# Patient Record
Sex: MALE | Race: BLACK OR AFRICAN AMERICAN | NOT HISPANIC OR LATINO | Employment: OTHER | URBAN - METROPOLITAN AREA
[De-identification: names, ages, dates, MRNs, and addresses within clinical notes are randomized per-mention and may not be internally consistent; named-entity substitution may affect disease eponyms.]

---

## 2017-01-11 ENCOUNTER — OFFICE VISIT (OUTPATIENT)
Dept: INTERNAL MEDICINE | Facility: CLINIC | Age: 67
End: 2017-01-11
Payer: MEDICARE

## 2017-01-11 VITALS
OXYGEN SATURATION: 98 % | RESPIRATION RATE: 20 BRPM | BODY MASS INDEX: 40.49 KG/M2 | HEIGHT: 67 IN | DIASTOLIC BLOOD PRESSURE: 76 MMHG | HEART RATE: 58 BPM | WEIGHT: 258 LBS | SYSTOLIC BLOOD PRESSURE: 126 MMHG

## 2017-01-11 DIAGNOSIS — E11.42 DIABETIC POLYNEUROPATHY ASSOCIATED WITH TYPE 2 DIABETES MELLITUS: ICD-10-CM

## 2017-01-11 DIAGNOSIS — I25.118 CORONARY ARTERY DISEASE OF NATIVE ARTERY OF NATIVE HEART WITH STABLE ANGINA PECTORIS: Primary | ICD-10-CM

## 2017-01-11 DIAGNOSIS — K21.9 GASTROESOPHAGEAL REFLUX DISEASE, ESOPHAGITIS PRESENCE NOT SPECIFIED: ICD-10-CM

## 2017-01-11 DIAGNOSIS — E11.69 DIABETES MELLITUS TYPE 2 IN OBESE: ICD-10-CM

## 2017-01-11 DIAGNOSIS — E78.5 HYPERLIPIDEMIA, UNSPECIFIED HYPERLIPIDEMIA TYPE: ICD-10-CM

## 2017-01-11 DIAGNOSIS — E11.9 ENCOUNTER FOR COMPREHENSIVE DIABETIC FOOT EXAMINATION, TYPE 2 DIABETES MELLITUS: ICD-10-CM

## 2017-01-11 DIAGNOSIS — E66.9 DIABETES MELLITUS TYPE 2 IN OBESE: ICD-10-CM

## 2017-01-11 DIAGNOSIS — Z12.11 SCREEN FOR COLON CANCER: ICD-10-CM

## 2017-01-11 DIAGNOSIS — Z95.1 S/P CABG X 5: ICD-10-CM

## 2017-01-11 DIAGNOSIS — Z87.891 HISTORY OF TOBACCO USE: ICD-10-CM

## 2017-01-11 PROCEDURE — 99203 OFFICE O/P NEW LOW 30 MIN: CPT | Mod: S$GLB,,, | Performed by: INTERNAL MEDICINE

## 2017-01-11 PROCEDURE — 3046F HEMOGLOBIN A1C LEVEL >9.0%: CPT | Mod: S$GLB,,, | Performed by: INTERNAL MEDICINE

## 2017-01-11 PROCEDURE — 2022F DILAT RTA XM EVC RTNOPTHY: CPT | Mod: S$GLB,,, | Performed by: INTERNAL MEDICINE

## 2017-01-11 PROCEDURE — 1160F RVW MEDS BY RX/DR IN RCRD: CPT | Mod: S$GLB,,, | Performed by: INTERNAL MEDICINE

## 2017-01-11 PROCEDURE — 3060F POS MICROALBUMINURIA REV: CPT | Mod: S$GLB,,, | Performed by: INTERNAL MEDICINE

## 2017-01-11 PROCEDURE — 99999 PR PBB SHADOW E&M-NEW PATIENT-LVL III: CPT | Mod: PBBFAC,,, | Performed by: INTERNAL MEDICINE

## 2017-01-11 PROCEDURE — 1126F AMNT PAIN NOTED NONE PRSNT: CPT | Mod: S$GLB,,, | Performed by: INTERNAL MEDICINE

## 2017-01-11 PROCEDURE — 1159F MED LIST DOCD IN RCRD: CPT | Mod: S$GLB,,, | Performed by: INTERNAL MEDICINE

## 2017-01-11 PROCEDURE — 99499 UNLISTED E&M SERVICE: CPT | Mod: S$PBB,,, | Performed by: INTERNAL MEDICINE

## 2017-01-11 PROCEDURE — 1157F ADVNC CARE PLAN IN RCRD: CPT | Mod: S$GLB,,, | Performed by: INTERNAL MEDICINE

## 2017-01-11 RX ORDER — ASPIRIN 81 MG/1
81 TABLET ORAL DAILY
COMMUNITY
End: 2018-09-26

## 2017-01-11 RX ORDER — ISOSORBIDE MONONITRATE 30 MG/1
30 TABLET, EXTENDED RELEASE ORAL DAILY
COMMUNITY
End: 2017-01-18 | Stop reason: DRUGHIGH

## 2017-01-11 RX ORDER — IBUPROFEN 200 MG
200-400 TABLET ORAL EVERY 6 HOURS PRN
COMMUNITY
End: 2017-01-18

## 2017-01-11 RX ORDER — METOPROLOL TARTRATE 50 MG/1
50 TABLET ORAL 2 TIMES DAILY
COMMUNITY
Start: 2017-01-10 | End: 2017-04-10 | Stop reason: SDUPTHER

## 2017-01-11 RX ORDER — ROSUVASTATIN CALCIUM 20 MG/1
20 TABLET, COATED ORAL DAILY
COMMUNITY
Start: 2017-01-10 | End: 2017-06-26 | Stop reason: SDUPTHER

## 2017-01-11 RX ORDER — FUROSEMIDE 40 MG/1
40 TABLET ORAL DAILY
COMMUNITY
Start: 2017-01-10 | End: 2017-06-26 | Stop reason: SDUPTHER

## 2017-01-11 RX ORDER — LANOLIN ALCOHOL/MO/W.PET/CERES
1000 CREAM (GRAM) TOPICAL DAILY
COMMUNITY
End: 2018-10-15

## 2017-01-11 RX ORDER — NITROGLYCERIN 0.4 MG/1
0.4 TABLET SUBLINGUAL EVERY 5 MIN PRN
COMMUNITY
End: 2017-07-19 | Stop reason: SDUPTHER

## 2017-01-11 RX ORDER — DIPHENHYDRAMINE HCL 25 MG
25 CAPSULE ORAL EVERY 6 HOURS PRN
COMMUNITY
End: 2017-07-12

## 2017-01-11 RX ORDER — SITAGLIPTIN AND METFORMIN HYDROCHLORIDE 1000; 50 MG/1; MG/1
TABLET, FILM COATED ORAL 2 TIMES DAILY
COMMUNITY
Start: 2017-01-04 | End: 2017-04-10 | Stop reason: SDUPTHER

## 2017-01-11 NOTE — PROGRESS NOTES
Subjective:       Patient ID: Walter Bull is a 66 y.o. male.    Chief Complaint: Establish Care      HPI:  Patient is new to clinic and presents to establish care. Patient previously followed by Dr. Polk in [place]. Moves to the area and need to establish.     He has CAD, DM type 2, HLD.    CAD s/p 5v CABG: he is going to establish with Dr. Vitale as well. Does still have occasional chest pains which is relieved by SL NTG---takes a NTG up to twice a day. Last stress test 1 year ago which was abnormal per patient but I don't have records for me to review.  Also reports h/o CHF, on lasix 40mg once a day. Denies SOB, [place] and orthopnea.   He takes ASA, BB. On Crestor. Unsure why not on ACEi/ARB as it sounds like he has systolic heart failure.    Dm type 2: NPH 80 units AM and 30 units bedtime and Janumet. He does not check blood sugars at home. Need A1C. He does report numbness and tingling of left foot > right foot and b/l fingers; sx have been present for several years. Getting worse. Not on medicine for neuropathy. Denies dizziness, syncope. Denies polyuria, polydipsia  Foot exam: due  Eye exam: last exam 3 months ago, needs to establish here  Microalb: ordered    HLD: on crestor, has been taking for several years. Denies side effects    GERD: Uses Zantac PRN. Works well, denies abd pain, n/v/d/c.    Tobacco use: quit 1981; previously smoked 3 PPD for 20 years  EtOH: stopped drink 1982; was a daily drink 2 fifth liquor daily  Illicit drug: denies    FH  Mother with DMa nd CAD  No FH of cancers  Past Medical History   Diagnosis Date    Diabetes mellitus type I     Disorder of kidney and ureter     Heart disease        Family History   Problem Relation Age of Onset    Diabetes Mother     Heart disease Mother        Social History     Social History    Marital status:      Spouse name: N/A    Number of children: N/A    Years of education: N/A     Occupational History    Not on file.     Social  History Main Topics    Smoking status: Former Smoker     Quit date: 1/1/1981    Smokeless tobacco: Never Used    Alcohol use No    Drug use: No    Sexual activity: Not on file     Other Topics Concern    Not on file     Social History Narrative    No narrative on file       Review of Systems   Constitutional: Negative for activity change, fatigue, fever and unexpected weight change.   HENT: Negative for congestion, ear pain, hearing loss, rhinorrhea and sore throat.    Eyes: Negative for pain, redness and visual disturbance.   Respiratory: Negative for cough, shortness of breath and wheezing.    Cardiovascular: Negative for chest pain, palpitations and leg swelling.   Gastrointestinal: Negative for abdominal pain, blood in stool, constipation, diarrhea, nausea and vomiting.   Genitourinary: Negative for decreased urine volume, dysuria, frequency and urgency.   Musculoskeletal: Negative for back pain, joint swelling and neck pain.   Skin: Negative for color change, rash and wound.   Neurological: Positive for numbness. Negative for dizziness, tremors, weakness, light-headedness and headaches.         Objective:      Physical Exam   Constitutional: He is oriented to person, place, and time. He appears well-developed and well-nourished. No distress.   HENT:   Head: Normocephalic and atraumatic.   Right Ear: External ear normal.   Left Ear: External ear normal.   Eyes: Conjunctivae and EOM are normal. Pupils are equal, round, and reactive to light. Right eye exhibits no discharge. Left eye exhibits no discharge.   Neck: Neck supple. No tracheal deviation present.   Cardiovascular: Normal rate and regular rhythm.  Exam reveals no gallop and no friction rub.    No murmur heard.  Pulses:       Dorsalis pedis pulses are 2+ on the right side, and 2+ on the left side.        Posterior tibial pulses are 2+ on the right side, and 2+ on the left side.   Pulmonary/Chest: Effort normal and breath sounds normal. No  respiratory distress. He has no wheezes. He has no rales.   Abdominal: Soft. Bowel sounds are normal. He exhibits no distension. There is no tenderness. There is no rebound and no guarding.   Musculoskeletal: He exhibits no edema or deformity.   Feet:   Right Foot:   Skin Integrity: Positive for dry skin. Negative for ulcer or blister.   Left Foot:   Skin Integrity: Positive for dry skin. Negative for ulcer or blister.   Lymphadenopathy:     He has no cervical adenopathy.   Neurological: He is alert and oriented to person, place, and time. No cranial nerve deficit.   senstaion intact at all points on b/l feet with monofilament exam     Skin: Skin is warm and dry.   Psychiatric: He has a normal mood and affect. His behavior is normal.   Vitals reviewed.      Assessment:       1. Coronary artery disease of native artery of native heart with stable angina pectoris    2. Diabetes mellitus type 2 in obese    3. Diabetic polyneuropathy associated with type 2 diabetes mellitus    4. Hyperlipidemia, unspecified hyperlipidemia type    5. S/P CABG x 5    6. History of tobacco use    7. Gastroesophageal reflux disease, esophagitis presence not specified    8. Encounter for comprehensive diabetic foot examination, type 2 diabetes mellitus    9. Screen for colon cancer        Plan:       Walter was seen today for establish care.    Diagnoses and all orders for this visit:    Coronary artery disease of native artery of native heart with stable angina pectoris  -     CBC auto differential; Future  -     Comprehensive metabolic panel; Future  -     TSH; Future  -     Lipid panel; Future  -     T4, free; Future  -     CBC auto differential; Future  -     Comprehensive metabolic panel; Future  -     TSH; Future  -     Lipid panel; Future  Chronic  Still with angina relieved with SL NTG  Continue imdur, BB, ASA, statin at same dose  Unsure why not on ACEi/ARB, will need to get records  Establishing with Dr. Vitale  ??? Systolic CHF, need  TTE records to confirm    Diabetes mellitus type 2 in obese  -     CBC auto differential; Future  -     Comprehensive metabolic panel; Future  -     Lipid panel; Future  -     Hemoglobin A1c; Future  -     Microalbumin/creatinine urine ratio; Future  -     CBC auto differential; Future  -     Comprehensive metabolic panel; Future  -     TSH; Future  -     Lipid panel; Future  -     Hemoglobin A1c; Future  Chronic  Check labs to assist with how controlled he is  Continue NPH and janumet at same dose  Foot exam: 1/2017  Eye exam: last exam 3 months ago, needs to establish here  Microalb: ordered  On statin  Unsure why not on ACEi/ARB, will need to get records    Diabetic polyneuropathy associated with type 2 diabetes mellitus  -     CBC auto differential; Future  -     Comprehensive metabolic panel; Future  -     Lipid panel; Future  -     Hemoglobin A1c; Future  -     Microalbumin/creatinine urine ratio; Future  -     CBC auto differential; Future  -     Comprehensive metabolic panel; Future  -     TSH; Future  -     Lipid panel; Future  -     Hemoglobin A1c; Future  Normal foot exam  Declines medical intervention for numbness at this time    Hyperlipidemia, unspecified hyperlipidemia type  -     CBC auto differential; Future  -     Comprehensive metabolic panel; Future  -     Lipid panel; Future  -     CBC auto differential; Future  -     Comprehensive metabolic panel; Future  -     Lipid panel; Future  Check labs  Continue crestor at same dose  Chronic    S/P CABG x 5  -     CBC auto differential; Future  -     Comprehensive metabolic panel; Future  -     TSH; Future  -     Lipid panel; Future  -     T4, free; Future  -     CBC auto differential; Future  -     Comprehensive metabolic panel; Future  -     Lipid panel; Future  Establishing with cardiology  Continue ASA, BB, statin    History of tobacco use  -     US Abdominal Aorta; Future  Now quit  Needs AAA screening    Gastroesophageal reflux disease, esophagitis  presence not specified  Well controlled wit Zantac PRN    Encounter for comprehensive diabetic foot examination, type 2 diabetes mellitus  Normal foot exam today  + dry skin otherwise normal sensation and pulse b/l    Screen for colon cancer  -     Case request GI: COLONOSCOPY      Health Maintenance:  -CRC: due for repeat, ordered today  -PSA: not screening  -AAA ordered today  -tobacco: former smoker  -Vaccines  Flu: did 2016  Pneumonia: received both prevnar and pneumovx  Zoster: needs to get    RTC 6 months with labs and PRN. Will call with labs from this week

## 2017-01-11 NOTE — MR AVS SNAPSHOT
[facility]  [address] [place] [address]  Phone: [phone]  Fax: [phone]                  Walter Bull   2017 9:00 AM   Office Visit    Description:  Male : 1950   Provider:  Belkys Kruger MD   Department:  [facility]           Reason for Visit     Establish Care           Diagnoses this Visit        Comments    Coronary artery disease of native artery of native heart with stable angina pectoris    -  Primary     Diabetes mellitus type 2 in obese         Diabetic polyneuropathy associated with type 2 diabetes mellitus         Hyperlipidemia, unspecified hyperlipidemia type         S/P CABG x 5         History of tobacco use         Gastroesophageal reflux disease, esophagitis presence not specified         Encounter for comprehensive diabetic foot examination, type 2 diabetes mellitus         Screen for colon cancer                To Do List           Goals (5 Years of Data)     None      Follow-Up and Disposition     Return in about 6 months (around 2017).      [facility]     [facility] -  Assistance  Registered nurses in the [facility] provide clinical advisement, health education, appointment booking, and other advisory services.  Call for this free service at [phone].             Medications           Message regarding Medications     Verify the changes and/or additions to your medication regime listed below are the same as discussed with your clinician today.  If any of these changes or additions are incorrect, please notify your healthcare provider.             Verify that the below list of medications is an accurate representation of the medications you are currently taking.  If none reported, the list may be blank. If incorrect, please contact your healthcare provider. Carry this list with you in case of emergency.           Current Medications     aspirin (ECOTRIN) 81 MG EC  "tablet Take 81 mg by mouth once daily.    cyanocobalamin (VITAMIN B-12) 1000 MCG tablet Take 3,000 mcg by mouth once daily.    diphenhydrAMINE (BENADRYL) 25 mg capsule Take 25 mg by mouth every 6 (six) hours as needed for Itching.    FOLIC ACID/MULTIVIT-MIN/LUTEIN (CENTRUM SILVER ORAL) Take by mouth.    furosemide (LASIX) 40 MG tablet 40 mg once daily.     ibuprofen (ADVIL,MOTRIN) 200 MG tablet Take 200 mg by mouth every 6 (six) hours as needed for Pain.    insulin NPH (NOVOLIN N) 100 unit/mL injection Inject 80 Units into the skin 2 (two) times daily before meals. 80 units in the morning and 30 units at bedtime    isosorbide mononitrate (IMDUR) 30 MG 24 hr tablet Take 30 mg by mouth once daily.    JANUMET 50-1,000 mg per tablet 2 (two) times daily.     metoprolol tartrate (LOPRESSOR) 50 MG tablet 50 mg 2 (two) times daily.     nitroGLYCERIN (NITROSTAT) 0.4 MG SL tablet Place 0.4 mg under the tongue every 5 (five) minutes as needed for Chest pain.    ranitidine (ZANTAC) 150 MG capsule Take 150 mg by mouth as needed for Heartburn.    rosuvastatin (CRESTOR) 20 MG tablet 20 mg once daily.            Clinical Reference Information           Vital Signs - Last Recorded  Most recent update: 1/11/2017  9:15 AM by Ailyn Hope MA    BP Pulse Resp Ht Wt SpO2    126/76 (!) 58 20 5' 7" (1.702 m) 117 kg (258 lb) 98%    BMI                40.41 kg/m2          Blood Pressure          Most Recent Value    BP  126/76      Allergies as of 1/11/2017     No Known Allergies      Immunizations Administered on Date of Encounter - 1/11/2017     None      Orders Placed During Today's Visit      Normal Orders This Visit    Case request GI: COLONOSCOPY     Future Labs/Procedures Expected by Expires    CBC auto differential  1/11/2017 (Approximate) 2/10/2017    Comprehensive metabolic panel  1/11/2017 (Approximate) 2/10/2017    Hemoglobin A1c  1/11/2017 3/12/2018    Lipid panel  1/11/2017 (Approximate) 2/10/2017    " Microalbumin/creatinine urine ratio  1/11/2017 (Approximate) 2/10/2017    T4, free  1/11/2017 (Approximate) 2/10/2017    TSH  1/11/2017 (Approximate) 2/10/2017    US Abdominal Aorta  1/11/2017 1/11/2018    CBC auto differential  7/10/2017 (Approximate) 1/11/2018    Comprehensive metabolic panel  7/10/2017 (Approximate) 1/11/2018    Hemoglobin A1c  7/10/2017 (Approximate) 1/11/2018    Lipid panel  7/10/2017 (Approximate) 1/11/2018    TSH  7/10/2017 (Approximate) 1/11/2018      Instructions      Using a Blood Sugar Log  You have diabetes. This means your body has trouble regulating a sugar called glucose. To help manage your diabetes, youll need to check your blood sugar level as directed by your healthcare provider. Keeping a log of your blood sugar levels will help you track your blood sugar readings. Its a simple and easy way to see how well you are controlling your diabetes.    Checking your blood sugar level  You can check your blood sugar level with a blood glucose meter. Youll first prick the side of your finger with a tiny lancet to draw a tiny drop of blood onto the test strip. Some glucose meters let you use another place on your body to test. But these other places should not be used in some cases as they may be inaccurate. Follow the instructions for your glucose meter. And talk with your healthcare provider before doing the test on other places.  The strip goes into the meter first, then a drop of blood is placed on the tip of the strip. The meter then shows a reading that tells you the level of your blood sugar. Your readings should be in your target range as often as possible. This means not too high or too low. Staying in this range helps lower your risk for complications. Your healthcare provider will help you figure out the target range that is best for you.  Tracking your readings  Every time you check your blood sugar, use your log to keep track of your readings. Your meter will also probably  have a memory feature that your healthcare provider can check at your next visit. You may be advised by your healthcare provider to check your blood sugar in the morning, at bedtime, and before and after meals. Be sure to write down all of your numbers. Also use your log to record things that might have affected your blood sugar. Some examples include being sick, certain medicines, being physically active, feeling stressed, or skipping meals.   Lessons learned from your readings  Tracking your blood sugar readings helps you see patterns. These patterns tell you how your actions affect your blood sugar. For instance, you may have higher numbers after eating certain foods or lower numbers after exercise. They just help you understand how to stay in your target range more often, so that your diabetes remains in good control.  Sharing your log with your healthcare team  Bring your blood sugar log and glucose meter with you to all of your healthcare appointments. This can help your healthcare team make changes to your treatment plan, if needed. This may involve making changes in what you eat, what medicines you take, or how much you exercise.  To learn more  The resources below can help you learn more:  · American Diabetes Association 672-353-0396 www.diabetes.org  · [employer] 836-298-7282 www.lighthouse.org  · National Eye [place] 862-065-4237 www.nei.nih.gov  · [facility] 968-690-1336 www.hormone.org  © 5570-1699 [employer]. [address] [place] [address], [place] [address]. All rights reserved. This information is not intended as a substitute for professional medical care. Always follow your healthcare professional's instructions.

## 2017-01-11 NOTE — PATIENT INSTRUCTIONS
Using a Blood Sugar Log  You have diabetes. This means your body has trouble regulating a sugar called glucose. To help manage your diabetes, youll need to check your blood sugar level as directed by your healthcare provider. Keeping a log of your blood sugar levels will help you track your blood sugar readings. Its a simple and easy way to see how well you are controlling your diabetes.    Checking your blood sugar level  You can check your blood sugar level with a blood glucose meter. Youll first prick the side of your finger with a tiny lancet to draw a tiny drop of blood onto the test strip. Some glucose meters let you use another place on your body to test. But these other places should not be used in some cases as they may be inaccurate. Follow the instructions for your glucose meter. And talk with your healthcare provider before doing the test on other places.  The strip goes into the meter first, then a drop of blood is placed on the tip of the strip. The meter then shows a reading that tells you the level of your blood sugar. Your readings should be in your target range as often as possible. This means not too high or too low. Staying in this range helps lower your risk for complications. Your healthcare provider will help you figure out the target range that is best for you.  Tracking your readings  Every time you check your blood sugar, use your log to keep track of your readings. Your meter will also probably have a memory feature that your healthcare provider can check at your next visit. You may be advised by your healthcare provider to check your blood sugar in the morning, at bedtime, and before and after meals. Be sure to write down all of your numbers. Also use your log to record things that might have affected your blood sugar. Some examples include being sick, certain medicines, being physically active, feeling stressed, or skipping meals.   Lessons learned from your readings  Tracking your  blood sugar readings helps you see patterns. These patterns tell you how your actions affect your blood sugar. For instance, you may have higher numbers after eating certain foods or lower numbers after exercise. They just help you understand how to stay in your target range more often, so that your diabetes remains in good control.  Sharing your log with your healthcare team  Bring your blood sugar log and glucose meter with you to all of your healthcare appointments. This can help your healthcare team make changes to your treatment plan, if needed. This may involve making changes in what you eat, what medicines you take, or how much you exercise.  To learn more  The resources below can help you learn more:  · American Diabetes Association 593-796-2408 www.diabetes.org  · [employer] 357-613-3568 www.lighthouse.org  · National [employer] [place] 517-520-0227 www.nei.nih.gov  · [facility] [facility] 768-276-7095 www.hormone.org  © 0638-6561 [employer]. [address] [place] [address], [place] [address]. All rights reserved. This information is not intended as a substitute for professional medical care. Always follow your healthcare professional's instructions.

## 2017-01-18 ENCOUNTER — OFFICE VISIT (OUTPATIENT)
Dept: NEPHROLOGY | Facility: CLINIC | Age: 67
End: 2017-01-18
Payer: MEDICARE

## 2017-01-18 ENCOUNTER — HOSPITAL ENCOUNTER (OUTPATIENT)
Dept: RADIOLOGY | Facility: HOSPITAL | Age: 67
Discharge: HOME | End: 2017-01-18
Attending: INTERNAL MEDICINE
Payer: MEDICARE

## 2017-01-18 ENCOUNTER — ANESTHESIA EVENT (OUTPATIENT)
Dept: ENDOSCOPY | Facility: HOSPITAL | Age: 67
End: 2017-01-18
Payer: MEDICARE

## 2017-01-18 ENCOUNTER — HOSPITAL ENCOUNTER (OUTPATIENT)
Dept: PREADMISSION TESTING | Facility: HOSPITAL | Age: 67
Discharge: HOME | End: 2017-01-18
Attending: INTERNAL MEDICINE
Payer: MEDICARE

## 2017-01-18 VITALS
DIASTOLIC BLOOD PRESSURE: 68 MMHG | WEIGHT: 264.56 LBS | BODY MASS INDEX: 41.52 KG/M2 | HEART RATE: 64 BPM | HEIGHT: 67 IN | SYSTOLIC BLOOD PRESSURE: 118 MMHG | RESPIRATION RATE: 16 BRPM

## 2017-01-18 VITALS — BODY MASS INDEX: 40.97 KG/M2 | HEIGHT: 67 IN | WEIGHT: 261 LBS

## 2017-01-18 DIAGNOSIS — K21.9 GASTROESOPHAGEAL REFLUX DISEASE, ESOPHAGITIS PRESENCE NOT SPECIFIED: ICD-10-CM

## 2017-01-18 DIAGNOSIS — Z95.1 S/P CABG X 5: Primary | ICD-10-CM

## 2017-01-18 DIAGNOSIS — E66.9 DIABETES MELLITUS TYPE 2 IN OBESE: ICD-10-CM

## 2017-01-18 DIAGNOSIS — E11.69 DIABETES MELLITUS TYPE 2 IN OBESE: ICD-10-CM

## 2017-01-18 DIAGNOSIS — E78.5 HYPERLIPIDEMIA, UNSPECIFIED HYPERLIPIDEMIA TYPE: ICD-10-CM

## 2017-01-18 DIAGNOSIS — Z87.891 HISTORY OF TOBACCO USE: ICD-10-CM

## 2017-01-18 PROCEDURE — 1157F ADVNC CARE PLAN IN RCRD: CPT | Mod: S$GLB,,, | Performed by: INTERNAL MEDICINE

## 2017-01-18 PROCEDURE — 3046F HEMOGLOBIN A1C LEVEL >9.0%: CPT | Mod: S$GLB,,, | Performed by: INTERNAL MEDICINE

## 2017-01-18 PROCEDURE — 99999 PR PBB SHADOW E&M-EST. PATIENT-LVL IV: CPT | Mod: PBBFAC,,, | Performed by: INTERNAL MEDICINE

## 2017-01-18 PROCEDURE — 99499 UNLISTED E&M SERVICE: CPT | Mod: S$PBB,,, | Performed by: INTERNAL MEDICINE

## 2017-01-18 PROCEDURE — 1160F RVW MEDS BY RX/DR IN RCRD: CPT | Mod: S$GLB,,, | Performed by: INTERNAL MEDICINE

## 2017-01-18 PROCEDURE — 1159F MED LIST DOCD IN RCRD: CPT | Mod: S$GLB,,, | Performed by: INTERNAL MEDICINE

## 2017-01-18 PROCEDURE — 99203 OFFICE O/P NEW LOW 30 MIN: CPT | Mod: S$GLB,,, | Performed by: INTERNAL MEDICINE

## 2017-01-18 PROCEDURE — 76775 US EXAM ABDO BACK WALL LIM: CPT | Mod: 26,,, | Performed by: RADIOLOGY

## 2017-01-18 PROCEDURE — 3066F NEPHROPATHY DOC TX: CPT | Mod: S$GLB,,, | Performed by: INTERNAL MEDICINE

## 2017-01-18 PROCEDURE — 1126F AMNT PAIN NOTED NONE PRSNT: CPT | Mod: S$GLB,,, | Performed by: INTERNAL MEDICINE

## 2017-01-18 PROCEDURE — 76775 US EXAM ABDO BACK WALL LIM: CPT | Mod: TC

## 2017-01-18 PROCEDURE — 2022F DILAT RTA XM EVC RTNOPTHY: CPT | Mod: S$GLB,,, | Performed by: INTERNAL MEDICINE

## 2017-01-18 RX ORDER — CLOPIDOGREL BISULFATE 75 MG/1
75 TABLET ORAL DAILY
Status: ON HOLD | COMMUNITY
End: 2018-11-20 | Stop reason: HOSPADM

## 2017-01-18 RX ORDER — ISOSORBIDE MONONITRATE 60 MG/1
60 TABLET, EXTENDED RELEASE ORAL DAILY
Status: ON HOLD | COMMUNITY
End: 2018-11-20 | Stop reason: SDUPTHER

## 2017-01-18 RX ORDER — AMLODIPINE BESYLATE 5 MG/1
5 TABLET ORAL DAILY
COMMUNITY
End: 2018-10-15

## 2017-01-18 NOTE — PROGRESS NOTES
Subjective:       Patient ID: Walter Bull is a 66 y.o. Black or  male who presents for new evaluation of renal fucntion  HPI  67 yo AAAM with IDDM type 2, CAD, CABGx5 vessel complicated by HARRIS not requiring dialysis, past tobacco abuse,  morbid obesity, HPL, HTN, with serum crt 1.2  U microalbumin crt ratio of 2.9. No h/o continuous  NSAIDs use though occassional, BPH or LUTS, UTIs, flank pain, fever sob, peripheral edema.Recently meds adjusted by cardiology for frequent cp requiring use of sl NTG  He is to have doubutamine stress test next week.  He does not appear to have been onRAAs inhibiton in past.    Daughter DM age 30 s/p kidney transplant    Review of Systems   Constitutional: Negative for activity change, appetite change, chills, diaphoresis, fatigue, fever and unexpected weight change.   HENT: Negative for congestion, ear discharge, ear pain, facial swelling, hearing loss, nosebleeds, sinus pressure, sore throat and trouble swallowing.    Eyes: Negative for photophobia, pain, discharge, redness, itching and visual disturbance.   Respiratory: Negative for apnea, cough, chest tightness, shortness of breath and wheezing.    Cardiovascular: Negative for chest pain, palpitations and leg swelling.   Gastrointestinal: Negative for abdominal distention, abdominal pain, constipation, diarrhea and vomiting.   Endocrine: Negative for cold intolerance, heat intolerance, polydipsia and polyuria.   Genitourinary: Negative for decreased urine volume, difficulty urinating, dysuria, flank pain, frequency, hematuria, scrotal swelling, testicular pain and urgency.   Musculoskeletal: Negative for arthralgias, back pain, gait problem, joint swelling, myalgias, neck pain and neck stiffness.   Skin: Negative for color change, pallor, rash and wound.   Allergic/Immunologic: Negative for environmental allergies and food allergies.   Neurological: Negative for dizziness, tremors, seizures, syncope, facial  "asymmetry, speech difficulty, weakness, light-headedness, numbness and headaches.   Hematological: Negative for adenopathy. Does not bruise/bleed easily.   Psychiatric/Behavioral: Negative for agitation, behavioral problems, dysphoric mood and sleep disturbance. The patient is not nervous/anxious.        Objective:     Blood pressure 118/68, pulse 64, resp. rate 16, height 5' 7" (1.702 m), weight 120 kg (264 lb 8.8 oz).    Physical Exam   Constitutional: He is oriented to person, place, and time. He appears well-developed and well-nourished. No distress.   HENT:   Head: Normocephalic and atraumatic.   Mouth/Throat: Oropharynx is clear and moist. No oropharyngeal exudate.   Eyes: Conjunctivae and EOM are normal. Pupils are equal, round, and reactive to light. Right eye exhibits no discharge. Left eye exhibits no discharge. No scleral icterus.   Neck: Normal range of motion. Neck supple. No JVD present. No tracheal deviation present. No thyromegaly present.   Cardiovascular: Normal rate, regular rhythm and normal heart sounds.  Exam reveals no gallop and no friction rub.    No murmur heard.  Pulmonary/Chest: No stridor. No respiratory distress. He has no wheezes. He has no rales. He exhibits no tenderness.   Abdominal: Soft. Bowel sounds are normal. He exhibits no distension and no mass. There is no tenderness. There is no rebound and no guarding. No hernia.   Musculoskeletal: Normal range of motion. He exhibits no edema or tenderness.   Lymphadenopathy:     He has no cervical adenopathy.   Neurological: He is alert and oriented to person, place, and time. No cranial nerve deficit. He exhibits normal muscle tone. Coordination normal.   Skin: Skin is warm and dry. No rash noted. He is not diaphoretic. No erythema. No pallor.   Psychiatric: He has a normal mood and affect. His behavior is normal. Judgment and thought content normal.   Nursing note and vitals reviewed.      Assessment:       1. S/P CABG x 5    2. History " of tobacco use    3. Hyperlipidemia, unspecified hyperlipidemia type    4. Gastroesophageal reflux disease, esophagitis presence not specified    5. Diabetes mellitus type 2 in obese        Plan:       1. CKD:  Stage 2 with serum crt 1.2 and estimated gfr >60 cc in the setting of longstanding DM, CAD, and past HARRIS. 67 yo AAAM with IDDM type 2, CAD, CABGx5 vessel complicated by HARRIS, past tobacco abuse,  morbid obesity, HPL, HTN, with serum crt 1.2  U microalbumin crt ratio of 2.9. No h/o continuous  NSAIDs use though occassional, BPH or LUTS, UTIs, flank pain, fever sob, peripheral edema.Recently meds adjusted by cardiology for frequent cp requiring use of sl NTG  He is to have doubutamine [id number] test next week.  He does not appear to have been onRAAs inhibiton in past.  Will order labs and serologies, check renal US  And await stress test and then would consider RAAS for BP control and control of microalbuminuria rather than amlodipine.     Lab Results   Component Value Date    CREATININE 1.2 01/18/2017     Protein Creatinine Ratios: check    No results found for: UTPCR    2. Acid-Base: stable follow serially  Lab Results   Component Value Date     01/18/2017    K 4.6 01/18/2017    CO2 29 01/18/2017         3. Renal osteodystrophy: last PTH order and vit d  Lab Results   Component Value Date    CALCIUM 8.8 01/18/2017       4. Anemia:   Lab Results   Component Value Date    HGB 12.3 (L) 01/18/2017        5. HTN:       Medication: no change      Monitor BP as directed in instructions    6. DM:  Metformin max dose 1000mg daily with gfr <45, d/c for gfr < 30 ml/min               Avoid long acting sulfonylurea     7. Weight:  . he states that his daily weights are stable     Wt Readings from Last 3 Encounters:   01/18/17 118.4 kg (261 lb)   01/11/17 117 kg (258 lb)       8. Lipid management:   Lab Results   Component Value Date    LDLCALC 83.2 01/18/2017       9. Diet:  Education/referral:       Sodium: 2  gm    10. ESRD planing: not at this time       Education:       Access:    11. Please avoid or minimize all NSAIDS (ibuprofen, motrin, aleve, indocin, naprosyn) to minimize the risk to your kidneys.      12. Follow up in 4 months labs prior

## 2017-01-18 NOTE — DISCHARGE INSTRUCTIONS
Colonoscopy Outpatient procedure instructions    Prep Review  Nothing to eat or drink after midnight unless your doctor tells you differently.    Bring your medication in the original containers.   Take medications as instructed by your doctor.    Wear something comfortable that is easy for you to take off and put on.   Do not wear any makeup, jewelry, or body piercings. Leave valuables at home or let your family member keep them for you. Do not bring them to the Surgery area.     Date/Day of Procedure: 1-    Arrival time: Someone will call you the day before the procedure to give you an arrival time.   If you are told to report to the hospital before 7:00 am you will report to the Emergency Room.  If you are told to report to the hospital at 7:00 a.m. or later you will report to Patient Registration  It is not necessary to report earlier than the time you are told.   Ignore any automated/computer generated calls telling to what time to report to the hospital.   Plan to be at the hospital for about 4 hours, however, it could be longer.       Diabetics  If you are diabetic do not take your diabetes medication the morning of the procedure unless otherwise instructed by your doctor.  It is important to monitor your blood sugar levels the day you are doing your prep to make sure your blood sugar levels are maintained.

## 2017-01-18 NOTE — IP AVS SNAPSHOT
[place] [id number] [address]  Phone: [phone]           I have received a copy of my After Visit Summary and discharge instructions from [facility].    INSTRUCTIONS RECEIVED AND UNDERSTOOD BY:                     Patient/Patient Representative: ________________________________________________________________     Date/Time: ________________________________________________________________                     Instructions Given By: ________________________________________________________________     Date/Time: ________________________________________________________________

## 2017-01-18 NOTE — MR AVS SNAPSHOT
[facility]  [address] [facility] [address]  Phone: [phone]                  Walter Bull   2017 1:00 PM   Office Visit    Description:  Male : 1950   Provider:  Claudine Burroughs MD   Department:  [facility]           Reason for Visit     Establish Care           Diagnoses this Visit        Comments    S/P CABG x 5    -  Primary     History of tobacco use         Hyperlipidemia, unspecified hyperlipidemia type         Gastroesophageal reflux disease, esophagitis presence not specified         Diabetes mellitus type 2 in obese                To Do List           Future Appointments        Provider Department Dept Phone    2017 8:00 AM [place], LAB [facility] [phone]    2017 9:30 AM Belkys Kruger MD [facility] [phone]      Your Future Surgeries/Procedures     2017   Surgery with Ronnie Conway MD   [facility] ([facility])    [address] [place] [address] [phone]              Goals (5 Years of Data)     None      Follow-Up and Disposition     Return in about 4 months (around 2017).      [facility]     [facility] Nurse Care Line -  Assistance  Registered nurses in the [facility] provide clinical advisement, health education, appointment booking, and other advisory services.  Call for this free service at [phone].             Medications           Message regarding Medications     Verify the changes and/or additions to your medication regime listed below are the same as discussed with your clinician today.  If any of these changes or additions are incorrect, please notify your healthcare provider.        STOP taking these medications     ibuprofen (ADVIL,MOTRIN) 200 MG tablet Take 200-400 mg by mouth every 6 (six) hours as needed for Pain.            Verify that the below list of medications is  "an accurate representation of the medications you are currently taking.  If none reported, the list may be blank. If incorrect, please contact your healthcare provider. Carry this list with you in case of emergency.           Current Medications     amlodipine (NORVASC) 5 MG tablet Take 5 mg by mouth once daily.    aspirin (ECOTRIN) 81 MG EC tablet Take 81 mg by mouth once daily.    clopidogrel (PLAVIX) 75 mg tablet Take 75 mg by mouth once daily.    cyanocobalamin (VITAMIN B-12) 1000 MCG tablet Take 1,000 mcg by mouth once daily.     diphenhydrAMINE (BENADRYL) 25 mg capsule Take 25 mg by mouth every 6 (six) hours as needed for Itching.    FOLIC ACID/MULTIVIT-MIN/LUTEIN (CENTRUM SILVER ORAL) Take by mouth.    furosemide (LASIX) 40 MG tablet 40 mg once daily.     insulin NPH (NOVOLIN N) 100 unit/mL injection Inject into the skin. 80 units in the morning and 30 units at bedtime     isosorbide mononitrate (IMDUR) 60 MG 24 hr tablet Take 60 mg by mouth once daily.    JANUMET 50-1,000 mg per tablet 2 (two) times daily.     metoprolol tartrate (LOPRESSOR) 50 MG tablet 50 mg 2 (two) times daily.     nitroGLYCERIN (NITROSTAT) 0.4 MG SL tablet Place 0.4 mg under the tongue every 5 (five) minutes as needed for Chest pain.    ranitidine (ZANTAC) 150 MG capsule Take 150 mg by mouth as needed for Heartburn.    rosuvastatin (CRESTOR) 20 MG tablet 20 mg once daily.            Clinical Reference Information           Vital Signs - Last Recorded  Most recent update: 1/18/2017  1:30 PM by Elisha Tejada MA    BP Pulse Resp Ht Wt BMI    118/68 (BP Location: Left arm, Patient Position: Sitting, BP Method: Manual) 64 16 5' 7" (1.702 m) 120 kg (264 lb 8.8 oz) 41.43 kg/m2      Blood Pressure          Most Recent Value    BP  118/68      Allergies as of 1/18/2017     No Known Allergies      Immunizations Administered on Date of Encounter - 1/18/2017     None      Instructions      1. Labs: prior to next visit and renal US    2.  " Medications:no change      5. BP:  Take BP and pulse  twice daily for one week, record              Bring results  to next visit.              Goal :   <140/90    6. Diet:         Sodium: < 2000 milligrams daily including all food and drink      (one teaspoon of table salt has 2300 milligrams of sodium)          7. Please avoid or minimize all NSAIDS (ibuprofen, motrin, aleve, indocin, naprosyn) to minimize the risk to your kidneys    8. Return to clinic: 4 months

## 2017-01-18 NOTE — ANESTHESIA PREPROCEDURE EVALUATION
01/18/2017  Walter Bull is a 66 y.o., male.    [facility] Anesthesia Evaluation    I have reviewed the Patient Summary Reports.    I have reviewed the Nursing Notes.   I have reviewed the Medications.     Review of Systems  Anesthesia Hx:  No problems with previous Anesthesia    Social:  Non-Smoker, No Alcohol Use    Cardiovascular:   Hypertension, well controlled CAD asymptomatic  Angina, at rest CHF    Renal/:   Chronic Renal Disease    Hepatic/GI:   GERD, well controlled    Endocrine:   Diabetes, well controlled, using insulin        Physical Exam  General:  Well nourished, Obesity    Airway/Jaw/Neck:  Airway Findings: Mouth Opening: Normal Tongue: Normal  General Airway Assessment: Adult  Mallampati: I  TM Distance: Normal, at least 6 cm  Jaw/Neck Findings:  Neck ROM: Normal ROM      Dental:  Dental Findings: Edentulous             Anesthesia Plan  Type of Anesthesia, risks & benefits discussed:  Anesthesia Type:  general  Patient's Preference:   Intra-op Monitoring Plan:   Intra-op Monitoring Plan Comments:   Post Op Pain Control Plan:   Post Op Pain Control Plan Comments:   Induction:   IV  Beta Blocker:  Patient is on a Beta-Blocker and has received one dose within the past 24 hours (No further documentation required).       Informed Consent: Patient understands risks and agrees with Anesthesia plan.  Questions answered. Anesthesia consent signed with patient.  ASA Score: 3     Day of Surgery Review of History & Physical: I have interviewed and examined the patient. I have reviewed the patient's H&P dated: 2/2/2017. There are no significant changes.  H&P update referred to the surgeon.         Ready For Surgery From Anesthesia Perspective.

## 2017-01-18 NOTE — PATIENT INSTRUCTIONS
1. Labs: prior to next visit and renal US    2.  Medications:no change      5. BP:  Take BP and pulse  twice daily for one week, record              Bring results  to next visit.              Goal :   <140/90    6. Diet:         Sodium: < 2000 milligrams daily including all food and drink      (one teaspoon of table salt has 2300 milligrams of sodium)          7. Please avoid or minimize all NSAIDS (ibuprofen, motrin, aleve, indocin, naprosyn) to minimize the risk to your kidneys    8. Return to clinic: 4 months

## 2017-01-18 NOTE — IP AVS SNAPSHOT
[address] [address]  Phone: [phone]           Patient Discharge Instructions     Our goal is to set you up for success. This packet includes information on your condition, medications, and your home care. It will help you to care for yourself so you don't get sicker and need to go back to the hospital.     Please ask your nurse if you have any questions.        There are many details to remember when preparing to leave the hospital. Here is what you will need to do:    1. Take your medicine. If you are prescribed medications, review your Medication List in the following pages. You may have new medications to  at the pharmacy and others that you'll need to stop taking. Review the instructions for how and when to take your medications. Talk with your doctor or nurses if you are unsure of what to do.     2. Go to your follow-up appointments. Specific follow-up information is listed in the following pages. Your may be contacted by a transition nurse or clinical provider about future appointments. Be sure we have all of the phone numbers to reach you, if needed. Please contact your provider's office if you are unable to make an appointment.     3. Watch for warning signs. Your doctor or nurse will give you detailed warning signs to watch for and when to call for assistance. These instructions may also include educational information about your condition. If you experience any of warning signs to your health, call your doctor.               [facility]  Unless otherwise directed by your provider, please contact [facility], our nurse care line that is available for 24/7 assistance.     [phone] (toll-free)    Registered nurses in the [facility] provide clinical advisement, health education, appointment booking, and other advisory services.                    ** Verify the list of medication(s) below is accurate and up to date. Carry  this with you in case of emergency. If your medications have changed, please notify your healthcare provider.             Medication List      TAKE these medications        Additional Info                      amlodipine 5 MG tablet   Commonly known as:  NORVASC   Refills:  0   Dose:  5 mg    Instructions:  Take 5 mg by mouth once daily.     Begin Date    AM    Noon    PM    Bedtime       aspirin 81 MG EC tablet   Commonly known as:  ECOTRIN   Refills:  0   Dose:  81 mg    Instructions:  Take 81 mg by mouth once daily.     Begin Date    AM    Noon    PM    Bedtime       CENTRUM SILVER ORAL   Refills:  0    Instructions:  Take by mouth.     Begin Date    AM    Noon    PM    Bedtime       clopidogrel 75 mg tablet   Commonly known as:  PLAVIX   Refills:  0   Dose:  75 mg    Instructions:  Take 75 mg by mouth once daily.     Begin Date    AM    Noon    PM    Bedtime       diphenhydrAMINE 25 mg capsule   Commonly known as:  BENADRYL   Refills:  0   Dose:  25 mg    Instructions:  Take 25 mg by mouth every 6 (six) hours as needed for Itching.     Begin Date    AM    Noon    PM    Bedtime       furosemide 40 MG tablet   Commonly known as:  LASIX   Refills:  0   Dose:  40 mg    Instructions:  40 mg once daily.     Begin Date    AM    Noon    PM    Bedtime       ibuprofen 200 MG tablet   Commonly known as:  ADVIL,MOTRIN   Refills:  0   Dose:  200-400 mg    Instructions:  Take 200-400 mg by mouth every 6 (six) hours as needed for Pain.     Begin Date    AM    Noon    PM    Bedtime       insulin  unit/mL injection   Commonly known as:  NovoLIN N   Refills:  0    Instructions:  Inject into the skin. 80 units in the morning and 30 units at bedtime     Begin Date    AM    Noon    PM    Bedtime       * isosorbide mononitrate 30 MG 24 hr tablet   Commonly known as:  IMDUR   Refills:  0   Dose:  30 mg    Instructions:  Take 30 mg by mouth once daily.     Begin Date    AM    Noon    PM    Bedtime       * isosorbide mononitrate  60 MG 24 hr tablet   Commonly known as:  IMDUR   Refills:  0   Dose:  60 mg    Instructions:  Take 60 mg by mouth once daily.     Begin Date    AM    Noon    PM    Bedtime       JANUMET 50-1,000 mg per tablet   Refills:  0   Generic drug:  SITagliptan-metformin    Instructions:  2 (two) times daily.     Begin Date    AM    Noon    PM    Bedtime       metoprolol tartrate 50 MG tablet   Commonly known as:  LOPRESSOR   Refills:  0   Dose:  50 mg    Instructions:  50 mg 2 (two) times daily.     Begin Date    AM    Noon    PM    Bedtime       nitroGLYCERIN 0.4 MG SL tablet   Commonly known as:  NITROSTAT   Refills:  0   Dose:  0.4 mg    Instructions:  Place 0.4 mg under the tongue every 5 (five) minutes as needed for Chest pain.     Begin Date    AM    Noon    PM    Bedtime       ranitidine 150 MG capsule   Commonly known as:  ZANTAC   Refills:  0   Dose:  150 mg    Instructions:  Take 150 mg by mouth as needed for Heartburn.     Begin Date    AM    Noon    PM    Bedtime       rosuvastatin 20 MG tablet   Commonly known as:  CRESTOR   Refills:  0   Dose:  20 mg    Instructions:  20 mg once daily.     Begin Date    AM    Noon    PM    Bedtime       VITAMIN B-12 1000 MCG tablet   Refills:  0   Dose:  1000 mcg   Generic drug:  cyanocobalamin    Instructions:  Take 1,000 mcg by mouth once daily.     Begin Date    AM    Noon    PM    Bedtime       * Notice:  This list has 2 medication(s) that are the same as other medications prescribed for you. Read the directions carefully, and ask your doctor or other care provider to review them with you.               Please bring to all follow up appointments:    1. A copy of your discharge instructions.  2. All medicines you are currently taking in their original bottles.  3. Identification and insurance card.    Please arrive 15 minutes ahead of scheduled appointment time.    Please call 24 hours in advance if you must reschedule your appointment and/or time.        Your Scheduled  Appointments     Jan 18, 2017  1:00 PM CST   Consult with Claudine Burroughs MD   [facility] ([facility])    [address] [facility] [id number] [phone]            Jul 12, 2017  8:00 AM CDT   Nurse Visit with [place], [facility]   [facility]    [address] [facility] [address]   [phone]            Jul 19, 2017  9:30 AM CDT   Established Patient Visit with Belkys Kruger MD   [facility]    [address] [facility] [address]   [phone]              Your Future Surgeries/Procedures     Jan 26, 2017   Surgery with Ronnie Conway MD   [facility] ([facility])    [address] [place] [address]   [phone]                  Discharge Instructions       Colonoscopy Outpatient procedure instructions    Prep Review  Nothing to eat or drink after midnight unless your doctor tells you differently.    Bring your medication in the original containers.   Take medications as instructed by your doctor.    Wear something comfortable that is easy for you to take off and put on.   Do not wear any makeup, jewelry, or body piercings. Leave valuables at home or let your family member keep them for you. Do not bring them to the Surgery area.     Date/Day of Procedure: 1-    Arrival time: Someone will call you the day before the procedure to give you an arrival time.   If you are told to report to the hospital before 7:00 am you will report to the Emergency Room.  If you are told to report to the hospital at 7:00 a.m. or later you will report to Patient Registration  It is not necessary to report earlier than the time you are told.   Ignore any automated/computer generated calls telling to what time to report to the hospital.   Plan to be at the hospital for about 4 hours, however, it could be longer.       Diabetics  If you are diabetic do not take your diabetes medication  "the morning of the procedure unless otherwise instructed by your doctor.  It is important to monitor your blood sugar levels the day you are doing your prep to make sure your blood sugar levels are maintained.       Admission Information     Date & Time Provider Department CSN    1/18/2017 11:00 AM Belkys Kruger MD [facility] [id number]      Care Providers     Provider Role Specialty Primary office phone    Belkys Kruger MD Attending Provider Internal Medicine [phone]      Your Vitals Were     Height Weight BMI          5' 7" (1.702 m) 118.4 kg (261 lb) 40.88 kg/m2        Recent Lab Values     No lab values to display.      Allergies as of 1/18/2017     No Known Allergies      Advance Directives     An advance directive is a document which, in the event you are no longer able to make decisions for yourself, tells your healthcare team what kind of treatment you do or do not want to receive, or who you would like to make those decisions for you.  If you do not currently have an advance directive, [facility] encourages you to create one.  For more information call:  [phone]), [phone] ([phone]),  or log on to [email or web address].        Smoking Cessation     If you would like to quit smoking:   You may be eligible for free services if you are a [place] resident and started smoking cigarettes before September 1, 1988.  Call the Smoking Cessation [employer] ([facility]) toll free at (669) 074-9769 or (525) 562-2202.   Call 1-800-QUIT-NOW if you do not meet the above criteria.            Language Assistance Services     ATTENTION: Language assistance services are available, free of charge. Please call [phone].      ATENCIÓN: Si habla español, tiene a moore disposición servicios gratuitos de asistencia lingüística. Llame al [phone].     CHÚ Ý: N?u b?n nói Ti?ng Vi?t, có các d?ch v? h? tr? ngôn ng? mi?n phí dành cho b?n. G?i s? [phone].      "   Diabetes Discharge Instructions                                    [facility] complies with applicable Federal civil rights laws and does not discriminate on the basis of race, color, national origin, age, disability, or sex.

## 2017-02-01 ENCOUNTER — TELEPHONE (OUTPATIENT)
Dept: INTERNAL MEDICINE | Facility: CLINIC | Age: 67
End: 2017-02-01

## 2017-02-01 NOTE — TELEPHONE ENCOUNTER
Spoke with pt. Notified  was out for the afternoon and tomorrow stating he was feeling a little but better but will keep an eye on it and call back if needs to be seen tomorrow or Friday when  is back in

## 2017-02-01 NOTE — TELEPHONE ENCOUNTER
----- Message from Lucero Coombs sent at 2017 10:14 AM CST -----  Contact: Eve / girlfriend  Walter Bull  MRN: [id number]  : 1950  PCP: Belkys Kruger  Home Phone      [phone]  Work Phone      Not on file.  Mobile          [phone]      MESSAGE:   Pt is having acid reflux. The top part of his stomach is hurting really bad. It started a couple days ago and nothing is helping for it. Wanting to see if he can get something called in or does he need to make an appt.    Phone: [phone]    Pharmacy: [facility] / [place]

## 2017-02-02 ENCOUNTER — HOSPITAL ENCOUNTER (OUTPATIENT)
Facility: HOSPITAL | Age: 67
Discharge: HOME | End: 2017-02-02
Attending: COLON & RECTAL SURGERY | Admitting: COLON & RECTAL SURGERY
Payer: MEDICARE

## 2017-02-02 ENCOUNTER — ANESTHESIA (OUTPATIENT)
Dept: ENDOSCOPY | Facility: HOSPITAL | Age: 67
End: 2017-02-02
Payer: MEDICARE

## 2017-02-02 ENCOUNTER — SURGERY (OUTPATIENT)
Age: 67
End: 2017-02-02

## 2017-02-02 VITALS
OXYGEN SATURATION: 94 % | DIASTOLIC BLOOD PRESSURE: 63 MMHG | HEART RATE: 50 BPM | TEMPERATURE: 97 F | SYSTOLIC BLOOD PRESSURE: 137 MMHG | RESPIRATION RATE: 18 BRPM

## 2017-02-02 VITALS — RESPIRATION RATE: 30 BRPM

## 2017-02-02 DIAGNOSIS — Z12.11 COLON CANCER SCREENING: ICD-10-CM

## 2017-02-02 PROCEDURE — 63600175 PHARM REV CODE 636 W HCPCS

## 2017-02-02 PROCEDURE — G0121 COLON CA SCRN NOT HI RSK IND: HCPCS | Performed by: COLON & RECTAL SURGERY

## 2017-02-02 PROCEDURE — 37000008 HC ANESTHESIA 1ST 15 MINUTES: Performed by: COLON & RECTAL SURGERY

## 2017-02-02 PROCEDURE — 25000003 PHARM REV CODE 250: Performed by: COLON & RECTAL SURGERY

## 2017-02-02 PROCEDURE — 27000495 *HC ANESTHESIA START UP SUPPLY KIT (STAH ONLY): Performed by: NURSE ANESTHETIST, CERTIFIED REGISTERED

## 2017-02-02 PROCEDURE — G0121 COLON CA SCRN NOT HI RSK IND: HCPCS | Mod: ,,, | Performed by: COLON & RECTAL SURGERY

## 2017-02-02 PROCEDURE — 27200120 HC KIT IV START (RUSH ONLY): Performed by: NURSE ANESTHETIST, CERTIFIED REGISTERED

## 2017-02-02 PROCEDURE — 37000009 HC ANESTHESIA EA ADD 15 MINS: Performed by: COLON & RECTAL SURGERY

## 2017-02-02 PROCEDURE — 00810 *PRA ANESTH,INTESTINE,SCOPE,LOW: CPT | Mod: P3,,QZ | Performed by: NURSE ANESTHETIST, CERTIFIED REGISTERED

## 2017-02-02 PROCEDURE — 27000494 *HC OXYGEN SET UP (STAH ONLY): Performed by: NURSE ANESTHETIST, CERTIFIED REGISTERED

## 2017-02-02 PROCEDURE — 25000003 PHARM REV CODE 250

## 2017-02-02 RX ORDER — LIDOCAINE HCL/PF 100 MG/5ML
SYRINGE (ML) INTRAVENOUS
Status: DISCONTINUED | OUTPATIENT
Start: 2017-02-02 | End: 2017-02-02

## 2017-02-02 RX ORDER — PROPOFOL 10 MG/ML
INJECTION, EMULSION INTRAVENOUS CONTINUOUS PRN
Status: DISCONTINUED | OUTPATIENT
Start: 2017-02-02 | End: 2017-02-02

## 2017-02-02 RX ORDER — SODIUM CHLORIDE, SODIUM LACTATE, POTASSIUM CHLORIDE, CALCIUM CHLORIDE 600; 310; 30; 20 MG/100ML; MG/100ML; MG/100ML; MG/100ML
INJECTION, SOLUTION INTRAVENOUS CONTINUOUS
Status: DISCONTINUED | OUTPATIENT
Start: 2017-02-02 | End: 2017-02-02 | Stop reason: HOSPADM

## 2017-02-02 RX ORDER — PROPOFOL 10 MG/ML
INJECTION, EMULSION INTRAVENOUS
Status: DISCONTINUED | OUTPATIENT
Start: 2017-02-02 | End: 2017-02-02

## 2017-02-02 RX ADMIN — PROPOFOL 130 MG: 10 INJECTION, EMULSION INTRAVENOUS at 07:02

## 2017-02-02 RX ADMIN — SODIUM CHLORIDE, SODIUM LACTATE, POTASSIUM CHLORIDE, AND CALCIUM CHLORIDE: .6; .31; .03; .02 INJECTION, SOLUTION INTRAVENOUS at 07:02

## 2017-02-02 RX ADMIN — LIDOCAINE HYDROCHLORIDE 100 MG: 20 INJECTION, SOLUTION INTRAVENOUS at 07:02

## 2017-02-02 RX ADMIN — PROPOFOL 200 MCG/KG/MIN: 10 INJECTION, EMULSION INTRAVENOUS at 07:02

## 2017-02-02 NOTE — DISCHARGE INSTRUCTIONS
If sedated do not drive, smoke or drink alcohol for 10 hours  Avoid heavy lifting,straining or running for 3 days  You may not have a bowel movement for 1-3 days after procedure  You may return to normal activities the day after  You may experience some bloating and excessive gas.  This can be relieved by walking, eating lightly,or a heating pad can be applied to the abdomen.   A small amount of blood from the return is not serious, especially if hemorrhoids are present,  Go to ER if you notice any;   Chills and/or fever over 101   Persistent vomiting or vomiting blood   Severe abdominal pain, other gas cramp   Severe chest pain   Black tarry stools    Bright red bleeding from your rectum (greater than 1 tablespoon    Call your doctor for any unusual pain,bleeding or fever.    Resume medications today except aspirin and plavix which can be resumed tomorrow

## 2017-02-02 NOTE — H&P
Procedure : Colonoscopy    Indication(s):  asymptomatic screening exam    Review of patient's allergies indicates:  No Known Allergies    Past Medical History   Diagnosis Date    Anticoagulant long-term use     CHF (congestive heart failure)     Colon cancer screening 2/2/2017    Coronary artery disease     Diabetes mellitus type I     Disorder of kidney and ureter     Encounter for blood transfusion     Glaucoma     Heart disease     Hypertension     Kidney failure      post CABG       Prior to Admission medications    Medication Sig Start Date End Date Taking? Authorizing Provider   amlodipine (NORVASC) 5 MG tablet Take 5 mg by mouth once daily.    Historical Provider, MD   aspirin (ECOTRIN) 81 MG EC tablet Take 81 mg by mouth once daily.    Historical Provider, MD   clopidogrel (PLAVIX) 75 mg tablet Take 75 mg by mouth once daily.    Historical Provider, MD   cyanocobalamin (VITAMIN B-12) 1000 MCG tablet Take 1,000 mcg by mouth once daily.     Historical Provider, MD   diphenhydrAMINE (BENADRYL) 25 mg capsule Take 25 mg by mouth every 6 (six) hours as needed for Itching.    Historical Provider, MD   FOLIC ACID/MULTIVIT-MIN/LUTEIN (CENTRUM SILVER ORAL) Take by mouth.    Historical Provider, MD   furosemide (LASIX) 40 MG tablet 40 mg once daily.  1/10/17   Historical Provider, MD   insulin NPH (NOVOLIN N) 100 unit/mL injection Inject into the skin. 90 units in the morning and 40 units at bedtime     Historical Provider, MD   isosorbide mononitrate (IMDUR) 60 MG 24 hr tablet Take 60 mg by mouth once daily.    Historical Provider, MD   JANUMET 50-1,000 mg per tablet 2 (two) times daily.  1/4/17   Historical Provider, MD   metoprolol tartrate (LOPRESSOR) 50 MG tablet 50 mg 2 (two) times daily.  1/10/17   Historical Provider, MD   nitroGLYCERIN (NITROSTAT) 0.4 MG SL tablet Place 0.4 mg under the tongue every 5 (five) minutes as needed for Chest pain.    Historical Provider, MD   ranitidine (ZANTAC) 150 MG  capsule Take 150 mg by mouth as needed for Heartburn.    Historical Provider, MD   rosuvastatin (CRESTOR) 20 MG tablet 20 mg once daily.  1/10/17   Historical Provider, MD       Sedation Problems: NO    Family History   Problem Relation Age of Onset    Diabetes Mother     Heart disease Mother        Fam Hx of Sedation Problems: NO    Social History     Social History    Marital status:      Spouse name: N/A    Number of children: N/A    Years of education: N/A     Occupational History    Not on file.     Social History Main Topics    Smoking status: Former Smoker     Packs/day: 3.00     Types: Cigarettes     Start date: 1/18/1963     Quit date: 1/1/1981    Smokeless tobacco: Former User     Types: Snuff, Chew     Quit date: 1984    Alcohol use No    Drug use: No    Sexual activity: Not on file      Comment: -with a significant other     Other Topics Concern    Not on file     Social History Narrative       Review of Systems -     Respiratory ROS: no cough, shortness of breath, or wheezing  Cardiovascular ROS: no chest pain or dyspnea on exertion  Gastrointestinal ROS: no abdominal pain, change in bowel habits, or black or bloody stools  Musculoskeletal ROS: negative  Neurological ROS: no TIA or stroke symptoms        Physical Exam:  General: no distress  Head: normocephalic  Airway:  normal oropharynx, airway normal  Neck: supple, symmetrical, trachea midline  Lungs:  normal respiratory effort  Heart: regular rate and rhythm  Abdomen: soft, non-tender non-distented; bowel sounds normal; no masses,  no organomegaly  Extremities: no cyanosis or edema, or clubbing       Deep Sedation: Mallampati Score per anesthesia     SedationPlan :Moderate     ASA : II    Patient is medically cleared for anesthesia.    Anesthesia/Surgery risks, benefits and alternative options discussed and understood by patient/family.

## 2017-02-02 NOTE — ANESTHESIA POSTPROCEDURE EVALUATION
Anesthesia Post Evaluation    Patient: Walter Bull    Procedure(s) Performed: Procedure(s) (LRB):  COLONOSCOPY (N/A)    Final Anesthesia Type: general  Patient location during evaluation: PACU  Patient participation: Yes- Able to Participate  Level of consciousness: awake and alert and oriented  Post-procedure vital signs: reviewed and stable  Pain management: adequate  Airway patency: patent  PONV status at discharge: No PONV  Anesthetic complications: no      Cardiovascular status: blood pressure returned to baseline, hemodynamically stable and stable  Respiratory status: unassisted, spontaneous ventilation and room air  Hydration status: euvolemic  Follow-up not needed.        Visit Vitals    /63    Pulse (!) 50    Temp 36.2 °C (97.1 °F) (Tympanic)    Resp 18    SpO2 (!) 94%       Pain/Madhav Score: Pain Assessment Performed: Yes (2/2/2017  7:30 AM)  Presence of Pain: denies (2/2/2017  8:22 AM)  Madhav Score: 10 (2/2/2017  8:12 AM)

## 2017-02-02 NOTE — OP NOTE
Patient Name: Walter Bull   Procedure Date: 2017  MRN: [id number]  : 1950  Age: 66 y.o.  Gender: male   Referring Physician :  Belkys Kruger MD  Plan for Procedure: Monitored anaesthesia care  Indication: asymptomatic screening exam  Procedure:   Colonoscopy    Surgeon(s) and Role:     * Ronnie Conway MD - Primary    Complications: None     Medicines: monitored anesthesia care    Procedure:  Prior to the procedure, a History and Physical was performed, and patient medications, allergies and sensitivities were reviewed.  The patient's tolerance of previous anesthesia was reviewed. The risks and benefits of the procedure and the sedation options and risks were discussed with the patient.  All questions were answered and informed consent was obtained.    After I obtained informed consent, the scope was passed under direct vision.  Throughout the procedure, the patient's blood pressure, pulse, and oxygen saturations were monitored continuously.  The Olympus scope CF - DF840U was introduced through the anus and advanced to the cecum, identified by appendiceal orifice and ileocecal valve.  The colonoscopy was performed without difficulty.  The patient tolerated the procedure well.  The quality of the bowel preparation was good     Findings:  normal colonic mucosa throughout    EBL: none  Impression:  Normal colonoscopy to the cecum with no evidence of neoplasia, diverticular disease or mucosal abnormality.    Recommendation:  Repeat exam: 10 years  Return to my office prn

## 2017-02-02 NOTE — DISCHARGE SUMMARY
Discharge Note  Short Stay      SUMMARY     Admit Date: 2/2/2017    Attending Physician: Ronnie Conway MD     Discharge Physician: Ronnie Conway MD    Discharge Date: 2/2/2017 7:51 AM    Admission Diagnosis: asymptomatic screening exam    Final Diagnosis:  Normal screening colonoscopy    Procedures Performed:    Colonoscopy    Disposition: Home or Self Care    Condition at Discharge:  Stable    Patient Instructions:   Current Discharge Medication List      CONTINUE these medications which have NOT CHANGED    Details   amlodipine (NORVASC) 5 MG tablet Take 5 mg by mouth once daily.      aspirin (ECOTRIN) 81 MG EC tablet Take 81 mg by mouth once daily.      clopidogrel (PLAVIX) 75 mg tablet Take 75 mg by mouth once daily.      cyanocobalamin (VITAMIN B-12) 1000 MCG tablet Take 1,000 mcg by mouth once daily.       diphenhydrAMINE (BENADRYL) 25 mg capsule Take 25 mg by mouth every 6 (six) hours as needed for Itching.      FOLIC ACID/MULTIVIT-MIN/LUTEIN (CENTRUM SILVER ORAL) Take by mouth.      furosemide (LASIX) 40 MG tablet 40 mg once daily.       insulin NPH (NOVOLIN N) 100 unit/mL injection Inject into the skin. 90 units in the morning and 40 units at bedtime       isosorbide mononitrate (IMDUR) 60 MG 24 hr tablet Take 60 mg by mouth once daily.      JANUMET 50-1,000 mg per tablet 2 (two) times daily.       metoprolol tartrate (LOPRESSOR) 50 MG tablet 50 mg 2 (two) times daily.       nitroGLYCERIN (NITROSTAT) 0.4 MG SL tablet Place 0.4 mg under the tongue every 5 (five) minutes as needed for Chest pain.      ranitidine (ZANTAC) 150 MG capsule Take 150 mg by mouth as needed for Heartburn.      rosuvastatin (CRESTOR) 20 MG tablet 20 mg once daily.              Discharge Procedure Orders (must include Diet, Follow-up, Activity)    Discharge Procedure Orders (must include Diet, Follow-up, Activity)  Diet general     Activity as tolerated          Repeat colonoscopy 10 years.

## 2017-02-02 NOTE — IP AVS SNAPSHOT
[address] [address]  Phone: [phone]           Patient Discharge Instructions     Our goal is to set you up for success. This packet includes information on your condition, medications, and your home care. It will help you to care for yourself so you don't get sicker and need to go back to the hospital.     Please ask your nurse if you have any questions.        There are many details to remember when preparing to leave the hospital. Here is what you will need to do:    1. Take your medicine. If you are prescribed medications, review your Medication List in the following pages. You may have new medications to  at the pharmacy and others that you'll need to stop taking. Review the instructions for how and when to take your medications. Talk with your doctor or nurses if you are unsure of what to do.     2. Go to your follow-up appointments. Specific follow-up information is listed in the following pages. Your may be contacted by a transition nurse or clinical provider about future appointments. Be sure we have all of the phone numbers to reach you, if needed. Please contact your provider's office if you are unable to make an appointment.     3. Watch for warning signs. Your doctor or nurse will give you detailed warning signs to watch for and when to call for assistance. These instructions may also include educational information about your condition. If you experience any of warning signs to your health, call your doctor.               [facility]  Unless otherwise directed by your provider, please contact [facility], our nurse care line that is available for 24/7 assistance.     [phone] (toll-free)    Registered nurses in the [facility] provide clinical advisement, health education, appointment booking, and other advisory services.                    ** Verify the list of medication(s) below is accurate and up to date. Carry  this with you in case of emergency. If your medications have changed, please notify your healthcare provider.             Medication List      CONTINUE taking these medications        Additional Info                      amlodipine 5 MG tablet   Commonly known as:  NORVASC   Refills:  0   Dose:  5 mg    Instructions:  Take 5 mg by mouth once daily.     Begin Date    AM    Noon    PM    Bedtime       aspirin 81 MG EC tablet   Commonly known as:  ECOTRIN   Refills:  0   Dose:  81 mg    Instructions:  Take 81 mg by mouth once daily.     Begin Date    AM    Noon    PM    Bedtime       CENTRUM SILVER ORAL   Refills:  0    Instructions:  Take by mouth.     Begin Date    AM    Noon    PM    Bedtime       clopidogrel 75 mg tablet   Commonly known as:  PLAVIX   Refills:  0   Dose:  75 mg    Instructions:  Take 75 mg by mouth once daily.     Begin Date    AM    Noon    PM    Bedtime       diphenhydrAMINE 25 mg capsule   Commonly known as:  BENADRYL   Refills:  0   Dose:  25 mg    Instructions:  Take 25 mg by mouth every 6 (six) hours as needed for Itching.     Begin Date    AM    Noon    PM    Bedtime       furosemide 40 MG tablet   Commonly known as:  LASIX   Refills:  0   Dose:  40 mg    Instructions:  40 mg once daily.     Begin Date    AM    Noon    PM    Bedtime       insulin  unit/mL injection   Commonly known as:  NovoLIN N   Refills:  0    Instructions:  Inject into the skin. 90 units in the morning and 40 units at bedtime     Begin Date    AM    Noon    PM    Bedtime       isosorbide mononitrate 60 MG 24 hr tablet   Commonly known as:  IMDUR   Refills:  0   Dose:  60 mg    Instructions:  Take 60 mg by mouth once daily.     Begin Date    AM    Noon    PM    Bedtime       JANUMET 50-1,000 mg per tablet   Refills:  0   Generic drug:  SITagliptan-metformin    Instructions:  2 (two) times daily.     Begin Date    AM    Noon    PM    Bedtime       metoprolol tartrate 50 MG tablet   Commonly known as:  LOPRESSOR    Refills:  0   Dose:  50 mg    Instructions:  50 mg 2 (two) times daily.     Begin Date    AM    Noon    PM    Bedtime       nitroGLYCERIN 0.4 MG SL tablet   Commonly known as:  NITROSTAT   Refills:  0   Dose:  0.4 mg    Instructions:  Place 0.4 mg under the tongue every 5 (five) minutes as needed for Chest pain.     Begin Date    AM    Noon    PM    Bedtime       ranitidine 150 MG capsule   Commonly known as:  ZANTAC   Refills:  0   Dose:  150 mg    Instructions:  Take 150 mg by mouth as needed for Heartburn.     Begin Date    AM    Noon    PM    Bedtime       rosuvastatin 20 MG tablet   Commonly known as:  CRESTOR   Refills:  0   Dose:  20 mg    Instructions:  20 mg once daily.     Begin Date    AM    Noon    PM    Bedtime       VITAMIN B-12 1000 MCG tablet   Refills:  0   Dose:  1000 mcg   Generic drug:  cyanocobalamin    Instructions:  Take 1,000 mcg by mouth once daily.     Begin Date    AM    Noon    PM    Bedtime                  Please bring to all follow up appointments:    1. A copy of your discharge instructions.  2. All medicines you are currently taking in their original bottles.  3. Identification and insurance card.    Please arrive 15 minutes ahead of scheduled appointment time.    Please call 24 hours in advance if you must reschedule your appointment and/or time.        Your Scheduled Appointments     Jul 12, 2017  8:00 AM CDT   Nurse Visit with [place], LAB   [place]  Internal [facility] ([place])    [address]  [place] [address]   [phone]            Jul 19, 2017  9:30 AM CDT   Established Patient Visit with MD Fabienne [place] - Internal Medicine [facility]    [address]  [place] [address]   [phone]                Discharge Instructions     Future Orders    Activity as tolerated     Diet general     Questions:    Total calories:      Fat restriction, if any:      Protein restriction, if any:      Na restriction, if any:      Fluid restriction:       Additional restrictions:          Discharge Instructions         If sedated do not drive, smoke or drink alcohol for 10 hours  Avoid heavy lifting,straining or running for 3 days  You may not have a bowel movement for 1-3 days after procedure  You may return to normal activities the day after  You may experience some bloating and excessive gas.  This can be relieved by walking, eating lightly,or a heating pad can be applied to the abdomen.   A small amount of blood from the return is not serious, especially if hemorrhoids are present,  Go to ER if you notice any;   Chills and/or fever over 101   Persistent vomiting or vomiting blood   Severe abdominal pain, other gas cramp   Severe chest pain   Black tarry stools    Bright red bleeding from your rectum (greater than 1 tablespoon    Call your doctor for any unusual pain,bleeding or fever.    Resume medications today except aspirin and plavix which can be resumed tomorrow      Admission Information     Date & Time Provider Department CSN    2/2/2017  6:16 AM Ronnie Conway MD [facility] [id number]      Care Providers     Provider Role Specialty Primary office phone    Ronnie Conway MD Attending Provider Colon and Rectal Surgery [phone]    Ronnie Conway MD Surgeon  Colon and Rectal Surgery [phone]      Your Vitals Were     BP Pulse Temp Resp SpO2       135/62 52 96.6 °F (35.9 °C) (Tympanic) 18 95%       Recent Lab Values        1/18/2017                           8:40 AM           A1C 8.1 (H)           Comment for A1C at  8:40 AM on 1/18/2017:  According to ADA guidelines, hemoglobin A1C <7.0% represents  optimal control in non-pregnant diabetic patients.  Different  metrics may apply to specific populations.   Standards of Medical Care in Diabetes - 2016.  For the purpose of screening for the presence of diabetes:  <5.7%     Consistent with the absence of diabetes  5.7-6.4%  Consistent with increasing risk for diabetes    (prediabetes)  >or=6.5%  Consistent with diabetes  Currently no consensus exists for use of hemoglobin A1C  for diagnosis of diabetes for children.        Allergies as of 2/2/2017     No Known Allergies      Advance Directives     An advance directive is a document which, in the event you are no longer able to make decisions for yourself, tells your healthcare team what kind of treatment you do or do not want to receive, or who you would like to make those decisions for you.  If you do not currently have an advance directive, [facility] encourages you to create one.  For more information call:  [phone]), [phone] ([phone]),  or log on to [email or web address].        Smoking Cessation     If you would like to quit smoking:   You may be eligible for free services if you are a [place] resident and started smoking cigarettes before September 1, 1988.  Call the Smoking Cessation [employer] (SCT) toll free at (306) 917-5154 or (833) 291-6929.   Call 5-576-QUIT-NOW if you do not meet the above criteria.            Language Assistance Services     ATTENTION: Language assistance services are available, free of charge. Please call [phone].      ATENCIÓN: Si habla español, tiene a moore disposición servicios gratuitos de asistencia lingüística. Llame al [phone].     CHÚ Ý: N?u b?n nói Ti?ng Vi?t, có các d?ch v? h? tr? ngôn ng? mi?n phí dành cho b?n. G?i s? [phone].        Diabetes Discharge Instructions                                    [facility] complies with applicable Federal civil rights laws and does not discriminate on the basis of race, color, national origin, age, disability, or sex.

## 2017-02-03 ENCOUNTER — TELEPHONE (OUTPATIENT)
Dept: INTERNAL MEDICINE | Facility: CLINIC | Age: 67
End: 2017-02-03

## 2017-02-03 NOTE — TELEPHONE ENCOUNTER
----- Message from Erin Giles sent at 2/3/2017 11:27 AM CST -----  Contact: JO Bull  MRN: [id number]  : 1950  PCP: Belkys Kruger  Home Phone      [phone]  Work Phone      Not on file.  Mobile          [phone]      MESSAGE: QUESTION ABOUT ULCER IN STOMACH------[phone]

## 2017-02-22 ENCOUNTER — OFFICE VISIT (OUTPATIENT)
Dept: PODIATRY | Facility: CLINIC | Age: 67
End: 2017-02-22
Payer: MEDICARE

## 2017-02-22 VITALS
BODY MASS INDEX: 41.44 KG/M2 | HEART RATE: 56 BPM | WEIGHT: 264 LBS | DIASTOLIC BLOOD PRESSURE: 67 MMHG | HEIGHT: 67 IN | SYSTOLIC BLOOD PRESSURE: 128 MMHG

## 2017-02-22 DIAGNOSIS — B35.3 TINEA PEDIS OF BOTH FEET: Primary | ICD-10-CM

## 2017-02-22 DIAGNOSIS — E11.42 DIABETIC POLYNEUROPATHY ASSOCIATED WITH TYPE 2 DIABETES MELLITUS: ICD-10-CM

## 2017-02-22 PROCEDURE — 3066F NEPHROPATHY DOC TX: CPT | Mod: S$GLB,,, | Performed by: PODIATRIST

## 2017-02-22 PROCEDURE — 1160F RVW MEDS BY RX/DR IN RCRD: CPT | Mod: S$GLB,,, | Performed by: PODIATRIST

## 2017-02-22 PROCEDURE — 2022F DILAT RTA XM EVC RTNOPTHY: CPT | Mod: S$GLB,,, | Performed by: PODIATRIST

## 2017-02-22 PROCEDURE — 99204 OFFICE O/P NEW MOD 45 MIN: CPT | Mod: S$GLB,,, | Performed by: PODIATRIST

## 2017-02-22 PROCEDURE — 1159F MED LIST DOCD IN RCRD: CPT | Mod: S$GLB,,, | Performed by: PODIATRIST

## 2017-02-22 PROCEDURE — 3045F PR MOST RECENT HEMOGLOBIN A1C LEVEL 7.0-9.0%: CPT | Mod: S$GLB,,, | Performed by: PODIATRIST

## 2017-02-22 PROCEDURE — 1126F AMNT PAIN NOTED NONE PRSNT: CPT | Mod: S$GLB,,, | Performed by: PODIATRIST

## 2017-02-22 PROCEDURE — 99999 PR PBB SHADOW E&M-EST. PATIENT-LVL III: CPT | Mod: PBBFAC,,, | Performed by: PODIATRIST

## 2017-02-22 PROCEDURE — 1157F ADVNC CARE PLAN IN RCRD: CPT | Mod: S$GLB,,, | Performed by: PODIATRIST

## 2017-02-22 PROCEDURE — 99499 UNLISTED E&M SERVICE: CPT | Mod: S$PBB,,, | Performed by: PODIATRIST

## 2017-02-22 RX ORDER — ECONAZOLE NITRATE 10 MG/G
CREAM TOPICAL DAILY
Qty: 30 G | Refills: 0 | Status: SHIPPED | OUTPATIENT
Start: 2017-02-22 | End: 2017-07-12

## 2017-02-22 NOTE — PROGRESS NOTES
CC:     Foot exam       HPI:   The patient is a 66 y.o.  male  who presents for a diabetic foot exam.     Patient reports  presence of abnormal sensation to the feet .    Feet were hurting a few days ago due to being on it a lot while working but better when he was able to be off to rest.   History of diabetic foot ulcers: none   History of foot surgery: none.     Shoes worn today:  [place]  He is concerned about the fungal right great toenail.  Had oral treatment over 10 years ago but toenail fungus recurred.         Primary care doctor is: Belkys Kruger MD  Patient last saw primary care doctor on:   1/11/2017        Past Medical History   Diagnosis Date    Anticoagulant long-term use     CHF (congestive heart failure)     Colon cancer screening 2/2/2017    Coronary artery disease     Diabetes mellitus type I     Disorder of kidney and ureter     Encounter for blood transfusion     Glaucoma     Heart disease     Hypertension     Kidney failure      post CABG         Current Outpatient Prescriptions on File Prior to Visit   Medication Sig Dispense Refill    amlodipine (NORVASC) 5 MG tablet Take 5 mg by mouth once daily.      aspirin (ECOTRIN) 81 MG EC tablet Take 81 mg by mouth once daily.      clopidogrel (PLAVIX) 75 mg tablet Take 75 mg by mouth once daily.      cyanocobalamin (VITAMIN B-12) 1000 MCG tablet Take 1,000 mcg by mouth once daily.       diphenhydrAMINE (BENADRYL) 25 mg capsule Take 25 mg by mouth every 6 (six) hours as needed for Itching.      FOLIC ACID/MULTIVIT-MIN/LUTEIN (CENTRUM SILVER ORAL) Take by mouth.      furosemide (LASIX) 40 MG tablet 40 mg once daily.       insulin NPH (NOVOLIN N) 100 unit/mL injection Inject into the skin. 90 units in the morning and 40 units at bedtime       isosorbide mononitrate (IMDUR) 60 MG 24 hr tablet Take 60 mg by mouth once daily.      JANUMET 50-1,000 mg per tablet 2 (two) times daily.       metoprolol tartrate (LOPRESSOR) 50 MG  "tablet 50 mg 2 (two) times daily.       nitroGLYCERIN (NITROSTAT) 0.4 MG SL tablet Place 0.4 mg under the tongue every 5 (five) minutes as needed for Chest pain.      ranitidine (ZANTAC) 150 MG capsule Take 150 mg by mouth as needed for Heartburn.      rosuvastatin (CRESTOR) 20 MG tablet 20 mg once daily.        No current facility-administered medications on file prior to visit.          Review of patient's allergies indicates:  No Known Allergies          ROS:  General ROS: negative  Respiratory ROS: no cough, shortness of breath, or wheezing  Cardiovascular ROS: no chest pain or dyspnea on exertion  Musculoskeletal ROS: negative  Neurological ROS:   positive for - numbness/tingling  Dermatological ROS: negative      LAST HbA1c:   Hemoglobin A1C   Date Value Ref Range Status   01/18/2017 8.1 (H) 4.5 - 6.2 % Final     Comment:     According to ADA guidelines, hemoglobin A1C <7.0% represents  optimal control in non-pregnant diabetic patients.  Different  metrics may apply to specific populations.   Standards of Medical Care in Diabetes - 2016.  For the purpose of screening for the presence of diabetes:  <5.7%     Consistent with the absence of diabetes  5.7-6.4%  Consistent with increasing risk for diabetes   (prediabetes)  >or=6.5%  Consistent with diabetes  Currently no consensus exists for use of hemoglobin A1C  for diagnosis of diabetes for children.             EXAM:   Vitals:    02/22/17 1054   BP: 128/67   Pulse: (!) 56   Weight: 119.7 kg (264 lb)   Height: 5' 7" (1.702 m)       General: alert, cooperative    Vascular:   Dorsalis pedis:   2+ bilateral.   Posterior Tibial:   1+ bilateral.   3 seconds capillary refill time   Temperature of toes are warm to touch.   reduced hair growth on the feet.    Edema on feet:   Trace and non-pitting   Varicosities:  none    Dermatological:    Skin: thin,  warm and dry; hyperkeratotic bilateral soles  Nails: toenails 1-5 L and 1-5 R  are dystrophic nails, right hallux " toenail darkly discolored  Callus:  Mild - diffusely on the soles  Open Wounds: None  Ecchymoses is not observed.      Erythema:  none .     Interdigital spaces: clean, dry and without evidence of break in skin integrity      Neurological:    normal light touch sensation and normal position sensation  [place] diminished      Musculoskeletal:     Muscle strength: 5/5, adequate ROM, adequate strength     Right foot:  hammertoe   Left foot: hammertoe             ASSESSMENT/PLAN:      I counseled the patient on his conditions, their implications and medical management.       Tinea pedis of both feet  -     econazole nitrate 1 % cream; Apply topically once daily. To the bottom of the feet.  Dispense: 30 g; Refill: 0    Diabetic polyneuropathy associated with type 2 diabetes mellitus  -     DIABETIC SHOES FOR HOME USE      Shoe inspection. Diabetic Foot Education. Patient reminded of the importance of good nutrition and blood sugar control to help prevent podiatric complications of diabetes. Patient instructed on proper foot hygiene. We discussed wearing proper shoe gear, daily foot inspections, never walking without protective shoe gear, never putting sharp instruments to feet.    Return in about 6 months (around 8/22/2017) for diabetic foot exam, or sooner if concerned.

## 2017-02-22 NOTE — PATIENT INSTRUCTIONS
Your A1c:    Hemoglobin A1C   Date Value Ref Range Status   01/18/2017 8.1 (H) 4.5 - 6.2 % Final     Comment:     According to ADA guidelines, hemoglobin A1C <7.0% represents  optimal control in non-pregnant diabetic patients.  Different  metrics may apply to specific populations.   Standards of Medical Care in Diabetes - 2016.  For the purpose of screening for the presence of diabetes:  <5.7%     Consistent with the absence of diabetes  5.7-6.4%  Consistent with increasing risk for diabetes   (prediabetes)  >or=6.5%  Consistent with diabetes  Currently no consensus exists for use of hemoglobin A1C  for diagnosis of diabetes for children.         How to Check Your Feet    Below are tips to help you look for foot problems. Try to check your feet at the same time each day, such as when you get out of bed in the morning.    · Check the top of each foot. The tops of toes, back of the heel, and outer edge of the foot can get a lot of rubbing from poor-fitting shoes.    · Check the bottom of each foot. Daily wear and tear often leads to problems at pressure spots.    · Check the toes and nails. Fungal infections often occur between toes. Toenail problems can also be a sign of fungal infections or lead to breaks in the skin.    · Check your shoes, too. Loose objects inside a shoe can injure the foot. Use your hand to feel inside your shoes for things like robbie, loose stitching, or rough areas that could irritate your skin.        Diabetic Foot Care    Diabetes can lead to a number of different foot complications. Fortunately, most of these complications can be prevented with a little extra foot care. If diabetes is not well controlled, the high blood sugar can cause damage to blood vessels and result in poor circulation to the foot. When the skin does not get enough blood flow, it becomes prone to pressure sores and ulcers, which heal slowly.  High blood sugar can also damage nerves, interfering with the ability to feel  pain and pressure. When you cant feel your foot normally, it is easy to injure your skin, bones and joints without knowing it. For these reasons diabetes increases the risk of fungal infections, bunions and ulcers. Deep ulcers can lead to bone infection. Gangrene is the most serious foot complication of diabetes. It usually occurs on the tips of the toes as blacked areas of skin. The black area is dead tissue. In severe cases, gangrene spreads to involve the entire toe, other toes and the entire foot. Foot or toe amputation may be required. Good foot care and blood sugar control can prevent this.    Home Care  1. Wear comfortable, proper fitting shoes.  2. Wash your feet daily with warm water and mild soap.  3. After drying, apply a moisturizing cream or lotion.  4. Check your feet daily for skin breaks, blisters, swelling, or redness. Look between your toes also.  5. Wear cotton socks and change them every day.  6. Trim toe nails carefully and do not cut your cuticles.  7. Strive to keep your blood sugar under control with a combination of medicines, diet and activity.  8. If you smoke and have diabetes, it is very important that you stop. Smoking reduces blood flow to your foot.  9. Avoid activities that increase your risk of foot injury:  · Do not walk barefoot.  · Do not use heating pads or hot water bottles on your feet.  · Do not put your foot in a hot tub without first checking the temperature with your hand.  10) Schedule yearly foot exams.    Follow Up  with your doctor or as advised by our staff. Report any cut, puncture, scrape, other injury, blister, ingrown toenail or ulcer on your foot.    Get Prompt Medical Attention  if any of the following occur:  -- Open ulcer with pus draining from the wound  -- Increasing foot or leg pain  -- New areas of redness or swelling or tender areas of the foot    © 1339-1210 [employer]. [address] [place] [address], [place] [address]. All rights reserved. This  information is not intended as a substitute for professional medical care. Always follow your healthcare professional's instructions.

## 2017-04-10 RX ORDER — SITAGLIPTIN AND METFORMIN HYDROCHLORIDE 1000; 50 MG/1; MG/1
1 TABLET, FILM COATED ORAL 2 TIMES DAILY WITH MEALS
Qty: 180 TABLET | Refills: 1 | Status: SHIPPED | OUTPATIENT
Start: 2017-04-10 | End: 2017-10-19 | Stop reason: SDUPTHER

## 2017-04-10 RX ORDER — METOPROLOL TARTRATE 50 MG/1
50 TABLET ORAL 2 TIMES DAILY
Qty: 180 TABLET | Refills: 1 | Status: SHIPPED | OUTPATIENT
Start: 2017-04-10 | End: 2017-10-19 | Stop reason: SDUPTHER

## 2017-04-10 NOTE — TELEPHONE ENCOUNTER
----- Message from Lucero Coombs sent at 4/10/2017  8:26 AM CDT -----  Contact: Eve / girlfriend  Walter Bull  MRN: [id number]  : 1950  PCP: Belkys Kruger  Home Phone      [phone]  Work Phone      Not on file.  Mobile          [phone]      MESSAGE:   Pt needs to get a refill on his janumet and his metoprolol.    Pharmacy: [facility]    Phone: [phone]

## 2017-06-26 RX ORDER — ROSUVASTATIN CALCIUM 20 MG/1
20 TABLET, COATED ORAL DAILY
Qty: 30 TABLET | Refills: 5 | Status: SHIPPED | OUTPATIENT
Start: 2017-06-26 | End: 2017-10-24 | Stop reason: SDUPTHER

## 2017-06-26 RX ORDER — FUROSEMIDE 40 MG/1
40 TABLET ORAL DAILY
Qty: 30 TABLET | Refills: 5 | Status: SHIPPED | OUTPATIENT
Start: 2017-06-26 | End: 2018-05-02 | Stop reason: DRUGHIGH

## 2017-06-26 NOTE — TELEPHONE ENCOUNTER
----- Message from Erin Giles sent at 2017  9:21 AM CDT -----  Contact: anival  girlfriensuki Bull  MRN: [id number]  : 1950  PCP: Belkys Kruger  Home Phone      [phone]  Work Phone      Not on file.  [employer]          [phone]      MESSAGE: [facility]-----[place]---yanelissomide----[phone]

## 2017-07-12 ENCOUNTER — CLINICAL SUPPORT (OUTPATIENT)
Dept: INTERNAL MEDICINE | Facility: CLINIC | Age: 67
End: 2017-07-12
Payer: MEDICARE

## 2017-07-12 ENCOUNTER — OFFICE VISIT (OUTPATIENT)
Dept: INTERNAL MEDICINE | Facility: CLINIC | Age: 67
End: 2017-07-12
Payer: MEDICARE

## 2017-07-12 VITALS
RESPIRATION RATE: 18 BRPM | DIASTOLIC BLOOD PRESSURE: 60 MMHG | WEIGHT: 263.88 LBS | HEART RATE: 62 BPM | SYSTOLIC BLOOD PRESSURE: 122 MMHG | HEIGHT: 67 IN | BODY MASS INDEX: 41.42 KG/M2

## 2017-07-12 DIAGNOSIS — E11.69 DIABETES MELLITUS TYPE 2 IN OBESE: ICD-10-CM

## 2017-07-12 DIAGNOSIS — R63.0 POOR APPETITE: Primary | ICD-10-CM

## 2017-07-12 DIAGNOSIS — E66.9 DIABETES MELLITUS TYPE 2 IN OBESE: ICD-10-CM

## 2017-07-12 DIAGNOSIS — E78.5 HYPERLIPIDEMIA, UNSPECIFIED HYPERLIPIDEMIA TYPE: ICD-10-CM

## 2017-07-12 DIAGNOSIS — N40.1 BENIGN PROSTATIC HYPERPLASIA WITH NOCTURIA: ICD-10-CM

## 2017-07-12 DIAGNOSIS — I25.118 CORONARY ARTERY DISEASE OF NATIVE ARTERY OF NATIVE HEART WITH STABLE ANGINA PECTORIS: ICD-10-CM

## 2017-07-12 DIAGNOSIS — R35.1 BENIGN PROSTATIC HYPERPLASIA WITH NOCTURIA: ICD-10-CM

## 2017-07-12 DIAGNOSIS — Z95.1 S/P CABG X 5: ICD-10-CM

## 2017-07-12 DIAGNOSIS — E11.42 DIABETIC POLYNEUROPATHY ASSOCIATED WITH TYPE 2 DIABETES MELLITUS: ICD-10-CM

## 2017-07-12 LAB
ALBUMIN SERPL BCP-MCNC: 3.5 G/DL
ALP SERPL-CCNC: 59 U/L
ALT SERPL W/O P-5'-P-CCNC: 21 U/L
ANION GAP SERPL CALC-SCNC: 8 MMOL/L
AST SERPL-CCNC: 28 U/L
BASOPHILS # BLD AUTO: 0.04 K/UL
BASOPHILS NFR BLD: 0.5 %
BILIRUB SERPL-MCNC: 0.4 MG/DL
BUN SERPL-MCNC: 12 MG/DL
CALCIUM SERPL-MCNC: 9 MG/DL
CHLORIDE SERPL-SCNC: 105 MMOL/L
CHOLEST/HDLC SERPL: 3.3 {RATIO}
CO2 SERPL-SCNC: 26 MMOL/L
CREAT SERPL-MCNC: 1.1 MG/DL
DIFFERENTIAL METHOD: ABNORMAL
EOSINOPHIL # BLD AUTO: 0.2 K/UL
EOSINOPHIL NFR BLD: 2.8 %
ERYTHROCYTE [DISTWIDTH] IN BLOOD BY AUTOMATED COUNT: 15.4 %
EST. GFR  (AFRICAN AMERICAN): >60 ML/MIN/1.73 M^2
EST. GFR  (NON AFRICAN AMERICAN): >60 ML/MIN/1.73 M^2
GLUCOSE SERPL-MCNC: 61 MG/DL
HCT VFR BLD AUTO: 38.2 %
HDL/CHOLESTEROL RATIO: 30.4 %
HDLC SERPL-MCNC: 125 MG/DL
HDLC SERPL-MCNC: 38 MG/DL
HGB BLD-MCNC: 12.2 G/DL
LDLC SERPL CALC-MCNC: 72.8 MG/DL
LYMPHOCYTES # BLD AUTO: 2.9 K/UL
LYMPHOCYTES NFR BLD: 36.5 %
MCH RBC QN AUTO: 27.2 PG
MCHC RBC AUTO-ENTMCNC: 31.9 %
MCV RBC AUTO: 85 FL
MONOCYTES # BLD AUTO: 0.8 K/UL
MONOCYTES NFR BLD: 9.7 %
NEUTROPHILS # BLD AUTO: 4 K/UL
NEUTROPHILS NFR BLD: 50.4 %
NONHDLC SERPL-MCNC: 87 MG/DL
PLATELET # BLD AUTO: 255 K/UL
PMV BLD AUTO: 10.2 FL
POTASSIUM SERPL-SCNC: 4.1 MMOL/L
PROT SERPL-MCNC: 7.1 G/DL
RBC # BLD AUTO: 4.48 M/UL
SODIUM SERPL-SCNC: 139 MMOL/L
TRIGL SERPL-MCNC: 71 MG/DL
TSH SERPL DL<=0.005 MIU/L-ACNC: 1.7 UIU/ML
WBC # BLD AUTO: 7.94 K/UL

## 2017-07-12 PROCEDURE — 36415 COLL VENOUS BLD VENIPUNCTURE: CPT | Mod: S$GLB,,, | Performed by: INTERNAL MEDICINE

## 2017-07-12 PROCEDURE — 99999 PR PBB SHADOW E&M-EST. PATIENT-LVL III: CPT | Mod: PBBFAC,,, | Performed by: INTERNAL MEDICINE

## 2017-07-12 PROCEDURE — 1159F MED LIST DOCD IN RCRD: CPT | Mod: S$GLB,,, | Performed by: INTERNAL MEDICINE

## 2017-07-12 PROCEDURE — 1126F AMNT PAIN NOTED NONE PRSNT: CPT | Mod: S$GLB,,, | Performed by: INTERNAL MEDICINE

## 2017-07-12 PROCEDURE — 99214 OFFICE O/P EST MOD 30 MIN: CPT | Mod: S$GLB,,, | Performed by: INTERNAL MEDICINE

## 2017-07-12 RX ORDER — EZETIMIBE 10 MG/1
10 TABLET ORAL DAILY
COMMUNITY
Start: 2017-06-15 | End: 2019-03-14 | Stop reason: SDUPTHER

## 2017-07-12 RX ORDER — TAMSULOSIN HYDROCHLORIDE 0.4 MG/1
0.4 CAPSULE ORAL DAILY
Qty: 30 CAPSULE | Refills: 5 | Status: SHIPPED | OUTPATIENT
Start: 2017-07-12 | End: 2018-01-28 | Stop reason: SDUPTHER

## 2017-07-12 RX ORDER — RANOLAZINE 500 MG/1
500 TABLET, FILM COATED, EXTENDED RELEASE ORAL 2 TIMES DAILY
COMMUNITY
Start: 2017-07-10 | End: 2018-09-25

## 2017-07-12 NOTE — PROGRESS NOTES
Subjective:       Patient ID: Walter Bull is a 66 y.o. male.    Chief Complaint: Follow-up (No appetite for the last two weeks)      HPI:  Patient is known to me and presents with poor appetite. Started 2 weeks ago. For last few days he has noticed an improvement but only eating 1-2 small meals a day. No abd pain, No n/v. He reports small amount of watery stools but that is resolved. Denies constipation. Denies eating raw/undercooked foods. No fevers. Weight is stable, actually gained weight since last visit 6 months ago.     He also reports difficulty urinating at night. Sx started a few weeks ago. Dark colored urine reports. No dysuria. + nocturia, urinated 3-4x a night. No fevers.   Past Medical History:   Diagnosis Date    Anticoagulant long-term use     CHF (congestive heart failure)     Colon cancer screening 2/2/2017    Coronary artery disease     Diabetes mellitus type I     Disorder of kidney and ureter     Encounter for blood transfusion     Glaucoma     Heart disease     Hypertension     Kidney failure     post CABG       Family History   Problem Relation Age of Onset    Diabetes Mother     Heart disease Mother        Social History     Social History    Marital status:      Spouse name: N/A    Number of children: N/A    Years of education: N/A     Occupational History    Not on file.     Social History Main Topics    Smoking status: Former Smoker     Packs/day: 3.00     Types: Cigarettes     Start date: 1/18/1963     Quit date: 1/1/1981    Smokeless tobacco: Former User     Types: Snuff, Chew     Quit date: 1984    Alcohol use No    Drug use: No    Sexual activity: Not on file      Comment: -with a significant other     Other Topics Concern    Not on file     Social History Narrative    No narrative on file       Review of Systems   Constitutional: Negative for activity change, fatigue, fever and unexpected weight change.   HENT: Negative for congestion, ear  pain, hearing loss, rhinorrhea and sore throat.    Eyes: Negative for pain, redness and visual disturbance.   Respiratory: Negative for cough, shortness of breath and wheezing.    Cardiovascular: Negative for chest pain, palpitations and leg swelling.   Gastrointestinal: Negative for abdominal pain, constipation, diarrhea, nausea and vomiting.   Genitourinary: Positive for difficulty urinating. Negative for decreased urine volume, dysuria, frequency and urgency.        Nocturia     Musculoskeletal: Negative for back pain, joint swelling and neck pain.   Skin: Negative for color change, rash and wound.   Neurological: Negative for dizziness, tremors, weakness, light-headedness and headaches.         Objective:      Physical Exam   Constitutional: He is oriented to person, place, and time. He appears well-developed and well-nourished. No distress.   HENT:   Head: Normocephalic and atraumatic.   Right Ear: External ear normal.   Left Ear: External ear normal.   Eyes: Conjunctivae and EOM are normal. Pupils are equal, round, and reactive to light.   Neck: Neck supple. No tracheal deviation present.   Cardiovascular: Normal rate and regular rhythm.    No murmur heard.  Pulmonary/Chest: Effort normal and breath sounds normal. No respiratory distress. He has no wheezes. He has no rales.   Abdominal: Soft. Bowel sounds are normal. He exhibits no distension. There is no tenderness.   Neurological: He is alert and oriented to person, place, and time. No cranial nerve deficit.   Skin: Skin is warm and dry.   Psychiatric: He has a normal mood and affect. His behavior is normal.   Vitals reviewed.      Assessment:       1. Poor appetite    2. Benign prostatic hyperplasia with nocturia        Plan:       Walter was seen today for follow-up.    Diagnoses and all orders for this visit:    Poor appetite  - etiology unclear to me  - could have been viral with the diarrhea reported and sounds like it is resolving on it's own at this  point  - weight is stable  - no other concerning sx reported  - will monitor and check in with him next week for his 6 months follow u pappt    Benign prostatic hyperplasia with nocturia  -     tamsulosin (FLOMAX) 0.4 mg Cp24; Take 1 capsule (0.4 mg total) by mouth once daily.  -     PSA, Screening; Future  New problem  Check PSA  Start flomax  Follow up next week for sx improvement    RTC as scheduled with labs and PRN

## 2017-07-13 LAB
ESTIMATED AVG GLUCOSE: 148 MG/DL
HBA1C MFR BLD HPLC: 6.8 %

## 2017-07-19 ENCOUNTER — OFFICE VISIT (OUTPATIENT)
Dept: INTERNAL MEDICINE | Facility: CLINIC | Age: 67
End: 2017-07-19
Payer: MEDICARE

## 2017-07-19 VITALS
HEIGHT: 67 IN | WEIGHT: 265.63 LBS | DIASTOLIC BLOOD PRESSURE: 62 MMHG | SYSTOLIC BLOOD PRESSURE: 132 MMHG | OXYGEN SATURATION: 94 % | RESPIRATION RATE: 18 BRPM | BODY MASS INDEX: 41.69 KG/M2 | HEART RATE: 58 BPM

## 2017-07-19 DIAGNOSIS — I25.118 CORONARY ARTERY DISEASE OF NATIVE ARTERY OF NATIVE HEART WITH STABLE ANGINA PECTORIS: Primary | ICD-10-CM

## 2017-07-19 DIAGNOSIS — K21.9 GASTROESOPHAGEAL REFLUX DISEASE, ESOPHAGITIS PRESENCE NOT SPECIFIED: ICD-10-CM

## 2017-07-19 DIAGNOSIS — E11.42 DIABETIC POLYNEUROPATHY ASSOCIATED WITH TYPE 2 DIABETES MELLITUS: ICD-10-CM

## 2017-07-19 DIAGNOSIS — I10 BENIGN ESSENTIAL HTN: ICD-10-CM

## 2017-07-19 DIAGNOSIS — E66.9 DIABETES MELLITUS TYPE 2 IN OBESE: ICD-10-CM

## 2017-07-19 DIAGNOSIS — E78.5 HYPERLIPIDEMIA, UNSPECIFIED HYPERLIPIDEMIA TYPE: ICD-10-CM

## 2017-07-19 DIAGNOSIS — E11.69 DIABETES MELLITUS TYPE 2 IN OBESE: ICD-10-CM

## 2017-07-19 DIAGNOSIS — N40.0 BENIGN PROSTATIC HYPERPLASIA WITHOUT LOWER URINARY TRACT SYMPTOMS: ICD-10-CM

## 2017-07-19 DIAGNOSIS — Z95.1 S/P CABG X 5: ICD-10-CM

## 2017-07-19 PROBLEM — Z12.11 COLON CANCER SCREENING: Status: RESOLVED | Noted: 2017-02-02 | Resolved: 2017-07-19

## 2017-07-19 PROCEDURE — 3066F NEPHROPATHY DOC TX: CPT | Mod: S$GLB,,, | Performed by: INTERNAL MEDICINE

## 2017-07-19 PROCEDURE — 99214 OFFICE O/P EST MOD 30 MIN: CPT | Mod: S$GLB,,, | Performed by: INTERNAL MEDICINE

## 2017-07-19 PROCEDURE — 99999 PR PBB SHADOW E&M-EST. PATIENT-LVL III: CPT | Mod: PBBFAC,,, | Performed by: INTERNAL MEDICINE

## 2017-07-19 PROCEDURE — 99499 UNLISTED E&M SERVICE: CPT | Mod: S$PBB,,, | Performed by: INTERNAL MEDICINE

## 2017-07-19 PROCEDURE — 1159F MED LIST DOCD IN RCRD: CPT | Mod: S$GLB,,, | Performed by: INTERNAL MEDICINE

## 2017-07-19 PROCEDURE — 3044F HG A1C LEVEL LT 7.0%: CPT | Mod: S$GLB,,, | Performed by: INTERNAL MEDICINE

## 2017-07-19 RX ORDER — NITROGLYCERIN 0.4 MG/1
0.4 TABLET SUBLINGUAL EVERY 5 MIN PRN
Qty: 90 TABLET | Refills: 0 | Status: SHIPPED | OUTPATIENT
Start: 2017-07-19 | End: 2018-05-22 | Stop reason: SDUPTHER

## 2017-07-19 NOTE — PROGRESS NOTES
Subjective:       Patient ID: Walter Bull is a 66 y.o. male.    Chief Complaint: Follow-up      HPI:  Patient is known to me and presents for follow up CAD, DM type 2, HLD. Labs from 7./12/17 personally reviewed and discussed with the patient today.     H/H remains stable at 12/38; MCV normal at 85    CAD s/p 5v CABG: following with Dr. Vitale as well. Now on Imdur and Ranexa and chest pains are resolved. Not using NTG as much  Also reports h/o CHF, on lasix 40mg once a day. Denies SOB, [place] and orthopnea.   He takes ASA, BB. On Crestor. Unsure why not on ACEi/ARB as it sounds like he has systolic heart failure, will still need to get records.     Dm type 2: NPH 90 units AM and 40 units bedtime and Janumet. He does not check blood sugars at home. A1C 6.8% from 8.1% prior . He does report numbness and tingling of left foot > right foot and b/l fingers; sx have been present for several years. Not on medicine for neuropathy. Denies dizziness, syncope. Denies polyuria, polydipsia  Foot exam: 1/2017  Eye exam: last exam 3 months ago, needs to establish here  Microalb: 1/2017     HLD: on crestor, has been taking for several years. Denies side effects. LDL 72     HTN: on amlodipine. Unsure why not on ACEi/ARB, will need to get prior records to determine why. If no reason I would like to start low dose due to DM.     GERD: Uses Zantac PRN. Works well, denies abd pain, n/v/d/c.     BPH: start flomax at last visit due to poor stream and nocturia. He has already noticed an improvement in sx. No medication side effects    Tobacco use: quit 1981; previously smoked 3 PPD for 20 years  EtOH: stopped drink 1982; was a daily drink 2 fifth liquor daily  Illicit drug: denies  Past Medical History:   Diagnosis Date    Anticoagulant long-term use     CHF (congestive heart failure)     Colon cancer screening 2/2/2017    Coronary artery disease     Diabetes mellitus type I     Disorder of kidney and ureter     Encounter for blood  transfusion     Glaucoma     Heart disease     Hypertension     Kidney failure     post CABG       Family History   Problem Relation Age of Onset    Diabetes Mother     Heart disease Mother        Social History     Social History    Marital status:      Spouse name: N/A    Number of children: N/A    Years of education: N/A     Occupational History    Not on file.     Social History Main Topics    Smoking status: Former Smoker     Packs/day: 3.00     Types: Cigarettes     Start date: 1/18/1963     Quit date: 1/1/1981    Smokeless tobacco: Former User     Types: Snuff, Chew     Quit date: 1984    Alcohol use No    Drug use: No    Sexual activity: Not on file      Comment: -with a significant other     Other Topics Concern    Not on file     Social History Narrative    No narrative on file       Review of Systems   Constitutional: Negative for activity change, fatigue, fever and unexpected weight change.   HENT: Negative for congestion, ear pain, hearing loss, rhinorrhea, sore throat and tinnitus.    Eyes: Negative for pain, redness and visual disturbance.   Respiratory: Negative for cough, shortness of breath and wheezing.    Cardiovascular: Negative for chest pain, palpitations and leg swelling.   Gastrointestinal: Negative for abdominal pain, blood in stool, constipation, diarrhea, nausea and vomiting.   Genitourinary: Negative for decreased urine volume, dysuria, frequency and urgency.   Musculoskeletal: Negative for back pain, joint swelling and neck pain.   Skin: Negative for color change, rash and wound.   Neurological: Negative for dizziness, tremors, weakness, light-headedness and headaches.         Objective:      Physical Exam   Constitutional: He is oriented to person, place, and time. He appears well-developed and well-nourished. No distress.   HENT:   Head: Normocephalic and atraumatic.   Right Ear: External ear normal.   Left Ear: External ear normal.   Eyes:  Conjunctivae and EOM are normal. Pupils are equal, round, and reactive to light. Right eye exhibits no discharge. Left eye exhibits no discharge.   Neck: Neck supple. No tracheal deviation present.   Cardiovascular: Normal rate and regular rhythm.  Exam reveals no gallop and no friction rub.    No murmur heard.  Pulmonary/Chest: Effort normal and breath sounds normal. No respiratory distress. He has no wheezes. He has no rales.   Abdominal: Soft. Bowel sounds are normal. He exhibits no distension. There is no tenderness. There is no rebound and no guarding.   Musculoskeletal: He exhibits no edema.   Neurological: He is alert and oriented to person, place, and time. No cranial nerve deficit.   Skin: Skin is warm and dry.   Psychiatric: He has a normal mood and affect. His behavior is normal.   Vitals reviewed.      Assessment:       1. Coronary artery disease of native artery of native heart with stable angina pectoris    2. S/P CABG x 5    3. Diabetes mellitus type 2 in obese    4. Diabetic polyneuropathy associated with type 2 diabetes mellitus    5. Hyperlipidemia, unspecified hyperlipidemia type    6. Gastroesophageal reflux disease, esophagitis presence not specified    7. Benign essential HTN    8. Benign prostatic hyperplasia without lower urinary tract symptoms        Plan:       Walter was seen today for follow-up.    Diagnoses and all orders for this visit:    Coronary artery disease of native artery of native heart with stable angina pectoris  -     nitroGLYCERIN (NITROSTAT) 0.4 MG SL tablet; Place 1 tablet (0.4 mg total) under the tongue every 5 (five) minutes as needed for Chest pain.  -     CBC auto differential; Future  -     Comprehensive metabolic panel; Future  -     TSH; Future  -     Lipid panel; Future  Chronic, stable  No more angina on imdur and ranex. Has PRN NTG  Following with Dr. Vitale  Cont BB, ASA, plavix and statin  LDL at goal  DM controlled    S/P CABG x 5  -     nitroGLYCERIN  (NITROSTAT) 0.4 MG SL tablet; Place 1 tablet (0.4 mg total) under the tongue every 5 (five) minutes as needed for Chest pain.  -     CBC auto differential; Future  -     Comprehensive metabolic panel; Future  -     Lipid panel; Future  -     Hemoglobin A1c; Future  Chronic, stable  No more angina on imdur and ranex. Has PRN NTG  Following with Dr. Vitale  Cont BB, ASA, plavix and statin    Diabetes mellitus type 2 in obese  -     CBC auto differential; Future  -     Comprehensive metabolic panel; Future  -     TSH; Future  -     Lipid panel; Future  -     Hemoglobin A1c; Future  -     Microalbumin/creatinine urine ratio; Future  A1C great control!  Cont insulin and janumet at same doses  Check sugars and keep log  1800 cookie ADA diet  Exercise weight loss disucssed  Foot exam: 1/2017  Eye exam: last exam 3 months ago, needs to establish here  Microalb: 1/2017  On statin  Unclear why not on ACEi/ARB prior; would like to start low dose. Trying to get prior records as he is unsure if he was ever on these medications    Diabetic polyneuropathy associated with type 2 diabetes mellitus  -     CBC auto differential; Future  -     Comprehensive metabolic panel; Future  -     TSH; Future  -     Lipid panel; Future  -     Hemoglobin A1c; Future  -     Microalbumin/creatinine urine ratio; Future  Sugars much better controlled  Cont meds at same dose  No meds for neuropathy right now. Will monitor and start gabapentin if worsening sx    Hyperlipidemia, unspecified hyperlipidemia type  -     CBC auto differential; Future  -     Comprehensive metabolic panel; Future  -     Lipid panel; Future  Chronic, controlled  Cont statin at same dose    Gastroesophageal reflux disease, esophagitis presence not specified  Chronic, controlled  Cont PRN zantac    Benign essential HTN  -     CBC auto differential; Future  -     Comprehensive metabolic panel; Future  -     Lipid panel; Future  Chronic controlled  Cont meds at same dose  Low Na  diet  Check BP and keep log  Diet and exercise and weight loss disucssed    Benign prostatic hyperplasia without lower urinary tract symptoms  New problem  Start flomax  Sx now resolved  Cont meds at same dose  PSA ordered    Health Maintenance:  -CRC: due for repeat, ordered today  -PSA: ordered  -AAA 1/2017 negative  -tobacco: former smoker  -Vaccines  Flu: did 2016  Pneumonia: received both prevnar and pneumovx  Zoster: needs to get      RTC 6 months with labs and PRN

## 2017-10-20 RX ORDER — SITAGLIPTIN AND METFORMIN HYDROCHLORIDE 1000; 50 MG/1; MG/1
TABLET, FILM COATED ORAL
Qty: 180 TABLET | Refills: 1 | Status: SHIPPED | OUTPATIENT
Start: 2017-10-20 | End: 2018-05-16 | Stop reason: SDUPTHER

## 2017-10-20 RX ORDER — METOPROLOL TARTRATE 50 MG/1
TABLET ORAL
Qty: 180 TABLET | Refills: 1 | Status: SHIPPED | OUTPATIENT
Start: 2017-10-20 | End: 2018-05-14 | Stop reason: SDUPTHER

## 2017-10-24 RX ORDER — ROSUVASTATIN CALCIUM 20 MG/1
20 TABLET, COATED ORAL DAILY
Qty: 30 TABLET | Refills: 5 | Status: SHIPPED | OUTPATIENT
Start: 2017-10-24 | End: 2018-07-11 | Stop reason: SDUPTHER

## 2017-11-14 ENCOUNTER — HOSPITAL ENCOUNTER (EMERGENCY)
Facility: HOSPITAL | Age: 67
Discharge: HOME | End: 2017-11-14
Attending: FAMILY MEDICINE
Payer: MEDICARE

## 2017-11-14 VITALS
DIASTOLIC BLOOD PRESSURE: 61 MMHG | BODY MASS INDEX: 40.88 KG/M2 | WEIGHT: 261 LBS | SYSTOLIC BLOOD PRESSURE: 138 MMHG | TEMPERATURE: 101 F | RESPIRATION RATE: 20 BRPM | HEART RATE: 67 BPM

## 2017-11-14 DIAGNOSIS — J40 BRONCHITIS: Primary | ICD-10-CM

## 2017-11-14 LAB
FLUAV AG SPEC QL IA: NEGATIVE
FLUBV AG SPEC QL IA: NEGATIVE
SPECIMEN SOURCE: NORMAL

## 2017-11-14 PROCEDURE — 63600175 PHARM REV CODE 636 W HCPCS: Performed by: FAMILY MEDICINE

## 2017-11-14 PROCEDURE — 99283 EMERGENCY DEPT VISIT LOW MDM: CPT | Mod: 25

## 2017-11-14 PROCEDURE — 25000003 PHARM REV CODE 250: Performed by: FAMILY MEDICINE

## 2017-11-14 PROCEDURE — 96372 THER/PROPH/DIAG INJ SC/IM: CPT

## 2017-11-14 PROCEDURE — 87400 INFLUENZA A/B EACH AG IA: CPT | Mod: 59

## 2017-11-14 RX ORDER — DOXYCYCLINE 100 MG/1
100 CAPSULE ORAL 2 TIMES DAILY
Qty: 20 CAPSULE | Refills: 0 | Status: SHIPPED | OUTPATIENT
Start: 2017-11-14 | End: 2017-11-24

## 2017-11-14 RX ORDER — KETOROLAC TROMETHAMINE 10 MG/1
10 TABLET, FILM COATED ORAL
Status: COMPLETED | OUTPATIENT
Start: 2017-11-14 | End: 2017-11-14

## 2017-11-14 RX ORDER — DOXYCYCLINE HYCLATE 100 MG
100 TABLET ORAL
Status: COMPLETED | OUTPATIENT
Start: 2017-11-14 | End: 2017-11-14

## 2017-11-14 RX ORDER — METHYLPREDNISOLONE SOD SUCC 125 MG
125 VIAL (EA) INJECTION
Status: COMPLETED | OUTPATIENT
Start: 2017-11-14 | End: 2017-11-14

## 2017-11-14 RX ORDER — ACETAMINOPHEN 500 MG
1000 TABLET ORAL
Status: COMPLETED | OUTPATIENT
Start: 2017-11-14 | End: 2017-11-14

## 2017-11-14 RX ADMIN — KETOROLAC TROMETHAMINE 10 MG: 10 TABLET, FILM COATED ORAL at 03:11

## 2017-11-14 RX ADMIN — ACETAMINOPHEN 1000 MG: 500 TABLET ORAL at 03:11

## 2017-11-14 RX ADMIN — METHYLPREDNISOLONE SODIUM SUCCINATE 125 MG: 125 INJECTION, POWDER, FOR SOLUTION INTRAMUSCULAR; INTRAVENOUS at 03:11

## 2017-11-14 RX ADMIN — DOXYCYCLINE HYCLATE 100 MG: 100 TABLET, COATED ORAL at 03:11

## 2017-11-14 NOTE — ED PROVIDER NOTES
Encounter Date: 11/14/2017       History     Chief Complaint   Patient presents with    Fever     The history is provided by the patient. No  was used.   URI   The primary symptoms include fever (Subjective), fatigue, sore throat, cough (Dry) and myalgias. Primary symptoms do not include headaches, ear pain, swollen glands, wheezing, abdominal pain, nausea, vomiting, arthralgias or rash. The current episode started two days ago. This is a new problem. The problem has been gradually worsening. The fever began several days ago. The fever has been resolved since its onset. The fever has been present for 1 to 2 days. The temperature was taken by no thermometer. The maximum temperature recorded prior to his arrival was unknown.   The fatigue began 2 days ago.   The sore throat began 2 days ago. The sore throat has been unchanged since its onset. The sore throat is not accompanied by trouble swallowing, drooling, hoarse voice or stridor. The sore throat pain is at a severity of 1/10.   The cough began 2 days ago. The cough is non-productive. The sputum is clear.   Myalgias began 2 days ago. The myalgias have been unchanged since their onset. The myalgias are generalized. The myalgias are not associated with weakness, tenderness or swelling. The myalgia pain is at a severity of 2/10.   Symptoms associated with the illness include rhinorrhea. The illness is not associated with chills, plugged ear sensation, facial pain, sinus pressure or congestion. The following treatments were addressed: Acetaminophen was not tried. A decongestant was ineffective. Aspirin was not tried. NSAIDs were not tried. Risk factors for severe complications from URI include being elderly.     Patient had onset 2 days ago of subjective fever cough and rhinorrhea.  He also had diffuse body aches.  No nausea or vomiting.  Mild sore throat.  Has some chest discomfort with coughing.  No shortness of breath.  Medication includes  DayQuil NyQuil and Coricidin.  He quit smoking approximately 35 years ago.  No diarrhea.    Review of patient's allergies indicates:  No Known Allergies  Past Medical History:   Diagnosis Date    Anticoagulant long-term use     CHF (congestive heart failure)     Colon cancer screening 2/2/2017    Coronary artery disease     Diabetes mellitus type I     Disorder of kidney and ureter     Encounter for blood transfusion     Glaucoma     Heart disease     Hypertension     Kidney failure     post CABG     Past Surgical History:   Procedure Laterality Date    COLON SURGERY  2006    COLONOSCOPY N/A 2/2/2017    Procedure: COLONOSCOPY;  Surgeon: Ronnie Conway MD;  Location[facility];  Service: Endoscopy;  Laterality: N/A;    CORONARY ARTERY BYPASS GRAFT  2005    5 arteries    EYE SURGERY Bilateral 2016    Laser surgery for glaucoma     Family History   Problem Relation Age of Onset    Diabetes Mother     Heart disease Mother      Social History   Substance Use Topics    Smoking status: Former Smoker     Packs/day: 3.00     Types: Cigarettes     Start date: 1/18/1963     Quit date: 1/1/1981    Smokeless tobacco: Former User     Types: Snuff, Chew     Quit date: 1984    Alcohol use No     Review of Systems   Constitutional: Positive for fatigue and fever (Subjective). Negative for chills.   HENT: Positive for rhinorrhea and sore throat. Negative for congestion, drooling, ear pain, hoarse voice, sinus pressure and trouble swallowing.    Respiratory: Positive for cough (Dry). Negative for wheezing and stridor.    Gastrointestinal: Negative for abdominal pain, nausea and vomiting.   Musculoskeletal: Positive for myalgias. Negative for arthralgias.   Skin: Negative for rash.   Neurological: Negative for weakness and headaches.       Physical Exam     Initial Vitals [11/14/17 0217]   BP Pulse Resp Temp SpO2   (!) 158/61 68 (!) 22 98.8 °F (37.1 °C) --      MAP       93.33         Physical Exam    Nursing note  and vitals reviewed.  Constitutional: He appears well-developed and well-nourished.   Obese elderly male in no acute distress.   HENT:   Head: Normocephalic.   Right Ear: External ear normal.   Left Ear: External ear normal.   Nose: Nose normal.   Mouth/Throat: Oropharynx is clear and moist.   Eyes: Conjunctivae and EOM are normal. Pupils are equal, round, and reactive to light.   Neck: Normal range of motion. Neck supple.   Cardiovascular: Normal rate, regular rhythm and normal heart sounds.   Pulmonary/Chest: Breath sounds normal. No respiratory distress. He has no wheezes. He has no rhonchi. He has no rales.   Abdominal: Soft. Bowel sounds are normal. He exhibits no distension. There is no tenderness.   Musculoskeletal: Normal range of motion.   Neurological: He is alert and oriented to person, place, and time.   Skin: Skin is warm and dry.   Psychiatric: He has a normal mood and affect. Thought content normal.         ED Course   Procedures  Labs Reviewed   INFLUENZA A AND B ANTIGEN                               ED Course      Clinical Impression:   The encounter diagnosis was Bronchitis.                           Rodríguez Frazier MD  11/14/17 [id number]

## 2017-11-20 ENCOUNTER — OFFICE VISIT (OUTPATIENT)
Dept: INTERNAL MEDICINE | Facility: CLINIC | Age: 67
End: 2017-11-20
Payer: MEDICARE

## 2017-11-20 ENCOUNTER — HOSPITAL ENCOUNTER (OUTPATIENT)
Dept: RADIOLOGY | Facility: HOSPITAL | Age: 67
Discharge: HOME | End: 2017-11-20
Attending: INTERNAL MEDICINE
Payer: MEDICARE

## 2017-11-20 ENCOUNTER — TELEPHONE (OUTPATIENT)
Dept: INTERNAL MEDICINE | Facility: CLINIC | Age: 67
End: 2017-11-20

## 2017-11-20 VITALS
RESPIRATION RATE: 14 BRPM | SYSTOLIC BLOOD PRESSURE: 110 MMHG | HEIGHT: 67 IN | BODY MASS INDEX: 40.17 KG/M2 | DIASTOLIC BLOOD PRESSURE: 58 MMHG | WEIGHT: 255.94 LBS | HEART RATE: 61 BPM | OXYGEN SATURATION: 97 %

## 2017-11-20 DIAGNOSIS — J40 BRONCHITIS: Primary | ICD-10-CM

## 2017-11-20 DIAGNOSIS — J40 BRONCHITIS: ICD-10-CM

## 2017-11-20 PROCEDURE — 71020 XR CHEST PA AND LATERAL: CPT | Mod: 26,,, | Performed by: RADIOLOGY

## 2017-11-20 PROCEDURE — 71020 XR CHEST PA AND LATERAL: CPT | Mod: TC

## 2017-11-20 PROCEDURE — 99214 OFFICE O/P EST MOD 30 MIN: CPT | Mod: S$GLB,,, | Performed by: INTERNAL MEDICINE

## 2017-11-20 PROCEDURE — 99999 PR PBB SHADOW E&M-EST. PATIENT-LVL III: CPT | Mod: PBBFAC,,, | Performed by: INTERNAL MEDICINE

## 2017-11-20 PROCEDURE — 99499 UNLISTED E&M SERVICE: CPT | Mod: S$PBB,,, | Performed by: INTERNAL MEDICINE

## 2017-11-20 RX ORDER — ALBUTEROL SULFATE 90 UG/1
2 AEROSOL, METERED RESPIRATORY (INHALATION) EVERY 6 HOURS PRN
Qty: 1 INHALER | Refills: 5 | Status: ON HOLD | OUTPATIENT
Start: 2017-11-20 | End: 2018-11-20 | Stop reason: SDUPTHER

## 2017-11-20 RX ORDER — METHYLPREDNISOLONE 4 MG/1
TABLET ORAL
Qty: 1 PACKAGE | Refills: 0 | Status: SHIPPED | OUTPATIENT
Start: 2017-11-20 | End: 2018-01-24

## 2017-11-20 NOTE — PROGRESS NOTES
Subjective:       Patient ID: Walter Bull is a 67 y.o. male.    Chief Complaint: Cough and Wheezing      HPI:  Patient is known to me and presents for ER follow for bronchitis. He was given IM steroids and started on doxycycline. He is still on doxy. Using Vicks vapor rub which is helping. However he still feels SOB. He was flu negative on 11/14/17. Subjective fevers. + wheezing. Cough is sometimes productive or sputum.     Past Medical History:   Diagnosis Date    Anticoagulant long-term use     CHF (congestive heart failure)     Colon cancer screening 2/2/2017    Coronary artery disease     Diabetes mellitus type I     Disorder of kidney and ureter     Encounter for blood transfusion     Glaucoma     Heart disease     Hypertension     Kidney failure     post CABG       Family History   Problem Relation Age of Onset    Diabetes Mother     Heart disease Mother        Social History     Social History    Marital status:      Spouse name: N/A    Number of children: N/A    Years of education: N/A     Occupational History    Not on file.     Social History Main Topics    Smoking status: Former Smoker     Packs/day: 3.00     Types: Cigarettes     Start date: 1/18/1963     Quit date: 1/1/1981    Smokeless tobacco: Former User     Types: Snuff, Chew     Quit date: 1984    Alcohol use No    Drug use: No    Sexual activity: Not on file      Comment: -with a significant other     Other Topics Concern    Not on file     Social History Narrative    No narrative on file       Review of Systems   Constitutional: Negative for activity change, fatigue, fever and unexpected weight change.   HENT: Negative for congestion, ear pain, hearing loss, rhinorrhea and sore throat.    Eyes: Negative for pain, redness and visual disturbance.   Respiratory: Positive for cough, shortness of breath and wheezing.    Cardiovascular: Negative for chest pain, palpitations and leg swelling.    Gastrointestinal: Negative for abdominal pain, constipation, diarrhea, nausea and vomiting.   Genitourinary: Negative for decreased urine volume, dysuria, frequency and urgency.   Musculoskeletal: Negative for back pain, joint swelling and neck pain.   Skin: Negative for color change, rash and wound.   Neurological: Negative for dizziness, light-headedness and headaches.         Objective:      Physical Exam   Constitutional: He is oriented to person, place, and time. He appears well-developed and well-nourished. No distress.   HENT:   Head: Normocephalic and atraumatic.   Right Ear: Tympanic membrane and external ear normal.   Left Ear: Tympanic membrane and external ear normal.   Mouth/Throat: Posterior oropharyngeal erythema present. No oropharyngeal exudate or posterior oropharyngeal edema.   Eyes: Conjunctivae and EOM are normal. Pupils are equal, round, and reactive to light. Right eye exhibits no discharge. Left eye exhibits no discharge.   Neck: Neck supple. No tracheal deviation present.   Cardiovascular: Normal rate and regular rhythm.  Exam reveals no gallop and no friction rub.    No murmur heard.  Pulmonary/Chest: Effort normal. No respiratory distress. He has wheezes.   rhonchit left lung throughout   Abdominal: Soft. Bowel sounds are normal. He exhibits no distension. There is no tenderness.   Neurological: He is alert and oriented to person, place, and time. No cranial nerve deficit.   Skin: Skin is warm and dry.   Psychiatric: He has a normal mood and affect. His behavior is normal.   Vitals reviewed.      Assessment:       1. Bronchitis        Plan:       Walter was seen today for cough and wheezing.    Diagnoses and all orders for this visit:    Bronchitis  -     X-Ray Chest PA And Lateral; Future  -     albuterol 90 mcg/actuation inhaler; Inhale 2 puffs into the lungs every 6 (six) hours as needed for Wheezing.  -     methylPREDNISolone (MEDROL DOSEPACK) 4 mg tablet; use as directed    not  improving  cont doxy for now. If CXR shows consolidation may switch to levaquin  Start steroids, WATCH SUGARS and call if elevated  Albuterol PRN for wheezing

## 2017-11-20 NOTE — TELEPHONE ENCOUNTER
----- Message from Rebeca Palacios MA sent at 2017 10:25 AM CST -----  Contact: Eve / wife  Walter Bull  MRN: [id number]  : 1950  PCP: Belkys Kruger  Home Phone      [phone]  Work Phone      Not on file.  Mobile          [phone]      MESSAGE:   Would like to be seen today for bronchitis.    Phone:  [phone]

## 2017-11-20 NOTE — PROGRESS NOTES
Patient, Walter Bull (MRN #[id number]), presented with a recorded BMI of 40.09 kg/m^2 consistent with the definition of morbid obesity (ICD-10 E66.01). The patient's morbid obesity was monitored, evaluated, addressed and/or treated. This addendum to the medical record is made on 11/20/2017.

## 2018-01-15 ENCOUNTER — LAB VISIT (OUTPATIENT)
Dept: LAB | Facility: HOSPITAL | Age: 68
End: 2018-01-15
Attending: INTERNAL MEDICINE
Payer: MEDICARE

## 2018-01-15 DIAGNOSIS — E78.5 HYPERLIPIDEMIA, UNSPECIFIED HYPERLIPIDEMIA TYPE: ICD-10-CM

## 2018-01-15 DIAGNOSIS — E11.42 DIABETIC POLYNEUROPATHY ASSOCIATED WITH TYPE 2 DIABETES MELLITUS: ICD-10-CM

## 2018-01-15 DIAGNOSIS — K21.9 GASTROESOPHAGEAL REFLUX DISEASE, ESOPHAGITIS PRESENCE NOT SPECIFIED: ICD-10-CM

## 2018-01-15 DIAGNOSIS — I25.118 CORONARY ARTERY DISEASE OF NATIVE ARTERY OF NATIVE HEART WITH STABLE ANGINA PECTORIS: ICD-10-CM

## 2018-01-15 DIAGNOSIS — Z95.1 S/P CABG X 5: ICD-10-CM

## 2018-01-15 DIAGNOSIS — Z87.891 HISTORY OF TOBACCO USE: ICD-10-CM

## 2018-01-15 DIAGNOSIS — E66.9 DIABETES MELLITUS TYPE 2 IN OBESE: ICD-10-CM

## 2018-01-15 DIAGNOSIS — N40.0 BENIGN PROSTATIC HYPERPLASIA WITHOUT LOWER URINARY TRACT SYMPTOMS: ICD-10-CM

## 2018-01-15 DIAGNOSIS — E11.69 DIABETES MELLITUS TYPE 2 IN OBESE: ICD-10-CM

## 2018-01-15 DIAGNOSIS — I10 BENIGN ESSENTIAL HTN: ICD-10-CM

## 2018-01-15 LAB
25(OH)D3+25(OH)D2 SERPL-MCNC: 26 NG/ML
ALBUMIN SERPL BCP-MCNC: 3.2 G/DL
ALP SERPL-CCNC: 67 U/L
ALT SERPL W/O P-5'-P-CCNC: 23 U/L
ANION GAP SERPL CALC-SCNC: 8 MMOL/L
AST SERPL-CCNC: 21 U/L
BASOPHILS # BLD AUTO: 0.03 K/UL
BASOPHILS NFR BLD: 0.4 %
BILIRUB SERPL-MCNC: 0.4 MG/DL
BILIRUB UR QL STRIP: NEGATIVE
BUN SERPL-MCNC: 10 MG/DL
CALCIUM SERPL-MCNC: 9 MG/DL
CHLORIDE SERPL-SCNC: 108 MMOL/L
CHOLEST SERPL-MCNC: 119 MG/DL
CHOLEST/HDLC SERPL: 3.1 {RATIO}
CLARITY UR: CLEAR
CO2 SERPL-SCNC: 25 MMOL/L
COLOR UR: YELLOW
COMPLEXED PSA SERPL-MCNC: 1.7 NG/ML
CREAT SERPL-MCNC: 1 MG/DL
CREAT UR-MCNC: 240.7 MG/DL
CREAT UR-MCNC: 240.7 MG/DL
DIFFERENTIAL METHOD: ABNORMAL
EOSINOPHIL # BLD AUTO: 0.3 K/UL
EOSINOPHIL NFR BLD: 4.1 %
ERYTHROCYTE [DISTWIDTH] IN BLOOD BY AUTOMATED COUNT: 15.4 %
EST. GFR  (AFRICAN AMERICAN): >60 ML/MIN/1.73 M^2
EST. GFR  (NON AFRICAN AMERICAN): >60 ML/MIN/1.73 M^2
ESTIMATED AVG GLUCOSE: 174 MG/DL
GLUCOSE SERPL-MCNC: 107 MG/DL
GLUCOSE UR QL STRIP: NEGATIVE
HBA1C MFR BLD HPLC: 7.7 %
HCT VFR BLD AUTO: 37.8 %
HDLC SERPL-MCNC: 38 MG/DL
HDLC SERPL: 31.9 %
HGB BLD-MCNC: 12 G/DL
HGB UR QL STRIP: NEGATIVE
KETONES UR QL STRIP: ABNORMAL
LDLC SERPL CALC-MCNC: 69.8 MG/DL
LEUKOCYTE ESTERASE UR QL STRIP: NEGATIVE
LYMPHOCYTES # BLD AUTO: 2.5 K/UL
LYMPHOCYTES NFR BLD: 35 %
MAGNESIUM SERPL-MCNC: 1.6 MG/DL
MCH RBC QN AUTO: 27.6 PG
MCHC RBC AUTO-ENTMCNC: 31.7 G/DL
MCV RBC AUTO: 87 FL
MICROALBUMIN UR DL<=1MG/L-MCNC: 10 UG/ML
MICROALBUMIN/CREATININE RATIO: 4.2 UG/MG
MONOCYTES # BLD AUTO: 0.6 K/UL
MONOCYTES NFR BLD: 8.7 %
NEUTROPHILS # BLD AUTO: 3.6 K/UL
NEUTROPHILS NFR BLD: 51.8 %
NITRITE UR QL STRIP: NEGATIVE
NONHDLC SERPL-MCNC: 81 MG/DL
PH UR STRIP: 6 [PH] (ref 5–8)
PLATELET # BLD AUTO: 200 K/UL
PMV BLD AUTO: 10.7 FL
POTASSIUM SERPL-SCNC: 4.5 MMOL/L
PROT SERPL-MCNC: 6.4 G/DL
PROT UR QL STRIP: NEGATIVE
PROT UR-MCNC: 20 MG/DL
PROT/CREAT RATIO, UR: 0.08
PTH-INTACT SERPL-MCNC: 54 PG/ML
RBC # BLD AUTO: 4.34 M/UL
SODIUM SERPL-SCNC: 141 MMOL/L
SP GR UR STRIP: >=1.03 (ref 1–1.03)
TRIGL SERPL-MCNC: 56 MG/DL
TSH SERPL DL<=0.005 MIU/L-ACNC: 1.64 UIU/ML
URN SPEC COLLECT METH UR: ABNORMAL
UROBILINOGEN UR STRIP-ACNC: NEGATIVE EU/DL
WBC # BLD AUTO: 7.01 K/UL

## 2018-01-15 PROCEDURE — 84153 ASSAY OF PSA TOTAL: CPT

## 2018-01-15 PROCEDURE — 81003 URINALYSIS AUTO W/O SCOPE: CPT

## 2018-01-15 PROCEDURE — 82043 UR ALBUMIN QUANTITATIVE: CPT

## 2018-01-15 PROCEDURE — 83735 ASSAY OF MAGNESIUM: CPT

## 2018-01-15 PROCEDURE — 87340 HEPATITIS B SURFACE AG IA: CPT

## 2018-01-15 PROCEDURE — 80061 LIPID PANEL: CPT

## 2018-01-15 PROCEDURE — 84165 PROTEIN E-PHORESIS SERUM: CPT

## 2018-01-15 PROCEDURE — 86803 HEPATITIS C AB TEST: CPT

## 2018-01-15 PROCEDURE — 86334 IMMUNOFIX E-PHORESIS SERUM: CPT | Mod: 26,,, | Performed by: PATHOLOGY

## 2018-01-15 PROCEDURE — 82570 ASSAY OF URINE CREATININE: CPT

## 2018-01-15 PROCEDURE — 83970 ASSAY OF PARATHORMONE: CPT

## 2018-01-15 PROCEDURE — 86334 IMMUNOFIX E-PHORESIS SERUM: CPT

## 2018-01-15 PROCEDURE — 86706 HEP B SURFACE ANTIBODY: CPT

## 2018-01-15 PROCEDURE — 36415 COLL VENOUS BLD VENIPUNCTURE: CPT

## 2018-01-15 PROCEDURE — 86038 ANTINUCLEAR ANTIBODIES: CPT

## 2018-01-15 PROCEDURE — 84443 ASSAY THYROID STIM HORMONE: CPT

## 2018-01-15 PROCEDURE — 86255 FLUORESCENT ANTIBODY SCREEN: CPT

## 2018-01-15 PROCEDURE — 80053 COMPREHEN METABOLIC PANEL: CPT

## 2018-01-15 PROCEDURE — 82306 VITAMIN D 25 HYDROXY: CPT

## 2018-01-15 PROCEDURE — 85025 COMPLETE CBC W/AUTO DIFF WBC: CPT

## 2018-01-15 PROCEDURE — 84165 PROTEIN E-PHORESIS SERUM: CPT | Mod: 26,,, | Performed by: PATHOLOGY

## 2018-01-15 PROCEDURE — 83036 HEMOGLOBIN GLYCOSYLATED A1C: CPT

## 2018-01-16 LAB
ALBUMIN SERPL ELPH-MCNC: 3.39 G/DL
ALPHA1 GLOB SERPL ELPH-MCNC: 0.34 G/DL
ALPHA2 GLOB SERPL ELPH-MCNC: 0.74 G/DL
ANA SER QL IF: NORMAL
B-GLOBULIN SERPL ELPH-MCNC: 0.79 G/DL
GAMMA GLOB SERPL ELPH-MCNC: 0.94 G/DL
HBV SURFACE AB SER-ACNC: NEGATIVE M[IU]/ML
HBV SURFACE AG SERPL QL IA: NEGATIVE
HCV AB SERPL QL IA: NEGATIVE
INTERPRETATION SERPL IFE-IMP: NORMAL
PATHOLOGIST INTERPRETATION IFE: NORMAL
PATHOLOGIST INTERPRETATION SPE: NORMAL
PROT SERPL-MCNC: 6.2 G/DL

## 2018-01-17 ENCOUNTER — OFFICE VISIT (OUTPATIENT)
Dept: PODIATRY | Facility: CLINIC | Age: 68
End: 2018-01-17
Payer: MEDICARE

## 2018-01-17 ENCOUNTER — DOCUMENTATION ONLY (OUTPATIENT)
Dept: NEPHROLOGY | Facility: CLINIC | Age: 68
End: 2018-01-17

## 2018-01-17 VITALS
DIASTOLIC BLOOD PRESSURE: 59 MMHG | BODY MASS INDEX: 42.22 KG/M2 | SYSTOLIC BLOOD PRESSURE: 131 MMHG | HEIGHT: 67 IN | WEIGHT: 269 LBS | HEART RATE: 50 BPM

## 2018-01-17 DIAGNOSIS — E11.22 CKD STAGE 2 DUE TO TYPE 2 DIABETES MELLITUS: ICD-10-CM

## 2018-01-17 DIAGNOSIS — D47.2 IGG LAMBDA MONOCLONAL GAMMOPATHY: ICD-10-CM

## 2018-01-17 DIAGNOSIS — E11.9 ENCOUNTER FOR DIABETIC FOOT EXAM: ICD-10-CM

## 2018-01-17 DIAGNOSIS — D89.2 PARAPROTEINEMIA: Primary | ICD-10-CM

## 2018-01-17 DIAGNOSIS — N18.2 CKD STAGE 2 DUE TO TYPE 2 DIABETES MELLITUS: ICD-10-CM

## 2018-01-17 DIAGNOSIS — E11.42 DIABETIC POLYNEUROPATHY ASSOCIATED WITH TYPE 2 DIABETES MELLITUS: Primary | ICD-10-CM

## 2018-01-17 LAB
ANCA AB TITR SER IF: NORMAL TITER
P-ANCA TITR SER IF: NORMAL TITER

## 2018-01-17 PROCEDURE — 99999 PR PBB SHADOW E&M-EST. PATIENT-LVL III: CPT | Mod: PBBFAC,,, | Performed by: PODIATRIST

## 2018-01-17 PROCEDURE — 99499 UNLISTED E&M SERVICE: CPT | Mod: S$GLB,,, | Performed by: PODIATRIST

## 2018-01-17 PROCEDURE — 99213 OFFICE O/P EST LOW 20 MIN: CPT | Mod: S$GLB,,, | Performed by: PODIATRIST

## 2018-01-17 NOTE — PROGRESS NOTES
Patient, Walter Bull (MRN #[id number]), presented with a recorded BMI of 42.13 kg/m^2 consistent with the definition of morbid obesity (ICD-10 E66.01). The patient's morbid obesity was monitored, evaluated, addressed and/or treated. This addendum to the medical record is made on 01/17/2018.

## 2018-01-17 NOTE — PATIENT INSTRUCTIONS
How to Check Your Feet    Below are tips to help you look for foot problems. Try to check your feet at the same time each day, such as when you get out of bed in the morning.    · Check the top of each foot. The tops of toes, back of the heel, and outer edge of the foot can get a lot of rubbing from poor-fitting shoes.    · Check the bottom of each foot. Daily wear and tear often leads to problems at pressure spots.    · Check the toes and nails. Fungal infections often occur between toes. Toenail problems can also be a sign of fungal infections or lead to breaks in the skin.    · Check your shoes, too. Loose objects inside a shoe can injure the foot. Use your hand to feel inside your shoes for things like robbie, loose stitching, or rough areas that could irritate your skin.        Diabetic Foot Care    Diabetes can lead to a number of different foot complications. Fortunately, most of these complications can be prevented with a little extra foot care. If diabetes is not well controlled, the high blood sugar can cause damage to blood vessels and result in poor circulation to the foot. When the skin does not get enough blood flow, it becomes prone to pressure sores and ulcers, which heal slowly.  High blood sugar can also damage nerves, interfering with the ability to feel pain and pressure. When you cant feel your foot normally, it is easy to injure your skin, bones and joints without knowing it. For these reasons diabetes increases the risk of fungal infections, bunions and ulcers. Deep ulcers can lead to bone infection. Gangrene is the most serious foot complication of diabetes. It usually occurs on the tips of the toes as blacked areas of skin. The black area is dead tissue. In severe cases, gangrene spreads to involve the entire toe, other toes and the entire foot. Foot or toe amputation may be required. Good foot care and blood sugar control can prevent this.    Home Care  1. Wear comfortable, proper fitting  shoes.  2. Wash your feet daily with warm water and mild soap.  3. After drying, apply a moisturizing cream or lotion.  4. Check your feet daily for skin breaks, blisters, swelling, or redness. Look between your toes also.  5. Wear cotton socks and change them every day.  6. Trim toe nails carefully and do not cut your cuticles.  7. Strive to keep your blood sugar under control with a combination of medicines, diet and activity.  8. If you smoke and have diabetes, it is very important that you stop. Smoking reduces blood flow to your foot.  9. Avoid activities that increase your risk of foot injury:  · Do not walk barefoot.  · Do not use heating pads or hot water bottles on your feet.  · Do not put your foot in a hot tub without first checking the temperature with your hand.  10) Schedule yearly foot exams.    Follow Up  with your doctor or as advised by our staff. Report any cut, puncture, scrape, other injury, blister, ingrown toenail or ulcer on your foot.    Get Prompt Medical Attention  if any of the following occur:  -- Open ulcer with pus draining from the wound  -- Increasing foot or leg pain  -- New areas of redness or swelling or tender areas of the foot    © 3628-6399 [employer]. [address] [place] [address], [place] [address]. All rights reserved. This information is not intended as a substitute for professional medical care. Always follow your healthcare professional's instructions.

## 2018-01-17 NOTE — PROGRESS NOTES
Chief Complaint   Patient presents with    Diabetes Mellitus     PCP Dr. Kruger 11/20/17    Diabetic Foot Exam    Routine Foot Care        HPI:   The patient is a 67 y.o.  male  who presents for a diabetic foot exam.     Patient reports no presence of persistent tingling/numb sensation to the feet .    History of diabetic foot ulcers: none   History of foot surgery: none.     Shoes worn today:  diabetes shoes.   Needs a prescription for new diabetes shoes.        Primary care doctor is: Belkys Kruger MD  Patient last saw primary care doctor on:   11/20/2017        Past Medical History:   Diagnosis Date    Anticoagulant long-term use     CHF (congestive heart failure)     Colon cancer screening 2/2/2017    Coronary artery disease     Diabetes mellitus type I     Disorder of kidney and ureter     Encounter for blood transfusion     Glaucoma     Heart disease     Hypertension     Kidney failure     post CABG         Current Outpatient Prescriptions on File Prior to Visit   Medication Sig Dispense Refill    albuterol 90 mcg/actuation inhaler Inhale 2 puffs into the lungs every 6 (six) hours as needed for Wheezing. 1 Inhaler 5    amlodipine (NORVASC) 5 MG tablet Take 5 mg by mouth once daily.      aspirin (ECOTRIN) 81 MG EC tablet Take 81 mg by mouth once daily.      clopidogrel (PLAVIX) 75 mg tablet Take 75 mg by mouth once daily.      cyanocobalamin (VITAMIN B-12) 1000 MCG tablet Take 1,000 mcg by mouth once daily.       ezetimibe (ZETIA) 10 mg tablet Take 10 mg by mouth once daily.       FOLIC ACID/MULTIVIT-MIN/LUTEIN (CENTRUM SILVER ORAL) Take by mouth.      furosemide (LASIX) 40 MG tablet Take 1 tablet (40 mg total) by mouth once daily. 30 tablet 5    insulin NPH (NOVOLIN N) 100 unit/mL injection Inject into the skin. 90 units in the morning and 40 units at bedtime       isosorbide mononitrate (IMDUR) 60 MG 24 hr tablet Take 60 mg by mouth once daily.      JANUMET 50-1,000 mg per  tablet TAKE ONE TABLET BY MOUTH TWICE DAILY WITH MEALS 180 tablet 1    methylPREDNISolone (MEDROL DOSEPACK) 4 mg tablet use as directed 1 Package 0    metoprolol tartrate (LOPRESSOR) 50 MG tablet TAKE ONE TABLET BY MOUTH TWICE DAILY 180 tablet 1    nitroGLYCERIN (NITROSTAT) 0.4 MG SL tablet Place 1 tablet (0.4 mg total) under the tongue every 5 (five) minutes as needed for Chest pain. 90 tablet 0    RANEXA 500 mg Tb12 Take 500 mg by mouth 2 (two) times daily.       ranitidine (ZANTAC) 150 MG capsule Take 150 mg by mouth as needed for Heartburn.      rosuvastatin (CRESTOR) 20 MG tablet Take 1 tablet (20 mg total) by mouth once daily. 30 tablet 5    tamsulosin (FLOMAX) 0.4 mg Cp24 Take 1 capsule (0.4 mg total) by mouth once daily. 30 capsule 5     No current facility-administered medications on file prior to visit.          Review of patient's allergies indicates:  No Known Allergies      Social History     Social History    Marital status:      Spouse name: N/A    Number of children: N/A    Years of education: N/A     Occupational History    Not on file.     Social History Main Topics    Smoking status: Former Smoker     Packs/day: 3.00     Types: Cigarettes     Start date: 1/18/1963     Quit date: 1/1/1981    Smokeless tobacco: Former User     Types: Snuff, Chew     Quit date: 1984    Alcohol use No    Drug use: No    Sexual activity: Not on file      Comment: -with a significant other     Other Topics Concern    Not on file     Social History Narrative    No narrative on file           ROS:  General ROS: negative  Respiratory ROS: no cough, shortness of breath, or wheezing  Cardiovascular ROS: no chest pain or dyspnea on exertion  Musculoskeletal ROS: negative  Neurological ROS:   positive for - numbness/tingling  Dermatological ROS: negative      LAST HbA1c:   Hemoglobin A1C   Date Value Ref Range Status   01/15/2018 7.7 (H) 4.0 - 5.6 % Final     Comment:     According to ADA  guidelines, hemoglobin A1c <7.0% represents  optimal control in non-pregnant diabetic patients. Different  metrics may apply to specific patient populations.   Standards of Medical Care in Diabetes-2016.  For the purpose of screening for the presence of diabetes:  <5.7%     Consistent with the absence of diabetes  5.7-6.4%  Consistent with increasing risk for diabetes   (prediabetes)  >or=6.5%  Consistent with diabetes  Currently, no consensus exists for use of hemoglobin A1c  for diagnosis of diabetes for children.  This Hemoglobin A1c assay has significant interference with fetal   hemoglobin   (HbF). The results are invalid for patients with abnormal amounts of   HbF,   including those with known Hereditary Persistence   of Fetal Hemoglobin. Heterozygous hemoglobin variants (HbAS, HbAC,   HbAD, HbAE, HbA2) do not significantly interfere with this assay;   however, presence of multiple variants in a sample may impact the %   interference.     07/12/2017 6.8 (H) 4.0 - 5.6 % Final     Comment:     According to ADA guidelines, hemoglobin A1c <7.0% represents  optimal control in non-pregnant diabetic patients. Different  metrics may apply to specific patient populations.   Standards of Medical Care in Diabetes-2016.  For the purpose of screening for the presence of diabetes:  <5.7%     Consistent with the absence of diabetes  5.7-6.4%  Consistent with increasing risk for diabetes   (prediabetes)  >or=6.5%  Consistent with diabetes  Currently, no consensus exists for use of hemoglobin A1c  for diagnosis of diabetes for children.  This Hemoglobin A1c assay has significant interference with fetal   hemoglobin   (HbF). The results are invalid for patients with abnormal amounts of   HbF,   including those with known Hereditary Persistence   of Fetal Hemoglobin. Heterozygous hemoglobin variants (HbAS, HbAC,   HbAD, HbAE, HbA2) do not significantly interfere with this assay;   however, presence of multiple variants in a  "sample may impact the %   interference.     01/18/2017 8.1 (H) 4.5 - 6.2 % Final     Comment:     According to ADA guidelines, hemoglobin A1C <7.0% represents  optimal control in non-pregnant diabetic patients.  Different  metrics may apply to specific populations.   Standards of Medical Care in Diabetes - 2016.  For the purpose of screening for the presence of diabetes:  <5.7%     Consistent with the absence of diabetes  5.7-6.4%  Consistent with increasing risk for diabetes   (prediabetes)  >or=6.5%  Consistent with diabetes  Currently no consensus exists for use of hemoglobin A1C  for diagnosis of diabetes for children.             EXAM:   Vitals:    01/17/18 1256   BP: (!) 131/59   Pulse: (!) 50   Weight: 122 kg (269 lb)   Height: 5' 7" (1.702 m)       General: alert, cooperative    Vascular:   Dorsalis pedis:   2+ bilateral.   Posterior Tibial:   1+ bilateral.   3 seconds capillary refill time   Temperature of toes are warm to touch.   reduced hair growth on the feet.    Edema on feet:   Trace and non-pitting   Varicosities:  none      Dermatological:    Skin: thin,  warm and dry; hyperkeratotic bilateral soles  Nails: toenails 1-5 L and 1-5 R  are dystrophic nails, right hallux toenail darkly discolored; no paronychia  Callus:  Mild - diffusely on the soles  Open Wounds: None  Ecchymoses is not observed.      Erythema:  none .     Interdigital spaces: clean, dry and without evidence of break in skin integrity      Neurological:    normal light touch sensation and normal position sensation  [place] diminished      Musculoskeletal:     Muscle strength: 5/5, adequate ROM, adequate strength     Right foot:  hammertoe   Left foot: hammertoe             ASSESSMENT/PLAN:      I counseled the patient on his conditions, their implications and medical management.       Diabetic polyneuropathy associated with type 2 diabetes mellitus  -     DIABETIC SHOES FOR HOME USE    Encounter for diabetic foot exam      Shoe " inspection. Diabetic Foot Education. Patient reminded of the importance of good nutrition and blood sugar control to help prevent podiatric complications of diabetes. Patient instructed on proper foot hygiene. We discussed wearing proper shoe gear, daily foot inspections, never walking without protective shoe gear, never putting sharp instruments to feet.  Nails trimmed as a courtesy.     Follow up annually for foot exam or sooner if concerned.

## 2018-01-17 NOTE — PROGRESS NOTES
IgG lambda protein on SPEP and SHLOMO 0.24 g/dl   Mild anemia  Likely MGUS.  UPRCT 0.08  Umicroalbumin/crt  4.2  Will send for Heme eval and obtain free light chains.  Unlikely  renal invovlevment given minimal prtoeinuria  Rfp, cbc prior to next visit 4 mo

## 2018-01-19 DIAGNOSIS — Z13.5 DIABETIC RETINOPATHY SCREENING: ICD-10-CM

## 2018-01-23 ENCOUNTER — TELEPHONE (OUTPATIENT)
Dept: NEPHROLOGY | Facility: CLINIC | Age: 68
End: 2018-01-23

## 2018-01-24 ENCOUNTER — OFFICE VISIT (OUTPATIENT)
Dept: INTERNAL MEDICINE | Facility: CLINIC | Age: 68
End: 2018-01-24
Payer: MEDICARE

## 2018-01-24 VITALS
SYSTOLIC BLOOD PRESSURE: 124 MMHG | HEART RATE: 80 BPM | OXYGEN SATURATION: 97 % | RESPIRATION RATE: 18 BRPM | DIASTOLIC BLOOD PRESSURE: 70 MMHG | HEIGHT: 67 IN | WEIGHT: 264.56 LBS | BODY MASS INDEX: 41.52 KG/M2

## 2018-01-24 DIAGNOSIS — E11.42 DIABETIC POLYNEUROPATHY ASSOCIATED WITH TYPE 2 DIABETES MELLITUS: ICD-10-CM

## 2018-01-24 DIAGNOSIS — H91.93 BILATERAL HEARING LOSS, UNSPECIFIED HEARING LOSS TYPE: ICD-10-CM

## 2018-01-24 DIAGNOSIS — E78.5 HYPERLIPIDEMIA, UNSPECIFIED HYPERLIPIDEMIA TYPE: ICD-10-CM

## 2018-01-24 DIAGNOSIS — E11.69 DIABETES MELLITUS TYPE 2 IN OBESE: ICD-10-CM

## 2018-01-24 DIAGNOSIS — E66.9 DIABETES MELLITUS TYPE 2 IN OBESE: ICD-10-CM

## 2018-01-24 DIAGNOSIS — I25.118 CORONARY ARTERY DISEASE OF NATIVE ARTERY OF NATIVE HEART WITH STABLE ANGINA PECTORIS: Primary | ICD-10-CM

## 2018-01-24 DIAGNOSIS — I10 BENIGN ESSENTIAL HTN: ICD-10-CM

## 2018-01-24 PROCEDURE — 99214 OFFICE O/P EST MOD 30 MIN: CPT | Mod: S$GLB,,, | Performed by: INTERNAL MEDICINE

## 2018-01-24 PROCEDURE — 99999 PR PBB SHADOW E&M-EST. PATIENT-LVL III: CPT | Mod: PBBFAC,,, | Performed by: INTERNAL MEDICINE

## 2018-01-24 PROCEDURE — 99499 UNLISTED E&M SERVICE: CPT | Mod: S$GLB,,, | Performed by: INTERNAL MEDICINE

## 2018-01-24 RX ORDER — TRAMADOL HYDROCHLORIDE 50 MG/1
TABLET ORAL
COMMUNITY
Start: 2017-12-26 | End: 2018-08-28

## 2018-01-24 RX ORDER — FAMOTIDINE 20 MG/1
TABLET, FILM COATED ORAL
COMMUNITY
Start: 2017-12-26 | End: 2018-03-01 | Stop reason: DRUGHIGH

## 2018-01-24 RX ORDER — AMLODIPINE BESYLATE 2.5 MG/1
TABLET ORAL
COMMUNITY
Start: 2017-12-22 | End: 2018-01-24 | Stop reason: DRUGHIGH

## 2018-01-24 RX ORDER — MELOXICAM 15 MG/1
TABLET ORAL
Status: ON HOLD | COMMUNITY
Start: 2017-12-26 | End: 2018-09-13

## 2018-01-24 NOTE — PROGRESS NOTES
Subjective:       Patient ID: Walter Bull is a 67 y.o. male.    Chief Complaint: Follow-up (x 6 months- results)      HPI:  Patient is known to me and presents for follow up CAD, DM type 2, HLD. Labs from 1/15/18 personally reviewed and discussed with the patient today.     Today wife reports hearing loss. TV is very loud. Does not respond when she calls for him. He agrees his hearing is worse. B/L ears affected. No otalgia or tinnitus. Would like to be evaluated for hearing aids.     H/H remains stable at 12/38; MCV normal at 87. SPEP was abnormal (done by nephrology) and referred to hematology.     CAD s/p 5v CABG: following with Dr. Vitale as well. Now on Imdur and Ranexa and chest pains are resolved. Not using NTG as much  Also reports h/o CHF, on lasix 40mg once a day. Denies SOB, [place] and orthopnea.   He takes ASA, BB. On Crestor. Unsure why not on ACEi/ARB as it sounds like he has systolic heart failure, will still need to get records.     Dm type 2: NPH 80 units AM and 40 units bedtime and Janumet. He does not check blood sugars at home. A1C 7.7% from 6.8%. He admits he is cheating on his diet, potato chips every night.   He does report numbness and tingling of left foot > right foot and b/l fingers; sx have been present for several years. Not on medicine for neuropathy. Denies dizziness, syncope. Denies polyuria, polydipsia  Foot exam: 1/2018  Eye exam: 6/2017  Microalb: 1/2017, ordered     HLD: on crestor and zetia, has been taking for several years. Denies side effects. LDL 78     HTN: on amlodipine. Unsure why not on ACEi/ARB, will need to get prior records to determine why. If no reason I would like to start low dose due to DM. He denies allergies.     GERD: Uses Zantac PRN. Works well, denies abd pain, n/v/d/c.     BPH: on flomax due to poor stream and nocturia. He has already noticed an improvement in sx. No medication side effects     Tobacco use: quit 1981; previously smoked 3 PPD for 20  years  EtOH: stopped drink 1982; was a daily drink 2 fifth liquor daily  Illicit drug: denies  Past Medical History:   Diagnosis Date    Anticoagulant long-term use     CHF (congestive heart failure)     Colon cancer screening 2/2/2017    Coronary artery disease     Diabetes mellitus type I     Disorder of kidney and ureter     Encounter for blood transfusion     Glaucoma     Heart disease     Hypertension     Kidney failure     post CABG       Family History   Problem Relation Age of Onset    Diabetes Mother     Heart disease Mother        Social History     Social History    Marital status:      Spouse name: N/A    Number of children: N/A    Years of education: N/A     Occupational History    Not on file.     Social History Main Topics    Smoking status: Former Smoker     Packs/day: 3.00     Types: Cigarettes     Start date: 1/18/1963     Quit date: 1/1/1981    Smokeless tobacco: Former User     Types: Snuff, Chew     Quit date: 1984    Alcohol use No    Drug use: No    Sexual activity: Not on file      Comment: -with a significant other     Other Topics Concern    Not on file     Social History Narrative    No narrative on file       Review of Systems   Constitutional: Negative for activity change, fatigue, fever and unexpected weight change.   HENT: Positive for hearing loss. Negative for congestion, ear pain, rhinorrhea and sore throat.    Eyes: Negative for redness and visual disturbance.   Respiratory: Negative for cough, shortness of breath and wheezing.    Cardiovascular: Negative for chest pain, palpitations and leg swelling.   Gastrointestinal: Negative for abdominal pain, blood in stool, constipation, diarrhea, nausea and vomiting.   Genitourinary: Negative for decreased urine volume, dysuria, frequency and urgency.   Musculoskeletal: Negative for back pain, joint swelling and neck pain.   Skin: Negative for color change, rash and wound.   Neurological: Negative  for dizziness, tremors, weakness, light-headedness and headaches.         Objective:      Physical Exam   Constitutional: He is oriented to person, place, and time. He appears well-developed and well-nourished. No distress.   HENT:   Head: Normocephalic and atraumatic.   Right Ear: External ear normal.   Left Ear: External ear normal.   Eyes: Conjunctivae and EOM are normal. Pupils are equal, round, and reactive to light. Right eye exhibits no discharge. Left eye exhibits no discharge.   Neck: Neck supple. No tracheal deviation present.   Cardiovascular: Normal rate and regular rhythm.    No murmur heard.  Pulmonary/Chest: Effort normal and breath sounds normal. No respiratory distress. He has no wheezes. He has no rales.   Abdominal: Soft. Bowel sounds are normal. He exhibits no distension. There is no tenderness.   Musculoskeletal: He exhibits no edema.   Neurological: He is alert and oriented to person, place, and time. No cranial nerve deficit.   Skin: Skin is warm and dry.   Psychiatric: He has a normal mood and affect. His behavior is normal.   Vitals reviewed.      Assessment:       1. Coronary artery disease of native artery of native heart with stable angina pectoris    2. Benign essential HTN    3. Diabetes mellitus type 2 in obese    4. Diabetic polyneuropathy associated with type 2 diabetes mellitus    5. Hyperlipidemia, unspecified hyperlipidemia type    6. Bilateral hearing loss, unspecified hearing loss type        Plan:       Walter was seen today for follow-up.    Diagnoses and all orders for this visit:    Coronary artery disease of native artery of native heart with stable angina pectoris  -     CBC auto differential; Future  -     Comprehensive metabolic panel; Future  -     Hemoglobin A1c; Future  -     Lipid panel; Future  -     Microalbumin/creatinine urine ratio; Future  -     TSH; Future  -     T4, free; Future  Chronic controlled  Cont meds at same dose  Low Na/cardiac diet  Sees.   Iam  Denies angina    Benign essential HTN  -     CBC auto differential; Future  -     Comprehensive metabolic panel; Future  -     Hemoglobin A1c; Future  -     Lipid panel; Future  -     Microalbumin/creatinine urine ratio; Future  -     TSH; Future  -     T4, free; Future  Chronic controlled  Cont meds at same dose  Low na diet  Exercise, weight loss  Check BP and keep log    Diabetes mellitus type 2 in obese  -     CBC auto differential; Future  -     Comprehensive metabolic panel; Future  -     Hemoglobin A1c; Future  -     Lipid panel; Future  -     Microalbumin/creatinine urine ratio; Future  -     TSH; Future  -     T4, free; Future  Chronic uncontrolled  1800 cookie ADA diet stressed---I think we can control this again with just diet modification so no meds changed today   Check sugars and keep log  Foot exam: 1/2018  Eye exam: 6/2017  Microalb: 1/2017, ordered  On statin  Start ACEi next visit    Diabetic polyneuropathy associated with type 2 diabetes mellitus  -     CBC auto differential; Future  -     Comprehensive metabolic panel; Future  -     Hemoglobin A1c; Future  -     Lipid panel; Future  -     Microalbumin/creatinine urine ratio; Future  -     TSH; Future  -     T4, free; Future  Having sx but not painful enough he feels he needs medications  Keep sugars under good control    Hyperlipidemia, unspecified hyperlipidemia type  -     CBC auto differential; Future  -     Comprehensive metabolic panel; Future  -     Hemoglobin A1c; Future  -     Lipid panel; Future  -     Microalbumin/creatinine urine ratio; Future  -     TSH; Future  -     T4, free; Future  Chronic controlled  Cont statin at same dose    Bilateral hearing loss, unspecified hearing loss type  -     Ambulatory Referral to Audiology  New problem  Referred for hearing aids eval       Health Maintenance:  -CRC: due for repeat, ordered today  -PSA: ordered  -AAA 1/2017 negative  -tobacco: former smoker  -Vaccines  Flu: did 2016  Pneumonia:  received both prevnar and pneumovx  Zoster: needs to get    RTC 3 months with labs and PRN

## 2018-01-28 DIAGNOSIS — N40.1 BENIGN PROSTATIC HYPERPLASIA WITH NOCTURIA: ICD-10-CM

## 2018-01-28 DIAGNOSIS — R35.1 BENIGN PROSTATIC HYPERPLASIA WITH NOCTURIA: ICD-10-CM

## 2018-01-29 RX ORDER — TAMSULOSIN HYDROCHLORIDE 0.4 MG/1
CAPSULE ORAL
Qty: 30 CAPSULE | Refills: 5 | Status: SHIPPED | OUTPATIENT
Start: 2018-01-29 | End: 2018-07-27 | Stop reason: SDUPTHER

## 2018-01-30 ENCOUNTER — TELEPHONE (OUTPATIENT)
Dept: HEMATOLOGY/ONCOLOGY | Facility: CLINIC | Age: 68
End: 2018-01-30

## 2018-01-31 ENCOUNTER — TELEPHONE (OUTPATIENT)
Dept: HEMATOLOGY/ONCOLOGY | Facility: CLINIC | Age: 68
End: 2018-01-31

## 2018-01-31 NOTE — PROGRESS NOTES
HEMATOLOGY/ONCOLOGY CONSULT    Requesting physician: Dr. Hood    Reason for consult: Paraproteinemia    HPI:   Mr. Bull is a 68 y/o PMHx of CHF, CAD s/p CABG in 2005 complicated by HARRIS, HTN, DM II, CKD II, who is referred by Nephrology for recently found abnormal paraproteinemia. Patient was noted to have a IgG Lambda specific monoclonal band of 0.24 g/dL in gamma on 1/15/18. His renal function remains relatively stable with Cr of 1.0 mg/dL and calcium relatively unremarkable. He does have history of mild normocytic anemia. No significant proteinuria. Urine microalbumin/crt ratio of 4.2. No reported bone pain. No weight loss or generalized weakness. Denies any fever/chills, night sweats. Denies any melena or hematochezia. Had a colonoscopy done in Feb, 2017 which was unremarkable.        Past Medical History:   Diagnosis Date    Anticoagulant long-term use     CHF (congestive heart failure)     Colon cancer screening 2/2/2017    Coronary artery disease     Diabetes mellitus type I     Disorder of kidney and ureter     Encounter for blood transfusion     Glaucoma     Heart disease     Hypertension     Kidney failure     post CABG     Past Surgical History:   Procedure Laterality Date    COLON SURGERY  2006    COLONOSCOPY N/A 2/2/2017    Procedure: COLONOSCOPY;  Surgeon: Ronnie Conway MD;  Location: [facility];  Service: Endoscopy;  Laterality: N/A;    CORONARY ARTERY BYPASS GRAFT  2005    5 arteries    EYE SURGERY Bilateral 2016    Laser surgery for glaucoma       Allergies:   Patient has no known allergies.      Medications:     Current Outpatient Prescriptions   Medication Sig Dispense Refill    albuterol 90 mcg/actuation inhaler Inhale 2 puffs into the lungs every 6 (six) hours as needed for Wheezing. 1 Inhaler 5    amlodipine (NORVASC) 5 MG tablet Take 5 mg by mouth once daily.      aspirin (ECOTRIN) 81 MG EC tablet Take 81 mg by mouth once daily.      clopidogrel (PLAVIX) 75 mg tablet  Take 75 mg by mouth once daily.      cyanocobalamin (VITAMIN B-12) 1000 MCG tablet Take 1,000 mcg by mouth once daily.       ezetimibe (ZETIA) 10 mg tablet Take 10 mg by mouth once daily.       famotidine (PEPCID) 20 MG tablet       FOLIC ACID/MULTIVIT-MIN/LUTEIN (CENTRUM SILVER ORAL) Take by mouth.      furosemide (LASIX) 40 MG tablet Take 1 tablet (40 mg total) by mouth once daily. 30 tablet 5    insulin NPH (NOVOLIN N) 100 unit/mL injection Inject into the skin. 90 units in the morning and 40 units at bedtime       isosorbide mononitrate (IMDUR) 60 MG 24 hr tablet Take 60 mg by mouth once daily.      JANUMET 50-1,000 mg per tablet TAKE ONE TABLET BY MOUTH TWICE DAILY WITH MEALS 180 tablet 1    meloxicam (MOBIC) 15 MG tablet       metoprolol tartrate (LOPRESSOR) 50 MG tablet TAKE ONE TABLET BY MOUTH TWICE DAILY 180 tablet 1    nitroGLYCERIN (NITROSTAT) 0.4 MG SL tablet Place 1 tablet (0.4 mg total) under the tongue every 5 (five) minutes as needed for Chest pain. 90 tablet 0    RANEXA 500 mg Tb12 Take 500 mg by mouth 2 (two) times daily.       ranitidine (ZANTAC) 150 MG capsule Take 150 mg by mouth as needed for Heartburn.      rosuvastatin (CRESTOR) 20 MG tablet Take 1 tablet (20 mg total) by mouth once daily. 30 tablet 5    tamsulosin (FLOMAX) 0.4 mg Cp24 TAKE ONE CAPSULE BY MOUTH ONCE DAILY 30 capsule 5    traMADol (ULTRAM) 50 mg tablet        No current facility-administered medications for this visit.          Review Of Systems:   Constitutional: Negative for activity change, fatigue, fever and unexpected weight change.   HENT: Positive for hearing loss. Negative for congestion, ear pain, rhinorrhea and sore throat.    Eyes: Negative for redness and visual disturbance.   Respiratory: Negative for cough, shortness of breath and wheezing.    Cardiovascular: Negative for chest pain, palpitations and leg swelling.   Gastrointestinal: Negative for abdominal pain, blood in stool, constipation,  diarrhea, nausea and vomiting.   Genitourinary: Negative for decreased urine volume, dysuria, frequency and urgency.   Musculoskeletal: Negative for back pain, joint swelling and neck pain.   Skin: Negative for color change, rash and wound.   Neurological: Negative for dizziness, tremors, weakness, light-headedness and headaches.     Physical Exam:   Performance Status: 1  Constitutional: He is oriented to person, place, and time. He appears well-developed and well-nourished. No distress.   HENT:   Head: Normocephalic and atraumatic.   Right Ear: External ear normal.   Left Ear: External ear normal.   Eyes: Conjunctivae and EOM are normal. Pupils are equal, round, and reactive to light. Right eye exhibits no discharge. Left eye exhibits no discharge.   Neck: Neck supple. No tracheal deviation present.   Cardiovascular: Normal rate and regular rhythm.    No murmur heard.  Pulmonary/Chest: Effort normal and breath sounds normal. No respiratory distress. He has no wheezes. He has no rales.   Abdominal: Soft. Bowel sounds are normal. He exhibits no distension. There is no tenderness.   Musculoskeletal: He exhibits no edema.   Neurological: He is alert and oriented to person, place, and time. No cranial nerve deficit.   Skin: Skin is warm and dry.   Psychiatric: He has a normal mood and affect. His behavior is normal.     Diagnostic Tests:   Labs: Reviewed    Recent Results (from the past 24 hour(s))   CBC auto differential    Collection Time: 02/01/18 10:14 AM   Result Value Ref Range    WBC 9.11 3.90 - 12.70 K/uL    RBC 4.21 (L) 4.60 - 6.20 M/uL    Hemoglobin 11.4 (L) 14.0 - 18.0 g/dL    Hematocrit 36.5 (L) 40.0 - 54.0 %    MCV 87 82 - 98 fL    MCH 27.1 27.0 - 31.0 pg    MCHC 31.2 (L) 32.0 - 36.0 g/dL    RDW 15.0 (H) 11.5 - 14.5 %    Platelets 242 150 - 350 K/uL    MPV 10.0 9.2 - 12.9 fL    Immature Granulocytes 0.1 0.0 - 0.5 %    Gran # (ANC) 4.7 1.8 - 7.7 K/uL    Immature Grans (Abs) 0.01 0.00 - 0.04 K/uL    Lymph #  3.2 1.0 - 4.8 K/uL    Mono # 0.9 0.3 - 1.0 K/uL    Eos # 0.3 0.0 - 0.5 K/uL    Baso # 0.04 0.00 - 0.20 K/uL    nRBC 0 0 /100 WBC    Gran% 52.0 38.0 - 73.0 %    Lymph% 34.8 18.0 - 48.0 %    Mono% 9.8 4.0 - 15.0 %    Eosinophil% 2.9 0.0 - 8.0 %    Basophil% 0.4 0.0 - 1.9 %    Differential Method Automated    Pathologist Interpretation Differential    Collection Time: 02/01/18 10:14 AM   Result Value Ref Range    Pathologist Review Review required    Reticulocytes    Collection Time: 02/01/18 10:14 AM   Result Value Ref Range    Retic 1.7 0.4 - 2.0 %   Lactate dehydrogenase    Collection Time: 02/01/18 10:14 AM   Result Value Ref Range     110 - 260 U/L   Haptoglobin    Collection Time: 02/01/18 10:14 AM   Result Value Ref Range    Haptoglobin 133 30 - 250 mg/dL   Direct antiglobulin test    Collection Time: 02/01/18 10:14 AM   Result Value Ref Range    Direct Tracy (RAFAELA) NEG    TSH    Collection Time: 02/01/18 10:14 AM   Result Value Ref Range    TSH 1.013 0.400 - 4.000 uIU/mL   T4, free    Collection Time: 02/01/18 10:14 AM   Result Value Ref Range    Free T4 0.82 0.71 - 1.51 ng/dL   Iron and TIBC    Collection Time: 02/01/18 10:14 AM   Result Value Ref Range    Iron 36 (L) 45 - 160 ug/dL    Transferrin 263 200 - 375 mg/dL    TIBC 389 250 - 450 ug/dL    Saturated Iron 9 (L) 20 - 50 %   FERRITIN    Collection Time: 02/01/18 10:14 AM   Result Value Ref Range    Ferritin 20 20.0 - 300.0 ng/mL   Folate    Collection Time: 02/01/18 10:14 AM   Result Value Ref Range    Folate 15.5 4.0 - 24.0 ng/mL   Vitamin B12    Collection Time: 02/01/18 10:14 AM   Result Value Ref Range    Vitamin B-12 347 210 - 950 pg/mL   IMMUNOGLOBULINS (IGG, IGA, IGM) QUANTITATIVE    Collection Time: 02/01/18 10:14 AM   Result Value Ref Range    IgG - Serum 1048 650 - 1600 mg/dL    IgA 191 40 - 350 mg/dL    IgM 28 (L) 50 - 300 mg/dL   Protein, Quantitative, Urine Random    Collection Time: 02/01/18 10:14 AM   Result Value Ref Range     Protein, Urine Random <7 0 - 15 mg/dL       Assessment:     1. Paraproteinemia     2. Normocytic anemia       Recommendations:     1. IgG Lambda Paraproteinemia  - paraproteinemia noted on work-up for CKD.   - M-protein of 0.24 g/dL and also fits one of the CRAB criteria with anemia.   - therefore, will obtain SFLC, Immunoglobulins, 24 hr Urine Protein, UPEP/UIEP, and Skeletal Survey.   - will consider bone marrow evaluation if above studies are concerning.   - return to clinic in 1 month to review studies and discuss further management.   - if above studies are unremarkable then very likely he may have a very low risk type of MGUS and have a 5% absolute risk of disease progression over 20 years. This can be monitored with surveillance lab work on a yearly basis.       2. Normocytic Anemia  - repeat CBC today and review peripheral smear.   - check hemolysis lab work, TSH, and nutritional studies.   - check Hb Electrophoresis and Alpha Globin Gene Chain Analysis.  - will consider bone marrow biopsy if deemed necessary.   - if above studies are unremarkable then very likely his anemia possibly could to be related to chronic kidney disease.       Will call patients with results.  RTC in 1 month to review studies and discuss further management.       Case discussed with Dr. Asmita Rodriguez MD  Hematology/Oncology Fellow        Distress Screening Results: Psychosocial Distress screening score of Distress Score: 0 noted and reviewed. No intervention indicated.     Agree with Dr. Rodriguez's history, physical, assessment, and plan with the following addendums.    Low risk igg k paraprotein noted on cade boss  Mild anemia  - basic anemia eval includign hgep, agga   24 hr urine protein  SS/LBS  Quant ig, SFLC  rtc 4 weeks  May need bone marrow biopsy if anemia etiology unclear  Gasper Tian MD  Hematology & Stem Cell Transplant

## 2018-02-01 ENCOUNTER — INITIAL CONSULT (OUTPATIENT)
Dept: HEMATOLOGY/ONCOLOGY | Facility: CLINIC | Age: 68
End: 2018-02-01
Payer: MEDICARE

## 2018-02-01 ENCOUNTER — HOSPITAL ENCOUNTER (OUTPATIENT)
Dept: RADIOLOGY | Facility: HOSPITAL | Age: 68
Discharge: HOME | End: 2018-02-01
Attending: INTERNAL MEDICINE
Payer: MEDICARE

## 2018-02-01 VITALS
HEART RATE: 53 BPM | DIASTOLIC BLOOD PRESSURE: 67 MMHG | RESPIRATION RATE: 16 BRPM | WEIGHT: 270.06 LBS | HEIGHT: 67 IN | OXYGEN SATURATION: 95 % | SYSTOLIC BLOOD PRESSURE: 142 MMHG | BODY MASS INDEX: 42.39 KG/M2 | TEMPERATURE: 99 F

## 2018-02-01 DIAGNOSIS — D50.9 IRON DEFICIENCY ANEMIA, UNSPECIFIED IRON DEFICIENCY ANEMIA TYPE: Primary | ICD-10-CM

## 2018-02-01 DIAGNOSIS — D64.9 NORMOCYTIC ANEMIA: ICD-10-CM

## 2018-02-01 DIAGNOSIS — D89.2 PARAPROTEINEMIA: Primary | ICD-10-CM

## 2018-02-01 DIAGNOSIS — D89.2 PARAPROTEINEMIA: ICD-10-CM

## 2018-02-01 PROCEDURE — 99204 OFFICE O/P NEW MOD 45 MIN: CPT | Mod: GC,S$GLB,, | Performed by: INTERNAL MEDICINE

## 2018-02-01 PROCEDURE — 3008F BODY MASS INDEX DOCD: CPT | Mod: GC,S$GLB,, | Performed by: INTERNAL MEDICINE

## 2018-02-01 PROCEDURE — 99999 PR PBB SHADOW E&M-EST. PATIENT-LVL V: CPT | Mod: PBBFAC,GC,, | Performed by: INTERNAL MEDICINE

## 2018-02-01 PROCEDURE — 1126F AMNT PAIN NOTED NONE PRSNT: CPT | Mod: GC,S$GLB,, | Performed by: INTERNAL MEDICINE

## 2018-02-01 PROCEDURE — 77075 RADEX OSSEOUS SURVEY COMPL: CPT | Mod: TC

## 2018-02-01 PROCEDURE — 1159F MED LIST DOCD IN RCRD: CPT | Mod: GC,S$GLB,, | Performed by: INTERNAL MEDICINE

## 2018-02-01 PROCEDURE — 77075 RADEX OSSEOUS SURVEY COMPL: CPT | Mod: 26,,, | Performed by: RADIOLOGY

## 2018-02-01 RX ORDER — FERROUS SULFATE 325(65) MG
325 TABLET ORAL 3 TIMES DAILY
Qty: 90 TABLET | Refills: 3 | Status: SHIPPED | OUTPATIENT
Start: 2018-02-01 | End: 2018-09-06 | Stop reason: SDUPTHER

## 2018-02-01 NOTE — PROGRESS NOTES
Low risk igg k paraprotein noted on cade boss  Mild anemia  - basic anemia eval includign hgep, agga   24 hr urine protein  SS/LBS  Quant ig, SFLC  rtc 4 weeks  May need bone marrow biopsy if anemia etiology unclear

## 2018-02-07 PROCEDURE — 84166 PROTEIN E-PHORESIS/URINE/CSF: CPT | Mod: 26,,, | Performed by: PATHOLOGY

## 2018-02-07 PROCEDURE — 86335 IMMUNFIX E-PHORSIS/URINE/CSF: CPT | Mod: 26,,, | Performed by: PATHOLOGY

## 2018-02-09 ENCOUNTER — LAB VISIT (OUTPATIENT)
Dept: LAB | Facility: HOSPITAL | Age: 68
End: 2018-02-09
Attending: STUDENT IN AN ORGANIZED HEALTH CARE EDUCATION/TRAINING PROGRAM
Payer: MEDICARE

## 2018-02-09 DIAGNOSIS — D89.2 PARAPROTEINEMIA: ICD-10-CM

## 2018-02-09 DIAGNOSIS — D64.9 NORMOCYTIC ANEMIA: ICD-10-CM

## 2018-02-09 LAB
PROT 24H UR-MRATE: 84 MG/SPEC
PROT UR-MCNC: 7 MG/DL
URINE COLLECTION DURATION: 24 HR
URINE VOLUME: 1200 ML

## 2018-02-09 PROCEDURE — 86335 IMMUNFIX E-PHORSIS/URINE/CSF: CPT

## 2018-02-09 PROCEDURE — 84166 PROTEIN E-PHORESIS/URINE/CSF: CPT

## 2018-02-09 PROCEDURE — 84156 ASSAY OF PROTEIN URINE: CPT

## 2018-02-12 LAB
INTERPRETATION UR IFE-IMP: NORMAL
PATHOLOGIST INTERPRETATION UIFE: NORMAL
PATHOLOGIST INTERPRETATION UPE: NORMAL
PROT PATTERN UR ELPH-IMP: NORMAL

## 2018-02-20 DIAGNOSIS — D50.9 IRON DEFICIENCY ANEMIA, UNSPECIFIED IRON DEFICIENCY ANEMIA TYPE: Primary | ICD-10-CM

## 2018-02-20 NOTE — PROGRESS NOTES
Hematology Note  - case discussed with Dr. Tian  - given low ferritin, refer to GI for Colonoscopy/EGD.   - on oral iron supplements since 2/2/18.  - RTC in March for follow up regarding ALEKSANDR and Paraproteinemia.     Jose Francisco Rodriguez MD  Hematology/Oncology Fellow

## 2018-02-21 ENCOUNTER — OFFICE VISIT (OUTPATIENT)
Dept: GASTROENTEROLOGY | Facility: CLINIC | Age: 68
End: 2018-02-21
Payer: MEDICARE

## 2018-02-21 VITALS
HEART RATE: 56 BPM | BODY MASS INDEX: 41.66 KG/M2 | WEIGHT: 265.44 LBS | DIASTOLIC BLOOD PRESSURE: 64 MMHG | HEIGHT: 67 IN | SYSTOLIC BLOOD PRESSURE: 117 MMHG

## 2018-02-21 DIAGNOSIS — I10 BENIGN ESSENTIAL HTN: ICD-10-CM

## 2018-02-21 DIAGNOSIS — Z79.01 ENCOUNTER FOR CURRENT LONG-TERM USE OF ANTICOAGULANTS: ICD-10-CM

## 2018-02-21 DIAGNOSIS — D50.9 IRON DEFICIENCY ANEMIA, UNSPECIFIED IRON DEFICIENCY ANEMIA TYPE: Primary | ICD-10-CM

## 2018-02-21 DIAGNOSIS — Z95.1 S/P CABG X 5: ICD-10-CM

## 2018-02-21 DIAGNOSIS — Z79.1 ENCOUNTER FOR LONG-TERM (CURRENT) USE OF NSAIDS: ICD-10-CM

## 2018-02-21 DIAGNOSIS — I25.118 CORONARY ARTERY DISEASE OF NATIVE ARTERY OF NATIVE HEART WITH STABLE ANGINA PECTORIS: ICD-10-CM

## 2018-02-21 DIAGNOSIS — I50.9 CONGESTIVE HEART FAILURE, UNSPECIFIED CONGESTIVE HEART FAILURE CHRONICITY, UNSPECIFIED CONGESTIVE HEART FAILURE TYPE: ICD-10-CM

## 2018-02-21 PROCEDURE — 3008F BODY MASS INDEX DOCD: CPT | Mod: S$GLB,,, | Performed by: PHYSICIAN ASSISTANT

## 2018-02-21 PROCEDURE — 99999 PR PBB SHADOW E&M-EST. PATIENT-LVL V: CPT | Mod: PBBFAC,,, | Performed by: PHYSICIAN ASSISTANT

## 2018-02-21 PROCEDURE — 1126F AMNT PAIN NOTED NONE PRSNT: CPT | Mod: S$GLB,,, | Performed by: PHYSICIAN ASSISTANT

## 2018-02-21 PROCEDURE — 99205 OFFICE O/P NEW HI 60 MIN: CPT | Mod: S$GLB,,, | Performed by: PHYSICIAN ASSISTANT

## 2018-02-21 PROCEDURE — 99499 UNLISTED E&M SERVICE: CPT | Mod: S$GLB,,, | Performed by: PHYSICIAN ASSISTANT

## 2018-02-21 PROCEDURE — 1159F MED LIST DOCD IN RCRD: CPT | Mod: S$GLB,,, | Performed by: PHYSICIAN ASSISTANT

## 2018-02-21 NOTE — LETTER
February 21, 2018      Jose Francisco Rodriguez MD  [address]  [facility] [id number]           [facility]  [address]  [facility] [id number]  Phone: [phone]  Fax: [phone]          Patient: Walter Bull   MR Number: [id number]   YOB: 1950   Date of Visit: 2/21/2018       Dear Dr. Jose Francisco Rodriguez:    Thank you for referring Walter Bull to me for evaluation. Attached you will find relevant portions of my assessment and plan of care.    If you have questions, please do not hesitate to call me. I look forward to following Walter Bull along with you.    Sincerely,    Jasbir Styles PA-C    Enclosure  CC:  No Recipients    If you would like to receive this communication electronically, please contact [email or web address] or [phone] to request more information on [employer] Link access.    For providers and/or their staff who would like to refer a patient to [facility], please contact us through our one-stop-shop provider referral line, [facility], at [phone].    If you feel you have received this communication in error or would no longer like to receive these types of communications, please e-mail [email or web address]

## 2018-02-21 NOTE — PROGRESS NOTES
[facility] Consultation Note    Reason for Consult:  The primary encounter diagnosis was Iron deficiency anemia, unspecified iron deficiency anemia type. Diagnoses of Encounter for current long-term use of anticoagulants, Coronary artery disease of native artery of native heart with stable angina pectoris, S/P CABG x 5, Benign essential HTN, and Congestive heart failure, unspecified congestive heart failure chronicity, unspecified congestive heart failure type were also pertinent to this visit.    PCP:   Belkys Kruger   [address] [facility] / [place] [address]    Referring MD:  Jose Francisco Rodriguez Md  [address] [place] [address]    HPI:  This is a 67 y.o. male referred by Dr. Rodriguez in hematology for evaluation of Iron Deficiency Anemia    2/2018 Pertinent labs:   Hgb 11.4 (low)  MCV 87 (wnl)  Iron- 36 (low)  Iron Sat- 9 (low)  TIBC- 389 (wnl)  Ferritin- 20    Denies blood in the stool  No abdominal pain  Some nausea with taking meds on an empty stomach  No dysphagia  Currently taking iron 325mg twice daily    NSAIDs/ ASA - [id number], mobic  Anticoagulants - plavix only about 1 yr  Antacids - Pepcid 20 intermittently as needed    EGD - no prior  Colonoscopy- normal per pt- need report    PMH: CAD with stent placement sp CABG. SOB with walking  Saw cardiology - echocardiogram few months     García screening- Family Hx of:  Colon cancer- no    ROS:  Constitutional: No fevers, chills, No weight loss  ENT: No allergies  CV: No chest pain  Pulm: No cough, No shortness of breath  Ophtho: No vision changes  GI: see HPI  Derm: No rash  Heme: No lymphadenopathy, No bruising  MSK: No arthritis  : No dysuria, No hematuria  Endo: No hot or cold intolerance  Neuro: No syncope, No seizure  Psych: No anxiety, No depression    Medical History:  has a past medical history of Anemia; Anticoagulant long-term use; CHF (congestive heart failure); Colon cancer screening (2/2/2017); Coronary artery  disease; Diabetes mellitus type I; Disorder of kidney and ureter; Encounter for blood transfusion; Glaucoma; Heart disease; Hypertension; and Kidney failure.    Surgical History:  has a past surgical history that includes Coronary artery bypass graft (2005); Eye surgery (Bilateral, 2016); Colon surgery (2006); and Colonoscopy (N/A, 2/2/2017).    Family History: family history includes Diabetes in his mother; Heart disease in his mother..     Social History:  reports that he quit smoking about 37 years ago. His smoking use included Cigarettes. He started smoking about 55 years ago. He smoked 3.00 packs per day. He quit smokeless tobacco use about 34 years ago. His smokeless tobacco use included Snuff and Chew. He reports that he does not drink alcohol or use drugs.    Review of patient's allergies indicates:  No Known Allergies    Current Outpatient Prescriptions on File Prior to Visit   Medication Sig Dispense Refill    albuterol 90 mcg/actuation inhaler Inhale 2 puffs into the lungs every 6 (six) hours as needed for Wheezing. 1 Inhaler 5    amlodipine (NORVASC) 5 MG tablet Take 5 mg by mouth once daily.      aspirin (ECOTRIN) 81 MG EC tablet Take 81 mg by mouth once daily.      clopidogrel (PLAVIX) 75 mg tablet Take 75 mg by mouth once daily.      cyanocobalamin (VITAMIN B-12) 1000 MCG tablet Take 1,000 mcg by mouth once daily.       ezetimibe (ZETIA) 10 mg tablet Take 10 mg by mouth once daily.       famotidine (PEPCID) 20 MG tablet       ferrous sulfate 325 mg (65 mg iron) Tab tablet Take 1 tablet (325 mg total) by mouth 3 (three) times daily. 90 tablet 3    FOLIC ACID/MULTIVIT-MIN/LUTEIN (CENTRUM SILVER ORAL) Take by mouth.      furosemide (LASIX) 40 MG tablet Take 1 tablet (40 mg total) by mouth once daily. 30 tablet 5    insulin NPH (NOVOLIN N) 100 unit/mL injection Inject into the skin. 90 units in the morning and 40 units at bedtime       isosorbide mononitrate (IMDUR) 60 MG 24 hr tablet Take 60  "mg by mouth once daily.      JANUMET 50-1,000 mg per tablet TAKE ONE TABLET BY MOUTH TWICE DAILY WITH MEALS 180 tablet 1    meloxicam (MOBIC) 15 MG tablet       metoprolol tartrate (LOPRESSOR) 50 MG tablet TAKE ONE TABLET BY MOUTH TWICE DAILY 180 tablet 1    nitroGLYCERIN (NITROSTAT) 0.4 MG SL tablet Place 1 tablet (0.4 mg total) under the tongue every 5 (five) minutes as needed for Chest pain. 90 tablet 0    RANEXA 500 mg Tb12 Take 500 mg by mouth 2 (two) times daily.       rosuvastatin (CRESTOR) 20 MG tablet Take 1 tablet (20 mg total) by mouth once daily. 30 tablet 5    tamsulosin (FLOMAX) 0.4 mg Cp24 TAKE ONE CAPSULE BY MOUTH ONCE DAILY 30 capsule 5    traMADol (ULTRAM) 50 mg tablet       [DISCONTINUED] ranitidine (ZANTAC) 150 MG capsule Take 150 mg by mouth as needed for Heartburn.       No current facility-administered medications on file prior to visit.          Objective Findings:    Vital Signs:  /64   Pulse (!) 56   Ht 5' 7" (1.702 m)   Wt 120.4 kg (265 lb 6.9 oz)   BMI 41.57 kg/m²   Body mass index is 41.57 kg/m².    Physical Exam:  General Appearance: Well appearing in no acute distress  Head:   Normocephalic, without obvious abnormality  Eyes:    No scleral icterus  ENT: Neck supple, Lips, mucosa, and tongue normal  Lungs: CTA bilaterally in anterior and posterior fields, no wheezes, no crackles.  Heart:  Regular rhythm, bradycardia, S1, S2 normal, no murmurs heard  Abdomen: Soft, mild superficial LLQ tenderness over injection site, non distended with positive bowel sounds in all four quadrants.   Extremities: no edema  Skin: No rash  Neurologic: AAO x 3      Labs:  Lab Results   Component Value Date    WBC 9.11 02/01/2018    HGB 11.4 (L) 02/01/2018    HCT 36.5 (L) 02/01/2018     02/01/2018    CHOL 119 (L) 01/15/2018    TRIG 56 01/15/2018    HDL 38 (L) 01/15/2018    ALT 23 01/15/2018    AST 21 01/15/2018     01/15/2018    K 4.5 01/15/2018     01/15/2018    " CREATININE 1.0 01/15/2018    BUN 10 01/15/2018    CO2 25 01/15/2018    TSH 1.013 02/01/2018    PSA 1.7 01/15/2018    HGBA1C 7.7 (H) 01/15/2018       Imaging:    Endoscopy:    EGD - no prior  2/2017 Colonoscopy- normal, f/u in 10yrs  (Scanned to media)    Assessment:  1. Iron deficiency anemia, unspecified iron deficiency anemia type    2. Encounter for current long-term use of anticoagulants    3. Coronary artery disease of native artery of native heart with stable angina pectoris    4. S/P CABG x 5    5. Benign essential HTN    6. Congestive heart failure, unspecified congestive heart failure chronicity, unspecified congestive heart failure type       68yo M with chronic anemia, recently diagnosed with iron deficiency. ASA and Mobic use increase his risk for erosions / ulcers.  Need to rule out GI bleeding.    Recommendations:  1. Schedule EGD to rule out sources of bleeding. He is high risk for endoscopies given his CAD on plavix.    2. Will contact pt's cardiologist Dr. Vitale of [facility] [place] for recent echocardiogram, EKG, stress test, and clearance to hold his plavix for EGD.      3. Request recent colonoscopy report from Dr. Kruger in [place]. Will consider repeating.    No Follow-up on file.      Order summary:  Orders Placed This Encounter    Case request GI: ESOPHAGOGASTRODUODENOSCOPY (EGD)       Thank you so much for allowing me to participate in the care of Walter Styles PA-C

## 2018-03-01 ENCOUNTER — LAB VISIT (OUTPATIENT)
Dept: LAB | Facility: HOSPITAL | Age: 68
End: 2018-03-01
Payer: MEDICARE

## 2018-03-01 ENCOUNTER — OFFICE VISIT (OUTPATIENT)
Dept: HEMATOLOGY/ONCOLOGY | Facility: CLINIC | Age: 68
End: 2018-03-01
Payer: MEDICARE

## 2018-03-01 VITALS
RESPIRATION RATE: 18 BRPM | DIASTOLIC BLOOD PRESSURE: 72 MMHG | BODY MASS INDEX: 41.84 KG/M2 | TEMPERATURE: 98 F | OXYGEN SATURATION: 95 % | WEIGHT: 266.56 LBS | HEIGHT: 67 IN | HEART RATE: 52 BPM | SYSTOLIC BLOOD PRESSURE: 158 MMHG

## 2018-03-01 DIAGNOSIS — D50.9 IRON DEFICIENCY ANEMIA, UNSPECIFIED IRON DEFICIENCY ANEMIA TYPE: ICD-10-CM

## 2018-03-01 DIAGNOSIS — D89.2 PARAPROTEINEMIA: Primary | ICD-10-CM

## 2018-03-01 DIAGNOSIS — D89.2 PARAPROTEINEMIA: ICD-10-CM

## 2018-03-01 DIAGNOSIS — D64.9 NORMOCYTIC ANEMIA: ICD-10-CM

## 2018-03-01 LAB
BASOPHILS # BLD AUTO: 0.04 K/UL
BASOPHILS NFR BLD: 0.4 %
DIFFERENTIAL METHOD: ABNORMAL
EOSINOPHIL # BLD AUTO: 0.2 K/UL
EOSINOPHIL NFR BLD: 2.6 %
ERYTHROCYTE [DISTWIDTH] IN BLOOD BY AUTOMATED COUNT: 14.6 %
FERRITIN SERPL-MCNC: 33 NG/ML
HCT VFR BLD AUTO: 38.9 %
HGB BLD-MCNC: 12.2 G/DL
IMM GRANULOCYTES # BLD AUTO: 0.02 K/UL
IMM GRANULOCYTES NFR BLD AUTO: 0.2 %
LYMPHOCYTES # BLD AUTO: 3.3 K/UL
LYMPHOCYTES NFR BLD: 37.1 %
MCH RBC QN AUTO: 27.6 PG
MCHC RBC AUTO-ENTMCNC: 31.4 G/DL
MCV RBC AUTO: 88 FL
MONOCYTES # BLD AUTO: 0.9 K/UL
MONOCYTES NFR BLD: 9.6 %
NEUTROPHILS # BLD AUTO: 4.5 K/UL
NEUTROPHILS NFR BLD: 50.1 %
NRBC BLD-RTO: 0 /100 WBC
PLATELET # BLD AUTO: 240 K/UL
PMV BLD AUTO: 10.3 FL
RBC # BLD AUTO: 4.42 M/UL
WBC # BLD AUTO: 8.9 K/UL

## 2018-03-01 PROCEDURE — 81269 HBA1/HBA2 GENE DUP/DEL VRNTS: CPT

## 2018-03-01 PROCEDURE — 36415 COLL VENOUS BLD VENIPUNCTURE: CPT

## 2018-03-01 PROCEDURE — 85025 COMPLETE CBC W/AUTO DIFF WBC: CPT

## 2018-03-01 PROCEDURE — 99999 PR PBB SHADOW E&M-EST. PATIENT-LVL V: CPT | Mod: PBBFAC,GC,, | Performed by: INTERNAL MEDICINE

## 2018-03-01 PROCEDURE — 99214 OFFICE O/P EST MOD 30 MIN: CPT | Mod: GC,S$GLB,, | Performed by: INTERNAL MEDICINE

## 2018-03-01 PROCEDURE — 82728 ASSAY OF FERRITIN: CPT

## 2018-03-01 PROCEDURE — 3077F SYST BP >= 140 MM HG: CPT | Mod: GC,S$GLB,, | Performed by: INTERNAL MEDICINE

## 2018-03-01 PROCEDURE — 3078F DIAST BP <80 MM HG: CPT | Mod: GC,S$GLB,, | Performed by: INTERNAL MEDICINE

## 2018-03-01 RX ORDER — FAMOTIDINE 40 MG/1
TABLET, FILM COATED ORAL
Status: ON HOLD | COMMUNITY
Start: 2018-02-02 | End: 2018-03-21 | Stop reason: HOSPADM

## 2018-03-01 NOTE — Clinical Note
Follow up on 9/3 with Dr. Tian with lab work (CBC, CMP, Ferritin, Serum Protein Electrophoresis, Immunofixation, Light Chains, and Immunoglobulins). Thank you!

## 2018-03-01 NOTE — PROGRESS NOTES
HEMATOLOGY/ONCOLOGY Progress Note    Interval Hx:  Mr. Bull is a 68 y/o PMHx of CHF, CAD s/p CABG in 2005 complicated by HARRIS, HTN, DM II, CKD II, who was initially referred for evaluation of IgG Lambda Paraproteinemia here for follow up. Since last visit he was also noted to have Iron Deficiency Anemia and was referred to GI for endoscopy. On oral iron twice a day. Otherwise, doing well overall.     Hematology Hx:  Mr. Bull is a 68 y/o PMHx of CHF, CAD s/p CABG in 2005 complicated by HARRIS, HTN, DM II, CKD II, who is referred by Nephrology for recently found abnormal paraproteinemia. Patient was noted to have a IgG Lambda specific monoclonal band of 0.24 g/dL in gamma on 1/15/18. His renal function remains relatively stable with Cr of 1.0 mg/dL and calcium relatively unremarkable. He does have history of mild normocytic anemia. No significant proteinuria. Urine microalbumin/crt ratio of 4.2. No reported bone pain. No weight loss or generalized weakness. Denies any fever/chills, night sweats. Denies any melena or hematochezia. Had a colonoscopy done in Feb, 2017 which was unremarkable.   - diagnosed with Iron Deficiency Anemia 2/1/18  (feritin 20 ng/mL) - on oral supplements   - seen by GI - Dr. Cunningham - plan for EGD upon cardiac clearance.   - IgG Lambda Paraproteinemia - initial work-up unremarkable, currently under surveillance.     Allergies:   Patient has no known allergies.      Medications:     Current Outpatient Prescriptions   Medication Sig Dispense Refill    acetaminophen (TYLENOL) 100 mg/mL suspension Take by mouth every 4 (four) hours as needed for Temperature greater than.      albuterol 90 mcg/actuation inhaler Inhale 2 puffs into the lungs every 6 (six) hours as needed for Wheezing. 1 Inhaler 5    amlodipine (NORVASC) 5 MG tablet Take 5 mg by mouth once daily.      aspirin (ECOTRIN) 81 MG EC tablet Take 81 mg by mouth once daily.      clopidogrel (PLAVIX) 75 mg tablet Take 75 mg by mouth once  daily.      cyanocobalamin (VITAMIN B-12) 1000 MCG tablet Take 1,000 mcg by mouth once daily.       ezetimibe (ZETIA) 10 mg tablet Take 10 mg by mouth once daily.       famotidine (PEPCID) 40 MG tablet       ferrous sulfate 325 mg (65 mg iron) Tab tablet Take 1 tablet (325 mg total) by mouth 3 (three) times daily. 90 tablet 3    FOLIC ACID/MULTIVIT-MIN/LUTEIN (CENTRUM SILVER ORAL) Take by mouth.      furosemide (LASIX) 40 MG tablet Take 1 tablet (40 mg total) by mouth once daily. 30 tablet 5    insulin NPH (NOVOLIN N) 100 unit/mL injection Inject into the skin. 90 units in the morning and 40 units at bedtime       isosorbide mononitrate (IMDUR) 60 MG 24 hr tablet Take 60 mg by mouth once daily.      JANUMET 50-1,000 mg per tablet TAKE ONE TABLET BY MOUTH TWICE DAILY WITH MEALS 180 tablet 1    meloxicam (MOBIC) 15 MG tablet       metoprolol tartrate (LOPRESSOR) 50 MG tablet TAKE ONE TABLET BY MOUTH TWICE DAILY 180 tablet 1    nitroGLYCERIN (NITROSTAT) 0.4 MG SL tablet Place 1 tablet (0.4 mg total) under the tongue every 5 (five) minutes as needed for Chest pain. 90 tablet 0    RANEXA 500 mg Tb12 Take 500 mg by mouth 2 (two) times daily.       rosuvastatin (CRESTOR) 20 MG tablet Take 1 tablet (20 mg total) by mouth once daily. 30 tablet 5    tamsulosin (FLOMAX) 0.4 mg Cp24 TAKE ONE CAPSULE BY MOUTH ONCE DAILY 30 capsule 5    traMADol (ULTRAM) 50 mg tablet        No current facility-administered medications for this visit.          Review Of Systems:   Constitutional: Negative for activity change, fatigue, fever and unexpected weight change.   HENT: Positive for hearing loss. Negative for congestion, ear pain, rhinorrhea and sore throat.    Eyes: Negative for redness and visual disturbance.   Respiratory: Negative for cough, shortness of breath and wheezing.    Cardiovascular: Negative for chest pain, palpitations and leg swelling.   Gastrointestinal: Negative for abdominal pain, blood in stool,  constipation, diarrhea, nausea and vomiting.   Genitourinary: Negative for decreased urine volume, dysuria, frequency and urgency.   Musculoskeletal: Negative for back pain, joint swelling and neck pain.   Skin: Negative for color change, rash and wound.   Neurological: Negative for dizziness, tremors, weakness, light-headedness and headaches.     Physical Exam:   Performance Status: 1  Constitutional: He is oriented to person, place, and time. He appears well-developed and well-nourished. No distress.   HENT:   Head: Normocephalic and atraumatic.   Right Ear: External ear normal.   Left Ear: External ear normal.   Eyes: Conjunctivae and EOM are normal. Pupils are equal, round, and reactive to light. Right eye exhibits no discharge. Left eye exhibits no discharge.   Neck: Neck supple. No tracheal deviation present.   Cardiovascular: Normal rate and regular rhythm.    No murmur heard.  Pulmonary/Chest: Effort normal and breath sounds normal. No respiratory distress. He has no wheezes. He has no rales.   Abdominal: Soft. Bowel sounds are normal. He exhibits no distension. There is no tenderness.   Musculoskeletal: He exhibits no edema.   Neurological: He is alert and oriented to person, place, and time. No cranial nerve deficit.   Skin: Skin is warm and dry.   Psychiatric: He has a normal mood and affect. His behavior is normal.     Diagnostic Tests:   Labs: Reviewed    Recent Results (from the past 24 hour(s))   CBC auto differential    Collection Time: 03/01/18  7:05 AM   Result Value Ref Range    WBC 8.90 3.90 - 12.70 K/uL    RBC 4.42 (L) 4.60 - 6.20 M/uL    Hemoglobin 12.2 (L) 14.0 - 18.0 g/dL    Hematocrit 38.9 (L) 40.0 - 54.0 %    MCV 88 82 - 98 fL    MCH 27.6 27.0 - 31.0 pg    MCHC 31.4 (L) 32.0 - 36.0 g/dL    RDW 14.6 (H) 11.5 - 14.5 %    Platelets 240 150 - 350 K/uL    MPV 10.3 9.2 - 12.9 fL    Immature Granulocytes 0.2 0.0 - 0.5 %    Gran # (ANC) 4.5 1.8 - 7.7 K/uL    Immature Grans (Abs) 0.02 0.00 - 0.04  K/uL    Lymph # 3.3 1.0 - 4.8 K/uL    Mono # 0.9 0.3 - 1.0 K/uL    Eos # 0.2 0.0 - 0.5 K/uL    Baso # 0.04 0.00 - 0.20 K/uL    nRBC 0 0 /100 WBC    Gran% 50.1 38.0 - 73.0 %    Lymph% 37.1 18.0 - 48.0 %    Mono% 9.6 4.0 - 15.0 %    Eosinophil% 2.6 0.0 - 8.0 %    Basophil% 0.4 0.0 - 1.9 %    Differential Method Automated    FERRITIN    Collection Time: 03/01/18  7:05 AM   Result Value Ref Range    Ferritin 33 20.0 - 300.0 ng/mL       Assessment:     1. Paraproteinemia     2. Iron deficiency anemia, unspecified iron deficiency anemia type       Recommendations:     1. IgG Lambda MGUS  - paraproteinemia noted on work-up for CKD.   - M-protein of 0.24 g/dL.   - K/L: 1.83, 24 hr Urine Protein unremarkable, and no discrete monoclonal peaks identified in urine  - Skeletal Survey did not show any obvious lesions.   - has a low risk type of MGUS and will repeat lab work in 6 months.  - if unremarkable, then he can be monitored with surveillance lab work on a yearly basis.       2. Iron Deficiency Anemia/Normocytic Anemia   - ferritin on 2/1/18 was 20 ng/mL, on oral iron supplements. Today increased to 33 ng/mL.    - seen by GI - plan for EGD upon cardiac clearance. Had a Colonoscopy about 1 year ago.  - Hb Electrophoresis unremarkable and Alpha Globin Gene Chain Analysis in process.   - monitor Ferritin on follow up visit.       RTC in 6 months with lab work.     Case discussed with Dr. Asmita Rodriguez MD  Hematology/Oncology Fellow        Distress Screening Results: Psychosocial Distress screening score of Distress Score: 0 noted and reviewed. No intervention indicated.     Agree with Dr. Rodriguez's history, physical, assessment, and plan with the following addendums.  Low risk IgG MGUS. mild Renal dysfunction anemia due to other etiologies.  Fu in 6 months with cbc, cmp, serum free light chains, quantitative immunoglobulins, serum electropheresis, serum immunofixation prior to appt.  Gasper Tian MD  Hematology &  Stem Cell Transplant

## 2018-03-02 DIAGNOSIS — D50.9 IRON DEFICIENCY ANEMIA, UNSPECIFIED IRON DEFICIENCY ANEMIA TYPE: Primary | ICD-10-CM

## 2018-03-02 RX ORDER — POLYETHYLENE GLYCOL 3350, SODIUM SULFATE, SODIUM CHLORIDE, POTASSIUM CHLORIDE, SODIUM ASCORBATE, AND ASCORBIC ACID 7.5-2.691G
2000 KIT ORAL ONCE
Qty: 1 BOTTLE | Refills: 0 | Status: SHIPPED | OUTPATIENT
Start: 2018-03-02 | End: 2018-03-02

## 2018-03-08 LAB
ALPHA GLOBIN RELEASED BY: NORMAL
ALPHA-GLOBIN SPECIMEN: NORMAL
GENETICIST REVIEW: NORMAL
HBA1 GENE MUT ANL BLD/T: NEGATIVE
HBA1 GENE MUT ANL BLD/T: NORMAL
REF LAB TEST METHOD: NORMAL
SERVICE CMNT-IMP: NORMAL
SPECIMEN SOURCE: NORMAL

## 2018-03-21 ENCOUNTER — SURGERY (OUTPATIENT)
Age: 68
End: 2018-03-21

## 2018-03-21 ENCOUNTER — HOSPITAL ENCOUNTER (OUTPATIENT)
Facility: HOSPITAL | Age: 68
Discharge: HOME | End: 2018-03-21
Attending: INTERNAL MEDICINE | Admitting: INTERNAL MEDICINE
Payer: MEDICARE

## 2018-03-21 ENCOUNTER — ANESTHESIA (OUTPATIENT)
Dept: ENDOSCOPY | Facility: HOSPITAL | Age: 68
End: 2018-03-21
Payer: MEDICARE

## 2018-03-21 ENCOUNTER — ANESTHESIA EVENT (OUTPATIENT)
Dept: ENDOSCOPY | Facility: HOSPITAL | Age: 68
End: 2018-03-21
Payer: MEDICARE

## 2018-03-21 VITALS
HEIGHT: 67 IN | TEMPERATURE: 98 F | OXYGEN SATURATION: 96 % | DIASTOLIC BLOOD PRESSURE: 78 MMHG | BODY MASS INDEX: 41.12 KG/M2 | HEART RATE: 54 BPM | SYSTOLIC BLOOD PRESSURE: 138 MMHG | RESPIRATION RATE: 20 BRPM | WEIGHT: 262 LBS

## 2018-03-21 DIAGNOSIS — K21.9 GASTROESOPHAGEAL REFLUX DISEASE, ESOPHAGITIS PRESENCE NOT SPECIFIED: Primary | ICD-10-CM

## 2018-03-21 DIAGNOSIS — D50.9 IRON DEFICIENCY ANEMIA, UNSPECIFIED IRON DEFICIENCY ANEMIA TYPE: ICD-10-CM

## 2018-03-21 DIAGNOSIS — K21.9 GERD (GASTROESOPHAGEAL REFLUX DISEASE): ICD-10-CM

## 2018-03-21 LAB
GLUCOSE SERPL-MCNC: 36 MG/DL (ref 70–110)
POCT GLUCOSE: 157 MG/DL (ref 70–110)

## 2018-03-21 PROCEDURE — D9220A PRA ANESTHESIA: Mod: ANES,,, | Performed by: ANESTHESIOLOGY

## 2018-03-21 PROCEDURE — 88305 TISSUE EXAM BY PATHOLOGIST: CPT | Mod: 26,,, | Performed by: PATHOLOGY

## 2018-03-21 PROCEDURE — 43239 EGD BIOPSY SINGLE/MULTIPLE: CPT | Mod: ,,, | Performed by: INTERNAL MEDICINE

## 2018-03-21 PROCEDURE — 63600175 PHARM REV CODE 636 W HCPCS: Performed by: NURSE ANESTHETIST, CERTIFIED REGISTERED

## 2018-03-21 PROCEDURE — 37000008 HC ANESTHESIA 1ST 15 MINUTES: Performed by: INTERNAL MEDICINE

## 2018-03-21 PROCEDURE — 88305 TISSUE EXAM BY PATHOLOGIST: CPT | Performed by: PATHOLOGY

## 2018-03-21 PROCEDURE — D9220A PRA ANESTHESIA: Mod: CRNA,,, | Performed by: NURSE ANESTHETIST, CERTIFIED REGISTERED

## 2018-03-21 PROCEDURE — 43239 EGD BIOPSY SINGLE/MULTIPLE: CPT | Performed by: INTERNAL MEDICINE

## 2018-03-21 PROCEDURE — S5010 5% DEXTROSE AND 0.45% SALINE: HCPCS | Performed by: ANESTHESIOLOGY

## 2018-03-21 PROCEDURE — 25000003 PHARM REV CODE 250: Performed by: INTERNAL MEDICINE

## 2018-03-21 PROCEDURE — 88342 IMHCHEM/IMCYTCHM 1ST ANTB: CPT | Mod: 26,,, | Performed by: PATHOLOGY

## 2018-03-21 PROCEDURE — 25000003 PHARM REV CODE 250: Performed by: NURSE ANESTHETIST, CERTIFIED REGISTERED

## 2018-03-21 PROCEDURE — 27201012 HC FORCEPS, HOT/COLD, DISP: Performed by: INTERNAL MEDICINE

## 2018-03-21 PROCEDURE — 25000003 PHARM REV CODE 250: Performed by: ANESTHESIOLOGY

## 2018-03-21 PROCEDURE — 82962 GLUCOSE BLOOD TEST: CPT | Performed by: INTERNAL MEDICINE

## 2018-03-21 PROCEDURE — 37000009 HC ANESTHESIA EA ADD 15 MINS: Performed by: INTERNAL MEDICINE

## 2018-03-21 RX ORDER — DEXTROSE MONOHYDRATE AND SODIUM CHLORIDE 5; .45 G/100ML; G/100ML
INJECTION, SOLUTION INTRAVENOUS CONTINUOUS
Status: DISCONTINUED | OUTPATIENT
Start: 2018-03-21 | End: 2018-03-21 | Stop reason: HOSPADM

## 2018-03-21 RX ORDER — PROPOFOL 10 MG/ML
VIAL (ML) INTRAVENOUS
Status: DISCONTINUED | OUTPATIENT
Start: 2018-03-21 | End: 2018-03-21

## 2018-03-21 RX ORDER — DEXTROSE 50 % IN WATER (D50W) INTRAVENOUS SYRINGE
25
Status: DISCONTINUED | OUTPATIENT
Start: 2018-03-21 | End: 2018-03-21 | Stop reason: HOSPADM

## 2018-03-21 RX ORDER — SODIUM CHLORIDE 9 MG/ML
INJECTION, SOLUTION INTRAVENOUS CONTINUOUS
Status: DISCONTINUED | OUTPATIENT
Start: 2018-03-21 | End: 2018-03-21 | Stop reason: HOSPADM

## 2018-03-21 RX ORDER — GLYCOPYRROLATE 0.2 MG/ML
INJECTION INTRAMUSCULAR; INTRAVENOUS
Status: DISCONTINUED | OUTPATIENT
Start: 2018-03-21 | End: 2018-03-21

## 2018-03-21 RX ORDER — LIDOCAINE HCL/PF 100 MG/5ML
SYRINGE (ML) INTRAVENOUS
Status: DISCONTINUED | OUTPATIENT
Start: 2018-03-21 | End: 2018-03-21

## 2018-03-21 RX ORDER — OMEPRAZOLE 40 MG/1
40 CAPSULE, DELAYED RELEASE ORAL EVERY MORNING
Qty: 30 CAPSULE | Refills: 11 | Status: SHIPPED | OUTPATIENT
Start: 2018-03-21 | End: 2018-10-22 | Stop reason: ALTCHOICE

## 2018-03-21 RX ADMIN — DEXTROSE MONOHYDRATE 25 G: 500 INJECTION PARENTERAL at 01:03

## 2018-03-21 RX ADMIN — PROPOFOL 100 MG: 10 INJECTION, EMULSION INTRAVENOUS at 01:03

## 2018-03-21 RX ADMIN — SODIUM CHLORIDE: 9 INJECTION, SOLUTION INTRAVENOUS at 01:03

## 2018-03-21 RX ADMIN — GLYCOPYRROLATE 0.2 MG: 0.2 INJECTION, SOLUTION INTRAMUSCULAR; INTRAVENOUS at 01:03

## 2018-03-21 RX ADMIN — PROPOFOL 50 MG: 10 INJECTION, EMULSION INTRAVENOUS at 01:03

## 2018-03-21 RX ADMIN — LIDOCAINE HYDROCHLORIDE 150 MG: 20 INJECTION, SOLUTION INTRAVENOUS at 01:03

## 2018-03-21 RX ADMIN — DEXTROSE AND SODIUM CHLORIDE: 5; .45 INJECTION, SOLUTION INTRAVENOUS at 01:03

## 2018-03-21 NOTE — PROGRESS NOTES
Blood glucose 151,pt instructed to start plavix on 3/23  And Dr Cunningham at bedside instructed pt to use new med phoned into pharmacy,  Pt verbalized understanding

## 2018-03-21 NOTE — ANESTHESIA POSTPROCEDURE EVALUATION
"Anesthesia Post Evaluation    Patient: Walter Bull    Procedure(s) Performed: Procedure(s) (LRB):  ESOPHAGOGASTRODUODENOSCOPY (EGD) (N/A)    Final Anesthesia Type: general  Patient location during evaluation: PACU  Patient participation: Yes- Able to Participate  Level of consciousness: awake and alert and oriented  Post-procedure vital signs: reviewed and stable  Pain management: adequate  Airway patency: patent  PONV status at discharge: No PONV  Anesthetic complications: no      Cardiovascular status: stable  Respiratory status: unassisted, spontaneous ventilation and room air  Hydration status: euvolemic  Follow-up not needed.        Visit Vitals  /75 (BP Location: Left arm, Patient Position: Lying)   Pulse (!) 55   Temp 36.7 °C (98.1 °F) (Temporal)   Resp 20   Ht 5' 7" (1.702 m)   Wt 118.8 kg (262 lb)   SpO2 96%   BMI 41.04 kg/m²       Pain/Madhav Score: Pain Assessment Performed: Yes (3/21/2018  2:11 PM)  Presence of Pain: denies (3/21/2018  1:30 PM)  Pain Rating Prior to Med Admin: 0 (3/21/2018  2:04 PM)  Madhav Score: 10 (3/21/2018  2:11 PM)      "

## 2018-03-21 NOTE — TRANSFER OF CARE
"Anesthesia Transfer of Care Note    Patient: Walter Bull    Procedure(s) Performed: Procedure(s) (LRB):  ESOPHAGOGASTRODUODENOSCOPY (EGD) (N/A)    Patient location: PACU    Anesthesia Type: general    Transport from OR: Transported from OR on room air with adequate spontaneous ventilation    Post pain: adequate analgesia    Post assessment: no apparent anesthetic complications and tolerated procedure well    Post vital signs: stable    Level of consciousness: sedated    Nausea/Vomiting: no nausea/vomiting    Complications: none    Transfer of care protocol was followed      Last vitals:   Visit Vitals  BP (!) 146/66 (BP Location: Left arm, Patient Position: Lying)   Pulse (!) 57   Temp 37 °C (98.6 °F) (Temporal)   Resp 18   Ht 5' 7" (1.702 m)   Wt 118.8 kg (262 lb)   SpO2 100%   BMI 41.04 kg/m²     "

## 2018-03-21 NOTE — ANESTHESIA PREPROCEDURE EVALUATION
03/21/2018  Walter Bull is a 67 y.o., male.    Evaluation of bleeding  without emesis    Pre-operative evaluation for Procedure(s) (LRB):  ESOPHAGOGASTRODUODENOSCOPY (EGD) (N/A)    Review of patient's allergies indicates:  No Known Allergies    No current facility-administered medications on file prior to encounter.      Current Outpatient Prescriptions on File Prior to Encounter   Medication Sig Dispense Refill    acetaminophen (TYLENOL) 100 mg/mL suspension Take by mouth every 4 (four) hours as needed for Temperature greater than.      albuterol 90 mcg/actuation inhaler Inhale 2 puffs into the lungs every 6 (six) hours as needed for Wheezing. 1 Inhaler 5    amlodipine (NORVASC) 5 MG tablet Take 5 mg by mouth once daily.      aspirin (ECOTRIN) 81 MG EC tablet Take 81 mg by mouth once daily.      clopidogrel (PLAVIX) 75 mg tablet Take 75 mg by mouth once daily.      cyanocobalamin (VITAMIN B-12) 1000 MCG tablet Take 1,000 mcg by mouth once daily.       ezetimibe (ZETIA) 10 mg tablet Take 10 mg by mouth once daily.       ferrous sulfate 325 mg (65 mg iron) Tab tablet Take 1 tablet (325 mg total) by mouth 3 (three) times daily. 90 tablet 3    FOLIC ACID/MULTIVIT-MIN/LUTEIN (CENTRUM SILVER ORAL) Take by mouth.      furosemide (LASIX) 40 MG tablet Take 1 tablet (40 mg total) by mouth once daily. 30 tablet 5    insulin NPH (NOVOLIN N) 100 unit/mL injection Inject into the skin. 90 units in the morning and 40 units at bedtime       isosorbide mononitrate (IMDUR) 60 MG 24 hr tablet Take 60 mg by mouth once daily.      JANUMET 50-1,000 mg per tablet TAKE ONE TABLET BY MOUTH TWICE DAILY WITH MEALS 180 tablet 1    meloxicam (MOBIC) 15 MG tablet       metoprolol tartrate (LOPRESSOR) 50 MG tablet TAKE ONE TABLET BY MOUTH TWICE DAILY 180 tablet 1    nitroGLYCERIN (NITROSTAT) 0.4 MG SL tablet Place 1  tablet (0.4 mg total) under the tongue every 5 (five) minutes as needed for Chest pain. 90 tablet 0    RANEXA 500 mg Tb12 Take 500 mg by mouth 2 (two) times daily.       rosuvastatin (CRESTOR) 20 MG tablet Take 1 tablet (20 mg total) by mouth once daily. 30 tablet 5    tamsulosin (FLOMAX) 0.4 mg Cp24 TAKE ONE CAPSULE BY MOUTH ONCE DAILY 30 capsule 5    traMADol (ULTRAM) 50 mg tablet          Patient Active Problem List   Diagnosis    Coronary artery disease of native artery of native heart with stable angina pectoris    Diabetes mellitus type 2 in obese    Diabetic polyneuropathy associated with type 2 diabetes mellitus    HLD (hyperlipidemia)    S/P CABG x 5    History of tobacco use    GERD (gastroesophageal reflux disease)    Benign essential HTN    Benign prostatic hyperplasia without lower urinary tract symptoms    Iron deficiency anemia       Past Surgical History:   Procedure Laterality Date    COLON SURGERY  2006    COLONOSCOPY N/A 2/2/2017    Procedure: COLONOSCOPY;  Surgeon: Ronnie Conway MD;  Location: [facility];  Service: Endoscopy;  Laterality: N/A;    CORONARY ARTERY BYPASS GRAFT  2005    5 arteries    EYE SURGERY Bilateral 2016    Laser surgery for glaucoma           No results for input(s): HCT in the last 72 hours.  No results for input(s): PLT in the last 72 hours.  No results for input(s): K in the last 72 hours.  No results for input(s): CREATININE in the last 72 hours.  No results for input(s): GLU in the last 72 hours.  No results for input(s): PT in the last 72 hours.                    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  Denies Asthma.  Denies Shortness of breath. Last bronchitis several months ago   Hepatic/GI:   Denies Liver Disease.    Neurological:   Denies TIA. Denies CVA. Denies Seizures.    Endocrine:   Diabetes, type 2, using insulin        Physical Exam  General:  Well nourished, Obesity    Airway/Jaw/Neck:  Airway Findings: Mouth Opening: Normal Tongue: Normal  General Airway Assessment: Adult, Average  Mallampati: III  TM Distance: Normal, at least 6 cm  Jaw/Neck Findings:  Neck ROM: Normal ROM  No teeth        Heart/Vascular:  Heart Findings: Rate: Normal  Rhythm: Regular Rhythm        Mental Status:  Mental Status Findings:  Cooperative, Alert and Oriented         Anesthesia Plan  Type of Anesthesia, risks & benefits discussed:  Anesthesia Type:  general  Patient's Preference:   Intra-op Monitoring Plan:   Intra-op Monitoring Plan Comments:   Post Op Pain Control Plan:   Post Op Pain Control Plan Comments: As per surgeon's plan  Induction:   IV  Beta Blocker:  Patient is on a Beta-Blocker and has received one dose within the past 24 hours (No further documentation required).       Informed Consent: Patient understands risks and agrees with Anesthesia plan.  Questions answered. Anesthesia consent signed with patient.  ASA Score: 2     Day of Surgery Review of History & Physical:    H&P update referred to the surgeon.         Ready For Surgery From Anesthesia Perspective.

## 2018-03-21 NOTE — H&P (VIEW-ONLY)
[facility] Consultation Note    Reason for Consult:  The primary encounter diagnosis was Iron deficiency anemia, unspecified iron deficiency anemia type. Diagnoses of Encounter for current long-term use of anticoagulants, Coronary artery disease of native artery of native heart with stable angina pectoris, S/P CABG x 5, Benign essential HTN, and Congestive heart failure, unspecified congestive heart failure chronicity, unspecified congestive heart failure type were also pertinent to this visit.    PCP:   Belkys Kruger   [address] / [place] [address]    Referring MD:  Jose Francisco Rodriguez Md  [address] [place] [address]    HPI:  This is a 67 y.o. male referred by Dr. Rodriguez in hematology for evaluation of Iron Deficiency Anemia    2/2018 Pertinent labs:   Hgb 11.4 (low)  MCV 87 (wnl)  Iron- 36 (low)  Iron Sat- 9 (low)  TIBC- 389 (wnl)  Ferritin- 20    Denies blood in the stool  No abdominal pain  Some nausea with taking meds on an empty stomach  No dysphagia  Currently taking iron 325mg twice daily    NSAIDs/ ASA - [id number], mobic  Anticoagulants - plavix only about 1 yr  Antacids - Pepcid 20 intermittently as needed    EGD - no prior  Colonoscopy- normal per pt- need report    PMH: CAD with stent placement sp CABG. SOB with walking  Saw cardiology - echocardiogram few months     García screening- Family Hx of:  Colon cancer- no    ROS:  Constitutional: No fevers, chills, No weight loss  ENT: No allergies  CV: No chest pain  Pulm: No cough, No shortness of breath  Ophtho: No vision changes  GI: see HPI  Derm: No rash  Heme: No lymphadenopathy, No bruising  MSK: No arthritis  : No dysuria, No hematuria  Endo: No hot or cold intolerance  Neuro: No syncope, No seizure  Psych: No anxiety, No depression    Medical History:  has a past medical history of Anemia; Anticoagulant long-term use; CHF (congestive heart failure); Colon cancer screening (2/2/2017); Coronary artery  disease; Diabetes mellitus type I; Disorder of kidney and ureter; Encounter for blood transfusion; Glaucoma; Heart disease; Hypertension; and Kidney failure.    Surgical History:  has a past surgical history that includes Coronary artery bypass graft (2005); Eye surgery (Bilateral, 2016); Colon surgery (2006); and Colonoscopy (N/A, 2/2/2017).    Family History: family history includes Diabetes in his mother; Heart disease in his mother..     Social History:  reports that he quit smoking about 37 years ago. His smoking use included Cigarettes. He started smoking about 55 years ago. He smoked 3.00 packs per day. He quit smokeless tobacco use about 34 years ago. His smokeless tobacco use included Snuff and Chew. He reports that he does not drink alcohol or use drugs.    Review of patient's allergies indicates:  No Known Allergies    Current Outpatient Prescriptions on File Prior to Visit   Medication Sig Dispense Refill    albuterol 90 mcg/actuation inhaler Inhale 2 puffs into the lungs every 6 (six) hours as needed for Wheezing. 1 Inhaler 5    amlodipine (NORVASC) 5 MG tablet Take 5 mg by mouth once daily.      aspirin (ECOTRIN) 81 MG EC tablet Take 81 mg by mouth once daily.      clopidogrel (PLAVIX) 75 mg tablet Take 75 mg by mouth once daily.      cyanocobalamin (VITAMIN B-12) 1000 MCG tablet Take 1,000 mcg by mouth once daily.       ezetimibe (ZETIA) 10 mg tablet Take 10 mg by mouth once daily.       famotidine (PEPCID) 20 MG tablet       ferrous sulfate 325 mg (65 mg iron) Tab tablet Take 1 tablet (325 mg total) by mouth 3 (three) times daily. 90 tablet 3    FOLIC ACID/MULTIVIT-MIN/LUTEIN (CENTRUM SILVER ORAL) Take by mouth.      furosemide (LASIX) 40 MG tablet Take 1 tablet (40 mg total) by mouth once daily. 30 tablet 5    insulin NPH (NOVOLIN N) 100 unit/mL injection Inject into the skin. 90 units in the morning and 40 units at bedtime       isosorbide mononitrate (IMDUR) 60 MG 24 hr tablet Take 60  "mg by mouth once daily.      JANUMET 50-1,000 mg per tablet TAKE ONE TABLET BY MOUTH TWICE DAILY WITH MEALS 180 tablet 1    meloxicam (MOBIC) 15 MG tablet       metoprolol tartrate (LOPRESSOR) 50 MG tablet TAKE ONE TABLET BY MOUTH TWICE DAILY 180 tablet 1    nitroGLYCERIN (NITROSTAT) 0.4 MG SL tablet Place 1 tablet (0.4 mg total) under the tongue every 5 (five) minutes as needed for Chest pain. 90 tablet 0    RANEXA 500 mg Tb12 Take 500 mg by mouth 2 (two) times daily.       rosuvastatin (CRESTOR) 20 MG tablet Take 1 tablet (20 mg total) by mouth once daily. 30 tablet 5    tamsulosin (FLOMAX) 0.4 mg Cp24 TAKE ONE CAPSULE BY MOUTH ONCE DAILY 30 capsule 5    traMADol (ULTRAM) 50 mg tablet       [DISCONTINUED] ranitidine (ZANTAC) 150 MG capsule Take 150 mg by mouth as needed for Heartburn.       No current facility-administered medications on file prior to visit.          Objective Findings:    Vital Signs:  /64   Pulse (!) 56   Ht 5' 7" (1.702 m)   Wt 120.4 kg (265 lb 6.9 oz)   BMI 41.57 kg/m²   Body mass index is 41.57 kg/m².    Physical Exam:  General Appearance: Well appearing in no acute distress  Head:   Normocephalic, without obvious abnormality  Eyes:    No scleral icterus  ENT: Neck supple, Lips, mucosa, and tongue normal  Lungs: CTA bilaterally in anterior and posterior fields, no wheezes, no crackles.  Heart:  Regular rhythm, bradycardia, S1, S2 normal, no murmurs heard  Abdomen: Soft, mild superficial LLQ tenderness over injection site, non distended with positive bowel sounds in all four quadrants.   Extremities: no edema  Skin: No rash  Neurologic: AAO x 3      Labs:  Lab Results   Component Value Date    WBC 9.11 02/01/2018    HGB 11.4 (L) 02/01/2018    HCT 36.5 (L) 02/01/2018     02/01/2018    CHOL 119 (L) 01/15/2018    TRIG 56 01/15/2018    HDL 38 (L) 01/15/2018    ALT 23 01/15/2018    AST 21 01/15/2018     01/15/2018    K 4.5 01/15/2018     01/15/2018    " CREATININE 1.0 01/15/2018    BUN 10 01/15/2018    CO2 25 01/15/2018    TSH 1.013 02/01/2018    PSA 1.7 01/15/2018    HGBA1C 7.7 (H) 01/15/2018       Imaging:    Endoscopy:    EGD - no prior  Colonoscopy- normal per pt- need report    Assessment:  1. Iron deficiency anemia, unspecified iron deficiency anemia type    2. Encounter for current long-term use of anticoagulants    3. Coronary artery disease of native artery of native heart with stable angina pectoris    4. S/P CABG x 5    5. Benign essential HTN    6. Congestive heart failure, unspecified congestive heart failure chronicity, unspecified congestive heart failure type       68yo M with chronic anemia, recently diagnosed with iron deficiency. ASA and Mobic use increase his risk for erosions / ulcers.  Need to rule out GI bleeding.    Recommendations:  1. Schedule EGD to rule out sources of bleeding. He is high risk for endoscopies given his CAD on plavix.    2. Will contact pt's cardiologist Dr. Vitale of [facility] for recent echocardiogram, EKG, stress test, and clearance to hold his plavix for EGD.      3. Request recent colonoscopy report from Dr. Kruger in [place]. Will consider repeating.    No Follow-up on file.      Order summary:  Orders Placed This Encounter    Case request GI: ESOPHAGOGASTRODUODENOSCOPY (EGD)         Thank you so much for allowing me to participate in the care of Walter Oc Styles PA-C

## 2018-03-21 NOTE — DISCHARGE INSTRUCTIONS

## 2018-03-21 NOTE — PROVATION PATIENT INSTRUCTIONS
Discharge Summary/Instructions after an Endoscopic Procedure  Patient Name: Walter Bull  Patient MRN: [id number]  Patient YOB: 1950  Wednesday, March 21, 2018  Fawad Cunningham MD  RESTRICTIONS:  During your procedure today, you received medications for sedation.  These   medications may affect your judgment, balance and coordination.  Therefore,   for 24 hours, you have the following restrictions:   - DO NOT drive a car, operate machinery, make legal/financial decisions,   sign important papers or drink alcohol.    ACTIVITY:  The following day: return to full activity including work, except no heavy   lifting, straining or running for 3 days if polyps were removed.  DIET:  Eat and drink normally unless instructed otherwise.     TREATMENT FOR COMMON SIDE EFFECTS:  - Mild abdominal pain, nausea, belching, bloating or excessive gas:  rest,   eat lightly and use a heating pad.  - Sore Throat: treat with throat lozenges and/or gargle with warm salt   water.  - Because air was used during the procedure, expelling large amounts of air   from your rectum or belching is normal.  - If a bowel prep was taken, you may not have a bowel movement for 1-3 days.    This is normal.  SYMPTOMS TO WATCH FOR AND REPORT TO YOUR PHYSICIAN:  1. Abdominal pain or bloating, other than gas cramps.  2. Chest pain.  3. Back pain.  4. Signs of infection such as: chills or fever occurring within 24 hours   after the procedure.  5. Rectal bleeding, which would show as bright red, maroon, or black stools.   (A tablespoon of blood from the rectum is not serious, especially if   hemorrhoids are present.)  6. Vomiting.  7. Weakness or dizziness.  GO DIRECTLY TO THE NEAREST EMERGENCY ROOM IF YOU HAVE ANY OF THE FOLLOWING:      Difficulty breathing              Chills and/or fever over 101 F   Persistent vomiting and/or vomiting blood   Severe abdominal pain   Severe chest pain   Black, tarry stools   Bleeding- more than one tablespoon   Any  other symptom or condition that you feel may need urgent attention  Your doctor recommends these additional instructions:  If any biopsies were taken, your doctors clinic will contact you in 1 to 2   weeks with any results.  You have a contact number available for emergencies.  The signs and symptoms   of potential delayed complications were discussed with you.  You may return   to normal activities tomorrow.  Written discharge instructions were   provided to you.   You are being discharged to home.   Resume palvis in 2 days.  We are waiting for your pathology results.   A proton pump inhibitor medication will be prescribed to be taken by mouth   once a day indefinitely.   The findings and recommendations were discussed with your designated   responsible adult.  For questions, problems or results please call your physician - Fawad Cunningham MD at Work:  [phone].  [facility], EMERGENCY ROOM PHONE NUMBER: [phone]  IF A COMPLICATION OR EMERGENCY SITUATION ARISES AND YOU ARE UNABLE TO REACH   YOUR PHYSICIAN - GO DIRECTLY TO THE EMERGENCY ROOM.  Fawad Cunningham MD  3/21/2018 1:59:19 PM  This report has been verified and signed electronically.

## 2018-03-22 LAB — POCT GLUCOSE: 151 MG/DL (ref 70–110)

## 2018-03-26 ENCOUNTER — TELEPHONE (OUTPATIENT)
Dept: GASTROENTEROLOGY | Facility: CLINIC | Age: 68
End: 2018-03-26

## 2018-03-28 ENCOUNTER — TELEPHONE (OUTPATIENT)
Dept: ENDOSCOPY | Facility: HOSPITAL | Age: 68
End: 2018-03-28

## 2018-03-30 ENCOUNTER — TELEPHONE (OUTPATIENT)
Dept: GASTROENTEROLOGY | Facility: CLINIC | Age: 68
End: 2018-03-30

## 2018-03-30 DIAGNOSIS — A04.8 H. PYLORI INFECTION: Primary | ICD-10-CM

## 2018-03-30 RX ORDER — AMOXICILLIN 500 MG/1
1000 TABLET, FILM COATED ORAL EVERY 12 HOURS
Qty: 56 TABLET | Refills: 0 | Status: SHIPPED | OUTPATIENT
Start: 2018-03-30 | End: 2018-04-13

## 2018-03-30 RX ORDER — CLARITHROMYCIN 500 MG/1
500 TABLET, FILM COATED ORAL EVERY 12 HOURS
Qty: 28 TABLET | Refills: 0 | Status: SHIPPED | OUTPATIENT
Start: 2018-03-30 | End: 2018-04-13

## 2018-04-02 ENCOUNTER — TELEPHONE (OUTPATIENT)
Dept: GASTROENTEROLOGY | Facility: CLINIC | Age: 68
End: 2018-04-02

## 2018-04-04 ENCOUNTER — TELEPHONE (OUTPATIENT)
Dept: GASTROENTEROLOGY | Facility: CLINIC | Age: 68
End: 2018-04-04

## 2018-04-04 NOTE — TELEPHONE ENCOUNTER
----- Message from Dea Hendrickson sent at 4/3/2018 12:56 PM CDT -----  Contact: anival shell - [phone]  Ray - meds are making him nauseated and lightheaded - please call anival shell - [phone]

## 2018-04-04 NOTE — TELEPHONE ENCOUNTER
----- Message from Fawad Cunningham MD sent at 4/4/2018 11:00 AM CDT -----  Tell him to try to reduce to taking one and finishing it out if he can  ----- Message -----  From: Noreen Ching MA  Sent: 4/4/2018  10:49 AM  To: Fawad Cunningham MD    Spoke with  and he told me that the amoxicillin(2) that he is taking every 12 hours and the  Clarithromycin(1) that he is taking every 12 hours are making him lightheaded and nauseated. He is asking should he stop taking the pills or reduce taking one? Please advise

## 2018-04-25 ENCOUNTER — CLINICAL SUPPORT (OUTPATIENT)
Dept: INTERNAL MEDICINE | Facility: CLINIC | Age: 68
End: 2018-04-25
Payer: MEDICARE

## 2018-04-25 DIAGNOSIS — E66.9 DIABETES MELLITUS TYPE 2 IN OBESE: ICD-10-CM

## 2018-04-25 DIAGNOSIS — I10 BENIGN ESSENTIAL HTN: ICD-10-CM

## 2018-04-25 DIAGNOSIS — I25.118 CORONARY ARTERY DISEASE OF NATIVE ARTERY OF NATIVE HEART WITH STABLE ANGINA PECTORIS: ICD-10-CM

## 2018-04-25 DIAGNOSIS — E11.42 DIABETIC POLYNEUROPATHY ASSOCIATED WITH TYPE 2 DIABETES MELLITUS: ICD-10-CM

## 2018-04-25 DIAGNOSIS — E11.69 DIABETES MELLITUS TYPE 2 IN OBESE: ICD-10-CM

## 2018-04-25 DIAGNOSIS — E78.5 HYPERLIPIDEMIA, UNSPECIFIED HYPERLIPIDEMIA TYPE: ICD-10-CM

## 2018-04-25 LAB
ALBUMIN SERPL BCP-MCNC: 3.7 G/DL
ALP SERPL-CCNC: 65 U/L
ALT SERPL W/O P-5'-P-CCNC: 19 U/L
ANION GAP SERPL CALC-SCNC: 5 MMOL/L
AST SERPL-CCNC: 23 U/L
BASOPHILS # BLD AUTO: 0.04 K/UL
BASOPHILS NFR BLD: 0.5 %
BILIRUB SERPL-MCNC: 0.4 MG/DL
BUN SERPL-MCNC: 14 MG/DL
CALCIUM SERPL-MCNC: 8.7 MG/DL
CHLORIDE SERPL-SCNC: 102 MMOL/L
CHOLEST SERPL-MCNC: 128 MG/DL
CHOLEST/HDLC SERPL: 3.4 {RATIO}
CO2 SERPL-SCNC: 29 MMOL/L
CREAT SERPL-MCNC: 1.2 MG/DL
CREAT UR-MCNC: 103 MG/DL
DIFFERENTIAL METHOD: ABNORMAL
EOSINOPHIL # BLD AUTO: 1.1 K/UL
EOSINOPHIL NFR BLD: 12.9 %
ERYTHROCYTE [DISTWIDTH] IN BLOOD BY AUTOMATED COUNT: 15 %
EST. GFR  (AFRICAN AMERICAN): >60 ML/MIN/1.73 M^2
EST. GFR  (NON AFRICAN AMERICAN): >60 ML/MIN/1.73 M^2
ESTIMATED AVG GLUCOSE: 146 MG/DL
GLUCOSE SERPL-MCNC: 241 MG/DL
HBA1C MFR BLD HPLC: 6.7 %
HCT VFR BLD AUTO: 41.5 %
HDLC SERPL-MCNC: 38 MG/DL
HDLC SERPL: 29.7 %
HGB BLD-MCNC: 12.7 G/DL
IMM GRANULOCYTES # BLD AUTO: 0.03 K/UL
IMM GRANULOCYTES NFR BLD AUTO: 0.4 %
LDLC SERPL CALC-MCNC: 75.8 MG/DL
LYMPHOCYTES # BLD AUTO: 2.5 K/UL
LYMPHOCYTES NFR BLD: 30.1 %
MCH RBC QN AUTO: 27.5 PG
MCHC RBC AUTO-ENTMCNC: 30.6 G/DL
MCV RBC AUTO: 90 FL
MICROALBUMIN UR DL<=1MG/L-MCNC: 7 UG/ML
MICROALBUMIN/CREATININE RATIO: 6.8 UG/MG
MONOCYTES # BLD AUTO: 0.8 K/UL
MONOCYTES NFR BLD: 9.7 %
NEUTROPHILS # BLD AUTO: 3.8 K/UL
NEUTROPHILS NFR BLD: 46.4 %
NONHDLC SERPL-MCNC: 90 MG/DL
NRBC BLD-RTO: 0 /100 WBC
PLATELET # BLD AUTO: 205 K/UL
PMV BLD AUTO: 11.1 FL
POTASSIUM SERPL-SCNC: 4.3 MMOL/L
PROT SERPL-MCNC: 7 G/DL
RBC # BLD AUTO: 4.61 M/UL
SODIUM SERPL-SCNC: 136 MMOL/L
T4 FREE SERPL-MCNC: 1.06 NG/DL
TRIGL SERPL-MCNC: 71 MG/DL
TSH SERPL DL<=0.005 MIU/L-ACNC: 1.55 UIU/ML
WBC # BLD AUTO: 8.24 K/UL

## 2018-04-25 PROCEDURE — 84443 ASSAY THYROID STIM HORMONE: CPT

## 2018-04-25 PROCEDURE — 82043 UR ALBUMIN QUANTITATIVE: CPT

## 2018-04-25 PROCEDURE — 80061 LIPID PANEL: CPT

## 2018-04-25 PROCEDURE — 99999 PR PBB SHADOW E&M-EST. PATIENT-LVL I: CPT | Mod: PBBFAC,,,

## 2018-04-25 PROCEDURE — 80053 COMPREHEN METABOLIC PANEL: CPT

## 2018-04-25 PROCEDURE — 84439 ASSAY OF FREE THYROXINE: CPT

## 2018-04-25 PROCEDURE — 85025 COMPLETE CBC W/AUTO DIFF WBC: CPT

## 2018-04-25 PROCEDURE — 83036 HEMOGLOBIN GLYCOSYLATED A1C: CPT

## 2018-05-02 ENCOUNTER — OFFICE VISIT (OUTPATIENT)
Dept: INTERNAL MEDICINE | Facility: CLINIC | Age: 68
End: 2018-05-02
Payer: MEDICARE

## 2018-05-02 VITALS
BODY MASS INDEX: 41.18 KG/M2 | HEIGHT: 67 IN | SYSTOLIC BLOOD PRESSURE: 116 MMHG | DIASTOLIC BLOOD PRESSURE: 66 MMHG | RESPIRATION RATE: 18 BRPM | WEIGHT: 262.38 LBS | OXYGEN SATURATION: 95 % | HEART RATE: 60 BPM

## 2018-05-02 DIAGNOSIS — E66.9 DIABETES MELLITUS TYPE 2 IN OBESE: ICD-10-CM

## 2018-05-02 DIAGNOSIS — I10 BENIGN ESSENTIAL HTN: ICD-10-CM

## 2018-05-02 DIAGNOSIS — N40.0 BENIGN PROSTATIC HYPERPLASIA WITHOUT LOWER URINARY TRACT SYMPTOMS: ICD-10-CM

## 2018-05-02 DIAGNOSIS — Z12.5 PROSTATE CANCER SCREENING: ICD-10-CM

## 2018-05-02 DIAGNOSIS — E11.42 DIABETIC POLYNEUROPATHY ASSOCIATED WITH TYPE 2 DIABETES MELLITUS: ICD-10-CM

## 2018-05-02 DIAGNOSIS — E78.5 HYPERLIPIDEMIA, UNSPECIFIED HYPERLIPIDEMIA TYPE: ICD-10-CM

## 2018-05-02 DIAGNOSIS — E11.69 DIABETES MELLITUS TYPE 2 IN OBESE: ICD-10-CM

## 2018-05-02 DIAGNOSIS — I25.118 CORONARY ARTERY DISEASE OF NATIVE ARTERY OF NATIVE HEART WITH STABLE ANGINA PECTORIS: Primary | ICD-10-CM

## 2018-05-02 DIAGNOSIS — H91.93 BILATERAL HEARING LOSS, UNSPECIFIED HEARING LOSS TYPE: ICD-10-CM

## 2018-05-02 PROCEDURE — 99215 OFFICE O/P EST HI 40 MIN: CPT | Mod: S$GLB,,, | Performed by: INTERNAL MEDICINE

## 2018-05-02 PROCEDURE — 3044F HG A1C LEVEL LT 7.0%: CPT | Mod: CPTII,S$GLB,, | Performed by: INTERNAL MEDICINE

## 2018-05-02 PROCEDURE — 99999 PR PBB SHADOW E&M-EST. PATIENT-LVL III: CPT | Mod: PBBFAC,,, | Performed by: INTERNAL MEDICINE

## 2018-05-02 PROCEDURE — 3074F SYST BP LT 130 MM HG: CPT | Mod: CPTII,S$GLB,, | Performed by: INTERNAL MEDICINE

## 2018-05-02 PROCEDURE — 3078F DIAST BP <80 MM HG: CPT | Mod: CPTII,S$GLB,, | Performed by: INTERNAL MEDICINE

## 2018-05-02 PROCEDURE — 99499 UNLISTED E&M SERVICE: CPT | Mod: S$GLB,,, | Performed by: INTERNAL MEDICINE

## 2018-05-02 RX ORDER — FUROSEMIDE 20 MG/1
TABLET ORAL
COMMUNITY
Start: 2018-04-11 | End: 2018-08-28 | Stop reason: DRUGHIGH

## 2018-05-02 NOTE — PROGRESS NOTES
Subjective:       Patient ID: Walter Bull is a 67 y.o. male.    Chief Complaint: Follow-up (x 3 months- results)      HPI:  Patient is known to me and presents for follow up CAD, DM type 2, HLD. Labs from 4/25/18 personally reviewed and discussed with the patient today.     Today wife reports hearing loss. TV is very loud. Does not respond when she calls for him. He agrees his hearing is worse. B/L ears affected. No otalgia or tinnitus. Would like to be evaluated for hearing aids.      CAD s/p 5v CABG: following with Dr. Vitale as well. Now on Imdur and Ranexa and chest pains are resolved. Still using NTG4-5x a week. Sees Dr. Vitale again soon.  Also reports h/o CHF, on lasix 40mg once a day. Denies SOB, [place] and orthopnea.   He takes ASA, BB. On Crestor. Unsure why not on ACEi/ARB as it sounds like he has systolic heart failure, will still need to get records.     Dm type 2: NPH 80 units AM and 40 units bedtime and Janumet. Reports having hypoglycemia in the AM, sometimes waking him up at night. As low as 35--eats and sugars improve. A1C 6.7%.    He does report numbness and tingling of left foot > right foot and b/l fingers; sx have been present for several years. Not on medicine for neuropathy. Denies dizziness, syncope. Denies polyuria, polydipsia  Foot exam: 1/2018  Eye exam: 6/2017  Microalb: 4/2018     HLD: on crestor and zetia, has been taking for several years. Denies side effects. LDL at goal     HTN: on amlodipine. Unsure why not on ACEi/ARB, will need to get prior records to determine why.     GERD: Uses Zantac PRN. Works well, denies abd pain, n/v/d/c.     BPH: on flomax due to poor stream and nocturia. He has already noticed an improvement in sx. No medication side effects     Tobacco use: quit 1981; previously smoked 3 PPD for 20 years  EtOH: stopped drink 1982; was a daily drink 2 fifth liquor daily  Illicit drug: denies  Past Medical History:   Diagnosis Date    Anemia     Anticoagulant long-term  use     CHF (congestive heart failure)     Colon cancer screening 2/2/2017    Coronary artery disease     Diabetes mellitus type I     Disorder of kidney and ureter     Encounter for blood transfusion     Glaucoma     Heart disease     Hypertension     Kidney failure     post CABG       Family History   Problem Relation Age of Onset    Diabetes Mother     Heart disease Mother     Colon cancer Neg Hx     Esophageal cancer Neg Hx     Stomach cancer Neg Hx        Social History     Social History    Marital status:      Spouse name: N/A    Number of children: N/A    Years of education: N/A     Occupational History    Not on file.     Social History Main Topics    Smoking status: Former Smoker     Packs/day: 3.00     Types: Cigarettes     Start date: 1/18/1963     Quit date: 1/1/1981    Smokeless tobacco: Former User     Types: Snuff, Chew     Quit date: 1984    Alcohol use No    Drug use: No    Sexual activity: Not on file      Comment: -with a significant other     Other Topics Concern    Not on file     Social History Narrative    No narrative on file       Review of Systems   Constitutional: Negative for activity change, fever and unexpected weight change.   HENT: Negative for congestion, ear pain, hearing loss, rhinorrhea and sore throat.    Eyes: Negative for pain, redness and visual disturbance.   Respiratory: Negative for cough, shortness of breath and wheezing.    Cardiovascular: Positive for chest pain (chronic, sees Dr. Vitale). Negative for palpitations and leg swelling.   Gastrointestinal: Negative for abdominal pain, constipation, diarrhea, nausea and vomiting.   Genitourinary: Negative for decreased urine volume, dysuria, frequency and urgency.   Musculoskeletal: Negative for back pain, joint swelling and neck pain.   Skin: Negative for color change, rash and wound.   Neurological: Negative for dizziness, tremors, weakness, light-headedness and headaches.          Objective:      Physical Exam   Constitutional: He is oriented to person, place, and time. He appears well-developed and well-nourished. No distress.   HENT:   Head: Normocephalic and atraumatic.   Right Ear: External ear normal.   Left Ear: External ear normal.   Eyes: Conjunctivae and EOM are normal. Pupils are equal, round, and reactive to light. Right eye exhibits no discharge. Left eye exhibits no discharge.   Neck: Neck supple. No tracheal deviation present.   Cardiovascular: Normal rate and regular rhythm.  Exam reveals no gallop and no friction rub.    No murmur heard.  Pulmonary/Chest: Effort normal and breath sounds normal. No respiratory distress. He has no wheezes. He has no rales.   Abdominal: Soft. Bowel sounds are normal. He exhibits no distension. There is no tenderness. There is no rebound and no guarding.   Musculoskeletal: He exhibits no edema.   Neurological: He is alert and oriented to person, place, and time. No cranial nerve deficit.   Skin: Skin is warm and dry.   Psychiatric: He has a normal mood and affect. His behavior is normal.   Vitals reviewed.      Assessment:       1. Coronary artery disease of native artery of native heart with stable angina pectoris    2. Benign essential HTN    3. Hyperlipidemia, unspecified hyperlipidemia type    4. Benign prostatic hyperplasia without lower urinary tract symptoms    5. Diabetes mellitus type 2 in obese    6. Diabetic polyneuropathy associated with type 2 diabetes mellitus    7. Prostate cancer screening    8. Bilateral hearing loss, unspecified hearing loss type        Plan:       Walter was seen today for follow-up.    Diagnoses and all orders for this visit:    Coronary artery disease of native artery of native heart with stable angina pectoris  -     CBC auto differential; Future  -     Comprehensive metabolic panel; Future  -     TSH; Future  -     Lipid panel; Future  Chronic controlled  Cont ASA, Plavix, BB, statin  Sees Dr. Vitale, does  still get intermittent chest pain sx. Cont imdur and Ranexa  Diet and exercise, weight loss    Benign essential HTN  -     CBC auto differential; Future  -     Comprehensive metabolic panel; Future  -     TSH; Future  -     Lipid panel; Future  Chronic controlled  Continue medications at same dose  Low Na diet  Exercise, weight loss  Check BP and keep log for next visit    Hyperlipidemia, unspecified hyperlipidemia type  -     CBC auto differential; Future  -     Comprehensive metabolic panel; Future  -     TSH; Future  -     Lipid panel; Future  Chronic controlled  Cont meds at same dose  Diet and exercise discussed    Benign prostatic hyperplasia without lower urinary tract symptoms  -     CBC auto differential; Future  -     Comprehensive metabolic panel; Future  -     TSH; Future  -     Lipid panel; Future  Chronic controlled  Asymptomatic  Cont meds at same dose    Diabetes mellitus type 2 in obese  -     CBC auto differential; Future  -     Comprehensive metabolic panel; Future  -     TSH; Future  -     Lipid panel; Future  -     Hemoglobin A1c; Future  Chronic controlled  Having HYPOGLYCEMIA: REDUCED NPH FROM 40 UNITS TO 30 UNITS qhs, CONTINUE 80 UNITS QAM. IF MORNING OR OVERNIGHT SUGARS REMAIN LOW CALL ME IN 1 WEEK. HYPOGLYCEMIA PRECAUTIONS DISCUSSED  May need evening snack as well  Otherwise, well controlled based on a1C. Cont Janumet as well  1800 cookie ADA diet  Exercise, weight loss  Check sugars and keep log  Foot exam: 1/2018  Eye exam: 6/2017  Microalb: 4/2018    Diabetic polyneuropathy associated with type 2 diabetes mellitus  Declines medication, sx controlled  Cont good glucose control    Prostate cancer screening  -     PSA, Screening; Future    Bilateral hearing loss, unspecified hearing loss type  -     Ambulatory Referral to Audiology  New problem  Never got hearing checked, referred today       Health Maintenance:  -CRC: 2017  -PSA: ordered  -AAA 1/2017 negative  -tobacco: former  smoker  -Vaccines  Flu: did 2016  Pneumonia: received both prevnar and pneumovx  Zoster: needs to get    RTC 6 months with labs and PRN

## 2018-05-04 ENCOUNTER — TELEPHONE (OUTPATIENT)
Dept: INTERNAL MEDICINE | Facility: CLINIC | Age: 68
End: 2018-05-04

## 2018-05-04 NOTE — TELEPHONE ENCOUNTER
----- Message from Zoe Rene sent at 2018  9:28 AM CDT -----  Contact: Eve/significant other  Walter Bull  MRN: [id number]  : 1950  PCP: Belkys Kruger  Home Phone      [phone]  Work Phone      Not on file.  Mobile          [phone]      MESSAGE: Eve states Dr. Antoine does not accept the patient's [facility] insurance for Audiology. Eve is inquiring where the patient will need to be referred Audiology.  Phone:[phone]

## 2018-05-14 RX ORDER — SITAGLIPTIN AND METFORMIN HYDROCHLORIDE 1000; 50 MG/1; MG/1
TABLET, FILM COATED ORAL
Qty: 180 TABLET | Refills: 1 | Status: CANCELLED | OUTPATIENT
Start: 2018-05-14

## 2018-05-16 ENCOUNTER — TELEPHONE (OUTPATIENT)
Dept: INTERNAL MEDICINE | Facility: CLINIC | Age: 68
End: 2018-05-16

## 2018-05-16 RX ORDER — SITAGLIPTIN AND METFORMIN HYDROCHLORIDE 1000; 50 MG/1; MG/1
1 TABLET, FILM COATED ORAL 2 TIMES DAILY WITH MEALS
Qty: 180 TABLET | Refills: 1 | Status: SHIPPED | OUTPATIENT
Start: 2018-05-16 | End: 2018-11-27 | Stop reason: SDUPTHER

## 2018-05-16 NOTE — TELEPHONE ENCOUNTER
----- Message from Finesse Lazaro sent at 2018 10:46 AM CDT -----  Contact: JO / YAMILE Cobbjamshid Bull  MRN: [id number]  : 1950  PCP: Belkys Kruger  Home Phone      [phone]  Work Phone      Not on file.  [employer]          [phone]      MESSAGE: NEEDS REFILL ON sitagliptin phos/metformin HCl 50-1,000  AND metoprolol tartrate 50 MG. [facility] SENT REQUEST EARLIER IN THE WEEK AND IT HASN'T BEEN ADDRESSED YET. PT IS OUT OF MEDICATIONS.     PHONE: [phone]    CINDY PARDO

## 2018-05-18 RX ORDER — METOPROLOL TARTRATE 50 MG/1
TABLET ORAL
Qty: 180 TABLET | Refills: 1 | Status: SHIPPED | OUTPATIENT
Start: 2018-05-18 | End: 2018-09-26 | Stop reason: DRUGHIGH

## 2018-05-22 DIAGNOSIS — Z95.1 S/P CABG X 5: ICD-10-CM

## 2018-05-22 DIAGNOSIS — I25.118 CORONARY ARTERY DISEASE OF NATIVE ARTERY OF NATIVE HEART WITH STABLE ANGINA PECTORIS: ICD-10-CM

## 2018-06-12 ENCOUNTER — OFFICE VISIT (OUTPATIENT)
Dept: FAMILY MEDICINE | Facility: CLINIC | Age: 68
End: 2018-06-12
Payer: MEDICARE

## 2018-06-12 VITALS
HEIGHT: 67 IN | DIASTOLIC BLOOD PRESSURE: 74 MMHG | WEIGHT: 264.63 LBS | BODY MASS INDEX: 41.53 KG/M2 | HEART RATE: 60 BPM | SYSTOLIC BLOOD PRESSURE: 128 MMHG | RESPIRATION RATE: 20 BRPM

## 2018-06-12 DIAGNOSIS — Z80.42 FAMILY HISTORY OF PROSTATE CANCER: ICD-10-CM

## 2018-06-12 DIAGNOSIS — E11.69 DIABETES MELLITUS TYPE 2 IN OBESE: ICD-10-CM

## 2018-06-12 DIAGNOSIS — I10 BENIGN ESSENTIAL HTN: ICD-10-CM

## 2018-06-12 DIAGNOSIS — N40.0 BENIGN PROSTATIC HYPERPLASIA WITHOUT LOWER URINARY TRACT SYMPTOMS: Primary | ICD-10-CM

## 2018-06-12 DIAGNOSIS — E66.9 DIABETES MELLITUS TYPE 2 IN OBESE: ICD-10-CM

## 2018-06-12 PROCEDURE — 99499 UNLISTED E&M SERVICE: CPT | Mod: S$GLB,,, | Performed by: FAMILY MEDICINE

## 2018-06-12 PROCEDURE — 3078F DIAST BP <80 MM HG: CPT | Mod: CPTII,S$GLB,, | Performed by: FAMILY MEDICINE

## 2018-06-12 PROCEDURE — 3074F SYST BP LT 130 MM HG: CPT | Mod: CPTII,S$GLB,, | Performed by: FAMILY MEDICINE

## 2018-06-12 PROCEDURE — 99999 PR PBB SHADOW E&M-EST. PATIENT-LVL III: CPT | Mod: PBBFAC,,, | Performed by: FAMILY MEDICINE

## 2018-06-12 PROCEDURE — 99214 OFFICE O/P EST MOD 30 MIN: CPT | Mod: S$GLB,,, | Performed by: FAMILY MEDICINE

## 2018-06-12 PROCEDURE — 3044F HG A1C LEVEL LT 7.0%: CPT | Mod: CPTII,S$GLB,, | Performed by: FAMILY MEDICINE

## 2018-06-12 RX ORDER — FINASTERIDE 5 MG/1
5 TABLET, FILM COATED ORAL DAILY
Qty: 30 TABLET | Refills: 11 | Status: SHIPPED | OUTPATIENT
Start: 2018-06-12 | End: 2019-02-27 | Stop reason: SDUPTHER

## 2018-06-12 NOTE — PROGRESS NOTES
Subjective:       Patient ID: Walter Bull is a 67 y.o. male.    Chief Complaint: Annual Exam (prostate)    Patient is here to get his prostate evaluated.  He is concerned because his brother now has prostate cancer and is receiving treatment for it.  In January his PSA was 1.7.  He does have some BPH symptoms.  Flomax is helping with this.  He sees his primary care provider for diabetes, hypertension, dyslipidemia.      Review of Systems   Constitutional: Negative for activity change, chills, fatigue, fever and unexpected weight change.   HENT: Negative for sore throat and trouble swallowing.    Respiratory: Negative for cough, chest tightness and shortness of breath.    Cardiovascular: Negative for chest pain and leg swelling.   Gastrointestinal: Negative for abdominal pain.   Endocrine: Negative for cold intolerance and heat intolerance.   Genitourinary: Negative for difficulty urinating.   Musculoskeletal: Negative for back pain and joint swelling.   Skin: Negative for rash.   Neurological: Negative for numbness.   Hematological: Negative for adenopathy.   Psychiatric/Behavioral: Negative for decreased concentration.       Objective:      Vitals:    06/12/18 0817   BP: 128/74   Pulse: 60   Resp: 20     Physical Exam   Constitutional: He is oriented to person, place, and time. He appears well-developed and well-nourished.   HENT:   Head: Normocephalic and atraumatic.   Right Ear: Tympanic membrane, external ear and ear canal normal.   Left Ear: Tympanic membrane, external ear and ear canal normal.   Nose: Nose normal.   Mouth/Throat: Oropharynx is clear and moist.   Eyes: Conjunctivae and EOM are normal. Pupils are equal, round, and reactive to light.   Neck: Normal range of motion. Neck supple. No tracheal deviation present. No thyromegaly present.   Cardiovascular: Normal rate, regular rhythm, normal heart sounds and intact distal pulses.  Exam reveals no gallop and no friction rub.    No murmur  heard.  Pulmonary/Chest: Effort normal and breath sounds normal.   Abdominal: Soft. Bowel sounds are normal. He exhibits no distension and no mass. There is no tenderness. There is no rebound and no guarding. Hernia confirmed negative in the right inguinal area and confirmed negative in the left inguinal area.   Genitourinary: Rectum normal and prostate normal.   Genitourinary Comments: Prostate is smooth, enlarged, approximately 35 g in size   Musculoskeletal: Normal range of motion. He exhibits tenderness. He exhibits no edema.   Neurological: He is alert and oriented to person, place, and time. He has normal reflexes.   Skin: Skin is warm and dry.   Psychiatric: He has a normal mood and affect. His behavior is normal. Judgment and thought content normal.         Lab Results   Component Value Date    PSA 1.7 01/15/2018     Lab Results   Component Value Date    HGBA1C 6.7 (H) 04/25/2018         Assessment:       1. Benign prostatic hyperplasia without lower urinary tract symptoms    2. Benign essential HTN    3. Diabetes mellitus type 2 in obese        Plan:   Walter was seen today for annual exam.    Diagnoses and all orders for this visit:    Benign prostatic hyperplasia without lower urinary tract symptoms  Continue Flomax  Add Proscar 5 mg daily  Repeat PSA in 6 months    Benign essential HTN  Continue Lasix 20 mg daily  Continue amlodipine 5 mg daily  Continue metoprolol 50 mg daily  Keep appointment with Dr. Kruger  Keep appointment with cardiology    Diabetes mellitus type 2 in obese  Continue current insulin dose  Continue Janumet  Keep appointment with Dr. Kruger

## 2018-06-20 RX ORDER — NITROGLYCERIN 0.4 MG/1
TABLET SUBLINGUAL
Qty: 100 TABLET | Refills: 0 | Status: ON HOLD | OUTPATIENT
Start: 2018-06-20 | End: 2018-11-20 | Stop reason: SDUPTHER

## 2018-07-11 RX ORDER — ROSUVASTATIN CALCIUM 20 MG/1
TABLET, COATED ORAL
Qty: 60 TABLET | Refills: 2 | Status: SHIPPED | OUTPATIENT
Start: 2018-07-11 | End: 2018-09-26

## 2018-07-27 DIAGNOSIS — N40.1 BENIGN PROSTATIC HYPERPLASIA WITH NOCTURIA: ICD-10-CM

## 2018-07-27 DIAGNOSIS — R35.1 BENIGN PROSTATIC HYPERPLASIA WITH NOCTURIA: ICD-10-CM

## 2018-07-28 RX ORDER — TAMSULOSIN HYDROCHLORIDE 0.4 MG/1
CAPSULE ORAL
Qty: 30 CAPSULE | Refills: 5 | Status: SHIPPED | OUTPATIENT
Start: 2018-07-28 | End: 2019-02-27 | Stop reason: SDUPTHER

## 2018-08-28 ENCOUNTER — OFFICE VISIT (OUTPATIENT)
Dept: HEMATOLOGY/ONCOLOGY | Facility: CLINIC | Age: 68
End: 2018-08-28
Payer: MEDICARE

## 2018-08-28 ENCOUNTER — LAB VISIT (OUTPATIENT)
Dept: LAB | Facility: HOSPITAL | Age: 68
End: 2018-08-28
Payer: MEDICARE

## 2018-08-28 VITALS
DIASTOLIC BLOOD PRESSURE: 67 MMHG | SYSTOLIC BLOOD PRESSURE: 145 MMHG | HEIGHT: 67 IN | OXYGEN SATURATION: 95 % | RESPIRATION RATE: 17 BRPM | TEMPERATURE: 99 F | HEART RATE: 50 BPM | BODY MASS INDEX: 41.66 KG/M2 | WEIGHT: 265.44 LBS

## 2018-08-28 DIAGNOSIS — D64.9 NORMOCYTIC ANEMIA: ICD-10-CM

## 2018-08-28 DIAGNOSIS — D47.2 MONOCLONAL GAMMOPATHY OF UNDETERMINED SIGNIFICANCE: ICD-10-CM

## 2018-08-28 DIAGNOSIS — D89.2 PARAPROTEINEMIA: ICD-10-CM

## 2018-08-28 DIAGNOSIS — A04.8 H. PYLORI INFECTION: Primary | ICD-10-CM

## 2018-08-28 DIAGNOSIS — D50.9 IRON DEFICIENCY ANEMIA, UNSPECIFIED IRON DEFICIENCY ANEMIA TYPE: ICD-10-CM

## 2018-08-28 LAB
ALBUMIN SERPL BCP-MCNC: 3.5 G/DL
ALP SERPL-CCNC: 65 U/L
ALT SERPL W/O P-5'-P-CCNC: 28 U/L
ANION GAP SERPL CALC-SCNC: 10 MMOL/L
AST SERPL-CCNC: 23 U/L
BASOPHILS # BLD AUTO: 0.05 K/UL
BASOPHILS NFR BLD: 0.6 %
BILIRUB SERPL-MCNC: 0.4 MG/DL
BUN SERPL-MCNC: 14 MG/DL
CALCIUM SERPL-MCNC: 9.1 MG/DL
CHLORIDE SERPL-SCNC: 104 MMOL/L
CO2 SERPL-SCNC: 24 MMOL/L
CREAT SERPL-MCNC: 1.1 MG/DL
DIFFERENTIAL METHOD: ABNORMAL
EOSINOPHIL # BLD AUTO: 0.2 K/UL
EOSINOPHIL NFR BLD: 3.1 %
ERYTHROCYTE [DISTWIDTH] IN BLOOD BY AUTOMATED COUNT: 13.9 %
EST. GFR  (AFRICAN AMERICAN): >60 ML/MIN/1.73 M^2
EST. GFR  (NON AFRICAN AMERICAN): >60 ML/MIN/1.73 M^2
FERRITIN SERPL-MCNC: 58 NG/ML
GLUCOSE SERPL-MCNC: 152 MG/DL
HCT VFR BLD AUTO: 41.1 %
HGB BLD-MCNC: 13.2 G/DL
IGA SERPL-MCNC: 207 MG/DL
IGG SERPL-MCNC: 1116 MG/DL
IGM SERPL-MCNC: 37 MG/DL
IMM GRANULOCYTES # BLD AUTO: 0.01 K/UL
IMM GRANULOCYTES NFR BLD AUTO: 0.1 %
LYMPHOCYTES # BLD AUTO: 2.7 K/UL
LYMPHOCYTES NFR BLD: 35.1 %
MCH RBC QN AUTO: 28.6 PG
MCHC RBC AUTO-ENTMCNC: 32.1 G/DL
MCV RBC AUTO: 89 FL
MONOCYTES # BLD AUTO: 0.7 K/UL
MONOCYTES NFR BLD: 8.5 %
NEUTROPHILS # BLD AUTO: 4.1 K/UL
NEUTROPHILS NFR BLD: 52.6 %
NRBC BLD-RTO: 0 /100 WBC
PLATELET # BLD AUTO: 220 K/UL
PMV BLD AUTO: 10.5 FL
POTASSIUM SERPL-SCNC: 4.5 MMOL/L
PROT SERPL-MCNC: 6.9 G/DL
RBC # BLD AUTO: 4.61 M/UL
SODIUM SERPL-SCNC: 138 MMOL/L
WBC # BLD AUTO: 7.78 K/UL

## 2018-08-28 PROCEDURE — 85025 COMPLETE CBC W/AUTO DIFF WBC: CPT

## 2018-08-28 PROCEDURE — 3078F DIAST BP <80 MM HG: CPT | Mod: CPTII,S$GLB,, | Performed by: INTERNAL MEDICINE

## 2018-08-28 PROCEDURE — 86334 IMMUNOFIX E-PHORESIS SERUM: CPT | Mod: 26,,, | Performed by: PATHOLOGY

## 2018-08-28 PROCEDURE — 82784 ASSAY IGA/IGD/IGG/IGM EACH: CPT | Mod: 59

## 2018-08-28 PROCEDURE — 99999 PR PBB SHADOW E&M-EST. PATIENT-LVL IV: CPT | Mod: PBBFAC,,, | Performed by: INTERNAL MEDICINE

## 2018-08-28 PROCEDURE — 84165 PROTEIN E-PHORESIS SERUM: CPT

## 2018-08-28 PROCEDURE — 84165 PROTEIN E-PHORESIS SERUM: CPT | Mod: 26,,, | Performed by: PATHOLOGY

## 2018-08-28 PROCEDURE — 99215 OFFICE O/P EST HI 40 MIN: CPT | Mod: S$GLB,,, | Performed by: INTERNAL MEDICINE

## 2018-08-28 PROCEDURE — 80053 COMPREHEN METABOLIC PANEL: CPT

## 2018-08-28 PROCEDURE — 36415 COLL VENOUS BLD VENIPUNCTURE: CPT

## 2018-08-28 PROCEDURE — 86334 IMMUNOFIX E-PHORESIS SERUM: CPT

## 2018-08-28 PROCEDURE — 82728 ASSAY OF FERRITIN: CPT

## 2018-08-28 PROCEDURE — 3077F SYST BP >= 140 MM HG: CPT | Mod: CPTII,S$GLB,, | Performed by: INTERNAL MEDICINE

## 2018-08-28 PROCEDURE — 83520 IMMUNOASSAY QUANT NOS NONAB: CPT | Mod: 59

## 2018-08-28 RX ORDER — FUROSEMIDE 40 MG/1
TABLET ORAL
COMMUNITY
Start: 2018-06-15 | End: 2018-10-15 | Stop reason: DRUGHIGH

## 2018-08-28 NOTE — Clinical Note
- cbc, ferritin in 6 months lab only at [facility]-cbc, cmp, serum free light chains, quantitative immunoglobulins, serum electropheresis, serum immunofixation , ferritin and MD appt in 1 year

## 2018-08-28 NOTE — PROGRESS NOTES
HEMATOLOGY/ONCOLOGY Progress Note    Interval Hx:  Mr. Bull is a 68 y/o PMHx of CHF, CAD s/p CABG in 2005 complicated by HARRIS, HTN, DM II, CKD II, who was initially referred for evaluation of IgG Lambda Paraproteinemia here for follow up. Since last visit he was also noted to have Iron Deficiency Anemia and was referred to GI for endoscopy. On oral iron twice a day. Otherwise, doing well overall.     Hematology Hx:  Mr. Bull is a 68 y/o PMHx of CHF, CAD s/p CABG in 2005 complicated by HARRIS, HTN, DM II, CKD II, who is referred by Nephrology for recently found abnormal paraproteinemia. Patient was noted to have a IgG Lambda specific monoclonal band of 0.24 g/dL in gamma on 1/15/18. His renal function remains relatively stable with Cr of 1.0 mg/dL and calcium relatively unremarkable. He does have history of mild normocytic anemia. No significant proteinuria. Urine microalbumin/crt ratio of 4.2. No reported bone pain. No weight loss or generalized weakness. Denies any fever/chills, night sweats. Denies any melena or hematochezia. Had a colonoscopy done in Feb, 2017 which was unremarkable.   - diagnosed with Iron Deficiency Anemia 2/1/18  (feritin 20 ng/mL) - on oral supplements   - seen by GI - Dr. Cunningham - plan for EGD upon cardiac clearance.   - IgG Lambda Paraproteinemia - initial work-up unremarkable, currently under surveillance  -notes dyspepsia returning; resolved previously with h pylori treatment       Allergies:   Patient has no known allergies.      Medications:     Current Outpatient Medications   Medication Sig Dispense Refill    acetaminophen (TYLENOL) 100 mg/mL suspension Take by mouth every 4 (four) hours as needed for Temperature greater than.      albuterol 90 mcg/actuation inhaler Inhale 2 puffs into the lungs every 6 (six) hours as needed for Wheezing. 1 Inhaler 5    amlodipine (NORVASC) 5 MG tablet Take 5 mg by mouth once daily.      aspirin (ECOTRIN) 81 MG EC tablet Take 81 mg by mouth once  daily.      clopidogrel (PLAVIX) 75 mg tablet Take 75 mg by mouth once daily.      cyanocobalamin (VITAMIN B-12) 1000 MCG tablet Take 1,000 mcg by mouth once daily.       ezetimibe (ZETIA) 10 mg tablet Take 10 mg by mouth once daily.       finasteride (PROSCAR) 5 mg tablet Take 1 tablet (5 mg total) by mouth once daily. 30 tablet 11    FOLIC ACID/MULTIVIT-MIN/LUTEIN (CENTRUM SILVER ORAL) Take by mouth.      furosemide (LASIX) 40 MG tablet       insulin NPH (NOVOLIN N) 100 unit/mL injection Inject into the skin. 90 units in the morning and 40 units at bedtime       isosorbide mononitrate (IMDUR) 60 MG 24 hr tablet Take 60 mg by mouth once daily.      JANUMET 50-1,000 mg per tablet Take 1 tablet by mouth 2 (two) times daily with meals. 180 tablet 1    meloxicam (MOBIC) 15 MG tablet       metoprolol tartrate (LOPRESSOR) 50 MG tablet TAKE ONE TABLET BY MOUTH TWICE DAILY 180 tablet 1    nitroGLYCERIN (NITROSTAT) 0.4 MG SL tablet DISSOLVE ONE TABLET UNDER THE TONGUE EVERY 5 MINUTES AS NEEDED FOR CHEST PAIN. 100 tablet 0    omeprazole (PRILOSEC) 40 MG capsule Take 1 capsule (40 mg total) by mouth every morning. 30 capsule 11    RANEXA 500 mg Tb12 Take 500 mg by mouth 2 (two) times daily.       rosuvastatin (CRESTOR) 20 MG tablet TAKE ONE TABLET BY MOUTH ONCE DAILY 60 tablet 2    tamsulosin (FLOMAX) 0.4 mg Cap TAKE ONE CAPSULE BY MOUTH ONCE DAILY 30 capsule 5    ferrous sulfate 325 mg (65 mg iron) Tab tablet Take 1 tablet (325 mg total) by mouth 3 (three) times daily. 90 tablet 3     No current facility-administered medications for this visit.          Review Of Systems:   Constitutional: Negative for activity change, fatigue, fever and unexpected weight change.   HENT: Positive for hearing loss. Negative for congestion, ear pain, rhinorrhea and sore throat.    Eyes: Negative for redness and visual disturbance.   Respiratory: Negative for cough, shortness of breath and wheezing.    Cardiovascular: Negative  for chest pain, palpitations and leg swelling.   Gastrointestinal: Negative for abdominal pain, blood in stool, constipation, diarrhea, nausea and vomiting.   Genitourinary: Negative for decreased urine volume, dysuria, frequency and urgency.   Musculoskeletal: Negative for back pain, joint swelling and neck pain.   Skin: Negative for color change, rash and wound.   Neurological: Negative for dizziness, tremors, weakness, light-headedness and headaches.     Physical Exam:   Performance Status: 1  Constitutional: He is oriented to person, place, and time. He appears well-developed and well-nourished. No distress.   HENT:   Head: Normocephalic and atraumatic.   Right Ear: External ear normal.   Left Ear: External ear normal.   Eyes: Conjunctivae and EOM are normal. Pupils are equal, round, and reactive to light. Right eye exhibits no discharge. Left eye exhibits no discharge.   Neck: Neck supple. No tracheal deviation present.   Cardiovascular: Normal rate and regular rhythm.    No murmur heard.  Pulmonary/Chest: Effort normal and breath sounds normal. No respiratory distress. He has no wheezes. He has no rales.   Abdominal: Soft. Bowel sounds are normal. He exhibits no distension. There is no tenderness.   Musculoskeletal: He exhibits no edema.   Neurological: He is alert and oriented to person, place, and time. No cranial nerve deficit.   Skin: Skin is warm and dry.   Psychiatric: He has a normal mood and affect. His behavior is normal.     Diagnostic Tests:   Labs: Reviewed    No results found for this or any previous visit (from the past 24 hour(s)).    Assessment:     1. H. pylori infection  H. pylori antigen, stool   2. Iron deficiency anemia, unspecified iron deficiency anemia type     3. Monoclonal gammopathy of undetermined significance       Recommendations:     1. IgG Lambda MGUS   -no overt CRAB  Today   -todays biochemical studies pending     2. Iron Deficiency Anemia/Normocytic Anemia   -due to potential  GI blood loss and or iron malaborption from h pylori ; follow with GI  -  contniue oral po supplementation, fes04 BID; hgb and ferritin uptrending        3. Dyspepsia/early satiety  -states symptomsr resolved with h pylori therapy now returned  -repeat stool ag to confirm clearance; if negative discuss re  referral to GI    FU:- cbc, ferritin in 6 months lab only at [facility] lab  -cbc, cmp, serum free light chains, quantitative immunoglobulins, serum electropheresis, serum immunofixation , ferritin and MD appt in 1 year        Distress Screening Results: Psychosocial Distress screening score of Distress Score: 0 noted and reviewed. No intervention indicated.

## 2018-08-28 NOTE — Clinical Note
-cbc, ferritin lab only at [facility] in 6 months-ferritin, cbc, cmp, serum free light chains, quantitative immunoglobulins, serum electropheresis, serum immunofixation and MD appt in 1 year

## 2018-08-28 NOTE — PROGRESS NOTES
MGUS NEp fu 1 year  ALEKSANDR ? h pylror; send ag stool to make sure cleared  Iron , cbc 6 moths at [place] [facility]   [facility] labs md appt 1 year

## 2018-08-29 ENCOUNTER — LAB VISIT (OUTPATIENT)
Dept: LAB | Facility: HOSPITAL | Age: 68
End: 2018-08-29
Attending: INTERNAL MEDICINE
Payer: MEDICARE

## 2018-08-29 DIAGNOSIS — A04.8 H. PYLORI INFECTION: ICD-10-CM

## 2018-08-29 DIAGNOSIS — D50.9 IRON DEFICIENCY ANEMIA, UNSPECIFIED IRON DEFICIENCY ANEMIA TYPE: Primary | ICD-10-CM

## 2018-08-29 LAB
ALBUMIN SERPL ELPH-MCNC: 3.54 G/DL
ALPHA1 GLOB SERPL ELPH-MCNC: 0.36 G/DL
ALPHA2 GLOB SERPL ELPH-MCNC: 0.79 G/DL
B-GLOBULIN SERPL ELPH-MCNC: 0.77 G/DL
GAMMA GLOB SERPL ELPH-MCNC: 1.03 G/DL
INTERPRETATION SERPL IFE-IMP: NORMAL
KAPPA LC SER QL IA: 2.06 MG/DL
KAPPA LC/LAMBDA SER IA: 1.5
LAMBDA LC SER QL IA: 1.37 MG/DL
PROT SERPL-MCNC: 6.5 G/DL

## 2018-08-29 PROCEDURE — 87338 HPYLORI STOOL AG IA: CPT

## 2018-08-30 LAB
PATHOLOGIST INTERPRETATION IFE: NORMAL
PATHOLOGIST INTERPRETATION SPE: NORMAL

## 2018-09-02 LAB — H PYLORI AG STL QL: NOT DETECTED

## 2018-09-06 ENCOUNTER — TELEPHONE (OUTPATIENT)
Dept: HEMATOLOGY/ONCOLOGY | Facility: CLINIC | Age: 68
End: 2018-09-06

## 2018-09-06 DIAGNOSIS — D50.9 IRON DEFICIENCY ANEMIA, UNSPECIFIED IRON DEFICIENCY ANEMIA TYPE: ICD-10-CM

## 2018-09-06 RX ORDER — FERROUS SULFATE 325(65) MG
325 TABLET ORAL 3 TIMES DAILY
Qty: 90 TABLET | Refills: 3 | Status: SHIPPED | OUTPATIENT
Start: 2018-09-06 | End: 2019-03-27 | Stop reason: SDUPTHER

## 2018-09-12 ENCOUNTER — HOSPITAL ENCOUNTER (OUTPATIENT)
Facility: HOSPITAL | Age: 68
Discharge: OTHER INSTITUTION | End: 2018-09-13
Attending: FAMILY MEDICINE | Admitting: FAMILY MEDICINE
Payer: MEDICARE

## 2018-09-12 DIAGNOSIS — R07.9 CHEST PAIN: ICD-10-CM

## 2018-09-12 DIAGNOSIS — I21.4 NSTEMI (NON-ST ELEVATED MYOCARDIAL INFARCTION): Primary | ICD-10-CM

## 2018-09-12 LAB
ALBUMIN SERPL BCP-MCNC: 3.9 G/DL
ALP SERPL-CCNC: 74 U/L
ALT SERPL W/O P-5'-P-CCNC: 58 U/L
ANION GAP SERPL CALC-SCNC: 13 MMOL/L
AST SERPL-CCNC: 54 U/L
BASOPHILS # BLD AUTO: 0.03 K/UL
BASOPHILS NFR BLD: 0.3 %
BILIRUB SERPL-MCNC: 0.4 MG/DL
BNP SERPL-MCNC: 91 PG/ML
BUN SERPL-MCNC: 12 MG/DL
CALCIUM SERPL-MCNC: 9.4 MG/DL
CHLORIDE SERPL-SCNC: 102 MMOL/L
CO2 SERPL-SCNC: 24 MMOL/L
CREAT SERPL-MCNC: 1.5 MG/DL
DIFFERENTIAL METHOD: ABNORMAL
EOSINOPHIL # BLD AUTO: 0.2 K/UL
EOSINOPHIL NFR BLD: 1.5 %
ERYTHROCYTE [DISTWIDTH] IN BLOOD BY AUTOMATED COUNT: 14.5 %
EST. GFR  (AFRICAN AMERICAN): 54 ML/MIN/1.73 M^2
EST. GFR  (NON AFRICAN AMERICAN): 47 ML/MIN/1.73 M^2
GLUCOSE SERPL-MCNC: 297 MG/DL
HCT VFR BLD AUTO: 45.1 %
HGB BLD-MCNC: 14.3 G/DL
LYMPHOCYTES # BLD AUTO: 3.9 K/UL
LYMPHOCYTES NFR BLD: 37.6 %
MCH RBC QN AUTO: 28 PG
MCHC RBC AUTO-ENTMCNC: 31.7 G/DL
MCV RBC AUTO: 88 FL
MONOCYTES # BLD AUTO: 1.1 K/UL
MONOCYTES NFR BLD: 10.5 %
NEUTROPHILS # BLD AUTO: 5.2 K/UL
NEUTROPHILS NFR BLD: 50.1 %
PLATELET # BLD AUTO: 256 K/UL
PMV BLD AUTO: 10.7 FL
POTASSIUM SERPL-SCNC: 4.7 MMOL/L
PROT SERPL-MCNC: 8.1 G/DL
RBC # BLD AUTO: 5.1 M/UL
SODIUM SERPL-SCNC: 139 MMOL/L
TROPONIN I SERPL DL<=0.01 NG/ML-MCNC: 0.02 NG/ML
WBC # BLD AUTO: 10.34 K/UL

## 2018-09-12 PROCEDURE — 82553 CREATINE MB FRACTION: CPT

## 2018-09-12 PROCEDURE — 82550 ASSAY OF CK (CPK): CPT

## 2018-09-12 PROCEDURE — 80053 COMPREHEN METABOLIC PANEL: CPT

## 2018-09-12 PROCEDURE — 99285 EMERGENCY DEPT VISIT HI MDM: CPT

## 2018-09-12 PROCEDURE — 36415 COLL VENOUS BLD VENIPUNCTURE: CPT

## 2018-09-12 PROCEDURE — 83880 ASSAY OF NATRIURETIC PEPTIDE: CPT

## 2018-09-12 PROCEDURE — 85025 COMPLETE CBC W/AUTO DIFF WBC: CPT

## 2018-09-12 PROCEDURE — 93010 ELECTROCARDIOGRAM REPORT: CPT | Mod: ,,, | Performed by: INTERNAL MEDICINE

## 2018-09-12 PROCEDURE — 84484 ASSAY OF TROPONIN QUANT: CPT | Mod: 91

## 2018-09-12 PROCEDURE — 93005 ELECTROCARDIOGRAM TRACING: CPT

## 2018-09-12 RX ORDER — ASPIRIN 325 MG
325 TABLET ORAL
Status: DISCONTINUED | OUTPATIENT
Start: 2018-09-12 | End: 2018-09-12

## 2018-09-13 VITALS
OXYGEN SATURATION: 98 % | BODY MASS INDEX: 39.44 KG/M2 | SYSTOLIC BLOOD PRESSURE: 131 MMHG | WEIGHT: 251.31 LBS | DIASTOLIC BLOOD PRESSURE: 62 MMHG | RESPIRATION RATE: 18 BRPM | HEIGHT: 67 IN | HEART RATE: 51 BPM | TEMPERATURE: 97 F

## 2018-09-13 PROBLEM — I21.4 NSTEMI (NON-ST ELEVATED MYOCARDIAL INFARCTION): Status: ACTIVE | Noted: 2018-09-13

## 2018-09-13 PROBLEM — R07.9 CHEST PAIN: Status: ACTIVE | Noted: 2018-09-13

## 2018-09-13 LAB
APTT BLDCRRT: 27.2 SEC
APTT BLDCRRT: 75.4 SEC
APTT BLDCRRT: 77.9 SEC
CK MB SERPL-MCNC: 3.6 NG/ML
CK MB SERPL-RTO: 2.7 %
CK SERPL-CCNC: 133 U/L
CK SERPL-CCNC: 133 U/L
INR PPP: 1.1
POCT GLUCOSE: 107 MG/DL (ref 70–110)
POCT GLUCOSE: 52 MG/DL (ref 70–110)
POCT GLUCOSE: 90 MG/DL (ref 70–110)
PROTHROMBIN TIME: 11.6 SEC
TROPONIN I SERPL DL<=0.01 NG/ML-MCNC: 0.26 NG/ML
TROPONIN I SERPL DL<=0.01 NG/ML-MCNC: 0.78 NG/ML
TROPONIN I SERPL DL<=0.01 NG/ML-MCNC: 0.81 NG/ML

## 2018-09-13 PROCEDURE — 63600175 PHARM REV CODE 636 W HCPCS: Performed by: INTERNAL MEDICINE

## 2018-09-13 PROCEDURE — 85610 PROTHROMBIN TIME: CPT

## 2018-09-13 PROCEDURE — 84484 ASSAY OF TROPONIN QUANT: CPT

## 2018-09-13 PROCEDURE — 36415 COLL VENOUS BLD VENIPUNCTURE: CPT

## 2018-09-13 PROCEDURE — G0378 HOSPITAL OBSERVATION PER HR: HCPCS

## 2018-09-13 PROCEDURE — 63600175 PHARM REV CODE 636 W HCPCS: Performed by: NURSE PRACTITIONER

## 2018-09-13 PROCEDURE — 85730 THROMBOPLASTIN TIME PARTIAL: CPT

## 2018-09-13 PROCEDURE — 99234 HOSP IP/OBS SM DT SF/LOW 45: CPT | Mod: ,,, | Performed by: FAMILY MEDICINE

## 2018-09-13 PROCEDURE — 96365 THER/PROPH/DIAG IV INF INIT: CPT

## 2018-09-13 PROCEDURE — 93005 ELECTROCARDIOGRAM TRACING: CPT

## 2018-09-13 PROCEDURE — 94760 N-INVAS EAR/PLS OXIMETRY 1: CPT

## 2018-09-13 PROCEDURE — 25000003 PHARM REV CODE 250: Performed by: INTERNAL MEDICINE

## 2018-09-13 PROCEDURE — 25000003 PHARM REV CODE 250: Performed by: NURSE PRACTITIONER

## 2018-09-13 PROCEDURE — 84484 ASSAY OF TROPONIN QUANT: CPT | Mod: 91

## 2018-09-13 PROCEDURE — 96366 THER/PROPH/DIAG IV INF ADDON: CPT

## 2018-09-13 PROCEDURE — 85730 THROMBOPLASTIN TIME PARTIAL: CPT | Mod: 91

## 2018-09-13 RX ORDER — FUROSEMIDE 40 MG/1
40 TABLET ORAL DAILY
Status: DISCONTINUED | OUTPATIENT
Start: 2018-09-13 | End: 2018-09-13

## 2018-09-13 RX ORDER — HEPARIN SODIUM,PORCINE/D5W 25000/250
12 INTRAVENOUS SOLUTION INTRAVENOUS CONTINUOUS
Status: DISCONTINUED | OUTPATIENT
Start: 2018-09-13 | End: 2018-09-13 | Stop reason: HOSPADM

## 2018-09-13 RX ORDER — HEPARIN SODIUM 10000 [USP'U]/100ML
5000 INJECTION, SOLUTION INTRAVENOUS
Status: DISCONTINUED | OUTPATIENT
Start: 2018-09-13 | End: 2018-09-13

## 2018-09-13 RX ORDER — ISOSORBIDE MONONITRATE 60 MG/1
60 TABLET, EXTENDED RELEASE ORAL DAILY
Status: DISCONTINUED | OUTPATIENT
Start: 2018-09-13 | End: 2018-09-13 | Stop reason: HOSPADM

## 2018-09-13 RX ORDER — CLOPIDOGREL BISULFATE 75 MG/1
75 TABLET ORAL DAILY
Status: DISCONTINUED | OUTPATIENT
Start: 2018-09-13 | End: 2018-09-13 | Stop reason: HOSPADM

## 2018-09-13 RX ORDER — NITROGLYCERIN 0.4 MG/1
0.4 TABLET SUBLINGUAL EVERY 5 MIN PRN
Status: DISCONTINUED | OUTPATIENT
Start: 2018-09-13 | End: 2018-09-13 | Stop reason: HOSPADM

## 2018-09-13 RX ORDER — TAMSULOSIN HYDROCHLORIDE 0.4 MG/1
1 CAPSULE ORAL DAILY
Status: DISCONTINUED | OUTPATIENT
Start: 2018-09-13 | End: 2018-09-13 | Stop reason: HOSPADM

## 2018-09-13 RX ORDER — ASPIRIN 325 MG
325 TABLET ORAL
Status: ACTIVE | OUTPATIENT
Start: 2018-09-13 | End: 2018-09-13

## 2018-09-13 RX ORDER — PANTOPRAZOLE SODIUM 40 MG/1
40 TABLET, DELAYED RELEASE ORAL DAILY
Status: DISCONTINUED | OUTPATIENT
Start: 2018-09-13 | End: 2018-09-13 | Stop reason: HOSPADM

## 2018-09-13 RX ORDER — ROSUVASTATIN CALCIUM 10 MG/1
20 TABLET, COATED ORAL DAILY
Status: DISCONTINUED | OUTPATIENT
Start: 2018-09-13 | End: 2018-09-13 | Stop reason: HOSPADM

## 2018-09-13 RX ORDER — PREDNISONE 20 MG/1
40 TABLET ORAL ONCE
Status: COMPLETED | OUTPATIENT
Start: 2018-09-13 | End: 2018-09-13

## 2018-09-13 RX ORDER — METOPROLOL TARTRATE 50 MG/1
50 TABLET ORAL 2 TIMES DAILY
Status: DISCONTINUED | OUTPATIENT
Start: 2018-09-13 | End: 2018-09-13 | Stop reason: HOSPADM

## 2018-09-13 RX ORDER — AMLODIPINE BESYLATE 5 MG/1
5 TABLET ORAL DAILY
Status: DISCONTINUED | OUTPATIENT
Start: 2018-09-13 | End: 2018-09-13 | Stop reason: HOSPADM

## 2018-09-13 RX ORDER — SODIUM CHLORIDE 9 MG/ML
INJECTION, SOLUTION INTRAVENOUS CONTINUOUS
Status: DISCONTINUED | OUTPATIENT
Start: 2018-09-13 | End: 2018-09-13 | Stop reason: HOSPADM

## 2018-09-13 RX ORDER — HEPARIN SODIUM 10000 [USP'U]/100ML
1000 INJECTION, SOLUTION INTRAVENOUS
Status: DISCONTINUED | OUTPATIENT
Start: 2018-09-13 | End: 2018-09-13

## 2018-09-13 RX ADMIN — NITROGLYCERIN 1 INCH: 20 OINTMENT TOPICAL at 01:09

## 2018-09-13 RX ADMIN — CLOPIDOGREL BISULFATE 75 MG: 75 TABLET ORAL at 08:09

## 2018-09-13 RX ADMIN — NITROGLYCERIN 1 INCH: 20 OINTMENT TOPICAL at 11:09

## 2018-09-13 RX ADMIN — TAMSULOSIN HYDROCHLORIDE 0.4 MG: 0.4 CAPSULE ORAL at 10:09

## 2018-09-13 RX ADMIN — SODIUM CHLORIDE: 0.9 INJECTION, SOLUTION INTRAVENOUS at 08:09

## 2018-09-13 RX ADMIN — HEPARIN SODIUM 12 UNITS/KG/HR: 10000 INJECTION, SOLUTION INTRAVENOUS at 04:09

## 2018-09-13 RX ADMIN — ISOSORBIDE MONONITRATE 60 MG: 60 TABLET, EXTENDED RELEASE ORAL at 08:09

## 2018-09-13 RX ADMIN — ROSUVASTATIN CALCIUM 20 MG: 10 TABLET, FILM COATED ORAL at 10:09

## 2018-09-13 RX ADMIN — PANTOPRAZOLE SODIUM 40 MG: 40 TABLET, DELAYED RELEASE ORAL at 08:09

## 2018-09-13 RX ADMIN — AMLODIPINE BESYLATE 5 MG: 5 TABLET ORAL at 08:09

## 2018-09-13 RX ADMIN — METOPROLOL TARTRATE 50 MG: 50 TABLET ORAL at 08:09

## 2018-09-13 RX ADMIN — PREDNISONE 40 MG: 20 TABLET ORAL at 10:09

## 2018-09-13 NOTE — ED NOTES
Care of patient assumed. Patient resting w significant other at bedside. Cardiac monitoring continued. Updated on plan of care/they voiced understanding. Call bell in reach. Bed locked and in lowest position, side rail up X1. Patient in no acute distress. Awaiting additional orders/results. Will continue to monitor.

## 2018-09-13 NOTE — PLAN OF CARE
Called [facility] for authorization for transfer to [facility]. Authorization number [id number] given to Smita Adame at [facility] for inpatient stay. Authorization number [id number] obtained from [facility] for ambulance transportation with LILIAM Robertson RN and patient updated.

## 2018-09-13 NOTE — H&P
[facility] Medicine  History & Physical    Patient Name: Walter Bull  MRN: [id number]  Admission Date: 9/12/2018  Attending Physician: Andrea Roque MD   Primary Care Provider: Belkys Kruger MD         Patient information was obtained from patient and ER records.     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  He  reports + chest pain with exertion over the past week. Pt endorses chest pressure while cutting grass yesterday;  + chest pressure not relieved with 2 NTG and asa so came to ER. Family notes ^BS 300s of late, but low this am  TNI trending up ; 0.021>0.260>0.810   Discussed with Dr. Vitale this am; recommends transfer for [facility]  BUN 12/ creat 1.5. + Hx of CHF, no available echo in chart. Will start gentle IV hydration in preparation  for procedure contrast.   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 On oral iron twice a day. .            Past Medical History:   Diagnosis Date    Anemia     Anticoagulant long-term use     CHF (congestive heart failure)     Colon cancer screening 2/2/2017    Coronary artery disease     Diabetes mellitus type I     Disorder of kidney and ureter     Encounter for blood transfusion     Glaucoma     Heart disease     Hypertension     Kidney failure     post CABG       Past Surgical History:   Procedure Laterality Date    COLON SURGERY  2006     COLONOSCOPY N/A 2/2/2017    Procedure: COLONOSCOPY;  Surgeon: Ronnie Conway MD;  [facility];  Service: Endoscopy;  Laterality: N/A;    COLONOSCOPY N/A 2/2/2017    [facility]    CORONARY ARTERY BYPASS GRAFT  2005    5 arteries    ESOPHAGOGASTRODUODENOSCOPY (EGD) N/A 3/21/2018    Performed by Fawad Cunningham MD at [facility] (4TH F[facility])    EYE SURGERY Bilateral 2016    Laser surgery for glaucoma       Review of patient's allergies indicates:  No Known Allergies    No current facility-administered medications on file prior to encounter.      Current Outpatient Medications on File Prior to Encounter   Medication Sig    acetaminophen (TYLENOL) 100 mg/mL suspension Take by mouth every 4 (four) hours as needed for Temperature greater than.    albuterol 90 mcg/actuation inhaler Inhale 2 puffs into the lungs every 6 (six) hours as needed for Wheezing.    amlodipine (NORVASC) 5 MG tablet Take 5 mg by mouth once daily.    aspirin (ECOTRIN) 81 MG EC tablet Take 81 mg by mouth once daily.    clopidogrel (PLAVIX) 75 mg tablet Take 75 mg by mouth once daily.    ezetimibe (ZETIA) 10 mg tablet Take 10 mg by mouth once daily.     ferrous sulfate 325 mg (65 mg iron) Tab tablet Take 1 tablet (325 mg total) by mouth 3 (three) times daily.    finasteride (PROSCAR) 5 mg tablet Take 1 tablet (5 mg total) by mouth once daily.    FOLIC ACID/MULTIVIT-MIN/LUTEIN (CENTRUM SILVER ORAL) Take by mouth.    furosemide (LASIX) 40 MG tablet     insulin NPH (NOVOLIN N) 100 unit/mL injection Inject into the skin. 90 units in the morning and 40 units at bedtime     isosorbide mononitrate (IMDUR) 60 MG 24 hr tablet Take 60 mg by mouth once daily.    JANUMET 50-1,000 mg per tablet Take 1 tablet by mouth 2 (two) times daily with meals.    meloxicam (MOBIC) 15 MG tablet     metoprolol tartrate (LOPRESSOR) 50 MG tablet TAKE ONE TABLET BY MOUTH TWICE DAILY    nitroGLYCERIN (NITROSTAT) 0.4 MG SL tablet  DISSOLVE ONE TABLET UNDER THE TONGUE EVERY 5 MINUTES AS NEEDED FOR CHEST PAIN.    omeprazole (PRILOSEC) 40 MG capsule Take 1 capsule (40 mg total) by mouth every morning.    RANEXA 500 mg Tb12 Take 500 mg by mouth 2 (two) times daily.     rosuvastatin (CRESTOR) 20 MG tablet TAKE ONE TABLET BY MOUTH ONCE DAILY    tamsulosin (FLOMAX) 0.4 mg Cap TAKE ONE CAPSULE BY MOUTH ONCE DAILY    cyanocobalamin (VITAMIN B-12) 1000 MCG tablet Take 1,000 mcg by mouth once daily.      Family History     Problem Relation (Age of Onset)    Diabetes Mother    Heart disease Mother        Tobacco Use    Smoking status: Former Smoker     Packs/day: 3.00     Types: Cigarettes     Start date: 1963     Last attempt to quit: 1981     Years since quittin.7    Smokeless tobacco: Former User     Types: Snuff, Chew     Quit date:    Substance and Sexual Activity    Alcohol use: No    Drug use: No    Sexual activity: Yes     Comment: -with a significant other     Review of Systems   Constitutional: Negative.  Negative for fatigue and fever.   HENT: Negative for congestion, ear pain, sinus pressure, sneezing and sore throat.    Eyes: Negative.    Respiratory: Negative for cough, chest tightness, shortness of breath and wheezing.    Cardiovascular: Positive for chest pain.   Gastrointestinal: Negative for abdominal pain, blood in stool, constipation, diarrhea, nausea and vomiting.   Genitourinary: Negative for difficulty urinating, dysuria and flank pain.   Musculoskeletal: Negative.  Negative for joint swelling.   Skin: Negative.    Neurological: Negative for dizziness, weakness and headaches.   Hematological: Negative.    Psychiatric/Behavioral: Negative.  Negative for behavioral problems and confusion.     Objective:     Vital Signs (Most Recent):  Temp: 96.1 °F (35.6 °C) (18)  Pulse: 63 (18)  Resp: 18 (18)  BP: (!) 113/58 (18)  SpO2: 96 % (18) Vital Signs  (24h Range):  Temp:  [96.1 °F (35.6 °C)-97.5 °F (36.4 °C)] 96.1 °F (35.6 °C)  Pulse:  [] 63  Resp:  [11-24] 18  SpO2:  [94 %-99 %] 96 %  BP: (109-140)/(44-89) 113/58     Weight: 114 kg (251 lb 5.2 oz)  Body mass index is 39.36 kg/m².    Physical Exam   Constitutional: He is oriented to person, place, and time. He appears well-developed.   Morbidly obese   HENT:   Head: Normocephalic and atraumatic.   Nose: Nose normal.   Eyes: EOM are normal. Pupils are equal, round, and reactive to light.   Neck: Normal range of motion. Neck supple.   Cardiovascular: Normal rate and regular rhythm.   No murmur heard.  Pulmonary/Chest: Effort normal and breath sounds normal.   Abdominal: Soft. Bowel sounds are normal.   Musculoskeletal: Normal range of motion. He exhibits no edema.   Neurological: He is alert and oriented to person, place, and time.   Skin: Skin is warm and dry. Capillary refill takes less than 2 seconds.   Psychiatric: He has a normal mood and affect. His behavior is normal. Judgment and thought content normal.   Vitals reviewed.        CRANIAL NERVES     CN III, IV, VI   Pupils are equal, round, and reactive to light.  Extraocular motions are normal.        CBC:   Recent Labs   Lab  09/12/18 2103   WBC  10.34   HGB  14.3   HCT  45.1   PLT  256     CMP:   Recent Labs   Lab  09/12/18 2103   NA  139   K  4.7   CL  102   CO2  24   GLU  297*   BUN  12   CREATININE  1.5*   CALCIUM  9.4   PROT  8.1   ALBUMIN  3.9   BILITOT  0.4   ALKPHOS  74   AST  54*   ALT  58*   ANIONGAP  13   EGFRNONAA  47*     Cardiac Markers:   Recent Labs   Lab  09/12/18   2103   BNP  91     Coagulation:   Recent Labs   Lab  09/13/18   0136  09/13/18   0601   INR   --   1.1   APTT  27.2   --      Lipid Panel: No results for input(s): CHOL, HDL, LDLCALC, TRIG, CHOLHDL in the last 48 hours.  Magnesium: No results for input(s): MG in the last 48 hours.  Troponin:   Recent Labs   Lab  09/12/18   2103  09/12/18   2351  09/13/18   0601    TROPONINI  0.021  0.260*  0.810*     TSH:   Recent Labs   Lab  04/25/18   0957   TSH  1.546       Significant Imaging: I have reviewed all pertinent imaging results/findings within the past 24 hours.   Normal sinus rhythm  Possible Inferior infarct ,age undetermined  T wave abnormality, consider lateral ischemia  Abnormal ECG  When compared with ECG of 12-SEP-2018 20:42,  Sinus rhythm has replaced Junctional rhythm  Vent. rate has decreased BY 62 BPM  Inverted T waves have replaced nonspecific T wave abnormality in Lateral leads        Assessment/Plan:     * NSTEMI (non-ST elevated myocardial infarction)    Planning for transfer for [facility]  Continue IV heparin; starting hydration, hold lasix this am  Continue ASA, plavix, statin, BB, heparin, cont to trend TN  O2, Morphine PRN (no longer having chest pain)          Chest pain    IV heparin  ASA, BB, statin  O2, Imdur/NTG  CXR this morning          Benign prostatic hyperplasia without lower urinary tract symptoms      Resume flomax        Benign essential HTN    Continue amlodipine, Isosorbide, metoprolol,  Hold lasix today        S/P CABG x 5    Continue ASA, Plavix, BB, statin  Planning for C          HLD (hyperlipidemia)    Continue statin- crestor,           Diabetes mellitus type 2 in obese      Monitor glucose; sliding scale   Hold Janumet           VTE Risk Mitigation (From admission, onward)        Ordered     heparin 25,000 units in dextrose 5% 250 mL (100 units/mL) infusion LOW INTENSITY nomogram - [facility]  Continuous      09/13/18 0357             Pearl Hurley MD  Department of Hospital Medicine   [facility]

## 2018-09-13 NOTE — ED PROVIDER NOTES
Encounter Date: 9/12/2018       History     Chief Complaint   Patient presents with    Chest Pain     pressure x 1 hour     67-year-old male presents to the emergency department complaining of chest discomfort since approximately 3:00 p.m. today.  He denies nausea /vomiting/fever /chills.  He has a history of CABG several years ago.      The history is provided by the patient. No  was used.   Chest Pain   The current episode started several hours ago. Chest pain occurs constantly. The chest pain is improving. At its most intense, the chest pain is at 5/10. The chest pain is currently at 3/10. The quality of the pain is described as squeezing. The pain does not radiate. Pertinent negatives for primary symptoms include no fever, no syncope, no shortness of breath, no wheezing, no palpitations and no abdominal pain.     Review of patient's allergies indicates:  No Known Allergies  Past Medical History:   Diagnosis Date    Anemia     Anticoagulant long-term use     CHF (congestive heart failure)     Colon cancer screening 2/2/2017    Coronary artery disease     Diabetes mellitus type I     Disorder of kidney and ureter     Encounter for blood transfusion     Glaucoma     Heart disease     Hypertension     Kidney failure     post CABG     Past Surgical History:   Procedure Laterality Date    COLON SURGERY  2006    COLONOSCOPY N/A 2/2/2017    Procedure: COLONOSCOPY;  Surgeon: Ronnie Conway MD;  Location: [facility];  Service: Endoscopy;  Laterality: N/A;    COLONOSCOPY N/A 2/2/2017    Performed by [facility] at [facility]    CORONARY ARTERY BYPASS GRAFT  2005    5 arteries    ESOPHAGOGASTRODUODENOSCOPY (EGD) N/A 3/21/2018    Performed by Fawad Cunningham [facility] at [facility] (4TH FLR)    EYE SURGERY Bilateral 2016    Laser surgery for glaucoma     Family History   Problem Relation Age of Onset    Diabetes Mother     Heart disease Mother     Colon cancer Neg Hx     Esophageal cancer  Neg Hx     Stomach cancer Neg Hx      Social History     Tobacco Use    Smoking status: Former Smoker     Packs/day: 3.00     Types: Cigarettes     Start date: 1963     Last attempt to quit: 1981     Years since quittin.7    Smokeless tobacco: Former User     Types: Snuff, Chew     Quit date:    Substance Use Topics    Alcohol use: No    Drug use: No     Review of Systems   Constitutional: Negative for fever.   Respiratory: Negative for shortness of breath and wheezing.    Cardiovascular: Positive for chest pain. Negative for palpitations, leg swelling and syncope.   Gastrointestinal: Negative for abdominal pain.   All other systems reviewed and are negative.      Physical Exam     Initial Vitals [18]   BP Pulse Resp Temp SpO2   (!) 140/89 (!) 121 (!) 24 97.5 °F (36.4 °C) 95 %      MAP       --         Physical Exam    Nursing note and vitals reviewed.  Constitutional: He appears well-developed and well-nourished. No distress.   HENT:   Head: Normocephalic and atraumatic.   Right Ear: External ear normal.   Left Ear: External ear normal.   Eyes: EOM are normal.   Neck: Normal range of motion. Neck supple.   Cardiovascular: Normal rate, regular rhythm and normal heart sounds.   Pulmonary/Chest: Breath sounds normal. No respiratory distress.   Abdominal: Soft. Bowel sounds are normal.   Musculoskeletal: Normal range of motion.   Neurological: He is alert. He has normal strength.   Skin: Skin is warm and dry.   Psychiatric: He has a normal mood and affect. Thought content normal.         ED Course   Procedures  Labs Reviewed   CBC W/ AUTO DIFFERENTIAL - Abnormal; Notable for the following components:       Result Value    MCHC 31.7 (*)     Mono # 1.1 (*)     All other components within normal limits   COMPREHENSIVE METABOLIC PANEL - Abnormal; Notable for the following components:    Glucose 297 (*)     Creatinine 1.5 (*)     AST 54 (*)     ALT 58 (*)     eGFR if  54 (*)      eGFR if non  47 (*)     All other components within normal limits   TROPONIN I - Abnormal; Notable for the following components:    Troponin I 0.260 (*)     All other components within normal limits   TROPONIN I   B-TYPE NATRIURETIC PEPTIDE          Imaging Results    None          Medical Decision Making:   Initial Assessment:   67-year-old male presents to the emergency department complaining of chest discomfort since approximately 3:00 p.m. today.  He denies nausea /vomiting/fever /chills.  He has a history of CABG several years ago.    Clinical Tests:   Lab Tests: Ordered and Reviewed  Radiological Study: Ordered and Reviewed  ED Management:  Initial troponin was normal. Second troponin was 0.26.  Spoke with Dr. Robertson from cardiology service who recommends admission to hospitalist for serial enzymes and possible cardiac catheterization within the next 24 hrs if enzymes continued to increase.  Spoke with Dr. Roque  at 12:40 a.m. who accepts admission to telemetry for further evaluation and treatment.                      Clinical Impression:   The encounter diagnosis was Chest pain.      Disposition:   Disposition: Admitted  Condition: Stable                        Jordan Cavanaugh MD  09/13/18 0059

## 2018-09-13 NOTE — NURSING
PTT 77.9 drawn at 12pm. Heparin was adjusted at 10am for PTT of 75.4. PTT needs to be drawn every 6 hours, 4 pm would be next draw. Dr. Hurley contacted to verify whether or not any adjustment needs to be made at this time. No adjustments needed, will wait for 4pm draw. Still awaiting bed assignment from [facility].

## 2018-09-13 NOTE — ED NOTES
Significant other at bedside.  Bed in low and locked position.  2 siderails up.  Call bell in reach.  Voiced understanding of use.  In NAD.  Even and unlabored breathing.  Awaiting provider evaluation.  Resting comfortably.  Patient has no complaints at this time.    Patient placed on continuous cardiac monitoring, continuous pulse oximetry, and automatic NIBP.     Will continue to monitor.

## 2018-09-13 NOTE — NURSING
Call to Dr. Hurley @ 07:27 notifying of Patient troponin 0.810, Glu 52 mg/dl, and order for aPTT to be drawn at 10 am. Nitro cream 1inch applied to left chest wall at 7am (previous patch applied after 1am). Repeat BS fingerstick verbally confirmed to be re-checked by am Nurse. Patient verbally expressed no c/o pain. Observed Patient asymptomatic.

## 2018-09-13 NOTE — PROGRESS NOTES
I spoke with Mr. Bull about his home meds, current meds, what they are for, how to take and potential side effects. We discussed his anticoagulation therapy and fall precautions.  He only questioned why he received prednisone this morning. His nurse said it was a pre-cath order from the nurse practitioner at [facility].     Nohelia Jimenes, SarahD

## 2018-09-13 NOTE — NURSING
Received bed assignment for transfer. Pt going to Step down unit Rm. 201. Report called to ANCA Miranda at [facility].

## 2018-09-13 NOTE — NURSING
Received Patient from ANCA Kelley (ER depatment) alert and oriented x2. Nitro patch in place. No c/o pain. IV clean, dry, and flushed.

## 2018-09-13 NOTE — CONSULTS
[facility]  Cardiology  Consult Note    Patient Name: Walter Bull  MRN: [id number]  Admission Date: 9/12/2018  Hospital Length of Stay: 0 days  Code Status: No Order   Consulting Provider: Douglas Rene NP  Primary Care Physician: Belkys Kruger MD  Principal Problem:NSTEMI (non-ST elevated myocardial infarction)      Inpatient consult to [facility]  Consult performed by: Parth Vitale MD  Consult ordered by: Pearl Hurley MD        Subjective:     Chief Complaint:  CP  HPI: 66 y/o male with PMHx of CAD CABG x 5 2005, DM, dye allergy, HHD, dislipidemia, presents to the ED yesterday with c/o chest pain starting a few hours prior to arrival. Pts chest pain was relieved with nitro. EKG reveals no acute ischemia, however, Dalton drawn and +.     Past Medical History:   Diagnosis Date    Anemia     Anticoagulant long-term use     CHF (congestive heart failure)     Colon cancer screening 2/2/2017    Coronary artery disease     Diabetes mellitus type I     Disorder of kidney and ureter     Encounter for blood transfusion     Glaucoma     Heart disease     Hypertension     Kidney failure     post CABG       Past Surgical History:   Procedure Laterality Date    COLON SURGERY  2006    COLONOSCOPY N/A 2/2/2017    Procedure: COLONOSCOPY;  Surgeon: Ronnie Conway MD;  Location: [facility];  Service: Endoscopy;  Laterality: N/A;    COLONOSCOPY N/A 2/2/2017    Performed by Ronnie [facility] at [facility]    CORONARY ARTERY BYPASS GRAFT  2005    5 arteries    ESOPHAGOGASTRODUODENOSCOPY (EGD) N/A 3/21/2018    [facility]    EYE SURGERY Bilateral 2016    Laser surgery for glaucoma       Review of patient's allergies indicates:  No Known Allergies    No current facility-administered medications on file prior to encounter.      Current Outpatient Medications on File Prior to Encounter   Medication Sig    acetaminophen (TYLENOL) 100 mg/mL  suspension Take by mouth every 4 (four) hours as needed for Temperature greater than.    albuterol 90 mcg/actuation inhaler Inhale 2 puffs into the lungs every 6 (six) hours as needed for Wheezing.    amlodipine (NORVASC) 5 MG tablet Take 5 mg by mouth once daily.    aspirin (ECOTRIN) 81 MG EC tablet Take 81 mg by mouth once daily.    clopidogrel (PLAVIX) 75 mg tablet Take 75 mg by mouth once daily.    ezetimibe (ZETIA) 10 mg tablet Take 10 mg by mouth once daily.     ferrous sulfate 325 mg (65 mg iron) Tab tablet Take 1 tablet (325 mg total) by mouth 3 (three) times daily.    finasteride (PROSCAR) 5 mg tablet Take 1 tablet (5 mg total) by mouth once daily.    FOLIC ACID/MULTIVIT-MIN/LUTEIN (CENTRUM SILVER ORAL) Take by mouth.    furosemide (LASIX) 40 MG tablet     insulin NPH (NOVOLIN N) 100 unit/mL injection Inject into the skin. 90 units in the morning and 40 units at bedtime     isosorbide mononitrate (IMDUR) 60 MG 24 hr tablet Take 60 mg by mouth once daily.    JANUMET 50-1,000 mg per tablet Take 1 tablet by mouth 2 (two) times daily with meals.    meloxicam (MOBIC) 15 MG tablet     metoprolol tartrate (LOPRESSOR) 50 MG tablet TAKE ONE TABLET BY MOUTH TWICE DAILY    nitroGLYCERIN (NITROSTAT) 0.4 MG SL tablet DISSOLVE ONE TABLET UNDER THE TONGUE EVERY 5 MINUTES AS NEEDED FOR CHEST PAIN.    omeprazole (PRILOSEC) 40 MG capsule Take 1 capsule (40 mg total) by mouth every morning.    RANEXA 500 mg Tb12 Take 500 mg by mouth 2 (two) times daily.     rosuvastatin (CRESTOR) 20 MG tablet TAKE ONE TABLET BY MOUTH ONCE DAILY    tamsulosin (FLOMAX) 0.4 mg Cap TAKE ONE CAPSULE BY MOUTH ONCE DAILY    cyanocobalamin (VITAMIN B-12) 1000 MCG tablet Take 1,000 mcg by mouth once daily.      Family History     Problem Relation (Age of Onset)    Diabetes Mother    Heart disease Mother        Tobacco Use    Smoking status: Former Smoker     Packs/day: 3.00     Types: Cigarettes     Start date: 1/18/1963     Last  attempt to quit: 1981     Years since quittin.7    Smokeless tobacco: Former User     Types: Snuff, Chew     Quit date:    Substance and Sexual Activity    Alcohol use: No    Drug use: No    Sexual activity: Yes     Comment: -with a significant other     ROS     Constitutional : Negative  EENT : Negative  CV: CP  Respiratory : Negative  Gastrointestinal: Negative   Genitourinary: Negative  Musculoskeletal: Negative  Skin : Negative  Neurological : AAO x 3     Objective:     Vital Signs (Most Recent):  Temp: 96.1 °F (35.6 °C) (18)  Pulse: 63 (18)  Resp: 18 (18)  BP: (!) 113/58 (18)  SpO2: 96 % (18) Vital Signs (24h Range):  Temp:  [96.1 °F (35.6 °C)-97.5 °F (36.4 °C)] 96.1 °F (35.6 °C)  Pulse:  [] 63  Resp:  [11-24] 18  SpO2:  [94 %-99 %] 96 %  BP: (109-140)/(44-89) 113/58     Weight: 114 kg (251 lb 5.2 oz)  Body mass index is 39.36 kg/m².    SpO2: 96 %  O2 Device (Oxygen Therapy): room air      Intake/Output Summary (Last 24 hours) at 2018 0855  Last data filed at 2018 0648  Gross per 24 hour   Intake --   Output 275 ml   Net -275 ml       Lines/Drains/Airways     Peripheral Intravenous Line                 Peripheral IV - Single Lumen 18 Right Wrist less than 1 day                Physical Exam     General appearance: alert, appears stated age and cooperative  Head: Normocephalic, without obvious abnormality, atraumatic  Eyes: conjunctivae/corneas clear. PERRL  Neck: no carotid bruit, no JVD and supple, symmetrical, trachea midline  Lungs: clear to auscultation bilaterally, normal respiratory effort  Chest Wall: no tenderness  Heart: regular rate and rhythm, S1, S2 normal, 1+ SE murmur, click, rub or gallop  Abdomen: soft, non-tender; bowel sounds normal; no masses,  no organomegaly  Extremities: Extremities normal, atraumatic, no cyanosis, clubbing, or edema  Pulses: Dorsalis Pedis R: 2+ (normal)/L: 2+  (normal)  Skin: Skin color, texture, turgor normal. No rashes or lesions  Neurologic: Normal mood and affect  Alert and oriented X 3    Significant Labs:   BMP:   Recent Labs   Lab  09/12/18   2103   GLU  297*   NA  139   K  4.7   CL  102   CO2  24   BUN  12   CREATININE  1.5*   CALCIUM  9.4   , CMP   Recent Labs   Lab  09/12/18   2103   NA  139   K  4.7   CL  102   CO2  24   GLU  297*   BUN  12   CREATININE  1.5*   CALCIUM  9.4   PROT  8.1   ALBUMIN  3.9   BILITOT  0.4   ALKPHOS  74   AST  54*   ALT  58*   ANIONGAP  13   ESTGFRAFRICA  54*   EGFRNONAA  47*   , CBC   Recent Labs   Lab  09/12/18 2103   WBC  10.34   HGB  14.3   HCT  45.1   PLT  256    and Troponin   Recent Labs   Lab  09/12/18   2103  09/12/18   2351  09/13/18   0601   TROPONINI  0.021  0.260*  0.810*         Assessment and Plan:     Active Diagnoses:    Diagnosis Date Noted POA    PRINCIPAL PROBLEM:  NSTEMI (non-ST elevated myocardial infarction) [I21.4] 09/13/2018 Yes    Chest pain [R07.9] 09/13/2018 Yes    Benign essential HTN [I10] 07/19/2017 Yes    Benign prostatic hyperplasia without lower urinary tract symptoms [N40.0] 07/19/2017 Yes    HLD (hyperlipidemia) [E78.5] 01/11/2017 Yes    S/P CABG x 5 [Z95.1] 01/11/2017 Not Applicable    Diabetes mellitus type 2 in obese [E11.69, E66.9] 01/11/2017 Yes      Problems Resolved During this Admission:       VTE Risk Mitigation (From admission, onward)        Ordered     heparin 25,000 units in dextrose 5% 250 mL (100 units/mL) infusion LOW INTENSITY nomogram - [facility]  Continuous      09/13/18 0357        Current Facility-Administered Medications   Medication    0.9%  NaCl infusion    amLODIPine tablet 5 mg    aspirin tablet 325 mg    clopidogrel tablet 75 mg    furosemide tablet 40 mg    heparin 25,000 units in dextrose 5% 250 mL (100 units/mL) infusion LOW INTENSITY nomogram - [facility]    isosorbide mononitrate 24 hr tablet 60 mg    metoprolol tartrate (LOPRESSOR) tablet 50 mg    nitroGLYCERIN  2% TD oint ointment 1 inch    nitroGLYCERIN SL tablet 0.4 mg    pantoprazole EC tablet 40 mg     PET 12/17:  Fixed inferior, small reversible inferior lateral    BERTIN 1/18:  EF 55%    DX:  NSTEMI with crescendo exertional angina in the past week  CAD/CABG x 5 2005 with abnormal stress PET 12/17  Dyslipidemia  HTN  DM      Douglas Rene, NP for Dr Vitale  Cardiology   [facility]    PLAN:heparin  Transfer for [facility]  Premedicate for dye allergy  Hydrate for mild CKD    I attest that I have personally seen and examined this patient. I have reviewed and discussed the management in detail as outlined above.

## 2018-09-13 NOTE — HPI
9/13/18   68 YO AAM with PMHX of CA p CABG x 5 in 2005 complicated by HARRIS, CKDII,  HTN, Hyperlipidemia, Type II DM, Diabetic polyneuropathy associated with type 2 diabetes mellitus, BPH  and iron deficiency anemia presented to ER yesterday PM with c/of chest discomfort since approximately 3:00 p.m.  He reports + chest pain with exertion over the past week. Pt endorses chest pressure while cutting grass yesterday;  + chest pressure not relieved with 2 NTG and asa so came to ER. Family notes ^BS 300s of late, but low this am  TNI trending up ; 0.021>0.260>0.810   Discussed with Dr. Vitale this am; recommends transfer for [facility]  BUN 12/ creat 1.5. + Hx of CHF, no available echo in chart. Will start gentle IV hydration in preparation  for procedure contrast.   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 On oral iron twice a day. .

## 2018-09-13 NOTE — HOSPITAL COURSE
9/13   BS 58 this am; given Sprite. Feeling better at present.   Denies any chest pain.   IV fluids started- will hold lasix this am.   Pending transfer to [facility]

## 2018-09-13 NOTE — PLAN OF CARE
09/13/18 1313   Discharge Assessment   Assessment Type Discharge Planning Assessment   Confirmed/corrected address and phone number on facesheet? Yes   Assessment information obtained from? Patient;Caregiver;Medical Record   Expected Length of Stay (days) 2   Communicated expected length of stay with patient/caregiver yes   Prior to hospitilization cognitive status: Alert/Oriented   Prior to hospitalization functional status: Independent   Current cognitive status: Alert/Oriented   Current Functional Status: Independent   Lives With significant other   Able to Return to Prior Arrangements yes   Is patient able to care for self after discharge? Yes   Who are your caregiver(s) and their phone number(s)? Eve Ortega [facility] - [phone]   Patient's perception of discharge disposition acute care hospital   Readmission Within The Last 30 Days no previous admission in last 30 days   Patient currently being followed by outpatient case management? No   Patient currently receives any other outside agency services? No   Equipment Currently Used at Home glucometer   Do you have any problems affording any of your prescribed medications? No   Is the patient taking medications as prescribed? yes   Does the patient have transportation home? Yes   Transportation Available family or friend will provide   Does the patient receive services at the [facility]? No   Discharge Plan A Home   Discharge Plan B Home   Patient/Family In Agreement With Plan yes       Pt is a 67 year old male admitted for NSTEMI.  Pt lives with his SO and she provides all care to the patient.  She is in the room and answered all questions.  No Social Work needs noted at this time. Will continue to follow and offer support as needed.    Parth Rene LMSW

## 2018-09-13 NOTE — PLAN OF CARE
09/13/18 1321   AGUIRRE Message   Medicare Outpatient and Observation Notification regarding financial responsibility Given to patient/caregiver;Explained to patient/caregiver;Signed/date by patient/caregiver   Date AGUIRRE was signed 09/13/18   Time AGUIRRE was signed 1328

## 2018-09-13 NOTE — SUBJECTIVE & OBJECTIVE
Past Medical History:   Diagnosis Date    Anemia     Anticoagulant long-term use     CHF (congestive heart failure)     Colon cancer screening 2/2/2017    Coronary artery disease     Diabetes mellitus type I     Disorder of kidney and ureter     Encounter for blood transfusion     Glaucoma     Heart disease     Hypertension     Kidney failure     post CABG       Past Surgical History:   Procedure Laterality Date    COLON SURGERY  2006    COLONOSCOPY N/A 2/2/2017    Procedure: COLONOSCOPY;  Surgeon: Ronnie Conway MD;  Location: [facility];  Service: Endoscopy;  Laterality: N/A;    COLONOSCOPY N/A 2/2/2017    Performed by Ronnie Conway MD at [facility]    CORONARY ARTERY BYPASS GRAFT  2005    5 arteries    ESOPHAGOGASTRODUODENOSCOPY (EGD) N/A 3/21/2018    Performed by Fawad Cunningham [facility] at [facility] (4[facility] F[facility])    EYE SURGERY Bilateral 2016    Laser surgery for glaucoma       Review of patient's allergies indicates:  No Known Allergies    No current facility-administered medications on file prior to encounter.      Current Outpatient Medications on File Prior to Encounter   Medication Sig    acetaminophen (TYLENOL) 100 mg/mL suspension Take by mouth every 4 (four) hours as needed for Temperature greater than.    albuterol 90 mcg/actuation inhaler Inhale 2 puffs into the lungs every 6 (six) hours as needed for Wheezing.    amlodipine (NORVASC) 5 MG tablet Take 5 mg by mouth once daily.    aspirin (ECOTRIN) 81 MG EC tablet Take 81 mg by mouth once daily.    clopidogrel (PLAVIX) 75 mg tablet Take 75 mg by mouth once daily.    ezetimibe (ZETIA) 10 mg tablet Take 10 mg by mouth once daily.     ferrous sulfate 325 mg (65 mg iron) Tab tablet Take 1 tablet (325 mg total) by mouth 3 (three) times daily.    finasteride (PROSCAR) 5 mg tablet Take 1 tablet (5 mg total) by mouth once daily.    FOLIC ACID/MULTIVIT-MIN/LUTEIN (CENTRUM SILVER ORAL) Take by mouth.    furosemide (LASIX) 40 MG tablet     insulin  NPH (NOVOLIN N) 100 unit/mL injection Inject into the skin. 90 units in the morning and 40 units at bedtime     isosorbide mononitrate (IMDUR) 60 MG 24 hr tablet Take 60 mg by mouth once daily.    JANUMET 50-1,000 mg per tablet Take 1 tablet by mouth 2 (two) times daily with meals.    meloxicam (MOBIC) 15 MG tablet     metoprolol tartrate (LOPRESSOR) 50 MG tablet TAKE ONE TABLET BY MOUTH TWICE DAILY    nitroGLYCERIN (NITROSTAT) 0.4 MG SL tablet DISSOLVE ONE TABLET UNDER THE TONGUE EVERY 5 MINUTES AS NEEDED FOR CHEST PAIN.    omeprazole (PRILOSEC) 40 MG capsule Take 1 capsule (40 mg total) by mouth every morning.    RANEXA 500 mg Tb12 Take 500 mg by mouth 2 (two) times daily.     rosuvastatin (CRESTOR) 20 MG tablet TAKE ONE TABLET BY MOUTH ONCE DAILY    tamsulosin (FLOMAX) 0.4 mg Cap TAKE ONE CAPSULE BY MOUTH ONCE DAILY    cyanocobalamin (VITAMIN B-12) 1000 MCG tablet Take 1,000 mcg by mouth once daily.      Family History     Problem Relation (Age of Onset)    Diabetes Mother    Heart disease Mother        Tobacco Use    Smoking status: Former Smoker     Packs/day: 3.00     Types: Cigarettes     Start date: 1963     Last attempt to quit: 1981     Years since quittin.7    Smokeless tobacco: Former User     Types: Snuff, Chew     Quit date:    Substance and Sexual Activity    Alcohol use: No    Drug use: No    Sexual activity: Yes     Comment: -with a significant other     Review of Systems   Constitutional: Negative.  Negative for fatigue and fever.   HENT: Negative for congestion, ear pain, sinus pressure, sneezing and sore throat.    Eyes: Negative.    Respiratory: Negative for cough, chest tightness, shortness of breath and wheezing.    Cardiovascular: Positive for chest pain.   Gastrointestinal: Negative for abdominal pain, blood in stool, constipation, diarrhea, nausea and vomiting.   Genitourinary: Negative for difficulty urinating, dysuria and flank pain.    Musculoskeletal: Negative.  Negative for joint swelling.   Skin: Negative.    Neurological: Negative for dizziness, weakness and headaches.   Hematological: Negative.    Psychiatric/Behavioral: Negative.  Negative for behavioral problems and confusion.     Objective:     Vital Signs (Most Recent):  Temp: 96.1 °F (35.6 °C) (09/13/18 0728)  Pulse: 63 (09/13/18 0728)  Resp: 18 (09/13/18 0728)  BP: (!) 113/58 (09/13/18 0728)  SpO2: 96 % (09/13/18 0728) Vital Signs (24h Range):  Temp:  [96.1 °F (35.6 °C)-97.5 °F (36.4 °C)] 96.1 °F (35.6 °C)  Pulse:  [] 63  Resp:  [11-24] 18  SpO2:  [94 %-99 %] 96 %  BP: (109-140)/(44-89) 113/58     Weight: 114 kg (251 lb 5.2 oz)  Body mass index is 39.36 kg/m².    Physical Exam   Constitutional: He is oriented to person, place, and time. He appears well-developed.   Morbidly obese   HENT:   Head: Normocephalic and atraumatic.   Nose: Nose normal.   Eyes: EOM are normal. Pupils are equal, round, and reactive to light.   Neck: Normal range of motion. Neck supple.   Cardiovascular: Normal rate and regular rhythm.   No murmur heard.  Pulmonary/Chest: Effort normal and breath sounds normal.   Abdominal: Soft. Bowel sounds are normal.   Musculoskeletal: Normal range of motion. He exhibits no edema.   Neurological: He is alert and oriented to person, place, and time.   Skin: Skin is warm and dry. Capillary refill takes less than 2 seconds.   Psychiatric: He has a normal mood and affect. His behavior is normal. Judgment and thought content normal.   Vitals reviewed.        CRANIAL NERVES     CN III, IV, VI   Pupils are equal, round, and reactive to light.  Extraocular motions are normal.        CBC:   Recent Labs   Lab  09/12/18 2103   WBC  10.34   HGB  14.3   HCT  45.1   PLT  256     CMP:   Recent Labs   Lab  09/12/18 2103   NA  139   K  4.7   CL  102   CO2  24   GLU  297*   BUN  12   CREATININE  1.5*   CALCIUM  9.4   PROT  8.1   ALBUMIN  3.9   BILITOT  0.4   ALKPHOS  74   AST  54*    ALT  58*   ANIONGAP  13   EGFRNONAA  47*     Cardiac Markers:   Recent Labs   Lab  09/12/18   2103   BNP  91     Coagulation:   Recent Labs   Lab  09/13/18   0136  09/13/18   0601   INR   --   1.1   APTT  27.2   --      Lipid Panel: No results for input(s): CHOL, HDL, LDLCALC, TRIG, CHOLHDL in the last 48 hours.  Magnesium: No results for input(s): MG in the last 48 hours.  Troponin:   Recent Labs   Lab  09/12/18   2103  09/12/18   2351  09/13/18   0601   TROPONINI  0.021  0.260*  0.810*     TSH:   Recent Labs   Lab  04/25/18   0957   TSH  1.546       Significant Imaging: I have reviewed all pertinent imaging results/findings within the past 24 hours.   Normal sinus rhythm  Possible Inferior infarct ,age undetermined  T wave abnormality, consider lateral ischemia  Abnormal ECG  When compared with ECG of 12-SEP-2018 20:42,  Sinus rhythm has replaced Junctional rhythm  Vent. rate has decreased BY 62 BPM  Inverted T waves have replaced nonspecific T wave abnormality in Lateral leads

## 2018-09-13 NOTE — ED TRIAGE NOTES
67/b/m presents to ED c/o midsternal chest pressure x 1 hour.  States took 325 mg ASA and 2 SL NTG just pta.  Patient also states + [place].  States has had cabg 12 years ago and is followed by Dr. Vitale with [facility].    ECG done.

## 2018-09-13 NOTE — PLAN OF CARE
Problem: Patient Care Overview  Goal: Plan of Care Review  Outcome: Ongoing (interventions implemented as appropriate)  Patient aware of plan of care. VS stable. Afebrile. Patient asymptomatic, no c/o chest pain/SOB. Heparin infusion and IV fluids continued. 1 inch nitropaste on left side chest wall. Transferred to [facility] step down unit Rm. 201 via stretcher per [facility]. Free from falls/injuries. No questions or concerns at this time. Agrees with plan of care.

## 2018-09-13 NOTE — ED NOTES
Patient transported to room 306 via stretcher in stable condition, respirations even and unlabored, skin warm and dry, cardiac monitoring maintained, NAD.

## 2018-09-14 NOTE — PLAN OF CARE
09/14/18 0856   Final Note   Assessment Type Final Discharge Note   Discharge Disposition ANOTHER INST  (Transfered to a higher level of care.)   What phone number can be called within the next 1-3 days to see how you are doing after discharge? [phone]   Hospital Follow Up  Appt(s) scheduled? Yes   Discharge plans and expectations educations in teach back method with documentation complete? Yes   Right Care Referral Info   Post Acute Recommendation No Care

## 2018-09-24 DIAGNOSIS — I25.10 CORONARY ARTERY DISEASE, ANGINA PRESENCE UNSPECIFIED, UNSPECIFIED VESSEL OR LESION TYPE, UNSPECIFIED WHETHER NATIVE OR TRANSPLANTED HEART: Primary | ICD-10-CM

## 2018-09-25 ENCOUNTER — OFFICE VISIT (OUTPATIENT)
Dept: INTERNAL MEDICINE | Facility: CLINIC | Age: 68
End: 2018-09-25
Payer: MEDICARE

## 2018-09-25 VITALS
HEIGHT: 67 IN | RESPIRATION RATE: 18 BRPM | WEIGHT: 248.69 LBS | SYSTOLIC BLOOD PRESSURE: 118 MMHG | BODY MASS INDEX: 39.03 KG/M2 | DIASTOLIC BLOOD PRESSURE: 50 MMHG | HEART RATE: 56 BPM

## 2018-09-25 DIAGNOSIS — E11.42 DIABETIC POLYNEUROPATHY ASSOCIATED WITH TYPE 2 DIABETES MELLITUS: ICD-10-CM

## 2018-09-25 DIAGNOSIS — Z87.891 HISTORY OF TOBACCO USE: ICD-10-CM

## 2018-09-25 DIAGNOSIS — E78.5 HYPERLIPIDEMIA, UNSPECIFIED HYPERLIPIDEMIA TYPE: ICD-10-CM

## 2018-09-25 DIAGNOSIS — I21.4 NSTEMI (NON-ST ELEVATED MYOCARDIAL INFARCTION): ICD-10-CM

## 2018-09-25 DIAGNOSIS — Z23 NEED FOR VACCINATION: ICD-10-CM

## 2018-09-25 DIAGNOSIS — E66.9 DIABETES MELLITUS TYPE 2 IN OBESE: ICD-10-CM

## 2018-09-25 DIAGNOSIS — N40.0 BENIGN PROSTATIC HYPERPLASIA WITHOUT LOWER URINARY TRACT SYMPTOMS: ICD-10-CM

## 2018-09-25 DIAGNOSIS — I10 BENIGN ESSENTIAL HTN: Primary | ICD-10-CM

## 2018-09-25 DIAGNOSIS — E11.69 DIABETES MELLITUS TYPE 2 IN OBESE: ICD-10-CM

## 2018-09-25 DIAGNOSIS — E66.01 SEVERE OBESITY (BMI 35.0-39.9) WITH COMORBIDITY: ICD-10-CM

## 2018-09-25 DIAGNOSIS — I25.118 CORONARY ARTERY DISEASE OF NATIVE ARTERY OF NATIVE HEART WITH STABLE ANGINA PECTORIS: ICD-10-CM

## 2018-09-25 DIAGNOSIS — Z95.1 S/P CABG X 5: ICD-10-CM

## 2018-09-25 PROBLEM — R07.9 CHEST PAIN: Status: RESOLVED | Noted: 2018-09-13 | Resolved: 2018-09-25

## 2018-09-25 PROCEDURE — 99499 UNLISTED E&M SERVICE: CPT | Mod: S$GLB,,, | Performed by: INTERNAL MEDICINE

## 2018-09-25 PROCEDURE — 3078F DIAST BP <80 MM HG: CPT | Mod: CPTII,,, | Performed by: INTERNAL MEDICINE

## 2018-09-25 PROCEDURE — G0009 ADMIN PNEUMOCOCCAL VACCINE: HCPCS | Mod: PBBFAC

## 2018-09-25 PROCEDURE — 1101F PT FALLS ASSESS-DOCD LE1/YR: CPT | Mod: CPTII,,, | Performed by: INTERNAL MEDICINE

## 2018-09-25 PROCEDURE — 99999 PR PBB SHADOW E&M-EST. PATIENT-LVL III: CPT | Mod: PBBFAC,,, | Performed by: INTERNAL MEDICINE

## 2018-09-25 PROCEDURE — 3074F SYST BP LT 130 MM HG: CPT | Mod: CPTII,,, | Performed by: INTERNAL MEDICINE

## 2018-09-25 PROCEDURE — 99215 OFFICE O/P EST HI 40 MIN: CPT | Mod: S$PBB,,, | Performed by: INTERNAL MEDICINE

## 2018-09-25 PROCEDURE — 99213 OFFICE O/P EST LOW 20 MIN: CPT | Mod: PBBFAC,25 | Performed by: INTERNAL MEDICINE

## 2018-09-25 PROCEDURE — 90662 IIV NO PRSV INCREASED AG IM: CPT | Mod: PBBFAC

## 2018-09-25 PROCEDURE — 3044F HG A1C LEVEL LT 7.0%: CPT | Mod: CPTII,,, | Performed by: INTERNAL MEDICINE

## 2018-09-25 RX ORDER — SPIRONOLACTONE 25 MG/1
25 TABLET ORAL DAILY
COMMUNITY
Start: 2018-09-14 | End: 2019-01-25 | Stop reason: SDUPTHER

## 2018-09-25 RX ORDER — RANOLAZINE 1000 MG/1
TABLET, FILM COATED, EXTENDED RELEASE ORAL
Status: ON HOLD | COMMUNITY
Start: 2018-09-20 | End: 2018-11-20 | Stop reason: SDUPTHER

## 2018-09-25 NOTE — PROGRESS NOTES
Subjective:       Patient ID: Walter Bull is a 67 y.o. male.    Chief Complaint: Follow-up      HPI:  Patient is known to me and presents for follow up CAD, DM type 2, HLD. Labs not done prior to today's visit.     Patient was admitted for 9/12/18 and diagnosed with NSTEMI and transferred for LHC/PCI. He reports no new stents were placed---he is doing medical management. He will be seeing cardiology at [facility] tomorrow for another opinion. He was started on aldactone since his hospitalization--I have no records to know if his EF is now lower than prior (was 55% previously).      CAD s/p 5v CABG: following with Dr. Vitale as well. Now on Imdur and Ranexa. On Plavix, ASA, Crestor and BB. Also reports h/o CHF (last known EF 55%), on lasix 40mg once a day. Denies SOB, [place] and orthopnea.      Dm type 2: NPH 70 units AM and 30 units bedtime and Janumet. blood sugars are ranging from 60s-200s. He is not taking insulin regularly--states when his sugars are low he doesn't take any insulin. Last A1C 6.7% but due for repeat labs.    He does report numbness and tingling of left foot > right foot and b/l fingers; sx have been present for several years. Not on medicine for neuropathy as he declines treatment. Denies dizziness, syncope. Denies polyuria, polydipsia  Foot exam: 1/2018  Eye exam: 6/2017  Microalb: 4/2018     HLD: on crestor and zetia, has been taking for several years. Denies side effects. Last LDL at goal, due for repeat.     HTN: on amlodipine, aldactone. BP is well controlled. Denies headaches, vision changes. If EF is low may need ACEi/ARB, will need most recent TTE.     GERD: Uses Zantac PRN. Works well, denies abd pain, n/v/d/c.     BPH: on flomax and working well.  No medication side effects     Tobacco use: quit 1981; previously smoked 3 PPD for 20 years  EtOH: stopped drink 1982; was a daily drink 2 fifth liquor daily  Illicit drug: denies    Past Medical History:   Diagnosis Date    Anemia      Anticoagulant long-term use     CHF (congestive heart failure)     Colon cancer screening 2017    Coronary artery disease     Diabetes mellitus type I     Disorder of kidney and ureter     Encounter for blood transfusion     Glaucoma     Heart disease     Hypertension     Kidney failure     post CABG       Family History   Problem Relation Age of Onset    Diabetes Mother     Heart disease Mother     Colon cancer Neg Hx     Esophageal cancer Neg Hx     Stomach cancer Neg Hx        Social History     Socioeconomic History    Marital status:      Spouse name: Not on file    Number of children: Not on file    Years of education: Not on file    Highest education level: Not on file   Social Needs    Financial resource strain: Not on file    Food insecurity - worry: Not on file    Food insecurity - inability: Not on file    Transportation needs - medical: Not on file    Transportation needs - non-medical: Not on file   Occupational History    Not on file   Tobacco Use    Smoking status: Former Smoker     Packs/day: 3.00     Types: Cigarettes     Start date: 1963     Last attempt to quit: 1981     Years since quittin.7    Smokeless tobacco: Former User     Types: Snuff, Chew     Quit date:    Substance and Sexual Activity    Alcohol use: No    Drug use: No    Sexual activity: Yes     Comment: -with a significant other   Other Topics Concern    Not on file   Social History Narrative    Not on file       Review of Systems   Constitutional: Negative for activity change, fatigue, fever and unexpected weight change.   HENT: Negative for congestion, ear pain, hearing loss, rhinorrhea, sore throat and tinnitus.    Eyes: Negative for pain, redness and visual disturbance.   Respiratory: Negative for cough, shortness of breath and wheezing.    Cardiovascular: Negative for chest pain, palpitations and leg swelling.   Gastrointestinal: Negative for abdominal pain,  blood in stool, constipation, diarrhea, nausea and vomiting.   Genitourinary: Negative for decreased urine volume, dysuria, frequency and urgency.   Musculoskeletal: Negative for back pain, joint swelling and neck pain.   Skin: Negative for color change, rash and wound.   Neurological: Negative for dizziness, tremors, weakness, light-headedness and headaches.         Objective:      Physical Exam   Constitutional: He is oriented to person, place, and time. He appears well-developed and well-nourished. No distress.   HENT:   Head: Normocephalic and atraumatic.   Right Ear: External ear normal.   Left Ear: External ear normal.   Eyes: Conjunctivae and EOM are normal. Pupils are equal, round, and reactive to light. Right eye exhibits no discharge. Left eye exhibits no discharge.   Neck: Neck supple. No tracheal deviation present.   Cardiovascular: Normal rate and regular rhythm. Exam reveals no gallop and no friction rub.   No murmur heard.  Pulmonary/Chest: Effort normal and breath sounds normal. No respiratory distress. He has no wheezes. He has no rales.   Abdominal: Soft. Bowel sounds are normal. He exhibits no distension. There is no tenderness. There is no rebound and no guarding.   Musculoskeletal: He exhibits no edema.   Neurological: He is alert and oriented to person, place, and time. No cranial nerve deficit.   Skin: Skin is warm and dry.   Psychiatric: He has a normal mood and affect. His behavior is normal.   Vitals reviewed.      Assessment:       1. Benign essential HTN    2. Coronary artery disease of native artery of native heart with stable angina pectoris    3. S/P CABG x 5    4. NSTEMI (non-ST elevated myocardial infarction)    5. Hyperlipidemia, unspecified hyperlipidemia type    6. History of tobacco use    7. Diabetes mellitus type 2 in obese    8. Diabetic polyneuropathy associated with type 2 diabetes mellitus    9. Benign prostatic hyperplasia without lower urinary tract symptoms    10. Severe  "obesity (BMI 35.0-39.9) with comorbidity    11. Need for vaccination        Plan:       Walter was seen today for follow-up.    Diagnoses and all orders for this visit:    Benign essential HTN  Chronic controlled  Cont amlodipine and newly added aldactone at same dose  Low Na diet  Exercise, weight loss  Check BP and keep log for next visit    Coronary artery disease of native artery of native heart with stable angina pectoris  S/P CABG x 5  NSTEMI (non-ST elevated myocardial infarction)  Had recent NSTEMI and treated at [facility], no records to review today  Ranexa dose increased to 1000mg   Cont Imdur  Cont Plavix, ASA, statin and BB  Will see cardiology at [facility] tomorrow for a second opinion. States he was told no further intervention is possible  He was started on aldactone---I am unsure why. Possibly EF is now lower than his last (which was 55%) so will need records    Hyperlipidemia, unspecified hyperlipidemia type  Chronic controlled pending labs  Cont crestor and zetia pending labs    History of tobacco use  Noted  No longer smoking    Diabetes mellitus type 2 in obese  Diabetic polyneuropathy associated with type 2 diabetes mellitus  Chronic  I suspect uncontrolled as home BP swining from 60s-200s  He is not taking insulin regularly, does not taking if BS is low  Discussed his insulin is "moderate" acting insulin. And if severly low he can hold but he is holding for sugars in low 100s. Discussed he can and should take his insulin as long as BS > 70 and he will be eating a meal. Voiced understanding. Hopefully this will prevent the swings we are seeing  Cont janumet at same dose  1800 cookie ADA diet  Exercise,w eigh tloss  Check sugars and keep log  Foot exam: 1/2018  Eye exam: 6/2017  Microalb: 4/2018  Not on ACEi, consider starting but BP low/normal today  Cont statin  Declines need for medication for neuropathy sx  Will do fasting labs tomorrow AM and then I'll decide on need to adjust meds based " on labs    Benign prostatic hyperplasia without lower urinary tract symptoms  Chronic controlled  Cont flomax same dose    Severe obesity (BMI 35.0-39.9) with comorbidity  Diet and exercise discussed    Need for vaccination  -     Pneumococcal Conjugate Vaccine (13 Valent) (IM)  -     Influenza - High Dose (65+) (PF) (IM)           Health Maintenance:  -CRC: 2017  -PSA: ordered  -AAA 1/2017 negative  -tobacco: former smoker  -Vaccines  Flu: did 2018 completed  Pneumonia: prevnar 13 9/2018  Zoster: needs to get    RTC 3 months pending fasting labs tomorrow. If all labs good and no med adjustments will do 6 months. Will order next set of labs after I see fasting tomorrow

## 2018-09-26 ENCOUNTER — TELEPHONE (OUTPATIENT)
Dept: CARDIOLOGY | Facility: CLINIC | Age: 68
End: 2018-09-26

## 2018-09-26 ENCOUNTER — OFFICE VISIT (OUTPATIENT)
Dept: CARDIOLOGY | Facility: CLINIC | Age: 68
End: 2018-09-26
Payer: MEDICARE

## 2018-09-26 ENCOUNTER — LAB VISIT (OUTPATIENT)
Dept: LAB | Facility: HOSPITAL | Age: 68
End: 2018-09-26
Attending: INTERNAL MEDICINE
Payer: MEDICARE

## 2018-09-26 ENCOUNTER — HOSPITAL ENCOUNTER (OUTPATIENT)
Dept: CARDIOLOGY | Facility: CLINIC | Age: 68
Discharge: HOME | End: 2018-09-26
Payer: MEDICARE

## 2018-09-26 VITALS
HEIGHT: 67 IN | DIASTOLIC BLOOD PRESSURE: 67 MMHG | HEART RATE: 50 BPM | BODY MASS INDEX: 39.03 KG/M2 | SYSTOLIC BLOOD PRESSURE: 133 MMHG | WEIGHT: 248.69 LBS

## 2018-09-26 DIAGNOSIS — E66.9 DIABETES MELLITUS TYPE 2 IN OBESE: ICD-10-CM

## 2018-09-26 DIAGNOSIS — E11.69 DIABETES MELLITUS TYPE 2 IN OBESE: ICD-10-CM

## 2018-09-26 DIAGNOSIS — Z95.1 S/P CABG X 5: ICD-10-CM

## 2018-09-26 DIAGNOSIS — I10 BENIGN ESSENTIAL HTN: ICD-10-CM

## 2018-09-26 DIAGNOSIS — E78.5 HYPERLIPIDEMIA, UNSPECIFIED HYPERLIPIDEMIA TYPE: ICD-10-CM

## 2018-09-26 DIAGNOSIS — I25.118 CORONARY ARTERY DISEASE OF NATIVE ARTERY OF NATIVE HEART WITH STABLE ANGINA PECTORIS: Primary | ICD-10-CM

## 2018-09-26 DIAGNOSIS — I25.10 CORONARY ARTERY DISEASE, ANGINA PRESENCE UNSPECIFIED, UNSPECIFIED VESSEL OR LESION TYPE, UNSPECIFIED WHETHER NATIVE OR TRANSPLANTED HEART: ICD-10-CM

## 2018-09-26 DIAGNOSIS — I25.5 ISCHEMIC CARDIOMYOPATHY: ICD-10-CM

## 2018-09-26 DIAGNOSIS — I25.118 CORONARY ARTERY DISEASE OF NATIVE ARTERY OF NATIVE HEART WITH STABLE ANGINA PECTORIS: ICD-10-CM

## 2018-09-26 DIAGNOSIS — E66.01 SEVERE OBESITY (BMI 35.0-39.9) WITH COMORBIDITY: ICD-10-CM

## 2018-09-26 DIAGNOSIS — I21.4 NSTEMI (NON-ST ELEVATED MYOCARDIAL INFARCTION): ICD-10-CM

## 2018-09-26 DIAGNOSIS — Z12.5 PROSTATE CANCER SCREENING: ICD-10-CM

## 2018-09-26 DIAGNOSIS — N40.0 BENIGN PROSTATIC HYPERPLASIA WITHOUT LOWER URINARY TRACT SYMPTOMS: ICD-10-CM

## 2018-09-26 LAB
ALBUMIN SERPL BCP-MCNC: 3.9 G/DL
ALP SERPL-CCNC: 57 U/L
ALT SERPL W/O P-5'-P-CCNC: 33 U/L
ANION GAP SERPL CALC-SCNC: 11 MMOL/L
AST SERPL-CCNC: 24 U/L
BASOPHILS # BLD AUTO: 0.04 K/UL
BASOPHILS NFR BLD: 0.4 %
BILIRUB SERPL-MCNC: 0.4 MG/DL
BUN SERPL-MCNC: 19 MG/DL
CALCIUM SERPL-MCNC: 9.9 MG/DL
CHLORIDE SERPL-SCNC: 100 MMOL/L
CHOLEST SERPL-MCNC: 124 MG/DL
CHOLEST/HDLC SERPL: 3.9 {RATIO}
CO2 SERPL-SCNC: 29 MMOL/L
COMPLEXED PSA SERPL-MCNC: 1.6 NG/ML
CREAT SERPL-MCNC: 1.4 MG/DL
DIFFERENTIAL METHOD: NORMAL
EOSINOPHIL # BLD AUTO: 0.3 K/UL
EOSINOPHIL NFR BLD: 3.2 %
ERYTHROCYTE [DISTWIDTH] IN BLOOD BY AUTOMATED COUNT: 14.2 %
EST. GFR  (AFRICAN AMERICAN): 59 ML/MIN/1.73 M^2
EST. GFR  (NON AFRICAN AMERICAN): 51 ML/MIN/1.73 M^2
ESTIMATED AVG GLUCOSE: 151 MG/DL
GLUCOSE SERPL-MCNC: 66 MG/DL
HBA1C MFR BLD HPLC: 6.9 %
HCT VFR BLD AUTO: 42.9 %
HDLC SERPL-MCNC: 32 MG/DL
HDLC SERPL: 25.8 %
HGB BLD-MCNC: 14 G/DL
LDLC SERPL CALC-MCNC: 74.8 MG/DL
LYMPHOCYTES # BLD AUTO: 3.2 K/UL
LYMPHOCYTES NFR BLD: 33.2 %
MCH RBC QN AUTO: 28.4 PG
MCHC RBC AUTO-ENTMCNC: 32.6 G/DL
MCV RBC AUTO: 87 FL
MONOCYTES # BLD AUTO: 0.9 K/UL
MONOCYTES NFR BLD: 9.8 %
NEUTROPHILS # BLD AUTO: 5.1 K/UL
NEUTROPHILS NFR BLD: 53.4 %
NONHDLC SERPL-MCNC: 92 MG/DL
PLATELET # BLD AUTO: 255 K/UL
PMV BLD AUTO: 10.4 FL
POTASSIUM SERPL-SCNC: 4.7 MMOL/L
PROT SERPL-MCNC: 7.5 G/DL
RBC # BLD AUTO: 4.93 M/UL
SODIUM SERPL-SCNC: 140 MMOL/L
TRIGL SERPL-MCNC: 86 MG/DL
TSH SERPL DL<=0.005 MIU/L-ACNC: 1.41 UIU/ML
WBC # BLD AUTO: 9.49 K/UL

## 2018-09-26 PROCEDURE — 3075F SYST BP GE 130 - 139MM HG: CPT | Mod: CPTII,,, | Performed by: INTERNAL MEDICINE

## 2018-09-26 PROCEDURE — 84443 ASSAY THYROID STIM HORMONE: CPT

## 2018-09-26 PROCEDURE — 85025 COMPLETE CBC W/AUTO DIFF WBC: CPT

## 2018-09-26 PROCEDURE — 99999 PR PBB SHADOW E&M-EST. PATIENT-LVL III: CPT | Mod: PBBFAC,,, | Performed by: INTERNAL MEDICINE

## 2018-09-26 PROCEDURE — 80061 LIPID PANEL: CPT

## 2018-09-26 PROCEDURE — 80053 COMPREHEN METABOLIC PANEL: CPT

## 2018-09-26 PROCEDURE — 84153 ASSAY OF PSA TOTAL: CPT

## 2018-09-26 PROCEDURE — 36415 COLL VENOUS BLD VENIPUNCTURE: CPT

## 2018-09-26 PROCEDURE — 1101F PT FALLS ASSESS-DOCD LE1/YR: CPT | Mod: CPTII,,, | Performed by: INTERNAL MEDICINE

## 2018-09-26 PROCEDURE — 3044F HG A1C LEVEL LT 7.0%: CPT | Mod: CPTII,,, | Performed by: INTERNAL MEDICINE

## 2018-09-26 PROCEDURE — 93005 ELECTROCARDIOGRAM TRACING: CPT | Mod: PBBFAC | Performed by: INTERNAL MEDICINE

## 2018-09-26 PROCEDURE — 99204 OFFICE O/P NEW MOD 45 MIN: CPT | Mod: S$PBB,,, | Performed by: INTERNAL MEDICINE

## 2018-09-26 PROCEDURE — 99213 OFFICE O/P EST LOW 20 MIN: CPT | Mod: PBBFAC,25 | Performed by: INTERNAL MEDICINE

## 2018-09-26 PROCEDURE — 93010 ELECTROCARDIOGRAM REPORT: CPT | Mod: S$PBB,,, | Performed by: INTERNAL MEDICINE

## 2018-09-26 PROCEDURE — 83036 HEMOGLOBIN GLYCOSYLATED A1C: CPT

## 2018-09-26 PROCEDURE — 3078F DIAST BP <80 MM HG: CPT | Mod: CPTII,,, | Performed by: INTERNAL MEDICINE

## 2018-09-26 RX ORDER — ROSUVASTATIN CALCIUM 40 MG/1
40 TABLET, COATED ORAL DAILY
Qty: 90 TABLET | Refills: 3 | Status: ON HOLD | OUTPATIENT
Start: 2018-09-26 | End: 2018-11-20 | Stop reason: SDUPTHER

## 2018-09-26 RX ORDER — LOSARTAN POTASSIUM 25 MG/1
25 TABLET ORAL DAILY
Qty: 39 TABLET | Refills: 11 | Status: ON HOLD | OUTPATIENT
Start: 2018-09-26 | End: 2018-11-20 | Stop reason: HOSPADM

## 2018-09-26 RX ORDER — ASPIRIN 325 MG
325 TABLET ORAL DAILY
Status: ON HOLD | COMMUNITY
End: 2018-11-16

## 2018-09-26 RX ORDER — METOPROLOL SUCCINATE 100 MG/1
100 TABLET, EXTENDED RELEASE ORAL DAILY
Qty: 30 TABLET | Refills: 11 | Status: SHIPPED | OUTPATIENT
Start: 2018-09-26 | End: 2018-10-15 | Stop reason: DRUGHIGH

## 2018-09-26 NOTE — PROGRESS NOTES
Subjective:   Patient ID:  Walter Bull is a 67 y.o. male who presents for post hospital follow up  S/P CABG x 5 (Hospital d/c 9/15/18) and NSTEMI (non-ST elevated myocardial infarction)      HPI:   Walter Bull presents for follow up of a recent hospitalization for chest pain where his troponin was mildly elevated but trended upward. Walter Bull has known coronary artery disease having had CABG in 2005. Had coronary angiography at [facility][1] with  of the RCA and LCX with patent LIMA to LAD and SVG to Diagonal. SVG to OM occluded. Medical management recommended. Echo EF 35% with inferolateral hypokinesis. Since discharge, the patient had one episode of mild chest discomfort at rest but was able to mow his yard with little difficulty having to rest. Walter Bull has hypertension with adequate control. Walter Bull has dyslipidemia not at goal last check. Walter Bull denies  shortness of breath, palpitations, presyncope , or syncope. ..  Review of Systems   Constitution: Negative for weakness, malaise/fatigue, weight gain and weight loss.   Eyes: Negative for blurred vision.   Cardiovascular: Positive for chest pain. Negative for claudication, cyanosis, dyspnea on exertion, irregular heartbeat, leg swelling, near-syncope, orthopnea, palpitations, paroxysmal nocturnal dyspnea and syncope.   Respiratory: Negative for cough, shortness of breath and wheezing.    Musculoskeletal: Negative for falls and myalgias.   Gastrointestinal: Negative for abdominal pain, heartburn, nausea and vomiting.   Genitourinary: Negative for nocturia.   Neurological: Positive for numbness and paresthesias. Negative for brief paralysis, dizziness, focal weakness and headaches.   Psychiatric/Behavioral: Negative for altered mental status.       Current Outpatient Medications   Medication Sig    acetaminophen (TYLENOL) 100 mg/mL suspension Take by mouth every 4 (four) hours as needed for Temperature greater  than.    albuterol 90 mcg/actuation inhaler Inhale 2 puffs into the lungs every 6 (six) hours as needed for Wheezing.    amlodipine (NORVASC) 5 MG tablet Take 5 mg by mouth once daily.    aspirin (ECOTRIN) 81 MG EC tablet Take 81 mg by mouth once daily.    clopidogrel (PLAVIX) 75 mg tablet Take 75 mg by mouth once daily.    cyanocobalamin (VITAMIN B-12) 1000 MCG tablet Take 1,000 mcg by mouth once daily.     ezetimibe (ZETIA) 10 mg tablet Take 10 mg by mouth once daily.     ferrous sulfate 325 mg (65 mg iron) Tab tablet Take 1 tablet (325 mg total) by mouth 3 (three) times daily.    finasteride (PROSCAR) 5 mg tablet Take 1 tablet (5 mg total) by mouth once daily.    FOLIC ACID/MULTIVIT-MIN/LUTEIN (CENTRUM SILVER ORAL) Take by mouth.    furosemide (LASIX) 40 MG tablet     insulin NPH (NOVOLIN N) 100 unit/mL injection Inject into the skin. 70 units in the morning and 40 units at bedtime    isosorbide mononitrate (IMDUR) 60 MG 24 hr tablet Take 60 mg by mouth once daily.    JANUMET 50-1,000 mg per tablet Take 1 tablet by mouth 2 (two) times daily with meals.    nitroGLYCERIN (NITROSTAT) 0.4 MG SL tablet DISSOLVE ONE TABLET UNDER THE TONGUE EVERY 5 MINUTES AS NEEDED FOR CHEST PAIN.    omeprazole (PRILOSEC) 40 MG capsule Take 1 capsule (40 mg total) by mouth every morning.    RANEXA 1,000 mg Tb12     ranitidine (ZANTAC) 150 MG tablet     rosuvastatin (CRESTOR) 40 MG Tab Take 1 tablet (40 mg total) by mouth once daily.    spironolactone (ALDACTONE) 25 MG tablet     tamsulosin (FLOMAX) 0.4 mg Cap TAKE ONE CAPSULE BY MOUTH ONCE DAILY    losartan (COZAAR) 25 MG tablet Take 1 tablet (25 mg total) by mouth once daily.    metoprolol succinate (TOPROL-XL) 100 MG 24 hr tablet Take 1 tablet (100 mg total) by mouth once daily.     No current facility-administered medications for this visit.      Objective:   Physical Exam   Constitutional: He is oriented to person, place, and time. He appears well-developed.  "No distress.   /67 (BP Location: Left arm, Patient Position: Sitting, BP Method: Large (Automatic))   Pulse (!) 50   Ht 5' 7" (1.702 m)   Wt 112.8 kg (248 lb 10.9 oz)   BMI 38.95 kg/m²    HENT:   Head: Normocephalic.   Right Ear: External ear normal.   Left Ear: External ear normal.   Eyes: EOM are normal. Pupils are equal, round, and reactive to light. No scleral icterus.   Neck: Neck supple. No JVD present. No thyromegaly present.   Cardiovascular: Normal rate, regular rhythm, normal heart sounds and intact distal pulses. PMI is not displaced. Exam reveals no gallop and no friction rub.   No murmur heard.  Pulmonary/Chest: Effort normal and breath sounds normal. No respiratory distress. He has no wheezes. He has no rales.   Median sternotomy scar.   Abdominal: Soft. He exhibits no distension. There is no hepatosplenomegaly. There is no tenderness.   Musculoskeletal: He exhibits no edema or tenderness.   Gait normal   Neurological: He is alert and oriented to person, place, and time.   Skin: Skin is warm and dry. No rash noted.   Psychiatric: He has a normal mood and affect. His behavior is normal.       Lab Results   Component Value Date     09/26/2018    K 4.7 09/26/2018     09/26/2018    CO2 29 09/26/2018    BUN 19 09/26/2018    CREATININE 1.4 09/26/2018    GLU 66 (L) 09/26/2018    HGBA1C 6.9 (H) 09/26/2018    MG 1.6 01/15/2018    AST 24 09/26/2018    ALT 33 09/26/2018    ALBUMIN 3.9 09/26/2018    PROT 7.5 09/26/2018    BILITOT 0.4 09/26/2018    WBC 9.49 09/26/2018    HGB 14.0 09/26/2018    HCT 42.9 09/26/2018    MCV 87 09/26/2018     09/26/2018    TSH 1.409 09/26/2018    CHOL 124 09/26/2018    HDL 32 (L) 09/26/2018    LDLCALC 74.8 09/26/2018    TRIG 86 09/26/2018       Assessment:     1. Coronary artery disease of native artery of native heart with stable angina pectoris    2. NSTEMI (non-ST elevated myocardial infarction)    3. S/P CABG x 5    4. Ischemic cardiomyopathy : EF 35% "   5. Benign essential HTN: Adequate control    6. Hyperlipidemia, unspecified hyperlipidemia type : Not at goal   7. Severe obesity (BMI 35.0-39.9) with comorbidity    8. Diabetes mellitus type 2 in obese        Plan:     Walter was seen today for s/p cabg x 5 and nstemi (non-st elevated myocardial infarction).    Diagnoses and all orders for this visit:    Coronary artery disease of native artery of native heart with stable angina pectoris    NSTEMI (non-ST elevated myocardial infarction)  -     metoprolol succinate (TOPROL-XL) 100 MG 24 hr tablet; Take 1 tablet (100 mg total) by mouth once daily.  Suggest exploring cardiac rehab at [facility]  S/P CABG x 5    Ischemic cardiomyopathy     losartan (COZAAR) 25 MG tablet; Take 1 tablet (25 mg total) by mouth once daily.  -     metoprolol succinate (TOPROL-XL) 100 MG 24 hr tablet; Take 1 tablet (100 mg total) by mouth once daily in place of BID metoprolol tartrate  Benign essential HTN  -     losartan (COZAAR) 25 MG tablet; Take 1 tablet (25 mg total) by mouth once daily.  -     Basic metabolic panel; Future; Expected date: 10/10/2018    Hyperlipidemia, unspecified hyperlipidemia type  -     Lipid panel; Future; Expected date: 11/25/2018  -     rosuvastatin (CRESTOR) 40 MG Tab; Take 1 tablet (40 mg total) by mouth once daily ( an increase ).    Severe obesity (BMI 35.0-39.9) with comorbidity  Mediterranean Diet recommended with carbohydrate restriction  Graded exercise for 30 minutes a day at least 5 days a week suggested.  Diabetes mellitus type 2 in obese

## 2018-09-26 NOTE — PATIENT INSTRUCTIONS
Increase Rosuvastatin to 40 mg daily ( 2 of your current tablets until gone then the new 40 mg tablet daily)  Change Metoprolol to the once a day metoprolol Succinate 100 mg  Add Losartan 25 mg daily  Mediterranean Diet recommended with carbohydrate restriction  Graded exercise for 30 minutes a day at least 5 days a week suggested.  Contact cardiac rehab program at [facility]

## 2018-09-27 ENCOUNTER — TELEPHONE (OUTPATIENT)
Dept: INTERNAL MEDICINE | Facility: CLINIC | Age: 68
End: 2018-09-27

## 2018-09-27 NOTE — TELEPHONE ENCOUNTER
----- Message from Finesse Lazaro sent at 2018  8:10 AM CDT -----  Contact: JO / KELLEN Renae Jorgito  MRN: [id number]  : 1950  PCP: Belkys Kruger  Home Phone      [phone]  Work Phone      Not on file.  [employer]          [phone]      MESSAGE: RETURNING CALL ABOUT LAB RESULTS     PHONE: [phone]

## 2018-10-01 ENCOUNTER — TELEPHONE (OUTPATIENT)
Dept: INTERNAL MEDICINE | Facility: CLINIC | Age: 68
End: 2018-10-01

## 2018-10-01 ENCOUNTER — TELEPHONE (OUTPATIENT)
Dept: CARDIOLOGY | Facility: CLINIC | Age: 68
End: 2018-10-01

## 2018-10-01 NOTE — TELEPHONE ENCOUNTER
----- Message from Evette Murdock sent at 10/1/2018  3:08 PM CDT -----  Contact: Pt called   Pt would like to know if a referral was sent to Cardiac Rehab . Please call pt @ [phone]. Thank you.

## 2018-10-05 ENCOUNTER — TELEPHONE (OUTPATIENT)
Dept: INTERNAL MEDICINE | Facility: CLINIC | Age: 68
End: 2018-10-05

## 2018-10-05 NOTE — TELEPHONE ENCOUNTER
Stop bedtime insulin and reduce AM to 40 units. I still want him to take morning insulin as long as he is eating. Also can stop the amlodipine and keep monitoring his sugars and blood pressure. If his sugar or BP is every low and stays low go to ER, especially over the weekend. If sugars > 120 fasting with this change let me know.

## 2018-10-05 NOTE — TELEPHONE ENCOUNTER
----- Message from Robyn Kaplan sent at 10/5/2018  1:20 PM CDT -----  Contact: Eve/Girlfriend  Walter Bull  MRN: [id number]  : 1950  PCP: Belkys Kruger  Home Phone      [phone]  Work Phone      Not on file.  [employer]          [phone]    MESSAGE:   Would like to speak to nurse about his glucose readings and blood pressure.  She spoke to nurse earlier in the week and was told to call back on Friday if they did not get any better.  Things have not improved.  Please call to advise.    Phone: [phone]

## 2018-10-05 NOTE — TELEPHONE ENCOUNTER
pts blood sugar dropping to 30s in middle of night and only coming up to 60s after eating. Also, BP is running 80s/50s. Please advise.

## 2018-10-12 ENCOUNTER — HOSPITAL ENCOUNTER (EMERGENCY)
Facility: HOSPITAL | Age: 68
Discharge: HOME | End: 2018-10-12
Attending: EMERGENCY MEDICINE
Payer: MEDICARE

## 2018-10-12 ENCOUNTER — OFFICE VISIT (OUTPATIENT)
Dept: URGENT CARE | Facility: CLINIC | Age: 68
End: 2018-10-12
Payer: MEDICARE

## 2018-10-12 VITALS
WEIGHT: 248 LBS | SYSTOLIC BLOOD PRESSURE: 124 MMHG | HEIGHT: 67 IN | HEART RATE: 59 BPM | RESPIRATION RATE: 18 BRPM | DIASTOLIC BLOOD PRESSURE: 59 MMHG | OXYGEN SATURATION: 96 % | TEMPERATURE: 98 F | BODY MASS INDEX: 38.92 KG/M2

## 2018-10-12 VITALS
DIASTOLIC BLOOD PRESSURE: 64 MMHG | SYSTOLIC BLOOD PRESSURE: 109 MMHG | BODY MASS INDEX: 38.92 KG/M2 | WEIGHT: 248 LBS | OXYGEN SATURATION: 96 % | TEMPERATURE: 97 F | HEART RATE: 54 BPM | HEIGHT: 67 IN | RESPIRATION RATE: 18 BRPM

## 2018-10-12 DIAGNOSIS — R07.9 CHEST PAIN: ICD-10-CM

## 2018-10-12 DIAGNOSIS — R42 LIGHTHEADEDNESS: Primary | ICD-10-CM

## 2018-10-12 DIAGNOSIS — R42 DIZZINESS: Primary | ICD-10-CM

## 2018-10-12 LAB
ALBUMIN SERPL BCP-MCNC: 3.9 G/DL
ALP SERPL-CCNC: 58 U/L
ALT SERPL W/O P-5'-P-CCNC: 37 U/L
ANION GAP SERPL CALC-SCNC: 9 MMOL/L
AST SERPL-CCNC: 27 U/L
BASOPHILS # BLD AUTO: 0.04 K/UL
BASOPHILS NFR BLD: 0.5 %
BILIRUB SERPL-MCNC: 0.5 MG/DL
BNP SERPL-MCNC: 82 PG/ML
BUN SERPL-MCNC: 25 MG/DL
CALCIUM SERPL-MCNC: 9.7 MG/DL
CHLORIDE SERPL-SCNC: 103 MMOL/L
CO2 SERPL-SCNC: 25 MMOL/L
CREAT SERPL-MCNC: 1.4 MG/DL
DIFFERENTIAL METHOD: ABNORMAL
EOSINOPHIL # BLD AUTO: 0.2 K/UL
EOSINOPHIL NFR BLD: 2.3 %
ERYTHROCYTE [DISTWIDTH] IN BLOOD BY AUTOMATED COUNT: 13.7 %
EST. GFR  (AFRICAN AMERICAN): 58.8 ML/MIN/1.73 M^2
EST. GFR  (NON AFRICAN AMERICAN): 50.9 ML/MIN/1.73 M^2
GLUCOSE SERPL-MCNC: 109 MG/DL
HCT VFR BLD AUTO: 40.9 %
HGB BLD-MCNC: 13.1 G/DL
IMM GRANULOCYTES # BLD AUTO: 0.02 K/UL
IMM GRANULOCYTES NFR BLD AUTO: 0.2 %
LYMPHOCYTES # BLD AUTO: 3.6 K/UL
LYMPHOCYTES NFR BLD: 40.8 %
MCH RBC QN AUTO: 28.5 PG
MCHC RBC AUTO-ENTMCNC: 32 G/DL
MCV RBC AUTO: 89 FL
MONOCYTES # BLD AUTO: 0.9 K/UL
MONOCYTES NFR BLD: 9.8 %
NEUTROPHILS # BLD AUTO: 4.1 K/UL
NEUTROPHILS NFR BLD: 46.4 %
NRBC BLD-RTO: 0 /100 WBC
PLATELET # BLD AUTO: 190 K/UL
PMV BLD AUTO: 11.1 FL
POTASSIUM SERPL-SCNC: 4.8 MMOL/L
PROT SERPL-MCNC: 7.1 G/DL
RBC # BLD AUTO: 4.59 M/UL
SODIUM SERPL-SCNC: 137 MMOL/L
TROPONIN I SERPL DL<=0.01 NG/ML-MCNC: 0.01 NG/ML
WBC # BLD AUTO: 8.84 K/UL

## 2018-10-12 PROCEDURE — 83880 ASSAY OF NATRIURETIC PEPTIDE: CPT

## 2018-10-12 PROCEDURE — 93010 ELECTROCARDIOGRAM REPORT: CPT | Mod: ,,, | Performed by: INTERNAL MEDICINE

## 2018-10-12 PROCEDURE — 99285 EMERGENCY DEPT VISIT HI MDM: CPT | Mod: 25

## 2018-10-12 PROCEDURE — 99284 EMERGENCY DEPT VISIT MOD MDM: CPT | Mod: ,,, | Performed by: EMERGENCY MEDICINE

## 2018-10-12 PROCEDURE — 80053 COMPREHEN METABOLIC PANEL: CPT

## 2018-10-12 PROCEDURE — 99499 UNLISTED E&M SERVICE: CPT | Mod: S$GLB,,, | Performed by: NURSE PRACTITIONER

## 2018-10-12 PROCEDURE — 85025 COMPLETE CBC W/AUTO DIFF WBC: CPT

## 2018-10-12 PROCEDURE — 84484 ASSAY OF TROPONIN QUANT: CPT

## 2018-10-12 NOTE — ED PROVIDER NOTES
"SCRIBE #1 NOTE: I, Annamarie Shaji, am scribing for, and in the presence of,  Dr. Markham. I have scribed the entire note.        CC: Dizziness (when going from sitting to standing on and off,had medicine changed, sugar was down, now regulated, ) and Chest Pain (had chest pain walking from parking loc, hx cabg and has more blockages)      HPI: Walter Bull is a 67 y.o. year old male who presents to the ED complaining of chest pain and dizziness x 2 weeks. He has multiple comorbidities including status post CABG x 5 in 2005, NSTEMI September 2018, DM, CHF, and HTN. Patient reports intermittent chest pain x 2 weeks, with 3 episodes within the last week. He states his first episode was a few nights ago while at rest, which went away on its own. The second episode was last night while at rest, which was relived with Nitro and the 3rd episode was today while walking from the garage to the ED. He denies diaphoresis, nausea, or vomiting with each episode. He denies any chest pain at this time. He notes this chest pain is not similar to the pain associated with his NSTEMI a month ago. He describes the pain as 3/10 in severity and states, "it's not as sharp, but it's more heavier." he usually takes nitro for it, pain lasts less than 5 minutes and resolves either w/ nitro or just resting He had a follow up of a hospitalization for chest pain at the cardiology clinic 2 weeks ago, at which Dr. Capone increased furosemide to 40 mg in the morning, metoprolol was increased to 100 mg, losartan 25 mg daily was added and his insulin was decreased. Patient attributes his symptoms to the change in medication. He states for the past week he decided to only take 20 mg of furosemide instead of the medication instructions given by Dr. Capone. Patient also reports he has been having multiple episodes of dizziness upon standing, which is relieved when sitting down. He notes today he ate breakfast before going to work; however, he states, "I felt " "nauseas and like I was going to faint," which lasted 3 minutes. He notes he tried to avoid sitting down or standing up when this sensation onset, so he continued to stand. When he returned home later, he ate again and feeling was relieved. He additionally reports 2 episodes of vomiting yesterday, and nausea for the last 3 days, at which his BGL had been at baseline at that time. He notes normal PO intake. He denies fever, SOB, weight gain, or any urinary symptoms           Past Medical History:   Diagnosis Date    Anemia     Anticoagulant long-term use     CHF (congestive heart failure)     Colon cancer screening 2/2/2017    Coronary artery disease     Diabetes mellitus type I     Disorder of kidney and ureter     Encounter for blood transfusion     Glaucoma     Heart attack     09/2018    Heart disease     Hypertension     Iron deficiency     Kidney failure     post CABG     Past Surgical History:   Procedure Laterality Date    COLON SURGERY  2006    COLONOSCOPY N/A 2/2/2017    Procedure: COLONOSCOPY;  Surgeon: Ronnie Conway MD;  Location: [facility];  Service: Endoscopy;  Laterality: N/A;    COLONOSCOPY N/A 2/2/2017    Performed by Ronnie Conway MD at [facility]    CORONARY ARTERY BYPASS GRAFT  2005    5 arteries    ESOPHAGOGASTRODUODENOSCOPY (EGD) N/A 3/21/2018    Performed by Fawad Cunningham MD at [facility] ([facility])    EYE SURGERY Bilateral 2016    Laser surgery for glaucoma     Family History   Problem Relation Age of Onset    Diabetes Mother     Heart disease Mother     Hypertension Sister     Diabetes Sister     Heart disease Sister     Heart attack Brother     Colon cancer Neg Hx     Esophageal cancer Neg Hx     Stomach cancer Neg Hx      No current facility-administered medications on file prior to encounter.      Current Outpatient Medications on File Prior to Encounter   Medication Sig Dispense Refill    acetaminophen (TYLENOL) 100 mg/mL suspension Take by mouth every 4 (four) " hours as needed for Temperature greater than.      albuterol 90 mcg/actuation inhaler Inhale 2 puffs into the lungs every 6 (six) hours as needed for Wheezing. 1 Inhaler 5    amlodipine (NORVASC) 5 MG tablet Take 5 mg by mouth once daily.      aspirin 325 MG tablet Take 325 mg by mouth once daily.      clopidogrel (PLAVIX) 75 mg tablet Take 75 mg by mouth once daily.      cyanocobalamin (VITAMIN B-12) 1000 MCG tablet Take 1,000 mcg by mouth once daily.       ezetimibe (ZETIA) 10 mg tablet Take 10 mg by mouth once daily.       ferrous sulfate 325 mg (65 mg iron) Tab tablet Take 1 tablet (325 mg total) by mouth 3 (three) times daily. 90 tablet 3    finasteride (PROSCAR) 5 mg tablet Take 1 tablet (5 mg total) by mouth once daily. 30 tablet 11    FOLIC ACID/MULTIVIT-MIN/LUTEIN (CENTRUM SILVER ORAL) Take by mouth.      furosemide (LASIX) 40 MG tablet       insulin NPH (NOVOLIN N) 100 unit/mL injection Inject into the skin. 70 units in the morning and 40 units at bedtime      isosorbide mononitrate (IMDUR) 60 MG 24 hr tablet Take 60 mg by mouth once daily.      JANUMET 50-1,000 mg per tablet Take 1 tablet by mouth 2 (two) times daily with meals. 180 tablet 1    losartan (COZAAR) 25 MG tablet Take 1 tablet (25 mg total) by mouth once daily. 39 tablet 11    metoprolol succinate (TOPROL-XL) 100 MG 24 hr tablet Take 1 tablet (100 mg total) by mouth once daily. 30 tablet 11    nitroGLYCERIN (NITROSTAT) 0.4 MG SL tablet DISSOLVE ONE TABLET UNDER THE TONGUE EVERY 5 MINUTES AS NEEDED FOR CHEST PAIN. 100 tablet 0    omeprazole (PRILOSEC) 40 MG capsule Take 1 capsule (40 mg total) by mouth every morning. 30 capsule 11    RANEXA 1,000 mg Tb12       ranitidine (ZANTAC) 150 MG tablet       rosuvastatin (CRESTOR) 40 MG Tab Take 1 tablet (40 mg total) by mouth once daily. 90 tablet 3    spironolactone (ALDACTONE) 25 MG tablet       tamsulosin (FLOMAX) 0.4 mg Cap TAKE ONE CAPSULE BY MOUTH ONCE DAILY 30 capsule 5      Patient has no known allergies.  Social History     Socioeconomic History    Marital status:      Spouse name: None    Number of children: None    Years of education: None    Highest education level: None   Social Needs    Financial resource strain: None    Food insecurity - worry: None    Food insecurity - inability: None    Transportation needs - medical: None    Transportation needs - non-medical: None   Occupational History    None   Tobacco Use    Smoking status: Former Smoker     Packs/day: 3.00     Types: Cigarettes     Start date: 1963     Last attempt to quit: 1981     Years since quittin.8    Smokeless tobacco: Former User     Types: Snuff, Chew     Quit date:    Substance and Sexual Activity    Alcohol use: No    Drug use: No    Sexual activity: Yes     Comment: -with a significant other   Other Topics Concern    None   Social History Narrative    None       ROS:     Constitutional : neg  HEENT neg  Resp neg  Cardiac  Pos chest pain   GI Pos nausea and vomiting    neg  Neuro pos lightheadedness  Heme/Immune: neg  Endo neg  Skin neg    PHYSICAL EXAM:  Vitals:    10/12/18 1946   BP: (!) 124/59   Pulse: (!) 59   Resp:    Temp:          PHYSICAL EXAM:   general: comfortable, in no acute distress  VS: triage VS reviewed  HEENT: NC/AT, PERRL, EOMI  Neck: trachea midline  CV: RRR, no m/r/g, no LE pitting edema  Resp: CTAB  ABD:  ND, + normal BS, NT  Renal: No CVAT  Neuro: AAO x 3, 5/5 muscle strength in upper and lower extremities, sensation grossly intact, face symmetric, speech normal  Ext: no deformity, +2 symmetrical peripheral pulses          DATA & INTERVENTIONS:    LABS reviewed:  Labs Reviewed   COMPREHENSIVE METABOLIC PANEL - Abnormal; Notable for the following components:       Result Value    BUN, Bld 25 (*)     eGFR if  58.8 (*)     eGFR if non  50.9 (*)     All other components within normal limits   CBC W/ AUTO  DIFFERENTIAL - Abnormal; Notable for the following components:    RBC 4.59 (*)     Hemoglobin 13.1 (*)     All other components within normal limits   TROPONIN I   B-TYPE NATRIURETIC PEPTIDE   URINALYSIS, REFLEX TO URINE CULTURE       RADIOLOGY reviewed:  Imaging Results          X-Ray Chest PA And Lateral (Final result)  Result time 10/12/18 19:49:38    Final result by Benny Steele MD (10/12/18 19:49:38)                 Narrative:    EXAMINATION:  XR CHEST PA AND LATERAL    CLINICAL HISTORY:  Dizziness and giddiness    TECHNIQUE:  PA and lateral views of the chest were performed.    COMPARISON:  Chest radiograph 9/13/2018    FINDINGS:  There are postsurgical changes related to median sternotomy and prior CABG.  There is hazy opacity at the lung bases likely related to prior scarring.  The lungs are otherwise clear.  Cardiac silhouette is enlarged.  Osseous and soft tissue structures demonstrate no acute abnormality.    Conclusion:    No acute abnormality.      Electronically signed by: Benny Steele MD  Date:    10/12/2018  Time:    19:49                              MEDICATIONS/FLUIDS:  Medications - No data to display      MDM:   67 y.o. male presents with lightheadedness with standing and exertional chest pain. Patient has history of DM, CABG, recent NSTEMI and multiple of his medications were changed including furosemide changed to 40 mg in the morning from 20 mg, metoprolol increased to 10 mg daily, insulin has been decreased. He related the onset of his symptoms with the change in dosage of furosemide.   The chest pain is short lasted, usually exertional, results either with rest or nitro . He reports this is not similar chest pain associated with the NSTEMI, likely angina. Will check EKG, troponin, CXR, basic labs, and orthostatic vitals. Physical exam negative for signs of heart failure. Vitals normal in the room. Patient looks well and comfortable. Most likely his symptoms are in the picture of  recent medication changes.   CMP with creatinine of 1.4, baseline of 1.1 earlier this year. LFT's normal. CBC with hemoglobin of 13, troponin 0.007. BNP 82, not suggestive of heart failure. CXR and orthostatic vitals pending.EKG w/ TWI V5-V6 and q wave in the inf leads seen on prior EKGs      cxr no acute  patietn w/ SBP dropped from 145 to 124, no lightheadedness associated w/ the drop in BP    Discussed w/ the patient to slowly stand up, drink a glass of water when he feels lightheaded, f/u w/ his PCP and cardiologist for management of his medications most likley orthostasis secondary to recent medication dose modification)No findings of heart failure, patient decreased his lasix to 20 mg for the last couple of days  Chest pain: likely angina. Discussed w/ the patient signs and sym[ptoms that would require return to the ED      IMPRESSION:  1.) Lightheadedness  2.) Stable angina    Dispo: Discharge    Critical Care Time: N/A      Tegan Markham MD  10/12/18 2059

## 2018-10-12 NOTE — ED NOTES
"Pt complains of shortness of breath with exertion and chest pain with exertion. Pt states had recent MI on September 13th/ patient states "I have 5 blockages and there is nothing they can do". Pt states SOB and chest pain has been x 2 weeks, getting progressively worse. Pt in no distress at this time, denies chest pain and shortness of breath at this time. Pt also complains of dizziness upon standing and states often awakens from sleep at night with sensation room is spinning.  "

## 2018-10-12 NOTE — PROGRESS NOTES
"Subjective:       Patient ID: Walter Bull is a 67 y.o. male.    Vitals:  height is 5' 7" (1.702 m) and weight is 112.5 kg (248 lb). His oral temperature is 97.2 °F (36.2 °C). His blood pressure is 109/64 and his pulse is 54 (abnormal). His respiration is 18 and oxygen saturation is 96%.     Chief Complaint: Dizziness    68 y/o male, with multiple co-morbidities, S/P CABG x 5 in 2005, NSTEMI (non-ST elevated myocardial infarction) 2 weeks ago, presents with complaints of multiple episodes of "feeling dizzy and weak."   Had follow up 9/29/18 with Dr. Capone. At which time Rosuvastatin was increased, Metoprolol was changed to once daily and Losartan 25 mg daily was added to medications. Patient states symptoms have progressively worsened since medication change.        Dizziness:   Chronicity:  New  Onset: 2 weeks ago.  Frequency:  Every few hours  Pain Scale:  0/10  Severity:  Moderate  Duration:  1 minute  Initial Spell Date and Length:  3 minutes  Frequency of Spells:  Daily  Duration of Spells:  3 minutes   Associated symptoms: vomiting, weakness, visual disturbances, light-headedness, syncope, slurred speech, extremity numbness and chest pain (last night).no hearing loss, no ear pain, no fever, no headaches, no tinnitus, no nausea, no palpitations, no panic and no facial weakness.  Exacerbated by: Switching from sitting to standing.  Treatments tried: Pepto Bismol.  Improvements on treatment:  No relief   PMH includes: cardiac surgery (13 years ago.).no strokes, no head trauma, no balance testing, no ear trauma, no ear surgery, no head trauma, no ear tubes, no MRI head and no CT head.    Review of Systems   Constitution: Positive for weakness. Negative for chills and fever.   HENT: Negative for ear pain, hearing loss, sore throat and tinnitus.    Eyes: Negative for blurred vision.   Cardiovascular: Positive for chest pain (last night) and syncope. Negative for palpitations.   Respiratory: Negative for shortness " of breath.    Skin: Negative for rash.   Musculoskeletal: Negative for back pain and joint pain.   Gastrointestinal: Positive for vomiting. Negative for abdominal pain, diarrhea and nausea.   Neurological: Positive for dizziness and light-headedness. Negative for headaches.   Psychiatric/Behavioral: The patient is not nervous/anxious.        Objective:      Physical Exam   Constitutional: He is oriented to person, place, and time.   HENT:   Head: Normocephalic and atraumatic.   Nose: Nose normal.   Eyes: EOM are normal. Pupils are equal, round, and reactive to light.   Neck: Normal range of motion. Neck supple.   Cardiovascular: Normal pulses. Bradycardia present.   Pulmonary/Chest: Effort normal and breath sounds normal.   Abdominal: Soft. Bowel sounds are normal. He exhibits no distension.   Musculoskeletal: Normal range of motion.   Neurological: He is alert and oriented to person, place, and time. No cranial nerve deficit or sensory deficit. Coordination normal.   Skin: Skin is warm and dry. Capillary refill takes less than 2 seconds.   Psychiatric: He has a normal mood and affect. His behavior is normal. Judgment and thought content normal.       Assessment:       1. Dizziness        Plan:         Dizziness  -     Refer to Emergency Dept.

## 2018-10-15 ENCOUNTER — OFFICE VISIT (OUTPATIENT)
Dept: INTERNAL MEDICINE | Facility: CLINIC | Age: 68
End: 2018-10-15
Payer: MEDICARE

## 2018-10-15 VITALS
HEIGHT: 67 IN | HEART RATE: 67 BPM | BODY MASS INDEX: 39.38 KG/M2 | OXYGEN SATURATION: 97 % | WEIGHT: 250.88 LBS | SYSTOLIC BLOOD PRESSURE: 126 MMHG | RESPIRATION RATE: 20 BRPM | DIASTOLIC BLOOD PRESSURE: 70 MMHG

## 2018-10-15 DIAGNOSIS — E11.69 DIABETES MELLITUS TYPE 2 IN OBESE: ICD-10-CM

## 2018-10-15 DIAGNOSIS — I10 BENIGN ESSENTIAL HTN: Primary | ICD-10-CM

## 2018-10-15 DIAGNOSIS — E66.9 DIABETES MELLITUS TYPE 2 IN OBESE: ICD-10-CM

## 2018-10-15 PROCEDURE — 1101F PT FALLS ASSESS-DOCD LE1/YR: CPT | Mod: CPTII,S$PBB,, | Performed by: INTERNAL MEDICINE

## 2018-10-15 PROCEDURE — 3074F SYST BP LT 130 MM HG: CPT | Mod: CPTII,S$PBB,, | Performed by: INTERNAL MEDICINE

## 2018-10-15 PROCEDURE — 3044F HG A1C LEVEL LT 7.0%: CPT | Mod: CPTII,S$PBB,, | Performed by: INTERNAL MEDICINE

## 2018-10-15 PROCEDURE — 99214 OFFICE O/P EST MOD 30 MIN: CPT | Mod: S$PBB,,, | Performed by: INTERNAL MEDICINE

## 2018-10-15 PROCEDURE — 99999 PR PBB SHADOW E&M-EST. PATIENT-LVL III: CPT | Mod: PBBFAC,,, | Performed by: INTERNAL MEDICINE

## 2018-10-15 PROCEDURE — 3078F DIAST BP <80 MM HG: CPT | Mod: CPTII,S$PBB,, | Performed by: INTERNAL MEDICINE

## 2018-10-15 PROCEDURE — 99213 OFFICE O/P EST LOW 20 MIN: CPT | Mod: PBBFAC | Performed by: INTERNAL MEDICINE

## 2018-10-15 RX ORDER — FUROSEMIDE 20 MG/1
20 TABLET ORAL DAILY
Qty: 30 TABLET | Refills: 11 | Status: ON HOLD | OUTPATIENT
Start: 2018-10-15 | End: 2018-11-20 | Stop reason: SDUPTHER

## 2018-10-15 RX ORDER — METOPROLOL TARTRATE 25 MG/1
25 TABLET, FILM COATED ORAL 2 TIMES DAILY
Qty: 60 TABLET | Refills: 11 | Status: SHIPPED | OUTPATIENT
Start: 2018-10-15 | End: 2018-10-22 | Stop reason: ALTCHOICE

## 2018-10-15 NOTE — PROGRESS NOTES
Subjective:       Patient ID: Walter Bull is a 67 y.o. male.    Chief Complaint: Dizziness (s/s x 2 weeks); Nausea; and discuss medications      HPI:  Patient is known to me and presents for diabetes follow up. At last visit NPH 70 units AM and 30 units bedtime and Janumet. blood sugars are ranging from 60s-200s. He is not taking insulin regularly--states when his sugars are low he doesn't take any insulin so I had him resume insulin at prescribe dose. This resulted in consistent hypoglycemia, mostly in the AM as low as 30s and was symptomatic but was also having daytime episodes. I decreased his AM insulin to 40 units and held him bedtime insulin. We also stopped the amlodipine as his blood pressure was running low and he was feeling dizzy. Since then he went to ER for dizziness and was found to have orthostasis but BP meds were changed. Dr. Vitale changed him to lasix 40mg--but he is only taking 20mg daily. He was also increased from metoprolol 50mg BID to 100mg Qday. Sx started after this change.     Past Medical History:   Diagnosis Date    Anemia     Anticoagulant long-term use     CHF (congestive heart failure)     Colon cancer screening 2/2/2017    Coronary artery disease     Diabetes mellitus type I     Disorder of kidney and ureter     Encounter for blood transfusion     Glaucoma     Heart attack     09/2018    Heart disease     Hypertension     Iron deficiency     Kidney failure     post CABG       Family History   Problem Relation Age of Onset    Diabetes Mother     Heart disease Mother     Hypertension Sister     Diabetes Sister     Heart disease Sister     Heart attack Brother     Colon cancer Neg Hx     Esophageal cancer Neg Hx     Stomach cancer Neg Hx        Social History     Socioeconomic History    Marital status:      Spouse name: Not on file    Number of children: Not on file    Years of education: Not on file    Highest education level: Not on file   Social  Needs    Financial resource strain: Not on file    Food insecurity - worry: Not on file    Food insecurity - inability: Not on file    Transportation needs - medical: Not on file    Transportation needs - non-medical: Not on file   Occupational History    Not on file   Tobacco Use    Smoking status: Former Smoker     Packs/day: 3.00     Types: Cigarettes     Start date: 1963     Last attempt to quit: 1981     Years since quittin.8    Smokeless tobacco: Former User     Types: Snuff, Chew     Quit date:    Substance and Sexual Activity    Alcohol use: No    Drug use: No    Sexual activity: Yes     Comment: -with a significant other   Other Topics Concern    Not on file   Social History Narrative    Not on file       Review of Systems   Constitutional: Positive for fatigue. Negative for activity change, fever and unexpected weight change.   HENT: Negative for congestion, ear pain, hearing loss, rhinorrhea, sore throat and tinnitus.    Eyes: Negative for pain, redness and visual disturbance.   Respiratory: Negative for cough, shortness of breath and wheezing.    Cardiovascular: Negative for chest pain, palpitations and leg swelling.   Gastrointestinal: Negative for abdominal pain, blood in stool, constipation, diarrhea, nausea and vomiting.   Genitourinary: Negative for decreased urine volume, dysuria, frequency and urgency.   Musculoskeletal: Negative for back pain, joint swelling and neck pain.   Skin: Negative for color change, rash and wound.   Neurological: Positive for dizziness. Negative for tremors, weakness, light-headedness and headaches.         Objective:      Physical Exam   Constitutional: He is oriented to person, place, and time. He appears well-developed and well-nourished. No distress.   HENT:   Head: Normocephalic and atraumatic.   Right Ear: External ear normal.   Left Ear: External ear normal.   Eyes: Conjunctivae and EOM are normal. Pupils are equal, round,  and reactive to light.   Neck: Neck supple. No tracheal deviation present.   Cardiovascular: Normal rate and regular rhythm.   No murmur heard.  Pulmonary/Chest: Effort normal and breath sounds normal. No respiratory distress. He has no wheezes. He has no rales.   Abdominal: Soft. Bowel sounds are normal. He exhibits no distension. There is no tenderness. There is no rebound and no guarding.   Musculoskeletal: He exhibits no edema.   Neurological: He is alert and oriented to person, place, and time. No cranial nerve deficit.   Skin: Skin is warm and dry.   Psychiatric: He has a normal mood and affect. His behavior is normal.   Vitals reviewed.      Assessment:       1. Benign essential HTN    2. Diabetes mellitus type 2 in obese        Plan:       Walter was seen today for dizziness, nausea and discuss medications.    Diagnoses and all orders for this visit:    Benign essential HTN  -     furosemide (LASIX) 20 MG tablet; Take 1 tablet (20 mg total) by mouth once daily.  -     metoprolol tartrate (LOPRESSOR) 25 MG tablet; Take 1 tablet (25 mg total) by mouth 2 (two) times daily.  Having orthostatic hypotension  Cont lasix 20mg instead of 40mg  Reduce metoprolol to 25mg BID from 100mg Qday  Check BP and keep log for next visit  Cont other meds at same dose    Diabetes mellitus type 2 in obese  Cont insulin 40mg QAM And Janumet at same dose  Blood sugars averageing 80-120s fasting  No more hypoglycemia    RTC 2 weeks and PRN

## 2018-10-18 ENCOUNTER — TELEPHONE (OUTPATIENT)
Dept: INTERNAL MEDICINE | Facility: CLINIC | Age: 68
End: 2018-10-18

## 2018-10-18 NOTE — TELEPHONE ENCOUNTER
Attempted to contact patient, but no answer. LMOM requesting that patient return call.Patient already has an appt with Dr. Kruger on 10/22/18.

## 2018-10-18 NOTE — TELEPHONE ENCOUNTER
----- Message from Finesse Lazaro sent at 10/18/2018 12:39 PM CDT -----  Contact: JO FOREMAN   Walter Bull  MRN: [id number]  : 1950  PCP: Belkys Kruger  Home Phone      [phone]  Work Phone      Not on file.  Mobile          [phone]      MESSAGE: SAID THAT BP MEDICATION HAS BEEN CHANGED AND HE'S STILL LIGHT HEADED AND VOMITING. CAN IT BE SOMETHING ELSE? PLEASE CALL     PHONE: [phone]

## 2018-10-19 NOTE — TELEPHONE ENCOUNTER
Spoke with pts wife. Pt is still having dizziness and N/V. He is also having taste changes. Foods he has always loved taste bad to him now. BP is running great. Instructed her that we only had 1 provider in today and she would likely instruct to go to ER. They will wait to see you on Monday. I instructed that if things got worse over the weekend that they should go straight to ER.

## 2018-10-22 ENCOUNTER — OFFICE VISIT (OUTPATIENT)
Dept: INTERNAL MEDICINE | Facility: CLINIC | Age: 68
End: 2018-10-22
Payer: MEDICARE

## 2018-10-22 VITALS
HEART RATE: 57 BPM | DIASTOLIC BLOOD PRESSURE: 56 MMHG | WEIGHT: 250.88 LBS | BODY MASS INDEX: 39.38 KG/M2 | RESPIRATION RATE: 18 BRPM | HEIGHT: 67 IN | SYSTOLIC BLOOD PRESSURE: 116 MMHG

## 2018-10-22 DIAGNOSIS — I10 BENIGN ESSENTIAL HTN: Primary | ICD-10-CM

## 2018-10-22 DIAGNOSIS — K21.9 GASTROESOPHAGEAL REFLUX DISEASE, ESOPHAGITIS PRESENCE NOT SPECIFIED: ICD-10-CM

## 2018-10-22 DIAGNOSIS — R11.2 NAUSEA AND VOMITING, INTRACTABILITY OF VOMITING NOT SPECIFIED, UNSPECIFIED VOMITING TYPE: ICD-10-CM

## 2018-10-22 PROCEDURE — 99214 OFFICE O/P EST MOD 30 MIN: CPT | Mod: S$PBB,,, | Performed by: INTERNAL MEDICINE

## 2018-10-22 PROCEDURE — 99213 OFFICE O/P EST LOW 20 MIN: CPT | Mod: PBBFAC | Performed by: INTERNAL MEDICINE

## 2018-10-22 PROCEDURE — 3078F DIAST BP <80 MM HG: CPT | Mod: CPTII,,, | Performed by: INTERNAL MEDICINE

## 2018-10-22 PROCEDURE — 3074F SYST BP LT 130 MM HG: CPT | Mod: CPTII,,, | Performed by: INTERNAL MEDICINE

## 2018-10-22 PROCEDURE — 1101F PT FALLS ASSESS-DOCD LE1/YR: CPT | Mod: CPTII,,, | Performed by: INTERNAL MEDICINE

## 2018-10-22 PROCEDURE — 99999 PR PBB SHADOW E&M-EST. PATIENT-LVL III: CPT | Mod: PBBFAC,,, | Performed by: INTERNAL MEDICINE

## 2018-10-22 RX ORDER — CARVEDILOL 6.25 MG/1
6.25 TABLET ORAL 2 TIMES DAILY WITH MEALS
Qty: 60 TABLET | Refills: 11 | Status: ON HOLD | OUTPATIENT
Start: 2018-10-22 | End: 2018-11-20 | Stop reason: SDUPTHER

## 2018-10-22 RX ORDER — ESOMEPRAZOLE MAGNESIUM 40 MG/1
40 CAPSULE, DELAYED RELEASE ORAL
Qty: 30 CAPSULE | Refills: 11 | Status: SHIPPED | OUTPATIENT
Start: 2018-10-22 | End: 2019-01-02 | Stop reason: SDUPTHER

## 2018-10-22 NOTE — PROGRESS NOTES
Subjective:       Patient ID: Walter Bull is a 67 y.o. male.    Chief Complaint: Follow-up      HPI:  Patient is known to me and presents for follow up HTN, dizziness. At last visit we reduced lasix to 20mg daily (which is what he was taking anyway) and metoprolol to 25mg BID from 100mg Qday. He reports his dizzy spells are less frequent and less severe but still occurring. Mostly with position changes. His BP is still on the low side and HR in the 50s today. At home HR runs 40-50s.    He is still having nausea and vomiting as well. He doesn't really find this is associated with his dizzy spells. Occurs mostly with eating. Not every day and not every meals. No particular foods make his sx worse. He is on Prilosec but it is not helpful.         Past Medical History:   Diagnosis Date    Anemia     Anticoagulant long-term use     CHF (congestive heart failure)     Colon cancer screening 2/2/2017    Coronary artery disease     Diabetes mellitus type I     Disorder of kidney and ureter     Encounter for blood transfusion     Glaucoma     Heart attack     09/2018    Heart disease     Hypertension     Iron deficiency     Kidney failure     post CABG       Family History   Problem Relation Age of Onset    Diabetes Mother     Heart disease Mother     Hypertension Sister     Diabetes Sister     Heart disease Sister     Heart attack Brother     Colon cancer Neg Hx     Esophageal cancer Neg Hx     Stomach cancer Neg Hx        Social History     Socioeconomic History    Marital status:      Spouse name: Not on file    Number of children: Not on file    Years of education: Not on file    Highest education level: Not on file   Social Needs    Financial resource strain: Not on file    Food insecurity - worry: Not on file    Food insecurity - inability: Not on file    Transportation needs - medical: Not on file    Transportation needs - non-medical: Not on file   Occupational History     Not on file   Tobacco Use    Smoking status: Former Smoker     Packs/day: 3.00     Types: Cigarettes     Start date: 1963     Last attempt to quit: 1981     Years since quittin.8    Smokeless tobacco: Former User     Types: Snuff, Chew     Quit date:    Substance and Sexual Activity    Alcohol use: No    Drug use: No    Sexual activity: Yes     Comment: -with a significant other   Other Topics Concern    Not on file   Social History Narrative    Not on file       Review of Systems   Constitutional: Negative for activity change, fatigue, fever and unexpected weight change.   HENT: Negative for congestion, ear pain, hearing loss, rhinorrhea and sore throat.    Eyes: Negative for pain, redness and visual disturbance.   Respiratory: Negative for cough, shortness of breath and wheezing.    Cardiovascular: Negative for chest pain, palpitations and leg swelling.   Gastrointestinal: Positive for nausea and vomiting. Negative for abdominal pain, blood in stool, constipation and diarrhea.   Genitourinary: Negative for decreased urine volume, dysuria, frequency and urgency.   Musculoskeletal: Negative for back pain, joint swelling and neck pain.   Skin: Negative for color change, rash and wound.   Neurological: Positive for dizziness. Negative for tremors, weakness, light-headedness and headaches.         Objective:      Physical Exam   Constitutional: He is oriented to person, place, and time. He appears well-developed and well-nourished. No distress.   HENT:   Head: Normocephalic and atraumatic.   Right Ear: External ear normal.   Left Ear: External ear normal.   Eyes: Conjunctivae and EOM are normal. Pupils are equal, round, and reactive to light.   Neck: Neck supple. No tracheal deviation present.   Cardiovascular: Normal rate and regular rhythm.   No murmur heard.  Pulmonary/Chest: Effort normal and breath sounds normal. No respiratory distress. He has no wheezes. He has no rales.    Abdominal: Soft. Bowel sounds are normal. He exhibits no distension. There is no tenderness. There is no rebound and no guarding.   Musculoskeletal: He exhibits no edema.   Neurological: He is alert and oriented to person, place, and time. No cranial nerve deficit.   Skin: Skin is warm and dry.   Psychiatric: He has a normal mood and affect. His behavior is normal.   Vitals reviewed.      Assessment:       1. Benign essential HTN    2. Nausea and vomiting, intractability of vomiting not specified, unspecified vomiting type    3. Gastroesophageal reflux disease, esophagitis presence not specified        Plan:       Walter was seen today for follow-up.    Diagnoses and all orders for this visit:    Benign essential HTN  -     carvedilol (COREG) 6.25 MG tablet; Take 1 tablet (6.25 mg total) by mouth 2 (two) times daily with meals.  Stop metoprolol  Start low dose Coreg  See if less dizziness and bradycardia with this medication  Needs BB due to CHF and CAD diagnosis but may not be able to tolerate  Cont other meds at same dose for now  Check BP and HR and keep log    Nausea and vomiting, intractability of vomiting not specified, unspecified vomiting type  -     esomeprazole (NEXIUM) 40 MG capsule; Take 1 capsule (40 mg total) by mouth before breakfast.    Gastroesophageal reflux disease, esophagitis presence not specified  -     esomeprazole (NEXIUM) 40 MG capsule; Take 1 capsule (40 mg total) by mouth before breakfast.    GERD Sx uncontrolled  Stop prilosec  Start nexium  If still uncontrolled consider gastroparesis and NM scan for nausea and vomiting with eating  abd exam is benign today. No abd pain. No other GI sx    RTC 1 week BP/HR check and PRN

## 2018-10-30 ENCOUNTER — OFFICE VISIT (OUTPATIENT)
Dept: INTERNAL MEDICINE | Facility: CLINIC | Age: 68
End: 2018-10-30
Payer: MEDICARE

## 2018-10-30 VITALS
WEIGHT: 244.25 LBS | HEIGHT: 67 IN | DIASTOLIC BLOOD PRESSURE: 58 MMHG | HEART RATE: 64 BPM | RESPIRATION RATE: 20 BRPM | SYSTOLIC BLOOD PRESSURE: 102 MMHG | BODY MASS INDEX: 38.34 KG/M2

## 2018-10-30 DIAGNOSIS — R42 LIGHTHEADEDNESS: ICD-10-CM

## 2018-10-30 DIAGNOSIS — R55 PRE-SYNCOPE: Primary | ICD-10-CM

## 2018-10-30 DIAGNOSIS — R00.1 BRADYCARDIA: ICD-10-CM

## 2018-10-30 PROCEDURE — 99999 PR PBB SHADOW E&M-EST. PATIENT-LVL III: CPT | Mod: PBBFAC,,, | Performed by: INTERNAL MEDICINE

## 2018-10-30 PROCEDURE — 3078F DIAST BP <80 MM HG: CPT | Mod: CPTII,,, | Performed by: INTERNAL MEDICINE

## 2018-10-30 PROCEDURE — 1101F PT FALLS ASSESS-DOCD LE1/YR: CPT | Mod: CPTII,,, | Performed by: INTERNAL MEDICINE

## 2018-10-30 PROCEDURE — 99213 OFFICE O/P EST LOW 20 MIN: CPT | Mod: S$PBB,,, | Performed by: INTERNAL MEDICINE

## 2018-10-30 PROCEDURE — 99213 OFFICE O/P EST LOW 20 MIN: CPT | Mod: PBBFAC | Performed by: INTERNAL MEDICINE

## 2018-10-30 PROCEDURE — 3074F SYST BP LT 130 MM HG: CPT | Mod: CPTII,,, | Performed by: INTERNAL MEDICINE

## 2018-10-30 NOTE — PROGRESS NOTES
Subjective:       Patient ID: Walter Bull is a 67 y.o. male.    Chief Complaint: Follow-up (1 week) and Dizziness      HPI:  Patient is known to me and presents for follow up HTN, bradycardia and dizziness. He is still having episodes of vomiting and dizziness. Dizziness still occurring with position changes. Described more as lightheaded. Seeing spots in his vision when he stands. Lasts a few seconds and then resolvs. Had 1 episode of vomiting 5 days ago. HR runs 50-70 with the change to Coreg last visit. -150. He reports his sx are better with changing from metoprolol to Coreg but had a bad day again today.     Past Medical History:   Diagnosis Date    Anemia     Anticoagulant long-term use     CHF (congestive heart failure)     Colon cancer screening 2/2/2017    Coronary artery disease     Diabetes mellitus type I     Disorder of kidney and ureter     Encounter for blood transfusion     Glaucoma     Heart attack     09/2018    Heart disease     Hypertension     Iron deficiency     Kidney failure     post CABG       Family History   Problem Relation Age of Onset    Diabetes Mother     Heart disease Mother     Hypertension Sister     Diabetes Sister     Heart disease Sister     Heart attack Brother     Colon cancer Neg Hx     Esophageal cancer Neg Hx     Stomach cancer Neg Hx        Social History     Socioeconomic History    Marital status:      Spouse name: Not on file    Number of children: Not on file    Years of education: Not on file    Highest education level: Not on file   Social Needs    Financial resource strain: Not on file    Food insecurity - worry: Not on file    Food insecurity - inability: Not on file    Transportation needs - medical: Not on file    Transportation needs - non-medical: Not on file   Occupational History    Not on file   Tobacco Use    Smoking status: Former Smoker     Packs/day: 3.00     Types: Cigarettes     Start date:  1963     Last attempt to quit: 1981     Years since quittin.8    Smokeless tobacco: Former User     Types: Snuff, Chew     Quit date:    Substance and Sexual Activity    Alcohol use: No    Drug use: No    Sexual activity: Yes     Comment: -with a significant other   Other Topics Concern    Not on file   Social History Narrative    Not on file       Review of Systems   Constitutional: Negative for activity change, fatigue, fever and unexpected weight change.   HENT: Negative for congestion, ear pain, hearing loss, rhinorrhea and sore throat.    Eyes: Negative for pain, redness and visual disturbance.   Respiratory: Negative for cough, shortness of breath and wheezing.    Cardiovascular: Negative for chest pain, palpitations and leg swelling.   Gastrointestinal: Positive for nausea. Negative for abdominal pain, blood in stool, constipation, diarrhea and vomiting.   Genitourinary: Negative for decreased urine volume, dysuria, frequency and urgency.   Musculoskeletal: Negative for back pain, joint swelling and neck pain.   Skin: Negative for color change, rash and wound.   Neurological: Positive for dizziness and light-headedness. Negative for tremors, weakness and headaches.         Objective:      Physical Exam   Constitutional: He is oriented to person, place, and time. He appears well-developed and well-nourished. No distress.   HENT:   Head: Normocephalic and atraumatic.   Right Ear: External ear normal.   Left Ear: External ear normal.   Eyes: Conjunctivae and EOM are normal. Pupils are equal, round, and reactive to light.   Neck: Neck supple. No tracheal deviation present.   Cardiovascular: Normal rate and regular rhythm. Exam reveals no gallop.   No murmur heard.  Pulmonary/Chest: Effort normal and breath sounds normal. No respiratory distress. He has no wheezes. He has no rales.   Abdominal: Soft. Bowel sounds are normal. He exhibits no distension. There is no tenderness.    Neurological: He is alert and oriented to person, place, and time. No cranial nerve deficit.   Skin: Skin is warm and dry.   Psychiatric: He has a normal mood and affect. His behavior is normal.   Vitals reviewed.      Assessment:       1. Pre-syncope    2. Bradycardia    3. Lightheadedness        Plan:       Walter was seen today for follow-up and dizziness.    Diagnoses and all orders for this visit:    Pre-syncope    Bradycardia    Lightheadedness      I think he does have some orthostasis. Likely mediated by his multiple medications  Sx are better with switching from metoprolol to Coreg but still present  HR 60s   to 102 from sitting to standing today in clinic. Doesn't truly meet criteria for orthostatic hypotension but did at his last ER visit before I changed his medications.   I wonder if he would benefit from long term heart monitor to further eval his bradycardia. He certainly  Need BB due to CAD but I questions if he is having episodes of worse bradycardia, ? Pauses. Or he could be having intermittent tachycardia: ? A-fib, NSVT, etc.  He has appt with Dr. Vitale tomorrow. Asked him to discuss this.   At this time, I don't think changing his medications further is wisdom given his cardiac history.

## 2018-11-07 ENCOUNTER — OFFICE VISIT (OUTPATIENT)
Dept: INTERNAL MEDICINE | Facility: CLINIC | Age: 68
End: 2018-11-07
Payer: MEDICARE

## 2018-11-07 VITALS
RESPIRATION RATE: 18 BRPM | WEIGHT: 235.88 LBS | OXYGEN SATURATION: 96 % | HEIGHT: 67 IN | HEART RATE: 68 BPM | DIASTOLIC BLOOD PRESSURE: 78 MMHG | BODY MASS INDEX: 37.02 KG/M2 | SYSTOLIC BLOOD PRESSURE: 122 MMHG

## 2018-11-07 DIAGNOSIS — I25.118 CORONARY ARTERY DISEASE OF NATIVE ARTERY OF NATIVE HEART WITH STABLE ANGINA PECTORIS: ICD-10-CM

## 2018-11-07 DIAGNOSIS — Z12.5 PROSTATE CANCER SCREENING: ICD-10-CM

## 2018-11-07 DIAGNOSIS — I10 BENIGN ESSENTIAL HTN: ICD-10-CM

## 2018-11-07 DIAGNOSIS — E11.69 DIABETES MELLITUS TYPE 2 IN OBESE: Primary | ICD-10-CM

## 2018-11-07 DIAGNOSIS — E66.01 SEVERE OBESITY (BMI 35.0-39.9) WITH COMORBIDITY: ICD-10-CM

## 2018-11-07 DIAGNOSIS — E66.9 DIABETES MELLITUS TYPE 2 IN OBESE: Primary | ICD-10-CM

## 2018-11-07 PROCEDURE — 1101F PT FALLS ASSESS-DOCD LE1/YR: CPT | Mod: CPTII,S$GLB,, | Performed by: INTERNAL MEDICINE

## 2018-11-07 PROCEDURE — 3074F SYST BP LT 130 MM HG: CPT | Mod: CPTII,S$GLB,, | Performed by: INTERNAL MEDICINE

## 2018-11-07 PROCEDURE — 99999 PR PBB SHADOW E&M-EST. PATIENT-LVL III: CPT | Mod: PBBFAC,,, | Performed by: INTERNAL MEDICINE

## 2018-11-07 PROCEDURE — 3044F HG A1C LEVEL LT 7.0%: CPT | Mod: CPTII,S$GLB,, | Performed by: INTERNAL MEDICINE

## 2018-11-07 PROCEDURE — 99214 OFFICE O/P EST MOD 30 MIN: CPT | Mod: 25,S$GLB,, | Performed by: INTERNAL MEDICINE

## 2018-11-07 PROCEDURE — 3078F DIAST BP <80 MM HG: CPT | Mod: CPTII,S$GLB,, | Performed by: INTERNAL MEDICINE

## 2018-11-07 NOTE — PROGRESS NOTES
Subjective:       Patient ID: Walter Bull is a 67 y.o. male.    Chief Complaint: Follow-up (6 month check up)      HPI:    Patient is known to me and presents for follow up CAD, DM type 2, HLD. Labs not done prior to today's visit.     CAD s/p 5v CABG and NSTEMI 9/12/18: following with Dr. Vitale as well. Now on Imdur and Ranexa. On Plavix, ASA, Crestor and BB. Also reports h/o CHF (last known EF 55%), on lasix 40mg once a day. Denies SOB, [place] and orthopnea.       Dm type 2: was having episodes of hypoglycemia so NPH reduced to 40 units QAM and stopped bedtime insulin and remains on Janumet. Blood sugars are stable, no more hypoglcyemia. A1C 6.9%  He does report numbness and tingling of left foot > right foot and b/l fingers; sx have been present for several years. Not on medicine for neuropathy as he declines treatment. Denies dizziness, syncope. Denies polyuria, polydipsia  Foot exam: 1/2018  Eye exam: 6/2017  Microalb: 4/2018     HLD: on crestor and zetia, has been taking for several years. Denies side effects. Last LDL at goal.     HTN: on coreg, amlodipine, aldactone. BP is well controlled. Denies headaches, vision changes. If EF is low may need ACEi/ARB, will need most recent TTE.     Patient was having issues with dizziness and orthostasis. We switched metoprolol to Coreg. Noted he was having more bradycardia with the metoprolol. He is feeling better. Did 24 hour holter with Dr. Vitale, awaiting results.     GERD: Uses Zantac PRN. Works well, denies abd pain, n/v/d/c.     BPH: on flomax and finasteride and working well.  No medication side effects     Tobacco use: quit 1981; previously smoked 3 PPD for 20 years  EtOH: stopped drink 1982; was a daily drink 2 fifth liquor daily  Illicit drug: denies    Past Medical History:   Diagnosis Date    Anemia     Anticoagulant long-term use     CHF (congestive heart failure)     Colon cancer screening 2/2/2017    Coronary artery disease     Diabetes mellitus type  I     Disorder of kidney and ureter     Encounter for blood transfusion     Glaucoma     Heart attack     2018    Heart disease     Hypertension     Iron deficiency     Kidney failure     post CABG       Family History   Problem Relation Age of Onset    Diabetes Mother     Heart disease Mother     Hypertension Sister     Diabetes Sister     Heart disease Sister     Heart attack Brother     Colon cancer Neg Hx     Esophageal cancer Neg Hx     Stomach cancer Neg Hx        Social History     Socioeconomic History    Marital status:      Spouse name: Not on file    Number of children: Not on file    Years of education: Not on file    Highest education level: Not on file   Social Needs    Financial resource strain: Not on file    Food insecurity - worry: Not on file    Food insecurity - inability: Not on file    Transportation needs - medical: Not on file    Transportation needs - non-medical: Not on file   Occupational History    Not on file   Tobacco Use    Smoking status: Former Smoker     Packs/day: 3.00     Types: Cigarettes     Start date: 1963     Last attempt to quit: 1981     Years since quittin.8    Smokeless tobacco: Former User     Types: Snuff, Chew     Quit date:    Substance and Sexual Activity    Alcohol use: No    Drug use: No    Sexual activity: Yes     Comment: -with a significant other   Other Topics Concern    Not on file   Social History Narrative    Not on file       Review of Systems   Constitutional: Negative for activity change, fatigue, fever and unexpected weight change.   HENT: Negative for congestion, ear pain, hearing loss, rhinorrhea, sore throat and tinnitus.    Eyes: Negative for pain, redness and visual disturbance.   Respiratory: Negative for cough, shortness of breath and wheezing.    Cardiovascular: Negative for chest pain, palpitations and leg swelling.   Gastrointestinal: Negative for abdominal pain, blood in  stool, constipation, diarrhea, nausea and vomiting.   Genitourinary: Negative for decreased urine volume, dysuria, frequency and urgency.   Musculoskeletal: Negative for back pain, joint swelling and neck pain.   Skin: Negative for color change, rash and wound.   Neurological: Negative for dizziness, tremors, weakness, light-headedness and headaches.         Objective:      Physical Exam   Constitutional: He is oriented to person, place, and time. He appears well-developed and well-nourished. No distress.   HENT:   Head: Normocephalic and atraumatic.   Right Ear: External ear normal.   Left Ear: External ear normal.   Eyes: Conjunctivae and EOM are normal. Pupils are equal, round, and reactive to light.   Neck: Neck supple. No tracheal deviation present.   Cardiovascular: Normal rate and regular rhythm.   No murmur heard.  Pulmonary/Chest: Effort normal and breath sounds normal. No respiratory distress. He has no wheezes. He has no rales.   Abdominal: Soft. Bowel sounds are normal. He exhibits no distension. There is no tenderness.   Neurological: He is alert and oriented to person, place, and time. No cranial nerve deficit.   Skin: Skin is warm and dry.   Psychiatric: He has a normal mood and affect. His behavior is normal.   Vitals reviewed.      Assessment:       1. Diabetes mellitus type 2 in obese    2. Severe obesity (BMI 35.0-39.9) with comorbidity    3. Coronary artery disease of native artery of native heart with stable angina pectoris    4. Benign essential HTN    5. Prostate cancer screening        Plan:       Walter was seen today for follow-up.    Diagnoses and all orders for this visit:    Diabetes mellitus type 2 in obese  -     CBC auto differential; Future  -     Comprehensive metabolic panel; Future  -     TSH; Future  -     Lipid panel; Future  -     Microalbumin/creatinine urine ratio; Future  -     Hemoglobin A1c; Future  Chronic controlled  Cont meds at same dose  1800 cookie ADA diet  Check sugars  and keep log  Foot exam: 1/2018  Eye exam: 6/2017  Microalb: 4/2018    Severe obesity (BMI 35.0-39.9) with comorbidity  -     CBC auto differential; Future  -     Comprehensive metabolic panel; Future  -     TSH; Future  -     Lipid panel; Future  -     Microalbumin/creatinine urine ratio; Future  Chronic, controlled  Has lost a significant amount of weight over last few weeks! Working out and changed his diet!  Doing great. Keep up the good work.     Coronary artery disease of native artery of native heart with stable angina pectoris  -     CBC auto differential; Future  -     Comprehensive metabolic panel; Future  -     TSH; Future  -     Lipid panel; Future  -     Microalbumin/creatinine urine ratio; Future  Chronic  Denies angina  Cont meds at same dose  Follows with Dr. Vitale    Benign essential HTN  -     CBC auto differential; Future  -     Comprehensive metabolic panel; Future  -     TSH; Future  -     Lipid panel; Future  -     Microalbumin/creatinine urine ratio; Future  Chronic controlled  Cont meds at same dose    Prostate cancer screening  -     PSA, Screening; Future      RTC 6 months with albs and PRN

## 2018-11-16 ENCOUNTER — HOSPITAL ENCOUNTER (INPATIENT)
Facility: HOSPITAL | Age: 68
LOS: 4 days | Discharge: HOME | DRG: 225 | End: 2018-11-20
Attending: EMERGENCY MEDICINE | Admitting: EMERGENCY MEDICINE
Payer: MEDICARE

## 2018-11-16 DIAGNOSIS — R00.0 WIDE-COMPLEX TACHYCARDIA: Primary | ICD-10-CM

## 2018-11-16 DIAGNOSIS — I47.20 VT (VENTRICULAR TACHYCARDIA): ICD-10-CM

## 2018-11-16 DIAGNOSIS — Z95.1 S/P CABG X 5: ICD-10-CM

## 2018-11-16 DIAGNOSIS — I10 BENIGN ESSENTIAL HTN: ICD-10-CM

## 2018-11-16 DIAGNOSIS — I21.4 NSTEMI (NON-ST ELEVATION MYOCARDIAL INFARCTION): ICD-10-CM

## 2018-11-16 DIAGNOSIS — R00.0 TACHYCARDIA: ICD-10-CM

## 2018-11-16 DIAGNOSIS — I21.4 NSTEMI (NON-ST ELEVATED MYOCARDIAL INFARCTION): ICD-10-CM

## 2018-11-16 DIAGNOSIS — I21.4 NON-ST ELEVATION MYOCARDIAL INFARCTION (NSTEMI): ICD-10-CM

## 2018-11-16 DIAGNOSIS — I50.9 HEART FAILURE: ICD-10-CM

## 2018-11-16 DIAGNOSIS — I47.29 VENTRICULAR TACHYCARDIA (PAROXYSMAL): ICD-10-CM

## 2018-11-16 DIAGNOSIS — I25.118 CORONARY ARTERY DISEASE OF NATIVE ARTERY OF NATIVE HEART WITH STABLE ANGINA PECTORIS: ICD-10-CM

## 2018-11-16 DIAGNOSIS — I49.9 ARRHYTHMIA: ICD-10-CM

## 2018-11-16 DIAGNOSIS — E66.9 DIABETES MELLITUS TYPE 2 IN OBESE: ICD-10-CM

## 2018-11-16 DIAGNOSIS — R07.9 CHEST PAIN: ICD-10-CM

## 2018-11-16 DIAGNOSIS — E11.69 DIABETES MELLITUS TYPE 2 IN OBESE: ICD-10-CM

## 2018-11-16 DIAGNOSIS — E78.5 HYPERLIPIDEMIA, UNSPECIFIED HYPERLIPIDEMIA TYPE: ICD-10-CM

## 2018-11-16 DIAGNOSIS — I25.5 ISCHEMIC CARDIOMYOPATHY: ICD-10-CM

## 2018-11-16 DIAGNOSIS — J40 BRONCHITIS: ICD-10-CM

## 2018-11-16 DIAGNOSIS — R00.1 BRADYCARDIA: ICD-10-CM

## 2018-11-16 LAB
ABO + RH BLD: NORMAL
ALBUMIN SERPL BCP-MCNC: 3.7 G/DL
ALP SERPL-CCNC: 58 U/L
ALT SERPL W/O P-5'-P-CCNC: 45 U/L
AMPHET+METHAMPHET UR QL: NEGATIVE
ANION GAP SERPL CALC-SCNC: 4 MMOL/L
ANION GAP SERPL CALC-SCNC: 9 MMOL/L
APTT BLDCRRT: 27.5 SEC
AST SERPL-CCNC: 33 U/L
BARBITURATES UR QL SCN>200 NG/ML: NEGATIVE
BASOPHILS # BLD AUTO: 0.07 K/UL
BASOPHILS NFR BLD: 0.7 %
BENZODIAZ UR QL SCN>200 NG/ML: NORMAL
BILIRUB SERPL-MCNC: 0.4 MG/DL
BILIRUB UR QL STRIP: NEGATIVE
BLD GP AB SCN CELLS X3 SERPL QL: NORMAL
BLOOD GROUP ANTIBODIES SERPL: NORMAL
BNP SERPL-MCNC: 101 PG/ML
BUN SERPL-MCNC: 13 MG/DL
BUN SERPL-MCNC: 18 MG/DL
BZE UR QL SCN: NEGATIVE
CALCIUM SERPL-MCNC: 7 MG/DL
CALCIUM SERPL-MCNC: 9.9 MG/DL
CANNABINOIDS UR QL SCN: NEGATIVE
CHLORIDE SERPL-SCNC: 108 MMOL/L
CHLORIDE SERPL-SCNC: 99 MMOL/L
CHOLEST SERPL-MCNC: 84 MG/DL
CHOLEST/HDLC SERPL: 4 {RATIO}
CLARITY UR REFRACT.AUTO: CLEAR
CO2 SERPL-SCNC: 22 MMOL/L
CO2 SERPL-SCNC: 25 MMOL/L
COLOR UR AUTO: YELLOW
CREAT SERPL-MCNC: 0.8 MG/DL
CREAT SERPL-MCNC: 1.3 MG/DL
CREAT UR-MCNC: 96 MG/DL
DIFFERENTIAL METHOD: ABNORMAL
EOSINOPHIL # BLD AUTO: 0.5 K/UL
EOSINOPHIL NFR BLD: 5 %
ERYTHROCYTE [DISTWIDTH] IN BLOOD BY AUTOMATED COUNT: 14.2 %
EST. GFR  (AFRICAN AMERICAN): >60 ML/MIN/1.73 M^2
EST. GFR  (AFRICAN AMERICAN): >60 ML/MIN/1.73 M^2
EST. GFR  (NON AFRICAN AMERICAN): 55.3 ML/MIN/1.73 M^2
EST. GFR  (NON AFRICAN AMERICAN): >60 ML/MIN/1.73 M^2
ESTIMATED AVG GLUCOSE: 151 MG/DL
ESTIMATED AVG GLUCOSE: NORMAL MG/DL
ETHANOL UR-MCNC: <10 MG/DL
GLUCOSE SERPL-MCNC: 223 MG/DL
GLUCOSE SERPL-MCNC: 242 MG/DL
GLUCOSE UR QL STRIP: ABNORMAL
HBA1C MFR BLD HPLC: 6.9 %
HBA1C MFR BLD HPLC: NORMAL %
HCT VFR BLD AUTO: 41.4 %
HDLC SERPL-MCNC: 21 MG/DL
HDLC SERPL: 25 %
HGB BLD-MCNC: 13.1 G/DL
HGB UR QL STRIP: NEGATIVE
IMM GRANULOCYTES # BLD AUTO: 0.02 K/UL
IMM GRANULOCYTES NFR BLD AUTO: 0.2 %
INR PPP: 1
KETONES UR QL STRIP: ABNORMAL
LDLC SERPL CALC-MCNC: 51.2 MG/DL
LEUKOCYTE ESTERASE UR QL STRIP: NEGATIVE
LYMPHOCYTES # BLD AUTO: 3 K/UL
LYMPHOCYTES NFR BLD: 29 %
MAGNESIUM SERPL-MCNC: 1.1 MG/DL
MAGNESIUM SERPL-MCNC: 2.2 MG/DL
MCH RBC QN AUTO: 27.9 PG
MCHC RBC AUTO-ENTMCNC: 31.6 G/DL
MCV RBC AUTO: 88 FL
METHADONE UR QL SCN>300 NG/ML: NEGATIVE
MICROSCOPIC COMMENT: NORMAL
MONOCYTES # BLD AUTO: 1.1 K/UL
MONOCYTES NFR BLD: 10.8 %
NEUTROPHILS # BLD AUTO: 5.6 K/UL
NEUTROPHILS NFR BLD: 54.3 %
NITRITE UR QL STRIP: NEGATIVE
NONHDLC SERPL-MCNC: 63 MG/DL
NRBC BLD-RTO: 0 /100 WBC
OHS CV CATH LVEDP PRE: 18
OPIATES UR QL SCN: NORMAL
PCP UR QL SCN>25 NG/ML: NEGATIVE
PH UR STRIP: 6 [PH] (ref 5–8)
PHOSPHATE SERPL-MCNC: 1.7 MG/DL
PLATELET # BLD AUTO: 224 K/UL
PMV BLD AUTO: 10.6 FL
POCT GLUCOSE: 198 MG/DL (ref 70–110)
POCT GLUCOSE: 226 MG/DL (ref 70–110)
POTASSIUM SERPL-SCNC: 3.8 MMOL/L
POTASSIUM SERPL-SCNC: 5 MMOL/L
PROT SERPL-MCNC: 7.5 G/DL
PROT UR QL STRIP: NEGATIVE
PROTHROMBIN TIME: 10.6 SEC
RBC # BLD AUTO: 4.69 M/UL
RBC #/AREA URNS AUTO: 1 /HPF (ref 0–4)
SODIUM SERPL-SCNC: 133 MMOL/L
SODIUM SERPL-SCNC: 134 MMOL/L
SP GR UR STRIP: >=1.03 (ref 1–1.03)
SQUAMOUS #/AREA URNS AUTO: 0 /HPF
TOXICOLOGY INFORMATION: NORMAL
TRIGL SERPL-MCNC: 59 MG/DL
TROPONIN I SERPL DL<=0.01 NG/ML-MCNC: 0.01 NG/ML
TROPONIN I SERPL DL<=0.01 NG/ML-MCNC: 0.03 NG/ML
URN SPEC COLLECT METH UR: ABNORMAL
WBC # BLD AUTO: 10.26 K/UL
WBC #/AREA URNS AUTO: 1 /HPF (ref 0–5)

## 2018-11-16 PROCEDURE — 80307 DRUG TEST PRSMV CHEM ANLYZR: CPT

## 2018-11-16 PROCEDURE — 81001 URINALYSIS AUTO W/SCOPE: CPT

## 2018-11-16 PROCEDURE — 85025 COMPLETE CBC W/AUTO DIFF WBC: CPT

## 2018-11-16 PROCEDURE — 84484 ASSAY OF TROPONIN QUANT: CPT | Mod: 91

## 2018-11-16 PROCEDURE — 80061 LIPID PANEL: CPT

## 2018-11-16 PROCEDURE — 85730 THROMBOPLASTIN TIME PARTIAL: CPT

## 2018-11-16 PROCEDURE — 93005 ELECTROCARDIOGRAM TRACING: CPT

## 2018-11-16 PROCEDURE — 99291 CRITICAL CARE FIRST HOUR: CPT | Mod: 25

## 2018-11-16 PROCEDURE — 63600175 PHARM REV CODE 636 W HCPCS: Performed by: INTERNAL MEDICINE

## 2018-11-16 PROCEDURE — 93010 ELECTROCARDIOGRAM REPORT: CPT | Mod: ,,, | Performed by: INTERNAL MEDICINE

## 2018-11-16 PROCEDURE — 83880 ASSAY OF NATRIURETIC PEPTIDE: CPT

## 2018-11-16 PROCEDURE — 63600175 PHARM REV CODE 636 W HCPCS: Performed by: STUDENT IN AN ORGANIZED HEALTH CARE EDUCATION/TRAINING PROGRAM

## 2018-11-16 PROCEDURE — 80048 BASIC METABOLIC PNL TOTAL CA: CPT

## 2018-11-16 PROCEDURE — 20000000 HC ICU ROOM

## 2018-11-16 PROCEDURE — 80053 COMPREHEN METABOLIC PANEL: CPT

## 2018-11-16 PROCEDURE — 25000003 PHARM REV CODE 250: Performed by: STUDENT IN AN ORGANIZED HEALTH CARE EDUCATION/TRAINING PROGRAM

## 2018-11-16 PROCEDURE — 25000003 PHARM REV CODE 250: Performed by: INTERNAL MEDICINE

## 2018-11-16 PROCEDURE — C1769 GUIDE WIRE: HCPCS | Performed by: INTERNAL MEDICINE

## 2018-11-16 PROCEDURE — 96375 TX/PRO/DX INJ NEW DRUG ADDON: CPT

## 2018-11-16 PROCEDURE — 86850 RBC ANTIBODY SCREEN: CPT

## 2018-11-16 PROCEDURE — 84484 ASSAY OF TROPONIN QUANT: CPT

## 2018-11-16 PROCEDURE — 86905 BLOOD TYPING RBC ANTIGENS: CPT

## 2018-11-16 PROCEDURE — 93010 ELECTROCARDIOGRAM REPORT: CPT | Mod: 76,,, | Performed by: INTERNAL MEDICINE

## 2018-11-16 PROCEDURE — 99152 MOD SED SAME PHYS/QHP 5/>YRS: CPT | Performed by: INTERNAL MEDICINE

## 2018-11-16 PROCEDURE — 25000003 PHARM REV CODE 250

## 2018-11-16 PROCEDURE — 85610 PROTHROMBIN TIME: CPT

## 2018-11-16 PROCEDURE — B2131ZZ FLUOROSCOPY OF MULTIPLE CORONARY ARTERY BYPASS GRAFTS USING LOW OSMOLAR CONTRAST: ICD-10-PCS | Performed by: INTERNAL MEDICINE

## 2018-11-16 PROCEDURE — 86870 RBC ANTIBODY IDENTIFICATION: CPT

## 2018-11-16 PROCEDURE — 96374 THER/PROPH/DIAG INJ IV PUSH: CPT

## 2018-11-16 PROCEDURE — 93459 L HRT ART/GRFT ANGIO: CPT | Mod: 26,,, | Performed by: INTERNAL MEDICINE

## 2018-11-16 PROCEDURE — 83036 HEMOGLOBIN GLYCOSYLATED A1C: CPT

## 2018-11-16 PROCEDURE — 99291 CRITICAL CARE FIRST HOUR: CPT | Mod: ,,,

## 2018-11-16 PROCEDURE — 99223 1ST HOSP IP/OBS HIGH 75: CPT | Mod: ,,, | Performed by: INTERNAL MEDICINE

## 2018-11-16 PROCEDURE — 84100 ASSAY OF PHOSPHORUS: CPT

## 2018-11-16 PROCEDURE — C1894 INTRO/SHEATH, NON-LASER: HCPCS | Performed by: INTERNAL MEDICINE

## 2018-11-16 PROCEDURE — B2181ZZ FLUOROSCOPY OF LEFT INTERNAL MAMMARY BYPASS GRAFT USING LOW OSMOLAR CONTRAST: ICD-10-PCS | Performed by: INTERNAL MEDICINE

## 2018-11-16 PROCEDURE — 83735 ASSAY OF MAGNESIUM: CPT | Mod: 91

## 2018-11-16 PROCEDURE — 63600175 PHARM REV CODE 636 W HCPCS

## 2018-11-16 PROCEDURE — S5571 INSULIN DISPOS PEN 3 ML: HCPCS | Performed by: INTERNAL MEDICINE

## 2018-11-16 PROCEDURE — 93459 L HRT ART/GRFT ANGIO: CPT | Performed by: INTERNAL MEDICINE

## 2018-11-16 PROCEDURE — 27201423 OPTIME MED/SURG SUP & DEVICES STERILE SUPPLY: Performed by: INTERNAL MEDICINE

## 2018-11-16 PROCEDURE — 94761 N-INVAS EAR/PLS OXIMETRY MLT: CPT

## 2018-11-16 PROCEDURE — 99152 MOD SED SAME PHYS/QHP 5/>YRS: CPT | Mod: ,,, | Performed by: INTERNAL MEDICINE

## 2018-11-16 PROCEDURE — 99233 SBSQ HOSP IP/OBS HIGH 50: CPT | Mod: GC,,, | Performed by: INTERNAL MEDICINE

## 2018-11-16 PROCEDURE — B2111ZZ FLUOROSCOPY OF MULTIPLE CORONARY ARTERIES USING LOW OSMOLAR CONTRAST: ICD-10-PCS | Performed by: INTERNAL MEDICINE

## 2018-11-16 PROCEDURE — B2151ZZ FLUOROSCOPY OF LEFT HEART USING LOW OSMOLAR CONTRAST: ICD-10-PCS | Performed by: INTERNAL MEDICINE

## 2018-11-16 RX ORDER — NITROGLYCERIN 20 MG/100ML
5 INJECTION INTRAVENOUS CONTINUOUS
Status: DISCONTINUED | OUTPATIENT
Start: 2018-11-16 | End: 2018-11-16

## 2018-11-16 RX ORDER — ASPIRIN 325 MG
325 TABLET ORAL
Status: COMPLETED | OUTPATIENT
Start: 2018-11-16 | End: 2018-11-16

## 2018-11-16 RX ORDER — SODIUM CHLORIDE 9 MG/ML
3 INJECTION, SOLUTION INTRAVENOUS CONTINUOUS
Status: CANCELLED | OUTPATIENT
Start: 2018-11-16 | End: 2018-11-16

## 2018-11-16 RX ORDER — GLUCAGON 1 MG
1 KIT INJECTION
Status: DISCONTINUED | OUTPATIENT
Start: 2018-11-16 | End: 2018-11-18

## 2018-11-16 RX ORDER — MAGNESIUM SULFATE HEPTAHYDRATE 40 MG/ML
2 INJECTION, SOLUTION INTRAVENOUS ONCE
Status: COMPLETED | OUTPATIENT
Start: 2018-11-16 | End: 2018-11-16

## 2018-11-16 RX ORDER — PANTOPRAZOLE SODIUM 40 MG/1
40 TABLET, DELAYED RELEASE ORAL DAILY
Status: DISCONTINUED | OUTPATIENT
Start: 2018-11-16 | End: 2018-11-20 | Stop reason: HOSPADM

## 2018-11-16 RX ORDER — SODIUM,POTASSIUM PHOSPHATES 280-250MG
1 POWDER IN PACKET (EA) ORAL ONCE
Status: COMPLETED | OUTPATIENT
Start: 2018-11-16 | End: 2018-11-16

## 2018-11-16 RX ORDER — MIDAZOLAM HYDROCHLORIDE 1 MG/ML
INJECTION, SOLUTION INTRAMUSCULAR; INTRAVENOUS
Status: DISCONTINUED | OUTPATIENT
Start: 2018-11-16 | End: 2018-11-16 | Stop reason: HOSPADM

## 2018-11-16 RX ORDER — INSULIN ASPART 100 [IU]/ML
0-5 INJECTION, SOLUTION INTRAVENOUS; SUBCUTANEOUS
Status: DISCONTINUED | OUTPATIENT
Start: 2018-11-16 | End: 2018-11-18

## 2018-11-16 RX ORDER — LIDOCAINE HYDROCHLORIDE 20 MG/ML
INJECTION, SOLUTION EPIDURAL; INFILTRATION; INTRACAUDAL; PERINEURAL
Status: DISCONTINUED | OUTPATIENT
Start: 2018-11-16 | End: 2018-11-16 | Stop reason: HOSPADM

## 2018-11-16 RX ORDER — ACETAMINOPHEN 325 MG/1
650 TABLET ORAL EVERY 4 HOURS PRN
Status: DISCONTINUED | OUTPATIENT
Start: 2018-11-16 | End: 2018-11-20 | Stop reason: HOSPADM

## 2018-11-16 RX ORDER — IBUPROFEN 200 MG
24 TABLET ORAL
Status: DISCONTINUED | OUTPATIENT
Start: 2018-11-16 | End: 2018-11-18

## 2018-11-16 RX ORDER — FENTANYL CITRATE 50 UG/ML
INJECTION, SOLUTION INTRAMUSCULAR; INTRAVENOUS
Status: DISCONTINUED | OUTPATIENT
Start: 2018-11-16 | End: 2018-11-16 | Stop reason: HOSPADM

## 2018-11-16 RX ORDER — ISOSORBIDE MONONITRATE 60 MG/1
60 TABLET, EXTENDED RELEASE ORAL DAILY
Status: DISCONTINUED | OUTPATIENT
Start: 2018-11-16 | End: 2018-11-20 | Stop reason: HOSPADM

## 2018-11-16 RX ORDER — FINASTERIDE 5 MG/1
5 TABLET, FILM COATED ORAL DAILY
Status: DISCONTINUED | OUTPATIENT
Start: 2018-11-16 | End: 2018-11-20 | Stop reason: HOSPADM

## 2018-11-16 RX ORDER — HEPARIN SODIUM,PORCINE/D5W 25000/250
12 INTRAVENOUS SOLUTION INTRAVENOUS CONTINUOUS
Status: DISCONTINUED | OUTPATIENT
Start: 2018-11-16 | End: 2018-11-16

## 2018-11-16 RX ORDER — LOSARTAN POTASSIUM 25 MG/1
25 TABLET ORAL DAILY
Status: DISCONTINUED | OUTPATIENT
Start: 2018-11-16 | End: 2018-11-17

## 2018-11-16 RX ORDER — NITROGLYCERIN 0.4 MG/1
0.4 TABLET SUBLINGUAL
Status: DISCONTINUED | OUTPATIENT
Start: 2018-11-16 | End: 2018-11-16

## 2018-11-16 RX ORDER — IPRATROPIUM BROMIDE AND ALBUTEROL SULFATE 2.5; .5 MG/3ML; MG/3ML
3 SOLUTION RESPIRATORY (INHALATION) EVERY 6 HOURS PRN
Status: DISCONTINUED | OUTPATIENT
Start: 2018-11-16 | End: 2018-11-20 | Stop reason: HOSPADM

## 2018-11-16 RX ORDER — NITROGLYCERIN 0.4 MG/1
0.4 TABLET SUBLINGUAL EVERY 5 MIN PRN
Status: DISCONTINUED | OUTPATIENT
Start: 2018-11-16 | End: 2018-11-16

## 2018-11-16 RX ORDER — SODIUM CHLORIDE 0.9 % (FLUSH) 0.9 %
5 SYRINGE (ML) INJECTION
Status: DISCONTINUED | OUTPATIENT
Start: 2018-11-16 | End: 2018-11-20 | Stop reason: HOSPADM

## 2018-11-16 RX ORDER — SODIUM CHLORIDE 0.9 % (FLUSH) 0.9 %
3 SYRINGE (ML) INJECTION
Status: DISCONTINUED | OUTPATIENT
Start: 2018-11-16 | End: 2018-11-16

## 2018-11-16 RX ORDER — DIPHENHYDRAMINE HCL 50 MG
50 CAPSULE ORAL ONCE
Status: COMPLETED | OUTPATIENT
Start: 2018-11-16 | End: 2018-11-16

## 2018-11-16 RX ORDER — CARVEDILOL 6.25 MG/1
6.25 TABLET ORAL 2 TIMES DAILY WITH MEALS
Status: DISCONTINUED | OUTPATIENT
Start: 2018-11-16 | End: 2018-11-20 | Stop reason: HOSPADM

## 2018-11-16 RX ORDER — MORPHINE SULFATE 4 MG/ML
4 INJECTION, SOLUTION INTRAMUSCULAR; INTRAVENOUS
Status: COMPLETED | OUTPATIENT
Start: 2018-11-16 | End: 2018-11-16

## 2018-11-16 RX ORDER — ONDANSETRON 2 MG/ML
4 INJECTION INTRAMUSCULAR; INTRAVENOUS EVERY 6 HOURS PRN
Status: DISCONTINUED | OUTPATIENT
Start: 2018-11-16 | End: 2018-11-20 | Stop reason: HOSPADM

## 2018-11-16 RX ORDER — IBUPROFEN 200 MG
16 TABLET ORAL
Status: DISCONTINUED | OUTPATIENT
Start: 2018-11-16 | End: 2018-11-18

## 2018-11-16 RX ORDER — NAPROXEN SODIUM 220 MG/1
81 TABLET, FILM COATED ORAL DAILY
Status: DISCONTINUED | OUTPATIENT
Start: 2018-11-17 | End: 2018-11-20 | Stop reason: HOSPADM

## 2018-11-16 RX ORDER — NITROGLYCERIN 0.4 MG/1
0.4 TABLET SUBLINGUAL EVERY 5 MIN PRN
Status: DISCONTINUED | OUTPATIENT
Start: 2018-11-16 | End: 2018-11-20 | Stop reason: HOSPADM

## 2018-11-16 RX ORDER — POTASSIUM CHLORIDE 20 MEQ/1
20 TABLET, EXTENDED RELEASE ORAL ONCE
Status: COMPLETED | OUTPATIENT
Start: 2018-11-16 | End: 2018-11-16

## 2018-11-16 RX ORDER — SPIRONOLACTONE 25 MG/1
25 TABLET ORAL DAILY
Status: DISCONTINUED | OUTPATIENT
Start: 2018-11-17 | End: 2018-11-20 | Stop reason: HOSPADM

## 2018-11-16 RX ORDER — ROSUVASTATIN CALCIUM 40 MG/1
40 TABLET, COATED ORAL DAILY
Status: DISCONTINUED | OUTPATIENT
Start: 2018-11-16 | End: 2018-11-20 | Stop reason: HOSPADM

## 2018-11-16 RX ADMIN — INSULIN DETEMIR 15 UNITS: 100 INJECTION, SOLUTION SUBCUTANEOUS at 09:11

## 2018-11-16 RX ADMIN — TICAGRELOR 180 MG: 90 TABLET ORAL at 11:11

## 2018-11-16 RX ADMIN — ISOSORBIDE MONONITRATE 60 MG: 60 TABLET, EXTENDED RELEASE ORAL at 01:11

## 2018-11-16 RX ADMIN — PANTOPRAZOLE SODIUM 40 MG: 40 TABLET, DELAYED RELEASE ORAL at 01:11

## 2018-11-16 RX ADMIN — ASPIRIN 325 MG ORAL TABLET 325 MG: 325 PILL ORAL at 10:11

## 2018-11-16 RX ADMIN — MORPHINE SULFATE 4 MG: 4 INJECTION, SOLUTION INTRAMUSCULAR; INTRAVENOUS at 10:11

## 2018-11-16 RX ADMIN — TICAGRELOR 90 MG: 90 TABLET ORAL at 11:11

## 2018-11-16 RX ADMIN — AMIODARONE HYDROCHLORIDE 0.5 MG/MIN: 1.8 INJECTION, SOLUTION INTRAVENOUS at 09:11

## 2018-11-16 RX ADMIN — POTASSIUM CHLORIDE 20 MEQ: 1500 TABLET, EXTENDED RELEASE ORAL at 06:11

## 2018-11-16 RX ADMIN — ROSUVASTATIN CALCIUM 40 MG: 40 TABLET, FILM COATED ORAL at 01:11

## 2018-11-16 RX ADMIN — FINASTERIDE 5 MG: 5 TABLET, FILM COATED ORAL at 02:11

## 2018-11-16 RX ADMIN — LOSARTAN POTASSIUM 25 MG: 25 TABLET, FILM COATED ORAL at 02:11

## 2018-11-16 RX ADMIN — POTASSIUM & SODIUM PHOSPHATES POWDER PACK 280-160-250 MG 1 PACKET: 280-160-250 PACK at 06:11

## 2018-11-16 RX ADMIN — HEPARIN SODIUM AND DEXTROSE 12 UNITS/KG/HR: 10000; 5 INJECTION INTRAVENOUS at 11:11

## 2018-11-16 RX ADMIN — SODIUM CHLORIDE 3 ML/KG/HR: 0.9 INJECTION, SOLUTION INTRAVENOUS at 12:11

## 2018-11-16 RX ADMIN — AMIODARONE HYDROCHLORIDE 1 MG/MIN: 1.8 INJECTION, SOLUTION INTRAVENOUS at 01:11

## 2018-11-16 RX ADMIN — DIPHENHYDRAMINE HYDROCHLORIDE 50 MG: 50 CAPSULE ORAL at 01:11

## 2018-11-16 RX ADMIN — NITROGLYCERIN 5 MCG/MIN: 20 INJECTION INTRAVENOUS at 11:11

## 2018-11-16 RX ADMIN — MAGNESIUM SULFATE IN WATER 2 G: 40 INJECTION, SOLUTION INTRAVENOUS at 06:11

## 2018-11-16 NOTE — SUBJECTIVE & OBJECTIVE
Past Medical History:   Diagnosis Date    Anemia     Anticoagulant long-term use     CHF (congestive heart failure)     Colon cancer screening 2/2/2017    Coronary artery disease     Diabetes mellitus type I     Disorder of kidney and ureter     Encounter for blood transfusion     Glaucoma     Heart attack     09/2018    Heart disease     Hypertension     Iron deficiency     Kidney failure     post CABG       Past Surgical History:   Procedure Laterality Date    COLON SURGERY  2006    COLONOSCOPY N/A 2/2/2017    Procedure: COLONOSCOPY;  Surgeon: Ronnie Conway MD;  Location: [facility];  Service: Endoscopy;  Laterality: N/A;    COLONOSCOPY N/A 2/2/2017    Performed by Ronnie Conway MD at [facility]    CORONARY ARTERY BYPASS GRAFT  2005    5 arteries    ESOPHAGOGASTRODUODENOSCOPY (EGD) N/A 3/21/2018    Performed by Fawad Cunningham MD at [facility] (4TH F[facility])    EYE SURGERY Bilateral 2016    Laser surgery for glaucoma       Review of patient's allergies indicates:  No Known Allergies    No current facility-administered medications on file prior to encounter.      Current Outpatient Medications on File Prior to Encounter   Medication Sig    acetaminophen (TYLENOL) 100 mg/mL suspension Take by mouth every 4 (four) hours as needed for Temperature greater than.    albuterol 90 mcg/actuation inhaler Inhale 2 puffs into the lungs every 6 (six) hours as needed for Wheezing.    carvedilol (COREG) 6.25 MG tablet Take 1 tablet (6.25 mg total) by mouth 2 (two) times daily with meals.    clopidogrel (PLAVIX) 75 mg tablet Take 75 mg by mouth once daily.    esomeprazole (NEXIUM) 40 MG capsule Take 1 capsule (40 mg total) by mouth before breakfast.    ezetimibe (ZETIA) 10 mg tablet Take 10 mg by mouth once daily.     ferrous sulfate 325 mg (65 mg iron) Tab tablet Take 1 tablet (325 mg total) by mouth 3 (three) times daily.    finasteride (PROSCAR) 5 mg tablet Take 1 tablet (5 mg total) by mouth once daily.     FOLIC ACID/MULTIVIT-MIN/LUTEIN (CENTRUM SILVER ORAL) Take by mouth.    furosemide (LASIX) 20 MG tablet Take 1 tablet (20 mg total) by mouth once daily. (Patient taking differently: Take 20 mg by mouth once daily. Pt takes 20 mg every other day , 40 mg on the other days in between)    insulin NPH (NOVOLIN N) 100 unit/mL injection Inject into the skin. 70 units in the morning and 40 units at bedtime    isosorbide mononitrate (IMDUR) 60 MG 24 hr tablet Take 60 mg by mouth once daily.    JANUMET 50-1,000 mg per tablet Take 1 tablet by mouth 2 (two) times daily with meals.    losartan (COZAAR) 25 MG tablet Take 1 tablet (25 mg total) by mouth once daily.    nitroGLYCERIN (NITROSTAT) 0.4 MG SL tablet DISSOLVE ONE TABLET UNDER THE TONGUE EVERY 5 MINUTES AS NEEDED FOR CHEST PAIN.    RANEXA 1,000 mg Tb12     ranitidine (ZANTAC) 150 MG tablet     rosuvastatin (CRESTOR) 40 MG Tab Take 1 tablet (40 mg total) by mouth once daily.    spironolactone (ALDACTONE) 25 MG tablet     tamsulosin (FLOMAX) 0.4 mg Cap TAKE ONE CAPSULE BY MOUTH ONCE DAILY    [DISCONTINUED] aspirin 325 MG tablet Take 325 mg by mouth once daily.     Family History     Problem Relation (Age of Onset)    Diabetes Mother, Sister    Heart attack Brother    Heart disease Mother, Sister    Hypertension Sister        Tobacco Use    Smoking status: Former Smoker     Packs/day: 3.00     Types: Cigarettes     Start date: 1963     Last attempt to quit: 1981     Years since quittin.8    Smokeless tobacco: Former User     Types: Snuff, Chew     Quit date:    Substance and Sexual Activity    Alcohol use: No    Drug use: No    Sexual activity: Yes     Comment: -with a significant other     Review of Systems   Constitution: Negative.   HENT: Negative.    Eyes: Negative.    Cardiovascular: Positive for chest pain, irregular heartbeat and palpitations.   Respiratory: Positive for shortness of breath.    Endocrine: Negative.     Hematologic/Lymphatic: Negative.    Skin: Negative.    Musculoskeletal: Negative.    Gastrointestinal: Positive for nausea.   Genitourinary: Negative.    Neurological: Negative.    Psychiatric/Behavioral: Negative.      Objective:     Vital Signs (Most Recent):  Temp: 97.5 °F (36.4 °C) (11/16/18 1245)  Pulse: 69 (11/16/18 1300)  Resp: 14 (11/16/18 1300)  BP: 126/61 (11/16/18 1300)  SpO2: 95 % (11/16/18 1300) Vital Signs (24h Range):  Temp:  [97.3 °F (36.3 °C)-97.5 °F (36.4 °C)] 97.5 °F (36.4 °C)  Pulse:  [] 69  Resp:  [14-24] 14  SpO2:  [94 %-99 %] 95 %  BP: ()/(50-98) 126/61     Weight: 108.6 kg (239 lb 6.7 oz)  Body mass index is 37.5 kg/m².    SpO2: 95 %  O2 Device (Oxygen Therapy): (P) room air      Intake/Output Summary (Last 24 hours) at 11/16/2018 1342  Last data filed at 11/16/2018 1300  Gross per 24 hour   Intake 115.39 ml   Output --   Net 115.39 ml       Lines/Drains/Airways     Peripheral Intravenous Line                 Peripheral IV - Single Lumen 11/16/18 1032 Right Antecubital less than 1 day         Peripheral IV - Single Lumen 11/16/18 1107 Right Hand less than 1 day                Physical Exam   Constitutional: He is oriented to person, place, and time. He appears well-developed and well-nourished. No distress.   HENT:   Head: Normocephalic and atraumatic.   Neck: No JVD present.   Cardiovascular: Normal rate, regular rhythm, normal heart sounds and intact distal pulses. Exam reveals no gallop and no friction rub.   No murmur heard.  2+ pulses in bilateral DP and radial arteries   Pulmonary/Chest: Effort normal and breath sounds normal. No respiratory distress. He has no wheezes. He has no rales.   Abdominal: Soft. Bowel sounds are normal. He exhibits no distension. There is no tenderness.   Musculoskeletal: He exhibits no edema.   Neurological: He is alert and oriented to person, place, and time.   Skin: He is not diaphoretic.       Significant Labs:     Recent Results (from the  past 24 hour(s))   CBC auto differential    Collection Time: 11/16/18 10:33 AM   Result Value Ref Range    WBC 10.26 3.90 - 12.70 K/uL    RBC 4.69 4.60 - 6.20 M/uL    Hemoglobin 13.1 (L) 14.0 - 18.0 g/dL    Hematocrit 41.4 40.0 - 54.0 %    MCV 88 82 - 98 fL    MCH 27.9 27.0 - 31.0 pg    MCHC 31.6 (L) 32.0 - 36.0 g/dL    RDW 14.2 11.5 - 14.5 %    Platelets 224 150 - 350 K/uL    MPV 10.6 9.2 - 12.9 fL    Immature Granulocytes 0.2 0.0 - 0.5 %    Gran # (ANC) 5.6 1.8 - 7.7 K/uL    Immature Grans (Abs) 0.02 0.00 - 0.04 K/uL    Lymph # 3.0 1.0 - 4.8 K/uL    Mono # 1.1 (H) 0.3 - 1.0 K/uL    Eos # 0.5 0.0 - 0.5 K/uL    Baso # 0.07 0.00 - 0.20 K/uL    nRBC 0 0 /100 WBC    Gran% 54.3 38.0 - 73.0 %    Lymph% 29.0 18.0 - 48.0 %    Mono% 10.8 4.0 - 15.0 %    Eosinophil% 5.0 0.0 - 8.0 %    Basophil% 0.7 0.0 - 1.9 %    Differential Method Automated    Comprehensive metabolic panel    Collection Time: 11/16/18 10:33 AM   Result Value Ref Range    Sodium 133 (L) 136 - 145 mmol/L    Potassium 5.0 3.5 - 5.1 mmol/L    Chloride 99 95 - 110 mmol/L    CO2 25 23 - 29 mmol/L    Glucose 242 (H) 70 - 110 mg/dL    BUN, Bld 18 8 - 23 mg/dL    Creatinine 1.3 0.5 - 1.4 mg/dL    Calcium 9.9 8.7 - 10.5 mg/dL    Total Protein 7.5 6.0 - 8.4 g/dL    Albumin 3.7 3.5 - 5.2 g/dL    Total Bilirubin 0.4 0.1 - 1.0 mg/dL    Alkaline Phosphatase 58 55 - 135 U/L    AST 33 10 - 40 U/L    ALT 45 (H) 10 - 44 U/L    Anion Gap 9 8 - 16 mmol/L    eGFR if African American >60.0 >60 mL/min/1.73 m^2    eGFR if non  55.3 (A) >60 mL/min/1.73 m^2   Troponin I #1    Collection Time: 11/16/18 10:33 AM   Result Value Ref Range    Troponin I 0.007 0.000 - 0.026 ng/mL   B-Type natriuretic peptide (BNP)    Collection Time: 11/16/18 10:33 AM   Result Value Ref Range     (H) 0 - 99 pg/mL   Protime-INR    Collection Time: 11/16/18 10:33 AM   Result Value Ref Range    Prothrombin Time 10.6 9.0 - 12.5 sec    INR 1.0 0.8 - 1.2   APTT    Collection Time:  11/16/18 10:33 AM   Result Value Ref Range    aPTT 27.5 21.0 - 32.0 sec   Type & Screen    Collection Time: 11/16/18 11:10 AM   Result Value Ref Range    Group & Rh A POS     Indirect Tracy POS    Antibody identification    Collection Time: 11/16/18 11:10 AM   Result Value Ref Range    Antibody Identification POS    Troponin I #2    Collection Time: 11/16/18 12:35 PM   Result Value Ref Range    Troponin I 0.025 0.000 - 0.026 ng/mL         Significant Imaging:     Imaging Results          X-Ray Chest AP Portable (Final result)  Result time 11/16/18 11:01:14    Final result by Jesus Hobosn Jr., MD (11/16/18 11:01:14)                 Impression:      There are some patchy increased parenchymal markings particularly at the right base.  Correlation with clinical findings suggested.      Electronically signed by: Jesus Hobson MD  Date:    11/16/2018  Time:    11:01             Narrative:    EXAMINATION:  XR CHEST AP PORTABLE    CLINICAL HISTORY:  Chest Pain;    TECHNIQUE:  Single frontal view of the chest was performed.    COMPARISON:  October 12, 2018.    FINDINGS:  Postop changes noted.  Heart size is similar.  Mild increase in the pulmonary vascular markings.  No confluent consolidation though there are some patchy increased markings at the right base.

## 2018-11-16 NOTE — ASSESSMENT & PLAN NOTE
LBBB pattern terminated with administration of lidocaine, thought to be slow VT, which may be due to the patient's cardiomyopathy  EF reportedly 35% and has no ICD, will obtain 2D echo and EP consultation for further recs for antiarrhythmic therapy and/or device placement  He had an NSTEMI on 9/13/18 and is still within the 40 day window of not being a candidate for defibrillator therapy  Continue IV amiodarone for now  Cardiac monitoring

## 2018-11-16 NOTE — H&P
[facility]  Cardiology  History and Physical     Patient Name: Walter Bull  MRN: [id number]  Admission Date: 11/16/2018  Code Status: Full Code   Attending Provider: Erika Varela MD   Primary Care Physician: Belkys Kruger MD  Principal Problem:Ventricular tachycardia    Patient information was obtained from patient and ER records.     Subjective:     Chief Complaint:  Ventricular tachycardia    HPI:  69 y/o man with prior 5v CABG (2005) (LIMA to LAD, SVG to Diag), known  to RCA and LCx and recent admission to outside hospital with NSTEMI, CKD II, HTN, HLD, DM presented with acute onset of chest pain and new onset LBBB. Had LHC at OSH and has been managed medically. Has LVEF 35%, no ICD. Was about to perform lawn work when he started having acute onset chest pain, similar in nature to his prior MI. Also noted palpitations which he had not experienced before, has never experienced syncope. 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 He denies orthopnea, PND, peripheral edema or [place]. There is no family history of sudden cardiac death.    EMS arrived and found to have wide complex tachycardia. Cardiology called for assistance. He continued to have 9-10/10 chest pain like an elephant sitting on his chest. Received NTG x 2 and ASA 325mg, was taken to the cath lab and no new occlusions were identified. The patient received lidocaine for a slow MMVT which restored NSR with a narrow complex. He is admitted to [facility] for further evaluation on an amiodarone infusion.        Past Medical History:   Diagnosis Date    Anemia     Anticoagulant long-term use     CHF (congestive heart failure)     Colon cancer screening 2/2/2017    Coronary artery disease     Diabetes mellitus type I     Disorder of kidney and ureter     Encounter for blood transfusion     Glaucoma     Heart attack     09/2018    Heart disease      Hypertension     Iron deficiency     Kidney failure     post CABG       Past Surgical History:   Procedure Laterality Date    COLON SURGERY  2006    COLONOSCOPY N/A 2/2/2017    Procedure: COLONOSCOPY;  Surgeon: Ronnie Conway MD;  Location: [facility];  Service: Endoscopy;  Laterality: N/A;    COLONOSCOPY N/A 2/2/2017    Performed by Ronnie Conway MD at [facility]    CORONARY ARTERY BYPASS GRAFT  2005    5 arteries    ESOPHAGOGASTRODUODENOSCOPY (EGD) N/A 3/21/2018    Performed by Fawad Cunningham MD at [facility] (4TH F[facility])    EYE SURGERY Bilateral 2016    Laser surgery for glaucoma       Review of patient's allergies indicates:  No Known Allergies    No current facility-administered medications on file prior to encounter.      Current Outpatient Medications on File Prior to Encounter   Medication Sig    acetaminophen (TYLENOL) 100 mg/mL suspension Take by mouth every 4 (four) hours as needed for Temperature greater than.    albuterol 90 mcg/actuation inhaler Inhale 2 puffs into the lungs every 6 (six) hours as needed for Wheezing.    carvedilol (COREG) 6.25 MG tablet Take 1 tablet (6.25 mg total) by mouth 2 (two) times daily with meals.    clopidogrel (PLAVIX) 75 mg tablet Take 75 mg by mouth once daily.    esomeprazole (NEXIUM) 40 MG capsule Take 1 capsule (40 mg total) by mouth before breakfast.    ezetimibe (ZETIA) 10 mg tablet Take 10 mg by mouth once daily.     ferrous sulfate 325 mg (65 mg iron) Tab tablet Take 1 tablet (325 mg total) by mouth 3 (three) times daily.    finasteride (PROSCAR) 5 mg tablet Take 1 tablet (5 mg total) by mouth once daily.    FOLIC ACID/MULTIVIT-MIN/LUTEIN (CENTRUM SILVER ORAL) Take by mouth.    furosemide (LASIX) 20 MG tablet Take 1 tablet (20 mg total) by mouth once daily. (Patient taking differently: Take 20 mg by mouth once daily. Pt takes 20 mg every other day , 40 mg on the other days in between)    insulin NPH (NOVOLIN N) 100 unit/mL injection Inject into the skin.  70 units in the morning and 40 units at bedtime    isosorbide mononitrate (IMDUR) 60 MG 24 hr tablet Take 60 mg by mouth once daily.    JANUMET 50-1,000 mg per tablet Take 1 tablet by mouth 2 (two) times daily with meals.    losartan (COZAAR) 25 MG tablet Take 1 tablet (25 mg total) by mouth once daily.    nitroGLYCERIN (NITROSTAT) 0.4 MG SL tablet DISSOLVE ONE TABLET UNDER THE TONGUE EVERY 5 MINUTES AS NEEDED FOR CHEST PAIN.    RANEXA 1,000 mg Tb12     ranitidine (ZANTAC) 150 MG tablet     rosuvastatin (CRESTOR) 40 MG Tab Take 1 tablet (40 mg total) by mouth once daily.    spironolactone (ALDACTONE) 25 MG tablet     tamsulosin (FLOMAX) 0.4 mg Cap TAKE ONE CAPSULE BY MOUTH ONCE DAILY    [DISCONTINUED] aspirin 325 MG tablet Take 325 mg by mouth once daily.     Family History     Problem Relation (Age of Onset)    Diabetes Mother, Sister    Heart attack Brother    Heart disease Mother, Sister    Hypertension Sister        Tobacco Use    Smoking status: Former Smoker     Packs/day: 3.00     Types: Cigarettes     Start date: 1963     Last attempt to quit: 1981     Years since quittin.8    Smokeless tobacco: Former User     Types: Snuff, Chew     Quit date:    Substance and Sexual Activity    Alcohol use: No    Drug use: No    Sexual activity: Yes     Comment: -with a significant other     Review of Systems   Constitution: Negative.   HENT: Negative.    Eyes: Negative.    Cardiovascular: Positive for chest pain, irregular heartbeat and palpitations.   Respiratory: Positive for shortness of breath.    Endocrine: Negative.    Hematologic/Lymphatic: Negative.    Skin: Negative.    Musculoskeletal: Negative.    Gastrointestinal: Positive for nausea.   Genitourinary: Negative.    Neurological: Negative.    Psychiatric/Behavioral: Negative.      Objective:     Vital Signs (Most Recent):  Temp: 97.5 °F (36.4 °C) (18 1245)  Pulse: 69 (18 1300)  Resp: 14 (18 1300)  BP:  126/61 (11/16/18 1300)  SpO2: 95 % (11/16/18 1300) Vital Signs (24h Range):  Temp:  [97.3 °F (36.3 °C)-97.5 °F (36.4 °C)] 97.5 °F (36.4 °C)  Pulse:  [] 69  Resp:  [14-24] 14  SpO2:  [94 %-99 %] 95 %  BP: ()/(50-98) 126/61     Weight: 108.6 kg (239 lb 6.7 oz)  Body mass index is 37.5 kg/m².    SpO2: 95 %  O2 Device (Oxygen Therapy): (P) room air      Intake/Output Summary (Last 24 hours) at 11/16/2018 1342  Last data filed at 11/16/2018 1300  Gross per 24 hour   Intake 115.39 ml   Output --   Net 115.39 ml       Lines/Drains/Airways     Peripheral Intravenous Line                 Peripheral IV - Single Lumen 11/16/18 1032 Right Antecubital less than 1 day         Peripheral IV - Single Lumen 11/16/18 1107 Right Hand less than 1 day                Physical Exam   Constitutional: He is oriented to person, place, and time. He appears well-developed and well-nourished. No distress.   HENT:   Head: Normocephalic and atraumatic.   Neck: No JVD present.   Cardiovascular: Normal rate, regular rhythm, normal heart sounds and intact distal pulses. Exam reveals no gallop and no friction rub.   No murmur heard.  2+ pulses in bilateral DP and radial arteries   Pulmonary/Chest: Effort normal and breath sounds normal. No respiratory distress. He has no wheezes. He has no rales.   Abdominal: Soft. Bowel sounds are normal. He exhibits no distension. There is no tenderness.   Musculoskeletal: He exhibits no edema.   Neurological: He is alert and oriented to person, place, and time.   Skin: He is not diaphoretic.       Significant Labs:     Recent Results (from the past 24 hour(s))   CBC auto differential    Collection Time: 11/16/18 10:33 AM   Result Value Ref Range    WBC 10.26 3.90 - 12.70 K/uL    RBC 4.69 4.60 - 6.20 M/uL    Hemoglobin 13.1 (L) 14.0 - 18.0 g/dL    Hematocrit 41.4 40.0 - 54.0 %    MCV 88 82 - 98 fL    MCH 27.9 27.0 - 31.0 pg    MCHC 31.6 (L) 32.0 - 36.0 g/dL    RDW 14.2 11.5 - 14.5 %    Platelets 224 150  - 350 K/uL    MPV 10.6 9.2 - 12.9 fL    Immature Granulocytes 0.2 0.0 - 0.5 %    Gran # (ANC) 5.6 1.8 - 7.7 K/uL    Immature Grans (Abs) 0.02 0.00 - 0.04 K/uL    Lymph # 3.0 1.0 - 4.8 K/uL    Mono # 1.1 (H) 0.3 - 1.0 K/uL    Eos # 0.5 0.0 - 0.5 K/uL    Baso # 0.07 0.00 - 0.20 K/uL    nRBC 0 0 /100 WBC    Gran% 54.3 38.0 - 73.0 %    Lymph% 29.0 18.0 - 48.0 %    Mono% 10.8 4.0 - 15.0 %    Eosinophil% 5.0 0.0 - 8.0 %    Basophil% 0.7 0.0 - 1.9 %    Differential Method Automated    Comprehensive metabolic panel    Collection Time: 11/16/18 10:33 AM   Result Value Ref Range    Sodium 133 (L) 136 - 145 mmol/L    Potassium 5.0 3.5 - 5.1 mmol/L    Chloride 99 95 - 110 mmol/L    CO2 25 23 - 29 mmol/L    Glucose 242 (H) 70 - 110 mg/dL    BUN, Bld 18 8 - 23 mg/dL    Creatinine 1.3 0.5 - 1.4 mg/dL    Calcium 9.9 8.7 - 10.5 mg/dL    Total Protein 7.5 6.0 - 8.4 g/dL    Albumin 3.7 3.5 - 5.2 g/dL    Total Bilirubin 0.4 0.1 - 1.0 mg/dL    Alkaline Phosphatase 58 55 - 135 U/L    AST 33 10 - 40 U/L    ALT 45 (H) 10 - 44 U/L    Anion Gap 9 8 - 16 mmol/L    eGFR if African American >60.0 >60 mL/min/1.73 m^2    eGFR if non  55.3 (A) >60 mL/min/1.73 m^2   Troponin I #1    Collection Time: 11/16/18 10:33 AM   Result Value Ref Range    Troponin I 0.007 0.000 - 0.026 ng/mL   B-Type natriuretic peptide (BNP)    Collection Time: 11/16/18 10:33 AM   Result Value Ref Range     (H) 0 - 99 pg/mL   Protime-INR    Collection Time: 11/16/18 10:33 AM   Result Value Ref Range    Prothrombin Time 10.6 9.0 - 12.5 sec    INR 1.0 0.8 - 1.2   APTT    Collection Time: 11/16/18 10:33 AM   Result Value Ref Range    aPTT 27.5 21.0 - 32.0 sec   Type & Screen    Collection Time: 11/16/18 11:10 AM   Result Value Ref Range    Group & Rh A POS     Indirect Tracy POS    Antibody identification    Collection Time: 11/16/18 11:10 AM   Result Value Ref Range    Antibody Identification POS    Troponin I #2    Collection Time: 11/16/18 12:35 PM    Result Value Ref Range    Troponin I 0.025 0.000 - 0.026 ng/mL         Significant Imaging:     Imaging Results          X-Ray Chest AP Portable (Final result)  Result time 11/16/18 11:01:14    Final result by Jesus Hobson Jr., MD (11/16/18 11:01:14)                 Impression:      There are some patchy increased parenchymal markings particularly at the right base.  Correlation with clinical findings suggested.      Electronically signed by: Jesus Hobson MD  Date:    11/16/2018  Time:    11:01             Narrative:    EXAMINATION:  XR CHEST AP PORTABLE    CLINICAL HISTORY:  Chest Pain;    TECHNIQUE:  Single frontal view of the chest was performed.    COMPARISON:  October 12, 2018.    FINDINGS:  Postop changes noted.  Heart size is similar.  Mild increase in the pulmonary vascular markings.  No confluent consolidation though there are some patchy increased markings at the right base.                                  Assessment and Plan:     * Ventricular tachycardia    LBBB pattern terminated with administration of lidocaine, thought to be slow VT, which may be due to the patient's cardiomyopathy  EF reportedly 35% and has no ICD, will obtain 2D echo and EP consultation for further recs for antiarrhythmic therapy and/or device placement  He had an NSTEMI on 9/13/18 and is still within the 40 day window of not being a candidate for defibrillator therapy  Continue IV amiodarone for now  Cardiac monitoring  MG > 2, K > 4     Ischemic cardiomyopathy    EF reportedly 35% from an outside facility, will check 2D echo here  Continue medical therapy for ICM including BB (carvedilol), ARB (losartan), diuretics and aldactone, isosorbide  Currently without evidence of volume overload or decompensation  Medical therapy for CAD as mentioned     Benign essential HTN    Medical therapy per ICM and CAD management, will add bidil if able     S/P CABG x 5    As per CAD     HLD (hyperlipidemia)    Statin      Diabetes mellitus  type 2 in obese    Detemir 15 QHS with sliding scale  Target -180     Coronary artery disease of native artery of native heart with stable angina pectoris    Taken to cath lab due to concern for MI with new LBBB pattern on EKG, no new occlusions found and no intervention performed, troponin undetectable  Found to have slow VT (see below)  Continue treatment of CAD, including DAPT, high intensity statin, beta blocker (coreg), ARB  Troponin undetectable, EKG without acute ischemic changes (nonspecific ST changes were present prior)         VTE Risk Mitigation (From admission, onward)        Ordered     IP VTE HIGH RISK PATIENT  Once      11/16/18 1234     Reason for No Pharmacological VTE Prophylaxis  Once      11/16/18 1234          Benny Rooney MD  Cardiology   [facility]

## 2018-11-16 NOTE — CONSULTS
[facility]  Cardiac Electrophysiology  Consult Note    Admission Date: 11/16/2018  Code Status: Full Code   Attending Provider: Erika Varela MD  Consulting Provider: Kamaljit Roy MD  Principal Problem:Ventricular tachycardia    Inpatient consult to Electrophysiology  Consult performed by: Kamaljit Roy MD  Consult ordered by: Benny Rooney MD        Subjective:     Chief Complaint:  Chest tightness     HPI:   Reason for Consult: Wide-complex tachycardia    HPI:  67 y/o man with prior 5v CABG (2005) (LIMA to LAD, SVG to Diag), known  to RCA and LCx and recent admission to outside hospital with NSTEMI (treated medically), ICM 35% (Sept 2018), CKD II, HTN, HLD, DM presented with acute onset of chest pain, nausea, lightheadedness and dyspnea.     He arrived to the ED earlier today 30 min after having intense chest tightness (similar to previous NSTEMI) that started suddenly when he was doing physical activity and did not improve with NTG x2. Concomitantly, complains of dyspnea, nausea, and lightheadedness that worsened in the ED with nitro drip. EKGs were obtained and showed R axis, LBBB-morphology tachycardia (with suspicion for lead misplacement) and . His WCT resolved in the ED.     He received LHC that showed no relevant change in previously known coronary disease. During the LVgram, he developed sustained VT of different morphology to presentation, that resolved with lidocaine drip. He has been on sinus rhythm w HR 60-70s since arrival to CCU.    Also, complains of episodes of palpitations that have woken him up several nights.    Previous EKG from admission for NSTEMI showed sinus tachycardia w HR on 120s and 1st degree AV block and narrow QRS.    Denies personal history of electrophysiologic abnormalities, syncope, or family history of sudden cardiac death.      Past Medical History:   Diagnosis Date    Anemia     Anticoagulant long-term  use     CHF (congestive heart failure)     Colon cancer screening 2/2/2017    Coronary artery disease     Diabetes mellitus type I     Disorder of kidney and ureter     Encounter for blood transfusion     Glaucoma     Heart attack     09/2018    Heart disease     Hypertension     Iron deficiency     Kidney failure     post CABG       Past Surgical History:   Procedure Laterality Date    COLON SURGERY  2006    COLONOSCOPY N/A 2/2/2017    Procedure: COLONOSCOPY;  Surgeon: Ronnie Conway MD;  Location: [facility];  Service: Endoscopy;  Laterality: N/A;    COLONOSCOPY N/A 2/2/2017    Performed by Ronnie Conway MD at [facility]    CORONARY ARTERY BYPASS GRAFT  2005    5 arteries    ESOPHAGOGASTRODUODENOSCOPY (EGD) N/A 3/21/2018    Performed by Fawad Cunningham MD at [facility] (4TH FLR)    EYE SURGERY Bilateral 2016    Laser surgery for glaucoma       Review of patient's allergies indicates:  No Known Allergies    No current facility-administered medications on file prior to encounter.      Current Outpatient Medications on File Prior to Encounter   Medication Sig    acetaminophen (TYLENOL) 100 mg/mL suspension Take by mouth every 4 (four) hours as needed for Temperature greater than.    albuterol 90 mcg/actuation inhaler Inhale 2 puffs into the lungs every 6 (six) hours as needed for Wheezing.    carvedilol (COREG) 6.25 MG tablet Take 1 tablet (6.25 mg total) by mouth 2 (two) times daily with meals.    clopidogrel (PLAVIX) 75 mg tablet Take 75 mg by mouth once daily.    esomeprazole (NEXIUM) 40 MG capsule Take 1 capsule (40 mg total) by mouth before breakfast.    ezetimibe (ZETIA) 10 mg tablet Take 10 mg by mouth once daily.     ferrous sulfate 325 mg (65 mg iron) Tab tablet Take 1 tablet (325 mg total) by mouth 3 (three) times daily.    finasteride (PROSCAR) 5 mg tablet Take 1 tablet (5 mg total) by mouth once daily.    FOLIC ACID/MULTIVIT-MIN/LUTEIN (CENTRUM SILVER ORAL) Take by mouth.     furosemide (LASIX) 20 MG tablet Take 1 tablet (20 mg total) by mouth once daily. (Patient taking differently: Take 20 mg by mouth once daily. Pt takes 20 mg every other day , 40 mg on the other days in between)    insulin NPH (NOVOLIN N) 100 unit/mL injection Inject into the skin. 70 units in the morning and 40 units at bedtime    isosorbide mononitrate (IMDUR) 60 MG 24 hr tablet Take 60 mg by mouth once daily.    JANUMET 50-1,000 mg per tablet Take 1 tablet by mouth 2 (two) times daily with meals.    losartan (COZAAR) 25 MG tablet Take 1 tablet (25 mg total) by mouth once daily.    nitroGLYCERIN (NITROSTAT) 0.4 MG SL tablet DISSOLVE ONE TABLET UNDER THE TONGUE EVERY 5 MINUTES AS NEEDED FOR CHEST PAIN.    RANEXA 1,000 mg Tb12     ranitidine (ZANTAC) 150 MG tablet     rosuvastatin (CRESTOR) 40 MG Tab Take 1 tablet (40 mg total) by mouth once daily.    spironolactone (ALDACTONE) 25 MG tablet     tamsulosin (FLOMAX) 0.4 mg Cap TAKE ONE CAPSULE BY MOUTH ONCE DAILY    [DISCONTINUED] aspirin 325 MG tablet Take 325 mg by mouth once daily.     Family History     Problem Relation (Age of Onset)    Diabetes Mother, Sister    Heart attack Brother    Heart disease Mother, Sister    Hypertension Sister        Tobacco Use    Smoking status: Former Smoker     Packs/day: 3.00     Types: Cigarettes     Start date: 1963     Last attempt to quit: 1981     Years since quittin.8    Smokeless tobacco: Former User     Types: Snuff, Chew     Quit date:    Substance and Sexual Activity    Alcohol use: No    Drug use: No    Sexual activity: Yes     Comment: -with a significant other     Review of Systems   Constitution: Negative for chills and decreased appetite.   HENT: Negative for congestion, ear discharge and ear pain.    Eyes: Negative for blurred vision and discharge.   Cardiovascular: Positive for chest pain. Negative for claudication, cyanosis and dyspnea on exertion.   Respiratory: Positive  for shortness of breath. Negative for cough and hemoptysis.    Endocrine: Negative for cold intolerance and heat intolerance.   Skin: Negative for color change and nail changes.   Musculoskeletal: Negative for arthritis and back pain.   Gastrointestinal: Positive for nausea and vomiting. Negative for bloating and abdominal pain.   Genitourinary: Negative for bladder incontinence and decreased libido.   Neurological: Positive for light-headedness. Negative for aphonia and brief paralysis.     Objective:     Vital Signs (Most Recent):  Temp: 97.8 °F (36.6 °C) (11/16/18 1500)  Pulse: (!) 52 (11/16/18 1500)  Resp: 13 (11/16/18 1500)  BP: (!) 131/90 (11/16/18 1500)  SpO2: 100 % (11/16/18 1500) Vital Signs (24h Range):  Temp:  [97.3 °F (36.3 °C)-97.8 °F (36.6 °C)] 97.8 °F (36.6 °C)  Pulse:  [] 52  Resp:  [13-24] 13  SpO2:  [94 %-100 %] 100 %  BP: ()/(50-98) 131/90       Weight: 108.6 kg (239 lb 6.7 oz)  Body mass index is 37.5 kg/m².    SpO2: 100 %  O2 Device (Oxygen Therapy): room air    Physical Exam   Constitutional: He is oriented to person, place, and time. He appears well-developed and well-nourished.   HENT:   Head: Normocephalic and atraumatic.   Nose: Nose normal.   Mouth/Throat: No oropharyngeal exudate.   Eyes: Right eye exhibits no discharge. Left eye exhibits no discharge. No scleral icterus.   Neck: Normal range of motion. Neck supple. No JVD present.   Cardiovascular: Normal rate, regular rhythm, S1 normal and S2 normal. Exam reveals no gallop, no S3, no S4, no distant heart sounds, no friction rub, no midsystolic click and no opening snap.   No murmur heard.  Pulses:       Radial pulses are 2+ on the right side, and 2+ on the left side.        Femoral pulses are 2+ on the right side, and 2+ on the left side.  Pulmonary/Chest: Effort normal and breath sounds normal. No respiratory distress. He has no wheezes. He has no rales. He exhibits no tenderness.   Abdominal: Soft. Bowel sounds are normal.  He exhibits no distension. There is no tenderness. There is no rebound.   Musculoskeletal: Normal range of motion. He exhibits no edema, tenderness or deformity.   Lymphadenopathy:     He has no cervical adenopathy.   Neurological: He is alert and oriented to person, place, and time. No cranial nerve deficit.   Skin: Skin is warm and dry.   Psychiatric: He has a normal mood and affect. His behavior is normal.       Significant Labs:   BMP:   Recent Labs   Lab 11/16/18  1033   *   *   K 5.0   CL 99   CO2 25   BUN 18   CREATININE 1.3   CALCIUM 9.9   , CMP:   Recent Labs   Lab 11/16/18  1033   *   K 5.0   CL 99   CO2 25   *   BUN 18   CREATININE 1.3   CALCIUM 9.9   PROT 7.5   ALBUMIN 3.7   BILITOT 0.4   ALKPHOS 58   AST 33   ALT 45*   ANIONGAP 9   ESTGFRAFRICA >60.0   EGFRNONAA 55.3*    and CBC:   Recent Labs   Lab 11/16/18  1033   WBC 10.26   HGB 13.1*   HCT 41.4          Significant Imaging: Telemetry and EKGs as above.              Assessment and Plan:     Wide-complex tachycardia    69 y/o man with prior 5v CABG (2005) (LIMA to LAD, SVG to Diag), known  to RCA and LCx and recent admission to outside hospital with NSTEMI (treated medically), ICM 35% (Sept 2018), CKD II, HTN, HLD, DM presented with WCT (LBBB morphology and R axis). LHC showed unchanged anatomy from previous LHC but he had easily induced sustained VT during LVgram.    Recommendations:  -EP study w potential ICD implantation on Monady  -NPO after midnight on Monday  -DC amiodarone drip on Sunday AM    Case discussed w Dr Glasgow           Thank you for your consult. I will follow-up with patient. Please contact us if you have any additional questions.    Kamaljit Roy MD  Cardiac Electrophysiology  [facility]

## 2018-11-16 NOTE — SUBJECTIVE & OBJECTIVE
Past Medical History:   Diagnosis Date    Anemia     Anticoagulant long-term use     CHF (congestive heart failure)     Colon cancer screening 2/2/2017    Coronary artery disease     Diabetes mellitus type I     Disorder of kidney and ureter     Encounter for blood transfusion     Glaucoma     Heart attack     09/2018    Heart disease     Hypertension     Iron deficiency     Kidney failure     post CABG       Past Surgical History:   Procedure Laterality Date    COLON SURGERY  2006    COLONOSCOPY N/A 2/2/2017    Procedure: COLONOSCOPY;  Surgeon: Ronnie Conway MD;  Location: [facility];  Service: Endoscopy;  Laterality: N/A;    COLONOSCOPY N/A 2/2/2017    Performed by Ronnie Conway MD at [facility]    CORONARY ARTERY BYPASS GRAFT  2005    5 arteries    ESOPHAGOGASTRODUODENOSCOPY (EGD) N/A 3/21/2018    Performed by Fawad Cunningham MD at [facility] (4TH FLR)    EYE SURGERY Bilateral 2016    Laser surgery for glaucoma       Review of patient's allergies indicates:  No Known Allergies    No current facility-administered medications on file prior to encounter.      Current Outpatient Medications on File Prior to Encounter   Medication Sig    acetaminophen (TYLENOL) 100 mg/mL suspension Take by mouth every 4 (four) hours as needed for Temperature greater than.    albuterol 90 mcg/actuation inhaler Inhale 2 puffs into the lungs every 6 (six) hours as needed for Wheezing.    aspirin 325 MG tablet Take 325 mg by mouth once daily.    carvedilol (COREG) 6.25 MG tablet Take 1 tablet (6.25 mg total) by mouth 2 (two) times daily with meals.    clopidogrel (PLAVIX) 75 mg tablet Take 75 mg by mouth once daily.    esomeprazole (NEXIUM) 40 MG capsule Take 1 capsule (40 mg total) by mouth before breakfast.    ezetimibe (ZETIA) 10 mg tablet Take 10 mg by mouth once daily.     ferrous sulfate 325 mg (65 mg iron) Tab tablet Take 1 tablet (325 mg total) by mouth 3 (three) times daily.    finasteride (PROSCAR) 5 mg  tablet Take 1 tablet (5 mg total) by mouth once daily.    FOLIC ACID/MULTIVIT-MIN/LUTEIN (CENTRUM SILVER ORAL) Take by mouth.    furosemide (LASIX) 20 MG tablet Take 1 tablet (20 mg total) by mouth once daily. (Patient taking differently: Take 20 mg by mouth once daily. Pt takes 20 mg every other day , 40 mg on the other days in between)    insulin NPH (NOVOLIN N) 100 unit/mL injection Inject into the skin. 70 units in the morning and 40 units at bedtime    isosorbide mononitrate (IMDUR) 60 MG 24 hr tablet Take 60 mg by mouth once daily.    JANUMET 50-1,000 mg per tablet Take 1 tablet by mouth 2 (two) times daily with meals.    losartan (COZAAR) 25 MG tablet Take 1 tablet (25 mg total) by mouth once daily.    nitroGLYCERIN (NITROSTAT) 0.4 MG SL tablet DISSOLVE ONE TABLET UNDER THE TONGUE EVERY 5 MINUTES AS NEEDED FOR CHEST PAIN.    RANEXA 1,000 mg Tb12     ranitidine (ZANTAC) 150 MG tablet     rosuvastatin (CRESTOR) 40 MG Tab Take 1 tablet (40 mg total) by mouth once daily.    spironolactone (ALDACTONE) 25 MG tablet     tamsulosin (FLOMAX) 0.4 mg Cap TAKE ONE CAPSULE BY MOUTH ONCE DAILY     Family History     Problem Relation (Age of Onset)    Diabetes Mother, Sister    Heart attack Brother    Heart disease Mother, Sister    Hypertension Sister        Tobacco Use    Smoking status: Former Smoker     Packs/day: 3.00     Types: Cigarettes     Start date: 1963     Last attempt to quit: 1981     Years since quittin.8    Smokeless tobacco: Former User     Types: Snuff, Chew     Quit date:    Substance and Sexual Activity    Alcohol use: No    Drug use: No    Sexual activity: Yes     Comment: -with a significant other     Review of Systems   Constitution: Negative for chills and fever.   Cardiovascular: Positive for chest pain. Negative for dyspnea on exertion, near-syncope and orthopnea.   Respiratory: Negative for cough and shortness of breath.    Gastrointestinal: Negative for  abdominal pain, constipation, diarrhea, melena and nausea.   Genitourinary: Negative for dysuria and hematuria.     Objective:     Vital Signs (Most Recent):  Temp: 97.3 °F (36.3 °C) (11/16/18 1017)  Pulse: (!) 130 (11/16/18 1047)  Resp: (!) 24 (11/16/18 1033)  BP: 137/72 (11/16/18 1047)  SpO2: 98 % (11/16/18 1055) Vital Signs (24h Range):  Temp:  [97.3 °F (36.3 °C)] 97.3 °F (36.3 °C)  Pulse:  [130-139] 130  Resp:  [20-24] 24  SpO2:  [97 %-98 %] 98 %  BP: (118-168)/(64-98) 137/72     Weight: 106.6 kg (235 lb)  Body mass index is 36.81 kg/m².    SpO2: 98 %  O2 Device (Oxygen Therapy): room air    No intake or output data in the 24 hours ending 11/16/18 1113    Lines/Drains/Airways     Peripheral Intravenous Line                 Peripheral IV - Single Lumen 11/16/18 1032 Right Antecubital less than 1 day         Peripheral IV - Single Lumen 11/16/18 1107 Right Hand less than 1 day                Physical Exam  Gen: no acute distress, alert & oriented x 3  Neck: no JVD, no carotid bruits  CV: tachycardic, regular, normal S1/2, no murmurs, rubs, gallops  Resp: clear to auscultation bilaterally, normal effort  GI: soft, nontender nondistended, normal bowel sounds  Ext: warm well perfused, no edema, no clubbing or cyanosis    Significant Labs:   All pertinent lab results from the last 24 hours have been reviewed. and   Recent Lab Results       11/16/18  1033        Immature Granulocytes 0.2     Immature Grans (Abs) 0.02  Comment:  Mild elevation in immature granulocytes is non specific and   can be seen in a variety of conditions including stress response,   acute inflammation, trauma and pregnancy. Correlation with other   laboratory and clinical findings is essential.       Baso # 0.07     Basophil% 0.7     Differential Method Automated     Eos # 0.5     Eosinophil% 5.0     Gran # (ANC) 5.6     Gran% 54.3     Hematocrit 41.4     Hemoglobin 13.1     Lymph # 3.0     Lymph% 29.0     MCH 27.9     MCHC 31.6     MCV 88      Mono # 1.1     Mono% 10.8     MPV 10.6     nRBC 0     Platelets 224     RBC 4.69     RDW 14.2     WBC 10.26           Significant Imaging:   EKG: LBBB, tachycardic   Reported OSH Echo: LVEF 35%

## 2018-11-16 NOTE — ED TRIAGE NOTES
Walter Bull, a 68 y.o. male presents to the ED w/ complaint of midsternal chest pain that radiates to abdomen.  Pt states that he is also SOB.  Pt was cranking his lawnmower with his R arm this morning and started having chest pains.  Pt had 5 bypass years ago and had a mild heart attack in September. Pt had an episode of nausea and lightheadedness.     Triage note:  Chief Complaint   Patient presents with    Chest Pain     onset approx x30 minutes ago, reports hx of MI     Review of patient's allergies indicates:  No Known Allergies  Past Medical History:   Diagnosis Date    Anemia     Anticoagulant long-term use     CHF (congestive heart failure)     Colon cancer screening 2/2/2017    Coronary artery disease     Diabetes mellitus type I     Disorder of kidney and ureter     Encounter for blood transfusion     Glaucoma     Heart attack     09/2018    Heart disease     Hypertension     Iron deficiency     Kidney failure     post CABG     LOC: Patient name and date of birth verified. The patient is awake, alert and aware of environment with an appropriate affect, the patient is oriented x 3 and speaking appropriately.   APPEARANCE: Patient resting comfortably, patient is clean and well groomed, patient's clothing is properly fastened.  RESPIRATORY: Respirations are spontaneous, patient has a normal effort and rate, no accessory muscle use noted. SOB reported at this time.   CARDIAC: Patient has a normal rate and rhythm, no periphreal edema noted, capillary refill < 3 seconds. Constant Midsteranl CP reported at this time.   ABDOMEN: Soft and non tender to palpation, no distention noted. Bowel sounds present in all four quadrants. Pain from chest radiates to abdomen.

## 2018-11-16 NOTE — BRIEF OP NOTE
"Post Cath Note  Referring Physician: No att. providers found  Procedure: Left heart cath (Left), Angiogram, Aortic Arch, Coronary, Bypass graft study       Access: Right CFA    LVEDP 14  See full report for further details    Intervention:    of the RCA,  LCx, occluded SVG to OM  Patent LIMA to LAD and SVG to D1  Closure device: Manual pressure    Post Cath Exam:   BP (!) 94/54   Pulse (!) 145   Temp 97.3 °F (36.3 °C) (Oral)   Resp (!) 24   Ht 5' 7" (1.702 m)   Wt 106.6 kg (235 lb)   SpO2 95%   BMI 36.81 kg/m²       Recommendations:   - Routine post-cath care  - IVF at 3cc/kg x 4 hours   -Recommend EP consult for SVT presentation and had inducible sustained VT when pigtail catheter was inserted into the LV, VT terminated with lidocaine bolus (patient never lost conciousness).   -medical management for CAD, he has  that is known and the LIMA to LAD and SVG to D1 grafts are patent.  -Bp <130/80  -HR <70  -Consider beta blockers and long acting nitrates   -Transfer to CCU     Pineda Head DO  Cardiology Fellow         "

## 2018-11-16 NOTE — Clinical Note
110 ml injected throughout the case. 90 mL total wasted during the case. 200 mL total used in the case.

## 2018-11-16 NOTE — SUBJECTIVE & OBJECTIVE
Past Medical History:   Diagnosis Date    Anemia     Anticoagulant long-term use     CHF (congestive heart failure)     Colon cancer screening 2/2/2017    Coronary artery disease     Diabetes mellitus type I     Disorder of kidney and ureter     Encounter for blood transfusion     Glaucoma     Heart attack     09/2018    Heart disease     Hypertension     Iron deficiency     Kidney failure     post CABG       Past Surgical History:   Procedure Laterality Date    COLON SURGERY  2006    COLONOSCOPY N/A 2/2/2017    Procedure: COLONOSCOPY;  Surgeon: Ronnie Conway MD;  Location: [facility];  Service: Endoscopy;  Laterality: N/A;    COLONOSCOPY N/A 2/2/2017    Performed by Ronnie Conway MD at [facility]    CORONARY ARTERY BYPASS GRAFT  2005    5 arteries    ESOPHAGOGASTRODUODENOSCOPY (EGD) N/A 3/21/2018    Performed by Fawad Cunningham MD at [facility] (4[facility] F[facility])    EYE SURGERY Bilateral 2016    Laser surgery for glaucoma       Review of patient's allergies indicates:  No Known Allergies    No current facility-administered medications on file prior to encounter.      Current Outpatient Medications on File Prior to Encounter   Medication Sig    acetaminophen (TYLENOL) 100 mg/mL suspension Take by mouth every 4 (four) hours as needed for Temperature greater than.    albuterol 90 mcg/actuation inhaler Inhale 2 puffs into the lungs every 6 (six) hours as needed for Wheezing.    carvedilol (COREG) 6.25 MG tablet Take 1 tablet (6.25 mg total) by mouth 2 (two) times daily with meals.    clopidogrel (PLAVIX) 75 mg tablet Take 75 mg by mouth once daily.    esomeprazole (NEXIUM) 40 MG capsule Take 1 capsule (40 mg total) by mouth before breakfast.    ezetimibe (ZETIA) 10 mg tablet Take 10 mg by mouth once daily.     ferrous sulfate 325 mg (65 mg iron) Tab tablet Take 1 tablet (325 mg total) by mouth 3 (three) times daily.    finasteride (PROSCAR) 5 mg tablet Take 1 tablet (5 mg total) by mouth once daily.     FOLIC ACID/MULTIVIT-MIN/LUTEIN (CENTRUM SILVER ORAL) Take by mouth.    furosemide (LASIX) 20 MG tablet Take 1 tablet (20 mg total) by mouth once daily. (Patient taking differently: Take 20 mg by mouth once daily. Pt takes 20 mg every other day , 40 mg on the other days in between)    insulin NPH (NOVOLIN N) 100 unit/mL injection Inject into the skin. 70 units in the morning and 40 units at bedtime    isosorbide mononitrate (IMDUR) 60 MG 24 hr tablet Take 60 mg by mouth once daily.    JANUMET 50-1,000 mg per tablet Take 1 tablet by mouth 2 (two) times daily with meals.    losartan (COZAAR) 25 MG tablet Take 1 tablet (25 mg total) by mouth once daily.    nitroGLYCERIN (NITROSTAT) 0.4 MG SL tablet DISSOLVE ONE TABLET UNDER THE TONGUE EVERY 5 MINUTES AS NEEDED FOR CHEST PAIN.    RANEXA 1,000 mg Tb12     ranitidine (ZANTAC) 150 MG tablet     rosuvastatin (CRESTOR) 40 MG Tab Take 1 tablet (40 mg total) by mouth once daily.    spironolactone (ALDACTONE) 25 MG tablet     tamsulosin (FLOMAX) 0.4 mg Cap TAKE ONE CAPSULE BY MOUTH ONCE DAILY    [DISCONTINUED] aspirin 325 MG tablet Take 325 mg by mouth once daily.     Family History     Problem Relation (Age of Onset)    Diabetes Mother, Sister    Heart attack Brother    Heart disease Mother, Sister    Hypertension Sister        Tobacco Use    Smoking status: Former Smoker     Packs/day: 3.00     Types: Cigarettes     Start date: 1963     Last attempt to quit: 1981     Years since quittin.8    Smokeless tobacco: Former User     Types: Snuff, Chew     Quit date:    Substance and Sexual Activity    Alcohol use: No    Drug use: No    Sexual activity: Yes     Comment: -with a significant other     Review of Systems   Constitution: Negative for chills and decreased appetite.   HENT: Negative for congestion, ear discharge and ear pain.    Eyes: Negative for blurred vision and discharge.   Cardiovascular: Positive for chest pain. Negative for  claudication, cyanosis and dyspnea on exertion.   Respiratory: Positive for shortness of breath. Negative for cough and hemoptysis.    Endocrine: Negative for cold intolerance and heat intolerance.   Skin: Negative for color change and nail changes.   Musculoskeletal: Negative for arthritis and back pain.   Gastrointestinal: Positive for nausea and vomiting. Negative for bloating and abdominal pain.   Genitourinary: Negative for bladder incontinence and decreased libido.   Neurological: Positive for light-headedness. Negative for aphonia and brief paralysis.     Objective:     Vital Signs (Most Recent):  Temp: 97.8 °F (36.6 °C) (11/16/18 1500)  Pulse: (!) 52 (11/16/18 1500)  Resp: 13 (11/16/18 1500)  BP: (!) 131/90 (11/16/18 1500)  SpO2: 100 % (11/16/18 1500) Vital Signs (24h Range):  Temp:  [97.3 °F (36.3 °C)-97.8 °F (36.6 °C)] 97.8 °F (36.6 °C)  Pulse:  [] 52  Resp:  [13-24] 13  SpO2:  [94 %-100 %] 100 %  BP: ()/(50-98) 131/90       Weight: 108.6 kg (239 lb 6.7 oz)  Body mass index is 37.5 kg/m².    SpO2: 100 %  O2 Device (Oxygen Therapy): room air    Physical Exam   Constitutional: He is oriented to person, place, and time. He appears well-developed and well-nourished.   HENT:   Head: Normocephalic and atraumatic.   Nose: Nose normal.   Mouth/Throat: No oropharyngeal exudate.   Eyes: Right eye exhibits no discharge. Left eye exhibits no discharge. No scleral icterus.   Neck: Normal range of motion. Neck supple. No JVD present.   Cardiovascular: Normal rate, regular rhythm, S1 normal and S2 normal. Exam reveals no gallop, no S3, no S4, no distant heart sounds, no friction rub, no midsystolic click and no opening snap.   No murmur heard.  Pulses:       Radial pulses are 2+ on the right side, and 2+ on the left side.        Femoral pulses are 2+ on the right side, and 2+ on the left side.  Pulmonary/Chest: Effort normal and breath sounds normal. No respiratory distress. He has no wheezes. He has no  rales. He exhibits no tenderness.   Abdominal: Soft. Bowel sounds are normal. He exhibits no distension. There is no tenderness. There is no rebound.   Musculoskeletal: Normal range of motion. He exhibits no edema, tenderness or deformity.   Lymphadenopathy:     He has no cervical adenopathy.   Neurological: He is alert and oriented to person, place, and time. No cranial nerve deficit.   Skin: Skin is warm and dry.   Psychiatric: He has a normal mood and affect. His behavior is normal.       Significant Labs:   BMP:   Recent Labs   Lab 11/16/18  1033   *   *   K 5.0   CL 99   CO2 25   BUN 18   CREATININE 1.3   CALCIUM 9.9   , CMP:   Recent Labs   Lab 11/16/18  1033   *   K 5.0   CL 99   CO2 25   *   BUN 18   CREATININE 1.3   CALCIUM 9.9   PROT 7.5   ALBUMIN 3.7   BILITOT 0.4   ALKPHOS 58   AST 33   ALT 45*   ANIONGAP 9   ESTGFRAFRICA >60.0   EGFRNONAA 55.3*    and CBC:   Recent Labs   Lab 11/16/18  1033   WBC 10.26   HGB 13.1*   HCT 41.4          Significant Imaging: Telemetry and EKGs as above.

## 2018-11-16 NOTE — ASSESSMENT & PLAN NOTE
Taken to cath lab due to concern for MI with new LBBB pattern on EKG, no new occlusions found and no intervention performed, troponin undetectable  Found to have slow VT (see below)  Continue treatment of CAD, including DAPT, high intensity statin, beta blocker (coreg), ARB  Troponin undetectable, EKG without acute ischemic changes (nonspecific ST changes were present prior)

## 2018-11-16 NOTE — ASSESSMENT & PLAN NOTE
68 year old man with CAD s/p CABG and recent NSTEMI, ICM with LVEF 35%, DM who presents this AM with ischemic chest pain 30 minutes PTA with new onset LBBB. He continues to have 9/10 chest pain, is hemodynamically stable and breathing on room air. ALEM 6, Jaci 157.     --ASA 325mg and ticagrelor 180mg  --nitro gtt  --cath lab this AM  --heparin gtt  --will need echo with CFD  --admit to ICU

## 2018-11-16 NOTE — CONSULTS
Food & Nutrition  Education    Diet Education: Cardiac (NSTEMI pathway)   Time Spent: 10 minutes   Learners: Patient & friends at bedside    Nutrition Education provided with handouts. All questions and concerns answered. Dietitian's contact information provided.   Comments: Friends at bedside report patient follows a Mediterranean diet at home.      Follow-Up: 11/23    Please re-consult as needed.    Thanks!

## 2018-11-16 NOTE — ASSESSMENT & PLAN NOTE
69 y/o man with prior 5v CABG (2005) (LIMA to LAD, SVG to Diag), known  to RCA and LCx and recent admission to outside hospital with NSTEMI (treated medically), ICM 35% (Sept 2018), CKD II, HTN, HLD, DM presented with WCT (LBBB morphology and R axis). LHC showed unchanged anatomy from previous LHC but he had easily induced sustained VT during LVgram.    Recommendations:  -EP study w potential ICD implantation on Monady  -NPO after midnight on Monday  -DC amiodarone drip on Sunday AM    Case discussed w Dr Glasgow

## 2018-11-16 NOTE — ASSESSMENT & PLAN NOTE
EF reportedly 35% from an outside facility, will check 2D echo here  Continue medical therapy for ICM including BB (carvedilol), ARB (losartan), diuretics and aldactone, isosorbide  Currently without evidence of volume overload or decompensation  Medical therapy for CAD as mentioned

## 2018-11-16 NOTE — Clinical Note
Catheter is inserted into the ostium left main artery. The vessel(s) were injected and visualized in multiple views.

## 2018-11-16 NOTE — PROGRESS NOTES
Pt arrived to [facility] 6034 from cath lab. NSR 75, BP s131/61, reporting 3/10 chest pain. Heparin and Nitro gtts are off- called CARDS to confirm they are to remain off at this time. Site check is C/D/I no hematoma or pain. Labs sent off. TM

## 2018-11-16 NOTE — CONSULTS
[facility]  Cardiology  Consult Note    Patient Name: Walter Bull  MRN: [id number]  Admission Date: 11/16/2018  Hospital Length of Stay: 0 days  Code Status: Full Code   Attending Provider: Debbi Bhagat MD   Consulting Provider: Altaf Menendez MD  Primary Care Physician: Belkys Kruger MD  Principal Problem:<principal problem not specified>    Patient information was obtained from patient.     Inpatient consult to Cardiology  Consult performed by: Altaf Menendez MD  Consult ordered by: Annamarie Pham PA-C        Subjective:     Chief Complaint:  Chest pain     HPI:   69 y/o man with prior CABG (LIMA to LAD, SVG to Diag), known  to RCA and LCx and recent admission to outside hospital with NSTEMI who presents with acute onset of chest pain and new onset LBBB. Had LHC at OSH and has been managed medically. Has LVEF 35%, no ICD. Was about to lawn work this AM when he started having acute onset chest pain, worse than his prior episodes but similar in nature. EMS arrived and found to have wide complex tachycardia. Cardiology called for assistance. He continues to have 9-10/10 chest pain like an elephant sitting on his chest. Received NTG x 2 and ASA 325mg. Started on nitro gtt. DUring my visit, BP dipped into 80s and he became less responsive, nausea and vomited clear phlegm, lightheaded and dizzy. QRS complex narrowed, HR up to 160.     Past Medical History:   Diagnosis Date    Anemia     Anticoagulant long-term use     CHF (congestive heart failure)     Colon cancer screening 2/2/2017    Coronary artery disease     Diabetes mellitus type I     Disorder of kidney and ureter     Encounter for blood transfusion     Glaucoma     Heart attack     09/2018    Heart disease     Hypertension     Iron deficiency     Kidney failure     post CABG       Past Surgical History:   Procedure Laterality Date    COLON SURGERY  2006    COLONOSCOPY N/A 2/2/2017     Procedure: COLONOSCOPY;  Surgeon: Ronnie Conway MD;  Location: [facility];  Service: Endoscopy;  Laterality: N/A;    COLONOSCOPY N/A 2/2/2017    Performed by Ronnie Conway MD at [facility]    CORONARY ARTERY BYPASS GRAFT  2005    5 arteries    ESOPHAGOGASTRODUODENOSCOPY (EGD) N/A 3/21/2018    Performed by Fawad Cunningham MD at [facility] (4TH FLR)    EYE SURGERY Bilateral 2016    Laser surgery for glaucoma       Review of patient's allergies indicates:  No Known Allergies    No current facility-administered medications on file prior to encounter.      Current Outpatient Medications on File Prior to Encounter   Medication Sig    acetaminophen (TYLENOL) 100 mg/mL suspension Take by mouth every 4 (four) hours as needed for Temperature greater than.    albuterol 90 mcg/actuation inhaler Inhale 2 puffs into the lungs every 6 (six) hours as needed for Wheezing.    aspirin 325 MG tablet Take 325 mg by mouth once daily.    carvedilol (COREG) 6.25 MG tablet Take 1 tablet (6.25 mg total) by mouth 2 (two) times daily with meals.    clopidogrel (PLAVIX) 75 mg tablet Take 75 mg by mouth once daily.    esomeprazole (NEXIUM) 40 MG capsule Take 1 capsule (40 mg total) by mouth before breakfast.    ezetimibe (ZETIA) 10 mg tablet Take 10 mg by mouth once daily.     ferrous sulfate 325 mg (65 mg iron) Tab tablet Take 1 tablet (325 mg total) by mouth 3 (three) times daily.    finasteride (PROSCAR) 5 mg tablet Take 1 tablet (5 mg total) by mouth once daily.    FOLIC ACID/MULTIVIT-MIN/LUTEIN (CENTRUM SILVER ORAL) Take by mouth.    furosemide (LASIX) 20 MG tablet Take 1 tablet (20 mg total) by mouth once daily. (Patient taking differently: Take 20 mg by mouth once daily. Pt takes 20 mg every other day , 40 mg on the other days in between)    insulin NPH (NOVOLIN N) 100 unit/mL injection Inject into the skin. 70 units in the morning and 40 units at bedtime    isosorbide mononitrate (IMDUR) 60 MG 24 hr tablet Take 60 mg by mouth  once daily.    JANUMET 50-1,000 mg per tablet Take 1 tablet by mouth 2 (two) times daily with meals.    losartan (COZAAR) 25 MG tablet Take 1 tablet (25 mg total) by mouth once daily.    nitroGLYCERIN (NITROSTAT) 0.4 MG SL tablet DISSOLVE ONE TABLET UNDER THE TONGUE EVERY 5 MINUTES AS NEEDED FOR CHEST PAIN.    RANEXA 1,000 mg Tb12     ranitidine (ZANTAC) 150 MG tablet     rosuvastatin (CRESTOR) 40 MG Tab Take 1 tablet (40 mg total) by mouth once daily.    spironolactone (ALDACTONE) 25 MG tablet     tamsulosin (FLOMAX) 0.4 mg Cap TAKE ONE CAPSULE BY MOUTH ONCE DAILY     Family History     Problem Relation (Age of Onset)    Diabetes Mother, Sister    Heart attack Brother    Heart disease Mother, Sister    Hypertension Sister        Tobacco Use    Smoking status: Former Smoker     Packs/day: 3.00     Types: Cigarettes     Start date: 1963     Last attempt to quit: 1981     Years since quittin.8    Smokeless tobacco: Former User     Types: Snuff, Chew     Quit date:    Substance and Sexual Activity    Alcohol use: No    Drug use: No    Sexual activity: Yes     Comment: -with a significant other     Review of Systems   Constitution: Negative for chills and fever.   Cardiovascular: Positive for chest pain. Negative for dyspnea on exertion, near-syncope and orthopnea.   Respiratory: Negative for cough and shortness of breath.    Gastrointestinal: Negative for abdominal pain, constipation, diarrhea, melena and nausea.   Genitourinary: Negative for dysuria and hematuria.     Objective:     Vital Signs (Most Recent):  Temp: 97.3 °F (36.3 °C) (18 1017)  Pulse: (!) 130 (18 1047)  Resp: (!) 24 (18 1033)  BP: 137/72 (18 1047)  SpO2: 98 % (18 1055) Vital Signs (24h Range):  Temp:  [97.3 °F (36.3 °C)] 97.3 °F (36.3 °C)  Pulse:  [130-139] 130  Resp:  [20-24] 24  SpO2:  [97 %-98 %] 98 %  BP: (118-168)/(64-98) 137/72     Weight: 106.6 kg (235 lb)  Body mass index is  36.81 kg/m².    SpO2: 98 %  O2 Device (Oxygen Therapy): room air    No intake or output data in the 24 hours ending 11/16/18 1113    Lines/Drains/Airways     Peripheral Intravenous Line                 Peripheral IV - Single Lumen 11/16/18 1032 Right Antecubital less than 1 day         Peripheral IV - Single Lumen 11/16/18 1107 Right Hand less than 1 day                Physical Exam  Gen: no acute distress, alert & oriented x 3  Neck: no JVD, no carotid bruits  CV: tachycardic, regular, normal S1/2, no murmurs, rubs, gallops  Resp: clear to auscultation bilaterally, normal effort  GI: soft, nontender nondistended, normal bowel sounds  Ext: warm well perfused, no edema, no clubbing or cyanosis    Significant Labs:   All pertinent lab results from the last 24 hours have been reviewed. and   Recent Lab Results       11/16/18  1033        Immature Granulocytes 0.2     Immature Grans (Abs) 0.02  Comment:  Mild elevation in immature granulocytes is non specific and   can be seen in a variety of conditions including stress response,   acute inflammation, trauma and pregnancy. Correlation with other   laboratory and clinical findings is essential.       Baso # 0.07     Basophil% 0.7     Differential Method Automated     Eos # 0.5     Eosinophil% 5.0     Gran # (ANC) 5.6     Gran% 54.3     Hematocrit 41.4     Hemoglobin 13.1     Lymph # 3.0     Lymph% 29.0     MCH 27.9     MCHC 31.6     MCV 88     Mono # 1.1     Mono% 10.8     MPV 10.6     nRBC 0     Platelets 224     RBC 4.69     RDW 14.2     WBC 10.26         CMP  Sodium   Date Value Ref Range Status   11/16/2018 133 (L) 136 - 145 mmol/L Final     Potassium   Date Value Ref Range Status   11/16/2018 5.0 3.5 - 5.1 mmol/L Final     Chloride   Date Value Ref Range Status   11/16/2018 99 95 - 110 mmol/L Final     CO2   Date Value Ref Range Status   11/16/2018 25 23 - 29 mmol/L Final     Glucose   Date Value Ref Range Status   11/16/2018 242 (H) 70 - 110 mg/dL Final     BUN,  Bld   Date Value Ref Range Status   11/16/2018 18 8 - 23 mg/dL Final     Creatinine   Date Value Ref Range Status   11/16/2018 1.3 0.5 - 1.4 mg/dL Final     Calcium   Date Value Ref Range Status   11/16/2018 9.9 8.7 - 10.5 mg/dL Final     Total Protein   Date Value Ref Range Status   11/16/2018 7.5 6.0 - 8.4 g/dL Final     Albumin   Date Value Ref Range Status   11/16/2018 3.7 3.5 - 5.2 g/dL Final     Total Bilirubin   Date Value Ref Range Status   11/16/2018 0.4 0.1 - 1.0 mg/dL Final     Comment:     For infants and newborns, interpretation of results should be based  on gestational age, weight and in agreement with clinical  observations.  Premature Infant recommended reference ranges:  Up to 24 hours.............<8.0 mg/dL  Up to 48 hours............<12.0 mg/dL  3-5 days..................<15.0 mg/dL  6-29 days.................<15.0 mg/dL       Alkaline Phosphatase   Date Value Ref Range Status   11/16/2018 58 55 - 135 U/L Final     AST   Date Value Ref Range Status   11/16/2018 33 10 - 40 U/L Final     ALT   Date Value Ref Range Status   11/16/2018 45 (H) 10 - 44 U/L Final     Anion Gap   Date Value Ref Range Status   11/16/2018 9 8 - 16 mmol/L Final     eGFR if    Date Value Ref Range Status   11/16/2018 >60.0 >60 mL/min/1.73 m^2 Final     eGFR if non    Date Value Ref Range Status   11/16/2018 55.3 (A) >60 mL/min/1.73 m^2 Final     Comment:     Calculation used to obtain the estimated glomerular filtration  rate (eGFR) is the CKD-EPI equation.        Troponin 0.007    Significant Imaging:   EKG: LBBB, tachycardic   Reported OSH Echo: LVEF 35%    Assessment and Plan:     NSTEMI (non-ST elevated myocardial infarction)    68 year old man with CAD s/p CABG and recent NSTEMI, ICM with LVEF 35%, DM who presents this AM with ischemic chest pain 30 minutes PTA with new onset LBBB. He continues to have 9/10 chest pain, is hemodynamically stable and breathing on room air. ALEM 6, Jaci  157.     --ASA 325mg and ticagrelor 180mg  --nitro gtt  --cath lab this AM  --heparin gtt  --will need echo with CFD  --admit to ICU         VTE Risk Mitigation (From admission, onward)        Ordered     heparin 25,000 units in dextrose 5% (100 units/ml) IV bolus from bag INITIAL BOLUS (max bolus 4000 units)  Once      11/16/18 1105     heparin 25,000 units in dextrose 5% 250 mL (100 units/mL) infusion LOW INTENSITY nomogram - [facility]  Continuous      11/16/18 1105     heparin 25,000 units in dextrose 5% (100 units/ml) IV bolus from bag - ADDITIONAL PRN BOLUS - 60 units/kg (max bolus 4000 units)  As needed (PRN)      11/16/18 1105     heparin 25,000 units in dextrose 5% (100 units/ml) IV bolus from bag - ADDITIONAL PRN BOLUS - 30 units/kg (max bolus 4000 units)  As needed (PRN)      11/16/18 1105     IP VTE HIGH RISK PATIENT  Once      11/16/18 1105          Thank you for your consult. Will admit patient to CCU, cath lab    Altaf Menendez MD  Cardiology   [facility]

## 2018-11-16 NOTE — Clinical Note
Generator Pocket made at the left  upper chest  with blunt dissection, electrocautery and sharp dissection.

## 2018-11-16 NOTE — HPI
67 y/o man with prior 5v CABG (2005) (LIMA to LAD, SVG to Diag), known  to RCA and LCx and recent admission to outside hospital with NSTEMI, CKD II, HTN, HLD, DM presented with acute onset of chest pain and new onset LBBB. Had LHC at OSH and has been managed medically. Has LVEF 35%, no ICD. Was about to perform lawn work when he started having acute onset chest pain, similar in nature to his prior MI. Also noted palpitations which he had not experienced before, has never experienced syncope. 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 He denies orthopnea, PND, peripheral edema or [place]. There is no family history of sudden cardiac death.    EMS arrived and found to have wide complex tachycardia. Cardiology called for assistance. He continued to have 9-10/10 chest pain like an elephant sitting on his chest. Received NTG x 2 and ASA 325mg, was taken to the cath lab and no new occlusions were identified. The patient received lidocaine for a slow MMVT which restored NSR with a narrow complex. He is admitted to [facility]ICU for further evaluation on an amiodarone infusion.

## 2018-11-16 NOTE — ED PROVIDER NOTES
Encounter Date: 11/16/2018    SCRIBE #1 NOTE: I, Vivian Cuadra, am scribing for, and in the presence of,  Dr. Bhagat. I have scribed the following portions of the note - the EKG reading and the APC attestation.       History     Chief Complaint   Patient presents with    Chest Pain     onset approx x30 minutes ago, reports hx of MI     68-year-old male with medical history of  diabetes mellitus type 1, CAD, CHF, hypertension, on long-term anticoagulation and past h/o CABGs presents to the ED with chest pain. Patient states pain began in mid sternal chest 30 min prior to arrival while cranking lawnmower.  Pain described as sharp and radiating to left side.  He denies headache, shortness of breath, abdominal pain, vomiting, vision changes, diaphoresis. Patient took 2 nitro with no relief pta.          Review of patient's allergies indicates:  No Known Allergies  Past Medical History:   Diagnosis Date    Anemia     Anticoagulant long-term use     CHF (congestive heart failure)     Colon cancer screening 2/2/2017    Coronary artery disease     Diabetes mellitus type I     Disorder of kidney and ureter     Encounter for blood transfusion     Glaucoma     Heart attack     09/2018    Heart disease     Hypertension     Iron deficiency     Kidney failure     post CABG     Past Surgical History:   Procedure Laterality Date    COLON SURGERY  2006    COLONOSCOPY N/A 2/2/2017    Procedure: COLONOSCOPY;  Surgeon: Ronnie Conway MD;  Location: [facility];  Service: Endoscopy;  Laterality: N/A;    COLONOSCOPY N/A 2/2/2017    Performed by Ronnie Conway MD at [facility]    CORONARY ARTERY BYPASS GRAFT  2005    5 arteries    ESOPHAGOGASTRODUODENOSCOPY (EGD) N/A 3/21/2018    Performed by Fawad Cunningham MD at [facility] ([facility])    EYE SURGERY Bilateral 2016    Laser surgery for glaucoma     Family History   Problem Relation Age of Onset    Diabetes Mother     Heart disease Mother     Hypertension Sister      Diabetes Sister     Heart disease Sister     Heart attack Brother     Colon cancer Neg Hx     Esophageal cancer Neg Hx     Stomach cancer Neg Hx      Social History     Tobacco Use    Smoking status: Former Smoker     Packs/day: 3.00     Types: Cigarettes     Start date: 1963     Last attempt to quit: 1981     Years since quittin.8    Smokeless tobacco: Former User     Types: Snuff, Chew     Quit date:    Substance Use Topics    Alcohol use: No    Drug use: No     Review of Systems   Constitutional: Positive for fatigue. Negative for chills, diaphoresis and fever.   HENT: Negative for congestion.    Eyes: Negative for visual disturbance.   Respiratory: Positive for shortness of breath. Negative for cough.    Cardiovascular: Positive for chest pain and palpitations. Negative for leg swelling.   Gastrointestinal: Negative for abdominal pain, nausea and vomiting.   Genitourinary: Negative for dysuria and hematuria.   Musculoskeletal: Negative for back pain.   Skin: Negative for rash.   Neurological: Negative for syncope, weakness, light-headedness and headaches.   Psychiatric/Behavioral: The patient is not nervous/anxious.        Physical Exam     Initial Vitals [18 1017]   BP Pulse Resp Temp SpO2   118/64 (!) 139 20 97.3 °F (36.3 °C) 98 %      MAP       --         Physical Exam    Vitals reviewed.  Constitutional: He appears well-developed and well-nourished.   HENT:   Head: Normocephalic and atraumatic.   Mouth/Throat: No oropharyngeal exudate.   Eyes: EOM are normal. Pupils are equal, round, and reactive to light.   Neck: Normal range of motion. Neck supple.   Cardiovascular: Intact distal pulses. Tachycardia present.    Pulmonary/Chest: Breath sounds normal. He has no wheezes. He has no rhonchi. He has no rales.   Abdominal: Soft. Bowel sounds are normal. He exhibits no distension. There is no tenderness. There is no rebound and no guarding.   Musculoskeletal: He exhibits no edema.    Neurological: He is alert and oriented to person, place, and time. He has normal strength. No cranial nerve deficit or sensory deficit.   Skin: Skin is warm. Capillary refill takes less than 2 seconds. No rash noted.   Psychiatric: He has a normal mood and affect.         ED Course   Critical Care  Date/Time: 11/16/2018 11:23 AM  Performed by: Annamarie Pham PA-C  Authorized by: Debbi Bhagat MD   Direct patient critical care time: 15 minutes  Additional history critical care time: 5 minutes  Ordering / reviewing critical care time: 5 minutes  Documentation critical care time: 5 minutes  Consulting other physicians critical care time: 10 minutes  Total critical care time (exclusive of procedural time) : 40 minutes  Critical care time was exclusive of separately billable procedures and treating other patients.  Critical care was necessary to treat or prevent imminent or life-threatening deterioration of the following conditions: wide complex tachycardia.  Critical care was time spent personally by me on the following activities: blood draw for specimens, development of treatment plan with patient or surrogate, discussions with consultants, evaluation of patient's response to treatment, examination of patient, ordering and review of laboratory studies, ordering and review of radiographic studies, pulse oximetry, re-evaluation of patient's condition and review of old charts.  Subsequent provider of critical care: I assumed direction of critical care for this patient from another provider of my specialty.        Labs Reviewed   CBC W/ AUTO DIFFERENTIAL - Abnormal; Notable for the following components:       Result Value    Hemoglobin 13.1 (*)     MCHC 31.6 (*)     Mono # 1.1 (*)     All other components within normal limits   COMPREHENSIVE METABOLIC PANEL - Abnormal; Notable for the following components:    Sodium 133 (*)     Glucose 242 (*)     ALT 45 (*)     eGFR if non  55.3 (*)     All  other components within normal limits   TROPONIN I   PROTIME-INR   APTT     EKG Readings: (Independently Interpreted)   Heart Rate: 130.   Wide complex tachycardia       Imaging Results          X-Ray Chest AP Portable (Final result)  Result time 11/16/18 11:01:14    Final result by Jesus Hobson Jr., MD (11/16/18 11:01:14)                 Impression:      There are some patchy increased parenchymal markings particularly at the right base.  Correlation with clinical findings suggested.      Electronically signed by: Jesus Hobson MD  Date:    11/16/2018  Time:    11:01             Narrative:    EXAMINATION:  XR CHEST AP PORTABLE    CLINICAL HISTORY:  Chest Pain;    TECHNIQUE:  Single frontal view of the chest was performed.    COMPARISON:  October 12, 2018.    FINDINGS:  Postop changes noted.  Heart size is similar.  Mild increase in the pulmonary vascular markings.  No confluent consolidation though there are some patchy increased markings at the right base.                                 Medical Decision Making:   History:   Old Medical Records: I decided to obtain old medical records.  Old Records Summarized: records from clinic visits.  Initial Assessment:   68-year-old male with medical history of but diabetes mellitus type 1, CAD, CHF, hypertension, on long-term anticoagulation and multiple CABGs presents to the ED with chest pain. Pain began 30 min pta while exerting self. Patient tachycardic and NAD.  Differential Diagnosis:   DDX includes but is not limited to arrhythmia, ACS, musculoskeletal, pneumonia, CHF. Considered but do nto suspect dissection or AAA; denies tearing chest pain, pulses in UE and LE symmetric, no pulsatile mass on exam.  Independently Interpreted Test(s):   I have ordered and independently interpreted X-rays - see summary below.  I have ordered and independently interpreted EKG Reading(s) - see summary below  Clinical Tests:   Lab Tests: Ordered and Reviewed  Radiological Study:  Ordered and Reviewed  Medical Tests: Ordered and Reviewed  ED Management:  Will get cardiac labs, CXR, EKG and place on cardiac telemetry. Ordered IV morphine 4mg for pain.    EKG shows wide complex tachycardia at rate of 130 bpm. Will consult cardiology    Troponin 0.007. Cr 1.3. CXR shows increased markings in right base. Cardiology taking patient to cath lab for angiogram.     I have discussed the treatment and management of this patient with my supervisory physician, and we agree on the plan of care.               Scribe Attestation:   Scribe #1: I performed the above scribed service and the documentation accurately describes the services I performed. I attest to the accuracy of the note.    Attending Attestation:     Physician Attestation Statement for NP/PA:   I have conducted a face to face encounter with this patient in addition to the NP/PA, due to Medical Complexity    Other NP/PA Attestation Additions:    History of Present Illness: 68 y.o. Male patient with PMHx of remote 5 vessel CABG who experienced MI 3 months ago, pt reports no further cardiology intervention permitable,  presents today with rapid heart rate while pulling a , and endorses chest pain.    Physical Exam: NAD, A&Ox3, SBP 160s,  with wide complex tachycardia apparent on monitor, no MRG, no CW TTP, CTAB, abdomen is soft, non tender, non distended, no lower extremity edema.      Medical Decision Making: EKG shows wide complex tachycardia with rate of 130. This is dissimilar from prior EKG, 10/12/18, with sinus bradycardia, and is dissimilar from EKG of 9/12/18 at time of MI ventrical rate of 125.   Cardiology fellow consulted.     Pt taken to cath lab emergently.          Attending ED Notes:   11:49 AM  Cardiology fellow Gemma called to the bedside and evaluated the patient, he was given ticagrelor 180 and placed on 25,000 untis heparin drip in dextrose. Patient then vomited and became bradycardic down to the 60's with wide  complex morphology then improved to 100. Patient was then taken promptly to cath lab by cardiology.              Clinical Impression:   The primary encounter diagnosis was Wide-complex tachycardia. Diagnoses of Chest pain, Non-ST elevation myocardial infarction (NSTEMI), NSTEMI (non-ST elevation myocardial infarction), NSTEMI (non-ST elevated myocardial infarction), Tachycardia, and Heart failure were also pertinent to this visit.      Disposition:   Disposition: Admitted  Condition: Serious                        Annamarie Pham PA-C  11/16/18 [id number]       Debbi Bhagat MD  11/25/18 [id number]

## 2018-11-16 NOTE — HPI
Reason for Consult: Wide-complex tachycardia    HPI:  67 y/o man with prior 5v CABG (2005) (LIMA to LAD, SVG to Diag), known  to RCA and LCx and recent admission to outside hospital with NSTEMI (treated medically), ICM 35% (Sept 2018), CKD II, HTN, HLD, DM presented with acute onset of chest pain, nausea, lightheadedness and dyspnea.     He arrived to the ED earlier today 30 min after having intense chest tightness (similar to previous NSTEMI) that started suddenly when he was doing physical activity and did not improve with NTG x2. Concomitantly, complains of dyspnea, nausea, and lightheadedness that worsened in the ED with nitro drip. EKGs were obtained and showed R axis, LBBB-morphology tachycardia (with suspicion for lead misplacement) and . His WCT resolved in the ED.     He received LHC that showed no relevant change in previously known coronary disease. During the LVgram, he developed sustained VT of different morphology to presentation, that resolved with lidocaine drip. He has been on sinus rhythm w HR 60-70s since arrival to CCU.    Also, complains of episodes of palpitations that have woken him up several nights.    Previous EKG from admission for NSTEMI showed sinus tachycardia w HR on 120s and 1st degree AV block.    Denies personal history of electrophysiologic abnormalities, syncope, or family history of sudden cardiac death.

## 2018-11-17 PROBLEM — R00.1 BRADYCARDIA: Status: ACTIVE | Noted: 2018-11-17

## 2018-11-17 LAB
ALBUMIN SERPL BCP-MCNC: 3 G/DL
ALP SERPL-CCNC: 49 U/L
ALT SERPL W/O P-5'-P-CCNC: 36 U/L
ANION GAP SERPL CALC-SCNC: 8 MMOL/L
ASCENDING AORTA: 3.67 CM
AST SERPL-CCNC: 40 U/L
AV MEAN GRADIENT: 4.34 MMHG
AV PEAK GRADIENT: 7.18 MMHG
AV VALVE AREA: 2 CM2
BASOPHILS # BLD AUTO: 0.03 K/UL
BASOPHILS NFR BLD: 0.4 %
BILIRUB SERPL-MCNC: 0.3 MG/DL
BSA FOR ECHO PROCEDURE: 2.26 M2
BUN SERPL-MCNC: 11 MG/DL
CALCIUM SERPL-MCNC: 8.7 MG/DL
CHLORIDE SERPL-SCNC: 104 MMOL/L
CO2 SERPL-SCNC: 22 MMOL/L
CREAT SERPL-MCNC: 1 MG/DL
CV ECHO LV RWT: 0.22 CM
DIFFERENTIAL METHOD: ABNORMAL
DOP CALC AO PEAK VEL: 1.34 M/S
DOP CALC AO VTI: 31.24 CM
DOP CALC LVOT AREA: 3.36 CM2
DOP CALC LVOT DIAMETER: 2.07 CM
DOP CALC LVOT STROKE VOLUME: 62.46 CM3
DOP CALCLVOT PEAK VEL VTI: 18.57 CM
E WAVE DECELERATION TIME: 366.68 MSEC
E/A RATIO: 0.65
E/E' RATIO: 7.25
ECHO LV POSTERIOR WALL: 0.57 CM (ref 0.6–1.1)
EOSINOPHIL # BLD AUTO: 0.4 K/UL
EOSINOPHIL NFR BLD: 4.8 %
ERYTHROCYTE [DISTWIDTH] IN BLOOD BY AUTOMATED COUNT: 14.3 %
EST. GFR  (AFRICAN AMERICAN): >60 ML/MIN/1.73 M^2
EST. GFR  (NON AFRICAN AMERICAN): >60 ML/MIN/1.73 M^2
FRACTIONAL SHORTENING: 23 % (ref 28–44)
GLUCOSE SERPL-MCNC: 263 MG/DL
HCT VFR BLD AUTO: 36.6 %
HGB BLD-MCNC: 11.4 G/DL
IMM GRANULOCYTES # BLD AUTO: 0.02 K/UL
IMM GRANULOCYTES NFR BLD AUTO: 0.2 %
INTERVENTRICULAR SEPTUM: 0.81 CM (ref 0.6–1.1)
IVRT: 0.13 MSEC
LA MAJOR: 5.42 CM
LA MINOR: 5.41 CM
LA WIDTH: 4.03 CM
LEFT ATRIUM SIZE: 4.19 CM
LEFT ATRIUM VOLUME INDEX: 34.4 ML/M2
LEFT ATRIUM VOLUME: 77.72 CM3
LEFT INTERNAL DIMENSION IN SYSTOLE: 3.94 CM (ref 2.1–4)
LEFT VENTRICLE DIASTOLIC VOLUME INDEX: 55.86 ML/M2
LEFT VENTRICLE DIASTOLIC VOLUME: 126.24 ML
LEFT VENTRICLE MASS INDEX: 52.3 G/M2
LEFT VENTRICLE SYSTOLIC VOLUME INDEX: 29.8 ML/M2
LEFT VENTRICLE SYSTOLIC VOLUME: 67.41 ML
LEFT VENTRICULAR INTERNAL DIMENSION IN DIASTOLE: 5.14 CM (ref 3.5–6)
LEFT VENTRICULAR MASS: 118.22 G
LV LATERAL E/E' RATIO: 5.8
LV SEPTAL E/E' RATIO: 9.67
LYMPHOCYTES # BLD AUTO: 2.4 K/UL
LYMPHOCYTES NFR BLD: 28 %
MAGNESIUM SERPL-MCNC: 2.2 MG/DL
MAGNESIUM SERPL-MCNC: 2.2 MG/DL
MCH RBC QN AUTO: 27.5 PG
MCHC RBC AUTO-ENTMCNC: 31.1 G/DL
MCV RBC AUTO: 88 FL
MONOCYTES # BLD AUTO: 1.1 K/UL
MONOCYTES NFR BLD: 13.3 %
MV PEAK A VEL: 0.89 M/S
MV PEAK E VEL: 0.58 M/S
NEUTROPHILS # BLD AUTO: 4.5 K/UL
NEUTROPHILS NFR BLD: 53.3 %
NRBC BLD-RTO: 0 /100 WBC
PHOSPHATE SERPL-MCNC: 2.6 MG/DL
PHOSPHATE SERPL-MCNC: 2.6 MG/DL
PLATELET # BLD AUTO: 201 K/UL
PMV BLD AUTO: 10.7 FL
POCT GLUCOSE: 207 MG/DL (ref 70–110)
POCT GLUCOSE: 243 MG/DL (ref 70–110)
POCT GLUCOSE: 273 MG/DL (ref 70–110)
POCT GLUCOSE: 278 MG/DL (ref 70–110)
POCT GLUCOSE: 369 MG/DL (ref 70–110)
POTASSIUM SERPL-SCNC: 4.7 MMOL/L
PROT SERPL-MCNC: 6.5 G/DL
PULM VEIN S/D RATIO: 1.31
PV PEAK D VEL: 0.29 M/S
PV PEAK S VEL: 0.38 M/S
RA MAJOR: 4.72 CM
RA PRESSURE: 3 MMHG
RA WIDTH: 3.76 CM
RBC # BLD AUTO: 4.15 M/UL
RIGHT VENTRICULAR END-DIASTOLIC DIMENSION: 3.06 CM
RV TISSUE DOPPLER FREE WALL SYSTOLIC VELOCITY 1 (APICAL 4 CHAMBER VIEW): 6.58 M/S
SINUS: 3.41 CM
SODIUM SERPL-SCNC: 134 MMOL/L
STJ: 2.69 CM
TDI LATERAL: 0.1
TDI SEPTAL: 0.06
TDI: 0.08
TRICUSPID ANNULAR PLANE SYSTOLIC EXCURSION: 1.63 CM
WBC # BLD AUTO: 8.51 K/UL

## 2018-11-17 PROCEDURE — 99233 SBSQ HOSP IP/OBS HIGH 50: CPT | Mod: GC,,, | Performed by: INTERNAL MEDICINE

## 2018-11-17 PROCEDURE — 25000003 PHARM REV CODE 250: Performed by: STUDENT IN AN ORGANIZED HEALTH CARE EDUCATION/TRAINING PROGRAM

## 2018-11-17 PROCEDURE — 84100 ASSAY OF PHOSPHORUS: CPT

## 2018-11-17 PROCEDURE — 25000003 PHARM REV CODE 250: Performed by: INTERNAL MEDICINE

## 2018-11-17 PROCEDURE — 63600175 PHARM REV CODE 636 W HCPCS: Performed by: INTERNAL MEDICINE

## 2018-11-17 PROCEDURE — 83735 ASSAY OF MAGNESIUM: CPT

## 2018-11-17 PROCEDURE — 20000000 HC ICU ROOM

## 2018-11-17 PROCEDURE — 85025 COMPLETE CBC W/AUTO DIFF WBC: CPT

## 2018-11-17 PROCEDURE — 80053 COMPREHEN METABOLIC PANEL: CPT

## 2018-11-17 RX ORDER — LOSARTAN POTASSIUM 50 MG/1
50 TABLET ORAL DAILY
Status: DISCONTINUED | OUTPATIENT
Start: 2018-11-18 | End: 2018-11-20 | Stop reason: HOSPADM

## 2018-11-17 RX ORDER — SODIUM,POTASSIUM PHOSPHATES 280-250MG
1 POWDER IN PACKET (EA) ORAL ONCE
Status: COMPLETED | OUTPATIENT
Start: 2018-11-17 | End: 2018-11-17

## 2018-11-17 RX ADMIN — TICAGRELOR 90 MG: 90 TABLET ORAL at 08:11

## 2018-11-17 RX ADMIN — INSULIN ASPART 3 UNITS: 100 INJECTION, SOLUTION INTRAVENOUS; SUBCUTANEOUS at 12:11

## 2018-11-17 RX ADMIN — AMIODARONE HYDROCHLORIDE 0.5 MG/MIN: 1.8 INJECTION, SOLUTION INTRAVENOUS at 06:11

## 2018-11-17 RX ADMIN — ASPIRIN 81 MG CHEWABLE TABLET 81 MG: 81 TABLET CHEWABLE at 08:11

## 2018-11-17 RX ADMIN — ISOSORBIDE MONONITRATE 60 MG: 60 TABLET, EXTENDED RELEASE ORAL at 08:11

## 2018-11-17 RX ADMIN — PANTOPRAZOLE SODIUM 40 MG: 40 TABLET, DELAYED RELEASE ORAL at 08:11

## 2018-11-17 RX ADMIN — LOSARTAN POTASSIUM 25 MG: 25 TABLET, FILM COATED ORAL at 08:11

## 2018-11-17 RX ADMIN — ROSUVASTATIN CALCIUM 40 MG: 40 TABLET, FILM COATED ORAL at 08:11

## 2018-11-17 RX ADMIN — FINASTERIDE 5 MG: 5 TABLET, FILM COATED ORAL at 08:11

## 2018-11-17 RX ADMIN — SPIRONOLACTONE 25 MG: 25 TABLET ORAL at 08:11

## 2018-11-17 RX ADMIN — AMIODARONE HYDROCHLORIDE 0.5 MG/MIN: 1.8 INJECTION, SOLUTION INTRAVENOUS at 04:11

## 2018-11-17 RX ADMIN — POTASSIUM & SODIUM PHOSPHATES POWDER PACK 280-160-250 MG 1 PACKET: 280-160-250 PACK at 08:11

## 2018-11-17 RX ADMIN — INSULIN ASPART 2 UNITS: 100 INJECTION, SOLUTION INTRAVENOUS; SUBCUTANEOUS at 08:11

## 2018-11-17 RX ADMIN — INSULIN ASPART 5 UNITS: 100 INJECTION, SOLUTION INTRAVENOUS; SUBCUTANEOUS at 04:11

## 2018-11-17 NOTE — NURSING
Reported to Cards  On call ; HR in the upper 40's on amiodarone infusion; drip decreased to 16.7 mL /hr at 1900; EKG ordered ; cards to come see pt . Prasanth MARCH.    2330- Called cards ; per Dr. Saldivar, HR in the 40's is ok with normal BP and pt is asymptomatic; hold coreg in the a.m. And continue amiodarone drip as ordered. Prasanth MARCH.

## 2018-11-17 NOTE — ASSESSMENT & PLAN NOTE
LBBB pattern terminated with administration of lidocaine, thought to be slow VT, which may be due to the patient's cardiomyopathy  EF reportedly 35% and has no ICD, pending 2D echo;  EP consulted for further recs for antiarrhythmic therapy and/or device placement;   continue Amiodarone GTT until Sun AM. Invasive EPS +/- dual chamber ICD on Monday.    Cardiac monitoring

## 2018-11-17 NOTE — PROGRESS NOTES
Reviewed patient's telemetry today which showed sinus rhythm with HR 45-80s. No events.    Discussed w Dr Glasgow

## 2018-11-17 NOTE — PLAN OF CARE
Problem: Patient Care Overview  Goal: Plan of Care Review  Outcome: Ongoing (interventions implemented as appropriate)  Neuro- AAOx4, 3mm NICK, afebrile, follows commands   Resp- RA  CV- Since arrival from cath lab- pt has been in Sinus Clarence to NSR (52-80s) BP stable. Beta blocker held for bradycardia.   GI/- Voids per urinal. Urinalysis sent off. No BM. Cardiac Diet tolerated well.   Skin/Musk- Right groin site C/D/I with tegaderm and gauze. See documentation for frequent site and neuro checks per order. Pt no longer on bedrest orders. Protective HAPI bundle in place.  Gtts- Nitro and Hep off since arrival from cath lab. Amio started at 1mg/min- dose ordered to change at 1930.     POC: Electrolytes replaced. Pt was seen by EP. Plan is for EP study on Monday and possible AICD placement. Consents obtained. Care explained to Walter Bull and family. THA

## 2018-11-17 NOTE — ASSESSMENT & PLAN NOTE
EF reportedly 35% from an outside facility, will check 2D echo here  Continue medical therapy for ICM including ARB (losartan), and aldactone, isosorbide  Hold BB due to kala for now  Currently without evidence of volume overload or decompensation  Medical therapy for CAD as mentioned

## 2018-11-17 NOTE — ASSESSMENT & PLAN NOTE
EF reportedly 35% and has no ICD, pending 2D echo;  EP consulted for further recs for antiarrhythmic therapy and/or device placement;   continue Amiodarone GTT until Sun AM. Invasive EPS +/- dual chamber ICD on Monday.    Cardiac monitoring

## 2018-11-17 NOTE — PLAN OF CARE
Problem: Patient Care Overview  Goal: Plan of Care Review  Outcome: Ongoing (interventions implemented as appropriate)  Amiodarone infusion reduced to 16.7 ml/hr at 1900; HR in the 40's Sinus Kala , BP's 130s-150's, no ectopy noted, no edema, pt is asymptomatic; ok with Cards as pt is asymptomatic; coreg a.m. Dose 11/17 to be held for kala; pt is A x O x 4, MAEW, RA sats > 95 %, lungs clear, tolerating diet , voids per urinal UOP 1200 for shift; repeat MG 2.2 early in the shift; instructed on POC and progressing toward goals; continue to monitor. NPO after midnight Drew night for possible PPM Monday. Prasanth MARCH.

## 2018-11-17 NOTE — PROGRESS NOTES
[facility]  Cardiology  Progress Note    Patient Name: Walter Bull  MRN: [id number]  Admission Date: 11/16/2018  Hospital Length of Stay: 1 days  Code Status: Full Code   Attending Physician: Erika Varela MD   Primary Care Physician: Belkys Kruger MD  Expected Discharge Date:   Principal Problem:Ventricular tachycardia    Subjective:     Hospital Course:   11/16: Cath lab-- that is known and the LIMA to LAD and SVG to D1 grafts are patent; EP consult for SVT presentation and had inducible sustained VT when pigtail catheter was inserted into the LV, VT terminated with lidocaine bolus (patient never lost conciousness). Started on amio gtt. DC heparin gtt and nitro gtt.  11/17: continue amio drip, BB held due to kala          Interval History:   Taken to the cath lab, coronary anatomy remains stable; no interventions applied. Off heparin drip and nitro drip, chest pain resolved, VT during the procedure, started on amio gtt  While in the morning, pt reports no more chest discomfort or SOB  Bradycardia, (not received BB), but HDS, and pt denies any lightheadedness;    ROS  Objective:     Vital Signs (Most Recent):  Temp: 97.8 °F (36.6 °C) (11/17/18 0700)  Pulse: (!) 50 (11/17/18 1200)  Resp: 17 (11/17/18 1200)  BP: (!) 146/68 (11/17/18 1200)  SpO2: 99 % (11/17/18 1200) Vital Signs (24h Range):  Temp:  [97.7 °F (36.5 °C)-97.8 °F (36.6 °C)] 97.8 °F (36.6 °C)  Pulse:  [46-91] 50  Resp:  [9-21] 17  SpO2:  [97 %-100 %] 99 %  BP: (118-174)/(58-90) 146/68     Weight: 108.6 kg (239 lb 6.7 oz)  Body mass index is 37.5 kg/m².     SpO2: 99 %  O2 Device (Oxygen Therapy): room air      Intake/Output Summary (Last 24 hours) at 11/17/2018 1358  Last data filed at 11/17/2018 1200  Gross per 24 hour   Intake 1727.89 ml   Output 3025 ml   Net -1297.11 ml       Lines/Drains/Airways     Peripheral Intravenous Line                 Peripheral IV - Single Lumen 11/16/18 1032 Right Antecubital 1 day          Peripheral IV - Single Lumen 11/16/18 1107 Right Hand 1 day                Physical Exam   Constitutional: He is oriented to person, place, and time. He appears well-developed and well-nourished.   HENT:   Head: Normocephalic and atraumatic.   Nose: Nose normal.   Mouth/Throat: No oropharyngeal exudate.   Eyes: Right eye exhibits no discharge. Left eye exhibits no discharge. No scleral icterus.   Neck: Normal range of motion. Neck supple. No JVD present.   Cardiovascular: Normal rate, regular rhythm, S1 normal and S2 normal. Exam reveals no gallop, no S3, no S4, no distant heart sounds, no friction rub, no midsystolic click and no opening snap.   No murmur heard.  Pulses:       Radial pulses are 2+ on the right side, and 2+ on the left side.        Femoral pulses are 2+ on the right side, and 2+ on the left side.  Pulmonary/Chest: Effort normal and breath sounds normal. No respiratory distress. He has no wheezes. He has no rales. He exhibits no tenderness.   Abdominal: Soft. Bowel sounds are normal. He exhibits no distension. There is no tenderness. There is no rebound.   Musculoskeletal: Normal range of motion. He exhibits no edema, tenderness or deformity.   R groin site c/d/i, no hematoma felt;   Lymphadenopathy:     He has no cervical adenopathy.   Neurological: He is alert and oriented to person, place, and time. No cranial nerve deficit.   Skin: Skin is warm and dry.   Psychiatric: He has a normal mood and affect. His behavior is normal.           Assessment and Plan:         * Ventricular tachycardia    LBBB pattern terminated with administration of lidocaine, thought to be slow VT, which may be due to the patient's cardiomyopathy  EF reportedly 35% and has no ICD, pending 2D echo;  EP consulted for further recs for antiarrhythmic therapy and/or device placement;   continue Amiodarone GTT until Sun AM. Invasive EPS +/- dual chamber ICD on Monday.    Cardiac monitoring       Bradycardia    HR about 40-50s  during sleep and awake, but HDS, no symptoms  Hold BB for now     Wide-complex tachycardia    EF reportedly 35% and has no ICD, pending 2D echo;  EP consulted for further recs for antiarrhythmic therapy and/or device placement;   continue Amiodarone GTT until Sun AM. Invasive EPS +/- dual chamber ICD on Monday.    Cardiac monitoring       Ischemic cardiomyopathy    EF reportedly 35% from an outside facility, will check 2D echo here  Continue medical therapy for ICM including ARB (losartan), and aldactone, isosorbide  Hold BB due to kala for now  Currently without evidence of volume overload or decompensation  Medical therapy for CAD as mentioned     NSTEMI (non-ST elevated myocardial infarction)    68 year old man with CAD s/p CABG and recent NSTEMI, ICM with LVEF 35%, DM who presents this AM with ischemic chest pain 30 minutes PTA with new onset LBBB. He continues to have 9/10 chest pain, is hemodynamically stable and breathing on room air. ALEM 6, Jaci 157.     --ASA 325mg and ticagrelor 180mg loaded, taken to the cath lab-- that is known and the LIMA to LAD and SVG to D1 grafts are patent; EP consult for SVT presentation and had inducible sustained VT when pigtail catheter was inserted into the LV, VT terminated with lidocaine bolus (patient never lost conciousness).   --nitro gtt and heparin drip discontinued  --will need echo with CFD  -- Continue ASA 81mg and brilinta 90mg BID now  -- Continue Amio drip   Benign essential HTN    Medical therapy per ICM and CAD management,     S/P CABG x 5    As per CAD     HLD (hyperlipidemia)    Statin      Diabetes mellitus type 2 in obese    Detemir 15 QHS with sliding scale  Target -180     Coronary artery disease of native artery of native heart with stable angina pectoris    Taken to cath lab due to concern for MI with new LBBB pattern on EKG, no new occlusions found and no intervention performed, troponin undetectable  Found to have slow VT (see  below)  Continue treatment of CAD, including DAPT, high intensity statin, ARB dose increased due to HTN;  Hold BB now due to bradycardia w HR 40-50s             VTE Risk Mitigation (From admission, onward)        Ordered     IP VTE HIGH RISK PATIENT  Once      11/16/18 1234     Reason for No Pharmacological VTE Prophylaxis  Once      11/16/18 1234          Olman Leo MD  Cardiology  [facility]

## 2018-11-17 NOTE — ASSESSMENT & PLAN NOTE
Taken to cath lab due to concern for MI with new LBBB pattern on EKG, no new occlusions found and no intervention performed, troponin undetectable  Found to have slow VT (see below)  Continue treatment of CAD, including DAPT, high intensity statin, ARB dose increased due to HTN;  Hold BB now due to bradycardia w HR 40-50s

## 2018-11-17 NOTE — SUBJECTIVE & OBJECTIVE
Interval History:   Taken to the cath lab, coronary anatomy remains stable; no interventions applied. Off heparin drip and nitro drip, chest pain resolved, VT during the procedure, started on amio gtt  While in the morning, pt reports no more chest discomfort or SOB  Bradycardia, (not received BB), but HDS, and pt denies any lightheadedness;    ROS  Objective:     Vital Signs (Most Recent):  Temp: 97.8 °F (36.6 °C) (11/17/18 0700)  Pulse: (!) 50 (11/17/18 1200)  Resp: 17 (11/17/18 1200)  BP: (!) 146/68 (11/17/18 1200)  SpO2: 99 % (11/17/18 1200) Vital Signs (24h Range):  Temp:  [97.7 °F (36.5 °C)-97.8 °F (36.6 °C)] 97.8 °F (36.6 °C)  Pulse:  [46-91] 50  Resp:  [9-21] 17  SpO2:  [97 %-100 %] 99 %  BP: (118-174)/(58-90) 146/68     Weight: 108.6 kg (239 lb 6.7 oz)  Body mass index is 37.5 kg/m².     SpO2: 99 %  O2 Device (Oxygen Therapy): room air      Intake/Output Summary (Last 24 hours) at 11/17/2018 1358  Last data filed at 11/17/2018 1200  Gross per 24 hour   Intake 1727.89 ml   Output 3025 ml   Net -1297.11 ml       Lines/Drains/Airways     Peripheral Intravenous Line                 Peripheral IV - Single Lumen 11/16/18 1032 Right Antecubital 1 day         Peripheral IV - Single Lumen 11/16/18 1107 Right Hand 1 day                Physical Exam   Constitutional: He is oriented to person, place, and time. He appears well-developed and well-nourished.   HENT:   Head: Normocephalic and atraumatic.   Nose: Nose normal.   Mouth/Throat: No oropharyngeal exudate.   Eyes: Right eye exhibits no discharge. Left eye exhibits no discharge. No scleral icterus.   Neck: Normal range of motion. Neck supple. No JVD present.   Cardiovascular: Normal rate, regular rhythm, S1 normal and S2 normal. Exam reveals no gallop, no S3, no S4, no distant heart sounds, no friction rub, no midsystolic click and no opening snap.   No murmur heard.  Pulses:       Radial pulses are 2+ on the right side, and 2+ on the left side.        Femoral  pulses are 2+ on the right side, and 2+ on the left side.  Pulmonary/Chest: Effort normal and breath sounds normal. No respiratory distress. He has no wheezes. He has no rales. He exhibits no tenderness.   Abdominal: Soft. Bowel sounds are normal. He exhibits no distension. There is no tenderness. There is no rebound.   Musculoskeletal: Normal range of motion. He exhibits no edema, tenderness or deformity.   R groin site c/d/i, no hematoma felt;   Lymphadenopathy:     He has no cervical adenopathy.   Neurological: He is alert and oriented to person, place, and time. No cranial nerve deficit.   Skin: Skin is warm and dry.   Psychiatric: He has a normal mood and affect. His behavior is normal.

## 2018-11-17 NOTE — HOSPITAL COURSE
11/16: Cath lab-- that is known and the LIMA to LAD and SVG to D1 grafts are patent; EP consult for SVT presentation and had inducible sustained VT when pigtail catheter was inserted into the LV, VT terminated with lidocaine bolus (patient never lost conciousness). Started on amio gtt. DC heparin gtt and nitro gtt.  11/17: continue amio drip, BB held due to kala  11/18: Patient stable, awaiting EP procedure tomorrow  11/19: Patient to EP lab for study today. Remains CP free. Amio ggt d/c prior to procedure   11/20: Patient stable for discharge, f/u with cardiology in 1 week for wound recheck and 3mo with EP

## 2018-11-18 LAB
ALBUMIN SERPL BCP-MCNC: 3.1 G/DL
ALP SERPL-CCNC: 54 U/L
ALT SERPL W/O P-5'-P-CCNC: 51 U/L
ANION GAP SERPL CALC-SCNC: 8 MMOL/L
AST SERPL-CCNC: 44 U/L
BASOPHILS # BLD AUTO: 0.05 K/UL
BASOPHILS NFR BLD: 0.6 %
BILIRUB SERPL-MCNC: 0.5 MG/DL
BUN SERPL-MCNC: 9 MG/DL
CALCIUM SERPL-MCNC: 8.6 MG/DL
CHLORIDE SERPL-SCNC: 105 MMOL/L
CO2 SERPL-SCNC: 22 MMOL/L
CREAT SERPL-MCNC: 1 MG/DL
DIFFERENTIAL METHOD: ABNORMAL
EOSINOPHIL # BLD AUTO: 0.5 K/UL
EOSINOPHIL NFR BLD: 5.8 %
ERYTHROCYTE [DISTWIDTH] IN BLOOD BY AUTOMATED COUNT: 14.3 %
EST. GFR  (AFRICAN AMERICAN): >60 ML/MIN/1.73 M^2
EST. GFR  (NON AFRICAN AMERICAN): >60 ML/MIN/1.73 M^2
GLUCOSE SERPL-MCNC: 289 MG/DL
HCT VFR BLD AUTO: 38.6 %
HGB BLD-MCNC: 12.1 G/DL
IMM GRANULOCYTES # BLD AUTO: 0.01 K/UL
IMM GRANULOCYTES NFR BLD AUTO: 0.1 %
LYMPHOCYTES # BLD AUTO: 2.3 K/UL
LYMPHOCYTES NFR BLD: 28.1 %
MAGNESIUM SERPL-MCNC: 1.7 MG/DL
MCH RBC QN AUTO: 28.5 PG
MCHC RBC AUTO-ENTMCNC: 31.3 G/DL
MCV RBC AUTO: 91 FL
MONOCYTES # BLD AUTO: 1 K/UL
MONOCYTES NFR BLD: 12.4 %
NEUTROPHILS # BLD AUTO: 4.2 K/UL
NEUTROPHILS NFR BLD: 53 %
NRBC BLD-RTO: 0 /100 WBC
PHOSPHATE SERPL-MCNC: 2.4 MG/DL
PHOSPHATE SERPL-MCNC: 2.4 MG/DL
PLATELET # BLD AUTO: 208 K/UL
PMV BLD AUTO: 10.4 FL
POCT GLUCOSE: 262 MG/DL (ref 70–110)
POCT GLUCOSE: 331 MG/DL (ref 70–110)
POCT GLUCOSE: 415 MG/DL (ref 70–110)
POCT GLUCOSE: 431 MG/DL (ref 70–110)
POTASSIUM SERPL-SCNC: 4.1 MMOL/L
POTASSIUM SERPL-SCNC: 4.5 MMOL/L
PROT SERPL-MCNC: 6.5 G/DL
RBC # BLD AUTO: 4.25 M/UL
SODIUM SERPL-SCNC: 135 MMOL/L
WBC # BLD AUTO: 8 K/UL

## 2018-11-18 PROCEDURE — 84132 ASSAY OF SERUM POTASSIUM: CPT

## 2018-11-18 PROCEDURE — 99233 SBSQ HOSP IP/OBS HIGH 50: CPT | Mod: GC,,, | Performed by: INTERNAL MEDICINE

## 2018-11-18 PROCEDURE — 25000003 PHARM REV CODE 250

## 2018-11-18 PROCEDURE — 85025 COMPLETE CBC W/AUTO DIFF WBC: CPT

## 2018-11-18 PROCEDURE — 20000000 HC ICU ROOM

## 2018-11-18 PROCEDURE — 80053 COMPREHEN METABOLIC PANEL: CPT

## 2018-11-18 PROCEDURE — 84100 ASSAY OF PHOSPHORUS: CPT

## 2018-11-18 PROCEDURE — 83735 ASSAY OF MAGNESIUM: CPT | Mod: 91

## 2018-11-18 PROCEDURE — 25000003 PHARM REV CODE 250: Performed by: INTERNAL MEDICINE

## 2018-11-18 PROCEDURE — 83735 ASSAY OF MAGNESIUM: CPT

## 2018-11-18 PROCEDURE — 25000003 PHARM REV CODE 250: Performed by: STUDENT IN AN ORGANIZED HEALTH CARE EDUCATION/TRAINING PROGRAM

## 2018-11-18 PROCEDURE — 63600175 PHARM REV CODE 636 W HCPCS: Performed by: INTERNAL MEDICINE

## 2018-11-18 RX ORDER — IBUPROFEN 200 MG
24 TABLET ORAL
Status: DISCONTINUED | OUTPATIENT
Start: 2018-11-18 | End: 2018-11-18

## 2018-11-18 RX ORDER — LANOLIN ALCOHOL/MO/W.PET/CERES
400 CREAM (GRAM) TOPICAL ONCE
Status: COMPLETED | OUTPATIENT
Start: 2018-11-18 | End: 2018-11-18

## 2018-11-18 RX ORDER — INSULIN ASPART 100 [IU]/ML
1-10 INJECTION, SOLUTION INTRAVENOUS; SUBCUTANEOUS
Status: DISCONTINUED | OUTPATIENT
Start: 2018-11-18 | End: 2018-11-20 | Stop reason: HOSPADM

## 2018-11-18 RX ORDER — IBUPROFEN 200 MG
16 TABLET ORAL
Status: DISCONTINUED | OUTPATIENT
Start: 2018-11-18 | End: 2018-11-20 | Stop reason: HOSPADM

## 2018-11-18 RX ORDER — GLUCAGON 1 MG
1 KIT INJECTION
Status: DISCONTINUED | OUTPATIENT
Start: 2018-11-18 | End: 2018-11-20 | Stop reason: HOSPADM

## 2018-11-18 RX ORDER — IBUPROFEN 200 MG
16 TABLET ORAL
Status: DISCONTINUED | OUTPATIENT
Start: 2018-11-18 | End: 2018-11-18

## 2018-11-18 RX ORDER — IBUPROFEN 200 MG
24 TABLET ORAL
Status: DISCONTINUED | OUTPATIENT
Start: 2018-11-18 | End: 2018-11-20 | Stop reason: HOSPADM

## 2018-11-18 RX ORDER — VANCOMYCIN HCL IN 5 % DEXTROSE 1G/250ML
1000 PLASTIC BAG, INJECTION (ML) INTRAVENOUS ONCE
Status: CANCELLED | OUTPATIENT
Start: 2018-11-18

## 2018-11-18 RX ADMIN — ROSUVASTATIN CALCIUM 40 MG: 40 TABLET, FILM COATED ORAL at 09:11

## 2018-11-18 RX ADMIN — INSULIN ASPART 4 UNITS: 100 INJECTION, SOLUTION INTRAVENOUS; SUBCUTANEOUS at 10:11

## 2018-11-18 RX ADMIN — TICAGRELOR 90 MG: 90 TABLET ORAL at 09:11

## 2018-11-18 RX ADMIN — NITROGLYCERIN 0.4 MG: 0.4 TABLET SUBLINGUAL at 02:11

## 2018-11-18 RX ADMIN — PANTOPRAZOLE SODIUM 40 MG: 40 TABLET, DELAYED RELEASE ORAL at 09:11

## 2018-11-18 RX ADMIN — MAGNESIUM OXIDE TAB 400 MG (241.3 MG ELEMENTAL MG) 400 MG: 400 (241.3 MG) TAB at 06:11

## 2018-11-18 RX ADMIN — NITROGLYCERIN 0.4 MG: 0.4 TABLET SUBLINGUAL at 07:11

## 2018-11-18 RX ADMIN — AMIODARONE HYDROCHLORIDE 0.5 MG/MIN: 1.8 INJECTION, SOLUTION INTRAVENOUS at 06:11

## 2018-11-18 RX ADMIN — LOSARTAN POTASSIUM 50 MG: 50 TABLET, FILM COATED ORAL at 09:11

## 2018-11-18 RX ADMIN — INSULIN ASPART 3 UNITS: 100 INJECTION, SOLUTION INTRAVENOUS; SUBCUTANEOUS at 08:11

## 2018-11-18 RX ADMIN — MAGNESIUM OXIDE TAB 400 MG (241.3 MG ELEMENTAL MG) 400 MG: 400 (241.3 MG) TAB at 07:11

## 2018-11-18 RX ADMIN — INSULIN ASPART 5 UNITS: 100 INJECTION, SOLUTION INTRAVENOUS; SUBCUTANEOUS at 05:11

## 2018-11-18 RX ADMIN — FINASTERIDE 5 MG: 5 TABLET, FILM COATED ORAL at 09:11

## 2018-11-18 RX ADMIN — SPIRONOLACTONE 25 MG: 25 TABLET ORAL at 09:11

## 2018-11-18 RX ADMIN — ISOSORBIDE MONONITRATE 60 MG: 60 TABLET, EXTENDED RELEASE ORAL at 09:11

## 2018-11-18 RX ADMIN — ASPIRIN 81 MG CHEWABLE TABLET 81 MG: 81 TABLET CHEWABLE at 09:11

## 2018-11-18 RX ADMIN — INSULIN ASPART 5 UNITS: 100 INJECTION, SOLUTION INTRAVENOUS; SUBCUTANEOUS at 12:11

## 2018-11-18 RX ADMIN — INSULIN DETEMIR 20 UNITS: 100 INJECTION, SOLUTION SUBCUTANEOUS at 09:11

## 2018-11-18 NOTE — ASSESSMENT & PLAN NOTE
-Last A1c reviewed-   Lab Results   Component Value Date    HGBA1C 6.9 (H) 11/16/2018       Home Antihyperglycemic Regiment:  -Janumet  -SSI    Inpatient Antihyperglycemic Regiment:   Antihyperglycemics (From admission, onward)    Start     Stop Route Frequency Ordered    11/17/18 2100  insulin detemir U-100 pen 17 Units      -- SubQ Nightly 11/17/18 1029    11/16/18 1433  insulin aspart U-100 pen 0-5 Units      -- SubQ Before meals & nightly PRN 11/16/18 1334        -LSSI  -ACCU Checks ACHS  -Diabetic Diet 2000 cookie  -Diabetic nutritional counseling given     Blood Sugars (AccuCheck):  Recent Labs     11/17/18  0834 11/17/18  1221 11/17/18  1647 11/17/18  2058 11/18/18  0810 11/18/18  1141   POCTGLUCOSE 243* 278* 369* 273* 262* 431*

## 2018-11-18 NOTE — PLAN OF CARE
Problem: Patient Care Overview  Goal: Plan of Care Review  Outcome: Ongoing (interventions implemented as appropriate)    No acute events throughout shift. See vital signs and assessments in flowsheets. See below for updates on today's progress.     Pulmonary: room air sats >95    Cardiovascular: HR 50-70s -160s bilat upper and lower extremity pulses palpable    Neurological: aaox4; afebrile    Gastrointestinal: no bm    Genitourinary: voids per urinal     Endocrine: Mg replaced    Integumentary/Other: no skin breakdown noted    Infusions: amiodarone     Patient progressing towards goals as tolerated, plan of care communicated and reviewed with Walter Bull and spouse. All concerns addressed. Will continue to monitor.

## 2018-11-18 NOTE — SUBJECTIVE & OBJECTIVE
Interval History:   Taken to the cath lab, coronary anatomy remains stable; no interventions applied. Off heparin drip and nitro drip, chest pain resolved, VT during the procedure, continued on amio gtt    Review of Systems   Constitution: Negative.   HENT: Negative.    Eyes: Negative.    Cardiovascular: Negative for chest pain.   Respiratory: Positive for shortness of breath.    Endocrine: Negative.    Hematologic/Lymphatic: Negative.    Skin: Negative.    Musculoskeletal: Negative.    Gastrointestinal: Negative.    Genitourinary: Negative.    Neurological: Negative.    Psychiatric/Behavioral: Negative.    Allergic/Immunologic: Negative.      Objective:     Vital Signs (Most Recent):  Temp: 98 °F (36.7 °C) (11/18/18 1100)  Pulse: 72 (11/18/18 1500)  Resp: (!) 26 (11/18/18 1500)  BP: (!) 141/77 (11/18/18 1500)  SpO2: 98 % (11/18/18 1500) Vital Signs (24h Range):  Temp:  [98 °F (36.7 °C)-98.2 °F (36.8 °C)] 98 °F (36.7 °C)  Pulse:  [52-76] 72  Resp:  [11-28] 26  SpO2:  [97 %-100 %] 98 %  BP: (120-168)/(57-88) 141/77     Weight: 108.4 kg (239 lb)  Body mass index is 37.43 kg/m².     SpO2: 98 %  O2 Device (Oxygen Therapy): room air      Intake/Output Summary (Last 24 hours) at 11/18/2018 1547  Last data filed at 11/18/2018 1500  Gross per 24 hour   Intake 1210.8 ml   Output 3005 ml   Net -1794.2 ml       Lines/Drains/Airways     Peripheral Intravenous Line                 Peripheral IV - Single Lumen 11/16/18 1032 Right Antecubital 2 days         Peripheral IV - Single Lumen 11/16/18 1107 Right Hand 2 days                Physical Exam   Constitutional: He is oriented to person, place, and time. He appears well-developed and well-nourished.   HENT:   Head: Normocephalic and atraumatic.   Nose: Nose normal.   Mouth/Throat: No oropharyngeal exudate.   Eyes: Right eye exhibits no discharge. Left eye exhibits no discharge. No scleral icterus.   Neck: Normal range of motion. Neck supple. No JVD present.   Cardiovascular:  Normal rate, regular rhythm, S1 normal and S2 normal. Exam reveals no gallop, no S3, no S4, no distant heart sounds, no friction rub, no midsystolic click and no opening snap.   No murmur heard.  Pulses:       Radial pulses are 2+ on the right side, and 2+ on the left side.        Femoral pulses are 2+ on the right side, and 2+ on the left side.  Pulmonary/Chest: Effort normal and breath sounds normal. No respiratory distress. He has no wheezes. He has no rales. He exhibits no tenderness.   Abdominal: Soft. Bowel sounds are normal. He exhibits no distension. There is no tenderness. There is no rebound.   Musculoskeletal: Normal range of motion. He exhibits no edema, tenderness or deformity.   R groin site c/d/i, no hematoma felt;   Lymphadenopathy:     He has no cervical adenopathy.   Neurological: He is alert and oriented to person, place, and time. No cranial nerve deficit.   Skin: Skin is warm and dry.   Psychiatric: He has a normal mood and affect. His behavior is normal.

## 2018-11-18 NOTE — NURSING
Patient complained of 4/10 midsternal chest pain before shift change. Nitro given x1 with full relief of CP :)

## 2018-11-18 NOTE — PROGRESS NOTES
Brief EP F/U Note    Reviewed telemetry today AM which showed sinus rhythm w HR 50-70s. No events. No new recommendations for today from our end. Pending EP study and potentially ICD placement tomorrow w Dr Glasgow.

## 2018-11-18 NOTE — ASSESSMENT & PLAN NOTE
LBBB pattern terminated with administration of lidocaine, thought to be slow VT, which may be due to the patient's cardiomyopathy  EF reportedly 35% and has no ICD  EP consulted for further recs for antiarrhythmic therapy and/or device placement; continue Amiodarone GTT until Sun AM.  · Mildly decreased left ventricular systolic function. The estimated ejection fraction is 45%  · Grade I (mild) left ventricular diastolic dysfunction consistent with impaired relaxation.  · Normal right ventricular systolic function.  · Moderate left atrial enlargement.  · Normal central venous pressure (3 mm Hg).  · No pericardial effusion.  · Septal wall has abnormal motion.  · Local segmental wall motion abnormalities.  · Normal left atrial pressure.    PLAN  - Invasive EPS +/- dual chamber ICD on Monday.   -Cardiac monitoring

## 2018-11-18 NOTE — NURSING
-Held BetaBlocker this AM 2/2 MD order d/t pt bradycardia..     -Cardiology team notified of patient's uncontrolled hyperglycemia on current insulin regimen

## 2018-11-18 NOTE — PLAN OF CARE
Problem: Patient Care Overview  Goal: Plan of Care Review  Outcome: Ongoing (interventions implemented as appropriate)  No acute events throughout day. See vital signs and assessments in flowsheets. VSS, no episodes of VT. Afebrile, -170s. Amio gtt cont @ 0.5mg/min, plan to stop gtt in AM. UO 1400cc for shift. No BM today, cardiac diet tolerated well. BG monitored, covered per sliding scale, -350s. Plan for AICD placement Monday. Patient progressing towards goals as tolerated, plan of care communicated and reviewed with Walter Bull and family. All concerns addressed. Will continue to monitor.

## 2018-11-18 NOTE — ASSESSMENT & PLAN NOTE
EF reportedly 35% from an outside facility, will check 2D echo here  Continue medical therapy for ICM including ARB (losartan), and aldactone, isosorbide, BB  Currently without evidence of volume overload or decompensation  Medical therapy for CAD as mentioned

## 2018-11-18 NOTE — PROGRESS NOTES
[facility]  Cardiology  Progress Note    Patient Name: Walter Bull  MRN: [id number]  Admission Date: 11/16/2018  Hospital Length of Stay: 2 days  Code Status: Full Code   Attending Physician: Erika Varela MD   Primary Care Physician: Belkys Kruger MD  Expected Discharge Date:   Principal Problem:Ventricular tachycardia    Subjective:     Hospital Course:   11/16: Cath lab-- that is known and the LIMA to LAD and SVG to D1 grafts are patent; EP consult for SVT presentation and had inducible sustained VT when pigtail catheter was inserted into the LV, VT terminated with lidocaine bolus (patient never lost conciousness). Started on amio gtt. DC heparin gtt and nitro gtt.  11/17: continue amio drip, BB held due to kala          Interval History:   Taken to the cath lab, coronary anatomy remains stable; no interventions applied. Off heparin drip and nitro drip, chest pain resolved, VT during the procedure, continued on amio gtt    Review of Systems   Constitution: Negative.   HENT: Negative.    Eyes: Negative.    Cardiovascular: Negative for chest pain.   Respiratory: Positive for shortness of breath.    Endocrine: Negative.    Hematologic/Lymphatic: Negative.    Skin: Negative.    Musculoskeletal: Negative.    Gastrointestinal: Negative.    Genitourinary: Negative.    Neurological: Negative.    Psychiatric/Behavioral: Negative.    Allergic/Immunologic: Negative.      Objective:     Vital Signs (Most Recent):  Temp: 98 °F (36.7 °C) (11/18/18 1100)  Pulse: 72 (11/18/18 1500)  Resp: (!) 26 (11/18/18 1500)  BP: (!) 141/77 (11/18/18 1500)  SpO2: 98 % (11/18/18 1500) Vital Signs (24h Range):  Temp:  [98 °F (36.7 °C)-98.2 °F (36.8 °C)] 98 °F (36.7 °C)  Pulse:  [52-76] 72  Resp:  [11-28] 26  SpO2:  [97 %-100 %] 98 %  BP: (120-168)/(57-88) 141/77     Weight: 108.4 kg (239 lb)  Body mass index is 37.43 kg/m².     SpO2: 98 %  O2 Device (Oxygen Therapy): room air      Intake/Output Summary  (Last 24 hours) at 11/18/2018 1547  Last data filed at 11/18/2018 1500  Gross per 24 hour   Intake 1210.8 ml   Output 3005 ml   Net -1794.2 ml       Lines/Drains/Airways     Peripheral Intravenous Line                 Peripheral IV - Single Lumen 11/16/18 1032 Right Antecubital 2 days         Peripheral IV - Single Lumen 11/16/18 1107 Right Hand 2 days                Physical Exam   Constitutional: He is oriented to person, place, and time. He appears well-developed and well-nourished.   HENT:   Head: Normocephalic and atraumatic.   Nose: Nose normal.   Mouth/Throat: No oropharyngeal exudate.   Eyes: Right eye exhibits no discharge. Left eye exhibits no discharge. No scleral icterus.   Neck: Normal range of motion. Neck supple. No JVD present.   Cardiovascular: Normal rate, regular rhythm, S1 normal and S2 normal. Exam reveals no gallop, no S3, no S4, no distant heart sounds, no friction rub, no midsystolic click and no opening snap.   No murmur heard.  Pulses:       Radial pulses are 2+ on the right side, and 2+ on the left side.        Femoral pulses are 2+ on the right side, and 2+ on the left side.  Pulmonary/Chest: Effort normal and breath sounds normal. No respiratory distress. He has no wheezes. He has no rales. He exhibits no tenderness.   Abdominal: Soft. Bowel sounds are normal. He exhibits no distension. There is no tenderness. There is no rebound.   Musculoskeletal: Normal range of motion. He exhibits no edema, tenderness or deformity.   R groin site c/d/i, no hematoma felt;   Lymphadenopathy:     He has no cervical adenopathy.   Neurological: He is alert and oriented to person, place, and time. No cranial nerve deficit.   Skin: Skin is warm and dry.   Psychiatric: He has a normal mood and affect. His behavior is normal.           Assessment and Plan:     Brief HPI: 67 y/o man with prior 5v CABG (2005) (LIMA to LAD, SVG to Diag), known  to RCA and LCx and recent admission to outside hospital with  NSTEMI, CKD II, HTN, HLD, DM presented with acute onset of chest pain and new onset LBBB. Had LHC at OSH and has been managed medically. Has LVEF 35%, no ICD. Was about to perform lawn work when he started having acute onset chest pain, similar in nature to his prior MI. Also noted palpitations which he had not experienced before, has never experienced syncope. 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 He denies orthopnea, PND, peripheral edema or [place]. There is no family history of sudden cardiac death.    EMS arrived and found to have wide complex tachycardia. Cardiology called for assistance. He continued to have 9-10/10 chest pain like an elephant sitting on his chest. Received NTG x 2 and ASA 325mg, was taken to the cath lab and no new occlusions were identified. The patient received lidocaine for a slow MMVT which restored NSR with a narrow complex. He is admitted to [facility] for further evaluation on an amiodarone infusion.      * Ventricular tachycardia    LBBB pattern terminated with administration of lidocaine, thought to be slow VT, which may be due to the patient's cardiomyopathy  EF reportedly 35% and has no ICD  EP consulted for further recs for antiarrhythmic therapy and/or device placement; continue Amiodarone GTT until Sun AM.  · Mildly decreased left ventricular systolic function. The estimated ejection fraction is 45%  · Grade I (mild) left ventricular diastolic dysfunction consistent with impaired relaxation.  · Normal right ventricular systolic function.  · Moderate left atrial enlargement.  · Normal central venous pressure (3 mm Hg).  · No pericardial effusion.  · Septal wall has abnormal motion.  · Local segmental wall motion abnormalities.  · Normal left atrial pressure.    PLAN  - Invasive EPS +/- dual chamber ICD on Monday.   -Cardiac monitoring       Bradycardia    HR about 40-50s during sleep and awake, but HDS, no symptoms    -HR  60-70  -WCTM     Wide-complex tachycardia    -see above        Ischemic cardiomyopathy    EF reportedly 35% from an outside facility, will check 2D echo here  Continue medical therapy for ICM including ARB (losartan), and aldactone, isosorbide, BB  Currently without evidence of volume overload or decompensation  Medical therapy for CAD as mentioned         NSTEMI (non-ST elevated myocardial infarction)    68 year old man with CAD s/p CABG and recent NSTEMI, ICM with LVEF 35%, DM who presents this AM with ischemic chest pain 30 minutes PTA with new onset LBBB. He continues to have 9/10 chest pain, is hemodynamically stable and breathing on room air. ALEM 6, Jaci 157.     --ASA 325mg and ticagrelor 180mg  --nitro gtt  --cath lab this AM  --heparin gtt  --will need echo with CFD  --admit to ICU     Benign essential HTN    Medical therapy per ICM and CAD management  -Losartan 50mg QD  -Isosorbide 60mg QD  -Spironolactone 25mg QD    -WCTM     S/P CABG x 5    As per CAD     HLD (hyperlipidemia)    -Rosuvastatin 40mg QD       Diabetes mellitus type 2 in obese    -Last A1c reviewed-   Lab Results   Component Value Date    HGBA1C 6.9 (H) 11/16/2018       Home Antihyperglycemic Regiment:  -Janumet  -SSI    Inpatient Antihyperglycemic Regiment:   Antihyperglycemics (From admission, onward)    Start     Stop Route Frequency Ordered    11/17/18 2100  insulin detemir U-100 pen 17 Units      -- SubQ Nightly 11/17/18 1029    11/16/18 1433  insulin aspart U-100 pen 0-5 Units      -- SubQ Before meals & nightly PRN 11/16/18 1334        -LSSI  -ACCU Checks ACHS  -Diabetic Diet 2000 cookie  -Diabetic nutritional counseling given     Blood Sugars (AccuCheck):  Recent Labs     11/17/18  0834 11/17/18  1221 11/17/18  1647 11/17/18  2058 11/18/18  0810 11/18/18  1141   POCTGLUCOSE 243* 278* 369* 273* 262* 431*                Coronary artery disease of native artery of native heart with stable angina pectoris    Taken to cath lab due to  concern for MI with new LBBB pattern on EKG, no new occlusions found and no intervention performed, troponin undetectable  Found to have slow VT (see below)  Continue treatment of CAD, including DAPT, high intensity statin, ARB dose increased due to HTN;  Hold BB now due to bradycardia w HR 40-50s              VTE Risk Mitigation (From admission, onward)        Ordered     IP VTE HIGH RISK PATIENT  Once      11/16/18 1234     Reason for No Pharmacological VTE Prophylaxis  Once      11/16/18 1234          Felisha Og MD  Cardiology  [facility]

## 2018-11-18 NOTE — ASSESSMENT & PLAN NOTE
Medical therapy per ICM and CAD management  -Losartan 50mg QD  -Isosorbide 60mg QD  -Spironolactone 25mg QD    -WCTM

## 2018-11-19 ENCOUNTER — ANESTHESIA (OUTPATIENT)
Dept: MEDSURG UNIT | Facility: HOSPITAL | Age: 68
DRG: 225 | End: 2018-11-19
Payer: MEDICARE

## 2018-11-19 ENCOUNTER — ANESTHESIA EVENT (OUTPATIENT)
Dept: MEDSURG UNIT | Facility: HOSPITAL | Age: 68
DRG: 225 | End: 2018-11-19
Payer: MEDICARE

## 2018-11-19 PROBLEM — Z95.1 S/P CABG X 5: Status: RESOLVED | Noted: 2017-01-11 | Resolved: 2018-11-19

## 2018-11-19 LAB
ALBUMIN SERPL BCP-MCNC: 3.1 G/DL
ALP SERPL-CCNC: 59 U/L
ALT SERPL W/O P-5'-P-CCNC: 102 U/L
ANION GAP SERPL CALC-SCNC: 8 MMOL/L
AST SERPL-CCNC: 79 U/L
BASOPHILS # BLD AUTO: 0.06 K/UL
BASOPHILS NFR BLD: 0.6 %
BILIRUB SERPL-MCNC: 0.6 MG/DL
BUN SERPL-MCNC: 10 MG/DL
CALCIUM SERPL-MCNC: 9.2 MG/DL
CHLORIDE SERPL-SCNC: 103 MMOL/L
CO2 SERPL-SCNC: 25 MMOL/L
CREAT SERPL-MCNC: 1.1 MG/DL
DIFFERENTIAL METHOD: ABNORMAL
EOSINOPHIL # BLD AUTO: 0.5 K/UL
EOSINOPHIL NFR BLD: 5.4 %
ERYTHROCYTE [DISTWIDTH] IN BLOOD BY AUTOMATED COUNT: 14 %
EST. GFR  (AFRICAN AMERICAN): >60 ML/MIN/1.73 M^2
EST. GFR  (NON AFRICAN AMERICAN): >60 ML/MIN/1.73 M^2
GLUCOSE SERPL-MCNC: 273 MG/DL
HCT VFR BLD AUTO: 37.2 %
HGB BLD-MCNC: 12 G/DL
IMM GRANULOCYTES # BLD AUTO: 0.02 K/UL
IMM GRANULOCYTES NFR BLD AUTO: 0.2 %
LYMPHOCYTES # BLD AUTO: 3 K/UL
LYMPHOCYTES NFR BLD: 31.1 %
MAGNESIUM SERPL-MCNC: 1.8 MG/DL
MAGNESIUM SERPL-MCNC: 1.8 MG/DL
MAGNESIUM SERPL-MCNC: 2.3 MG/DL
MCH RBC QN AUTO: 28.2 PG
MCHC RBC AUTO-ENTMCNC: 32.3 G/DL
MCV RBC AUTO: 87 FL
MONOCYTES # BLD AUTO: 1.2 K/UL
MONOCYTES NFR BLD: 12.1 %
NEUTROPHILS # BLD AUTO: 4.9 K/UL
NEUTROPHILS NFR BLD: 50.6 %
NRBC BLD-RTO: 0 /100 WBC
PHOSPHATE SERPL-MCNC: 3.2 MG/DL
PHOSPHATE SERPL-MCNC: 3.2 MG/DL
PLATELET # BLD AUTO: 202 K/UL
PMV BLD AUTO: 10.7 FL
POCT GLUCOSE: 222 MG/DL (ref 70–110)
POCT GLUCOSE: 245 MG/DL (ref 70–110)
POCT GLUCOSE: 251 MG/DL (ref 70–110)
POTASSIUM SERPL-SCNC: 4.3 MMOL/L
PROT SERPL-MCNC: 6.6 G/DL
RBC # BLD AUTO: 4.26 M/UL
SODIUM SERPL-SCNC: 136 MMOL/L
WBC # BLD AUTO: 9.6 K/UL

## 2018-11-19 PROCEDURE — 02H63KZ INSERTION OF DEFIBRILLATOR LEAD INTO RIGHT ATRIUM, PERCUTANEOUS APPROACH: ICD-10-PCS | Performed by: INTERNAL MEDICINE

## 2018-11-19 PROCEDURE — 4A0234Z MEASUREMENT OF CARDIAC ELECTRICAL ACTIVITY, PERCUTANEOUS APPROACH: ICD-10-PCS | Performed by: INTERNAL MEDICINE

## 2018-11-19 PROCEDURE — 37000009 HC ANESTHESIA EA ADD 15 MINS: Performed by: INTERNAL MEDICINE

## 2018-11-19 PROCEDURE — 25000003 PHARM REV CODE 250: Performed by: STUDENT IN AN ORGANIZED HEALTH CARE EDUCATION/TRAINING PROGRAM

## 2018-11-19 PROCEDURE — 83735 ASSAY OF MAGNESIUM: CPT | Mod: 91

## 2018-11-19 PROCEDURE — 93618 INDCTJ ARRHYTHMIA ELEC PACG: CPT

## 2018-11-19 PROCEDURE — S5571 INSULIN DISPOS PEN 3 ML: HCPCS | Performed by: STUDENT IN AN ORGANIZED HEALTH CARE EDUCATION/TRAINING PROGRAM

## 2018-11-19 PROCEDURE — 63600175 PHARM REV CODE 636 W HCPCS: Performed by: NURSE ANESTHETIST, CERTIFIED REGISTERED

## 2018-11-19 PROCEDURE — 63600175 PHARM REV CODE 636 W HCPCS: Performed by: INTERNAL MEDICINE

## 2018-11-19 PROCEDURE — C1769 GUIDE WIRE: HCPCS | Performed by: INTERNAL MEDICINE

## 2018-11-19 PROCEDURE — 20000000 HC ICU ROOM

## 2018-11-19 PROCEDURE — 25000003 PHARM REV CODE 250: Performed by: NURSE ANESTHETIST, CERTIFIED REGISTERED

## 2018-11-19 PROCEDURE — C1883 ADAPT/EXT, PACING/NEURO LEAD: HCPCS | Performed by: INTERNAL MEDICINE

## 2018-11-19 PROCEDURE — C1777 LEAD, AICD, ENDO SINGLE COIL: HCPCS | Performed by: INTERNAL MEDICINE

## 2018-11-19 PROCEDURE — C1730 CATH, EP, 19 OR FEW ELECT: HCPCS | Performed by: INTERNAL MEDICINE

## 2018-11-19 PROCEDURE — 63600175 PHARM REV CODE 636 W HCPCS: Performed by: NURSE PRACTITIONER

## 2018-11-19 PROCEDURE — C9290 INJ, BUPIVACAINE LIPOSOME: HCPCS | Performed by: INTERNAL MEDICINE

## 2018-11-19 PROCEDURE — 99233 SBSQ HOSP IP/OBS HIGH 50: CPT | Mod: GC,,, | Performed by: INTERNAL MEDICINE

## 2018-11-19 PROCEDURE — 63600175 PHARM REV CODE 636 W HCPCS: Performed by: STUDENT IN AN ORGANIZED HEALTH CARE EDUCATION/TRAINING PROGRAM

## 2018-11-19 PROCEDURE — 02HK3KZ INSERTION OF DEFIBRILLATOR LEAD INTO RIGHT VENTRICLE, PERCUTANEOUS APPROACH: ICD-10-PCS | Performed by: INTERNAL MEDICINE

## 2018-11-19 PROCEDURE — 80053 COMPREHEN METABOLIC PANEL: CPT

## 2018-11-19 PROCEDURE — 25000003 PHARM REV CODE 250: Performed by: NURSE PRACTITIONER

## 2018-11-19 PROCEDURE — 25000003 PHARM REV CODE 250: Performed by: INTERNAL MEDICINE

## 2018-11-19 PROCEDURE — C1894 INTRO/SHEATH, NON-LASER: HCPCS | Performed by: INTERNAL MEDICINE

## 2018-11-19 PROCEDURE — C1721 AICD, DUAL CHAMBER: HCPCS | Performed by: INTERNAL MEDICINE

## 2018-11-19 PROCEDURE — 37000008 HC ANESTHESIA 1ST 15 MINUTES: Performed by: INTERNAL MEDICINE

## 2018-11-19 PROCEDURE — 83735 ASSAY OF MAGNESIUM: CPT

## 2018-11-19 PROCEDURE — 85025 COMPLETE CBC W/AUTO DIFF WBC: CPT

## 2018-11-19 PROCEDURE — 84100 ASSAY OF PHOSPHORUS: CPT

## 2018-11-19 PROCEDURE — 33249 INSJ/RPLCMT DEFIB W/LEAD(S): CPT

## 2018-11-19 PROCEDURE — 93618 INDCTJ ARRHYTHMIA ELEC PACG: CPT | Mod: 26,,, | Performed by: INTERNAL MEDICINE

## 2018-11-19 PROCEDURE — 33249 INSJ/RPLCMT DEFIB W/LEAD(S): CPT | Mod: Q0,,, | Performed by: INTERNAL MEDICINE

## 2018-11-19 PROCEDURE — D9220A PRA ANESTHESIA: Mod: CRNA,,, | Performed by: NURSE ANESTHETIST, CERTIFIED REGISTERED

## 2018-11-19 PROCEDURE — D9220A PRA ANESTHESIA: Mod: ANES,,, | Performed by: ANESTHESIOLOGY

## 2018-11-19 PROCEDURE — 0JH608Z INSERTION OF DEFIBRILLATOR GENERATOR INTO CHEST SUBCUTANEOUS TISSUE AND FASCIA, OPEN APPROACH: ICD-10-PCS | Performed by: INTERNAL MEDICINE

## 2018-11-19 DEVICE — PACING LEAD
Type: IMPLANTABLE DEVICE | Site: CHEST | Status: FUNCTIONAL
Brand: TENDRIL MRI™

## 2018-11-19 DEVICE — DEFIBRILLATION LEAD
Type: IMPLANTABLE DEVICE | Site: CHEST | Status: FUNCTIONAL
Brand: DURATA™

## 2018-11-19 DEVICE — TIERED-THERAPY CARDIOVERTER/DEFIBRILLATOR VVEV VVIR
Type: IMPLANTABLE DEVICE | Site: CHEST | Status: FUNCTIONAL
Brand: FORTIFY ASSURA™
Removed: 2022-04-07

## 2018-11-19 RX ORDER — FENTANYL CITRATE 50 UG/ML
25 INJECTION, SOLUTION INTRAMUSCULAR; INTRAVENOUS EVERY 5 MIN PRN
Status: CANCELLED | OUTPATIENT
Start: 2018-11-19

## 2018-11-19 RX ORDER — HYDROMORPHONE HYDROCHLORIDE 1 MG/ML
0.2 INJECTION, SOLUTION INTRAMUSCULAR; INTRAVENOUS; SUBCUTANEOUS EVERY 5 MIN PRN
Status: CANCELLED | OUTPATIENT
Start: 2018-11-19

## 2018-11-19 RX ORDER — CEFAZOLIN SODIUM 1 G/3ML
1 INJECTION, POWDER, FOR SOLUTION INTRAMUSCULAR; INTRAVENOUS
Status: COMPLETED | OUTPATIENT
Start: 2018-11-19 | End: 2018-11-20

## 2018-11-19 RX ORDER — CEFAZOLIN SODIUM 1 G/3ML
2 INJECTION, POWDER, FOR SOLUTION INTRAMUSCULAR; INTRAVENOUS
Status: COMPLETED | OUTPATIENT
Start: 2018-11-19 | End: 2018-11-19

## 2018-11-19 RX ORDER — LIDOCAINE HYDROCHLORIDE 20 MG/ML
INJECTION, SOLUTION INFILTRATION; PERINEURAL
Status: DISCONTINUED | OUTPATIENT
Start: 2018-11-19 | End: 2018-11-20 | Stop reason: HOSPADM

## 2018-11-19 RX ORDER — FENTANYL CITRATE 50 UG/ML
INJECTION, SOLUTION INTRAMUSCULAR; INTRAVENOUS
Status: DISCONTINUED | OUTPATIENT
Start: 2018-11-19 | End: 2018-11-19

## 2018-11-19 RX ORDER — AMIODARONE HYDROCHLORIDE 200 MG/1
400 TABLET ORAL 2 TIMES DAILY
Status: DISCONTINUED | OUTPATIENT
Start: 2018-11-19 | End: 2018-11-20

## 2018-11-19 RX ORDER — PROPOFOL 10 MG/ML
VIAL (ML) INTRAVENOUS
Status: DISCONTINUED | OUTPATIENT
Start: 2018-11-19 | End: 2018-11-19

## 2018-11-19 RX ORDER — PROPOFOL 10 MG/ML
VIAL (ML) INTRAVENOUS CONTINUOUS PRN
Status: DISCONTINUED | OUTPATIENT
Start: 2018-11-19 | End: 2018-11-19

## 2018-11-19 RX ORDER — SODIUM CHLORIDE 9 MG/ML
INJECTION, SOLUTION INTRAVENOUS CONTINUOUS PRN
Status: DISCONTINUED | OUTPATIENT
Start: 2018-11-19 | End: 2018-11-19

## 2018-11-19 RX ORDER — OXYCODONE HYDROCHLORIDE 5 MG/1
5 TABLET ORAL EVERY 6 HOURS PRN
Status: DISCONTINUED | OUTPATIENT
Start: 2018-11-19 | End: 2018-11-20 | Stop reason: HOSPADM

## 2018-11-19 RX ORDER — MAGNESIUM SULFATE HEPTAHYDRATE 40 MG/ML
2 INJECTION, SOLUTION INTRAVENOUS ONCE
Status: COMPLETED | OUTPATIENT
Start: 2018-11-19 | End: 2018-11-19

## 2018-11-19 RX ORDER — MIDAZOLAM HYDROCHLORIDE 1 MG/ML
INJECTION, SOLUTION INTRAMUSCULAR; INTRAVENOUS
Status: DISCONTINUED | OUTPATIENT
Start: 2018-11-19 | End: 2018-11-19

## 2018-11-19 RX ORDER — LIDOCAINE HCL/PF 100 MG/5ML
SYRINGE (ML) INTRAVENOUS
Status: DISCONTINUED | OUTPATIENT
Start: 2018-11-19 | End: 2018-11-19

## 2018-11-19 RX ORDER — PHENYLEPHRINE HYDROCHLORIDE 10 MG/ML
INJECTION INTRAVENOUS
Status: DISCONTINUED | OUTPATIENT
Start: 2018-11-19 | End: 2018-11-19

## 2018-11-19 RX ADMIN — FENTANYL CITRATE 25 MCG: 50 INJECTION, SOLUTION INTRAMUSCULAR; INTRAVENOUS at 11:11

## 2018-11-19 RX ADMIN — ISOSORBIDE MONONITRATE 60 MG: 60 TABLET, EXTENDED RELEASE ORAL at 09:11

## 2018-11-19 RX ADMIN — SODIUM CHLORIDE, SODIUM GLUCONATE, SODIUM ACETATE, POTASSIUM CHLORIDE, MAGNESIUM CHLORIDE, SODIUM PHOSPHATE, DIBASIC, AND POTASSIUM PHOSPHATE: .53; .5; .37; .037; .03; .012; .00082 INJECTION, SOLUTION INTRAVENOUS at 10:11

## 2018-11-19 RX ADMIN — ACETAMINOPHEN 650 MG: 325 TABLET, FILM COATED ORAL at 03:11

## 2018-11-19 RX ADMIN — MAGNESIUM SULFATE IN WATER 2 G: 40 INJECTION, SOLUTION INTRAVENOUS at 05:11

## 2018-11-19 RX ADMIN — TICAGRELOR 90 MG: 90 TABLET ORAL at 09:11

## 2018-11-19 RX ADMIN — INSULIN DETEMIR 20 UNITS: 100 INJECTION, SOLUTION SUBCUTANEOUS at 10:11

## 2018-11-19 RX ADMIN — CARVEDILOL 6.25 MG: 6.25 TABLET, FILM COATED ORAL at 06:11

## 2018-11-19 RX ADMIN — SODIUM CHLORIDE: 0.9 INJECTION, SOLUTION INTRAVENOUS at 10:11

## 2018-11-19 RX ADMIN — MIDAZOLAM 1 MG: 1 INJECTION INTRAMUSCULAR; INTRAVENOUS at 11:11

## 2018-11-19 RX ADMIN — INSULIN ASPART 4 UNITS: 100 INJECTION, SOLUTION INTRAVENOUS; SUBCUTANEOUS at 09:11

## 2018-11-19 RX ADMIN — PROPOFOL 80 MG: 10 INJECTION, EMULSION INTRAVENOUS at 11:11

## 2018-11-19 RX ADMIN — ASPIRIN 81 MG CHEWABLE TABLET 81 MG: 81 TABLET CHEWABLE at 09:11

## 2018-11-19 RX ADMIN — CEFAZOLIN 2 G: 330 INJECTION, POWDER, FOR SOLUTION INTRAMUSCULAR; INTRAVENOUS at 12:11

## 2018-11-19 RX ADMIN — INSULIN ASPART 4 UNITS: 100 INJECTION, SOLUTION INTRAVENOUS; SUBCUTANEOUS at 05:11

## 2018-11-19 RX ADMIN — LIDOCAINE HYDROCHLORIDE 50 MG: 20 INJECTION, SOLUTION INTRAVENOUS at 11:11

## 2018-11-19 RX ADMIN — CEFAZOLIN 1 G: 330 INJECTION, POWDER, FOR SOLUTION INTRAMUSCULAR; INTRAVENOUS at 10:11

## 2018-11-19 RX ADMIN — PHENYLEPHRINE HYDROCHLORIDE 100 MCG: 10 INJECTION INTRAVENOUS at 12:11

## 2018-11-19 RX ADMIN — AMIODARONE HYDROCHLORIDE 400 MG: 200 TABLET ORAL at 10:11

## 2018-11-19 RX ADMIN — PROPOFOL 50 MCG/KG/MIN: 10 INJECTION, EMULSION INTRAVENOUS at 11:11

## 2018-11-19 RX ADMIN — TICAGRELOR 90 MG: 90 TABLET ORAL at 10:11

## 2018-11-19 RX ADMIN — OXYCODONE HYDROCHLORIDE 5 MG: 5 TABLET ORAL at 09:11

## 2018-11-19 RX ADMIN — SPIRONOLACTONE 25 MG: 25 TABLET ORAL at 09:11

## 2018-11-19 RX ADMIN — LOSARTAN POTASSIUM 50 MG: 50 TABLET, FILM COATED ORAL at 09:11

## 2018-11-19 RX ADMIN — ROSUVASTATIN CALCIUM 40 MG: 40 TABLET, FILM COATED ORAL at 09:11

## 2018-11-19 RX ADMIN — FINASTERIDE 5 MG: 5 TABLET, FILM COATED ORAL at 09:11

## 2018-11-19 RX ADMIN — PANTOPRAZOLE SODIUM 40 MG: 40 TABLET, DELAYED RELEASE ORAL at 09:11

## 2018-11-19 NOTE — ANESTHESIA PREPROCEDURE EVALUATION
11/19/2018  Walter Bull is a 68 y.o., male   Patient Active Problem List   Diagnosis    Coronary artery disease of native artery of native heart with stable angina pectoris    Diabetes mellitus type 2 in obese    Diabetic polyneuropathy associated with type 2 diabetes mellitus    HLD (hyperlipidemia)    S/P CABG x 5    History of tobacco use    GERD (gastroesophageal reflux disease)    Benign essential HTN    Benign prostatic hyperplasia without lower urinary tract symptoms    Iron deficiency anemia    NSTEMI (non-ST elevated myocardial infarction)    Severe obesity (BMI 35.0-39.9) with comorbidity    Ischemic cardiomyopathy    Chest pain    Ventricular tachycardia    Wide-complex tachycardia    Bradycardia     .    Anesthesia Evaluation         Review of Systems      Physical Exam  General:  Well nourished    Airway/Jaw/Neck:  Airway Findings: Mouth Opening: Normal Tongue: Normal  General Airway Assessment: Adult  Mallampati: II  Improves to II with phonation.  TM Distance: Normal, at least 6 cm      Dental:  Dental Findings: In tact   Chest/Lungs:  Chest/Lungs Findings: Clear to auscultation     Heart/Vascular:  Heart Findings: Rate: Normal  Rhythm: Regular Rhythm  Sounds: Normal        Mental Status:  Mental Status Findings:  Cooperative, Alert and Oriented         Anesthesia Plan  Type of Anesthesia, risks & benefits discussed:  Anesthesia Type:  MAC  Patient's Preference: Sedation  Intra-op Monitoring Plan: standard ASA monitors  Intra-op Monitoring Plan Comments:   Post Op Pain Control Plan: per primary service following discharge from PACU  Post Op Pain Control Plan Comments:   Induction:   IV  Beta Blocker:  Patient is not currently on a Beta-Blocker (No further documentation required).       Informed Consent: Patient understands risks and agrees with Anesthesia plan.  Questions  answered.   ASA Score: 3     Day of Surgery Review of History & Physical:    H&P update referred to the surgeon.     Anesthesia Plan Notes: Sedation plan discussed.          Ready For Surgery From Anesthesia Perspective.

## 2018-11-19 NOTE — ASSESSMENT & PLAN NOTE
Medical therapy per ICM and CAD management  -Losartan 50mg QD  -Isosorbide 60mg QD  -Spironolactone 25mg QD  -WCTM    Vitals:    11/19/18 0600 11/19/18 0700 11/19/18 0800 11/19/18 0900   BP: (!) 149/71 139/68 (!) 142/66 (!) 140/66   BP Location: Left arm Left arm Left arm Left arm   Patient Position: Lying Lying Lying Lying   Pulse: 63 72 60 60   Resp: 17 18 18 18   Temp:  98.1 °F (36.7 °C)     TempSrc:  Oral     SpO2: 99% 97% 99% 99%   Weight:       Height:

## 2018-11-19 NOTE — PROGRESS NOTES
"It was noted this morning that pt has no order for "Okay to use" regarding NG tube that was placed yesterday and currently has tube feeds running per order. Called Dr Rooney to obtain ok to use order. MD looked at KUB and order to advance tube 5 cm and then  Okay to use. NG tube advanced to 67 cm.  "

## 2018-11-19 NOTE — TRANSFER OF CARE
"Anesthesia Transfer of Care Note    Patient: Walter Bull    Procedure(s) Performed: Procedure(s) (LRB):  CARDIAC ELECTROPHYSIOLOGY STUDY (N/A)    Patient location: ICU ( [id number])    Anesthesia Type: general    Transport from OR: Transported from OR on room air with adequate spontaneous ventilation    Post pain: adequate analgesia    Post assessment: no apparent anesthetic complications and tolerated procedure well    Post vital signs: stable    Level of consciousness: awake, alert and oriented    Nausea/Vomiting: no nausea/vomiting    Complications: none    Transfer of care protocol was followed      Last vitals:   Visit Vitals  BP (!) 140/66 (BP Location: Left arm, Patient Position: Lying)   Pulse 60   Temp 36.7 °C (98.1 °F) (Oral)   Resp 18   Ht 5' 7" (1.702 m)   Wt 108.4 kg (239 lb)   SpO2 99%   BMI 37.43 kg/m²     "

## 2018-11-19 NOTE — SUBJECTIVE & OBJECTIVE
Interval History: Patient stable, going to EP lab today. WCTM and follow recs after procedure    Review of Systems   Constitution: Negative.   HENT: Negative.    Eyes: Negative.    Cardiovascular: Negative for chest pain.   Respiratory: Positive for shortness of breath.    Endocrine: Negative.    Hematologic/Lymphatic: Negative.    Skin: Negative.    Musculoskeletal: Negative.    Gastrointestinal: Negative.    Genitourinary: Negative.    Neurological: Negative.    Psychiatric/Behavioral: Negative.      Objective:     Vital Signs (Most Recent):  Temp: 98.1 °F (36.7 °C) (11/19/18 0700)  Pulse: 60 (11/19/18 0900)  Resp: 18 (11/19/18 0900)  BP: (!) 140/66 (11/19/18 0900)  SpO2: 99 % (11/19/18 0900) Vital Signs (24h Range):  Temp:  [97.9 °F (36.6 °C)-98.5 °F (36.9 °C)] 98.1 °F (36.7 °C)  Pulse:  [55-88] 60  Resp:  [14-28] 18  SpO2:  [96 %-99 %] 99 %  BP: (122-175)/(58-88) 140/66     Weight: 108.4 kg (239 lb)  Body mass index is 37.43 kg/m².     SpO2: 99 %  O2 Device (Oxygen Therapy): room air      Intake/Output Summary (Last 24 hours) at 11/19/2018 1358  Last data filed at 11/19/2018 1347  Gross per 24 hour   Intake 1296.8 ml   Output 1950 ml   Net -653.2 ml       Lines/Drains/Airways     Peripheral Intravenous Line                 Peripheral IV - Single Lumen 11/16/18 1107 Right Hand 3 days         Peripheral IV - Single Lumen 11/18/18 2300 Left Antecubital less than 1 day                Physical Exam   Constitutional: He is oriented to person, place, and time. He appears well-developed and well-nourished. No distress.   HENT:   Head: Normocephalic and atraumatic.   Mouth/Throat: No oropharyngeal exudate.   Eyes: Pupils are equal, round, and reactive to light. No scleral icterus.   Neck: Normal range of motion. Neck supple.   Cardiovascular: Normal rate.   Pulmonary/Chest: Effort normal and breath sounds normal. No respiratory distress.   Abdominal: Soft. Bowel sounds are normal.   Neurological: He is alert and oriented  to person, place, and time. No cranial nerve deficit.   Skin: Skin is warm and dry. He is not diaphoretic. No erythema.   Vitals reviewed.      Significant Labs: All pertinent lab results from the last 24 hours have been reviewed.    Significant Imaging: no new imaging

## 2018-11-19 NOTE — ASSESSMENT & PLAN NOTE
Taken to cath lab due to concern for MI with new LBBB pattern on EKG, no new occlusions found and no intervention performed, troponin undetectable  Found to have slow VT (see below)    Plan:  -Continue treatment of CAD  -DAPT with ASA and Ticagrelor  -Rosuvastatin 40mg QD  - Losartan 50mg QD  -Spironolactone 25mg QD  -Carvedilol 6.25 mg BID

## 2018-11-19 NOTE — PROGRESS NOTES
[facility]  Cardiac Electrophysiology  Progress Note    Admission Date: 11/16/2018  Code Status: Full Code   Attending Physician: Matt Cook MD   Expected Discharge Date:   Principal Problem:Ventricular tachycardia    Subjective:     Interval History: Patient getting EPS +/- ICD today.   Telemetry showed sinus rhythm w HR 40s-100    ROS  Objective:     Vital Signs (Most Recent):  Temp: 98.1 °F (36.7 °C) (11/19/18 0700)  Pulse: 60 (11/19/18 0900)  Resp: 18 (11/19/18 0900)  BP: (!) 140/66 (11/19/18 0900)  SpO2: 99 % (11/19/18 0900) Vital Signs (24h Range):  Temp:  [97.9 °F (36.6 °C)-98.5 °F (36.9 °C)] 98.1 °F (36.7 °C)  Pulse:  [55-88] 60  Resp:  [14-28] 18  SpO2:  [96 %-99 %] 99 %  BP: (122-175)/(58-85) 140/66     Weight: 108.4 kg (239 lb)  Body mass index is 37.43 kg/m².     SpO2: 99 %  O2 Device (Oxygen Therapy): room air    Physical Exam   Constitutional: He is oriented to person, place, and time. He appears well-developed and well-nourished.   HENT:   Head: Normocephalic and atraumatic.   Nose: Nose normal.   Mouth/Throat: No oropharyngeal exudate.   Eyes: Right eye exhibits no discharge. Left eye exhibits no discharge. No scleral icterus.   Neck: Normal range of motion. Neck supple. No JVD present.   Cardiovascular: Normal rate, regular rhythm, S1 normal and S2 normal. Exam reveals no gallop, no S3, no S4, no distant heart sounds, no friction rub, no midsystolic click and no opening snap.   No murmur heard.  Pulses:       Radial pulses are 2+ on the right side, and 2+ on the left side.        Femoral pulses are 2+ on the right side, and 2+ on the left side.  Pulmonary/Chest: Effort normal and breath sounds normal. No respiratory distress. He has no wheezes. He has no rales. He exhibits no tenderness.   Abdominal: Soft. Bowel sounds are normal. He exhibits no distension. There is no tenderness. There is no rebound.   Musculoskeletal: Normal range of motion. He exhibits no edema,  tenderness or deformity.   Lymphadenopathy:     He has no cervical adenopathy.   Neurological: He is alert and oriented to person, place, and time. No cranial nerve deficit.   Skin: Skin is warm and dry.   Psychiatric: He has a normal mood and affect. His behavior is normal.       Significant Labs:   BMP:   Recent Labs   Lab 11/18/18 0328 11/18/18 2306 11/19/18  0300   *  --  273*   *  --  136   K 4.1 4.5 4.3     --  103   CO2 22*  --  25   BUN 9  --  10   CREATININE 1.0  --  1.1   CALCIUM 8.6*  --  9.2   MG 1.7  1.7 1.7 1.8  1.8   , CMP:   Recent Labs   Lab 11/18/18 0328 11/18/18 2306 11/19/18  0300   *  --  136   K 4.1 4.5 4.3     --  103   CO2 22*  --  25   *  --  273*   BUN 9  --  10   CREATININE 1.0  --  1.1   CALCIUM 8.6*  --  9.2   PROT 6.5  --  6.6   ALBUMIN 3.1*  --  3.1*   BILITOT 0.5  --  0.6   ALKPHOS 54*  --  59   AST 44*  --  79*   ALT 51*  --  102*   ANIONGAP 8  --  8   ESTGFRAFRICA >60.0  --  >60.0   EGFRNONAA >60.0  --  >60.0   , CBC:   Recent Labs   Lab 11/18/18 0328 11/19/18  0300   WBC 8.00 9.60   HGB 12.1* 12.0*   HCT 38.6* 37.2*    202    and INR: No results for input(s): INR, PROTIME in the last 48 hours.    Significant Imaging: Telemetry as above    Assessment and Plan:     Wide-complex tachycardia    69 y/o man with prior 5v CABG (2005) (LIMA to LAD, SVG to Diag), known  to RCA and LCx and recent admission to outside hospital with NSTEMI (treated medically), ICM 35% (Sept 2018), CKD II, HTN, HLD, DM presented with WCT (LBBB morphology and R axis). LHC showed unchanged anatomy from previous LHC but he had easily induced sustained VT during LVgram.    Recommendations:  -EP study w potential ICD implantation today  -F/U op note for further recommendations    Case discussed w Dr Myah Roy MD  Cardiac Electrophysiology  [facility]

## 2018-11-19 NOTE — PROGRESS NOTES
[facility]  Cardiology  Progress Note    Patient Name: Walter Bull  MRN: [id number]  Admission Date: 11/16/2018  Hospital Length of Stay: 3 days  Code Status: Full Code   Attending Physician: Matt Cook MD   Primary Care Physician: Belkys Kruger MD  Expected Discharge Date:   Principal Problem:Ventricular tachycardia    Subjective:     Hospital Course:   11/16: Cath lab-- that is known and the LIMA to LAD and SVG to D1 grafts are patent; EP consult for SVT presentation and had inducible sustained VT when pigtail catheter was inserted into the LV, VT terminated with lidocaine bolus (patient never lost conciousness). Started on amio gtt. DC heparin gtt and nitro gtt.  11/17: continue amio drip, BB held due to kala  11/18: Patient stable, awaiting EP procedure tomorrow  11/19: Patient to EP lab for study today. Remains CP free. Amio ggt d/c prior to procedure         Interval History: Patient stable, going to EP lab today. WCTM and follow recs after procedure    Review of Systems   Constitution: Negative.   HENT: Negative.    Eyes: Negative.    Cardiovascular: Negative for chest pain.   Respiratory: Positive for shortness of breath.    Endocrine: Negative.    Hematologic/Lymphatic: Negative.    Skin: Negative.    Musculoskeletal: Negative.    Gastrointestinal: Negative.    Genitourinary: Negative.    Neurological: Negative.    Psychiatric/Behavioral: Negative.      Objective:     Vital Signs (Most Recent):  Temp: 98.1 °F (36.7 °C) (11/19/18 0700)  Pulse: 60 (11/19/18 0900)  Resp: 18 (11/19/18 0900)  BP: (!) 140/66 (11/19/18 0900)  SpO2: 99 % (11/19/18 0900) Vital Signs (24h Range):  Temp:  [97.9 °F (36.6 °C)-98.5 °F (36.9 °C)] 98.1 °F (36.7 °C)  Pulse:  [55-88] 60  Resp:  [14-28] 18  SpO2:  [96 %-99 %] 99 %  BP: (122-175)/(58-88) 140/66     Weight: 108.4 kg (239 lb)  Body mass index is 37.43 kg/m².     SpO2: 99 %  O2 Device (Oxygen Therapy): room air      Intake/Output  Summary (Last 24 hours) at 11/19/2018 1358  Last data filed at 11/19/2018 1347  Gross per 24 hour   Intake 1296.8 ml   Output 1950 ml   Net -653.2 ml       Lines/Drains/Airways     Peripheral Intravenous Line                 Peripheral IV - Single Lumen 11/16/18 1107 Right Hand 3 days         Peripheral IV - Single Lumen 11/18/18 2300 Left Antecubital less than 1 day                Physical Exam   Constitutional: He is oriented to person, place, and time. He appears well-developed and well-nourished. No distress.   HENT:   Head: Normocephalic and atraumatic.   Mouth/Throat: No oropharyngeal exudate.   Eyes: Pupils are equal, round, and reactive to light. No scleral icterus.   Neck: Normal range of motion. Neck supple.   Cardiovascular: Normal rate.   Pulmonary/Chest: Effort normal and breath sounds normal. No respiratory distress.   Abdominal: Soft. Bowel sounds are normal.   Neurological: He is alert and oriented to person, place, and time. No cranial nerve deficit.   Skin: Skin is warm and dry. He is not diaphoretic. No erythema.   Vitals reviewed.      Significant Labs: All pertinent lab results from the last 24 hours have been reviewed.    Significant Imaging: no new imaging    Assessment and Plan:     Brief HPI: 69 y/o man with prior 5v CABG (2005) (LIMA to LAD, SVG to Diag), known  to RCA and LCx and recent admission to outside hospital with NSTEMI, CKD II, HTN, HLD, DM presented with acute onset of chest pain and new onset LBBB. Had LHC at OSH and has been managed medically. Has LVEF 35%, no ICD. Was about to perform lawn work when he started having acute onset chest pain, similar in nature to his prior MI. Also noted palpitations which he had not experienced before, has never experienced syncope. 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 He denies orthopnea, PND, peripheral edema or [place]. There is no family history of sudden cardiac  death.    EMS arrived and found to have wide complex tachycardia. Cardiology called for assistance. He continued to have 9-10/10 chest pain like an elephant sitting on his chest. Received NTG x 2 and ASA 325mg, was taken to the cath lab and no new occlusions were identified. The patient received lidocaine for a slow MMVT which restored NSR with a narrow complex. He is admitted to CMICU for further evaluation on an amiodarone infusion.          * Ventricular tachycardia    LBBB pattern terminated with administration of lidocaine, thought to be slow VT, which may be due to the patient's cardiomyopathy, EF reportedly 35% and has no ICD  EP consulted for further recs for antiarrhythmic therapy and/or device placement    2D echo:  · Mildly decreased left ventricular systolic function. The estimated ejection fraction is 45%  · Grade I (mild) left ventricular diastolic dysfunction consistent with impaired relaxation.  · Normal right ventricular systolic function.  · Moderate left atrial enlargement.  · Normal central venous pressure (3 mm Hg).  · No pericardial effusion.  · Septal wall has abnormal motion.  · Local segmental wall motion abnormalities.  · Normal left atrial pressure.    PLAN:  - Invasive EPS +/- dual chamber ICD on Monday.   -Cardiac monitoring       Bradycardia    HR about 40-50s during sleep and awake, but HDS, no symptoms    -HR 60-70  -WCTM     Wide-complex tachycardia    -see above        Ischemic cardiomyopathy    EF reportedly 35% from an outside facility  -Continue medical therapy for ICM including ARB (losartan), and aldactone, isosorbide, BB  -Currently without evidence of volume overload or decompensation  -Medical therapy for CAD as mentioned above         NSTEMI (non-ST elevated myocardial infarction)    68 year old man with CAD s/p CABG and recent NSTEMI, ICM with LVEF 35%, DM who presents this AM with ischemic chest pain 30 minutes PTA with new onset LBBB. He continues to have 9/10 chest  pain, is hemodynamically stable and breathing on room air. ALEM 6, Jaci 157.     --ASA 325mg and ticagrelor 180mg  --nitro gtt  --cath lab this AM  --heparin gtt  --will need echo with CFD  --admit to ICU     Benign essential HTN    Medical therapy per ICM and CAD management  -Losartan 50mg QD  -Isosorbide 60mg QD  -Spironolactone 25mg QD  -WCTM    Vitals:    11/19/18 0600 11/19/18 0700 11/19/18 0800 11/19/18 0900   BP: (!) 149/71 139/68 (!) 142/66 (!) 140/66   BP Location: Left arm Left arm Left arm Left arm   Patient Position: Lying Lying Lying Lying   Pulse: 63 72 60 60   Resp: 17 18 18 18   Temp:  98.1 °F (36.7 °C)     TempSrc:  Oral     SpO2: 99% 97% 99% 99%   Weight:       Height:              HLD (hyperlipidemia)    -Rosuvastatin 40mg QD       Diabetes mellitus type 2 in obese    -Last A1c reviewed-   Lab Results   Component Value Date    HGBA1C 6.9 (H) 11/16/2018       Home Antihyperglycemic Regiment:  -Janumet  -SSI    Inpatient Antihyperglycemic Regiment:   Antihyperglycemics (From admission, onward)    Start     Stop Route Frequency Ordered    11/18/18 2100  insulin detemir U-100 pen 20 Units      -- SubQ Nightly 11/18/18 1601    11/18/18 1851  insulin aspart U-100 pen 1-10 Units      -- SubQ Before meals & nightly PRN 11/18/18 1751        -LSSI  -ACCU Checks ACHS  -Diabetic Diet 2000 cookie  -Diabetic nutritional counseling given     Blood Sugars (AccuCheck):    Recent Labs     11/17/18  2058 11/18/18  0810 11/18/18  1141 11/18/18  1740 11/18/18  2234 11/19/18  0849   POCTGLUCOSE 273* 262* 431* 415* 331* 222*                Coronary artery disease of native artery of native heart with stable angina pectoris    Taken to cath lab due to concern for MI with new LBBB pattern on EKG, no new occlusions found and no intervention performed, troponin undetectable  Found to have slow VT (see below)    Plan:  -Continue treatment of CAD  -DAPT with ASA and Ticagrelor  -Rosuvastatin 40mg QD  - Losartan 50mg  QD  -Spironolactone 25mg QD  -Carvedilol 6.25 mg BID            VTE Risk Mitigation (From admission, onward)        Ordered     IP VTE HIGH RISK PATIENT  Once      11/16/18 1234     Reason for No Pharmacological VTE Prophylaxis  Once      11/16/18 1234          Felisha Og MD  Cardiology  [facility]

## 2018-11-19 NOTE — ANESTHESIA POSTPROCEDURE EVALUATION
"Anesthesia Post Evaluation    Patient: Walter Bull    Procedure(s) Performed: Procedure(s) (LRB):  CARDIAC ELECTROPHYSIOLOGY STUDY (N/A)    Final Anesthesia Type: general  Patient location during evaluation: ICU  Patient participation: Yes- Able to Participate  Level of consciousness: awake and alert  Post-procedure vital signs: reviewed and stable  Pain management: adequate  Airway patency: patent  PONV status at discharge: No PONV  Anesthetic complications: no      Cardiovascular status: hemodynamically stable  Respiratory status: unassisted  Hydration status: euvolemic  Follow-up not needed.        Visit Vitals  /68 (BP Location: Right arm, Patient Position: Lying)   Pulse 66   Temp 36.7 °C (98 °F)   Resp 18   Ht 5' 7" (1.702 m)   Wt 108.4 kg (239 lb)   SpO2 99%   BMI 37.43 kg/m²       Pain/Madhav Score: Pain Assessment Performed: Yes (11/19/2018  3:00 PM)  Presence of Pain: denies (11/19/2018  3:00 PM)        "

## 2018-11-19 NOTE — ASSESSMENT & PLAN NOTE
EF reportedly 35% from an outside facility  -Continue medical therapy for ICM including ARB (losartan), and aldactone, isosorbide, BB  -Currently without evidence of volume overload or decompensation  -Medical therapy for CAD as mentioned above

## 2018-11-19 NOTE — PROGRESS NOTES
Transport arrived at bedside to take pt to EP Lab. Pt placed on telemetry monitor and transported via bed with this RN and [place] RN at bedside. All vitals stable during transport and no complications. Pt AAOx4. Chart sent with pt along with consents.

## 2018-11-19 NOTE — DISCHARGE SUMMARY
[facility] Hospital Medicine  Discharge Summary      Patient Name: Walter Bull  MRN: [id number]  Admission Date: 9/12/2018  Hospital Length of Stay: 0 days  Discharge Date and Time: 9/13/2018  3:45 PM  Attending Physician: No att. providers found   Discharging Provider: Pearl Pacheco MD  Primary Care Provider: Belkys Kruger MD      HPI:   9/13/18   68 YO AAM with PMHX of CA p CABG x 5 in 2005 complicated by HARRIS, CKDII,  HTN, Hyperlipidemia, Type II DM, Diabetic polyneuropathy associated with type 2 diabetes mellitus, BPH  and iron deficiency anemia presented to ER yesterday PM with c/of chest discomfort since approximately 3:00 p.m.  He  reports + chest pain with exertion over the past week. Pt endorses chest pressure while cutting grass yesterday;  + chest pressure not relieved with 2 NTG and asa so came to ER. Family notes ^BS 300s of late, but low this am  TNI trending up ; 0.021>0.260>0.810   Discussed with Dr. Vitale this am; recommends transfer for [facility]  BUN 12/ creat 1.5. + Hx of CHF, no available echo in chart. Will start gentle IV hydration in preparation  for procedure contrast.   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 On oral iron twice a day. .            * No surgery found *      Hospital Course:   9/13   BS 58 this am; given Sprite. Feeling better at present.   Denies any chest pain.   IV fluids started- will hold lasix this am.   Pending transfer to [facility] [place]     Consults:   Consults (From admission, onward)        Status Ordering Provider     Inpatient consult to Cardiology-[facility]  Once     Provider:  Parth Vitale MD    Completed PEARL PACHECO.          * NSTEMI (non-ST elevated myocardial infarction)    Planning for transfer for [facility]/PCI  Continue IV heparin; starting hydration, hold lasix this am  Continue ASA, plavix, statin, BB,  heparin, cont to trend TN  O2, Morphine PRN (no longer having chest pain)       Benign prostatic hyperplasia without lower urinary tract symptoms    Resume flomax     Benign essential HTN    Continue amlodipine, Isosorbide, metoprolol,  Hold lasix today     S/P CABG x 5    Continue ASA, Plavix, BB, statin  Planning for LHC - transfer today       HLD (hyperlipidemia)    Continue statin- crestor,        Diabetes mellitus type 2 in obese      Monitor glucose; sliding scale   Hold Janumet        Final Active Diagnoses:    Diagnosis Date Noted POA    PRINCIPAL PROBLEM:  NSTEMI (non-ST elevated myocardial infarction) [I21.4] 09/13/2018 Yes    Benign essential HTN [I10] 07/19/2017 Yes    Benign prostatic hyperplasia without lower urinary tract symptoms [N40.0] 07/19/2017 Yes    HLD (hyperlipidemia) [E78.5] 01/11/2017 Yes    S/P CABG x 5 [Z95.1] 01/11/2017 Not Applicable    Diabetes mellitus type 2 in obese [E11.69, E66.9] 01/11/2017 Yes      Problems Resolved During this Admission:    Diagnosis Date Noted Date Resolved POA    Chest pain [R07.9] 09/13/2018 09/25/2018 Yes       Discharged Condition: fair    Disposition: Another Health Care Inst*    Follow Up:  Follow-up Information     Belkys Kruger MD.    Specialty:  Internal Medicine  Why:  Outpatient Services  Contact information:  [address] [place] [id number]  [phone]                 Patient Instructions:   No discharge procedures on file.    Significant Diagnostic Studies: Labs:   Troponin   Recent Labs   Lab 11/16/18  1235   TROPONINI 0.025       Pending Diagnostic Studies:     None         Medications:  Reconciled Home Medications:      Medication List      STOP taking these medications    meloxicam 15 MG tablet  Commonly known as:  MOBIC        ASK your doctor about these medications    acetaminophen 100 mg/mL suspension  Commonly known as:  TYLENOL  Take by mouth every 4 (four) hours as needed for Temperature greater than.     albuterol 90  mcg/actuation inhaler  Commonly known as:  PROVENTIL/VENTOLIN HFA  Inhale 2 puffs into the lungs every 6 (six) hours as needed for Wheezing.     aspirin 81 MG EC tablet  Commonly known as:  ECOTRIN  Take 81 mg by mouth once daily.     CENTRUM SILVER ORAL  Take by mouth.     clopidogrel 75 mg tablet  Commonly known as:  PLAVIX  Take 75 mg by mouth once daily.     ezetimibe 10 mg tablet  Commonly known as:  ZETIA  Take 10 mg by mouth once daily.     ferrous sulfate 325 mg (65 mg iron) Tab tablet  Commonly known as:  FEOSOL  Take 1 tablet (325 mg total) by mouth 3 (three) times daily.     finasteride 5 mg tablet  Commonly known as:  PROSCAR  Take 1 tablet (5 mg total) by mouth once daily.     insulin  unit/mL injection  Commonly known as:  NovoLIN N  Inject into the skin. 70 units in the morning and 40 units at bedtime     isosorbide mononitrate 60 MG 24 hr tablet  Commonly known as:  IMDUR  Take 60 mg by mouth once daily.     JANUMET 50-1,000 mg per tablet  Generic drug:  SITagliptan-metformin  Take 1 tablet by mouth 2 (two) times daily with meals.     nitroGLYCERIN 0.4 MG SL tablet  Commonly known as:  NITROSTAT  DISSOLVE ONE TABLET UNDER THE TONGUE EVERY 5 MINUTES AS NEEDED FOR CHEST PAIN.     RANEXA 500 MG Tb12  Generic drug:  ranolazine  Take 500 mg by mouth 2 (two) times daily.     rosuvastatin 20 MG tablet  Commonly known as:  CRESTOR  TAKE ONE TABLET BY MOUTH ONCE DAILY     tamsulosin 0.4 mg Cap  Commonly known as:  FLOMAX  TAKE ONE CAPSULE BY MOUTH ONCE DAILY            Indwelling Lines/Drains at time of discharge:   Lines/Drains/Airways          None          Time spent on the discharge of patient: 25 minutes  Patient was seen and examined on the date of discharge and determined to be suitable for discharge.         Pearl Hurley MD  Department of Hospital Medicine  [facility]

## 2018-11-19 NOTE — SUBJECTIVE & OBJECTIVE
Interval History: Patient getting EPS +/- ICD today.   Telemetry showed sinus rhythm w HR 40s-100    ROS  Objective:     Vital Signs (Most Recent):  Temp: 98.1 °F (36.7 °C) (11/19/18 0700)  Pulse: 60 (11/19/18 0900)  Resp: 18 (11/19/18 0900)  BP: (!) 140/66 (11/19/18 0900)  SpO2: 99 % (11/19/18 0900) Vital Signs (24h Range):  Temp:  [97.9 °F (36.6 °C)-98.5 °F (36.9 °C)] 98.1 °F (36.7 °C)  Pulse:  [55-88] 60  Resp:  [14-28] 18  SpO2:  [96 %-99 %] 99 %  BP: (122-175)/(58-85) 140/66     Weight: 108.4 kg (239 lb)  Body mass index is 37.43 kg/m².     SpO2: 99 %  O2 Device (Oxygen Therapy): room air    Physical Exam   Constitutional: He is oriented to person, place, and time. He appears well-developed and well-nourished.   HENT:   Head: Normocephalic and atraumatic.   Nose: Nose normal.   Mouth/Throat: No oropharyngeal exudate.   Eyes: Right eye exhibits no discharge. Left eye exhibits no discharge. No scleral icterus.   Neck: Normal range of motion. Neck supple. No JVD present.   Cardiovascular: Normal rate, regular rhythm, S1 normal and S2 normal. Exam reveals no gallop, no S3, no S4, no distant heart sounds, no friction rub, no midsystolic click and no opening snap.   No murmur heard.  Pulses:       Radial pulses are 2+ on the right side, and 2+ on the left side.        Femoral pulses are 2+ on the right side, and 2+ on the left side.  Pulmonary/Chest: Effort normal and breath sounds normal. No respiratory distress. He has no wheezes. He has no rales. He exhibits no tenderness.   Abdominal: Soft. Bowel sounds are normal. He exhibits no distension. There is no tenderness. There is no rebound.   Musculoskeletal: Normal range of motion. He exhibits no edema, tenderness or deformity.   Lymphadenopathy:     He has no cervical adenopathy.   Neurological: He is alert and oriented to person, place, and time. No cranial nerve deficit.   Skin: Skin is warm and dry.   Psychiatric: He has a normal mood and affect. His behavior is  normal.       Significant Labs:   BMP:   Recent Labs   Lab 11/18/18 0328 11/18/18 2306 11/19/18  0300   *  --  273*   *  --  136   K 4.1 4.5 4.3     --  103   CO2 22*  --  25   BUN 9  --  10   CREATININE 1.0  --  1.1   CALCIUM 8.6*  --  9.2   MG 1.7  1.7 1.7 1.8  1.8   , CMP:   Recent Labs   Lab 11/18/18 0328 11/18/18 2306 11/19/18  0300   *  --  136   K 4.1 4.5 4.3     --  103   CO2 22*  --  25   *  --  273*   BUN 9  --  10   CREATININE 1.0  --  1.1   CALCIUM 8.6*  --  9.2   PROT 6.5  --  6.6   ALBUMIN 3.1*  --  3.1*   BILITOT 0.5  --  0.6   ALKPHOS 54*  --  59   AST 44*  --  79*   ALT 51*  --  102*   ANIONGAP 8  --  8   ESTGFRAFRICA >60.0  --  >60.0   EGFRNONAA >60.0  --  >60.0   , CBC:   Recent Labs   Lab 11/18/18 0328 11/19/18  0300   WBC 8.00 9.60   HGB 12.1* 12.0*   HCT 38.6* 37.2*    202    and INR: No results for input(s): INR, PROTIME in the last 48 hours.    Significant Imaging: Telemetry as above

## 2018-11-19 NOTE — PHYSICIAN QUERY
"PT Name: Walter Bull  MR #: [id number]    Physician Query Form - Heart  Condition Clarification     CDS/: Lucille Rosario RN, CCDS             Contact information: [email or web address]  This form is a permanent document in the medical record.     Query Date: November 19, 2018    By submitting this query, we are merely seeking further clarification of documentation. Please utilize your independent clinical judgment when addressing the question(s) below.    The medical record contains the following   Indicators     Supporting Clinical Findings Location in Medical Record   X ,  11/16 lab   X EF · The estimated ejection fraction is 45% 11/17 TTE    Radiology findings     X Echo Results · Mildly decreased left ventricular systolic function. The estimated ejection fraction is 45%  · Grade I (mild) left ventricular diastolic dysfunction consistent with impaired relaxation.  · Normal right ventricular systolic function.  · Moderate left atrial enlargement.  · Normal central venous pressure (3 mm Hg).  · No pericardial effusion.  · Septal wall has abnormal motion.  · Local segmental wall motion abnormalities.  · Normal left atrial pressure. 11/17 TTE    "Ascites" documented      "SOB" or "[place]" documented      "Hypoxia" documented     X Heart Failure documented CHF      ICM- Continue medical therapy for ICM including BB (carvedilol), ARB (losartan), diuretics and aldactone, isosorbide  Currently without evidence of volume overload or decompensation 11/16 ed note, 11/16 h/p,   11/16 consult    11/16 h/p    "Edema" documented      Diuretics/Meds     X Treatment: Carvedilol 6.25 mg PO 2 x daily  Spironolactone 25 mg PO daily 11/17- 11/19 mar    Other:      Provider, please specify the diagnosis associated with above clinical findings                                 [  XXX  ] Chronic Diastolic Heart Failure - Pre-existing diastolic HF diagnosis.  EF > 40%  without  acute HF symptoms documented    "

## 2018-11-19 NOTE — PLAN OF CARE
Problem: Patient Care Overview  Goal: Plan of Care Review  Outcome: Ongoing (interventions implemented as appropriate)    No acute events throughout the shift. See vital signs and assessments in flowsheets. See below for updates on today's progress.     Pulmonary: Patient remains on room air with oxygen saturation of 96-98%    Cardiovascular: Sinus Rhythm from 55-70's beats per minute; -150mmHg. Pulses are palpable    Neurological: Oriented to person, place, time and situation    Gastrointestinal: BM X 1. NPO post midnight    Genitourinary: Voids per urinal    Endocrine: with blood glucose monitoring    Integumentary/Other: Skin is intact    Infusions: KVO    Patient progressing towards goals as tolerated, plan of care communicated and reviewed with Walter Bull and family. All concerns addressed. Will continue to monitor.

## 2018-11-19 NOTE — ASSESSMENT & PLAN NOTE
-Last A1c reviewed-   Lab Results   Component Value Date    HGBA1C 6.9 (H) 11/16/2018       Home Antihyperglycemic Regiment:  -Janumet  -SSI    Inpatient Antihyperglycemic Regiment:   Antihyperglycemics (From admission, onward)    Start     Stop Route Frequency Ordered    11/18/18 2100  insulin detemir U-100 pen 20 Units      -- SubQ Nightly 11/18/18 1601    11/18/18 1851  insulin aspart U-100 pen 1-10 Units      -- SubQ Before meals & nightly PRN 11/18/18 1751        -LSSI  -ACCU Checks ACHS  -Diabetic Diet 2000 cookie  -Diabetic nutritional counseling given     Blood Sugars (AccuCheck):    Recent Labs     11/17/18  2058 11/18/18  0810 11/18/18  1141 11/18/18  1740 11/18/18  2234 11/19/18  0849   POCTGLUCOSE 273* 262* 431* 415* 331* 222*

## 2018-11-19 NOTE — ASSESSMENT & PLAN NOTE
LBBB pattern terminated with administration of lidocaine, thought to be slow VT, which may be due to the patient's cardiomyopathy, EF reportedly 35% and has no ICD  EP consulted for further recs for antiarrhythmic therapy and/or device placement    2D echo:  · Mildly decreased left ventricular systolic function. The estimated ejection fraction is 45%  · Grade I (mild) left ventricular diastolic dysfunction consistent with impaired relaxation.  · Normal right ventricular systolic function.  · Moderate left atrial enlargement.  · Normal central venous pressure (3 mm Hg).  · No pericardial effusion.  · Septal wall has abnormal motion.  · Local segmental wall motion abnormalities.  · Normal left atrial pressure.    PLAN:  - Invasive EPS +/- dual chamber ICD on Monday.   -Cardiac monitoring

## 2018-11-20 ENCOUNTER — NURSE TRIAGE (OUTPATIENT)
Dept: ADMINISTRATIVE | Facility: CLINIC | Age: 68
End: 2018-11-20

## 2018-11-20 VITALS
HEART RATE: 66 BPM | SYSTOLIC BLOOD PRESSURE: 144 MMHG | TEMPERATURE: 99 F | OXYGEN SATURATION: 100 % | HEIGHT: 67 IN | RESPIRATION RATE: 19 BRPM | BODY MASS INDEX: 37.47 KG/M2 | DIASTOLIC BLOOD PRESSURE: 70 MMHG | WEIGHT: 238.75 LBS

## 2018-11-20 DIAGNOSIS — Z95.810 PRESENCE OF AUTOMATIC CARDIOVERTER/DEFIBRILLATOR (AICD): Primary | ICD-10-CM

## 2018-11-20 DIAGNOSIS — I47.20 VT (VENTRICULAR TACHYCARDIA): ICD-10-CM

## 2018-11-20 LAB
ALBUMIN SERPL BCP-MCNC: 3.1 G/DL
ALP SERPL-CCNC: 58 U/L
ALT SERPL W/O P-5'-P-CCNC: 72 U/L
ANION GAP SERPL CALC-SCNC: 7 MMOL/L
AST SERPL-CCNC: 44 U/L
BASOPHILS # BLD AUTO: 0.06 K/UL
BASOPHILS NFR BLD: 0.6 %
BILIRUB SERPL-MCNC: 0.7 MG/DL
BUN SERPL-MCNC: 11 MG/DL
CALCIUM SERPL-MCNC: 9.2 MG/DL
CHLORIDE SERPL-SCNC: 102 MMOL/L
CO2 SERPL-SCNC: 24 MMOL/L
CREAT SERPL-MCNC: 1.1 MG/DL
DIFFERENTIAL METHOD: ABNORMAL
EOSINOPHIL # BLD AUTO: 0.7 K/UL
EOSINOPHIL NFR BLD: 6.9 %
ERYTHROCYTE [DISTWIDTH] IN BLOOD BY AUTOMATED COUNT: 14.1 %
EST. GFR  (AFRICAN AMERICAN): >60 ML/MIN/1.73 M^2
EST. GFR  (NON AFRICAN AMERICAN): >60 ML/MIN/1.73 M^2
GLUCOSE SERPL-MCNC: 234 MG/DL
HCT VFR BLD AUTO: 37.1 %
HGB BLD-MCNC: 11.8 G/DL
IMM GRANULOCYTES # BLD AUTO: 0.03 K/UL
IMM GRANULOCYTES NFR BLD AUTO: 0.3 %
LYMPHOCYTES # BLD AUTO: 2.6 K/UL
LYMPHOCYTES NFR BLD: 24.6 %
MAGNESIUM SERPL-MCNC: 1.9 MG/DL
MAGNESIUM SERPL-MCNC: 1.9 MG/DL
MCH RBC QN AUTO: 27.4 PG
MCHC RBC AUTO-ENTMCNC: 31.8 G/DL
MCV RBC AUTO: 86 FL
MONOCYTES # BLD AUTO: 1.2 K/UL
MONOCYTES NFR BLD: 11.1 %
NEUTROPHILS # BLD AUTO: 6 K/UL
NEUTROPHILS NFR BLD: 56.5 %
NRBC BLD-RTO: 0 /100 WBC
PHOSPHATE SERPL-MCNC: 3 MG/DL
PHOSPHATE SERPL-MCNC: 3 MG/DL
PLATELET # BLD AUTO: 219 K/UL
PMV BLD AUTO: 10.6 FL
POCT GLUCOSE: 195 MG/DL (ref 70–110)
POCT GLUCOSE: 214 MG/DL (ref 70–110)
POTASSIUM SERPL-SCNC: 4.9 MMOL/L
PROT SERPL-MCNC: 6.8 G/DL
RBC # BLD AUTO: 4.3 M/UL
SODIUM SERPL-SCNC: 133 MMOL/L
WBC # BLD AUTO: 10.59 K/UL

## 2018-11-20 PROCEDURE — 25000003 PHARM REV CODE 250: Performed by: INTERNAL MEDICINE

## 2018-11-20 PROCEDURE — 25000003 PHARM REV CODE 250: Performed by: STUDENT IN AN ORGANIZED HEALTH CARE EDUCATION/TRAINING PROGRAM

## 2018-11-20 PROCEDURE — 83735 ASSAY OF MAGNESIUM: CPT

## 2018-11-20 PROCEDURE — 25000003 PHARM REV CODE 250: Performed by: NURSE PRACTITIONER

## 2018-11-20 PROCEDURE — 63600175 PHARM REV CODE 636 W HCPCS: Performed by: NURSE PRACTITIONER

## 2018-11-20 PROCEDURE — 85025 COMPLETE CBC W/AUTO DIFF WBC: CPT

## 2018-11-20 PROCEDURE — 36415 COLL VENOUS BLD VENIPUNCTURE: CPT

## 2018-11-20 PROCEDURE — 84100 ASSAY OF PHOSPHORUS: CPT

## 2018-11-20 PROCEDURE — 80053 COMPREHEN METABOLIC PANEL: CPT

## 2018-11-20 PROCEDURE — 99239 HOSP IP/OBS DSCHRG MGMT >30: CPT | Mod: GC,,, | Performed by: INTERNAL MEDICINE

## 2018-11-20 PROCEDURE — 94761 N-INVAS EAR/PLS OXIMETRY MLT: CPT

## 2018-11-20 RX ORDER — ISOSORBIDE MONONITRATE 60 MG/1
60 TABLET, EXTENDED RELEASE ORAL DAILY
Qty: 90 TABLET | Refills: 3 | Status: SHIPPED | OUTPATIENT
Start: 2018-11-20 | End: 2019-03-25 | Stop reason: SDUPTHER

## 2018-11-20 RX ORDER — RANOLAZINE 1000 MG/1
1000 TABLET, FILM COATED, EXTENDED RELEASE ORAL 2 TIMES DAILY
Qty: 60 TABLET | Refills: 11 | Status: SHIPPED | OUTPATIENT
Start: 2018-11-20 | End: 2019-01-11 | Stop reason: SDUPTHER

## 2018-11-20 RX ORDER — CARVEDILOL 6.25 MG/1
6.25 TABLET ORAL 2 TIMES DAILY WITH MEALS
Qty: 60 TABLET | Refills: 11 | Status: SHIPPED | OUTPATIENT
Start: 2018-11-20 | End: 2019-03-14 | Stop reason: SDUPTHER

## 2018-11-20 RX ORDER — SULFAMETHOXAZOLE AND TRIMETHOPRIM 800; 160 MG/1; MG/1
1 TABLET ORAL 2 TIMES DAILY
Qty: 10 TABLET | Refills: 0 | Status: SHIPPED | OUTPATIENT
Start: 2018-11-20 | End: 2018-11-25

## 2018-11-20 RX ORDER — AMIODARONE HYDROCHLORIDE 400 MG/1
400 TABLET ORAL 2 TIMES DAILY
Qty: 60 TABLET | Refills: 11 | Status: SHIPPED | OUTPATIENT
Start: 2018-11-20 | End: 2018-11-20

## 2018-11-20 RX ORDER — AMIODARONE HYDROCHLORIDE 400 MG/1
400 TABLET ORAL 2 TIMES DAILY
Qty: 60 TABLET | Refills: 11 | Status: CANCELLED | OUTPATIENT
Start: 2018-11-20 | End: 2019-11-20

## 2018-11-20 RX ORDER — LOSARTAN POTASSIUM 50 MG/1
50 TABLET ORAL DAILY
Qty: 90 TABLET | Refills: 3 | Status: SHIPPED | OUTPATIENT
Start: 2018-11-21 | End: 2018-12-22 | Stop reason: SDUPTHER

## 2018-11-20 RX ORDER — ROSUVASTATIN CALCIUM 40 MG/1
40 TABLET, COATED ORAL DAILY
Qty: 90 TABLET | Refills: 3 | Status: SHIPPED | OUTPATIENT
Start: 2018-11-20 | End: 2020-02-21 | Stop reason: SDUPTHER

## 2018-11-20 RX ORDER — AMIODARONE HYDROCHLORIDE 200 MG/1
400 TABLET ORAL 2 TIMES DAILY
Status: DISCONTINUED | OUTPATIENT
Start: 2018-11-20 | End: 2018-11-20 | Stop reason: HOSPADM

## 2018-11-20 RX ORDER — AMIODARONE HYDROCHLORIDE 400 MG/1
400 TABLET ORAL DAILY
Qty: 30 TABLET | Refills: 11 | Status: SHIPPED | OUTPATIENT
Start: 2018-11-30 | End: 2019-01-28 | Stop reason: SDUPTHER

## 2018-11-20 RX ORDER — AMIODARONE HYDROCHLORIDE 200 MG/1
400 TABLET ORAL DAILY
Status: DISCONTINUED | OUTPATIENT
Start: 2018-11-30 | End: 2018-11-20 | Stop reason: HOSPADM

## 2018-11-20 RX ORDER — FUROSEMIDE 20 MG/1
20 TABLET ORAL DAILY
Qty: 30 TABLET | Refills: 11 | Status: SHIPPED | OUTPATIENT
Start: 2018-11-20 | End: 2019-01-25 | Stop reason: SDUPTHER

## 2018-11-20 RX ORDER — ALBUTEROL SULFATE 90 UG/1
2 AEROSOL, METERED RESPIRATORY (INHALATION) EVERY 6 HOURS PRN
Qty: 1 INHALER | Refills: 5 | Status: SHIPPED | OUTPATIENT
Start: 2018-11-20 | End: 2019-11-01 | Stop reason: ALTCHOICE

## 2018-11-20 RX ORDER — NITROGLYCERIN 0.4 MG/1
TABLET SUBLINGUAL
Qty: 100 TABLET | Refills: 0 | Status: SHIPPED | OUTPATIENT
Start: 2018-11-20 | End: 2019-12-31 | Stop reason: SDUPTHER

## 2018-11-20 RX ORDER — TAMSULOSIN HYDROCHLORIDE 0.4 MG/1
0.4 CAPSULE ORAL DAILY
Status: DISCONTINUED | OUTPATIENT
Start: 2018-11-20 | End: 2018-11-20 | Stop reason: HOSPADM

## 2018-11-20 RX ORDER — AMIODARONE HYDROCHLORIDE 400 MG/1
400 TABLET ORAL 2 TIMES DAILY
Qty: 19 TABLET | Refills: 0 | Status: SHIPPED | OUTPATIENT
Start: 2018-11-20 | End: 2019-01-02

## 2018-11-20 RX ORDER — LANOLIN ALCOHOL/MO/W.PET/CERES
400 CREAM (GRAM) TOPICAL ONCE
Status: COMPLETED | OUTPATIENT
Start: 2018-11-20 | End: 2018-11-20

## 2018-11-20 RX ADMIN — PANTOPRAZOLE SODIUM 40 MG: 40 TABLET, DELAYED RELEASE ORAL at 08:11

## 2018-11-20 RX ADMIN — ROSUVASTATIN CALCIUM 40 MG: 40 TABLET, FILM COATED ORAL at 08:11

## 2018-11-20 RX ADMIN — LOSARTAN POTASSIUM 50 MG: 50 TABLET, FILM COATED ORAL at 08:11

## 2018-11-20 RX ADMIN — INSULIN ASPART 4 UNITS: 100 INJECTION, SOLUTION INTRAVENOUS; SUBCUTANEOUS at 11:11

## 2018-11-20 RX ADMIN — TAMSULOSIN HYDROCHLORIDE 0.4 MG: 0.4 CAPSULE ORAL at 11:11

## 2018-11-20 RX ADMIN — CARVEDILOL 6.25 MG: 6.25 TABLET, FILM COATED ORAL at 08:11

## 2018-11-20 RX ADMIN — SPIRONOLACTONE 25 MG: 25 TABLET ORAL at 08:11

## 2018-11-20 RX ADMIN — MAGNESIUM OXIDE TAB 400 MG (241.3 MG ELEMENTAL MG) 400 MG: 400 (241.3 MG) TAB at 05:11

## 2018-11-20 RX ADMIN — FINASTERIDE 5 MG: 5 TABLET, FILM COATED ORAL at 08:11

## 2018-11-20 RX ADMIN — ACETAMINOPHEN 650 MG: 325 TABLET, FILM COATED ORAL at 04:11

## 2018-11-20 RX ADMIN — ISOSORBIDE MONONITRATE 60 MG: 60 TABLET, EXTENDED RELEASE ORAL at 08:11

## 2018-11-20 RX ADMIN — TICAGRELOR 90 MG: 90 TABLET ORAL at 08:11

## 2018-11-20 RX ADMIN — ASPIRIN 81 MG CHEWABLE TABLET 81 MG: 81 TABLET CHEWABLE at 08:11

## 2018-11-20 RX ADMIN — CEFAZOLIN 1 G: 330 INJECTION, POWDER, FOR SOLUTION INTRAMUSCULAR; INTRAVENOUS at 05:11

## 2018-11-20 RX ADMIN — AMIODARONE HYDROCHLORIDE 400 MG: 200 TABLET ORAL at 08:11

## 2018-11-20 RX ADMIN — INSULIN ASPART 2 UNITS: 100 INJECTION, SOLUTION INTRAVENOUS; SUBCUTANEOUS at 08:11

## 2018-11-20 NOTE — PROGRESS NOTES
[facility]  Cardiac Electrophysiology  Progress Note    Admission Date: 11/16/2018  Code Status: Full Code   Attending Physician: No att. providers found   Expected Discharge Date: 11/22/2018  Principal Problem:Ventricular tachycardia    Subjective:     Interval History: Feeling well, denies angina, dyspnea, lightheadedness, or syncope.  Telemetry showed sinus rhythm w episodes of A-Paced    Review of Systems   HENT: Negative for congestion and ear discharge.    Eyes: Negative for blurred vision and discharge.   Cardiovascular: Negative for chest pain and claudication.   Respiratory: Negative for cough and hemoptysis.    Endocrine: Negative for cold intolerance and heat intolerance.     Objective:     Vital Signs (Most Recent):  Temp: 98.5 °F (36.9 °C) (11/20/18 1105)  Pulse: 66 (11/20/18 1400)  Resp: 19 (11/20/18 1400)  BP: (!) 144/70 (11/20/18 1400)  SpO2: 100 % (11/20/18 1400) Vital Signs (24h Range):  Temp:  [98.2 °F (36.8 °C)-100.2 °F (37.9 °C)] 98.5 °F (36.9 °C)  Pulse:  [60-82] 66  Resp:  [12-21] 19  SpO2:  [97 %-100 %] 100 %  BP: (112-185)/(60-79) 144/70     Weight: 108.3 kg (238 lb 12.1 oz)  Body mass index is 37.39 kg/m².     SpO2: 100 %  O2 Device (Oxygen Therapy): nasal cannula    Physical Exam   Constitutional: He is oriented to person, place, and time. He appears well-developed and well-nourished.   HENT:   Head: Normocephalic and atraumatic.   Nose: Nose normal.   Mouth/Throat: No oropharyngeal exudate.   Eyes: Right eye exhibits no discharge. Left eye exhibits no discharge. No scleral icterus.   Neck: Normal range of motion. Neck supple. No JVD present.   Cardiovascular: Normal rate, regular rhythm, S1 normal and S2 normal. Exam reveals no gallop, no S3, no S4, no distant heart sounds, no friction rub, no midsystolic click and no opening snap.   No murmur heard.  Pulses:       Radial pulses are 2+ on the right side, and 2+ on the left side.        Femoral pulses are 2+ on the  right side, and 2+ on the left side.  Pulmonary/Chest: Effort normal and breath sounds normal. No respiratory distress. He has no wheezes. He has no rales. He exhibits no tenderness.   Abdominal: Soft. Bowel sounds are normal. He exhibits no distension. There is no tenderness. There is no rebound.   Musculoskeletal: Normal range of motion. He exhibits no edema, tenderness or deformity.   Lymphadenopathy:     He has no cervical adenopathy.   Neurological: He is alert and oriented to person, place, and time. No cranial nerve deficit.   Skin: Skin is warm and dry.   Psychiatric: He has a normal mood and affect. His behavior is normal.       Significant Labs:   BMP:   Recent Labs   Lab 11/18/18  2306 11/19/18  0300 11/19/18  1520 11/20/18  0320   GLU  --  273*  --  234*   NA  --  136  --  133*   K 4.5 4.3  --  4.9   CL  --  103  --  102   CO2  --  25  --  24   BUN  --  10  --  11   CREATININE  --  1.1  --  1.1   CALCIUM  --  9.2  --  9.2   MG 1.7 1.8  1.8 2.3 1.9  1.9   , CMP:   Recent Labs   Lab 11/18/18  2306 11/19/18  0300 11/20/18  0320   NA  --  136 133*   K 4.5 4.3 4.9   CL  --  103 102   CO2  --  25 24   GLU  --  273* 234*   BUN  --  10 11   CREATININE  --  1.1 1.1   CALCIUM  --  9.2 9.2   PROT  --  6.6 6.8   ALBUMIN  --  3.1* 3.1*   BILITOT  --  0.6 0.7   ALKPHOS  --  59 58   AST  --  79* 44*   ALT  --  102* 72*   ANIONGAP  --  8 7*   ESTGFRAFRICA  --  >60.0 >60.0   EGFRNONAA  --  >60.0 >60.0   , CBC:   Recent Labs   Lab 11/19/18 0300 11/20/18  0320   WBC 9.60 10.59   HGB 12.0* 11.8*   HCT 37.2* 37.1*    219    and INR: No results for input(s): INR, PROTIME in the last 48 hours.    Significant Imaging: Telemetry as above    Assessment and Plan:     Wide-complex tachycardia    67 y/o man with prior 5v CABG (2005) (LIMA to LAD, SVG to Diag), known  to RCA and LCx and recent admission to outside hospital with NSTEMI (treated medically), ICM 35% (Sept 2018), CKD II, HTN, HLD, DM presented with WCT (LBBB  morphology and R axis). LHC showed unchanged anatomy from previous LHC but he had easily induced sustained VT during LVgram.    Recommendations:  -Continue amiodarone 400 BID for 10 days then 400 daily  -Continue carvedilol  -F/U general recommendations from EP post-op note from yesterday    Case discussed w Dr Glasgow. Will sign off           Kamaljit Roy MD  Cardiac Electrophysiology  [facility]

## 2018-11-20 NOTE — PLAN OF CARE
Problem: Patient Care Overview  Goal: Plan of Care Review  Outcome: Ongoing (interventions implemented as appropriate)    Pt went to EP today for EP Study as well as AICD placement. See vital signs and assessments in flowsheets. See below for updates on today's progress.     Pulmonary: Room Air. %. Lungs clear.     Cardiovascular: SB-NSR. BP: 12- 150' systolic. No ectopy or VTAC. No chest pain this shift. Pt has left arm in sling and is to keep immobilized for 24 hours. Ice pack also in place over site.     Neurological: AAOx4. Follows commands. Calm and cooperative, Afebrile. Pulses +2. Right groin site clean dry intact with no hematoma formation or oozing.     Gastrointestinal: No BM this shift. Normoactive BS    Genitourinary: Voids per urinal. No complications.     Endocrine: AC/HS. PRN sliding scale.     Integumentary/Other: Left upper chest incision from AICD placement     Infusions: none    Patient progressing towards goals as tolerated, plan of care communicated and reviewed with Walter Bull and family. All concerns addressed. Will continue to monitor.

## 2018-11-20 NOTE — DISCHARGE SUMMARY
[facility]  Cardiology  Discharge Summary      Patient Name: Walter Bull  MRN: [id number]  Admission Date: 11/16/2018  Hospital Length of Stay: 4 days  Discharge Date and Time:  11/20/2018 2:59 PM  Attending Physician: Matt Cook MD    Discharging Provider: Felisha Og MD  Primary Care Physician: Belkys Kruger MD    HPI:   67 y/o man with prior 5v CABG (2005) (LIMA to LAD, SVG to Diag), known  to RCA and LCx and recent admission to outside hospital with NSTEMI, CKD II, HTN, HLD, DM presented with acute onset of chest pain and new onset LBBB. Had LHC at OSH and has been managed medically. Has LVEF 35%, no ICD. Was about to perform lawn work when he started having acute onset chest pain, similar in nature to his prior MI. Also noted palpitations which he had not experienced before, has never experienced syncope. 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 He denies orthopnea, PND, peripheral edema or [place]. There is no family history of sudden cardiac death.    EMS arrived and found to have wide complex tachycardia. Cardiology called for assistance. He continued to have 9-10/10 chest pain like an elephant sitting on his chest. Received NTG x 2 and ASA 325mg, was taken to the cath lab and no new occlusions were identified. The patient received lidocaine for a slow MMVT which restored NSR with a narrow complex. He is admitted to CMICU for further evaluation on an amiodarone infusion.        Procedure(s) (LRB):  CARDIAC ELECTROPHYSIOLOGY STUDY (N/A)     Indwelling Lines/Drains at time of discharge:  Lines/Drains/Airways          None          Hospital Course:  11/16: Cath lab-- that is known and the LIMA to LAD and SVG to D1 grafts are patent; EP consult for SVT presentation and had inducible sustained VT when pigtail catheter was inserted into the LV, VT terminated with lidocaine bolus (patient never lost  conciousness). Started on amio gtt. DC heparin gtt and nitro gtt.  11/17: continue amio drip, BB held due to kala  11/18: Patient stable, awaiting EP procedure tomorrow  11/19: Patient to EP lab for study today. Remains CP free. Amio ggt d/c prior to procedure   11/20: Patient stable for discharge, f/u with cardiology in 1 week for wound recheck and 3mo with EP         Consults:   Consults (From admission, onward)        Status Ordering Provider     Inpatient consult to Cardiac Rehab  Once     Provider:  (Not yet assigned)    Completed JOSH SHIELDS     Inpatient consult to Cardiology  Once     Provider:  (Not yet assigned)    Completed ARABELLA ARROYO     Inpatient consult to Electrophysiology  Once     Provider:  (Not yet assigned)    Completed ROBERT DOS SANTOS     Inpatient consult to Interventional Cardiology  Once     Provider:  (Not yet assigned)    Completed JOSH SHIELDS     Inpatient consult to Registered Dietitian/Nutritionist  Once     Provider:  (Not yet assigned)    Completed JOSH SHIELDS     Inpatient consult to Social Work/Case Management  Once     Provider:  (Not yet assigned)    Acknowledged JOSH SHIELDS          Significant Diagnostic Studies: Labs: All labs within the past 24 hours have been reviewed    Pending Diagnostic Studies:     Procedure Component Value Units Date/Time    CBC auto differential [[id number]] Collected:  11/20/18 0320    Order Status:  Sent Lab Status:  In process Updated:  11/20/18 0321    Specimen:  Blood     Comprehensive metabolic panel [[id number]] Collected:  11/20/18 0320    Order Status:  Sent Lab Status:  In process Updated:  11/20/18 0321    Specimen:  Blood     Magnesium [[id number]] Collected:  11/20/18 0320    Order Status:  Sent Lab Status:  In process Updated:  11/20/18 0321    Specimen:  Blood     Magnesium [[id number]] Collected:  11/20/18 0320    Order Status:  Sent Lab Status:  In process Updated:  11/20/18 0321    Specimen:   Blood     Phosphorus [[id number]] Collected:  11/20/18 0320    Order Status:  Sent Lab Status:  In process Updated:  11/20/18 0321    Specimen:  Blood     Phosphorus [[id number]] Collected:  11/20/18 0320    Order Status:  Sent Lab Status:  In process Updated:  11/20/18 0321    Specimen:  Blood           Final Active Diagnoses:    Diagnosis Date Noted POA    PRINCIPAL PROBLEM:  Ventricular tachycardia [I47.2] 11/16/2018 Yes    Bradycardia [R00.1] 11/17/2018 Yes    Wide-complex tachycardia [I47.2] 11/16/2018 Yes    Ischemic cardiomyopathy [I25.5] 09/26/2018 Yes    Benign essential HTN [I10] 07/19/2017 Yes    Coronary artery disease of native artery of native heart with stable angina pectoris [I25.118] 01/11/2017 Yes    Diabetes mellitus type 2 in obese [E11.69, E66.9] 01/11/2017 Yes    HLD (hyperlipidemia) [E78.5] 01/11/2017 Yes      Problems Resolved During this Admission:    Diagnosis Date Noted Date Resolved POA    S/P CABG x 5 [Z95.1] 01/11/2017 11/19/2018 Not Applicable     * Ventricular tachycardia    LBBB pattern terminated with administration of lidocaine, thought to be slow VT, which may be due to the patient's cardiomyopathy, EF reportedly 35%, inducible VT noted on EP studies    2D echo:  · Mildly decreased left ventricular systolic function. The estimated ejection fraction is 45%  · Grade I (mild) left ventricular diastolic dysfunction consistent with impaired relaxation.  · Normal right ventricular systolic function.  · Moderate left atrial enlargement.  · Normal central venous pressure (3 mm Hg).  · No pericardial effusion.  · Septal wall has abnormal motion.  · Local segmental wall motion abnormalities.  · Normal left atrial pressure.    PLAN:  - Dual chamber ICD placed on 11/19 2/2 to inducible VT and high probability of arrhythmia   - Amiodarone 400mg PO BID x10 days  - Transition to Amiodarone 400mg PO QD after completion of loading   - Patient discharged 11/20 in good condition        Bradycardia    HR about 40-50s during sleep and awake, asymptomatic and hemodynamically stable           Ischemic cardiomyopathy    EF reportedly 35% from an outside facility  -Continue medical therapy for ICM including ARB (losartan), and aldactone, isosorbide, BB  -Currently without evidence of volume overload or decompensation  -Medical therapy for CAD as mentioned above           Benign essential HTN    Medical therapy per ICM and CAD management    Plan:  -Losartan 50mg QD  -Isosorbide mononitrate 60mg QD  -Spironolactone 25mg QD      Vitals:    11/20/18 1200 11/20/18 1300 11/20/18 1305 11/20/18 1400   BP: (!) 146/68 (!) 158/66  (!) 144/70   BP Location:       Patient Position:       Pulse: 70 68 68 66   Resp: 13 15  19   Temp:       TempSrc:       SpO2: 100% 100%  100%   Weight:       Height:              HLD (hyperlipidemia)    -Rosuvastatin 40mg QD         Diabetes mellitus type 2 in obese    -Last A1c reviewed-   Lab Results   Component Value Date    HGBA1C 6.9 (H) 11/16/2018       Home Antihyperglycemic Regiment:  -Janumet  -SSI    Inpatient Antihyperglycemic Regiment:   Antihyperglycemics (From admission, onward)    Start     Stop Route Frequency Ordered    11/18/18 2100  insulin detemir U-100 pen 20 Units      -- SubQ Nightly 11/18/18 1601    11/18/18 1851  insulin aspart U-100 pen 1-10 Units      -- SubQ Before meals & nightly PRN 11/18/18 1751        -LSSI  -ACCU Checks ACHS  -Diabetic Diet 2000 cookie  -Diabetic nutritional counseling given     Blood Sugars (AccuCheck):    Recent Labs     11/18/18  2234 11/19/18  0849 11/19/18  1704 11/19/18  2225 11/20/18  0856 11/20/18  1145   POCTGLUCOSE 331* 222* 245* 251* 195* 214*                Coronary artery disease of native artery of native heart with stable angina pectoris    Taken to cath lab due to concern for MI with new LBBB pattern on EKG, no new occlusions found and no intervention performed, troponin undetectable  Found to have slow VT (see  below)    Plan:  -Continue treatment of CAD  -DAPT with ASA and Ticagrelor  -Rosuvastatin 40mg QD  - Losartan 50mg QD  -Spironolactone 25mg QD  -Carvedilol 6.25 mg BID            Discharged Condition: good    Disposition: Home or Self Care    Follow Up:  Follow-up Information     David Busch - Cardiology In 1 week.    Specialty:  Cardiology  Why:  For wound re-check  Contact information:  [address]  [facility] [address] [phone]  Additional information:  3rd floor           Byron Glasgow MD In 3 months.    Specialties:  Electrophysiology, Cardiovascular Disease, Cardiology  Why:  For followup   Contact information:  [address]  [facility] [address] [phone]             David Busch - Diabetes Management In 1 week.    Specialty:  Diabetes  Contact information:  [facility]  [facility] [address] [phone]  Additional information:  6th Floor               Patient Instructions:      Ambulatory Referral to Endocrinology   Referral Priority: Routine Referral Type: Consultation   Requested Specialty: Endocrinology   Number of Visits Requested: 1     Ambulatory Referral to Electrophysiology   Referral Priority: Routine Referral Type: Consultation   Referral Reason: Specialty Services Required   Requested Specialty: Electrophysiology   Number of Visits Requested: 1     Diet Cardiac     Other restrictions (specify):   Order Comments: - Sling to left arm - wear for 48 hours, then only at night for 6 weeks.  - NO HEPARIN PRODUCTS  - Bactrim DS BID for five days   - No lifting left elbow above shoulder height  - No lifting over 5 pounds  - No driving for 1 week and for 4 weeks if patient uses left arm to make turns  - Patient may shower in 48 hours, do not let beam of shower hit site directly and no scrubbing in area  - Follow up in device clinic in 1 week     Medications:  Reconciled Home Medications:      Medication List      START taking these medications    * amiodarone  400 MG tablet  Commonly known as:  PACERONE  Take 1 tablet (400 mg total) by mouth 2 (two) times daily.     * amiodarone 400 MG tablet  Commonly known as:  PACERONE  Take 1 tablet (400 mg total) by mouth once daily.  Start taking on:  11/30/2018     sulfamethoxazole-trimethoprim 800-160mg 800-160 mg Tab  Commonly known as:  BACTRIM DS  Take 1 tablet by mouth 2 (two) times daily. for 5 days     ticagrelor 90 mg tablet  Commonly known as:  BRILINTA  Take 1 tablet (90 mg total) by mouth 2 (two) times daily.         * This list has 2 medication(s) that are the same as other medications prescribed for you. Read the directions carefully, and ask your doctor or other care provider to review them with you.            CHANGE how you take these medications    furosemide 20 MG tablet  Commonly known as:  LASIX  Take 1 tablet (20 mg total) by mouth once daily.  What changed:  additional instructions  Notes to patient:  Alternate 20 mg daily with 40 mg daily.     losartan 50 MG tablet  Commonly known as:  COZAAR  Take 1 tablet (50 mg total) by mouth once daily.  Start taking on:  11/21/2018  What changed:    · medication strength  · how much to take     RANEXA 1,000 mg Tb12  Generic drug:  ranolazine  Take 1 tablet (1,000 mg total) by mouth 2 (two) times daily.  What changed:    · how much to take  · how to take this  · when to take this        CONTINUE taking these medications    albuterol 90 mcg/actuation inhaler  Commonly known as:  PROVENTIL/VENTOLIN HFA  Inhale 2 puffs into the lungs every 6 (six) hours as needed for Wheezing.     carvedilol 6.25 MG tablet  Commonly known as:  COREG  Take 1 tablet (6.25 mg total) by mouth 2 (two) times daily with meals.     CENTRUM SILVER ORAL  Take by mouth.     esomeprazole 40 MG capsule  Commonly known as:  NEXIUM  Take 1 capsule (40 mg total) by mouth before breakfast.     ezetimibe 10 mg tablet  Commonly known as:  ZETIA  Take 10 mg by mouth once daily.     ferrous sulfate 325 mg (65  mg iron) Tab tablet  Commonly known as:  FEOSOL  Take 1 tablet (325 mg total) by mouth 3 (three) times daily.     finasteride 5 mg tablet  Commonly known as:  PROSCAR  Take 1 tablet (5 mg total) by mouth once daily.     insulin  unit/mL injection  Commonly known as:  NovoLIN N  Inject into the skin. 70 units in the morning and 40 units at bedtime     isosorbide mononitrate 60 MG 24 hr tablet  Commonly known as:  IMDUR  Take 1 tablet (60 mg total) by mouth once daily.     JANUMET 50-1,000 mg per tablet  Generic drug:  SITagliptan-metformin  Take 1 tablet by mouth 2 (two) times daily with meals.     nitroGLYCERIN 0.4 MG SL tablet  Commonly known as:  NITROSTAT  DISSOLVE ONE TABLET UNDER THE TONGUE EVERY 5 MINUTES AS NEEDED FOR CHEST PAIN.     ranitidine 150 MG tablet  Commonly known as:  ZANTAC     rosuvastatin 40 MG Tab  Commonly known as:  CRESTOR  Take 1 tablet (40 mg total) by mouth once daily.     spironolactone 25 MG tablet  Commonly known as:  ALDACTONE     tamsulosin 0.4 mg Cap  Commonly known as:  FLOMAX  TAKE ONE CAPSULE BY MOUTH ONCE DAILY        STOP taking these medications    acetaminophen 100 mg/mL suspension  Commonly known as:  TYLENOL     aspirin 325 MG tablet     clopidogrel 75 mg tablet  Commonly known as:  PLAVIX          Plan discussed with attending Dr. Matt Cook MD , further recommendations as per attending addendum. Please feel free to call with any questions or concerns.            Time spent on the discharge of patient: 45 minutes    Felisha Og MD  Cardiology  [facility]

## 2018-11-20 NOTE — PLAN OF CARE
Problem: Patient Care Overview  Goal: Plan of Care Review  Outcome: Ongoing (interventions implemented as appropriate)  Patient lying in bed; significant other at bedside.  AAOx4. Follows commands. GARCIA.  LUE in sling for immobilization.  Respirations even, nonlabored on RA.  O2 sat %.  NSR on bedside monitor.  ICD to L chestwall; pressure dressing intact; ice pack in place.  No BM this shift; active BS.  Voids per urinal; 700 ml this shift.  Blood glucose monitoring AC/HS; 20 units Levemir administered.  Pain medication administered x1.  Call bell in reach. Bed locked and in lowest position. SR upx3.  No acute distress noted during shift.  Will continue to monitor.

## 2018-11-20 NOTE — PLAN OF CARE
Unabled to complete discharge planning assessment due to patient is not in room, nor any family is in room.     [facility] Packet left at BS.        11/19/18 1320   Discharge Assessment   Assessment Type Discharge Planning Assessment

## 2018-11-20 NOTE — PLAN OF CARE
Patient lives in a 1 story house w/his friend, Eve. He is independent & agile prior to this admit, partaking in OP Cardiac rehab. No needs determined.       11/19/18 1803   Discharge Assessment   Assessment Type Discharge Planning Assessment   Confirmed/corrected address and phone number on facesheet? Yes   Assessment information obtained from? Medical Record;Other  (Friend-Eve)   Expected Length of Stay (days) (TBD/? 4+)   Communicated expected length of stay with patient/caregiver no  (Per MD)   Prior to hospitilization cognitive status: Alert/Oriented;No Deficits   Prior to hospitalization functional status: Independent   Current cognitive status: Alert/Oriented;No Deficits   Current Functional Status: Independent;Needs Assistance   Lives With significant other  (Friend-Eve)   Able to Return to Prior Arrangements yes   Is patient able to care for self after discharge? Yes   Who are your caregiver(s) and their phone number(s)? (Eve Ortega Friend     [phone] )   Patient's perception of discharge disposition home or selfcare   Readmission Within The Last 30 Days no previous admission in last 30 days   Patient currently being followed by outpatient case management? No   Patient currently receives any other outside agency services? No   Equipment Currently Used at Home none   Do you have any problems affording any of your prescribed medications? No   Is the patient taking medications as prescribed? yes   Does the patient have transportation home? Yes   Transportation Available car;family or friend will provide   Dialysis Name and Scheduled days (N/A)   Does the patient receive services at the Coumadin Clinic? Yes  ([facility])   Discharge Plan A Home with family;Other  (Currently going to OP Cardiac Rehab)   Discharge Plan B Home with family;Other   Patient/Family In Agreement With Plan yes

## 2018-11-20 NOTE — SUBJECTIVE & OBJECTIVE
Interval History: Feeling well, denies angina, dyspnea, lightheadedness, or syncope.  Telemetry showed sinus rhythm w episodes of A-Paced    Review of Systems   HENT: Negative for congestion and ear discharge.    Eyes: Negative for blurred vision and discharge.   Cardiovascular: Negative for chest pain and claudication.   Respiratory: Negative for cough and hemoptysis.    Endocrine: Negative for cold intolerance and heat intolerance.     Objective:     Vital Signs (Most Recent):  Temp: 98.5 °F (36.9 °C) (11/20/18 1105)  Pulse: 66 (11/20/18 1400)  Resp: 19 (11/20/18 1400)  BP: (!) 144/70 (11/20/18 1400)  SpO2: 100 % (11/20/18 1400) Vital Signs (24h Range):  Temp:  [98.2 °F (36.8 °C)-100.2 °F (37.9 °C)] 98.5 °F (36.9 °C)  Pulse:  [60-82] 66  Resp:  [12-21] 19  SpO2:  [97 %-100 %] 100 %  BP: (112-185)/(60-79) 144/70     Weight: 108.3 kg (238 lb 12.1 oz)  Body mass index is 37.39 kg/m².     SpO2: 100 %  O2 Device (Oxygen Therapy): nasal cannula    Physical Exam   Constitutional: He is oriented to person, place, and time. He appears well-developed and well-nourished.   HENT:   Head: Normocephalic and atraumatic.   Nose: Nose normal.   Mouth/Throat: No oropharyngeal exudate.   Eyes: Right eye exhibits no discharge. Left eye exhibits no discharge. No scleral icterus.   Neck: Normal range of motion. Neck supple. No JVD present.   Cardiovascular: Normal rate, regular rhythm, S1 normal and S2 normal. Exam reveals no gallop, no S3, no S4, no distant heart sounds, no friction rub, no midsystolic click and no opening snap.   No murmur heard.  Pulses:       Radial pulses are 2+ on the right side, and 2+ on the left side.        Femoral pulses are 2+ on the right side, and 2+ on the left side.  Pulmonary/Chest: Effort normal and breath sounds normal. No respiratory distress. He has no wheezes. He has no rales. He exhibits no tenderness.   Abdominal: Soft. Bowel sounds are normal. He exhibits no distension. There is no tenderness.  There is no rebound.   Musculoskeletal: Normal range of motion. He exhibits no edema, tenderness or deformity.   Lymphadenopathy:     He has no cervical adenopathy.   Neurological: He is alert and oriented to person, place, and time. No cranial nerve deficit.   Skin: Skin is warm and dry.   Psychiatric: He has a normal mood and affect. His behavior is normal.       Significant Labs:   BMP:   Recent Labs   Lab 11/18/18  2306 11/19/18  0300 11/19/18  1520 11/20/18  0320   GLU  --  273*  --  234*   NA  --  136  --  133*   K 4.5 4.3  --  4.9   CL  --  103  --  102   CO2  --  25  --  24   BUN  --  10  --  11   CREATININE  --  1.1  --  1.1   CALCIUM  --  9.2  --  9.2   MG 1.7 1.8  1.8 2.3 1.9  1.9   , CMP:   Recent Labs   Lab 11/18/18  2306 11/19/18  0300 11/20/18  0320   NA  --  136 133*   K 4.5 4.3 4.9   CL  --  103 102   CO2  --  25 24   GLU  --  273* 234*   BUN  --  10 11   CREATININE  --  1.1 1.1   CALCIUM  --  9.2 9.2   PROT  --  6.6 6.8   ALBUMIN  --  3.1* 3.1*   BILITOT  --  0.6 0.7   ALKPHOS  --  59 58   AST  --  79* 44*   ALT  --  102* 72*   ANIONGAP  --  8 7*   ESTGFRAFRICA  --  >60.0 >60.0   EGFRNONAA  --  >60.0 >60.0   , CBC:   Recent Labs   Lab 11/19/18  0300 11/20/18  0320   WBC 9.60 10.59   HGB 12.0* 11.8*   HCT 37.2* 37.1*    219    and INR: No results for input(s): INR, PROTIME in the last 48 hours.    Significant Imaging: Telemetry as above

## 2018-11-20 NOTE — ASSESSMENT & PLAN NOTE
Medical therapy per ICM and CAD management    Plan:  -Losartan 50mg QD  -Isosorbide mononitrate 60mg QD  -Spironolactone 25mg QD      Vitals:    11/20/18 1200 11/20/18 1300 11/20/18 1305 11/20/18 1400   BP: (!) 146/68 (!) 158/66  (!) 144/70   BP Location:       Patient Position:       Pulse: 70 68 68 66   Resp: 13 15  19   Temp:       TempSrc:       SpO2: 100% 100%  100%   Weight:       Height:

## 2018-11-20 NOTE — ASSESSMENT & PLAN NOTE
LBBB pattern terminated with administration of lidocaine, thought to be slow VT, which may be due to the patient's cardiomyopathy, EF reportedly 35%, inducible VT noted on EP studies    2D echo:  · Mildly decreased left ventricular systolic function. The estimated ejection fraction is 45%  · Grade I (mild) left ventricular diastolic dysfunction consistent with impaired relaxation.  · Normal right ventricular systolic function.  · Moderate left atrial enlargement.  · Normal central venous pressure (3 mm Hg).  · No pericardial effusion.  · Septal wall has abnormal motion.  · Local segmental wall motion abnormalities.  · Normal left atrial pressure.    PLAN:  - Dual chamber ICD placed on 11/19 2/2 to inducible VT and high probability of arrhythmia   - Amiodarone 400mg PO BID x10 days  - Transition to Amiodarone 400mg PO QD after completion of loading   - Patient discharged 11/20 in good condition

## 2018-11-20 NOTE — ASSESSMENT & PLAN NOTE
-Last A1c reviewed-   Lab Results   Component Value Date    HGBA1C 6.9 (H) 11/16/2018       Home Antihyperglycemic Regiment:  -Janumet  -SSI    Inpatient Antihyperglycemic Regiment:   Antihyperglycemics (From admission, onward)    Start     Stop Route Frequency Ordered    11/18/18 2100  insulin detemir U-100 pen 20 Units      -- SubQ Nightly 11/18/18 1601    11/18/18 1851  insulin aspart U-100 pen 1-10 Units      -- SubQ Before meals & nightly PRN 11/18/18 1751        -LSSI  -ACCU Checks ACHS  -Diabetic Diet 2000 cookie  -Diabetic nutritional counseling given     Blood Sugars (AccuCheck):    Recent Labs     11/18/18  2234 11/19/18  0849 11/19/18  1704 11/19/18  2225 11/20/18  0856 11/20/18  1145   POCTGLUCOSE 331* 222* 245* 251* 195* 214*

## 2018-11-21 ENCOUNTER — LAB VISIT (OUTPATIENT)
Dept: LAB | Facility: HOSPITAL | Age: 68
End: 2018-11-21
Attending: INTERNAL MEDICINE
Payer: MEDICARE

## 2018-11-21 DIAGNOSIS — E78.5 HYPERLIPIDEMIA, UNSPECIFIED HYPERLIPIDEMIA TYPE: ICD-10-CM

## 2018-11-21 LAB
CHOLEST SERPL-MCNC: 127 MG/DL
CHOLEST/HDLC SERPL: 4.4 {RATIO}
HDLC SERPL-MCNC: 29 MG/DL
HDLC SERPL: 22.8 %
LDLC SERPL CALC-MCNC: 75.6 MG/DL
NONHDLC SERPL-MCNC: 98 MG/DL
TRIGL SERPL-MCNC: 112 MG/DL

## 2018-11-21 PROCEDURE — 36415 COLL VENOUS BLD VENIPUNCTURE: CPT

## 2018-11-21 PROCEDURE — 80061 LIPID PANEL: CPT

## 2018-11-21 NOTE — TELEPHONE ENCOUNTER
"Just released to home from Cardiac ICU, went to fill meds at [facility] and was told that the md cancelled them all.  Amiodarone 400 mg  brilinta 90 mg   S/w JOHNATHON Block, apparantly there was a mix up as to which pharmacy the meds were supposed to go to , and the provider cancelled the meds from the wrong pharmacy.  I called the amiodarone rx's in verbally.  The brilinta is already ready to be picked up.  iformed caller of the above via .      Reason for Disposition   [1] Prescription not at pharmacy AND [2] was prescribed today by PCP    Answer Assessment - Initial Assessment Questions  1. SYMPTOMS: "Do you have any symptoms?"      Went to  meds after discharging from the hospital, and were told the provider cancelled them.    2. SEVERITY: If symptoms are present, ask "Are they mild, moderate or severe?"      Na    Protocols used: ST MEDICATION QUESTION CALL-A-AH      "

## 2018-11-27 ENCOUNTER — OFFICE VISIT (OUTPATIENT)
Dept: INTERNAL MEDICINE | Facility: CLINIC | Age: 68
End: 2018-11-27
Payer: MEDICARE

## 2018-11-27 VITALS
HEIGHT: 67 IN | SYSTOLIC BLOOD PRESSURE: 126 MMHG | HEART RATE: 73 BPM | DIASTOLIC BLOOD PRESSURE: 84 MMHG | BODY MASS INDEX: 35.94 KG/M2 | RESPIRATION RATE: 18 BRPM | OXYGEN SATURATION: 95 % | WEIGHT: 229 LBS

## 2018-11-27 DIAGNOSIS — I80.8 THROMBOPHLEBITIS ARM: Primary | ICD-10-CM

## 2018-11-27 PROCEDURE — 99999 PR PBB SHADOW E&M-EST. PATIENT-LVL V: CPT | Mod: PBBFAC,,, | Performed by: NURSE PRACTITIONER

## 2018-11-27 PROCEDURE — 3079F DIAST BP 80-89 MM HG: CPT | Mod: CPTII,S$GLB,, | Performed by: NURSE PRACTITIONER

## 2018-11-27 PROCEDURE — 1101F PT FALLS ASSESS-DOCD LE1/YR: CPT | Mod: CPTII,S$GLB,, | Performed by: NURSE PRACTITIONER

## 2018-11-27 PROCEDURE — 3074F SYST BP LT 130 MM HG: CPT | Mod: CPTII,S$GLB,, | Performed by: NURSE PRACTITIONER

## 2018-11-27 PROCEDURE — 99213 OFFICE O/P EST LOW 20 MIN: CPT | Mod: S$GLB,,, | Performed by: NURSE PRACTITIONER

## 2018-11-27 RX ORDER — CEPHALEXIN 500 MG/1
500 CAPSULE ORAL EVERY 12 HOURS
Qty: 14 CAPSULE | Refills: 0 | Status: SHIPPED | OUTPATIENT
Start: 2018-11-27 | End: 2018-12-04

## 2018-11-27 NOTE — PROGRESS NOTES
"Subjective:       Patient ID: Walter Bull is a 68 y.o. male.    Chief Complaint: Follow-up (x hospital- swelling from IV on R. arm PS:0)    Patient is known, to me and presents with   Chief Complaint   Patient presents with    Follow-up     x hospital- swelling from IV on R. arm PS:0   .  Denies chest pain and shortness of breath.  Patient presents with possible phlebitis to right brachial region from post IVF's. Just feels indurated but no warmth or redness noted  HPI  Review of Systems   Constitutional: Negative.    Respiratory: Negative.    Cardiovascular: Negative.    Skin: Negative.        Objective:      Physical Exam   Constitutional: He appears well-developed and well-nourished. No distress.   Neck: Normal range of motion. Neck supple. No JVD present. No tracheal deviation present. No thyromegaly present.   Cardiovascular: Normal rate, regular rhythm and normal heart sounds.   No murmur heard.  Pulmonary/Chest: Effort normal and breath sounds normal. He has no wheezes.   Lymphadenopathy:     He has no cervical adenopathy.   Skin: Skin is warm and dry. Capillary refill takes less than 2 seconds. No rash noted. He is not diaphoretic. No erythema. No pallor.        Indurated region to right ac region but no warmth or redness noted        Assessment:       1. Thrombophlebitis arm        Plan:   Walter was seen today for follow-up.    Diagnoses and all orders for this visit:    Thrombophlebitis arm  -     cephALEXin (KEFLEX) 500 MG capsule; Take 1 capsule (500 mg total) by mouth every 12 (twelve) hours. for 7 days    "This note will not be shared with the patient."  Will place on keflex bid to hellp with phlebitis   Bactrim will interact with some of the medications  If continues to be a problem return but no need form imaging   rtc  As scheduled  "

## 2018-11-28 ENCOUNTER — CLINICAL SUPPORT (OUTPATIENT)
Dept: CARDIOLOGY | Facility: HOSPITAL | Age: 68
DRG: 225 | End: 2018-11-28
Attending: INTERNAL MEDICINE
Payer: MEDICARE

## 2018-11-28 ENCOUNTER — LAB VISIT (OUTPATIENT)
Dept: LAB | Facility: HOSPITAL | Age: 68
End: 2018-11-28
Attending: INTERNAL MEDICINE
Payer: MEDICARE

## 2018-11-28 ENCOUNTER — OFFICE VISIT (OUTPATIENT)
Dept: CARDIOLOGY | Facility: CLINIC | Age: 68
End: 2018-11-28
Payer: MEDICARE

## 2018-11-28 VITALS
HEIGHT: 67 IN | WEIGHT: 231.94 LBS | HEART RATE: 65 BPM | SYSTOLIC BLOOD PRESSURE: 107 MMHG | BODY MASS INDEX: 36.4 KG/M2 | DIASTOLIC BLOOD PRESSURE: 59 MMHG

## 2018-11-28 DIAGNOSIS — E78.2 MIXED HYPERLIPIDEMIA: ICD-10-CM

## 2018-11-28 DIAGNOSIS — I25.10 CORONARY ARTERY DISEASE INVOLVING NATIVE CORONARY ARTERY OF NATIVE HEART WITHOUT ANGINA PECTORIS: ICD-10-CM

## 2018-11-28 DIAGNOSIS — I47.20 VT (VENTRICULAR TACHYCARDIA): ICD-10-CM

## 2018-11-28 DIAGNOSIS — E66.9 DIABETES MELLITUS TYPE 2 IN OBESE: ICD-10-CM

## 2018-11-28 DIAGNOSIS — I25.5 ISCHEMIC CARDIOMYOPATHY: ICD-10-CM

## 2018-11-28 DIAGNOSIS — I25.5 ISCHEMIC CARDIOMYOPATHY: Primary | ICD-10-CM

## 2018-11-28 DIAGNOSIS — Z95.1 S/P CABG X 5: ICD-10-CM

## 2018-11-28 DIAGNOSIS — T80.1XXD: ICD-10-CM

## 2018-11-28 DIAGNOSIS — I82.90: ICD-10-CM

## 2018-11-28 DIAGNOSIS — Z95.810 PRESENCE OF AUTOMATIC CARDIOVERTER/DEFIBRILLATOR (AICD): ICD-10-CM

## 2018-11-28 DIAGNOSIS — I47.20 VENTRICULAR TACHYCARDIA: ICD-10-CM

## 2018-11-28 DIAGNOSIS — E66.01 SEVERE OBESITY (BMI 35.0-39.9) WITH COMORBIDITY: ICD-10-CM

## 2018-11-28 DIAGNOSIS — I10 BENIGN ESSENTIAL HTN: ICD-10-CM

## 2018-11-28 DIAGNOSIS — E11.69 DIABETES MELLITUS TYPE 2 IN OBESE: ICD-10-CM

## 2018-11-28 DIAGNOSIS — Z95.810 ICD (IMPLANTABLE CARDIOVERTER-DEFIBRILLATOR) IN PLACE: ICD-10-CM

## 2018-11-28 PROBLEM — T80.1XXA: Status: ACTIVE | Noted: 2018-11-28

## 2018-11-28 LAB
ALBUMIN SERPL BCP-MCNC: 3.2 G/DL
ALP SERPL-CCNC: 70 U/L
ALT SERPL W/O P-5'-P-CCNC: 57 U/L
ANION GAP SERPL CALC-SCNC: 7 MMOL/L
AST SERPL-CCNC: 29 U/L
BILIRUB SERPL-MCNC: 0.5 MG/DL
BUN SERPL-MCNC: 12 MG/DL
CALCIUM SERPL-MCNC: 9 MG/DL
CHLORIDE SERPL-SCNC: 98 MMOL/L
CO2 SERPL-SCNC: 30 MMOL/L
CREAT SERPL-MCNC: 1.4 MG/DL
EST. GFR  (AFRICAN AMERICAN): 58.4 ML/MIN/1.73 M^2
EST. GFR  (NON AFRICAN AMERICAN): 50.5 ML/MIN/1.73 M^2
GLUCOSE SERPL-MCNC: 208 MG/DL
POTASSIUM SERPL-SCNC: 4.4 MMOL/L
PROT SERPL-MCNC: 6.9 G/DL
SODIUM SERPL-SCNC: 135 MMOL/L

## 2018-11-28 PROCEDURE — 3074F SYST BP LT 130 MM HG: CPT | Mod: CPTII,S$GLB,, | Performed by: INTERNAL MEDICINE

## 2018-11-28 PROCEDURE — 93289 INTERROG DEVICE EVAL HEART: CPT

## 2018-11-28 PROCEDURE — 3044F HG A1C LEVEL LT 7.0%: CPT | Mod: CPTII,S$GLB,, | Performed by: INTERNAL MEDICINE

## 2018-11-28 PROCEDURE — 1101F PT FALLS ASSESS-DOCD LE1/YR: CPT | Mod: CPTII,S$GLB,, | Performed by: INTERNAL MEDICINE

## 2018-11-28 PROCEDURE — 36415 COLL VENOUS BLD VENIPUNCTURE: CPT

## 2018-11-28 PROCEDURE — 93289 INTERROG DEVICE EVAL HEART: CPT | Mod: 26,,, | Performed by: INTERNAL MEDICINE

## 2018-11-28 PROCEDURE — 99214 OFFICE O/P EST MOD 30 MIN: CPT | Mod: S$GLB,,, | Performed by: INTERNAL MEDICINE

## 2018-11-28 PROCEDURE — 80053 COMPREHEN METABOLIC PANEL: CPT

## 2018-11-28 PROCEDURE — 3078F DIAST BP <80 MM HG: CPT | Mod: CPTII,S$GLB,, | Performed by: INTERNAL MEDICINE

## 2018-11-28 PROCEDURE — 99999 PR PBB SHADOW E&M-EST. PATIENT-LVL V: CPT | Mod: PBBFAC,,, | Performed by: INTERNAL MEDICINE

## 2018-11-28 NOTE — PATIENT INSTRUCTIONS
Weigh daily and double your furosemide dose  for 3-4 lb weight gain over a 2-3 day period or 5 pounds in a week until the weight has resolved then return to original dose. Repeat as necessary.  Continue all current medications reducing Amiodarone as directed..

## 2018-11-28 NOTE — PROGRESS NOTES
Subjective:   Patient ID:  Walter Bull is a 68 y.o. male who presents for follow-up of Coronary artery disease of native artery of native heart wit (2 months fu)      HPI:   Walter Bull presents for follow up of coronary artery disease having had prior CABG with an NSTEMI a couple of months ago . Has known  of the RCA and LCX with patent LIMA to LAD and SVG to Diagonal. SVG to OM occluded. Walter Bull has an ischemic cardiomyopathy with most recent EF increased to 45%. He had an admission 12 days ago with wide complex tachycardia ( VT) and was loaded with Amiodarone. A dual chamber ICD was implanted. He has had no subsequent events and no chest pain or SOB . Walter Bull has hypertension with good control.Walter Bull has dyslipidemia near LDL goal recent check  Review of Systems   Constitution: Negative for weakness, malaise/fatigue, weight gain and weight loss.   Eyes: Negative for blurred vision.   Cardiovascular: Negative for chest pain, claudication, cyanosis, dyspnea on exertion, irregular heartbeat, leg swelling, near-syncope, orthopnea, palpitations, paroxysmal nocturnal dyspnea and syncope.        Hard knot right antecubital fossa that had been red and swollen.   Respiratory: Negative for cough, shortness of breath and wheezing.    Musculoskeletal: Negative for falls and myalgias.   Gastrointestinal: Positive for nausea and vomiting. Negative for abdominal pain and heartburn.        N&V that has improved.   Genitourinary: Negative for nocturia.   Neurological: Negative for brief paralysis, dizziness, focal weakness, headaches, numbness and paresthesias.   Psychiatric/Behavioral: Negative for altered mental status.       Current Outpatient Medications   Medication Sig    albuterol (PROVENTIL/VENTOLIN HFA) 90 mcg/actuation inhaler Inhale 2 puffs into the lungs every 6 (six) hours as needed for Wheezing.    [START ON 11/30/2018] amiodarone (PACERONE) 400 MG tablet Take 1 tablet (400  mg total) by mouth once daily.    amiodarone (PACERONE) 400 MG tablet Take 1 tablet (400 mg total) by mouth 2 (two) times daily.    carvedilol (COREG) 6.25 MG tablet Take 1 tablet (6.25 mg total) by mouth 2 (two) times daily with meals.    cephALEXin (KEFLEX) 500 MG capsule Take 1 capsule (500 mg total) by mouth every 12 (twelve) hours. for 7 days    esomeprazole (NEXIUM) 40 MG capsule Take 1 capsule (40 mg total) by mouth before breakfast.    ezetimibe (ZETIA) 10 mg tablet Take 10 mg by mouth once daily.     ferrous sulfate 325 mg (65 mg iron) Tab tablet Take 1 tablet (325 mg total) by mouth 3 (three) times daily.    finasteride (PROSCAR) 5 mg tablet Take 1 tablet (5 mg total) by mouth once daily.    FOLIC ACID/MULTIVIT-MIN/LUTEIN (CENTRUM SILVER ORAL) Take by mouth once daily.     furosemide (LASIX) 20 MG tablet Take 1 tablet (20 mg total) by mouth once daily.    insulin NPH (NOVOLIN N) 100 unit/mL injection Inject 40 Units into the skin before breakfast.     isosorbide mononitrate (IMDUR) 60 MG 24 hr tablet Take 1 tablet (60 mg total) by mouth once daily.    JANUMET 50-1,000 mg per tablet Take 1 tablet by mouth 2 (two) times daily with meals.    losartan (COZAAR) 50 MG tablet Take 1 tablet (50 mg total) by mouth once daily.    nitroGLYCERIN (NITROSTAT) 0.4 MG SL tablet DISSOLVE ONE TABLET UNDER THE TONGUE EVERY 5 MINUTES AS NEEDED FOR CHEST PAIN.    RANEXA 1,000 mg Tb12 Take 1 tablet (1,000 mg total) by mouth 2 (two) times daily.    ranitidine (ZANTAC) 150 MG tablet 150 mg nightly.     rosuvastatin (CRESTOR) 40 MG Tab Take 1 tablet (40 mg total) by mouth once daily.    spironolactone (ALDACTONE) 25 MG tablet Take 25 mg by mouth once daily.     tamsulosin (FLOMAX) 0.4 mg Cap TAKE ONE CAPSULE BY MOUTH ONCE DAILY    ticagrelor (BRILINTA) 90 mg tablet Take 1 tablet (90 mg total) by mouth 2 (two) times daily.     No current facility-administered medications for this visit.      Objective:  "  Physical Exam   Constitutional: He is oriented to person, place, and time. He appears well-developed. No distress.   BP (!) 107/59 (BP Location: Left arm, Patient Position: Sitting, BP Method: X-Large (Automatic))   Pulse 65   Ht 5' 7" (1.702 m)   Wt 105.2 kg (231 lb 14.8 oz)   BMI 36.32 kg/m²    HENT:   Head: Normocephalic.   Right Ear: External ear normal.   Left Ear: External ear normal.   Eyes: EOM are normal. Pupils are equal, round, and reactive to light. No scleral icterus.   Neck: Neck supple. No JVD present. No thyromegaly present.   Cardiovascular: Normal rate, regular rhythm and intact distal pulses. PMI is not displaced. Exam reveals no gallop and no friction rub.   Murmur heard.   Medium-pitched midsystolic murmur is present with a grade of 2/6 at the upper left sternal border and lower left sternal border.  Thrombosed right antecubital vein non-tender.   Pulmonary/Chest: Effort normal and breath sounds normal. No respiratory distress. He has no wheezes. He has no rales.   ICD present left chest  Median sternotomy scar.    Abdominal: Soft. He exhibits no distension. There is no hepatosplenomegaly. There is no tenderness.   Musculoskeletal: He exhibits no edema or tenderness.   Gait normal   Neurological: He is alert and oriented to person, place, and time.   Skin: Skin is warm and dry. No rash noted.   Psychiatric: He has a normal mood and affect. His behavior is normal.       Lab Results   Component Value Date     (L) 11/20/2018    K 4.9 11/20/2018     11/20/2018    CO2 24 11/20/2018    BUN 11 11/20/2018    CREATININE 1.1 11/20/2018     (H) 11/20/2018    HGBA1C 6.9 (H) 11/16/2018    MG 1.9 11/20/2018    MG 1.9 11/20/2018    AST 44 (H) 11/20/2018    ALT 72 (H) 11/20/2018    ALBUMIN 3.1 (L) 11/20/2018    PROT 6.8 11/20/2018    BILITOT 0.7 11/20/2018    WBC 10.59 11/20/2018    HGB 11.8 (L) 11/20/2018    HCT 37.1 (L) 11/20/2018    MCV 86 11/20/2018     11/20/2018    TSH " 1.409 09/26/2018    CHOL 127 11/21/2018    HDL 29 (L) 11/21/2018    LDLCALC 75.6 11/21/2018    TRIG 112 11/21/2018       Assessment:     1. Coronary artery disease involving native coronary artery of native heart without angina pectoris    2. S/P CABG x 5;  OM Graft occluded   3. Ischemic cardiomyopathy : improved with EF 45%   4. Ventricular tachycardia    5. ICD (implantable cardioverter-defibrillator) in place    6. Mixed hyperlipidemia: Near goal   7. Benign essential HTN : Good control.   8. Severe obesity (BMI 35.0-39.9) with comorbidity    9. Diabetes mellitus type 2 in obese    10. Thrombosed vein secondary to intravenous line placement, subsequent encounter        Plan:     Walter was seen today for coronary artery disease of native artery of native heart wit.    Diagnoses and all orders for this visit:    Coronary artery disease involving native coronary artery of native heart without angina pectoris    S/P CABG x 5    Ischemic cardiomyopathy  Continue current regimen  Discussed daily weights and adjusting Furosemide for short term weight gain.  Ventricular tachycardia  Continue decreasing doses of Amiodarone  ICD (implantable cardioverter-defibrillator) in place    Mixed hyperlipidemia  Continue current regimen    Benign essential HTN  Continue current regimen    Severe obesity (BMI 35.0-39.9) with comorbidity    Diabetes mellitus type 2 in obese    Thrombosed vein secondary to intravenous line placement, subsequent encounter  Warm soaks if symptomatic.

## 2018-11-29 RX ORDER — SITAGLIPTIN AND METFORMIN HYDROCHLORIDE 1000; 50 MG/1; MG/1
TABLET, FILM COATED ORAL
Qty: 180 TABLET | Refills: 1 | Status: SHIPPED | OUTPATIENT
Start: 2018-11-29 | End: 2019-02-27 | Stop reason: SDUPTHER

## 2018-12-01 ENCOUNTER — NURSE TRIAGE (OUTPATIENT)
Dept: ADMINISTRATIVE | Facility: CLINIC | Age: 68
End: 2018-12-01

## 2018-12-01 ENCOUNTER — OFFICE VISIT (OUTPATIENT)
Dept: URGENT CARE | Facility: CLINIC | Age: 68
End: 2018-12-01
Payer: MEDICARE

## 2018-12-01 VITALS
DIASTOLIC BLOOD PRESSURE: 58 MMHG | HEIGHT: 67 IN | HEART RATE: 71 BPM | TEMPERATURE: 98 F | WEIGHT: 231 LBS | OXYGEN SATURATION: 96 % | RESPIRATION RATE: 18 BRPM | SYSTOLIC BLOOD PRESSURE: 109 MMHG | BODY MASS INDEX: 36.26 KG/M2

## 2018-12-01 DIAGNOSIS — Z95.0 PACEMAKER: Primary | ICD-10-CM

## 2018-12-01 PROCEDURE — 1101F PT FALLS ASSESS-DOCD LE1/YR: CPT | Mod: CPTII,S$GLB,, | Performed by: FAMILY MEDICINE

## 2018-12-01 PROCEDURE — 3074F SYST BP LT 130 MM HG: CPT | Mod: CPTII,S$GLB,, | Performed by: FAMILY MEDICINE

## 2018-12-01 PROCEDURE — 3078F DIAST BP <80 MM HG: CPT | Mod: CPTII,S$GLB,, | Performed by: FAMILY MEDICINE

## 2018-12-01 PROCEDURE — 99213 OFFICE O/P EST LOW 20 MIN: CPT | Mod: S$GLB,,, | Performed by: FAMILY MEDICINE

## 2018-12-01 NOTE — TELEPHONE ENCOUNTER
Reason for Disposition   [1] Clear or blood-tinged fluid draining from wound AND [2] no fever    Protocols used: ST POST-OP INCISION SYMPTOMS-A-AH    Pt had pacemaker placed 2 weeks ago. This morning he has some clear and bloody drainage come from the incision. Able to control it when pressure. Per protocol, patient advised to see MD within 24 hours

## 2018-12-01 NOTE — PATIENT INSTRUCTIONS
Discharge Instructions for Pacemaker Implantation  You have had a procedure to insert a pacemaker. Once inside your body, this small electronic device helps keep your heart from beating too slowly. A pacemaker cant fix existing heart problems. But it can help you feel better and have more energy. As you recover, follow all of the instructions you are given, including those below.  Activity  · Dont drive until your doctor says its OK. Plan to have someone drive you home after the procedure.  · Follow the instructions you are given about limiting your activity.  · If you are fitted with an arm sling, keep your arm in the sling for as long as your doctor tells you to. Most often, the sling will be removed the following day though you may be instructed to sleep with it on for a period to prevent damage to the pacemaker while it's healing.  · Do not raise your arm on the incision side above shoulder level or stretch your arm behind your back for as long as directed by your doctor. This gives the leads a chance to secure themselves inside your heart.  · Ask your doctor when you can expect to return to work. Depending on the type of work you do, you may have restrictions until your cardiologist clears you for unrestricted activity.  · You can still exercise. Its good for your body and your heart. Talk with your doctor about an exercise plan and the types of exercise to minimize the risk of damaging your pacemaker.  Other precautions  · Follow your doctor's directions carefully for wound care. If there is a dressing, ask whether you should remove it or keep it on until your next visit. Never put any creams, lotions, or products like peroxide on an incision unless your doctor tells you to. Do not get the incision wet until your doctor says it's OK.  · Every day, take your temperature and check your incision for signs of infection (redness, swelling, drainage, or warmth). Do this for 7 days or as advised by your  doctor.  · Learn to take your own pulse. Keep a record of your results. Ask your doctor what pulse rate means you should call for medical attention.  · Before you receive any treatment, tell all healthcare providers (including your dentist) that you have a pacemaker.  · You will be given an ID card that contains information about your pacemaker. Always carry this card with you. You can show this card if your pacemaker sets off a metal detector. You should also show it to avoid screening with a hand-held security wand.  · Keep your cell phone away from your pacemaker. Dont carry the phone in your shirt pocket overlying the pacemaker, even when its turned off.  · Avoid strong magnets. Examples are those used in MRIs or in hand-held security wands. Some pacemakers are now safe to use with MRI scanners. Ask your doctor if you have such a pacemaker.  · Avoid strong electrical fields. Examples are those made by radio transmitting towers, ham radios, and heavy-duty electrical equipment.  · Avoid leaning over the open figueroa of a running car. A running engine creates an electrical field. Most household and yard appliances will not cause any problems. If you use any large power tools, such as an industrial , talk with your doctor.   Follow-up care  · See your cardiologist in the next 7 to 10 days. Call and make an appointment as soon as you get home.  · Make regular follow-up appointments with your doctor. He or she will check the pacemaker to make sure its working properly.  · Plan on having periodic check-ups with your healthcare provider to evaluate the battery life of your pacemaker. Depending on your device and how much your body uses the pacing functions of the pacemaker, you will need a new device generator implanted at some point, generally about every 5 to 7 years.  · Some pacemakers have a built-in antenna that can transmit information such as trouble alerts over the internet to your doctor. Ask your  doctor if your pacemaker is capable of remote monitoring.  When should I call my healthcare provider?  Call 911 if you have:  · Chest pain  · Severe trouble breathing     Call your healthcare provider right away if you have any of these:  · Dizziness  · Lack of energy  · Fainting spells  · Twitching chest muscles  · Rapid pulse or pounding heartbeat  · Shortness of breath  · Pain around your pacemaker  · Fever above 100.4°F (38°C) or other signs of infection (redness, swelling, drainage, or warmth at the incision site)  · Your incision is not healing or your incision separates or opens  · Hiccups that wont stop  · Redness, severe swelling, drainage, worsening pain, bleeding, or warmth at the incision site  · If your pacemaker generator feels loose or like it is wiggling in the pocket under the skin   Date Last Reviewed: 6/1/2016  © 6992-8929 [employer]. [address] [place] [address], [place] [address]. All rights reserved. This information is not intended as a substitute for professional medical care. Always follow your healthcare professional's instructions.      Monitor for redness, swelling or pus drainage.   ER if fever occurs or symptoms worsen.   Complete Keflex.

## 2018-12-21 ENCOUNTER — TELEPHONE (OUTPATIENT)
Dept: ELECTROPHYSIOLOGY | Facility: CLINIC | Age: 68
End: 2018-12-21

## 2018-12-21 ENCOUNTER — OFFICE VISIT (OUTPATIENT)
Dept: URGENT CARE | Facility: CLINIC | Age: 68
End: 2018-12-21
Payer: MEDICARE

## 2018-12-21 VITALS
DIASTOLIC BLOOD PRESSURE: 63 MMHG | BODY MASS INDEX: 36.26 KG/M2 | TEMPERATURE: 98 F | WEIGHT: 231 LBS | HEIGHT: 67 IN | HEART RATE: 66 BPM | SYSTOLIC BLOOD PRESSURE: 134 MMHG | OXYGEN SATURATION: 97 %

## 2018-12-21 DIAGNOSIS — J02.9 ACUTE PHARYNGITIS, UNSPECIFIED ETIOLOGY: Primary | ICD-10-CM

## 2018-12-21 PROCEDURE — 99214 OFFICE O/P EST MOD 30 MIN: CPT | Mod: S$GLB,,, | Performed by: FAMILY MEDICINE

## 2018-12-21 PROCEDURE — 3075F SYST BP GE 130 - 139MM HG: CPT | Mod: CPTII,S$GLB,, | Performed by: FAMILY MEDICINE

## 2018-12-21 PROCEDURE — 3078F DIAST BP <80 MM HG: CPT | Mod: CPTII,S$GLB,, | Performed by: FAMILY MEDICINE

## 2018-12-21 PROCEDURE — 1101F PT FALLS ASSESS-DOCD LE1/YR: CPT | Mod: CPTII,S$GLB,, | Performed by: FAMILY MEDICINE

## 2018-12-21 RX ORDER — CEFDINIR 300 MG/1
300 CAPSULE ORAL 2 TIMES DAILY
Qty: 20 CAPSULE | Refills: 0 | Status: SHIPPED | OUTPATIENT
Start: 2018-12-21 | End: 2018-12-31

## 2018-12-21 RX ORDER — CODEINE PHOSPHATE AND GUAIFENESIN 10; 100 MG/5ML; MG/5ML
10 SOLUTION ORAL NIGHTLY PRN
Qty: 100 ML | Refills: 0 | Status: SHIPPED | OUTPATIENT
Start: 2018-12-21 | End: 2018-12-31

## 2018-12-21 NOTE — PATIENT INSTRUCTIONS
Please drink plenty of fluids.  Please get plenty of rest.  Please return here or go to the Emergency Department for any concerns or worsening of condition.  If you were given wait & see antibiotics, please wait 3-5 days before taking them, and only take them if your symptoms have worsened or not improved.  If you do begin taking the antibiotics, please take them to completion.  If you were prescribed antibiotics, please take them to completion.  If you were prescribed a narcotic medication, do not drive or operate heavy equipment or machinery while taking these medications.    You were given a decongestant (RESCON or POLY VENT Dm).  If your insurance does not cover it or you cannot afford it, it is ok to use the over the counter products listed below.  If you do not have Hypertension or any history of palpitations, it is ok to take over the counter Sudafed or Mucinex D or Allegra-D or Claritin-D or Zyrtec-D.  If you do take one of the above, it is ok to combine that with plain over the counter Mucinex or Allegra or Claritin or Zyrtec.  If for example you are taking Zyrtec -D, you can combine that with Mucinex, but not Mucinex-D.  If you are taking Mucinex-D, you can combine that with plain Allegra or Claritin or Zyrtec.   If you do have Hypertension or palpitations, it is safe to take Coricidin HBP for relief of sinus symptoms.    We recommend you take over the counter Flonase (Fluticasone) or another nasally inhaled steroid unless you are already taking one.  Nasal irrigation with a saline spray or Netti Pot like device per their directions is also recommended.  If not allergic, please take over the counter Tylenol (Acetaminophen) and/or Motrin (Ibuprofen) as directed for control of pain and/or fever.    Robitussin DM 2 teas every 4 hours as needed for cough.  If you  smoke, please stop smoking.        Please follow up with your primary care doctor or specialist as needed.  Belkys Kruger,  MD  [phone]        Acute Bacterial Rhinosinusitis (ABRS)  Acute bacterial rhinosinusitis (ABRS) is an infection of your nasal cavity and sinuses. Its caused by bacteria. Acute means that youve had symptoms for less than 12 weeks.  Understanding your sinuses  The nasal cavity is the large air-filled space behind your nose. The sinuses are a group of spaces formed by the bones of your face. They connect with your nasal cavity. ABRS causes the tissue lining these spaces to become inflamed. Mucus may not drain normally. This leads to facial pain and other symptoms.  What causes ABRS?  ABRS most often follows an upper respiratory infection caused by a virus. Bacteria then infect the lining of your nasal cavity and sinuses. But you can also get ABRS if you have:  · Nasal allergies  · Long-term nasal swelling and congestion not caused by allergies  · Blockage in the nose  Symptoms of ABRS  The symptoms of ABRS may be different for each person, and can include:  · Nasal congestion  · Runny nose  · Fluid draining from the nose down the throat (postnasal drip)  · Headache  · Cough  · Pain in the sinuses  · Thick, colored fluid from the nose (mucus)  · Fever  Diagnosing ABRS  ABRS may be diagnosed if youve had an upper respiratory infection like a cold and cough for longer than 10 to 14 days. Your health care provider will ask about your symptoms and your medical history. The provider will check your vital signs, including your temperature. Youll have a physical exam. The health care provider will check your ears, nose, and throat. You likely wont need any tests. If ABRS comes back, you may have a culture or other tests.  Treatment for ABRS  Treatment may include:  · Antibiotic medicine. This is for symptoms that last for at least 10 to 14 days.  · Nasal corticosteroid medicine. Drops or spray used in the nose can lessen swelling and congestion.  · Over-the-counter pain medicine. This is to lessen sinus pain and  pressure.  · Nasal decongestant medicine. Spray or drops may help to lessen congestion. Do not use them for more than a few days.  · Salt wash (saline irrigation). This can help to loosen mucus.  Possible complications of ABRS  ABRS may come back or become long-term (chronic).  In rare cases, ABRS may cause complications such as:   · Inflamed tissue around the brain and spinal cord (meningitis)  · Inflamed tissue around the eyes (orbital cellulitis)  · Inflamed bones around the sinuses (osteitis)  These problems may need to be treated in a hospital with intravenous (IV) antibiotic medicine or surgery.  When to call the health care provider  Call your health care provider if you have any of the following:  · Symptoms that dont get better, or get worse  · Symptoms that dont get better after 3 to 5 days on antibiotics  · Trouble seeing  · Swelling around your eyes  · Confusion or trouble staying awake   Date Last Reviewed: 3/3/2015  © 4867-1560 [employer]. [address] [place] [address], [place] [address]. All rights reserved. This information is not intended as a substitute for professional medical care. Always follow your healthcare professional's instructions.      Pharyngitis: Strep (Presumed)    You have pharyngitis (sore throat). The cause is thought to be the streptococcus, or strep, bacterium. Strep throat infection can cause throat pain that is worse when swallowing, aching all over, headache, and fever. The infection may be spread by coughing, kissing, or touching others after touching your mouth or nose. Antibiotic medications are given to treat the infection.  Home care  · Rest at home. Drink plenty of fluids to avoid dehydration.  · No work or school for the first 2 days of taking the antibiotics. After this time, you will not be contagious. You can then return to work or school if you are feeling better.   · The antibiotic medication must be taken for the full 10 days, even if you feel better.  This is very important to ensure the infection is treated. It is also important to prevent drug-resistant organisms from developing. If you were given an antibiotic shot, no more antibiotics are needed.  · You may use acetaminophen or ibuprofen to control pain or fever, unless another medicine was prescribed for this. If you have chronic liver or kidney disease or ever had a stomach ulcer or GI bleeding, talk with your doctor before using these medicines.  · Throat lozenges or a throat-numbing sprays can help reduce throat pain. Gargling with warm salt water can also help. Dissolve 1/2 teaspoon of salt in 1 8 ounce glass of warm water.   · Avoid salty or spicy foods, which can irritate the throat.  Follow-up care  Follow up with your healthcare provider or our staff if you are not improving over the next week.  When to seek medical advice  Call your healthcare provider right away if any of these occur:  · Fever as directed by your doctor.   · New or worsening ear pain, sinus pain, or headache  · Painful lumps in the back of neck  · Stiff neck  · Lymph nodes are getting larger  · Inability to swallow liquids, excessive drooling, or inability to open mouth wide due to throat pain  · Signs of dehydration (very dark urine or no urine, sunken eyes, dizziness)  · Trouble breathing or noisy breathing  · Muffled voice  · New rash  Date Last Reviewed: 4/13/2015  © 3787-1650 [employer]. [address] [place] [address], [place] [address]. All rights reserved. This information is not intended as a substitute for professional medical care. Always follow your healthcare professional's instructions.

## 2018-12-21 NOTE — PROGRESS NOTES
"Subjective:       Patient ID: Walter Bull is a 68 y.o. male.    Vitals:  height is 5' 7" (1.702 m) and weight is 104.8 kg (231 lb). His oral temperature is 98.1 °F (36.7 °C). His blood pressure is 134/63 and his pulse is 66. His oxygen saturation is 97%.     Chief Complaint: Sinus Problem    Sinus Problem   This is a new problem. Episode onset: 1 week ago. The problem has been gradually worsening since onset. There has been no fever. Associated symptoms include chills, congestion, coughing, diaphoresis, ear pain (right ear), headaches, a hoarse voice, sneezing and a sore throat. Pertinent negatives include no neck pain, shortness of breath or sinus pressure. Treatments tried: robitussin dm. The treatment provided mild relief.       Constitution: Positive for chills and sweating. Negative for fatigue and fever.   HENT: Positive for ear pain (right ear), congestion, postnasal drip and sore throat. Negative for sinus pain, sinus pressure, trouble swallowing and voice change.    Neck: Negative for neck pain and painful lymph nodes.   Eyes: Negative for eye redness.   Respiratory: Positive for cough and sputum production. Negative for chest tightness, bloody sputum, COPD, shortness of breath, stridor, wheezing and asthma.    Gastrointestinal: Positive for vomiting. Negative for abdominal pain, nausea and diarrhea.   Musculoskeletal: Negative for muscle ache.   Skin: Negative for rash.   Allergic/Immunologic: Positive for sneezing. Negative for seasonal allergies and asthma.   Neurological: Positive for headaches.   Hematologic/Lymphatic: Negative for swollen lymph nodes.       Objective:      Physical Exam   Constitutional: He is oriented to person, place, and time. He appears well-developed and well-nourished. He is cooperative.  Non-toxic appearance. He does not appear ill. No distress.   HENT:   Head: Normocephalic and atraumatic.   Right Ear: Hearing, tympanic membrane, external ear and ear canal normal.   Left " Ear: Hearing, tympanic membrane, external ear and ear canal normal.   Nose: No mucosal edema, rhinorrhea or nasal deformity. No epistaxis. Right sinus exhibits maxillary sinus tenderness and frontal sinus tenderness. Left sinus exhibits maxillary sinus tenderness and frontal sinus tenderness.   Mouth/Throat: Uvula is midline and mucous membranes are normal. No trismus in the jaw. Normal dentition. No uvula swelling. Posterior oropharyngeal edema and posterior oropharyngeal erythema present.   Eyes: Conjunctivae and lids are normal. No scleral icterus.   Neck: Trachea normal, full passive range of motion without pain and phonation normal. Neck supple.   Cardiovascular: Normal rate, regular rhythm, normal heart sounds, intact distal pulses and normal pulses.   Pulmonary/Chest: Effort normal and breath sounds normal. No respiratory distress.   Abdominal: Soft. Normal appearance and bowel sounds are normal. He exhibits no distension. There is no tenderness.   Musculoskeletal: Normal range of motion. He exhibits no edema or deformity.   Neurological: He is alert and oriented to person, place, and time. He exhibits normal muscle tone. Coordination normal.   Skin: Skin is warm, dry and intact. He is not diaphoretic. No pallor.   Psychiatric: He has a normal mood and affect. His speech is normal and behavior is normal. Judgment and thought content normal. Cognition and memory are normal.   Nursing note and vitals reviewed.      Assessment:       1. Acute pharyngitis, unspecified etiology        Plan:         Acute pharyngitis, unspecified etiology  -     cefdinir (OMNICEF) 300 MG capsule; Take 1 capsule (300 mg total) by mouth 2 (two) times daily. for 10 days  Dispense: 20 capsule; Refill: 0  -     pseudoephedrine-DM-guaifenesin (POLY-VENT DM) 60- mg Tab; Take 1 tablet by mouth every 6 (six) hours as needed.  Dispense: 20 tablet; Refill: 0  -     guaifenesin-codeine 100-10 mg/5 ml (CHERATUSSIN AC)  mg/5 mL  syrup; Take 10 mLs by mouth nightly as needed for Cough.  Dispense: 100 mL; Refill: 0    Please drink plenty of fluids.  Please get plenty of rest.  Please return here or go to the Emergency Department for any concerns or worsening of condition.  If you were given wait & see antibiotics, please wait 3-5 days before taking them, and only take them if your symptoms have worsened or not improved.  If you do begin taking the antibiotics, please take them to completion.  If you were prescribed antibiotics, please take them to completion.  If you were prescribed a narcotic medication, do not drive or operate heavy equipment or machinery while taking these medications.    You were given a decongestant (RESCON or POLY VENT Dm).  If your insurance does not cover it or you cannot afford it, it is ok to use the over the counter products listed below.  If you do not have Hypertension or any history of palpitations, it is ok to take over the counter Sudafed or Mucinex D or Allegra-D or Claritin-D or Zyrtec-D.  If you do take one of the above, it is ok to combine that with plain over the counter Mucinex or Allegra or Claritin or Zyrtec.  If for example you are taking Zyrtec -D, you can combine that with Mucinex, but not Mucinex-D.  If you are taking Mucinex-D, you can combine that with plain Allegra or Claritin or Zyrtec.   If you do have Hypertension or palpitations, it is safe to take Coricidin HBP for relief of sinus symptoms.    We recommend you take over the counter Flonase (Fluticasone) or another nasally inhaled steroid unless you are already taking one.  Nasal irrigation with a saline spray or Netti Pot like device per their directions is also recommended.  If not allergic, please take over the counter Tylenol (Acetaminophen) and/or Motrin (Ibuprofen) as directed for control of pain and/or fever.    Robitussin DM 2 teas every 4 hours as needed for cough.  If you  smoke, please stop smoking.    Please follow up with your  primary care doctor or specialist as needed.  Belkys Kruger MD  [phone]

## 2018-12-21 NOTE — TELEPHONE ENCOUNTER
Returned call to Pt and spoke with wife. She stated they just returned from urgent care. She stated that she informed the doctor that the Pt got up this morning and passed out and hit his head. She stated the doctor didn't do or say anything about that. He was given medications for a cough and cold. Advised on [place] HBP medications due to elevated blood pressure on Robutussin DM. She voiced understanding. Pt also sent a device transmission and was reviewed by device clinic as WNL.       ----- Message from Zay Mays sent at 12/21/2018  3:54 PM CST -----  Contact: pt  I had patient send a manual transmission and All is WNL, no arrhythmias.     Thanks,  Zay  ----- Message -----  From: Nadege Scruggs RN  Sent: 12/21/2018   3:09 PM  To: [facility] Staff    Any alerts on this pt. He passed out last night??  ----- Message -----  From: Magui Gonzalez MA  Sent: 12/21/2018  10:32 AM  To: Nadege Scruggs RN        ----- Message -----  From: Nidia Salomon  Sent: 12/21/2018  10:10 AM  To: [facility] Staff    Pt says he had a coughing spell last night and when he stood up to go get water he fell and hit his head on the heater. He says his heart was racing no chest pains. He took his pressure at 10a.m this morning and it was 137/102 pulse 60 and this was before meds.    Thanks

## 2018-12-22 ENCOUNTER — HOSPITAL ENCOUNTER (EMERGENCY)
Facility: HOSPITAL | Age: 68
Discharge: HOME | End: 2018-12-22
Attending: SURGERY
Payer: MEDICARE

## 2018-12-22 ENCOUNTER — NURSE TRIAGE (OUTPATIENT)
Dept: ADMINISTRATIVE | Facility: CLINIC | Age: 68
End: 2018-12-22

## 2018-12-22 VITALS
OXYGEN SATURATION: 99 % | BODY MASS INDEX: 36.18 KG/M2 | HEART RATE: 60 BPM | SYSTOLIC BLOOD PRESSURE: 123 MMHG | RESPIRATION RATE: 16 BRPM | DIASTOLIC BLOOD PRESSURE: 68 MMHG | TEMPERATURE: 97 F | WEIGHT: 231 LBS

## 2018-12-22 DIAGNOSIS — R53.83 FATIGUE: ICD-10-CM

## 2018-12-22 DIAGNOSIS — I95.9 HYPOTENSION, UNSPECIFIED HYPOTENSION TYPE: Primary | ICD-10-CM

## 2018-12-22 LAB
ALBUMIN SERPL BCP-MCNC: 3.7 G/DL
ALP SERPL-CCNC: 54 U/L
ALT SERPL W/O P-5'-P-CCNC: 26 U/L
ANION GAP SERPL CALC-SCNC: 10 MMOL/L
APTT BLDCRRT: 26.6 SEC
AST SERPL-CCNC: 18 U/L
BASOPHILS # BLD AUTO: 0.02 K/UL
BASOPHILS NFR BLD: 0.2 %
BILIRUB SERPL-MCNC: 0.5 MG/DL
BNP SERPL-MCNC: 89 PG/ML
BUN SERPL-MCNC: 10 MG/DL
CALCIUM SERPL-MCNC: 8.9 MG/DL
CHLORIDE SERPL-SCNC: 102 MMOL/L
CK MB SERPL-MCNC: 1 NG/ML
CK MB SERPL-MCNC: 1.1 NG/ML
CK MB SERPL-RTO: 1.6 %
CK MB SERPL-RTO: 1.6 %
CK SERPL-CCNC: 63 U/L
CK SERPL-CCNC: 63 U/L
CK SERPL-CCNC: 70 U/L
CK SERPL-CCNC: 70 U/L
CO2 SERPL-SCNC: 26 MMOL/L
CREAT SERPL-MCNC: 1.4 MG/DL
D DIMER PPP IA.FEU-MCNC: 1.51 MG/L FEU
DIFFERENTIAL METHOD: ABNORMAL
EOSINOPHIL # BLD AUTO: 0.2 K/UL
EOSINOPHIL NFR BLD: 2.4 %
ERYTHROCYTE [DISTWIDTH] IN BLOOD BY AUTOMATED COUNT: 15.1 %
EST. GFR  (AFRICAN AMERICAN): 58 ML/MIN/1.73 M^2
EST. GFR  (NON AFRICAN AMERICAN): 51 ML/MIN/1.73 M^2
GLUCOSE SERPL-MCNC: 102 MG/DL
HCT VFR BLD AUTO: 38.2 %
HGB BLD-MCNC: 12.1 G/DL
INR PPP: 1
LYMPHOCYTES # BLD AUTO: 2.5 K/UL
LYMPHOCYTES NFR BLD: 30.3 %
MAGNESIUM SERPL-MCNC: 1.7 MG/DL
MCH RBC QN AUTO: 28.5 PG
MCHC RBC AUTO-ENTMCNC: 31.7 G/DL
MCV RBC AUTO: 90 FL
MONOCYTES # BLD AUTO: 0.7 K/UL
MONOCYTES NFR BLD: 8.2 %
NEUTROPHILS # BLD AUTO: 4.9 K/UL
NEUTROPHILS NFR BLD: 58.9 %
PHOSPHATE SERPL-MCNC: 3.4 MG/DL
PLATELET # BLD AUTO: 202 K/UL
PMV BLD AUTO: 10.3 FL
POTASSIUM SERPL-SCNC: 4.4 MMOL/L
PROT SERPL-MCNC: 6.9 G/DL
PROTHROMBIN TIME: 11 SEC
RBC # BLD AUTO: 4.25 M/UL
SODIUM SERPL-SCNC: 138 MMOL/L
TROPONIN I SERPL DL<=0.01 NG/ML-MCNC: 0.01 NG/ML
TROPONIN I SERPL DL<=0.01 NG/ML-MCNC: <0.006 NG/ML
TSH SERPL DL<=0.005 MIU/L-ACNC: 2.84 UIU/ML
WBC # BLD AUTO: 8.28 K/UL

## 2018-12-22 PROCEDURE — 82553 CREATINE MB FRACTION: CPT

## 2018-12-22 PROCEDURE — 82550 ASSAY OF CK (CPK): CPT

## 2018-12-22 PROCEDURE — 93005 ELECTROCARDIOGRAM TRACING: CPT

## 2018-12-22 PROCEDURE — 25000003 PHARM REV CODE 250: Performed by: SURGERY

## 2018-12-22 PROCEDURE — 85379 FIBRIN DEGRADATION QUANT: CPT

## 2018-12-22 PROCEDURE — 85025 COMPLETE CBC W/AUTO DIFF WBC: CPT

## 2018-12-22 PROCEDURE — 36415 COLL VENOUS BLD VENIPUNCTURE: CPT

## 2018-12-22 PROCEDURE — 84484 ASSAY OF TROPONIN QUANT: CPT

## 2018-12-22 PROCEDURE — 99285 EMERGENCY DEPT VISIT HI MDM: CPT | Mod: 25

## 2018-12-22 PROCEDURE — 80053 COMPREHEN METABOLIC PANEL: CPT

## 2018-12-22 PROCEDURE — 83735 ASSAY OF MAGNESIUM: CPT

## 2018-12-22 PROCEDURE — 84100 ASSAY OF PHOSPHORUS: CPT

## 2018-12-22 PROCEDURE — 85610 PROTHROMBIN TIME: CPT

## 2018-12-22 PROCEDURE — 85730 THROMBOPLASTIN TIME PARTIAL: CPT

## 2018-12-22 PROCEDURE — 96361 HYDRATE IV INFUSION ADD-ON: CPT

## 2018-12-22 PROCEDURE — 93010 ELECTROCARDIOGRAM REPORT: CPT | Mod: ,,, | Performed by: INTERNAL MEDICINE

## 2018-12-22 PROCEDURE — 84443 ASSAY THYROID STIM HORMONE: CPT

## 2018-12-22 PROCEDURE — 83880 ASSAY OF NATRIURETIC PEPTIDE: CPT

## 2018-12-22 PROCEDURE — 96360 HYDRATION IV INFUSION INIT: CPT

## 2018-12-22 RX ORDER — LOSARTAN POTASSIUM 25 MG/1
25 TABLET ORAL DAILY
Qty: 30 TABLET | Refills: 11 | Status: SHIPPED | OUTPATIENT
Start: 2018-12-22 | End: 2019-01-30

## 2018-12-22 RX ADMIN — SODIUM CHLORIDE 500 ML: 0.9 INJECTION, SOLUTION INTRAVENOUS at 03:12

## 2018-12-22 RX ADMIN — SODIUM CHLORIDE 500 ML: 0.9 INJECTION, SOLUTION INTRAVENOUS at 01:12

## 2018-12-22 NOTE — TELEPHONE ENCOUNTER
"    Reason for Disposition   [1] Systolic BP < 90 AND [2] dizzy, lightheaded, or weak    Answer Assessment - Initial Assessment Questions  1. BLOOD PRESSURE: "What is the blood pressure?" "Did you take at least two measurements 5 minutes apart?"    63/54 and 79/37  2. ONSET: "When did you take your blood pressure?"    5 mins ago  3. HOW: "How did you obtain the blood pressure?" (e.g., visiting nurse, automatic home BP monitor)     automatic  4. HISTORY: "Do you have a history of low blood pressure?" "What is your blood pressure normally?"     Now, 128-138/60's  5. MEDICATIONS: "Are you taking any medications for blood pressure?" If yes: "Have they been changed recently?"    Losartan 25mg  6. PULSE RATE: "Do you know what your pulse rate is?"    60-63  7. OTHER SYMPTOMS: "Have you been sick recently?" "Have you had a recent injury?"      Cold for a week   8. PREGNANCY: "Is there any chance you are pregnant?" "When was your last menstrual period?"  n/a    Protocols used: ST LOW BLOOD PRESSURE-A-      "

## 2018-12-22 NOTE — ED TRIAGE NOTES
68 y.o. male presents to ER Room/bed info not found   Chief Complaint   Patient presents with    Hypotension   pt's girlfriend reports hypotension, head pressure, N&V that started this AM. No acute distress noted.

## 2018-12-22 NOTE — ED NOTES
The patient is awake, alert and cooperative with a calm affect, patient is aware of environment, pt's girlfriend at bedside. Airway is open and patent, respirations are spontaneous, normal respiratory effort and rate noted, skin warm and dry, full ROM in all extremities, appearance: no apparent distress noted, resting comfortably.  VSS, no change from previous assessment.  Bed in low, locked position, SR x2, HOB 30 degrees.  Pt able to change position independently. Will continue to monitor.

## 2018-12-23 NOTE — ED NOTES
Discharged to home/self care.    - Condition at discharge: Good  - Mode of Discharge: wheelchair  - The patient left the ED accompanied by his girlfriend  - The discharge instructions were discussed with the patient & girlfriend  - They both stated an understanding of the discharge instructions.  - Walked pt to the discharge station.

## 2018-12-23 NOTE — ED PROVIDER NOTES
[facility]                                                 Chief Complaint  68 y.o. male with Hypotension    History of Present Illness  Walter Bull presents to the emergency room with low blood pressure today  Patient states he has had unstable blood pressure for last couple weeks per history  Patient states he has been lower today than usual, on several BP meds every day  Patient has no actual complaint, no symptoms except for mild fatigue, normal exam  Pt has no specific system complaint, denies any chest pain or shortness of breath  Patient has a normal neuro exam, afebrile with good stable vital signs in the ER    The history is provided by the patient   device was not used during this ER visit    Past Medical History   -- Anemia    -- Anticoagulant long-term use    -- CHF (congestive heart failure)    -- Colon cancer screening    -- Coronary artery disease    -- Diabetes mellitus type I    -- Disorder of kidney and ureter    -- Encounter for blood transfusion    -- Glaucoma    -- Heart attack    -- Heart disease    -- Hypertension    -- Iron deficiency    -- Kidney failure    -- S/P CABG x 5      Past Surgical History   -- Angiogram, Aortic Arch, Coronary     -- Bypass graft study     -- CARDIAC DEFIBRILLATOR PLACEMENT     -- CARDIAC ELECTROPHYSIOLOGY STUDY     -- CARDIAC ELECTROPHYSIOLOGY STUDY     -- COLON SURGERY     -- COLONOSCOPY     -- CORONARY ARTERY BYPASS GRAFT     -- ESOPHAGOGASTRODUODENOSCOPY (EGD)     -- EYE SURGERY     -- INSERTION, DUAL ICD     -- Left heart cath        No Known Allergies     Review of Systems and Physical Exam      Review of Systems  -- Constitution - no fever, denies fatigue, no weakness, no chills  -- Eyes - no tearing or redness, no visual disturbance  -- Ear, Nose - no tinnitus or earache, no nasal congestion or discharge  -- Mouth,Throat - no sore throat, no toothache, normal voice, normal swallowing  -- Respiratory - denies  cough and congestion, no shortness of breath, no [place]  -- Cardiovascular - denies chest pain, no palpitations, denies claudication  -- Gastrointestinal - denies abdominal pain, nausea, vomiting, or diarrhea  -- Genitourinary - no dysuria, no hematuria, no flank pain, no bladder pain  -- Musculoskeletal - denies back pain, negative for myalgias and arthralgias   -- Neurological - no headache, denies weakness or seizure; no LOC  -- Skin - denies pallor, rash, or changes in skin. no hives or welts noted    Vital Signs  His oral temperature is 97.2 °F (36.2 °C).   His blood pressure is 124/66 and his pulse is 60.   His respiration is 16 and oxygen saturation is 99%.     Physical Exam  -- Nursing note and vitals reviewed  -- Constitutional: Appears well-developed and well-nourished  -- Head: Atraumatic. Normocephalic. No obvious abnormality  -- Eyes: Pupils are equal and reactive to light. Normal conjunctiva and lids  -- Cardiac: Normal rate, regular rhythm and normal heart sounds  -- Pulmonary: Normal respiratory effort, breath sounds clear to auscultation  -- Abdominal: Soft, no tenderness. Normal bowel sounds. Normal liver edge  -- Musculoskeletal: Normal range of motion, no effusions. Joints stable   -- Neurological: No focal deficits. Showed good interaction with staff  -- Skin: Warm and dry. No evidence of rash or cellulitis    Emergency Room Course      Lab Results     K 4.4      CO2 26   BUN 10   CREATININE 1.4      ALKPHOS 54 (L)   AST 18   ALT 26   BILITOT 0.5   ALBUMIN 3.7   PROT 6.9   WBC 8.28   HGB 12.1 (L)   HCT 38.2 (L)      CPK 63   CPK 63   CPKMB 1.0   TROPONINI 0.011   INR 1.0   BNP 89   DDIMER 1.51 (H)   MG 1.7   TSH 2.842     EKG  -- The EKG findings today were without concerning findings from baseline  -- The troponin drawn in the ER today was within normal limits  -- The 2nd troponin drawn in the ER today was within normal limits     Radiology  -- The CT of the head  performed in the ER today was negative for acute pathology  -- Chest x-ray showed no infiltrate and showed no acute pathology     Medications Given  sodium chloride 0.9% bolus 500 mL (0 mLs Intravenous Stopped 12/22/18 1435)   sodium chloride 0.9% bolus 500 mL (0 mLs Intravenous Stopped 12/22/18 1635)     ED Management  -- 6:12 PM: The patient has a normal workup including EKG and 2 sets of troponins  -- asymptomatic with improved blood pressure in the emergency room this evening  -- will reduce the patient's dose of losartan 25 milligram from 50 milligram going forward  -- patient will follow up with cardiology in 2 days, return with any symptoms or changes    Diagnosis  -- The primary encounter diagnosis was Hypotension, unspecified hypotension type.   -- A diagnosis of Fatigue was also pertinent to this visit.    Disposition and Plan  -- Disposition: home  -- Condition: stable  -- Follow-up: Patient to follow up with Belkys Kruger MD in 1-2 days.  -- I advised the patient that we have found no life threatening condition today  -- At this time, I believe the patient is clinically stable for discharge.   -- The patient acknowledges that close follow up with a MD is required   -- Patient agrees to comply with all instruction and direction given in the ER    This note is dictated on M*Modal word recognition program.  There are word recognition mistakes that are occasionally missed on review.          Omar Garrett MD  12/22/18 1532

## 2019-01-02 ENCOUNTER — OFFICE VISIT (OUTPATIENT)
Dept: INTERNAL MEDICINE | Facility: CLINIC | Age: 69
End: 2019-01-02
Payer: MEDICARE

## 2019-01-02 VITALS
OXYGEN SATURATION: 95 % | WEIGHT: 229.25 LBS | HEIGHT: 67 IN | RESPIRATION RATE: 18 BRPM | BODY MASS INDEX: 35.98 KG/M2 | SYSTOLIC BLOOD PRESSURE: 118 MMHG | DIASTOLIC BLOOD PRESSURE: 68 MMHG | HEART RATE: 65 BPM | TEMPERATURE: 98 F

## 2019-01-02 DIAGNOSIS — R11.2 NAUSEA AND VOMITING, INTRACTABILITY OF VOMITING NOT SPECIFIED, UNSPECIFIED VOMITING TYPE: ICD-10-CM

## 2019-01-02 DIAGNOSIS — K21.9 GASTROESOPHAGEAL REFLUX DISEASE, ESOPHAGITIS PRESENCE NOT SPECIFIED: ICD-10-CM

## 2019-01-02 PROCEDURE — 99214 OFFICE O/P EST MOD 30 MIN: CPT | Mod: S$GLB,,, | Performed by: INTERNAL MEDICINE

## 2019-01-02 PROCEDURE — 3078F PR MOST RECENT DIASTOLIC BLOOD PRESSURE < 80 MM HG: ICD-10-PCS | Mod: CPTII,S$GLB,, | Performed by: INTERNAL MEDICINE

## 2019-01-02 PROCEDURE — 3074F SYST BP LT 130 MM HG: CPT | Mod: CPTII,S$GLB,, | Performed by: INTERNAL MEDICINE

## 2019-01-02 PROCEDURE — 3078F DIAST BP <80 MM HG: CPT | Mod: CPTII,S$GLB,, | Performed by: INTERNAL MEDICINE

## 2019-01-02 PROCEDURE — 99999 PR PBB SHADOW E&M-EST. PATIENT-LVL III: ICD-10-PCS | Mod: PBBFAC,,, | Performed by: INTERNAL MEDICINE

## 2019-01-02 PROCEDURE — 99214 PR OFFICE/OUTPT VISIT, EST, LEVL IV, 30-39 MIN: ICD-10-PCS | Mod: S$GLB,,, | Performed by: INTERNAL MEDICINE

## 2019-01-02 PROCEDURE — 99999 PR PBB SHADOW E&M-EST. PATIENT-LVL III: CPT | Mod: PBBFAC,,, | Performed by: INTERNAL MEDICINE

## 2019-01-02 PROCEDURE — 3074F PR MOST RECENT SYSTOLIC BLOOD PRESSURE < 130 MM HG: ICD-10-PCS | Mod: CPTII,S$GLB,, | Performed by: INTERNAL MEDICINE

## 2019-01-02 PROCEDURE — 1101F PT FALLS ASSESS-DOCD LE1/YR: CPT | Mod: CPTII,S$GLB,, | Performed by: INTERNAL MEDICINE

## 2019-01-02 PROCEDURE — 1101F PR PT FALLS ASSESS DOC 0-1 FALLS W/OUT INJ PAST YR: ICD-10-PCS | Mod: CPTII,S$GLB,, | Performed by: INTERNAL MEDICINE

## 2019-01-02 RX ORDER — ESOMEPRAZOLE MAGNESIUM 40 MG/1
40 CAPSULE, DELAYED RELEASE ORAL
Qty: 60 CAPSULE | Refills: 11 | Status: SHIPPED | OUTPATIENT
Start: 2019-01-02 | End: 2019-01-10 | Stop reason: SDUPTHER

## 2019-01-02 NOTE — PROGRESS NOTES
"Subjective:       Patient ID: Walter Bull is a 68 y.o. male.    Chief Complaint: Emesis (x almost 1.5 months- comes and goes )      HPI:  Patient is known to me and presents with nausea and vomiting. Has had sx for 1-2 months now. Sx occur 2-3x a week. Emesis is "like water". Sx are better after vomiting. No hematemesis. Having normal BM, denies BRBPR or melena. No real pain in the stomach. + belching. On nexium in AM and zantac at night. Has h/o h. Pylori per patient.     Past Medical History:   Diagnosis Date    Anemia     Anticoagulant long-term use     CHF (congestive heart failure)     Colon cancer screening 2017    Coronary artery disease     Diabetes mellitus type I     Disorder of kidney and ureter     Encounter for blood transfusion     Glaucoma     Heart attack     2018    Heart disease     Hypertension     Iron deficiency     Kidney failure     post CABG    S/P CABG x 5 2017       Family History   Problem Relation Age of Onset    Diabetes Mother     Heart disease Mother     Hypertension Sister     Diabetes Sister     Heart disease Sister     Heart attack Brother     Colon cancer Neg Hx     Esophageal cancer Neg Hx     Stomach cancer Neg Hx        Social History     Socioeconomic History    Marital status:      Spouse name: Not on file    Number of children: Not on file    Years of education: Not on file    Highest education level: Not on file   Social Needs    Financial resource strain: Not on file    Food insecurity - worry: Not on file    Food insecurity - inability: Not on file    Transportation needs - medical: Not on file    Transportation needs - non-medical: Not on file   Occupational History    Not on file   Tobacco Use    Smoking status: Former Smoker     Packs/day: 3.00     Types: Cigarettes     Start date: 1963     Last attempt to quit: 1981     Years since quittin.0    Smokeless tobacco: Former User     Types: Snuff, " Chew     Quit date: 1984   Substance and Sexual Activity    Alcohol use: No    Drug use: No    Sexual activity: Yes     Comment: -with a significant other   Other Topics Concern    Not on file   Social History Narrative    Not on file       Review of Systems   Constitutional: Positive for activity change. Negative for fatigue, fever and unexpected weight change.   HENT: Negative for congestion, ear pain, hearing loss, rhinorrhea and sore throat.    Eyes: Negative for pain, discharge, redness and visual disturbance.   Respiratory: Negative for cough, chest tightness, shortness of breath and wheezing.    Cardiovascular: Negative for chest pain, palpitations and leg swelling.   Gastrointestinal: Positive for vomiting. Negative for abdominal pain, blood in stool, constipation, diarrhea and nausea.   Endocrine: Negative for polydipsia and polyuria.   Genitourinary: Negative for decreased urine volume, difficulty urinating, dysuria, frequency, hematuria and urgency.   Musculoskeletal: Negative for arthralgias, back pain, joint swelling and neck pain.   Skin: Negative for color change, rash and wound.   Neurological: Positive for weakness. Negative for dizziness, tremors, light-headedness and headaches.   Psychiatric/Behavioral: Negative for confusion and dysphoric mood.         Objective:      Physical Exam   Constitutional: He is oriented to person, place, and time. He appears well-developed and well-nourished. No distress.   HENT:   Head: Normocephalic and atraumatic.   Right Ear: External ear normal.   Left Ear: External ear normal.   Eyes: Conjunctivae and EOM are normal. Pupils are equal, round, and reactive to light.   Neck: Neck supple. No tracheal deviation present.   Cardiovascular: Normal rate and regular rhythm.   No murmur heard.  Pulmonary/Chest: Effort normal and breath sounds normal. No respiratory distress. He has no wheezes. He has no rales.   Abdominal: Soft. Bowel sounds are normal. He  exhibits no distension. There is no tenderness.   Neurological: He is alert and oriented to person, place, and time. No cranial nerve deficit.   Skin: Skin is warm and dry.   Psychiatric: He has a normal mood and affect. His behavior is normal.   Vitals reviewed.      Assessment:       1. Nausea and vomiting, intractability of vomiting not specified, unspecified vomiting type    2. Gastroesophageal reflux disease, esophagitis presence not specified        Plan:       Walter was seen today for emesis.    Diagnoses and all orders for this visit:    Nausea and vomiting, intractability of vomiting not specified, unspecified vomiting type  -     esomeprazole (NEXIUM) 40 MG capsule; Take 1 capsule (40 mg total) by mouth 2 (two) times daily before meals.  -     Ambulatory Referral to Gastroenterology    Gastroesophageal reflux disease, esophagitis presence not specified  -     esomeprazole (NEXIUM) 40 MG capsule; Take 1 capsule (40 mg total) by mouth 2 (two) times daily before meals.  -     Ambulatory Referral to Gastroenterology      Increase PPI to BID dosing  Refer to GI to see if repeat EGD is warranted  No pain in abdomen, normal BM  Sounds like dyspepsia sx

## 2019-01-10 DIAGNOSIS — I25.118 CORONARY ARTERY DISEASE OF NATIVE ARTERY OF NATIVE HEART WITH STABLE ANGINA PECTORIS: ICD-10-CM

## 2019-01-10 DIAGNOSIS — K21.9 GASTROESOPHAGEAL REFLUX DISEASE, ESOPHAGITIS PRESENCE NOT SPECIFIED: ICD-10-CM

## 2019-01-10 DIAGNOSIS — R11.2 NAUSEA AND VOMITING, INTRACTABILITY OF VOMITING NOT SPECIFIED, UNSPECIFIED VOMITING TYPE: ICD-10-CM

## 2019-01-11 DIAGNOSIS — I25.118 CORONARY ARTERY DISEASE OF NATIVE ARTERY OF NATIVE HEART WITH STABLE ANGINA PECTORIS: ICD-10-CM

## 2019-01-11 RX ORDER — RANOLAZINE 1000 MG/1
1000 TABLET, FILM COATED, EXTENDED RELEASE ORAL 2 TIMES DAILY
Qty: 60 TABLET | Refills: 11 | Status: CANCELLED | OUTPATIENT
Start: 2019-01-11

## 2019-01-14 RX ORDER — RANOLAZINE 1000 MG/1
1000 TABLET, FILM COATED, EXTENDED RELEASE ORAL 2 TIMES DAILY
Qty: 60 TABLET | Refills: 11 | Status: SHIPPED | OUTPATIENT
Start: 2019-01-14 | End: 2019-11-13 | Stop reason: SDUPTHER

## 2019-01-14 RX ORDER — ESOMEPRAZOLE MAGNESIUM 40 MG/1
40 CAPSULE, DELAYED RELEASE ORAL
Qty: 60 CAPSULE | Refills: 11 | Status: SHIPPED | OUTPATIENT
Start: 2019-01-14 | End: 2019-02-27 | Stop reason: SDUPTHER

## 2019-01-21 ENCOUNTER — TELEPHONE (OUTPATIENT)
Dept: INTERNAL MEDICINE | Facility: CLINIC | Age: 69
End: 2019-01-21

## 2019-01-25 DIAGNOSIS — I10 BENIGN ESSENTIAL HTN: ICD-10-CM

## 2019-01-25 RX ORDER — SPIRONOLACTONE 25 MG/1
25 TABLET ORAL DAILY
Qty: 30 TABLET | Refills: 6 | Status: SHIPPED | OUTPATIENT
Start: 2019-01-25 | End: 2019-01-28 | Stop reason: SDUPTHER

## 2019-01-25 RX ORDER — FUROSEMIDE 20 MG/1
20 TABLET ORAL DAILY
Qty: 30 TABLET | Refills: 11 | Status: SHIPPED | OUTPATIENT
Start: 2019-01-25 | End: 2019-01-28 | Stop reason: SDUPTHER

## 2019-01-28 DIAGNOSIS — I10 BENIGN ESSENTIAL HTN: ICD-10-CM

## 2019-01-28 RX ORDER — FUROSEMIDE 20 MG/1
20 TABLET ORAL DAILY
Qty: 90 TABLET | Refills: 3 | Status: SHIPPED | OUTPATIENT
Start: 2019-01-28 | End: 2019-05-01

## 2019-01-28 RX ORDER — SPIRONOLACTONE 25 MG/1
25 TABLET ORAL DAILY
Qty: 90 TABLET | Refills: 3 | Status: SHIPPED | OUTPATIENT
Start: 2019-01-28 | End: 2019-07-22

## 2019-01-28 RX ORDER — AMIODARONE HYDROCHLORIDE 400 MG/1
400 TABLET ORAL DAILY
Qty: 90 TABLET | Refills: 3 | Status: SHIPPED | OUTPATIENT
Start: 2019-01-28 | End: 2019-01-30

## 2019-01-29 ENCOUNTER — OFFICE VISIT (OUTPATIENT)
Dept: OPTOMETRY | Facility: CLINIC | Age: 69
End: 2019-01-29
Payer: MEDICARE

## 2019-01-29 DIAGNOSIS — H52.11 MYOPIA WITH ASTIGMATISM AND PRESBYOPIA, RIGHT: ICD-10-CM

## 2019-01-29 DIAGNOSIS — H52.4 MYOPIA WITH ASTIGMATISM AND PRESBYOPIA, RIGHT: ICD-10-CM

## 2019-01-29 DIAGNOSIS — H52.4 MYOPIA WITH PRESBYOPIA OF LEFT EYE: ICD-10-CM

## 2019-01-29 DIAGNOSIS — H40.1131 PRIMARY OPEN ANGLE GLAUCOMA (POAG) OF BOTH EYES, MILD STAGE: ICD-10-CM

## 2019-01-29 DIAGNOSIS — H52.201 MYOPIA WITH ASTIGMATISM AND PRESBYOPIA, RIGHT: ICD-10-CM

## 2019-01-29 DIAGNOSIS — H52.12 MYOPIA WITH PRESBYOPIA OF LEFT EYE: ICD-10-CM

## 2019-01-29 DIAGNOSIS — E11.9 TYPE 2 DIABETES MELLITUS WITHOUT RETINOPATHY: Primary | ICD-10-CM

## 2019-01-29 DIAGNOSIS — Z96.1 PSEUDOPHAKIA: ICD-10-CM

## 2019-01-29 PROCEDURE — 99999 PR PBB SHADOW E&M-EST. PATIENT-LVL II: CPT | Mod: PBBFAC,,, | Performed by: OPTOMETRIST

## 2019-01-29 PROCEDURE — 92250 COLOR FUNDUS PHOTOGRAPHY - OU - BOTH EYES: ICD-10-PCS | Mod: S$GLB,,, | Performed by: OPTOMETRIST

## 2019-01-29 PROCEDURE — 92250 FUNDUS PHOTOGRAPHY W/I&R: CPT | Mod: S$GLB,,, | Performed by: OPTOMETRIST

## 2019-01-29 PROCEDURE — 92004 COMPRE OPH EXAM NEW PT 1/>: CPT | Mod: S$GLB,,, | Performed by: OPTOMETRIST

## 2019-01-29 PROCEDURE — 92015 DETERMINE REFRACTIVE STATE: CPT | Mod: S$GLB,,, | Performed by: OPTOMETRIST

## 2019-01-29 PROCEDURE — 92015 PR REFRACTION: ICD-10-PCS | Mod: S$GLB,,, | Performed by: OPTOMETRIST

## 2019-01-29 PROCEDURE — 92004 PR EYE EXAM, NEW PATIENT,COMPREHESV: ICD-10-PCS | Mod: S$GLB,,, | Performed by: OPTOMETRIST

## 2019-01-29 PROCEDURE — 99999 PR PBB SHADOW E&M-EST. PATIENT-LVL II: ICD-10-PCS | Mod: PBBFAC,,, | Performed by: OPTOMETRIST

## 2019-01-29 NOTE — PROGRESS NOTES
HPI     Pt here for diabetic and glaucoma eye exam. Pt denies any eye complaints   today.    DONA: about 2 years ago  Glasses? Yes  Contacts? No  H/o eye surgery: ceiol ou  H/o eye injury: No  Known eye conditions? Glaucoma x 8-10 years. S/p SLT x 3 in [place]   about 10 years ago.    (-) Flashes/Floaters (-) Mucous (-) Tearing (-) Itching (-) Burning  (-) Eye Pain (-) Irritation (-) Redness  (-) Double vision (+) Blurry   vision when light headed occasionally    Eye gtt? no    Hemoglobin A1C       Date                     Value               Ref Range             Status                11/16/2018               6.9 (H)             4.0 - 5.6 %           Final              Comment:    ADA Screening Guidelines:  5.7-6.4%  Consistent with   prediabetes  >or=6.5%  Consistent with diabetes  High levels of fetal   hemoglobin interfere with the HbA1C  assay. Heterozygous hemoglobin   variants (HbS, HgC, etc)do  not significantly interfere with this assay.     However, presence of multiple variants may affect accuracy.         11/16/2018               SEE COMMENT         4.0 - 5.6 %           Corrected          Comment:    ADA Screening Guidelines:  5.7-6.4%  Consistent with   prediabetes  >or=6.5%  Consistent with diabetes  High levels of fetal   hemoglobin interfere with the HbA1C  assay. Heterozygous hemoglobin   variants (HbS, HgC, etc)do  not significantly interfere with this assay.     However, presence of multiple variants may affect accuracy.  See   comment  SPECIMEN TO BE RECOLLECTED CALLED TO RINKU CARRASCO RN  Corrected result; previously reported as 7.0 on 11/16/2018 at 14:16.         09/26/2018               6.9 (H)             4.0 - 5.6 %           Final              Comment:    ADA Screening Guidelines:  5.7-6.4%  Consistent with   prediabetes  >or=6.5%  Consistent with diabetes  High levels of fetal   hemoglobin interfere with the HbA1C  assay. Heterozygous hemoglobin   variants (HbS, HgC, etc)do  not  significantly interfere with this assay.     However, presence of multiple variants may affect accuracy.    ----------      Last edited by Kal Houston, OD on 1/29/2019  2:45 PM. (History)        ROS     Negative for: Constitutional, Gastrointestinal, Neurological, Skin,   Genitourinary, Musculoskeletal, HENT, Endocrine, Cardiovascular, Eyes,   Respiratory, Psychiatric, Allergic/Imm, Heme/Lymph    Last edited by Kal Houston, OD on 1/29/2019  2:30 PM. (History)        Assessment /Plan     For exam results, see Encounter Report.    Type 2 diabetes mellitus without retinopathy    Primary open angle glaucoma (POAG) of both eyes, mild stage  -     Johnson Visual Field - OU - Extended - Both Eyes; Future  -     Color Fundus Photography - OU - Both Eyes  -     Posterior Segment OCT Optic Nerve- Both eyes; Future    Myopia with astigmatism and presbyopia, right    Myopia with presbyopia of left eye    Pseudophakia    1. BS control. No signs of diabetic retinopathy. Monitor with annual exam.  2. (-) fHx. IOP 18 OD, OS. C/d 0.65 OD, OS. S/p SLT x3 about 10 years ago per pt. Pt has been off drops since SLT. Last HVF 1 year ago in [place]. Photos today. RTC 2-3 weeks IOP/pachy/OCT/HVF.   3-4. SRx updated.   5. Good result.

## 2019-01-30 ENCOUNTER — TELEPHONE (OUTPATIENT)
Dept: GASTROENTEROLOGY | Facility: CLINIC | Age: 69
End: 2019-01-30

## 2019-01-30 ENCOUNTER — OFFICE VISIT (OUTPATIENT)
Dept: GASTROENTEROLOGY | Facility: CLINIC | Age: 69
End: 2019-01-30
Payer: MEDICARE

## 2019-01-30 ENCOUNTER — OFFICE VISIT (OUTPATIENT)
Dept: CARDIOLOGY | Facility: CLINIC | Age: 69
End: 2019-01-30
Payer: MEDICARE

## 2019-01-30 VITALS
HEART RATE: 60 BPM | WEIGHT: 237.88 LBS | HEIGHT: 67 IN | BODY MASS INDEX: 37.34 KG/M2 | SYSTOLIC BLOOD PRESSURE: 146 MMHG | DIASTOLIC BLOOD PRESSURE: 69 MMHG

## 2019-01-30 VITALS
BODY MASS INDEX: 36.95 KG/M2 | HEART RATE: 59 BPM | SYSTOLIC BLOOD PRESSURE: 106 MMHG | WEIGHT: 235.44 LBS | HEIGHT: 67 IN | DIASTOLIC BLOOD PRESSURE: 62 MMHG

## 2019-01-30 DIAGNOSIS — E66.9 DIABETES MELLITUS TYPE 2 IN OBESE: ICD-10-CM

## 2019-01-30 DIAGNOSIS — E11.69 DIABETES MELLITUS TYPE 2 IN OBESE: ICD-10-CM

## 2019-01-30 DIAGNOSIS — Z86.19 HISTORY OF HELICOBACTER PYLORI INFECTION: ICD-10-CM

## 2019-01-30 DIAGNOSIS — Z95.810 ICD (IMPLANTABLE CARDIOVERTER-DEFIBRILLATOR) IN PLACE: ICD-10-CM

## 2019-01-30 DIAGNOSIS — I25.10 CORONARY ARTERY DISEASE INVOLVING NATIVE CORONARY ARTERY OF NATIVE HEART WITHOUT ANGINA PECTORIS: Primary | ICD-10-CM

## 2019-01-30 DIAGNOSIS — R11.2 NON-INTRACTABLE VOMITING WITH NAUSEA, UNSPECIFIED VOMITING TYPE: Primary | ICD-10-CM

## 2019-01-30 DIAGNOSIS — E78.2 MIXED HYPERLIPIDEMIA: ICD-10-CM

## 2019-01-30 DIAGNOSIS — E66.01 SEVERE OBESITY (BMI 35.0-39.9) WITH COMORBIDITY: ICD-10-CM

## 2019-01-30 DIAGNOSIS — I25.5 ISCHEMIC CARDIOMYOPATHY: ICD-10-CM

## 2019-01-30 DIAGNOSIS — I10 BENIGN ESSENTIAL HTN: ICD-10-CM

## 2019-01-30 PROCEDURE — 99999 PR PBB SHADOW E&M-EST. PATIENT-LVL III: CPT | Mod: PBBFAC,,, | Performed by: INTERNAL MEDICINE

## 2019-01-30 PROCEDURE — 3078F DIAST BP <80 MM HG: CPT | Mod: CPTII,S$GLB,, | Performed by: INTERNAL MEDICINE

## 2019-01-30 PROCEDURE — 99499 RISK ADDL DX/OHS AUDIT: ICD-10-PCS | Mod: S$GLB,,, | Performed by: INTERNAL MEDICINE

## 2019-01-30 PROCEDURE — 99213 OFFICE O/P EST LOW 20 MIN: CPT | Mod: S$GLB,,, | Performed by: INTERNAL MEDICINE

## 2019-01-30 PROCEDURE — 3074F PR MOST RECENT SYSTOLIC BLOOD PRESSURE < 130 MM HG: ICD-10-PCS | Mod: CPTII,S$GLB,, | Performed by: INTERNAL MEDICINE

## 2019-01-30 PROCEDURE — 1101F PT FALLS ASSESS-DOCD LE1/YR: CPT | Mod: CPTII,S$GLB,, | Performed by: INTERNAL MEDICINE

## 2019-01-30 PROCEDURE — 1101F PR PT FALLS ASSESS DOC 0-1 FALLS W/OUT INJ PAST YR: ICD-10-PCS | Mod: CPTII,S$GLB,, | Performed by: INTERNAL MEDICINE

## 2019-01-30 PROCEDURE — 99214 PR OFFICE/OUTPT VISIT, EST, LEVL IV, 30-39 MIN: ICD-10-PCS | Mod: S$GLB,,, | Performed by: INTERNAL MEDICINE

## 2019-01-30 PROCEDURE — 3044F PR MOST RECENT HEMOGLOBIN A1C LEVEL <7.0%: ICD-10-PCS | Mod: CPTII,S$GLB,, | Performed by: INTERNAL MEDICINE

## 2019-01-30 PROCEDURE — 99999 PR PBB SHADOW E&M-EST. PATIENT-LVL III: ICD-10-PCS | Mod: PBBFAC,,, | Performed by: INTERNAL MEDICINE

## 2019-01-30 PROCEDURE — 3078F PR MOST RECENT DIASTOLIC BLOOD PRESSURE < 80 MM HG: ICD-10-PCS | Mod: CPTII,S$GLB,, | Performed by: INTERNAL MEDICINE

## 2019-01-30 PROCEDURE — 3074F SYST BP LT 130 MM HG: CPT | Mod: CPTII,S$GLB,, | Performed by: INTERNAL MEDICINE

## 2019-01-30 PROCEDURE — 3044F HG A1C LEVEL LT 7.0%: CPT | Mod: CPTII,S$GLB,, | Performed by: INTERNAL MEDICINE

## 2019-01-30 PROCEDURE — 99499 UNLISTED E&M SERVICE: CPT | Mod: S$GLB,,, | Performed by: INTERNAL MEDICINE

## 2019-01-30 PROCEDURE — 99213 PR OFFICE/OUTPT VISIT, EST, LEVL III, 20-29 MIN: ICD-10-PCS | Mod: S$GLB,,, | Performed by: INTERNAL MEDICINE

## 2019-01-30 PROCEDURE — 99214 OFFICE O/P EST MOD 30 MIN: CPT | Mod: S$GLB,,, | Performed by: INTERNAL MEDICINE

## 2019-01-30 RX ORDER — ONDANSETRON 4 MG/1
4 TABLET, FILM COATED ORAL EVERY 8 HOURS PRN
Qty: 30 TABLET | Refills: 0 | Status: SHIPPED | OUTPATIENT
Start: 2019-01-30 | End: 2019-01-30

## 2019-01-30 RX ORDER — AMIODARONE HYDROCHLORIDE 200 MG/1
200 TABLET ORAL DAILY
Qty: 90 TABLET | Refills: 3 | Status: SHIPPED | OUTPATIENT
Start: 2019-01-30 | End: 2020-02-21 | Stop reason: SDUPTHER

## 2019-01-30 RX ORDER — TRIMETHOBENZAMIDE HCL 300 MG
300 CAPSULE ORAL 3 TIMES DAILY PRN
Qty: 30 CAPSULE | Refills: 0 | Status: SHIPPED | OUTPATIENT
Start: 2019-01-30 | End: 2019-02-09

## 2019-01-30 RX ORDER — LOSARTAN POTASSIUM 50 MG/1
50 TABLET ORAL DAILY
Qty: 90 TABLET | Refills: 3 | Status: SHIPPED | OUTPATIENT
Start: 2019-01-30 | End: 2019-07-09

## 2019-01-30 RX ORDER — CLOPIDOGREL BISULFATE 75 MG/1
TABLET ORAL
Status: ON HOLD | COMMUNITY
Start: 2019-01-27 | End: 2019-05-19 | Stop reason: HOSPADM

## 2019-01-30 NOTE — LETTER
February 5, 2019      Belkys Kruger MD  [address] [address]           David mary - Gastroenterology  [address]  [facility] [address]  Phone: [phone]  Fax: [phone]          Patient: Walter Bull   MR Number: [id number]   YOB: 1950   Date of Visit: 1/30/2019       Dear Dr. Belkys Kruger:    Thank you for referring Walter Bull to me for evaluation. Attached you will find relevant portions of my assessment and plan of care.    If you have questions, please do not hesitate to call me. I look forward to following Walter Bull along with you.    Sincerely,    Branden Bush MD    Enclosure  CC:  No Recipients    If you would like to receive this communication electronically, please contact [email or web address] or [phone] to request more information on [employer] access.    For providers and/or their staff who would like to refer a patient to [facility], please contact us through our one-stop-shop provider referral line, [facility], at [phone].    If you feel you have received this communication in error or would no longer like to receive these types of communications, please e-mail [email or web address]

## 2019-01-30 NOTE — TELEPHONE ENCOUNTER
Pharmacist informed to discontinue Zofran and he confirmed new prescription (Tigan) was received.

## 2019-01-30 NOTE — PROGRESS NOTES
Subjective:   Patient ID:  Walter Bull is a 68 y.o. male who presents for follow-up of Cardiomyopathy (2 month f/u); Dizziness; and Emesis      HPI:   Walter Bull presents for follow up of coronary artery disease having had had prior CABG with known  of the RCA and LCX with patent LIMA to LAD and SVG to Diagonal. SVG to OM occluded. Walter Bull has an ischemic cardiomyopathy with last EF 45%. Has an ICD and is on Amiodarone after an admission for wide QRS tachycardia. He is still on 400 mg of Amiodarone daily. His main issue is intractable N&V that has worsened since device implantation. Isolated  Episode of left sided chest pain during sleep relieved with TNG. Able to mow lawns with no discomfort or SOB. Has occasional dizziness with BP in good range. Walter Bull has hypertension with mild elevation this visit. Walter Bull has dyslipidemia  on high intensity statin..    Review of Systems   Constitution: Negative for weakness, malaise/fatigue, weight gain and weight loss.   Eyes: Negative for blurred vision.   Cardiovascular: Positive for chest pain. Negative for claudication, cyanosis, dyspnea on exertion, irregular heartbeat, leg swelling, near-syncope, orthopnea, palpitations, paroxysmal nocturnal dyspnea and syncope.   Respiratory: Negative for cough, shortness of breath and wheezing.    Musculoskeletal: Negative for falls and myalgias.   Gastrointestinal: Positive for nausea and vomiting. Negative for abdominal pain and heartburn.   Genitourinary: Negative for nocturia.   Neurological: Positive for dizziness. Negative for brief paralysis, focal weakness, headaches, numbness and paresthesias.   Psychiatric/Behavioral: Negative for altered mental status.       Current Outpatient Medications   Medication Sig    albuterol (PROVENTIL/VENTOLIN HFA) 90 mcg/actuation inhaler Inhale 2 puffs into the lungs every 6 (six) hours as needed for Wheezing.    amiodarone (PACERONE) 200 MG Tab Take 1  tablet (200 mg total) by mouth once daily.    carvedilol (COREG) 6.25 MG tablet Take 1 tablet (6.25 mg total) by mouth 2 (two) times daily with meals.    clopidogrel (PLAVIX) 75 mg tablet     esomeprazole (NEXIUM) 40 MG capsule Take 1 capsule (40 mg total) by mouth 2 (two) times daily before meals.    ezetimibe (ZETIA) 10 mg tablet Take 10 mg by mouth once daily.     ferrous sulfate 325 mg (65 mg iron) Tab tablet Take 1 tablet (325 mg total) by mouth 3 (three) times daily.    finasteride (PROSCAR) 5 mg tablet Take 1 tablet (5 mg total) by mouth once daily.    furosemide (LASIX) 20 MG tablet Take 1 tablet (20 mg total) by mouth once daily.    insulin NPH (NOVOLIN N) 100 unit/mL injection Inject 40 Units into the skin before breakfast.    isosorbide mononitrate (IMDUR) 60 MG 24 hr tablet Take 1 tablet (60 mg total) by mouth once daily.    JANUMET 50-1,000 mg per tablet TAKE 1 TABLET BY MOUTH TWO TIMES DAILY WITH MEALS    losartan (COZAAR) 50 MG tablet Take 1 tablet (50 mg total) by mouth once daily.    nitroGLYCERIN (NITROSTAT) 0.4 MG SL tablet DISSOLVE ONE TABLET UNDER THE TONGUE EVERY 5 MINUTES AS NEEDED FOR CHEST PAIN.    ondansetron (ZOFRAN) 4 MG tablet Take 1 tablet (4 mg total) by mouth every 8 (eight) hours as needed for Nausea.    RANEXA 1,000 mg Tb12 Take 1 tablet (1,000 mg total) by mouth 2 (two) times daily.    ranitidine (ZANTAC) 150 MG tablet     rosuvastatin (CRESTOR) 40 MG Tab Take 1 tablet (40 mg total) by mouth once daily.    spironolactone (ALDACTONE) 25 MG tablet Take 1 tablet (25 mg total) by mouth once daily.    tamsulosin (FLOMAX) 0.4 mg Cap TAKE ONE CAPSULE BY MOUTH ONCE DAILY    ticagrelor (BRILINTA) 90 mg tablet Take 1 tablet (90 mg total) by mouth 2 (two) times daily.     No current facility-administered medications for this visit.      Objective:   Physical Exam   Constitutional: He is oriented to person, place, and time. He appears well-developed. No distress.   BP (!)  "146/69 (BP Location: Left arm, Patient Position: Sitting, BP Method: Large (Automatic))   Pulse 60   Ht 5' 7" (1.702 m)   Wt 107.9 kg (237 lb 14 oz)   BMI 37.26 kg/m²    HENT:   Head: Normocephalic.   Right Ear: External ear normal.   Left Ear: External ear normal.   Eyes: EOM are normal. Pupils are equal, round, and reactive to light. No scleral icterus.   Neck: Neck supple. No JVD present. No thyromegaly present.   Cardiovascular: Normal rate, regular rhythm and intact distal pulses. PMI is not displaced. Exam reveals no gallop and no friction rub.   Murmur heard.   Medium-pitched midsystolic murmur is present with a grade of 2/6 at the upper left sternal border and lower left sternal border.  Pulmonary/Chest: Effort normal and breath sounds normal. No respiratory distress. He has no wheezes. He has no rales.     ICD present left chest  Median sternotomy scar   Abdominal: Soft. He exhibits no distension. There is no hepatosplenomegaly. There is no tenderness.   Musculoskeletal: He exhibits no edema or tenderness.   Gait normal   Neurological: He is alert and oriented to person, place, and time.   Skin: Skin is warm and dry. No rash noted.   Psychiatric: He has a normal mood and affect. His behavior is normal.       Lab Results   Component Value Date     12/22/2018    K 4.4 12/22/2018     12/22/2018    CO2 26 12/22/2018    BUN 10 12/22/2018    CREATININE 1.4 12/22/2018     12/22/2018    HGBA1C 6.9 (H) 11/16/2018    MG 1.7 12/22/2018    AST 18 12/22/2018    ALT 26 12/22/2018    ALBUMIN 3.7 12/22/2018    PROT 6.9 12/22/2018    BILITOT 0.5 12/22/2018    WBC 8.28 12/22/2018    HGB 12.1 (L) 12/22/2018    HCT 38.2 (L) 12/22/2018    MCV 90 12/22/2018     12/22/2018    TSH 2.842 12/22/2018    CHOL 127 11/21/2018    HDL 29 (L) 11/21/2018    LDLCALC 75.6 11/21/2018    TRIG 112 11/21/2018       Assessment:     1. Coronary artery disease involving native coronary artery of native heart without " angina pectoris : stable   2. Ischemic cardiomyopathy: EF     3. Benign essential HTN : Mild elevation   4. Mixed hyperlipidemia :  on high intensity statin   5. ICD (implantable cardioverter-defibrillator) in place    6. Severe obesity (BMI 35.0-39.9) with comorbidity : Weight up despite N&V   7. Diabetes mellitus type 2 in obese        Plan:     Walter was seen today for cardiomyopathy, dizziness and emesis.    Diagnoses and all orders for this visit:    Coronary artery disease involving native coronary artery of native heart without angina pectoris    Ischemic cardiomyopathy  -     losartan (COZAAR) 50 MG tablet; Take 1 tablet (50 mg total) by mouth once daily.( an increase )    Benign essential HTN  -     losartan (COZAAR) 50 MG tablet; Take 1 tablet (50 mg total) by mouth once daily.  -     Comprehensive metabolic panel; Future; Expected date: 02/13/2019    Mixed hyperlipidemia  Continue current regimen    ICD (implantable cardioverter-defibrillator) in place  -     amiodarone (PACERONE) 200 MG Tab; Take 1 tablet (200 mg total) by mouth once daily( a decrease ).    Severe obesity (BMI 35.0-39.9) with comorbidity    Diabetes mellitus type 2 in obese

## 2019-01-30 NOTE — PROGRESS NOTES
[facility] Established Patient Visit    Reason for Visit:    Chief Complaint   Patient presents with    Emesis    Nausea    Dizziness       PCP: Belkys Kruger    HPI:  Walter Bull is a 68 y.o. male here for follow-up of nausea with vomiting.  He was last seen in clinic a year ago by my PA for iron deficiency.  He reports intermittent nausea and vomiting that started 3-4 months ago and has progressively worsened.  He has hiccups after eating and drinking.  He improves after vomiting.  He denies abdominal pain.  He also denies blood in the stool or black stools.  He has mild anorexia.  He has bowel movements every other day; however, he used to have BM every day.  He is still using blood thinners.  He takes Nexium in the morning and Zantac in the evening for months (Nexium was added in November).  He drinks a lot of Pepto Bismol.  Amiodarone was added in November, but no other medication changes.  Symptoms were present prior to starting Amiodarone.      Colonoscopy 2/2/17 with Dr. Conway at [place] and was normal.    EGD 3/21/18 with Dr. Cunningham noting gastric erosions and biopsies positive for H pylori.  He was treated with Amoxicillin and Clarithromycin.  Stool antigen was negative in August 2018.            ROS:  Constitutional: No fevers, chills, No weight loss, normal appetite  ENT: No congestion, rhinorrhea, or chronic sinus problems  CV: No chest pain or palpitations  Pulm: No cough, No shortness of breath  Ophtho: No vision changes or pain  GI: see HPI        PMHX:  has a past medical history of Anemia, Anticoagulant long-term use, CHF (congestive heart failure), Colon cancer screening (2/2/2017), Coronary artery disease, Diabetes mellitus type I, Disorder of kidney and ureter, Encounter for blood transfusion, Glaucoma, Heart attack, Heart disease, Hypertension, Iron deficiency, Kidney failure, and S/P CABG x 5 (1/11/2017).    PSHX:  has a past surgical history that includes  Eye surgery (Bilateral, 2016); Colon surgery (2006); Colonoscopy (N/A, 2/2/2017); Coronary artery bypass graft (2005); left heart catheterization (Left, 11/16/2018); coronary bypass graft angiography (11/16/2018); cardiac electrophysiology study (N/A, 11/19/2018); insertion of implantable cardioverter-defibrillator (icd) generator with two existing leads (Left, 11/19/2018); Cardiac defibrillator placement; and cardiac electrophysiology study (N/A, 11/19/2018).    The patient's social and family histories were reviewed by me and updated in the appropriate section of the electronic medical record.    Review of patient's allergies indicates:  No Known Allergies    Prior to Admission medications    Medication Sig Start Date End Date Taking? Authorizing Provider   albuterol (PROVENTIL/VENTOLIN HFA) 90 mcg/actuation inhaler Inhale 2 puffs into the lungs every 6 (six) hours as needed for Wheezing. 11/20/18  Yes Benny Rooney MD   amiodarone (PACERONE) 400 MG tablet Take 1 tablet (400 mg total) by mouth once daily. 1/28/19  Yes Agustin Capone MD   carvedilol (COREG) 6.25 MG tablet Take 1 tablet (6.25 mg total) by mouth 2 (two) times daily with meals. 11/20/18 11/20/19 Yes Benny Rooney MD   clopidogrel (PLAVIX) 75 mg tablet  1/27/19  Yes Historical Provider, MD   esomeprazole (NEXIUM) 40 MG capsule Take 1 capsule (40 mg total) by mouth 2 (two) times daily before meals. 1/14/19 1/14/20 Yes Belkys Kruger MD   ezetimibe (ZETIA) 10 mg tablet Take 10 mg by mouth once daily.  6/15/17  Yes Historical Provider, MD   ferrous sulfate 325 mg (65 mg iron) Tab tablet Take 1 tablet (325 mg total) by mouth 3 (three) times daily. 9/6/18 9/6/19 Yes Benny Tian MD   finasteride (PROSCAR) 5 mg tablet Take 1 tablet (5 mg total) by mouth once daily. 6/12/18 6/12/19 Yes Marshall Barnhart MD   insulin NPH (NOVOLIN N) 100 unit/mL injection Inject 40 Units into the skin before breakfast. 1/14/19  Yes Belkys Kruger MD   isosorbide  "mononitrate (IMDUR) 60 MG 24 hr tablet Take 1 tablet (60 mg total) by mouth once daily. 11/20/18  Yes Benny Rooney MD   JANUMET 50-1,000 mg per tablet TAKE 1 TABLET BY MOUTH TWO TIMES DAILY WITH MEALS 11/29/18  Yes Louann Lombardi NP   losartan (COZAAR) 25 MG tablet Take 1 tablet (25 mg total) by mouth once daily. 12/22/18 12/22/19 Yes Omar Garrett MD   nitroGLYCERIN (NITROSTAT) 0.4 MG SL tablet DISSOLVE ONE TABLET UNDER THE TONGUE EVERY 5 MINUTES AS NEEDED FOR CHEST PAIN. 11/20/18  Yes Benny Rooney MD   RANEXA 1,000 mg Tb12 Take 1 tablet (1,000 mg total) by mouth 2 (two) times daily. 1/14/19  Yes Belkys Kruger MD   ranitidine (ZANTAC) 150 MG tablet  1/27/19  Yes Karina Houser MD   rosuvastatin (CRESTOR) 40 MG Tab Take 1 tablet (40 mg total) by mouth once daily. 11/20/18  Yes Benny Rooney MD   spironolactone (ALDACTONE) 25 MG tablet Take 1 tablet (25 mg total) by mouth once daily. 1/28/19  Yes Agustin Capone MD   tamsulosin (FLOMAX) 0.4 mg Cap TAKE ONE CAPSULE BY MOUTH ONCE DAILY 7/28/18  Yes Belkys Kruger MD   ticagrelor (BRILINTA) 90 mg tablet Take 1 tablet (90 mg total) by mouth 2 (two) times daily. 11/16/18 11/16/19 Yes Altaf Milton MD   furosemide (LASIX) 20 MG tablet Take 1 tablet (20 mg total) by mouth once daily. 1/28/19   Agustin Capone MD         Objective Findings:  Vital Signs:  /62   Pulse (!) 59   Ht 5' 7" (1.702 m)   Wt 106.8 kg (235 lb 7.2 oz)   BMI 36.88 kg/m²  Body mass index is 36.88 kg/m².    Physical Exam:  General Appearance:  Well appearing in no acute distress, appears stated age  Head:  Normocephalic, atraumatic  Eyes:  No scleral icterus or pallor, EOMI  ENT:  Neck supple, moist mucosa; OP clear  Lungs:  CTA bilaterally in anterior and posterior fields, no wheezes, no crackles; no dullness  Heart:  Regular rate and rhythm, S1, S2 normal, no murmurs heard  Abdomen:  Soft, non tender, non distended with positive bowel sounds in all four quadrants. No " hepatosplenomegaly, ascites, or mass        Labs:  Lab Results   Component Value Date    WBC 8.28 12/22/2018    HGB 12.1 (L) 12/22/2018    HCT 38.2 (L) 12/22/2018    MCV 90 12/22/2018    RDW 15.1 (H) 12/22/2018     12/22/2018    GRAN 4.9 12/22/2018    GRAN 58.9 12/22/2018    LYMPH 2.5 12/22/2018    LYMPH 30.3 12/22/2018    MONO 0.7 12/22/2018    MONO 8.2 12/22/2018    EOS 0.2 12/22/2018    BASO 0.02 12/22/2018     Lab Results   Component Value Date     12/22/2018    K 4.4 12/22/2018     12/22/2018    CO2 26 12/22/2018     12/22/2018    BUN 10 12/22/2018    CREATININE 1.4 12/22/2018    CALCIUM 8.9 12/22/2018    PROT 6.9 12/22/2018    ALBUMIN 3.7 12/22/2018    BILITOT 0.5 12/22/2018    ALKPHOS 54 (L) 12/22/2018    AST 18 12/22/2018    ALT 26 12/22/2018                   Assessment:  Walter Bull is a 68 y.o. male here with:  1. Non-intractable vomiting with nausea, unspecified vomiting type    2. History of Helicobacter pylori infection      Nausea and vomiting started 3-4 months ago.  Does not appear to be medication related.  Consider H pylori, although this was successfully treated as documented by a negative stool antigen.  He may have been on acid reducers when he submitted that test which could have limited the sensitivity of the test.  His symptoms continue despite the use of Nexium and Zantac on daily basis.        Recommendations:  1. I will check an H pylori stool antigen and recommend that he stop the Nexium and Zantac for up to 2 weeks before submitting.    2. I will get a gastric emptying study.  3. If the above are negative, then will consider repeat EGD  4. I will give him anti-nausea medication.    Follow-up pending the above.      Order summary:  Orders Placed This Encounter   Procedures    NM Gastric Emptying    H. pylori antigen, stool         Branden Bush MD

## 2019-01-30 NOTE — TELEPHONE ENCOUNTER
----- Message from Rupal Morales sent at 1/30/2019 11:35 AM CST -----  Contact: [facility]- [phone]  Eleazar- pharmacy called and stated there may be a drug interaction btw the rxs ondansetron (ZOFRAN) 4 MG tablet and amiodarone (PACERONE) 400 MG tablet- please contact pharmacy at [phone]

## 2019-02-05 ENCOUNTER — HOSPITAL ENCOUNTER (OUTPATIENT)
Dept: RADIOLOGY | Facility: HOSPITAL | Age: 69
Discharge: HOME | End: 2019-02-05
Attending: INTERNAL MEDICINE
Payer: MEDICARE

## 2019-02-05 DIAGNOSIS — R11.2 NON-INTRACTABLE VOMITING WITH NAUSEA, UNSPECIFIED VOMITING TYPE: ICD-10-CM

## 2019-02-05 PROCEDURE — A9541 TC99M SULFUR COLLOID: HCPCS

## 2019-02-05 PROCEDURE — 78264 GASTRIC EMPTYING IMG STUDY: CPT | Mod: TC

## 2019-02-05 PROCEDURE — 78264 GASTRIC EMPTYING IMG STUDY: CPT | Mod: 26,,, | Performed by: RADIOLOGY

## 2019-02-05 PROCEDURE — 78264 NM GASTRIC EMPTYING: ICD-10-PCS | Mod: 26,,, | Performed by: RADIOLOGY

## 2019-02-06 ENCOUNTER — TELEPHONE (OUTPATIENT)
Dept: GASTROENTEROLOGY | Facility: CLINIC | Age: 69
End: 2019-02-06

## 2019-02-06 NOTE — TELEPHONE ENCOUNTER
----- Message from Branden Bush MD sent at 2/6/2019  3:57 PM CST -----  The gastric emptying study was normal.  The next step will be to see the result of the stool test for H pylori, so we will wait on that for now.    MA to call patient with results.

## 2019-02-06 NOTE — TELEPHONE ENCOUNTER
Patient wife stated results were seen on Mr. Bull portal account and verbalized understanding.     Mr. Bull is going to submit his stool specimen on Tuesday. He is no longer vomiting since he stopped taking Nexium and Zantac. He has only taking Tums once since he stopped taking Nexium.     Message routed to Dr. Bush.

## 2019-02-13 ENCOUNTER — LAB VISIT (OUTPATIENT)
Dept: LAB | Facility: HOSPITAL | Age: 69
End: 2019-02-13
Attending: INTERNAL MEDICINE
Payer: MEDICARE

## 2019-02-13 DIAGNOSIS — I10 BENIGN ESSENTIAL HTN: ICD-10-CM

## 2019-02-13 DIAGNOSIS — Z86.19 HISTORY OF HELICOBACTER PYLORI INFECTION: ICD-10-CM

## 2019-02-13 LAB
ALBUMIN SERPL BCP-MCNC: 3.5 G/DL
ALP SERPL-CCNC: 53 U/L
ALT SERPL W/O P-5'-P-CCNC: 23 U/L
ANION GAP SERPL CALC-SCNC: 7 MMOL/L
AST SERPL-CCNC: 16 U/L
BILIRUB SERPL-MCNC: 0.6 MG/DL
BUN SERPL-MCNC: 9 MG/DL
CALCIUM SERPL-MCNC: 9 MG/DL
CHLORIDE SERPL-SCNC: 103 MMOL/L
CO2 SERPL-SCNC: 28 MMOL/L
CREAT SERPL-MCNC: 1.1 MG/DL
EST. GFR  (AFRICAN AMERICAN): >60 ML/MIN/1.73 M^2
EST. GFR  (NON AFRICAN AMERICAN): >60 ML/MIN/1.73 M^2
GLUCOSE SERPL-MCNC: 126 MG/DL
POTASSIUM SERPL-SCNC: 4.7 MMOL/L
PROT SERPL-MCNC: 6.5 G/DL
SODIUM SERPL-SCNC: 138 MMOL/L

## 2019-02-13 PROCEDURE — 87338 HPYLORI STOOL AG IA: CPT

## 2019-02-13 PROCEDURE — 80053 COMPREHEN METABOLIC PANEL: CPT

## 2019-02-13 PROCEDURE — 36415 COLL VENOUS BLD VENIPUNCTURE: CPT

## 2019-02-17 LAB — H PYLORI AG STL QL IA: NOT DETECTED

## 2019-02-21 ENCOUNTER — CLINICAL SUPPORT (OUTPATIENT)
Dept: CARDIOLOGY | Facility: HOSPITAL | Age: 69
End: 2019-02-21
Attending: INTERNAL MEDICINE
Payer: MEDICARE

## 2019-02-21 ENCOUNTER — HOSPITAL ENCOUNTER (OUTPATIENT)
Dept: CARDIOLOGY | Facility: CLINIC | Age: 69
Discharge: HOME | End: 2019-02-21
Payer: MEDICARE

## 2019-02-21 ENCOUNTER — TELEPHONE (OUTPATIENT)
Dept: GASTROENTEROLOGY | Facility: CLINIC | Age: 69
End: 2019-02-21

## 2019-02-21 ENCOUNTER — OFFICE VISIT (OUTPATIENT)
Dept: ELECTROPHYSIOLOGY | Facility: CLINIC | Age: 69
End: 2019-02-21
Payer: MEDICARE

## 2019-02-21 VITALS
HEIGHT: 67 IN | SYSTOLIC BLOOD PRESSURE: 110 MMHG | HEART RATE: 60 BPM | BODY MASS INDEX: 36.68 KG/M2 | DIASTOLIC BLOOD PRESSURE: 60 MMHG | WEIGHT: 233.69 LBS

## 2019-02-21 DIAGNOSIS — Z95.810 ICD (IMPLANTABLE CARDIOVERTER-DEFIBRILLATOR), DUAL, IN SITU: Primary | ICD-10-CM

## 2019-02-21 DIAGNOSIS — I25.10 CORONARY ARTERY DISEASE INVOLVING NATIVE CORONARY ARTERY OF NATIVE HEART WITHOUT ANGINA PECTORIS: ICD-10-CM

## 2019-02-21 DIAGNOSIS — Z79.899 LONG TERM CURRENT USE OF AMIODARONE: ICD-10-CM

## 2019-02-21 DIAGNOSIS — Z95.810 PRESENCE OF AUTOMATIC CARDIOVERTER/DEFIBRILLATOR (AICD): ICD-10-CM

## 2019-02-21 DIAGNOSIS — I47.20 VENTRICULAR TACHYCARDIA: ICD-10-CM

## 2019-02-21 DIAGNOSIS — I25.10 CORONARY ARTERY DISEASE INVOLVING NATIVE CORONARY ARTERY OF NATIVE HEART WITHOUT ANGINA PECTORIS: Primary | ICD-10-CM

## 2019-02-21 DIAGNOSIS — I47.20 VT (VENTRICULAR TACHYCARDIA): ICD-10-CM

## 2019-02-21 DIAGNOSIS — I10 BENIGN ESSENTIAL HTN: ICD-10-CM

## 2019-02-21 DIAGNOSIS — I25.5 ISCHEMIC CARDIOMYOPATHY: ICD-10-CM

## 2019-02-21 PROCEDURE — 3078F PR MOST RECENT DIASTOLIC BLOOD PRESSURE < 80 MM HG: ICD-10-PCS | Mod: CPTII,S$GLB,, | Performed by: NURSE PRACTITIONER

## 2019-02-21 PROCEDURE — 3074F SYST BP LT 130 MM HG: CPT | Mod: CPTII,S$GLB,, | Performed by: NURSE PRACTITIONER

## 2019-02-21 PROCEDURE — 99999 PR PBB SHADOW E&M-EST. PATIENT-LVL III: ICD-10-PCS | Mod: PBBFAC,,, | Performed by: NURSE PRACTITIONER

## 2019-02-21 PROCEDURE — 93000 RHYTHM STRIP: ICD-10-PCS | Mod: S$GLB,,, | Performed by: INTERNAL MEDICINE

## 2019-02-21 PROCEDURE — 99999 PR PBB SHADOW E&M-EST. PATIENT-LVL III: CPT | Mod: PBBFAC,,, | Performed by: NURSE PRACTITIONER

## 2019-02-21 PROCEDURE — 99214 PR OFFICE/OUTPT VISIT, EST, LEVL IV, 30-39 MIN: ICD-10-PCS | Mod: S$GLB,,, | Performed by: NURSE PRACTITIONER

## 2019-02-21 PROCEDURE — 93283 PRGRMG EVAL IMPLANTABLE DFB: CPT

## 2019-02-21 PROCEDURE — 99214 OFFICE O/P EST MOD 30 MIN: CPT | Mod: S$GLB,,, | Performed by: NURSE PRACTITIONER

## 2019-02-21 PROCEDURE — 99499 UNLISTED E&M SERVICE: CPT | Mod: S$GLB,,, | Performed by: NURSE PRACTITIONER

## 2019-02-21 PROCEDURE — 1101F PR PT FALLS ASSESS DOC 0-1 FALLS W/OUT INJ PAST YR: ICD-10-PCS | Mod: CPTII,S$GLB,, | Performed by: NURSE PRACTITIONER

## 2019-02-21 PROCEDURE — 99499 RISK ADDL DX/OHS AUDIT: ICD-10-PCS | Mod: S$GLB,,, | Performed by: NURSE PRACTITIONER

## 2019-02-21 PROCEDURE — 93000 ELECTROCARDIOGRAM COMPLETE: CPT | Mod: S$GLB,,, | Performed by: INTERNAL MEDICINE

## 2019-02-21 PROCEDURE — 3074F PR MOST RECENT SYSTOLIC BLOOD PRESSURE < 130 MM HG: ICD-10-PCS | Mod: CPTII,S$GLB,, | Performed by: NURSE PRACTITIONER

## 2019-02-21 PROCEDURE — 1101F PT FALLS ASSESS-DOCD LE1/YR: CPT | Mod: CPTII,S$GLB,, | Performed by: NURSE PRACTITIONER

## 2019-02-21 PROCEDURE — 3078F DIAST BP <80 MM HG: CPT | Mod: CPTII,S$GLB,, | Performed by: NURSE PRACTITIONER

## 2019-02-21 NOTE — TELEPHONE ENCOUNTER
Results given to patient's girlfriend. She verbalized understanding and stated Mr. Bull has not been vomiting since stop taking Nexium and Zantac.     Mr. Bull will contact our office if his symptoms return.

## 2019-02-21 NOTE — TELEPHONE ENCOUNTER
----- Message from Branden Bush MD sent at 2/20/2019  5:29 PM CST -----  The stool test for H pylori was negative.  If he is still having symptoms, then we will need to proceed with doing an EGD.  If he is no longer having symptoms, then I would suggest that he not make any changes.  He may also consider staying off of the Nexium for now if he is doing better.    MA to call patient with results and check on symptoms.

## 2019-02-21 NOTE — PROGRESS NOTES
Mr. Bull is a patient of Dr. Glasgow.      Subjective:   Patient ID:  Walter Bull is a 68 y.o. male who presents for follow-up of Follow-up  .     HPI:    Mr. Bull is a 68 y.o. 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    Background:     General cardiologist: Dr. Agustin Capone    69 y/o man with prior 5v CABG (2005) (LIMA to LAD, SVG to Diag), known  to RCA and LCx and recent admission to outside hospital with NSTEMI, CKD II, HTN, HLD, DM presented to the hospital 11/16/2018 with acute onset of chest pain and new onset LBBB. Had LHC at OSH and has been managed medically. Has LVEF 35%, no ICD. Was about to perform lawn work when he started having acute onset chest pain, similar in nature to his prior MI. Also noted palpitations which he had not experienced before, has never experienced syncope. 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 He denies orthopnea, PND, peripheral edema or [place]. There is no family history of sudden cardiac death.     Patient presented in wide complex tachycardia. Cardiology called for assistance. He continued to have 9-10/10 chest pain like an elephant sitting on his chest. Received NTG x 2 and ASA 325mg, was taken to the cath lab and no new occlusions were identified. The patient received lidocaine for a slow MMVT which restored NSR with a narrow complex. LHC found  that is known and the LIMA to LAD and SVG to D1 grafts are patent. Had inducible sustained VT when pigtail catheter was inserted into the LV, VT terminated with lidocaine bolus. Started on amiodarone gtt.     On 11/19/2018 he underwent EPS. Patient easily inducible for VT (LBLS  ms) with ventricular double extrastimuli. Underwent successful dual chamber ICD implantation ([employer]).    Update (02/21/2019):    Today he says he feels substantially better than prior to  his hospitalizations. Some CP a couple of weeks ago relieved with nitro. Mr. Bull denies palpitations, dizziness, or syncope. He is following the Mediterranean Diet suggested by Dr. Capone and is doing well on it.    Device Interrogation (2/21/2019) reveals an intrinsic sinus bradycardia with stable lead and device function. No arrhythmias or treated episodes were noted.  He paces 51% in the RA and 0% in the RV. Estimated battery longevity 8 years. Rate response turned on in device clinic today.  He is currently on amiodarone 200mg daily. LFTs are WNL 2/2019. TSH is WNL 12/2018. No PFTs on file.    I have personally reviewed the patient's EKG today, which shows intermittent atrial pacing, ventricular sensing at 60bpm. VT interval is 168. QTc is 464.    Recent Cardiac Tests:    Last EF 45%. Updated echo pending.    Current Outpatient Medications   Medication Sig    albuterol (PROVENTIL/VENTOLIN HFA) 90 mcg/actuation inhaler Inhale 2 puffs into the lungs every 6 (six) hours as needed for Wheezing.    amiodarone (PACERONE) 200 MG Tab Take 1 tablet (200 mg total) by mouth once daily.    carvedilol (COREG) 6.25 MG tablet Take 1 tablet (6.25 mg total) by mouth 2 (two) times daily with meals.    clopidogrel (PLAVIX) 75 mg tablet     esomeprazole (NEXIUM) 40 MG capsule Take 1 capsule (40 mg total) by mouth 2 (two) times daily before meals.    ezetimibe (ZETIA) 10 mg tablet Take 10 mg by mouth once daily.     ferrous sulfate 325 mg (65 mg iron) Tab tablet Take 1 tablet (325 mg total) by mouth 3 (three) times daily.    finasteride (PROSCAR) 5 mg tablet Take 1 tablet (5 mg total) by mouth once daily.    furosemide (LASIX) 20 MG tablet Take 1 tablet (20 mg total) by mouth once daily.    insulin NPH (NOVOLIN N) 100 unit/mL injection Inject 40 Units into the skin before breakfast.    isosorbide mononitrate (IMDUR) 60 MG 24 hr tablet Take 1 tablet (60 mg total) by mouth once daily.    JANUMET 50-1,000 mg per tablet  "TAKE 1 TABLET BY MOUTH TWO TIMES DAILY WITH MEALS    losartan (COZAAR) 50 MG tablet Take 1 tablet (50 mg total) by mouth once daily.    nitroGLYCERIN (NITROSTAT) 0.4 MG SL tablet DISSOLVE ONE TABLET UNDER THE TONGUE EVERY 5 MINUTES AS NEEDED FOR CHEST PAIN.    RANEXA 1,000 mg Tb12 Take 1 tablet (1,000 mg total) by mouth 2 (two) times daily.    ranitidine (ZANTAC) 150 MG tablet     rosuvastatin (CRESTOR) 40 MG Tab Take 1 tablet (40 mg total) by mouth once daily.    spironolactone (ALDACTONE) 25 MG tablet Take 1 tablet (25 mg total) by mouth once daily.    tamsulosin (FLOMAX) 0.4 mg Cap TAKE ONE CAPSULE BY MOUTH ONCE DAILY    ticagrelor (BRILINTA) 90 mg tablet Take 1 tablet (90 mg total) by mouth 2 (two) times daily.     No current facility-administered medications for this visit.      Review of Systems   Constitution: Negative for malaise/fatigue.   Cardiovascular: Positive for chest pain (occassional). Negative for dyspnea on exertion, irregular heartbeat, leg swelling and palpitations.   Respiratory: Negative for shortness of breath.    Hematologic/Lymphatic: Negative for bleeding problem.   Skin: Negative for rash.   Musculoskeletal: Negative for myalgias.   Gastrointestinal: Negative for hematemesis, hematochezia and nausea.   Genitourinary: Negative for hematuria.   Neurological: Negative for light-headedness.   Psychiatric/Behavioral: Negative for altered mental status.   Allergic/Immunologic: Negative for persistent infections.     Objective:          /60   Pulse 60   Ht 5' 7" (1.702 m)   Wt 106 kg (233 lb 11 oz)   BMI 36.60 kg/m²     Physical Exam   Constitutional: He is oriented to person, place, and time. He appears well-developed and well-nourished.   HENT:   Head: Normocephalic.   Nose: Nose normal.   Eyes: Pupils are equal, round, and reactive to light.   Cardiovascular: Normal rate, regular rhythm, S1 normal and S2 normal.   No murmur heard.  Pulses:       Radial pulses are 2+ on the " right side, and 2+ on the left side.   Pulmonary/Chest: Breath sounds normal. No respiratory distress.   Device to LUCW.   Abdominal: Normal appearance.   Musculoskeletal: Normal range of motion. He exhibits no edema.   Neurological: He is alert and oriented to person, place, and time.   Skin: Skin is warm and dry. No erythema.   Psychiatric: He has a normal mood and affect. His speech is normal and behavior is normal.   Nursing note and vitals reviewed.    Lab Results   Component Value Date     02/13/2019    K 4.7 02/13/2019    MG 1.7 12/22/2018    BUN 9 02/13/2019    CREATININE 1.1 02/13/2019    ALT 23 02/13/2019    AST 16 02/13/2019    HGB 12.1 (L) 12/22/2018    HCT 38.2 (L) 12/22/2018    TSH 2.842 12/22/2018    LDLCALC 75.6 11/21/2018       Recent Labs   Lab 09/13/18  0601 11/16/18  1033 12/22/18  1342   INR 1.1 1.0 1.0       Assessment     1. ICD (implantable cardioverter-defibrillator), dual, in situ    2. Long term current use of amiodarone    3. Benign essential HTN    4. Ischemic cardiomyopathy    5. Ventricular tachycardia      Plan:     In summary, Mr. Bull is a 68 y.o. male with CAD (CABG 2005, PCI 2018), CKD, HTN, HLD, DM, LBBB, MMVT (on amiodarone) here for follow up after device implantation.  Mr. Bull is doing well from a device perspective with stable lead and device function. No arrhythmia noted. Rhythm controlled on amiodarone. LFTs, TSH ok. No ventricular pacing. No CHF symptoms.  He is followed by Dr. Capone in cardiology.     Continue current medication regimen and device settings.   Follow up in device clinic as scheduled.   Follow up in EP clinic in 6 months with amiodarone testing (LFTs, TSH, PFTs), sooner as needed.     *A copy of this note has been sent to Dr. Glasgow*    Follow-up in about 6 months (around 8/21/2019).    ------------------------------------------------------------------    MAISHA De La Torre, NP-C  [facility]

## 2019-02-21 NOTE — TELEPHONE ENCOUNTER
Attempted to contact patient with test results, but he did not answer. Left voicemail for patient to return call.

## 2019-02-26 ENCOUNTER — OFFICE VISIT (OUTPATIENT)
Dept: OPTOMETRY | Facility: CLINIC | Age: 69
End: 2019-02-26
Payer: MEDICARE

## 2019-02-26 ENCOUNTER — CLINICAL SUPPORT (OUTPATIENT)
Dept: OPHTHALMOLOGY | Facility: CLINIC | Age: 69
End: 2019-02-26
Payer: MEDICARE

## 2019-02-26 DIAGNOSIS — H40.1131 PRIMARY OPEN ANGLE GLAUCOMA (POAG) OF BOTH EYES, MILD STAGE: Primary | ICD-10-CM

## 2019-02-26 DIAGNOSIS — R42 DIZZINESS: ICD-10-CM

## 2019-02-26 DIAGNOSIS — H40.1131 PRIMARY OPEN ANGLE GLAUCOMA (POAG) OF BOTH EYES, MILD STAGE: ICD-10-CM

## 2019-02-26 PROCEDURE — 92012 PR EYE EXAM, EST PATIENT,INTERMED: ICD-10-PCS | Mod: S$GLB,,, | Performed by: OPTOMETRIST

## 2019-02-26 PROCEDURE — 99999 PR PBB SHADOW E&M-EST. PATIENT-LVL III: CPT | Mod: PBBFAC,,, | Performed by: OPTOMETRIST

## 2019-02-26 PROCEDURE — 99499 RISK ADDL DX/OHS AUDIT: ICD-10-PCS | Mod: S$GLB,,, | Performed by: OPTOMETRIST

## 2019-02-26 PROCEDURE — 99999 PR PBB SHADOW E&M-EST. PATIENT-LVL I: CPT | Mod: PBBFAC,,,

## 2019-02-26 PROCEDURE — 92012 INTRM OPH EXAM EST PATIENT: CPT | Mod: S$GLB,,, | Performed by: OPTOMETRIST

## 2019-02-26 PROCEDURE — 99999 PR PBB SHADOW E&M-EST. PATIENT-LVL I: ICD-10-PCS | Mod: PBBFAC,,,

## 2019-02-26 PROCEDURE — 76514 PR  US, EYE, FOR CORNEAL THICKNESS: ICD-10-PCS | Mod: S$GLB,,, | Performed by: OPTOMETRIST

## 2019-02-26 PROCEDURE — 92083 EXTENDED VISUAL FIELD XM: CPT | Mod: S$GLB,,, | Performed by: OPTOMETRIST

## 2019-02-26 PROCEDURE — 92083 HUMPHREY VISUAL FIELD - OU - BOTH EYES: ICD-10-PCS | Mod: S$GLB,,, | Performed by: OPTOMETRIST

## 2019-02-26 PROCEDURE — 99999 PR PBB SHADOW E&M-EST. PATIENT-LVL III: ICD-10-PCS | Mod: PBBFAC,,, | Performed by: OPTOMETRIST

## 2019-02-26 PROCEDURE — 76514 ECHO EXAM OF EYE THICKNESS: CPT | Mod: S$GLB,,, | Performed by: OPTOMETRIST

## 2019-02-26 PROCEDURE — 92133 CPTRZD OPH DX IMG PST SGM ON: CPT | Mod: S$GLB,,, | Performed by: OPTOMETRIST

## 2019-02-26 PROCEDURE — 92133 POSTERIOR SEGMENT OCT OPTIC NERVE(OCULAR COHERENCE TOMOGRAPHY) - OU - BOTH EYES: ICD-10-PCS | Mod: S$GLB,,, | Performed by: OPTOMETRIST

## 2019-02-26 PROCEDURE — 99499 UNLISTED E&M SERVICE: CPT | Mod: S$GLB,,, | Performed by: OPTOMETRIST

## 2019-02-27 ENCOUNTER — PATIENT MESSAGE (OUTPATIENT)
Dept: INTERNAL MEDICINE | Facility: CLINIC | Age: 69
End: 2019-02-27

## 2019-02-27 DIAGNOSIS — N40.0 BENIGN PROSTATIC HYPERPLASIA WITHOUT LOWER URINARY TRACT SYMPTOMS: ICD-10-CM

## 2019-02-27 DIAGNOSIS — I25.118 CORONARY ARTERY DISEASE OF NATIVE ARTERY OF NATIVE HEART WITH STABLE ANGINA PECTORIS: ICD-10-CM

## 2019-02-27 DIAGNOSIS — R35.1 BENIGN PROSTATIC HYPERPLASIA WITH NOCTURIA: ICD-10-CM

## 2019-02-27 DIAGNOSIS — R11.2 NAUSEA AND VOMITING, INTRACTABILITY OF VOMITING NOT SPECIFIED, UNSPECIFIED VOMITING TYPE: ICD-10-CM

## 2019-02-27 DIAGNOSIS — K21.9 GASTROESOPHAGEAL REFLUX DISEASE, ESOPHAGITIS PRESENCE NOT SPECIFIED: ICD-10-CM

## 2019-02-27 DIAGNOSIS — N40.1 BENIGN PROSTATIC HYPERPLASIA WITH NOCTURIA: ICD-10-CM

## 2019-02-27 RX ORDER — ESOMEPRAZOLE MAGNESIUM 40 MG/1
40 CAPSULE, DELAYED RELEASE ORAL
Qty: 180 CAPSULE | Refills: 3 | Status: SHIPPED | OUTPATIENT
Start: 2019-02-27 | End: 2019-04-22

## 2019-02-27 RX ORDER — TAMSULOSIN HYDROCHLORIDE 0.4 MG/1
1 CAPSULE ORAL DAILY
Qty: 30 CAPSULE | Refills: 3 | Status: ON HOLD | OUTPATIENT
Start: 2019-02-27 | End: 2019-05-19 | Stop reason: SDUPTHER

## 2019-02-27 RX ORDER — FINASTERIDE 5 MG/1
5 TABLET, FILM COATED ORAL DAILY
Qty: 90 TABLET | Refills: 3 | Status: ON HOLD | OUTPATIENT
Start: 2019-02-27 | End: 2019-05-19 | Stop reason: SDUPTHER

## 2019-02-27 RX ORDER — SITAGLIPTIN AND METFORMIN HYDROCHLORIDE 1000; 50 MG/1; MG/1
TABLET, FILM COATED ORAL
Qty: 180 TABLET | Refills: 3 | Status: SHIPPED | OUTPATIENT
Start: 2019-02-27 | End: 2019-05-14 | Stop reason: SINTOL

## 2019-02-28 NOTE — PROGRESS NOTES
Assessment /Plan     For exam results, see Encounter Report.    Primary open angle glaucoma (POAG) of both eyes, mild stage  -     Johnson Visual Field - OU - Extended - Both Eyes  -     Posterior Segment OCT Optic Nerve- Both eyes

## 2019-02-28 NOTE — PROGRESS NOTES
HPI     Pt here for a glaucoma work up. Pt denies any eye complaints, no changes   since last visit.    Last edited by Sanchez Nunes on 2/26/2019  8:26 AM. (History)        ROS     Negative for: Constitutional, Gastrointestinal, Neurological, Skin,   Genitourinary, Musculoskeletal, HENT, Endocrine, Cardiovascular, Eyes,   Respiratory, Psychiatric, Allergic/Imm, Heme/Lymph    Last edited by Kal Houston, OD on 2/26/2019  8:46 AM. (History)        Assessment /Plan     For exam results, see Encounter Report.    Primary open angle glaucoma (POAG) of both eyes, mild stage    Dizziness    1. (-) fHx. IOP 17 OD, 18 OS. Last IOP 18 OD, OS. C/d 0.65 OD, OS. S/p SLT x3 about 10 years ago per pt. Pt has been off drops since SLT. Last HVF 1 year ago in [place]. Photos 1/29/19.   2/28/19- OCT OD thin TS, T, TI and G. OS thin TS, T and G and borderline TI  2/28/19- HVF OD ONL- scattered inf defect, OS borderline  Educated the patient on findings. Unclear whether progression has occurred since pt is establishing care. Cont to monitor. RTC 6 mo repeat OCT and HVF and IOP.    2. Pt reports recent dizziness. He associates with dehydration. Seems related to orthostatic hypotension since it's associated with standing from a sitting or laying position. Pt advised to notify PcP. Note to PCP.

## 2019-03-13 ENCOUNTER — TELEPHONE (OUTPATIENT)
Dept: INTERNAL MEDICINE | Facility: CLINIC | Age: 69
End: 2019-03-13

## 2019-03-13 NOTE — TELEPHONE ENCOUNTER
----- Message from Mariza Wu sent at 3/13/2019  9:28 AM CDT -----  Contact: Sandra with [facility]   Walter Bull  MRN: [id number]  : 1950  PCP: Belkys Kruger  Home Phone      [phone]  Work Phone      Not on file.  Mobile          [phone]    MESSAGE:   She is requesting substitute of medication due to cost- $144.98 for RANEXA 1,000 mg Tb12  The Rx is due to be processed today.   Ref # [id number]    Phone # [phone]  Fax # [phone]    [facility] [place], [place] - [facility] [address] AT [address] to [facility]

## 2019-03-13 NOTE — TELEPHONE ENCOUNTER
Didn't want to change the drug. They just wanted to dispense generic instead of namebrand. I gave ok to do so.

## 2019-03-13 NOTE — TELEPHONE ENCOUNTER
I would like for him to discuss this with his cardiologist. It is not a medication I would feel comfortable stopping without discussing with cardiology. Also, sometimes cardiology has options to help with costs (copay cards, samples, etc). Thanks

## 2019-03-14 ENCOUNTER — PATIENT MESSAGE (OUTPATIENT)
Dept: CARDIOLOGY | Facility: CLINIC | Age: 69
End: 2019-03-14

## 2019-03-14 DIAGNOSIS — I10 BENIGN ESSENTIAL HTN: ICD-10-CM

## 2019-03-14 RX ORDER — CARVEDILOL 6.25 MG/1
6.25 TABLET ORAL 2 TIMES DAILY WITH MEALS
Qty: 180 TABLET | Refills: 3 | Status: SHIPPED | OUTPATIENT
Start: 2019-03-14 | End: 2019-04-26 | Stop reason: DRUGHIGH

## 2019-03-14 RX ORDER — EZETIMIBE 10 MG/1
10 TABLET ORAL DAILY
Qty: 90 TABLET | Refills: 3 | Status: SHIPPED | OUTPATIENT
Start: 2019-03-14 | End: 2020-05-21 | Stop reason: SDUPTHER

## 2019-03-25 RX ORDER — ISOSORBIDE MONONITRATE 60 MG/1
60 TABLET, EXTENDED RELEASE ORAL DAILY
Qty: 90 TABLET | Refills: 3 | Status: ON HOLD | OUTPATIENT
Start: 2019-03-25 | End: 2019-10-07 | Stop reason: HOSPADM

## 2019-03-27 DIAGNOSIS — D50.9 IRON DEFICIENCY ANEMIA, UNSPECIFIED IRON DEFICIENCY ANEMIA TYPE: ICD-10-CM

## 2019-03-28 RX ORDER — FERROUS SULFATE 325(65) MG
TABLET ORAL
Qty: 90 TABLET | Refills: 3 | Status: ON HOLD | OUTPATIENT
Start: 2019-03-28 | End: 2019-10-07 | Stop reason: HOSPADM

## 2019-04-02 ENCOUNTER — PATIENT MESSAGE (OUTPATIENT)
Dept: INTERNAL MEDICINE | Facility: CLINIC | Age: 69
End: 2019-04-02

## 2019-04-02 DIAGNOSIS — R36.1 BLOOD IN SEMEN: Primary | ICD-10-CM

## 2019-04-22 ENCOUNTER — OFFICE VISIT (OUTPATIENT)
Dept: UROLOGY | Facility: CLINIC | Age: 69
End: 2019-04-22
Attending: UROLOGY
Payer: MEDICARE

## 2019-04-22 VITALS
DIASTOLIC BLOOD PRESSURE: 66 MMHG | BODY MASS INDEX: 37.51 KG/M2 | HEART RATE: 60 BPM | SYSTOLIC BLOOD PRESSURE: 128 MMHG | WEIGHT: 239 LBS | HEIGHT: 67 IN

## 2019-04-22 DIAGNOSIS — N40.2 PROSTATE NODULE: Primary | ICD-10-CM

## 2019-04-22 PROCEDURE — 1101F PR PT FALLS ASSESS DOC 0-1 FALLS W/OUT INJ PAST YR: ICD-10-PCS | Mod: CPTII,S$GLB,, | Performed by: UROLOGY

## 2019-04-22 PROCEDURE — 3074F SYST BP LT 130 MM HG: CPT | Mod: CPTII,S$GLB,, | Performed by: UROLOGY

## 2019-04-22 PROCEDURE — 99999 PR PBB SHADOW E&M-EST. PATIENT-LVL III: ICD-10-PCS | Mod: PBBFAC,,, | Performed by: UROLOGY

## 2019-04-22 PROCEDURE — 3078F DIAST BP <80 MM HG: CPT | Mod: CPTII,S$GLB,, | Performed by: UROLOGY

## 2019-04-22 PROCEDURE — 3074F PR MOST RECENT SYSTOLIC BLOOD PRESSURE < 130 MM HG: ICD-10-PCS | Mod: CPTII,S$GLB,, | Performed by: UROLOGY

## 2019-04-22 PROCEDURE — 99204 OFFICE O/P NEW MOD 45 MIN: CPT | Mod: S$GLB,,, | Performed by: UROLOGY

## 2019-04-22 PROCEDURE — 99999 PR PBB SHADOW E&M-EST. PATIENT-LVL III: CPT | Mod: PBBFAC,,, | Performed by: UROLOGY

## 2019-04-22 PROCEDURE — 99204 PR OFFICE/OUTPT VISIT, NEW, LEVL IV, 45-59 MIN: ICD-10-PCS | Mod: S$GLB,,, | Performed by: UROLOGY

## 2019-04-22 PROCEDURE — 1101F PT FALLS ASSESS-DOCD LE1/YR: CPT | Mod: CPTII,S$GLB,, | Performed by: UROLOGY

## 2019-04-22 PROCEDURE — 3078F PR MOST RECENT DIASTOLIC BLOOD PRESSURE < 80 MM HG: ICD-10-PCS | Mod: CPTII,S$GLB,, | Performed by: UROLOGY

## 2019-04-22 RX ORDER — SULFAMETHOXAZOLE AND TRIMETHOPRIM 800; 160 MG/1; MG/1
1 TABLET ORAL 2 TIMES DAILY
Qty: 4 TABLET | Refills: 0 | Status: SHIPPED | OUTPATIENT
Start: 2019-04-22 | End: 2019-04-24

## 2019-04-22 NOTE — PROGRESS NOTES
Subjective:       Patient ID: Walter Bull is a 68 y.o. male.    Chief Complaint: Advice Only (c/o blood in semen pt state it was not painful. 09/26/2018 psa level 1.6, pt voice no other urology issue.)    HPI    Walter Bull is a 68 y.o. male with PMHx of DM and HTN here for discussion and evaluation of hematospermia. His PSA 6 months ago was 1.6. Denies painful ejaculation. No other urological complaints at this time.     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       Past Surgical History:   Procedure Laterality Date    Angiogram, Aortic Arch, Coronary  11/16/2018    Performed [facility] at [facility] CATH LAB    Bypass graft study  11/16/2018    [facility] at [facility] CATH [facility]    CARDIAC DEFIBRILLATOR PLACEMENT      CARDIAC ELECTROPHYSIOLOGY STUDY N/A 11/19/2018    Performed by Byron Glasgow [facility] at [facility] EP LAB    CARDIAC ELECTROPHYSIOLOGY STUDY N/A 11/19/2018    Performed by [facility] Glasgow [facility] at [facility] EP LAB    COLON SURGERY  2006    COLONOSCOPY N/A 2/2/2017    [facility]    CORONARY ARTERY BYPASS GRAFT  2005    5 arteries    ESOPHAGOGASTRODUODENOSCOPY (EGD) N/A 3/21/2018    Performed by Fawad Cunningham MD at [facility] ENDO (4TH FLR)    EYE SURGERY Bilateral 2016    Laser surgery for glaucoma    INSERTION, DUAL ICD Left 11/19/2018    Per[facility] at [facility] EP LAB    Left heart cath Left 11/16/2018    Performed by [facility] at [facility] CATH LAB       Family History   Problem Relation Age of Onset    Diabetes Mother      Heart disease Mother     Hypertension Sister     Diabetes Sister     Heart disease Sister     Heart attack Brother     Colon cancer Neg Hx     Esophageal cancer Neg Hx     Stomach cancer Neg Hx        Social History     Socioeconomic History    Marital status:      Spouse name: Not on file    Number of children: Not on file    Years of education: Not on file    Highest education level: Not on file   Occupational History    Not on file   Social Needs    Financial resource strain: Not on file    Food insecurity:     Worry: Not on file     Inability: Not on file    Transportation needs:     Medical: Not on file     Non-medical: Not on file   Tobacco Use    Smoking status: Former Smoker     Packs/day: 3.00     Types: Cigarettes     Start date: 1963     Last attempt to quit: 1981     Years since quittin.3    Smokeless tobacco: Former User     Types: Snuff, Chew     Quit date:    Substance and Sexual Activity    Alcohol use: No    Drug use: No    Sexual activity: Yes     Comment: -with a significant other   Lifestyle    Physical activity:     Days per week: Not on file     Minutes per session: Not on file    Stress: Not on file   Relationships    Social connections:     Talks on phone: Not on file     Gets together: Not on file     Attends Alevism service: Not on file     Active member of club or organization: Not on file     Attends meetings of clubs or organizations: Not on file     Relationship status: Not on file   Other Topics Concern    Not on file   Social History Narrative    Not on file       Allergies:  Patient has no known allergies.    Medications:    Current Outpatient Medications:     albuterol (PROVENTIL/VENTOLIN HFA) 90 mcg/actuation inhaler, Inhale 2 puffs into the lungs every 6 (six) hours as needed for Wheezing., Disp: 1 Inhaler, Rfl: 5    amiodarone (PACERONE) 200 MG Tab, Take 1 tablet (200 mg total) by mouth once daily., Disp: 90 tablet,  Rfl: 3    carvedilol (COREG) 6.25 MG tablet, Take 1 tablet (6.25 mg total) by mouth 2 (two) times daily with meals., Disp: 180 tablet, Rfl: 3    clopidogrel (PLAVIX) 75 mg tablet, , Disp: , Rfl:     ezetimibe (ZETIA) 10 mg tablet, Take 1 tablet (10 mg total) by mouth once daily., Disp: 90 tablet, Rfl: 3    ferrous sulfate (FEOSOL) 325 mg (65 mg iron) Tab tablet, TAKE 1 TABLET BY MOUTH THREE TIMES DAILY, Disp: 90 tablet, Rfl: 3    finasteride (PROSCAR) 5 mg tablet, Take 1 tablet (5 mg total) by mouth once daily., Disp: 90 tablet, Rfl: 3    furosemide (LASIX) 20 MG tablet, Take 1 tablet (20 mg total) by mouth once daily., Disp: 90 tablet, Rfl: 3    insulin NPH (NOVOLIN N) 100 unit/mL injection, Inject 40 Units into the skin before breakfast., Disp: 3 vial, Rfl: 5    isosorbide mononitrate (IMDUR) 60 MG 24 hr tablet, Take 1 tablet (60 mg total) by mouth once daily., Disp: 90 tablet, Rfl: 3    JANUMET 50-1,000 mg per tablet, TAKE 1 TABLET BY MOUTH TWO TIMES DAILY WITH MEALS, Disp: 180 tablet, Rfl: 3    losartan (COZAAR) 50 MG tablet, Take 1 tablet (50 mg total) by mouth once daily., Disp: 90 tablet, Rfl: 3    nitroGLYCERIN (NITROSTAT) 0.4 MG SL tablet, DISSOLVE ONE TABLET UNDER THE TONGUE EVERY 5 MINUTES AS NEEDED FOR CHEST PAIN., Disp: 100 tablet, Rfl: 0    RANEXA 1,000 mg Tb12, Take 1 tablet (1,000 mg total) by mouth 2 (two) times daily., Disp: 60 tablet, Rfl: 11    rosuvastatin (CRESTOR) 40 MG Tab, Take 1 tablet (40 mg total) by mouth once daily., Disp: 90 tablet, Rfl: 3    spironolactone (ALDACTONE) 25 MG tablet, Take 1 tablet (25 mg total) by mouth once daily., Disp: 90 tablet, Rfl: 3    tamsulosin (FLOMAX) 0.4 mg Cap, Take 1 capsule (0.4 mg total) by mouth once daily., Disp: 30 capsule, Rfl: 3    ticagrelor (BRILINTA) 90 mg tablet, Take 1 tablet (90 mg total) by mouth 2 (two) times daily., Disp: 60 tablet, Rfl: 11    Review of Systems   Constitutional: Negative for activity change, appetite change,  chills, diaphoresis, fatigue, fever and unexpected weight change.   HENT: Negative for congestion, dental problem, hearing loss, mouth sores, postnasal drip, rhinorrhea, sinus pressure and trouble swallowing.    Eyes: Negative for pain, discharge and itching.   Respiratory: Negative for apnea, cough, choking, chest tightness, shortness of breath and wheezing.    Cardiovascular: Negative for chest pain, palpitations and leg swelling.   Gastrointestinal: Negative for abdominal distention, abdominal pain, anal bleeding, blood in stool, constipation, diarrhea, nausea, rectal pain and vomiting.   Endocrine: Negative for polydipsia and polyuria.   Genitourinary: Negative for decreased urine volume, difficulty urinating, discharge, dysuria, enuresis, flank pain, frequency, genital sores, hematuria, penile pain, penile swelling and scrotal swelling.        Positive for hematospermia.    Musculoskeletal: Negative for arthralgias, back pain and myalgias.   Skin: Negative for color change, rash and wound.   Neurological: Negative for dizziness, syncope, speech difficulty, light-headedness and headaches.   Hematological: Negative for adenopathy. Does not bruise/bleed easily.   Psychiatric/Behavioral: Negative for behavioral problems, confusion and sleep disturbance.       Objective:      Physical Exam   Constitutional: He appears well-developed.   HENT:   Head: Normocephalic.   Neck: Neck supple.   Cardiovascular: Normal rate.    Pulmonary/Chest: Effort normal.   Abdominal: Soft.   Neurological: He is alert.   Skin: Skin is warm.     Psychiatric: He has a normal mood and affect.       Labs:     Ref Range & Units 6mo ago   PSA, SCREEN 0.00 - 4.00 ng/mL 1.6      Assessment:       No diagnosis found.    Plan:       There are no diagnoses linked to this encounter.      Start abx above.    I, Luke Moya, am acting as a scribe on this patient encounter in the presence and under the supervision of Dr. Carver.    04/22/2019 7:00  AM    I, Dr. Carver, personally performed the services described in this documentation.   All medical record entries made by the scribe were at my direction and in my presence.   I have reviewed the chart and agree that the record is accurate and complete.   Guicho Carver MD.  7:02 AM 04/22/2019

## 2019-04-22 NOTE — LETTER
April 22, 2019      Belkys Kruger MD  [address] [address]           [facility] Urology 4th Floor  [address]  [facility] [address]  Phone: [phone]          Patient: Walter Bull   MR Number: [id number]   YOB: 1950   Date of Visit: 4/22/2019       Dear Dr. Belkys Kruger:    Thank you for referring Walter Bull to me for evaluation. Attached you will find relevant portions of my assessment and plan of care.    If you have questions, please do not hesitate to call me. I look forward to following Walter Bull along with you.    Sincerely,    Guicho Carver Jr., MD    Enclosure  CC:  No Recipients    If you would like to receive this communication electronically, please contact [email or web address] or [phone] to request more information on [employer] access.    For providers and/or their staff who would like to refer a patient to [facility], please contact us through our one-stop-shop provider referral line, [facility] , at [phone].    If you feel you have received this communication in error or would no longer like to receive these types of communications, please e-mail [email or web address]

## 2019-04-22 NOTE — PROGRESS NOTES
"[facility] 4th Floor  Urology  History & Physical    Patient Name: Walter Bull  MRN: [id number]  Attending Physician: Guicho Carver MD  Primary Care Provider: Belkys Kruger MD    Patient information was obtained from patient and ER records.     Subjective:     Chief Complaint/Reason: hematospermia    History of Present Illness:  Patient is a 68 y.o. male presents with hematospermia. He had a single episode of hematospermia 3 weeks ago. No recurrence since. After this episode he had dysuria x 1-2 days. He says he has occasional "brownish-red" urine, unsure of whether it's hematuria or not. Denies fever, chills, perineal pain. Takes Plavix for CAD.  Takes Flomax 0.4 mg and Proscar 5 mg. Occasional urgency, intermittency, weak stream. Nocturia x 5-6, patient on Lasix. Last PSA in 09/2018 was 1.6.    Current Outpatient Medications on File Prior to Visit   Medication Sig    albuterol (PROVENTIL/VENTOLIN HFA) 90 mcg/actuation inhaler Inhale 2 puffs into the lungs every 6 (six) hours as needed for Wheezing.    amiodarone (PACERONE) 200 MG Tab Take 1 tablet (200 mg total) by mouth once daily.    carvedilol (COREG) 6.25 MG tablet Take 1 tablet (6.25 mg total) by mouth 2 (two) times daily with meals.    clopidogrel (PLAVIX) 75 mg tablet     ezetimibe (ZETIA) 10 mg tablet Take 1 tablet (10 mg total) by mouth once daily.    ferrous sulfate (FEOSOL) 325 mg (65 mg iron) Tab tablet TAKE 1 TABLET BY MOUTH THREE TIMES DAILY    finasteride (PROSCAR) 5 mg tablet Take 1 tablet (5 mg total) by mouth once daily.    furosemide (LASIX) 20 MG tablet Take 1 tablet (20 mg total) by mouth once daily.    insulin NPH (NOVOLIN N) 100 unit/mL injection Inject 40 Units into the skin before breakfast.    isosorbide mononitrate (IMDUR) 60 MG 24 hr tablet Take 1 tablet (60 mg total) by mouth once daily.    JANUMET 50-1,000 mg per tablet TAKE 1 TABLET BY MOUTH TWO TIMES DAILY WITH MEALS    losartan (COZAAR) 50 MG tablet Take " 1 tablet (50 mg total) by mouth once daily.    nitroGLYCERIN (NITROSTAT) 0.4 MG SL tablet DISSOLVE ONE TABLET UNDER THE TONGUE EVERY 5 MINUTES AS NEEDED FOR CHEST PAIN.    RANEXA 1,000 mg Tb12 Take 1 tablet (1,000 mg total) by mouth 2 (two) times daily.    rosuvastatin (CRESTOR) 40 MG Tab Take 1 tablet (40 mg total) by mouth once daily.    spironolactone (ALDACTONE) 25 MG tablet Take 1 tablet (25 mg total) by mouth once daily.    tamsulosin (FLOMAX) 0.4 mg Cap Take 1 capsule (0.4 mg total) by mouth once daily.    ticagrelor (BRILINTA) 90 mg tablet Take 1 tablet (90 mg total) by mouth 2 (two) times daily.    [DISCONTINUED] esomeprazole (NEXIUM) 40 MG capsule Take 1 capsule (40 mg total) by mouth 2 (two) times daily before meals.    [DISCONTINUED] ranitidine (ZANTAC) 150 MG tablet      No current facility-administered medications on file prior to visit.        Review of patient's allergies indicates:  No Known Allergies    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     Past Surgical History:   Procedure Laterality Date    Angiogram, Aortic Arch, Coronary  11/16/2018    Performed by [facility] at [facility] CATH LAB    Bypass graft study  11/16/2018    Performed by [facility] at [facility] CATH LAB    CARDIAC DEFIBRILLATOR PLACEMENT      CARDIAC ELECTROPHYSIOLOGY STUDY N/A 11/19/2018    Performed by Byron Glasgow MD at [facility] EP LAB    CARDIAC ELECTROPHYSIOLOGY STUDY N/A 11/19/2018    Performed by Byron [facility] MD at [facility] EP LAB    COLON SURGERY  2006    COLONOSCOPY N/A 2/2/2017    Performed [facility]  [facility] Conway [facility]    CORONARY ARTERY BYPASS GRAFT      5 arteries    ESOPHAGOGASTRODUODENOSCOPY (EGD) N/A 3/21/2018    Per[facility]awad Cunningham [facility] (4[facility] F[facility])    EYE SURGERY Bilateral 2016    Laser surgery for glaucoma    INSERTION, DUAL ICD Left 2018    Per[facility] Byron Glasgow [facility] EP LAB    Left heart cath Left 2018    Performed by Ryan Schmidt [facility] at [facility] CATH LAB     Family History     Problem Relation (Age of Onset)    Diabetes Mother, Sister    Heart attack Brother    Heart disease Mother, Sister    Hypertension Sister        Tobacco Use    Smoking status: Former Smoker     Packs/day: 3.00     Types: Cigarettes     Start date: 1963     Last attempt to quit: 1981     Years since quittin.3    Smokeless tobacco: Former User     Types: Snuff, Chew     Quit date:    Substance and Sexual Activity    Alcohol use: No    Drug use: No    Sexual activity: Yes     Comment: -with a significant other     Review of Systems   Constitutional: Negative for chills and fever.   HENT: Negative for trouble swallowing.    Respiratory: Negative for shortness of breath.    Cardiovascular: Negative for chest pain.   Genitourinary: Positive for difficulty urinating, dysuria and urgency. Negative for decreased urine volume, flank pain, hematuria, scrotal swelling and testicular pain.   Skin: Negative for wound.   Neurological: Negative for dizziness and light-headedness.   Hematological: Does not bruise/bleed easily.     Objective:     Vital Signs (Most Recent):  Pulse: 60 (19 0654)  BP: 128/66 (19 0654) Vital Signs (24h Range):  [unfilled]     Weight: 108.4 kg (238 lb 15.7 oz)  Body mass index is 37.43 kg/m².    Physical Exam   Constitutional: He is oriented to person, place, and time. He appears well-developed and well-nourished.   HENT:   Head: Normocephalic and atraumatic.   Eyes: Conjunctivae are normal.   Neck: Normal range of  motion.   Cardiovascular: Normal rate and intact distal pulses.   Pulmonary/Chest: Effort normal. No respiratory distress.   Abdominal: Soft. He exhibits no distension. There is no tenderness.   Genitourinary:   Genitourinary Comments: Uncircumcised penis; testis, epididymis and cord structures normal bilaterally; 30g prostate, nontender, with nodule on left lateral lobe   Musculoskeletal: Normal range of motion.   Neurological: He is alert and oriented to person, place, and time.   Skin: Skin is warm and dry.   Psychiatric: He has a normal mood and affect. His behavior is normal. Judgment and thought content normal.       Significant Labs:  PSA 09/2018 - 1.6      Assessment/Plan:     There are no hospital problems to display for this patient.      Ean Louie MD  General Surgery  [facility] - Urology 4th Floor

## 2019-04-25 ENCOUNTER — TELEPHONE (OUTPATIENT)
Dept: INTERNAL MEDICINE | Facility: CLINIC | Age: 69
End: 2019-04-25

## 2019-04-25 ENCOUNTER — TELEPHONE (OUTPATIENT)
Dept: UROLOGY | Facility: CLINIC | Age: 69
End: 2019-04-25

## 2019-04-25 NOTE — TELEPHONE ENCOUNTER
----- Message from Robyn Kaplan sent at 2019  4:32 PM CDT -----  Contact: Eve/Girlfriend  Walter Bull  MRN: [id number]  : 1950  PCP: Belkys Kruger  Home Phone      [phone]  Work Phone      Not on file.  Mobile          [phone]    MESSAGE:   Requesting appointment to see Dr. Kruger tomorrow.  He is having problems with nausea and lightheadedness for about a week, and thinks maybe this is being caused from the medications that he is on. Would like to come in to see Dr. Kruger tomorrow. Please call to advise.    Phone: [phone]

## 2019-04-25 NOTE — TELEPHONE ENCOUNTER
Mr supriya was seen by dr scales and will need a prostate biopsy. He is currently on plavix and dr scales would like to hold for 7 days, if acceptable. Please advise

## 2019-04-26 ENCOUNTER — OFFICE VISIT (OUTPATIENT)
Dept: INTERNAL MEDICINE | Facility: CLINIC | Age: 69
End: 2019-04-26
Payer: MEDICARE

## 2019-04-26 VITALS
HEART RATE: 56 BPM | SYSTOLIC BLOOD PRESSURE: 116 MMHG | WEIGHT: 236.56 LBS | HEIGHT: 67 IN | DIASTOLIC BLOOD PRESSURE: 56 MMHG | BODY MASS INDEX: 37.13 KG/M2 | RESPIRATION RATE: 18 BRPM

## 2019-04-26 DIAGNOSIS — R00.1 BRADYCARDIA: Primary | ICD-10-CM

## 2019-04-26 DIAGNOSIS — R42 LIGHTHEADEDNESS: ICD-10-CM

## 2019-04-26 PROCEDURE — 1101F PT FALLS ASSESS-DOCD LE1/YR: CPT | Mod: CPTII,S$GLB,, | Performed by: INTERNAL MEDICINE

## 2019-04-26 PROCEDURE — 99999 PR PBB SHADOW E&M-EST. PATIENT-LVL III: CPT | Mod: PBBFAC,,, | Performed by: INTERNAL MEDICINE

## 2019-04-26 PROCEDURE — 3074F PR MOST RECENT SYSTOLIC BLOOD PRESSURE < 130 MM HG: ICD-10-PCS | Mod: CPTII,S$GLB,, | Performed by: INTERNAL MEDICINE

## 2019-04-26 PROCEDURE — 99214 PR OFFICE/OUTPT VISIT, EST, LEVL IV, 30-39 MIN: ICD-10-PCS | Mod: S$GLB,,, | Performed by: INTERNAL MEDICINE

## 2019-04-26 PROCEDURE — 3078F PR MOST RECENT DIASTOLIC BLOOD PRESSURE < 80 MM HG: ICD-10-PCS | Mod: CPTII,S$GLB,, | Performed by: INTERNAL MEDICINE

## 2019-04-26 PROCEDURE — 3074F SYST BP LT 130 MM HG: CPT | Mod: CPTII,S$GLB,, | Performed by: INTERNAL MEDICINE

## 2019-04-26 PROCEDURE — 99214 OFFICE O/P EST MOD 30 MIN: CPT | Mod: S$GLB,,, | Performed by: INTERNAL MEDICINE

## 2019-04-26 PROCEDURE — 1101F PR PT FALLS ASSESS DOC 0-1 FALLS W/OUT INJ PAST YR: ICD-10-PCS | Mod: CPTII,S$GLB,, | Performed by: INTERNAL MEDICINE

## 2019-04-26 PROCEDURE — 99999 PR PBB SHADOW E&M-EST. PATIENT-LVL III: ICD-10-PCS | Mod: PBBFAC,,, | Performed by: INTERNAL MEDICINE

## 2019-04-26 PROCEDURE — 3078F DIAST BP <80 MM HG: CPT | Mod: CPTII,S$GLB,, | Performed by: INTERNAL MEDICINE

## 2019-04-26 RX ORDER — CARVEDILOL 3.12 MG/1
3.12 TABLET ORAL 2 TIMES DAILY WITH MEALS
Qty: 60 TABLET | Refills: 11 | Status: SHIPPED | OUTPATIENT
Start: 2019-04-26 | End: 2019-07-22

## 2019-04-26 NOTE — PROGRESS NOTES
"Subjective:       Patient ID: Walter Bull is a 68 y.o. male.    Chief Complaint: Dizziness; Fatigue; and Nausea      HPI:  Patient is known to me and presents with nausea and dizziness. He report vomiting for 2 consecutive days last week. Vomiting has improved but still feeling nauseated. Wife reports 4 falls over last 1 week. No syncope. Dizziness is not a spinning sensation; reports feeling "lightheaded". Reports blood sugars 120-140s. Denies hypoglycemia. BP running 110-140/70s . HR has been in 60s. Has had theses issues for months and had improved with changing from metoprolol to coreg. ICD in place--last interrogatoin 2/2019. Sees cardiology again next week.     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       Family History   Problem Relation Age of Onset    Diabetes Mother     Heart disease Mother     Hypertension Sister     Diabetes Sister     Heart disease Sister     Heart attack Brother     Colon cancer Neg Hx     Esophageal cancer Neg Hx     Stomach cancer Neg Hx        Social History     Socioeconomic History    Marital status:      Spouse name: Not on file    Number of children: Not on file    Years of education: Not on file    Highest education level: Not on file   Occupational History    Not on file   Social Needs    Financial resource strain: Not on file    Food insecurity:     Worry: Not on file     Inability: Not on file    Transportation needs:     Medical: Not on file     Non-medical: Not on file   Tobacco Use    Smoking status: Former Smoker     Packs/day: 3.00     " Types: Cigarettes     Start date: 1963     Last attempt to quit: 1981     Years since quittin.3    Smokeless tobacco: Former User     Types: Snuff, Chew     Quit date:    Substance and Sexual Activity    Alcohol use: No    Drug use: No    Sexual activity: Yes     Comment: -with a significant other   Lifestyle    Physical activity:     Days per week: Not on file     Minutes per session: Not on file    Stress: Not on file   Relationships    Social connections:     Talks on phone: Not on file     Gets together: Not on file     Attends [employer] service: Not on file     Active member of club or organization: Not on file     Attends meetings of clubs or organizations: Not on file     Relationship status: Not on file   Other Topics Concern    Not on file   Social History Narrative    Not on file       Review of Systems   Constitutional: Positive for fatigue. Negative for activity change, fever and unexpected weight change.   HENT: Negative for congestion, ear pain, hearing loss, rhinorrhea and sore throat.    Eyes: Negative for pain, redness and visual disturbance.   Respiratory: Negative for cough, shortness of breath and wheezing.    Cardiovascular: Negative for chest pain, palpitations and leg swelling.   Gastrointestinal: Positive for nausea. Negative for abdominal pain, blood in stool, constipation, diarrhea and vomiting.   Genitourinary: Negative for decreased urine volume, dysuria, frequency and urgency.   Musculoskeletal: Negative for back pain, joint swelling and neck pain.   Skin: Negative for color change, rash and wound.   Neurological: Positive for light-headedness. Negative for dizziness, tremors, weakness and headaches.         Objective:      Physical Exam   Constitutional: He is oriented to person, place, and time. He appears well-developed and well-nourished. No distress.   HENT:   Head: Normocephalic and atraumatic.   Right Ear: External ear normal.   Left Ear:  External ear normal.   Eyes: Pupils are equal, round, and reactive to light. Conjunctivae and EOM are normal. Right eye exhibits no discharge. Left eye exhibits no discharge.   Neck: Neck supple. No tracheal deviation present.   Cardiovascular: Regular rhythm. Exam reveals no gallop and no friction rub.   No murmur heard.  Rate ~60 bpm   Pulmonary/Chest: Effort normal and breath sounds normal. No respiratory distress. He has no wheezes.   Abdominal: Soft. Bowel sounds are normal. He exhibits no distension. There is no tenderness.   Neurological: He is alert and oriented to person, place, and time. No cranial nerve deficit.   Skin: Skin is warm and dry.   Psychiatric: He has a normal mood and affect. His behavior is normal.   Vitals reviewed.      Assessment:       1. Bradycardia    2. Lightheadedness        Plan:       Walter was seen today for dizziness, fatigue and nausea.    Diagnoses and all orders for this visit:    Bradycardia  -     carvedilol (COREG) 3.125 MG tablet; Take 1 tablet (3.125 mg total) by mouth 2 (two) times daily with meals.    Lightheadedness      DECREASED dose of coreg from 6.25 to 3/125mg BID today  Maybe too much BB causing pre-syncope sx  I agree we want to keep his HR low given his cardiac history but sounds like he is not tolerating  Will see cardiology next week for follow up  He denies chest pain but would have low threshold for further cardiac eval given his history

## 2019-04-30 ENCOUNTER — TELEPHONE (OUTPATIENT)
Dept: UROLOGY | Facility: CLINIC | Age: 69
End: 2019-04-30

## 2019-04-30 NOTE — TELEPHONE ENCOUNTER
----- Message from Guicho Carver Jr., MD sent at 4/26/2019  5:03 PM CDT -----  Yes, that would be ok    Thank you  ----- Message -----  From: Ean Louie MD  Sent: 4/25/2019   7:14 AM  To: MD Dr. Wen Luna Jr.,    This is a patient you wanted to schedule for a TRUS biopsy. It sounds like he's not cleared to go off antiplatelet therapy completely, but he could switch to ASA 81 mg for a week prior to the procedure. Would that work?    Thanks,  [place]    ----- Message -----  From: Agustin Capone MD  Sent: 4/23/2019  10:27 PM  To: Ean Louie MD    With his coronary anatomy ( prior CABG with some occluded vessels and SVG) there would be some risk with him off all antiplatelet agents.He has not had a coronary stent per my records so that might lessen the risk some.  ----- Message -----  From: Ean Louie MD  Sent: 4/23/2019   1:33 PM  To: Agustin Capone MD    We would ideally like for him to be off all antiplatelet drugs, including aspirin, for a week prior to his prostate biopsy. Is that not possible for him?    ----- Message -----  From: Agustin Capone MD  Sent: 4/22/2019   6:17 PM  To: Ean Louie MD    The patient has both Plavix and Brilinta listed on hid med sheet and should not be on both. If he needs to stop one or both, would add low dose aspirin and resume the Brilinta when safe  ----- Message -----  From: Ean Louie MD  Sent: 4/22/2019   7:40 AM  To: MD Dr. Liborio Byrne,    Mr. Bull is currently being scheduled for a transrectal ultrasound with prostate biopsy with Dr. Guicho Carver. We would like to hold his Plavix for seven days prior to the procedure. Can we get clearance for this?    Thanks,  Ean Louie, PGY1

## 2019-05-01 ENCOUNTER — OFFICE VISIT (OUTPATIENT)
Dept: CARDIOLOGY | Facility: CLINIC | Age: 69
End: 2019-05-01
Payer: MEDICARE

## 2019-05-01 VITALS
BODY MASS INDEX: 37.06 KG/M2 | WEIGHT: 236.13 LBS | OXYGEN SATURATION: 98 % | SYSTOLIC BLOOD PRESSURE: 120 MMHG | HEIGHT: 67 IN | HEART RATE: 60 BPM | DIASTOLIC BLOOD PRESSURE: 80 MMHG

## 2019-05-01 DIAGNOSIS — Z79.899 LONG TERM CURRENT USE OF AMIODARONE: ICD-10-CM

## 2019-05-01 DIAGNOSIS — I25.5 ISCHEMIC CARDIOMYOPATHY: Primary | ICD-10-CM

## 2019-05-01 DIAGNOSIS — Z95.810 ICD (IMPLANTABLE CARDIOVERTER-DEFIBRILLATOR), DUAL, IN SITU: ICD-10-CM

## 2019-05-01 DIAGNOSIS — E66.9 DIABETES MELLITUS TYPE 2 IN OBESE: ICD-10-CM

## 2019-05-01 DIAGNOSIS — E78.2 MIXED HYPERLIPIDEMIA: ICD-10-CM

## 2019-05-01 DIAGNOSIS — I47.20 VENTRICULAR TACHYCARDIA: ICD-10-CM

## 2019-05-01 DIAGNOSIS — R29.818 SUSPECTED SLEEP APNEA: ICD-10-CM

## 2019-05-01 DIAGNOSIS — I25.10 CORONARY ARTERY DISEASE INVOLVING NATIVE CORONARY ARTERY OF NATIVE HEART WITHOUT ANGINA PECTORIS: ICD-10-CM

## 2019-05-01 DIAGNOSIS — E66.01 SEVERE OBESITY (BMI 35.0-39.9) WITH COMORBIDITY: ICD-10-CM

## 2019-05-01 DIAGNOSIS — Z87.891 HISTORY OF TOBACCO USE: ICD-10-CM

## 2019-05-01 DIAGNOSIS — I10 BENIGN ESSENTIAL HTN: ICD-10-CM

## 2019-05-01 DIAGNOSIS — E11.69 DIABETES MELLITUS TYPE 2 IN OBESE: ICD-10-CM

## 2019-05-01 PROCEDURE — 3044F HG A1C LEVEL LT 7.0%: CPT | Mod: CPTII,S$GLB,, | Performed by: NURSE PRACTITIONER

## 2019-05-01 PROCEDURE — 99499 UNLISTED E&M SERVICE: CPT | Mod: S$GLB,,, | Performed by: NURSE PRACTITIONER

## 2019-05-01 PROCEDURE — 99214 PR OFFICE/OUTPT VISIT, EST, LEVL IV, 30-39 MIN: ICD-10-PCS | Mod: S$GLB,,, | Performed by: NURSE PRACTITIONER

## 2019-05-01 PROCEDURE — 3044F PR MOST RECENT HEMOGLOBIN A1C LEVEL <7.0%: ICD-10-PCS | Mod: CPTII,S$GLB,, | Performed by: NURSE PRACTITIONER

## 2019-05-01 PROCEDURE — 3074F PR MOST RECENT SYSTOLIC BLOOD PRESSURE < 130 MM HG: ICD-10-PCS | Mod: CPTII,S$GLB,, | Performed by: NURSE PRACTITIONER

## 2019-05-01 PROCEDURE — 99499 RISK ADDL DX/OHS AUDIT: ICD-10-PCS | Mod: S$GLB,,, | Performed by: NURSE PRACTITIONER

## 2019-05-01 PROCEDURE — 3079F PR MOST RECENT DIASTOLIC BLOOD PRESSURE 80-89 MM HG: ICD-10-PCS | Mod: CPTII,S$GLB,, | Performed by: NURSE PRACTITIONER

## 2019-05-01 PROCEDURE — 3079F DIAST BP 80-89 MM HG: CPT | Mod: CPTII,S$GLB,, | Performed by: NURSE PRACTITIONER

## 2019-05-01 PROCEDURE — 99999 PR PBB SHADOW E&M-EST. PATIENT-LVL IV: ICD-10-PCS | Mod: PBBFAC,,, | Performed by: NURSE PRACTITIONER

## 2019-05-01 PROCEDURE — 3074F SYST BP LT 130 MM HG: CPT | Mod: CPTII,S$GLB,, | Performed by: NURSE PRACTITIONER

## 2019-05-01 PROCEDURE — 99999 PR PBB SHADOW E&M-EST. PATIENT-LVL IV: CPT | Mod: PBBFAC,,, | Performed by: NURSE PRACTITIONER

## 2019-05-01 PROCEDURE — 99214 OFFICE O/P EST MOD 30 MIN: CPT | Mod: S$GLB,,, | Performed by: NURSE PRACTITIONER

## 2019-05-01 PROCEDURE — 1101F PR PT FALLS ASSESS DOC 0-1 FALLS W/OUT INJ PAST YR: ICD-10-PCS | Mod: CPTII,S$GLB,, | Performed by: NURSE PRACTITIONER

## 2019-05-01 PROCEDURE — 1101F PT FALLS ASSESS-DOCD LE1/YR: CPT | Mod: CPTII,S$GLB,, | Performed by: NURSE PRACTITIONER

## 2019-05-01 RX ORDER — FUROSEMIDE 20 MG/1
20 TABLET ORAL DAILY
Qty: 140 TABLET | Refills: 3 | Status: SHIPPED | OUTPATIENT
Start: 2019-05-01 | End: 2019-10-15 | Stop reason: SDUPTHER

## 2019-05-01 NOTE — PROGRESS NOTES
Subjective:   Patient ID:  Walter Bull is a 68 y.o. male who presents for follow up of Coronary Artery Disease

## 2019-05-01 NOTE — PATIENT INSTRUCTIONS
Try taking the vitamin D3 2,000 units daily for the low vitamin D.     Stop the plavix today and do not re-start the plavix (clopidogrel) after the procedure.    Stop the brilinta 5 days before your biopsy.  Take the last dose of brilinta in the evening of 5/7/19.  Re-start the brilinta the morning after the procedure on 5/14/19.

## 2019-05-01 NOTE — PROGRESS NOTES
"Mr. Bull is a patient of Dr. Capone and was last seen in [facility] 1/30/2019.      Subjective:   Patient ID:  Walter Bull is a 68 y.o. male who presents for follow-up of Coronary Artery Disease    Problems:  Ischemic Cardiomyopathy  HFrEF, EF 45%  ICD (Dr. KARY Glasgow)  CAD (CABGx5 in 2005, PCI 2018)  -anginal equivalent is GREENWOOD and fatigue with exertion  LBBB (rate related)  HTN  HLD  DM type I (dx around age 28)  MMVT (on amiodarone)     HPI  Mr. Bull is in clinic today for routine follow up.  Patient denies chest pain with exertion or at rest, palpitations, SOB, GREENWOOD, dizziness, syncope, edema, orthopnea, PND, or claudication.  Reports routine exercise, 2-3 times a week rides stationary bike for about 30 minutes or more.  He is treated with DAPT and high intensity statin.  He has intermittent episodes of nausea, vomiting, and lightheadedness.  His medications were adjusted and it went away for about 3 weeks and returned but "not as bad as before."    Revised Cardiac Risk Index for Pre-Operative Risk Yes +1 No 0   1. High-risk surgery: Intraperitoneal; intrathoracic; suprainguinal vascular    0   2. History of ischemic heart disease:  History of myocardial infarction (MI); history of positive exercise test; current chest paint considered due to myocardial ischemia; use of nitrate therapy or ECG with pathological Q waves   1    3. History of congestive heart failure:  Pulmonary edema, bilateral rales or S3 gallop; paroxysmal nocturnal dyspnea; chest x-ray (CXR) showing pulmonary vascular redistribution   1    4. History of cerebrovascular disease: Prior transient ischemic attack (TIA) or stroke    0   5.  Pre-operative treatment with insulin 1    6.  Creatinine >2  0 (CRT 1.1)     Total Risk for alberto-operative major cardiac event, 15%      Review of Systems   Constitution: Negative for decreased appetite, diaphoresis, malaise/fatigue, weight gain and weight loss.   Eyes: Negative for visual disturbance. "   Cardiovascular: Negative for chest pain, claudication, dyspnea on exertion, irregular heartbeat, leg swelling, near-syncope, orthopnea, palpitations, paroxysmal nocturnal dyspnea and syncope.        Denies chest pressure   Respiratory: Negative for cough, hemoptysis, shortness of breath, sleep disturbances due to breathing and snoring.    Endocrine: Negative for cold intolerance and heat intolerance.   Hematologic/Lymphatic: Negative for bleeding problem. Does not bruise/bleed easily.   Musculoskeletal: Negative for myalgias.   Gastrointestinal: Positive for nausea and vomiting. Negative for bloating, abdominal pain, anorexia, change in bowel habit, constipation and diarrhea.   Neurological: Positive for light-headedness. Negative for difficulty with concentration, disturbances in coordination, excessive daytime sleepiness, dizziness, headaches, loss of balance, numbness and weakness.   Psychiatric/Behavioral: The patient does not have insomnia.      Allergies and current medications updated and reviewed:  Review of patient's allergies indicates:  No Known Allergies  Current Outpatient Medications   Medication Sig    albuterol (PROVENTIL/VENTOLIN HFA) 90 mcg/actuation inhaler Inhale 2 puffs into the lungs every 6 (six) hours as needed for Wheezing.    amiodarone (PACERONE) 200 MG Tab Take 1 tablet (200 mg total) by mouth once daily.    carvedilol (COREG) 3.125 MG tablet Take 1 tablet (3.125 mg total) by mouth 2 (two) times daily with meals.    clopidogrel (PLAVIX) 75 mg tablet     ezetimibe (ZETIA) 10 mg tablet Take 1 tablet (10 mg total) by mouth once daily.    ferrous sulfate (FEOSOL) 325 mg (65 mg iron) Tab tablet TAKE 1 TABLET BY MOUTH THREE TIMES DAILY    finasteride (PROSCAR) 5 mg tablet Take 1 tablet (5 mg total) by mouth once daily.    furosemide (LASIX) 20 MG tablet Take 1 tablet (20 mg total) by mouth once daily.    insulin NPH (NOVOLIN N) 100 unit/mL injection Inject 40 Units into the skin  "before breakfast.    isosorbide mononitrate (IMDUR) 60 MG 24 hr tablet Take 1 tablet (60 mg total) by mouth once daily.    JANUMET 50-1,000 mg per tablet TAKE 1 TABLET BY MOUTH TWO TIMES DAILY WITH MEALS    losartan (COZAAR) 50 MG tablet Take 1 tablet (50 mg total) by mouth once daily.    nitroGLYCERIN (NITROSTAT) 0.4 MG SL tablet DISSOLVE ONE TABLET UNDER THE TONGUE EVERY 5 MINUTES AS NEEDED FOR CHEST PAIN.    RANEXA 1,000 mg Tb12 Take 1 tablet (1,000 mg total) by mouth 2 (two) times daily.    rosuvastatin (CRESTOR) 40 MG Tab Take 1 tablet (40 mg total) by mouth once daily.    spironolactone (ALDACTONE) 25 MG tablet Take 1 tablet (25 mg total) by mouth once daily.    tamsulosin (FLOMAX) 0.4 mg Cap Take 1 capsule (0.4 mg total) by mouth once daily.    ticagrelor (BRILINTA) 90 mg tablet Take 1 tablet (90 mg total) by mouth 2 (two) times daily.     No current facility-administered medications for this visit.        Objective:     Right Arm BP - Sittin/80 (19 0759)  Left Arm BP - Sittin/80 (19 0759)    /80   Pulse 60   Ht 5' 7" (1.702 m)   Wt 107.1 kg (236 lb 1.8 oz)   SpO2 98%   BMI 36.98 kg/m²     Physical Exam   Constitutional: He is oriented to person, place, and time. Vital signs are normal. He appears well-developed and well-nourished. He is active. No distress.   HENT:   Head: Normocephalic and atraumatic.   Eyes: Conjunctivae and lids are normal. No scleral icterus.   Neck: Neck supple. Normal carotid pulses, no hepatojugular reflux and no JVD present. Carotid bruit is not present.   Cardiovascular: Normal rate, regular rhythm, S1 normal, S2 normal and intact distal pulses. PMI is not displaced. Exam reveals no gallop and no friction rub.   No murmur heard.  Pulses:       Carotid pulses are 2+ on the right side, and 2+ on the left side.       Radial pulses are 2+ on the right side, and 2+ on the left side.        Dorsalis pedis pulses are 2+ on the right side, and 2+ " on the left side.        Posterior tibial pulses are 1+ on the right side, and 1+ on the left side.   Pulmonary/Chest: Effort normal and breath sounds normal. No respiratory distress. He has no decreased breath sounds. He has no wheezes. He has no rhonchi. He has no rales. He exhibits no tenderness.   Abdominal: Soft. Normal appearance and bowel sounds are normal. He exhibits no distension, no fluid wave, no abdominal bruit, no ascites and no pulsatile midline mass. There is no hepatosplenomegaly. There is no tenderness.   Musculoskeletal: He exhibits no edema.   Neurological: He is alert and oriented to person, place, and time. Gait normal.   Skin: Skin is warm, dry and intact. No rash noted. He is not diaphoretic. Nails show no clubbing.   Psychiatric: He has a normal mood and affect. His speech is normal and behavior is normal. Judgment and thought content normal. Cognition and memory are normal.   Nursing note and vitals reviewed.      Chemistry        Component Value Date/Time     02/13/2019 0940    K 4.7 02/13/2019 0940     02/13/2019 0940    CO2 28 02/13/2019 0940    BUN 9 02/13/2019 0940    CREATININE 1.1 02/13/2019 0940     (H) 02/13/2019 0940        Component Value Date/Time    CALCIUM 9.0 02/13/2019 0940    ALKPHOS 53 (L) 02/13/2019 0940    AST 16 02/13/2019 0940    ALT 23 02/13/2019 0940    BILITOT 0.6 02/13/2019 0940    ESTGFRAFRICA >60 02/13/2019 0940    EGFRNONAA >60 02/13/2019 0940        Lab Results   Component Value Date    HGBA1C 6.9 (H) 11/16/2018     Recent Labs   Lab 10/12/18  1835 11/16/18  1033  11/21/18  0856 12/22/18  1342 12/22/18  1343   WHITE BLOOD CELL COUNT 8.84 10.26   < >  --   --  8.28   HEMOGLOBIN 13.1 L 13.1 L   < >  --   --  12.1 L   HEMATOCRIT 40.9 41.4   < >  --   --  38.2 L   MCV 89 88   < >  --   --  90   PLATELETS 190 224   < >  --   --  202   BNP 82 101 H  --   --  89  --    TSH  --   --   --   --  2.842  --    CHOLESTEROL  --   --    < > 127  --   --     HDL  --   --    < > 29 L  --   --    LDL CHOLESTEROL  --   --    < > 75.6  --   --    TRIGLYCERIDES  --   --    < > 112  --   --    HDL/CHOLESTEROL RATIO  --   --    < > 22.8  --   --     < > = values in this interval not displayed.       Recent Labs   Lab 09/13/18  0601 11/16/18  1033 12/22/18  1342   INR 1.1 1.0 1.0        Test(s) Reviewed  I have reviewed the following in detail:  [] Stress test   [] Angiography   [x] Echocardiogram   [x] Labs   [] Other:         Assessment/Plan:   1. Ischemic cardiomyopathy  Euvolemic on exam.  Taking ARB and BB.  Continue PRN diuretic.    - furosemide (LASIX) 20 MG tablet; Take 1 tablet (20 mg total) by mouth once daily. Take an extra tablet daily for wt gain more than 3 pounds/day or 5 pounds/week  Dispense: 140 tablet; Refill: 3  2. ICD (implantable cardioverter-defibrillator), dual, in situ      3. Coronary artery disease involving native coronary artery of native heart without angina pectoris  Asymptomatic. Taking high intensity statin and ASA. No changes.      4. Mixed hyperlipidemia  LDL not at goal <70. Continue rosuvastatin 40 mg and zetia 10 mg.  Encouraged and given copy of mediterranean diet.       5. Ventricular tachycardia  Taking amiodarone and has ICD. No changes.    6. Benign essential HTN  BP at goal <130/80. Continue current regimen.    7. Diabetes mellitus type 2 in obese  A1C at goal <7. F/U with PCP as planned    8. History of tobacco use      9. Severe obesity (BMI 35.0-39.9) with comorbidity  BMI 37 Encouraged increased CV exercise to 30 minutes a day for 5 days a week.     - Ambulatory referral to Sleep Disorders    10. Suspected sleep apnea  Wakes tired, snores, obese, wakes gasping/choking, nocturia, and daytime napping.  Refer to sleep clinic.     - Ambulatory referral to Sleep Disorders    11. Long term amiodarone use.  Instructed to get TSH and LFTs next month, schedule PFTs, and annual eye exams.      Mr. Bull has no active cardiac condition  (ACS/USA, decompenstated CHF, significant arrhythmias or severe valvular disease) and can easily achieve 4 METS.  He is at high risk, 15%,  for a major cardiac event during the perioperative period but his risk factors can not be further reduced.  Pt does not require further cardiac evaluation prior to undergoing prostate biopsy.  Pt should remain on beta-blockers throughout the entire alberto-procedure time period.  Brilinta therapy should be held 5 days prior to procedure but should be restarted the day after biopsy.  The remaining cardiac meds can be held as needed but should also be restarted after the procedure. These recommendations follow the most current Guideline on Perioperative Cardiovascular Evaluation and Management of Patients Undergoing Noncardiac Surgery released by the ACC/AHA. (JACC 2014.07.944).      Follow up in about 6 months (around 11/1/2019).

## 2019-05-02 ENCOUNTER — OFFICE VISIT (OUTPATIENT)
Dept: SLEEP MEDICINE | Facility: CLINIC | Age: 69
End: 2019-05-02
Payer: MEDICARE

## 2019-05-02 ENCOUNTER — TELEPHONE (OUTPATIENT)
Dept: SLEEP MEDICINE | Facility: CLINIC | Age: 69
End: 2019-05-02

## 2019-05-02 VITALS
BODY MASS INDEX: 37.34 KG/M2 | HEIGHT: 67 IN | SYSTOLIC BLOOD PRESSURE: 133 MMHG | HEART RATE: 67 BPM | DIASTOLIC BLOOD PRESSURE: 66 MMHG | WEIGHT: 237.88 LBS

## 2019-05-02 DIAGNOSIS — G47.30 SLEEP APNEA, UNSPECIFIED TYPE: Primary | ICD-10-CM

## 2019-05-02 DIAGNOSIS — E66.01 SEVERE OBESITY (BMI 35.0-39.9) WITH COMORBIDITY: ICD-10-CM

## 2019-05-02 PROCEDURE — 99999 PR PBB SHADOW E&M-EST. PATIENT-LVL IV: ICD-10-PCS | Mod: PBBFAC,,, | Performed by: NURSE PRACTITIONER

## 2019-05-02 PROCEDURE — 3075F PR MOST RECENT SYSTOLIC BLOOD PRESS GE 130-139MM HG: ICD-10-PCS | Mod: CPTII,S$GLB,, | Performed by: NURSE PRACTITIONER

## 2019-05-02 PROCEDURE — 3075F SYST BP GE 130 - 139MM HG: CPT | Mod: CPTII,S$GLB,, | Performed by: NURSE PRACTITIONER

## 2019-05-02 PROCEDURE — 1101F PR PT FALLS ASSESS DOC 0-1 FALLS W/OUT INJ PAST YR: ICD-10-PCS | Mod: CPTII,S$GLB,, | Performed by: NURSE PRACTITIONER

## 2019-05-02 PROCEDURE — 1101F PT FALLS ASSESS-DOCD LE1/YR: CPT | Mod: CPTII,S$GLB,, | Performed by: NURSE PRACTITIONER

## 2019-05-02 PROCEDURE — 99203 OFFICE O/P NEW LOW 30 MIN: CPT | Mod: S$GLB,,, | Performed by: NURSE PRACTITIONER

## 2019-05-02 PROCEDURE — 99203 PR OFFICE/OUTPT VISIT, NEW, LEVL III, 30-44 MIN: ICD-10-PCS | Mod: S$GLB,,, | Performed by: NURSE PRACTITIONER

## 2019-05-02 PROCEDURE — 99499 RISK ADDL DX/OHS AUDIT: ICD-10-PCS | Mod: S$GLB,,, | Performed by: NURSE PRACTITIONER

## 2019-05-02 PROCEDURE — 99999 PR PBB SHADOW E&M-EST. PATIENT-LVL IV: CPT | Mod: PBBFAC,,, | Performed by: NURSE PRACTITIONER

## 2019-05-02 PROCEDURE — 99499 UNLISTED E&M SERVICE: CPT | Mod: S$GLB,,, | Performed by: NURSE PRACTITIONER

## 2019-05-02 PROCEDURE — 3078F DIAST BP <80 MM HG: CPT | Mod: CPTII,S$GLB,, | Performed by: NURSE PRACTITIONER

## 2019-05-02 PROCEDURE — 3078F PR MOST RECENT DIASTOLIC BLOOD PRESSURE < 80 MM HG: ICD-10-PCS | Mod: CPTII,S$GLB,, | Performed by: NURSE PRACTITIONER

## 2019-05-02 NOTE — LETTER
May 2, 2019      Rosa Harrison NP  [address]  [facility] [address]           [facility] [place] [address]  [address]  [facility] [address]  Phone: [phone]          Patient: Walter Bull   MR Number: [id number]   YOB: 1950   Date of Visit: 5/2/2019       Dear Rosa Harrison:    Thank you for referring Walter Bull to me for evaluation. Attached you will find relevant portions of my assessment and plan of care.    If you have questions, please do not hesitate to call me. I look forward to following Walter Bull along with you.    Sincerely,    Alexandra Cast NP    Enclosure  CC:  No Recipients    If you would like to receive this communication electronically, please contact [email or web address] or [phone] to request more information on vozero Link access.    For providers and/or their staff who would like to refer a patient to [facility], please contact us through our one-stop-shop provider referral line, [facility], at [phone].    If you feel you have received this communication in error or would no longer like to receive these types of communications, please e-mail [email or web address]

## 2019-05-02 NOTE — PATIENT INSTRUCTIONS
Fernie or Zeenat will contact you to schedule your sleep study. Their number is [phone] (ext 2). The [facility] is located on 7th floor of the [facility].    If you have not been contacted regarding scheduling your sleep test after one week from today, please notify me via [facility] or by phone by calling [phone] (ext 1).     We will call you when the sleep study results are ready - if you have not heard from us by 2 weeks from the date of the study, please call [phone] (ext 1).    You are advised to abstain from driving should you feel sleepy or drowsy.

## 2019-05-02 NOTE — PROGRESS NOTES
Walter Bull  was seen as a new patient at the request of Rosa Harrison NP for the evaluation of obstructive sleep apnea.    CHIEF COMPLAINT:  Suspected ANUARDHA     05/02/2019 ANDRZEJ Cast NP: Initial HISTORY OF PRESENT ILLNESS: Walter Bull is a 68 y.o. male is here for sleep evaluation.       Patient complaints include: snoring, air gasping, unrefreshing sleep, and daytime sleepiness.   Denies difficulty falling asleep but sleep is fragmented due to either abnormal breathing and nocturia up to 6 x  Prefers to take his spironolactone dose at bedtime since he works outside, he prefers not to take it when he does not have bathroom readily available and not during sun-exposure  Denies symptoms of restless legs or kicking during sleep.    Occupation: lawn maintenance business     [facility] Sleepiness Scale score during initial sleep evaluation was 17.    SLEEP ROUTINE:    Bed partner:  GirlfrEve fields   Time to bed: 7 pm  Sleep onset latency: 30 minutes        Disruptions or awakenings: multiple     Wakeup time:  5:30 am  Perceived sleep quality:  Poor to fair     PAST MEDICAL HISTORY:    Active Ambulatory Problems     Diagnosis Date Noted    Coronary artery disease involving native coronary artery of native heart without angina pectoris 01/11/2017    Diabetes mellitus type 2 in obese 01/11/2017    Diabetic polyneuropathy associated with type 2 diabetes mellitus 01/11/2017    HLD (hyperlipidemia) 01/11/2017    History of tobacco use 01/11/2017    GERD (gastroesophageal reflux disease) 01/11/2017    Benign essential HTN 07/19/2017    Benign prostatic hyperplasia without lower urinary tract symptoms 07/19/2017    Iron deficiency anemia 02/21/2018    NSTEMI (non-ST elevated myocardial infarction) 09/13/2018    Severe obesity (BMI 35.0-39.9) with comorbidity 09/25/2018    Ischemic cardiomyopathy 09/26/2018    Chest pain 11/16/2018    Ventricular tachycardia 11/16/2018    Wide-complex tachycardia 11/16/2018     Bradycardia 11/17/2018    ICD (implantable cardioverter-defibrillator), dual, in situ 11/28/2018    Thrombosed vein secondary to IV placement 11/28/2018     Resolved Ambulatory Problems     Diagnosis Date Noted    S/P CABG x 5 01/11/2017    Colon cancer screening 02/02/2017    Chest pain 09/13/2018     Past Medical History:   Diagnosis Date    Anemia     Anticoagulant long-term use     CHF (congestive heart failure)     Colon cancer screening 2/2/2017    Coronary artery disease     Diabetes mellitus type I     Disorder of kidney and ureter     Encounter for blood transfusion     Glaucoma     Heart attack     Heart disease     Hypertension     Iron deficiency     Kidney failure     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                PAST SURGICAL HISTORY:    Past Surgical History:   Procedure Laterality Date    Angiogram, Aortic Arch, Coronary  11/16/2018    [facility] CATH LAB    Bypass graft study  11/16/2018    [facility] CATH [facility]    CARDIAC DEFIBRILLATOR PLACEMENT      CARDIAC ELECTROPHYSIOLOGY STUDY N/A 11/19/2018    Performed by [facility] Glasgow [facility] at [facility] EP LAB    CARDIAC ELECTROPHYSIOLOGY STUDY N/A 11/19/2018    [facility] by [facility] at [facility] EP LAB    COLON SURGERY  2006    COLONOSCOPY N/A 2/2/2017    [facility]    CORONARY ARTERY BYPASS GRAFT  2005    5 arteries    ESOPHAGOGASTRODUODENOSCOPY (EGD) N/A 3/21/2018    Performed by Fawad Cunningham [facility] at [facility] ENDO (4TH FLR)    EYE SURGERY Bilateral 2016    Laser surgery for glaucoma    INSERTION, DUAL ICD Left 11/19/2018    Performed by [facility] Glasgow [facility] at [facility] EP LAB    Left heart cath Left 11/16/2018    Performed by [facility] at [facility] CATH LAB         FAMILY HISTORY:                Family History   Problem Relation Age of Onset    Diabetes Mother     Heart disease Mother     Hypertension Sister     Diabetes Sister      Heart disease Sister     Heart attack Brother     Colon cancer Neg Hx     Esophageal cancer Neg Hx     Stomach cancer Neg Hx        SOCIAL HISTORY:          Tobacco:   Social History     Tobacco Use   Smoking Status Former Smoker    Packs/day: 3.00    Types: Cigarettes    Start date: 1963    Last attempt to quit: 1981    Years since quittin.3   Smokeless Tobacco Former User    Types: Snuff, Chew    Quit date:        Alcohol use:    Social History     Substance and Sexual Activity   Alcohol Use No                 ALLERGIES:  Review of patient's allergies indicates:  No Known Allergies    CURRENT MEDICATIONS:    Current Outpatient Medications   Medication Sig Dispense Refill    albuterol (PROVENTIL/VENTOLIN HFA) 90 mcg/actuation inhaler Inhale 2 puffs into the lungs every 6 (six) hours as needed for Wheezing. 1 Inhaler 5    amiodarone (PACERONE) 200 MG Tab Take 1 tablet (200 mg total) by mouth once daily. 90 tablet 3    carvedilol (COREG) 3.125 MG tablet Take 1 tablet (3.125 mg total) by mouth 2 (two) times daily with meals. 60 tablet 11    clopidogrel (PLAVIX) 75 mg tablet       ezetimibe (ZETIA) 10 mg tablet Take 1 tablet (10 mg total) by mouth once daily. 90 tablet 3    ferrous sulfate (FEOSOL) 325 mg (65 mg iron) Tab tablet TAKE 1 TABLET BY MOUTH THREE TIMES DAILY 90 tablet 3    finasteride (PROSCAR) 5 mg tablet Take 1 tablet (5 mg total) by mouth once daily. 90 tablet 3    furosemide (LASIX) 20 MG tablet Take 1 tablet (20 mg total) by mouth once daily. Take an extra tablet daily for wt gain more than 3 pounds/day or 5 pounds/week 140 tablet 3    insulin NPH (NOVOLIN N) 100 unit/mL injection Inject 40 Units into the skin before breakfast. 3 vial 5    isosorbide mononitrate (IMDUR) 60 MG 24 hr tablet Take 1 tablet (60 mg total) by mouth once daily. 90 tablet 3    JANUMET 50-1,000 mg per tablet TAKE 1 TABLET BY MOUTH TWO TIMES DAILY WITH MEALS 180 tablet 3    losartan  "(COZAAR) 50 MG tablet Take 1 tablet (50 mg total) by mouth once daily. 90 tablet 3    nitroGLYCERIN (NITROSTAT) 0.4 MG SL tablet DISSOLVE ONE TABLET UNDER THE TONGUE EVERY 5 MINUTES AS NEEDED FOR CHEST PAIN. 100 tablet 0    RANEXA 1,000 mg Tb12 Take 1 tablet (1,000 mg total) by mouth 2 (two) times daily. 60 tablet 11    rosuvastatin (CRESTOR) 40 MG Tab Take 1 tablet (40 mg total) by mouth once daily. 90 tablet 3    spironolactone (ALDACTONE) 25 MG tablet Take 1 tablet (25 mg total) by mouth once daily. 90 tablet 3    tamsulosin (FLOMAX) 0.4 mg Cap Take 1 capsule (0.4 mg total) by mouth once daily. 30 capsule 3    ticagrelor (BRILINTA) 90 mg tablet Take 1 tablet (90 mg total) by mouth 2 (two) times daily. 60 tablet 11     No current facility-administered medications for this visit.                   REVIEW OF SYSTEMS:     Sleep related symptoms as per HPI.  CONST:Denies weight gain    HEENT: Denies sinus congestion  PULM: Denies dyspnea  CARD:  Denies palpitations   GI:  Denies acid reflux  : Denies polyuria  NEURO: Denies headaches  PSYCH: Denies mood disturbance  HEME: Denies anemia    Otherwise, a balance of 10 systems reviewed is negative.          PHYSICAL EXAM:  Vitals:    05/02/19 1133   BP: 133/66   Pulse: 67   Weight: 107.9 kg (237 lb 14 oz)   Height: 5' 7" (1.702 m)   PainSc: 0-No pain     Body mass index is 37.26 kg/m².     GENERAL: Overweight development, well groomed  HEENT:  Conjunctivae are non-erythematous; Pupils equal, round, and reactive to light; Nose is symmetrical; Nasal mucosa is pink and moist; Septum is midline; Inferior turbinates are normal; Nasal airflow is normal; Posterior pharynx is pink; Modified Mallampati: IV; Posterior palate is normal; Tonsils +1; Uvula is normal and pink;Tongue is normal; Dentition is edentulous; No TMJ tenderness; Jaw opening and protrusion without click and without discomfort.  NECK: Supple. Neck circumference is 17.5 inches. No thyromegaly. No palpable " nodes.    SKIN: On face and neck: No abrasions, no rashes, no lesions.  No subcutaneous nodules are palpable.  RESPIRATORY: Chest is clear to auscultation.  Normal chest expansion and non-labored breathing at rest.  CARDIOVASCULAR: Normal S1, S2.  No murmurs, gallops or rubs. No carotid bruits bilaterally.  EXTREMITIES: No edema. No clubbing. No cyanosis. Station normal. Gait normal.        NEURO/PSYCH: Oriented to time, place and person. Normal attention span and concentration. Affect is full. Mood is normal.        11/17/2018 estimated ejection fraction is 45%                                          ASSESSMENT:    Sleep apnea, unspecified. The patient symptomatically has snoring, air gasping, unrefreshing sleep, and daytime sleepiness with findings of crowded oral airway and elevated body mass index. Medical co-morbidities: cardiomyopathy, LBBB, HTN, hx MI, DM2, and obesity.  This warrants further investigation for possible obstructive sleep apnea.     Obesity, BMI > 35, discussed relationship between ANURADHA and weight      PLAN:    Diagnostic: Polysomnogram. The nature of this procedure and its indication was discussed with the patient. Patient will be contacted after sleep study is done.  Email results, RTC prn.     Consider taking diuretic upon getting home from work around 2 - 3 pm, if not able to take it first thing in AM, vs within an hour of bedtime to reduce nocturia    Education: During our discussion today, we talked about the etiology of obstructive sleep apnea as well as the potential ramifications of untreated sleep apnea, which could include daytime sleepiness, hypertension, heart disease and/or stroke. We discussed potential treatment options, which could include weight loss, body positioning, continuous positive airway pressure (CPAP), OA, EPAP, or referral for surgical consideration.     Precautions: The patient was advised to abstain from driving should they feel sleepy  or drowsy.     Thank you  for allowing me the opportunity to participate in the care of your patient.

## 2019-05-03 NOTE — TELEPHONE ENCOUNTER
Spoke with pt and his girlfriend. Pt states that he was taken off the plavix.  Pt agreed to date and time of biopsy. Instructions given to stop brilinta Monday may 6 (7days before biopsy) and start 81mg asa. Pt girlfriend verbalized understanding of those directions. I also went over instructions for enema and antibiotics. Written instructions mailed

## 2019-05-06 RX ORDER — SULFAMETHOXAZOLE AND TRIMETHOPRIM 800; 160 MG/1; MG/1
1 TABLET ORAL 2 TIMES DAILY
Qty: 4 TABLET | Refills: 0 | Status: SHIPPED | OUTPATIENT
Start: 2019-05-06 | End: 2019-05-08

## 2019-05-07 ENCOUNTER — CLINICAL SUPPORT (OUTPATIENT)
Dept: INTERNAL MEDICINE | Facility: CLINIC | Age: 69
End: 2019-05-07
Payer: MEDICARE

## 2019-05-07 DIAGNOSIS — I25.118 CORONARY ARTERY DISEASE OF NATIVE ARTERY OF NATIVE HEART WITH STABLE ANGINA PECTORIS: ICD-10-CM

## 2019-05-07 DIAGNOSIS — E66.9 DIABETES MELLITUS TYPE 2 IN OBESE: ICD-10-CM

## 2019-05-07 DIAGNOSIS — E66.01 SEVERE OBESITY (BMI 35.0-39.9) WITH COMORBIDITY: ICD-10-CM

## 2019-05-07 DIAGNOSIS — E11.69 DIABETES MELLITUS TYPE 2 IN OBESE: ICD-10-CM

## 2019-05-07 DIAGNOSIS — I10 BENIGN ESSENTIAL HTN: ICD-10-CM

## 2019-05-07 LAB
ALBUMIN SERPL BCP-MCNC: 3.9 G/DL (ref 3.5–5.2)
ALP SERPL-CCNC: 52 U/L (ref 55–135)
ALT SERPL W/O P-5'-P-CCNC: 23 U/L (ref 10–44)
ANION GAP SERPL CALC-SCNC: 8 MMOL/L (ref 8–16)
AST SERPL-CCNC: 23 U/L (ref 10–40)
BASOPHILS # BLD AUTO: 0.05 K/UL (ref 0–0.2)
BASOPHILS NFR BLD: 0.7 % (ref 0–1.9)
BILIRUB SERPL-MCNC: 0.5 MG/DL (ref 0.1–1)
BUN SERPL-MCNC: 19 MG/DL (ref 8–23)
CALCIUM SERPL-MCNC: 9.7 MG/DL (ref 8.7–10.5)
CHLORIDE SERPL-SCNC: 101 MMOL/L (ref 95–110)
CHOLEST SERPL-MCNC: 131 MG/DL (ref 120–199)
CHOLEST/HDLC SERPL: 2.6 {RATIO} (ref 2–5)
CO2 SERPL-SCNC: 26 MMOL/L (ref 23–29)
CREAT SERPL-MCNC: 1.5 MG/DL (ref 0.5–1.4)
DIFFERENTIAL METHOD: ABNORMAL
EOSINOPHIL # BLD AUTO: 0.2 K/UL (ref 0–0.5)
EOSINOPHIL NFR BLD: 2.5 % (ref 0–8)
ERYTHROCYTE [DISTWIDTH] IN BLOOD BY AUTOMATED COUNT: 14.7 % (ref 11.5–14.5)
EST. GFR  (AFRICAN AMERICAN): 53.8 ML/MIN/1.73 M^2
EST. GFR  (NON AFRICAN AMERICAN): 46.5 ML/MIN/1.73 M^2
ESTIMATED AVG GLUCOSE: 169 MG/DL (ref 68–131)
GLUCOSE SERPL-MCNC: 81 MG/DL (ref 70–110)
HBA1C MFR BLD HPLC: 7.5 % (ref 4–5.6)
HCT VFR BLD AUTO: 39.9 % (ref 40–54)
HDLC SERPL-MCNC: 50 MG/DL (ref 40–75)
HDLC SERPL: 38.2 % (ref 20–50)
HGB BLD-MCNC: 12.6 G/DL (ref 14–18)
IMM GRANULOCYTES # BLD AUTO: 0.02 K/UL (ref 0–0.04)
IMM GRANULOCYTES NFR BLD AUTO: 0.3 % (ref 0–0.5)
LDLC SERPL CALC-MCNC: 68.8 MG/DL (ref 63–159)
LYMPHOCYTES # BLD AUTO: 2.4 K/UL (ref 1–4.8)
LYMPHOCYTES NFR BLD: 33.9 % (ref 18–48)
MCH RBC QN AUTO: 30.1 PG (ref 27–31)
MCHC RBC AUTO-ENTMCNC: 31.6 G/DL (ref 32–36)
MCV RBC AUTO: 96 FL (ref 82–98)
MONOCYTES # BLD AUTO: 0.8 K/UL (ref 0.3–1)
MONOCYTES NFR BLD: 10.9 % (ref 4–15)
NEUTROPHILS # BLD AUTO: 3.7 K/UL (ref 1.8–7.7)
NEUTROPHILS NFR BLD: 51.7 % (ref 38–73)
NONHDLC SERPL-MCNC: 81 MG/DL
NRBC BLD-RTO: 0 /100 WBC
PLATELET # BLD AUTO: 202 K/UL (ref 150–350)
PMV BLD AUTO: 11 FL (ref 9.2–12.9)
POTASSIUM SERPL-SCNC: 5 MMOL/L (ref 3.5–5.1)
PROT SERPL-MCNC: 7 G/DL (ref 6–8.4)
RBC # BLD AUTO: 4.18 M/UL (ref 4.6–6.2)
SODIUM SERPL-SCNC: 135 MMOL/L (ref 136–145)
TRIGL SERPL-MCNC: 61 MG/DL (ref 30–150)
TSH SERPL DL<=0.005 MIU/L-ACNC: 1.88 UIU/ML (ref 0.4–4)
WBC # BLD AUTO: 7.17 K/UL (ref 3.9–12.7)

## 2019-05-07 PROCEDURE — 80053 COMPREHEN METABOLIC PANEL: CPT

## 2019-05-07 PROCEDURE — 36415 COLL VENOUS BLD VENIPUNCTURE: CPT

## 2019-05-07 PROCEDURE — 85025 COMPLETE CBC W/AUTO DIFF WBC: CPT

## 2019-05-07 PROCEDURE — 84443 ASSAY THYROID STIM HORMONE: CPT

## 2019-05-07 PROCEDURE — 80061 LIPID PANEL: CPT

## 2019-05-07 PROCEDURE — 83036 HEMOGLOBIN GLYCOSYLATED A1C: CPT

## 2019-05-09 ENCOUNTER — PATIENT MESSAGE (OUTPATIENT)
Dept: SLEEP MEDICINE | Facility: CLINIC | Age: 69
End: 2019-05-09

## 2019-05-09 ENCOUNTER — TELEPHONE (OUTPATIENT)
Dept: INTERNAL MEDICINE | Facility: CLINIC | Age: 69
End: 2019-05-09

## 2019-05-09 NOTE — TELEPHONE ENCOUNTER
----- Message from Robyn Kaplan sent at 2019 11:30 AM CDT -----  Contact: Eve/Girlfriend  Walter Bull  MRN: [id number]  : 1950  PCP: Belkys Kruger  Home Phone      [phone]  Work Phone      Not on file.  Mobile          [phone]    MESSAGE:   Patient is still having problems with vomiting and lightheadedness since last visit.  Dr. Kruger wanted him to call back if he continued.  Please call to advise.    Phone: [phone]

## 2019-05-10 ENCOUNTER — PATIENT MESSAGE (OUTPATIENT)
Dept: HEMATOLOGY/ONCOLOGY | Facility: CLINIC | Age: 69
End: 2019-05-10

## 2019-05-10 RX ORDER — LIDOCAINE HYDROCHLORIDE 20 MG/ML
JELLY TOPICAL ONCE
Status: CANCELLED | OUTPATIENT
Start: 2019-05-10 | End: 2019-05-10

## 2019-05-10 NOTE — TELEPHONE ENCOUNTER
He has an appoitment with me next week. Maybe it is the metformin (of the janumet) causing his sx. We could change this medication next week. What is his HR? Is it better since lower the coreg?    Did he talk to the cardiologist about this? I don't see any mention of it in the cardiology notes.

## 2019-05-11 ENCOUNTER — PATIENT MESSAGE (OUTPATIENT)
Dept: INTERNAL MEDICINE | Facility: CLINIC | Age: 69
End: 2019-05-11

## 2019-05-13 ENCOUNTER — HOSPITAL ENCOUNTER (OUTPATIENT)
Dept: UROLOGY | Facility: HOSPITAL | Age: 69
Discharge: HOME | End: 2019-05-13
Attending: UROLOGY
Payer: MEDICARE

## 2019-05-13 VITALS
WEIGHT: 232.38 LBS | BODY MASS INDEX: 36.47 KG/M2 | SYSTOLIC BLOOD PRESSURE: 165 MMHG | DIASTOLIC BLOOD PRESSURE: 71 MMHG | RESPIRATION RATE: 18 BRPM | HEIGHT: 67 IN | HEART RATE: 60 BPM | TEMPERATURE: 99 F

## 2019-05-13 DIAGNOSIS — R97.20 ELEVATED PSA: Primary | ICD-10-CM

## 2019-05-13 DIAGNOSIS — N40.2 PROSTATE NODULE: ICD-10-CM

## 2019-05-13 PROCEDURE — 76942 PR U/S GUIDANCE FOR NEEDLE GUIDANCE: ICD-10-PCS | Mod: 26,59,, | Performed by: UROLOGY

## 2019-05-13 PROCEDURE — 55700 PR BIOPSY OF PROSTATE,NEEDLE/PUNCH: ICD-10-PCS | Mod: ,,, | Performed by: UROLOGY

## 2019-05-13 PROCEDURE — 76872 PR US TRANSRECTAL: ICD-10-PCS | Mod: 26,,, | Performed by: UROLOGY

## 2019-05-13 PROCEDURE — 76872 US TRANSRECTAL: CPT | Mod: 26,,, | Performed by: UROLOGY

## 2019-05-13 PROCEDURE — 55700 PR BIOPSY OF PROSTATE,NEEDLE/PUNCH: CPT | Mod: ,,, | Performed by: UROLOGY

## 2019-05-13 PROCEDURE — 76942 ECHO GUIDE FOR BIOPSY: CPT | Mod: 26,59,, | Performed by: UROLOGY

## 2019-05-13 PROCEDURE — 88305 TISSUE SPECIMEN TO PATHOLOGY, UROLOGY: ICD-10-PCS | Mod: 26,,, | Performed by: PATHOLOGY

## 2019-05-13 PROCEDURE — 55700 HC BIOPSY OF PROSTATE - NEEDLE OR PUNCH: CPT

## 2019-05-13 PROCEDURE — 76872 US TRANSRECTAL: CPT

## 2019-05-13 PROCEDURE — 76942 ECHO GUIDE FOR BIOPSY: CPT

## 2019-05-13 PROCEDURE — 88305 TISSUE EXAM BY PATHOLOGIST: CPT | Mod: 59 | Performed by: PATHOLOGY

## 2019-05-13 PROCEDURE — 88305 TISSUE EXAM BY PATHOLOGIST: CPT | Mod: 26,,, | Performed by: PATHOLOGY

## 2019-05-13 RX ORDER — LIDOCAINE HYDROCHLORIDE 10 MG/ML
10 INJECTION INFILTRATION; PERINEURAL ONCE
Status: COMPLETED | OUTPATIENT
Start: 2019-05-13 | End: 2019-05-13

## 2019-05-13 RX ORDER — LIDOCAINE HYDROCHLORIDE 20 MG/ML
JELLY TOPICAL
Status: COMPLETED | OUTPATIENT
Start: 2019-05-13 | End: 2019-05-13

## 2019-05-13 RX ADMIN — LIDOCAINE HYDROCHLORIDE: 20 JELLY TOPICAL at 12:05

## 2019-05-13 RX ADMIN — LIDOCAINE HYDROCHLORIDE 10 ML: 10 INJECTION INFILTRATION; PERINEURAL at 12:05

## 2019-05-13 NOTE — OP NOTE
DATE OF PROCEDURE:  05/13/2019.    PREOPERATIVE DIAGNOSIS:  Prostate nodule.    POSTOPERATIVE DIAGNOSIS:  Prostate nodule.    OPERATION PERFORMED:  Transrectal prostate ultrasound-guided biopsy.    ANESTHESIA:  Bilateral pudendal nerve blocks.    ESTIMATED BLOOD LOSS:  Minimal.    SURGEON:  Guicho Carver Jr., M.D.    PROCEDURE IN DETAIL:  The patient was placed in left lateral decubitus position.    Sagittal and transverse views of prostate were made.  Several stones were   seen.  No obvious nodules were seen.  PSA was 1.6.  Prostate measured 4.9 x 3.0   x 4.2 cm for a total volume of 33.6 g.  PSA density was 0.05.  Bilateral   pudendal nerve blocks were performed using 10 mL of 1% plain Xylocaine   bilaterally.  Random sextant biopsies were performed.  A total of 12 biopsies   were made.  Usual post-biopsy precautions were given.  He will call should he   develop fever, chills or any problems.  He will take his antibiotics and I will   call him with the results as soon as they are available.      MARISOL/IN  dd: 05/13/2019 12:29:01 (CDT)  td: 05/13/2019 18:13:53 (CDT)  Doc ID   #[id number]  Job ID #[id number]    CC:

## 2019-05-13 NOTE — PATIENT INSTRUCTIONS
What to Expect After a Prostate Biopsy    Please be sure to finish your pre-procedure antibiotics as instructed.    You may have mild bleeding from the rectum or urine for about 1 week to 1 month, or in your ejaculate for several months. This bleeding is normal and expected, and it will stop. You may have mild discomfort in your rectal or urethral area for 24-48 hours.    You cannot do any strenuous lifting, straining, or exercising for 24 hours. You may return to full activity the day after the biopsy.    You may continue to take all your regular medications after the procedure except for the blood thinners.    You may resume all blood-thinning medications once you no longer see any bleeding or whenever your physician prescribing the medication says it is all right to do so. You may take Tylenol if you have a fever and your temperature is less than 100° F or if you have some discomfort.    You will receive a call from the Urology Department at [facility] with the results of your prostate biopsy within one week.    Signs and Symptoms to Report    Call your [facility] urologist at [phone] if you develop any of the following:  · Temperature greater than 101°  F  · Inability to urinate  · A large amount of bleeding from the rectum or in the urine  · Persistent or severe pain    After hours or on weekends, you may reach a urology resident on call at this number: [phone].

## 2019-05-14 ENCOUNTER — OFFICE VISIT (OUTPATIENT)
Dept: INTERNAL MEDICINE | Facility: CLINIC | Age: 69
End: 2019-05-14
Payer: MEDICARE

## 2019-05-14 ENCOUNTER — TELEPHONE (OUTPATIENT)
Dept: SLEEP MEDICINE | Facility: OTHER | Age: 69
End: 2019-05-14

## 2019-05-14 VITALS
BODY MASS INDEX: 36.22 KG/M2 | DIASTOLIC BLOOD PRESSURE: 60 MMHG | HEART RATE: 62 BPM | RESPIRATION RATE: 16 BRPM | WEIGHT: 230.81 LBS | SYSTOLIC BLOOD PRESSURE: 110 MMHG | HEIGHT: 67 IN

## 2019-05-14 DIAGNOSIS — R00.1 BRADYCARDIA: ICD-10-CM

## 2019-05-14 DIAGNOSIS — I10 BENIGN ESSENTIAL HTN: ICD-10-CM

## 2019-05-14 DIAGNOSIS — E11.42 DIABETIC POLYNEUROPATHY ASSOCIATED WITH TYPE 2 DIABETES MELLITUS: ICD-10-CM

## 2019-05-14 DIAGNOSIS — I25.10 CORONARY ARTERY DISEASE INVOLVING NATIVE CORONARY ARTERY OF NATIVE HEART WITHOUT ANGINA PECTORIS: ICD-10-CM

## 2019-05-14 DIAGNOSIS — E11.69 DIABETES MELLITUS TYPE 2 IN OBESE: ICD-10-CM

## 2019-05-14 DIAGNOSIS — E78.2 MIXED HYPERLIPIDEMIA: ICD-10-CM

## 2019-05-14 DIAGNOSIS — N40.0 BENIGN PROSTATIC HYPERPLASIA WITHOUT LOWER URINARY TRACT SYMPTOMS: ICD-10-CM

## 2019-05-14 DIAGNOSIS — E66.9 DIABETES MELLITUS TYPE 2 IN OBESE: ICD-10-CM

## 2019-05-14 DIAGNOSIS — I25.5 ISCHEMIC CARDIOMYOPATHY: ICD-10-CM

## 2019-05-14 DIAGNOSIS — Z95.810 ICD (IMPLANTABLE CARDIOVERTER-DEFIBRILLATOR), DUAL, IN SITU: ICD-10-CM

## 2019-05-14 DIAGNOSIS — E66.01 SEVERE OBESITY (BMI 35.0-39.9) WITH COMORBIDITY: ICD-10-CM

## 2019-05-14 DIAGNOSIS — L29.9 ITCHING: ICD-10-CM

## 2019-05-14 DIAGNOSIS — K21.9 GASTROESOPHAGEAL REFLUX DISEASE, ESOPHAGITIS PRESENCE NOT SPECIFIED: Primary | ICD-10-CM

## 2019-05-14 PROBLEM — R07.9 CHEST PAIN: Status: RESOLVED | Noted: 2018-11-16 | Resolved: 2019-05-14

## 2019-05-14 PROCEDURE — 99499 RISK ADDL DX/OHS AUDIT: ICD-10-PCS | Mod: S$GLB,,, | Performed by: INTERNAL MEDICINE

## 2019-05-14 PROCEDURE — 3074F PR MOST RECENT SYSTOLIC BLOOD PRESSURE < 130 MM HG: ICD-10-PCS | Mod: CPTII,S$GLB,, | Performed by: INTERNAL MEDICINE

## 2019-05-14 PROCEDURE — 1101F PR PT FALLS ASSESS DOC 0-1 FALLS W/OUT INJ PAST YR: ICD-10-PCS | Mod: CPTII,S$GLB,, | Performed by: INTERNAL MEDICINE

## 2019-05-14 PROCEDURE — 3078F DIAST BP <80 MM HG: CPT | Mod: CPTII,S$GLB,, | Performed by: INTERNAL MEDICINE

## 2019-05-14 PROCEDURE — 3078F PR MOST RECENT DIASTOLIC BLOOD PRESSURE < 80 MM HG: ICD-10-PCS | Mod: CPTII,S$GLB,, | Performed by: INTERNAL MEDICINE

## 2019-05-14 PROCEDURE — 99499 UNLISTED E&M SERVICE: CPT | Mod: S$GLB,,, | Performed by: INTERNAL MEDICINE

## 2019-05-14 PROCEDURE — 3074F SYST BP LT 130 MM HG: CPT | Mod: CPTII,S$GLB,, | Performed by: INTERNAL MEDICINE

## 2019-05-14 PROCEDURE — 99215 PR OFFICE/OUTPT VISIT, EST, LEVL V, 40-54 MIN: ICD-10-PCS | Mod: S$GLB,,, | Performed by: INTERNAL MEDICINE

## 2019-05-14 PROCEDURE — 99999 PR PBB SHADOW E&M-EST. PATIENT-LVL III: ICD-10-PCS | Mod: PBBFAC,,, | Performed by: INTERNAL MEDICINE

## 2019-05-14 PROCEDURE — 99999 PR PBB SHADOW E&M-EST. PATIENT-LVL III: CPT | Mod: PBBFAC,,, | Performed by: INTERNAL MEDICINE

## 2019-05-14 PROCEDURE — 3045F PR MOST RECENT HEMOGLOBIN A1C LEVEL 7.0-9.0%: ICD-10-PCS | Mod: CPTII,S$GLB,, | Performed by: INTERNAL MEDICINE

## 2019-05-14 PROCEDURE — 99215 OFFICE O/P EST HI 40 MIN: CPT | Mod: S$GLB,,, | Performed by: INTERNAL MEDICINE

## 2019-05-14 PROCEDURE — 1101F PT FALLS ASSESS-DOCD LE1/YR: CPT | Mod: CPTII,S$GLB,, | Performed by: INTERNAL MEDICINE

## 2019-05-14 PROCEDURE — 3045F PR MOST RECENT HEMOGLOBIN A1C LEVEL 7.0-9.0%: CPT | Mod: CPTII,S$GLB,, | Performed by: INTERNAL MEDICINE

## 2019-05-14 RX ORDER — ESOMEPRAZOLE MAGNESIUM 40 MG/1
40 CAPSULE, DELAYED RELEASE ORAL
Qty: 30 CAPSULE | Refills: 11 | Status: SHIPPED | OUTPATIENT
Start: 2019-05-14 | End: 2020-02-26 | Stop reason: SDUPTHER

## 2019-05-14 NOTE — PROGRESS NOTES
"Subjective:       Patient ID: Walter Bull is a 68 y.o. male.    Chief Complaint: Follow-up; stones; and skin irritation (on shoulder )      HPI:  Patient is known to me and presents for follow up CAD, DM type 2, HLD. Labs from 5/2019 personally reviewed and discussed with the patient today.      CAD s/p 5v CABG and NSTEMI 9/12/18: following with Dr. Vitale as well. Now on Imdur and Ranexa. On plavix, ASA, Crestor and BB. Also reports h/o CHF (last known EF 55%), on aldactone and lasix 20mg once a day. Denies SOB, [place] and orthopnea.       Dm type 2: was having episodes of hypoglycemia so NPH reduced to 40 units QAM and stopped bedtime insulin. remains on Janumet. Blood sugars are stable, no more hypoglcyemia. But A1C up to 7.5% from 6.9%  He does report numbness and tingling of left foot > right foot and b/l fingers; sx have been present for several years. Not on medicine for neuropathy as he declines treatment. Denies polyuria, polydipsia  Foot exam: 1/2018  Eye exam: 2/2019  Microalb: 4/2018; ordered   on losartan and crestor    HLD: on crestor and zetia, has been taking for several years. Denies side effects. Last LDL at goal.     HTN: on coreg, losartan, aldactone. BP is well controlled. Denies headaches, vision changes.     GERD: Uses Zantac PRN. Was on Nexium but stopped when did GI testing for n/v. H. Pylori was negative. H/o H. Pylori on EGD (2018) with gastric erosions. Again, still with n/v symptoms     BPH: on flomax and finasteride and working well.  No medication side effects. S/p prostate bx 5/13/19 with DR. Chaudhry, awaiting results for nodule found on exam     Tobacco use: quit 1981; previously smoked 3 PPD for 20 years  EtOH: stopped drink 1982; was a daily drink 2 fifth liquor daily  Illicit drug: denies    He is still having vomiting and feeling lightheadedness. Bad taste in his mouth. Stomach is "gurgling". No real abdominal pain however. He reports when he exerts himself is when he feels his " symptoms. No chest pains but feels weak and fatigued. We switched metoprolol to Coreg and reduced the dose of Coreg last visit. He is still having symptoms.       He is having rash on right shoulder. + itching. Worse at night. Sx started 3 months. Sx are intermittent. Nothing makes it worse. Has not tried anything OTC for the rash.     Past Medical History:   Diagnosis Date    Anemia     Anticoagulant long-term use     CHF (congestive heart failure)     Colon cancer screening 2017    Coronary artery disease     Diabetes mellitus type I     Disorder of kidney and ureter     Encounter for blood transfusion     Glaucoma     Heart attack     2018    Heart disease     Hypertension     Iron deficiency     Kidney failure     post CABG    S/P CABG x 5 2017       Family History   Problem Relation Age of Onset    Diabetes Mother     Heart disease Mother     Hypertension Sister     Diabetes Sister     Heart disease Sister     Heart attack Brother     Colon cancer Neg Hx     Esophageal cancer Neg Hx     Stomach cancer Neg Hx        Social History     Socioeconomic History    Marital status:      Spouse name: Not on file    Number of children: Not on file    Years of education: Not on file    Highest education level: Not on file   Occupational History    Not on file   Social Needs    Financial resource strain: Not on file    Food insecurity:     Worry: Not on file     Inability: Not on file    Transportation needs:     Medical: Not on file     Non-medical: Not on file   Tobacco Use    Smoking status: Former Smoker     Packs/day: 3.00     Types: Cigarettes     Start date: 1963     Last attempt to quit: 1981     Years since quittin.3    Smokeless tobacco: Former User     Types: Snuff, Chew     Quit date:    Substance and Sexual Activity    Alcohol use: No    Drug use: No    Sexual activity: Yes     Comment: -with a significant other   Lifestyle     Physical activity:     Days per week: Not on file     Minutes per session: Not on file    Stress: Not on file   Relationships    Social connections:     Talks on phone: Not on file     Gets together: Not on file     Attends Buddhism service: Not on file     Active member of club or organization: Not on file     Attends meetings of clubs or organizations: Not on file     Relationship status: Not on file   Other Topics Concern    Not on file   Social History Narrative    Not on file       Review of Systems   Constitutional: Negative for activity change, fatigue, fever and unexpected weight change.   HENT: Negative for congestion, ear pain, hearing loss, rhinorrhea and sore throat.    Eyes: Negative for redness and visual disturbance.   Respiratory: Negative for cough, shortness of breath and wheezing.    Cardiovascular: Negative for chest pain, palpitations and leg swelling.   Gastrointestinal: Positive for nausea and vomiting. Negative for abdominal pain, constipation and diarrhea.   Genitourinary: Negative for decreased urine volume, dysuria, frequency and urgency.   Musculoskeletal: Negative for back pain, joint swelling and neck pain.   Skin: Positive for rash. Negative for color change and wound.   Neurological: Positive for light-headedness. Negative for dizziness, tremors, weakness and headaches.         Objective:      Physical Exam   Constitutional: He is oriented to person, place, and time. He appears well-developed and well-nourished. No distress.   HENT:   Head: Normocephalic and atraumatic.   Right Ear: External ear normal.   Left Ear: External ear normal.   Eyes: Pupils are equal, round, and reactive to light. Conjunctivae and EOM are normal. Right eye exhibits no discharge. Left eye exhibits no discharge.   Neck: Neck supple. No tracheal deviation present.   Cardiovascular: Normal rate and regular rhythm.   No murmur heard.  Pulmonary/Chest: Effort normal and breath sounds normal. No respiratory  distress. He has no wheezes.   Abdominal: Soft. Bowel sounds are normal. He exhibits no distension. There is no tenderness.   Musculoskeletal: He exhibits no edema.   Neurological: He is alert and oriented to person, place, and time. No cranial nerve deficit.   Skin: Skin is warm and dry. No rash (no rash present where has itching right shoulder) noted.   Psychiatric: He has a normal mood and affect. His behavior is normal.   Vitals reviewed.      Assessment:       1. Gastroesophageal reflux disease, esophagitis presence not specified    2. Diabetes mellitus type 2 in obese    3. Diabetic polyneuropathy associated with type 2 diabetes mellitus    4. Benign essential HTN    5. Coronary artery disease involving native coronary artery of native heart without angina pectoris    6. Mixed hyperlipidemia    7. Ischemic cardiomyopathy    8. ICD (implantable cardioverter-defibrillator), dual, in situ    9. Bradycardia    10. Benign prostatic hyperplasia without lower urinary tract symptoms    11. Severe obesity (BMI 35.0-39.9) with comorbidity    12. Itching        Plan:       Walter was seen today for follow-up, stones and skin irritation.    Diagnoses and all orders for this visit:    Gastroesophageal reflux disease, esophagitis presence not specified  -     esomeprazole (NEXIUM) 40 MG capsule; Take 1 capsule (40 mg total) by mouth before breakfast.  Chronic uncontrolled  Some n/v sx may be uncontrolled GERD  PPI stopped for h. Pylori testing  Will restart today  Diet modifications    Diabetes mellitus type 2 in obese  -     SITagliptin (JANUVIA) 100 MG Tab; Take 1 tablet (100 mg total) by mouth once daily.  -     insulin NPH (NOVOLIN N) 100 unit/mL injection; Inject 50 Units into the skin before breakfast.  -     CBC auto differential; Future  -     Comprehensive metabolic panel; Future  -     Hemoglobin A1c; Future  -     Lipid panel; Future  -     Microalbumin/creatinine urine ratio; Future  Chronic uncontrolled  A1C  trended up  ???? Metformin causing some GI side effects with n/v symptoms  Will stop Janumet  Start Januvai 100mg  Increase NPH from 40 to 50 units  Check sugars and keep log  1800 cookie ADA diet  Exercise, weight loss  Foot exam: 1/2018  Eye exam: 2/2019  Microalb: 4/2018; ordered   on losartan and crestor    Diabetic polyneuropathy associated with type 2 diabetes mellitus  -     SITagliptin (JANUVIA) 100 MG Tab; Take 1 tablet (100 mg total) by mouth once daily.  -     insulin NPH (NOVOLIN N) 100 unit/mL injection; Inject 50 Units into the skin before breakfast.  -     CBC auto differential; Future  -     Comprehensive metabolic panel; Future  -     Hemoglobin A1c; Future  -     Lipid panel; Future  -     Microalbumin/creatinine urine ratio; Future  Chronic stable  Does not feel he needs medication for sx  Better glucose control as noted above    Benign essential HTN  -     CBC auto differential; Future  -     Comprehensive metabolic panel; Future  -     Lipid panel; Future  Chronic controlled  Continue medications at same dose  Low Na diet  Exercise, weight loss  Check BP and keep log for next visit    Coronary artery disease involving native coronary artery of native heart without angina pectoris  -     CBC auto differential; Future  -     Comprehensive metabolic panel; Future  -     Hemoglobin A1c; Future  -     Lipid panel; Future  Chronic stable  LDL at goal  Cont meds same dose  No angina sx    Mixed hyperlipidemia  -     CBC auto differential; Future  -     Comprehensive metabolic panel; Future  -     Lipid panel; Future  Chronic controlled  Cont crestor, zetia same dose    Ischemic cardiomyopathy  -     CBC auto differential; Future  -     Comprehensive metabolic panel; Future  -     Hemoglobin A1c; Future  -     Lipid panel; Future  Chronic stable  No signs of volume overload today  Cont meds same dose  Watch GFR, overall stable but trended down some, electrolytes stable    ICD (implantable  cardioverter-defibrillator), dual, in situ  Noted  Sees cardiology    Bradycardia  -     CBC auto differential; Future  -     Comprehensive metabolic panel; Future  Reduced coreg last visit  His sx of exertional fatigue and lightheadedness could be related to bradycardia, HR about 60  I am hesitant to pull him completely off his BB however given his CAD and CHF history   Will trial these other measures (stop metformin, start PPI for n/v symptoms) and if still no improvement revisit cardiac etiology    Benign prostatic hyperplasia without lower urinary tract symptoms  Follows with Dr. Chaudhry  Cont meds same dose  S/p prostate bx 5/13/19, results pending    Severe obesity (BMI 35.0-39.9) with comorbidity  -     CBC auto differential; Future  -     Comprehensive metabolic panel; Future  -     Hemoglobin A1c; Future  -     Lipid panel; Future  Diet and exercise    Itching  New problem  No rash today  Intermittent sx, likely allergic  Watch for triggers  Trial of benadryl    RTC 3 months with labs and PRN. Call if sugars trending up with above changes before next visit

## 2019-05-16 ENCOUNTER — PATIENT MESSAGE (OUTPATIENT)
Dept: UROLOGY | Facility: CLINIC | Age: 69
End: 2019-05-16

## 2019-05-17 ENCOUNTER — HOSPITAL ENCOUNTER (OUTPATIENT)
Facility: HOSPITAL | Age: 69
Discharge: HOME | End: 2019-05-19
Attending: EMERGENCY MEDICINE | Admitting: INTERNAL MEDICINE
Payer: MEDICARE

## 2019-05-17 ENCOUNTER — OFFICE VISIT (OUTPATIENT)
Dept: URGENT CARE | Facility: CLINIC | Age: 69
End: 2019-05-17
Payer: MEDICARE

## 2019-05-17 VITALS
TEMPERATURE: 97 F | BODY MASS INDEX: 36.1 KG/M2 | SYSTOLIC BLOOD PRESSURE: 136 MMHG | WEIGHT: 230 LBS | OXYGEN SATURATION: 99 % | DIASTOLIC BLOOD PRESSURE: 63 MMHG | HEIGHT: 67 IN | RESPIRATION RATE: 18 BRPM | HEART RATE: 60 BPM

## 2019-05-17 DIAGNOSIS — I95.1 ORTHOSTATIC HYPOTENSION: ICD-10-CM

## 2019-05-17 DIAGNOSIS — R53.83 FATIGUE: ICD-10-CM

## 2019-05-17 DIAGNOSIS — E11.69 DIABETES MELLITUS TYPE 2 IN OBESE: Chronic | ICD-10-CM

## 2019-05-17 DIAGNOSIS — N40.0 BENIGN PROSTATIC HYPERPLASIA WITHOUT LOWER URINARY TRACT SYMPTOMS: ICD-10-CM

## 2019-05-17 DIAGNOSIS — N50.89 TESTICULAR SWELLING, RIGHT: Primary | ICD-10-CM

## 2019-05-17 DIAGNOSIS — N40.1 BENIGN PROSTATIC HYPERPLASIA WITH NOCTURIA: ICD-10-CM

## 2019-05-17 DIAGNOSIS — R35.1 BENIGN PROSTATIC HYPERPLASIA WITH NOCTURIA: ICD-10-CM

## 2019-05-17 DIAGNOSIS — N50.811 PAIN IN RIGHT TESTICLE: ICD-10-CM

## 2019-05-17 DIAGNOSIS — E66.9 DIABETES MELLITUS TYPE 2 IN OBESE: Chronic | ICD-10-CM

## 2019-05-17 DIAGNOSIS — N45.1 EPIDIDYMITIS: Primary | ICD-10-CM

## 2019-05-17 DIAGNOSIS — R07.9 CHEST PAIN: ICD-10-CM

## 2019-05-17 DIAGNOSIS — R31.0 GROSS HEMATURIA: ICD-10-CM

## 2019-05-17 LAB
ALBUMIN SERPL BCP-MCNC: 3.7 G/DL (ref 3.5–5.2)
ALP SERPL-CCNC: 55 U/L (ref 55–135)
ALT SERPL W/O P-5'-P-CCNC: 132 U/L (ref 10–44)
ANION GAP SERPL CALC-SCNC: 10 MMOL/L (ref 8–16)
AST SERPL-CCNC: 120 U/L (ref 10–40)
BACTERIA #/AREA URNS AUTO: ABNORMAL /HPF
BASOPHILS # BLD AUTO: 0.04 K/UL (ref 0–0.2)
BASOPHILS NFR BLD: 0.4 % (ref 0–1.9)
BILIRUB SERPL-MCNC: 0.4 MG/DL (ref 0.1–1)
BILIRUB UR QL STRIP: NEGATIVE
BNP SERPL-MCNC: 168 PG/ML (ref 0–99)
BUN SERPL-MCNC: 21 MG/DL (ref 8–23)
CALCIUM SERPL-MCNC: 9.4 MG/DL (ref 8.7–10.5)
CHLORIDE SERPL-SCNC: 98 MMOL/L (ref 95–110)
CLARITY UR REFRACT.AUTO: CLEAR
CO2 SERPL-SCNC: 24 MMOL/L (ref 23–29)
COLOR UR AUTO: YELLOW
CREAT SERPL-MCNC: 1.9 MG/DL (ref 0.5–1.4)
DIFFERENTIAL METHOD: ABNORMAL
EOSINOPHIL # BLD AUTO: 0.1 K/UL (ref 0–0.5)
EOSINOPHIL NFR BLD: 1.3 % (ref 0–8)
ERYTHROCYTE [DISTWIDTH] IN BLOOD BY AUTOMATED COUNT: 14.1 % (ref 11.5–14.5)
EST. GFR  (AFRICAN AMERICAN): 40.4 ML/MIN/1.73 M^2
EST. GFR  (NON AFRICAN AMERICAN): 34.9 ML/MIN/1.73 M^2
GLUCOSE SERPL-MCNC: 332 MG/DL (ref 70–110)
GLUCOSE UR QL STRIP: ABNORMAL
HCT VFR BLD AUTO: 35.7 % (ref 40–54)
HGB BLD-MCNC: 11.5 G/DL (ref 14–18)
HGB UR QL STRIP: ABNORMAL
HYALINE CASTS UR QL AUTO: 0 /LPF
IMM GRANULOCYTES # BLD AUTO: 0.04 K/UL (ref 0–0.04)
IMM GRANULOCYTES NFR BLD AUTO: 0.4 % (ref 0–0.5)
INR PPP: 1 (ref 0.8–1.2)
KETONES UR QL STRIP: NEGATIVE
LEUKOCYTE ESTERASE UR QL STRIP: NEGATIVE
LYMPHOCYTES # BLD AUTO: 2.2 K/UL (ref 1–4.8)
LYMPHOCYTES NFR BLD: 20.2 % (ref 18–48)
MCH RBC QN AUTO: 29.7 PG (ref 27–31)
MCHC RBC AUTO-ENTMCNC: 32.2 G/DL (ref 32–36)
MCV RBC AUTO: 92 FL (ref 82–98)
MICROSCOPIC COMMENT: ABNORMAL
MONOCYTES # BLD AUTO: 1.4 K/UL (ref 0.3–1)
MONOCYTES NFR BLD: 12.4 % (ref 4–15)
NEUTROPHILS # BLD AUTO: 7.1 K/UL (ref 1.8–7.7)
NEUTROPHILS NFR BLD: 65.3 % (ref 38–73)
NITRITE UR QL STRIP: NEGATIVE
NRBC BLD-RTO: 0 /100 WBC
PH UR STRIP: 5 [PH] (ref 5–8)
PLATELET # BLD AUTO: 187 K/UL (ref 150–350)
PMV BLD AUTO: 11.3 FL (ref 9.2–12.9)
POTASSIUM SERPL-SCNC: 4.1 MMOL/L (ref 3.5–5.1)
PROT SERPL-MCNC: 7.5 G/DL (ref 6–8.4)
PROT UR QL STRIP: ABNORMAL
PROTHROMBIN TIME: 10.7 SEC (ref 9–12.5)
RBC # BLD AUTO: 3.87 M/UL (ref 4.6–6.2)
RBC #/AREA URNS AUTO: >100 /HPF (ref 0–4)
SODIUM SERPL-SCNC: 132 MMOL/L (ref 136–145)
SP GR UR STRIP: 1.01 (ref 1–1.03)
SQUAMOUS #/AREA URNS AUTO: 1 /HPF
TROPONIN I SERPL DL<=0.01 NG/ML-MCNC: <0.006 NG/ML (ref 0–0.03)
URN SPEC COLLECT METH UR: ABNORMAL
WBC # BLD AUTO: 10.85 K/UL (ref 3.9–12.7)
WBC #/AREA URNS AUTO: 0 /HPF (ref 0–5)
YEAST UR QL AUTO: ABNORMAL

## 2019-05-17 PROCEDURE — 83880 ASSAY OF NATRIURETIC PEPTIDE: CPT

## 2019-05-17 PROCEDURE — 80053 COMPREHEN METABOLIC PANEL: CPT

## 2019-05-17 PROCEDURE — 99214 OFFICE O/P EST MOD 30 MIN: CPT | Mod: S$GLB,,, | Performed by: PHYSICIAN ASSISTANT

## 2019-05-17 PROCEDURE — 81001 URINALYSIS AUTO W/SCOPE: CPT

## 2019-05-17 PROCEDURE — 1101F PT FALLS ASSESS-DOCD LE1/YR: CPT | Mod: CPTII,S$GLB,, | Performed by: PHYSICIAN ASSISTANT

## 2019-05-17 PROCEDURE — 99214 PR OFFICE/OUTPT VISIT, EST, LEVL IV, 30-39 MIN: ICD-10-PCS | Mod: S$GLB,,, | Performed by: PHYSICIAN ASSISTANT

## 2019-05-17 PROCEDURE — 93010 EKG 12-LEAD: ICD-10-PCS | Mod: ,,, | Performed by: INTERNAL MEDICINE

## 2019-05-17 PROCEDURE — 99285 EMERGENCY DEPT VISIT HI MDM: CPT

## 2019-05-17 PROCEDURE — 96375 TX/PRO/DX INJ NEW DRUG ADDON: CPT

## 2019-05-17 PROCEDURE — 3075F PR MOST RECENT SYSTOLIC BLOOD PRESS GE 130-139MM HG: ICD-10-PCS | Mod: CPTII,S$GLB,, | Performed by: PHYSICIAN ASSISTANT

## 2019-05-17 PROCEDURE — 1101F PR PT FALLS ASSESS DOC 0-1 FALLS W/OUT INJ PAST YR: ICD-10-PCS | Mod: CPTII,S$GLB,, | Performed by: PHYSICIAN ASSISTANT

## 2019-05-17 PROCEDURE — 85610 PROTHROMBIN TIME: CPT

## 2019-05-17 PROCEDURE — 99220 PR INITIAL OBSERVATION CARE,LEVL III: CPT | Mod: ,,, | Performed by: PHYSICIAN ASSISTANT

## 2019-05-17 PROCEDURE — G0378 HOSPITAL OBSERVATION PER HR: HCPCS

## 2019-05-17 PROCEDURE — 3078F DIAST BP <80 MM HG: CPT | Mod: CPTII,S$GLB,, | Performed by: PHYSICIAN ASSISTANT

## 2019-05-17 PROCEDURE — 93010 ELECTROCARDIOGRAM REPORT: CPT | Mod: ,,, | Performed by: INTERNAL MEDICINE

## 2019-05-17 PROCEDURE — 3075F SYST BP GE 130 - 139MM HG: CPT | Mod: CPTII,S$GLB,, | Performed by: PHYSICIAN ASSISTANT

## 2019-05-17 PROCEDURE — 85025 COMPLETE CBC W/AUTO DIFF WBC: CPT

## 2019-05-17 PROCEDURE — 96374 THER/PROPH/DIAG INJ IV PUSH: CPT

## 2019-05-17 PROCEDURE — 63600175 PHARM REV CODE 636 W HCPCS: Performed by: PHYSICIAN ASSISTANT

## 2019-05-17 PROCEDURE — 84484 ASSAY OF TROPONIN QUANT: CPT

## 2019-05-17 PROCEDURE — 99285 EMERGENCY DEPT VISIT HI MDM: CPT | Mod: ,,, | Performed by: EMERGENCY MEDICINE

## 2019-05-17 PROCEDURE — 93005 ELECTROCARDIOGRAM TRACING: CPT

## 2019-05-17 PROCEDURE — 99220 PR INITIAL OBSERVATION CARE,LEVL III: ICD-10-PCS | Mod: ,,, | Performed by: PHYSICIAN ASSISTANT

## 2019-05-17 PROCEDURE — 3078F PR MOST RECENT DIASTOLIC BLOOD PRESSURE < 80 MM HG: ICD-10-PCS | Mod: CPTII,S$GLB,, | Performed by: PHYSICIAN ASSISTANT

## 2019-05-17 PROCEDURE — 99285 PR EMERGENCY DEPT VISIT,LEVEL V: ICD-10-PCS | Mod: ,,, | Performed by: EMERGENCY MEDICINE

## 2019-05-17 RX ORDER — ACETAMINOPHEN 325 MG/1
650 TABLET ORAL EVERY 4 HOURS PRN
Status: DISCONTINUED | OUTPATIENT
Start: 2019-05-18 | End: 2019-05-19 | Stop reason: HOSPADM

## 2019-05-17 RX ORDER — PROMETHAZINE HYDROCHLORIDE 25 MG/1
25 TABLET ORAL EVERY 6 HOURS PRN
Status: DISCONTINUED | OUTPATIENT
Start: 2019-05-18 | End: 2019-05-19 | Stop reason: HOSPADM

## 2019-05-17 RX ORDER — LEVOFLOXACIN 500 MG/1
500 TABLET, FILM COATED ORAL DAILY
Status: DISCONTINUED | OUTPATIENT
Start: 2019-05-17 | End: 2019-05-18

## 2019-05-17 RX ORDER — RANOLAZINE 500 MG/1
1000 TABLET, EXTENDED RELEASE ORAL 2 TIMES DAILY
Status: DISCONTINUED | OUTPATIENT
Start: 2019-05-18 | End: 2019-05-19 | Stop reason: HOSPADM

## 2019-05-17 RX ORDER — CARVEDILOL 3.12 MG/1
3.12 TABLET ORAL 2 TIMES DAILY WITH MEALS
Status: DISCONTINUED | OUTPATIENT
Start: 2019-05-18 | End: 2019-05-18

## 2019-05-17 RX ORDER — ISOSORBIDE MONONITRATE 30 MG/1
60 TABLET, EXTENDED RELEASE ORAL DAILY
Status: DISCONTINUED | OUTPATIENT
Start: 2019-05-18 | End: 2019-05-19 | Stop reason: HOSPADM

## 2019-05-17 RX ORDER — ONDANSETRON 8 MG/1
8 TABLET, ORALLY DISINTEGRATING ORAL EVERY 8 HOURS PRN
Status: DISCONTINUED | OUTPATIENT
Start: 2019-05-18 | End: 2019-05-19 | Stop reason: HOSPADM

## 2019-05-17 RX ORDER — RAMELTEON 8 MG/1
8 TABLET ORAL NIGHTLY PRN
Status: DISCONTINUED | OUTPATIENT
Start: 2019-05-18 | End: 2019-05-19 | Stop reason: HOSPADM

## 2019-05-17 RX ORDER — MORPHINE SULFATE 4 MG/ML
4 INJECTION, SOLUTION INTRAMUSCULAR; INTRAVENOUS
Status: COMPLETED | OUTPATIENT
Start: 2019-05-17 | End: 2019-05-17

## 2019-05-17 RX ORDER — IPRATROPIUM BROMIDE AND ALBUTEROL SULFATE 2.5; .5 MG/3ML; MG/3ML
3 SOLUTION RESPIRATORY (INHALATION) EVERY 4 HOURS PRN
Status: DISCONTINUED | OUTPATIENT
Start: 2019-05-18 | End: 2019-05-19 | Stop reason: HOSPADM

## 2019-05-17 RX ORDER — SPIRONOLACTONE 25 MG/1
25 TABLET ORAL DAILY
Status: DISCONTINUED | OUTPATIENT
Start: 2019-05-18 | End: 2019-05-19 | Stop reason: HOSPADM

## 2019-05-17 RX ORDER — PANTOPRAZOLE SODIUM 40 MG/1
40 TABLET, DELAYED RELEASE ORAL DAILY
Status: DISCONTINUED | OUTPATIENT
Start: 2019-05-18 | End: 2019-05-19 | Stop reason: HOSPADM

## 2019-05-17 RX ORDER — FINASTERIDE 5 MG/1
5 TABLET, FILM COATED ORAL DAILY
Status: DISCONTINUED | OUTPATIENT
Start: 2019-05-18 | End: 2019-05-19 | Stop reason: HOSPADM

## 2019-05-17 RX ORDER — EZETIMIBE 10 MG/1
10 TABLET ORAL DAILY
Status: DISCONTINUED | OUTPATIENT
Start: 2019-05-18 | End: 2019-05-19 | Stop reason: HOSPADM

## 2019-05-17 RX ORDER — SODIUM CHLORIDE 0.9 % (FLUSH) 0.9 %
5 SYRINGE (ML) INJECTION
Status: DISCONTINUED | OUTPATIENT
Start: 2019-05-18 | End: 2019-05-19 | Stop reason: HOSPADM

## 2019-05-17 RX ORDER — LOSARTAN POTASSIUM 50 MG/1
50 TABLET ORAL DAILY
Status: DISCONTINUED | OUTPATIENT
Start: 2019-05-18 | End: 2019-05-19 | Stop reason: HOSPADM

## 2019-05-17 RX ORDER — ONDANSETRON 2 MG/ML
4 INJECTION INTRAMUSCULAR; INTRAVENOUS
Status: COMPLETED | OUTPATIENT
Start: 2019-05-17 | End: 2019-05-17

## 2019-05-17 RX ORDER — TAMSULOSIN HYDROCHLORIDE 0.4 MG/1
1 CAPSULE ORAL DAILY
Status: DISCONTINUED | OUTPATIENT
Start: 2019-05-18 | End: 2019-05-19 | Stop reason: HOSPADM

## 2019-05-17 RX ORDER — FUROSEMIDE 20 MG/1
20 TABLET ORAL DAILY
Status: DISCONTINUED | OUTPATIENT
Start: 2019-05-18 | End: 2019-05-19 | Stop reason: HOSPADM

## 2019-05-17 RX ORDER — FERROUS SULFATE 325(65) MG
325 TABLET, DELAYED RELEASE (ENTERIC COATED) ORAL 3 TIMES DAILY
Status: DISCONTINUED | OUTPATIENT
Start: 2019-05-18 | End: 2019-05-19 | Stop reason: HOSPADM

## 2019-05-17 RX ORDER — ROSUVASTATIN CALCIUM 40 MG/1
40 TABLET, COATED ORAL DAILY
Status: DISCONTINUED | OUTPATIENT
Start: 2019-05-18 | End: 2019-05-19 | Stop reason: HOSPADM

## 2019-05-17 RX ORDER — ASPIRIN 325 MG
325 TABLET ORAL
Status: DISCONTINUED | OUTPATIENT
Start: 2019-05-17 | End: 2019-05-17

## 2019-05-17 RX ORDER — AMIODARONE HYDROCHLORIDE 200 MG/1
200 TABLET ORAL DAILY
Status: DISCONTINUED | OUTPATIENT
Start: 2019-05-18 | End: 2019-05-19 | Stop reason: HOSPADM

## 2019-05-17 RX ORDER — AMOXICILLIN 250 MG
1 CAPSULE ORAL 2 TIMES DAILY
Status: DISCONTINUED | OUTPATIENT
Start: 2019-05-18 | End: 2019-05-19 | Stop reason: HOSPADM

## 2019-05-17 RX ORDER — CLOPIDOGREL BISULFATE 75 MG/1
75 TABLET ORAL DAILY
Status: DISCONTINUED | OUTPATIENT
Start: 2019-05-18 | End: 2019-05-18

## 2019-05-17 RX ADMIN — ONDANSETRON 4 MG: 2 INJECTION INTRAMUSCULAR; INTRAVENOUS at 09:05

## 2019-05-17 RX ADMIN — MORPHINE SULFATE 4 MG: 4 INJECTION INTRAVENOUS at 08:05

## 2019-05-17 NOTE — PROGRESS NOTES
"Subjective:       Patient ID: Walter Bull is a 68 y.o. male.    Vitals:  height is 5' 7" (1.702 m) and weight is 104.3 kg (230 lb). His oral temperature is 97.3 °F (36.3 °C). His blood pressure is 136/63 and his pulse is 60. His respiration is 18 and oxygen saturation is 99%.     Chief Complaint: Testicle Pain    Prostate biopsy 4 days previously. R testicular pain and swelling beginning last night.    Testicle Pain   The patient's primary symptoms include penile pain, scrotal swelling and testicular pain. The patient's pertinent negatives include no genital injury, genital itching, genital lesions, pelvic pain or penile discharge. This is a new problem. The current episode started yesterday. The problem occurs constantly. The problem has been gradually worsening. The pain is mild. Associated symptoms include discolored urine, hematuria and nausea. Pertinent negatives include no abdominal pain, chest pain, chills, constipation, coughing, diarrhea, dysuria, fever, flank pain, frequency, headaches, hesitancy, joint pain, joint swelling, painful intercourse, rash, shortness of breath, sore throat, urgency, urinary retention or vomiting. The testicular pain affects the right testicle. There is swelling in the right testicle. The color of the testicles is normal. Nothing aggravates the symptoms. He has tried nothing for the symptoms. The treatment provided no relief. He is not sexually active. He never uses condoms. No, his partner does not have an STD. There is no history of BPH, chlamydia, cryptorchidism, erectile aid use, erectile dysfunction, a femoral hernia, gonorrhea, herpes simplex, HIV, an inguinal hernia, kidney stones, prostatitis, sickle cell disease, syphilis or varicocele.       Constitution: Negative for chills, fatigue and fever.   HENT: Negative for congestion and sore throat.    Neck: Negative for painful lymph nodes.   Cardiovascular: Negative for chest pain and leg swelling.   Eyes: Negative for " double vision and blurred vision.   Respiratory: Negative for cough and shortness of breath.    Gastrointestinal: Positive for nausea. Negative for abdominal pain, vomiting, constipation and diarrhea.   Genitourinary: Positive for penile pain, scrotal swelling and testicular pain. Negative for dysuria, frequency, urgency, flank pain, penile discharge and pelvic pain.   Musculoskeletal: Negative for joint pain, joint swelling, muscle cramps and muscle ache.   Skin: Negative for color change, pale and rash.   Allergic/Immunologic: Negative for seasonal allergies.   Neurological: Negative for dizziness, history of vertigo, light-headedness, passing out and headaches.   Hematologic/Lymphatic: Negative for swollen lymph nodes, easy bruising/bleeding and history of blood clots. Does not bruise/bleed easily.   Psychiatric/Behavioral: Negative for nervous/anxious, sleep disturbance and depression. The patient is not nervous/anxious.        Objective:      Physical Exam   Constitutional: He is oriented to person, place, and time. He appears well-developed and well-nourished. No distress.   HENT:   Head: Normocephalic and atraumatic.   Right Ear: External ear normal.   Left Ear: External ear normal.   Nose: Nose normal. No nasal deformity. No epistaxis.   Mouth/Throat: Oropharynx is clear and moist and mucous membranes are normal.   Eyes: Conjunctivae and lids are normal.   Neck: Trachea normal, normal range of motion and phonation normal. Neck supple.   Cardiovascular: Normal rate, regular rhythm, normal heart sounds and intact distal pulses.   Pulmonary/Chest: Effort normal and breath sounds normal.   Abdominal: Soft. Normal appearance and bowel sounds are normal. He exhibits no distension. There is no tenderness. There is no CVA tenderness.   Genitourinary: Right testis shows swelling and tenderness. Uncircumcised.   Musculoskeletal: Normal range of motion.   Neurological: He is alert and oriented to person, place, and  time. He has normal reflexes.   Skin: Skin is warm, dry and intact. He is not diaphoretic.   Psychiatric: He has a normal mood and affect. His speech is normal and behavior is normal. Judgment and thought content normal. Cognition and memory are normal.   Nursing note and vitals reviewed.      Assessment:       1. Testicular swelling, right        Plan:         Testicular swelling, right  -     Refer to Emergency Dept.     -EMS offered but pt declined. He will be traveling to ER by private vehicle. Risks of this decision were discussed. Pt v/u.  Patient Instructions   Please report directly to the Emergency Department for further evaluation

## 2019-05-18 PROBLEM — I50.42 CHRONIC COMBINED SYSTOLIC AND DIASTOLIC HEART FAILURE: Chronic | Status: ACTIVE | Noted: 2019-05-18

## 2019-05-18 PROBLEM — Z95.810 ICD (IMPLANTABLE CARDIOVERTER-DEFIBRILLATOR), DUAL, IN SITU: Chronic | Status: ACTIVE | Noted: 2018-11-28

## 2019-05-18 PROBLEM — E11.69 DIABETES MELLITUS TYPE 2 IN OBESE: Chronic | Status: ACTIVE | Noted: 2017-01-11

## 2019-05-18 PROBLEM — E83.42 HYPOMAGNESEMIA: Status: ACTIVE | Noted: 2019-05-18

## 2019-05-18 PROBLEM — E66.9 DIABETES MELLITUS TYPE 2 IN OBESE: Chronic | Status: ACTIVE | Noted: 2017-01-11

## 2019-05-18 PROBLEM — N17.9 AKI (ACUTE KIDNEY INJURY): Status: ACTIVE | Noted: 2019-05-18

## 2019-05-18 PROBLEM — K21.9 GERD (GASTROESOPHAGEAL REFLUX DISEASE): Chronic | Status: ACTIVE | Noted: 2017-01-11

## 2019-05-18 PROBLEM — I10 BENIGN ESSENTIAL HTN: Chronic | Status: ACTIVE | Noted: 2017-07-19

## 2019-05-18 PROBLEM — E78.5 HLD (HYPERLIPIDEMIA): Chronic | Status: ACTIVE | Noted: 2017-01-11

## 2019-05-18 PROBLEM — N40.0 BENIGN PROSTATIC HYPERPLASIA WITHOUT LOWER URINARY TRACT SYMPTOMS: Chronic | Status: ACTIVE | Noted: 2017-07-19

## 2019-05-18 PROBLEM — I25.10 CORONARY ARTERY DISEASE INVOLVING NATIVE CORONARY ARTERY OF NATIVE HEART WITHOUT ANGINA PECTORIS: Chronic | Status: ACTIVE | Noted: 2017-01-11

## 2019-05-18 LAB
ALBUMIN SERPL BCP-MCNC: 3.2 G/DL (ref 3.5–5.2)
ALP SERPL-CCNC: 48 U/L (ref 55–135)
ALT SERPL W/O P-5'-P-CCNC: 121 U/L (ref 10–44)
ANION GAP SERPL CALC-SCNC: 9 MMOL/L (ref 8–16)
AST SERPL-CCNC: 100 U/L (ref 10–40)
BASOPHILS # BLD AUTO: 0.05 K/UL (ref 0–0.2)
BASOPHILS NFR BLD: 0.6 % (ref 0–1.9)
BILIRUB SERPL-MCNC: 0.4 MG/DL (ref 0.1–1)
BUN SERPL-MCNC: 17 MG/DL (ref 8–23)
CALCIUM SERPL-MCNC: 9.2 MG/DL (ref 8.7–10.5)
CHLORIDE SERPL-SCNC: 103 MMOL/L (ref 95–110)
CO2 SERPL-SCNC: 25 MMOL/L (ref 23–29)
CREAT SERPL-MCNC: 1.4 MG/DL (ref 0.5–1.4)
DIFFERENTIAL METHOD: ABNORMAL
EOSINOPHIL # BLD AUTO: 0.2 K/UL (ref 0–0.5)
EOSINOPHIL NFR BLD: 2.3 % (ref 0–8)
ERYTHROCYTE [DISTWIDTH] IN BLOOD BY AUTOMATED COUNT: 14 % (ref 11.5–14.5)
EST. GFR  (AFRICAN AMERICAN): 58.4 ML/MIN/1.73 M^2
EST. GFR  (NON AFRICAN AMERICAN): 50.5 ML/MIN/1.73 M^2
ESTIMATED AVG GLUCOSE: 171 MG/DL (ref 68–131)
GLUCOSE SERPL-MCNC: 208 MG/DL (ref 70–110)
HBA1C MFR BLD HPLC: 7.6 % (ref 4–5.6)
HCT VFR BLD AUTO: 34.6 % (ref 40–54)
HGB BLD-MCNC: 11.3 G/DL (ref 14–18)
IMM GRANULOCYTES # BLD AUTO: 0.02 K/UL (ref 0–0.04)
IMM GRANULOCYTES NFR BLD AUTO: 0.2 % (ref 0–0.5)
LYMPHOCYTES # BLD AUTO: 2 K/UL (ref 1–4.8)
LYMPHOCYTES NFR BLD: 23.2 % (ref 18–48)
MAGNESIUM SERPL-MCNC: 1.5 MG/DL (ref 1.6–2.6)
MCH RBC QN AUTO: 29.7 PG (ref 27–31)
MCHC RBC AUTO-ENTMCNC: 32.7 G/DL (ref 32–36)
MCV RBC AUTO: 91 FL (ref 82–98)
MONOCYTES # BLD AUTO: 1.2 K/UL (ref 0.3–1)
MONOCYTES NFR BLD: 13.9 % (ref 4–15)
NEUTROPHILS # BLD AUTO: 5 K/UL (ref 1.8–7.7)
NEUTROPHILS NFR BLD: 59.8 % (ref 38–73)
NRBC BLD-RTO: 0 /100 WBC
PHOSPHATE SERPL-MCNC: 2.7 MG/DL (ref 2.7–4.5)
PLATELET # BLD AUTO: 170 K/UL (ref 150–350)
PMV BLD AUTO: 11 FL (ref 9.2–12.9)
POCT GLUCOSE: 210 MG/DL (ref 70–110)
POCT GLUCOSE: 216 MG/DL (ref 70–110)
POCT GLUCOSE: 226 MG/DL (ref 70–110)
POCT GLUCOSE: 236 MG/DL (ref 70–110)
POTASSIUM SERPL-SCNC: 4.4 MMOL/L (ref 3.5–5.1)
PROT SERPL-MCNC: 6.7 G/DL (ref 6–8.4)
RBC # BLD AUTO: 3.81 M/UL (ref 4.6–6.2)
SODIUM SERPL-SCNC: 137 MMOL/L (ref 136–145)
TROPONIN I SERPL DL<=0.01 NG/ML-MCNC: 0.01 NG/ML (ref 0–0.03)
TROPONIN I SERPL DL<=0.01 NG/ML-MCNC: 0.02 NG/ML (ref 0–0.03)
WBC # BLD AUTO: 8.39 K/UL (ref 3.9–12.7)

## 2019-05-18 PROCEDURE — G0378 HOSPITAL OBSERVATION PER HR: HCPCS

## 2019-05-18 PROCEDURE — 83735 ASSAY OF MAGNESIUM: CPT

## 2019-05-18 PROCEDURE — 99226 PR SUBSEQUENT OBSERVATION CARE,LEVEL III: CPT | Mod: ,,, | Performed by: PHYSICIAN ASSISTANT

## 2019-05-18 PROCEDURE — 99226 PR SUBSEQUENT OBSERVATION CARE,LEVEL III: ICD-10-PCS | Mod: ,,, | Performed by: PHYSICIAN ASSISTANT

## 2019-05-18 PROCEDURE — 25000003 PHARM REV CODE 250: Performed by: PHYSICIAN ASSISTANT

## 2019-05-18 PROCEDURE — S5571 INSULIN DISPOS PEN 3 ML: HCPCS | Performed by: INTERNAL MEDICINE

## 2019-05-18 PROCEDURE — 84100 ASSAY OF PHOSPHORUS: CPT

## 2019-05-18 PROCEDURE — 36415 COLL VENOUS BLD VENIPUNCTURE: CPT

## 2019-05-18 PROCEDURE — 80074 ACUTE HEPATITIS PANEL: CPT

## 2019-05-18 PROCEDURE — 87491 CHLMYD TRACH DNA AMP PROBE: CPT

## 2019-05-18 PROCEDURE — 84484 ASSAY OF TROPONIN QUANT: CPT

## 2019-05-18 PROCEDURE — 85025 COMPLETE CBC W/AUTO DIFF WBC: CPT

## 2019-05-18 PROCEDURE — 87040 BLOOD CULTURE FOR BACTERIA: CPT | Mod: 59

## 2019-05-18 PROCEDURE — 63600175 PHARM REV CODE 636 W HCPCS: Performed by: PHYSICIAN ASSISTANT

## 2019-05-18 PROCEDURE — 82962 GLUCOSE BLOOD TEST: CPT

## 2019-05-18 PROCEDURE — 84484 ASSAY OF TROPONIN QUANT: CPT | Mod: 91

## 2019-05-18 PROCEDURE — 80053 COMPREHEN METABOLIC PANEL: CPT

## 2019-05-18 PROCEDURE — 83036 HEMOGLOBIN GLYCOSYLATED A1C: CPT

## 2019-05-18 PROCEDURE — 63600175 PHARM REV CODE 636 W HCPCS: Performed by: INTERNAL MEDICINE

## 2019-05-18 RX ORDER — CEFTRIAXONE 1 G/1
1 INJECTION, POWDER, FOR SOLUTION INTRAMUSCULAR; INTRAVENOUS
Status: DISCONTINUED | OUTPATIENT
Start: 2019-05-18 | End: 2019-05-19 | Stop reason: HOSPADM

## 2019-05-18 RX ORDER — GLUCAGON 1 MG
1 KIT INJECTION
Status: DISCONTINUED | OUTPATIENT
Start: 2019-05-18 | End: 2019-05-19 | Stop reason: HOSPADM

## 2019-05-18 RX ORDER — MAGNESIUM SULFATE HEPTAHYDRATE 40 MG/ML
2 INJECTION, SOLUTION INTRAVENOUS ONCE
Status: COMPLETED | OUTPATIENT
Start: 2019-05-18 | End: 2019-05-18

## 2019-05-18 RX ORDER — GLUCAGON 1 MG
1 KIT INJECTION
Status: DISCONTINUED | OUTPATIENT
Start: 2019-05-18 | End: 2019-05-18

## 2019-05-18 RX ORDER — BISACODYL 10 MG
10 SUPPOSITORY, RECTAL RECTAL DAILY PRN
Status: DISCONTINUED | OUTPATIENT
Start: 2019-05-18 | End: 2019-05-19 | Stop reason: HOSPADM

## 2019-05-18 RX ORDER — INSULIN ASPART 100 [IU]/ML
1-10 INJECTION, SOLUTION INTRAVENOUS; SUBCUTANEOUS EVERY 6 HOURS PRN
Status: DISCONTINUED | OUTPATIENT
Start: 2019-05-18 | End: 2019-05-18

## 2019-05-18 RX ORDER — INSULIN ASPART 100 [IU]/ML
1-10 INJECTION, SOLUTION INTRAVENOUS; SUBCUTANEOUS
Status: DISCONTINUED | OUTPATIENT
Start: 2019-05-18 | End: 2019-05-18

## 2019-05-18 RX ORDER — INSULIN ASPART 100 [IU]/ML
1-10 INJECTION, SOLUTION INTRAVENOUS; SUBCUTANEOUS
Status: DISCONTINUED | OUTPATIENT
Start: 2019-05-18 | End: 2019-05-19 | Stop reason: HOSPADM

## 2019-05-18 RX ORDER — POLYETHYLENE GLYCOL 3350 17 G/17G
17 POWDER, FOR SOLUTION ORAL 2 TIMES DAILY PRN
Status: DISCONTINUED | OUTPATIENT
Start: 2019-05-18 | End: 2019-05-19 | Stop reason: HOSPADM

## 2019-05-18 RX ORDER — OXYCODONE HYDROCHLORIDE 5 MG/1
5 TABLET ORAL EVERY 4 HOURS PRN
Status: DISCONTINUED | OUTPATIENT
Start: 2019-05-18 | End: 2019-05-19 | Stop reason: HOSPADM

## 2019-05-18 RX ADMIN — RANOLAZINE 1000 MG: 500 TABLET, FILM COATED, EXTENDED RELEASE ORAL at 11:05

## 2019-05-18 RX ADMIN — FERROUS SULFATE TAB EC 325 MG (65 MG FE EQUIVALENT) 325 MG: 325 (65 FE) TABLET DELAYED RESPONSE at 11:05

## 2019-05-18 RX ADMIN — POLYETHYLENE GLYCOL 3350 17 G: 17 POWDER, FOR SOLUTION ORAL at 11:05

## 2019-05-18 RX ADMIN — INSULIN ASPART 4 UNITS: 100 INJECTION, SOLUTION INTRAVENOUS; SUBCUTANEOUS at 06:05

## 2019-05-18 RX ADMIN — MAGNESIUM SULFATE IN WATER 2 G: 40 INJECTION, SOLUTION INTRAVENOUS at 09:05

## 2019-05-18 RX ADMIN — TICAGRELOR 90 MG: 90 TABLET ORAL at 11:05

## 2019-05-18 RX ADMIN — FINASTERIDE 5 MG: 5 TABLET, FILM COATED ORAL at 09:05

## 2019-05-18 RX ADMIN — RANOLAZINE 1000 MG: 500 TABLET, FILM COATED, EXTENDED RELEASE ORAL at 09:05

## 2019-05-18 RX ADMIN — RAMELTEON 8 MG: 8 TABLET, FILM COATED ORAL at 11:05

## 2019-05-18 RX ADMIN — INSULIN DETEMIR 20 UNITS: 100 INJECTION, SOLUTION SUBCUTANEOUS at 10:05

## 2019-05-18 RX ADMIN — TICAGRELOR 90 MG: 90 TABLET ORAL at 01:05

## 2019-05-18 RX ADMIN — SENNOSIDES,DOCUSATE SODIUM 1 TABLET: 8.6; 5 TABLET, FILM COATED ORAL at 01:05

## 2019-05-18 RX ADMIN — FERROUS SULFATE TAB EC 325 MG (65 MG FE EQUIVALENT) 325 MG: 325 (65 FE) TABLET DELAYED RESPONSE at 04:05

## 2019-05-18 RX ADMIN — INSULIN ASPART 4 UNITS: 100 INJECTION, SOLUTION INTRAVENOUS; SUBCUTANEOUS at 12:05

## 2019-05-18 RX ADMIN — RAMELTEON 8 MG: 8 TABLET, FILM COATED ORAL at 01:05

## 2019-05-18 RX ADMIN — EZETIMIBE 10 MG: 10 TABLET ORAL at 09:05

## 2019-05-18 RX ADMIN — ISOSORBIDE MONONITRATE 60 MG: 30 TABLET, EXTENDED RELEASE ORAL at 09:05

## 2019-05-18 RX ADMIN — FUROSEMIDE 20 MG: 20 TABLET ORAL at 09:05

## 2019-05-18 RX ADMIN — FERROUS SULFATE TAB EC 325 MG (65 MG FE EQUIVALENT) 325 MG: 325 (65 FE) TABLET DELAYED RESPONSE at 09:05

## 2019-05-18 RX ADMIN — SENNOSIDES,DOCUSATE SODIUM 1 TABLET: 8.6; 5 TABLET, FILM COATED ORAL at 11:05

## 2019-05-18 RX ADMIN — SODIUM CHLORIDE, SODIUM LACTATE, POTASSIUM CHLORIDE, AND CALCIUM CHLORIDE 500 ML: .6; .31; .03; .02 INJECTION, SOLUTION INTRAVENOUS at 04:05

## 2019-05-18 RX ADMIN — INSULIN ASPART 2 UNITS: 100 INJECTION, SOLUTION INTRAVENOUS; SUBCUTANEOUS at 11:05

## 2019-05-18 RX ADMIN — ROSUVASTATIN CALCIUM 40 MG: 40 TABLET, FILM COATED ORAL at 09:05

## 2019-05-18 RX ADMIN — LOSARTAN POTASSIUM 50 MG: 50 TABLET, FILM COATED ORAL at 09:05

## 2019-05-18 RX ADMIN — SPIRONOLACTONE 25 MG: 25 TABLET, FILM COATED ORAL at 09:05

## 2019-05-18 RX ADMIN — TICAGRELOR 90 MG: 90 TABLET ORAL at 09:05

## 2019-05-18 RX ADMIN — TAMSULOSIN HYDROCHLORIDE 0.4 MG: 0.4 CAPSULE ORAL at 09:05

## 2019-05-18 RX ADMIN — PANTOPRAZOLE SODIUM 40 MG: 40 TABLET, DELAYED RELEASE ORAL at 09:05

## 2019-05-18 RX ADMIN — SENNOSIDES,DOCUSATE SODIUM 1 TABLET: 8.6; 5 TABLET, FILM COATED ORAL at 09:05

## 2019-05-18 RX ADMIN — RANOLAZINE 1000 MG: 500 TABLET, FILM COATED, EXTENDED RELEASE ORAL at 01:05

## 2019-05-18 RX ADMIN — CEFTRIAXONE SODIUM 1 G: 1 INJECTION, POWDER, FOR SOLUTION INTRAMUSCULAR; INTRAVENOUS at 01:05

## 2019-05-18 RX ADMIN — INSULIN DETEMIR 20 UNITS: 100 INJECTION, SOLUTION SUBCUTANEOUS at 11:05

## 2019-05-18 RX ADMIN — AMIODARONE HYDROCHLORIDE 200 MG: 200 TABLET ORAL at 09:05

## 2019-05-18 NOTE — ASSESSMENT & PLAN NOTE
- Patient had biopsy of prostate nodule on 5/13/2019.  - Continue Proscar 5mg daily, Flomax 0.4mg daily.

## 2019-05-18 NOTE — ASSESSMENT & PLAN NOTE
Coronary artery disease involving native coronary artery of native heart without angina pectoris    - Troponin <0.006.  Will trend.  - Heart score of 5.  - NPO midnight, monitor on telemetry, hold BB.  - Continue Otilia Acosta ASA.

## 2019-05-18 NOTE — H&P
[facility] Medicine  History & Physical    Patient Name: Walter Bull  MRN: [id number]  Admission Date: 5/17/2019  Attending Physician: Ailyn Giles MD   Primary Care Provider: Belkys Kruger MD    [facility] Medicine Team: [facility]OSP MED E Bessie Rosario PA-C     Patient information was obtained from patient, spouse/SO, past medical records and ER records.     Subjective:     Principal Problem:Epididymitis    Chief Complaint:   Chief Complaint   Patient presents with    Male  Problem     Presents to ED c/o R testicle swelling, pain, and intermittent hematuria since yesterday. States pain started sharp and has become more dull. Pt had prostate biopsy Monday.         HPI: Patient is a 68 year old gentleman with a h/o CAD s/p CABG on Brilinta, chronic systolic and diastolic HF, HTN, HLD, and DM II.  He presents with testicular pain.  Patient states that he began having right testicular pain yesterday.  He reports dysuria and swelling.  He denies warmth, erythema.  He underwent a biopsy of a prostate nodule on 5/13/2019.  Denies fevers, but reports chills last night.  Patient also had an episode of left-sided chest pain while in the ED.  He describes it as a constant pressure that did not radiate.  Pain has resolved.  He noted associated nausea, but denies SOB, vomiting, abdominal pain.     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       Past Surgical History:   Procedure Laterality Date    Angiogram, Aortic Arch, Coronary  11/16/2018    Performed by Ryan Schmidt MD at  [facility] CATH LAB    Bypass graft study  11/16/2018    Performed by Ryan Schmidt MD at [facility] CATH LAB    CARDIAC DEFIBRILLATOR PLACEMENT      CARDIAC ELECTROPHYSIOLOGY STUDY N/A 11/19/2018    Performed by Byron Glasgow MD at [facility] EP LAB    CARDIAC ELECTROPHYSIOLOGY STUDY N/A 11/19/2018    Performed by Byron Glasgow MD at [facility] EP LAB    COLON SURGERY  2006    COLONOSCOPY N/A 2/2/2017    Performed by Ronnie Conway MD at [facility] ENDO    CORONARY ARTERY BYPASS GRAFT  2005    5 arteries    ESOPHAGOGASTRODUODENOSCOPY (EGD) N/A 3/21/2018    Performed by Fawad Cunningham MD at [facility] ENDO (4TH FLR)    EYE SURGERY Bilateral 2016    Laser surgery for glaucoma    INSERTION, DUAL ICD Left 11/19/2018    Performed by Byron Glasgow MD at [facility] EP LAB    Left heart cath Left 11/16/2018    Performed by Ryan Schmidt MD at [facility] CATH LAB       Review of patient's allergies indicates:  No Known Allergies    No current facility-administered medications on file prior to encounter.      Current Outpatient Medications on File Prior to Encounter   Medication Sig    amiodarone (PACERONE) 200 MG Tab Take 1 tablet (200 mg total) by mouth once daily.    carvedilol (COREG) 3.125 MG tablet Take 1 tablet (3.125 mg total) by mouth 2 (two) times daily with meals.    clopidogrel (PLAVIX) 75 mg tablet     esomeprazole (NEXIUM) 40 MG capsule Take 1 capsule (40 mg total) by mouth before breakfast.    ezetimibe (ZETIA) 10 mg tablet Take 1 tablet (10 mg total) by mouth once daily.    ferrous sulfate (FEOSOL) 325 mg (65 mg iron) Tab tablet TAKE 1 TABLET BY MOUTH THREE TIMES DAILY    finasteride (PROSCAR) 5 mg tablet Take 1 tablet (5 mg total) by mouth once daily.    furosemide (LASIX) 20 MG tablet Take 1 tablet (20 mg total) by mouth once daily. Take an extra tablet daily for wt gain more than 3 pounds/day or 5 pounds/week    insulin NPH (NOVOLIN N) 100 unit/mL injection Inject 50 Units into the skin before breakfast.    isosorbide  mononitrate (IMDUR) 60 MG 24 hr tablet Take 1 tablet (60 mg total) by mouth once daily.    losartan (COZAAR) 50 MG tablet Take 1 tablet (50 mg total) by mouth once daily.    nitroGLYCERIN (NITROSTAT) 0.4 MG SL tablet DISSOLVE ONE TABLET UNDER THE TONGUE EVERY 5 MINUTES AS NEEDED FOR CHEST PAIN.    RANEXA 1,000 mg Tb12 Take 1 tablet (1,000 mg total) by mouth 2 (two) times daily.    rosuvastatin (CRESTOR) 40 MG Tab Take 1 tablet (40 mg total) by mouth once daily.    SITagliptin (JANUVIA) 100 MG Tab Take 1 tablet (100 mg total) by mouth once daily.    spironolactone (ALDACTONE) 25 MG tablet Take 1 tablet (25 mg total) by mouth once daily.    tamsulosin (FLOMAX) 0.4 mg Cap Take 1 capsule (0.4 mg total) by mouth once daily.    ticagrelor (BRILINTA) 90 mg tablet Take 90 mg by mouth 2 (two) times daily.    albuterol (PROVENTIL/VENTOLIN HFA) 90 mcg/actuation inhaler Inhale 2 puffs into the lungs every 6 (six) hours as needed for Wheezing.     Family History     Problem Relation (Age of Onset)    Diabetes Mother, Sister    Heart attack Brother    Heart disease Mother, Sister    Hypertension Sister        Tobacco Use    Smoking status: Former Smoker     Packs/day: 3.00     Types: Cigarettes     Start date: 1963     Last attempt to quit: 1981     Years since quittin.4    Smokeless tobacco: Former User     Types: Snuff, Chew     Quit date:    Substance and Sexual Activity    Alcohol use: No    Drug use: No    Sexual activity: Yes     Comment: -with a significant other     Review of Systems   Constitutional: Positive for chills. Negative for activity change, appetite change, diaphoresis, fatigue, fever and unexpected weight change.   HENT: Negative for congestion, rhinorrhea, sore throat, trouble swallowing and voice change.    Eyes: Negative for visual disturbance.   Respiratory: Negative for cough, choking, chest tightness, shortness of breath and wheezing.    Cardiovascular: Positive  for chest pain. Negative for palpitations and leg swelling.   Gastrointestinal: Positive for nausea. Negative for abdominal distention, abdominal pain, anal bleeding, blood in stool, constipation, diarrhea and vomiting.   Endocrine: Negative for cold intolerance, heat intolerance, polydipsia and polyuria.   Genitourinary: Positive for dysuria, scrotal swelling and testicular pain. Negative for flank pain, frequency, hematuria and urgency.   Musculoskeletal: Negative for arthralgias, back pain, joint swelling and myalgias.   Skin: Negative for color change and rash.   Neurological: Negative for dizziness, seizures, syncope, facial asymmetry, speech difficulty, weakness, light-headedness, numbness and headaches.   Hematological: Negative for adenopathy. Does not bruise/bleed easily.   Psychiatric/Behavioral: Negative for agitation, confusion, hallucinations and suicidal ideas.     Objective:     Vital Signs (Most Recent):  Temp: 98 °F (36.7 °C) (05/18/19 0044)  Pulse: 60 (05/18/19 0044)  Resp: 18 (05/18/19 0044)  BP: 122/60 (05/18/19 0044)  SpO2: 100 % (05/18/19 0044) Vital Signs (24h Range):  Temp:  [97.3 °F (36.3 °C)-98.7 °F (37.1 °C)] 98 °F (36.7 °C)  Pulse:  [60-67] 60  Resp:  [15-19] 18  SpO2:  [98 %-100 %] 100 %  BP: (120-174)/(56-79) 122/60     Weight: 104.3 kg (230 lb)  Body mass index is 36.02 kg/m².    Physical Exam   Constitutional: He is oriented to person, place, and time. He appears well-developed and well-nourished. No distress.   HENT:   Head: Normocephalic and atraumatic.   Eyes: Pupils are equal, round, and reactive to light.   Neck: Neck supple. Carotid bruit is not present. No thyromegaly present.   Cardiovascular: Normal rate and regular rhythm. Exam reveals no gallop.   No murmur heard.  Pulmonary/Chest: Effort normal and breath sounds normal. No respiratory distress. He has no wheezes.   Abdominal: Bowel sounds are normal. He exhibits no distension. There is no splenomegaly or hepatomegaly.  There is no tenderness.   Genitourinary: Right testis shows swelling and tenderness. Left testis shows no swelling and no tenderness.   Musculoskeletal: Normal range of motion. He exhibits no edema.   Neurological: He is alert and oriented to person, place, and time. No cranial nerve deficit or sensory deficit.   Skin: Skin is warm and dry. No rash noted.   Psychiatric: He has a normal mood and affect. His behavior is normal.         CRANIAL NERVES     CN III, IV, VI   Pupils are equal, round, and reactive to light.       Significant Labs:   CBC:   Recent Labs   Lab 05/17/19 2043   WBC 10.85   HGB 11.5*   HCT 35.7*        CMP:   Recent Labs   Lab 05/17/19 2043   *   K 4.1   CL 98   CO2 24   *   BUN 21   CREATININE 1.9*   CALCIUM 9.4   PROT 7.5   ALBUMIN 3.7   BILITOT 0.4   ALKPHOS 55   *   *   ANIONGAP 10   EGFRNONAA 34.9*     Cardiac Markers:   Recent Labs   Lab 05/17/19 2043   *     Troponin:   Recent Labs   Lab 05/17/19 2043   TROPONINI <0.006     Urine Studies:   Recent Labs   Lab 05/17/19 2043   COLORU Yellow   APPEARANCEUA Clear   PHUR 5.0   SPECGRAV 1.015   PROTEINUA 1+*   GLUCUA 3+*   KETONESU Negative   BILIRUBINUA Negative   OCCULTUA 3+*   NITRITE Negative   LEUKOCYTESUR Negative   RBCUA >100*   WBCUA 0   BACTERIA None   SQUAMEPITHEL 1   HYALINECASTS 0       Significant Imaging: I have reviewed all pertinent imaging results/findings within the past 24 hours.    Assessment/Plan:     * Epididymitis  - UA showed hematuria.  - US showed enlargement and hyperemia of the right epididymis, suggestive of acute epididymitis.  - Will test for N. gonorrhoeae and C. trachomatis.    - Patient has a h/o wide complex tachycardia and is on amiodarone 200mg daily.  Floroquinolones, Bactrim contraindicated.  Patient discussed with Dr. Horowitz.  Will start on Rocephin.      Chest pain  Coronary artery disease involving native coronary artery of native heart without angina  pectoris    - Troponin <0.006.  Will trend.  - Heart score of 5.  - NPO midnight, monitor on telemetry, hold BB.  - Continue Imdur, Brilinta, ASA.    Chronic combined systolic and diastolic heart failure  ICD (implantable cardioverter-defibrillator), dual, in situ    - No evidence of decompensation.  - November 2018 echo showed EF 45% with grade 1 diastolic dysfunction.  - Continue ARB, Lasix 20mg daily.  BB held, restart ASAP.    Benign prostatic hyperplasia without lower urinary tract symptoms  - Patient had biopsy of prostate nodule on 5/13/2019.  - Continue Proscar 5mg daily, Flomax 0.4mg daily.      Benign essential HTN  - Hold Coreg 3.125mg BID.  - Continue losartan 50mg daily, spironolactone 25mg daily.      GERD (gastroesophageal reflux disease)  - Protonix.      HLD (hyperlipidemia)  - Continue Crestor 40mg daily, Zetia 10mg daily.      Diabetes mellitus type 2 in obese  - NPO SSI.      VTE Risk Mitigation (From admission, onward)        Ordered     IP VTE HIGH RISK PATIENT  Once      05/17/19 2355     Place sequential compression device  Until discontinued      05/17/19 2355     Place KADY hose  Until discontinued      05/17/19 2355             Bessie Rosario PA-C  Department of Hospital Medicine   [facility]

## 2019-05-18 NOTE — HOSPITAL COURSE
Walter Bull was admitted to [facility] Medicine for epididymitis which was demonstrated on scrotal US. Patient started on antibiotics--- Keflex PO chosen with respect to his PMH of wide complex tachycardia. Patient ambulated and reported extreme dizziness; orthostatics +. Patient advised to hold Finastride and tamulosin until seen by urology as these may be causing his orthostatic hypotension, patient to call and schedule follow-up appointment and monitor urinary output daily.

## 2019-05-18 NOTE — ED NOTES
Walter Bull, a 68 y.o. male presents to the ED with CC right scrotal pain and swelling. Pt reports hematuria intermittently. Pt had a prostate biopsy this past Monday. Pt states pain and swelling began last night. Pt also complaining of tightness to left chest wall. Pt states tightness began upon arrival to ED. Pt with PMH CABG 15 years ago. Pt has ICD to left chest.     Patient identifiers verified verbally with patient and correct for Walter Bull.    LOC/ APPEARANCE: The patient is AAOx4. Pt is speaking appropriately, no slurred speech. Pt changed into hospital gown. Continuous cardiac monitor, cont pulse ox, and auto BP cuff applied to patient. Pt is clean and well groomed. No JVD visible. Pt reports pain level of 4/10 to chest and scrotum. Pt updated on POC. Pt's girlfriend at bedside. Bed low and locked with side rails up x2, call bell in pt reach.    SKIN: Skin is warm dry and intact, and color is consistent with ethnicity. Capillary refill <3 seconds. No breakdown or brusing visible. Mucus membranes moist, acyanotic.    RESPIRATORY: Airway is open and patent. Respirations-spontaneous, unlabored, regular rate, equal bilaterally on inspiration and expiration. No accessory muscle use noted. Lungs clear to auscultation in all fields bilaterally anterior and posterior.     CARDIAC: Patient has regular heart rate.  No peripheral edema noted. Pt complaining of tightness to the left chest wall. Pt denies SOB. Peripheral pulses present equal and strong throughout.    ABDOMEN: Soft and non-tender to palpation with no distention noted. Normoactive bowel sounds x4 quadrants. Pt has no complaints of abnormal bowel movements, denies blood in stool.     NEUROLOGIC: Eyes open spontaneously and facial expression symmetrical. Pt behavior appropriate to situation, and pt follows commands. Pt reports sensation present in all extremities when touched with a finger, denies any numbness or tingling.  PERRLA.    MUSCULOSKELETAL: Spontaneous movement noted to all extremities. Hand  equal and leg strength strong +5 bilaterally.     : Pt reports intermittent hematuria over the past few days. Pt with tenderness to palpation of right pelvic region. Pt also with tenderness upon palpation of scrotum bilaterally. Swelling noted to bilateral testes, with more prominent swelling noted to the right testicle. No discharge noted.

## 2019-05-18 NOTE — ED TRIAGE NOTES
Pt presents today with complaints of right scrotal pain and swelling x1 day. Pt states he had a prostate biopsy on Monday. Pt with PMH heart attack with CABG 15 years ago. Pt complaining of left sided chest pain that is tight in quality. CP does not radiate. Pt states CP began upon arrival to ED.

## 2019-05-18 NOTE — SUBJECTIVE & OBJECTIVE
Interval History: Orthostatic vitals +. IVFs ordered. Will reassess in the AM.     Review of Systems   Constitutional: Positive for chills. Negative for activity change, appetite change, diaphoresis, fatigue, fever and unexpected weight change.   HENT: Negative for congestion, rhinorrhea, sore throat, trouble swallowing and voice change.    Eyes: Negative for visual disturbance.   Respiratory: Negative for cough, choking, chest tightness, shortness of breath and wheezing.    Cardiovascular: Negative for chest pain, palpitations and leg swelling.   Gastrointestinal: Negative for abdominal distention, abdominal pain, anal bleeding, blood in stool, constipation, diarrhea, nausea and vomiting.   Endocrine: Negative for cold intolerance, heat intolerance, polydipsia and polyuria.   Genitourinary: Positive for dysuria, scrotal swelling and testicular pain. Negative for flank pain, frequency, hematuria and urgency.   Musculoskeletal: Negative for arthralgias, back pain, joint swelling and myalgias.   Skin: Negative for color change and rash.   Neurological: Positive for dizziness and light-headedness. Negative for seizures, syncope, facial asymmetry, speech difficulty, weakness, numbness and headaches.   Hematological: Negative for adenopathy. Does not bruise/bleed easily.   Psychiatric/Behavioral: Negative for agitation, confusion, hallucinations and suicidal ideas.     Objective:     Vital Signs (Most Recent):  Temp: 98 °F (36.7 °C) (05/18/19 1553)  Pulse: 60 (05/18/19 1557)  Resp: 18 (05/18/19 1553)  BP: 135/62 (05/18/19 1557)  SpO2: 98 % (05/18/19 1553) Vital Signs (24h Range):  Temp:  [97.3 °F (36.3 °C)-98.7 °F (37.1 °C)] 98 °F (36.7 °C)  Pulse:  [60-77] 60  Resp:  [15-19] 18  SpO2:  [97 %-100 %] 98 %  BP: ()/(47-79) 135/62     Weight: 102.7 kg (226 lb 8.4 oz)  Body mass index is 35.48 kg/m².    Intake/Output Summary (Last 24 hours) at 5/18/2019 1750  Last data filed at 5/18/2019 1115  Gross per 24 hour   Intake  --   Output 1750 ml   Net -1750 ml      Physical Exam   Constitutional: He is oriented to person, place, and time. He appears well-developed and well-nourished. No distress.   HENT:   Head: Normocephalic and atraumatic.   Eyes: Pupils are equal, round, and reactive to light.   Neck: Neck supple. Carotid bruit is not present. No thyromegaly present.   Cardiovascular: Normal rate and regular rhythm. Exam reveals no gallop.   No murmur heard.  Pulmonary/Chest: Effort normal and breath sounds normal. No respiratory distress. He has no wheezes.   Abdominal: Bowel sounds are normal. He exhibits no distension. There is no splenomegaly or hepatomegaly. There is no tenderness.   Genitourinary: Right testis shows swelling and tenderness. Left testis shows no swelling and no tenderness.   Musculoskeletal: Normal range of motion. He exhibits no edema.   Neurological: He is alert and oriented to person, place, and time. No cranial nerve deficit or sensory deficit.   Skin: Skin is warm and dry. No rash noted.   Psychiatric: He has a normal mood and affect. His behavior is normal.       Significant Labs: All pertinent labs within the past 24 hours have been reviewed.    Significant Imaging: I have reviewed all pertinent imaging results/findings within the past 24 hours.

## 2019-05-18 NOTE — ED NOTES
Report given to ANCA Mailn on Obs. Pt still in ultrasound. Will arrange for transport when pt returns.

## 2019-05-18 NOTE — ED PROVIDER NOTES
Encounter Date: 5/17/2019       History     Chief Complaint   Patient presents with    Male  Problem     Presents to ED c/o R testicle swelling, pain, and intermittent hematuria since yesterday. States pain started sharp and has become more dull. Pt had prostate biopsy Monday.      Patient is 68 year old male with PMHX of CAD on Brilinta 90mg, CABG x5, ischemic cardiomyopathy, c-CHF with 45%EF, HTN, HLD, and DM1. He presents to the ED for testicular pain. He reports having right testicular pain onset yesterday. Describes pain as intermittent and pressure. Rates pain 3/10. Reports pain worse with urination, but also relieved with urination. Patient recently underwent prostate biopsy of prostate nodule on 05/13/19 by Dr. Carver. Patient also, reports having left sided chest pain onset during ED visit. Describes pain as constant and pressure. Rates pain 4/10. Denies pain radiation. Reports associated fatigue. Hx of MI. He denies fever,chills, nausea, vomiting, sob, abd pain, dysuria, diarrhea, or constipation. He is a former smoker and denies alcohol use.        Review of patient's allergies indicates:  No Known Allergies  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     Past Surgical History:   Procedure Laterality Date    Angiogram, Aortic Arch, Coronary  11/16/2018    [facility]formed [facility] CATH LAB    Bypass graft study  11/16/2018    Performed by [facility] at [facility] CATH LAB    CARDIAC DEFIBRILLATOR PLACEMENT      CARDIAC ELECTROPHYSIOLOGY STUDY N/A 11/19/2018    Performed by Byron  CORDELIA Glasgow MD at [facility] EP LAB    CARDIAC ELECTROPHYSIOLOGY STUDY N/A 2018    Performed by Byron Glasgow MD at [facility] EP LAB    COLON SURGERY  2006    COLONOSCOPY N/A 2017    [facility] at [facility]    CORONARY ARTERY BYPASS GRAFT  2005    5 arteries    ESOPHAGOGASTRODUODENOSCOPY (EGD) N/A 3/21/2018    Performed by Fawad Cunningham MD at [facility] ENDO (4TH FLR)    EYE SURGERY Bilateral 2016    Laser surgery for glaucoma    INSERTION, DUAL ICD Left 2018    Performed by Byron Glasgow MD at [facility] EP LAB    Left heart cath Left 2018    Performed by Ryan Schmidt MD at [facility] CATH LAB     Family History   Problem Relation Age of Onset    Diabetes Mother     Heart disease Mother     Hypertension Sister     Diabetes Sister     Heart disease Sister     Heart attack Brother     Colon cancer Neg Hx     Esophageal cancer Neg Hx     Stomach cancer Neg Hx      Social History     Tobacco Use    Smoking status: Former Smoker     Packs/day: 3.00     Types: Cigarettes     Start date: 1963     Last attempt to quit: 1981     Years since quittin.4    Smokeless tobacco: Former User     Types: Snuff, Chew     Quit date:    Substance Use Topics    Alcohol use: No    Drug use: No     Review of Systems   Constitutional: Positive for fatigue. Negative for fever.   HENT: Negative for sore throat.    Respiratory: Negative for shortness of breath.    Cardiovascular: Positive for chest pain.   Gastrointestinal: Negative for abdominal pain, nausea and vomiting.   Genitourinary: Positive for scrotal swelling and testicular pain. Negative for discharge, dysuria, penile pain and penile swelling.   Musculoskeletal: Negative for back pain.   Skin: Negative for rash.   Neurological: Negative for weakness.   Hematological: Does not bruise/bleed easily.       Physical Exam     Initial Vitals [19]   BP Pulse Resp Temp SpO2   (!) 165/73 61 18 98.4 °F (36.9 °C) 99 %      MAP        --         Physical Exam    Vitals reviewed.  Constitutional: He appears well-developed and well-nourished. No distress.   HENT:   Head: Normocephalic.   Eyes: Conjunctivae are normal.   Neck: Normal range of motion.   Cardiovascular: Normal rate and regular rhythm.   No murmur heard.The patient has a device (subcutaneous AICD) in the left chest.   Pulmonary/Chest: Breath sounds normal. No respiratory distress. He has no wheezes. He has no rales.   Abdominal: Soft. Bowel sounds are normal. He exhibits no distension. There is no tenderness.   Genitourinary: Right testis shows swelling and tenderness. Left testis shows no swelling and no tenderness. Uncircumcised. No penile erythema or penile tenderness. No discharge found.   Genitourinary Comments:  exam performed, patient tolerated exam well. Female chaperone at bedside during exam.    Musculoskeletal: Normal range of motion.   Neurological: He is alert and oriented to person, place, and time.   Skin: Skin is warm and dry. No erythema.         ED Course   Procedures  Labs Reviewed   CBC W/ AUTO DIFFERENTIAL - Abnormal; Notable for the following components:       Result Value    RBC 3.87 (*)     Hemoglobin 11.5 (*)     Hematocrit 35.7 (*)     Mono # 1.4 (*)     All other components within normal limits    Narrative:     shared lavender   COMPREHENSIVE METABOLIC PANEL - Abnormal; Notable for the following components:    Sodium 132 (*)     Glucose 332 (*)     Creatinine 1.9 (*)      (*)      (*)     eGFR if  40.4 (*)     eGFR if non  34.9 (*)     All other components within normal limits    Narrative:     shared lavender   B-TYPE NATRIURETIC PEPTIDE - Abnormal; Notable for the following components:     (*)     All other components within normal limits    Narrative:     shared lavender   URINALYSIS, REFLEX TO URINE CULTURE - Abnormal; Notable for the following components:    Protein, UA 1+ (*)     Glucose, UA  3+ (*)     Occult Blood UA 3+ (*)     All other components within normal limits    Narrative:     Preferred Collection Type->Urine, Clean Catch  yellow and grey   URINALYSIS MICROSCOPIC - Abnormal; Notable for the following components:    RBC, UA >100 (*)     All other components within normal limits    Narrative:     Preferred Collection Type->Urine, Clean Catch  yellow and grey   TROPONIN I    Narrative:     shared lavender   PROTIME-INR    Narrative:     shared lavender          Imaging Results          US Scrotum And Testicles (Final result)  Result time 05/17/19 23:22:59    Final result by Altaf Barton MD (05/17/19 23:22:59)                 Impression:      Enlargement and hyperemia of the right epididymis suggestive of acute epididymitis.  Small right-sided hydrocele.    Electronically signed by resident: Shaji Stanley  Date:    05/17/2019  Time:    23:03    Electronically signed by: Altaf Barton MD  Date:    05/17/2019  Time:    23:22             Narrative:    EXAMINATION:  US SCROTUM AND TESTICLES    CLINICAL HISTORY:  Right testicular pain.    TECHNIQUE:  Sonography of the scrotum and testes.    COMPARISON:  None.    FINDINGS:  Right Testicle:    *Size: 3.6 x 2.4 x 2.9 cm  *Appearance: Normal.  *Flow: Normal arterial and venous flow  *Epididymis: Enlarged with hypervascularity suggestive of epididymitis.  *Hydrocele: Small.  *Varicocele: None.  Left Testicle:    *Size: 2.8 x 2.0 x 2.5 cm  *Appearance: Normal.  *Flow: Normal arterial and venous flow  *Epididymis: Normal.  *Hydrocele: Small.  *Varicocele: None.  Other findings: [id number] color Doppler imaging of the testicles demonstrates normal and symmetric flow.                               X-Ray Chest PA And Lateral (Final result)  Result time 05/17/19 21:51:31    Final result by Arya Horn MD (05/17/19 21:51:31)                 Impression:      No failure or pneumonia or pneumothorax.      Electronically signed by: Arya  Kory  Date:    05/17/2019  Time:    21:51             Narrative:    EXAMINATION:  XR CHEST PA AND LATERAL    CLINICAL HISTORY:  Other fatigue    TECHNIQUE:  PA and lateral views of the chest were performed.    COMPARISON:  12/22/2018    FINDINGS:  Frontal and 2 lateral views presented.  The heart is mildly enlarged unchanged.  No interstitial edema or pneumothorax or pleural effusion or consolidation or nodule.    There are sternotomy wires mediastinal clips a CABG.  There is cardiac pacing device with leads extending to the right atrium and right ventricle.  There are multiple overlying leads.  Trachea appears normal.  Visualized spine appears intact.                                       APC / Resident Notes:   Patient is 68 year old male presents to the ED for emergent evaluation of chest pain and testicular pain.     Will order labs and imaging. Will continue to monitor.     Differential diagnoses include, but are not limited to: ACS, cardiac arrhythmia, epididymitis, testicular mass, or electrolyte imbalance.     No leukocytosis. Hemodynamically stable. Initial troponin WNL. Minimally elevated . UA found to have gross hematuria. CXR found to have no acute cardiopulmonary process.     Will admit to medicine for observation. US testicles and scrotum pending.     I have discussed and reviewed with my supervising physician.        Clinical Impression:       ICD-10-CM ICD-9-CM   1. Chest pain R07.9 786.50   2. Fatigue R53.83 780.79   3. Pain in right testicle N50.811 608.9   4. Gross hematuria R31.0 599.71         Disposition:   Disposition: Placed in Observation  Condition: Stable                        Nelly Ceballos PA-C  05/18/19 0545

## 2019-05-18 NOTE — ASSESSMENT & PLAN NOTE
Keflex PO at discharge   - UA showed hematuria.  - US showed enlargement and hyperemia of the right epididymis, suggestive of acute epididymitis.  - Will test for N. gonorrhoeae and C. trachomatis.    - Patient has a h/o wide complex tachycardia and is on amiodarone 200mg daily.  Floroquinolones, Bactrim contraindicated.  Patient discussed with Dr. Horowitz.

## 2019-05-18 NOTE — PLAN OF CARE
Problem: Adult Inpatient Plan of Care  Goal: Plan of Care Review  Outcome: Ongoing (interventions implemented as appropriate)  Patient's vital signs remained stable this shift.  Patient had complaints of chest pain.  Blood sugar elevated - covered with PRN insulin ( see mar).  Patient has been NPO since 0040.  SLIV.   Fall and safety precautions initiated and continued.

## 2019-05-18 NOTE — ED NOTES
Telemetry Verification   Patient placed on Telemetry Box  Verified on ED monitor  Box [id number]   Monitor Tech Blanca   Rate 60   Rhythm Normal Sinus

## 2019-05-18 NOTE — HPI
Patient is a 68 year old gentleman with a h/o CAD s/p CABG on Brilinta, chronic systolic and diastolic HF, HTN, HLD, and DM II.  He presents with testicular pain.  Patient states that he began having right testicular pain yesterday.  He reports dysuria and swelling.  He denies warmth, erythema.  He underwent a biopsy of a prostate nodule on 5/13/2019.  Denies fevers, but reports chills last night.  Patient also had an episode of left-sided chest pain while in the ED.  He describes it as a constant pressure that did not radiate.  Pain has resolved.  He noted associated nausea, but denies SOB, vomiting, abdominal pain.

## 2019-05-18 NOTE — PROGRESS NOTES
[facility] Hospital Medicine  Progress Note    Patient Name: Walter Bull  MRN: [id number]  Patient Class: OP- Observation   Admission Date: 5/17/2019  Length of Stay: 0 days  Attending Physician: Ailyn Giles MD  Primary Care Provider: Belkys Kruger MD    [facility] Medicine Team: [facility] HOSP MED E Birgit Lucas PA-C    Subjective:     Principal Problem:Epididymitis    HPI:  Patient is a 68 year old gentleman with a h/o CAD s/p CABG on Brilinta, chronic systolic and diastolic HF, HTN, HLD, and DM II.  He presents with testicular pain.  Patient states that he began having right testicular pain yesterday.  He reports dysuria and swelling.  He denies warmth, erythema.  He underwent a biopsy of a prostate nodule on 5/13/2019.  Denies fevers, but reports chills last night.  Patient also had an episode of left-sided chest pain while in the ED.  He describes it as a constant pressure that did not radiate.  Pain has resolved.  He noted associated nausea, but denies SOB, vomiting, abdominal pain.     Hospital Course:  Walter Bull was admitted to Hospital Medicine for epididymitis which was demonstrated on scrotal US. Patient started on antibiotics--- Keflex PO chosen with respect to his PMH of wide complex tachycardia. Patient ambulated and reported extreme dizziness; orthostatics +. IVFs administered, TTE ordered.     Interval History: Orthostatic vitals +. IVFs ordered. Will reassess in the AM.     Review of Systems   Constitutional: Positive for chills. Negative for activity change, appetite change, diaphoresis, fatigue, fever and unexpected weight change.   HENT: Negative for congestion, rhinorrhea, sore throat, trouble swallowing and voice change.    Eyes: Negative for visual disturbance.   Respiratory: Negative for cough, choking, chest tightness, shortness of breath and wheezing.    Cardiovascular: Negative for chest pain, palpitations and leg swelling.   Gastrointestinal: Negative  for abdominal distention, abdominal pain, anal bleeding, blood in stool, constipation, diarrhea, nausea and vomiting.   Endocrine: Negative for cold intolerance, heat intolerance, polydipsia and polyuria.   Genitourinary: Positive for dysuria, scrotal swelling and testicular pain. Negative for flank pain, frequency, hematuria and urgency.   Musculoskeletal: Negative for arthralgias, back pain, joint swelling and myalgias.   Skin: Negative for color change and rash.   Neurological: Positive for dizziness and light-headedness. Negative for seizures, syncope, facial asymmetry, speech difficulty, weakness, numbness and headaches.   Hematological: Negative for adenopathy. Does not bruise/bleed easily.   Psychiatric/Behavioral: Negative for agitation, confusion, hallucinations and suicidal ideas.     Objective:     Vital Signs (Most Recent):  Temp: 98 °F (36.7 °C) (05/18/19 1553)  Pulse: 60 (05/18/19 1557)  Resp: 18 (05/18/19 1553)  BP: 135/62 (05/18/19 1557)  SpO2: 98 % (05/18/19 1553) Vital Signs (24h Range):  Temp:  [97.3 °F (36.3 °C)-98.7 °F (37.1 °C)] 98 °F (36.7 °C)  Pulse:  [60-77] 60  Resp:  [15-19] 18  SpO2:  [97 %-100 %] 98 %  BP: ()/(47-79) 135/62     Weight: 102.7 kg (226 lb 8.4 oz)  Body mass index is 35.48 kg/m².    Intake/Output Summary (Last 24 hours) at 5/18/2019 1750  Last data filed at 5/18/2019 1115  Gross per 24 hour   Intake --   Output 1750 ml   Net -1750 ml      Physical Exam   Constitutional: He is oriented to person, place, and time. He appears well-developed and well-nourished. No distress.   HENT:   Head: Normocephalic and atraumatic.   Eyes: Pupils are equal, round, and reactive to light.   Neck: Neck supple. Carotid bruit is not present. No thyromegaly present.   Cardiovascular: Normal rate and regular rhythm. Exam reveals no gallop.   No murmur heard.  Pulmonary/Chest: Effort normal and breath sounds normal. No respiratory distress. He has no wheezes.   Abdominal: Bowel sounds are  normal. He exhibits no distension. There is no splenomegaly or hepatomegaly. There is no tenderness.   Genitourinary: Right testis shows swelling and tenderness. Left testis shows no swelling and no tenderness.   Musculoskeletal: Normal range of motion. He exhibits no edema.   Neurological: He is alert and oriented to person, place, and time. No cranial nerve deficit or sensory deficit.   Skin: Skin is warm and dry. No rash noted.   Psychiatric: He has a normal mood and affect. His behavior is normal.       Significant Labs: All pertinent labs within the past 24 hours have been reviewed.    Significant Imaging: I have reviewed all pertinent imaging results/findings within the past 24 hours.    Assessment/Plan:      * Epididymitis  Keflex PO at discharge   - UA showed hematuria.  - US showed enlargement and hyperemia of the right epididymis, suggestive of acute epididymitis.  - Will test for N. gonorrhoeae and C. trachomatis.    - Patient has a h/o wide complex tachycardia and is on amiodarone 200mg daily.  Floroquinolones, Bactrim contraindicated.  Patient discussed with Dr. Horowitz.        Chronic combined systolic and diastolic heart failure  ICD (implantable cardioverter-defibrillator), dual, in situ    - No evidence of decompensation.  - November 2018 echo showed EF 45% with grade 1 diastolic dysfunction.  - Continue ARB, Lasix 20mg daily.  BB held, restart ASAP.    Chest pain  Coronary artery disease involving native coronary artery of native heart without angina pectoris    - Troponin <0.006.  Will trend.  - Heart score of 5.  - NPO midnight, monitor on telemetry, hold BB.  - Continue Imdur, Brilinta, ASA.    Benign prostatic hyperplasia without lower urinary tract symptoms  - Patient had biopsy of prostate nodule on 5/13/2019.  - Continue Proscar 5mg daily, Flomax 0.4mg daily.      Benign essential HTN  - Hold Coreg 3.125mg BID.  - Continue losartan 50mg daily, spironolactone 25mg daily.      GERD  (gastroesophageal reflux disease)  - Protonix.      HLD (hyperlipidemia)  - Continue Crestor 40mg daily, Zetia 10mg daily.      Diabetes mellitus type 2 in obese  - NPO SSI.      VTE Risk Mitigation (From admission, onward)        Ordered     IP VTE HIGH RISK PATIENT  Once      05/17/19 2355     Place sequential compression device  Until discontinued      05/17/19 1985              Birgit Lucas PA-C  Department of Hospital Medicine   [facility]

## 2019-05-18 NOTE — ASSESSMENT & PLAN NOTE
- UA showed hematuria.  - US showed enlargement and hyperemia of the right epididymis, suggestive of acute epididymitis.  - Will test for N. gonorrhoeae and C. trachomatis.    - Patient has a h/o wide complex tachycardia and is on amiodarone 200mg daily.  Floroquinolones, Bactrim contraindicated.  Patient discussed with Dr. Horowitz.  Will start on Rocephin.

## 2019-05-18 NOTE — ASSESSMENT & PLAN NOTE
ICD (implantable cardioverter-defibrillator), dual, in situ    - No evidence of decompensation.  - November 2018 echo showed EF 45% with grade 1 diastolic dysfunction.  - Continue ARB, Lasix 20mg daily.  BB held, restart ASAP.

## 2019-05-18 NOTE — SUBJECTIVE & OBJECTIVE
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       Past Surgical History:   Procedure Laterality Date    Angiogram, Aortic Arch, Coronary  11/16/2018    Performed by Ryan Schmidt MD at [facility] CATH LAB    Bypass graft study  11/16/2018    Performed by Ryan Schmidt MD at [facility] CATH LAB    CARDIAC DEFIBRILLATOR PLACEMENT      CARDIAC ELECTROPHYSIOLOGY STUDY N/A 11/19/2018    Performed by Byron Glasgow MD at [facility] EP LAB    CARDIAC ELECTROPHYSIOLOGY STUDY N/A 11/19/2018    Performed by Byron Glasgow MD at [facility] EP LAB    COLON SURGERY  2006    COLONOSCOPY N/A 2/2/2017    [facility] at [facility] ENDO    CORONARY ARTERY BYPASS GRAFT  2005    5 arteries    ESOPHAGOGASTRODUODENOSCOPY (EGD) N/A 3/21/2018    Performed by Fawad Cunningham MD at [facility] ENDO (4TH FLR)    EYE SURGERY Bilateral 2016    Laser surgery for glaucoma    INSERTION, DUAL ICD Left 11/19/2018    Performed by Byron Glasgow MD at [facility] EP LAB    Left heart cath Left 11/16/2018    Performed by Ryan Schmidt MD at [facility] CATH LAB       Review of patient's allergies indicates:  No Known Allergies    No current facility-administered medications on file prior to encounter.      Current Outpatient Medications on File Prior to Encounter   Medication Sig    amiodarone (PACERONE) 200 MG Tab Take 1 tablet (200 mg total) by mouth once daily.    carvedilol (COREG) 3.125 MG tablet Take 1 tablet (3.125 mg total) by mouth 2 (two) times daily with meals.    clopidogrel (PLAVIX) 75 mg tablet     esomeprazole (NEXIUM) 40 MG capsule Take 1  capsule (40 mg total) by mouth before breakfast.    ezetimibe (ZETIA) 10 mg tablet Take 1 tablet (10 mg total) by mouth once daily.    ferrous sulfate (FEOSOL) 325 mg (65 mg iron) Tab tablet TAKE 1 TABLET BY MOUTH THREE TIMES DAILY    finasteride (PROSCAR) 5 mg tablet Take 1 tablet (5 mg total) by mouth once daily.    furosemide (LASIX) 20 MG tablet Take 1 tablet (20 mg total) by mouth once daily. Take an extra tablet daily for wt gain more than 3 pounds/day or 5 pounds/week    insulin NPH (NOVOLIN N) 100 unit/mL injection Inject 50 Units into the skin before breakfast.    isosorbide mononitrate (IMDUR) 60 MG 24 hr tablet Take 1 tablet (60 mg total) by mouth once daily.    losartan (COZAAR) 50 MG tablet Take 1 tablet (50 mg total) by mouth once daily.    nitroGLYCERIN (NITROSTAT) 0.4 MG SL tablet DISSOLVE ONE TABLET UNDER THE TONGUE EVERY 5 MINUTES AS NEEDED FOR CHEST PAIN.    RANEXA 1,000 mg Tb12 Take 1 tablet (1,000 mg total) by mouth 2 (two) times daily.    rosuvastatin (CRESTOR) 40 MG Tab Take 1 tablet (40 mg total) by mouth once daily.    SITagliptin (JANUVIA) 100 MG Tab Take 1 tablet (100 mg total) by mouth once daily.    spironolactone (ALDACTONE) 25 MG tablet Take 1 tablet (25 mg total) by mouth once daily.    tamsulosin (FLOMAX) 0.4 mg Cap Take 1 capsule (0.4 mg total) by mouth once daily.    ticagrelor (BRILINTA) 90 mg tablet Take 90 mg by mouth 2 (two) times daily.    albuterol (PROVENTIL/VENTOLIN HFA) 90 mcg/actuation inhaler Inhale 2 puffs into the lungs every 6 (six) hours as needed for Wheezing.     Family History     Problem Relation (Age of Onset)    Diabetes Mother, Sister    Heart attack Brother    Heart disease Mother, Sister    Hypertension Sister        Tobacco Use    Smoking status: Former Smoker     Packs/day: 3.00     Types: Cigarettes     Start date: 1963     Last attempt to quit: 1981     Years since quittin.4    Smokeless tobacco: Former User     Types:  Snuff, Chew     Quit date: 1984   Substance and Sexual Activity    Alcohol use: No    Drug use: No    Sexual activity: Yes     Comment: -with a significant other     Review of Systems   Constitutional: Positive for chills. Negative for activity change, appetite change, diaphoresis, fatigue, fever and unexpected weight change.   HENT: Negative for congestion, rhinorrhea, sore throat, trouble swallowing and voice change.    Eyes: Negative for visual disturbance.   Respiratory: Negative for cough, choking, chest tightness, shortness of breath and wheezing.    Cardiovascular: Positive for chest pain. Negative for palpitations and leg swelling.   Gastrointestinal: Positive for nausea. Negative for abdominal distention, abdominal pain, anal bleeding, blood in stool, constipation, diarrhea and vomiting.   Endocrine: Negative for cold intolerance, heat intolerance, polydipsia and polyuria.   Genitourinary: Positive for dysuria, scrotal swelling and testicular pain. Negative for flank pain, frequency, hematuria and urgency.   Musculoskeletal: Negative for arthralgias, back pain, joint swelling and myalgias.   Skin: Negative for color change and rash.   Neurological: Negative for dizziness, seizures, syncope, facial asymmetry, speech difficulty, weakness, light-headedness, numbness and headaches.   Hematological: Negative for adenopathy. Does not bruise/bleed easily.   Psychiatric/Behavioral: Negative for agitation, confusion, hallucinations and suicidal ideas.     Objective:     Vital Signs (Most Recent):  Temp: 98 °F (36.7 °C) (05/18/19 0044)  Pulse: 60 (05/18/19 0044)  Resp: 18 (05/18/19 0044)  BP: 122/60 (05/18/19 0044)  SpO2: 100 % (05/18/19 0044) Vital Signs (24h Range):  Temp:  [97.3 °F (36.3 °C)-98.7 °F (37.1 °C)] 98 °F (36.7 °C)  Pulse:  [60-67] 60  Resp:  [15-19] 18  SpO2:  [98 %-100 %] 100 %  BP: (120-174)/(56-79) 122/60     Weight: 104.3 kg (230 lb)  Body mass index is 36.02 kg/m².    Physical Exam    Constitutional: He is oriented to person, place, and time. He appears well-developed and well-nourished. No distress.   HENT:   Head: Normocephalic and atraumatic.   Eyes: Pupils are equal, round, and reactive to light.   Neck: Neck supple. Carotid bruit is not present. No thyromegaly present.   Cardiovascular: Normal rate and regular rhythm. Exam reveals no gallop.   No murmur heard.  Pulmonary/Chest: Effort normal and breath sounds normal. No respiratory distress. He has no wheezes.   Abdominal: Bowel sounds are normal. He exhibits no distension. There is no splenomegaly or hepatomegaly. There is no tenderness.   Genitourinary: Right testis shows swelling and tenderness. Left testis shows no swelling and no tenderness.   Musculoskeletal: Normal range of motion. He exhibits no edema.   Neurological: He is alert and oriented to person, place, and time. No cranial nerve deficit or sensory deficit.   Skin: Skin is warm and dry. No rash noted.   Psychiatric: He has a normal mood and affect. His behavior is normal.         CRANIAL NERVES     CN III, IV, VI   Pupils are equal, round, and reactive to light.       Significant Labs:   CBC:   Recent Labs   Lab 05/17/19 2043   WBC 10.85   HGB 11.5*   HCT 35.7*        CMP:   Recent Labs   Lab 05/17/19 2043   *   K 4.1   CL 98   CO2 24   *   BUN 21   CREATININE 1.9*   CALCIUM 9.4   PROT 7.5   ALBUMIN 3.7   BILITOT 0.4   ALKPHOS 55   *   *   ANIONGAP 10   EGFRNONAA 34.9*     Cardiac Markers:   Recent Labs   Lab 05/17/19 2043   *     Troponin:   Recent Labs   Lab 05/17/19 2043   TROPONINI <0.006     Urine Studies:   Recent Labs   Lab 05/17/19 2043   COLORU Yellow   APPEARANCEUA Clear   PHUR 5.0   SPECGRAV 1.015   PROTEINUA 1+*   GLUCUA 3+*   KETONESU Negative   BILIRUBINUA Negative   OCCULTUA 3+*   NITRITE Negative   LEUKOCYTESUR Negative   RBCUA >100*   WBCUA 0   BACTERIA None   SQUAMEPITHEL 1   HYALINECASTS 0       Significant  Imaging: I have reviewed all pertinent imaging results/findings within the past 24 hours.

## 2019-05-19 VITALS
BODY MASS INDEX: 35.47 KG/M2 | DIASTOLIC BLOOD PRESSURE: 57 MMHG | TEMPERATURE: 99 F | WEIGHT: 226 LBS | HEART RATE: 60 BPM | OXYGEN SATURATION: 95 % | RESPIRATION RATE: 18 BRPM | HEIGHT: 67 IN | SYSTOLIC BLOOD PRESSURE: 106 MMHG

## 2019-05-19 LAB
ALBUMIN SERPL BCP-MCNC: 3 G/DL (ref 3.5–5.2)
ALP SERPL-CCNC: 51 U/L (ref 55–135)
ALT SERPL W/O P-5'-P-CCNC: 143 U/L (ref 10–44)
ANION GAP SERPL CALC-SCNC: 9 MMOL/L (ref 8–16)
ASCENDING AORTA: 3.01 CM
AST SERPL-CCNC: 103 U/L (ref 10–40)
AV INDEX (PROSTH): 0.66
AV MEAN GRADIENT: 5.33 MMHG
AV PEAK GRADIENT: 10.11 MMHG
AV VALVE AREA: 2.16 CM2
AV VELOCITY RATIO: 0.65
BASOPHILS # BLD AUTO: 0.03 K/UL (ref 0–0.2)
BASOPHILS NFR BLD: 0.3 % (ref 0–1.9)
BILIRUB SERPL-MCNC: 0.5 MG/DL (ref 0.1–1)
BSA FOR ECHO PROCEDURE: 2.2 M2
BUN SERPL-MCNC: 15 MG/DL (ref 8–23)
CALCIUM SERPL-MCNC: 9.6 MG/DL (ref 8.7–10.5)
CHLORIDE SERPL-SCNC: 102 MMOL/L (ref 95–110)
CO2 SERPL-SCNC: 25 MMOL/L (ref 23–29)
CREAT SERPL-MCNC: 1.2 MG/DL (ref 0.5–1.4)
CV ECHO LV RWT: 0.29 CM
DIFFERENTIAL METHOD: ABNORMAL
DOP CALC AO PEAK VEL: 1.59 M/S
DOP CALC AO VTI: 28.07 CM
DOP CALC LVOT AREA: 3.27 CM2
DOP CALC LVOT DIAMETER: 2.04 CM
DOP CALC LVOT PEAK VEL: 1.03 M/S
DOP CALC LVOT STROKE VOLUME: 60.53 CM3
DOP CALCLVOT PEAK VEL VTI: 18.53 CM
E WAVE DECELERATION TIME: 227.1 MSEC
E/A RATIO: 0.79
E/E' RATIO: 7.41
ECHO LV POSTERIOR WALL: 0.73 CM (ref 0.6–1.1)
EOSINOPHIL # BLD AUTO: 0.1 K/UL (ref 0–0.5)
EOSINOPHIL NFR BLD: 1.4 % (ref 0–8)
ERYTHROCYTE [DISTWIDTH] IN BLOOD BY AUTOMATED COUNT: 13.8 % (ref 11.5–14.5)
EST. GFR  (AFRICAN AMERICAN): >60 ML/MIN/1.73 M^2
EST. GFR  (NON AFRICAN AMERICAN): >60 ML/MIN/1.73 M^2
FRACTIONAL SHORTENING: 23 % (ref 28–44)
GLUCOSE SERPL-MCNC: 149 MG/DL (ref 70–110)
HCT VFR BLD AUTO: 35.9 % (ref 40–54)
HGB BLD-MCNC: 12 G/DL (ref 14–18)
IMM GRANULOCYTES # BLD AUTO: 0.03 K/UL (ref 0–0.04)
IMM GRANULOCYTES NFR BLD AUTO: 0.3 % (ref 0–0.5)
INTERVENTRICULAR SEPTUM: 0.62 CM (ref 0.6–1.1)
IVRT: 0.15 MSEC
LA MAJOR: 6.23 CM
LA MINOR: 6.24 CM
LA WIDTH: 4.84 CM
LEFT ATRIUM SIZE: 4.45 CM
LEFT ATRIUM VOLUME INDEX: 53.6 ML/M2
LEFT ATRIUM VOLUME: 114.15 CM3
LEFT INTERNAL DIMENSION IN SYSTOLE: 3.94 CM (ref 2.1–4)
LEFT VENTRICLE DIASTOLIC VOLUME INDEX: 58.38 ML/M2
LEFT VENTRICLE DIASTOLIC VOLUME: 124.34 ML
LEFT VENTRICLE MASS INDEX: 53.5 G/M2
LEFT VENTRICLE SYSTOLIC VOLUME INDEX: 31.8 ML/M2
LEFT VENTRICLE SYSTOLIC VOLUME: 67.72 ML
LEFT VENTRICULAR INTERNAL DIMENSION IN DIASTOLE: 5.11 CM (ref 3.5–6)
LEFT VENTRICULAR MASS: 113.88 G
LV LATERAL E/E' RATIO: 7
LV SEPTAL E/E' RATIO: 7.88
LYMPHOCYTES # BLD AUTO: 1.7 K/UL (ref 1–4.8)
LYMPHOCYTES NFR BLD: 19.9 % (ref 18–48)
MAGNESIUM SERPL-MCNC: 1.4 MG/DL (ref 1.6–2.6)
MCH RBC QN AUTO: 29.6 PG (ref 27–31)
MCHC RBC AUTO-ENTMCNC: 33.4 G/DL (ref 32–36)
MCV RBC AUTO: 88 FL (ref 82–98)
MONOCYTES # BLD AUTO: 1.3 K/UL (ref 0.3–1)
MONOCYTES NFR BLD: 15.3 % (ref 4–15)
MV PEAK A VEL: 0.8 M/S
MV PEAK E VEL: 0.63 M/S
NEUTROPHILS # BLD AUTO: 5.4 K/UL (ref 1.8–7.7)
NEUTROPHILS NFR BLD: 62.8 % (ref 38–73)
NRBC BLD-RTO: 0 /100 WBC
PISA TR MAX VEL: 2.63 M/S
PLATELET # BLD AUTO: 181 K/UL (ref 150–350)
PMV BLD AUTO: 10.8 FL (ref 9.2–12.9)
POCT GLUCOSE: 135 MG/DL (ref 70–110)
POCT GLUCOSE: 176 MG/DL (ref 70–110)
POCT GLUCOSE: 187 MG/DL (ref 70–110)
POCT GLUCOSE: 211 MG/DL (ref 70–110)
POTASSIUM SERPL-SCNC: 4.5 MMOL/L (ref 3.5–5.1)
PROT SERPL-MCNC: 6.6 G/DL (ref 6–8.4)
PULM VEIN S/D RATIO: 1.32
PV PEAK D VEL: 0.28 M/S
PV PEAK S VEL: 0.37 M/S
RA MAJOR: 5.21 CM
RA PRESSURE: 3 MMHG
RA WIDTH: 4.1 CM
RBC # BLD AUTO: 4.06 M/UL (ref 4.6–6.2)
RIGHT VENTRICULAR END-DIASTOLIC DIMENSION: 3.52 CM
RV TISSUE DOPPLER FREE WALL SYSTOLIC VELOCITY 1 (APICAL 4 CHAMBER VIEW): 5.42 M/S
SINUS: 3.22 CM
SODIUM SERPL-SCNC: 136 MMOL/L (ref 136–145)
STJ: 2.47 CM
TDI LATERAL: 0.09
TDI SEPTAL: 0.08
TDI: 0.09
TR MAX PG: 27.67 MMHG
TRICUSPID ANNULAR PLANE SYSTOLIC EXCURSION: 0.82 CM
TV REST PULMONARY ARTERY PRESSURE: 31 MMHG
WBC # BLD AUTO: 8.6 K/UL (ref 3.9–12.7)

## 2019-05-19 PROCEDURE — G0378 HOSPITAL OBSERVATION PER HR: HCPCS

## 2019-05-19 PROCEDURE — 83735 ASSAY OF MAGNESIUM: CPT

## 2019-05-19 PROCEDURE — 25000003 PHARM REV CODE 250: Performed by: PHYSICIAN ASSISTANT

## 2019-05-19 PROCEDURE — 36415 COLL VENOUS BLD VENIPUNCTURE: CPT

## 2019-05-19 PROCEDURE — 63600175 PHARM REV CODE 636 W HCPCS: Performed by: PHYSICIAN ASSISTANT

## 2019-05-19 PROCEDURE — 99217 PR OBSERVATION CARE DISCHARGE: ICD-10-PCS | Mod: ,,, | Performed by: PHYSICIAN ASSISTANT

## 2019-05-19 PROCEDURE — 85025 COMPLETE CBC W/AUTO DIFF WBC: CPT

## 2019-05-19 PROCEDURE — 99217 PR OBSERVATION CARE DISCHARGE: CPT | Mod: ,,, | Performed by: PHYSICIAN ASSISTANT

## 2019-05-19 PROCEDURE — 80053 COMPREHEN METABOLIC PANEL: CPT

## 2019-05-19 RX ORDER — MAGNESIUM SULFATE HEPTAHYDRATE 40 MG/ML
2 INJECTION, SOLUTION INTRAVENOUS
Status: DISPENSED | OUTPATIENT
Start: 2019-05-19 | End: 2019-05-19

## 2019-05-19 RX ORDER — FINASTERIDE 5 MG/1
TABLET, FILM COATED ORAL
Qty: 90 TABLET | Refills: 3 | Status: SHIPPED | OUTPATIENT
Start: 2019-05-19 | End: 2020-08-17

## 2019-05-19 RX ORDER — TAMSULOSIN HYDROCHLORIDE 0.4 MG/1
CAPSULE ORAL
Qty: 30 CAPSULE | Refills: 3 | OUTPATIENT
Start: 2019-05-19 | End: 2019-06-22

## 2019-05-19 RX ORDER — CEPHALEXIN 500 MG/1
500 CAPSULE ORAL EVERY 6 HOURS
Qty: 28 CAPSULE | Refills: 0 | Status: SHIPPED | OUTPATIENT
Start: 2019-05-19 | End: 2019-05-26

## 2019-05-19 RX ORDER — TAMSULOSIN HYDROCHLORIDE 0.4 MG/1
1 CAPSULE ORAL DAILY
Qty: 30 CAPSULE | Refills: 3 | Status: SHIPPED | OUTPATIENT
Start: 2019-05-19 | End: 2019-05-19 | Stop reason: SDUPTHER

## 2019-05-19 RX ORDER — CEPHALEXIN 500 MG/1
500 CAPSULE ORAL 4 TIMES DAILY
Qty: 28 CAPSULE | Refills: 0 | Status: SHIPPED | OUTPATIENT
Start: 2019-05-19 | End: 2019-05-19 | Stop reason: SDUPTHER

## 2019-05-19 RX ORDER — FINASTERIDE 5 MG/1
5 TABLET, FILM COATED ORAL DAILY
Qty: 90 TABLET | Refills: 3 | Status: SHIPPED | OUTPATIENT
Start: 2019-05-19 | End: 2019-05-19 | Stop reason: SDUPTHER

## 2019-05-19 RX ORDER — LANOLIN ALCOHOL/MO/W.PET/CERES
800 CREAM (GRAM) TOPICAL 2 TIMES DAILY
Status: DISCONTINUED | OUTPATIENT
Start: 2019-05-19 | End: 2019-05-19 | Stop reason: HOSPADM

## 2019-05-19 RX ORDER — CEPHALEXIN 500 MG/1
500 CAPSULE ORAL EVERY 6 HOURS
Qty: 28 CAPSULE | Refills: 0 | Status: SHIPPED | OUTPATIENT
Start: 2019-05-19 | End: 2019-05-19 | Stop reason: SDUPTHER

## 2019-05-19 RX ADMIN — FUROSEMIDE 20 MG: 20 TABLET ORAL at 10:05

## 2019-05-19 RX ADMIN — LOSARTAN POTASSIUM 50 MG: 50 TABLET, FILM COATED ORAL at 10:05

## 2019-05-19 RX ADMIN — MAGNESIUM SULFATE IN WATER 2 G: 40 INJECTION, SOLUTION INTRAVENOUS at 04:05

## 2019-05-19 RX ADMIN — ROSUVASTATIN CALCIUM 40 MG: 40 TABLET, FILM COATED ORAL at 10:05

## 2019-05-19 RX ADMIN — SPIRONOLACTONE 25 MG: 25 TABLET, FILM COATED ORAL at 10:05

## 2019-05-19 RX ADMIN — INSULIN DETEMIR 20 UNITS: 100 INJECTION, SOLUTION SUBCUTANEOUS at 10:05

## 2019-05-19 RX ADMIN — CEFTRIAXONE SODIUM 1 G: 1 INJECTION, POWDER, FOR SOLUTION INTRAMUSCULAR; INTRAVENOUS at 02:05

## 2019-05-19 RX ADMIN — ISOSORBIDE MONONITRATE 60 MG: 30 TABLET, EXTENDED RELEASE ORAL at 10:05

## 2019-05-19 RX ADMIN — EZETIMIBE 10 MG: 10 TABLET ORAL at 10:05

## 2019-05-19 RX ADMIN — FERROUS SULFATE TAB EC 325 MG (65 MG FE EQUIVALENT) 325 MG: 325 (65 FE) TABLET DELAYED RESPONSE at 10:05

## 2019-05-19 RX ADMIN — AMIODARONE HYDROCHLORIDE 200 MG: 200 TABLET ORAL at 10:05

## 2019-05-19 RX ADMIN — FINASTERIDE 5 MG: 5 TABLET, FILM COATED ORAL at 10:05

## 2019-05-19 RX ADMIN — TAMSULOSIN HYDROCHLORIDE 0.4 MG: 0.4 CAPSULE ORAL at 10:05

## 2019-05-19 RX ADMIN — FERROUS SULFATE TAB EC 325 MG (65 MG FE EQUIVALENT) 325 MG: 325 (65 FE) TABLET DELAYED RESPONSE at 03:05

## 2019-05-19 RX ADMIN — SENNOSIDES,DOCUSATE SODIUM 1 TABLET: 8.6; 5 TABLET, FILM COATED ORAL at 10:05

## 2019-05-19 RX ADMIN — PANTOPRAZOLE SODIUM 40 MG: 40 TABLET, DELAYED RELEASE ORAL at 10:05

## 2019-05-19 RX ADMIN — POLYETHYLENE GLYCOL 3350 17 G: 17 POWDER, FOR SOLUTION ORAL at 10:05

## 2019-05-19 RX ADMIN — RANOLAZINE 1000 MG: 500 TABLET, FILM COATED, EXTENDED RELEASE ORAL at 10:05

## 2019-05-19 RX ADMIN — TICAGRELOR 90 MG: 90 TABLET ORAL at 10:05

## 2019-05-19 NOTE — NURSING
Client had uneventful day. Tolerated scheduled meds well. C/o of constipation Hospital Team E notified Family member at bedside TRACY Genao here today ordered LR 500ml bolus for Hypostatic Hypotension given as ordered. Nurse instructed pt to dangle on side of bed before getting up to reduce risk of falls and injury. Patient verbalized understanding. Client resting in bed left in good condition SR up x's 2 call light in reach.

## 2019-05-19 NOTE — PLAN OF CARE
Problem: Adult Inpatient Plan of Care  Goal: Plan of Care Review  Outcome: Ongoing (interventions implemented as appropriate)  Pt. Remains on fall precautions,bed low and locked side rails up,no falls sustained. Blood sugar checked ac and hs,last glucose 226,covered with 2 units novolog insulin continue to check sugars ac and hs. Afebrile,last  WBC count 8.3, continue antibiotics as ordered. Continue current plan of care.

## 2019-05-19 NOTE — DISCHARGE SUMMARY
[facility] Hospital Medicine  Discharge Summary      Patient Name: Walter Bull  MRN: [id number]  Admission Date: 5/17/2019  Hospital Length of Stay: 0 days  Discharge Date and Time:  05/19/2019 2:33 PM  Attending Physician: Ailyn Giles MD   Discharging Provider: Birgit Lucas PA-C  Primary Care Provider: Belkys Kruger MD  [facility] Medicine Team: [facility] HOSP MED E Birgit Lucas PA-C    HPI:   Patient is a 68 year old gentleman with a h/o CAD s/p CABG on Brilinta, chronic systolic and diastolic HF, HTN, HLD, and DM II.  He presents with testicular pain.  Patient states that he began having right testicular pain yesterday.  He reports dysuria and swelling.  He denies warmth, erythema.  He underwent a biopsy of a prostate nodule on 5/13/2019.  Denies fevers, but reports chills last night.  Patient also had an episode of left-sided chest pain while in the ED.  He describes it as a constant pressure that did not radiate.  Pain has resolved.  He noted associated nausea, but denies SOB, vomiting, abdominal pain.     * No surgery found *      Hospital Course:   Walter Bull was admitted to Hospital Medicine for epididymitis which was demonstrated on scrotal US. Patient started on antibiotics--- Keflex PO chosen with respect to his PMH of wide complex tachycardia. Patient ambulated and reported extreme dizziness; orthostatics +. Patient advised to hold Finastride and tamulosin until seen by urology as these may be causing his orthostatic hypotension, patient to call and schedule follow-up appointment and monitor urinary output daily.      Consults:     * Epididymitis  Keflex PO at discharge   - UA showed hematuria.  - US showed enlargement and hyperemia of the right epididymis, suggestive of acute epididymitis.  - N. gonorrhoeae and C. trachomatis. In process  - Patient has a h/o wide complex tachycardia and is on amiodarone 200mg daily.  Floroquinolones, Bactrim contraindicated.   Patient discussed with Dr. Horowitz.      Final Active Diagnoses:    Diagnosis Date Noted POA    PRINCIPAL PROBLEM:  Epididymitis [N45.1] 05/17/2019 Yes    Chronic combined systolic and diastolic heart failure [I50.42] 05/18/2019 Yes     Chronic    Hypomagnesemia [E83.42] 05/18/2019 Yes    HARRIS (acute kidney injury) [N17.9] 05/18/2019 Yes    Chest pain [R07.9] 05/17/2019 Yes    Benign essential HTN [I10] 07/19/2017 Yes     Chronic    Benign prostatic hyperplasia without lower urinary tract symptoms [N40.0] 07/19/2017 Yes     Chronic    Diabetes mellitus type 2 in obese [E11.69, E66.9] 01/11/2017 Yes     Chronic    HLD (hyperlipidemia) [E78.5] 01/11/2017 Yes     Chronic    GERD (gastroesophageal reflux disease) [K21.9] 01/11/2017 Yes     Chronic      Problems Resolved During this Admission:       Discharged Condition: stable    Disposition: Home or Self Care    Follow Up:  Follow-up Information     Schedule an appointment as soon as possible for a visit with Belkys Kruger MD.    Specialty:  Internal Medicine  Contact information:  [address] [place] [id number]  [phone]                 Patient Instructions:      Comprehensive metabolic panel   Standing Status: Future Standing Exp. Date: 07/17/20     Diet Cardiac       Significant Diagnostic Studies: Labs: All labs within the past 24 hours have been reviewed    Pending Diagnostic Studies:     Procedure Component Value Units Date/Time    Hepatitis panel, acute [[id number]] Collected:  05/18/19 1028    Order Status:  Sent Lab Status:  In process Updated:  05/18/19 1031    Specimen:  Blood     Narrative:       Collection has been rescheduled by CDP at 05/18/2019 09:58 Reason:   Due now    US Abdomen Limited [[id number]] Resulted:  05/19/19 1727    Order Status:  Sent Lab Status:  In process Updated:  05/19/19 1727         Medications:  Reconciled Home Medications:      Medication List      START taking these medications    cephALEXin 500 MG  capsule  Commonly known as:  KEFLEX  Take 1 capsule (500 mg total) by mouth 4 (four) times daily. for 7 days     finasteride 5 mg tablet  Commonly known as:  PROSCAR  HOLD DUE TO ORTHOSTATIC HYPOTENSION     tamsulosin 0.4 mg Cap  Commonly known as:  FLOMAX  HOLD DUE TO ORTHOSTATIC HYPOTENSION        CONTINUE taking these medications    albuterol 90 mcg/actuation inhaler  Commonly known as:  PROVENTIL/VENTOLIN HFA  Inhale 2 puffs into the lungs every 6 (six) hours as needed for Wheezing.     amiodarone 200 MG Tab  Commonly known as:  PACERONE  Take 1 tablet (200 mg total) by mouth once daily.     BRILINTA 90 mg tablet  Generic drug:  ticagrelor  Take 90 mg by mouth 2 (two) times daily.     carvedilol 3.125 MG tablet  Commonly known as:  COREG  Take 1 tablet (3.125 mg total) by mouth 2 (two) times daily with meals.     esomeprazole 40 MG capsule  Commonly known as:  NEXIUM  Take 1 capsule (40 mg total) by mouth before breakfast.     ezetimibe 10 mg tablet  Commonly known as:  ZETIA  Take 1 tablet (10 mg total) by mouth once daily.     ferrous sulfate 325 mg (65 mg iron) Tab tablet  Commonly known as:  FEOSOL  TAKE 1 TABLET BY MOUTH THREE TIMES DAILY     furosemide 20 MG tablet  Commonly known as:  LASIX  Take 1 tablet (20 mg total) by mouth once daily. Take an extra tablet daily for wt gain more than 3 pounds/day or 5 pounds/week     insulin  unit/mL injection  Inject 50 Units into the skin before breakfast.     isosorbide mononitrate 60 MG 24 hr tablet  Commonly known as:  IMDUR  Take 1 tablet (60 mg total) by mouth once daily.     losartan 50 MG tablet  Commonly known as:  COZAAR  Take 1 tablet (50 mg total) by mouth once daily.     nitroGLYCERIN 0.4 MG SL tablet  Commonly known as:  NITROSTAT  DISSOLVE ONE TABLET UNDER THE TONGUE EVERY 5 MINUTES AS NEEDED FOR CHEST PAIN.     RANEXA 1,000 mg Tb12  Generic drug:  ranolazine  Take 1 tablet (1,000 mg total) by mouth 2 (two) times daily.     rosuvastatin 40 MG  Tab  Commonly known as:  CRESTOR  Take 1 tablet (40 mg total) by mouth once daily.     SITagliptin 100 MG Tab  Commonly known as:  JANUVIA  Take 1 tablet (100 mg total) by mouth once daily.     spironolactone 25 MG tablet  Commonly known as:  ALDACTONE  Take 1 tablet (25 mg total) by mouth once daily.        STOP taking these medications    clopidogrel 75 mg tablet  Commonly known as:  PLAVIX            Indwelling Lines/Drains at time of discharge:   Lines/Drains/Airways          None          Time spent on the discharge of patient: 30 minutes  Patient was seen and examined on the date of discharge and determined to be suitable for discharge.         Birgit Lucas PA-C  Department of Hospital Medicine  [facility]

## 2019-05-19 NOTE — PLAN OF CARE
Problem: Adult Inpatient Plan of Care  Goal: Plan of Care Review  Outcome: Ongoing (interventions implemented as appropriate)  Pt with no falls or injuries this shift. Pt with no s/s hypo or hyperglycemia this shift. accuchecks ac and hs. Pt afebrile, continues on antibiotics.

## 2019-05-20 LAB
C TRACH DNA SPEC QL NAA+PROBE: NOT DETECTED
HAV IGM SERPL QL IA: NEGATIVE
HBV CORE IGM SERPL QL IA: NEGATIVE
HBV SURFACE AG SERPL QL IA: NEGATIVE
HCV AB SERPL QL IA: NEGATIVE
N GONORRHOEA DNA SPEC QL NAA+PROBE: NOT DETECTED

## 2019-05-20 NOTE — NURSING
Order to d/c home today. Saline lock removed. VSS. Tele box removed. Discharge instructions given to pt and spouse. Pt and spouse verbalized understanding. Pt discharged from OBS unit via w/c. Pt accompanied by transport associate and spouse. All personal belongings taken home by pt.

## 2019-05-21 ENCOUNTER — TELEPHONE (OUTPATIENT)
Dept: UROLOGY | Facility: CLINIC | Age: 69
End: 2019-05-21

## 2019-05-21 ENCOUNTER — OFFICE VISIT (OUTPATIENT)
Dept: INTERNAL MEDICINE | Facility: CLINIC | Age: 69
End: 2019-05-21
Payer: MEDICARE

## 2019-05-21 VITALS
SYSTOLIC BLOOD PRESSURE: 110 MMHG | HEIGHT: 67 IN | DIASTOLIC BLOOD PRESSURE: 68 MMHG | WEIGHT: 220 LBS | OXYGEN SATURATION: 95 % | BODY MASS INDEX: 34.53 KG/M2 | HEART RATE: 80 BPM

## 2019-05-21 DIAGNOSIS — I95.1 ORTHOSTASIS: ICD-10-CM

## 2019-05-21 DIAGNOSIS — R79.89 ELEVATED LFTS: ICD-10-CM

## 2019-05-21 DIAGNOSIS — N45.1 EPIDIDYMITIS: ICD-10-CM

## 2019-05-21 DIAGNOSIS — R11.2 NAUSEA AND VOMITING, INTRACTABILITY OF VOMITING NOT SPECIFIED, UNSPECIFIED VOMITING TYPE: ICD-10-CM

## 2019-05-21 DIAGNOSIS — Z09 HOSPITAL DISCHARGE FOLLOW-UP: Primary | ICD-10-CM

## 2019-05-21 PROCEDURE — 3074F SYST BP LT 130 MM HG: CPT | Mod: CPTII,S$GLB,, | Performed by: INTERNAL MEDICINE

## 2019-05-21 PROCEDURE — 99214 PR OFFICE/OUTPT VISIT, EST, LEVL IV, 30-39 MIN: ICD-10-PCS | Mod: S$GLB,,, | Performed by: INTERNAL MEDICINE

## 2019-05-21 PROCEDURE — 1101F PT FALLS ASSESS-DOCD LE1/YR: CPT | Mod: CPTII,S$GLB,, | Performed by: INTERNAL MEDICINE

## 2019-05-21 PROCEDURE — 99214 OFFICE O/P EST MOD 30 MIN: CPT | Mod: S$GLB,,, | Performed by: INTERNAL MEDICINE

## 2019-05-21 PROCEDURE — 3078F DIAST BP <80 MM HG: CPT | Mod: CPTII,S$GLB,, | Performed by: INTERNAL MEDICINE

## 2019-05-21 PROCEDURE — 3078F PR MOST RECENT DIASTOLIC BLOOD PRESSURE < 80 MM HG: ICD-10-PCS | Mod: CPTII,S$GLB,, | Performed by: INTERNAL MEDICINE

## 2019-05-21 PROCEDURE — 99999 PR PBB SHADOW E&M-EST. PATIENT-LVL III: CPT | Mod: PBBFAC,,, | Performed by: INTERNAL MEDICINE

## 2019-05-21 PROCEDURE — 3074F PR MOST RECENT SYSTOLIC BLOOD PRESSURE < 130 MM HG: ICD-10-PCS | Mod: CPTII,S$GLB,, | Performed by: INTERNAL MEDICINE

## 2019-05-21 PROCEDURE — 1101F PR PT FALLS ASSESS DOC 0-1 FALLS W/OUT INJ PAST YR: ICD-10-PCS | Mod: CPTII,S$GLB,, | Performed by: INTERNAL MEDICINE

## 2019-05-21 PROCEDURE — 99999 PR PBB SHADOW E&M-EST. PATIENT-LVL III: ICD-10-PCS | Mod: PBBFAC,,, | Performed by: INTERNAL MEDICINE

## 2019-05-21 NOTE — PROGRESS NOTES
Subjective:       Patient ID: Walter Bull is a 68 y.o. male.    Chief Complaint: Follow-up (hospital)      HPI:  Patient is known to me and presents for hopspital follow up for epididymitis. He was discharged with PI Keflex. UA showed hematuria and US showed hyperemia of right epididymis. He was s/p prostate biopsy 5/13/19 and admitted 5/17/19 for acute infection. Of note, his flomax and finasteride were held at discharge due to his issues with orthostatic hypotension (we had been adjusting his BB prior to admission). I also note his LFTs were elevated during his admission which was a new problem for him Were normal on labs 2 weeks ago. Hepatitis panel was negative. US hsowed hepatic steatosis.     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       Family History   Problem Relation Age of Onset    Diabetes Mother     Heart disease Mother     Hypertension Sister     Diabetes Sister     Heart disease Sister     Heart attack Brother     Colon cancer Neg Hx     Esophageal cancer Neg Hx     Stomach cancer Neg Hx        Social History     Socioeconomic History    Marital status:      Spouse name: Not on file    Number of children: Not on file    Years of education: Not on file    Highest education level: Not on file   Occupational History    Not on file   Social Needs    Financial resource strain: Not on file    Food insecurity:     Worry: Not on file     Inability: Not on file    Transportation needs:     Medical: Not on file     Non-medical: Not on file   Tobacco Use    Smoking status: Former  Smoker     Packs/day: 3.00     Types: Cigarettes     Start date: 1963     Last attempt to quit: 1981     Years since quittin.4    Smokeless tobacco: Former User     Types: Snuff, Chew     Quit date:    Substance and Sexual Activity    Alcohol use: No    Drug use: No    Sexual activity: Yes     Comment: -with a significant other   Lifestyle    Physical activity:     Days per week: Not on file     Minutes per session: Not on file    Stress: Not on file   Relationships    Social connections:     Talks on phone: Not on file     Gets together: Not on file     Attends Confucianism service: Not on file     Active member of club or organization: Not on file     Attends meetings of clubs or organizations: Not on file     Relationship status: Not on file   Other Topics Concern    Not on file   Social History Narrative    Not on file       Review of Systems   Constitutional: Negative for activity change, fatigue, fever and unexpected weight change.   HENT: Negative for congestion, ear pain, hearing loss, rhinorrhea and sore throat.    Eyes: Negative for pain, redness and visual disturbance.   Respiratory: Negative for cough, shortness of breath and wheezing.    Cardiovascular: Negative for chest pain, palpitations and leg swelling.   Gastrointestinal: Positive for nausea and vomiting. Negative for abdominal pain, constipation and diarrhea.   Genitourinary: Negative for decreased urine volume, dysuria, frequency and urgency.   Musculoskeletal: Negative for back pain, joint swelling and neck pain.   Skin: Negative for color change, rash and wound.   Neurological: Positive for light-headedness. Negative for dizziness, tremors, weakness and headaches.         Objective:      Physical Exam   Constitutional: He is oriented to person, place, and time. He appears well-developed and well-nourished. No distress.   HENT:   Head: Normocephalic and atraumatic.   Right Ear: External ear normal.   Left Ear:  External ear normal.   Eyes: Pupils are equal, round, and reactive to light. Conjunctivae and EOM are normal. Right eye exhibits no discharge. Left eye exhibits no discharge.   Neck: Neck supple. No tracheal deviation present.   Cardiovascular: Normal rate and regular rhythm.   Pulmonary/Chest: Effort normal and breath sounds normal. No respiratory distress. He has no wheezes. He has no rales.   Abdominal: Soft. Bowel sounds are normal. He exhibits no distension. There is no tenderness.   Neurological: He is alert and oriented to person, place, and time. No cranial nerve deficit.   Skin: Skin is warm and dry.   Psychiatric: He has a normal mood and affect. His behavior is normal.   Vitals reviewed.      Assessment:       1. Hospital discharge follow-up    2. Epididymitis    3. Nausea and vomiting, intractability of vomiting not specified, unspecified vomiting type    4. Orthostasis    5. Elevated LFTs        Plan:       Walter was seen today for follow-up.    Diagnoses and all orders for this visit:    Hospital discharge follow-up  Labs, imaging and discharge summary reviewed  Problem list reviewed and updated  Testicular/scrotal pain and swelling imrpoved  Complete PO antibx  Call if worsening sx, dysuria, fever,     Epididymitis  -     Comprehensive metabolic panel; Future  -     CBC auto differential; Future  Resolved  Complete PO antibx  Call for worsening reoccuring sx    Nausea and vomiting, intractability of vomiting not specified, unspecified vomiting type  -     Ambulatory Referral to Gastroenterology  Chronic  On PPI  Still no relief  Back to GI to discuss EGD    Orthostasis  Remain off flomax and proscar for now  If no improvement in 1 week can resume meds  Unsure if medication related    Elevated LFTs  -     Comprehensive metabolic panel; Future  -     CBC auto differential; Future  New problem  noramal 2 weeks prior  US with fatty liver  Repeat labs to ensure resolution  Hepatitis panel negative

## 2019-05-23 ENCOUNTER — PATIENT MESSAGE (OUTPATIENT)
Dept: INTERNAL MEDICINE | Facility: CLINIC | Age: 69
End: 2019-05-23

## 2019-05-23 ENCOUNTER — LAB VISIT (OUTPATIENT)
Dept: LAB | Facility: HOSPITAL | Age: 69
End: 2019-05-23
Attending: INTERNAL MEDICINE
Payer: MEDICARE

## 2019-05-23 DIAGNOSIS — R79.89 ELEVATED LFTS: ICD-10-CM

## 2019-05-23 DIAGNOSIS — N28.9 DECREASED RENAL FUNCTION: Primary | ICD-10-CM

## 2019-05-23 DIAGNOSIS — N45.1 EPIDIDYMITIS: ICD-10-CM

## 2019-05-23 LAB
ALBUMIN SERPL BCP-MCNC: 3.3 G/DL (ref 3.5–5.2)
ALP SERPL-CCNC: 53 U/L (ref 55–135)
ALT SERPL W/O P-5'-P-CCNC: 95 U/L (ref 10–44)
ANION GAP SERPL CALC-SCNC: 11 MMOL/L (ref 8–16)
AST SERPL-CCNC: 43 U/L (ref 10–40)
BACTERIA BLD CULT: NORMAL
BACTERIA BLD CULT: NORMAL
BASOPHILS # BLD AUTO: 0.04 K/UL (ref 0–0.2)
BASOPHILS NFR BLD: 0.4 % (ref 0–1.9)
BILIRUB SERPL-MCNC: 0.4 MG/DL (ref 0.1–1)
BUN SERPL-MCNC: 23 MG/DL (ref 8–23)
CALCIUM SERPL-MCNC: 9.1 MG/DL (ref 8.7–10.5)
CHLORIDE SERPL-SCNC: 99 MMOL/L (ref 95–110)
CO2 SERPL-SCNC: 24 MMOL/L (ref 23–29)
CREAT SERPL-MCNC: 2.3 MG/DL (ref 0.5–1.4)
DIFFERENTIAL METHOD: ABNORMAL
EOSINOPHIL # BLD AUTO: 0.2 K/UL (ref 0–0.5)
EOSINOPHIL NFR BLD: 1.6 % (ref 0–8)
ERYTHROCYTE [DISTWIDTH] IN BLOOD BY AUTOMATED COUNT: 13.5 % (ref 11.5–14.5)
EST. GFR  (AFRICAN AMERICAN): 32 ML/MIN/1.73 M^2
EST. GFR  (NON AFRICAN AMERICAN): 28 ML/MIN/1.73 M^2
GLUCOSE SERPL-MCNC: 248 MG/DL (ref 70–110)
HCT VFR BLD AUTO: 35.8 % (ref 40–54)
HGB BLD-MCNC: 11.9 G/DL (ref 14–18)
LYMPHOCYTES # BLD AUTO: 2.4 K/UL (ref 1–4.8)
LYMPHOCYTES NFR BLD: 24.3 % (ref 18–48)
MCH RBC QN AUTO: 29.2 PG (ref 27–31)
MCHC RBC AUTO-ENTMCNC: 33.2 G/DL (ref 32–36)
MCV RBC AUTO: 88 FL (ref 82–98)
MONOCYTES # BLD AUTO: 1.4 K/UL (ref 0.3–1)
MONOCYTES NFR BLD: 13.7 % (ref 4–15)
NEUTROPHILS # BLD AUTO: 6 K/UL (ref 1.8–7.7)
NEUTROPHILS NFR BLD: 60 % (ref 38–73)
PLATELET # BLD AUTO: 236 K/UL (ref 150–350)
PMV BLD AUTO: 10.2 FL (ref 9.2–12.9)
POTASSIUM SERPL-SCNC: 3.9 MMOL/L (ref 3.5–5.1)
PROT SERPL-MCNC: 7 G/DL (ref 6–8.4)
RBC # BLD AUTO: 4.07 M/UL (ref 4.6–6.2)
SODIUM SERPL-SCNC: 134 MMOL/L (ref 136–145)
WBC # BLD AUTO: 10.05 K/UL (ref 3.9–12.7)

## 2019-05-23 PROCEDURE — 85025 COMPLETE CBC W/AUTO DIFF WBC: CPT

## 2019-05-23 PROCEDURE — 36415 COLL VENOUS BLD VENIPUNCTURE: CPT

## 2019-05-23 PROCEDURE — 80053 COMPREHEN METABOLIC PANEL: CPT

## 2019-05-24 ENCOUNTER — PATIENT MESSAGE (OUTPATIENT)
Dept: INTERNAL MEDICINE | Facility: CLINIC | Age: 69
End: 2019-05-24

## 2019-05-24 ENCOUNTER — HOSPITAL ENCOUNTER (EMERGENCY)
Facility: HOSPITAL | Age: 69
Discharge: HOME | End: 2019-05-24
Attending: EMERGENCY MEDICINE
Payer: MEDICARE

## 2019-05-24 VITALS
TEMPERATURE: 99 F | OXYGEN SATURATION: 99 % | SYSTOLIC BLOOD PRESSURE: 108 MMHG | HEART RATE: 60 BPM | DIASTOLIC BLOOD PRESSURE: 56 MMHG | BODY MASS INDEX: 34.69 KG/M2 | RESPIRATION RATE: 20 BRPM | HEIGHT: 67 IN | WEIGHT: 221 LBS

## 2019-05-24 DIAGNOSIS — E86.0 DEHYDRATION: Primary | ICD-10-CM

## 2019-05-24 LAB
BUN SERPL-MCNC: 31 MG/DL (ref 6–30)
CHLORIDE SERPL-SCNC: 98 MMOL/L (ref 95–110)
CREAT SERPL-MCNC: 2.1 MG/DL (ref 0.5–1.4)
GLUCOSE SERPL-MCNC: 224 MG/DL (ref 70–110)
HCT VFR BLD CALC: 36 %PCV (ref 36–54)
POC IONIZED CALCIUM: 1.14 MMOL/L (ref 1.06–1.42)
POC TCO2 (MEASURED): 22 MMOL/L (ref 23–29)
POTASSIUM BLD-SCNC: 4.1 MMOL/L (ref 3.5–5.1)
SAMPLE: ABNORMAL
SODIUM BLD-SCNC: 134 MMOL/L (ref 136–145)

## 2019-05-24 PROCEDURE — 25000003 PHARM REV CODE 250: Performed by: EMERGENCY MEDICINE

## 2019-05-24 PROCEDURE — 99284 PR EMERGENCY DEPT VISIT,LEVEL IV: ICD-10-PCS | Mod: ,,, | Performed by: EMERGENCY MEDICINE

## 2019-05-24 PROCEDURE — 99284 EMERGENCY DEPT VISIT MOD MDM: CPT | Mod: 25

## 2019-05-24 PROCEDURE — 99284 EMERGENCY DEPT VISIT MOD MDM: CPT | Mod: ,,, | Performed by: EMERGENCY MEDICINE

## 2019-05-24 PROCEDURE — 82962 GLUCOSE BLOOD TEST: CPT

## 2019-05-24 RX ORDER — SODIUM CHLORIDE 9 MG/ML
1000 INJECTION, SOLUTION INTRAVENOUS
Status: COMPLETED | OUTPATIENT
Start: 2019-05-24 | End: 2019-05-24

## 2019-05-24 RX ADMIN — SODIUM CHLORIDE 500 ML: 0.9 INJECTION, SOLUTION INTRAVENOUS at 10:05

## 2019-05-24 NOTE — ED NOTES
Per Dr. Reed pt only to get 500 cc at 250cc per hour right now. 1 liter bag placed on an iv pump at it will stop at 500cc.

## 2019-05-24 NOTE — TELEPHONE ENCOUNTER
Please notifypatient his liver labs are better. Not quite normal but better. HOWEVER, his kidney numbers are worse. May be dehydrated but could also be urine is backing up since he is not on his prostate medications. He needs to go to ER for IVF and possible admisison if his kidney numbers are not any better today.

## 2019-05-24 NOTE — ED NOTES
LOC: The patient is awake, alert, and oriented to place, time, situation. Affect is appropriate.  Speech is appropriate and clear.     APPEARANCE: Patient resting comfortably.   Patient is clean and well groomed.    SKIN: The skin is warm and dry; color consistent with ethnicity.  Patient has normal skin turgor and moist mucus membranes.  Skin intact; no breakdown or bruising noted.     MUSCULOSKELETAL: Patient moving upper and lower extremities without difficulty.  Reports constant weakness.     RESPIRATORY: Airway is open and patent. Respirations spontaneous, even, easy, and non-labored.  Patient has a normal effort and rate.  No accessory muscle use noted. Denies cough.     CARDIAC:    No peripheral edema noted. No complaints of chest pain.      ABDOMEN: Soft and non tender to palpation.  No distention noted.     NEUROLOGIC: Eyes open spontaneously.  Behavior appropriate to situation.  Follows commands; facial expression symmetrical.  Purposeful motor response noted; normal sensation in all extremities.

## 2019-05-24 NOTE — ED PROVIDER NOTES
Encounter Date: 5/24/2019    SCRIBE #1 NOTE: I, Son Emmie, am scribing for, and in the presence of,  Dr. Concepcion. I have scribed the following portions of the note - Other sections scribed: HPI ROS MDM.       History     Chief Complaint   Patient presents with    Abnormal Lab     lab drawn yest, my dr called today to get iv fluid, elevated kidney funct     68 y.o. male with history of CHF, CAD, HTN  presenting with ~ one month history of mild fatigue. Patient was recently discharged 6 days from a hospitalization stay and had labs drawn yesterday during his follow up appointment with his PCP. He was notified by his PCP this morning to report to the ED for further evaluation of elevated creatinine. He notes that he has been nauseated for the past 2-3 days, but no vomiting or diarrhea. He does reports of constipation; however, it was relieved after taking some medications for it. No fever, abdominal distension, back pain, scrotal swelling, arthralgia, rash, wounds.     The history is provided by the patient and medical records.     Review of patient's allergies indicates:  No Known Allergies  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     Past Surgical History:   Procedure Laterality Date    Angiogram, Aortic Arch, Coronary  11/16/2018    Performed by [facility] at [facility] CATH LAB    Bypass graft study  11/16/2018    Performed by [facility] at [facility] CATH LAB    CARDIAC DEFIBRILLATOR PLACEMENT      CARDIAC ELECTROPHYSIOLOGY STUDY N/A 11/19/2018    Performed by Byron Glasgow MD at [facility] EP LAB     CARDIAC ELECTROPHYSIOLOGY STUDY N/A 2018    Performed by [facility] at [facility] EP LAB    COLON SURGERY  2006    COLONOSCOPY N/A 2017    [facility] ENDO    CORONARY ARTERY BYPASS GRAFT  2005    5 arteries    ESOPHAGOGASTRODUODENOSCOPY (EGD) N/A 3/21/2018    Performed by Fawad Cunningham MD at [facility] ENDO (4TH FLR)    EYE SURGERY Bilateral 2016    Laser surgery for glaucoma    INSERTION, DUAL ICD Left 2018    Performed by Byron Glasgow MD at [facility] EP LAB    Left heart cath Left 2018    Performed by Ryan Schmidt MD at [facility] CATH LAB     Family History   Problem Relation Age of Onset    Diabetes Mother     Heart disease Mother     Hypertension Sister     Diabetes Sister     Heart disease Sister     Heart attack Brother     Colon cancer Neg Hx     Esophageal cancer Neg Hx     Stomach cancer Neg Hx      Social History     Tobacco Use    Smoking status: Former Smoker     Packs/day: 3.00     Types: Cigarettes     Start date: 1963     Last attempt to quit: 1981     Years since quittin.4    Smokeless tobacco: Former User     Types: Snuff, Chew     Quit date:    Substance Use Topics    Alcohol use: No    Drug use: No     Review of Systems   Constitutional: Positive for fatigue and unexpected weight change. Negative for fever.   HENT: Negative for sore throat.    Eyes: Negative for visual disturbance.   Respiratory: Negative for shortness of breath.    Gastrointestinal: Positive for nausea. Negative for abdominal distention, diarrhea and vomiting.   Genitourinary: Negative for difficulty urinating and scrotal swelling.   Musculoskeletal: Negative for arthralgias and back pain.   Skin: Negative for rash and wound.   Neurological: Negative for headaches.       Physical Exam     Initial Vitals [19 0927]   BP Pulse Resp Temp SpO2   (!) 105/55 60 18 97.6 °F (36.4 °C) 100 %      MAP       --         Physical Exam    Constitutional: He  is cooperative. He does not appear ill.   HENT:   Head: Normocephalic and atraumatic.   Mouth/Throat: Mucous membranes are normal.   Eyes: Conjunctivae and EOM are normal. No scleral icterus.   Neck: Normal range of motion. Neck supple. No JVD present.   Cardiovascular: Normal rate and regular rhythm.   Pulmonary/Chest: No accessory muscle usage. No tachypnea. No respiratory distress.   Abdominal: He exhibits no distension. There is no tenderness.   Musculoskeletal: Normal range of motion.   Neurological: He is alert. GCS eye subscore is 4. GCS verbal subscore is 5. GCS motor subscore is 6.   Skin: Skin is warm and dry.         ED Course   Procedures  Labs Reviewed   POCT GLUCOSE - Abnormal; Notable for the following components:       Result Value    POC Glucose 224 (*)     POC BUN 31 (*)     POC Creatinine 2.1 (*)     POC Sodium 134 (*)     POC TCO2 (MEASURED) 22 (*)     All other components within normal limits          Imaging Results    None          Medical Decision Making:   History:   Old Medical Records: I decided to obtain old medical records.  Initial Assessment:   68 y.o. male was called by his physician who apparently noted changes in his renal function and referred him to the ED for further evaluation and possible hydration. Patient is on lasix. He has an EF of 43. Looking at labs done yesterday, it does show significant increase in his creatine from 1.2 to 2.3  Clinical Tests:   Lab Tests: Ordered and Reviewed            Scribe Attestation:   Scribe #1: I performed the above scribed service and the documentation accurately describes the services I performed. I attest to the accuracy of the note.               Clinical Impression:       ICD-10-CM ICD-9-CM   1. Dehydration E86.0 276.51         Disposition:   Disposition: Discharged  Condition: Stable                        Parth Concepcion MD  05/26/19 2000

## 2019-05-27 ENCOUNTER — PATIENT MESSAGE (OUTPATIENT)
Dept: CARDIOLOGY | Facility: CLINIC | Age: 69
End: 2019-05-27

## 2019-05-27 ENCOUNTER — OFFICE VISIT (OUTPATIENT)
Dept: CARDIOLOGY | Facility: CLINIC | Age: 69
End: 2019-05-27
Payer: MEDICARE

## 2019-05-27 ENCOUNTER — LAB VISIT (OUTPATIENT)
Dept: LAB | Facility: HOSPITAL | Age: 69
End: 2019-05-27
Attending: INTERNAL MEDICINE
Payer: MEDICARE

## 2019-05-27 VITALS
HEIGHT: 67 IN | BODY MASS INDEX: 36.29 KG/M2 | SYSTOLIC BLOOD PRESSURE: 108 MMHG | HEART RATE: 65 BPM | DIASTOLIC BLOOD PRESSURE: 57 MMHG | WEIGHT: 231.25 LBS

## 2019-05-27 DIAGNOSIS — I10 BENIGN ESSENTIAL HTN: Chronic | ICD-10-CM

## 2019-05-27 DIAGNOSIS — N40.0 BENIGN PROSTATIC HYPERPLASIA WITHOUT LOWER URINARY TRACT SYMPTOMS: Chronic | ICD-10-CM

## 2019-05-27 DIAGNOSIS — I25.10 CORONARY ARTERY DISEASE INVOLVING NATIVE CORONARY ARTERY OF NATIVE HEART WITHOUT ANGINA PECTORIS: Chronic | ICD-10-CM

## 2019-05-27 DIAGNOSIS — I25.5 ISCHEMIC CARDIOMYOPATHY: Primary | ICD-10-CM

## 2019-05-27 DIAGNOSIS — E66.01 SEVERE OBESITY (BMI 35.0-39.9) WITH COMORBIDITY: ICD-10-CM

## 2019-05-27 DIAGNOSIS — E78.5 HYPERLIPIDEMIA, UNSPECIFIED HYPERLIPIDEMIA TYPE: Chronic | ICD-10-CM

## 2019-05-27 DIAGNOSIS — Z95.810 ICD (IMPLANTABLE CARDIOVERTER-DEFIBRILLATOR), DUAL, IN SITU: Chronic | ICD-10-CM

## 2019-05-27 DIAGNOSIS — E66.9 DIABETES MELLITUS TYPE 2 IN OBESE: Chronic | ICD-10-CM

## 2019-05-27 DIAGNOSIS — E11.69 DIABETES MELLITUS TYPE 2 IN OBESE: Chronic | ICD-10-CM

## 2019-05-27 DIAGNOSIS — K21.9 GASTROESOPHAGEAL REFLUX DISEASE, ESOPHAGITIS PRESENCE NOT SPECIFIED: Chronic | ICD-10-CM

## 2019-05-27 DIAGNOSIS — N28.9 DECREASED RENAL FUNCTION: ICD-10-CM

## 2019-05-27 DIAGNOSIS — I50.42 CHRONIC COMBINED SYSTOLIC AND DIASTOLIC HEART FAILURE: Chronic | ICD-10-CM

## 2019-05-27 LAB
ANION GAP SERPL CALC-SCNC: 8 MMOL/L (ref 8–16)
BUN SERPL-MCNC: 11 MG/DL (ref 8–23)
CALCIUM SERPL-MCNC: 9.4 MG/DL (ref 8.7–10.5)
CHLORIDE SERPL-SCNC: 108 MMOL/L (ref 95–110)
CO2 SERPL-SCNC: 25 MMOL/L (ref 23–29)
CREAT SERPL-MCNC: 1.2 MG/DL (ref 0.5–1.4)
EST. GFR  (AFRICAN AMERICAN): >60 ML/MIN/1.73 M^2
EST. GFR  (NON AFRICAN AMERICAN): >60 ML/MIN/1.73 M^2
GLUCOSE SERPL-MCNC: 52 MG/DL (ref 70–110)
POTASSIUM SERPL-SCNC: 3.7 MMOL/L (ref 3.5–5.1)
SODIUM SERPL-SCNC: 141 MMOL/L (ref 136–145)

## 2019-05-27 PROCEDURE — 3045F PR MOST RECENT HEMOGLOBIN A1C LEVEL 7.0-9.0%: CPT | Mod: CPTII,S$GLB,, | Performed by: INTERNAL MEDICINE

## 2019-05-27 PROCEDURE — 3078F PR MOST RECENT DIASTOLIC BLOOD PRESSURE < 80 MM HG: ICD-10-PCS | Mod: CPTII,S$GLB,, | Performed by: INTERNAL MEDICINE

## 2019-05-27 PROCEDURE — 99214 PR OFFICE/OUTPT VISIT, EST, LEVL IV, 30-39 MIN: ICD-10-PCS | Mod: S$GLB,,, | Performed by: INTERNAL MEDICINE

## 2019-05-27 PROCEDURE — 99999 PR PBB SHADOW E&M-EST. PATIENT-LVL III: CPT | Mod: PBBFAC,,, | Performed by: INTERNAL MEDICINE

## 2019-05-27 PROCEDURE — 99214 OFFICE O/P EST MOD 30 MIN: CPT | Mod: S$GLB,,, | Performed by: INTERNAL MEDICINE

## 2019-05-27 PROCEDURE — 3288F FALL RISK ASSESSMENT DOCD: CPT | Mod: CPTII,S$GLB,, | Performed by: INTERNAL MEDICINE

## 2019-05-27 PROCEDURE — 80048 BASIC METABOLIC PNL TOTAL CA: CPT

## 2019-05-27 PROCEDURE — 3045F PR MOST RECENT HEMOGLOBIN A1C LEVEL 7.0-9.0%: ICD-10-PCS | Mod: CPTII,S$GLB,, | Performed by: INTERNAL MEDICINE

## 2019-05-27 PROCEDURE — 3078F DIAST BP <80 MM HG: CPT | Mod: CPTII,S$GLB,, | Performed by: INTERNAL MEDICINE

## 2019-05-27 PROCEDURE — 36415 COLL VENOUS BLD VENIPUNCTURE: CPT

## 2019-05-27 PROCEDURE — 1100F PTFALLS ASSESS-DOCD GE2>/YR: CPT | Mod: CPTII,S$GLB,, | Performed by: INTERNAL MEDICINE

## 2019-05-27 PROCEDURE — 3288F PR FALLS RISK ASSESSMENT DOCUMENTED: ICD-10-PCS | Mod: CPTII,S$GLB,, | Performed by: INTERNAL MEDICINE

## 2019-05-27 PROCEDURE — 3074F PR MOST RECENT SYSTOLIC BLOOD PRESSURE < 130 MM HG: ICD-10-PCS | Mod: CPTII,S$GLB,, | Performed by: INTERNAL MEDICINE

## 2019-05-27 PROCEDURE — 99999 PR PBB SHADOW E&M-EST. PATIENT-LVL III: ICD-10-PCS | Mod: PBBFAC,,, | Performed by: INTERNAL MEDICINE

## 2019-05-27 PROCEDURE — 3074F SYST BP LT 130 MM HG: CPT | Mod: CPTII,S$GLB,, | Performed by: INTERNAL MEDICINE

## 2019-05-27 PROCEDURE — 1100F PR PT FALLS ASSESS DOC 2+ FALLS/FALL W/INJURY/YR: ICD-10-PCS | Mod: CPTII,S$GLB,, | Performed by: INTERNAL MEDICINE

## 2019-05-27 RX ORDER — ASPIRIN 81 MG/1
81 TABLET ORAL DAILY
Qty: 90 TABLET | Refills: 3
Start: 2019-05-27 | End: 2023-01-12

## 2019-05-27 NOTE — Clinical Note
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 Please see my note for details of this encounter. If you have any questions, please contact me.  Thank you again for the referral.

## 2019-05-27 NOTE — PROGRESS NOTES
Chart has been dictated using voice recognition software.  It is not been reviewed carefully for any transcriptional errors due to this technology.   Subjective:   Patient ID:  Walter Bull is a 68 y.o. male who presents for follow-up of Hospital Follow Up      HPI:  Patient of Dr. 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    Patient was in the hospital for epididymitis 17-18-May-2019.  He was subsequently seen in the emergency room on 24-May-2019 for an increasing creatinine level thought secondary to dehydration.  Patient was treated with 500 mL sodium chloride solution and then discharged.  Weight has increased since discharge from 219 lbs to 223 lbs.    Since discharge, has not been lightheaded or nauseated.  Patient denies any chest discomfort on exertion or at rest.  Patient denies any dyspnea at rest or on exertion, orthopnea, PND, or edema.  Patient denies any palpitations, lightheadedness, or syncope.  Also notes that weakness has subsided.     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       Outpatient Medications Prior to Visit   Medication Sig Dispense Refill    albuterol (PROVENTIL/VENTOLIN HFA) 90 mcg/actuation inhaler Inhale 2 puffs into the lungs every 6 (six) hours as needed for Wheezing. 1 Inhaler 5    amiodarone (PACERONE)  200 MG Tab Take 1 tablet (200 mg total) by mouth once daily. 90 tablet 3    carvedilol (COREG) 3.125 MG tablet Take 1 tablet (3.125 mg total) by mouth 2 (two) times daily with meals. 60 tablet 11    esomeprazole (NEXIUM) 40 MG capsule Take 1 capsule (40 mg total) by mouth before breakfast. 30 capsule 11    ezetimibe (ZETIA) 10 mg tablet Take 1 tablet (10 mg total) by mouth once daily. 90 tablet 3    ferrous sulfate (FEOSOL) 325 mg (65 mg iron) Tab tablet TAKE 1 TABLET BY MOUTH THREE TIMES DAILY 90 tablet 3    furosemide (LASIX) 20 MG tablet Take 1 tablet (20 mg total) by mouth once daily. Take an extra tablet daily for wt gain more than 3 pounds/day or 5 pounds/week 140 tablet 3    insulin NPH (NOVOLIN N) 100 unit/mL injection Inject 50 Units into the skin before breakfast. 3 vial 5    isosorbide mononitrate (IMDUR) 60 MG 24 hr tablet Take 1 tablet (60 mg total) by mouth once daily. 90 tablet 3    losartan (COZAAR) 50 MG tablet Take 1 tablet (50 mg total) by mouth once daily. 90 tablet 3    nitroGLYCERIN (NITROSTAT) 0.4 MG SL tablet DISSOLVE ONE TABLET UNDER THE TONGUE EVERY 5 MINUTES AS NEEDED FOR CHEST PAIN. 100 tablet 0    RANEXA 1,000 mg Tb12 Take 1 tablet (1,000 mg total) by mouth 2 (two) times daily. 60 tablet 11    rosuvastatin (CRESTOR) 40 MG Tab Take 1 tablet (40 mg total) by mouth once daily. 90 tablet 3    SITagliptin (JANUVIA) 100 MG Tab Take 1 tablet (100 mg total) by mouth once daily. 90 tablet 3    spironolactone (ALDACTONE) 25 MG tablet Take 1 tablet (25 mg total) by mouth once daily. 90 tablet 3    ticagrelor (BRILINTA) 90 mg tablet Take 90 mg by mouth 2 (two) times daily.      finasteride (PROSCAR) 5 mg tablet HOLD DUE TO ORTHOSTATIC HYPOTENSION 90 tablet 3    tamsulosin (FLOMAX) 0.4 mg Cap HOLD DUE TO ORTHOSTATIC HYPOTENSION 30 capsule 3     No facility-administered medications prior to visit.    Patient not taking ASA at this time.    ROS - The patient's review of systems is  "negative for any significant constitutional, respiratory, endocrine, hematologic, musculoskeletal or neurologic symptoms.   Objective:   Physical Exam   Constitutional: He is oriented to person, place, and time. He appears well-developed and well-nourished.   BP (!) 108/57   Pulse 65   Ht 5' 7" (1.702 m)   Wt 104.9 kg (231 lb 4.2 oz)   BMI 36.22 kg/m²   obese   Neck: Neck supple. No JVD present. Carotid bruit is not present.   Cardiovascular: Normal rate, regular rhythm, normal heart sounds and intact distal pulses. Exam reveals no gallop and no friction rub.   No murmur heard.  Pulmonary/Chest: Effort normal and breath sounds normal. He has no wheezes. He has no rales.       Abdominal: Soft. Bowel sounds are normal. He exhibits no abdominal bruit. There is no hepatomegaly. There is no tenderness.   Musculoskeletal: He exhibits no edema.   Neurological: He is alert and oriented to person, place, and time. He has normal strength.   Skin: No cyanosis. Nails show no clubbing.         Lab Results   Component Value Date     05/27/2019    K 3.7 05/27/2019    BUN 11 05/27/2019    CREATININE 1.2 05/27/2019    GLU 52 (L) 05/27/2019    HGBA1C 7.6 (H) 05/18/2019    CHOL 131 05/07/2019    HDL 50 05/07/2019    LDLCALC 68.8 05/07/2019    TRIG 61 05/07/2019    CHOLHDL 38.2 05/07/2019    HGB 11.9 (L) 05/23/2019    HCT 36 05/24/2019     05/23/2019    INR 1.0 05/17/2019       Assessment:     1. Ischemic cardiomyopathy    2. Coronary artery disease involving native coronary artery of native heart without angina pectoris    3. Diabetes mellitus type 2 in obese    4. Hyperlipidemia, unspecified hyperlipidemia type    5. Gastroesophageal reflux disease, esophagitis presence not specified    6. Benign essential HTN    7. Benign prostatic hyperplasia without lower urinary tract symptoms    8. ICD (implantable cardioverter-defibrillator), dual, in situ    9. Chronic combined systolic and diastolic heart failure    10. " Severe obesity (BMI 35.0-39.9) with comorbidity      Patient is doing well at this time after his recent hospitalization.  He notices is that most of his symptoms have now resolved.  The patient is not on aspirin.  Although he is in the adapter will study and randomized to high-dose aspirin.  He has subsequently been started on ticagrelor and should be on low-dose aspirin.  Therefore, the patient will be advised to start aspirin 81 mg q.d..  No other change in his medications will be made at this time.  The patient should return to see Dr. Capone in 3 months  Plan:     Walter was seen today for hospital follow up.    Diagnoses and all orders for this visit:    Ischemic cardiomyopathy    Coronary artery disease involving native coronary artery of native heart without angina pectoris    Diabetes mellitus type 2 in obese    Hyperlipidemia, unspecified hyperlipidemia type    Gastroesophageal reflux disease, esophagitis presence not specified    Benign essential HTN    Benign prostatic hyperplasia without lower urinary tract symptoms    ICD (implantable cardioverter-defibrillator), dual, in situ    Chronic combined systolic and diastolic heart failure    Severe obesity (BMI 35.0-39.9) with comorbidity          Gurwinder Decker MD  Consultative Cardiology

## 2019-05-28 ENCOUNTER — CLINICAL SUPPORT (OUTPATIENT)
Dept: CARDIOLOGY | Facility: HOSPITAL | Age: 69
End: 2019-05-28
Attending: INTERNAL MEDICINE
Payer: MEDICARE

## 2019-05-28 DIAGNOSIS — Z95.810 PRESENCE OF AUTOMATIC CARDIOVERTER/DEFIBRILLATOR (AICD): ICD-10-CM

## 2019-05-28 DIAGNOSIS — I47.20 VT (VENTRICULAR TACHYCARDIA): ICD-10-CM

## 2019-05-28 PROCEDURE — 93296 REM INTERROG EVL PM/IDS: CPT

## 2019-06-03 DIAGNOSIS — D64.9 NORMOCYTIC ANEMIA: Primary | ICD-10-CM

## 2019-06-04 ENCOUNTER — HOSPITAL ENCOUNTER (OUTPATIENT)
Dept: SLEEP MEDICINE | Facility: OTHER | Age: 69
Discharge: HOME | End: 2019-06-04
Attending: INTERNAL MEDICINE
Payer: MEDICARE

## 2019-06-04 DIAGNOSIS — G47.33 OSA (OBSTRUCTIVE SLEEP APNEA): ICD-10-CM

## 2019-06-04 DIAGNOSIS — G47.30 SLEEP APNEA, UNSPECIFIED TYPE: ICD-10-CM

## 2019-06-04 PROCEDURE — 95810 PR POLYSOMNOGRAPHY, 4 OR MORE: ICD-10-PCS | Mod: 26,,, | Performed by: PSYCHIATRY & NEUROLOGY

## 2019-06-04 PROCEDURE — 95810 POLYSOM 6/> YRS 4/> PARAM: CPT

## 2019-06-04 PROCEDURE — 95810 POLYSOM 6/> YRS 4/> PARAM: CPT | Mod: 26,,, | Performed by: PSYCHIATRY & NEUROLOGY

## 2019-06-05 NOTE — PROGRESS NOTES
A PSG was preformed on Walter Bull on the night of 6/4/19. The procedure was explained to the patient , all questions were asked and answered priro to the start of the study. The importance of supine sleep was discussed. The patient did not meet split night criteria.     Sleep disordered breathing events appeared to have been noted, which apperaed to have been more significant in REM. All sleep stages seemed to have been reached. Snoring was noted to be mild to moderate. PLM's appeared to have been associated with arousals and position changes.     The EKG appeared to have shown NSR with PAC's. The patient had an implanted cardioverter defibrillator in place. The lowest spo2 seen appeared to have been 92%. No problems to report about the patient or the recording. An end of the night instruction sheet was giving to the patient upon leaving the lab.

## 2019-06-06 ENCOUNTER — PATIENT MESSAGE (OUTPATIENT)
Dept: INTERNAL MEDICINE | Facility: CLINIC | Age: 69
End: 2019-06-06

## 2019-06-07 ENCOUNTER — PATIENT MESSAGE (OUTPATIENT)
Dept: INTERNAL MEDICINE | Facility: CLINIC | Age: 69
End: 2019-06-07

## 2019-06-07 NOTE — TELEPHONE ENCOUNTER
I am so sorry. That was a mistake; I misread his initial dose. Great catch. He is taking 50 units so I want him to divide the dose like this: 40 units in the morning and 20 units at night for a total of 60 units. This is 2/3 of the dose in the morning and 1/3 of the dose at night with an overall dose increase.    I apologize for the error. I have double checked myself. This is now correct.

## 2019-06-07 NOTE — TELEPHONE ENCOUNTER
Pt is taking nph 50 units q am. youre instructing him to take 20 in am and 15 at night. You also said it would be an increase in overall dose. Please advise.

## 2019-06-12 ENCOUNTER — PATIENT MESSAGE (OUTPATIENT)
Dept: SLEEP MEDICINE | Facility: CLINIC | Age: 69
End: 2019-06-12

## 2019-06-12 NOTE — PROCEDURES
"Dear Referring Provider,    The sleep study that you ordered is complete. You have ordered sleep LAB services to perform the sleep study for Walter Bull.     Please find Sleep Study result in "Chart Review" under the "Media tab."     As the ordering provider, you are responsible for reviewing the results and implementing a treatment plan with your patient. If you need a Sleep Medicine provider to explain the sleep study findings and arrange treatment for the patient, please refer patient for consultation to our Sleep Clinic via [facility] with Ambulatory Consult Sleep.    To do that please place an order for an "Ambulatory Consult Sleep" - it will go to our clinic work queue for our Medical Assistant to contact the patient for an appointment.     For any questions, please contact our clinic staff at [phone] to talk to clinical staff.      "

## 2019-06-12 NOTE — TELEPHONE ENCOUNTER
06/04/2019 Baseline  lb. The overall AHI was 8.4 with an oxygen deysi of 92.0%. The supine AHI was 14.4 and the REM AHI was 40.6.

## 2019-06-13 ENCOUNTER — PATIENT MESSAGE (OUTPATIENT)
Dept: SLEEP MEDICINE | Facility: CLINIC | Age: 69
End: 2019-06-13

## 2019-06-13 DIAGNOSIS — G47.33 OSA (OBSTRUCTIVE SLEEP APNEA): Primary | ICD-10-CM

## 2019-06-21 ENCOUNTER — PATIENT MESSAGE (OUTPATIENT)
Dept: INTERNAL MEDICINE | Facility: CLINIC | Age: 69
End: 2019-06-21

## 2019-06-21 ENCOUNTER — HOSPITAL ENCOUNTER (EMERGENCY)
Facility: HOSPITAL | Age: 69
Discharge: HOME | End: 2019-06-22
Attending: SURGERY
Payer: MEDICARE

## 2019-06-21 DIAGNOSIS — N50.819 TESTICULAR PAIN: ICD-10-CM

## 2019-06-21 DIAGNOSIS — N30.00 ACUTE CYSTITIS WITHOUT HEMATURIA: Primary | ICD-10-CM

## 2019-06-21 LAB
ALBUMIN SERPL BCP-MCNC: 3.7 G/DL (ref 3.5–5.2)
ALP SERPL-CCNC: 55 U/L (ref 55–135)
ALT SERPL W/O P-5'-P-CCNC: 25 U/L (ref 10–44)
ANION GAP SERPL CALC-SCNC: 11 MMOL/L (ref 8–16)
AST SERPL-CCNC: 22 U/L (ref 10–40)
BACTERIA #/AREA URNS HPF: ABNORMAL /HPF
BASOPHILS # BLD AUTO: 0.02 K/UL (ref 0–0.2)
BASOPHILS NFR BLD: 0.2 % (ref 0–1.9)
BILIRUB SERPL-MCNC: 0.7 MG/DL (ref 0.1–1)
BILIRUB UR QL STRIP: NEGATIVE
BUN SERPL-MCNC: 20 MG/DL (ref 8–23)
CALCIUM SERPL-MCNC: 9.1 MG/DL (ref 8.7–10.5)
CHLORIDE SERPL-SCNC: 101 MMOL/L (ref 95–110)
CLARITY UR: ABNORMAL
CO2 SERPL-SCNC: 24 MMOL/L (ref 23–29)
COLOR UR: YELLOW
CREAT SERPL-MCNC: 1.9 MG/DL (ref 0.5–1.4)
DIFFERENTIAL METHOD: ABNORMAL
EOSINOPHIL # BLD AUTO: 0.1 K/UL (ref 0–0.5)
EOSINOPHIL NFR BLD: 0.7 % (ref 0–8)
ERYTHROCYTE [DISTWIDTH] IN BLOOD BY AUTOMATED COUNT: 14.7 % (ref 11.5–14.5)
EST. GFR  (AFRICAN AMERICAN): 40 ML/MIN/1.73 M^2
EST. GFR  (NON AFRICAN AMERICAN): 35 ML/MIN/1.73 M^2
GLUCOSE SERPL-MCNC: 70 MG/DL (ref 70–110)
GLUCOSE UR QL STRIP: NEGATIVE
HCT VFR BLD AUTO: 35.3 % (ref 40–54)
HGB BLD-MCNC: 11.2 G/DL (ref 14–18)
HGB UR QL STRIP: ABNORMAL
HYALINE CASTS #/AREA URNS LPF: 0 /LPF
KETONES UR QL STRIP: NEGATIVE
LEUKOCYTE ESTERASE UR QL STRIP: ABNORMAL
LIPASE SERPL-CCNC: 5 U/L (ref 4–60)
LYMPHOCYTES # BLD AUTO: 1.7 K/UL (ref 1–4.8)
LYMPHOCYTES NFR BLD: 14 % (ref 18–48)
MCH RBC QN AUTO: 29.6 PG (ref 27–31)
MCHC RBC AUTO-ENTMCNC: 31.7 G/DL (ref 32–36)
MCV RBC AUTO: 93 FL (ref 82–98)
MICROSCOPIC COMMENT: ABNORMAL
MONOCYTES # BLD AUTO: 1.2 K/UL (ref 0.3–1)
MONOCYTES NFR BLD: 10.2 % (ref 4–15)
NEUTROPHILS # BLD AUTO: 8.8 K/UL (ref 1.8–7.7)
NEUTROPHILS NFR BLD: 74.9 % (ref 38–73)
NITRITE UR QL STRIP: NEGATIVE
PH UR STRIP: 6 [PH] (ref 5–8)
PLATELET # BLD AUTO: 177 K/UL (ref 150–350)
PMV BLD AUTO: 10 FL (ref 9.2–12.9)
POTASSIUM SERPL-SCNC: 4.5 MMOL/L (ref 3.5–5.1)
PROT SERPL-MCNC: 7.4 G/DL (ref 6–8.4)
PROT UR QL STRIP: ABNORMAL
RBC # BLD AUTO: 3.78 M/UL (ref 4.6–6.2)
RBC #/AREA URNS HPF: 17 /HPF (ref 0–4)
SODIUM SERPL-SCNC: 136 MMOL/L (ref 136–145)
SP GR UR STRIP: 1.01 (ref 1–1.03)
SQUAMOUS #/AREA URNS HPF: 7 /HPF
URN SPEC COLLECT METH UR: ABNORMAL
UROBILINOGEN UR STRIP-ACNC: NEGATIVE EU/DL
WBC # BLD AUTO: 11.77 K/UL (ref 3.9–12.7)
WBC #/AREA URNS HPF: >100 /HPF (ref 0–5)
WBC CLUMPS URNS QL MICRO: ABNORMAL

## 2019-06-21 PROCEDURE — 87077 CULTURE AEROBIC IDENTIFY: CPT

## 2019-06-21 PROCEDURE — 36415 COLL VENOUS BLD VENIPUNCTURE: CPT

## 2019-06-21 PROCEDURE — 87186 SC STD MICRODIL/AGAR DIL: CPT

## 2019-06-21 PROCEDURE — 87088 URINE BACTERIA CULTURE: CPT

## 2019-06-21 PROCEDURE — 96372 THER/PROPH/DIAG INJ SC/IM: CPT

## 2019-06-21 PROCEDURE — 81000 URINALYSIS NONAUTO W/SCOPE: CPT

## 2019-06-21 PROCEDURE — 80053 COMPREHEN METABOLIC PANEL: CPT

## 2019-06-21 PROCEDURE — 63600175 PHARM REV CODE 636 W HCPCS: Performed by: SURGERY

## 2019-06-21 PROCEDURE — 99284 EMERGENCY DEPT VISIT MOD MDM: CPT | Mod: 25

## 2019-06-21 PROCEDURE — 83690 ASSAY OF LIPASE: CPT

## 2019-06-21 PROCEDURE — 87086 URINE CULTURE/COLONY COUNT: CPT

## 2019-06-21 PROCEDURE — 85025 COMPLETE CBC W/AUTO DIFF WBC: CPT

## 2019-06-21 RX ORDER — CEFTRIAXONE 1 G/1
1 INJECTION, POWDER, FOR SOLUTION INTRAMUSCULAR; INTRAVENOUS
Status: COMPLETED | OUTPATIENT
Start: 2019-06-21 | End: 2019-06-21

## 2019-06-21 RX ADMIN — CEFTRIAXONE SODIUM 1 G: 1 INJECTION, POWDER, FOR SOLUTION INTRAMUSCULAR; INTRAVENOUS at 11:06

## 2019-06-22 VITALS
OXYGEN SATURATION: 98 % | HEART RATE: 62 BPM | TEMPERATURE: 99 F | BODY MASS INDEX: 36.18 KG/M2 | RESPIRATION RATE: 16 BRPM | SYSTOLIC BLOOD PRESSURE: 126 MMHG | DIASTOLIC BLOOD PRESSURE: 58 MMHG | WEIGHT: 231 LBS

## 2019-06-22 RX ORDER — CIPROFLOXACIN 500 MG/1
500 TABLET ORAL 2 TIMES DAILY
Qty: 14 TABLET | Refills: 0 | Status: ON HOLD | OUTPATIENT
Start: 2019-06-22 | End: 2019-06-25 | Stop reason: HOSPADM

## 2019-06-22 RX ORDER — IBUPROFEN 800 MG/1
800 TABLET ORAL EVERY 6 HOURS PRN
Qty: 20 TABLET | Refills: 0 | Status: ON HOLD | OUTPATIENT
Start: 2019-06-22 | End: 2019-06-25 | Stop reason: HOSPADM

## 2019-06-22 RX ORDER — PHENAZOPYRIDINE HYDROCHLORIDE 200 MG/1
200 TABLET, FILM COATED ORAL 3 TIMES DAILY
Qty: 6 TABLET | Refills: 0 | Status: ON HOLD | OUTPATIENT
Start: 2019-06-22 | End: 2019-06-25 | Stop reason: HOSPADM

## 2019-06-22 RX ORDER — TAMSULOSIN HYDROCHLORIDE 0.4 MG/1
0.4 CAPSULE ORAL DAILY
Qty: 30 CAPSULE | Refills: 0 | Status: SHIPPED | OUTPATIENT
Start: 2019-06-22 | End: 2019-08-20

## 2019-06-22 NOTE — ED TRIAGE NOTES
"68 y.o. male presents to ER   Chief Complaint   Patient presents with    Testicle Pain    Abdominal Pain   Pt reports LLQ "throbbing" pain for two days and pain to testicles after urination. No acute distress noted.    "

## 2019-06-22 NOTE — ED PROVIDER NOTES
Encounter Date: 6/21/2019       History     Chief Complaint   Patient presents with    Testicle Pain    Abdominal Pain     The history is provided by the patient.   Abdominal Pain   The current episode started yesterday. The problem has been gradually worsening. The abdominal pain is located in the LLQ. The other symptoms of the illness do not include fever, shortness of breath, nausea, vomiting, diarrhea or dysuria.   Additional symptoms associated with the illness include chills, constipation (LBM 2 days ago) and urgency. Symptoms associated with the illness do not include frequency or back pain.     Also reports bilateral testicular tenderness and increase in pain after urination. Unsure if testicles are swollen. Denies STD risk.    Review of patient's allergies indicates:  No Known Allergies  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     Past Surgical History:   Procedure Laterality Date    Angiogram, Aortic Arch, Coronary  11/16/2018    Performed [facility] at [facility] CATH LAB    Bypass graft study  11/16/2018    Performed by [facility] at [facility] CATH LAB    CARDIAC DEFIBRILLATOR PLACEMENT      CARDIAC ELECTROPHYSIOLOGY STUDY N/A 11/19/2018    Performed by Byron Glasgow MD at [facility] EP LAB    CARDIAC ELECTROPHYSIOLOGY STUDY N/A 11/19/2018    Performed by [facility] at [facility] EP LAB    COLON SURGERY  2006    COLONOSCOPY N/A 2/2/2017    [facility]    CORONARY ARTERY BYPASS GRAFT  2005    5 arteries    ESOPHAGOGASTRODUODENOSCOPY (EGD) N/A  3/21/2018    Performed by Fawad Cunningham [facility] at [facility] ENDO (4TH FLR)    EYE SURGERY Bilateral 2016    Laser surgery for glaucoma    INSERTION, DUAL ICD Left 2018    Performed by [facility] at [facility] EP LAB    Left heart cath Left 2018    Performed by [facility] Schmidt [facility] at [facility] CATH LAB     Family History   Problem Relation Age of Onset    Diabetes Mother     Heart disease Mother     Hypertension Sister     Diabetes Sister     Heart disease Sister     Heart attack Brother     Colon cancer Neg Hx     Esophageal cancer Neg Hx     Stomach cancer Neg Hx      Social History     Tobacco Use    Smoking status: Former Smoker     Packs/day: 3.00     Types: Cigarettes     Start date: 1963     Last attempt to quit: 1981     Years since quittin.4    Smokeless tobacco: Former User     Types: Snuff, Chew     Quit date:    Substance Use Topics    Alcohol use: No    Drug use: No     Review of Systems   Constitutional: Positive for chills. Negative for fever.   HENT: Negative for congestion, ear pain, rhinorrhea, sore throat and trouble swallowing.    Eyes: Negative for pain, discharge, redness and visual disturbance.   Respiratory: Negative for cough, shortness of breath and wheezing.    Cardiovascular: Negative for chest pain and leg swelling.   Gastrointestinal: Positive for abdominal pain and constipation (LBM 2 days ago). Negative for diarrhea, nausea and vomiting.   Genitourinary: Positive for testicular pain and urgency. Negative for difficulty urinating, discharge, dysuria, flank pain and frequency.   Musculoskeletal: Negative for arthralgias, back pain, myalgias and neck pain.   Skin: Negative for rash and wound.   Neurological: Negative for seizures, weakness and headaches.   Psychiatric/Behavioral: Negative.        Physical Exam     Initial Vitals [19 2155]   BP Pulse Resp Temp SpO2   (!) 175/73 65 20 98.9 °F (37.2 °C) 98 %      MAP       --         Physical Exam   --  Nursing note and vitals reviewed  -- Constitutional: Appears well-developed and well-nourished  -- Head: Atraumatic. Normocephalic. No obvious abnormality  -- Eyes: Pupils are equal and reactive to light. Normal conjunctiva and lids  -- Cardiac: Normal rate, regular rhythm and normal heart sounds  -- Pulmonary: Normal respiratory effort, breath sounds clear to auscultation  -- Abdominal: Soft, no tenderness. Normal bowel sounds. Normal liver edge  -- Genitourinary: no flank pain on exam, no suprapubic pain by palpation   -- Testicles:  No obvious mass, no obvious abscess or erythema  -- Musculoskeletal: Normal range of motion, no effusions. Joints stable   -- Neurological: No focal deficits. Showed good interaction with staff  -- Vascular: Posterior tibial, dorsalis pedis and radial pulses 2+ bilaterally      ED Course   Procedures  Labs Reviewed   CBC W/ AUTO DIFFERENTIAL - Abnormal; Notable for the following components:       Result Value    RBC 3.78 (*)     Hemoglobin 11.2 (*)     Hematocrit 35.3 (*)     Mean Corpuscular Hemoglobin Conc 31.7 (*)     RDW 14.7 (*)     Gran # (ANC) 8.8 (*)     Mono # 1.2 (*)     Gran% 74.9 (*)     Lymph% 14.0 (*)     All other components within normal limits   COMPREHENSIVE METABOLIC PANEL - Abnormal; Notable for the following components:    Creatinine 1.9 (*)     eGFR if  40 (*)     eGFR if non  35 (*)     All other components within normal limits   URINALYSIS, REFLEX TO URINE CULTURE - Abnormal; Notable for the following components:    Appearance, UA Hazy (*)     Protein, UA 1+ (*)     Occult Blood UA 2+ (*)     Leukocytes, UA 1+ (*)     All other components within normal limits    Narrative:     Preferred Collection Type->Urine, Clean Catch   URINALYSIS MICROSCOPIC - Abnormal; Notable for the following components:    RBC, UA 17 (*)     WBC, UA >100 (*)     WBC Clumps, UA Moderate (*)     Bacteria Moderate (*)     All other components within normal  limits    Narrative:     Preferred Collection Type->Urine, Clean Catch   CULTURE, URINE   LIPASE          Imaging Results          CT Renal Stone Study ABD Pelvis WO (Final result)  Result time 06/22/19 00:08:43    Final result by Tima Archibald Jr., MD (06/22/19 00:08:43)                 Impression:      1. Infiltration of the fat surrounding the kidneys, left greater than right, may occur with pyelonephritis.  No obstructing kidney stones are evident.  No hydronephrosis.  2. No focal inflammation about the GI tract.      Electronically signed by: Tima Archibald Jr., MD  Date:    06/22/2019  Time:    00:08             Narrative:    EXAMINATION:  CT RENAL STONE STUDY ABD PELVIS WO    CLINICAL HISTORY:  Hematuria;Pain lumbago (724.2);    TECHNIQUE:  Low dose axial images, sagittal and coronal reformations were obtained from the lung bases to the pubic symphysis.  Contrast was not administered.  All CT scans at this facility use dose modulation, iterative reconstruction and/or weight based dosing when appropriate to reduce radiation dose to as low as reasonably achievable.    COMPARISON:  None    FINDINGS:  Heart: Normal in size. No pericardial effusion.  Prior coronary artery bypass grafting.    Lung Bases: Parenchymal bands consistent with atelectasis or scarring.    Liver: Normal in size and attenuation, with no focal hepatic lesions.    Gallbladder: No calcified gallstones.    Bile Ducts: No evidence of dilated ducts.    Pancreas: Mild fatty atrophy.    Spleen: Unremarkable.    Adrenals: Unremarkable.    Kidneys/ Ureters: Duplicated left-sided collecting system.  No hydronephrosis.  Mild right and moderate left-sided stranding inflammation about the kidneys.    Bladder: No evidence of wall thickening.    Reproductive organs: Unremarkable.    GI Tract/Mesentery: No evidence of bowel obstruction or inflammation.    Peritoneal Space: No ascites. No free air.    Retroperitoneum: No significant adenopathy.    Abdominal  wall: Unremarkable.    Vasculature: No significant atherosclerosis or aneurysm.    Bones: No acute fracture.                               US Scrotum And Testicles (Final result)  Result time 06/21/19 22:37:46    Final result by Tima Archibald Jr., MD (06/21/19 22:37:46)                 Impression:      1. No evidence of torsion or inflammation.  2. Small to moderate bilateral hydroceles.  Internal specular reflectors within the left hydrocele may relate to blood products or proteinaceous debris.      Electronically signed by: Tima Archibald Jr., MD  Date:    06/21/2019  Time:    22:37             Narrative:    EXAMINATION:  US SCROTUM AND TESTICLES    CLINICAL HISTORY:  Testicular pain, unspecified    TECHNIQUE:  Sonography of the scrotum and testes.    COMPARISON:  None.    FINDINGS:  Right Testicle:    *Size: 4.0 x 2.6 x 2.7 cm  *Appearance: No intratesticular mass.  *Flow: Normal arterial and venous flow  *Epididymis: Normal.  *Hydrocele: Small to moderate.  *Varicocele: None.  .    Left Testicle:    *Size: 3.9 x 2.8 x 2.8 cm  *Appearance: No intratesticular mass.  *Flow: Normal arterial and venous flow  *Epididymis: Normal.  *Hydrocele: Moderate hydrocele with internal specular reflectors.  *Varicocele: None.  .    Other findings: None.                                                         Clinical Impression:       ICD-10-CM ICD-9-CM   1. Acute cystitis without hematuria N30.00 595.0   2. Testicular pain N50.819 608.9         Disposition:   Disposition: Discharged  Condition: Stable                          Omar Garrett MD  06/28/19 0912

## 2019-06-23 ENCOUNTER — HOSPITAL ENCOUNTER (INPATIENT)
Facility: HOSPITAL | Age: 69
LOS: 2 days | Discharge: HOME | DRG: 690 | End: 2019-06-25
Attending: EMERGENCY MEDICINE | Admitting: INTERNAL MEDICINE
Payer: MEDICARE

## 2019-06-23 DIAGNOSIS — R42 DIZZINESS: ICD-10-CM

## 2019-06-23 DIAGNOSIS — R07.9 CHEST PAIN: ICD-10-CM

## 2019-06-23 DIAGNOSIS — N30.00 ACUTE CYSTITIS WITHOUT HEMATURIA: ICD-10-CM

## 2019-06-23 DIAGNOSIS — N30.90 CYSTITIS: Primary | ICD-10-CM

## 2019-06-23 DIAGNOSIS — N17.9 AKI (ACUTE KIDNEY INJURY): ICD-10-CM

## 2019-06-23 DIAGNOSIS — I50.9 CONGESTIVE HEART FAILURE, UNSPECIFIED HF CHRONICITY, UNSPECIFIED HEART FAILURE TYPE: ICD-10-CM

## 2019-06-23 DIAGNOSIS — N30.01 ACUTE CYSTITIS WITH HEMATURIA: ICD-10-CM

## 2019-06-23 PROCEDURE — 80047 BASIC METABLC PNL IONIZED CA: CPT

## 2019-06-23 PROCEDURE — 12000002 HC ACUTE/MED SURGE SEMI-PRIVATE ROOM

## 2019-06-23 PROCEDURE — 99285 EMERGENCY DEPT VISIT HI MDM: CPT | Mod: 25

## 2019-06-23 PROCEDURE — 96374 THER/PROPH/DIAG INJ IV PUSH: CPT

## 2019-06-23 PROCEDURE — 99285 PR EMERGENCY DEPT VISIT,LEVEL V: ICD-10-PCS | Mod: ,,, | Performed by: EMERGENCY MEDICINE

## 2019-06-23 PROCEDURE — 99285 EMERGENCY DEPT VISIT HI MDM: CPT | Mod: ,,, | Performed by: EMERGENCY MEDICINE

## 2019-06-24 PROBLEM — N17.9 ACUTE RENAL FAILURE SUPERIMPOSED ON STAGE 3 CHRONIC KIDNEY DISEASE: Status: ACTIVE | Noted: 2019-06-24

## 2019-06-24 PROBLEM — R42 DIZZINESS: Status: ACTIVE | Noted: 2019-06-24

## 2019-06-24 PROBLEM — N18.30 ACUTE RENAL FAILURE SUPERIMPOSED ON STAGE 3 CHRONIC KIDNEY DISEASE: Status: ACTIVE | Noted: 2019-06-24

## 2019-06-24 PROBLEM — N30.01 ACUTE CYSTITIS WITH HEMATURIA: Status: ACTIVE | Noted: 2019-06-24

## 2019-06-24 PROBLEM — R53.81 PHYSICAL DEBILITY: Status: ACTIVE | Noted: 2019-06-24

## 2019-06-24 PROBLEM — E87.1 HYPONATREMIA: Status: ACTIVE | Noted: 2019-06-24

## 2019-06-24 PROBLEM — N30.90 CYSTITIS: Status: ACTIVE | Noted: 2019-06-24

## 2019-06-24 LAB
ALBUMIN SERPL BCP-MCNC: 2.8 G/DL (ref 3.5–5.2)
ALP SERPL-CCNC: 79 U/L (ref 55–135)
ALT SERPL W/O P-5'-P-CCNC: 56 U/L (ref 10–44)
ANION GAP SERPL CALC-SCNC: 10 MMOL/L (ref 8–16)
ANION GAP SERPL CALC-SCNC: 11 MMOL/L (ref 8–16)
ANION GAP SERPL CALC-SCNC: 11 MMOL/L (ref 8–16)
ANION GAP SERPL CALC-SCNC: 8 MMOL/L (ref 8–16)
AST SERPL-CCNC: 68 U/L (ref 10–40)
BACTERIA #/AREA URNS AUTO: ABNORMAL /HPF
BACTERIA UR CULT: NORMAL
BASOPHILS # BLD AUTO: 0.03 K/UL (ref 0–0.2)
BASOPHILS NFR BLD: 0.2 % (ref 0–1.9)
BILIRUB SERPL-MCNC: 0.6 MG/DL (ref 0.1–1)
BILIRUB UR QL STRIP: NEGATIVE
BNP SERPL-MCNC: 304 PG/ML (ref 0–99)
BUN SERPL-MCNC: 21 MG/DL (ref 8–23)
BUN SERPL-MCNC: 22 MG/DL (ref 8–23)
BUN SERPL-MCNC: 24 MG/DL (ref 6–30)
CALCIUM SERPL-MCNC: 8.9 MG/DL (ref 8.7–10.5)
CALCIUM SERPL-MCNC: 9 MG/DL (ref 8.7–10.5)
CALCIUM SERPL-MCNC: 9.2 MG/DL (ref 8.7–10.5)
CALCIUM SERPL-MCNC: 9.2 MG/DL (ref 8.7–10.5)
CHLORIDE SERPL-SCNC: 104 MMOL/L (ref 95–110)
CHLORIDE SERPL-SCNC: 105 MMOL/L (ref 95–110)
CHLORIDE SERPL-SCNC: 105 MMOL/L (ref 95–110)
CHLORIDE SERPL-SCNC: 95 MMOL/L (ref 95–110)
CHLORIDE SERPL-SCNC: 98 MMOL/L (ref 95–110)
CLARITY UR REFRACT.AUTO: ABNORMAL
CO2 SERPL-SCNC: 18 MMOL/L (ref 23–29)
CO2 SERPL-SCNC: 19 MMOL/L (ref 23–29)
CO2 SERPL-SCNC: 21 MMOL/L (ref 23–29)
CO2 SERPL-SCNC: 23 MMOL/L (ref 23–29)
COLOR UR AUTO: ABNORMAL
CREAT SERPL-MCNC: 1.7 MG/DL (ref 0.5–1.4)
CREAT SERPL-MCNC: 1.9 MG/DL (ref 0.5–1.4)
CREAT SERPL-MCNC: 2 MG/DL (ref 0.5–1.4)
DIFFERENTIAL METHOD: ABNORMAL
EOSINOPHIL # BLD AUTO: 0 K/UL (ref 0–0.5)
EOSINOPHIL NFR BLD: 0.2 % (ref 0–8)
ERYTHROCYTE [DISTWIDTH] IN BLOOD BY AUTOMATED COUNT: 14.2 % (ref 11.5–14.5)
EST. GFR  (AFRICAN AMERICAN): 40.4 ML/MIN/1.73 M^2
EST. GFR  (AFRICAN AMERICAN): 46.2 ML/MIN/1.73 M^2
EST. GFR  (NON AFRICAN AMERICAN): 34.9 ML/MIN/1.73 M^2
EST. GFR  (NON AFRICAN AMERICAN): 40 ML/MIN/1.73 M^2
ESTIMATED AVG GLUCOSE: 174 MG/DL (ref 68–131)
GLUCOSE SERPL-MCNC: 102 MG/DL (ref 70–110)
GLUCOSE SERPL-MCNC: 103 MG/DL (ref 70–110)
GLUCOSE SERPL-MCNC: 114 MG/DL (ref 70–110)
GLUCOSE SERPL-MCNC: 118 MG/DL (ref 70–110)
GLUCOSE SERPL-MCNC: 119 MG/DL (ref 70–110)
GLUCOSE UR QL STRIP: NEGATIVE
GRAM STN SPEC: NORMAL
GRAM STN SPEC: NORMAL
HBA1C MFR BLD HPLC: 7.7 % (ref 4–5.6)
HCT VFR BLD AUTO: 32.4 % (ref 40–54)
HCT VFR BLD CALC: 33 %PCV (ref 36–54)
HGB BLD-MCNC: 10.5 G/DL (ref 14–18)
HGB UR QL STRIP: ABNORMAL
HYALINE CASTS UR QL AUTO: 0 /LPF
IMM GRANULOCYTES # BLD AUTO: 0.13 K/UL (ref 0–0.04)
IMM GRANULOCYTES NFR BLD AUTO: 0.7 % (ref 0–0.5)
KETONES UR QL STRIP: NEGATIVE
LEUKOCYTE ESTERASE UR QL STRIP: ABNORMAL
LYMPHOCYTES # BLD AUTO: 1.5 K/UL (ref 1–4.8)
LYMPHOCYTES NFR BLD: 8.5 % (ref 18–48)
MCH RBC QN AUTO: 29.9 PG (ref 27–31)
MCHC RBC AUTO-ENTMCNC: 32.4 G/DL (ref 32–36)
MCV RBC AUTO: 92 FL (ref 82–98)
MICROSCOPIC COMMENT: ABNORMAL
MONOCYTES # BLD AUTO: 2.2 K/UL (ref 0.3–1)
MONOCYTES NFR BLD: 12.2 % (ref 4–15)
NEUTROPHILS # BLD AUTO: 14.2 K/UL (ref 1.8–7.7)
NEUTROPHILS NFR BLD: 78.2 % (ref 38–73)
NITRITE UR QL STRIP: POSITIVE
NRBC BLD-RTO: 0 /100 WBC
PH UR STRIP: 5 [PH] (ref 5–8)
PLATELET # BLD AUTO: 174 K/UL (ref 150–350)
PMV BLD AUTO: 10.5 FL (ref 9.2–12.9)
POC IONIZED CALCIUM: 1.18 MMOL/L (ref 1.06–1.42)
POC TCO2 (MEASURED): 23 MMOL/L (ref 23–29)
POCT GLUCOSE: 105 MG/DL (ref 70–110)
POCT GLUCOSE: 118 MG/DL (ref 70–110)
POCT GLUCOSE: 184 MG/DL (ref 70–110)
POCT GLUCOSE: 44 MG/DL (ref 70–110)
POCT GLUCOSE: 75 MG/DL (ref 70–110)
POCT GLUCOSE: 97 MG/DL (ref 70–110)
POTASSIUM BLD-SCNC: 4 MMOL/L (ref 3.5–5.1)
POTASSIUM SERPL-SCNC: 4 MMOL/L (ref 3.5–5.1)
POTASSIUM SERPL-SCNC: 4.1 MMOL/L (ref 3.5–5.1)
POTASSIUM SERPL-SCNC: 4.3 MMOL/L (ref 3.5–5.1)
POTASSIUM SERPL-SCNC: 4.6 MMOL/L (ref 3.5–5.1)
PROT SERPL-MCNC: 6.8 G/DL (ref 6–8.4)
PROT UR QL STRIP: ABNORMAL
RBC # BLD AUTO: 3.51 M/UL (ref 4.6–6.2)
RBC #/AREA URNS AUTO: 16 /HPF (ref 0–4)
SAMPLE: ABNORMAL
SODIUM BLD-SCNC: 132 MMOL/L (ref 136–145)
SODIUM SERPL-SCNC: 124 MMOL/L (ref 136–145)
SODIUM SERPL-SCNC: 135 MMOL/L (ref 136–145)
SODIUM SERPL-SCNC: 135 MMOL/L (ref 136–145)
SODIUM SERPL-SCNC: 136 MMOL/L (ref 136–145)
SP GR UR STRIP: 1 (ref 1–1.03)
SQUAMOUS #/AREA URNS AUTO: 0 /HPF
TROPONIN I SERPL DL<=0.01 NG/ML-MCNC: 0.01 NG/ML (ref 0–0.03)
TROPONIN I SERPL DL<=0.01 NG/ML-MCNC: 0.01 NG/ML (ref 0–0.03)
TROPONIN I SERPL DL<=0.01 NG/ML-MCNC: 0.02 NG/ML (ref 0–0.03)
URN SPEC COLLECT METH UR: ABNORMAL
WBC # BLD AUTO: 18.1 K/UL (ref 3.9–12.7)
WBC #/AREA URNS AUTO: 86 /HPF (ref 0–5)
WBC CLUMPS UR QL AUTO: ABNORMAL

## 2019-06-24 PROCEDURE — 99900035 HC TECH TIME PER 15 MIN (STAT)

## 2019-06-24 PROCEDURE — 94761 N-INVAS EAR/PLS OXIMETRY MLT: CPT

## 2019-06-24 PROCEDURE — 80047 BASIC METABLC PNL IONIZED CA: CPT

## 2019-06-24 PROCEDURE — 96374 THER/PROPH/DIAG INJ IV PUSH: CPT

## 2019-06-24 PROCEDURE — 99223 PR INITIAL HOSPITAL CARE,LEVL III: ICD-10-PCS | Mod: ,,, | Performed by: INTERNAL MEDICINE

## 2019-06-24 PROCEDURE — 99222 PR INITIAL HOSPITAL CARE,LEVL II: ICD-10-PCS | Mod: ,,, | Performed by: NURSE PRACTITIONER

## 2019-06-24 PROCEDURE — 83036 HEMOGLOBIN GLYCOSYLATED A1C: CPT

## 2019-06-24 PROCEDURE — 25000003 PHARM REV CODE 250: Performed by: PHYSICIAN ASSISTANT

## 2019-06-24 PROCEDURE — 81001 URINALYSIS AUTO W/SCOPE: CPT

## 2019-06-24 PROCEDURE — 36415 COLL VENOUS BLD VENIPUNCTURE: CPT

## 2019-06-24 PROCEDURE — 93010 ELECTROCARDIOGRAM REPORT: CPT | Mod: ,,, | Performed by: INTERNAL MEDICINE

## 2019-06-24 PROCEDURE — 84484 ASSAY OF TROPONIN QUANT: CPT

## 2019-06-24 PROCEDURE — 80048 BASIC METABOLIC PNL TOTAL CA: CPT

## 2019-06-24 PROCEDURE — 84484 ASSAY OF TROPONIN QUANT: CPT | Mod: 91

## 2019-06-24 PROCEDURE — 99223 PR INITIAL HOSPITAL CARE,LEVL III: ICD-10-PCS | Mod: ,,, | Performed by: PHYSICIAN ASSISTANT

## 2019-06-24 PROCEDURE — 83880 ASSAY OF NATRIURETIC PEPTIDE: CPT

## 2019-06-24 PROCEDURE — 93010 EKG 12-LEAD: ICD-10-PCS | Mod: ,,, | Performed by: INTERNAL MEDICINE

## 2019-06-24 PROCEDURE — 93005 ELECTROCARDIOGRAM TRACING: CPT

## 2019-06-24 PROCEDURE — 99222 1ST HOSP IP/OBS MODERATE 55: CPT | Mod: ,,, | Performed by: NURSE PRACTITIONER

## 2019-06-24 PROCEDURE — 99223 1ST HOSP IP/OBS HIGH 75: CPT | Mod: ,,, | Performed by: PHYSICIAN ASSISTANT

## 2019-06-24 PROCEDURE — 27000190 HC CPAP FULL FACE MASK W/VALVE

## 2019-06-24 PROCEDURE — 87040 BLOOD CULTURE FOR BACTERIA: CPT

## 2019-06-24 PROCEDURE — 63600175 PHARM REV CODE 636 W HCPCS: Performed by: PHYSICIAN ASSISTANT

## 2019-06-24 PROCEDURE — 94660 CPAP INITIATION&MGMT: CPT

## 2019-06-24 PROCEDURE — 63600175 PHARM REV CODE 636 W HCPCS: Performed by: INTERNAL MEDICINE

## 2019-06-24 PROCEDURE — 85025 COMPLETE CBC W/AUTO DIFF WBC: CPT

## 2019-06-24 PROCEDURE — 27000221 HC OXYGEN, UP TO 24 HOURS

## 2019-06-24 PROCEDURE — 87205 SMEAR GRAM STAIN: CPT

## 2019-06-24 PROCEDURE — 80053 COMPREHEN METABOLIC PANEL: CPT

## 2019-06-24 PROCEDURE — 99223 1ST HOSP IP/OBS HIGH 75: CPT | Mod: ,,, | Performed by: INTERNAL MEDICINE

## 2019-06-24 PROCEDURE — 87086 URINE CULTURE/COLONY COUNT: CPT

## 2019-06-24 PROCEDURE — 20600001 HC STEP DOWN PRIVATE ROOM

## 2019-06-24 RX ORDER — EZETIMIBE 10 MG/1
10 TABLET ORAL DAILY
Status: DISCONTINUED | OUTPATIENT
Start: 2019-06-24 | End: 2019-06-25 | Stop reason: HOSPADM

## 2019-06-24 RX ORDER — CEFEPIME HYDROCHLORIDE 1 G/1
1 INJECTION, POWDER, FOR SOLUTION INTRAMUSCULAR; INTRAVENOUS
Status: DISCONTINUED | OUTPATIENT
Start: 2019-06-24 | End: 2019-06-24

## 2019-06-24 RX ORDER — IBUPROFEN 200 MG
24 TABLET ORAL
Status: DISCONTINUED | OUTPATIENT
Start: 2019-06-24 | End: 2019-06-24

## 2019-06-24 RX ORDER — IBUPROFEN 200 MG
16 TABLET ORAL
Status: DISCONTINUED | OUTPATIENT
Start: 2019-06-24 | End: 2019-06-25 | Stop reason: HOSPADM

## 2019-06-24 RX ORDER — ONDANSETRON 4 MG/1
4 TABLET, ORALLY DISINTEGRATING ORAL EVERY 8 HOURS PRN
Status: DISCONTINUED | OUTPATIENT
Start: 2019-06-24 | End: 2019-06-25 | Stop reason: HOSPADM

## 2019-06-24 RX ORDER — ACETAMINOPHEN 325 MG/1
650 TABLET ORAL EVERY 4 HOURS PRN
Status: DISCONTINUED | OUTPATIENT
Start: 2019-06-24 | End: 2019-06-25 | Stop reason: HOSPADM

## 2019-06-24 RX ORDER — SODIUM CHLORIDE 0.9 % (FLUSH) 0.9 %
5 SYRINGE (ML) INJECTION
Status: DISCONTINUED | OUTPATIENT
Start: 2019-06-24 | End: 2019-06-25 | Stop reason: HOSPADM

## 2019-06-24 RX ORDER — IBUPROFEN 200 MG
24 TABLET ORAL
Status: DISCONTINUED | OUTPATIENT
Start: 2019-06-24 | End: 2019-06-25 | Stop reason: HOSPADM

## 2019-06-24 RX ORDER — ASPIRIN 81 MG/1
81 TABLET ORAL DAILY
Status: DISCONTINUED | OUTPATIENT
Start: 2019-06-24 | End: 2019-06-25 | Stop reason: HOSPADM

## 2019-06-24 RX ORDER — AMOXICILLIN 250 MG
1 CAPSULE ORAL 2 TIMES DAILY PRN
Status: DISCONTINUED | OUTPATIENT
Start: 2019-06-24 | End: 2019-06-24

## 2019-06-24 RX ORDER — INSULIN ASPART 100 [IU]/ML
3-5 INJECTION, SOLUTION INTRAVENOUS; SUBCUTANEOUS
Status: DISCONTINUED | OUTPATIENT
Start: 2019-06-24 | End: 2019-06-24

## 2019-06-24 RX ORDER — CEFTRIAXONE 1 G/1
1 INJECTION, POWDER, FOR SOLUTION INTRAMUSCULAR; INTRAVENOUS
Status: DISCONTINUED | OUTPATIENT
Start: 2019-06-25 | End: 2019-06-24

## 2019-06-24 RX ORDER — SODIUM CHLORIDE 9 MG/ML
INJECTION, SOLUTION INTRAVENOUS CONTINUOUS
Status: DISCONTINUED | OUTPATIENT
Start: 2019-06-24 | End: 2019-06-24

## 2019-06-24 RX ORDER — RAMELTEON 8 MG/1
8 TABLET ORAL NIGHTLY PRN
Status: DISCONTINUED | OUTPATIENT
Start: 2019-06-24 | End: 2019-06-25 | Stop reason: HOSPADM

## 2019-06-24 RX ORDER — AMIODARONE HYDROCHLORIDE 200 MG/1
200 TABLET ORAL DAILY
Status: DISCONTINUED | OUTPATIENT
Start: 2019-06-24 | End: 2019-06-25 | Stop reason: HOSPADM

## 2019-06-24 RX ORDER — INSULIN ASPART 100 [IU]/ML
0-5 INJECTION, SOLUTION INTRAVENOUS; SUBCUTANEOUS
Status: DISCONTINUED | OUTPATIENT
Start: 2019-06-24 | End: 2019-06-25 | Stop reason: HOSPADM

## 2019-06-24 RX ORDER — ROSUVASTATIN CALCIUM 20 MG/1
40 TABLET, COATED ORAL DAILY
Status: DISCONTINUED | OUTPATIENT
Start: 2019-06-24 | End: 2019-06-25 | Stop reason: HOSPADM

## 2019-06-24 RX ORDER — FERROUS SULFATE 325(65) MG
325 TABLET, DELAYED RELEASE (ENTERIC COATED) ORAL DAILY
Status: DISCONTINUED | OUTPATIENT
Start: 2019-06-24 | End: 2019-06-25 | Stop reason: HOSPADM

## 2019-06-24 RX ORDER — GLUCAGON 1 MG
1 KIT INJECTION
Status: DISCONTINUED | OUTPATIENT
Start: 2019-06-24 | End: 2019-06-25 | Stop reason: HOSPADM

## 2019-06-24 RX ORDER — INSULIN ASPART 100 [IU]/ML
3 INJECTION, SOLUTION INTRAVENOUS; SUBCUTANEOUS
Status: DISCONTINUED | OUTPATIENT
Start: 2019-06-24 | End: 2019-06-24

## 2019-06-24 RX ORDER — IPRATROPIUM BROMIDE AND ALBUTEROL SULFATE 2.5; .5 MG/3ML; MG/3ML
3 SOLUTION RESPIRATORY (INHALATION) EVERY 4 HOURS PRN
Status: DISCONTINUED | OUTPATIENT
Start: 2019-06-24 | End: 2019-06-25 | Stop reason: HOSPADM

## 2019-06-24 RX ORDER — CEFEPIME HYDROCHLORIDE 2 G/1
2 INJECTION, POWDER, FOR SOLUTION INTRAVENOUS
Status: DISCONTINUED | OUTPATIENT
Start: 2019-06-24 | End: 2019-06-25

## 2019-06-24 RX ORDER — TAMSULOSIN HYDROCHLORIDE 0.4 MG/1
0.4 CAPSULE ORAL DAILY
Status: DISCONTINUED | OUTPATIENT
Start: 2019-06-24 | End: 2019-06-25 | Stop reason: HOSPADM

## 2019-06-24 RX ORDER — PANTOPRAZOLE SODIUM 40 MG/1
40 TABLET, DELAYED RELEASE ORAL DAILY
Status: DISCONTINUED | OUTPATIENT
Start: 2019-06-24 | End: 2019-06-25 | Stop reason: HOSPADM

## 2019-06-24 RX ORDER — IBUPROFEN 200 MG
16 TABLET ORAL
Status: DISCONTINUED | OUTPATIENT
Start: 2019-06-24 | End: 2019-06-24

## 2019-06-24 RX ORDER — CEFTRIAXONE 1 G/1
1 INJECTION, POWDER, FOR SOLUTION INTRAMUSCULAR; INTRAVENOUS
Status: COMPLETED | OUTPATIENT
Start: 2019-06-24 | End: 2019-06-24

## 2019-06-24 RX ORDER — SODIUM CHLORIDE 0.9 % (FLUSH) 0.9 %
10 SYRINGE (ML) INJECTION
Status: CANCELLED | OUTPATIENT
Start: 2019-06-24

## 2019-06-24 RX ORDER — GLUCAGON 1 MG
1 KIT INJECTION
Status: DISCONTINUED | OUTPATIENT
Start: 2019-06-24 | End: 2019-06-24

## 2019-06-24 RX ADMIN — TICAGRELOR 90 MG: 90 TABLET ORAL at 08:06

## 2019-06-24 RX ADMIN — ASPIRIN 81 MG: 81 TABLET, COATED ORAL at 08:06

## 2019-06-24 RX ADMIN — INSULIN ASPART 3 UNITS: 100 INJECTION, SOLUTION INTRAVENOUS; SUBCUTANEOUS at 11:06

## 2019-06-24 RX ADMIN — TAMSULOSIN HYDROCHLORIDE 0.4 MG: 0.4 CAPSULE ORAL at 08:06

## 2019-06-24 RX ADMIN — SODIUM CHLORIDE 500 ML: 0.9 INJECTION, SOLUTION INTRAVENOUS at 03:06

## 2019-06-24 RX ADMIN — AMIODARONE HYDROCHLORIDE 200 MG: 200 TABLET ORAL at 08:06

## 2019-06-24 RX ADMIN — TICAGRELOR 90 MG: 90 TABLET ORAL at 09:06

## 2019-06-24 RX ADMIN — ROSUVASTATIN CALCIUM 40 MG: 20 TABLET, FILM COATED ORAL at 08:06

## 2019-06-24 RX ADMIN — EZETIMIBE 10 MG: 10 TABLET ORAL at 08:06

## 2019-06-24 RX ADMIN — SODIUM CHLORIDE: 0.9 INJECTION, SOLUTION INTRAVENOUS at 06:06

## 2019-06-24 RX ADMIN — PANTOPRAZOLE SODIUM 40 MG: 40 TABLET, DELAYED RELEASE ORAL at 09:06

## 2019-06-24 RX ADMIN — CEFTRIAXONE SODIUM 1 G: 1 INJECTION, POWDER, FOR SOLUTION INTRAMUSCULAR; INTRAVENOUS at 03:06

## 2019-06-24 RX ADMIN — CEFEPIME 1 G: 1 INJECTION, POWDER, FOR SOLUTION INTRAMUSCULAR; INTRAVENOUS at 11:06

## 2019-06-24 RX ADMIN — FERROUS SULFATE TAB EC 325 MG (65 MG FE EQUIVALENT) 325 MG: 325 (65 FE) TABLET DELAYED RESPONSE at 08:06

## 2019-06-24 RX ADMIN — Medication 24 G: at 07:06

## 2019-06-24 RX ADMIN — CEFEPIME 2 G: 2 INJECTION, POWDER, FOR SOLUTION INTRAVENOUS at 10:06

## 2019-06-24 NOTE — SUBJECTIVE & OBJECTIVE
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       Past Surgical History:   Procedure Laterality Date    Angiogram, Aortic Arch, Coronary  11/16/2018    [facility] CATH LAB    Bypass graft study  11/16/2018    Performed by Ryan Schmidt MD at [facility] CATH LAB    CARDIAC DEFIBRILLATOR PLACEMENT      CARDIAC ELECTROPHYSIOLOGY STUDY N/A 11/19/2018    Performed by Byron Glasgow MD at [facility] EP LAB    CARDIAC ELECTROPHYSIOLOGY STUDY N/A 11/19/2018    Performed by Byron Glasgow MD at [facility] EP LAB    COLON SURGERY  2006    COLONOSCOPY N/A 2/2/2017    [facility]formed [facility] Ronnie [facility] ENDO    CORONARY ARTERY BYPASS GRAFT  2005    5 arteries    ESOPHAGOGASTRODUODENOSCOPY (EGD) N/A 3/21/2018    Performed by Fawad Cunningham MD at [facility] ENDO (4TH FLR)    EYE SURGERY Bilateral 2016    Laser surgery for glaucoma    INSERTION, DUAL ICD Left 11/19/2018    Performed by Byron Glasgow MD at [facility] EP LAB    Left heart cath Left 11/16/2018    Performed by Ryan Schmidt MD at [facility] CATH LAB       Review of patient's allergies indicates:  No Known Allergies    Medications:  Medications Prior to Admission   Medication Sig    albuterol (PROVENTIL/VENTOLIN HFA) 90 mcg/actuation inhaler Inhale 2 puffs into the lungs every 6 (six) hours as needed for Wheezing.    amiodarone (PACERONE) 200 MG Tab Take 1 tablet (200 mg total) by mouth once daily.    aspirin (ECOTRIN) 81 MG EC tablet Take 1 tablet (81 mg total) by mouth once daily.    carvedilol (COREG) 3.125 MG tablet Take 1 tablet (3.125 mg  total) by mouth 2 (two) times daily with meals.    ciprofloxacin HCl (CIPRO) 500 MG tablet Take 1 tablet (500 mg total) by mouth 2 (two) times daily. for 7 days    esomeprazole (NEXIUM) 40 MG capsule Take 1 capsule (40 mg total) by mouth before breakfast.    ezetimibe (ZETIA) 10 mg tablet Take 1 tablet (10 mg total) by mouth once daily.    ferrous sulfate (FEOSOL) 325 mg (65 mg iron) Tab tablet TAKE 1 TABLET BY MOUTH THREE TIMES DAILY    finasteride (PROSCAR) 5 mg tablet HOLD DUE TO ORTHOSTATIC HYPOTENSION    furosemide (LASIX) 20 MG tablet Take 1 tablet (20 mg total) by mouth once daily. Take an extra tablet daily for wt gain more than 3 pounds/day or 5 pounds/week    ibuprofen (ADVIL,MOTRIN) 800 MG tablet Take 1 tablet (800 mg total) by mouth every 6 (six) hours as needed for Pain.    insulin NPH (NOVOLIN N) 100 unit/mL injection Inject 50 Units into the skin before breakfast.    isosorbide mononitrate (IMDUR) 60 MG 24 hr tablet Take 1 tablet (60 mg total) by mouth once daily.    losartan (COZAAR) 50 MG tablet Take 1 tablet (50 mg total) by mouth once daily.    nitroGLYCERIN (NITROSTAT) 0.4 MG SL tablet DISSOLVE ONE TABLET UNDER THE TONGUE EVERY 5 MINUTES AS NEEDED FOR CHEST PAIN.    phenazopyridine (PYRIDIUM) 200 MG tablet Take 1 tablet (200 mg total) by mouth 3 (three) times daily. for 10 days    RANEXA 1,000 mg Tb12 Take 1 tablet (1,000 mg total) by mouth 2 (two) times daily.    rosuvastatin (CRESTOR) 40 MG Tab Take 1 tablet (40 mg total) by mouth once daily.    SITagliptin (JANUVIA) 100 MG Tab Take 1 tablet (100 mg total) by mouth once daily.    spironolactone (ALDACTONE) 25 MG tablet Take 1 tablet (25 mg total) by mouth once daily.    tamsulosin (FLOMAX) 0.4 mg Cap Take 1 capsule (0.4 mg total) by mouth once daily.    ticagrelor (BRILINTA) 90 mg tablet Take 90 mg by mouth 2 (two) times daily.     Antibiotics (From admission, onward)    Start     Stop Route Frequency Ordered    06/24/19 1000   ceFEPIme injection 1 g      -- IV Every 12 hours (non-standard times) 19 0855        Antifungals (From admission, onward)    None        Antivirals (From admission, onward)    None           Immunization History   Administered Date(s) Administered    Influenza - High Dose 2018    Pneumococcal Conjugate - 13 Valent 2018       Family History     Problem Relation (Age of Onset)    Diabetes Mother, Sister    Heart attack Brother    Heart disease Mother, Sister    Hypertension Sister        Social History     Socioeconomic History    Marital status:      Spouse name: Not on file    Number of children: Not on file    Years of education: Not on file    Highest education level: Not on file   Occupational History    Not on file   Social Needs    Financial resource strain: Not on file    Food insecurity:     Worry: Not on file     Inability: Not on file    Transportation needs:     Medical: Not on file     Non-medical: Not on file   Tobacco Use    Smoking status: Former Smoker     Packs/day: 3.00     Types: Cigarettes     Start date: 1963     Last attempt to quit: 1981     Years since quittin.5    Smokeless tobacco: Former User     Types: Snuff, Chew     Quit date:    Substance and Sexual Activity    Alcohol use: No    Drug use: No    Sexual activity: Yes     Comment: -with a significant other   Lifestyle    Physical activity:     Days per week: Not on file     Minutes per session: Not on file    Stress: Not on file   Relationships    Social connections:     Talks on phone: Not on file     Gets together: Not on file     Attends Gnosticist service: Not on file     Active member of club or organization: Not on file     Attends meetings of clubs or organizations: Not on file     Relationship status: Not on file   Other Topics Concern    Not on file   Social History Narrative    Not on file     Review of Systems   Constitutional: Positive for activity change,  fatigue and fever. Negative for appetite change, chills and diaphoresis.   Respiratory: Negative for cough and shortness of breath.    Cardiovascular: Negative for chest pain, palpitations and leg swelling.   Gastrointestinal: Positive for abdominal pain and constipation. Negative for diarrhea, nausea and vomiting.   Genitourinary: Positive for difficulty urinating, dysuria, scrotal swelling, testicular pain and urgency.   Musculoskeletal: Negative for back pain.   Skin: Negative for color change, pallor, rash and wound.   Neurological: Negative for dizziness and headaches.     Objective:     Vital Signs (Most Recent):  Temp: 98.8 °F (37.1 °C) (06/24/19 1123)  Pulse: 61 (06/24/19 1157)  Resp: 18 (06/24/19 1123)  BP: (!) 106/52 (06/24/19 1123)  SpO2: 98 % (06/24/19 1123) Vital Signs (24h Range):  Temp:  [97.4 °F (36.3 °C)-98.8 °F (37.1 °C)] 98.8 °F (37.1 °C)  Pulse:  [60-68] 61  Resp:  [13-31] 18  SpO2:  [95 %-99 %] 98 %  BP: (105-148)/(52-68) 106/52     Weight: 110.8 kg (244 lb 4.3 oz)  Body mass index is 38.26 kg/m².    Estimated Creatinine Clearance: 43 mL/min (A) (based on SCr of 1.9 mg/dL (H)).    Physical Exam   Constitutional: He appears well-developed and well-nourished. No distress.   HENT:   Head: Normocephalic.   Eyes: Pupils are equal, round, and reactive to light.   Cardiovascular: Normal rate, regular rhythm and normal heart sounds. Exam reveals no friction rub.   No murmur heard.  Pulmonary/Chest: Effort normal. No stridor. No respiratory distress. He has no wheezes. He has no rales.   Abdominal: Soft. He exhibits no distension and no mass. There is no tenderness. There is no guarding.   Genitourinary: Penis normal. Right testis shows tenderness. Right testis shows no mass and no swelling. Left testis shows no mass, no swelling and no tenderness. No penile tenderness. No discharge found.   Musculoskeletal: Normal range of motion. He exhibits no edema or deformity.   Neurological: He is alert.   Skin:  Skin is warm. No rash noted. He is not diaphoretic. No erythema.   Psychiatric: He has a normal mood and affect.   Vitals reviewed.      Significant Labs: All pertinent labs within the past 24 hours have been reviewed.    Significant Imaging: I have reviewed all pertinent imaging results/findings within the past 24 hours.

## 2019-06-24 NOTE — CONSULTS
"[facility]  Endocrinology  Diabetes Consult Note    Consult Requested by: Ailyn Giles MD   Reason for admit: Acute cystitis with hematuria    HISTORY OF PRESENT ILLNESS:  Reason for Consult: Management of T2DM, Hyperglycemia     Surgical Procedure and Date: n/a    Diabetes diagnosis age: in his 30s     Home Diabetes Medications:  NPH 50 units with breakfast. januvia 100 mg daily.   Previously on metformin; caused nausea and was discontinued    How often checking glucose at home? 1-3 x day   BG readings on regimen: labile; 500 yesterday; also with frequent lows   Hypoglycemia on the regimen?  Yes 1-2 times a week  Missed doses on regimen?  No    Diabetes Complications include:     Hyperglycemia, Hypoglycemia  and Diabetic peripheral neuropathy     Complicating diabetes co morbidities:   CKD      HPI:   Patient is a 68 y.o. male with a diagnosis of type 2 DM. Also with CAD, HTN and CKD. Patient presented to ED with testicular pain and treated for UTI previously; patient came back to ED on 19 fever, urinary frequency, and scrotal/testicular pain. Also with a 4 month hx of dizziness with associated vision changes ("yellow spots"), gait instability without correlation with standing, positioning or effort; he reports intermittent L sided chest pain today without associated sxs improved with 2 SL nitro.       Of note: patient reports on day of admission that his morning  BG was over 500 and he took about 190 U of NPH over the course of the day to control his BG.  He reports never trying prandial+basal insulin and reports BG labile at home.  He see Dr. Kruger in [place] as his PCP.     Endocrinology consulted for BG/ DM management.     Interval HPI:   Admitted to [facility]. Hypoglycemia this AM. Patient too ~ 200 units of insulin; administered between over 3 total doses yesterday.   Eatin%  Nausea: No  Hypoglycemia and intervention: Yes 44  Fever: No  TPN and/or TF: No    PMH, PSH, FH, SH  " reviewed     Review of Systems  Constitutional: + for weight changes.  Eyes: Negative for visual disturbance.  Respiratory: + for cough.   Cardiovascular: Negative for chest pain.  Gastrointestinal: Negative for nausea.  Endocrine: + for polyuria, polydipsia.  Musculoskeletal: Negative for back pain.  Skin: Negative for rash.  Neurological: Negative for syncope.  Psychiatric/Behavioral: Negative for depression.    Current Medications and/or Treatments Impacting Glycemic Control  Immunotherapy:    Immunosuppressants     None        Steroids:   Hormones (From admission, onward)    None        Pressors:    Autonomic Drugs (From admission, onward)    None        Hyperglycemia/Diabetes Medications:   Antihyperglycemics (From admission, onward)    Start     Stop Route Frequency Ordered    06/24/19 2100  insulin detemir U-100 pen 12 Units      -- SubQ Nightly 06/24/19 0436    06/24/19 0715  insulin aspart U-100 pen 3 Units      -- SubQ 3 times daily with meals 06/24/19 0436             PHYSICAL EXAMINATION:  Vitals:    06/24/19 0735   BP: (!) 148/61   Pulse: 60   Resp: 18   Temp: 97.4 °F (36.3 °C)     Body mass index is 38.26 kg/m².    Physical Exam   Constitutional: Well developed, well nourished, NAD.  ENT: External ears no masses with nose patent; normal hearing.  Neck: Supple; trachea midline.  Cardiovascular: Normal heart sounds, no LE edema. DP +2 bilaterally.  Lungs: Normal effort; lungs anterior bilaterally clear to auscultation.  Abdomen: Soft, no masses, no hernias.  MS: No clubbing or cyanosis of nails noted;  unable to assess gait.  Skin: No rashes, lesions, or ulcers; no nodules. Injection sites are ok. No lipo hypertropthy or atrophy.  Psychiatric: Good judgement and insight; normal mood and affect.  Neurological: Cranial nerves are grossly intact.   Foot: nails in good condition, no amputations noted.          Labs Reviewed and Include   Recent Labs   Lab 06/24/19  0005 06/24/19  1238    119*    CALCIUM 9.2 9.0   ALBUMIN 2.8*  --    PROT 6.8  --    * 135*   K 4.0 4.1   CO2 18* 21*   CL 95 104   BUN 22 22   CREATININE 1.9* 1.7*   ALKPHOS 79  --    ALT 56*  --    AST 68*  --    BILITOT 0.6  --      Lab Results   Component Value Date    WBC 18.10 (H) 06/24/2019    HGB 10.5 (L) 06/24/2019    HCT 33 (L) 06/24/2019    MCV 92 06/24/2019     06/24/2019     No results for input(s): TSH, FREET4 in the last 168 hours.  Lab Results   Component Value Date    HGBA1C 7.7 (H) 06/24/2019       Nutritional status:   Body mass index is 38.26 kg/m².  Lab Results   Component Value Date    ALBUMIN 2.8 (L) 06/24/2019    ALBUMIN 3.7 06/21/2019    ALBUMIN 3.3 (L) 05/23/2019     No results found for: PREALBUMIN    Estimated Creatinine Clearance: 48 mL/min (A) (based on SCr of 1.7 mg/dL (H)).    Accu-Checks  Recent Labs     06/24/19  0541 06/24/19  0733 06/24/19  0854 06/24/19  1124   POCTGLUCOSE 75 44* 118* 184*        ASSESSMENT and PLAN    * Acute cystitis with hematuria  Managed per primary.   Infection may increase insulin resistance.         Diabetes mellitus type 2 in obese  BG goal 140-180    TDD of insulin 50 units; decrease by 20% given hypoglycemia at home and decrease by additional 20% given inpatient management; 50/50 basal prandial as follows:   Levemir 16 units daily.   Novolog 3-5 units with meals.   BG monitoring /hs/0200 given hypoglycemia this AM and low dose correction scale given kidney function    ADDENDUM: BG remains below goal. Decrease Levemir 12 units daily; first dose tomorrow. Discontinue scheduled novolog. BG decreased from 184 to 97. Will re-evaluate in AM.     ** Please call Endocrine for any BG related issues **      Coronary artery disease involving native coronary artery of native heart without angina pectoris  Optimize glucose control and  avoid hypoglycemia          Acute renal failure superimposed on stage 3 chronic kidney disease  Titrate insulin slowly to avoid hypoglycemia as  the risk of hypoglycemia increases with decreased creatinine clearance.    Estimated Creatinine Clearance: 43 mL/min (A) (based on SCr of 1.9 mg/dL (H)).        Chronic combined systolic and diastolic heart failure  Optimize glucose control and  avoid hypoglycemia          Severe obesity (BMI 35.0-39.9) with comorbidity  May increase insulin resistance.   Body mass index is 38.26 kg/m².            Plan discussed with patient at bedside.     Anamaria Dugan NP  Endocrinology  [facility]

## 2019-06-24 NOTE — ASSESSMENT & PLAN NOTE
Compensated  - EF 43%, grade 1 DD  - hold lasix in setting of hyponatremia  - strict I/O, daily weights, tele

## 2019-06-24 NOTE — HPI
"68-year-old male with PMHx of DM, CAD (s/p CABG), V Tach ( with AICD), HTN, and kidney failure who presents to the ED for evaluation of multiple complaints including continued testicular pain despite treatment for UTI.  Patient is poor historian and has poor medical insight/knowledge.  Patient has been compliant with the cipro taking prescribed dosing (4 tablets total).  He states that he presented to the ED d/t his girlfriend's concern.  He pain after micturition improved after starting the abx, but has returned.  He reports taking ibuprofen 800 mg at home for fever of 101, with resolution. He reports continued urinary frequency, scrotal/testicular pain. He denies SOB, cough, numbness, abdominal pain, n/v/d, blood in stool, hematuria, flank pain, headache.    Patient reports a 4 month hx of dizziness with associated vision changes ("yellow spots"), gait instability without correlation with standing, positioning or effort.  He usually drinks 10 bottles of water a day, as instructed by his PCP, which has not seemed to help sxs.  This has not changed until today when he tried a bottle of gatorade, which resolved sxs. He has made multiple changes to his medications, including holding flomax/proscar and stopping metformin, but has not improved. He also reports 2-3 weeks of increased fatigue/weakness.  Patient denies hx of CVA.     Patient reports intermittent L sided chest pain today without associated sxs improved with 2 SL nitro.  Per ED provider "He also reports that while resting his AICD fired around 5 PM with associated left sided chest pain, diaphoresis, and dizziness. His pain lasted only a few seconds-minutes after taking 2 nitroglycerin. He states that his AICD has never fired before in the past."     This morning his BG was over 500 and he took about 190 U of NPH over the course of the day to control his BG.  He reports never trying prandial+basal insulin and reports BG labile at home.  He see Dr. Kruger in " "[place] as his PCP.     On 6/21, patient presented for BL testicular pain worse with urination.  UA revealed grossly positive UTI.  CT renal study showed "Infiltration of the fat surrounding the kidneys, left greater than right, may occur with pyelonephritis.  No obstructing kidney stones are evident.  No hydronephrosis".  US scrotum/testes shows "No evidence of torsion or inflammation.  Small to moderate bilateral hydroceles.  Internal specular reflectors within the left hydrocele may relate to blood products or proteinaceous debris." Without evidence of torsion, obstruction, or nephrolithiasis, patient was discharged home with cipro.     ED: Afebrile, HDS (BP 110s-100s), orthostatic negative. Leukocytosis of 18 k.  Na 124/Cr 1.9 (previously 1.9 x3 days ago, but BL 1.2-1.4)/CO2 18. . Trop .019/ (BL 100s). UA shows nitrite positive urine, 2+ leuks, 16 RBCs, 86 WBCs.  CTH shows "Generalized cerebral volume loss and chronic microvascular ischemic change".  CXR shows "Bilateral pattern of mild ground-glass infiltrate and minimal pleural fluid". Cards evaluated PM in ED as patient with episode of CP thought to be 2/2 ICD discharge.  "

## 2019-06-24 NOTE — CONSULTS
"[facility]  Infectious Disease  Consult Note    Patient Name: Walter Bull  MRN: [id number]  Admission Date: 6/23/2019  Hospital Length of Stay: 0 days  Attending Physician: Ailyn Giles MD  Primary Care Provider: Belkys Kruger MD     Isolation Status: No active isolations    Consults  Assessment/Plan:     * Acute cystitis with hematuria  69 y/o male with DM, CAD, AICD, HTN, presents to ED for dysuria despite abx tx for UTI. He was prescribed ciprofloxacin for UTI. We are consulted for abx recommendations.     CT renal study showed "Infiltration of the fat surrounding the kidneys, left greater than right, may occur with pyelonephritis.  No obstructing kidney stones are evident.  No hydronephrosis".      US scrotum/testes shows "No evidence of torsion or inflammation.  Small to moderate bilateral hydroceles.     WBC on admit 18K. Afebrile. Repeat UA with 86 WBCs urine cultures, blood cultures pending. He is on cefepime.      1. Continue 2yr40vy.  Blood cultures NGTD. Urine cultures pending.  2. Tailor abx accordingly  3. Anticipate 10-14 days of abx   4. ID will follow closely with you           Thank you for your consult. I will follow-up with patient. Please contact us if you have any additional questions.    Zachary Olea PA-C  Infectious Disease  [facility]  [phone]    Subjective:     Principal Problem: Acute cystitis with hematuria    HPI: 69 y/o male with DM, CAD, AICD, HTN, presents to ED for dysuria despite abx tx for UTI. He was prescribed ciprofloxacin for UTI. He febrile 101 at home and reports having urinary frequency, scrotal/testicular pain.      On 6/21, patient presented for BL testicular pain worse with urination.  UA revealed grossly positive UTI.  CT renal study showed "Infiltration of the fat surrounding the kidneys, left greater than right, may occur with pyelonephritis.  No obstructing kidney stones are evident.  No hydronephrosis".  US " "scrotum/testes shows "No evidence of torsion or inflammation.  Small to moderate bilateral hydroceles.  Internal specular reflectors within the left hydrocele may relate to blood products or proteinaceous debris." Without evidence of torsion, obstruction, or nephrolithiasis, patient was discharged home with cipro.     WBC on admit 18K. Afebrile. Repeat UA with 86 WBC,s urin cultures, blood cultures pending. He is on cefepime.     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       Past Surgical History:   Procedure Laterality Date    Angiogram, Aortic Arch, Coronary  11/16/2018    Performed [facility] at [facility] CATH LAB    Bypass graft study  11/16/2018    Performed by Ryan Schmidt MD at [facility] CATH LAB    CARDIAC DEFIBRILLATOR PLACEMENT      CARDIAC ELECTROPHYSIOLOGY STUDY N/A 11/19/2018    Performed by Byron Glasgow MD at [facility] EP LAB    CARDIAC ELECTROPHYSIOLOGY STUDY N/A 11/19/2018    Performed by Byron Glasgow MD at [facility] EP LAB    COLON SURGERY  2006    COLONOSCOPY N/A 2/2/2017    [facility]    CORONARY ARTERY BYPASS GRAFT  2005    5 arteries    ESOPHAGOGASTRODUODENOSCOPY (EGD) N/A 3/21/2018    Performed by Fawad Cunningham MD at [facility] ENDO (4TH FLR)    EYE SURGERY Bilateral 2016    Laser surgery for glaucoma    INSERTION, DUAL ICD Left 11/19/2018    Performed by Byron Glasgow MD at [facility] EP LAB    Left heart cath Left 11/16/2018    Performed by Ryan Schmidt MD at [facility] CATH LAB       Review of patient's allergies indicates:  No Known " Allergies    Medications:  Medications Prior to Admission   Medication Sig    albuterol (PROVENTIL/VENTOLIN HFA) 90 mcg/actuation inhaler Inhale 2 puffs into the lungs every 6 (six) hours as needed for Wheezing.    amiodarone (PACERONE) 200 MG Tab Take 1 tablet (200 mg total) by mouth once daily.    aspirin (ECOTRIN) 81 MG EC tablet Take 1 tablet (81 mg total) by mouth once daily.    carvedilol (COREG) 3.125 MG tablet Take 1 tablet (3.125 mg total) by mouth 2 (two) times daily with meals.    ciprofloxacin HCl (CIPRO) 500 MG tablet Take 1 tablet (500 mg total) by mouth 2 (two) times daily. for 7 days    esomeprazole (NEXIUM) 40 MG capsule Take 1 capsule (40 mg total) by mouth before breakfast.    ezetimibe (ZETIA) 10 mg tablet Take 1 tablet (10 mg total) by mouth once daily.    ferrous sulfate (FEOSOL) 325 mg (65 mg iron) Tab tablet TAKE 1 TABLET BY MOUTH THREE TIMES DAILY    finasteride (PROSCAR) 5 mg tablet HOLD DUE TO ORTHOSTATIC HYPOTENSION    furosemide (LASIX) 20 MG tablet Take 1 tablet (20 mg total) by mouth once daily. Take an extra tablet daily for wt gain more than 3 pounds/day or 5 pounds/week    ibuprofen (ADVIL,MOTRIN) 800 MG tablet Take 1 tablet (800 mg total) by mouth every 6 (six) hours as needed for Pain.    insulin NPH (NOVOLIN N) 100 unit/mL injection Inject 50 Units into the skin before breakfast.    isosorbide mononitrate (IMDUR) 60 MG 24 hr tablet Take 1 tablet (60 mg total) by mouth once daily.    losartan (COZAAR) 50 MG tablet Take 1 tablet (50 mg total) by mouth once daily.    nitroGLYCERIN (NITROSTAT) 0.4 MG SL tablet DISSOLVE ONE TABLET UNDER THE TONGUE EVERY 5 MINUTES AS NEEDED FOR CHEST PAIN.    phenazopyridine (PYRIDIUM) 200 MG tablet Take 1 tablet (200 mg total) by mouth 3 (three) times daily. for 10 days    RANEXA 1,000 mg Tb12 Take 1 tablet (1,000 mg total) by mouth 2 (two) times daily.    rosuvastatin (CRESTOR) 40 MG Tab Take 1 tablet (40 mg total) by mouth once  daily.    SITagliptin (JANUVIA) 100 MG Tab Take 1 tablet (100 mg total) by mouth once daily.    spironolactone (ALDACTONE) 25 MG tablet Take 1 tablet (25 mg total) by mouth once daily.    tamsulosin (FLOMAX) 0.4 mg Cap Take 1 capsule (0.4 mg total) by mouth once daily.    ticagrelor (BRILINTA) 90 mg tablet Take 90 mg by mouth 2 (two) times daily.     Antibiotics (From admission, onward)    Start     Stop Route Frequency Ordered    19 1000  ceFEPIme injection 1 g      -- IV Every 12 hours (non-standard times) 19 0855        Antifungals (From admission, onward)    None        Antivirals (From admission, onward)    None           Immunization History   Administered Date(s) Administered    Influenza - High Dose 2018    Pneumococcal Conjugate - 13 Valent 2018       Family History     Problem Relation (Age of Onset)    Diabetes Mother, Sister    Heart attack Brother    Heart disease Mother, Sister    Hypertension Sister        Social History     Socioeconomic History    Marital status:      Spouse name: Not on file    Number of children: Not on file    Years of education: Not on file    Highest education level: Not on file   Occupational History    Not on file   Social Needs    Financial resource strain: Not on file    Food insecurity:     Worry: Not on file     Inability: Not on file    Transportation needs:     Medical: Not on file     Non-medical: Not on file   Tobacco Use    Smoking status: Former Smoker     Packs/day: 3.00     Types: Cigarettes     Start date: 1963     Last attempt to quit: 1981     Years since quittin.5    Smokeless tobacco: Former User     Types: Snuff, Chew     Quit date:    Substance and Sexual Activity    Alcohol use: No    Drug use: No    Sexual activity: Yes     Comment: -with a significant other   Lifestyle    Physical activity:     Days per week: Not on file     Minutes per session: Not on file    Stress: Not on  file   Relationships    Social connections:     Talks on phone: Not on file     Gets together: Not on file     Attends Druze service: Not on file     Active member of club or organization: Not on file     Attends meetings of clubs or organizations: Not on file     Relationship status: Not on file   Other Topics Concern    Not on file   Social History Narrative    Not on file     Review of Systems   Constitutional: Positive for activity change, fatigue and fever. Negative for appetite change, chills and diaphoresis.   Respiratory: Negative for cough and shortness of breath.    Cardiovascular: Negative for chest pain, palpitations and leg swelling.   Gastrointestinal: Positive for abdominal pain and constipation. Negative for diarrhea, nausea and vomiting.   Genitourinary: Positive for difficulty urinating, dysuria, scrotal swelling, testicular pain and urgency.   Musculoskeletal: Negative for back pain.   Skin: Negative for color change, pallor, rash and wound.   Neurological: Negative for dizziness and headaches.     Objective:     Vital Signs (Most Recent):  Temp: 98.8 °F (37.1 °C) (06/24/19 1123)  Pulse: 61 (06/24/19 1157)  Resp: 18 (06/24/19 1123)  BP: (!) 106/52 (06/24/19 1123)  SpO2: 98 % (06/24/19 1123) Vital Signs (24h Range):  Temp:  [97.4 °F (36.3 °C)-98.8 °F (37.1 °C)] 98.8 °F (37.1 °C)  Pulse:  [60-68] 61  Resp:  [13-31] 18  SpO2:  [95 %-99 %] 98 %  BP: (105-148)/(52-68) 106/52     Weight: 110.8 kg (244 lb 4.3 oz)  Body mass index is 38.26 kg/m².    Estimated Creatinine Clearance: 43 mL/min (A) (based on SCr of 1.9 mg/dL (H)).    Physical Exam   Constitutional: He appears well-developed and well-nourished. No distress.   HENT:   Head: Normocephalic.   Eyes: Pupils are equal, round, and reactive to light.   Cardiovascular: Normal rate, regular rhythm and normal heart sounds. Exam reveals no friction rub.   No murmur heard.  Pulmonary/Chest: Effort normal. No stridor. No respiratory distress. He has  no wheezes. He has no rales.   Abdominal: Soft. He exhibits no distension and no mass. There is no tenderness. There is no guarding.   Genitourinary: Penis normal. Right testis shows tenderness. Right testis shows no mass and no swelling. Left testis shows no mass, no swelling and no tenderness. No penile tenderness. No discharge found.   Musculoskeletal: Normal range of motion. He exhibits no edema or deformity.   Neurological: He is alert.   Skin: Skin is warm. No rash noted. He is not diaphoretic. No erythema.   Psychiatric: He has a normal mood and affect.   Vitals reviewed.      Significant Labs: All pertinent labs within the past 24 hours have been reviewed.    Significant Imaging: I have reviewed all pertinent imaging results/findings within the past 24 hours.

## 2019-06-24 NOTE — ED TRIAGE NOTES
Pt presents to ed with cc of dizziness and testicular pain. Pt states has been dizzy for a few months and having testicular pain for for a few months as well. Pt was seen at [facility] recently for same. Diagnosed with prostate infection, protein in urine, and kidney damage. Was given prescription for antibiotic. Pt says symptoms aren't getting any better.

## 2019-06-24 NOTE — PLAN OF CARE
Problem: Adult Inpatient Plan of Care  Goal: Plan of Care Review  Outcome: Ongoing (interventions implemented as appropriate)  Pt AAx4, breathing even and unlabored, call light in reach, bed in lowest position. Educated pt about calling prior to ambulation due to reports of dizziness, pt stated understanding. NS D/C, PIV saline locked. VSS, frequent hourly rounding completed. No significant events throughout shift. Will continue to monitor.

## 2019-06-24 NOTE — PLAN OF CARE
Problem: Adult Inpatient Plan of Care  Goal: Plan of Care Review  Outcome: Ongoing (interventions implemented as appropriate)  Pt AAOx4, afebrile, free of falls. Pt arrived to 8096 approximately 0610 with SO at bedside. VSS. NS at 75cc/hr initiated, tolerating well. Questions addressed and answered, POC reviewed. SCD/KADY to be placed. Fall precautions discussed, bed alarm on at this time. WCTM.

## 2019-06-24 NOTE — H&P
"[facility] Medicine  History & Physical    Patient Name: Walter Bull  MRN: [id number]  Admission Date: 6/23/2019  Attending Physician: Smita Lambert MD   Primary Care Provider: Belkys Kruger MD    [facility] O Benny Lange PA-C     Patient information was obtained from patient, spouse/SO, past medical records and ER records.     Subjective:     Principal Problem:Acute cystitis with hematuria    Chief Complaint:   Chief Complaint   Patient presents with    Testicle Pain     Pt reports to ED w/ c/o bilat testicular pain when touched. mild swelling. Pt recently diagnosed with acute cystitis and currently on abx. Pt reports some dizziness. TMax 101 this morning, took ibuprofen 800mg.         HPI: 68-year-old male with PMHx of DM, CAD (s/p CABG), V Tach ( with AICD), HTN, and kidney failure who presents to the ED for evaluation of multiple complaints including continued testicular pain despite treatment for UTI.  Patient is poor historian and has poor medical insight/knowledge.  Patient has been compliant with the cipro taking prescribed dosing (4 tablets total).  He states that he presented to the ED d/t his girlfriend's concern.  He pain after micturition improved after starting the abx, but has returned.  He reports taking ibuprofen 800 mg at home for fever of 101, with resolution. He reports continued urinary frequency, scrotal/testicular pain. He denies SOB, cough, numbness, abdominal pain, n/v/d, blood in stool, hematuria, flank pain, headache.    Patient reports a 4 month hx of dizziness with associated vision changes ("yellow spots"), gait instability without correlation with standing, positioning or effort.  He usually drinks 10 bottles of water a day, as instructed by his PCP, which has not seemed to help sxs.  This has not changed until today when he tried a bottle of gatorade, which resolved sxs. He has made multiple changes to his " "medications, including holding flomax/proscar and stopping metformin, but has not improved. He also reports 2-3 weeks of increased fatigue/weakness.  Patient denies hx of CVA.     Patient reports intermittent L sided chest pain today without associated sxs improved with 2 SL nitro.  Per ED provider "He also reports that while resting his AICD fired around 5 PM with associated left sided chest pain, diaphoresis, and dizziness. His pain lasted only a few seconds-minutes after taking 2 nitroglycerin. He states that his AICD has never fired before in the past."     This morning his BG was over 500 and he took about 190 U of NPH over the course of the day to control his BG.  He reports never trying prandial+basal insulin and reports BG labile at home.  He see Dr. Kruger in [place] as his PCP.     On 6/21, patient presented for BL testicular pain worse with urination.  UA revealed grossly positive UTI.  CT renal study showed "Infiltration of the fat surrounding the kidneys, left greater than right, may occur with pyelonephritis.  No obstructing kidney stones are evident.  No hydronephrosis".  US scrotum/testes shows "No evidence of torsion or inflammation.  Small to moderate bilateral hydroceles.  Internal specular reflectors within the left hydrocele may relate to blood products or proteinaceous debris." Without evidence of torsion, obstruction, or nephrolithiasis, patient was discharged home with cipro.     ED: Afebrile, HDS (BP 110s-100s), orthostatic negative. Leukocytosis of 18 k.  Na 124/Cr 1.9 (previously 1.9 x3 days ago, but BL 1.2-1.4)/CO2 18. . Trop .019/ (BL 100s). UA shows nitrite positive urine, 2+ leuks, 16 RBCs, 86 WBCs.  CTH shows "Generalized cerebral volume loss and chronic microvascular ischemic change".  CXR shows "Bilateral pattern of mild ground-glass infiltrate and minimal pleural fluid". Cards evaluated PM in ED as patient with episode of CP thought to be 2/2 ICD discharge.    Past Medical " History:   Diagnosis Date    Anemia     Anticoagulant long-term use     CHF (congestive heart failure)     Colon cancer screening 2/2/2017    Coronary artery disease     Diabetes mellitus type I     Disorder of kidney and ureter     Encounter for blood transfusion     Glaucoma     Heart attack     09/2018    Heart disease     Hypertension     Iron deficiency     Kidney failure     post CABG    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       Past Surgical History:   Procedure Laterality Date    Angiogram, Aortic Arch, Coronary  11/16/2018    [facility]formed [facility] CATH LAB    Bypass graft study  11/16/2018    [facility] by [facility] at [facility] CATH [facility]    CARDIAC DEFIBRILLATOR PLACEMENT      CARDIAC ELECTROPHYSIOLOGY STUDY N/A 11/19/2018    Performed by Byron Glasgow [facility] at [facility] EP LAB    CARDIAC ELECTROPHYSIOLOGY STUDY N/A 11/19/2018    Performed by Byron Glasgow [facility] at [facility] EP LAB    COLON SURGERY  2006    COLONOSCOPY N/A 2/2/2017    [facility]    CORONARY ARTERY BYPASS GRAFT  2005    5 arteries    ESOPHAGOGASTRODUODENOSCOPY (EGD) N/A 3/21/2018    Performed by Fawad Cunningham MD at [facility] ENDO (4TH FLR)    EYE SURGERY Bilateral 2016    Laser surgery for glaucoma    INSERTION, DUAL ICD Left 11/19/2018    Performed by Byron Glasgow MD at [facility] EP LAB    Left heart cath Left 11/16/2018    Performed by Ryan Schmidt MD at [facility] CATH LAB       Review of patient's allergies indicates:  No Known Allergies    No current facility-administered medications on file prior to encounter.      Current Outpatient Medications on File Prior to Encounter   Medication Sig    albuterol (PROVENTIL/VENTOLIN HFA) 90 mcg/actuation inhaler Inhale 2 puffs into the lungs every 6 (six) hours as needed for Wheezing.    amiodarone (PACERONE) 200 MG Tab Take 1 tablet (200 mg total) by mouth once daily.    aspirin (ECOTRIN) 81 MG EC tablet Take 1 tablet (81 mg total) by mouth  once daily.    carvedilol (COREG) 3.125 MG tablet Take 1 tablet (3.125 mg total) by mouth 2 (two) times daily with meals.    ciprofloxacin HCl (CIPRO) 500 MG tablet Take 1 tablet (500 mg total) by mouth 2 (two) times daily. for 7 days    esomeprazole (NEXIUM) 40 MG capsule Take 1 capsule (40 mg total) by mouth before breakfast.    ezetimibe (ZETIA) 10 mg tablet Take 1 tablet (10 mg total) by mouth once daily.    ferrous sulfate (FEOSOL) 325 mg (65 mg iron) Tab tablet TAKE 1 TABLET BY MOUTH THREE TIMES DAILY    finasteride (PROSCAR) 5 mg tablet HOLD DUE TO ORTHOSTATIC HYPOTENSION    furosemide (LASIX) 20 MG tablet Take 1 tablet (20 mg total) by mouth once daily. Take an extra tablet daily for wt gain more than 3 pounds/day or 5 pounds/week    ibuprofen (ADVIL,MOTRIN) 800 MG tablet Take 1 tablet (800 mg total) by mouth every 6 (six) hours as needed for Pain.    insulin NPH (NOVOLIN N) 100 unit/mL injection Inject 50 Units into the skin before breakfast.    isosorbide mononitrate (IMDUR) 60 MG 24 hr tablet Take 1 tablet (60 mg total) by mouth once daily.    losartan (COZAAR) 50 MG tablet Take 1 tablet (50 mg total) by mouth once daily.    nitroGLYCERIN (NITROSTAT) 0.4 MG SL tablet DISSOLVE ONE TABLET UNDER THE TONGUE EVERY 5 MINUTES AS NEEDED FOR CHEST PAIN.    phenazopyridine (PYRIDIUM) 200 MG tablet Take 1 tablet (200 mg total) by mouth 3 (three) times daily. for 10 days    RANEXA 1,000 mg Tb12 Take 1 tablet (1,000 mg total) by mouth 2 (two) times daily.    rosuvastatin (CRESTOR) 40 MG Tab Take 1 tablet (40 mg total) by mouth once daily.    SITagliptin (JANUVIA) 100 MG Tab Take 1 tablet (100 mg total) by mouth once daily.    spironolactone (ALDACTONE) 25 MG tablet Take 1 tablet (25 mg total) by mouth once daily.    tamsulosin (FLOMAX) 0.4 mg Cap Take 1 capsule (0.4 mg total) by mouth once daily.    ticagrelor (BRILINTA) 90 mg tablet Take 90 mg by mouth 2 (two) times daily.     Family History      Problem Relation (Age of Onset)    Diabetes Mother, Sister    Heart attack Brother    Heart disease Mother, Sister    Hypertension Sister        Tobacco Use    Smoking status: Former Smoker     Packs/day: 3.00     Types: Cigarettes     Start date: 1963     Last attempt to quit: 1981     Years since quittin.5    Smokeless tobacco: Former User     Types: Snuff, Chew     Quit date:    Substance and Sexual Activity    Alcohol use: No    Drug use: No    Sexual activity: Yes     Comment: -with a significant other     Review of Systems   Constitutional: Positive for chills, fatigue, fever and unexpected weight change (wt gain 20 lbs ).        + increased water intake (10 bottles daily)   HENT: Negative for ear pain and sore throat.    Eyes: Positive for visual disturbance (with dizziness). Negative for pain.   Respiratory: Negative for cough and shortness of breath.    Cardiovascular: Negative for chest pain (resolved) and palpitations.   Gastrointestinal: Negative for abdominal pain, constipation (resolved), diarrhea, nausea and vomiting.   Endocrine: Negative for heat intolerance and polydipsia.   Genitourinary: Positive for dysuria, frequency, scrotal swelling and testicular pain. Negative for decreased urine volume, difficulty urinating, discharge, flank pain, hematuria, penile pain, penile swelling and urgency.        Post-micturition pain   Musculoskeletal: Negative for back pain and gait problem.   Skin: Negative for rash and wound.   Neurological: Positive for dizziness (chronic) and weakness (generalized).   Psychiatric/Behavioral: Negative for confusion. The patient is not nervous/anxious.      Objective:     Vital Signs (Most Recent):  Temp: 97.8 °F (36.6 °C) (19 0350)  Pulse: 60 (19 0357)  Resp: 18 (19 035)  BP: 132/60 (19 0349)  SpO2: 96 % (19 035) Vital Signs (24h Range):  Temp:  [97.8 °F (36.6 °C)-98.2 °F (36.8 °C)] 97.8 °F (36.6 °C)  Pulse:   [60-68] 60  Resp:  [13-31] 18  SpO2:  [95 %-99 %] 96 %  BP: (105-138)/(54-65) 132/60     Weight: 104.3 kg (230 lb)  Body mass index is 36.02 kg/m².    Physical Exam   Constitutional: He is oriented to person, place, and time. He appears well-developed and well-nourished.   Morbidly obese male, lying flat in bed   HENT:   Head: Normocephalic and atraumatic.   Eyes: Pupils are equal, round, and reactive to light. Conjunctivae and EOM are normal.   No nystagmus   Neck: Normal range of motion. Neck supple. No JVD present.   Cardiovascular: Normal rate, regular rhythm, normal heart sounds and intact distal pulses.   Pulmonary/Chest: Effort normal and breath sounds normal. No respiratory distress. He has no wheezes.   Diminished lung sounds   Abdominal: Soft. Bowel sounds are normal. He exhibits distension (obese abdomen). There is no tenderness.   No CVAT   Genitourinary: Right testis shows tenderness. Left testis shows tenderness. Uncircumcised. No paraphimosis, hypospadias, penile erythema or penile tenderness. No discharge found.   Genitourinary Comments: Mild edema to scrotum  No TTP to vas deferens   Musculoskeletal: Normal range of motion. He exhibits edema. He exhibits no tenderness or deformity.   Trace pitting edema to BLE  STR 5/5 to extremities   Neurological: He is alert and oriented to person, place, and time. No cranial nerve deficit. Coordination normal.   No pronator drift, no gross focal deficits   Skin: Skin is warm and dry.   Psychiatric: He has a normal mood and affect. His behavior is normal. Judgment and thought content normal.   Nursing note and vitals reviewed.        CRANIAL NERVES     CN III, IV, VI   Pupils are equal, round, and reactive to light.  Extraocular motions are normal.        Significant Labs:   CBC:   Recent Labs   Lab 06/24/19  0005 06/24/19  0011   WBC 18.10*  --    HGB 10.5*  --    HCT 32.4* 33*     --      CMP:   Recent Labs   Lab 06/24/19  0005   *   K 4.0   CL 95    CO2 18*      BUN 22   CREATININE 1.9*   CALCIUM 9.2   PROT 6.8   ALBUMIN 2.8*   BILITOT 0.6   ALKPHOS 79   AST 68*   ALT 56*   ANIONGAP 11   EGFRNONAA 34.9*     Urine Studies:   Recent Labs   Lab 06/24/19  0256   COLORU Mona   APPEARANCEUA Cloudy*   PHUR 5.0   SPECGRAV 1.005   PROTEINUA 1+*   GLUCUA Negative   KETONESU Negative   BILIRUBINUA Negative   OCCULTUA 2+*   NITRITE Positive*   LEUKOCYTESUR 2+*   RBCUA 16*   WBCUA 86*   BACTERIA None   SQUAMEPITHEL 0   HYALINECASTS 0       Significant Imaging: I have reviewed all pertinent imaging results/findings within the past 24 hours.     X-ray Chest Pa And Lateral    Result Date: 6/24/2019  EXAMINATION: XR CHEST PA AND LATERAL CLINICAL HISTORY: Chest pain, unspecified TECHNIQUE: PA and lateral views of the chest were performed. COMPARISON: May 17, 2019 FINDINGS: Two views of the chest are submitted.  Postoperative change and cardiac pacemaker again noted.  The cardiomediastinal silhouette appears somewhat more prominent, this may in part relate to mild rotation.  There is also appearance suggesting mild superimposed basilar ground-glass infiltrate and minimal pleural fluid.  There is no evidence for large pleural effusion and there is no evidence for pneumothorax.  The osseous structures demonstrate chronic change.     Bilateral pattern of mild ground-glass infiltrate and minimal pleural fluid. Electronically signed by: Jonnathan Abarca Date:    06/24/2019 Time:    01:07    Ct Head Without Contrast    Result Date: 6/24/2019  EXAMINATION: CT HEAD WITHOUT CONTRAST CLINICAL HISTORY: Dizziness; TECHNIQUE: Low dose axial CT images obtained throughout the head without intravenous contrast. Sagittal and coronal reconstructions were performed. COMPARISON: CT 12/22/2018. FINDINGS: Intracranial compartment: Ventricles are stable in size without evidence of hydrocephalus. No extra-axial blood or fluid collections. There is generalized cerebral volume loss.  Patchy  hypoattenuation of the supratentorial white matter suggesting chronic microvascular ischemic change.  No parenchymal mass, hemorrhage, edema or major vascular distribution infarct. Skull/extracranial contents (limited evaluation): No fracture. Lobular mucosal thickening of the right frontal sinus and sphenoid sinuses likely representing mucous retention cysts.  Mastoid air cells and remaining paranasal sinuses are essentially clear.     1. No acute intracranial abnormality. 2. Generalized cerebral volume loss and chronic microvascular ischemic change. Electronically signed by resident: Az Salinas Date:    06/24/2019 Time:    01:12 Electronically signed by: Jackson Florez MD Date:    06/24/2019 Time:    01:21    Us Scrotum And Testicles    Result Date: 6/21/2019  EXAMINATION: US SCROTUM AND TESTICLES CLINICAL HISTORY: Testicular pain, unspecified TECHNIQUE: Sonography of the scrotum and testes. COMPARISON: None. FINDINGS: Right Testicle: *Size: 4.0 x 2.6 x 2.7 cm *Appearance: No intratesticular mass. *Flow: Normal arterial and venous flow *Epididymis: Normal. *Hydrocele: Small to moderate. *Varicocele: None. . Left Testicle: *Size: 3.9 x 2.8 x 2.8 cm *Appearance: No intratesticular mass. *Flow: Normal arterial and venous flow *Epididymis: Normal. *Hydrocele: Moderate hydrocele with internal specular reflectors. *Varicocele: None. . Other findings: None.     1. No evidence of torsion or inflammation. 2. Small to moderate bilateral hydroceles.  Internal specular reflectors within the left hydrocele may relate to blood products or proteinaceous debris. Electronically signed by: Tima Archibald Jr., MD Date:    06/21/2019 Time:    22:37    Ct Renal Stone Study Abd Pelvis Wo    Result Date: 6/22/2019  EXAMINATION: CT RENAL STONE STUDY ABD PELVIS WO CLINICAL HISTORY: Hematuria;Pain lumbago (724.2); TECHNIQUE: Low dose axial images, sagittal and coronal reformations were obtained from the lung bases to the pubic symphysis.   Contrast was not administered.  All CT scans at this facility use dose modulation, iterative reconstruction and/or weight based dosing when appropriate to reduce radiation dose to as low as reasonably achievable. COMPARISON: None FINDINGS: Heart: Normal in size. No pericardial effusion.  Prior coronary artery bypass grafting. Lung Bases: Parenchymal bands consistent with atelectasis or scarring. Liver: Normal in size and attenuation, with no focal hepatic lesions. Gallbladder: No calcified gallstones. Bile Ducts: No evidence of dilated ducts. Pancreas: Mild fatty atrophy. Spleen: Unremarkable. Adrenals: Unremarkable. Kidneys/ Ureters: Duplicated left-sided collecting system.  No hydronephrosis.  Mild right and moderate left-sided stranding inflammation about the kidneys. Bladder: No evidence of wall thickening. Reproductive organs: Unremarkable. GI Tract/Mesentery: No evidence of bowel obstruction or inflammation. Peritoneal Space: No ascites. No free air. Retroperitoneum: No significant adenopathy. Abdominal wall: Unremarkable. Vasculature: No significant atherosclerosis or aneurysm. Bones: No acute fracture.     1. Infiltration of the fat surrounding the kidneys, left greater than right, may occur with pyelonephritis.  No obstructing kidney stones are evident.  No hydronephrosis. 2. No focal inflammation about the GI tract.     Assessment/Plan:     * Acute cystitis with hematuria  Failed outpatient tx of UTI with cipro.  UA appears worse than prior.  - C/w CTX 1 g daily; Initial UCx shows E. Coli, sensitivities pending. Check UCx, gram stain of new urine sample.  - SIRS 1/4 for leukocytosis; otherwise stable, BP increasing    - No CVAT, despite possible evidence of L sided pyelonephritis  - Will need close f/u with Urology    Dizziness  -Poor historian, which makes etiology difficult.  - CTH shows chronic changes, no acute concerns  - saw PCP 4/2019 who decreased coreg, as he thought it was contributing.     -  Labile BP and episodes of dizziness its possible he has orthostatic hypotension.  Orthostatics negative here (Positive during previous admission)   - Will need an outpatient tilt test  - He has been drinking at least 2500 ml fluids daily.  Sxs may be 2/2 cardiac etiology with hx of heart failure, but compensated on exam and EKG non-ischemic.  - No nystagmus, so I doubt vertigo   - could also be metabolic in nature with labile BG and improvement in sxs with gatorade  - monitor on tele    Hyponatremia  Na 124, previously 136  Unclear etiology (heart failure vs increase PO intake vs other)  - recheck BMP s/p NS infusion/bolus.  - hold lasix for now    Coronary artery disease involving native coronary artery of native heart without angina pectoris  Chest pain  - likely has stable angina; EKG non-ischemic (paced); Trop .019 (will trend)/ (BL 100s)  - c/w brillinta, ASA, statin; hold imdur, ranexa for now    Acute renal failure superimposed on stage 3 chronic kidney disease  Cr 1.9 which has been labile (previously 1.9 x3 days ago, but BL 1.2-1.4); NAGMA  - likely exacerbated by UTI; do not suspect dehydration  - no evidence of obstructive uropathy on recent CT, but BPH possibly contributing  - avoid nephrotoxic agents, renally dose medications  - recheck BMP s/p NS infusion/bolus.    Chronic combined systolic and diastolic heart failure  Compensated  - EF 43%, grade 1 DD  - hold lasix in setting of hyponatremia  - strict I/O, daily weights, tele    Diabetes mellitus type 2 in obese  Labile BG, but HgA1c 7.6 in May  - recheck HgA1c  - home regimen: NPH 50 U AM without sliding scale, januvia  - inpatient regimen: lantus 12 U QHS, aspart 3 U TIDWM, low dose SSI  - endocrine consulted with issues with BG at home and poor understanding    Benign essential HTN  BP stable on admission but supposedly labile at home  - will hold BP meds for now with BP stable and episodes of dizziness.  - home meds include: correg 3.125 PO  BID, losartan 50 mg PO daily, aldactone 25 mg PO daily, imdur 60 mg PO daily    Ventricular tachycardia  ICD (implantable cardioverter-defibrillator), dual, in situ  - c/w amiodarone  - ICD checked for possible discharge- ICD functional without discharge    Physical debility  Exacerbated by obesity and lifestyle behaviors  - PT/OT consult    Benign prostatic hyperplasia without lower urinary tract symptoms  Exacerbating UTI  - c/w proscar, flomax  - no improvement in dizziness with holding meds so will restart    VTE Risk Mitigation (From admission, onward)        Ordered     IP VTE HIGH RISK PATIENT  Once      06/24/19 0413     Place KADY hose  Until discontinued      06/24/19 0413     Place sequential compression device  Until discontinued      06/24/19 0413             ANKUR TaverasC  Department of Hospital Medicine   [facility]

## 2019-06-24 NOTE — CONSULTS
[facility]  Infectious Disease  Consult Note    Patient Name: Walter Bull  MRN: [id number]  Admission Date: 6/23/2019  Hospital Length of Stay: 0 days  Attending Physician: Ailyn Giles MD  Primary Care Provider: Belkys Kruger MD     Isolation Status: No active isolations      Inpatient consult to Infectious Diseases  Consult performed by: Zachary Olea PA-C  Consult ordered by: Ailyn Giles MD        ID consult received. Chart being reviewed. Full note with recommendations to follow.    Thank you,  Zachary Olea PA-C  [phone]

## 2019-06-24 NOTE — SUBJECTIVE & OBJECTIVE
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       Past Surgical History:   Procedure Laterality Date    Angiogram, Aortic Arch, Coronary  11/16/2018    Performed by [facility] at [facility] CATH LAB    Bypass graft study  11/16/2018    Performed by Ryan Schmidt MD at [facility] CATH LAB    CARDIAC DEFIBRILLATOR PLACEMENT      CARDIAC ELECTROPHYSIOLOGY STUDY N/A 11/19/2018    Performed by Byron Glasgow MD at [facility] EP LAB    CARDIAC ELECTROPHYSIOLOGY STUDY N/A 11/19/2018    Performed by Byron Glasgow MD at [facility] EP LAB    COLON SURGERY  2006    COLONOSCOPY N/A 2/2/2017    [facility] ENDO    CORONARY ARTERY BYPASS GRAFT  2005    5 arteries    ESOPHAGOGASTRODUODENOSCOPY (EGD) N/A 3/21/2018    Performed by Fawad Cunningham MD at [facility] ENDO (4TH FLR)    EYE SURGERY Bilateral 2016    Laser surgery for glaucoma    INSERTION, DUAL ICD Left 11/19/2018    Performed by Byron Glasgow MD at [facility] EP LAB    Left heart cath Left 11/16/2018    Performed by Ryan Schmidt MD at [facility] CATH LAB       Review of patient's allergies indicates:  No Known Allergies    No current facility-administered medications on file prior to encounter.      Current Outpatient Medications on File Prior to Encounter   Medication Sig    albuterol (PROVENTIL/VENTOLIN HFA) 90 mcg/actuation inhaler Inhale 2 puffs into the lungs every 6 (six) hours as needed for Wheezing.    amiodarone (PACERONE) 200 MG Tab Take 1 tablet (200 mg total) by mouth once daily.    aspirin (ECOTRIN) 81 MG EC tablet Take 1 tablet (81 mg  total) by mouth once daily.    carvedilol (COREG) 3.125 MG tablet Take 1 tablet (3.125 mg total) by mouth 2 (two) times daily with meals.    ciprofloxacin HCl (CIPRO) 500 MG tablet Take 1 tablet (500 mg total) by mouth 2 (two) times daily. for 7 days    esomeprazole (NEXIUM) 40 MG capsule Take 1 capsule (40 mg total) by mouth before breakfast.    ezetimibe (ZETIA) 10 mg tablet Take 1 tablet (10 mg total) by mouth once daily.    ferrous sulfate (FEOSOL) 325 mg (65 mg iron) Tab tablet TAKE 1 TABLET BY MOUTH THREE TIMES DAILY    finasteride (PROSCAR) 5 mg tablet HOLD DUE TO ORTHOSTATIC HYPOTENSION    furosemide (LASIX) 20 MG tablet Take 1 tablet (20 mg total) by mouth once daily. Take an extra tablet daily for wt gain more than 3 pounds/day or 5 pounds/week    ibuprofen (ADVIL,MOTRIN) 800 MG tablet Take 1 tablet (800 mg total) by mouth every 6 (six) hours as needed for Pain.    insulin NPH (NOVOLIN N) 100 unit/mL injection Inject 50 Units into the skin before breakfast.    isosorbide mononitrate (IMDUR) 60 MG 24 hr tablet Take 1 tablet (60 mg total) by mouth once daily.    losartan (COZAAR) 50 MG tablet Take 1 tablet (50 mg total) by mouth once daily.    nitroGLYCERIN (NITROSTAT) 0.4 MG SL tablet DISSOLVE ONE TABLET UNDER THE TONGUE EVERY 5 MINUTES AS NEEDED FOR CHEST PAIN.    phenazopyridine (PYRIDIUM) 200 MG tablet Take 1 tablet (200 mg total) by mouth 3 (three) times daily. for 10 days    RANEXA 1,000 mg Tb12 Take 1 tablet (1,000 mg total) by mouth 2 (two) times daily.    rosuvastatin (CRESTOR) 40 MG Tab Take 1 tablet (40 mg total) by mouth once daily.    SITagliptin (JANUVIA) 100 MG Tab Take 1 tablet (100 mg total) by mouth once daily.    spironolactone (ALDACTONE) 25 MG tablet Take 1 tablet (25 mg total) by mouth once daily.    tamsulosin (FLOMAX) 0.4 mg Cap Take 1 capsule (0.4 mg total) by mouth once daily.    ticagrelor (BRILINTA) 90 mg tablet Take 90 mg by mouth 2 (two) times daily.      Family History     Problem Relation (Age of Onset)    Diabetes Mother, Sister    Heart attack Brother    Heart disease Mother, Sister    Hypertension Sister        Tobacco Use    Smoking status: Former Smoker     Packs/day: 3.00     Types: Cigarettes     Start date: 1963     Last attempt to quit: 1981     Years since quittin.5    Smokeless tobacco: Former User     Types: Snuff, Chew     Quit date:    Substance and Sexual Activity    Alcohol use: No    Drug use: No    Sexual activity: Yes     Comment: -with a significant other     Review of Systems   Constitutional: Positive for chills, fatigue, fever and unexpected weight change (wt gain 20 lbs ).        + increased water intake (10 bottles daily)   HENT: Negative for ear pain and sore throat.    Eyes: Positive for visual disturbance (with dizziness). Negative for pain.   Respiratory: Negative for cough and shortness of breath.    Cardiovascular: Negative for chest pain (resolved) and palpitations.   Gastrointestinal: Negative for abdominal pain, constipation (resolved), diarrhea, nausea and vomiting.   Endocrine: Negative for heat intolerance and polydipsia.   Genitourinary: Positive for dysuria, frequency, scrotal swelling and testicular pain. Negative for decreased urine volume, difficulty urinating, discharge, flank pain, hematuria, penile pain, penile swelling and urgency.        Post-micturition pain   Musculoskeletal: Negative for back pain and gait problem.   Skin: Negative for rash and wound.   Neurological: Positive for dizziness (chronic) and weakness (generalized).   Psychiatric/Behavioral: Negative for confusion. The patient is not nervous/anxious.      Objective:     Vital Signs (Most Recent):  Temp: 97.8 °F (36.6 °C) (19 0350)  Pulse: 60 (19 035)  Resp: 18 (19)  BP: 132/60 (19 0349)  SpO2: 96 % (19) Vital Signs (24h Range):  Temp:  [97.8 °F (36.6 °C)-98.2 °F (36.8 °C)] 97.8 °F  (36.6 °C)  Pulse:  [60-68] 60  Resp:  [13-31] 18  SpO2:  [95 %-99 %] 96 %  BP: (105-138)/(54-65) 132/60     Weight: 104.3 kg (230 lb)  Body mass index is 36.02 kg/m².    Physical Exam   Constitutional: He is oriented to person, place, and time. He appears well-developed and well-nourished.   Morbidly obese male, lying flat in bed   HENT:   Head: Normocephalic and atraumatic.   Eyes: Pupils are equal, round, and reactive to light. Conjunctivae and EOM are normal.   No nystagmus   Neck: Normal range of motion. Neck supple. No JVD present.   Cardiovascular: Normal rate, regular rhythm, normal heart sounds and intact distal pulses.   Pulmonary/Chest: Effort normal and breath sounds normal. No respiratory distress. He has no wheezes.   Diminished lung sounds   Abdominal: Soft. Bowel sounds are normal. He exhibits distension (obese abdomen). There is no tenderness.   No CVAT   Genitourinary: Right testis shows tenderness. Left testis shows tenderness. Uncircumcised. No paraphimosis, hypospadias, penile erythema or penile tenderness. No discharge found.   Genitourinary Comments: Mild edema to scrotum  No TTP to vas deferens   Musculoskeletal: Normal range of motion. He exhibits edema. He exhibits no tenderness or deformity.   Trace pitting edema to BLE  STR 5/5 to extremities   Neurological: He is alert and oriented to person, place, and time. No cranial nerve deficit. Coordination normal.   No pronator drift, no gross focal deficits   Skin: Skin is warm and dry.   Psychiatric: He has a normal mood and affect. His behavior is normal. Judgment and thought content normal.   Nursing note and vitals reviewed.        CRANIAL NERVES     CN III, IV, VI   Pupils are equal, round, and reactive to light.  Extraocular motions are normal.        Significant Labs:   CBC:   Recent Labs   Lab 06/24/19  0005 06/24/19  0011   WBC 18.10*  --    HGB 10.5*  --    HCT 32.4* 33*     --      CMP:   Recent Labs   Lab 06/24/19  0005   NA  124*   K 4.0   CL 95   CO2 18*      BUN 22   CREATININE 1.9*   CALCIUM 9.2   PROT 6.8   ALBUMIN 2.8*   BILITOT 0.6   ALKPHOS 79   AST 68*   ALT 56*   ANIONGAP 11   EGFRNONAA 34.9*     Urine Studies:   Recent Labs   Lab 06/24/19  0256   COLORU Mona   APPEARANCEUA Cloudy*   PHUR 5.0   SPECGRAV 1.005   PROTEINUA 1+*   GLUCUA Negative   KETONESU Negative   BILIRUBINUA Negative   OCCULTUA 2+*   NITRITE Positive*   LEUKOCYTESUR 2+*   RBCUA 16*   WBCUA 86*   BACTERIA None   SQUAMEPITHEL 0   HYALINECASTS 0       Significant Imaging: I have reviewed all pertinent imaging results/findings within the past 24 hours.     X-ray Chest Pa And Lateral    Result Date: 6/24/2019  EXAMINATION: XR CHEST PA AND LATERAL CLINICAL HISTORY: Chest pain, unspecified TECHNIQUE: PA and lateral views of the chest were performed. COMPARISON: May 17, 2019 FINDINGS: Two views of the chest are submitted.  Postoperative change and cardiac pacemaker again noted.  The cardiomediastinal silhouette appears somewhat more prominent, this may in part relate to mild rotation.  There is also appearance suggesting mild superimposed basilar ground-glass infiltrate and minimal pleural fluid.  There is no evidence for large pleural effusion and there is no evidence for pneumothorax.  The osseous structures demonstrate chronic change.     Bilateral pattern of mild ground-glass infiltrate and minimal pleural fluid. Electronically signed by: Jonnathan Abarca Date:    06/24/2019 Time:    01:07    Ct Head Without Contrast    Result Date: 6/24/2019  EXAMINATION: CT HEAD WITHOUT CONTRAST CLINICAL HISTORY: Dizziness; TECHNIQUE: Low dose axial CT images obtained throughout the head without intravenous contrast. Sagittal and coronal reconstructions were performed. COMPARISON: CT 12/22/2018. FINDINGS: Intracranial compartment: Ventricles are stable in size without evidence of hydrocephalus. No extra-axial blood or fluid collections. There is generalized cerebral volume  loss.  Patchy hypoattenuation of the supratentorial white matter suggesting chronic microvascular ischemic change.  No parenchymal mass, hemorrhage, edema or major vascular distribution infarct. Skull/extracranial contents (limited evaluation): No fracture. Lobular mucosal thickening of the right frontal sinus and sphenoid sinuses likely representing mucous retention cysts.  Mastoid air cells and remaining paranasal sinuses are essentially clear.     1. No acute intracranial abnormality. 2. Generalized cerebral volume loss and chronic microvascular ischemic change. Electronically signed by resident: Az Salinas Date:    06/24/2019 Time:    01:12 Electronically signed by: Jackson Florez MD Date:    06/24/2019 Time:    01:21    Us Scrotum And Testicles    Result Date: 6/21/2019  EXAMINATION: US SCROTUM AND TESTICLES CLINICAL HISTORY: Testicular pain, unspecified TECHNIQUE: Sonography of the scrotum and testes. COMPARISON: None. FINDINGS: Right Testicle: *Size: 4.0 x 2.6 x 2.7 cm *Appearance: No intratesticular mass. *Flow: Normal arterial and venous flow *Epididymis: Normal. *Hydrocele: Small to moderate. *Varicocele: None. . Left Testicle: *Size: 3.9 x 2.8 x 2.8 cm *Appearance: No intratesticular mass. *Flow: Normal arterial and venous flow *Epididymis: Normal. *Hydrocele: Moderate hydrocele with internal specular reflectors. *Varicocele: None. . Other findings: None.     1. No evidence of torsion or inflammation. 2. Small to moderate bilateral hydroceles.  Internal specular reflectors within the left hydrocele may relate to blood products or proteinaceous debris. Electronically signed by: Tima Archibald Jr., MD Date:    06/21/2019 Time:    22:37    Ct Renal Stone Study Abd Pelvis Wo    Result Date: 6/22/2019  EXAMINATION: CT RENAL STONE STUDY ABD PELVIS WO CLINICAL HISTORY: Hematuria;Pain lumbago (724.2); TECHNIQUE: Low dose axial images, sagittal and coronal reformations were obtained from the lung bases to the  pubic symphysis.  Contrast was not administered.  All CT scans at this facility use dose modulation, iterative reconstruction and/or weight based dosing when appropriate to reduce radiation dose to as low as reasonably achievable. COMPARISON: None FINDINGS: Heart: Normal in size. No pericardial effusion.  Prior coronary artery bypass grafting. Lung Bases: Parenchymal bands consistent with atelectasis or scarring. Liver: Normal in size and attenuation, with no focal hepatic lesions. Gallbladder: No calcified gallstones. Bile Ducts: No evidence of dilated ducts. Pancreas: Mild fatty atrophy. Spleen: Unremarkable. Adrenals: Unremarkable. Kidneys/ Ureters: Duplicated left-sided collecting system.  No hydronephrosis.  Mild right and moderate left-sided stranding inflammation about the kidneys. Bladder: No evidence of wall thickening. Reproductive organs: Unremarkable. GI Tract/Mesentery: No evidence of bowel obstruction or inflammation. Peritoneal Space: No ascites. No free air. Retroperitoneum: No significant adenopathy. Abdominal wall: Unremarkable. Vasculature: No significant atherosclerosis or aneurysm. Bones: No acute fracture.     1. Infiltration of the fat surrounding the kidneys, left greater than right, may occur with pyelonephritis.  No obstructing kidney stones are evident.  No hydronephrosis. 2. No focal inflammation about the GI tract.

## 2019-06-24 NOTE — ASSESSMENT & PLAN NOTE
BG goal 140-180    TDD of insulin 50 units; decrease by 20% given hypoglycemia at home and decrease by additional 20% given inpatient management; 50/50 basal prandial as follows:   Levemir 16 units daily.   Novolog 3-5 units with meals.   BG monitoring ac/hs/0200 given hypoglycemia this AM and low dose correction scale given kidney function      ADDENDUM: BG remains below goal. Decrease Levemir 12 units daily; first dose tomorrow. Discontinue scheduled novolog. BG decreased from 184 to 97. Will re-evaluate in AM.     ** Please call Endocrine for any BG related issues **

## 2019-06-24 NOTE — ASSESSMENT & PLAN NOTE
Exacerbating UTI  - c/w proscar, flomax  - no improvement in dizziness with holding meds so will restart

## 2019-06-24 NOTE — ASSESSMENT & PLAN NOTE
ICD (implantable cardioverter-defibrillator), dual, in situ  - c/w amiodarone  - ICD checked for possible discharge- ICD functional without discharge

## 2019-06-24 NOTE — ASSESSMENT & PLAN NOTE
BP stable on admission but supposedly labile at home  - will hold BP meds for now with BP stable and episodes of dizziness.  - home meds include: correg 3.125 PO BID, losartan 50 mg PO daily, aldactone 25 mg PO daily, imdur 60 mg PO daily

## 2019-06-24 NOTE — ASSESSMENT & PLAN NOTE
"67 y/o male with DM, CAD, AICD, HTN, presents to ED for dysuria despite abx tx for UTI. He was prescribed ciprofloxacin for UTI. We are consulted for abx recommendations.     CT renal study showed "Infiltration of the fat surrounding the kidneys, left greater than right, may occur with pyelonephritis.  No obstructing kidney stones are evident.  No hydronephrosis".      US scrotum/testes shows "No evidence of torsion or inflammation.  Small to moderate bilateral hydroceles.     WBC on admit 18K. Afebrile. Repeat UA with 86 WBCs urine cultures, blood cultures pending. He is on cefepime.      1. Continue [id number].  Blood cultures NGTD. Urine cultures pending.  2. Tailor abx accordingly  3. Anticipate 10-14 days of abx   4. ID will follow closely with you     "

## 2019-06-24 NOTE — SIGNIFICANT EVENT
Device interrogation:    ST [employer] dual chamber ICD.  DDD at base rate 60 bpm.  VT zone 1 at 160 bpm, VT zone 2 at 190 bpm and VF zone at 222.  No VT/VF.  Mode switch 0.  Currently A paced.  AP 76%, <1 %.  No changes made to device.    Stephanie Jones MD  Cardiology Fellow  Pager: [phone]

## 2019-06-24 NOTE — HPI
"67 y/o male with DM, CAD, AICD, HTN, presents to ED for dysuria despite abx tx for UTI. He was prescribed ciprofloxacin for UTI. He febrile 101 at home and reports having urinary frequency, scrotal/testicular pain.      On 6/21, patient presented for BL testicular pain worse with urination.  UA revealed grossly positive UTI.  CT renal study showed "Infiltration of the fat surrounding the kidneys, left greater than right, may occur with pyelonephritis.  No obstructing kidney stones are evident.  No hydronephrosis".  US scrotum/testes shows "No evidence of torsion or inflammation.  Small to moderate bilateral hydroceles.  Internal specular reflectors within the left hydrocele may relate to blood products or proteinaceous debris." Without evidence of torsion, obstruction, or nephrolithiasis, patient was discharged home with cipro.     WBC on admit 18K. Afebrile. Repeat UA with 86 WBC,s urin cultures, blood cultures pending. He is on cefepime.   "

## 2019-06-24 NOTE — ASSESSMENT & PLAN NOTE
Na 124, previously 136  Unclear etiology (heart failure vs increase PO intake vs other)  - recheck BMP s/p NS infusion/bolus.  - hold lasix for now

## 2019-06-24 NOTE — ED PROVIDER NOTES
"Encounter Date: 6/23/2019       History     Chief Complaint   Patient presents with    Testicle Pain     Pt reports to ED w/ c/o bilat testicular pain when touched. mild swelling. Pt recently diagnosed with acute cystitis and currently on abx. Pt reports some dizziness. TMax 101 this morning, took ibuprofen 800mg.      68-year-old male with PMHx of DM, CAD (s/p CABG), V Tach ( with AICD), HTN, and kidney failure who presents to the ED with c/o dizziness. Per pt, yesterday he developed dizziness and intermittent vision changes, which have been persistent since. He describes his dizziness as an imbalance with lightheadedness with standing from sitting. He has associated intermittent "yellow spots in my vision." He was evaluated in the ED 2 days ago for abdominal and scrotal pain. He had testicular US showing bilateral hydroceles. His UA was positive for UTI and abdominal CT had evidence of pyelonephritis, but was negative for obstructing stone. He was discharged with prescription for Cipro. His dysuria and testicular pain has improved slightly since starting treatment. He had a fever of 101 F this AM, which improved with taking 800 mg of ibuprofen. He also reports that while resting his AICD fired around 5 PM with associated left sided chest pain, diaphoresis, and dizziness. His pain lasted only a few seconds-minutes after taking 2 nitroglycerin. He states that his AICD has never fired before in the past. Denies SOB, cough, weakness, numbness, abdominal pain, n/v/d, blood in stool, hematuria, headache, or any other medical complaints.     The history is provided by the patient and medical records.     Review of patient's allergies indicates:  No Known Allergies  Past Medical History:   Diagnosis Date    Anemia     Anticoagulant long-term use     CHF (congestive heart failure)     Colon cancer screening 2/2/2017    Coronary artery disease     Diabetes mellitus type I     Disorder of kidney and ureter     " Encounter for blood transfusion     Glaucoma     Heart attack     2018    Heart disease     Hypertension     Iron deficiency     Kidney failure     post CABG    S/P CABG x 5 2017     Past Surgical History:   Procedure Laterality Date    Angiogram, Aortic Arch, Coronary  2018    Performed by [facility] CATH LAB    Bypass graft study  2018    Performed by [facility] at [facility] CATH LAB    CARDIAC DEFIBRILLATOR PLACEMENT      CARDIAC ELECTROPHYSIOLOGY STUDY N/A 2018    Performed by Byron Glasgow MD at [facility] EP LAB    CARDIAC ELECTROPHYSIOLOGY STUDY N/A 2018    Performed by Byron Glasgow MD at [facility] EP LAB    COLON SURGERY  2006    COLONOSCOPY N/A 2017    [facility]    CORONARY ARTERY BYPASS GRAFT  2005    5 arteries    ESOPHAGOGASTRODUODENOSCOPY (EGD) N/A 3/21/2018    Performed by Fawad Cunningham MD at [facility] ENDO (4TH FLR)    EYE SURGERY Bilateral 2016    Laser surgery for glaucoma    INSERTION, DUAL ICD Left 2018    Performed by Byron Glasgow MD at [facility] EP LAB    Left heart cath Left 2018    Performed by Ryan Schmidt MD at [facility] CATH LAB     Family History   Problem Relation Age of Onset    Diabetes Mother     Heart disease Mother     Hypertension Sister     Diabetes Sister     Heart disease Sister     Heart attack Brother     Colon cancer Neg Hx     Esophageal cancer Neg Hx     Stomach cancer Neg Hx      Social History     Tobacco Use    Smoking status: Former Smoker     Packs/day: 3.00     Types: Cigarettes     Start date: 1963     Last attempt to quit: 1981     Years since quittin.5    Smokeless tobacco: Former User     Types: Snuff, Chew     Quit date:    Substance Use Topics    Alcohol use: No    Drug use: No     Review of Systems   Constitutional: Negative for chills, fatigue and fever.   HENT: Negative for congestion.    Eyes: Positive for visual  disturbance. Negative for pain, redness and itching.   Respiratory: Negative for shortness of breath.    Cardiovascular: Positive for chest pain. Negative for palpitations.   Gastrointestinal: Negative for abdominal pain, constipation, diarrhea and vomiting.   Genitourinary: Positive for dysuria and testicular pain. Negative for frequency, hematuria and penile pain.   Musculoskeletal: Negative for back pain and neck pain.   Skin: Negative for rash.   Neurological: Positive for dizziness and light-headedness. Negative for syncope, weakness, numbness and headaches.   Hematological: Does not bruise/bleed easily.   Psychiatric/Behavioral: Negative for confusion.       Physical Exam     Initial Vitals [06/23/19 2305]   BP Pulse Resp Temp SpO2   (!) 114/54 64 18 98.2 °F (36.8 °C) 96 %      MAP       --         Physical Exam    Nursing note and vitals reviewed.  Constitutional: He appears well-developed and well-nourished.   HENT:   Head: Normocephalic and atraumatic.   Eyes: Conjunctivae and EOM are normal.   Neck: Normal range of motion. Neck supple.   Cardiovascular: Normal rate, regular rhythm and normal heart sounds.   Pulmonary/Chest: Breath sounds normal. He has no wheezes. He has no rhonchi. He has no rales.   Abdominal: Soft. There is no tenderness. There is no rebound and no guarding.   No CVA tenderness.    Genitourinary: Uncircumcised.   Genitourinary Comments: Bilateral testicular tenderness to palpation.  No overlying edema, erythema, or induration.  No penile discharge or tenderness.    Musculoskeletal: Normal range of motion. He exhibits no edema.   Neurological: He is alert and oriented to person, place, and time. He has normal strength.   Strength and sensation intact and symmetric in upper and lower extremities. Speech is clear.          ED Course   Procedures  Labs Reviewed   CBC W/ AUTO DIFFERENTIAL - Abnormal; Notable for the following components:       Result Value    WBC 18.10 (*)     RBC 3.51 (*)      Hemoglobin 10.5 (*)     Hematocrit 32.4 (*)     Immature Granulocytes 0.7 (*)     Gran # (ANC) 14.2 (*)     Immature Grans (Abs) 0.13 (*)     Mono # 2.2 (*)     Gran% 78.2 (*)     Lymph% 8.5 (*)     All other components within normal limits   COMPREHENSIVE METABOLIC PANEL - Abnormal; Notable for the following components:    Sodium 124 (*)     CO2 18 (*)     Creatinine 1.9 (*)     Albumin 2.8 (*)     AST 68 (*)     ALT 56 (*)     eGFR if  40.4 (*)     eGFR if non  34.9 (*)     All other components within normal limits   B-TYPE NATRIURETIC PEPTIDE - Abnormal; Notable for the following components:     (*)     All other components within normal limits   URINALYSIS, REFLEX TO URINE CULTURE - Abnormal; Notable for the following components:    Appearance, UA Cloudy (*)     Protein, UA 1+ (*)     Occult Blood UA 2+ (*)     Nitrite, UA Positive (*)     Leukocytes, UA 2+ (*)     All other components within normal limits    Narrative:     Preferred Collection Type->Urine, Clean Catch   URINALYSIS MICROSCOPIC - Abnormal; Notable for the following components:    RBC, UA 16 (*)     WBC, UA 86 (*)     WBC Clumps, UA Occasional (*)     All other components within normal limits    Narrative:     Preferred Collection Type->Urine, Clean Catch   ISTAT PROCEDURE - Abnormal; Notable for the following components:    POC Creatinine 2.0 (*)     POC Sodium 132 (*)     POC Hematocrit 33 (*)     All other components within normal limits   CULTURE, BLOOD   CULTURE, BLOOD   CULTURE, URINE   TROPONIN I   ISTAT CHEM8          Imaging Results          CT Head Without Contrast (Final result)  Result time 06/24/19 01:21:52    Final result by Jackson Florez MD (06/24/19 01:21:52)                 Impression:      1. No acute intracranial abnormality.  2. Generalized cerebral volume loss and chronic microvascular ischemic change.    Electronically signed by resident: Az  Brad  Date:    06/24/2019  Time:    01:12    Electronically signed by: Jackson Florez MD  Date:    06/24/2019  Time:    01:21             Narrative:    EXAMINATION:  CT HEAD WITHOUT CONTRAST    CLINICAL HISTORY:  Dizziness;    TECHNIQUE:  Low dose axial CT images obtained throughout the head without intravenous contrast. Sagittal and coronal reconstructions were performed.    COMPARISON:  CT 12/22/2018.    FINDINGS:  Intracranial compartment:    Ventricles are stable in size without evidence of hydrocephalus. No extra-axial blood or fluid collections.    There is generalized cerebral volume loss.  Patchy hypoattenuation of the supratentorial white matter suggesting chronic microvascular ischemic change.  No parenchymal mass, hemorrhage, edema or major vascular distribution infarct.    Skull/extracranial contents (limited evaluation): No fracture. Lobular mucosal thickening of the right frontal sinus and sphenoid sinuses likely representing mucous retention cysts.  Mastoid air cells and remaining paranasal sinuses are essentially clear.                               X-Ray Chest PA And Lateral (Final result)  Result time 06/24/19 01:07:54    Final result by Jonnathan Abarca MD (06/24/19 01:07:54)                 Impression:      Bilateral pattern of mild ground-glass infiltrate and minimal pleural fluid.      Electronically signed by: Jonnathan Abarca  Date:    06/24/2019  Time:    01:07             Narrative:    EXAMINATION:  XR CHEST PA AND LATERAL    CLINICAL HISTORY:  Chest pain, unspecified    TECHNIQUE:  PA and lateral views of the chest were performed.    COMPARISON:  May 17, 2019    FINDINGS:  Two views of the chest are submitted.  Postoperative change and cardiac pacemaker again noted.  The cardiomediastinal silhouette appears somewhat more prominent, this may in part relate to mild rotation.  There is also appearance suggesting mild superimposed basilar ground-glass infiltrate and minimal pleural fluid.   There is no evidence for large pleural effusion and there is no evidence for pneumothorax.  The osseous structures demonstrate chronic change.                                 Medical Decision Making:   History:   Old Medical Records: I decided to obtain old medical records.  Old Records Summarized: records from clinic visits.       <> Summary of Records: See HPI for details of recent ED visit.  Initial Assessment:   68-year-old male with PMHx of DM, CAD (s/p CABG), V Tach ( with AICD), HTN, and kidney failure who presents to the ED with c/o dizziness x 2 days. He has associated positional lightheadedness and vision changes. He was seen in the ED 2 days and was diagnosed with UTI and given Rx for cipro. Pt also reports chest pain with AICD firing around 5 PM with associated diaphoresis, relieved with nitro. Vital signs chelsey stable. RRR. Lungs CTA bilaterally. Abdomen soft and nontender. Tender bilateral testicles, no erythema or edema. Neurovascularly intact throughout.   Differential Diagnosis:   DDx includes but is not limited to ICH, intracranial abnormality, ACS, CHF, arrhythmia, orthostatic hypotension, electrolyte abnormality, anemia, UTI/pyelonephritis, sepsis, HARRIS.   Independently Interpreted Test(s):   I have ordered and independently interpreted X-rays - see summary below.  I have ordered and independently interpreted EKG Reading(s) - see prior notes  Clinical Tests:   Lab Tests: Ordered and Reviewed  Radiological Study: Ordered and Reviewed  Medical Tests: Ordered and Reviewed  Sepsis Perfusion Assessment: I attest, a sepsis perfusion exam was performed within 6 hours of Septic Shock presentation, following fluid resuscitation.  ED Management:  Pt reports new onset of dizziness, will obtain basic labs and head CT without contrast. Will also chest cardiac work up, including orthoatic vitals, troponin, BNP, EKG, and CXR. Will recheck UA.     Head CT without intracranial abnormality, generalized cerebral  volume loss and chronic microvascular ischemic change.     Leukocytosis of 18.10. With history of UTI, will give 1 g of IV Rocephin for treatment. Hemoglobin 10.5. Hyponatremia of 124. Cr 1.9, which is elevated compared to previous value of 1.1 in May 2019. Will administer fluids judicuoulsy in patient with CHF. Will give 0.5 L of IVFs and reassess.     CXR with bilateral pattern of ground-glass infiltrate and minimal pleural fluid. . Troponin 0.019, no indication for repeat troponin as chest pain was >6 hours ago. Discussed with Cardiology, who interrogated his AICD in the ED. No firing noted on interrogation.     UA positive nitrites, 2+ leukocytes, 16 RBC, 86 WBC, and occasional WBC clumps. Discussed with Hospital Medicine, who will admit the patient to their service for observation. Reviewed plan with patient, who expressed understanding and is agreeable.     I have discussed the treatment and management of this patient with my supervising physician, and we agree on the plan of care.      Hedy Roman PA-C    Other:   I have discussed this case with another health care provider.       <> Summary of the Discussion: Cardiology, Hospital Medicine              Attending Attestation:     Physician Attestation Statement for NP/PA:   I have conducted a face to face encounter with this patient in addition to the NP/PA, due to Medical Complexity    Other NP/PA Attestation Additions:    History of Present Illness: 68-year-old male with PMHx of HTN, DM, CAD (s/p CABG), V Tach ( with AICD) and kidney failure who presents to the ED with c/o dizziness as well as AICD firing. No chest pain. Despite chief complaint pt states to me he is not here for testicular pain   Physical Exam: Aao, nad  No le edema   Medical Decision Making: Routine bloodwork, UA  Cards consult for AICD interrogation  Anticipate admission                    Clinical Impression:       ICD-10-CM ICD-9-CM   1. Cystitis N30.90 595.9   2. Dizziness R42  780.4   3. Chest pain R07.9 786.50   4. HARRIS (acute kidney injury) N17.9 584.9   5. Congestive heart failure, unspecified HF chronicity, unspecified heart failure type I50.9 428.0   6. Acute cystitis without hematuria N30.00 595.0         Disposition:   Disposition: Placed in Observation  Condition: Fair                        Hedy Roman PA-C  06/24/19 0358       Smita Lambert MD  06/26/19 3070

## 2019-06-24 NOTE — ED NOTES
LOC: Pt is awake, alert, oriented x4. Affect is appropriate. Speech clear and appropriate.      Appearance: Pt resting comfortably in no acute distress. Pt clean and well groomed.     Skin: Skin warm and dry. Color consistent with ethnicity. Skin turgor normal. Mucus membranes moist. Skin intact. No breakdown or bruising noted.     Musculoskeletal: Pt moving upper and lower extremities without difficulty. Denies weakness.     Respiratory: Airway open and patent. Respirations spontaneous, even, easy, and unlabored. Pt has normal effort and rate. No accessory muscle use noted. Denies cough. Denies shortness of breath.     Cardiovascular: No peripheral edema noted. No complaints of chest pain. S1S2 present. Rate regular. Radial pulses 2+.     Abdominal: Abdomen soft and nontender. No distention noted. Denies n/v. Bowels sounds active x 4 quadrants.     Neurological: Eyes open spontaneously. Behavior appropriate to situation. Follows commands appropriately. Facial expression symmetrical. Purposeful motor response. Sensation intact.     Testicular pain to touch and after urinating. And dizziness. Both have been evaluated on Friday. Placed on po antibiotics and azo. Symptoms are not improving.

## 2019-06-24 NOTE — ASSESSMENT & PLAN NOTE
-Poor historian, which makes etiolgy difficult.  - saw PCP 4/2019 who decreased coreg, as he thought it was contributing.     - Labile BP and episodes of dizziness its possible he has orthostatic hypotension.  Orthostatics negative here (Positive during previous admission)   - Will need an outpatient tilt test  - He has been drinking at least 2500 ml fluids daily.  Sxs may be 2/2 cardiac etiology with hx of heart failure, but compensated on exam and EKG non-ischemic.  - No nystagmus, so I doubt vertigo   - could also be metabolic in nature with labile BG and improvement in sxs with gatorade  - monitor on tele

## 2019-06-24 NOTE — PLAN OF CARE
06/24/19 1157   Post-Acute Status   Post-Acute Authorization Other   Other Status No Post-Acute Service Needs       No SW needs noted at this time.  SW in contact with CM and Medical staff. Will continue to follow and offer support as needed.     Parth Rene LMSW  [facility]   Ext. [phone]

## 2019-06-24 NOTE — SUBJECTIVE & OBJECTIVE
Interval HPI:   Admitted to [facility]. Hypoglycemia this AM. Patient too ~ 200 units of insulin; administered between over 3 total doses yesterday.   Eatin%  Nausea: No  Hypoglycemia and intervention: Yes 44  Fever: No  TPN and/or TF: No    PMH, PSH, FH, SH  reviewed     Review of Systems  Constitutional: + for weight changes.  Eyes: Negative for visual disturbance.  Respiratory: + for cough.   Cardiovascular: Negative for chest pain.  Gastrointestinal: Negative for nausea.  Endocrine: + for polyuria, polydipsia.  Musculoskeletal: Negative for back pain.  Skin: Negative for rash.  Neurological: Negative for syncope.  Psychiatric/Behavioral: Negative for depression.    Current Medications and/or Treatments Impacting Glycemic Control  Immunotherapy:    Immunosuppressants     None        Steroids:   Hormones (From admission, onward)    None        Pressors:    Autonomic Drugs (From admission, onward)    None        Hyperglycemia/Diabetes Medications:   Antihyperglycemics (From admission, onward)    Start     Stop Route Frequency Ordered    19 2100  insulin detemir U-100 pen 12 Units      -- SubQ Nightly 19 0436    19 0715  insulin aspart U-100 pen 3 Units      -- SubQ 3 times daily with meals 19 0436             PHYSICAL EXAMINATION:  Vitals:    19 0735   BP: (!) 148/61   Pulse: 60   Resp: 18   Temp: 97.4 °F (36.3 °C)     Body mass index is 38.26 kg/m².    Physical Exam   Constitutional: Well developed, well nourished, NAD.  ENT: External ears no masses with nose patent; normal hearing.  Neck: Supple; trachea midline.  Cardiovascular: Normal heart sounds, no LE edema. DP +2 bilaterally.  Lungs: Normal effort; lungs anterior bilaterally clear to auscultation.  Abdomen: Soft, no masses, no hernias.  MS: No clubbing or cyanosis of nails noted;  unable to assess gait.  Skin: No rashes, lesions, or ulcers; no nodules. Injection sites are ok. No lipo hypertropthy or atrophy.  Psychiatric: Good  judgement and insight; normal mood and affect.  Neurological: Cranial nerves are grossly intact.   Foot: nails in good condition, no amputations noted.

## 2019-06-24 NOTE — ASSESSMENT & PLAN NOTE
Labile BG, but HgA1c 7.6 in May  - recheck HgA1c  - home regimen: NPH 50 U AM without sliding scale, januvia  - inpatient regimen: lantus 12 U QHS, aspart 3 U TIDWM, low dose SSI  - endocrine consulted with issues with BG at home and poor understanding

## 2019-06-24 NOTE — ASSESSMENT & PLAN NOTE
BG goal 140-180    TDD of insulin 50 units; decrease by 20% given hypoglycemia at home and decrease by additional 20% given inpatient management; 50/50 basal prandial as follows:   Levemir 16 units daily.   Novolog 3-5 units with meals.   BG monitoring ac/hs/0200 given hypoglycemia this AM and low dose correction scale given kidney function

## 2019-06-24 NOTE — ASSESSMENT & PLAN NOTE
Cr 1.9 which has been labile (previously 1.9 x3 days ago, but BL 1.2-1.4); NAGMA  - likely exacerbated by UTI; do not suspect dehydration  - no evidence of obstructive uropathy on recent CT, but BPH possibly contributing  - avoid nephrotoxic agents, renally dose medications  - recheck BMP s/p NS infusion/bolus.

## 2019-06-24 NOTE — ASSESSMENT & PLAN NOTE
Titrate insulin slowly to avoid hypoglycemia as the risk of hypoglycemia increases with decreased creatinine clearance.    Estimated Creatinine Clearance: 43 mL/min (A) (based on SCr of 1.9 mg/dL (H)).

## 2019-06-24 NOTE — HPI
"Reason for Consult: Management of T2DM, Hyperglycemia     Surgical Procedure and Date: n/a    Diabetes diagnosis age: in his 30s     Home Diabetes Medications:  NPH 50 units with breakfast. januvia 100 mg daily.   Previously on metformin; caused nausea and was discontinued    How often checking glucose at home? 1-3 x day   BG readings on regimen: labile; 500 yesterday; also with frequent lows   Hypoglycemia on the regimen?  Yes 1-2 times a week  Missed doses on regimen?  No    Diabetes Complications include:     Hyperglycemia, Hypoglycemia  and Diabetic peripheral neuropathy     Complicating diabetes co morbidities:   CKD      HPI:   Patient is a 68 y.o. male with a diagnosis of type 2 DM. Also with CAD, HTN and CKD. Patient presented to ED with testicular pain and treated for UTI previously; patient came back to ED on 6/23/19 fever, urinary frequency, and scrotal/testicular pain. Also with a 4 month hx of dizziness with associated vision changes ("yellow spots"), gait instability without correlation with standing, positioning or effort; he reports intermittent L sided chest pain today without associated sxs improved with 2 SL nitro.       Of note: patient reports on day of admission that his morning  BG was over 500 and he took about 190 U of NPH over the course of the day to control his BG.  He reports never trying prandial+basal insulin and reports BG labile at home.  He see Dr. Kruger in [place] as his PCP.     Endocrinology consulted for BG/ DM management.   "

## 2019-06-24 NOTE — ASSESSMENT & PLAN NOTE
Chest pain  - likely has stable angina; EKG non-ischemic (paced); Trop .019 (will trend)/ (BL 100s)  - c/w brillinta, ASA, statin; hold imdur, ranexa for now

## 2019-06-24 NOTE — CARE UPDATE
Hospital Medicine Brief Note      Mr. Bull feels much better since admission, he has had no recurrence of fever, and his dizziness and lightheadedness are now resolved.  I was able to speak with him as well as his brother on the phone.    Basic metabolic panel was trended q.4 hours, showed resolution of the hyponatremia.  Suspect this may have been spurious.  Given the acute nature, low concern for myelinolysis with correction.  Will continue to trend his sodium levels and manage his volume.    Endocrinology was consulted overnight for evaluation of hyperglycemia, appreciate insulin recommendations.    Infectious Disease was consulted for evaluation of resistant UTI, agree with starting cefepime and recommend a 10-14 day course total.  Will follow urine micro from this admission and deescalate oral antimicrobials as able.        Ailyn Giles M.D., M.P.H.  Department of Hospital Medicine  [facility]  (pager) [phone]  (bmqmy) [phone]

## 2019-06-24 NOTE — ASSESSMENT & PLAN NOTE
Failed outpatient tx of UTI with cipro.  UA appears worse than prior.  - C/w CTX 1 g daily; Initial UCx shows E. Coli, sensitivities pending. Check UCx, gram stain of new urine sample.  - SIRS 1/4 for leukocytosis; otherwise stable, BP increasing    - No CVAT, despite possible evidence of L sided pyelonephritis  - Will need close f/u with Urology

## 2019-06-25 ENCOUNTER — TELEPHONE (OUTPATIENT)
Dept: ENDOCRINOLOGY | Facility: CLINIC | Age: 69
End: 2019-06-25

## 2019-06-25 VITALS
HEIGHT: 67 IN | TEMPERATURE: 98 F | HEART RATE: 60 BPM | DIASTOLIC BLOOD PRESSURE: 63 MMHG | BODY MASS INDEX: 37.58 KG/M2 | RESPIRATION RATE: 18 BRPM | OXYGEN SATURATION: 92 % | SYSTOLIC BLOOD PRESSURE: 133 MMHG | WEIGHT: 239.44 LBS

## 2019-06-25 PROBLEM — E87.1 HYPONATREMIA: Status: RESOLVED | Noted: 2019-06-24 | Resolved: 2019-06-25

## 2019-06-25 PROBLEM — R42 DIZZINESS: Status: RESOLVED | Noted: 2019-06-24 | Resolved: 2019-06-25

## 2019-06-25 PROBLEM — N17.9 ACUTE RENAL FAILURE SUPERIMPOSED ON STAGE 3 CHRONIC KIDNEY DISEASE: Status: RESOLVED | Noted: 2019-06-24 | Resolved: 2019-06-25

## 2019-06-25 PROBLEM — N18.30 ACUTE RENAL FAILURE SUPERIMPOSED ON STAGE 3 CHRONIC KIDNEY DISEASE: Status: RESOLVED | Noted: 2019-06-24 | Resolved: 2019-06-25

## 2019-06-25 LAB
ALBUMIN SERPL BCP-MCNC: 2.3 G/DL (ref 3.5–5.2)
ALP SERPL-CCNC: 75 U/L (ref 55–135)
ALT SERPL W/O P-5'-P-CCNC: 75 U/L (ref 10–44)
ANION GAP SERPL CALC-SCNC: 10 MMOL/L (ref 8–16)
ANION GAP SERPL CALC-SCNC: 9 MMOL/L (ref 8–16)
AST SERPL-CCNC: 91 U/L (ref 10–40)
BACTERIA UR CULT: NO GROWTH
BASOPHILS # BLD AUTO: 0.03 K/UL (ref 0–0.2)
BASOPHILS NFR BLD: 0.3 % (ref 0–1.9)
BILIRUB SERPL-MCNC: 0.4 MG/DL (ref 0.1–1)
BUN SERPL-MCNC: 20 MG/DL (ref 8–23)
BUN SERPL-MCNC: 22 MG/DL (ref 8–23)
CALCIUM SERPL-MCNC: 8.7 MG/DL (ref 8.7–10.5)
CALCIUM SERPL-MCNC: 9.2 MG/DL (ref 8.7–10.5)
CALCIUM SERPL-MCNC: 9.2 MG/DL (ref 8.7–10.5)
CALCIUM SERPL-MCNC: 9.3 MG/DL (ref 8.7–10.5)
CHLORIDE SERPL-SCNC: 104 MMOL/L (ref 95–110)
CHLORIDE SERPL-SCNC: 105 MMOL/L (ref 95–110)
CO2 SERPL-SCNC: 21 MMOL/L (ref 23–29)
CO2 SERPL-SCNC: 21 MMOL/L (ref 23–29)
CO2 SERPL-SCNC: 22 MMOL/L (ref 23–29)
CO2 SERPL-SCNC: 22 MMOL/L (ref 23–29)
CREAT SERPL-MCNC: 1.6 MG/DL (ref 0.5–1.4)
DIFFERENTIAL METHOD: ABNORMAL
EOSINOPHIL # BLD AUTO: 0.2 K/UL (ref 0–0.5)
EOSINOPHIL NFR BLD: 1.5 % (ref 0–8)
ERYTHROCYTE [DISTWIDTH] IN BLOOD BY AUTOMATED COUNT: 14.4 % (ref 11.5–14.5)
EST. GFR  (AFRICAN AMERICAN): 49.7 ML/MIN/1.73 M^2
EST. GFR  (NON AFRICAN AMERICAN): 43 ML/MIN/1.73 M^2
GLUCOSE SERPL-MCNC: 64 MG/DL (ref 70–110)
GLUCOSE SERPL-MCNC: 64 MG/DL (ref 70–110)
GLUCOSE SERPL-MCNC: 72 MG/DL (ref 70–110)
GLUCOSE SERPL-MCNC: 90 MG/DL (ref 70–110)
HCT VFR BLD AUTO: 32.6 % (ref 40–54)
HGB BLD-MCNC: 10.3 G/DL (ref 14–18)
IMM GRANULOCYTES # BLD AUTO: 0.07 K/UL (ref 0–0.04)
IMM GRANULOCYTES NFR BLD AUTO: 0.6 % (ref 0–0.5)
LYMPHOCYTES # BLD AUTO: 2 K/UL (ref 1–4.8)
LYMPHOCYTES NFR BLD: 17.6 % (ref 18–48)
MAGNESIUM SERPL-MCNC: 2 MG/DL (ref 1.6–2.6)
MCH RBC QN AUTO: 29.6 PG (ref 27–31)
MCHC RBC AUTO-ENTMCNC: 31.6 G/DL (ref 32–36)
MCV RBC AUTO: 94 FL (ref 82–98)
MONOCYTES # BLD AUTO: 1.6 K/UL (ref 0.3–1)
MONOCYTES NFR BLD: 14.2 % (ref 4–15)
NEUTROPHILS # BLD AUTO: 7.4 K/UL (ref 1.8–7.7)
NEUTROPHILS NFR BLD: 65.8 % (ref 38–73)
NRBC BLD-RTO: 0 /100 WBC
PHOSPHATE SERPL-MCNC: 3.5 MG/DL (ref 2.7–4.5)
PLATELET # BLD AUTO: 192 K/UL (ref 150–350)
PMV BLD AUTO: 10.7 FL (ref 9.2–12.9)
POCT GLUCOSE: 210 MG/DL (ref 70–110)
POCT GLUCOSE: 241 MG/DL (ref 70–110)
POCT GLUCOSE: 73 MG/DL (ref 70–110)
POTASSIUM SERPL-SCNC: 4.3 MMOL/L (ref 3.5–5.1)
POTASSIUM SERPL-SCNC: 4.3 MMOL/L (ref 3.5–5.1)
POTASSIUM SERPL-SCNC: 4.4 MMOL/L (ref 3.5–5.1)
POTASSIUM SERPL-SCNC: 4.4 MMOL/L (ref 3.5–5.1)
PROT SERPL-MCNC: 6.2 G/DL (ref 6–8.4)
RBC # BLD AUTO: 3.48 M/UL (ref 4.6–6.2)
SODIUM SERPL-SCNC: 135 MMOL/L (ref 136–145)
SODIUM SERPL-SCNC: 135 MMOL/L (ref 136–145)
SODIUM SERPL-SCNC: 136 MMOL/L (ref 136–145)
SODIUM SERPL-SCNC: 136 MMOL/L (ref 136–145)
WBC # BLD AUTO: 11.21 K/UL (ref 3.9–12.7)

## 2019-06-25 PROCEDURE — 63600175 PHARM REV CODE 636 W HCPCS: Performed by: NURSE PRACTITIONER

## 2019-06-25 PROCEDURE — 36415 COLL VENOUS BLD VENIPUNCTURE: CPT

## 2019-06-25 PROCEDURE — 80048 BASIC METABOLIC PNL TOTAL CA: CPT

## 2019-06-25 PROCEDURE — 84100 ASSAY OF PHOSPHORUS: CPT

## 2019-06-25 PROCEDURE — 63600175 PHARM REV CODE 636 W HCPCS: Performed by: PHYSICIAN ASSISTANT

## 2019-06-25 PROCEDURE — 99233 SBSQ HOSP IP/OBS HIGH 50: CPT | Mod: ,,, | Performed by: PHYSICIAN ASSISTANT

## 2019-06-25 PROCEDURE — 99239 HOSP IP/OBS DSCHRG MGMT >30: CPT | Mod: ,,, | Performed by: INTERNAL MEDICINE

## 2019-06-25 PROCEDURE — 80053 COMPREHEN METABOLIC PANEL: CPT

## 2019-06-25 PROCEDURE — 99233 PR SUBSEQUENT HOSPITAL CARE,LEVL III: ICD-10-PCS | Mod: ,,, | Performed by: PHYSICIAN ASSISTANT

## 2019-06-25 PROCEDURE — S5571 INSULIN DISPOS PEN 3 ML: HCPCS | Performed by: NURSE PRACTITIONER

## 2019-06-25 PROCEDURE — 25000003 PHARM REV CODE 250: Performed by: INTERNAL MEDICINE

## 2019-06-25 PROCEDURE — 97165 OT EVAL LOW COMPLEX 30 MIN: CPT

## 2019-06-25 PROCEDURE — 99239 PR HOSPITAL DISCHARGE DAY,>30 MIN: ICD-10-PCS | Mod: ,,, | Performed by: INTERNAL MEDICINE

## 2019-06-25 PROCEDURE — 85025 COMPLETE CBC W/AUTO DIFF WBC: CPT

## 2019-06-25 PROCEDURE — 97161 PT EVAL LOW COMPLEX 20 MIN: CPT

## 2019-06-25 PROCEDURE — 83735 ASSAY OF MAGNESIUM: CPT

## 2019-06-25 PROCEDURE — 99900035 HC TECH TIME PER 15 MIN (STAT)

## 2019-06-25 PROCEDURE — 94761 N-INVAS EAR/PLS OXIMETRY MLT: CPT

## 2019-06-25 PROCEDURE — 25000003 PHARM REV CODE 250: Performed by: PHYSICIAN ASSISTANT

## 2019-06-25 RX ORDER — CIPROFLOXACIN 500 MG/1
500 TABLET ORAL EVERY 12 HOURS
Qty: 28 TABLET | Refills: 0 | Status: SHIPPED | OUTPATIENT
Start: 2019-06-25 | End: 2019-07-09

## 2019-06-25 RX ORDER — CIPROFLOXACIN 500 MG/1
500 TABLET ORAL EVERY 12 HOURS
Status: DISCONTINUED | OUTPATIENT
Start: 2019-06-25 | End: 2019-06-25 | Stop reason: HOSPADM

## 2019-06-25 RX ORDER — CIPROFLOXACIN 500 MG/1
500 TABLET ORAL EVERY 12 HOURS
Qty: 28 TABLET | Refills: 0 | Status: SHIPPED | OUTPATIENT
Start: 2019-06-25 | End: 2019-06-25

## 2019-06-25 RX ORDER — CARVEDILOL 3.12 MG/1
3.12 TABLET ORAL 2 TIMES DAILY WITH MEALS
Status: DISCONTINUED | OUTPATIENT
Start: 2019-06-25 | End: 2019-06-25 | Stop reason: HOSPADM

## 2019-06-25 RX ADMIN — INSULIN ASPART 2 UNITS: 100 INJECTION, SOLUTION INTRAVENOUS; SUBCUTANEOUS at 11:06

## 2019-06-25 RX ADMIN — ASPIRIN 81 MG: 81 TABLET, COATED ORAL at 09:06

## 2019-06-25 RX ADMIN — CARVEDILOL 3.12 MG: 3.12 TABLET, FILM COATED ORAL at 04:06

## 2019-06-25 RX ADMIN — PANTOPRAZOLE SODIUM 40 MG: 40 TABLET, DELAYED RELEASE ORAL at 08:06

## 2019-06-25 RX ADMIN — CEFEPIME 2 G: 2 INJECTION, POWDER, FOR SOLUTION INTRAVENOUS at 10:06

## 2019-06-25 RX ADMIN — ROSUVASTATIN CALCIUM 40 MG: 20 TABLET, FILM COATED ORAL at 08:06

## 2019-06-25 RX ADMIN — CIPROFLOXACIN HYDROCHLORIDE 500 MG: 500 TABLET, FILM COATED ORAL at 04:06

## 2019-06-25 RX ADMIN — INSULIN DETEMIR 12 UNITS: 100 INJECTION, SOLUTION SUBCUTANEOUS at 09:06

## 2019-06-25 RX ADMIN — INSULIN ASPART 2 UNITS: 100 INJECTION, SOLUTION INTRAVENOUS; SUBCUTANEOUS at 04:06

## 2019-06-25 RX ADMIN — TICAGRELOR 90 MG: 90 TABLET ORAL at 08:06

## 2019-06-25 RX ADMIN — EZETIMIBE 10 MG: 10 TABLET ORAL at 08:06

## 2019-06-25 RX ADMIN — AMIODARONE HYDROCHLORIDE 200 MG: 200 TABLET ORAL at 08:06

## 2019-06-25 RX ADMIN — TAMSULOSIN HYDROCHLORIDE 0.4 MG: 0.4 CAPSULE ORAL at 08:06

## 2019-06-25 RX ADMIN — FERROUS SULFATE TAB EC 325 MG (65 MG FE EQUIVALENT) 325 MG: 325 (65 FE) TABLET DELAYED RESPONSE at 08:06

## 2019-06-25 NOTE — PLAN OF CARE
06/25/19 1459   Post-Acute Status   Post-Acute Authorization Other   Other Status No Post-Acute Service Needs       No SW needs noted at this time. SW in contact with CM and Medical staff. Will continue to follow and offer support as needed.     Parth Rene LMSW  [facility]   Ext. [phone]

## 2019-06-25 NOTE — PLAN OF CARE
Problem: Occupational Therapy Goal  Goal: Occupational Therapy Goal  Pt is not currently displaying a need for acute OT services. D/C acute OT services and recommend pt D/C home.  Outcome: Outcome(s) achieved Date Met: 06/25/19  D/C acute OT services       Comments: Enio Mascorro OTR/L  6/25/2019

## 2019-06-25 NOTE — ASSESSMENT & PLAN NOTE
"67 y/o male with DM, CAD, AICD, HTN, presents to ED for dysuria despite abx tx for UTI. He was prescribed ciprofloxacin for UTI. We are consulted for abx recommendations.     CT renal study showed "Infiltration of the fat surrounding the kidneys, left greater than right, may occur with pyelonephritis.  No obstructing kidney stones are evident.  No hydronephrosis".      US scrotum/testes shows "No evidence of torsion or inflammation.  Small to moderate bilateral hydroceles.     WBC on admit 18K. Afebrile. Repeat UA with 86 WBCs urine cultures, blood cultures NGTD. Urine cultures NGTD. He is on cefepime.      1. Continue [id number].  Blood cultures NGTD. Urine cultures  negative  3. As repeat urine cultures are negative,may discharge on ciprofloxacin 500 mg po bid. Anticipate 10-14 days of abx   4. Recommend close f/u with urology once discharged  5. ID will sign off. Please call if with questions    "

## 2019-06-25 NOTE — CARE UPDATE
Chart reviewed. Discussed with patient, who is NPH 50 units with breakfast which is not good medication for long acting. He is on januvia 100 mg daily.   Regiment not optimized, patient was discharged by hospital medicine with out alerting endocrine team    I called the patient and his gf at 6:36 PM. Given that he had been having highs more than low, suspect NPH not fully covering all day given short half life.     Recommend to pt and gf to give it 25U on NPH in AM and 25U of NPH in PM. Continue Januvia as renal function is stable  Will message outpatient team to set up appt with me to adjust regiment, given insurance status pt would be candidate for better therapy.    Advised gf to monitor glucose and bring logs to appt    Jacobo Coon MD  Endocrinology Fellow  6/25/2019 6:36 PM

## 2019-06-25 NOTE — NURSING
Pt AAx4, breathing even and unlabored, call light in reach, bed in lowest position, GF at bedside. Pt ambulates independantly in room, participated in Pt/OT evaluation. Pt tolerated well. Pt reported no pain throughout shift. VSS, frequent hourly rounding completed. D/C orders placed.

## 2019-06-25 NOTE — PLAN OF CARE
Problem: Adult Inpatient Plan of Care  Goal: Plan of Care Review  Outcome: Ongoing (interventions implemented as appropriate)  Pt AAOx4, afebrile, free of falls. Pt c/o still experiencing mild-moderate pain while urinating, not requiring any PRNs. Pt denies pain/distress. Tolerating all medications. BG monitored, no coverage required. CPAP utilized overnight, tolerating well. No acute changes, WCTM.

## 2019-06-25 NOTE — PT/OT/SLP EVAL
Occupational Therapy   Evaluation and Discharge Note    Name: Walter Bull  MRN: [id number]  Admitting Diagnosis:  Acute cystitis with hematuria      Recommendations:     Discharge Recommendations: home  Discharge Equipment Recommendations:  none  Barriers to discharge:  None    Assessment:     Walter Bull is a 68 y.o. male with a medical diagnosis of Acute cystitis with hematuria. At this time, patient is functioning at their prior level of function and does not require further acute OT services.     Plan:     During this hospitalization, patient does not require further acute OT services.  Please re-consult if situation changes.    · Plan of Care Reviewed with: patient    Subjective     Chief Complaint: No complaints   Patient/Family Comments/goals: return home    Occupational Profile:  Living Environment: Pt lives in a   Previous level of function: [facility]  Roles and Routines: N/A  Equipment Used at home:  none  Assistance upon Discharge: Pt has assistance upon D/C.     Pain/Comfort:  · Pain Rating 1: 0/10  · Pain Rating Post-Intervention 1: 0/10    Patients cultural, spiritual, Hindu conflicts given the current situation:      Objective:     Communicated with: RN prior to session.  Patient found seated in bedside couch with telemetry upon OT entry to room.    General Precautions: Standard, fall   Orthopedic Precautions:N/A   Braces: N/A     Occupational Performance:    Functional Mobility/Transfers:  · Patient completed Sit <> Stand Transfer with independence  with  no assistive device   · Functional Mobility: Pt competed household mobility at [facility] and ambulated in the hallway w/ PT. Refer to PT note for details    Activities of Daily Living:  · Lower Body Dressing: independence donned socks, shoes, and pants.     Cognitive/Visual Perceptual:  Cognitive/Psychosocial Skills:     -       Oriented to: Person, Place, Time and Situation   -       Follows Commands/attention:Follows multistep  commands  -        Communication: clear/fluent  -       Memory: No Deficits noted  -       Safety awareness/insight to disability: intact   -       Mood/Affect/Coping skills/emotional control: Appropriate to situation  Visual/Perceptual:      -Intact      Physical Exam:  Balance:    -       Pt displayed good overall balance   Postural examination/scapula alignment: -       Rounded shoulders  -       Forward head  Skin integrity: Visible skin intact  Upper Extremity Range of Motion:     -       Right Upper Extremity: WFL  -       Left Upper Extremity: WFL  Upper Extremity Strength:    -       Right Upper Extremity: WFL  -       Left Upper Extremity: WFL   Strength:    -       Right Upper Extremity: WFL  -       Left Upper Extremity: WFL  Fine Motor Coordination:    -       Intact  Gross motor coordination:   WFL    AMPAC 6 Click ADL:  AMPAC Total Score: 24    Treatment & Education:  Pt educated on POC.   Education:    Patient left ambulating in hallway w/ PT with all lines intact and PT present    GOALS:   Multidisciplinary Problems     Occupational Therapy Goals     Not on file          Multidisciplinary Problems (Resolved)        Problem: Occupational Therapy Goal    Goal Priority Disciplines Outcome Interventions   Occupational Therapy Goal   (Resolved)     OT, PT/OT Outcome(s) achieved    Description:  Pt is not currently displaying a need for acute OT services. D/C acute OT services and recommend pt D/C home.                    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       Past Surgical History:   Procedure  Laterality Date    Angiogram, Aortic Arch, Coronary  11/16/2018    [facility]formed [facility] CATH LAB    Bypass graft study  11/16/2018    [facility] at [facility] CATH [facility]    CARDIAC DEFIBRILLATOR PLACEMENT      CARDIAC ELECTROPHYSIOLOGY STUDY N/A 11/19/2018    Performed by [facility] Glasgow [facility] at [facility] EP LAB    CARDIAC ELECTROPHYSIOLOGY STUDY N/A 11/19/2018    Performed by [facility]asgwo [facility] at [facility] EP LAB    COLON SURGERY  2006    COLONOSCOPY N/A 2/2/2017    [facility]    CORONARY ARTERY BYPASS GRAFT  2005    5 arteries    ESOPHAGOGASTRODUODENOSCOPY (EGD) N/A 3/21/2018    Performed by Fawad Cunningham [facility] at [facility] ENDO (4TH FLR)    EYE SURGERY Bilateral 2016    Laser surgery for glaucoma    INSERTION, DUAL ICD Left 11/19/2018    Performed by [facility] at [facility] EP LAB    Left heart cath Left 11/16/2018    Performed by [facility] at [facility] CATH LAB       Time Tracking:     OT Date of Treatment: 06/25/19  OT Start Time: 1445  OT Stop Time: 1455  OT Total Time (min): 10 min    Billable Minutes:Evaluation 10 minutes    Enio Mascorro, OT  6/25/2019

## 2019-06-25 NOTE — PLAN OF CARE
CM to bedside - pt sleeping on sofa and did not wake up to his name being called. No family present.

## 2019-06-25 NOTE — SUBJECTIVE & OBJECTIVE
Interval History:   Repeat UA no growth  Afebrile, leukocytosis resolved  Pain improving    Review of Systems   Constitutional: Negative for activity change, appetite change, chills, diaphoresis, fatigue and fever.   Respiratory: Negative for cough and shortness of breath.    Cardiovascular: Negative for chest pain, palpitations and leg swelling.   Gastrointestinal: Negative for abdominal pain, constipation, diarrhea, nausea and vomiting.   Genitourinary: Positive for dysuria, scrotal swelling and testicular pain. Negative for difficulty urinating and urgency.   Musculoskeletal: Negative for back pain.   Skin: Negative for color change, pallor, rash and wound.   Neurological: Negative for dizziness and headaches.     Objective:     Vital Signs (Most Recent):  Temp: 97.9 °F (36.6 °C) (06/25/19 1229)  Pulse: 60 (06/25/19 1229)  Resp: 18 (06/25/19 0711)  BP: 125/60 (06/25/19 1229)  SpO2: 97 % (06/25/19 1229) Vital Signs (24h Range):  Temp:  [97.6 °F (36.4 °C)-99.2 °F (37.3 °C)] 97.9 °F (36.6 °C)  Pulse:  [60-65] 60  Resp:  [18-23] 18  SpO2:  [96 %-100 %] 97 %  BP: (105-129)/(51-60) 125/60     Weight: 108.6 kg (239 lb 6.7 oz)  Body mass index is 37.5 kg/m².    Estimated Creatinine Clearance: 50.5 mL/min (A) (based on SCr of 1.6 mg/dL (H)).    Physical Exam   Constitutional: He appears well-developed and well-nourished. No distress.   HENT:   Head: Normocephalic.   Eyes: Pupils are equal, round, and reactive to light.   Cardiovascular: Normal rate, regular rhythm and normal heart sounds. Exam reveals no friction rub.   No murmur heard.  Pulmonary/Chest: Effort normal. No stridor. No respiratory distress. He has no wheezes. He has no rales.   Abdominal: Soft. He exhibits no distension and no mass. There is no tenderness. There is no guarding.   Genitourinary: Penis normal. Right testis shows tenderness. Right testis shows no mass and no swelling. Left testis shows no mass, no swelling and no tenderness. No penile  tenderness. No discharge found.   Musculoskeletal: Normal range of motion. He exhibits no edema or deformity.   Neurological: He is alert.   Skin: Skin is warm. No rash noted. He is not diaphoretic. No erythema.   Psychiatric: He has a normal mood and affect.   Vitals reviewed.      Significant Labs: All pertinent labs within the past 24 hours have been reviewed.    Significant Imaging: I have reviewed all pertinent imaging results/findings within the past 24 hours.

## 2019-06-25 NOTE — PLAN OF CARE
Problem: Physical Therapy Goal  Goal: Physical Therapy Goal  Outcome: Outcome(s) achieved Date Met: 06/25/19  Evaluation complete. Pt functioning at prior level of function. Discharge acute PT services at this time.

## 2019-06-25 NOTE — PROGRESS NOTES
"[facility]  Infectious Disease  Progress Note    Patient Name: Walter Bull  MRN: [id number]  Admission Date: 6/23/2019  Length of Stay: 1 days  Attending Physician: Ailyn Giles MD  Primary Care Provider: Belkys Kruger MD    Isolation Status: No active isolations  Assessment/Plan:      * Acute cystitis with hematuria  67 y/o male with DM, CAD, AICD, HTN, presents to ED for dysuria despite abx tx for UTI. He was prescribed ciprofloxacin for UTI. We are consulted for abx recommendations.     CT renal study showed "Infiltration of the fat surrounding the kidneys, left greater than right, may occur with pyelonephritis.  No obstructing kidney stones are evident.  No hydronephrosis".      US scrotum/testes shows "No evidence of torsion or inflammation.  Small to moderate bilateral hydroceles.     WBC on admit 18K. Afebrile. Repeat UA with 86 WBCs urine cultures, blood cultures NGTD. Urine cultures NGTD. He is on cefepime.      1. Continue [id number].  Blood cultures NGTD. Urine cultures  negative  3. As repeat urine cultures are negative,may discharge on ciprofloxacin 500 mg po bid. Anticipate 10-14 days of abx   4. Recommend close f/u with urology once discharged  5. ID will sign off. Please call if with questions          Thank you for your consult. I will sign off. Please contact us if you have any additional questions.    Zachary Olea PA-C  Infectious Disease  [facility]  [phone]    Subjective:     Principal Problem:Acute cystitis with hematuria    HPI: 67 y/o male with DM, CAD, AICD, HTN, presents to ED for dysuria despite abx tx for UTI. He was prescribed ciprofloxacin for UTI. He febrile 101 at home and reports having urinary frequency, scrotal/testicular pain.      On 6/21, patient presented for BL testicular pain worse with urination.  UA revealed grossly positive UTI.  CT renal study showed "Infiltration of the fat surrounding the kidneys, left greater than " "right, may occur with pyelonephritis.  No obstructing kidney stones are evident.  No hydronephrosis".  US scrotum/testes shows "No evidence of torsion or inflammation.  Small to moderate bilateral hydroceles.  Internal specular reflectors within the left hydrocele may relate to blood products or proteinaceous debris." Without evidence of torsion, obstruction, or nephrolithiasis, patient was discharged home with cipro.     WBC on admit 18K. Afebrile. Repeat UA with 86 WBC,s urin cultures, blood cultures pending. He is on cefepime.   Interval History:   Repeat UA no growth  Afebrile, leukocytosis resolved  Pain improving    Review of Systems   Constitutional: Negative for activity change, appetite change, chills, diaphoresis, fatigue and fever.   Respiratory: Negative for cough and shortness of breath.    Cardiovascular: Negative for chest pain, palpitations and leg swelling.   Gastrointestinal: Negative for abdominal pain, constipation, diarrhea, nausea and vomiting.   Genitourinary: Positive for dysuria, scrotal swelling and testicular pain. Negative for difficulty urinating and urgency.   Musculoskeletal: Negative for back pain.   Skin: Negative for color change, pallor, rash and wound.   Neurological: Negative for dizziness and headaches.     Objective:     Vital Signs (Most Recent):  Temp: 97.9 °F (36.6 °C) (06/25/19 1229)  Pulse: 60 (06/25/19 1229)  Resp: 18 (06/25/19 0711)  BP: 125/60 (06/25/19 1229)  SpO2: 97 % (06/25/19 1229) Vital Signs (24h Range):  Temp:  [97.6 °F (36.4 °C)-99.2 °F (37.3 °C)] 97.9 °F (36.6 °C)  Pulse:  [60-65] 60  Resp:  [18-23] 18  SpO2:  [96 %-100 %] 97 %  BP: (105-129)/(51-60) 125/60     Weight: 108.6 kg (239 lb 6.7 oz)  Body mass index is 37.5 kg/m².    Estimated Creatinine Clearance: 50.5 mL/min (A) (based on SCr of 1.6 mg/dL (H)).    Physical Exam   Constitutional: He appears well-developed and well-nourished. No distress.   HENT:   Head: Normocephalic.   Eyes: Pupils are equal, " round, and reactive to light.   Cardiovascular: Normal rate, regular rhythm and normal heart sounds. Exam reveals no friction rub.   No murmur heard.  Pulmonary/Chest: Effort normal. No stridor. No respiratory distress. He has no wheezes. He has no rales.   Abdominal: Soft. He exhibits no distension and no mass. There is no tenderness. There is no guarding.   Genitourinary: Penis normal. Right testis shows tenderness. Right testis shows no mass and no swelling. Left testis shows no mass, no swelling and no tenderness. No penile tenderness. No discharge found.   Musculoskeletal: Normal range of motion. He exhibits no edema or deformity.   Neurological: He is alert.   Skin: Skin is warm. No rash noted. He is not diaphoretic. No erythema.   Psychiatric: He has a normal mood and affect.   Vitals reviewed.      Significant Labs: All pertinent labs within the past 24 hours have been reviewed.    Significant Imaging: I have reviewed all pertinent imaging results/findings within the past 24 hours.

## 2019-06-25 NOTE — PT/OT/SLP EVAL
Physical Therapy Evaluation and Discharge Note    Patient Name:  Walter Bull   MRN:  [id number]    Recommendations:     Discharge Recommendations:  outpatient PT   Discharge Equipment Recommendations: none   Barriers to discharge: None    Assessment:     Walter Bull is a 68 y.o. male admitted with a medical diagnosis of Acute cystitis with hematuria. Pt reports history of episodes of dizziness and gait instability as well as 5 falls last year, no falls this year. Pt denies any dizziness throughout therapy evaluation and did not demonstrate gait instability during ambulation. PT recommends pt follow up with outpatient PT during episodes of gait instability. At this time, patient is functioning at their prior level of function and does not require further acute PT services.     Recent Surgery: * No surgery found *      Plan:     During this hospitalization, patient does not require further acute PT services.  Please re-consult if situation changes.      Subjective     Chief Complaint: None stated   Patient/Family Comments/goals: To return home and return to work   Pain/Comfort:  · Pain Rating 1: 0/10   · Dizziness Ratin/10     Patients cultural, spiritual, Taoism conflicts given the current situation: no    Living Environment:  Pt lives with girlfriend in  with 5 LILIANA and L handrail  Prior to admission, patients level of function was independent with all functional mobility.  Equipment used at home: none. Upon discharge, patient will have assistance from girlfriend.    Objective:     Communicated with RN prior to session.  Patient found seated on couch with telemetry upon PT entry to room.    General Precautions: Standard, fall   Orthopedic Precautions:N/A   Braces: N/A     Exams:  · RUE ROM: WFL  · RUE Strength: WFL  · LUE ROM: WFL  · LUE Strength: WFL  · RLE ROM: WFL  · RLE Strength: WFL  · LLE ROM: WFL  · LLE Strength: WFL    Functional Mobility:  · Transfers:    · Sit to Stand:  supervision with  no AD    · Gait: Ambulated x230' with supervision and no AD. Pt ambulates with upright posture, reciprocal gait, and did not experience any LOB episode or gait instability. Pt denied any dizziness during ambulation.     · Balance: Modified independent sitting/static standing     · Stairs:  Pt ascended/descended 13 stair(s) with No Assistive Device with left handrail with Stand-by Assistance.     AM-PAC 6 CLICK MOBILITY  Total Score:24       Therapeutic Activities and Exercises:   - Discussed with pt PT role, POC, discharge recommendation of outpatient PT, and discontinuation of acute PT services  - Pt educated on proper foot wear to prevent falls     AM-PAC 6 CLICK MOBILITY  Total Score:24     Patient left seated on couch  with call button in reach.    GOALS:   Multidisciplinary Problems     Physical Therapy Goals     Not on file          Multidisciplinary Problems (Resolved)        Problem: Physical Therapy Goal    Goal Priority Disciplines Outcome Goal Variances Interventions   Physical Therapy Goal   (Resolved)     PT, PT/OT Outcome(s) achieved                     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       Past Surgical History:   Procedure Laterality Date    Angiogram, Aortic Arch, Coronary  11/16/2018    [facility] CATH LAB    Bypass graft study  11/16/2018    [facility] by [facility] at [facility] CATH LAB    CARDIAC DEFIBRILLATOR PLACEMENT      CARDIAC ELECTROPHYSIOLOGY STUDY N/A 11/19/2018    Performed by [facility] Glasgow [facility] at [facility] EP LAB    CARDIAC  ELECTROPHYSIOLOGY STUDY N/A 11/19/2018    [facility] EP LAB    COLON SURGERY  2006    COLONOSCOPY N/A 2/2/2017    [facility]    CORONARY ARTERY BYPASS GRAFT  2005    5 arteries    ESOPHAGOGASTRODUODENOSCOPY (EGD) N/A 3/21/2018    Performed by Fawad Cunningham [facility] at [facility] ENDO (4TH FLR)    EYE SURGERY Bilateral 2016    Laser surgery for glaucoma    INSERTION, DUAL ICD Left 11/19/2018    Performed by [facility] at [facility] EP LAB    Left heart cath Left 11/16/2018    Performed by [facility] at [facility] CATH LAB       Time Tracking:     PT Received On: 06/25/19  PT Start Time: 1444     PT Stop Time: 1501  PT Total Time (min): 17 min     Billable Minutes: Evaluation 17      [facility]  06/25/2019

## 2019-06-26 ENCOUNTER — PATIENT OUTREACH (OUTPATIENT)
Dept: ADMINISTRATIVE | Facility: CLINIC | Age: 69
End: 2019-06-26

## 2019-06-26 ENCOUNTER — OFFICE VISIT (OUTPATIENT)
Dept: PODIATRY | Facility: CLINIC | Age: 69
End: 2019-06-26
Payer: MEDICARE

## 2019-06-26 ENCOUNTER — NURSE TRIAGE (OUTPATIENT)
Dept: ADMINISTRATIVE | Facility: CLINIC | Age: 69
End: 2019-06-26

## 2019-06-26 VITALS
BODY MASS INDEX: 36.22 KG/M2 | WEIGHT: 239 LBS | DIASTOLIC BLOOD PRESSURE: 71 MMHG | HEART RATE: 60 BPM | HEIGHT: 68 IN | SYSTOLIC BLOOD PRESSURE: 137 MMHG

## 2019-06-26 DIAGNOSIS — E11.9 ENCOUNTER FOR COMPREHENSIVE DIABETIC FOOT EXAMINATION, TYPE 2 DIABETES MELLITUS: Primary | ICD-10-CM

## 2019-06-26 DIAGNOSIS — L85.3 DRY SKIN: ICD-10-CM

## 2019-06-26 PROCEDURE — 3045F PR MOST RECENT HEMOGLOBIN A1C LEVEL 7.0-9.0%: CPT | Mod: CPTII,S$GLB,, | Performed by: PODIATRIST

## 2019-06-26 PROCEDURE — 3078F DIAST BP <80 MM HG: CPT | Mod: CPTII,S$GLB,, | Performed by: PODIATRIST

## 2019-06-26 PROCEDURE — 99999 PR PBB SHADOW E&M-EST. PATIENT-LVL IV: CPT | Mod: PBBFAC,,, | Performed by: PODIATRIST

## 2019-06-26 PROCEDURE — 1100F PR PT FALLS ASSESS DOC 2+ FALLS/FALL W/INJURY/YR: ICD-10-PCS | Mod: CPTII,S$GLB,, | Performed by: PODIATRIST

## 2019-06-26 PROCEDURE — 99213 OFFICE O/P EST LOW 20 MIN: CPT | Mod: S$GLB,,, | Performed by: PODIATRIST

## 2019-06-26 PROCEDURE — 3078F PR MOST RECENT DIASTOLIC BLOOD PRESSURE < 80 MM HG: ICD-10-PCS | Mod: CPTII,S$GLB,, | Performed by: PODIATRIST

## 2019-06-26 PROCEDURE — 99999 PR PBB SHADOW E&M-EST. PATIENT-LVL IV: ICD-10-PCS | Mod: PBBFAC,,, | Performed by: PODIATRIST

## 2019-06-26 PROCEDURE — 3075F SYST BP GE 130 - 139MM HG: CPT | Mod: CPTII,S$GLB,, | Performed by: PODIATRIST

## 2019-06-26 PROCEDURE — 99213 PR OFFICE/OUTPT VISIT, EST, LEVL III, 20-29 MIN: ICD-10-PCS | Mod: S$GLB,,, | Performed by: PODIATRIST

## 2019-06-26 PROCEDURE — 1100F PTFALLS ASSESS-DOCD GE2>/YR: CPT | Mod: CPTII,S$GLB,, | Performed by: PODIATRIST

## 2019-06-26 PROCEDURE — 3288F FALL RISK ASSESSMENT DOCD: CPT | Mod: CPTII,S$GLB,, | Performed by: PODIATRIST

## 2019-06-26 PROCEDURE — 3288F PR FALLS RISK ASSESSMENT DOCUMENTED: ICD-10-PCS | Mod: CPTII,S$GLB,, | Performed by: PODIATRIST

## 2019-06-26 PROCEDURE — 3075F PR MOST RECENT SYSTOLIC BLOOD PRESS GE 130-139MM HG: ICD-10-PCS | Mod: CPTII,S$GLB,, | Performed by: PODIATRIST

## 2019-06-26 PROCEDURE — 3045F PR MOST RECENT HEMOGLOBIN A1C LEVEL 7.0-9.0%: ICD-10-PCS | Mod: CPTII,S$GLB,, | Performed by: PODIATRIST

## 2019-06-26 RX ORDER — AMMONIUM LACTATE 12 G/100G
CREAM TOPICAL
Qty: 1 BOTTLE | Refills: 6 | Status: SHIPPED | OUTPATIENT
Start: 2019-06-26 | End: 2022-01-26

## 2019-06-26 NOTE — TELEPHONE ENCOUNTER
I called the patient 5:13 PM  He denies having glucoses in 400s.  Advised patient that with current infection he will have higher glucoes  I advised him to increase his regiment to NPH 30U in morning and 30U at night, continue Januvia  Advised him that NPH does not quickly correct glucose, and that he should not take it so closely   He has appt with me in 8/1  He is to bring logs at that time.    Jacobo Coon MD  Endocrinology Fellow  6/26/2019 5:15 PM

## 2019-06-26 NOTE — PATIENT INSTRUCTIONS
Discharge Instructions for Atrial Fibrillation  You have been diagnosed with an abnormal heart rhythm called atrial fibrillation. With this condition, your hearts 2 upper chambers quiver rather than squeeze the blood out in a normal pattern. This leads to an irregular and sometimes rapid heartbeat. Some people will develop associated symptoms such as a flip-flopping heartbeat, chest pain, lightheadedness, or shortness of breath. Other people may have no symptoms at all. Atrial fibrillation is serious because it affects the hearts ability to fill with blood as it should. Blood clots may form. This increases the risk for stroke. Untreated atrial fibrillation can also lead to heart failure. Atrial fibrillation can be controlled. With treatment, most people with atrial fibrillation lead normal lives.  Treatment options  Recommended treatment for atrial fibrillation depends on your age, symptoms, how long you have had atrial fibrillation, and other factors. You will have a complete evaluation to find out if you have any abnormalities that caused your heart to go into atrial fibrillation. This might be blocked heart arteries or a thyroid problem. Your doctor will assess your particular case and discuss choices with you.  Treatment choices may include:  · Treating an underlying disorder that puts you at risk for atrial fibrillation. For example, correcting an abnormal thyroid or electrolyte problem, or treating a blocked heart artery.  · Restoring a normal heart rhythm with an electrical shock (cardioversion) or with an antiarrhythmic medicine (chemical cardioversion)  · Using medicine to control your heart rate in atrial fibrillation.  · Preventing the risk for blood clot and stroke using blood-thinning medicines. Your doctor will tell you what he or she recommends. Choices may include aspirin, clopidogrel, warfarin, dabigatran, rivaroxaban, apixaban, and edoxaban.  · Doing catheter ablation or a surgical maze  procedure. These use different methods to destroy certain areas of heart tissue. This interrupts the electrical signals causing atrial fibrillation. One of these procedures may be a choice when medicines do not work, or as an alternative to long-term medicine.  · Other treatment choices may be recommended for you by your doctor.  Managing risk factors for stroke and preventing heart failure are important parts of any treatment plan for atrial fibrillation.  Home care  · Take your medicines exactly as directed. Dont skip doses.  · Work with your doctor to find the right medicines and doses for you.  · Learn to take your own pulse. Keep a record of your results. Ask your doctor which pulse rates mean that you need medical attention. Slowing your pulse is often the goal of treatment. Ask your doctor if its OK for you to use an automatic machine to check your pulse at home. Sometimes these machines dont count the pulse correctly when you have atrial fibrillation.  · Limit your intake of coffee, tea, cola, and other beverages with caffeine. Talk with your doctor about whether you should eliminate caffeine.  · Avoid over-the-counter medicines that have caffeine in them.  · Let your doctor know what medicines you take, including prescription and over-the-counter medicines, as well as any supplements. They interfere with some medicines given for atrial fibrillation.  · Ask your doctor about whether you can drink alcohol. Some people need to avoid alcohol to better treat atrial fibrillation. If you are taking blood-thinner medicines, alcohol may interfere with them by increasing their effect.  · Never take stimulants such as amphetamines or cocaine. These drugs can speed up your heart rate and trigger atrial fibrillation.  Follow-up care  Follow up with your doctor, or as advised.     When should I call my healthcare provider  Call your healthcare provider right away if you have any of the  following:  · Weakness  · Dizziness  · Fainting  · Fatigue  · Shortness of breath  · Chest pain with increased activity  · A change in the usual regularity of your heartbeat, or an unusually fast heartbeat   Date Last Reviewed: 4/23/2016  © 4406-6369 [employer]. [address] [place] [address], [place] [address]. All rights reserved. This information is not intended as a substitute for professional medical care. Always follow your healthcare professional's instructions.

## 2019-06-26 NOTE — TELEPHONE ENCOUNTER
Reason for Disposition   Blood glucose > 400 mg/dl (22 mmol/l)   Blood glucose > 240 mg/dl (13 mmol/l)    Protocols used: DIABETES - HIGH BLOOD SUGAR-A-OH

## 2019-06-26 NOTE — TELEPHONE ENCOUNTER
Diabetes Blood Sugar Phone Call Screening   Talk to pt wife    Glucose Level and time taken: this morning at 6am 290, then at 9am BS was 498 then recheck for 10am BS was still in 400    Blood sugar range at home over past few weeks: 200 to 400    Having any low blood sugar readings at home: no      Time of last meal, snack or non-diet drink (juice, soft drinks, sweet tea): pt wife claims he haven't drink or eaten anything      Time of last diabetes medication administration: 6am he taking his 25 units NPH then at 9am took another 25 units  With his januvia 100mg      Current diabetes medications and doses: novolin 100 units take 25 units in am and pm , januvia 100mg daily     Have you missed any medication doses within past 48 hours? No     Any new symptoms such as abdominal pain, nausea, weakness, frequent urination, increased thirst, or blurry vision?  Fatigue   Any new recent medications this week such as: steroids (injections or pills) or antibiotics? Yes antibiotics  ciprofloxacin was given to him  When he was in hospital   Told pt wife I will send message to dr clements.

## 2019-06-27 ENCOUNTER — PATIENT MESSAGE (OUTPATIENT)
Dept: ENDOCRINOLOGY | Facility: CLINIC | Age: 69
End: 2019-06-27

## 2019-06-27 NOTE — DISCHARGE SUMMARY
Discharge Summary  Hospital Medicine    Attending Provider on Discharge: Ailyn Giles MD  [facility] Medicine Team: [facility]OSP MED O  Date of Admission:  6/23/2019     Date of Discharge:  6/25/2019  Code status: Full Code    Active Hospital Problems    Diagnosis  POA    *Acute cystitis with hematuria [N30.01]  Yes    Physical debility [R53.81]  Yes    Chronic combined systolic and diastolic heart failure [I50.42]  Yes     Chronic    Chest pain [R07.9]  Yes    ICD (implantable cardioverter-defibrillator), dual, in situ [Z95.810]  Yes     Chronic    Ventricular tachycardia [I47.2]  Yes    Ischemic cardiomyopathy [I25.5]  Yes    Severe obesity (BMI 35.0-39.9) with comorbidity [E66.01]  Yes    Benign prostatic hyperplasia without lower urinary tract symptoms [N40.0]  Yes     Chronic    Benign essential HTN [I10]  Yes     Chronic    Coronary artery disease involving native coronary artery of native heart without angina pectoris [I25.10]  Yes     Chronic    Diabetes mellitus type 2 in obese [E11.69, E66.9]  Yes     Chronic      Resolved Hospital Problems    Diagnosis Date Resolved POA    Dizziness [R42] 06/25/2019 Yes    Acute renal failure superimposed on stage 3 chronic kidney disease [N17.9, N18.3] 06/25/2019 Yes    Hyponatremia [E87.1] 06/25/2019 Yes        HPI  68-year-old male with PMHx of DM, CAD (s/p CABG), V Tach ( with AICD), HTN, and kidney failure who presents to the ED for evaluation of multiple complaints including continued testicular pain despite treatment for UTI.  Patient is poor historian and has poor medical insight/knowledge.  Patient has been compliant with the cipro taking prescribed dosing (4 tablets total).  He states that he presented to the ED d/t his girlfriend's concern.  He pain after micturition improved after starting the abx, but has returned.  He reports taking ibuprofen 800 mg at home for fever of 101, with resolution. He reports continued urinary frequency,  "scrotal/testicular pain. He denies SOB, cough, numbness, abdominal pain, n/v/d, blood in stool, hematuria, flank pain, headache.     Patient reports a 4 month hx of dizziness with associated vision changes ("yellow spots"), gait instability without correlation with standing, positioning or effort.  He usually drinks 10 bottles of water a day, as instructed by his PCP, which has not seemed to help sxs.  This has not changed until today when he tried a bottle of gatorade, which resolved sxs. He has made multiple changes to his medications, including holding flomax/proscar and stopping metformin, but has not improved. He also reports 2-3 weeks of increased fatigue/weakness.  Patient denies hx of CVA.      Patient reports intermittent L sided chest pain today without associated sxs improved with 2 SL nitro.  Per ED provider "He also reports that while resting his AICD fired around 5 PM with associated left sided chest pain, diaphoresis, and dizziness. His pain lasted only a few seconds-minutes after taking 2 nitroglycerin. He states that his AICD has never fired before in the past."      This morning his BG was over 500 and he took about 190 U of NPH over the course of the day to control his BG.  He reports never trying prandial+basal insulin and reports BG labile at home.  He see Dr. Kruger in [place] as his PCP.      On 6/21, patient presented for BL testicular pain worse with urination.  UA revealed grossly positive UTI.  CT renal study showed "Infiltration of the fat surrounding the kidneys, left greater than right, may occur with pyelonephritis.  No obstructing kidney stones are evident.  No hydronephrosis".  US scrotum/testes shows "No evidence of torsion or inflammation.  Small to moderate bilateral hydroceles.  Internal specular reflectors within the left hydrocele may relate to blood products or proteinaceous debris." Without evidence of torsion, obstruction, or nephrolithiasis, patient was discharged home " "with cipro.      ED: Afebrile, HDS (BP 110s-100s), orthostatic negative. Leukocytosis of 18 k.  Na 124/Cr 1.9 (previously 1.9 x3 days ago, but BL 1.2-1.4)/CO2 18. . Trop .019/ (BL 100s). UA shows nitrite positive urine, 2+ leuks, 16 RBCs, 86 WBCs.  CTH shows "Generalized cerebral volume loss and chronic microvascular ischemic change".  CXR shows "Bilateral pattern of mild ground-glass infiltrate and minimal pleural fluid". Cards evaluated PM in ED as patient with episode of CP thought to be 2/2 ICD discharge.    Hospital Course  Mr. Bull was admitted to Hospital Medicine for evaluation and management of urinary tract infection, with leukocytosis of 18 and a report of a fever at home. He was initially treated with ceftriaxone, but after cultures resulted with resistant E. Coli, Infectious Disease was consulted. He was transitioned to cefepime with good response and resolution of his leukocytosis and scrotal pain. CT renal study showed "infiltration of the fat surrounding the kidney,s left grater than right, may occur with pyelonephritis. No obstructing kidney stones are evident. No hydronephrosis." US of his scrotum showed no inflammation or torsion. Infectious Disease recommended a 10-14 day course of oral ciprofloxacin on discharge. He will have Urology clinic follow up.    He had signs of acute kidney injury on admission, with creatinine elevated to 1.9 (baseline 1.2-1.3). Suspect related to infection and pre-renal, this improved to creatinine 1.6 on discharge. He maintained good urine output throughout the hospitalization.     His dizziness resolved with correction of the hyponatremia. CT head was negative for acute changes. Orthostatic vitals were negative. His sodium was trended every 4 hours and resolved slowly to his baseline of 136. The hyponatremia was likely hypotonic in the setting of CHF, given his report of high volume of fluid intake (>2500ml) daily in the setting of chronic furosemide use and " a BNP of 304. He was advised to drink when thirsty. His home diuretic was resumed.     Endocrinology was consulted for management of his poorly controlled diabetes, A1c 7.7%. They recommended transition to 25units of NPH twice daily and will follow up with him in clinic.     His other chronic medical conditions including coronary artery disease, chronic combined systolic and heart failure, hypertension, and history of VT s/p ICD remained stable on his home medications.            Recent Labs   Lab 06/21/19 2238 06/24/19 0005 06/24/19  0011 06/25/19 0437   WBC 11.77 18.10*  --  11.21   HGB 11.2* 10.5*  --  10.3*   HCT 35.3* 32.4* 33* 32.6*    174  --  192     Recent Labs   Lab 06/21/19 2238 06/24/19  2315 06/25/19 0437 06/25/19  0748      < > 136 135*  135* 136   K 4.5   < > 4.4 4.3  4.3 4.4      < > 105 105  105 104   CO2 24   < > 22* 21*  21* 22*   BUN 20   < > 22 20  20 20   CREATININE 1.9*   < > 1.6* 1.6*  1.6* 1.6*   GLU 70   < > 90 64*  64* 72   CALCIUM 9.1   < > 8.7 9.2  9.2 9.3   MG  --   --   --  2.0  --    PHOS  --   --   --  3.5  --    LIPASE 5  --   --   --   --     < > = values in this interval not displayed.     Recent Labs   Lab 06/21/19 2238 06/24/19 0005 06/25/19 0437   ALKPHOS 55 79 75   ALT 25 56* 75*   AST 22 68* 91*   ALBUMIN 3.7 2.8* 2.3*   PROT 7.4 6.8 6.2   BILITOT 0.7 0.6 0.4      Recent Labs   Lab 06/24/19  1124 06/24/19  1651 06/24/19  2159 06/25/19  0709 06/25/19  1148 06/25/19  1654   POCTGLUCOSE 184* 97 105 73 210* 241*     No results for input(s): CPK, CPKMB, MB, TROPONINI in the last 72 hours.    Procedures: none    Consultants:   Infectious Disease    Discharge Medication List as of 6/25/2019  5:54 PM      CONTINUE these medications which have CHANGED    Details   ciprofloxacin HCl (CIPRO) 500 MG tablet Take 1 tablet (500 mg total) by mouth every 12 (twelve) hours. for 14 days, Starting Tue 6/25/2019, Until Tue 7/9/2019, Normal         CONTINUE  these medications which have NOT CHANGED    Details   albuterol (PROVENTIL/VENTOLIN HFA) 90 mcg/actuation inhaler Inhale 2 puffs into the lungs every 6 (six) hours as needed for Wheezing., Starting Tue 11/20/2018, Normal      amiodarone (PACERONE) 200 MG Tab Take 1 tablet (200 mg total) by mouth once daily., Starting Wed 1/30/2019, Normal      aspirin (ECOTRIN) 81 MG EC tablet Take 1 tablet (81 mg total) by mouth once daily., Starting Mon 5/27/2019, No Print      carvedilol (COREG) 3.125 MG tablet Take 1 tablet (3.125 mg total) by mouth 2 (two) times daily with meals., Starting Fri 4/26/2019, Until Sat 4/25/2020, Normal      esomeprazole (NEXIUM) 40 MG capsule Take 1 capsule (40 mg total) by mouth before breakfast., Starting Tue 5/14/2019, Until Wed 5/13/2020, Normal      ezetimibe (ZETIA) 10 mg tablet Take 1 tablet (10 mg total) by mouth once daily., Starting Thu 3/14/2019, Normal      ferrous sulfate (FEOSOL) 325 mg (65 mg iron) Tab tablet TAKE 1 TABLET BY MOUTH THREE TIMES DAILY, Normal      finasteride (PROSCAR) 5 mg tablet HOLD DUE TO ORTHOSTATIC HYPOTENSION, Normal      furosemide (LASIX) 20 MG tablet Take 1 tablet (20 mg total) by mouth once daily. Take an extra tablet daily for wt gain more than 3 pounds/day or 5 pounds/week, Starting Wed 5/1/2019, Normal      insulin NPH (NOVOLIN N) 100 unit/mL injection Inject 50 Units into the skin before breakfast., Starting Tue 5/14/2019, No Print      isosorbide mononitrate (IMDUR) 60 MG 24 hr tablet Take 1 tablet (60 mg total) by mouth once daily., Starting Mon 3/25/2019, Normal      losartan (COZAAR) 50 MG tablet Take 1 tablet (50 mg total) by mouth once daily., Starting Wed 1/30/2019, Until Thu 1/30/2020, Normal      nitroGLYCERIN (NITROSTAT) 0.4 MG SL tablet DISSOLVE ONE TABLET UNDER THE TONGUE EVERY 5 MINUTES AS NEEDED FOR CHEST PAIN., Normal      RANEXA 1,000 mg Tb12 Take 1 tablet (1,000 mg total) by mouth 2 (two) times daily., Starting Mon 1/14/2019, Normal       rosuvastatin (CRESTOR) 40 MG Tab Take 1 tablet (40 mg total) by mouth once daily., Starting Tue 11/20/2018, Normal      SITagliptin (JANUVIA) 100 MG Tab Take 1 tablet (100 mg total) by mouth once daily., Starting Tue 5/14/2019, Until Wed 5/13/2020, Normal      spironolactone (ALDACTONE) 25 MG tablet Take 1 tablet (25 mg total) by mouth once daily., Starting Mon 1/28/2019, Normal      tamsulosin (FLOMAX) 0.4 mg Cap Take 1 capsule (0.4 mg total) by mouth once daily., Starting Sat 6/22/2019, Until Mon 7/22/2019, Normal      ticagrelor (BRILINTA) 90 mg tablet Take 90 mg by mouth 2 (two) times daily., Historical Med         STOP taking these medications       ibuprofen (ADVIL,MOTRIN) 800 MG tablet Comments:   Reason for Stopping:         phenazopyridine (PYRIDIUM) 200 MG tablet Comments:   Reason for Stopping:               Discharge Diet:cardiac diet and diabetic diet: 2200 calorie with Normal Fluid intake of 1500 - 2000 mL per day    Activity: activity as tolerated    Discharge Condition: Good    Disposition: Home or Self Care    Tests pending at the time of discharge: none      Time spent  on the discharge of the patient including review of hospital course with the patient. reviewing discharge medications and arranging follow-up care: >30 mins    Discharge examination Patient was seen and examined on the date of discharge and determined to be suitable for discharge.    Discharge plan and follow up:      Future Appointments   Date Time Provider Department Center   7/2/2019  3:15 PM Belkys Kruger MD [facility]   7/15/2019  9:30 AM Guicho Carver Jr., MD [facility] UROLOGY [facility]   8/1/2019 11:00 AM Jacobo Coon MD [facility] ENDODIA [facility]   8/7/2019  8:30 AM [place], LAB [facility] [place]   8/14/2019  8:00 AM Belkys Kruger MD [facility]   8/27/2019  8:00 AM HOME MONITOR DEVICE CHECK, [facility] ARRHPRO [facility]   8/27/2019 10:30 AM LAB, HEMONC CANCER BLDG [facility] LAB AMANDO Lopez   8/27/2019 11:30 AM  Benny Tian MD [facility] BRENT [facility]   11/26/2019  8:00 AM HOME MONITOR DEVICE CHECK, [facility] LIBIA [place]   2/26/2020  8:00 AM HOME MONITOR DEVICE CHECK, [facility] LIBIA Giles M.D., M.P.H.  Department of [facility] Medicine  [facility] [place]  (pager) [phone] (spect) [phone]

## 2019-06-29 LAB
BACTERIA BLD CULT: NORMAL
BACTERIA BLD CULT: NORMAL

## 2019-07-02 ENCOUNTER — PATIENT MESSAGE (OUTPATIENT)
Dept: SLEEP MEDICINE | Facility: CLINIC | Age: 69
End: 2019-07-02

## 2019-07-03 NOTE — PROGRESS NOTES
Subjective:      Patient ID: Walter Bull is a 68 y.o. male.    Chief Complaint: Diabetes Mellitus (dr jerri kruger05/21/2019) and Diabetic Foot Exam    Walter is a 68 y.o. male who presents to the clinic upon referral from Dr. Villegas ref. provider found  for evaluation and treatment of diabetic feet. 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 Patient relates no major problem with feet. Only complaints today consist of diabetic foot exam.    PCP: Jerri Kruger MD    Date Last Seen by PCP: dr jerri kruger05/21/2019    Current shoe gear: Tennis shoes    Hemoglobin A1C   Date Value Ref Range Status   06/24/2019 7.7 (H) 4.0 - 5.6 % Final     Comment:     ADA Screening Guidelines:  5.7-6.4%  Consistent with prediabetes  >or=6.5%  Consistent with diabetes  High levels of fetal hemoglobin interfere with the HbA1C  assay. Heterozygous hemoglobin variants (HbS, HgC, etc)do  not significantly interfere with this assay.   However, presence of multiple variants may affect accuracy.     05/18/2019 7.6 (H) 4.0 - 5.6 % Final     Comment:     ADA Screening Guidelines:  5.7-6.4%  Consistent with prediabetes  >or=6.5%  Consistent with diabetes  High levels of fetal hemoglobin interfere with the HbA1C  assay. Heterozygous hemoglobin variants (HbS, HgC, etc)do  not significantly interfere with this assay.   However, presence of multiple variants may affect accuracy.     05/07/2019 7.5 (H) 4.0 - 5.6 % Final     Comment:     ADA Screening Guidelines:  5.7-6.4%  Consistent with prediabetes  >or=6.5%  Consistent with diabetes  High levels of fetal hemoglobin interfere with the HbA1C  assay. Heterozygous  hemoglobin variants (HbS, HgC, etc)do  not significantly interfere with this assay.   However, presence of multiple variants may affect accuracy.             Review of Systems   Constitution: Negative for chills, fever and malaise/fatigue.   HENT: Negative for hearing loss.    Cardiovascular: Negative for claudication.   Respiratory: Negative for shortness of breath.    Skin: Positive for dry skin. Negative for flushing and rash.   Musculoskeletal: Negative for joint pain and myalgias.   Neurological: Negative for loss of balance, numbness, paresthesias and sensory change.   Psychiatric/Behavioral: Negative for altered mental status.           Objective:      Physical Exam   Cardiovascular:   Pulses:       Dorsalis pedis pulses are 2+ on the right side, and 2+ on the left side.        Posterior tibial pulses are 2+ on the right side, and 2+ on the left side.   No edema noted to b/L LEs   Musculoskeletal:   Adequate joint ROM noted to all lower extremity muscle groups with no pain or crepitation noted. Muscle strength is 5/5 in all groups bilaterally.     Feet:   Right Foot:   Protective Sensation: 5 sites tested. 5 sites sensed.   Left Foot:   Protective Sensation: 5 sites tested. 5 sites sensed.   Neurological:   Intact gross sensation noted to b/L LEs   Skin:   Xerosis noted to b/L feet  No open lesion noted b/L  Skin temp is warm to warm from proximal to distal b/L.  Webspaces clean, dry, and intact  Nails x10 short             Assessment:       Encounter Diagnoses   Name Primary?    Encounter for comprehensive diabetic foot examination, type 2 diabetes mellitus Yes    Dry skin          Plan:       Walter was seen today for diabetes mellitus and diabetic foot exam.    Diagnoses and all orders for this visit:    Encounter for comprehensive diabetic foot examination, type 2 diabetes mellitus    Dry skin    Other orders  -     ammonium lactate 12 % Crea; Apply small amount to feet except for in between toes twice a  day      I counseled the patient on his conditions, their implications and medical management.      Rx valery lact for dry skin  - Shoe inspection. Diabetic Foot Education. Patient reminded of the importance of good nutrition and blood sugar control to help prevent podiatric complications of diabetes. Patient instructed on proper foot hygeine. We discussed wearing proper shoe gear, daily foot inspections, never walking without protective shoe gear.   RTC in 1 year or sooner if any new pedal problems should arise.

## 2019-07-05 ENCOUNTER — TELEPHONE (OUTPATIENT)
Dept: SLEEP MEDICINE | Facility: OTHER | Age: 69
End: 2019-07-05

## 2019-07-09 ENCOUNTER — OFFICE VISIT (OUTPATIENT)
Dept: INTERNAL MEDICINE | Facility: CLINIC | Age: 69
End: 2019-07-09
Payer: MEDICARE

## 2019-07-09 VITALS
WEIGHT: 244.06 LBS | HEIGHT: 68 IN | RESPIRATION RATE: 18 BRPM | BODY MASS INDEX: 36.99 KG/M2 | SYSTOLIC BLOOD PRESSURE: 104 MMHG | DIASTOLIC BLOOD PRESSURE: 44 MMHG | HEART RATE: 60 BPM

## 2019-07-09 DIAGNOSIS — I95.9 HYPOTENSION, UNSPECIFIED HYPOTENSION TYPE: Primary | ICD-10-CM

## 2019-07-09 PROCEDURE — 3288F FALL RISK ASSESSMENT DOCD: CPT | Mod: CPTII,S$GLB,, | Performed by: INTERNAL MEDICINE

## 2019-07-09 PROCEDURE — 3288F PR FALLS RISK ASSESSMENT DOCUMENTED: ICD-10-PCS | Mod: CPTII,S$GLB,, | Performed by: INTERNAL MEDICINE

## 2019-07-09 PROCEDURE — 3074F PR MOST RECENT SYSTOLIC BLOOD PRESSURE < 130 MM HG: ICD-10-PCS | Mod: CPTII,S$GLB,, | Performed by: INTERNAL MEDICINE

## 2019-07-09 PROCEDURE — 3074F SYST BP LT 130 MM HG: CPT | Mod: CPTII,S$GLB,, | Performed by: INTERNAL MEDICINE

## 2019-07-09 PROCEDURE — 99214 PR OFFICE/OUTPT VISIT, EST, LEVL IV, 30-39 MIN: ICD-10-PCS | Mod: S$GLB,,, | Performed by: INTERNAL MEDICINE

## 2019-07-09 PROCEDURE — 1100F PTFALLS ASSESS-DOCD GE2>/YR: CPT | Mod: CPTII,S$GLB,, | Performed by: INTERNAL MEDICINE

## 2019-07-09 PROCEDURE — 3078F DIAST BP <80 MM HG: CPT | Mod: CPTII,S$GLB,, | Performed by: INTERNAL MEDICINE

## 2019-07-09 PROCEDURE — 99214 OFFICE O/P EST MOD 30 MIN: CPT | Mod: S$GLB,,, | Performed by: INTERNAL MEDICINE

## 2019-07-09 PROCEDURE — 3078F PR MOST RECENT DIASTOLIC BLOOD PRESSURE < 80 MM HG: ICD-10-PCS | Mod: CPTII,S$GLB,, | Performed by: INTERNAL MEDICINE

## 2019-07-09 PROCEDURE — 1100F PR PT FALLS ASSESS DOC 2+ FALLS/FALL W/INJURY/YR: ICD-10-PCS | Mod: CPTII,S$GLB,, | Performed by: INTERNAL MEDICINE

## 2019-07-09 PROCEDURE — 99999 PR PBB SHADOW E&M-EST. PATIENT-LVL III: CPT | Mod: PBBFAC,,, | Performed by: INTERNAL MEDICINE

## 2019-07-09 PROCEDURE — 99999 PR PBB SHADOW E&M-EST. PATIENT-LVL III: ICD-10-PCS | Mod: PBBFAC,,, | Performed by: INTERNAL MEDICINE

## 2019-07-09 RX ORDER — LOSARTAN POTASSIUM 25 MG/1
25 TABLET ORAL DAILY
Qty: 90 TABLET | Refills: 3 | Status: SHIPPED | OUTPATIENT
Start: 2019-07-09 | End: 2020-01-09 | Stop reason: SDUPTHER

## 2019-07-09 NOTE — PROGRESS NOTES
"Subjective:       Patient ID: Walter Bull is a 68 y.o. male.    Chief Complaint: Follow-up      HPI:  Patient is known to me and presents for follow up HTN. BP at home running 90/40s. 104/44 today. He is still feeling dizzy and weak. When he stands he sees spots in his vision. He sometimes can't drive because he feels weak and his vision gets blurred. He drinks "salt water' and his sx improve. When he got IVF in the hospital his sx resolved. He is drinking up to 6 bottles of water a day and 1-2 gatorade; 1 bottle of salt water (pours salt into his water. Even feels better if he licks salt off his hand!). His last Na level 135.       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       Family History   Problem Relation Age of Onset    Diabetes Mother     Heart disease Mother     Hypertension Sister     Diabetes Sister     Heart disease Sister     Heart attack Brother     Colon cancer Neg Hx     Esophageal cancer Neg Hx     Stomach cancer Neg Hx        Social History     Socioeconomic History    Marital status:      Spouse name: Not on file    Number of children: Not on file    Years of education: Not on file    Highest education level: Not on file   Occupational History    Not on file   Social Needs    Financial resource strain: Not on file    Food insecurity:     Worry: Not on file     Inability: Not on file    Transportation needs:     Medical: Not on file     Non-medical: Not on file   Tobacco Use    Smoking status: Former Smoker     Packs/day: 3.00     Types: Cigarettes     Start date: " 1963     Last attempt to quit: 1981     Years since quittin.5    Smokeless tobacco: Former User     Types: Snuff, Chew     Quit date:    Substance and Sexual Activity    Alcohol use: No    Drug use: No    Sexual activity: Yes     Comment: -with a significant other   Lifestyle    Physical activity:     Days per week: Not on file     Minutes per session: Not on file    Stress: Not on file   Relationships    Social connections:     Talks on phone: Not on file     Gets together: Not on file     Attends [employer] service: Not on file     Active member of club or organization: Not on file     Attends meetings of clubs or organizations: Not on file     Relationship status: Not on file   Other Topics Concern    Not on file   Social History Narrative    Not on file       Review of Systems   Constitutional: Negative for activity change, fatigue, fever and unexpected weight change.   HENT: Negative for congestion, ear pain, hearing loss, rhinorrhea and sore throat.    Eyes: Negative for pain, redness and visual disturbance.   Respiratory: Negative for cough, shortness of breath and wheezing.    Cardiovascular: Negative for chest pain, palpitations and leg swelling.   Gastrointestinal: Positive for nausea. Negative for abdominal pain, constipation, diarrhea and vomiting.   Genitourinary: Negative for decreased urine volume, dysuria, frequency and urgency.   Musculoskeletal: Negative for back pain, joint swelling and neck pain.   Skin: Negative for color change, rash and wound.   Neurological: Positive for dizziness, weakness and light-headedness. Negative for tremors and headaches.         Objective:      Physical Exam   Constitutional: He is oriented to person, place, and time. He appears well-developed and well-nourished. No distress.   HENT:   Head: Normocephalic and atraumatic.   Right Ear: External ear normal.   Left Ear: External ear normal.   Eyes: Pupils are equal, round, and reactive  to light. Conjunctivae and EOM are normal. Right eye exhibits no discharge. Left eye exhibits no discharge.   Neck: Neck supple. No tracheal deviation present.   Cardiovascular: Normal rate and regular rhythm.   No murmur heard.  Pulmonary/Chest: Effort normal and breath sounds normal. No respiratory distress. He has no wheezes.   Abdominal: Soft. Bowel sounds are normal. He exhibits no distension. There is no tenderness.   Neurological: He is alert and oriented to person, place, and time. No cranial nerve deficit.   Skin: Skin is warm and dry.   Psychiatric: He has a normal mood and affect. His behavior is normal.   Vitals reviewed.      Assessment:       1. Hypotension, unspecified hypotension type        Plan:       Walter was seen today for follow-up.    Diagnoses and all orders for this visit:    Hypotension, unspecified hypotension type  -     losartan (COZAAR) 25 MG tablet; Take 1 tablet (25 mg total) by mouth once daily.    Other orders  -     Cancel: Foot Exam Performed      Exam normal today  He is clearly having hypotension  All of his medications have a clear and strong indication given his cardiac comorbidites but not if he can't handle the doses due to side effects.  For now, decrease losartan to 25mg from 50mg. Cont all other the same. We will take this step by step.   Stay hydrated but I don't advocate salt water due to CHF history, can result in volume overload. 1, maybe 2 gatorade a day may be helpful and we can better regular his Na intake from gatorade than pouring salt into his water

## 2019-07-15 ENCOUNTER — OFFICE VISIT (OUTPATIENT)
Dept: UROLOGY | Facility: CLINIC | Age: 69
End: 2019-07-15
Payer: MEDICARE

## 2019-07-15 ENCOUNTER — PATIENT MESSAGE (OUTPATIENT)
Dept: INTERNAL MEDICINE | Facility: CLINIC | Age: 69
End: 2019-07-15

## 2019-07-15 VITALS
BODY MASS INDEX: 35.17 KG/M2 | HEART RATE: 59 BPM | WEIGHT: 237.44 LBS | DIASTOLIC BLOOD PRESSURE: 53 MMHG | SYSTOLIC BLOOD PRESSURE: 109 MMHG | HEIGHT: 69 IN

## 2019-07-15 DIAGNOSIS — N41.1 CHRONIC PROSTATITIS: Primary | ICD-10-CM

## 2019-07-15 PROCEDURE — 99999 PR PBB SHADOW E&M-EST. PATIENT-LVL III: ICD-10-PCS | Mod: PBBFAC,,, | Performed by: UROLOGY

## 2019-07-15 PROCEDURE — 3074F PR MOST RECENT SYSTOLIC BLOOD PRESSURE < 130 MM HG: ICD-10-PCS | Mod: CPTII,S$GLB,, | Performed by: UROLOGY

## 2019-07-15 PROCEDURE — 1101F PT FALLS ASSESS-DOCD LE1/YR: CPT | Mod: CPTII,S$GLB,, | Performed by: UROLOGY

## 2019-07-15 PROCEDURE — 1101F PR PT FALLS ASSESS DOC 0-1 FALLS W/OUT INJ PAST YR: ICD-10-PCS | Mod: CPTII,S$GLB,, | Performed by: UROLOGY

## 2019-07-15 PROCEDURE — 3078F DIAST BP <80 MM HG: CPT | Mod: CPTII,S$GLB,, | Performed by: UROLOGY

## 2019-07-15 PROCEDURE — 99214 OFFICE O/P EST MOD 30 MIN: CPT | Mod: S$GLB,,, | Performed by: UROLOGY

## 2019-07-15 PROCEDURE — 3074F SYST BP LT 130 MM HG: CPT | Mod: CPTII,S$GLB,, | Performed by: UROLOGY

## 2019-07-15 PROCEDURE — 99214 PR OFFICE/OUTPT VISIT, EST, LEVL IV, 30-39 MIN: ICD-10-PCS | Mod: S$GLB,,, | Performed by: UROLOGY

## 2019-07-15 PROCEDURE — 99999 PR PBB SHADOW E&M-EST. PATIENT-LVL III: CPT | Mod: PBBFAC,,, | Performed by: UROLOGY

## 2019-07-15 PROCEDURE — 3078F PR MOST RECENT DIASTOLIC BLOOD PRESSURE < 80 MM HG: ICD-10-PCS | Mod: CPTII,S$GLB,, | Performed by: UROLOGY

## 2019-07-15 RX ORDER — AMOXICILLIN AND CLAVULANATE POTASSIUM 875; 125 MG/1; MG/1
1 TABLET, FILM COATED ORAL 2 TIMES DAILY
Qty: 180 TABLET | Refills: 0 | Status: ON HOLD | OUTPATIENT
Start: 2019-07-15 | End: 2019-10-07 | Stop reason: HOSPADM

## 2019-07-15 NOTE — LETTER
July 15, 2019      Belkys Kruger MD  [address] [address]           [facility] - Urology 4th Floor  [address]  [facility] [address]  Phone: [phone]          Patient: Walter Bull   MR Number: [id number]   YOB: 1950   Date of Visit: 7/15/2019       Dear Dr. Belkys Kruger:    Thank you for referring Walter Bull to me for evaluation. Attached you will find relevant portions of my assessment and plan of care.    If you have questions, please do not hesitate to call me. I look forward to following Walter Bull along with you.    Sincerely,    Guicho Carver Jr., MD    Enclosure  CC:  No Recipients    If you would like to receive this communication electronically, please contact [email or web address] or [phone] to request more information on [employer] Link access.    For providers and/or their staff who would like to refer a patient to [facility], please contact us through our one-stop-shop provider referral line, [facility] , at [phone].    If you feel you have received this communication in error or would no longer like to receive these types of communications, please e-mail [email or web address]

## 2019-07-15 NOTE — PROGRESS NOTES
Subjective:       Patient ID: Walter Bull is a 68 y.o. male.    Chief Complaint: prostate nodule (c/o pt state he had prostate bx with dr. scales on 05/17/2019 an dnow he having some issue with his testicle. and it hurt sometimes when he urinate.)    HPI Walter Bull is a 68 y.o. male with PMHx of T1DM, HTN, and disorder of kidney and ureter who presents to the clinic for evaluation of a possible infection. The patient was recently admitted after being seen in the ED at [place] for testicular tenderness and dysuria from 6/24-6/25. States that yesterday he had intercourse and experienced anorgasmia and after applying pressure to his penis he noticed hematospermia. Denies fever, chills.     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       Past Surgical History:   Procedure Laterality Date    Angiogram, Aortic Arch, Coronary  11/16/2018    Performed by [facility] at [facility] CATH LAB    Bypass graft study  11/16/2018    Performed by [facility] at [facility] CATH LAB    CARDIAC DEFIBRILLATOR PLACEMENT      CARDIAC ELECTROPHYSIOLOGY STUDY N/A 11/19/2018    Performed by Byron Glasgow [facility] at [facility] EP LAB    CARDIAC ELECTROPHYSIOLOGY STUDY N/A 11/19/2018    Performed by [facility] at [facility] EP LAB    COLON SURGERY  2006    COLONOSCOPY N/A 2/2/2017    [facility]    CORONARY ARTERY BYPASS GRAFT  2005    5 arteries    ESOPHAGOGASTRODUODENOSCOPY (EGD) N/A 3/21/2018    Performed by Fawad Cunningham MD at [facility] ENDO (4TH FLR)    EYE SURGERY Bilateral 2016    Laser  surgery for glaucoma    INSERTION, DUAL ICD Left 2018    [facility] at [facility] EP LAB    Left heart cath Left 2018    Performed by [facility] at [facility] CATH LAB       Family History   Problem Relation Age of Onset    Diabetes Mother     Heart disease Mother     Hypertension Sister     Diabetes Sister     Heart disease Sister     Heart attack Brother     Colon cancer Neg Hx     Esophageal cancer Neg Hx     Stomach cancer Neg Hx        Social History     Socioeconomic History    Marital status:      Spouse name: Not on file    Number of children: Not on file    Years of education: Not on file    Highest education level: Not on file   Occupational History    Not on file   Social Needs    Financial resource strain: Not on file    Food insecurity:     Worry: Not on file     Inability: Not on file    Transportation needs:     Medical: Not on file     Non-medical: Not on file   Tobacco Use    Smoking status: Former Smoker     Packs/day: 3.00     Types: Cigarettes     Start date: 1963     Last attempt to quit: 1981     Years since quittin.5    Smokeless tobacco: Former User     Types: Snuff, Chew     Quit date:    Substance and Sexual Activity    Alcohol use: No    Drug use: No    Sexual activity: Yes     Comment: -with a significant other   Lifestyle    Physical activity:     Days per week: Not on file     Minutes per session: Not on file    Stress: Not on file   Relationships    Social connections:     Talks on phone: Not on file     Gets together: Not on file     Attends Jehovah's witness service: Not on file     Active member of club or organization: Not on file     Attends meetings of clubs or organizations: Not on file     Relationship status: Not on file   Other Topics Concern    Not on file   Social History Narrative    Not on file       Allergies:  Patient has no known allergies.    Medications:    Current Outpatient  Medications:     albuterol (PROVENTIL/VENTOLIN HFA) 90 mcg/actuation inhaler, Inhale 2 puffs into the lungs every 6 (six) hours as needed for Wheezing., Disp: 1 Inhaler, Rfl: 5    amiodarone (PACERONE) 200 MG Tab, Take 1 tablet (200 mg total) by mouth once daily., Disp: 90 tablet, Rfl: 3    ammonium lactate 12 % Crea, Apply small amount to feet except for in between toes twice a day, Disp: 1 Bottle, Rfl: 6    aspirin (ECOTRIN) 81 MG EC tablet, Take 1 tablet (81 mg total) by mouth once daily., Disp: 90 tablet, Rfl: 3    carvedilol (COREG) 3.125 MG tablet, Take 1 tablet (3.125 mg total) by mouth 2 (two) times daily with meals., Disp: 60 tablet, Rfl: 11    esomeprazole (NEXIUM) 40 MG capsule, Take 1 capsule (40 mg total) by mouth before breakfast., Disp: 30 capsule, Rfl: 11    ezetimibe (ZETIA) 10 mg tablet, Take 1 tablet (10 mg total) by mouth once daily., Disp: 90 tablet, Rfl: 3    ferrous sulfate (FEOSOL) 325 mg (65 mg iron) Tab tablet, TAKE 1 TABLET BY MOUTH THREE TIMES DAILY, Disp: 90 tablet, Rfl: 3    finasteride (PROSCAR) 5 mg tablet, HOLD DUE TO ORTHOSTATIC HYPOTENSION, Disp: 90 tablet, Rfl: 3    furosemide (LASIX) 20 MG tablet, Take 1 tablet (20 mg total) by mouth once daily. Take an extra tablet daily for wt gain more than 3 pounds/day or 5 pounds/week, Disp: 140 tablet, Rfl: 3    insulin NPH (NOVOLIN N) 100 unit/mL injection, Inject 50 Units into the skin before breakfast. (Patient taking differently: Inject 25 Units into the skin 2 (two) times daily before meals. ), Disp: 3 vial, Rfl: 5    isosorbide mononitrate (IMDUR) 60 MG 24 hr tablet, Take 1 tablet (60 mg total) by mouth once daily., Disp: 90 tablet, Rfl: 3    losartan (COZAAR) 25 MG tablet, Take 1 tablet (25 mg total) by mouth once daily., Disp: 90 tablet, Rfl: 3    nitroGLYCERIN (NITROSTAT) 0.4 MG SL tablet, DISSOLVE ONE TABLET UNDER THE TONGUE EVERY 5 MINUTES AS NEEDED FOR CHEST PAIN., Disp: 100 tablet, Rfl: 0    RANEXA 1,000 mg Tb12,  Take 1 tablet (1,000 mg total) by mouth 2 (two) times daily., Disp: 60 tablet, Rfl: 11    rosuvastatin (CRESTOR) 40 MG Tab, Take 1 tablet (40 mg total) by mouth once daily., Disp: 90 tablet, Rfl: 3    SITagliptin (JANUVIA) 100 MG Tab, Take 1 tablet (100 mg total) by mouth once daily., Disp: 90 tablet, Rfl: 3    spironolactone (ALDACTONE) 25 MG tablet, Take 1 tablet (25 mg total) by mouth once daily., Disp: 90 tablet, Rfl: 3    tamsulosin (FLOMAX) 0.4 mg Cap, Take 1 capsule (0.4 mg total) by mouth once daily., Disp: 30 capsule, Rfl: 0    ticagrelor (BRILINTA) 90 mg tablet, Take 90 mg by mouth 2 (two) times daily., Disp: , Rfl:     amoxicillin-clavulanate 875-125mg (AUGMENTIN) 875-125 mg per tablet, Take 1 tablet by mouth 2 (two) times daily., Disp: 180 tablet, Rfl: 0    Review of Systems   Constitutional: Negative for activity change, appetite change, chills, diaphoresis, fatigue, fever and unexpected weight change.   HENT: Negative for congestion, dental problem, hearing loss, mouth sores, postnasal drip, rhinorrhea, sinus pressure and trouble swallowing.    Eyes: Negative for pain, discharge and itching.   Respiratory: Negative for apnea, cough, choking, chest tightness, shortness of breath and wheezing.    Cardiovascular: Negative for chest pain, palpitations and leg swelling.   Gastrointestinal: Negative for abdominal distention, abdominal pain, anal bleeding, blood in stool, constipation, diarrhea, nausea, rectal pain and vomiting.   Endocrine: Negative for polydipsia and polyuria.   Genitourinary: Negative for decreased urine volume, difficulty urinating, discharge, dysuria, enuresis, flank pain, frequency, genital sores, hematuria, penile pain, penile swelling, scrotal swelling and urgency.        Hematospermia, anorgasmia.    Musculoskeletal: Negative for arthralgias and back pain.   Skin: Negative for color change, rash and wound.   Neurological: Negative for dizziness, syncope, speech difficulty,  light-headedness and headaches.   Hematological: Negative for adenopathy. Does not bruise/bleed easily.   Psychiatric/Behavioral: Negative for behavioral problems and confusion.       Objective:       Imaging US Scrotum and Testicle 6/21/19  Impression       1. No evidence of torsion or inflammation.  2. Small to moderate bilateral hydroceles.  Internal specular reflectors within the left hydrocele may relate to blood products or proteinaceous debris.     CT RSS 6/21/19  Impression       1. Infiltration of the fat surrounding the kidneys, left greater than right, may occur with pyelonephritis.  No obstructing kidney stones are evident.  No hydronephrosis.  2. No focal inflammation about the GI tract.           Physical Exam   Constitutional: He appears well-developed.   HENT:   Head: Normocephalic.   Neck: Neck supple.   Cardiovascular: Normal rate.    Pulmonary/Chest: Effort normal.   Abdominal: Soft.   Genitourinary:   Genitourinary Comments: Prostate was smooth without nodularity. No rectal masses. Normal perineum.    Testicles feel normal, Epididymus normal.    Neurological: He is alert.   Skin: Skin is warm.     Psychiatric: He has a normal mood and affect.       Assessment:       1. Chronic prostatitis        Plan:         Prescribe antibiotics for 2 months  RTC in 2 months for evaluation      Walter was seen today for prostate nodule.    Diagnoses and all orders for this visit:    Chronic prostatitis    Other orders  -     amoxicillin-clavulanate 875-125mg (AUGMENTIN) 875-125 mg per tablet; Take 1 tablet by mouth 2 (two) times daily.            IAriana, am acting as a scribe on this patient encounter in the presence and under the supervision of Dr. Carver.    07/15/2019 9:18 AM    IDr. Carver, personally performed the services described in this documentation.   All medical record entries made by the scribe were at my direction and in my presence.   I have reviewed the chart and agree that the record  is accurate and complete.   Guicho Carver MD.  9:18 AM 07/15/2019

## 2019-07-20 ENCOUNTER — HOSPITAL ENCOUNTER (OUTPATIENT)
Dept: SLEEP MEDICINE | Facility: OTHER | Age: 69
Discharge: HOME | End: 2019-07-20
Attending: NURSE PRACTITIONER
Payer: MEDICARE

## 2019-07-20 DIAGNOSIS — G47.52 RBD (REM BEHAVIORAL DISORDER): ICD-10-CM

## 2019-07-20 DIAGNOSIS — G47.33 OSA (OBSTRUCTIVE SLEEP APNEA): ICD-10-CM

## 2019-07-20 PROCEDURE — 95811 PR POLYSOMNOGRAPHY W/CPAP: ICD-10-PCS | Mod: 26,,, | Performed by: INTERNAL MEDICINE

## 2019-07-20 PROCEDURE — 95811 POLYSOM 6/>YRS CPAP 4/> PARM: CPT

## 2019-07-20 PROCEDURE — 95811 POLYSOM 6/>YRS CPAP 4/> PARM: CPT | Mod: 26,,, | Performed by: INTERNAL MEDICINE

## 2019-07-21 NOTE — PROGRESS NOTES
"  Walter Bull to [facility] for an overnight sleep study on 07/20/2019.  Pt education, setup and cpap explanation given prior to study.      Full mask as requested per patient.  Titration with heated humidity, cflex and medium Simplus full mask.  Pressures observed from 6 to 14 cmH2O.  Start at 6 cm for pt comfort and compliance.  Supine REM observed. ? optimal settimg 11cm.    Some leg activity noted.      EKG- Lead 2 appears with atrial pacemaker.   Low saturation observed 96%.      In AM- Pt reports: "I slept well"  "I can wear that at home".       Post study information given in AM.  "

## 2019-07-22 ENCOUNTER — PATIENT MESSAGE (OUTPATIENT)
Dept: INTERNAL MEDICINE | Facility: CLINIC | Age: 69
End: 2019-07-22

## 2019-07-22 RX ORDER — METOPROLOL SUCCINATE 25 MG/1
25 TABLET, EXTENDED RELEASE ORAL DAILY
Qty: 90 TABLET | Refills: 1 | Status: ON HOLD | OUTPATIENT
Start: 2019-07-22 | End: 2019-10-07 | Stop reason: HOSPADM

## 2019-07-29 ENCOUNTER — PATIENT MESSAGE (OUTPATIENT)
Dept: SLEEP MEDICINE | Facility: CLINIC | Age: 69
End: 2019-07-29

## 2019-07-29 DIAGNOSIS — G47.33 OSA (OBSTRUCTIVE SLEEP APNEA): Primary | ICD-10-CM

## 2019-07-29 NOTE — TELEPHONE ENCOUNTER
07/20/2019 CPAP titration study 237 lb. Effective control of sleep disordered breathing was seen in supine REM sleep on 11 cm H2O.Higher pressures resulted in further improvement       Excessive motor tone and activity during REM stage sleep. This could be supportive of a diagnosis of REM sleep behavior disorder

## 2019-07-31 NOTE — PROGRESS NOTES
"Subjective:      Patient ID: Walter Bull is a 68 y.o. male.    Chief Complaint:  Diabetes    History of Present Illness: 68 y.o. male with a diagnosis of type 2 DM diagnosed 25-30 years ago been on "shot" since that time. Also with CAD, HTN and CKD.     Diabetes Mellitus Type II, Follow-up  He was recently admitted to observation, 6/24/19 with testicular pain, UTI, saw urology and is still on Abx for chronic prostatitis: augmentin for 2 mo per patient.   Endocrinology saw patient in the hospital but unable to give regiment not optimized, patient was discharged by hospital medicine before final endocrine recommendation    Current regiment NPH 30U in morning and 30U at night, Januvia 100mg daily    Prior diabetes medication: was on Janumet but unable to take Metformin due to GI issue. 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 2x this week, usually when getting up too quickly. Glucose is stable at that time. But BP is low, his PCP is evaluating this. BP med are on hold  Had 1 hypoglycemia episode 7/31 with 52 resolved with some juice.      Known diabetic complications: nephropathy and cardiovascular disease  Cardiovascular risk factors: advanced age (older than 55 for men, 65 for women), diabetes mellitus, dyslipidemia, male gender, obesity (BMI >= 30 kg/m2) and sedentary lifestyle     Eye exam current (within one year): yes 6mo ago  Weight trend: increasing steadily  Prior visit with dietician: no  Current diet: well balanced   Working, eat breakfast, skip lunch, eat dinner  Drink water: water, gatorade  Current exercise: none    Current monitoring regimen: home blood tests - 2-3 times weekly  Home blood sugar records:  AM is before meals but PM is sometime before or after meal        Any " episodes of hypoglycemia? yes - 7/31 with  52, recently, resolved with some juice.     Is He on ACE inhibitor or angiotensin II receptor blocker?   Yes   losartan (Cozaar)    Review of Systems   Constitutional: Negative for activity change, fatigue and unexpected weight change.   HENT: Negative for sore throat and voice change.    Eyes: Negative for visual disturbance.   Respiratory: Negative for shortness of breath.    Cardiovascular: Negative for chest pain.   Gastrointestinal: Negative for abdominal pain.   Genitourinary: Negative for difficulty urinating, flank pain and urgency.   Musculoskeletal: Negative for arthralgias.   Skin: Negative for wound.   Neurological: Positive for light-headedness. Negative for headaches.   Psychiatric/Behavioral: Negative for confusion and sleep disturbance.       Objective:   Physical Exam   Constitutional: He is oriented to person, place, and time. He appears well-developed and well-nourished. No distress.   Eyes: Conjunctivae are normal. No scleral icterus.   Neck: Normal range of motion. No tracheal deviation present. No thyromegaly present.   Cardiovascular: Normal rate and normal heart sounds.   Pulmonary/Chest: Effort normal and breath sounds normal.   Abdominal: Soft. There is no tenderness. No hernia.   Musculoskeletal: Normal range of motion. He exhibits no edema or tenderness.   Neurological: He is alert and oriented to person, place, and time.   Skin: Skin is warm. No erythema.   Psychiatric: He has a normal mood and affect. His behavior is normal.   Nursing note and vitals reviewed.      Lab Review:       Ref. Range 11/16/2018 14:24 12/22/2018 13:42 5/7/2019 08:05 5/18/2019 04:35 6/24/2019 07:53   Hemoglobin A1C External Latest Ref Range: 4.0 - 5.6 % 6.9 (H)  7.5 (H) 7.6 (H) 7.7 (H)   Estimated Avg Glucose Latest Ref Range: 68 - 131 mg/dL 151 (H)  169 (H) 171 (H) 174 (H)   TSH Latest Ref Range: 0.400 - 4.000 uIU/mL  2.842 1.880         Assessment:     1. Diabetes  mellitus type 2 in obese  Hemoglobin A1c    Microalbumin/creatinine urine ratio    RENAL FUNCTION PANEL    sub-q insulin device, 30 unit (V-GO 30) [place]    insulin aspart U-100 (NOVOLOG U-100 INSULIN ASPART) 100 unit/mL injection    insulin  unit/mL injection    Ambulatory consult to Diabetic Education   2. Benign essential HTN     3. Diabetic polyneuropathy associated with type 2 diabetes mellitus  insulin  unit/mL injection   4. Chronic prostatitis     5. Coronary artery disease involving native coronary artery of native heart without angina pectoris     6. Severe obesity (BMI 35.0-39.9) with comorbidity           Plan:     Diabetes mellitus type 2 in obese  Diabetic polyneuropathy associated with type 2 diabetes mellitus  - A1C: 7.7, not at goal  - patient is currently on NPH, glucose logs reviewed, glucose is lower in AM, will decrease night dose of NPH  - dose adjusted to maintain NPH 30U in morning and lower to 24U at night, continue Januvia, monitor renal function  - Patient is candidate for VGO, discussed with patient he is willing to try, will fax over information  - glucose logs given he is to check glucose 3-4x a day and mail back data in 1 week, envelop given  - A1C and Urine PC before next visit  - advised patient to see eye doctor, will do foot exam next visit  - patient is aware of signs of hypoglycemia and has active plan to treat    Benign essential HTN  - manage per PCP  - monitor for hypotension      Chronic prostatitis  - on ABx per urology  - infection can lead to hyperglycemia      Coronary artery disease involving native coronary artery of native heart without angina pectoris  - stable  - would be candidate for GLP1 or SGLT2 in the future, will monitor renal function and infection       Severe obesity (BMI 35.0-39.9) with comorbidity  - lead to further insulin resistance    Case discussed with Dr Arroyo  Recommendations were discussed with the patient in detail  The patient  verbalized understanding and agrees with the plan outlined as above  RTC in 3 mo        Jacobo Coon MD   Endocrinology Fellow   8/1/2019 3:00 PM

## 2019-08-01 ENCOUNTER — OFFICE VISIT (OUTPATIENT)
Dept: ENDOCRINOLOGY | Facility: CLINIC | Age: 69
End: 2019-08-01
Payer: MEDICARE

## 2019-08-01 VITALS
HEART RATE: 74 BPM | DIASTOLIC BLOOD PRESSURE: 62 MMHG | SYSTOLIC BLOOD PRESSURE: 132 MMHG | WEIGHT: 244.81 LBS | BODY MASS INDEX: 37.1 KG/M2 | HEIGHT: 68 IN

## 2019-08-01 DIAGNOSIS — E11.42 DIABETIC POLYNEUROPATHY ASSOCIATED WITH TYPE 2 DIABETES MELLITUS: ICD-10-CM

## 2019-08-01 DIAGNOSIS — E11.69 DIABETES MELLITUS TYPE 2 IN OBESE: Primary | Chronic | ICD-10-CM

## 2019-08-01 DIAGNOSIS — E66.01 SEVERE OBESITY (BMI 35.0-39.9) WITH COMORBIDITY: ICD-10-CM

## 2019-08-01 DIAGNOSIS — I10 BENIGN ESSENTIAL HTN: Chronic | ICD-10-CM

## 2019-08-01 DIAGNOSIS — N41.1 CHRONIC PROSTATITIS: ICD-10-CM

## 2019-08-01 DIAGNOSIS — I25.10 CORONARY ARTERY DISEASE INVOLVING NATIVE CORONARY ARTERY OF NATIVE HEART WITHOUT ANGINA PECTORIS: Chronic | ICD-10-CM

## 2019-08-01 DIAGNOSIS — E66.9 DIABETES MELLITUS TYPE 2 IN OBESE: Primary | Chronic | ICD-10-CM

## 2019-08-01 PROBLEM — N17.9 AKI (ACUTE KIDNEY INJURY): Status: RESOLVED | Noted: 2019-05-18 | Resolved: 2019-08-01

## 2019-08-01 PROCEDURE — 99499 UNLISTED E&M SERVICE: CPT | Mod: S$GLB,,, | Performed by: HOSPITALIST

## 2019-08-01 PROCEDURE — 99214 OFFICE O/P EST MOD 30 MIN: CPT | Mod: GC,S$GLB,, | Performed by: INTERNAL MEDICINE

## 2019-08-01 PROCEDURE — 99214 PR OFFICE/OUTPT VISIT, EST, LEVL IV, 30-39 MIN: ICD-10-PCS | Mod: GC,S$GLB,, | Performed by: INTERNAL MEDICINE

## 2019-08-01 PROCEDURE — 99499 RISK ADDL DX/OHS AUDIT: ICD-10-PCS | Mod: S$GLB,,, | Performed by: HOSPITALIST

## 2019-08-01 PROCEDURE — 99999 PR PBB SHADOW E&M-EST. PATIENT-LVL V: CPT | Mod: PBBFAC,GC,, | Performed by: HOSPITALIST

## 2019-08-01 PROCEDURE — 99999 PR PBB SHADOW E&M-EST. PATIENT-LVL V: ICD-10-PCS | Mod: PBBFAC,GC,, | Performed by: HOSPITALIST

## 2019-08-01 RX ORDER — INSULIN ASPART 100 [IU]/ML
INJECTION, SOLUTION INTRAVENOUS; SUBCUTANEOUS
Qty: 3 VIAL | Refills: 11 | Status: SHIPPED | OUTPATIENT
Start: 2019-08-01 | End: 2019-12-06

## 2019-08-07 ENCOUNTER — TELEPHONE (OUTPATIENT)
Dept: INTERNAL MEDICINE | Facility: CLINIC | Age: 69
End: 2019-08-07

## 2019-08-07 ENCOUNTER — CLINICAL SUPPORT (OUTPATIENT)
Dept: INTERNAL MEDICINE | Facility: CLINIC | Age: 69
End: 2019-08-07
Payer: MEDICARE

## 2019-08-07 DIAGNOSIS — E66.9 DIABETES MELLITUS TYPE 2 IN OBESE: ICD-10-CM

## 2019-08-07 DIAGNOSIS — E11.42 DIABETIC POLYNEUROPATHY ASSOCIATED WITH TYPE 2 DIABETES MELLITUS: ICD-10-CM

## 2019-08-07 DIAGNOSIS — E78.2 MIXED HYPERLIPIDEMIA: ICD-10-CM

## 2019-08-07 DIAGNOSIS — I10 BENIGN ESSENTIAL HTN: ICD-10-CM

## 2019-08-07 DIAGNOSIS — E11.69 DIABETES MELLITUS TYPE 2 IN OBESE: ICD-10-CM

## 2019-08-07 DIAGNOSIS — E66.01 SEVERE OBESITY (BMI 35.0-39.9) WITH COMORBIDITY: ICD-10-CM

## 2019-08-07 DIAGNOSIS — R00.1 BRADYCARDIA: ICD-10-CM

## 2019-08-07 DIAGNOSIS — I25.5 ISCHEMIC CARDIOMYOPATHY: ICD-10-CM

## 2019-08-07 DIAGNOSIS — I25.10 CORONARY ARTERY DISEASE INVOLVING NATIVE CORONARY ARTERY OF NATIVE HEART WITHOUT ANGINA PECTORIS: ICD-10-CM

## 2019-08-07 LAB
ALBUMIN SERPL BCP-MCNC: 3.3 G/DL (ref 3.5–5.2)
ALBUMIN/CREAT UR: 18 UG/MG (ref 0–30)
ALP SERPL-CCNC: 51 U/L (ref 55–135)
ALT SERPL W/O P-5'-P-CCNC: 45 U/L (ref 10–44)
ANION GAP SERPL CALC-SCNC: 7 MMOL/L (ref 8–16)
AST SERPL-CCNC: 37 U/L (ref 10–40)
BASOPHILS # BLD AUTO: 0.04 K/UL (ref 0–0.2)
BASOPHILS NFR BLD: 0.7 % (ref 0–1.9)
BILIRUB SERPL-MCNC: 0.5 MG/DL (ref 0.1–1)
BUN SERPL-MCNC: 15 MG/DL (ref 8–23)
CALCIUM SERPL-MCNC: 9.6 MG/DL (ref 8.7–10.5)
CHLORIDE SERPL-SCNC: 105 MMOL/L (ref 95–110)
CHOLEST SERPL-MCNC: 129 MG/DL (ref 120–199)
CHOLEST/HDLC SERPL: 2.2 {RATIO} (ref 2–5)
CO2 SERPL-SCNC: 28 MMOL/L (ref 23–29)
CREAT SERPL-MCNC: 1.4 MG/DL (ref 0.5–1.4)
CREAT UR-MCNC: 100 MG/DL (ref 23–375)
DIFFERENTIAL METHOD: ABNORMAL
EOSINOPHIL # BLD AUTO: 0.2 K/UL (ref 0–0.5)
EOSINOPHIL NFR BLD: 3.5 % (ref 0–8)
ERYTHROCYTE [DISTWIDTH] IN BLOOD BY AUTOMATED COUNT: 15.3 % (ref 11.5–14.5)
EST. GFR  (AFRICAN AMERICAN): 58.4 ML/MIN/1.73 M^2
EST. GFR  (NON AFRICAN AMERICAN): 50.5 ML/MIN/1.73 M^2
ESTIMATED AVG GLUCOSE: 169 MG/DL (ref 68–131)
GLUCOSE SERPL-MCNC: 201 MG/DL (ref 70–110)
HBA1C MFR BLD HPLC: 7.5 % (ref 4–5.6)
HCT VFR BLD AUTO: 35.1 % (ref 40–54)
HDLC SERPL-MCNC: 59 MG/DL (ref 40–75)
HDLC SERPL: 45.7 % (ref 20–50)
HGB BLD-MCNC: 10.9 G/DL (ref 14–18)
IMM GRANULOCYTES # BLD AUTO: 0.01 K/UL (ref 0–0.04)
IMM GRANULOCYTES NFR BLD AUTO: 0.2 % (ref 0–0.5)
LDLC SERPL CALC-MCNC: 60 MG/DL (ref 63–159)
LYMPHOCYTES # BLD AUTO: 1.7 K/UL (ref 1–4.8)
LYMPHOCYTES NFR BLD: 28.4 % (ref 18–48)
MCH RBC QN AUTO: 29.9 PG (ref 27–31)
MCHC RBC AUTO-ENTMCNC: 31.1 G/DL (ref 32–36)
MCV RBC AUTO: 96 FL (ref 82–98)
MICROALBUMIN UR DL<=1MG/L-MCNC: 18 UG/ML
MONOCYTES # BLD AUTO: 0.7 K/UL (ref 0.3–1)
MONOCYTES NFR BLD: 11.1 % (ref 4–15)
NEUTROPHILS # BLD AUTO: 3.4 K/UL (ref 1.8–7.7)
NEUTROPHILS NFR BLD: 56.1 % (ref 38–73)
NONHDLC SERPL-MCNC: 70 MG/DL
NRBC BLD-RTO: 0 /100 WBC
PLATELET # BLD AUTO: 211 K/UL (ref 150–350)
PMV BLD AUTO: 10.7 FL (ref 9.2–12.9)
POTASSIUM SERPL-SCNC: 4.6 MMOL/L (ref 3.5–5.1)
PROT SERPL-MCNC: 6.6 G/DL (ref 6–8.4)
RBC # BLD AUTO: 3.65 M/UL (ref 4.6–6.2)
SODIUM SERPL-SCNC: 140 MMOL/L (ref 136–145)
TRIGL SERPL-MCNC: 50 MG/DL (ref 30–150)
WBC # BLD AUTO: 6.05 K/UL (ref 3.9–12.7)

## 2019-08-07 PROCEDURE — 83036 HEMOGLOBIN GLYCOSYLATED A1C: CPT

## 2019-08-07 PROCEDURE — 80053 COMPREHEN METABOLIC PANEL: CPT

## 2019-08-07 PROCEDURE — 82043 UR ALBUMIN QUANTITATIVE: CPT

## 2019-08-07 PROCEDURE — 36415 PR COLLECTION VENOUS BLOOD,VENIPUNCTURE: ICD-10-PCS | Mod: S$GLB,,, | Performed by: NURSE PRACTITIONER

## 2019-08-07 PROCEDURE — 36415 COLL VENOUS BLD VENIPUNCTURE: CPT | Mod: S$GLB,,, | Performed by: NURSE PRACTITIONER

## 2019-08-07 PROCEDURE — 36415 COLL VENOUS BLD VENIPUNCTURE: CPT

## 2019-08-07 PROCEDURE — 85025 COMPLETE CBC W/AUTO DIFF WBC: CPT

## 2019-08-07 PROCEDURE — 80061 LIPID PANEL: CPT

## 2019-08-07 NOTE — TELEPHONE ENCOUNTER
Pharmacy questioning whether pt taking januvia or janumet. Informed that pt is on januvia. janumet was d/c'd d/t side effects.

## 2019-08-07 NOTE — TELEPHONE ENCOUNTER
----- Message from Rebeca Palacios MA sent at 2019  2:44 PM CDT -----  Contact: [facility] Rossana Bull  MRN: [id number]  : 1950  PCP: Belkys Kruger  Home Phone      [phone]  Work Phone      Not on file.  Mobile          [phone]      MESSAGE:   Needs to speak to nurse regarding poss drug reaction with medications.    Phone:  [phone] (ref#[id number])

## 2019-08-14 ENCOUNTER — OFFICE VISIT (OUTPATIENT)
Dept: INTERNAL MEDICINE | Facility: CLINIC | Age: 69
End: 2019-08-14
Payer: MEDICARE

## 2019-08-14 VITALS
OXYGEN SATURATION: 98 % | SYSTOLIC BLOOD PRESSURE: 124 MMHG | HEART RATE: 64 BPM | RESPIRATION RATE: 16 BRPM | DIASTOLIC BLOOD PRESSURE: 84 MMHG | WEIGHT: 240.31 LBS | BODY MASS INDEX: 37.72 KG/M2 | HEIGHT: 67 IN

## 2019-08-14 DIAGNOSIS — E78.5 HYPERLIPIDEMIA, UNSPECIFIED HYPERLIPIDEMIA TYPE: Chronic | ICD-10-CM

## 2019-08-14 DIAGNOSIS — I25.5 ISCHEMIC CARDIOMYOPATHY: ICD-10-CM

## 2019-08-14 DIAGNOSIS — Z95.810 ICD (IMPLANTABLE CARDIOVERTER-DEFIBRILLATOR), DUAL, IN SITU: Chronic | ICD-10-CM

## 2019-08-14 DIAGNOSIS — I25.10 CORONARY ARTERY DISEASE INVOLVING NATIVE CORONARY ARTERY OF NATIVE HEART WITHOUT ANGINA PECTORIS: Primary | Chronic | ICD-10-CM

## 2019-08-14 DIAGNOSIS — I50.42 CHRONIC COMBINED SYSTOLIC AND DIASTOLIC HEART FAILURE: Chronic | ICD-10-CM

## 2019-08-14 DIAGNOSIS — E11.69 DIABETES MELLITUS TYPE 2 IN OBESE: Chronic | ICD-10-CM

## 2019-08-14 DIAGNOSIS — E66.9 DIABETES MELLITUS TYPE 2 IN OBESE: Chronic | ICD-10-CM

## 2019-08-14 DIAGNOSIS — I10 BENIGN ESSENTIAL HTN: Chronic | ICD-10-CM

## 2019-08-14 DIAGNOSIS — K21.9 GASTROESOPHAGEAL REFLUX DISEASE, ESOPHAGITIS PRESENCE NOT SPECIFIED: Chronic | ICD-10-CM

## 2019-08-14 DIAGNOSIS — E11.42 DIABETIC POLYNEUROPATHY ASSOCIATED WITH TYPE 2 DIABETES MELLITUS: ICD-10-CM

## 2019-08-14 PROCEDURE — 3079F PR MOST RECENT DIASTOLIC BLOOD PRESSURE 80-89 MM HG: ICD-10-PCS | Mod: CPTII,S$GLB,, | Performed by: INTERNAL MEDICINE

## 2019-08-14 PROCEDURE — 3045F PR MOST RECENT HEMOGLOBIN A1C LEVEL 7.0-9.0%: ICD-10-PCS | Mod: CPTII,S$GLB,, | Performed by: INTERNAL MEDICINE

## 2019-08-14 PROCEDURE — 1101F PR PT FALLS ASSESS DOC 0-1 FALLS W/OUT INJ PAST YR: ICD-10-PCS | Mod: CPTII,S$GLB,, | Performed by: INTERNAL MEDICINE

## 2019-08-14 PROCEDURE — 99999 PR PBB SHADOW E&M-EST. PATIENT-LVL III: ICD-10-PCS | Mod: PBBFAC,,, | Performed by: INTERNAL MEDICINE

## 2019-08-14 PROCEDURE — 3074F SYST BP LT 130 MM HG: CPT | Mod: CPTII,S$GLB,, | Performed by: INTERNAL MEDICINE

## 2019-08-14 PROCEDURE — 99999 PR PBB SHADOW E&M-EST. PATIENT-LVL III: CPT | Mod: PBBFAC,,, | Performed by: INTERNAL MEDICINE

## 2019-08-14 PROCEDURE — 3045F PR MOST RECENT HEMOGLOBIN A1C LEVEL 7.0-9.0%: CPT | Mod: CPTII,S$GLB,, | Performed by: INTERNAL MEDICINE

## 2019-08-14 PROCEDURE — 99214 PR OFFICE/OUTPT VISIT, EST, LEVL IV, 30-39 MIN: ICD-10-PCS | Mod: S$GLB,,, | Performed by: INTERNAL MEDICINE

## 2019-08-14 PROCEDURE — 99499 RISK ADDL DX/OHS AUDIT: ICD-10-PCS | Mod: S$GLB,,, | Performed by: INTERNAL MEDICINE

## 2019-08-14 PROCEDURE — 3074F PR MOST RECENT SYSTOLIC BLOOD PRESSURE < 130 MM HG: ICD-10-PCS | Mod: CPTII,S$GLB,, | Performed by: INTERNAL MEDICINE

## 2019-08-14 PROCEDURE — 3079F DIAST BP 80-89 MM HG: CPT | Mod: CPTII,S$GLB,, | Performed by: INTERNAL MEDICINE

## 2019-08-14 PROCEDURE — 99499 UNLISTED E&M SERVICE: CPT | Mod: S$GLB,,, | Performed by: INTERNAL MEDICINE

## 2019-08-14 PROCEDURE — 99214 OFFICE O/P EST MOD 30 MIN: CPT | Mod: S$GLB,,, | Performed by: INTERNAL MEDICINE

## 2019-08-14 PROCEDURE — 1101F PT FALLS ASSESS-DOCD LE1/YR: CPT | Mod: CPTII,S$GLB,, | Performed by: INTERNAL MEDICINE

## 2019-08-14 RX ORDER — LOSARTAN POTASSIUM 50 MG/1
TABLET ORAL
COMMUNITY
Start: 2019-07-13 | End: 2019-08-14 | Stop reason: DRUGHIGH

## 2019-08-14 NOTE — PROGRESS NOTES
Subjective:       Patient ID: Walter Bull is a 68 y.o. male.    Chief Complaint: Follow-up (x 3 months-results)      HPI:  Patient is known to me and presents for follow up CAD, DM type 2, HLD. Labs from 8/7/2019 personally reviewed and discussed with the patient today.      CAD s/p 5v CABG and NSTEMI 9/12/18: following with Dr. Vitale as well. Now on Imdur and Ranexa. On plavix, ASA, Crestor, losartan and BB. Also reports h/o CHF (last known EF 43%+ diastolic dysfunction), on lasix 20mg once a day (aldactone stopped due to hypotension). Denies SOB, [place] and orthopnea.       Dm type 2: NPH  40 units QAM and 24 units QHS. Follows with suzy who is starting VGO but patient hasn't gotten it set up yet so still just doing NPH.Remains on Janumet. Blood sugars are stable but still elevated, no more hypoglcyemia. A1C  7.5%.  He does report numbness and tingling of left foot > right foot and b/l fingers; sx have been present for several years. Not on medicine for neuropathy as he declines treatment. Denies polyuria, polydipsia  Foot exam: 1/2018  Eye exam: 2/2019  Microalb: 8/2019  On ARB and statin  LDL < 70     HLD: on crestor and zetia, has been taking for several years. Denies side effects.goal.     HTN: on coreg, losartan, aldactone. BP is well controlled. Denies headaches, vision changes.     GERD: On Nexium daily. H/o H. Pylori on EGD (2018) with gastric erosions. Working better. No n/v.     BPH: on flomax and finasteride and working well.  No medication side effects. S/p prostate bx 5/13/19 with DR. Chaudhry, no malignany. On amoxil for chronic prostatitis per Dr. Chaudhry right now, reports pain is improving.     Tobacco use: quit 1981; previously smoked 3 PPD for 20 years  EtOH: stopped drink 1982; was a daily drink 2 fifth liquor daily  Illicit drug: denies     Several medications changed over last few motnhs due to issues with hypotension, dizziness, lightheadedness. He is feeling better now. BP is 120s today. When  he works outside he feels bad again. Discussed if he knows he will have to be outside for work maybe holding his lasix for that day, especially during these hot summer months. Overall, doing better.     Past Medical History:   Diagnosis Date    Anemia     Anticoagulant long-term use     CHF (congestive heart failure)     Colon cancer screening 2017    Coronary artery disease     Diabetes mellitus type I     Disorder of kidney and ureter     Encounter for blood transfusion     Glaucoma     Heart attack     2018    Heart disease     Hypertension     Iron deficiency     Kidney failure     post CABG    S/P CABG x 5 2017       Family History   Problem Relation Age of Onset    Diabetes Mother     Heart disease Mother     Hypertension Sister     Diabetes Sister     Heart disease Sister     Heart attack Brother     Colon cancer Neg Hx     Esophageal cancer Neg Hx     Stomach cancer Neg Hx        Social History     Socioeconomic History    Marital status:      Spouse name: Not on file    Number of children: Not on file    Years of education: Not on file    Highest education level: Not on file   Occupational History    Not on file   Social Needs    Financial resource strain: Not on file    Food insecurity:     Worry: Not on file     Inability: Not on file    Transportation needs:     Medical: Not on file     Non-medical: Not on file   Tobacco Use    Smoking status: Former Smoker     Packs/day: 3.00     Types: Cigarettes     Start date: 1963     Last attempt to quit: 1981     Years since quittin.6    Smokeless tobacco: Former User     Types: Snuff, Chew     Quit date:    Substance and Sexual Activity    Alcohol use: No    Drug use: No    Sexual activity: Yes     Comment: -with a significant other   Lifestyle    Physical activity:     Days per week: Not on file     Minutes per session: Not on file    Stress: Not on file   Relationships     Social connections:     Talks on phone: Not on file     Gets together: Not on file     Attends [employer] service: Not on file     Active member of club or organization: Not on file     Attends meetings of clubs or organizations: Not on file     Relationship status: Not on file   Other Topics Concern    Not on file   Social History Narrative    Not on file       Review of Systems   Constitutional: Negative for activity change, fatigue, fever and unexpected weight change.   HENT: Negative for congestion, ear pain, hearing loss, rhinorrhea and sore throat.    Eyes: Negative for redness and visual disturbance.   Respiratory: Negative for cough, shortness of breath and wheezing.    Cardiovascular: Negative for chest pain, palpitations and leg swelling.   Gastrointestinal: Negative for abdominal pain, constipation, diarrhea, nausea and vomiting.   Genitourinary: Positive for testicular pain (improving with antibx). Negative for decreased urine volume, dysuria, frequency and urgency.   Musculoskeletal: Negative for back pain, joint swelling and neck pain.   Skin: Negative for color change, rash and wound.   Neurological: Negative for dizziness, tremors, weakness, light-headedness and headaches.         Objective:      Physical Exam   Constitutional: He is oriented to person, place, and time. He appears well-developed and well-nourished. No distress.   HENT:   Head: Normocephalic and atraumatic.   Right Ear: External ear normal.   Left Ear: External ear normal.   Eyes: Pupils are equal, round, and reactive to light. Conjunctivae and EOM are normal. Right eye exhibits no discharge. Left eye exhibits no discharge.   Neck: Neck supple.   Cardiovascular: Normal rate and regular rhythm. Exam reveals no friction rub.   No murmur heard.  Pulmonary/Chest: Effort normal and breath sounds normal. No respiratory distress. He has no wheezes. He has no rales.   Abdominal: Soft. Bowel sounds are normal. He exhibits no distension. There  is no tenderness. There is no rebound and no guarding.   Musculoskeletal: He exhibits no edema.   Neurological: He is alert and oriented to person, place, and time. No cranial nerve deficit.   Skin: Skin is warm and dry.   Psychiatric: He has a normal mood and affect. His behavior is normal.   Vitals reviewed.      Assessment:       1. Coronary artery disease involving native coronary artery of native heart without angina pectoris    2. Ischemic cardiomyopathy    3. ICD (implantable cardioverter-defibrillator), dual, in situ    4. Hyperlipidemia, unspecified hyperlipidemia type    5. Chronic combined systolic and diastolic heart failure    6. Benign essential HTN    7. Diabetes mellitus type 2 in obese    8. Diabetic polyneuropathy associated with type 2 diabetes mellitus    9. Gastroesophageal reflux disease, esophagitis presence not specified        Plan:       Walter was seen today for follow-up.    Diagnoses and all orders for this visit:    Coronary artery disease involving native coronary artery of native heart without angina pectoris  -     CBC auto differential; Future  -     Comprehensive metabolic panel; Future  -     TSH; Future  -     Lipid panel; Future  -     Hemoglobin A1c; Future  Chronic stable  Cont meds same dose  Follows with cardiology  Denies yenni corrigan    Ischemic cardiomyopathy  -     CBC auto differential; Future  -     Comprehensive metabolic panel; Future  -     TSH; Future  -     Lipid panel; Future  -     Hemoglobin A1c; Future  Last EF 43%  On ARB and BB  Aldactone held due to hypotension  Lasix daily  No signs of volume overload today    ICD (implantable cardioverter-defibrillator), dual, in situ  -     CBC auto differential; Future  -     Comprehensive metabolic panel; Future  -     TSH; Future  -     Lipid panel; Future  -     Hemoglobin A1c; Future    Hyperlipidemia, unspecified hyperlipidemia type  -     CBC auto differential; Future  -     Comprehensive metabolic panel; Future  -      TSH; Future  -     Lipid panel; Future  -     Hemoglobin A1c; Future  Chronic controlled  Cont statin and zetia same dose    Chronic combined systolic and diastolic heart failure  -     CBC auto differential; Future  -     Comprehensive metabolic panel; Future  -     TSH; Future  -     Lipid panel; Future  -     Hemoglobin A1c; Future  Follows with cardiology  Cont meds same dose: ARB and BB on board  Aldactone held due to hypotension  Lasix daily. Can hold if working outside ins [facility] heat to prevent dehydration    Benign essential HTN  -     CBC auto differential; Future  -     Comprehensive metabolic panel; Future  -     TSH; Future  -     Lipid panel; Future  -     Hemoglobin A1c; Future  Chronic controlled  Continue medications at same dose  Low Na diet  Exercise, weight loss  Check BP and keep log for next visit    Issues with hypotension are improved with medication adjustments. Aldactone stopped. Losartan and metoprolol dose lowered    Diabetes mellitus type 2 in obese  -     CBC auto differential; Future  -     Comprehensive metabolic panel; Future  -     TSH; Future  -     Lipid panel; Future  -     Hemoglobin A1c; Future  Chronic uncontrolled  Sees endo  Awaiting VGO---so I have no made changes today  1800 cookie ADA diet  Check suagrs and keep log    Diabetic polyneuropathy associated with type 2 diabetes mellitus  -     CBC auto differential; Future  -     Comprehensive metabolic panel; Future  -     TSH; Future  -     Lipid panel; Future  -     Hemoglobin A1c; Future  Declines medications  Better sugar control as noted above    Gastroesophageal reflux disease, esophagitis presence not specified  Chronic controlled  Cont PPI same dose    RTC 6 months with labs and PRN

## 2019-08-16 ENCOUNTER — PATIENT MESSAGE (OUTPATIENT)
Dept: ENDOCRINOLOGY | Facility: CLINIC | Age: 69
End: 2019-08-16

## 2019-08-16 DIAGNOSIS — E11.69 DIABETES MELLITUS TYPE 2 IN OBESE: ICD-10-CM

## 2019-08-16 DIAGNOSIS — E66.9 DIABETES MELLITUS TYPE 2 IN OBESE: ICD-10-CM

## 2019-08-16 DIAGNOSIS — E11.42 DIABETIC POLYNEUROPATHY ASSOCIATED WITH TYPE 2 DIABETES MELLITUS: ICD-10-CM

## 2019-08-19 ENCOUNTER — PATIENT MESSAGE (OUTPATIENT)
Dept: INTERNAL MEDICINE | Facility: CLINIC | Age: 69
End: 2019-08-19

## 2019-08-20 ENCOUNTER — OFFICE VISIT (OUTPATIENT)
Dept: INTERNAL MEDICINE | Facility: CLINIC | Age: 69
End: 2019-08-20
Payer: MEDICARE

## 2019-08-20 VITALS
HEIGHT: 67 IN | RESPIRATION RATE: 18 BRPM | DIASTOLIC BLOOD PRESSURE: 70 MMHG | WEIGHT: 247.56 LBS | HEART RATE: 60 BPM | SYSTOLIC BLOOD PRESSURE: 116 MMHG | BODY MASS INDEX: 38.85 KG/M2

## 2019-08-20 DIAGNOSIS — M62.89 MUSCLE FATIGUE: ICD-10-CM

## 2019-08-20 DIAGNOSIS — M54.2 NECK PAIN: Primary | ICD-10-CM

## 2019-08-20 PROCEDURE — 99999 PR PBB SHADOW E&M-EST. PATIENT-LVL III: ICD-10-PCS | Mod: PBBFAC,,, | Performed by: INTERNAL MEDICINE

## 2019-08-20 PROCEDURE — 3074F PR MOST RECENT SYSTOLIC BLOOD PRESSURE < 130 MM HG: ICD-10-PCS | Mod: CPTII,S$GLB,, | Performed by: INTERNAL MEDICINE

## 2019-08-20 PROCEDURE — 3074F SYST BP LT 130 MM HG: CPT | Mod: CPTII,S$GLB,, | Performed by: INTERNAL MEDICINE

## 2019-08-20 PROCEDURE — 99213 OFFICE O/P EST LOW 20 MIN: CPT | Mod: S$GLB,,, | Performed by: INTERNAL MEDICINE

## 2019-08-20 PROCEDURE — 1101F PT FALLS ASSESS-DOCD LE1/YR: CPT | Mod: CPTII,S$GLB,, | Performed by: INTERNAL MEDICINE

## 2019-08-20 PROCEDURE — 3078F PR MOST RECENT DIASTOLIC BLOOD PRESSURE < 80 MM HG: ICD-10-PCS | Mod: CPTII,S$GLB,, | Performed by: INTERNAL MEDICINE

## 2019-08-20 PROCEDURE — 1101F PR PT FALLS ASSESS DOC 0-1 FALLS W/OUT INJ PAST YR: ICD-10-PCS | Mod: CPTII,S$GLB,, | Performed by: INTERNAL MEDICINE

## 2019-08-20 PROCEDURE — 3078F DIAST BP <80 MM HG: CPT | Mod: CPTII,S$GLB,, | Performed by: INTERNAL MEDICINE

## 2019-08-20 PROCEDURE — 99213 PR OFFICE/OUTPT VISIT, EST, LEVL III, 20-29 MIN: ICD-10-PCS | Mod: S$GLB,,, | Performed by: INTERNAL MEDICINE

## 2019-08-20 PROCEDURE — 99999 PR PBB SHADOW E&M-EST. PATIENT-LVL III: CPT | Mod: PBBFAC,,, | Performed by: INTERNAL MEDICINE

## 2019-08-20 NOTE — PROGRESS NOTES
Subjective:       Patient ID: Walter Bull is a 68 y.o. male.    Chief Complaint: Neck Pain      HPI:  Patient is known to me and presents with neck pain. Sx started a few months ago. He reports if working outside neck feels fatigue and then can't hold head up. If he goes rest/lay down he sx resolve after about 15-20. Sx started about 15-20 minutes of working. He reports it is not painful to try and lift his head but he actually feels weak and can't lift his head. No other part of his body has this sensation. No issue with vision, drooping eye lids. Sx are intermittent; some days he can work all day without symptoms.     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       Family History   Problem Relation Age of Onset    Diabetes Mother     Heart disease Mother     Hypertension Sister     Diabetes Sister     Heart disease Sister     Heart attack Brother     Colon cancer Neg Hx     Esophageal cancer Neg Hx     Stomach cancer Neg Hx        Social History     Socioeconomic History    Marital status:      Spouse name: Not on file    Number of children: Not on file    Years of education: Not on file    Highest education level: Not on file   Occupational History    Not on file   Social Needs    Financial resource strain: Not on file    Food insecurity:     Worry: Not on file     Inability: Not on file    Transportation needs:     Medical: Not on file     Non-medical: Not on file   Tobacco Use    Smoking status: Former Smoker     Packs/day: 3.00     Types: Cigarettes     Start date: 1/18/1963      Last attempt to quit: 1981     Years since quittin.6    Smokeless tobacco: Former User     Types: Snuff, Chew     Quit date:    Substance and Sexual Activity    Alcohol use: No    Drug use: No    Sexual activity: Yes     Comment: -with a significant other   Lifestyle    Physical activity:     Days per week: Not on file     Minutes per session: Not on file    Stress: Not on file   Relationships    Social connections:     Talks on phone: Not on file     Gets together: Not on file     Attends Spiritism service: Not on file     Active member of club or organization: Not on file     Attends meetings of clubs or organizations: Not on file     Relationship status: Not on file   Other Topics Concern    Not on file   Social History Narrative    Not on file       Review of Systems   Constitutional: Negative for activity change, fatigue, fever and unexpected weight change.   HENT: Negative for congestion, ear pain, hearing loss, rhinorrhea and sore throat.    Eyes: Negative for pain, redness and visual disturbance.   Respiratory: Negative for cough, shortness of breath and wheezing.    Cardiovascular: Negative for chest pain, palpitations and leg swelling.   Gastrointestinal: Negative for abdominal pain, constipation, diarrhea, nausea and vomiting.   Genitourinary: Negative for decreased urine volume, dysuria, frequency and urgency.   Musculoskeletal: Positive for neck pain. Negative for back pain and joint swelling.   Skin: Negative for color change, rash and wound.   Neurological: Positive for weakness (neck). Negative for dizziness, light-headedness and headaches.         Objective:      Physical Exam   Constitutional: He is oriented to person, place, and time. He appears well-developed and well-nourished. No distress.   HENT:   Head: Normocephalic and atraumatic.   Right Ear: External ear normal.   Left Ear: External ear normal.   Eyes: Pupils are equal, round, and reactive to light.  Conjunctivae and EOM are normal. Right eye exhibits no discharge. Left eye exhibits no discharge.   Neck: Neck supple. No tracheal deviation present.   Cardiovascular: Normal rate and regular rhythm.   No murmur heard.  Pulmonary/Chest: Effort normal and breath sounds normal. No respiratory distress. He has no wheezes. He has no rales.   Abdominal: Soft. Bowel sounds are normal. He exhibits no distension. There is no tenderness.   Neurological: He is alert and oriented to person, place, and time. No cranial nerve deficit.   Skin: Skin is warm and dry.   Psychiatric: He has a normal mood and affect. His behavior is normal.   Vitals reviewed.      Assessment:       1. Neck pain    2. Muscle fatigue        Plan:       Walter was seen today for neck pain.    Diagnoses and all orders for this visit:    Neck pain    Muscle fatigue      No significant tenderness on exam  I can not make him fatigue--neck muscles or any other muscle group  I highly doubt neuromuscular disorder like MG. No radiculopathy sx  I think he is simply experiencing fatigue in relation to his other chronic diseases--works outside cutting grass  Will monitor closely  He is comfortable with this plan  Warning signs discussed

## 2019-08-22 DIAGNOSIS — E11.69 DIABETES MELLITUS TYPE 2 IN OBESE: Chronic | ICD-10-CM

## 2019-08-22 DIAGNOSIS — E66.9 DIABETES MELLITUS TYPE 2 IN OBESE: Chronic | ICD-10-CM

## 2019-08-27 ENCOUNTER — OFFICE VISIT (OUTPATIENT)
Dept: HEMATOLOGY/ONCOLOGY | Facility: CLINIC | Age: 69
End: 2019-08-27
Payer: MEDICARE

## 2019-08-27 ENCOUNTER — CLINICAL SUPPORT (OUTPATIENT)
Dept: CARDIOLOGY | Facility: HOSPITAL | Age: 69
End: 2019-08-27
Attending: INTERNAL MEDICINE
Payer: MEDICARE

## 2019-08-27 VITALS
WEIGHT: 243.38 LBS | TEMPERATURE: 98 F | SYSTOLIC BLOOD PRESSURE: 109 MMHG | HEIGHT: 67 IN | RESPIRATION RATE: 16 BRPM | OXYGEN SATURATION: 99 % | BODY MASS INDEX: 38.2 KG/M2 | DIASTOLIC BLOOD PRESSURE: 53 MMHG | HEART RATE: 60 BPM

## 2019-08-27 DIAGNOSIS — D47.2 MONOCLONAL GAMMOPATHY OF UNDETERMINED SIGNIFICANCE: Primary | ICD-10-CM

## 2019-08-27 DIAGNOSIS — D50.9 IRON DEFICIENCY ANEMIA, UNSPECIFIED IRON DEFICIENCY ANEMIA TYPE: ICD-10-CM

## 2019-08-27 DIAGNOSIS — D64.9 NORMOCYTIC ANEMIA: ICD-10-CM

## 2019-08-27 DIAGNOSIS — I47.20 VT (VENTRICULAR TACHYCARDIA): ICD-10-CM

## 2019-08-27 DIAGNOSIS — Z95.810 PRESENCE OF AUTOMATIC CARDIOVERTER/DEFIBRILLATOR (AICD): ICD-10-CM

## 2019-08-27 PROCEDURE — 3078F DIAST BP <80 MM HG: CPT | Mod: CPTII,S$GLB,, | Performed by: INTERNAL MEDICINE

## 2019-08-27 PROCEDURE — 3074F SYST BP LT 130 MM HG: CPT | Mod: CPTII,S$GLB,, | Performed by: INTERNAL MEDICINE

## 2019-08-27 PROCEDURE — 99999 PR PBB SHADOW E&M-EST. PATIENT-LVL IV: ICD-10-PCS | Mod: PBBFAC,,, | Performed by: INTERNAL MEDICINE

## 2019-08-27 PROCEDURE — 99214 PR OFFICE/OUTPT VISIT, EST, LEVL IV, 30-39 MIN: ICD-10-PCS | Mod: S$GLB,,, | Performed by: INTERNAL MEDICINE

## 2019-08-27 PROCEDURE — 3074F PR MOST RECENT SYSTOLIC BLOOD PRESSURE < 130 MM HG: ICD-10-PCS | Mod: CPTII,S$GLB,, | Performed by: INTERNAL MEDICINE

## 2019-08-27 PROCEDURE — 3078F PR MOST RECENT DIASTOLIC BLOOD PRESSURE < 80 MM HG: ICD-10-PCS | Mod: CPTII,S$GLB,, | Performed by: INTERNAL MEDICINE

## 2019-08-27 PROCEDURE — 1101F PT FALLS ASSESS-DOCD LE1/YR: CPT | Mod: CPTII,S$GLB,, | Performed by: INTERNAL MEDICINE

## 2019-08-27 PROCEDURE — 93296 REM INTERROG EVL PM/IDS: CPT

## 2019-08-27 PROCEDURE — 99999 PR PBB SHADOW E&M-EST. PATIENT-LVL IV: CPT | Mod: PBBFAC,,, | Performed by: INTERNAL MEDICINE

## 2019-08-27 PROCEDURE — 99214 OFFICE O/P EST MOD 30 MIN: CPT | Mod: S$GLB,,, | Performed by: INTERNAL MEDICINE

## 2019-08-27 PROCEDURE — 1101F PR PT FALLS ASSESS DOC 0-1 FALLS W/OUT INJ PAST YR: ICD-10-PCS | Mod: CPTII,S$GLB,, | Performed by: INTERNAL MEDICINE

## 2019-08-27 NOTE — Clinical Note
cbc, cmp, serum free light chains, quantitative immunoglobulins, serum electropheresis, serum immunofixation and np appt in 1 year

## 2019-08-27 NOTE — PROGRESS NOTES
HEMATOLOGY/ONCOLOGY Progress Note    Interval Hx:  Mr. Bull is a 68 y/o PMHx of CHF, CAD s/p CABG in 2005 complicated by HARRIS, HTN, DM II, CKD II, who was initially referred for evaluation of IgG Lambda Paraproteinemia here for follow up. At prior   visit he was also noted to have Iron Deficiency Anemia and was referred to GI for endoscopy. On oral iron twice a day. Otherwise, doing well overall.  -no signs symptoms of progression today;     Hematology Hx:  Mr. Bull is a 68 y/o PMHx of CHF, CAD s/p CABG in 2005 complicated by HARRIS, HTN, DM II, CKD II, who is referred by Nephrology for recently found abnormal paraproteinemia. Patient was noted to have a IgG Lambda specific monoclonal band of 0.24 g/dL in gamma on 1/15/18. His renal function remains relatively stable with Cr of 1.0 mg/dL and calcium relatively unremarkable. He does have history of mild normocytic anemia. No significant proteinuria. Urine microalbumin/crt ratio of 4.2. No reported bone pain. No weight loss or generalized weakness. Denies any fever/chills, night sweats. Denies any melena or hematochezia. Had a colonoscopy done in Feb, 2017 which was unremarkable.   - diagnosed with Iron Deficiency Anemia 2/1/18  (feritin 20 ng/mL) - on oral supplements   - seen by GI - Dr. Cunningham - plan for EGD upon cardiac clearance.   - IgG Lambda Paraproteinemia - initial work-up unremarkable, currently under surveillance        Allergies:   Patient has no known allergies.      Medications:     Current Outpatient Medications   Medication Sig Dispense Refill    albuterol (PROVENTIL/VENTOLIN HFA) 90 mcg/actuation inhaler Inhale 2 puffs into the lungs every 6 (six) hours as needed for Wheezing. 1 Inhaler 5    amiodarone (PACERONE) 200 MG Tab Take 1 tablet (200 mg total) by mouth once daily. 90 tablet 3    ammonium lactate 12 % Crea Apply small amount to feet except for in between toes twice a day 1 Bottle 6    amoxicillin-clavulanate 875-125mg (AUGMENTIN) 875-125 mg  per tablet Take 1 tablet by mouth 2 (two) times daily. 180 tablet 0    aspirin (ECOTRIN) 81 MG EC tablet Take 1 tablet (81 mg total) by mouth once daily. 90 tablet 3    esomeprazole (NEXIUM) 40 MG capsule Take 1 capsule (40 mg total) by mouth before breakfast. 30 capsule 11    ezetimibe (ZETIA) 10 mg tablet Take 1 tablet (10 mg total) by mouth once daily. 90 tablet 3    ferrous sulfate (FEOSOL) 325 mg (65 mg iron) Tab tablet TAKE 1 TABLET BY MOUTH THREE TIMES DAILY 90 tablet 3    finasteride (PROSCAR) 5 mg tablet HOLD DUE TO ORTHOSTATIC HYPOTENSION 90 tablet 3    furosemide (LASIX) 20 MG tablet Take 1 tablet (20 mg total) by mouth once daily. Take an extra tablet daily for wt gain more than 3 pounds/day or 5 pounds/week 140 tablet 3    insulin aspart U-100 (NOVOLOG U-100 INSULIN ASPART) 100 unit/mL injection 3 x10ml vials required per month for use in VGO 30 3 vial 11    insulin  unit/mL injection Use 30 Units in the morning and 24 Units at night      isosorbide mononitrate (IMDUR) 60 MG 24 hr tablet Take 1 tablet (60 mg total) by mouth once daily. 90 tablet 3    losartan (COZAAR) 25 MG tablet Take 1 tablet (25 mg total) by mouth once daily. 90 tablet 3    nitroGLYCERIN (NITROSTAT) 0.4 MG SL tablet DISSOLVE ONE TABLET UNDER THE TONGUE EVERY 5 MINUTES AS NEEDED FOR CHEST PAIN. 100 tablet 0    RANEXA 1,000 mg Tb12 Take 1 tablet (1,000 mg total) by mouth 2 (two) times daily. 60 tablet 11    rosuvastatin (CRESTOR) 40 MG Tab Take 1 tablet (40 mg total) by mouth once daily. 90 tablet 3    SITagliptin (JANUVIA) 100 MG Tab Take 1 tablet (100 mg total) by mouth once daily. 90 tablet 3    sub-q insulin device, 30 unit (V-GO 30) [place] 1 VGo every 24 hr as directed 30 Device 11    sub-q insulin device, 30 unit (V-GO 30) [place] 1 VGo every 24 hr as directed 30 Device 11    ticagrelor (BRILINTA) 90 mg tablet Take 90 mg by mouth 2 (two) times daily.      metoprolol succinate (TOPROL-XL) 25 MG 24 hr tablet  Take 1 tablet (25 mg total) by mouth once daily. 90 tablet 1    tamsulosin (FLOMAX) 0.4 mg Cap Take 1 capsule (0.4 mg total) by mouth once daily. 30 capsule 0     No current facility-administered medications for this visit.          Review Of Systems:   Constitutional: Negative for activity change, fatigue, fever and unexpected weight change.   HENT: Positive for hearing loss. Negative for congestion, ear pain, rhinorrhea and sore throat.    Eyes: Negative for redness and visual disturbance.   Respiratory: Negative for cough, shortness of breath and wheezing.    Cardiovascular: Negative for chest pain, palpitations and leg swelling.   Gastrointestinal: Negative for abdominal pain, blood in stool, constipation, diarrhea, nausea and vomiting.   Genitourinary: Negative for decreased urine volume, dysuria, frequency and urgency.   Musculoskeletal: Negative for back pain, joint swelling and neck pain.   Skin: Negative for color change, rash and wound.   Neurological: Negative for dizziness, tremors, weakness, light-headedness and headaches.     Physical Exam:   Performance Status: 1  Constitutional: He is oriented to person, place, and time. He appears well-developed and well-nourished. No distress.   HENT:   Head: Normocephalic and atraumatic.   Right Ear: External ear normal.   Left Ear: External ear normal.   Eyes: Conjunctivae and EOM are normal. Pupils are equal, round, and reactive to light. Right eye exhibits no discharge. Left eye exhibits no discharge.   Neck: Neck supple. No tracheal deviation present.   Cardiovascular: Normal rate and regular rhythm.    No murmur heard.  Pulmonary/Chest: Effort normal and breath sounds normal. No respiratory distress. He has no wheezes. He has no rales.   Abdominal: Soft. Bowel sounds are normal. He exhibits no distension. There is no tenderness.   Musculoskeletal: He exhibits no edema.   Neurological: He is alert and oriented to person, place, and time. No cranial nerve  deficit.   Skin: Skin is warm and dry.   Psychiatric: He has a normal mood and affect. His behavior is normal.     Diagnostic Tests:   Labs: Reviewed    No results found for this or any previous visit (from the past 24 hour(s)).    Assessment:     1. Monoclonal gammopathy of undetermined significance     2. Iron deficiency anemia, unspecified iron deficiency anemia type       Recommendations:     1. IgG Lambda MGUS   -no overt CRAB  Today   -mild anemia; ckd stable and  related to other issues   -todays biochemical studies stable with only  mild increase in paraprotein     2. Iron Deficiency Anemia/Normocytic Anemia   -due to potential GI blood loss and or iron malaborption from h pylori ; follow with GI  -  contniue oral po supplementation, fes04 BID; hgb and ferritin uptrending           FU:-cbc, cmp, serum free light chains, ferritin,  quantitative immunoglobulins, serum electropheresis, serum immunofixation and np appt in 1 year     Distress Screening Results: Psychosocial Distress screening score of Distress Score: 0 noted and reviewed. No intervention indicated.

## 2019-08-28 ENCOUNTER — CLINICAL SUPPORT (OUTPATIENT)
Dept: DIABETES | Facility: CLINIC | Age: 69
End: 2019-08-28
Payer: MEDICARE

## 2019-08-28 DIAGNOSIS — E66.9 DIABETES MELLITUS TYPE 2 IN OBESE: Primary | Chronic | ICD-10-CM

## 2019-08-28 DIAGNOSIS — E11.69 DIABETES MELLITUS TYPE 2 IN OBESE: Primary | Chronic | ICD-10-CM

## 2019-08-28 PROCEDURE — 99999 PR PBB SHADOW E&M-EST. PATIENT-LVL I: ICD-10-PCS | Mod: PBBFAC,,,

## 2019-08-28 PROCEDURE — 99999 PR PBB SHADOW E&M-EST. PATIENT-LVL I: CPT | Mod: PBBFAC,,,

## 2019-08-28 PROCEDURE — G0108 DIAB MANAGE TRN  PER INDIV: HCPCS | Mod: S$GLB,,, | Performed by: INTERNAL MEDICINE

## 2019-08-28 PROCEDURE — G0108 PR DIAB MANAGE TRN  PER INDIV: ICD-10-PCS | Mod: S$GLB,,, | Performed by: INTERNAL MEDICINE

## 2019-08-28 NOTE — PROGRESS NOTES
Diabetes Education  Author: Keily Colin RN, CDE  Date: 8/28/2019    Diabetes Care Management Summary  Diabetes Education Record Assessment/Progress: Initial  Current Diabetes Risk Level: Moderate     Last A1c:   Lab Results   Component Value Date    HGBA1C 7.5 (H) 08/07/2019     Last visit with Diabetes Educator: : 08/28/2019    Diabetes Type  Diabetes Type : Type II    Diabetes History  Current Treatment: Oral Medication, Insulin(NPH 30 units in am 24 units in pm; Januvia 100 mg)  Reviewed Problem List with Patient: Yes    Health Maintenance was reviewed today with patient. Discussed with patient importance of routine eye exams, foot exams/foot care, blood work (i.e.: A1c, microalbumin, and lipid), dental visits, yearly flu vaccine, and pneumonia vaccine as indicated by PCP. Patient verbalized understanding.     Health Maintenance Topics with due status: Not Due       Topic Last Completion Date    Colonoscopy 02/02/2017    TETANUS VACCINE 01/24/2018    Pneumococcal Vaccine (65+ Low/Medium Risk) 09/25/2018    Influenza Vaccine 09/25/2018    Eye Exam 02/26/2019    Foot Exam 06/26/2019    Lipid Panel 08/07/2019    Hemoglobin A1c 08/07/2019    High Dose Statin 08/27/2019     There are no preventive care reminders to display for this patient.    Nutrition  Meal Planning: skipping meals, 3 meals per day, eats out often  What type of sweetener do you use?: sugar  What type of beverages do you drink?: water, regular soda/tea  Meal Plan 24 Hour Recall - Breakfast: Gets breakfast from [employer], sausage, hash brown, OJ, coffee with sugar  Meal Plan 24 Hour Recall - Lunch: most of time skips lunch; when he eats will do chicken or hamburger, fruit  Meal Plan 24 Hour Recall - Dinner: red beans/rice, veg sometimes potato  Meal Plan 24 Hour Recall - Snack: fruit    Monitoring   Self Monitoring : SMBG 2 x per day; did not bring recent logs; usually 110's to 200's  Blood Glucose Logs: No  Do you use a personal continuous glucose  monitor?: No  In the last month, how often have you had a low blood sugar reaction?: once  What are your symptoms of low blood sugar?: shaky, sweaty  How do you treat low blood sugar?: juice    Exercise   Exercise 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 He tends to skip lunch most days because he is working outside.  Encouraged to eat lunch because of the peak of his insulin which could result in a low.  Reviewed carb sources and appropriate amounts per meal and snack. The plate method for carb counting was discussed.  Educated patient on proper measurement of foods.  We reviewed in detail portion sizes of common foods and developed a meal plan that involves carb portions per each meal. Stressed importance of eating 3 balanced meals per day and reasonable snacks if needed.  Discussed tips for eating out. Emphasized importance of eliminating all sugary soft drinks, limiting consumption of fruit juices and sugar free drink options.     Physical Activity (incorporating physical activity into one's  lifestyle): Discussion, Comprehends Key Points, Written Materials Provided  Medications (states correct name, dose, onset, peak, duration, side effects & timing of meds): Discussion, Comprehends Key Points, Written Materials Provided  Reviewed action and side effects of NPH insulin.  Pt will be starting the V-Go 30 in September.  Reviewed how the V-Go works.  Appointment was made and pt was given written instructions to hold his am dose of the NPH insulin that day.    Monitoring (monitoring blood glucose/other parameters & using results): Discussion, Comprehends Key Points, Written Materials Provided  Acute Complications (preventing, detecting, and treating acute complications): Discussion, Comprehends Key Points, Written Materials Provided  Signs and symptoms of hypoglycemia and treatment of same discussed. Advised to always carry a source of fast acting carbs with him.    Chronic Complications (preventing, detecting, and treating chronic complications): Discussion, Comprehends Key Points, Not Covered/Deferred  Clinical (diabetes, other pertinent medical history, and relevant comorbidities reviewed during visit): Discussion, Comprehends Key Points, Written Materials Provided  Cognitive (knowledge of self-management skills, functional health literacy): Discussion, Comprehends Key Points, Written Materials Provided  Psychosocial (emotional response to diabetes): Discussion, Individual Session  Diabetes Distress and Support Systems: Discussion, Individual Session  Behavioral (readiness for change, lifestyle practices, self-care behaviors): Discussion, Individual Session    Goals  Patient has selected/evaluated goals during today's session: Yes, selected  Healthy Eating: (will begin counting carbs/portion sizes for each meal. stop skipping lunch to prevent possible low bgs)    Diabetes Care Plan/Intervention  Education Plan/Intervention: Individual Follow-Up DSMT    Diabetes Meal Plan  Restrictions: Restricted  Carbohydrate  Carbohydrate Per Meal: 45-60g  Carbohydrate Per Snack : 15-20g    Today's Self-Management Care Plan was developed with the patient's input and is based on barriers identified during today's assessment.    The long and short-term goals in the care plan were written with the patient/caregiver's input. The patient has agreed to work toward these goals to improve his overall diabetes control.      The patient received a copy of today's self-management plan and verbalized understanding of the care plan, goals, and all of today's instructions.      The patient was encouraged to communicate with his physician and care team regarding his condition(s) and treatment.  I provided the patient with my contact information today and encouraged him to contact me via phone or patient portal as needed.     Education Units of Time   Time Spent: 60 min

## 2019-08-29 ENCOUNTER — CLINICAL SUPPORT (OUTPATIENT)
Dept: CARDIOLOGY | Facility: HOSPITAL | Age: 69
End: 2019-08-29
Payer: MEDICARE

## 2019-08-29 DIAGNOSIS — Z95.810 PRESENCE OF AUTOMATIC (IMPLANTABLE) CARDIAC DEFIBRILLATOR: ICD-10-CM

## 2019-08-29 DIAGNOSIS — I25.5 ISCHEMIC CARDIOMYOPATHY: ICD-10-CM

## 2019-08-29 DIAGNOSIS — I47.20 VENTRICULAR TACHYCARDIA: ICD-10-CM

## 2019-08-29 PROCEDURE — 93295 CARDIAC DEVICE CHECK - REMOTE: ICD-10-PCS | Mod: ,,, | Performed by: INTERNAL MEDICINE

## 2019-08-29 PROCEDURE — 93296 REM INTERROG EVL PM/IDS: CPT | Performed by: INTERNAL MEDICINE

## 2019-08-29 PROCEDURE — 93295 DEV INTERROG REMOTE 1/2/MLT: CPT | Mod: ,,, | Performed by: INTERNAL MEDICINE

## 2019-09-04 NOTE — PROGRESS NOTES
I, Argenis Arroyo, have personally taken the history and physical exam and I agree with Dr. Coon's assessment and plan.

## 2019-09-11 ENCOUNTER — PATIENT MESSAGE (OUTPATIENT)
Dept: ENDOCRINOLOGY | Facility: CLINIC | Age: 69
End: 2019-09-11

## 2019-09-12 DIAGNOSIS — E66.9 DIABETES MELLITUS TYPE 2 IN OBESE: Chronic | ICD-10-CM

## 2019-09-12 DIAGNOSIS — E11.69 DIABETES MELLITUS TYPE 2 IN OBESE: Chronic | ICD-10-CM

## 2019-09-12 RX ORDER — INSULIN ASPART 100 [IU]/ML
INJECTION, SOLUTION INTRAVENOUS; SUBCUTANEOUS
Qty: 3 VIAL | Refills: 11 | Status: CANCELLED | OUTPATIENT
Start: 2019-09-12

## 2019-09-13 NOTE — TELEPHONE ENCOUNTER
Talk to [facility] mail service put in Rx for Novolog by phone. Rep said they will call patient to let them know. Pt wanted to express ship  But the rep told me to it takes 5 to 6 business days for [facility].

## 2019-09-16 ENCOUNTER — OFFICE VISIT (OUTPATIENT)
Dept: UROLOGY | Facility: CLINIC | Age: 69
End: 2019-09-16
Payer: MEDICARE

## 2019-09-16 VITALS
BODY MASS INDEX: 38.69 KG/M2 | DIASTOLIC BLOOD PRESSURE: 68 MMHG | SYSTOLIC BLOOD PRESSURE: 138 MMHG | HEIGHT: 67 IN | HEART RATE: 60 BPM | WEIGHT: 246.5 LBS

## 2019-09-16 DIAGNOSIS — N41.1 CHRONIC PROSTATITIS: Primary | ICD-10-CM

## 2019-09-16 PROCEDURE — 99999 PR PBB SHADOW E&M-EST. PATIENT-LVL III: ICD-10-PCS | Mod: PBBFAC,,, | Performed by: UROLOGY

## 2019-09-16 PROCEDURE — 1101F PR PT FALLS ASSESS DOC 0-1 FALLS W/OUT INJ PAST YR: ICD-10-PCS | Mod: CPTII,S$GLB,, | Performed by: UROLOGY

## 2019-09-16 PROCEDURE — 99999 PR PBB SHADOW E&M-EST. PATIENT-LVL III: CPT | Mod: PBBFAC,,, | Performed by: UROLOGY

## 2019-09-16 PROCEDURE — 3078F PR MOST RECENT DIASTOLIC BLOOD PRESSURE < 80 MM HG: ICD-10-PCS | Mod: CPTII,S$GLB,, | Performed by: UROLOGY

## 2019-09-16 PROCEDURE — 99213 OFFICE O/P EST LOW 20 MIN: CPT | Mod: S$GLB,,, | Performed by: UROLOGY

## 2019-09-16 PROCEDURE — 3075F SYST BP GE 130 - 139MM HG: CPT | Mod: CPTII,S$GLB,, | Performed by: UROLOGY

## 2019-09-16 PROCEDURE — 3075F PR MOST RECENT SYSTOLIC BLOOD PRESS GE 130-139MM HG: ICD-10-PCS | Mod: CPTII,S$GLB,, | Performed by: UROLOGY

## 2019-09-16 PROCEDURE — 99213 PR OFFICE/OUTPT VISIT, EST, LEVL III, 20-29 MIN: ICD-10-PCS | Mod: S$GLB,,, | Performed by: UROLOGY

## 2019-09-16 PROCEDURE — 3078F DIAST BP <80 MM HG: CPT | Mod: CPTII,S$GLB,, | Performed by: UROLOGY

## 2019-09-16 PROCEDURE — 1101F PT FALLS ASSESS-DOCD LE1/YR: CPT | Mod: CPTII,S$GLB,, | Performed by: UROLOGY

## 2019-09-16 RX ORDER — TAMSULOSIN HYDROCHLORIDE 0.4 MG/1
0.4 CAPSULE ORAL DAILY
Qty: 90 CAPSULE | Refills: 3 | Status: SHIPPED | OUTPATIENT
Start: 2019-09-16 | End: 2020-04-29

## 2019-09-16 NOTE — PROGRESS NOTES
Subjective:       Patient ID: Walter Bull is a 68 y.o. male.    Chief Complaint: chronic prostatitis (follow up appiontment on today with dr. scales.)    HPI   Walter Bull is a 68 y.o. male with a PMHx of kidney failure, DM, and heart disease who presents to the clinic for evaluation and management of chronic prostatitis. AUA Sx score is 11. He was prescribed 2 months of Augmentin at his last visit on 7/15/19. PSA 11 months ago was 1.6. Patient feels better today. He denies burning with urination. Patient takes Flomax.      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       Past Surgical History:   Procedure Laterality Date    Angiogram, Aortic Arch, Coronary  11/16/2018    Performed [facility] Schmidt [facility] at [facility]    Bypass graft study  11/16/2018    Performed by Ryan Schmidt MD at [facility] C[facility]    CARDIAC DEFIBRILLATOR PLACEMENT      CARDIAC ELECTROPHYSIOLOGY STUDY N/A 11/19/2018    Performed by Byron Glasgow MD at [facility] EP LAB    CARDIAC ELECTROPHYSIOLOGY STUDY N/A 11/19/2018    Performed by Byron Glasgow MD at [facility] EP [facility]    COLON SURGERY  2006    COLONOSCOPY N/A 2/2/2017    [facility]    CORONARY ARTERY BYPASS GRAFT  2005    5 arteries    ESOPHAGOGASTRODUODENOSCOPY (EGD) N/A 3/21/2018    Performed by Fawad Cunningham MD at [facility] ENDO (4TH FLR)    EYE SURGERY Bilateral 2016    Laser surgery for glaucoma    INSERTION, DUAL ICD Left 11/19/2018    Performed by Byron Glasgow MD at [facility] EP LAB    Left heart cath Left 11/16/2018    Performed by Ryan DAVIES  MD Brayan at [facility]       Family History   Problem Relation Age of Onset    Diabetes Mother     Heart disease Mother     Hypertension Sister     Diabetes Sister     Heart disease Sister     Heart attack Brother     Colon cancer Neg Hx     Esophageal cancer Neg Hx     Stomach cancer Neg Hx        Social History     Socioeconomic History    Marital status:      Spouse name: Not on file    Number of children: Not on file    Years of education: Not on file    Highest education level: Not on file   Occupational History    Not on file   Social Needs    Financial resource strain: Not on file    Food insecurity:     Worry: Not on file     Inability: Not on file    Transportation needs:     Medical: Not on file     Non-medical: Not on file   Tobacco Use    Smoking status: Former Smoker     Packs/day: 3.00     Types: Cigarettes     Start date: 1963     Last attempt to quit: 1981     Years since quittin.7    Smokeless tobacco: Former User     Types: Snuff, Chew     Quit date:    Substance and Sexual Activity    Alcohol use: No    Drug use: No    Sexual activity: Yes     Comment: -with a significant other   Lifestyle    Physical activity:     Days per week: Not on file     Minutes per session: Not on file    Stress: Not on file   Relationships    Social connections:     Talks on phone: Not on file     Gets together: Not on file     Attends Mosque service: Not on file     Active member of club or organization: Not on file     Attends meetings of clubs or organizations: Not on file     Relationship status: Not on file   Other Topics Concern    Not on file   Social History Narrative    Not on file       Allergies:  Patient has no known allergies.    Medications:    Current Outpatient Medications:     albuterol (PROVENTIL/VENTOLIN HFA) 90 mcg/actuation inhaler, Inhale 2 puffs into the lungs every 6 (six) hours as needed for Wheezing., Disp: 1 Inhaler, Rfl:  5    amiodarone (PACERONE) 200 MG Tab, Take 1 tablet (200 mg total) by mouth once daily., Disp: 90 tablet, Rfl: 3    ammonium lactate 12 % Crea, Apply small amount to feet except for in between toes twice a day, Disp: 1 Bottle, Rfl: 6    amoxicillin-clavulanate 875-125mg (AUGMENTIN) 875-125 mg per tablet, Take 1 tablet by mouth 2 (two) times daily., Disp: 180 tablet, Rfl: 0    aspirin (ECOTRIN) 81 MG EC tablet, Take 1 tablet (81 mg total) by mouth once daily., Disp: 90 tablet, Rfl: 3    esomeprazole (NEXIUM) 40 MG capsule, Take 1 capsule (40 mg total) by mouth before breakfast., Disp: 30 capsule, Rfl: 11    ezetimibe (ZETIA) 10 mg tablet, Take 1 tablet (10 mg total) by mouth once daily., Disp: 90 tablet, Rfl: 3    ferrous sulfate (FEOSOL) 325 mg (65 mg iron) Tab tablet, TAKE 1 TABLET BY MOUTH THREE TIMES DAILY, Disp: 90 tablet, Rfl: 3    finasteride (PROSCAR) 5 mg tablet, HOLD DUE TO ORTHOSTATIC HYPOTENSION, Disp: 90 tablet, Rfl: 3    furosemide (LASIX) 20 MG tablet, Take 1 tablet (20 mg total) by mouth once daily. Take an extra tablet daily for wt gain more than 3 pounds/day or 5 pounds/week, Disp: 140 tablet, Rfl: 3    insulin aspart U-100 (NOVOLOG U-100 INSULIN ASPART) 100 unit/mL injection, 3 x10ml vials required per month for use in VGO 30, Disp: 3 vial, Rfl: 11    insulin  unit/mL injection, Use 30 Units in the morning and 24 Units at night, Disp: , Rfl:     isosorbide mononitrate (IMDUR) 60 MG 24 hr tablet, Take 1 tablet (60 mg total) by mouth once daily., Disp: 90 tablet, Rfl: 3    losartan (COZAAR) 25 MG tablet, Take 1 tablet (25 mg total) by mouth once daily., Disp: 90 tablet, Rfl: 3    nitroGLYCERIN (NITROSTAT) 0.4 MG SL tablet, DISSOLVE ONE TABLET UNDER THE TONGUE EVERY 5 MINUTES AS NEEDED FOR CHEST PAIN., Disp: 100 tablet, Rfl: 0    RANEXA 1,000 mg Tb12, Take 1 tablet (1,000 mg total) by mouth 2 (two) times daily., Disp: 60 tablet, Rfl: 11    rosuvastatin (CRESTOR) 40 MG Tab, Take  1 tablet (40 mg total) by mouth once daily., Disp: 90 tablet, Rfl: 3    SITagliptin (JANUVIA) 100 MG Tab, Take 1 tablet (100 mg total) by mouth once daily., Disp: 90 tablet, Rfl: 3    sub-q insulin device, 30 unit (V-GO 30) [place], 1 VGo every 24 hr as directed, Disp: 30 Device, Rfl: 11    ticagrelor (BRILINTA) 90 mg tablet, Take 90 mg by mouth 2 (two) times daily., Disp: , Rfl:     metoprolol succinate (TOPROL-XL) 25 MG 24 hr tablet, Take 1 tablet (25 mg total) by mouth once daily., Disp: 90 tablet, Rfl: 1    sub-q insulin device, 30 unit (V-GO 30) [place], 1 VGo every 24 hr as directed, Disp: 30 Device, Rfl: 11    tamsulosin (FLOMAX) 0.4 mg Cap, Take 1 capsule (0.4 mg total) by mouth once daily., Disp: 90 capsule, Rfl: 3    Review of Systems   Constitutional: Negative for activity change, appetite change, chills, diaphoresis, fatigue, fever and unexpected weight change.   HENT: Negative for congestion, dental problem, hearing loss, mouth sores, postnasal drip, rhinorrhea, sinus pressure and trouble swallowing.    Eyes: Negative for pain, discharge and itching.   Respiratory: Negative for apnea, cough, choking, chest tightness, shortness of breath and wheezing.    Cardiovascular: Negative for chest pain, palpitations and leg swelling.   Gastrointestinal: Negative for abdominal distention, abdominal pain, anal bleeding, blood in stool, constipation, diarrhea, nausea, rectal pain and vomiting.   Endocrine: Negative for polydipsia and polyuria.   Genitourinary: Negative for decreased urine volume, difficulty urinating, discharge, dysuria, enuresis, flank pain, frequency, genital sores, hematuria, penile pain, penile swelling, scrotal swelling and urgency.   Musculoskeletal: Negative for arthralgias and back pain.   Skin: Negative for color change, rash and wound.   Neurological: Negative for dizziness, syncope, speech difficulty, light-headedness and headaches.   Hematological: Negative for adenopathy. Does not  bruise/bleed easily.   Psychiatric/Behavioral: Negative for behavioral problems and confusion.       Objective:      Physical Exam   Constitutional: He appears well-developed.   HENT:   Head: Normocephalic.   Neck: Neck supple.   Cardiovascular: Normal rate.    Pulmonary/Chest: Effort normal.   Abdominal: Soft.   Neurological: He is alert.   Skin: Skin is warm.     Psychiatric: He has a normal mood and affect.         Labs   Ref Range & Units 11mo ago   PSA, SCREEN 0.00 - 4.00 ng/mL 1.6        Assessment:       1. Chronic prostatitis        Plan:       Walter was seen today for chronic prostatitis.    Diagnoses and all orders for this visit:    Chronic prostatitis    Other orders  -     tamsulosin (FLOMAX) 0.4 mg Cap; Take 1 capsule (0.4 mg total) by mouth once daily.          Instructed patient to drink large amounts of water.  Refill Flomax Rx  RTC in 1 year for [facility]    I, Alena Fry, am acting as a scribe on this patient encounter in the presence and under the supervision of Dr. Carver.    09/16/2019 9:38 AM    I, Dr. Carver, personally performed the services described in this documentation.   All medical record entries made by the scribe were at my direction and in my presence.   I have reviewed the chart and agree that the record is accurate and complete.   Guicho Carver MD.  9:39 AM 09/16/2019

## 2019-09-17 ENCOUNTER — CLINICAL SUPPORT (OUTPATIENT)
Dept: DIABETES | Facility: CLINIC | Age: 69
End: 2019-09-17
Payer: MEDICARE

## 2019-09-17 DIAGNOSIS — E66.9 DIABETES MELLITUS TYPE 2 IN OBESE: Chronic | ICD-10-CM

## 2019-09-17 DIAGNOSIS — E11.69 DIABETES MELLITUS TYPE 2 IN OBESE: Chronic | ICD-10-CM

## 2019-09-17 PROCEDURE — G0108 DIAB MANAGE TRN  PER INDIV: HCPCS | Mod: S$GLB,,, | Performed by: INTERNAL MEDICINE

## 2019-09-17 PROCEDURE — G0108 PR DIAB MANAGE TRN  PER INDIV: ICD-10-PCS | Mod: S$GLB,,, | Performed by: INTERNAL MEDICINE

## 2019-09-17 NOTE — PROGRESS NOTES
Diabetes Education  Author: Keily Colin RN, CDE  Date: 9/17/2019    Diabetes Care Management Summary  Diabetes Education Record Assessment/Progress: Post Program/Follow-up  Current Diabetes Risk Level: Low     Diabetes Type  Diabetes Type : Type II    Diabetes History  Diabetes Diagnosis: >10 years    Health Maintenance was reviewed today with patient. Discussed with patient importance of routine eye exams, foot exams/foot care, blood work (i.e.: A1c, microalbumin, and lipid), dental visits, yearly flu vaccine, and pneumonia vaccine as indicated by PCP. Patient verbalized understanding.     Health Maintenance Topics with due status: Not Due       Topic Last Completion Date    Colonoscopy 02/02/2017    TETANUS VACCINE 01/24/2018    Pneumococcal Vaccine (65+ Low/Medium Risk) 09/25/2018    Eye Exam 02/26/2019    Foot Exam 06/26/2019    Lipid Panel 08/07/2019    Hemoglobin A1c 08/07/2019    High Dose Statin 09/16/2019     There are no preventive care reminders to display for this patient.    DIABETES EDUCATOR NOTE   V-GO INSULIN DELIVERY INSTRUCTIONS     Patient is here for instructions on use of the V-GO 30 which will provide 30 units of basal insulin given over 24 hours (1.25 units/hr) and up to 36 units of on-demand bolus dosing in 2-Unit increments. Patient will be giving 6 units of Novolog at each meal (3 pushes). Patient had been instructed to hold his NPH this AM. Patient also advised that he will discontinue taking the NPH insulin and that he will only use the Novolog vial with the V-Go system.     Patient was instructed on the following:    Insert vial into EZ Fill and leave in place until vial is empty   Remove plug and insert V-Go    Draw insulin into EZ Fill and fill V-Go with insulin (check that V-Go is full of insulin)    Remove V-GO and clean and replace plug (advised to wipe the circular opening with an alcohol swab before replacing)    Select site for V-Go (site selection reviewed) and prepare  infusion site with aseptic technique    Remove button cover and adhesive liner    Attach V-Go to site selected and insert needle by pushing needle button in to activate basal rate    Instructed patient on how to activate the bolus ready button and to push the bolus delivery button into the V-Go to deliver 2 units of insulin (1 push = 2 units). Patient advised that once all 36 units of the on-demand bolus have been given, the bolus buttons will lock, but the basal insulin will continue for the remainder of the 24 hours    To remove, release needle by sliding and pressing the needle release button    Remove the V-Go and discard (the V-Go is 100% disposable)   Patient instructed that the V-Go needs to be changed every 24 hours.    Patient was able to perform successful return demonstration of all.     General precautions reviewed with the patient:    V-Go needs to be removed before having an X-ray, MRI or CT scan (or any similar test or procedure). Replace with a new V-Go after the test or procedure is completed. Patient also advised to check with his doctor before any type of surgical procedure to see if the V-Go can be worn.    Patient advised to monitor his BG 4 times a day (pre-meals and at HS)    Patient advised to always carry back up NPH (non-) should a problem develop with the V-Go. Patient also advised that he should have directions from his MD regarding insulin doses should he need to switch back to NPH temporarily.    The V-Go should not be exposed to direct sunlight, hot tub, whirlpool or sauna use (replace with a new filled V-Go afterward)    Check that the V-Go is securely in place during and after periods of increased physical activity, exposure to water or gone under water to the depth of 3 feet, 3 inches (the V-Go can go under water and continue to work safely)    Reviewed s/s and tx of hypoglycemia     All of patient's questions and concerns were addressed.  Patient instructed to  keep a BG log (log sheets provided) and send BG readings to MD in 1 week for review. Phone number was provided and patient advised to call with any questions or concerns.     Time spent with patient: 60 minutes

## 2019-09-18 ENCOUNTER — TELEPHONE (OUTPATIENT)
Dept: DIABETES | Facility: CLINIC | Age: 69
End: 2019-09-18

## 2019-09-24 ENCOUNTER — OFFICE VISIT (OUTPATIENT)
Dept: INTERNAL MEDICINE | Facility: CLINIC | Age: 69
End: 2019-09-24

## 2019-09-24 VITALS
DIASTOLIC BLOOD PRESSURE: 60 MMHG | WEIGHT: 242.94 LBS | BODY MASS INDEX: 38.13 KG/M2 | HEART RATE: 60 BPM | SYSTOLIC BLOOD PRESSURE: 116 MMHG | HEIGHT: 67 IN | OXYGEN SATURATION: 100 % | RESPIRATION RATE: 20 BRPM

## 2019-09-24 DIAGNOSIS — Z23 NEED FOR VACCINATION: ICD-10-CM

## 2019-09-24 DIAGNOSIS — T14.8XXA MUSCLE STRAIN: Primary | ICD-10-CM
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
## 2019-09-24 PROCEDURE — 99999 PR PBB SHADOW E&M-EST. PATIENT-LVL III: CPT | Mod: PBBFAC,,, | Performed by: INTERNAL MEDICINE

## 2019-09-24 PROCEDURE — 90662 IIV NO PRSV INCREASED AG IM: CPT | Mod: S$GLB,,, | Performed by: INTERNAL MEDICINE

## 2019-09-24 PROCEDURE — 90662 FLU VACCINE - HIGH DOSE (65+) PRESERVATIVE FREE IM: ICD-10-PCS | Mod: S$GLB,,, | Performed by: INTERNAL MEDICINE

## 2019-09-24 PROCEDURE — 90471 IMMUNIZATION ADMIN: CPT | Mod: S$GLB,,, | Performed by: INTERNAL MEDICINE

## 2019-09-24 PROCEDURE — 90472 PNEUMOCOCCAL POLYSACCHARIDE VACCINE 23-VALENT =>2YO SQ IM: ICD-10-PCS | Mod: S$GLB,,, | Performed by: INTERNAL MEDICINE

## 2019-09-24 PROCEDURE — 99999 PR PBB SHADOW E&M-EST. PATIENT-LVL III: ICD-10-PCS | Mod: PBBFAC,,, | Performed by: INTERNAL MEDICINE

## 2019-09-24 PROCEDURE — 90471 FLU VACCINE - HIGH DOSE (65+) PRESERVATIVE FREE IM: ICD-10-PCS | Mod: S$GLB,,, | Performed by: INTERNAL MEDICINE

## 2019-09-24 PROCEDURE — 99213 PR OFFICE/OUTPT VISIT, EST, LEVL III, 20-29 MIN: ICD-10-PCS | Mod: 25,S$GLB,, | Performed by: INTERNAL MEDICINE

## 2019-09-24 PROCEDURE — 99213 OFFICE O/P EST LOW 20 MIN: CPT | Mod: 25,S$GLB,, | Performed by: INTERNAL MEDICINE

## 2019-09-24 PROCEDURE — 90732 PNEUMOCOCCAL POLYSACCHARIDE VACCINE 23-VALENT =>2YO SQ IM: ICD-10-PCS | Mod: S$GLB,,, | Performed by: INTERNAL MEDICINE

## 2019-09-24 PROCEDURE — 90472 IMMUNIZATION ADMIN EACH ADD: CPT | Mod: S$GLB,,, | Performed by: INTERNAL MEDICINE

## 2019-09-24 PROCEDURE — 90732 PPSV23 VACC 2 YRS+ SUBQ/IM: CPT | Mod: S$GLB,,, | Performed by: INTERNAL MEDICINE

## 2019-09-24 RX ORDER — TRAMADOL HYDROCHLORIDE 50 MG/1
50 TABLET ORAL EVERY 8 HOURS PRN
Qty: 20 TABLET | Refills: 0 | Status: SHIPPED | OUTPATIENT
Start: 2019-09-24 | End: 2020-02-26

## 2019-09-24 RX ORDER — CARVEDILOL 6.25 MG/1
6.25 TABLET ORAL DAILY
Status: ON HOLD | COMMUNITY
Start: 2019-08-24 | End: 2019-10-07 | Stop reason: HOSPADM
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
## 2019-10-01 ENCOUNTER — PATIENT MESSAGE (OUTPATIENT)
Dept: ELECTROPHYSIOLOGY | Facility: CLINIC | Age: 69
End: 2019-10-01

## 2019-10-01 ENCOUNTER — TELEPHONE (OUTPATIENT)
Dept: ELECTROPHYSIOLOGY | Facility: CLINIC | Age: 69
End: 2019-10-01

## 2019-10-01 DIAGNOSIS — I49.8 OTHER SPECIFIED CARDIAC ARRHYTHMIAS: Primary | ICD-10-CM

## 2019-10-01 NOTE — TELEPHONE ENCOUNTER
----- Message from Zay Mays sent at 10/1/2019 11:10 AM CDT -----  Good morning,    The above patient was due to see Ankita in August as per her Clinic Note on 2/21/19.  Patient will not need an in clinic ICD check.  Scheduled remote transmission received on 9/30/19.    Thanks,  Zay   Repair Hemostasis (Optional): Electrodesiccation

## 2019-10-04 ENCOUNTER — TELEPHONE (OUTPATIENT)
Dept: ELECTROPHYSIOLOGY | Facility: CLINIC | Age: 69
End: 2019-10-04

## 2019-10-04 DIAGNOSIS — I49.8 OTHER SPECIFIED CARDIAC ARRHYTHMIAS: Primary | ICD-10-CM

## 2019-10-05 ENCOUNTER — HOSPITAL ENCOUNTER (EMERGENCY)
Facility: HOSPITAL | Age: 69
Discharge: ANOTHER HOSPITAL | End: 2019-10-06
Attending: SURGERY
Payer: MEDICARE

## 2019-10-05 DIAGNOSIS — R07.9 CHEST PAIN: ICD-10-CM

## 2019-10-05 DIAGNOSIS — R07.2 PRECORDIAL PAIN: Primary | ICD-10-CM

## 2019-10-05 LAB
ALBUMIN SERPL BCP-MCNC: 3.6 G/DL (ref 3.5–5.2)
ALP SERPL-CCNC: 77 U/L (ref 55–135)
ALT SERPL W/O P-5'-P-CCNC: 53 U/L (ref 10–44)
ANION GAP SERPL CALC-SCNC: 10 MMOL/L (ref 8–16)
APTT BLDCRRT: 25.1 SEC (ref 21–32)
AST SERPL-CCNC: 45 U/L (ref 10–40)
BASOPHILS # BLD AUTO: 0.02 K/UL (ref 0–0.2)
BASOPHILS NFR BLD: 0.2 % (ref 0–1.9)
BILIRUB SERPL-MCNC: 0.4 MG/DL (ref 0.1–1)
BILIRUB UR QL STRIP: NEGATIVE
BNP SERPL-MCNC: 380 PG/ML (ref 0–99)
BUN SERPL-MCNC: 19 MG/DL (ref 8–23)
CALCIUM SERPL-MCNC: 9 MG/DL (ref 8.7–10.5)
CHLORIDE SERPL-SCNC: 98 MMOL/L (ref 95–110)
CK MB SERPL-MCNC: 1.8 NG/ML (ref 0.1–6.5)
CK MB SERPL-RTO: 0.9 % (ref 0–5)
CK SERPL-CCNC: 190 U/L (ref 20–200)
CK SERPL-CCNC: 190 U/L (ref 20–200)
CLARITY UR: CLEAR
CO2 SERPL-SCNC: 24 MMOL/L (ref 23–29)
COLOR UR: YELLOW
CREAT SERPL-MCNC: 1.9 MG/DL (ref 0.5–1.4)
D DIMER PPP IA.FEU-MCNC: 0.52 MG/L FEU
DIFFERENTIAL METHOD: ABNORMAL
EOSINOPHIL # BLD AUTO: 0 K/UL (ref 0–0.5)
EOSINOPHIL NFR BLD: 0.1 % (ref 0–8)
ERYTHROCYTE [DISTWIDTH] IN BLOOD BY AUTOMATED COUNT: 14.6 % (ref 11.5–14.5)
EST. GFR  (AFRICAN AMERICAN): 40 ML/MIN/1.73 M^2
EST. GFR  (NON AFRICAN AMERICAN): 35 ML/MIN/1.73 M^2
GLUCOSE SERPL-MCNC: 721 MG/DL (ref 70–110)
GLUCOSE UR QL STRIP: ABNORMAL
HCT VFR BLD AUTO: 36.2 % (ref 40–54)
HGB BLD-MCNC: 11.5 G/DL (ref 14–18)
HGB UR QL STRIP: NEGATIVE
IMM GRANULOCYTES # BLD AUTO: 0.04 K/UL (ref 0–0.04)
IMM GRANULOCYTES NFR BLD AUTO: 0.5 % (ref 0–0.5)
INR PPP: 1 (ref 0.8–1.2)
KETONES UR QL STRIP: NEGATIVE
LEUKOCYTE ESTERASE UR QL STRIP: NEGATIVE
LYMPHOCYTES # BLD AUTO: 1.2 K/UL (ref 1–4.8)
LYMPHOCYTES NFR BLD: 14.3 % (ref 18–48)
MCH RBC QN AUTO: 29.8 PG (ref 27–31)
MCHC RBC AUTO-ENTMCNC: 31.8 G/DL (ref 32–36)
MCV RBC AUTO: 94 FL (ref 82–98)
MONOCYTES # BLD AUTO: 0.6 K/UL (ref 0.3–1)
MONOCYTES NFR BLD: 7.4 % (ref 4–15)
NEUTROPHILS # BLD AUTO: 6.5 K/UL (ref 1.8–7.7)
NEUTROPHILS NFR BLD: 77.5 % (ref 38–73)
NITRITE UR QL STRIP: NEGATIVE
NRBC BLD-RTO: 0 /100 WBC
PH UR STRIP: 6 [PH] (ref 5–8)
PLATELET # BLD AUTO: 177 K/UL (ref 150–350)
PMV BLD AUTO: 10.9 FL (ref 9.2–12.9)
POCT GLUCOSE: 383 MG/DL (ref 70–110)
POTASSIUM SERPL-SCNC: 5.1 MMOL/L (ref 3.5–5.1)
PROT SERPL-MCNC: 6.9 G/DL (ref 6–8.4)
PROT UR QL STRIP: NEGATIVE
PROTHROMBIN TIME: 11 SEC (ref 9–12.5)
RBC # BLD AUTO: 3.86 M/UL (ref 4.6–6.2)
SODIUM SERPL-SCNC: 132 MMOL/L (ref 136–145)
SP GR UR STRIP: <=1.005 (ref 1–1.03)
TROPONIN I SERPL DL<=0.01 NG/ML-MCNC: 0.01 NG/ML (ref 0–0.03)
URN SPEC COLLECT METH UR: ABNORMAL
UROBILINOGEN UR STRIP-ACNC: NEGATIVE EU/DL
WBC # BLD AUTO: 8.35 K/UL (ref 3.9–12.7)

## 2019-10-05 PROCEDURE — 80053 COMPREHEN METABOLIC PANEL: CPT

## 2019-10-05 PROCEDURE — 25000003 PHARM REV CODE 250: Performed by: SURGERY

## 2019-10-05 PROCEDURE — 93010 ELECTROCARDIOGRAM REPORT: CPT | Mod: ,,, | Performed by: INTERNAL MEDICINE

## 2019-10-05 PROCEDURE — 63600175 PHARM REV CODE 636 W HCPCS: Performed by: SURGERY

## 2019-10-05 PROCEDURE — 96375 TX/PRO/DX INJ NEW DRUG ADDON: CPT

## 2019-10-05 PROCEDURE — 93005 ELECTROCARDIOGRAM TRACING: CPT

## 2019-10-05 PROCEDURE — 81003 URINALYSIS AUTO W/O SCOPE: CPT

## 2019-10-05 PROCEDURE — 83880 ASSAY OF NATRIURETIC PEPTIDE: CPT

## 2019-10-05 PROCEDURE — 85610 PROTHROMBIN TIME: CPT

## 2019-10-05 PROCEDURE — 99285 EMERGENCY DEPT VISIT HI MDM: CPT | Mod: 25

## 2019-10-05 PROCEDURE — 36415 COLL VENOUS BLD VENIPUNCTURE: CPT

## 2019-10-05 PROCEDURE — 93010 EKG 12-LEAD: ICD-10-PCS | Mod: ,,, | Performed by: INTERNAL MEDICINE

## 2019-10-05 PROCEDURE — 85379 FIBRIN DEGRADATION QUANT: CPT

## 2019-10-05 PROCEDURE — 96374 THER/PROPH/DIAG INJ IV PUSH: CPT

## 2019-10-05 PROCEDURE — 82550 ASSAY OF CK (CPK): CPT

## 2019-10-05 PROCEDURE — 85025 COMPLETE CBC W/AUTO DIFF WBC: CPT

## 2019-10-05 PROCEDURE — 84484 ASSAY OF TROPONIN QUANT: CPT

## 2019-10-05 PROCEDURE — 82553 CREATINE MB FRACTION: CPT

## 2019-10-05 PROCEDURE — 82962 GLUCOSE BLOOD TEST: CPT

## 2019-10-05 PROCEDURE — 85730 THROMBOPLASTIN TIME PARTIAL: CPT

## 2019-10-05 RX ORDER — ATORVASTATIN CALCIUM 40 MG/1
40 TABLET, FILM COATED ORAL
Status: COMPLETED | OUTPATIENT
Start: 2019-10-05 | End: 2019-10-05

## 2019-10-05 RX ORDER — PANTOPRAZOLE SODIUM 40 MG/1
40 TABLET, DELAYED RELEASE ORAL
Status: COMPLETED | OUTPATIENT
Start: 2019-10-05 | End: 2019-10-05

## 2019-10-05 RX ORDER — ONDANSETRON 2 MG/ML
4 INJECTION INTRAMUSCULAR; INTRAVENOUS
Status: COMPLETED | OUTPATIENT
Start: 2019-10-05 | End: 2019-10-05

## 2019-10-05 RX ORDER — MORPHINE SULFATE 2 MG/ML
2 INJECTION, SOLUTION INTRAMUSCULAR; INTRAVENOUS
Status: COMPLETED | OUTPATIENT
Start: 2019-10-05 | End: 2019-10-05

## 2019-10-05 RX ORDER — ASPIRIN 325 MG
325 TABLET ORAL
Status: COMPLETED | OUTPATIENT
Start: 2019-10-05 | End: 2019-10-05

## 2019-10-05 RX ADMIN — ONDANSETRON 4 MG: 2 INJECTION INTRAMUSCULAR; INTRAVENOUS at 11:10

## 2019-10-05 RX ADMIN — INSULIN HUMAN 12 UNITS: 100 INJECTION, SOLUTION PARENTERAL at 09:10

## 2019-10-05 RX ADMIN — MORPHINE SULFATE 2 MG: 2 INJECTION, SOLUTION INTRAMUSCULAR; INTRAVENOUS at 11:10

## 2019-10-05 RX ADMIN — ASPIRIN 325 MG ORAL TABLET 325 MG: 325 PILL ORAL at 08:10

## 2019-10-05 RX ADMIN — NITROGLYCERIN 1 INCH: 20 OINTMENT TOPICAL at 09:10

## 2019-10-05 RX ADMIN — PANTOPRAZOLE SODIUM 40 MG: 40 TABLET, DELAYED RELEASE ORAL at 09:10

## 2019-10-05 RX ADMIN — ATORVASTATIN CALCIUM 40 MG: 40 TABLET, FILM COATED ORAL at 09:10

## 2019-10-06 ENCOUNTER — HOSPITAL ENCOUNTER (INPATIENT)
Facility: HOSPITAL | Age: 69
LOS: 1 days | Discharge: HOME | DRG: 303 | End: 2019-10-07
Attending: HOSPITALIST | Admitting: HOSPITALIST
Payer: MEDICARE

## 2019-10-06 VITALS
SYSTOLIC BLOOD PRESSURE: 159 MMHG | HEART RATE: 60 BPM | DIASTOLIC BLOOD PRESSURE: 74 MMHG | HEIGHT: 67 IN | OXYGEN SATURATION: 99 % | RESPIRATION RATE: 13 BRPM | WEIGHT: 242 LBS | TEMPERATURE: 98 F | BODY MASS INDEX: 37.98 KG/M2

## 2019-10-06 DIAGNOSIS — N41.1 CHRONIC PROSTATITIS: ICD-10-CM

## 2019-10-06 DIAGNOSIS — I50.42 CHRONIC COMBINED SYSTOLIC AND DIASTOLIC HEART FAILURE: Chronic | ICD-10-CM

## 2019-10-06 DIAGNOSIS — R00.1 BRADYCARDIA: ICD-10-CM

## 2019-10-06 DIAGNOSIS — I82.90: ICD-10-CM

## 2019-10-06 DIAGNOSIS — N17.9 ACUTE KIDNEY INJURY SUPERIMPOSED ON CKD: ICD-10-CM

## 2019-10-06 DIAGNOSIS — K21.9 GASTROESOPHAGEAL REFLUX DISEASE, ESOPHAGITIS PRESENCE NOT SPECIFIED: Chronic | ICD-10-CM

## 2019-10-06 DIAGNOSIS — R07.9 CHEST PAIN NOT DUE TO ACUTE CORONARY SYNDROME: Primary | ICD-10-CM

## 2019-10-06 DIAGNOSIS — N40.0 BENIGN PROSTATIC HYPERPLASIA WITHOUT LOWER URINARY TRACT SYMPTOMS: Chronic | ICD-10-CM

## 2019-10-06 DIAGNOSIS — E66.01 SEVERE OBESITY (BMI 35.0-39.9) WITH COMORBIDITY: ICD-10-CM

## 2019-10-06 DIAGNOSIS — E66.9 DIABETES MELLITUS TYPE 2 IN OBESE: Chronic | ICD-10-CM

## 2019-10-06 DIAGNOSIS — E78.2 MIXED HYPERLIPIDEMIA: Chronic | ICD-10-CM

## 2019-10-06 DIAGNOSIS — I10 BENIGN ESSENTIAL HTN: Chronic | ICD-10-CM

## 2019-10-06 DIAGNOSIS — E83.42 HYPOMAGNESEMIA: ICD-10-CM

## 2019-10-06 DIAGNOSIS — E11.69 DIABETES MELLITUS TYPE 2 IN OBESE: Chronic | ICD-10-CM

## 2019-10-06 DIAGNOSIS — I25.5 ISCHEMIC CARDIOMYOPATHY: ICD-10-CM

## 2019-10-06 DIAGNOSIS — R00.0 WIDE-COMPLEX TACHYCARDIA: ICD-10-CM

## 2019-10-06 DIAGNOSIS — Z95.810 ICD (IMPLANTABLE CARDIOVERTER-DEFIBRILLATOR), DUAL, IN SITU: Chronic | ICD-10-CM

## 2019-10-06 DIAGNOSIS — E11.42 DIABETIC POLYNEUROPATHY ASSOCIATED WITH TYPE 2 DIABETES MELLITUS: ICD-10-CM

## 2019-10-06 DIAGNOSIS — Z87.891 HISTORY OF TOBACCO USE: ICD-10-CM

## 2019-10-06 DIAGNOSIS — I20.0 UNSTABLE ANGINA: ICD-10-CM

## 2019-10-06 DIAGNOSIS — D50.9 IRON DEFICIENCY ANEMIA, UNSPECIFIED IRON DEFICIENCY ANEMIA TYPE: ICD-10-CM

## 2019-10-06 DIAGNOSIS — I47.20 VENTRICULAR TACHYCARDIA: ICD-10-CM

## 2019-10-06 DIAGNOSIS — I21.4 NSTEMI (NON-ST ELEVATED MYOCARDIAL INFARCTION): ICD-10-CM

## 2019-10-06 DIAGNOSIS — G47.33 OSA (OBSTRUCTIVE SLEEP APNEA): ICD-10-CM

## 2019-10-06 DIAGNOSIS — T80.1XXD: ICD-10-CM

## 2019-10-06 DIAGNOSIS — R07.9 CHEST PAIN: ICD-10-CM

## 2019-10-06 DIAGNOSIS — R07.2 PRECORDIAL PAIN: ICD-10-CM

## 2019-10-06 DIAGNOSIS — I25.10 CORONARY ARTERY DISEASE INVOLVING NATIVE CORONARY ARTERY OF NATIVE HEART WITHOUT ANGINA PECTORIS: Chronic | ICD-10-CM

## 2019-10-06 DIAGNOSIS — N18.9 ACUTE KIDNEY INJURY SUPERIMPOSED ON CKD: ICD-10-CM

## 2019-10-06 DIAGNOSIS — N30.01 ACUTE CYSTITIS WITH HEMATURIA: ICD-10-CM

## 2019-10-06 DIAGNOSIS — R53.81 PHYSICAL DEBILITY: ICD-10-CM

## 2019-10-06 DIAGNOSIS — G47.52 RBD (REM BEHAVIORAL DISORDER): ICD-10-CM

## 2019-10-06 DIAGNOSIS — N45.1 EPIDIDYMITIS: ICD-10-CM

## 2019-10-06 LAB
ALBUMIN SERPL BCP-MCNC: 3.5 G/DL (ref 3.5–5.2)
ALP SERPL-CCNC: 62 U/L (ref 55–135)
ALT SERPL W/O P-5'-P-CCNC: 49 U/L (ref 10–44)
ANION GAP SERPL CALC-SCNC: 7 MMOL/L (ref 8–16)
ASCENDING AORTA: 3.43 CM
AST SERPL-CCNC: 34 U/L (ref 10–40)
AV INDEX (PROSTH): 0.9
AV MEAN GRADIENT: 4 MMHG
AV PEAK GRADIENT: 8 MMHG
AV VALVE AREA: 3.03 CM2
AV VELOCITY RATIO: 0.8
BASOPHILS # BLD AUTO: 0.02 K/UL (ref 0–0.2)
BASOPHILS NFR BLD: 0.2 % (ref 0–1.9)
BILIRUB SERPL-MCNC: 0.5 MG/DL (ref 0.1–1)
BNP SERPL-MCNC: 443 PG/ML (ref 0–99)
BSA FOR ECHO PROCEDURE: 2.26 M2
BUN SERPL-MCNC: 20 MG/DL (ref 8–23)
CALCIUM SERPL-MCNC: 9.2 MG/DL (ref 8.7–10.5)
CHLORIDE SERPL-SCNC: 100 MMOL/L (ref 95–110)
CHLORIDE UR-SCNC: 122 MMOL/L (ref 25–200)
CHOLEST SERPL-MCNC: 121 MG/DL (ref 120–199)
CHOLEST/HDLC SERPL: 2.6 {RATIO} (ref 2–5)
CK MB SERPL-MCNC: 1.8 NG/ML (ref 0.1–6.5)
CK MB SERPL-RTO: 1.2 % (ref 0–5)
CK SERPL-CCNC: 150 U/L (ref 20–200)
CK SERPL-CCNC: 150 U/L (ref 20–200)
CO2 SERPL-SCNC: 26 MMOL/L (ref 23–29)
CREAT SERPL-MCNC: 1.4 MG/DL (ref 0.5–1.4)
CREAT UR-MCNC: 13 MG/DL (ref 23–375)
CREAT UR-MCNC: 13 MG/DL (ref 23–375)
CV ECHO LV RWT: 0.36 CM
DIFFERENTIAL METHOD: ABNORMAL
DOP CALC AO PEAK VEL: 1.37 M/S
DOP CALC AO VTI: 30.12 CM
DOP CALC LVOT AREA: 3.4 CM2
DOP CALC LVOT DIAMETER: 2.07 CM
DOP CALC LVOT PEAK VEL: 1.1 M/S
DOP CALC LVOT STROKE VOLUME: 91.22 CM3
DOP CALCLVOT PEAK VEL VTI: 27.12 CM
E WAVE DECELERATION TIME: 186.38 MSEC
E/A RATIO: 1.07
E/E' RATIO: 8.42 M/S
ECHO LV POSTERIOR WALL: 0.93 CM (ref 0.6–1.1)
EOSINOPHIL # BLD AUTO: 0 K/UL (ref 0–0.5)
EOSINOPHIL NFR BLD: 0.1 % (ref 0–8)
EOSINOPHIL URNS QL WRIGHT STN: NORMAL
ERYTHROCYTE [DISTWIDTH] IN BLOOD BY AUTOMATED COUNT: 14.2 % (ref 11.5–14.5)
EST. GFR  (AFRICAN AMERICAN): 58.4 ML/MIN/1.73 M^2
EST. GFR  (NON AFRICAN AMERICAN): 50.5 ML/MIN/1.73 M^2
FRACTIONAL SHORTENING: 21 % (ref 28–44)
GLUCOSE SERPL-MCNC: 310 MG/DL (ref 70–110)
HCT VFR BLD AUTO: 37.4 % (ref 40–54)
HDLC SERPL-MCNC: 46 MG/DL (ref 40–75)
HDLC SERPL: 38 % (ref 20–50)
HGB BLD-MCNC: 11.6 G/DL (ref 14–18)
IMM GRANULOCYTES # BLD AUTO: 0.04 K/UL (ref 0–0.04)
IMM GRANULOCYTES NFR BLD AUTO: 0.4 % (ref 0–0.5)
INTERVENTRICULAR SEPTUM: 0.78 CM (ref 0.6–1.1)
IVRT: 0.11 MSEC
LA MAJOR: 5.83 CM
LA MINOR: 5.76 CM
LA WIDTH: 5.09 CM
LDLC SERPL CALC-MCNC: 67 MG/DL (ref 63–159)
LEFT ATRIUM SIZE: 4.41 CM
LEFT ATRIUM VOLUME INDEX: 50.7 ML/M2
LEFT ATRIUM VOLUME: 110.56 CM3
LEFT INTERNAL DIMENSION IN SYSTOLE: 4.07 CM (ref 2.1–4)
LEFT VENTRICLE DIASTOLIC VOLUME INDEX: 57.6 ML/M2
LEFT VENTRICLE DIASTOLIC VOLUME: 125.63 ML
LEFT VENTRICLE MASS INDEX: 71 G/M2
LEFT VENTRICLE SYSTOLIC VOLUME INDEX: 33.5 ML/M2
LEFT VENTRICLE SYSTOLIC VOLUME: 73.03 ML
LEFT VENTRICULAR INTERNAL DIMENSION IN DIASTOLE: 5.13 CM (ref 3.5–6)
LEFT VENTRICULAR MASS: 154.53 G
LV LATERAL E/E' RATIO: 7.27 M/S
LV SEPTAL E/E' RATIO: 10 M/S
LYMPHOCYTES # BLD AUTO: 1.7 K/UL (ref 1–4.8)
LYMPHOCYTES NFR BLD: 15.4 % (ref 18–48)
MAGNESIUM SERPL-MCNC: 2 MG/DL (ref 1.6–2.6)
MCH RBC QN AUTO: 29 PG (ref 27–31)
MCHC RBC AUTO-ENTMCNC: 31 G/DL (ref 32–36)
MCV RBC AUTO: 94 FL (ref 82–98)
MONOCYTES # BLD AUTO: 0.8 K/UL (ref 0.3–1)
MONOCYTES NFR BLD: 7.1 % (ref 4–15)
MV PEAK A VEL: 0.75 M/S
MV PEAK E VEL: 0.8 M/S
NEUTROPHILS # BLD AUTO: 8.4 K/UL (ref 1.8–7.7)
NEUTROPHILS NFR BLD: 76.8 % (ref 38–73)
NONHDLC SERPL-MCNC: 75 MG/DL
NRBC BLD-RTO: 0 /100 WBC
PHOSPHATE SERPL-MCNC: 2.9 MG/DL (ref 2.7–4.5)
PISA TR MAX VEL: 2.58 M/S
PLATELET # BLD AUTO: 169 K/UL (ref 150–350)
PMV BLD AUTO: 11 FL (ref 9.2–12.9)
POCT GLUCOSE: 221 MG/DL (ref 70–110)
POCT GLUCOSE: 264 MG/DL (ref 70–110)
POCT GLUCOSE: 315 MG/DL (ref 70–110)
POCT GLUCOSE: 331 MG/DL (ref 70–110)
POCT GLUCOSE: 333 MG/DL (ref 70–110)
POTASSIUM SERPL-SCNC: 4.6 MMOL/L (ref 3.5–5.1)
PROT SERPL-MCNC: 6.8 G/DL (ref 6–8.4)
PROT UR-MCNC: <7 MG/DL (ref 0–15)
PROT/CREAT UR: ABNORMAL MG/G{CREAT} (ref 0–0.2)
PULM VEIN S/D RATIO: 1.21
PV PEAK D VEL: 0.38 M/S
PV PEAK S VEL: 0.46 M/S
RA MAJOR: 4.35 CM
RA WIDTH: 3.66 CM
RBC # BLD AUTO: 4 M/UL (ref 4.6–6.2)
RIGHT VENTRICULAR END-DIASTOLIC DIMENSION: 3.77 CM
RV TISSUE DOPPLER FREE WALL SYSTOLIC VELOCITY 1 (APICAL 4 CHAMBER VIEW): 6.56 CM/S
SINUS: 3.48 CM
SODIUM SERPL-SCNC: 133 MMOL/L (ref 136–145)
SODIUM UR-SCNC: 133 MMOL/L (ref 20–250)
STJ: 3.08 CM
TDI LATERAL: 0.11 M/S
TDI SEPTAL: 0.08 M/S
TDI: 0.1 M/S
TR MAX PG: 27 MMHG
TRICUSPID ANNULAR PLANE SYSTOLIC EXCURSION: 1.23 CM
TRIGL SERPL-MCNC: 40 MG/DL (ref 30–150)
TROPONIN I SERPL DL<=0.01 NG/ML-MCNC: 0.01 NG/ML (ref 0–0.03)
TROPONIN I SERPL DL<=0.01 NG/ML-MCNC: 0.02 NG/ML (ref 0–0.03)
UUN UR-MCNC: 120 MG/DL (ref 140–1050)
WBC # BLD AUTO: 10.89 K/UL (ref 3.9–12.7)

## 2019-10-06 PROCEDURE — 84540 ASSAY OF URINE/UREA-N: CPT

## 2019-10-06 PROCEDURE — 25000003 PHARM REV CODE 250: Performed by: NURSE PRACTITIONER

## 2019-10-06 PROCEDURE — 93010 ELECTROCARDIOGRAM REPORT: CPT | Mod: ,,, | Performed by: INTERNAL MEDICINE

## 2019-10-06 PROCEDURE — 99223 PR INITIAL HOSPITAL CARE,LEVL III: ICD-10-PCS | Mod: AI,,, | Performed by: NURSE PRACTITIONER

## 2019-10-06 PROCEDURE — 84100 ASSAY OF PHOSPHORUS: CPT

## 2019-10-06 PROCEDURE — 84300 ASSAY OF URINE SODIUM: CPT

## 2019-10-06 PROCEDURE — 99233 PR SUBSEQUENT HOSPITAL CARE,LEVL III: ICD-10-PCS | Mod: GC,,, | Performed by: INTERNAL MEDICINE

## 2019-10-06 PROCEDURE — 85025 COMPLETE CBC W/AUTO DIFF WBC: CPT

## 2019-10-06 PROCEDURE — 99223 1ST HOSP IP/OBS HIGH 75: CPT | Mod: AI,,, | Performed by: NURSE PRACTITIONER

## 2019-10-06 PROCEDURE — 84165 PATHOLOGIST INTERPRETATION SPE: ICD-10-PCS | Mod: 26,,, | Performed by: PATHOLOGY

## 2019-10-06 PROCEDURE — 83735 ASSAY OF MAGNESIUM: CPT

## 2019-10-06 PROCEDURE — 84484 ASSAY OF TROPONIN QUANT: CPT | Mod: 91

## 2019-10-06 PROCEDURE — 80061 LIPID PANEL: CPT

## 2019-10-06 PROCEDURE — 63600175 PHARM REV CODE 636 W HCPCS: Performed by: NURSE PRACTITIONER

## 2019-10-06 PROCEDURE — 99222 1ST HOSP IP/OBS MODERATE 55: CPT | Mod: GC,,, | Performed by: INTERNAL MEDICINE

## 2019-10-06 PROCEDURE — 84166 PROTEIN E-PHORESIS/URINE/CSF: CPT

## 2019-10-06 PROCEDURE — 84484 ASSAY OF TROPONIN QUANT: CPT

## 2019-10-06 PROCEDURE — 84166 PATHOLOGIST INTERPRETATION UPE: ICD-10-PCS | Mod: 26,,, | Performed by: PATHOLOGY

## 2019-10-06 PROCEDURE — 99223 1ST HOSP IP/OBS HIGH 75: CPT | Mod: ,,, | Performed by: NURSE PRACTITIONER

## 2019-10-06 PROCEDURE — 99223 PR INITIAL HOSPITAL CARE,LEVL III: ICD-10-PCS | Mod: ,,, | Performed by: NURSE PRACTITIONER

## 2019-10-06 PROCEDURE — 93010 EKG 12-LEAD: ICD-10-PCS | Mod: ,,, | Performed by: INTERNAL MEDICINE

## 2019-10-06 PROCEDURE — 80053 COMPREHEN METABOLIC PANEL: CPT

## 2019-10-06 PROCEDURE — 99233 SBSQ HOSP IP/OBS HIGH 50: CPT | Mod: GC,,, | Performed by: INTERNAL MEDICINE

## 2019-10-06 PROCEDURE — C9399 UNCLASSIFIED DRUGS OR BIOLOG: HCPCS | Performed by: NURSE PRACTITIONER

## 2019-10-06 PROCEDURE — 82550 ASSAY OF CK (CPK): CPT

## 2019-10-06 PROCEDURE — 99222 PR INITIAL HOSPITAL CARE,LEVL II: ICD-10-PCS | Mod: GC,,, | Performed by: INTERNAL MEDICINE

## 2019-10-06 PROCEDURE — 84166 PROTEIN E-PHORESIS/URINE/CSF: CPT | Mod: 26,,, | Performed by: PATHOLOGY

## 2019-10-06 PROCEDURE — 84165 PROTEIN E-PHORESIS SERUM: CPT | Mod: 26,,, | Performed by: PATHOLOGY

## 2019-10-06 PROCEDURE — 93005 ELECTROCARDIOGRAM TRACING: CPT

## 2019-10-06 PROCEDURE — 83880 ASSAY OF NATRIURETIC PEPTIDE: CPT

## 2019-10-06 PROCEDURE — 20600001 HC STEP DOWN PRIVATE ROOM

## 2019-10-06 PROCEDURE — 36415 COLL VENOUS BLD VENIPUNCTURE: CPT

## 2019-10-06 PROCEDURE — 82570 ASSAY OF URINE CREATININE: CPT

## 2019-10-06 PROCEDURE — 82436 ASSAY OF URINE CHLORIDE: CPT

## 2019-10-06 PROCEDURE — 82553 CREATINE MB FRACTION: CPT

## 2019-10-06 PROCEDURE — 84156 ASSAY OF PROTEIN URINE: CPT | Mod: 91

## 2019-10-06 PROCEDURE — 84165 PROTEIN E-PHORESIS SERUM: CPT

## 2019-10-06 PROCEDURE — 87205 SMEAR GRAM STAIN: CPT

## 2019-10-06 RX ORDER — IBUPROFEN 200 MG
16 TABLET ORAL
Status: DISCONTINUED | OUTPATIENT
Start: 2019-10-06 | End: 2019-10-07 | Stop reason: HOSPADM

## 2019-10-06 RX ORDER — GLUCAGON 1 MG
1 KIT INJECTION
Status: DISCONTINUED | OUTPATIENT
Start: 2019-10-06 | End: 2019-10-07 | Stop reason: HOSPADM

## 2019-10-06 RX ORDER — TAMSULOSIN HYDROCHLORIDE 0.4 MG/1
0.4 CAPSULE ORAL DAILY
Status: DISCONTINUED | OUTPATIENT
Start: 2019-10-06 | End: 2019-10-07 | Stop reason: HOSPADM

## 2019-10-06 RX ORDER — IBUPROFEN 200 MG
24 TABLET ORAL
Status: DISCONTINUED | OUTPATIENT
Start: 2019-10-06 | End: 2019-10-07 | Stop reason: HOSPADM

## 2019-10-06 RX ORDER — FUROSEMIDE 10 MG/ML
40 INJECTION INTRAMUSCULAR; INTRAVENOUS ONCE
Status: DISCONTINUED | OUTPATIENT
Start: 2019-10-06 | End: 2019-10-06

## 2019-10-06 RX ORDER — ALBUTEROL SULFATE 90 UG/1
2 AEROSOL, METERED RESPIRATORY (INHALATION) EVERY 6 HOURS PRN
Status: DISCONTINUED | OUTPATIENT
Start: 2019-10-06 | End: 2019-10-07 | Stop reason: HOSPADM

## 2019-10-06 RX ORDER — AMMONIUM LACTATE 12 G/100G
LOTION TOPICAL 2 TIMES DAILY
Status: DISCONTINUED | OUTPATIENT
Start: 2019-10-06 | End: 2019-10-07 | Stop reason: HOSPADM

## 2019-10-06 RX ORDER — INSULIN ASPART 100 [IU]/ML
1-10 INJECTION, SOLUTION INTRAVENOUS; SUBCUTANEOUS
Status: DISCONTINUED | OUTPATIENT
Start: 2019-10-06 | End: 2019-10-07 | Stop reason: HOSPADM

## 2019-10-06 RX ORDER — ROSUVASTATIN CALCIUM 20 MG/1
40 TABLET, COATED ORAL DAILY
Status: DISCONTINUED | OUTPATIENT
Start: 2019-10-06 | End: 2019-10-07 | Stop reason: HOSPADM

## 2019-10-06 RX ORDER — CARVEDILOL 6.25 MG/1
6.25 TABLET ORAL DAILY
Status: DISCONTINUED | OUTPATIENT
Start: 2019-10-06 | End: 2019-10-07

## 2019-10-06 RX ORDER — AMOXICILLIN 250 MG
1 CAPSULE ORAL 2 TIMES DAILY
Status: DISCONTINUED | OUTPATIENT
Start: 2019-10-06 | End: 2019-10-07 | Stop reason: HOSPADM

## 2019-10-06 RX ORDER — PANTOPRAZOLE SODIUM 40 MG/1
40 TABLET, DELAYED RELEASE ORAL DAILY
Status: DISCONTINUED | OUTPATIENT
Start: 2019-10-06 | End: 2019-10-07 | Stop reason: HOSPADM

## 2019-10-06 RX ORDER — AMIODARONE HYDROCHLORIDE 200 MG/1
200 TABLET ORAL DAILY
Status: DISCONTINUED | OUTPATIENT
Start: 2019-10-06 | End: 2019-10-07 | Stop reason: HOSPADM

## 2019-10-06 RX ORDER — FERROUS SULFATE 325(65) MG
325 TABLET, DELAYED RELEASE (ENTERIC COATED) ORAL DAILY
Status: DISCONTINUED | OUTPATIENT
Start: 2019-10-06 | End: 2019-10-07 | Stop reason: HOSPADM

## 2019-10-06 RX ORDER — PROMETHAZINE HYDROCHLORIDE 12.5 MG/1
25 TABLET ORAL EVERY 6 HOURS PRN
Status: DISCONTINUED | OUTPATIENT
Start: 2019-10-06 | End: 2019-10-07 | Stop reason: HOSPADM

## 2019-10-06 RX ORDER — RAMELTEON 8 MG/1
8 TABLET ORAL NIGHTLY PRN
Status: DISCONTINUED | OUTPATIENT
Start: 2019-10-06 | End: 2019-10-07 | Stop reason: HOSPADM

## 2019-10-06 RX ORDER — ACETAMINOPHEN 325 MG/1
650 TABLET ORAL EVERY 4 HOURS PRN
Status: DISCONTINUED | OUTPATIENT
Start: 2019-10-06 | End: 2019-10-07 | Stop reason: HOSPADM

## 2019-10-06 RX ORDER — ENOXAPARIN SODIUM 100 MG/ML
40 INJECTION SUBCUTANEOUS EVERY 24 HOURS
Status: DISCONTINUED | OUTPATIENT
Start: 2019-10-06 | End: 2019-10-07 | Stop reason: HOSPADM

## 2019-10-06 RX ORDER — FUROSEMIDE 10 MG/ML
60 INJECTION INTRAMUSCULAR; INTRAVENOUS ONCE
Status: COMPLETED | OUTPATIENT
Start: 2019-10-06 | End: 2019-10-06

## 2019-10-06 RX ORDER — NITROGLYCERIN 0.4 MG/1
0.4 TABLET SUBLINGUAL EVERY 5 MIN PRN
Status: DISCONTINUED | OUTPATIENT
Start: 2019-10-06 | End: 2019-10-07 | Stop reason: HOSPADM

## 2019-10-06 RX ORDER — ISOSORBIDE MONONITRATE 60 MG/1
120 TABLET, EXTENDED RELEASE ORAL DAILY
Status: DISCONTINUED | OUTPATIENT
Start: 2019-10-06 | End: 2019-10-07 | Stop reason: HOSPADM

## 2019-10-06 RX ORDER — TRAMADOL HYDROCHLORIDE 50 MG/1
50 TABLET ORAL EVERY 8 HOURS PRN
Status: DISCONTINUED | OUTPATIENT
Start: 2019-10-06 | End: 2019-10-07 | Stop reason: HOSPADM

## 2019-10-06 RX ORDER — FINASTERIDE 5 MG/1
5 TABLET, FILM COATED ORAL DAILY
Status: DISCONTINUED | OUTPATIENT
Start: 2019-10-06 | End: 2019-10-07 | Stop reason: HOSPADM

## 2019-10-06 RX ORDER — SODIUM CHLORIDE 0.9 % (FLUSH) 0.9 %
10 SYRINGE (ML) INJECTION
Status: DISCONTINUED | OUTPATIENT
Start: 2019-10-06 | End: 2019-10-07 | Stop reason: HOSPADM

## 2019-10-06 RX ORDER — RANOLAZINE 500 MG/1
1000 TABLET, EXTENDED RELEASE ORAL 2 TIMES DAILY
Status: DISCONTINUED | OUTPATIENT
Start: 2019-10-06 | End: 2019-10-07 | Stop reason: HOSPADM

## 2019-10-06 RX ORDER — AMOXICILLIN AND CLAVULANATE POTASSIUM 875; 125 MG/1; MG/1
1 TABLET, FILM COATED ORAL 2 TIMES DAILY
Status: DISCONTINUED | OUTPATIENT
Start: 2019-10-06 | End: 2019-10-06

## 2019-10-06 RX ORDER — POLYETHYLENE GLYCOL 3350 17 G/17G
17 POWDER, FOR SOLUTION ORAL DAILY PRN
Status: DISCONTINUED | OUTPATIENT
Start: 2019-10-06 | End: 2019-10-07 | Stop reason: HOSPADM

## 2019-10-06 RX ORDER — HYDRALAZINE HYDROCHLORIDE 10 MG/1
10 TABLET, FILM COATED ORAL EVERY 8 HOURS
Status: DISCONTINUED | OUTPATIENT
Start: 2019-10-06 | End: 2019-10-07

## 2019-10-06 RX ORDER — INSULIN ASPART 100 [IU]/ML
5 INJECTION, SOLUTION INTRAVENOUS; SUBCUTANEOUS
Status: DISCONTINUED | OUTPATIENT
Start: 2019-10-06 | End: 2019-10-07

## 2019-10-06 RX ORDER — EZETIMIBE 10 MG/1
10 TABLET ORAL DAILY
Status: DISCONTINUED | OUTPATIENT
Start: 2019-10-06 | End: 2019-10-07 | Stop reason: HOSPADM

## 2019-10-06 RX ORDER — ASPIRIN 81 MG/1
81 TABLET ORAL DAILY
Status: DISCONTINUED | OUTPATIENT
Start: 2019-10-06 | End: 2019-10-07 | Stop reason: HOSPADM

## 2019-10-06 RX ADMIN — INSULIN ASPART 5 UNITS: 100 INJECTION, SOLUTION INTRAVENOUS; SUBCUTANEOUS at 12:10

## 2019-10-06 RX ADMIN — SENNOSIDES AND DOCUSATE SODIUM 1 TABLET: 8.6; 5 TABLET ORAL at 10:10

## 2019-10-06 RX ADMIN — CARVEDILOL 6.25 MG: 6.25 TABLET, FILM COATED ORAL at 09:10

## 2019-10-06 RX ADMIN — RANOLAZINE 1000 MG: 500 TABLET, FILM COATED, EXTENDED RELEASE ORAL at 09:10

## 2019-10-06 RX ADMIN — INSULIN ASPART 2 UNITS: 100 INJECTION, SOLUTION INTRAVENOUS; SUBCUTANEOUS at 10:10

## 2019-10-06 RX ADMIN — INSULIN ASPART 6 UNITS: 100 INJECTION, SOLUTION INTRAVENOUS; SUBCUTANEOUS at 12:10

## 2019-10-06 RX ADMIN — HYDRALAZINE HYDROCHLORIDE 10 MG: 10 TABLET, FILM COATED ORAL at 04:10

## 2019-10-06 RX ADMIN — EZETIMIBE 10 MG: 10 TABLET ORAL at 09:10

## 2019-10-06 RX ADMIN — ROSUVASTATIN CALCIUM 40 MG: 20 TABLET, FILM COATED ORAL at 09:10

## 2019-10-06 RX ADMIN — ENOXAPARIN SODIUM 40 MG: 100 INJECTION SUBCUTANEOUS at 10:10

## 2019-10-06 RX ADMIN — RANOLAZINE 1000 MG: 500 TABLET, FILM COATED, EXTENDED RELEASE ORAL at 10:10

## 2019-10-06 RX ADMIN — TAMSULOSIN HYDROCHLORIDE 0.4 MG: 0.4 CAPSULE ORAL at 10:10

## 2019-10-06 RX ADMIN — AMMONIUM LACTATE: 12 LOTION TOPICAL at 10:10

## 2019-10-06 RX ADMIN — FERROUS SULFATE TAB EC 325 MG (65 MG FE EQUIVALENT) 325 MG: 325 (65 FE) TABLET DELAYED RESPONSE at 09:10

## 2019-10-06 RX ADMIN — FINASTERIDE 5 MG: 5 TABLET, FILM COATED ORAL at 09:10

## 2019-10-06 RX ADMIN — FUROSEMIDE 60 MG: 10 INJECTION, SOLUTION INTRAMUSCULAR; INTRAVENOUS at 09:10

## 2019-10-06 RX ADMIN — INSULIN DETEMIR 25 UNITS: 100 INJECTION, SOLUTION SUBCUTANEOUS at 10:10

## 2019-10-06 RX ADMIN — INSULIN ASPART 5 UNITS: 100 INJECTION, SOLUTION INTRAVENOUS; SUBCUTANEOUS at 04:10

## 2019-10-06 RX ADMIN — ASPIRIN 81 MG: 81 TABLET, COATED ORAL at 09:10

## 2019-10-06 RX ADMIN — HYDRALAZINE HYDROCHLORIDE 10 MG: 10 TABLET, FILM COATED ORAL at 09:10

## 2019-10-06 RX ADMIN — PANTOPRAZOLE SODIUM 40 MG: 40 TABLET, DELAYED RELEASE ORAL at 10:10

## 2019-10-06 RX ADMIN — TICAGRELOR 90 MG: 90 TABLET ORAL at 10:10

## 2019-10-06 RX ADMIN — INSULIN ASPART 8 UNITS: 100 INJECTION, SOLUTION INTRAVENOUS; SUBCUTANEOUS at 04:10

## 2019-10-06 RX ADMIN — ISOSORBIDE MONONITRATE 120 MG: 60 TABLET, EXTENDED RELEASE ORAL at 09:10

## 2019-10-06 RX ADMIN — HYDRALAZINE HYDROCHLORIDE 10 MG: 10 TABLET, FILM COATED ORAL at 10:10

## 2019-10-06 RX ADMIN — TICAGRELOR 90 MG: 90 TABLET ORAL at 09:10

## 2019-10-06 RX ADMIN — AMIODARONE HYDROCHLORIDE 200 MG: 200 TABLET ORAL at 09:10

## 2019-10-06 NOTE — HPI
Mr. Walter Bull, 68 y.o. male with CAD s/p CABG 2005 and [facility] with last one on 11/2018 and showed   of the RCA and LCX with patent LIMA to LAD and SVG to Diagonal. SVG to OM occluded. Ischemic cardiomyopathy EF ~ 43%, prior epidose of VT and underwent dual chamber ICD. He presented to [facility] with symptoms of having chest pain that was retrosternal and became more recurrent compared to his baseline. The pain was described as pressure in nature chest discomfort that lasting from minutes to < 10 minutes. Reported compliance with his medications. On arrival to ED, he was having hyperglycemia > 700 and HARRIS on CKD which was improved with fluid and insulin and his sCr resorted to baseline. Cardiology was consulted for his chest pain and recommendation for further work up versus intervention.     [facility] for NSTEMI   · Origin to Prox Graft lesion , 100% stenosed.  · LVEDP (Pre): 18  · Prox RCA lesion , 100% stenosed.  · Mid LM to Ost LAD lesion , 100% stenosed.  · Estimated blood loss: none  · Three vessel coronary artery disease.

## 2019-10-06 NOTE — SUBJECTIVE & OBJECTIVE
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       Past Surgical History:   Procedure Laterality Date    CARDIAC DEFIBRILLATOR PLACEMENT      CARDIAC ELECTROPHYSIOLOGY STUDY N/A 11/19/2018    Procedure: CARDIAC ELECTROPHYSIOLOGY STUDY;  Surgeon: Byron Glasgow MD;  Location: [facility] EP LAB;  Service: Cardiology;  Laterality: N/A;  VT, EPS +/- ICD, SJM, anes, GP, 6086    CARDIAC ELECTROPHYSIOLOGY STUDY N/A 11/19/2018    Procedure: CARDIAC ELECTROPHYSIOLOGY STUDY;  Surgeon: Byron Glasgow MD;  Location: [facility] EP LAB;  Service: Cardiology;  Laterality: N/A;    COLON SURGERY  2006    COLONOSCOPY N/A 2/2/2017    Procedure: COLONOSCOPY;  Surgeon: Ronnie Conway MD;  Location: [facility];  Service: Endoscopy;  Laterality: N/A;    CORONARY ARTERY BYPASS GRAFT  2005    5 arteries    CORONARY BYPASS GRAFT ANGIOGRAPHY  11/16/2018    Procedure: Bypass graft study;  Surgeon: Ryan Schmidt MD;  Location: [facility] CATH LAB;  Service: Cardiology;;    EYE SURGERY Bilateral 2016    Laser surgery for glaucoma    INSERTION OF IMPLANTABLE CARDIOVERTER-DEFIBRILLATOR (ICD) GENERATOR WITH TWO EXISTING LEADS Left 11/19/2018    Procedure: INSERTION, DUAL ICD;  Surgeon: Byron Glasgow MD;  Location: [facility] EP LAB;  Service: Cardiology;  Laterality: Left;  VT, EPS +/- ICD, SJM, anes, GP, 6086    LEFT HEART CATHETERIZATION Left 11/16/2018    Procedure: Left heart cath;  Surgeon: Ryan Schmidt MD;  Location: [facility] CATH LAB;  Service: Cardiology;  Laterality: Left;       Review of patient's allergies indicates:  No Known  Allergies    Current Facility-Administered Medications on File Prior to Encounter   Medication    [COMPLETED] aspirin tablet 325 mg    [COMPLETED] atorvastatin tablet 40 mg    [COMPLETED] insulin regular injection 12 Units    [COMPLETED] morphine injection 2 mg    [COMPLETED] nitroGLYCERIN 2% TD oint ointment 1 inch    [COMPLETED] ondansetron injection 4 mg    [COMPLETED] pantoprazole EC tablet 40 mg     Current Outpatient Medications on File Prior to Encounter   Medication Sig    amiodarone (PACERONE) 200 MG Tab Take 1 tablet (200 mg total) by mouth once daily.    ammonium lactate 12 % Crea Apply small amount to feet except for in between toes twice a day    amoxicillin-clavulanate 875-125mg (AUGMENTIN) 875-125 mg per tablet Take 1 tablet by mouth 2 (two) times daily.    aspirin (ECOTRIN) 81 MG EC tablet Take 1 tablet (81 mg total) by mouth once daily.    carvedilol (COREG) 6.25 MG tablet Take 6.25 mg by mouth once daily.     esomeprazole (NEXIUM) 40 MG capsule Take 1 capsule (40 mg total) by mouth before breakfast.    ezetimibe (ZETIA) 10 mg tablet Take 1 tablet (10 mg total) by mouth once daily.    ferrous sulfate (FEOSOL) 325 mg (65 mg iron) Tab tablet TAKE 1 TABLET BY MOUTH THREE TIMES DAILY    finasteride (PROSCAR) 5 mg tablet HOLD DUE TO ORTHOSTATIC HYPOTENSION    furosemide (LASIX) 20 MG tablet Take 1 tablet (20 mg total) by mouth once daily. Take an extra tablet daily for wt gain more than 3 pounds/day or 5 pounds/week    insulin aspart U-100 (NOVOLOG U-100 INSULIN ASPART) 100 unit/mL injection 3 x10ml vials required per month for use in VGO 30    insulin  unit/mL injection Use 30 Units in the morning and 24 Units at night    isosorbide mononitrate (IMDUR) 60 MG 24 hr tablet Take 1 tablet (60 mg total) by mouth once daily.    losartan (COZAAR) 25 MG tablet Take 1 tablet (25 mg total) by mouth once daily.    nitroGLYCERIN (NITROSTAT) 0.4 MG SL tablet DISSOLVE ONE TABLET UNDER THE  TONGUE EVERY 5 MINUTES AS NEEDED FOR CHEST PAIN.    RANEXA 1,000 mg Tb12 Take 1 tablet (1,000 mg total) by mouth 2 (two) times daily.    rosuvastatin (CRESTOR) 40 MG Tab Take 1 tablet (40 mg total) by mouth once daily.    SITagliptin (JANUVIA) 100 MG Tab Take 1 tablet (100 mg total) by mouth once daily.    tamsulosin (FLOMAX) 0.4 mg Cap Take 1 capsule (0.4 mg total) by mouth once daily.    ticagrelor (BRILINTA) 90 mg tablet Take 90 mg by mouth 2 (two) times daily.    albuterol (PROVENTIL/VENTOLIN HFA) 90 mcg/actuation inhaler Inhale 2 puffs into the lungs every 6 (six) hours as needed for Wheezing.    metoprolol succinate (TOPROL-XL) 25 MG 24 hr tablet Take 1 tablet (25 mg total) by mouth once daily.    sub-q insulin device, 30 unit (V-GO 30) [place] 1 VGo every 24 hr as directed    sub-q insulin device, 30 unit (V-GO 30) [place] 1 VGo every 24 hr as directed    traMADol (ULTRAM) 50 mg tablet Take 1 tablet (50 mg total) by mouth every 8 (eight) hours as needed for Pain.     Family History     Problem Relation (Age of Onset)    Diabetes Mother, Sister    Heart attack Brother    Heart disease Mother, Sister    Hypertension Sister        Tobacco Use    Smoking status: Former Smoker     Packs/day: 3.00     Types: Cigarettes     Start date: 1963     Last attempt to quit: 1981     Years since quittin.7    Smokeless tobacco: Former User     Types: Snuff, Chew     Quit date:    Substance and Sexual Activity    Alcohol use: No    Drug use: No    Sexual activity: Yes     Comment: -with a significant other     Review of Systems   Constitutional: Positive for diaphoresis. Negative for activity change, appetite change, chills, fatigue and fever.   HENT: Negative for congestion, rhinorrhea, sinus pressure, sore throat and trouble swallowing.    Eyes: Negative for pain, redness and visual disturbance.   Respiratory: Positive for chest tightness and shortness of breath. Negative for cough,  wheezing and stridor.    Cardiovascular: Positive for chest pain. Negative for palpitations and leg swelling.   Gastrointestinal: Positive for nausea. Negative for abdominal distention, abdominal pain, blood in stool, constipation, diarrhea and vomiting.   Endocrine: Negative for cold intolerance and heat intolerance.   Genitourinary: Negative for dysuria, frequency, hematuria and urgency.   Musculoskeletal: Negative for arthralgias, back pain, myalgias and neck pain.   Skin: Negative for color change, pallor and rash.   Allergic/Immunologic: Negative for immunocompromised state.   Neurological: Negative for dizziness, tremors, syncope, weakness, light-headedness, numbness and headaches.   Hematological: Does not bruise/bleed easily.   Psychiatric/Behavioral: Negative for agitation, confusion and sleep disturbance. The patient is not nervous/anxious.      Objective:     Vital Signs (Most Recent):  Temp: 97.9 °F (36.6 °C) (10/06/19 1211)  Pulse: 60 (10/06/19 1400)  Resp: 17 (10/06/19 1211)  BP: 116/61 (10/06/19 1211)  SpO2: 97 % (10/06/19 1211) Vital Signs (24h Range):  Temp:  [97.8 °F (36.6 °C)-98.1 °F (36.7 °C)] 97.9 °F (36.6 °C)  Pulse:  [59-63] 60  Resp:  [10-22] 17  SpO2:  [97 %-100 %] 97 %  BP: (116-183)/(57-84) 116/61     Weight: 108.6 kg (239 lb 6.7 oz)  Body mass index is 37.5 kg/m².    Physical Exam   Constitutional: He is oriented to person, place, and time. He appears well-developed and well-nourished. No distress.   HENT:   Head: Normocephalic and atraumatic.   Right Ear: External ear normal.   Left Ear: External ear normal.   Nose: Nose normal.   Mouth/Throat: Oropharynx is clear and moist.   Eyes: Conjunctivae and EOM are normal. No scleral icterus.   Neck: Normal range of motion. Neck supple. Hepatojugular reflux and JVD present. No tracheal deviation present. No thyromegaly present.   Cardiovascular: Normal rate, regular rhythm, normal heart sounds and intact distal pulses. Exam reveals no gallop and  no friction rub.   No murmur heard.  Pulmonary/Chest: Effort normal and breath sounds normal. No respiratory distress. He has no wheezes. He has no rales.   Abdominal: Soft. Bowel sounds are normal. He exhibits no distension and no mass. There is no tenderness. There is no rebound and no guarding.   Musculoskeletal: Normal range of motion. He exhibits edema (+1). He exhibits no tenderness.   Neurological: He is alert and oriented to person, place, and time. No cranial nerve deficit or sensory deficit. He exhibits normal muscle tone. Coordination normal.   Skin: Skin is warm and dry. No rash noted. No erythema.   Psychiatric: He has a normal mood and affect. His behavior is normal. Judgment and thought content normal.   Nursing note and vitals reviewed.        CRANIAL NERVES     CN III, IV, VI   Extraocular motions are normal.        Significant Labs:   CBC:   Recent Labs   Lab 10/05/19  2023 10/06/19  0947   WBC 8.35 10.89   HGB 11.5* 11.6*   HCT 36.2* 37.4*    169     CMP:   Recent Labs   Lab 10/05/19  2023 10/06/19  0947   * 133*   K 5.1 4.6   CL 98 100   CO2 24 26   * 310*   BUN 19 20   CREATININE 1.9* 1.4   CALCIUM 9.0 9.2   PROT 6.9 6.8   ALBUMIN 3.6 3.5   BILITOT 0.4 0.5   ALKPHOS 77 62   AST 45* 34   ALT 53* 49*   ANIONGAP 10 7*   EGFRNONAA 35* 50.5*     Troponin:   Recent Labs   Lab 10/05/19  2023 10/06/19  0355 10/06/19  0947   TROPONINI 0.008 0.016 0.014  0.014       Significant Imaging: EKG: I have reviewed all pertinent results/findings within the past 24 hours and my personal findings are: no ischemia  I have reviewed all pertinent imaging results/findings within the past 24 hours.

## 2019-10-06 NOTE — HOSPITAL COURSE
Transferred from [place] for chest pain, no ecg changes and negative troponin's with significant story c/w USA, CP + Diaphoretic and nauseated.  Co managed cardiology consulted.  They discussed his case with interventional to see the utility of [facility] since he has severe disease from [facility] 11/18. Recommendations were to get an Nuc Stress to quantify ischemia for future endeavors.  Nuc stress results reveal fixed defect 25-30% of inferior wall of LV.  Preserved EF with stress, decreased at rest.  No evidence of ischemia. Dr. Rodriguez suggests to have HTS evaluate for advanced options. In interim, he will be medically managed with increased Imdur to 120 mg daily, continued Ranexa, Atorva 40, ASA 81, home Brilinta 90 bid, Coreg 6.25 daily changed to Coreg 12.5 mg bid. His pcp had him on daily coreg and daily metoprolol inadvertantly. Metoprolol d/c'ed. Continue  zetia 10. In hospital hydralazine transitioned back to home losartan as his CR improved from 1.9 back down to 1.4. He was diuresed 5 lbs fluid, d/c weight is 234 lbs.  Resume lasix as previously prescribed and titrate for fluid weight gain.  Encouraged 20 lbs weight loss.  Continue CPAP machine.     Dispo: home with cardiology f/u with Dr. Capone, ICD device interrogation department notified of in house ? Reschedule or interrogate prior to discharge.

## 2019-10-06 NOTE — PROGRESS NOTES
Pt taken to [facility]. Pt AAOx4. Pt stable. No complaints of pain or signs of distress. Left per stretcher with transport.

## 2019-10-06 NOTE — HPI
69 yo male patient with h/o CAD (s/p CABGx5). CHF (EF 43% RVPS 31mmg with grade I DD in 05/19), s/p AICD placement, T2DM (~40 years on insulin), glaucoma without diabetic retinopathy. HTN (~19 years), MGUS and history of CKD2 coming for chest pain to the ED and transferred from [facility] . Patient states that he has been having intermittent chest pain in the left anterior chest described as a pressure, travels downwards to his abdomen with no N/V, ER notes mention him being diaphoretic. Initial troponins and EKG were negative, however, given his extensive cardiac history he was admitted for observation. Nephrology consulted due to HARRIS (Cr 1.9 on admission from baseline of 1.2)  Upon chart review patient was seen by Nephrologist Dr. Burroguhs in 2017, his CKD was thought to be secondary to longstanding DM, HTN and HARRIS post CABG with minimal proteinuria. Last UPCR in chart is from 01/2018 at 0.08 g/g. Yesterday on presentation his sCr was 1.9, no fluids were given per chart review, and in fact 60 mg IV of lasix were administered. Today  s sCr is 1.4.  Pt denies dehydration although he did have diarrhea last week. Denies NSAID use or other OTC meds.

## 2019-10-06 NOTE — PLAN OF CARE
Plan of care discussed with patient. Patient is free of fall/trauma/injury. Denies CP, SOB, or pain/discomfort. 1 time 60mg IVP lasix administered. 24hr urine collect started at 1115 today-ends tomorrow morning at 1115. BG monitored, supplemental insulin administered. All questions addressed. Will continue to monitor.

## 2019-10-06 NOTE — ED NOTES
Patient AAox4. Patient updated on plan of care. Awaiting bed at transfer hospital. Patient verbalized understanding.

## 2019-10-06 NOTE — HPI
67 y/o male with PMH of CAD h/o NSTEMI ([facility] 11/18 no intervention) / CABGx5 (15 yrs ago), LBBB, Diastolic HF, AICD ([place] dual chamber), T2DM, HTN, HLD, CKD 3, MGUS, and anemia. Patient presented to [place] ED with c/o multiple bouts of L sided chest pain / pressure/ like someone sitting on his chest/ diaphoretic/ nauseated. Stated pain at it's intense was 8/10, L chest nonradiating.  Given nitro in the ER with some relief but pain persisted. Troponin's have been negative x4 with no EKG changes c/w ischemia.  CXR negative. His case was discussed with on call cardiologist Dr. Cook who advised transfer for further evaluation with cardiology given extensive cardiac history. It appears his PCP has him taking both Coreg and Metoprolol XL low doses each which h/o of b/p fluctuations.     11/16/18 [facility]:  · Origin to Prox Graft lesion , 100% stenosed.  · LVEDP (Pre): 18  · Prox RCA lesion , 100% stenosed.  · Mid LM to Ost LAD lesion , 100% stenosed.  · Estimated blood loss: none  · Three vessel coronary artery disease.

## 2019-10-06 NOTE — ED PROVIDER NOTES
[facility]                                                 Chief Complaint  68 y.o. male with Chest Pain    History of Present Illness  Walter Bull presents to the emergency room with chest pain today  Patient has had on again off again chest pain for last couple days, again  Patient has significant heart history including 5 cardiac bypass 15 years ago  Patient on exam has no reproducible pain, presents diaphoretic in the ER  Patient states that he feels like he is having chest pain, history of MI noted  Has 5/10 sternal pain like an elephant is sitting on his chest this weekend    The history is provided by the patient   device was not used during this ER visit    Past Medical History   -- Anemia     -- Anticoagulant long-term use     -- CHF (congestive heart failure)     -- Colon cancer screening     -- Coronary artery disease     -- Diabetes mellitus type I     -- Disorder of kidney and ureter     -- Encounter for blood transfusion     -- Glaucoma     -- Heart attack     -- Heart disease     -- Hypertension     -- Iron deficiency     -- Kidney failure     -- S/P CABG x 5        Past Surgical History   -- Angiogram, Aortic Arch, Coronary       -- Bypass graft study       -- CARDIAC DEFIBRILLATOR PLACEMENT       -- CARDIAC ELECTROPHYSIOLOGY STUDY       -- CARDIAC ELECTROPHYSIOLOGY STUDY       -- COLON SURGERY       -- COLONOSCOPY       -- CORONARY ARTERY BYPASS GRAFT       -- ESOPHAGOGASTRODUODENOSCOPY (EGD)       -- EYE SURGERY       -- INSERTION, DUAL ICD       -- Left heart cath          No Known Allergies     I have reviewed all of this patient's past medical, surgical, family, and social   histories as well as active allergies and medications documented in the  electronic medical record    Review of Systems and Physical Exam      Review of Systems  -- Constitution - no fever, denies fatigue, no weakness, no chills  -- Eyes - no tearing or redness, no visual  disturbance  -- Ear, Nose - no tinnitus or earache, no nasal congestion or discharge  -- Mouth,Throat - no sore throat, no toothache, normal voice, normal swallowing  -- Respiratory - denies cough and congestion, no shortness of breath, no [place]  -- Cardiovascular - chest pain, no palpitations, denies claudication  -- Gastrointestinal - denies abdominal pain, nausea, vomiting, or diarrhea  -- Genitourinary - no dysuria, denies flank pain, no hematuria, no STD risk  -- Musculoskeletal - denies back pain, negative for trauma or injury  -- Neurological - no headache, denies weakness or seizure; no LOC  -- Skin - denies pallor, rash, or changes in skin. no hives or welts noted  -- Psychiatric - Denies SI or HI, no psychosis or fractured thought noted     Vital Signs  His oral temperature is 98.1 °F (36.7 °C).   His blood pressure is 168/77 and his pulse is 59  His respiration is 22 and oxygen saturation is 100%.     Physical Exam  -- Nursing note and vitals reviewed  -- Constitutional: Appears well-developed and well-nourished  -- Head: Atraumatic. Normocephalic. No obvious abnormality  -- Eyes: Pupils are equal and reactive to light. Normal conjunctiva and lids  -- Cardiac: Normal rate, regular rhythm and normal heart sounds  -- Pulmonary: Normal respiratory effort, breath sounds clear to auscultation  -- Abdominal: Soft, no tenderness. Normal bowel sounds. Normal liver edge  -- Musculoskeletal: Normal range of motion, no effusions. Joints stable   -- Neurological: No focal deficits. Showed good interaction with staff  -- Vascular: Posterior tibial, dorsalis pedis and radial pulses 2+ bilaterally      Emergency Room Course      Lab Results   (L)   K 5.1   CL 98   CO2 24   BUN 19   CREATININE 1.9 (H)    (HH)   ALKPHOS 77   AST 45 (H)   ALT 53 (H)   BILITOT 0.4   ALBUMIN 3.6   PROT 6.9   WBC 8.35   HGB 11.5 (L)   HCT 36.2 (L)            CPKMB 1.8   TROPONINI 0.008   INR 1.0    (H)    DDIMER 0.52 (H)   MG 2.0   TSH 1.880     Urinalysis  -- Urinalysis performed during this ER visit showed no signs of infection      EKG   -- The EKG findings today were without concerning findings from baseline     Radiology  -- Chest x-ray showed no infiltrate and showed no acute pathology     Medications Given  aspirin tablet 325 mg (325 mg Oral Given 10/5/19 2033)   nitroGLYCERIN 2% TD oint ointment 1 inch (1 inch Topical (Top) Given 10/5/19 2143)   pantoprazole EC tablet 40 mg (40 mg Oral Given 10/5/19 2144)   atorvastatin tablet 40 mg (40 mg Oral Given 10/5/19 2144)   insulin regular injection 12 Units (12 Units Intravenous Given 10/5/19 2145)     ED Physician Management  -- Diagnosis management comments: 68 y.o. male with chest pain this week  -- negative troponin with a significant cardiac history, active angina now  -- patient has a appointment with his cardiology at [facility] Monday  -- patient states this pain is been terrible, diaphoretic in the ER tonight  -- we have reached out to the [facility] cardiology for transfer    Diagnosis  -- The primary encounter diagnosis was Precordial pain.   -- A diagnosis of Chest pain was also pertinent to this visit.    Disposition and Plan  -- Disposition: transfer  -- Condition: stable    This note is dictated on M*Modal word recognition program.  There are word recognition mistakes that are occasionally missed on review.         Omar Garrett MD  10/05/19 2758

## 2019-10-06 NOTE — ASSESSMENT & PLAN NOTE
- h/o MGUS  - Acute Renal failure, Cr dropped from 1.9 to 1.4 without perceived intervention  - consulted nephrology, urine studies underway, Renal US pending  - avoid dye if possible  - avoid nephrotoxic agents and extremes in b/p

## 2019-10-06 NOTE — ED TRIAGE NOTES
Patient reports left sided chest pain onset this morning. Patient took 2 nitro's with no relief. Patient has a defibrillator and a pacemaker.

## 2019-10-06 NOTE — HPI
"Reason for Consult: Management of T2DM, Hyperglycemia     Surgical Procedure and Date: N/A    Diabetes diagnosis year:   "When I was in my 30's"     Home Diabetes Medications:    VGo 30 cartridgle system started aprox 1 month ago per [facility] Endo clinic.    -3 clicks with meals   -1-2 clicks with snack.     How often checking glucose at home? 1-3 x day  "Twice a day"  BG readings on regimen: 130's - 200's  Hypoglycemia on the regimen?  Yes  Missed doses on regimen?  No    Diabetes Complications include:     Hyperglycemia, Hypoglycemia  and Diabetic nephropathy      Complicating diabetes co morbidities:   CKD, HTN, CAD, Obesity      HPI:   Patient is a 68 y.o. male with a diagnosis of CAD, CABG, CHF, AICD, T2DM, HTN, CKD 2, MGUS, and anemia. Presented to above ER with chest pain, and has since been transferred to [facility] for further evaluation. Patient is from [facility]. However, Patient receives primary DM care from [facility] endocrinology clinic. Inpatient DM Endo team consulted to salena [email or web address] during current admission to [facility].       Lab Results   Component Value Date    HGBA1C 7.5 (H) 08/07/2019       "

## 2019-10-06 NOTE — ASSESSMENT & PLAN NOTE
-  [facility] ED, 300's here upon admit  - Wears a VGO pump/ click, consulted endocrine to assist in transitioning from home NPH and VGO to in house regimen.    - Check hgba1c  - 5 units with meals aspart  - endo added levemir as they removed his VGO pump while in house  - SSI   - avoid hypoglycemia

## 2019-10-06 NOTE — CONSULTS
[facility]  Cardiology  Consult Note    Patient Name: Walter Bull  MRN: [id number]  Admission Date: 10/6/2019  Hospital Length of Stay: 0 days  Code Status: Full Code   Attending Provider: Christie Harper MD   Consulting Provider: Shaka Alves MD  Primary Care Physician: Belkys Kruger MD  Principal Problem:Chest pain    Patient information was obtained from patient, past medical records and ER records.     Inpatient consult to Cardiology  Consult performed by: Shaka Alves MD  Consult ordered by: Wendy Pablo NP        Subjective:     Chief Complaint:  chest pain      HPI:   Mr. Walter Bull, 68 y.o. male with CAD s/p CABG 2005 and [facility] with last one on 11/2018 and showed   of the RCA and LCX with patent LIMA to LAD and SVG to Diagonal. SVG to OM occluded.  Ischemic cardiomyopathy EF ~ 43%, prior epidose of VT and underwent dual chamber ICD. He presented to [facility] with symptoms of having chest pain that was retrosternal and became more recurrent compared to his baseline. The pain was described as pressure in nature chest discomfort that lasting from minutes to < 10 minutes. Reported compliance with his medications. On arrival to ED, he was having hyperglycemia > 700 and HRARIS on CKD which was improved with fluid and insulin and his sCr resorted to baseline. Cardiology was consulted for his chest pain and recommendation for further work up versus intervention.     [facility] for NSTEMI   · Origin to Prox Graft lesion , 100% stenosed.  · LVEDP (Pre): 18  · Prox RCA lesion , 100% stenosed.  · Mid LM to Ost LAD lesion , 100% stenosed.  · Estimated blood loss: none  Three vessel coronary artery disease.    Past Medical History:   Diagnosis Date    Anemia     Anticoagulant long-term use     CHF (congestive heart failure)     Colon cancer screening 2/2/2017    Coronary artery disease     Diabetes mellitus type I     Disorder of kidney and ureter      Encounter for blood transfusion     Glaucoma     Heart attack     09/2018    Heart disease     Hypertension     Iron deficiency     Kidney failure     post CABG    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       Past Surgical History:   Procedure Laterality Date    CARDIAC DEFIBRILLATOR PLACEMENT      CARDIAC ELECTROPHYSIOLOGY STUDY N/A 11/19/2018    Procedure: CARDIAC ELECTROPHYSIOLOGY STUDY;  Surgeon: Byron Glasgow MD;  Location: [facility] EP LAB;  Service: Cardiology;  Laterality: N/A;  VT, EPS +/- ICD, SJM, anes, GP, 6086    CARDIAC ELECTROPHYSIOLOGY STUDY N/A 11/19/2018    Procedure: CARDIAC ELECTROPHYSIOLOGY STUDY;  Surgeon: Byron Glasgow MD;  Location: [facility] EP LAB;  Service: Cardiology;  Laterality: N/A;    COLON SURGERY  2006    COLONOSCOPY N/A 2/2/2017    Procedure: COLONOSCOPY;  Surgeon: Ronnie Conway MD;  Location: [facility];  Service: Endoscopy;  Laterality: N/A;    CORONARY ARTERY BYPASS GRAFT  2005    5 arteries    CORONARY BYPASS GRAFT ANGIOGRAPHY  11/16/2018    Procedure: Bypass graft study;  Surgeon: Ryan Schmidt MD;  Location: [facility] CATH LAB;  Service: Cardiology;;    EYE SURGERY Bilateral 2016    Laser surgery for glaucoma    INSERTION OF IMPLANTABLE CARDIOVERTER-DEFIBRILLATOR (ICD) GENERATOR WITH TWO EXISTING LEADS Left 11/19/2018    Procedure: INSERTION, DUAL ICD;  Surgeon: Byron Glasgow MD;  Location: [facility] EP LAB;  Service: Cardiology;  Laterality: Left;  VT, EPS +/- ICD, SJM, anes, GP, 6086    LEFT HEART CATHETERIZATION Left 11/16/2018    Procedure: Left heart cath;  Surgeon: Ryan Schmidt MD;  Location: [facility] CATH LAB;  Service: Cardiology;  Laterality: Left;       Review of patient's allergies indicates:  No Known Allergies    Current Facility-Administered Medications on File Prior to Encounter   Medication    [COMPLETED] aspirin tablet 325 mg    [COMPLETED] atorvastatin tablet 40 mg    [COMPLETED] insulin regular injection 12 Units    [COMPLETED] morphine injection 2 mg     [COMPLETED] nitroGLYCERIN 2% TD oint ointment 1 inch    [COMPLETED] ondansetron injection 4 mg    [COMPLETED] pantoprazole EC tablet 40 mg     Current Outpatient Medications on File Prior to Encounter   Medication Sig    amiodarone (PACERONE) 200 MG Tab Take 1 tablet (200 mg total) by mouth once daily.    ammonium lactate 12 % Crea Apply small amount to feet except for in between toes twice a day    amoxicillin-clavulanate 875-125mg (AUGMENTIN) 875-125 mg per tablet Take 1 tablet by mouth 2 (two) times daily.    aspirin (ECOTRIN) 81 MG EC tablet Take 1 tablet (81 mg total) by mouth once daily.    carvedilol (COREG) 6.25 MG tablet Take 6.25 mg by mouth once daily.     esomeprazole (NEXIUM) 40 MG capsule Take 1 capsule (40 mg total) by mouth before breakfast.    ezetimibe (ZETIA) 10 mg tablet Take 1 tablet (10 mg total) by mouth once daily.    ferrous sulfate (FEOSOL) 325 mg (65 mg iron) Tab tablet TAKE 1 TABLET BY MOUTH THREE TIMES DAILY    finasteride (PROSCAR) 5 mg tablet HOLD DUE TO ORTHOSTATIC HYPOTENSION    furosemide (LASIX) 20 MG tablet Take 1 tablet (20 mg total) by mouth once daily. Take an extra tablet daily for wt gain more than 3 pounds/day or 5 pounds/week    insulin aspart U-100 (NOVOLOG U-100 INSULIN ASPART) 100 unit/mL injection 3 x10ml vials required per month for use in VGO 30    insulin  unit/mL injection Use 30 Units in the morning and 24 Units at night    isosorbide mononitrate (IMDUR) 60 MG 24 hr tablet Take 1 tablet (60 mg total) by mouth once daily.    losartan (COZAAR) 25 MG tablet Take 1 tablet (25 mg total) by mouth once daily.    nitroGLYCERIN (NITROSTAT) 0.4 MG SL tablet DISSOLVE ONE TABLET UNDER THE TONGUE EVERY 5 MINUTES AS NEEDED FOR CHEST PAIN.    RANEXA 1,000 mg Tb12 Take 1 tablet (1,000 mg total) by mouth 2 (two) times daily.    rosuvastatin (CRESTOR) 40 MG Tab Take 1 tablet (40 mg total) by mouth once daily.    SITagliptin (JANUVIA) 100 MG Tab Take 1  tablet (100 mg total) by mouth once daily.    tamsulosin (FLOMAX) 0.4 mg Cap Take 1 capsule (0.4 mg total) by mouth once daily.    ticagrelor (BRILINTA) 90 mg tablet Take 90 mg by mouth 2 (two) times daily.    albuterol (PROVENTIL/VENTOLIN HFA) 90 mcg/actuation inhaler Inhale 2 puffs into the lungs every 6 (six) hours as needed for Wheezing.    metoprolol succinate (TOPROL-XL) 25 MG 24 hr tablet Take 1 tablet (25 mg total) by mouth once daily.    sub-q insulin device, 30 unit (V-GO 30) [place] 1 VGo every 24 hr as directed    sub-q insulin device, 30 unit (V-GO 30) [place] 1 VGo every 24 hr as directed    traMADol (ULTRAM) 50 mg tablet Take 1 tablet (50 mg total) by mouth every 8 (eight) hours as needed for Pain.     Family History     Problem Relation (Age of Onset)    Diabetes Mother, Sister    Heart attack Brother    Heart disease Mother, Sister    Hypertension Sister        Tobacco Use    Smoking status: Former Smoker     Packs/day: 3.00     Types: Cigarettes     Start date: 1963     Last attempt to quit: 1981     Years since quittin.7    Smokeless tobacco: Former User     Types: Snuff, Chew     Quit date:    Substance and Sexual Activity    Alcohol use: No    Drug use: No    Sexual activity: Yes     Comment: -with a significant other     Review of Systems   Constitution: Positive for malaise/fatigue. Negative for decreased appetite and weight gain.   Eyes: Negative for blurred vision.   Cardiovascular: Positive for chest pain and dyspnea on exertion. Negative for claudication, irregular heartbeat, near-syncope, orthopnea, palpitations and paroxysmal nocturnal dyspnea.   Respiratory: Negative for hemoptysis and shortness of breath.    Gastrointestinal: Negative for bloating.   Genitourinary: Negative for bladder incontinence.   Neurological: Negative for aphonia.     Objective:     Vital Signs (Most Recent):  Temp: 97.9 °F (36.6 °C) (10/06/19 1211)  Pulse: 60 (10/06/19  1211)  Resp: 17 (10/06/19 1211)  BP: 116/61 (10/06/19 1211)  SpO2: 97 % (10/06/19 1211) Vital Signs (24h Range):  Temp:  [97.8 °F (36.6 °C)-98.1 °F (36.7 °C)] 97.9 °F (36.6 °C)  Pulse:  [59-63] 60  Resp:  [10-22] 17  SpO2:  [97 %-100 %] 97 %  BP: (116-183)/(57-84) 116/61     Weight: 108.6 kg (239 lb 6.7 oz)  Body mass index is 37.5 kg/m².    SpO2: 97 %         Intake/Output Summary (Last 24 hours) at 10/6/2019 1408  Last data filed at 10/6/2019 1200  Gross per 24 hour   Intake 0 ml   Output 1650 ml   Net -1650 ml       Lines/Drains/Airways     Peripheral Intravenous Line                 Peripheral IV - Single Lumen 10/05/19 2137 20 G Right Wrist less than 1 day                Physical Exam   Constitutional: He is oriented to person, place, and time. He appears well-nourished. No distress.   HENT:   Head: Normocephalic.   Eyes: Pupils are equal, round, and reactive to light.   Neck: No JVD present. No thyromegaly present.   Cardiovascular: Regular rhythm.   No murmur heard.  Pulses:       Radial pulses are 2+ on the right side, and 2+ on the left side.        Dorsalis pedis pulses are 1+ on the right side, and 1+ on the left side.        Posterior tibial pulses are 1+ on the right side, and 1+ on the left side.   Pulmonary/Chest: Effort normal. No respiratory distress. He has no rales.   Musculoskeletal: He exhibits no edema.   Neurological: He is alert and oriented to person, place, and time.   Vitals reviewed.      Significant Labs:   CMP   Recent Labs   Lab 10/05/19  2023 10/06/19  0947   * 133*   K 5.1 4.6   CL 98 100   CO2 24 26   * 310*   BUN 19 20   CREATININE 1.9* 1.4   CALCIUM 9.0 9.2   PROT 6.9 6.8   ALBUMIN 3.6 3.5   BILITOT 0.4 0.5   ALKPHOS 77 62   AST 45* 34   ALT 53* 49*   ANIONGAP 10 7*   ESTGFRAFRICA 40* 58.4*   EGFRNONAA 35* 50.5*   , CBC   Recent Labs   Lab 10/05/19  2023 10/06/19  0947   WBC 8.35 10.89   HGB 11.5* 11.6*   HCT 36.2* 37.4*    169   , Lipid Panel   Recent Labs    Lab 10/06/19  0947   CHOL 121   HDL 46   LDLCALC 67.0   TRIG 40   CHOLHDL 38.0     Assessment and Plan:     Unstable angina  - Known to have CAD s/p CABG 2005 and [facility] with last one on 11/2018 and showed   of the RCA and LCX with patent LIMA to LAD and SVG to Diagonal. SVG to OM occluded.   - Continue on ASA and ticagrelor and high intensity statin   - Continue anti anginal therapy with isosorbide mononitrate 120 mg PO daily, ranolazine 1000 mg PO BID and consider increase the dose of Coreg to 12.5 mg PO BID if blood pressure allows.   - We will involved interventional cardiology (case discussed with interventional cardiology staff) to review the last angiogram (11/2018) for possible intervention       VTE Risk Mitigation (From admission, onward)         Ordered     enoxaparin injection 40 mg  Daily      10/06/19 0837     IP VTE HIGH RISK PATIENT  Once      10/06/19 0837                Thank you for your consult. I will follow-up with patient. Please contact us if you have any additional questions.    Shaka Alves MD  Cardiology   [facility]

## 2019-10-06 NOTE — SUBJECTIVE & OBJECTIVE
Interval HPI:   Overnight events:   BG is trending downward overnight. Patient denies symptoms at time of consult.     Eating:   <25%  Nausea: No  Hypoglycemia and intervention: No  Fever: No  TPN and/or TF: No  If yes, type of TF/TPN and rate: none    PMH, PSH, FH, SH  reviewed       Review of Systems   Constitutional: Negative for weight changes.  Eyes: Negative for visual disturbance.  Respiratory: Negative for cough.   Cardiovascular: Negative for chest pain.  Gastrointestinal: Negative for nausea.  Endocrine: Positive for polyuria, polydipsia.  Musculoskeletal: Negative for back pain.  Skin: Negative for rash.  Neurological: Negative for syncope. Positive for dizziness when BG is low.  Psychiatric/Behavioral: Negative for depression.    Current Medications and/or Treatments Impacting Glycemic Control  Immunotherapy:    Immunosuppressants     None        Steroids:   Hormones (From admission, onward)    None        Pressors:    Autonomic Drugs (From admission, onward)    None        Hyperglycemia/Diabetes Medications:   Antihyperglycemics (From admission, onward)    Start     Stop Route Frequency Ordered    10/06/19 1130  insulin aspart U-100 pen 5 Units      -- SubQ 3 times daily with meals 10/06/19 0837    10/06/19 0935  insulin aspart U-100 pen 1-10 Units      -- SubQ Before meals & nightly PRN 10/06/19 0837             PHYSICAL EXAMINATION:  Vitals:    10/06/19 0728   BP:    Pulse: 63   Resp:    Temp:      Body mass index is 37.5 kg/m².    Physical Exam   Constitutional: Well developed, well nourished, NAD.  ENT:  normal hearing.  Neck: Supple; trachea midline;   Cardiovascular: Normal heart sounds, no LE edema. DP +2 bilaterally.  Lungs: Normal effort; lungs anterior bilaterally clear to auscultation.  Abdomen: Soft, obese, no masses, no hernias.  MS: No clubbing or cyanosis of nails noted;  Skin: No rashes, lesions, or ulcers; no nodules. Injection sites are ok. No lipo hypertropthy or  atrophy.  Psychiatric: Good judgement and insight; normal mood and affect.  Neurological: Cranial nerves are grossly intact.  Foot: nails in good condition, no amputations noted.

## 2019-10-06 NOTE — CONSULTS
[facility]  Nephrology  Consult Note    Patient Name: Walter Bull  MRN: [id number]  Admission Date: 10/6/2019  Hospital Length of Stay: 0 days  Attending Provider: Christie Harper MD   Primary Care Physician: Belkys Kruger MD  Principal Problem:Chest pain    Consults  Subjective:     HPI: 67 yo male patient with h/o CAD (s/p CABGx5). CHF (EF 43% RVPS 31mmg with grade I DD in 05/19), s/p AICD placement, T2DM (~40 years on insulin), glaucoma without diabetic retinopathy. HTN (~19 years), MGUS and history of CKD2 coming for chest pain to the ED and transferred from [facility] . Patient states that he has been having intermittent chest pain in the left anterior chest described as a pressure, travels downwards to his abdomen with no N/V, ER notes mention him being diaphoretic. Initial troponins and EKG were negative, however, given his extensive cardiac history he was admitted for observation. Nephrology consulted due to HARRIS (Cr 1.9 on admission from baseline of 1.2)  Upon chart review patient was seen by Nephrologist Dr. Burroughs in 2017, his CKD was thought to be secondary to longstanding DM, HTN and HARRIS post CABG with minimal proteinuria. Last UPCR in chart is from 01/2018 at 0.08 g/g. Yesterday on presentation his sCr was 1.9, no fluids were given per chart review, and in fact 60 mg IV of lasix were administered. Today  s sCr is 1.4.  Pt denies dehydration although he did have diarrhea last week. Denies NSAID use or other OTC meds.    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       Past  Surgical History:   Procedure Laterality Date    CARDIAC DEFIBRILLATOR PLACEMENT      CARDIAC ELECTROPHYSIOLOGY STUDY N/A 11/19/2018    Procedure: CARDIAC ELECTROPHYSIOLOGY STUDY;  Surgeon: Byron Glasgow MD;  Location: [facility] EP LAB;  Service: Cardiology;  Laterality: N/A;  VT, EPS +/- ICD, SJM, anes, GP, 6086    CARDIAC ELECTROPHYSIOLOGY STUDY N/A 11/19/2018    Procedure: CARDIAC ELECTROPHYSIOLOGY STUDY;  Surgeon: Byron Glasgow MD;  Location: [facility] EP LAB;  Service: Cardiology;  Laterality: N/A;    COLON SURGERY  2006    COLONOSCOPY N/A 2/2/2017    Procedure: COLONOSCOPY;  Surgeon: Ronnie Conway MD;  Location: [facility];  Service: Endoscopy;  Laterality: N/A;    CORONARY ARTERY BYPASS GRAFT  2005    5 arteries    CORONARY BYPASS GRAFT ANGIOGRAPHY  11/16/2018    Procedure: Bypass graft study;  Surgeon: Ryan Schmidt MD;  Location: [facility] CATH LAB;  Service: Cardiology;;    EYE SURGERY Bilateral 2016    Laser surgery for glaucoma    INSERTION OF IMPLANTABLE CARDIOVERTER-DEFIBRILLATOR (ICD) GENERATOR WITH TWO EXISTING LEADS Left 11/19/2018    Procedure: INSERTION, DUAL ICD;  Surgeon: Byron Glasgow MD;  Location: [facility] EP LAB;  Service: Cardiology;  Laterality: Left;  VT, EPS +/- ICD, SJM, anes, GP, 6086    LEFT HEART CATHETERIZATION Left 11/16/2018    Procedure: Left heart cath;  Surgeon: Ryan Schmidt MD;  Location: [facility] CATH LAB;  Service: Cardiology;  Laterality: Left;       Review of patient's allergies indicates:  No Known Allergies  Current Facility-Administered Medications   Medication Frequency    acetaminophen tablet 650 mg Q4H PRN    albuterol inhaler 2 puff Q6H PRN    amiodarone tablet 200 mg Daily    ammonium lactate 12 % lotion BID    aspirin EC tablet 81 mg Daily    carvedilol tablet 6.25 mg Daily    dextrose 10% (D10W) Bolus PRN    dextrose 10% (D10W) Bolus PRN    enoxaparin injection 40 mg Daily    ezetimibe tablet 10 mg Daily    ferrous sulfate EC tablet 325 mg Daily     finasteride tablet 5 mg Daily    glucagon (human recombinant) injection 1 mg PRN    glucose chewable tablet 16 g PRN    glucose chewable tablet 24 g PRN    hydrALAZINE tablet 10 mg Q8H    insulin aspart U-100 pen 1-10 Units QID (AC + HS) PRN    insulin aspart U-100 pen 5 Units TIDWM    insulin detemir U-100 pen 25 Units QHS    isosorbide mononitrate 24 hr tablet 120 mg Daily    nitroGLYCERIN SL tablet 0.4 mg Q5 Min PRN    pantoprazole EC tablet 40 mg Daily    polyethylene glycol packet 17 g Daily PRN    promethazine tablet 25 mg Q6H PRN    ramelteon tablet 8 mg Nightly PRN    ranolazine 12 hr tablet 1,000 mg BID    rosuvastatin tablet 40 mg Daily    senna-docusate 8.6-50 mg per tablet 1 tablet BID    sodium chloride 0.9% flush 10 mL PRN    tamsulosin 24 hr capsule 0.4 mg Daily    ticagrelor tablet 90 mg BID    traMADol tablet 50 mg Q8H PRN     Family History     Problem Relation (Age of Onset)    Diabetes Mother, Sister    Heart attack Brother    Heart disease Mother, Sister    Hypertension Sister        Tobacco Use    Smoking status: Former Smoker     Packs/day: 3.00     Types: Cigarettes     Start date: 1963     Last attempt to quit: 1981     Years since quittin.7    Smokeless tobacco: Former User     Types: Snuff, Chew     Quit date:    Substance and Sexual Activity    Alcohol use: No    Drug use: No    Sexual activity: Yes     Comment: -with a significant other     Review of Systems   Constitutional: Positive for diaphoresis. Negative for activity change, appetite change, fatigue and fever.   HENT: Negative for congestion and facial swelling.    Respiratory: Negative for cough, shortness of breath, wheezing and stridor.    Cardiovascular: Positive for chest pain. Negative for palpitations and leg swelling.   Gastrointestinal: Positive for diarrhea (last week). Negative for abdominal pain, nausea and vomiting.   Endocrine: Negative.  Negative for polyuria.    Genitourinary: Negative for dysuria, frequency and hematuria.   Musculoskeletal: Negative.  Negative for joint swelling and myalgias.   Skin: Negative.  Negative for rash.   Neurological: Negative.  Negative for dizziness, speech difficulty and numbness.   Psychiatric/Behavioral: Negative for behavioral problems. The patient is not nervous/anxious.      Objective:     Vital Signs (Most Recent):  Temp: 97.9 °F (36.6 °C) (10/06/19 1211)  Pulse: 60 (10/06/19 1211)  Resp: 17 (10/06/19 1211)  BP: 116/61 (10/06/19 1211)  SpO2: 97 % (10/06/19 1211) Vital Signs (24h Range):  Temp:  [97.8 °F (36.6 °C)-98.1 °F (36.7 °C)] 97.9 °F (36.6 °C)  Pulse:  [59-63] 60  Resp:  [10-22] 17  SpO2:  [97 %-100 %] 97 %  BP: (116-183)/(57-84) 116/61     Weight: 108.6 kg (239 lb 6.7 oz) (10/06/19 0701)  Body mass index is 37.5 kg/m².  Body surface area is 2.27 meters squared.    No intake/output data recorded.    Physical Exam   Constitutional: He is oriented to person, place, and time. He appears well-developed and well-nourished. No distress.   HENT:   Head: Normocephalic and atraumatic.   Mouth/Throat: Oropharynx is clear and moist.   Eyes: Pupils are equal, round, and reactive to light.   Neck: Normal range of motion. Neck supple. No JVD present.   Cardiovascular: Normal rate and regular rhythm.   Murmur heard.  Pulmonary/Chest: Effort normal and breath sounds normal. No respiratory distress. He has no wheezes. He has no rales. He exhibits no tenderness.   Abdominal: Soft. Bowel sounds are normal. He exhibits no distension. There is no tenderness.   Musculoskeletal: Normal range of motion. He exhibits no edema or deformity.   Neurological: He is alert and oriented to person, place, and time.   Skin: Skin is warm.   Psychiatric: He has a normal mood and affect.   Vitals reviewed.      Significant Labs:  BMP:   Recent Labs   Lab 10/06/19  0947   *      CO2 26   BUN 20   CREATININE 1.4   CALCIUM 9.2   MG 2.0     Cardiac Markers:  No results for input(s): CKMB, TROPONINT, MYOGLOBIN in the last 168 hours.  CBC:   Recent Labs   Lab 10/06/19  0947   WBC 10.89   RBC 4.00*   HGB 11.6*   HCT 37.4*      MCV 94   MCH 29.0   MCHC 31.0*     CMP:   Recent Labs   Lab 10/06/19  0947   *   CALCIUM 9.2   ALBUMIN 3.5   PROT 6.8   *   K 4.6   CO2 26      BUN 20   CREATININE 1.4   ALKPHOS 62   ALT 49*   AST 34   BILITOT 0.5     All labs within the past 24 hours have been reviewed.    Significant Imaging:  Labs: Reviewed  No infiltrates but has pleuroparenchymal scarring at the costophrenic angles and caridiomegaly as well as a pacemaker.     Assessment/Plan:     Acute kidney injury superimposed on CKD  67 yo male patient with h/o CAD (s/p CABGx5). CHF (EF 43% RVPS 31mmg with grade I DD in 05/19), s/p AICD placement, T2DM (~40 years on insulin), glaucoma without diabetic retinopathy. HTN (~19 years), MGUS and history of CKD2 coming for chest pain and concern for cardiac etiology. Nephrology consulted due to HARRIS (Cr 1.9 on admission from baseline of 1.2). CKD per Dr. Burroughs in 2017 thought to be 2/2 DM, HTN and HARRIS post CABG with minimal proteinuria. Last UPCR in chart is from 01/2018 at 0.08 g/g. Yesterday on presentation his sCr was 1.9, no fluids were given per chart review, and in fact 60 mg IV of lasix were administered. Today  s sCr is 1.4.     Plan:   - Creatinine appears to be improving (1.4 from 1.9 yesterday). Etiology of HARRIS unclear, he denies NSAID use. Possibly from hemodynamic changes, Currently Cr is coming back to baseline.    - No electrolyte, acid/base abnormality. Will continue to monitor for now. No acute interventions from the Nephrology standpoint.   - Educated patient on importance of avoiding NSAIDs or OTC meds   - Avoid contrast use, only if strictly needed.        Thank you for your consult. I will follow-up with patient. Please contact us if you have any additional questions.    Richard Rodriguez  MD  Nephrology  [facility]    ATTENDING PHYSICIAN ATTESTATION  I have personally assessed and examined the patient. I thoroughly reviewed the demographic, clinical, laboratorial and imaging information available in medical records. I agree with the assessment and recommendations provided by the subspecialty resident who was under my supervision.

## 2019-10-06 NOTE — ASSESSMENT & PLAN NOTE
-  H/o severe CAD, + CP neg trop's, no ecg changes  - increased IMDUR, kept Coreg home dose, d/c'ed metoprolol   - Ordered PET/ Nuc stress to quantify angina and to possibly avoid dye.    - consulted cards co managed for further management options: they will review films with Dr. Jose Antonio Rodriguez in am and plan accordingly   - D dimer elevated 0.52?

## 2019-10-06 NOTE — SUBJECTIVE & OBJECTIVE
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       Past Surgical History:   Procedure Laterality Date    CARDIAC DEFIBRILLATOR PLACEMENT      CARDIAC ELECTROPHYSIOLOGY STUDY N/A 11/19/2018    Procedure: CARDIAC ELECTROPHYSIOLOGY STUDY;  Surgeon: Byron Glasgow MD;  Location: [facility] EP LAB;  Service: Cardiology;  Laterality: N/A;  VT, EPS +/- ICD, SJM, anes, GP, 6086    CARDIAC ELECTROPHYSIOLOGY STUDY N/A 11/19/2018    Procedure: CARDIAC ELECTROPHYSIOLOGY STUDY;  Surgeon: Byron Glasgow MD;  Location: [facility] EP LAB;  Service: Cardiology;  Laterality: N/A;    COLON SURGERY  2006    COLONOSCOPY N/A 2/2/2017    Procedure: COLONOSCOPY;  Surgeon: Ronnie Conway MD;  Location: [facility];  Service: Endoscopy;  Laterality: N/A;    CORONARY ARTERY BYPASS GRAFT  2005    5 arteries    CORONARY BYPASS GRAFT ANGIOGRAPHY  11/16/2018    Procedure: Bypass graft study;  Surgeon: Ryan Schmidt MD;  Location: [facility] CATH LAB;  Service: Cardiology;;    EYE SURGERY Bilateral 2016    Laser surgery for glaucoma    INSERTION OF IMPLANTABLE CARDIOVERTER-DEFIBRILLATOR (ICD) GENERATOR WITH TWO EXISTING LEADS Left 11/19/2018    Procedure: INSERTION, DUAL ICD;  Surgeon: Byron Glasgow MD;  Location: [facility] EP LAB;  Service: Cardiology;  Laterality: Left;  VT, EPS +/- ICD, SJM, anes, GP, 6086    LEFT HEART CATHETERIZATION Left 11/16/2018    Procedure: Left heart cath;  Surgeon: Ryan Schmidt MD;  Location: [facility] CATH LAB;  Service: Cardiology;  Laterality: Left;       Review of patient's allergies indicates:  No Known  Allergies    Current Facility-Administered Medications on File Prior to Encounter   Medication    [COMPLETED] aspirin tablet 325 mg    [COMPLETED] atorvastatin tablet 40 mg    [COMPLETED] insulin regular injection 12 Units    [COMPLETED] morphine injection 2 mg    [COMPLETED] nitroGLYCERIN 2% TD oint ointment 1 inch    [COMPLETED] ondansetron injection 4 mg    [COMPLETED] pantoprazole EC tablet 40 mg     Current Outpatient Medications on File Prior to Encounter   Medication Sig    amiodarone (PACERONE) 200 MG Tab Take 1 tablet (200 mg total) by mouth once daily.    ammonium lactate 12 % Crea Apply small amount to feet except for in between toes twice a day    amoxicillin-clavulanate 875-125mg (AUGMENTIN) 875-125 mg per tablet Take 1 tablet by mouth 2 (two) times daily.    aspirin (ECOTRIN) 81 MG EC tablet Take 1 tablet (81 mg total) by mouth once daily.    carvedilol (COREG) 6.25 MG tablet Take 6.25 mg by mouth once daily.     esomeprazole (NEXIUM) 40 MG capsule Take 1 capsule (40 mg total) by mouth before breakfast.    ezetimibe (ZETIA) 10 mg tablet Take 1 tablet (10 mg total) by mouth once daily.    ferrous sulfate (FEOSOL) 325 mg (65 mg iron) Tab tablet TAKE 1 TABLET BY MOUTH THREE TIMES DAILY    finasteride (PROSCAR) 5 mg tablet HOLD DUE TO ORTHOSTATIC HYPOTENSION    furosemide (LASIX) 20 MG tablet Take 1 tablet (20 mg total) by mouth once daily. Take an extra tablet daily for wt gain more than 3 pounds/day or 5 pounds/week    insulin aspart U-100 (NOVOLOG U-100 INSULIN ASPART) 100 unit/mL injection 3 x10ml vials required per month for use in VGO 30    insulin  unit/mL injection Use 30 Units in the morning and 24 Units at night    isosorbide mononitrate (IMDUR) 60 MG 24 hr tablet Take 1 tablet (60 mg total) by mouth once daily.    losartan (COZAAR) 25 MG tablet Take 1 tablet (25 mg total) by mouth once daily.    nitroGLYCERIN (NITROSTAT) 0.4 MG SL tablet DISSOLVE ONE TABLET UNDER THE  TONGUE EVERY 5 MINUTES AS NEEDED FOR CHEST PAIN.    RANEXA 1,000 mg Tb12 Take 1 tablet (1,000 mg total) by mouth 2 (two) times daily.    rosuvastatin (CRESTOR) 40 MG Tab Take 1 tablet (40 mg total) by mouth once daily.    SITagliptin (JANUVIA) 100 MG Tab Take 1 tablet (100 mg total) by mouth once daily.    tamsulosin (FLOMAX) 0.4 mg Cap Take 1 capsule (0.4 mg total) by mouth once daily.    ticagrelor (BRILINTA) 90 mg tablet Take 90 mg by mouth 2 (two) times daily.    albuterol (PROVENTIL/VENTOLIN HFA) 90 mcg/actuation inhaler Inhale 2 puffs into the lungs every 6 (six) hours as needed for Wheezing.    metoprolol succinate (TOPROL-XL) 25 MG 24 hr tablet Take 1 tablet (25 mg total) by mouth once daily.    sub-q insulin device, 30 unit (V-GO 30) [place] 1 VGo every 24 hr as directed    sub-q insulin device, 30 unit (V-GO 30) [place] 1 VGo every 24 hr as directed    traMADol (ULTRAM) 50 mg tablet Take 1 tablet (50 mg total) by mouth every 8 (eight) hours as needed for Pain.     Family History     Problem Relation (Age of Onset)    Diabetes Mother, Sister    Heart attack Brother    Heart disease Mother, Sister    Hypertension Sister        Tobacco Use    Smoking status: Former Smoker     Packs/day: 3.00     Types: Cigarettes     Start date: 1963     Last attempt to quit: 1981     Years since quittin.7    Smokeless tobacco: Former User     Types: Snuff, Chew     Quit date:    Substance and Sexual Activity    Alcohol use: No    Drug use: No    Sexual activity: Yes     Comment: -with a significant other     Review of Systems   Constitution: Positive for malaise/fatigue. Negative for decreased appetite and weight gain.   Eyes: Negative for blurred vision.   Cardiovascular: Positive for chest pain and dyspnea on exertion. Negative for claudication, irregular heartbeat, near-syncope, orthopnea, palpitations and paroxysmal nocturnal dyspnea.   Respiratory: Negative for hemoptysis and shortness  of breath.    Gastrointestinal: Negative for bloating.   Genitourinary: Negative for bladder incontinence.   Neurological: Negative for aphonia.     Objective:     Vital Signs (Most Recent):  Temp: 97.9 °F (36.6 °C) (10/06/19 1211)  Pulse: 60 (10/06/19 1211)  Resp: 17 (10/06/19 1211)  BP: 116/61 (10/06/19 1211)  SpO2: 97 % (10/06/19 1211) Vital Signs (24h Range):  Temp:  [97.8 °F (36.6 °C)-98.1 °F (36.7 °C)] 97.9 °F (36.6 °C)  Pulse:  [59-63] 60  Resp:  [10-22] 17  SpO2:  [97 %-100 %] 97 %  BP: (116-183)/(57-84) 116/61     Weight: 108.6 kg (239 lb 6.7 oz)  Body mass index is 37.5 kg/m².    SpO2: 97 %         Intake/Output Summary (Last 24 hours) at 10/6/2019 1408  Last data filed at 10/6/2019 1200  Gross per 24 hour   Intake 0 ml   Output 1650 ml   Net -1650 ml       Lines/Drains/Airways     Peripheral Intravenous Line                 Peripheral IV - Single Lumen 10/05/19 2137 20 G Right Wrist less than 1 day                Physical Exam   Constitutional: He is oriented to person, place, and time. He appears well-nourished. No distress.   HENT:   Head: Normocephalic.   Eyes: Pupils are equal, round, and reactive to light.   Neck: No JVD present. No thyromegaly present.   Cardiovascular: Regular rhythm.   No murmur heard.  Pulses:       Radial pulses are 2+ on the right side, and 2+ on the left side.        Dorsalis pedis pulses are 1+ on the right side, and 1+ on the left side.        Posterior tibial pulses are 1+ on the right side, and 1+ on the left side.   Pulmonary/Chest: Effort normal. No respiratory distress. He has no rales.   Musculoskeletal: He exhibits no edema.   Neurological: He is alert and oriented to person, place, and time.   Vitals reviewed.      Significant Labs:   CMP   Recent Labs   Lab 10/05/19  2023 10/06/19  0947   * 133*   K 5.1 4.6   CL 98 100   CO2 24 26   * 310*   BUN 19 20   CREATININE 1.9* 1.4   CALCIUM 9.0 9.2   PROT 6.9 6.8   ALBUMIN 3.6 3.5   BILITOT 0.4 0.5   ALKPHOS 77  62   AST 45* 34   ALT 53* 49*   ANIONGAP 10 7*   ESTGFRAFRICA 40* 58.4*   EGFRNONAA 35* 50.5*   , CBC   Recent Labs   Lab 10/05/19  2023 10/06/19  0947   WBC 8.35 10.89   HGB 11.5* 11.6*   HCT 36.2* 37.4*    169   , Lipid Panel   Recent Labs   Lab 10/06/19  0947   CHOL 121   HDL 46   LDLCALC 67.0   TRIG 40   CHOLHDL 38.0

## 2019-10-06 NOTE — NURSING
CATHIE Pablo asked for BG to be checked. [id number] QG=[id number]. NP to place orders for insulin and Endocrine consult as pt has insulin pump.

## 2019-10-06 NOTE — H&P
[facility] Medicine  History & Physical    Patient Name: Walter Bull  MRN: [id number]  Admission Date: 10/6/2019  Attending Physician: Christie Harper MD   Primary Care Provider: Belkys Kruger MD    [facility] Medicine Team: [facility]OSP MED J Wendy Pablo NP     Patient information was obtained from patient, relative(s), past medical records and ER records.     Subjective:     Principal Problem:Chest pain    Chief Complaint:   Chief Complaint   Patient presents with    Chest Pain        HPI: 67 y/o male with PMH of CAD h/o NSTEMI ([facility] 11/18 no intervention) / CABGx5 (15 yrs ago), LBBB, Diastolic HF, AICD ([employer] dual chamber), T2DM, HTN, HLD, CKD 3, MGUS, and anemia. Patient presented to [place] ED with c/o multiple bouts of L sided chest pain / pressure/ like someone sitting on his chest/ diaphoretic/ nauseated. Stated pain at it's intense was 8/10, L chest nonradiating.  Given nitro in the ER with some relief but pain persisted. Troponin's have been negative x4 with no EKG changes c/w ischemia.  CXR negative. His case was discussed with on call cardiologist Dr. Cook who advised transfer for further evaluation with cardiology given extensive cardiac history.  It appears his PCP has him taking both Coreg and Metoprolol XL low doses each which h/o of b/p fluctuations.     11/16/18 C:  · Origin to Prox Graft lesion , 100% stenosed.  · LVEDP (Pre): 18  · Prox RCA lesion , 100% stenosed.  · Mid LM to Ost LAD lesion , 100% stenosed.  · Estimated blood loss: none  · Three vessel coronary artery disease.      Past Medical History:   Diagnosis Date    Anemia     Anticoagulant long-term use     CHF (congestive heart failure)     Colon cancer screening 2/2/2017    Coronary artery disease     Diabetes mellitus type I     Disorder of kidney and ureter     Encounter for blood transfusion     Glaucoma     Heart attack     09/2018    Heart disease     Hypertension     Iron  deficiency     Kidney failure     post CABG    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       Past Surgical History:   Procedure Laterality Date    CARDIAC DEFIBRILLATOR PLACEMENT      CARDIAC ELECTROPHYSIOLOGY STUDY N/A 11/19/2018    Procedure: CARDIAC ELECTROPHYSIOLOGY STUDY;  Surgeon: Byron Glasgow MD;  Location: [facility] EP LAB;  Service: Cardiology;  Laterality: N/A;  VT, EPS +/- ICD, SJM, brittanys, GP, 6086    CARDIAC ELECTROPHYSIOLOGY STUDY N/A 11/19/2018    Procedure: CARDIAC ELECTROPHYSIOLOGY STUDY;  Surgeon: Byron Glasgow MD;  Location: [facility] EP LAB;  Service: Cardiology;  Laterality: N/A;    COLON SURGERY  2006    COLONOSCOPY N/A 2/2/2017    Procedure: COLONOSCOPY;  Surgeon: Ronnie Conway MD;  Location: [facility];  Service: Endoscopy;  Laterality: N/A;    CORONARY ARTERY BYPASS GRAFT  2005    5 arteries    CORONARY BYPASS GRAFT ANGIOGRAPHY  11/16/2018    Procedure: Bypass graft study;  Surgeon: Ryan Schmidt MD;  Location: [facility] CATH LAB;  Service: Cardiology;;    EYE SURGERY Bilateral 2016    Laser surgery for glaucoma    INSERTION OF IMPLANTABLE CARDIOVERTER-DEFIBRILLATOR (ICD) GENERATOR WITH TWO EXISTING LEADS Left 11/19/2018    Procedure: INSERTION, DUAL ICD;  Surgeon: Byron Glasgow MD;  Location: [facility] EP LAB;  Service: Cardiology;  Laterality: Left;  VT, EPS +/- ICD, SJM, brittanys, GP, 6086    LEFT HEART CATHETERIZATION Left 11/16/2018    Procedure: Left heart cath;  Surgeon: Ryan Schmidt MD;  Location: [facility] CATH LAB;  Service: Cardiology;  Laterality: Left;       Review of patient's allergies indicates:  No Known Allergies    Current Facility-Administered Medications on File Prior to Encounter   Medication    [COMPLETED] aspirin tablet 325 mg    [COMPLETED] atorvastatin tablet 40 mg    [COMPLETED] insulin regular injection 12 Units    [COMPLETED] morphine injection 2 mg    [COMPLETED] nitroGLYCERIN 2% TD oint ointment 1 inch    [COMPLETED] ondansetron injection 4 mg    [COMPLETED] pantoprazole  EC tablet 40 mg     Current Outpatient Medications on File Prior to Encounter   Medication Sig    amiodarone (PACERONE) 200 MG Tab Take 1 tablet (200 mg total) by mouth once daily.    ammonium lactate 12 % Crea Apply small amount to feet except for in between toes twice a day    amoxicillin-clavulanate 875-125mg (AUGMENTIN) 875-125 mg per tablet Take 1 tablet by mouth 2 (two) times daily.    aspirin (ECOTRIN) 81 MG EC tablet Take 1 tablet (81 mg total) by mouth once daily.    carvedilol (COREG) 6.25 MG tablet Take 6.25 mg by mouth once daily.     esomeprazole (NEXIUM) 40 MG capsule Take 1 capsule (40 mg total) by mouth before breakfast.    ezetimibe (ZETIA) 10 mg tablet Take 1 tablet (10 mg total) by mouth once daily.    ferrous sulfate (FEOSOL) 325 mg (65 mg iron) Tab tablet TAKE 1 TABLET BY MOUTH THREE TIMES DAILY    finasteride (PROSCAR) 5 mg tablet HOLD DUE TO ORTHOSTATIC HYPOTENSION    furosemide (LASIX) 20 MG tablet Take 1 tablet (20 mg total) by mouth once daily. Take an extra tablet daily for wt gain more than 3 pounds/day or 5 pounds/week    insulin aspart U-100 (NOVOLOG U-100 INSULIN ASPART) 100 unit/mL injection 3 x10ml vials required per month for use in VGO 30    insulin  unit/mL injection Use 30 Units in the morning and 24 Units at night    isosorbide mononitrate (IMDUR) 60 MG 24 hr tablet Take 1 tablet (60 mg total) by mouth once daily.    losartan (COZAAR) 25 MG tablet Take 1 tablet (25 mg total) by mouth once daily.    nitroGLYCERIN (NITROSTAT) 0.4 MG SL tablet DISSOLVE ONE TABLET UNDER THE TONGUE EVERY 5 MINUTES AS NEEDED FOR CHEST PAIN.    RANEXA 1,000 mg Tb12 Take 1 tablet (1,000 mg total) by mouth 2 (two) times daily.    rosuvastatin (CRESTOR) 40 MG Tab Take 1 tablet (40 mg total) by mouth once daily.    SITagliptin (JANUVIA) 100 MG Tab Take 1 tablet (100 mg total) by mouth once daily.    tamsulosin (FLOMAX) 0.4 mg Cap Take 1 capsule (0.4 mg total) by mouth once daily.     ticagrelor (BRILINTA) 90 mg tablet Take 90 mg by mouth 2 (two) times daily.    albuterol (PROVENTIL/VENTOLIN HFA) 90 mcg/actuation inhaler Inhale 2 puffs into the lungs every 6 (six) hours as needed for Wheezing.    metoprolol succinate (TOPROL-XL) 25 MG 24 hr tablet Take 1 tablet (25 mg total) by mouth once daily.    sub-q insulin device, 30 unit (V-GO 30) [place] 1 VGo every 24 hr as directed    sub-q insulin device, 30 unit (V-GO 30) [place] 1 VGo every 24 hr as directed    traMADol (ULTRAM) 50 mg tablet Take 1 tablet (50 mg total) by mouth every 8 (eight) hours as needed for Pain.     Family History     Problem Relation (Age of Onset)    Diabetes Mother, Sister    Heart attack Brother    Heart disease Mother, Sister    Hypertension Sister        Tobacco Use    Smoking status: Former Smoker     Packs/day: 3.00     Types: Cigarettes     Start date: 1963     Last attempt to quit: 1981     Years since quittin.7    Smokeless tobacco: Former User     Types: Snuff, Chew     Quit date:    Substance and Sexual Activity    Alcohol use: No    Drug use: No    Sexual activity: Yes     Comment: -with a significant other     Review of Systems   Constitutional: Positive for diaphoresis. Negative for activity change, appetite change, chills, fatigue and fever.   HENT: Negative for congestion, rhinorrhea, sinus pressure, sore throat and trouble swallowing.    Eyes: Negative for pain, redness and visual disturbance.   Respiratory: Positive for chest tightness and shortness of breath. Negative for cough, wheezing and stridor.    Cardiovascular: Positive for chest pain. Negative for palpitations and leg swelling.   Gastrointestinal: Positive for nausea. Negative for abdominal distention, abdominal pain, blood in stool, constipation, diarrhea and vomiting.   Endocrine: Negative for cold intolerance and heat intolerance.   Genitourinary: Negative for dysuria, frequency, hematuria and urgency.    Musculoskeletal: Negative for arthralgias, back pain, myalgias and neck pain.   Skin: Negative for color change, pallor and rash.   Allergic/Immunologic: Negative for immunocompromised state.   Neurological: Negative for dizziness, tremors, syncope, weakness, light-headedness, numbness and headaches.   Hematological: Does not bruise/bleed easily.   Psychiatric/Behavioral: Negative for agitation, confusion and sleep disturbance. The patient is not nervous/anxious.      Objective:     Vital Signs (Most Recent):  Temp: 97.9 °F (36.6 °C) (10/06/19 1211)  Pulse: 60 (10/06/19 1400)  Resp: 17 (10/06/19 1211)  BP: 116/61 (10/06/19 1211)  SpO2: 97 % (10/06/19 1211) Vital Signs (24h Range):  Temp:  [97.8 °F (36.6 °C)-98.1 °F (36.7 °C)] 97.9 °F (36.6 °C)  Pulse:  [59-63] 60  Resp:  [10-22] 17  SpO2:  [97 %-100 %] 97 %  BP: (116-183)/(57-84) 116/61     Weight: 108.6 kg (239 lb 6.7 oz)  Body mass index is 37.5 kg/m².    Physical Exam   Constitutional: He is oriented to person, place, and time. He appears well-developed and well-nourished. No distress.   HENT:   Head: Normocephalic and atraumatic.   Right Ear: External ear normal.   Left Ear: External ear normal.   Nose: Nose normal.   Mouth/Throat: Oropharynx is clear and moist.   Eyes: Conjunctivae and EOM are normal. No scleral icterus.   Neck: Normal range of motion. Neck supple. Hepatojugular reflux and JVD present. No tracheal deviation present. No thyromegaly present.   Cardiovascular: Normal rate, regular rhythm, normal heart sounds and intact distal pulses. Exam reveals no gallop and no friction rub.   No murmur heard.  Pulmonary/Chest: Effort normal and breath sounds normal. No respiratory distress. He has no wheezes. He has no rales.   Abdominal: Soft. Bowel sounds are normal. He exhibits no distension and no mass. There is no tenderness. There is no rebound and no guarding.   Musculoskeletal: Normal range of motion. He exhibits edema (+1). He exhibits no tenderness.    Neurological: He is alert and oriented to person, place, and time. No cranial nerve deficit or sensory deficit. He exhibits normal muscle tone. Coordination normal.   Skin: Skin is warm and dry. No rash noted. No erythema.   Psychiatric: He has a normal mood and affect. His behavior is normal. Judgment and thought content normal.   Nursing note and vitals reviewed.        CRANIAL NERVES     CN III, IV, VI   Extraocular motions are normal.        Significant Labs:   CBC:   Recent Labs   Lab 10/05/19  2023 10/06/19  0947   WBC 8.35 10.89   HGB 11.5* 11.6*   HCT 36.2* 37.4*    169     CMP:   Recent Labs   Lab 10/05/19  2023 10/06/19  0947   * 133*   K 5.1 4.6   CL 98 100   CO2 24 26   * 310*   BUN 19 20   CREATININE 1.9* 1.4   CALCIUM 9.0 9.2   PROT 6.9 6.8   ALBUMIN 3.6 3.5   BILITOT 0.4 0.5   ALKPHOS 77 62   AST 45* 34   ALT 53* 49*   ANIONGAP 10 7*   EGFRNONAA 35* 50.5*     Troponin:   Recent Labs   Lab 10/05/19  2023 10/06/19  0355 10/06/19  0947   TROPONINI 0.008 0.016 0.014  0.014       Significant Imaging: EKG: I have reviewed all pertinent results/findings within the past 24 hours and my personal findings are: no ischemia  I have reviewed all pertinent imaging results/findings within the past 24 hours.    Assessment/Plan:     * Chest pain  -  H/o severe CAD, + CP neg trop's, no ecg changes  - increased IMDUR, kept Coreg home dose, d/c'ed metoprolol   - Ordered PET/ Nuc stress to quantify angina and to possibly avoid dye.    - consulted cards co managed for further management options: they will review films with Dr. Jose Antonio Rodriguez in am and plan accordingly   - D dimer elevated 0.52?       Unstable angina  - waiting for card's final rec's      ANURADHA (obstructive sleep apnea)  - ordered cpap 11 cmh20      Acute kidney injury superimposed on CKD  - h/o MGUS  - Acute Renal failure, Cr dropped from 1.9 to 1.4 without perceived intervention  - consulted nephrology, urine studies underway, Renal US  pending  - avoid dye if possible  - avoid nephrotoxic agents and extremes in b/p     ICD (implantable cardioverter-defibrillator), dual, in situ  - [employer] dual chamber ICD  - DDD Base rate 60      Severe obesity (BMI 35.0-39.9) with comorbidity  - encouraged daily ambulation/ exercise along with dietary changes   - he works outside in the heat       Iron deficiency anemia  - iron daily      Benign essential HTN  - d/c'ed metoprolol in lieu of Coreg and will titrate as needed  - held cozaar initially d/t elevated CR 1.9  - possibly due for dye procedure in am, will hold off for now      GERD (gastroesophageal reflux disease)  - continue home regimen nexium to protonix      HLD (hyperlipidemia)  - chol WNL on home regimen      Diabetes mellitus type 2 in obese  -  OSH ED, 300's here upon admit  - Wears a VGO pump/ click, consulted endocrine to assist in transitioning from home NPH and VGO to in house regimen.    - Check hgba1c  - 5 units with meals aspart  - endo added levemir as they removed his VGO pump while in house  - SSI   - avoid hypoglycemia      Coronary artery disease involving native coronary artery of native heart without angina pectoris  - see above      VTE Risk Mitigation (From admission, onward)         Ordered     enoxaparin injection 40 mg  Daily      10/06/19 0837     IP VTE HIGH RISK PATIENT  Once      10/06/19 0837                   Wendy Pablo NP  Department of Hospital Medicine   [facility]

## 2019-10-06 NOTE — ASSESSMENT & PLAN NOTE
- d/c'ed metoprolol in lieu of Coreg and will titrate as needed  - held cozaar initially d/t elevated CR 1.9  - possibly due for dye procedure in am, will hold off for now

## 2019-10-06 NOTE — ASSESSMENT & PLAN NOTE
- Known to have CAD s/p CABG 2005 and [facility] with last one on 11/2018 and showed   of the RCA and LCX with patent LIMA to LAD and SVG to Diagonal. SVG to OM occluded.   - Continue on ASA and ticagrelor and high intensity statin   - Continue anti anginal therapy with isosorbide mononitrate 120 mg PO daily, ranolazine 1000 mg PO BID and consider increase the dose of Coreg to 12.5 mg PO BID if blood pressure allows.   - We will involved interventional cardiology to review the last angiogram (11/2018) for possible intervention

## 2019-10-06 NOTE — ASSESSMENT & PLAN NOTE
"BG goal 140 -180     VGo cartridge removed at bedside during consult. Patient denies symptoms at time of consult. He explains he does not have additional Vgo with him while in hospital.     Start Levemir 25 units HS. First dose tonight (20% reduction from home dose)  Continue novolog 5 units TIDWM.   Moderate Dose SQ Insulin Correction Scale. BG may trend upward since VGo has been removed. Cr 1.4.   BG Monitoring AC/HS/0200    ** Please call Endocrine for any BG related issues **  ** Please notify Endocrine for any change and/or advance in diet**    Discharge Recommendations:   Most likely continue VGo -30 system, and follow up with [facility] endo as scheduled. Please notify endo prior to discharge. VGo "Click" dosage may be subject to change.         "

## 2019-10-06 NOTE — NURSING
Patient admitted to CSU. Patient arrived to floor from [facility] no evidence of distress; patient AAO x4 at this time. Patient placed on tele. Vital signs obtained. Patient voices no complaints at this time. Plan of care initiated with patient. Bed in lowest position, locked, SR up x2, call bell in reach. Will continue to monitor patient. Called admit service [phone]., no answer-message left.    [phone] no call received, called back-notified that admit team is aware

## 2019-10-06 NOTE — SUBJECTIVE & OBJECTIVE
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       Past Surgical History:   Procedure Laterality Date    CARDIAC DEFIBRILLATOR PLACEMENT      CARDIAC ELECTROPHYSIOLOGY STUDY N/A 11/19/2018    Procedure: CARDIAC ELECTROPHYSIOLOGY STUDY;  Surgeon: Byron Glasgow MD;  Location: [facility] EP LAB;  Service: Cardiology;  Laterality: N/A;  VT, EPS +/- ICD, SJM, anes, GP, 6086    CARDIAC ELECTROPHYSIOLOGY STUDY N/A 11/19/2018    Procedure: CARDIAC ELECTROPHYSIOLOGY STUDY;  Surgeon: Byron Glasgow MD;  Location: [facility] EP LAB;  Service: Cardiology;  Laterality: N/A;    COLON SURGERY  2006    COLONOSCOPY N/A 2/2/2017    Procedure: COLONOSCOPY;  Surgeon: Ronnie Conway MD;  Location: [facility] ENDO;  Service: Endoscopy;  Laterality: N/A;    CORONARY ARTERY BYPASS GRAFT  2005    5 arteries    CORONARY BYPASS GRAFT ANGIOGRAPHY  11/16/2018    Procedure: Bypass graft study;  Surgeon: Ryan Schmidt MD;  Location: [facility] CATH LAB;  Service: Cardiology;;    EYE SURGERY Bilateral 2016    Laser surgery for glaucoma    INSERTION OF IMPLANTABLE CARDIOVERTER-DEFIBRILLATOR (ICD) GENERATOR WITH TWO EXISTING LEADS Left 11/19/2018    Procedure: INSERTION, DUAL ICD;  Surgeon: Byron Glasgow MD;  Location: [facility] EP LAB;  Service: Cardiology;  Laterality: Left;  VT, EPS +/- ICD, SJM, anes, GP, 6086    LEFT HEART CATHETERIZATION Left 11/16/2018    Procedure: Left heart cath;  Surgeon: Ryan Schmidt MD;  Location: [facility] CATH LAB;  Service: Cardiology;  Laterality: Left;       Review of patient's allergies indicates:  No Known Allergies  Current  Facility-Administered Medications   Medication Frequency    acetaminophen tablet 650 mg Q4H PRN    albuterol inhaler 2 puff Q6H PRN    amiodarone tablet 200 mg Daily    ammonium lactate 12 % lotion BID    aspirin EC tablet 81 mg Daily    carvedilol tablet 6.25 mg Daily    dextrose 10% (D10W) Bolus PRN    dextrose 10% (D10W) Bolus PRN    enoxaparin injection 40 mg Daily    ezetimibe tablet 10 mg Daily    ferrous sulfate EC tablet 325 mg Daily    finasteride tablet 5 mg Daily    glucagon (human recombinant) injection 1 mg PRN    glucose chewable tablet 16 g PRN    glucose chewable tablet 24 g PRN    hydrALAZINE tablet 10 mg Q8H    insulin aspart U-100 pen 1-10 Units QID (AC + HS) PRN    insulin aspart U-100 pen 5 Units TIDWM    insulin detemir U-100 pen 25 Units QHS    isosorbide mononitrate 24 hr tablet 120 mg Daily    nitroGLYCERIN SL tablet 0.4 mg Q5 Min PRN    pantoprazole EC tablet 40 mg Daily    polyethylene glycol packet 17 g Daily PRN    promethazine tablet 25 mg Q6H PRN    ramelteon tablet 8 mg Nightly PRN    ranolazine 12 hr tablet 1,000 mg BID    rosuvastatin tablet 40 mg Daily    senna-docusate 8.6-50 mg per tablet 1 tablet BID    sodium chloride 0.9% flush 10 mL PRN    tamsulosin 24 hr capsule 0.4 mg Daily    ticagrelor tablet 90 mg BID    traMADol tablet 50 mg Q8H PRN     Family History     Problem Relation (Age of Onset)    Diabetes Mother, Sister    Heart attack Brother    Heart disease Mother, Sister    Hypertension Sister        Tobacco Use    Smoking status: Former Smoker     Packs/day: 3.00     Types: Cigarettes     Start date: 1963     Last attempt to quit: 1981     Years since quittin.7    Smokeless tobacco: Former User     Types: Snuff, Chew     Quit date:    Substance and Sexual Activity    Alcohol use: No    Drug use: No    Sexual activity: Yes     Comment: -with a significant other     Review of Systems   Constitutional: Positive  for diaphoresis. Negative for activity change, appetite change, fatigue and fever.   HENT: Negative for congestion and facial swelling.    Respiratory: Negative for cough, shortness of breath, wheezing and stridor.    Cardiovascular: Positive for chest pain. Negative for palpitations and leg swelling.   Gastrointestinal: Positive for diarrhea (last week). Negative for abdominal pain, nausea and vomiting.   Endocrine: Negative.  Negative for polyuria.   Genitourinary: Negative for dysuria, frequency and hematuria.   Musculoskeletal: Negative.  Negative for joint swelling and myalgias.   Skin: Negative.  Negative for rash.   Neurological: Negative.  Negative for dizziness, speech difficulty and numbness.   Psychiatric/Behavioral: Negative for behavioral problems. The patient is not nervous/anxious.      Objective:     Vital Signs (Most Recent):  Temp: 97.9 °F (36.6 °C) (10/06/19 1211)  Pulse: 60 (10/06/19 1211)  Resp: 17 (10/06/19 1211)  BP: 116/61 (10/06/19 1211)  SpO2: 97 % (10/06/19 1211) Vital Signs (24h Range):  Temp:  [97.8 °F (36.6 °C)-98.1 °F (36.7 °C)] 97.9 °F (36.6 °C)  Pulse:  [59-63] 60  Resp:  [10-22] 17  SpO2:  [97 %-100 %] 97 %  BP: (116-183)/(57-84) 116/61     Weight: 108.6 kg (239 lb 6.7 oz) (10/06/19 0701)  Body mass index is 37.5 kg/m².  Body surface area is 2.27 meters squared.    No intake/output data recorded.    Physical Exam   Constitutional: He is oriented to person, place, and time. He appears well-developed and well-nourished. No distress.   HENT:   Head: Normocephalic and atraumatic.   Mouth/Throat: Oropharynx is clear and moist.   Eyes: Pupils are equal, round, and reactive to light.   Neck: Normal range of motion. Neck supple. No JVD present.   Cardiovascular: Normal rate and regular rhythm.   Murmur heard.  Pulmonary/Chest: Effort normal and breath sounds normal. No respiratory distress. He has no wheezes. He has no rales. He exhibits no tenderness.   Abdominal: Soft. Bowel sounds are  normal. He exhibits no distension. There is no tenderness.   Musculoskeletal: Normal range of motion. He exhibits no edema or deformity.   Neurological: He is alert and oriented to person, place, and time.   Skin: Skin is warm.   Psychiatric: He has a normal mood and affect.   Vitals reviewed.      Significant Labs:  BMP:   Recent Labs   Lab 10/06/19  0947   *      CO2 26   BUN 20   CREATININE 1.4   CALCIUM 9.2   MG 2.0     Cardiac Markers: No results for input(s): CKMB, TROPONINT, MYOGLOBIN in the last 168 hours.  CBC:   Recent Labs   Lab 10/06/19  0947   WBC 10.89   RBC 4.00*   HGB 11.6*   HCT 37.4*      MCV 94   MCH 29.0   MCHC 31.0*     CMP:   Recent Labs   Lab 10/06/19  0947   *   CALCIUM 9.2   ALBUMIN 3.5   PROT 6.8   *   K 4.6   CO2 26      BUN 20   CREATININE 1.4   ALKPHOS 62   ALT 49*   AST 34   BILITOT 0.5     All labs within the past 24 hours have been reviewed.    Significant Imaging:  Labs: Reviewed  No infiltrates but has pleuroparenchymal scarring at the costophrenic angles and caridiomegaly as well as a pacemaker.

## 2019-10-06 NOTE — ASSESSMENT & PLAN NOTE
- encouraged daily ambulation/ exercise along with dietary changes   - he works outside in the heat

## 2019-10-06 NOTE — CONSULTS
"[facility]  Endocrinology  Diabetes Consult Note    Consult Requested by: Christie Harper MD   Reason for admit: Chest pain    HISTORY OF PRESENT ILLNESS:  Reason for Consult: Management of T2DM, Hyperglycemia     Surgical Procedure and Date: N/A    Diabetes diagnosis year:   "When I was in my 30's"     Home Diabetes Medications:    VGo 30 cartridgle system started aprox 1 month ago per [facility].     How often checking glucose at home? 1-3 x day  "Twice a day"  BG readings on regimen: 130's - 200's  Hypoglycemia on the regimen?  Yes  Missed doses on regimen?  No    Diabetes Complications include:     Hyperglycemia, Hypoglycemia  and Diabetic nephropathy      Complicating diabetes co morbidities:   CKD, HTN, CAD, Obesity      HPI:   Patient is a 68 y.o. male with a diagnosis of CAD, CABG, CHF, AICD, T2DM, HTN, CKD 2, MGUS, and anemia. Presented to above ER with chest pain, and has since been transferred to [facility] [place]. Patient is from [facility]. However, Patient receives primary DM care from [facility]. Inpatient DM Endo team consulted to saelna JOSHI during current admission to [facility].       Lab Results   Component Value Date    HGBA1C 7.5 (H) 08/07/2019       Interval HPI:   Overnight events:   BG is trending downward overnight. Patient denies symptoms at time of consult.     Eating:   <25%  Nausea: No  Hypoglycemia and intervention: No  Fever: No  TPN and/or TF: No  If yes, type of TF/TPN and rate: none    PMH, PSH, FH, SH  reviewed       Review of Systems   Constitutional: Negative for weight changes.  Eyes: Negative for visual disturbance.  Respiratory: Negative for cough.   Cardiovascular: Negative for chest pain.  Gastrointestinal: Negative for nausea.  Endocrine: Positive for polyuria, polydipsia.  Musculoskeletal: Negative for back pain.  Skin: Negative for rash.  Neurological: Negative for syncope. Positive for dizziness when BG is " low.  Psychiatric/Behavioral: Negative for depression.    Current Medications and/or Treatments Impacting Glycemic Control  Immunotherapy:    Immunosuppressants     None        Steroids:   Hormones (From admission, onward)    None        Pressors:    Autonomic Drugs (From admission, onward)    None        Hyperglycemia/Diabetes Medications:   Antihyperglycemics (From admission, onward)    Start     Stop Route Frequency Ordered    10/06/19 1130  insulin aspart U-100 pen 5 Units      -- SubQ 3 times daily with meals 10/06/19 0837    10/06/19 0935  insulin aspart U-100 pen 1-10 Units      -- SubQ Before meals & nightly PRN 10/06/19 0837             PHYSICAL EXAMINATION:  Vitals:    10/06/19 0728   BP:    Pulse: 63   Resp:    Temp:      Body mass index is 37.5 kg/m².    Physical Exam   Constitutional: Well developed, well nourished, NAD.  ENT:  normal hearing.  Neck: Supple; trachea midline;   Cardiovascular: Normal heart sounds, no LE edema. DP +2 bilaterally.  Lungs: Normal effort; lungs anterior bilaterally clear to auscultation.  Abdomen: Soft, obese, no masses, no hernias.  MS: No clubbing or cyanosis of nails noted;  Skin: No rashes, lesions, or ulcers; no nodules. Injection sites are ok. No lipo hypertropthy or atrophy.  Psychiatric: Good judgement and insight; normal mood and affect.  Neurological: Cranial nerves are grossly intact.  Foot: nails in good condition, no amputations noted.        Labs Reviewed and Include   Recent Labs   Lab 10/06/19  0947   *   CALCIUM 9.2   ALBUMIN 3.5   PROT 6.8   *   K 4.6   CO2 26      BUN 20   CREATININE 1.4   ALKPHOS 62   ALT 49*   AST 34   BILITOT 0.5     Lab Results   Component Value Date    WBC 10.89 10/06/2019    HGB 11.6 (L) 10/06/2019    HCT 37.4 (L) 10/06/2019    MCV 94 10/06/2019     10/06/2019     No results for input(s): TSH, FREET4 in the last 168 hours.  Lab Results   Component Value Date    HGBA1C 7.5 (H) 08/07/2019       Nutritional  "status:   Body mass index is 37.5 kg/m².  Lab Results   Component Value Date    ALBUMIN 3.5 10/06/2019    ALBUMIN 3.6 10/05/2019    ALBUMIN 3.5 08/27/2019     No results found for: PREALBUMIN    Estimated Creatinine Clearance: 57.7 mL/min (based on SCr of 1.4 mg/dL).    Accu-Checks  Recent Labs     10/05/19  2300 10/06/19  0450 10/06/19  0828   POCTGLUCOSE 383* 333* 315*        ASSESSMENT and PLAN    * Chest pain  Managed per primary team  Avoid hypoglycemia        Diabetes mellitus type 2 in obese  BG goal 140 -180     VGo cartridge removed at bedside during consult. Patient denies symptoms at time of consult. He explains he does not have additional Vgo with him while in hospital.     Start Levemir 25 units HS. First dose tonight (20% reduction from home dose)  Continue novolog 5 units TIDWM.   Moderate Dose SQ Insulin Correction Scale. BG may trend upward since VGo has been removed. Cr 1.4.   BG Monitoring AC/HS/0200    ** Please call Endocrine for any BG related issues **  ** Please notify Endocrine for any change and/or advance in diet**    Discharge Recommendations:   Most likely continue VGo -30 system, and follow up with [facility] endo as scheduled. Please notify endo prior to discharge. VGo "Click" dosage may be subject to change.           Coronary artery disease involving native coronary artery of native heart without angina pectoris  Managed per primary team  Condition may cause insulin resistance         Severe obesity (BMI 35.0-39.9) with comorbidity    may contribute to insulin resistance  Body mass index is 37.5 kg/m².        ANURADHA (obstructive sleep apnea)  May affect BG readings if uncontrolled            Plan discussed with patient, family, and RN at bedside.     Jluis Santos, CATHIE  Endocrinology  [facility]  "

## 2019-10-06 NOTE — PLAN OF CARE
(Physician in Lead of Transfers)   Outside Transfer Acceptance Note / Regional Referral Center      Transferring Physician: Dr. Garrett    Accepting Physician: Sander Roldan MD    Date of Acceptance: 10/06/2019    Transferring Facility: [facility]    Reason for Transfer: needs cardiology    Report from Transferring Physician/Hospital course: 69 y/o male with PMH of CAD, CABG, CHF, AICD, T2DM, HTN, CKD 2, MGUS, and anemia. Presented to above ER with chest pain intermittently throughout the day. Given nitro in the ER with some relief but pain persisted. Initial trop negative and EKG without significant changes. CXR negative. Case was discussed with Dr. Cook in cardiology initially and advised to bring in and observe in cardiac ICU however patient kept in the ER awaiting cardiac bed for several hrs. Patient still stable and second set of troponin also negative. Discussed with Dr. Cook again and advised ok to transfer to step down bed. Not complaining of worsened pain. Cardiology consult for CP given extensive cardiac history.       Labs & Radiographs: see EPIC      To Do List:   1) cardiology consult       Upon patient arrival to floor, please call extension [phone] (if no answer, this will flip to a beeper, so enter your call back number) for Hospital Medicine admit team assignment and for additional admit orders for the patient.  Do not page the attending physician associated with the patient on arrival (this physician may not be on duty at the time of arrival).  Rather, always call [phone] to reach the triage physician for orders and team assignment.      Sander Roldan MD  Hospital Medicine Staff  Cell: [phone]

## 2019-10-07 VITALS
HEART RATE: 60 BPM | TEMPERATURE: 98 F | OXYGEN SATURATION: 99 % | DIASTOLIC BLOOD PRESSURE: 57 MMHG | WEIGHT: 234 LBS | SYSTOLIC BLOOD PRESSURE: 116 MMHG | RESPIRATION RATE: 16 BRPM | HEIGHT: 67 IN | BODY MASS INDEX: 36.73 KG/M2

## 2019-10-07 PROBLEM — N17.9 ACUTE KIDNEY INJURY SUPERIMPOSED ON CKD: Status: RESOLVED | Noted: 2019-05-18 | Resolved: 2019-10-07

## 2019-10-07 PROBLEM — N18.9 ACUTE KIDNEY INJURY SUPERIMPOSED ON CKD: Status: RESOLVED | Noted: 2019-05-18 | Resolved: 2019-10-07

## 2019-10-07 PROBLEM — I20.0 UNSTABLE ANGINA: Status: RESOLVED | Noted: 2019-10-06 | Resolved: 2019-10-07

## 2019-10-07 LAB
ALBUMIN SERPL BCP-MCNC: 3.3 G/DL (ref 3.5–5.2)
ALBUMIN SERPL ELPH-MCNC: 3.6 G/DL (ref 3.35–5.55)
ALP SERPL-CCNC: 63 U/L (ref 55–135)
ALPHA1 GLOB SERPL ELPH-MCNC: 0.39 G/DL (ref 0.17–0.41)
ALPHA2 GLOB SERPL ELPH-MCNC: 0.76 G/DL (ref 0.43–0.99)
ALT SERPL W/O P-5'-P-CCNC: 43 U/L (ref 10–44)
ANION GAP SERPL CALC-SCNC: 10 MMOL/L (ref 8–16)
AST SERPL-CCNC: 34 U/L (ref 10–40)
B-GLOBULIN SERPL ELPH-MCNC: 0.78 G/DL (ref 0.5–1.1)
BILIRUB SERPL-MCNC: 0.6 MG/DL (ref 0.1–1)
BUN SERPL-MCNC: 22 MG/DL (ref 8–23)
CALCIUM SERPL-MCNC: 8.8 MG/DL (ref 8.7–10.5)
CHLORIDE SERPL-SCNC: 99 MMOL/L (ref 95–110)
CO2 SERPL-SCNC: 24 MMOL/L (ref 23–29)
CREAT SERPL-MCNC: 1.4 MG/DL (ref 0.5–1.4)
CV PHARM DOSE: 4 MG
CV STRESS BASE HR: 60 BPM
DIASTOLIC BLOOD PRESSURE: 53 MMHG
EST. GFR  (AFRICAN AMERICAN): 58.4 ML/MIN/1.73 M^2
EST. GFR  (NON AFRICAN AMERICAN): 50.5 ML/MIN/1.73 M^2
GAMMA GLOB SERPL ELPH-MCNC: 1.17 G/DL (ref 0.67–1.58)
GLUCOSE SERPL-MCNC: 273 MG/DL (ref 70–110)
MAGNESIUM SERPL-MCNC: 1.9 MG/DL (ref 1.6–2.6)
OHS CV CPX 1 MINUTE RECOVERY HEART RATE: 60 BPM
OHS CV CPX 85 PERCENT MAX PREDICTED HEART RATE MALE: 128
OHS CV CPX MAX PREDICTED HEART RATE: 150
OHS CV CPX PATIENT IS FEMALE: 0
OHS CV CPX PATIENT IS MALE: 1
OHS CV CPX PEAK DIASTOLIC BLOOD PRESSURE: 75 MMHG
OHS CV CPX PEAK HEAR RATE: 60 BPM
OHS CV CPX PEAK RATE PRESSURE PRODUCT: 7320
OHS CV CPX PEAK SYSTOLIC BLOOD PRESSURE: 122 MMHG
OHS CV CPX PERCENT MAX PREDICTED HEART RATE ACHIEVED: 40
OHS CV CPX RATE PRESSURE PRODUCT PRESENTING: 6360
PATHOLOGIST INTERPRETATION SPE: NORMAL
POCT GLUCOSE: 186 MG/DL (ref 70–110)
POCT GLUCOSE: 236 MG/DL (ref 70–110)
POCT GLUCOSE: 244 MG/DL (ref 70–110)
POCT GLUCOSE: 266 MG/DL (ref 70–110)
POCT GLUCOSE: 282 MG/DL (ref 70–110)
POTASSIUM SERPL-SCNC: 4 MMOL/L (ref 3.5–5.1)
PROT 24H UR-MRATE: 184 MG/SPEC (ref 0–100)
PROT SERPL-MCNC: 6.4 G/DL (ref 6–8.4)
PROT SERPL-MCNC: 6.7 G/DL (ref 6–8.4)
PROT UR-MCNC: 8 MG/DL (ref 0–15)
SODIUM SERPL-SCNC: 133 MMOL/L (ref 136–145)
STRESS ECHO TARGET HR: 127.5 BPM
SYSTOLIC BLOOD PRESSURE: 106 MMHG
URINE COLLECTION DURATION: 24 HR
URINE VOLUME: 2300 ML

## 2019-10-07 PROCEDURE — 99239 PR HOSPITAL DISCHARGE DAY,>30 MIN: ICD-10-PCS | Mod: ,,, | Performed by: NURSE PRACTITIONER

## 2019-10-07 PROCEDURE — 80053 COMPREHEN METABOLIC PANEL: CPT

## 2019-10-07 PROCEDURE — 94761 N-INVAS EAR/PLS OXIMETRY MLT: CPT

## 2019-10-07 PROCEDURE — 25000003 PHARM REV CODE 250: Performed by: NURSE PRACTITIONER

## 2019-10-07 PROCEDURE — 99232 SBSQ HOSP IP/OBS MODERATE 35: CPT | Mod: ,,, | Performed by: NURSE PRACTITIONER

## 2019-10-07 PROCEDURE — 99239 HOSP IP/OBS DSCHRG MGMT >30: CPT | Mod: ,,, | Performed by: NURSE PRACTITIONER

## 2019-10-07 PROCEDURE — 83735 ASSAY OF MAGNESIUM: CPT

## 2019-10-07 PROCEDURE — 36415 COLL VENOUS BLD VENIPUNCTURE: CPT

## 2019-10-07 PROCEDURE — 99900035 HC TECH TIME PER 15 MIN (STAT)

## 2019-10-07 PROCEDURE — 63600175 PHARM REV CODE 636 W HCPCS: Performed by: NURSE PRACTITIONER

## 2019-10-07 PROCEDURE — 99232 PR SUBSEQUENT HOSPITAL CARE,LEVL II: ICD-10-PCS | Mod: ,,, | Performed by: NURSE PRACTITIONER

## 2019-10-07 PROCEDURE — 63600175 PHARM REV CODE 636 W HCPCS: Performed by: HOSPITALIST

## 2019-10-07 PROCEDURE — 94660 CPAP INITIATION&MGMT: CPT

## 2019-10-07 RX ORDER — CARVEDILOL 12.5 MG/1
12.5 TABLET ORAL 2 TIMES DAILY
Status: DISCONTINUED | OUTPATIENT
Start: 2019-10-07 | End: 2019-10-07 | Stop reason: HOSPADM

## 2019-10-07 RX ORDER — FUROSEMIDE 20 MG/1
20 TABLET ORAL DAILY
Status: DISCONTINUED | OUTPATIENT
Start: 2019-10-07 | End: 2019-10-07 | Stop reason: HOSPADM

## 2019-10-07 RX ORDER — ISOSORBIDE MONONITRATE 120 MG/1
120 TABLET, EXTENDED RELEASE ORAL DAILY
Qty: 90 TABLET | Refills: 3 | Status: SHIPPED | OUTPATIENT
Start: 2019-10-08 | End: 2020-12-02 | Stop reason: SDUPTHER

## 2019-10-07 RX ORDER — INSULIN ASPART 100 [IU]/ML
10 INJECTION, SOLUTION INTRAVENOUS; SUBCUTANEOUS
Status: DISCONTINUED | OUTPATIENT
Start: 2019-10-07 | End: 2019-10-07 | Stop reason: HOSPADM

## 2019-10-07 RX ORDER — CARVEDILOL 12.5 MG/1
12.5 TABLET ORAL 2 TIMES DAILY
Qty: 180 TABLET | Refills: 3 | Status: SHIPPED | OUTPATIENT
Start: 2019-10-07 | End: 2019-10-15

## 2019-10-07 RX ORDER — INSULIN ASPART 100 [IU]/ML
8 INJECTION, SOLUTION INTRAVENOUS; SUBCUTANEOUS
Status: DISCONTINUED | OUTPATIENT
Start: 2019-10-07 | End: 2019-10-07

## 2019-10-07 RX ORDER — REGADENOSON 0.08 MG/ML
0.4 INJECTION, SOLUTION INTRAVENOUS
Status: COMPLETED | OUTPATIENT
Start: 2019-10-07 | End: 2019-10-07

## 2019-10-07 RX ORDER — FERROUS SULFATE 325(65) MG
325 TABLET, DELAYED RELEASE (ENTERIC COATED) ORAL DAILY
Refills: 0 | COMMUNITY
Start: 2019-10-08 | End: 2019-12-06

## 2019-10-07 RX ORDER — CARVEDILOL 12.5 MG/1
12.5 TABLET ORAL DAILY
Status: DISCONTINUED | OUTPATIENT
Start: 2019-10-07 | End: 2019-10-07

## 2019-10-07 RX ADMIN — ISOSORBIDE MONONITRATE 120 MG: 60 TABLET, EXTENDED RELEASE ORAL at 09:10

## 2019-10-07 RX ADMIN — CARVEDILOL 12.5 MG: 12.5 TABLET, FILM COATED ORAL at 09:10

## 2019-10-07 RX ADMIN — ENOXAPARIN SODIUM 40 MG: 100 INJECTION SUBCUTANEOUS at 04:10

## 2019-10-07 RX ADMIN — FINASTERIDE 5 MG: 5 TABLET, FILM COATED ORAL at 09:10

## 2019-10-07 RX ADMIN — INSULIN ASPART 2 UNITS: 100 INJECTION, SOLUTION INTRAVENOUS; SUBCUTANEOUS at 11:10

## 2019-10-07 RX ADMIN — RANOLAZINE 1000 MG: 500 TABLET, FILM COATED, EXTENDED RELEASE ORAL at 09:10

## 2019-10-07 RX ADMIN — REGADENOSON 0.4 MG: 0.08 INJECTION, SOLUTION INTRAVENOUS at 02:10

## 2019-10-07 RX ADMIN — TAMSULOSIN HYDROCHLORIDE 0.4 MG: 0.4 CAPSULE ORAL at 09:10

## 2019-10-07 RX ADMIN — ROSUVASTATIN CALCIUM 40 MG: 20 TABLET, FILM COATED ORAL at 09:10

## 2019-10-07 RX ADMIN — HYDRALAZINE HYDROCHLORIDE 10 MG: 10 TABLET, FILM COATED ORAL at 06:10

## 2019-10-07 RX ADMIN — FUROSEMIDE 20 MG: 20 TABLET ORAL at 09:10

## 2019-10-07 RX ADMIN — INSULIN ASPART 10 UNITS: 100 INJECTION, SOLUTION INTRAVENOUS; SUBCUTANEOUS at 11:10

## 2019-10-07 RX ADMIN — EZETIMIBE 10 MG: 10 TABLET ORAL at 09:10

## 2019-10-07 RX ADMIN — PANTOPRAZOLE SODIUM 40 MG: 40 TABLET, DELAYED RELEASE ORAL at 09:10

## 2019-10-07 RX ADMIN — INSULIN ASPART 3 UNITS: 100 INJECTION, SOLUTION INTRAVENOUS; SUBCUTANEOUS at 01:10

## 2019-10-07 RX ADMIN — SENNOSIDES AND DOCUSATE SODIUM 1 TABLET: 8.6; 5 TABLET ORAL at 09:10

## 2019-10-07 RX ADMIN — INSULIN ASPART 4 UNITS: 100 INJECTION, SOLUTION INTRAVENOUS; SUBCUTANEOUS at 08:10

## 2019-10-07 RX ADMIN — AMIODARONE HYDROCHLORIDE 200 MG: 200 TABLET ORAL at 09:10

## 2019-10-07 RX ADMIN — INSULIN ASPART 10 UNITS: 100 INJECTION, SOLUTION INTRAVENOUS; SUBCUTANEOUS at 05:10

## 2019-10-07 RX ADMIN — INSULIN ASPART 6 UNITS: 100 INJECTION, SOLUTION INTRAVENOUS; SUBCUTANEOUS at 05:10

## 2019-10-07 RX ADMIN — AMMONIUM LACTATE: 12 LOTION TOPICAL at 09:10

## 2019-10-07 RX ADMIN — ASPIRIN 81 MG: 81 TABLET, COATED ORAL at 09:10

## 2019-10-07 RX ADMIN — TICAGRELOR 90 MG: 90 TABLET ORAL at 09:10

## 2019-10-07 RX ADMIN — FERROUS SULFATE TAB EC 325 MG (65 MG FE EQUIVALENT) 325 MG: 325 (65 FE) TABLET DELAYED RESPONSE at 09:10

## 2019-10-07 NOTE — PROGRESS NOTES
[facility]  Nephrology  Progress Note    Patient Name: Walter Bull  MRN: [id number]  Admission Date: 10/6/2019  Hospital Length of Stay: 1 days  Attending Provider: Christie Harper MD   Primary Care Physician: Belkys Kruger MD  Principal Problem:Chest pain    Subjective:     HPI: 69 yo male patient with h/o CAD (s/p CABGx5). CHF (EF 43% RVPS 31mmg with grade I DD in 05/19), s/p AICD placement, T2DM (~40 years on insulin), glaucoma without diabetic retinopathy. HTN (~19 years), MGUS and history of CKD2 coming for chest pain to the ED and transferred from [facility] . Patient states that he has been having intermittent chest pain in the left anterior chest described as a pressure, travels downwards to his abdomen with no N/V, ER notes mention him being diaphoretic. Initial troponins and EKG were negative, however, given his extensive cardiac history he was admitted for observation. Nephrology consulted due to HARRIS (Cr 1.9 on admission from baseline of 1.2)  Upon chart review patient was seen by Nephrologist Dr. Burroughs in 2017, his CKD was thought to be secondary to longstanding DM, HTN and HARRIS post CABG with minimal proteinuria. Last UPCR in chart is from 01/2018 at 0.08 g/g. Yesterday on presentation his sCr was 1.9, no fluids were given per chart review, and in fact 60 mg IV of lasix were administered. Today  s sCr is 1.4.  Pt denies dehydration although he did have diarrhea last week. Denies NSAID use or other OTC meds.    Interval History: Pt seen and examined at bedside. Cr today stable from yesterday at 1.4, improved from admission (1.9). UO 3.1 L past 24 hours, net negative 1.9 L.    Review of patient's allergies indicates:  No Known Allergies  Current Facility-Administered Medications   Medication Frequency    acetaminophen tablet 650 mg Q4H PRN    albuterol inhaler 2 puff Q6H PRN    amiodarone tablet 200 mg Daily    ammonium lactate 12 % lotion BID    aspirin EC tablet 81 mg  Daily    carvedilol tablet 12.5 mg Daily    dextrose 10% (D10W) Bolus PRN    dextrose 10% (D10W) Bolus PRN    enoxaparin injection 40 mg Daily    ezetimibe tablet 10 mg Daily    ferrous sulfate EC tablet 325 mg Daily    finasteride tablet 5 mg Daily    furosemide tablet 20 mg Daily    glucagon (human recombinant) injection 1 mg PRN    glucose chewable tablet 16 g PRN    glucose chewable tablet 24 g PRN    insulin aspart U-100 pen 1-10 Units QID (AC + HS) PRN    insulin aspart U-100 pen 10 Units TIDWM    insulin detemir U-100 pen 30 Units QHS    isosorbide mononitrate 24 hr tablet 120 mg Daily    nitroGLYCERIN SL tablet 0.4 mg Q5 Min PRN    pantoprazole EC tablet 40 mg Daily    polyethylene glycol packet 17 g Daily PRN    promethazine tablet 25 mg Q6H PRN    ramelteon tablet 8 mg Nightly PRN    ranolazine 12 hr tablet 1,000 mg BID    rosuvastatin tablet 40 mg Daily    senna-docusate 8.6-50 mg per tablet 1 tablet BID    sodium chloride 0.9% flush 10 mL PRN    tamsulosin 24 hr capsule 0.4 mg Daily    ticagrelor tablet 90 mg BID    traMADol tablet 50 mg Q8H PRN     Family History     Problem Relation (Age of Onset)    Diabetes Mother, Sister    Heart attack Brother    Heart disease Mother, Sister    Hypertension Sister        Tobacco Use    Smoking status: Former Smoker     Packs/day: 3.00     Types: Cigarettes     Start date: 1963     Last attempt to quit: 1981     Years since quittin.7    Smokeless tobacco: Former User     Types: Snuff, Chew     Quit date:    Substance and Sexual Activity    Alcohol use: No    Drug use: No    Sexual activity: Yes     Comment: -with a significant other     Review of Systems   Constitutional: Positive for diaphoresis. Negative for activity change, appetite change, fatigue and fever.   HENT: Negative for congestion and facial swelling.    Respiratory: Negative for cough, shortness of breath, wheezing and stridor.     Cardiovascular: Positive for chest pain. Negative for palpitations and leg swelling.   Gastrointestinal: Positive for diarrhea (last week). Negative for abdominal pain, nausea and vomiting.   Endocrine: Negative.  Negative for polyuria.   Genitourinary: Negative for dysuria, frequency and hematuria.   Musculoskeletal: Negative.  Negative for joint swelling and myalgias.   Skin: Negative.  Negative for rash.   Neurological: Negative.  Negative for dizziness, speech difficulty and numbness.   Psychiatric/Behavioral: Negative for behavioral problems. The patient is not nervous/anxious.      Objective:     Vital Signs (Most Recent):  Temp: 97.7 °F (36.5 °C) (10/07/19 0802)  Pulse: 60 (10/07/19 0802)  Resp: 14 (10/07/19 0802)  BP: 128/66 (10/07/19 0802)  SpO2: 98 % (10/07/19 0802)  O2 Device (Oxygen Therapy): room air (10/07/19 0431) Vital Signs (24h Range):  Temp:  [97 °F (36.1 °C)-98.2 °F (36.8 °C)] 97.7 °F (36.5 °C)  Pulse:  [59-62] 60  Resp:  [14-18] 14  SpO2:  [94 %-98 %] 98 %  BP: (102-128)/(44-66) 128/66     Weight: 106.5 kg (234 lb 12.6 oz) (10/07/19 0400)  Body mass index is 36.77 kg/m².  Body surface area is 2.24 meters squared.    I/O last 3 completed shifts:  In: 1200 [P.O.:1200]  Out: 3100 [Urine:3100]    Physical Exam   Constitutional: He is oriented to person, place, and time. He appears well-developed and well-nourished. No distress.   HENT:   Head: Normocephalic and atraumatic.   Mouth/Throat: Oropharynx is clear and moist.   Eyes: Pupils are equal, round, and reactive to light.   Neck: Normal range of motion. Neck supple. No JVD present.   Cardiovascular: Normal rate and regular rhythm.   Murmur heard.  Pulmonary/Chest: Effort normal and breath sounds normal. No respiratory distress. He has no wheezes. He has no rales. He exhibits no tenderness.   Abdominal: Soft. Bowel sounds are normal. He exhibits no distension. There is no tenderness.   Musculoskeletal: Normal range of motion. He exhibits no edema  or deformity.   Neurological: He is alert and oriented to person, place, and time.   Skin: Skin is warm.   Psychiatric: He has a normal mood and affect.   Vitals reviewed.      Significant Labs:  BMP:   Recent Labs   Lab 10/07/19  0409   *   CL 99   CO2 24   BUN 22   CREATININE 1.4   CALCIUM 8.8   MG 1.9     Cardiac Markers: No results for input(s): CKMB, TROPONINT, MYOGLOBIN in the last 168 hours.  CBC:   Recent Labs   Lab 10/06/19  0947   WBC 10.89   RBC 4.00*   HGB 11.6*   HCT 37.4*      MCV 94   MCH 29.0   MCHC 31.0*     CMP:   Recent Labs   Lab 10/07/19  0409   *   CALCIUM 8.8   ALBUMIN 3.3*   PROT 6.4   *   K 4.0   CO2 24   CL 99   BUN 22   CREATININE 1.4   ALKPHOS 63   ALT 43   AST 34   BILITOT 0.6     All labs within the past 24 hours have been reviewed.    Significant Imaging:  Labs: Reviewed  No infiltrates but has pleuroparenchymal scarring at the costophrenic angles and caridiomegaly as well as a pacemaker.     Assessment/Plan:     Acute kidney injury superimposed on CKD  67 yo male patient with h/o CAD (s/p CABGx5). CHF (EF 43% RVPS 31mmg with grade I DD in 05/19), s/p AICD placement, T2DM (~40 years on insulin), glaucoma without diabetic retinopathy. HTN (~19 years), MGUS and history of CKD2 coming for chest pain and concern for cardiac etiology. Nephrology consulted due to HARRIS (Cr 1.9 on admission from baseline of 1.2). CKD per Dr. Burroughs in 2017 thought to be 2/2 DM, HTN and HARRIS post CABG with minimal proteinuria. Last UPCR in chart is from 01/2018 at 0.08 g/g. Yesterday on presentation his sCr was 1.9, no fluids were given per chart review, and in fact 60 mg IV of lasix were administered. Today  s sCr is 1.4.     Plan:   - Cr today stable from yesterday at 1.4, improved from admission (1.9). Etiology of HARRIS unclear, he denies NSAID use. Possibly from hemodynamic changes, Currently Cr is coming back to baseline.    - No electrolyte, acid/base abnormality. Will continue to  monitor for now. No acute interventions from the Nephrology standpoint.   - Educated patient on importance of avoiding NSAIDs or OTC meds   - Avoid contrast use, only if strictly needed.        Thank you for your consult. I will follow-up with patient. Please contact us if you have any additional questions.    Lydia Manuel MD  Nephrology  [facility]

## 2019-10-07 NOTE — NURSING TRANSFER
Nursing Transfer Note      10/7/2019     Transfer To: nuclear medicine for stress test     Transfer via wheelchair    Transfer with cardiac monitoring    Transported by transportation    Chart send with patient: no    Notified: wife

## 2019-10-07 NOTE — PLAN OF CARE
Pt AAO and VSS. 24hr urine collect started at 1115 today-ends tomorrow morning at 1115. BG monitored, supplemental insulin administered. Pt to go to stress test 10/7; NPO @0000. Pt educated on fall risk overnight,pt remained free from falls/trauma/injury. Denies chest pain, SOB, palpitations, dizziness, pain, or discomfort. Plan of care reviewed with pt, all questions answered. Bed locked in lowest position, call bell within reach, no acute distress noted, will continue to monitor.

## 2019-10-07 NOTE — PLAN OF CARE
Pt maintained free from falls/ trauma/ injuries and skin breakdown. Pt denied pain or discomfort. Pt had stress test done today and is adequate for discharge. VS stable through this shift. Blood glucose checked 4 times today and control with insulin per MAR. Pt given discharge instruction, medication reviewed with the patient, follow up appointments reviewed.  All questions and concerns addressed. Pt verbalized understanding. Left IV, telemetry removed. Discharge paperwork given to patient. Pt in the room waiting for transport.

## 2019-10-07 NOTE — ASSESSMENT & PLAN NOTE
69 yo male patient with h/o CAD (s/p CABGx5). CHF (EF 43% RVPS 31mmg with grade I DD in 05/19), s/p AICD placement, T2DM (~40 years on insulin), glaucoma without diabetic retinopathy. HTN (~19 years), MGUS and history of CKD2 coming for chest pain and concern for cardiac etiology. Nephrology consulted due to HARRIS (Cr 1.9 on admission from baseline of 1.2). CKD per Dr. Burroughs in 2017 thought to be 2/2 DM, HTN and HARRIS post CABG with minimal proteinuria. Last UPCR in chart is from 01/2018 at 0.08 g/g. Yesterday on presentation his sCr was 1.9, no fluids were given per chart review, and in fact 60 mg IV of lasix were administered. Today  s sCr is 1.4.     Plan:   - Cr today stable from yesterday at 1.4, improved from admission (1.9). Etiology of HARRIS unclear, he denies NSAID use. Possibly from hemodynamic changes, Currently Cr is coming back to baseline.    - No electrolyte, acid/base abnormality. Will continue to monitor for now. No acute interventions from the Nephrology standpoint.   - Educated patient on importance of avoiding NSAIDs or OTC meds   - Avoid contrast use, only if strictly needed.

## 2019-10-07 NOTE — PLAN OF CARE
10/07/19 1133   Discharge Assessment   Assessment Type Discharge Planning Assessment   Confirmed/corrected address and phone number on facesheet? Yes   Assessment information obtained from? Patient;Medical Record;Caregiver   Expected Length of Stay (days) 2   Communicated expected length of stay with patient/caregiver yes   Prior to hospitilization cognitive status: Alert/Oriented   Prior to hospitalization functional status: Independent   Current cognitive status: Alert/Oriented   Current Functional Status: Independent   Lives With significant other   Able to Return to Prior Arrangements yes   Is patient able to care for self after discharge? Yes   Patient's perception of discharge disposition home or selfcare   Readmission Within the Last 30 Days no previous admission in last 30 days   Patient currently being followed by outpatient case management? No   Patient currently receives any other outside agency services? No   Equipment Currently Used at Home CPAP   Do you have any problems affording any of your prescribed medications? TBD   Is the patient taking medications as prescribed? yes   Does the patient have transportation home? Yes   Transportation Anticipated family or friend will provide   Does the patient receive services at the Coumadin Clinic? No   Discharge Plan A Home with family   DME Needed Upon Discharge  none   Patient/Family in Agreement with Plan yes   Admitted with CP. Lives with SO and is independent in hisADLs. Plan is to DC home. No DC needs identified.  Home address:  [address]  [place] [address]

## 2019-10-07 NOTE — ASSESSMENT & PLAN NOTE
- ordered cpap 11 cmh20, respiratory dropped off cpap machine but never came back to put patient on machine

## 2019-10-07 NOTE — ASSESSMENT & PLAN NOTE
-  [facility] ED, 300's here upon admit  - Wears a VGO pump/ click. Endocrine consulted and assisted in transitioning from home NPH and VGO to in house regimen.    - hgba1c 7.5 8/19  - 5 units with meals aspart  - endo added levemir as they removed his VGO pump while in house  - SSI   - avoid hypoglycemia  - resume home regimen

## 2019-10-07 NOTE — ASSESSMENT & PLAN NOTE
"BG goal 140 -180   BG is above goal on current regimen.     Increase Levemir to 30 units HS.  Increase Novolog to 10 units TIDWM.   Moderate Dose SQ Insulin Correction Scale. BG may trend upward since VGo has been removed. Cr 1.4.   BG Monitoring AC/HS/0200.    ** Please call Endocrine for any BG related issues **  ** Please notify Endocrine for any change and/or advance in diet**    Discharge Recommendations:   Most likely continue VGo -30 system, and follow up with [facility] endo as scheduled. Please notify endo prior to discharge. VGo "Click" dosage may be subject to change.         "

## 2019-10-07 NOTE — PROGRESS NOTES
"   10/07/19 1130   Vital Signs   BP (!) 105/56   MAP (mmHg) 74   BP Location Left arm   Patient Position Lying   Pt , received 2unit insulin per sliding scale around 1130, complaint of dizziness, "felt hot" and "not feeling well" prior to transfering to stress test. VS depict decrease BP.  Called nuclear medicine to reschedule but was not successful.   Pt at stress test now. NP Lanks notified.    "

## 2019-10-07 NOTE — ASSESSMENT & PLAN NOTE
- h/o MGUS  - Acute Renal failure, Cr dropped from 1.9 to 1.4 without perceived intervention  - consulted nephrology, urine studies underway, Renal US c/w medical renal dz  - avoid dye if possible  - avoid nephrotoxic agents and extremes in b/p

## 2019-10-07 NOTE — SUBJECTIVE & OBJECTIVE
"Interval HPI:   Overnight events:   BG is above goal on current SQ insulin regimen. NAEON. Diet scheduled to advance today.    Eatin%  Nausea: No  Hypoglycemia and intervention: No  Fever: No  TPN and/or TF: No  If yes, type of TF/TPN and rate: None    /66 (BP Location: Left arm, Patient Position: Lying)   Pulse 60   Temp 97.7 °F (36.5 °C) (Oral)   Resp 14   Ht 5' 7" (1.702 m)   Wt 106.5 kg (234 lb 12.6 oz)   SpO2 98%   BMI 36.77 kg/m²     Labs Reviewed and Include    Recent Labs   Lab 10/07/19  0409   *   CALCIUM 8.8   ALBUMIN 3.3*   PROT 6.4   *   K 4.0   CO2 24   CL 99   BUN 22   CREATININE 1.4   ALKPHOS 63   ALT 43   AST 34   BILITOT 0.6     Lab Results   Component Value Date    WBC 10.89 10/06/2019    HGB 11.6 (L) 10/06/2019    HCT 37.4 (L) 10/06/2019    MCV 94 10/06/2019     10/06/2019     No results for input(s): TSH, FREET4 in the last 168 hours.  Lab Results   Component Value Date    HGBA1C 7.5 (H) 2019       Nutritional status:   Body mass index is 36.77 kg/m².  Lab Results   Component Value Date    ALBUMIN 3.3 (L) 10/07/2019    ALBUMIN 3.5 10/06/2019    ALBUMIN 3.6 10/05/2019     No results found for: PREALBUMIN    Estimated Creatinine Clearance: 57.2 mL/min (based on SCr of 1.4 mg/dL).    Accu-Checks  Recent Labs     10/05/19  2300 10/06/19  0450 10/06/19  0828 10/06/19  1212 10/06/19  1657 10/06/19  2159 10/07/19  0141 10/07/19  0803   POCTGLUCOSE 383* 333* 315* 264* 331* 221* 266* 244*       Current Medications and/or Treatments Impacting Glycemic Control  Immunotherapy:    Immunosuppressants     None        Steroids:   Hormones (From admission, onward)    None        Pressors:    Autonomic Drugs (From admission, onward)    None        Hyperglycemia/Diabetes Medications:   Antihyperglycemics (From admission, onward)    Start     Stop Route Frequency Ordered    10/06/19 2100  insulin detemir U-100 pen 25 Units      -- SubQ Nightly 10/06/19 1118    10/06/19 " 1130  insulin aspart U-100 pen 5 Units      -- SubQ 3 times daily with meals 10/06/19 0837    10/06/19 0935  insulin aspart U-100 pen 1-10 Units      -- SubQ Before meals & nightly PRN 10/06/19 0837

## 2019-10-07 NOTE — ASSESSMENT & PLAN NOTE
- [facility] [place] dual chamber ICD  - DDD Base rate 60  - was supposed to be interrogated today, informed of inpt status, they need to reschedule or check while he's here

## 2019-10-07 NOTE — ASSESSMENT & PLAN NOTE
- d/c'ed metoprolol in lieu of Coreg and will titrate as needed  - held cozaar initially d/t elevated CR 1.9, CR back down resume upon discharge

## 2019-10-07 NOTE — SUBJECTIVE & OBJECTIVE
Interval History: Pt seen and examined at bedside. Cr today stable from yesterday at 1.4, improved from admission (1.9). UO 3.1 L past 24 hours, net negative 1.9 L.    Review of patient's allergies indicates:  No Known Allergies  Current Facility-Administered Medications   Medication Frequency    acetaminophen tablet 650 mg Q4H PRN    albuterol inhaler 2 puff Q6H PRN    amiodarone tablet 200 mg Daily    ammonium lactate 12 % lotion BID    aspirin EC tablet 81 mg Daily    carvedilol tablet 12.5 mg Daily    dextrose 10% (D10W) Bolus PRN    dextrose 10% (D10W) Bolus PRN    enoxaparin injection 40 mg Daily    ezetimibe tablet 10 mg Daily    ferrous sulfate EC tablet 325 mg Daily    finasteride tablet 5 mg Daily    furosemide tablet 20 mg Daily    glucagon (human recombinant) injection 1 mg PRN    glucose chewable tablet 16 g PRN    glucose chewable tablet 24 g PRN    insulin aspart U-100 pen 1-10 Units QID (AC + HS) PRN    insulin aspart U-100 pen 10 Units TIDWM    insulin detemir U-100 pen 30 Units QHS    isosorbide mononitrate 24 hr tablet 120 mg Daily    nitroGLYCERIN SL tablet 0.4 mg Q5 Min PRN    pantoprazole EC tablet 40 mg Daily    polyethylene glycol packet 17 g Daily PRN    promethazine tablet 25 mg Q6H PRN    ramelteon tablet 8 mg Nightly PRN    ranolazine 12 hr tablet 1,000 mg BID    rosuvastatin tablet 40 mg Daily    senna-docusate 8.6-50 mg per tablet 1 tablet BID    sodium chloride 0.9% flush 10 mL PRN    tamsulosin 24 hr capsule 0.4 mg Daily    ticagrelor tablet 90 mg BID    traMADol tablet 50 mg Q8H PRN     Family History     Problem Relation (Age of Onset)    Diabetes Mother, Sister    Heart attack Brother    Heart disease Mother, Sister    Hypertension Sister        Tobacco Use    Smoking status: Former Smoker     Packs/day: 3.00     Types: Cigarettes     Start date: 1963     Last attempt to quit: 1981     Years since quittin.7    Smokeless tobacco:  Former User     Types: Snuff, Chew     Quit date: 1984   Substance and Sexual Activity    Alcohol use: No    Drug use: No    Sexual activity: Yes     Comment: -with a significant other     Review of Systems   Constitutional: Positive for diaphoresis. Negative for activity change, appetite change, fatigue and fever.   HENT: Negative for congestion and facial swelling.    Respiratory: Negative for cough, shortness of breath, wheezing and stridor.    Cardiovascular: Positive for chest pain. Negative for palpitations and leg swelling.   Gastrointestinal: Positive for diarrhea (last week). Negative for abdominal pain, nausea and vomiting.   Endocrine: Negative.  Negative for polyuria.   Genitourinary: Negative for dysuria, frequency and hematuria.   Musculoskeletal: Negative.  Negative for joint swelling and myalgias.   Skin: Negative.  Negative for rash.   Neurological: Negative.  Negative for dizziness, speech difficulty and numbness.   Psychiatric/Behavioral: Negative for behavioral problems. The patient is not nervous/anxious.      Objective:     Vital Signs (Most Recent):  Temp: 97.7 °F (36.5 °C) (10/07/19 0802)  Pulse: 60 (10/07/19 0802)  Resp: 14 (10/07/19 0802)  BP: 128/66 (10/07/19 0802)  SpO2: 98 % (10/07/19 0802)  O2 Device (Oxygen Therapy): room air (10/07/19 0431) Vital Signs (24h Range):  Temp:  [97 °F (36.1 °C)-98.2 °F (36.8 °C)] 97.7 °F (36.5 °C)  Pulse:  [59-62] 60  Resp:  [14-18] 14  SpO2:  [94 %-98 %] 98 %  BP: (102-128)/(44-66) 128/66     Weight: 106.5 kg (234 lb 12.6 oz) (10/07/19 0400)  Body mass index is 36.77 kg/m².  Body surface area is 2.24 meters squared.    I/O last 3 completed shifts:  In: 1200 [P.O.:1200]  Out: 3100 [Urine:3100]    Physical Exam   Constitutional: He is oriented to person, place, and time. He appears well-developed and well-nourished. No distress.   HENT:   Head: Normocephalic and atraumatic.   Mouth/Throat: Oropharynx is clear and moist.   Eyes: Pupils are equal,  round, and reactive to light.   Neck: Normal range of motion. Neck supple. No JVD present.   Cardiovascular: Normal rate and regular rhythm.   Murmur heard.  Pulmonary/Chest: Effort normal and breath sounds normal. No respiratory distress. He has no wheezes. He has no rales. He exhibits no tenderness.   Abdominal: Soft. Bowel sounds are normal. He exhibits no distension. There is no tenderness.   Musculoskeletal: Normal range of motion. He exhibits no edema or deformity.   Neurological: He is alert and oriented to person, place, and time.   Skin: Skin is warm.   Psychiatric: He has a normal mood and affect.   Vitals reviewed.      Significant Labs:  BMP:   Recent Labs   Lab 10/07/19  0409   *   CL 99   CO2 24   BUN 22   CREATININE 1.4   CALCIUM 8.8   MG 1.9     Cardiac Markers: No results for input(s): CKMB, TROPONINT, MYOGLOBIN in the last 168 hours.  CBC:   Recent Labs   Lab 10/06/19  0947   WBC 10.89   RBC 4.00*   HGB 11.6*   HCT 37.4*      MCV 94   MCH 29.0   MCHC 31.0*     CMP:   Recent Labs   Lab 10/07/19  0409   *   CALCIUM 8.8   ALBUMIN 3.3*   PROT 6.4   *   K 4.0   CO2 24   CL 99   BUN 22   CREATININE 1.4   ALKPHOS 63   ALT 43   AST 34   BILITOT 0.6     All labs within the past 24 hours have been reviewed.    Significant Imaging:  Labs: Reviewed  No infiltrates but has pleuroparenchymal scarring at the costophrenic angles and caridiomegaly as well as a pacemaker.

## 2019-10-07 NOTE — PROGRESS NOTES
"[facility]  Endocrinology  Progress Note    Admit Date: 10/6/2019     Reason for Consult: Management of T2DM, Hyperglycemia     Surgical Procedure and Date: N/A    Diabetes diagnosis year:   "When I was in my 30's"     Home Diabetes Medications:    VGo 30 cartridgle system started aprox 1 month ago per [facility] Endo clinic.    -3 clicks with meals   -1-2 clicks with snack.     How often checking glucose at home? 1-3 x day  "Twice a day"  BG readings on regimen: 130's - 200's  Hypoglycemia on the regimen?  Yes  Missed doses on regimen?  No    Diabetes Complications include:     Hyperglycemia, Hypoglycemia  and Diabetic nephropathy      Complicating diabetes co morbidities:   CKD, HTN, CAD, Obesity      HPI:   Patient is a 68 y.o. male with a diagnosis of CAD, CABG, CHF, AICD, T2DM, HTN, CKD 2, MGUS, and anemia. Presented to above ER with chest pain, and has since been transferred to [facility] for further evaluation. Patient is from [facility]. However, Patient receives primary DM care from [facility] endocrinology clinic. Inpatient DM Endo team consulted to salena JOSHI during current admission to [facility].       Lab Results   Component Value Date    HGBA1C 7.5 (H) 2019       Interval HPI:   Overnight events:   BG is above goal on current SQ insulin regimen. NAEON. Diet scheduled to advance today.    Eatin%  Nausea: No  Hypoglycemia and intervention: No  Fever: No  TPN and/or TF: No  If yes, type of TF/TPN and rate: None    /66 (BP Location: Left arm, Patient Position: Lying)   Pulse 60   Temp 97.7 °F (36.5 °C) (Oral)   Resp 14   Ht 5' 7" (1.702 m)   Wt 106.5 kg (234 lb 12.6 oz)   SpO2 98%   BMI 36.77 kg/m²      Labs Reviewed and Include    Recent Labs   Lab 10/07/19  0409   *   CALCIUM 8.8   ALBUMIN 3.3*   PROT 6.4   *   K 4.0   CO2 24   CL 99   BUN 22   CREATININE 1.4   ALKPHOS 63   ALT 43   AST 34   BILITOT 0.6     Lab Results   Component Value Date    WBC 10.89 10/06/2019    HGB " 11.6 (L) 10/06/2019    HCT 37.4 (L) 10/06/2019    MCV 94 10/06/2019     10/06/2019     No results for input(s): TSH, FREET4 in the last 168 hours.  Lab Results   Component Value Date    HGBA1C 7.5 (H) 08/07/2019       Nutritional status:   Body mass index is 36.77 kg/m².  Lab Results   Component Value Date    ALBUMIN 3.3 (L) 10/07/2019    ALBUMIN 3.5 10/06/2019    ALBUMIN 3.6 10/05/2019     No results found for: PREALBUMIN    Estimated Creatinine Clearance: 57.2 mL/min (based on SCr of 1.4 mg/dL).    Accu-Checks  Recent Labs     10/05/19  2300 10/06/19  0450 10/06/19  0828 10/06/19  1212 10/06/19  1657 10/06/19  2159 10/07/19  0141 10/07/19  0803   POCTGLUCOSE 383* 333* 315* 264* 331* 221* 266* 244*       Current Medications and/or Treatments Impacting Glycemic Control  Immunotherapy:    Immunosuppressants     None        Steroids:   Hormones (From admission, onward)    None        Pressors:    Autonomic Drugs (From admission, onward)    None        Hyperglycemia/Diabetes Medications:   Antihyperglycemics (From admission, onward)    Start     Stop Route Frequency Ordered    10/06/19 2100  insulin detemir U-100 pen 25 Units      -- SubQ Nightly 10/06/19 1118    10/06/19 1130  insulin aspart U-100 pen 5 Units      -- SubQ 3 times daily with meals 10/06/19 0837    10/06/19 0935  insulin aspart U-100 pen 1-10 Units      -- SubQ Before meals & nightly PRN 10/06/19 0837          ASSESSMENT and PLAN    * Chest pain  Managed per primary team  Avoid hypoglycemia        Diabetes mellitus type 2 in obese  BG goal 140 -180   BG is above goal on current regimen.     Increase Levemir to 30 units HS.  Increase Novolog to 10 units TIDWM.   Moderate Dose SQ Insulin Correction Scale. BG may trend upward since VGo has been removed. Cr 1.4.   BG Monitoring AC/HS/0200.    ** Please call Endocrine for any BG related issues **  ** Please notify Endocrine for any change and/or advance in diet**    Discharge Recommendations:   Most  "likely continue VGo -30 system, and follow up with [facility] endo as scheduled. Please notify endo prior to discharge. VGo "Click" dosage may be subject to change.           Coronary artery disease involving native coronary artery of native heart without angina pectoris  Managed per primary team  Condition may cause insulin resistance         Severe obesity (BMI 35.0-39.9) with comorbidity    may contribute to insulin resistance  Body mass index is 36.77 kg/m².        ANURADHA (obstructive sleep apnea)  May affect BG readings if uncontrolled            Jluis Santos NP  Endocrinology  [facility]  "

## 2019-10-07 NOTE — ASSESSMENT & PLAN NOTE
-  H/o severe CAD, + CP neg trop's, no ecg changes  - increased IMDUR, increased Coreg home dose to 12.5 bid from 6.25 daily, d/c'ed home metoprolol as he does not need two beta blockers   - s/p Nuc stress - fixed defect inferior wall of LV 25-30%. No active ischemia.     - consulted cards co managed for further management options: they reviewed films with Dr. Jose Antonio Rodriguez agreed with Nuc spec.  Medical management.  Dr. Rodriguez suggests to refer to [facility] for advanced options.    - D dimer elevated 0.52?

## 2019-10-07 NOTE — NURSING TRANSFER
Nursing Transfer Note      10/7/2019     Transfer From: nuclear medicine     Transfer via bed    Transfer with cardiac monitoring    Transported by transportation      Chart send with patient: no    Notified: spouse    Patient reassessed at: 10/7/19 1515    Upon arrival to floor: recheck blood glucose and insulin given per MAR

## 2019-10-08 ENCOUNTER — PATIENT OUTREACH (OUTPATIENT)
Dept: ADMINISTRATIVE | Facility: CLINIC | Age: 69
End: 2019-10-08

## 2019-10-08 NOTE — PLAN OF CARE
10/08/19 0639   Final Note   Assessment Type Final Discharge Note   Anticipated Discharge Disposition Home   Hospital Follow Up  Appt(s) scheduled? Yes

## 2019-10-08 NOTE — PATIENT INSTRUCTIONS
Discharge Instructions for Angina  You have been diagnosed with a type of chest pain called angina. Angina occurs when not enough oxygen reaches the heart muscle. It is most often felt under your breastbone, in your left shoulder, or down your left arm. The pain may even spread to your jaw or back. Exercise, increased activity, emotional upset, or stress can trigger this pain. With proper treatment and lifestyle changes to reduce risk factors, most people with angina are able to maintain a full and active life.  Managing risk factors  Your healthcare provider will work with you to make lifestyle changes as needed. This can help prevent worsening of coronary artery disease, which is likely the cause of your angina.  Coronary artery disease is a narrowing of the blood vessels that supply oxygen and nutrients to the heart muscle. The blood vessels can also spasm and reduce the oxygen reaching the heart muscle from the narrowing inside of the artery. Managing your risk factors may prevent both of these causes of narrowing of your arteries.  Diet  Your healthcare provider will give you information on dietary changes that you may need to make, based on your situation. Your provider may recommend that you see a registered dietitian for help with diet changes. Try these changes to start:    · Reduce fat and cholesterol intake   · Reduce sodium (salt) intake, especially if you have high blood pressure   · Increase your intake of fresh vegetables and fruits   · Eat lean proteins, such as fish, poultry, and legumes (beans and peas) and eat less red meat and processed meats  · Use low-fat dairy products   · Use vegetable and nut oils in limited amounts   · Limit sweets and processed foods such as chips, cookies, and baked goods  · Limit sodas and high calorie drinks  · Limit greasy and fried foods, or those high in saturated fat  · Limit alcohol intake  Physical activity  Your healthcare provider may recommend that you  increase your physical activity if you have not been as active as possible. This may include moderate to vigorous intensity physical activity for at least 30 to 60 minutes each day for at least 5 to 7 days per week. A few examples of moderate to vigorous intensity physical activity include:   · Walking at a brisk pace, about 3 to 4 miles per hour  · Jogging or running  · Swimming or water aerobics  · Hiking  · Dancing  · Martial arts  · Tennis  · Riding a bike  Don't start or increase your activity level without first seeing your healthcare provider.  Weight management  If you are overweight, your healthcare provider will work with you to lose weight and lower your BMI (body mass index) to a normal or near-normal level. Making diet changes and increasing physical activity can help. A healthy and reasonable goal for weight loss is to lose 10% of your current weight per year.  Smoking  If you smoke, break the smoking habit. Enroll in a stop-smoking program to improve your chances of success. You can also join a support group. Talk to your healthcare provider about nicotine replacement products or medicines to help you quit.  Stress  Learn ways to manage stress to help you deal with stress in your home and work life. Your ability to prioritize your health depends on your mental health and focus. Feeling supported in the rest of your life is key to achieving success with your health.  Managing medicines  · Keep a record of your episodes of chest pain. Take these with you when you see your healthcare provider.  · Take your medicines exactly as directed. Dont skip doses. If you miss a dose, call your healthcare provider right away.  · If you have unwanted side effects from your medicine, tell your healthcare provider right away.  Taking nitroglycerin  · Keep your nitroglycerin with you at all times.  · If youre on nitroglycerin, dont take medicines used to treat erectile dysfunction (such as sildenafil) at all. These  can react with nitroglycerin and cause your blood pressure to drop to a dangerous or even life-threatening level.  · If you use nitroglycerin to prevent angina attacks, follow your healthcare providers instructions for your kind of nitroglycerin (pill, spray, or skin patch).  · If you use nitroglycerin to stop an angina attack, follow these steps:  ¨ Sit down (you may become dizzy).  ¨ Place 1 tablet under your tongue, or between your lip and gum, or between your cheek and gum. Let the tablet dissolve completely; do not chew or swallow the tablet.  ¨ If you use a spray, then spray once on or under your tongue. Do not inhale. Close your mouth. Wait a few seconds before you swallow and don't rinse your mouth for 5 to 10 minutes.  ¨ After taking 1 tablet or spraying once, continue sitting for 5 minutes.  ¨ If the angina goes away completely, rest awhile and follow your provider's orders about returning to your normal routine.  ¨ If the chest pain or pressure continues, CALL 911 immediately. Do NOT delay. You may be having a heart attack (acute myocardial infarction, or AMI)!  ¨ You may be told by your doctor to CALL 911 after taking 2 or 3 tables or sprays of nitroglycerin (spaced 5 minutes apart) and the chest pain or pressure is still present 5 minutes after the last dose. Do not take more than 3 tablets, or spray more than 3 times, within 15 minutes.   When to call your doctor  Call your doctor immediately if you have any of these:  · Severe headache  · Severe dizziness, or fainting  · Nausea or vomiting  · Fast heartbeat (higher than 100 beats per minute)  · Swollen ankles  · Weakness  · Angina attacks that last longer, occur more often, or are more severe than in the past  · Angina that occurs at rest, wakes you up out of sleep, or does not resolve   Date Last Reviewed: 6/1/2016  © 2437-0068 The "[employer]. [address] [place] [address], [place] [address]. All rights reserved. This information is not intended  as a substitute for professional medical care. Always follow your healthcare professional's instructions.

## 2019-10-10 LAB — PROT PATTERN UR ELPH-IMP: NORMAL

## 2019-10-11 LAB — PATHOLOGIST INTERPRETATION UPE: NORMAL

## 2019-10-15 ENCOUNTER — OFFICE VISIT (OUTPATIENT)
Dept: INTERNAL MEDICINE | Facility: CLINIC | Age: 69
End: 2019-10-15
Payer: MEDICARE

## 2019-10-15 VITALS
WEIGHT: 241.88 LBS | RESPIRATION RATE: 18 BRPM | DIASTOLIC BLOOD PRESSURE: 54 MMHG | HEIGHT: 67 IN | HEART RATE: 62 BPM | SYSTOLIC BLOOD PRESSURE: 98 MMHG | BODY MASS INDEX: 37.96 KG/M2

## 2019-10-15 DIAGNOSIS — Z09 HOSPITAL DISCHARGE FOLLOW-UP: Primary | ICD-10-CM

## 2019-10-15 DIAGNOSIS — I10 BENIGN ESSENTIAL HTN: ICD-10-CM

## 2019-10-15 PROCEDURE — 99999 PR PBB SHADOW E&M-EST. PATIENT-LVL III: CPT | Mod: PBBFAC,,, | Performed by: INTERNAL MEDICINE

## 2019-10-15 PROCEDURE — 3074F SYST BP LT 130 MM HG: CPT | Mod: CPTII,S$GLB,, | Performed by: INTERNAL MEDICINE

## 2019-10-15 PROCEDURE — 1101F PT FALLS ASSESS-DOCD LE1/YR: CPT | Mod: CPTII,S$GLB,, | Performed by: INTERNAL MEDICINE

## 2019-10-15 PROCEDURE — 99214 OFFICE O/P EST MOD 30 MIN: CPT | Mod: S$GLB,,, | Performed by: INTERNAL MEDICINE

## 2019-10-15 PROCEDURE — 3074F PR MOST RECENT SYSTOLIC BLOOD PRESSURE < 130 MM HG: ICD-10-PCS | Mod: CPTII,S$GLB,, | Performed by: INTERNAL MEDICINE

## 2019-10-15 PROCEDURE — 99214 PR OFFICE/OUTPT VISIT, EST, LEVL IV, 30-39 MIN: ICD-10-PCS | Mod: S$GLB,,, | Performed by: INTERNAL MEDICINE

## 2019-10-15 PROCEDURE — 99999 PR PBB SHADOW E&M-EST. PATIENT-LVL III: ICD-10-PCS | Mod: PBBFAC,,, | Performed by: INTERNAL MEDICINE

## 2019-10-15 PROCEDURE — 3078F PR MOST RECENT DIASTOLIC BLOOD PRESSURE < 80 MM HG: ICD-10-PCS | Mod: CPTII,S$GLB,, | Performed by: INTERNAL MEDICINE

## 2019-10-15 PROCEDURE — 3078F DIAST BP <80 MM HG: CPT | Mod: CPTII,S$GLB,, | Performed by: INTERNAL MEDICINE

## 2019-10-15 PROCEDURE — 1101F PR PT FALLS ASSESS DOC 0-1 FALLS W/OUT INJ PAST YR: ICD-10-PCS | Mod: CPTII,S$GLB,, | Performed by: INTERNAL MEDICINE

## 2019-10-15 RX ORDER — FUROSEMIDE 20 MG/1
20 TABLET ORAL DAILY
Qty: 30 TABLET | Refills: 1 | Status: SHIPPED | OUTPATIENT
Start: 2019-10-15 | End: 2020-02-10

## 2019-10-15 RX ORDER — CARVEDILOL 6.25 MG/1
6.25 TABLET ORAL 2 TIMES DAILY
Qty: 60 TABLET | Refills: 1 | Status: SHIPPED | OUTPATIENT
Start: 2019-10-15 | End: 2020-12-02 | Stop reason: SDUPTHER

## 2019-10-15 NOTE — PROGRESS NOTES
Subjective:       Patient ID: Walter Bull is a 68 y.o. male.    Chief Complaint: Transitional Care (d/c 10/07/2019); Chest Pain; and Fatigue (pt reports has been feeling fatigued and tired since home from hospital)      HPI:  Patient is known to me and presents for hospital follow up chest pains. Trop negative. NM showed fixed defect. Plan is to medically manage. Imdur increased to 120mg, Coreg increased to 12.5mg. He is off metoprolol now; as he should have been prior to admission I believe. He was also discharged with Lasix 20mg daily for fluid management. Since discharge he denies further episodes of chest pains, SOB, [place]. No LE edema or orthopnea. However, he is very fatigued; feels weak. BP is 90s/50s.     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       Family History   Problem Relation Age of Onset    Diabetes Mother     Heart disease Mother     Hypertension Sister     Diabetes Sister     Heart disease Sister     Heart attack Brother     Colon cancer Neg Hx     Esophageal cancer Neg Hx     Stomach cancer Neg Hx        Social History     Socioeconomic History    Marital status:      Spouse name: Not on file    Number of children: Not on file    Years of education: Not on file    Highest education level: Not on file   Occupational History    Not on file   Social Needs    Financial resource strain: Not on file    Food insecurity:     Worry: Not on file     Inability: Not on file    Transportation needs:     Medical: Not on file     Non-medical: Not on file   Tobacco Use    Smoking  status: Former Smoker     Packs/day: 3.00     Types: Cigarettes     Start date: 1963     Last attempt to quit: 1981     Years since quittin.8    Smokeless tobacco: Former User     Types: Snuff, Chew     Quit date:    Substance and Sexual Activity    Alcohol use: No    Drug use: No    Sexual activity: Yes     Comment: -with a significant other   Lifestyle    Physical activity:     Days per week: Not on file     Minutes per session: Not on file    Stress: Not on file   Relationships    Social connections:     Talks on phone: Not on file     Gets together: Not on file     Attends Nondenominational service: Not on file     Active member of club or organization: Not on file     Attends meetings of clubs or organizations: Not on file     Relationship status: Not on file   Other Topics Concern    Not on file   Social History Narrative    Not on file       Review of Systems   Constitutional: Positive for fatigue. Negative for activity change, fever and unexpected weight change.   HENT: Negative for congestion, ear pain, hearing loss, rhinorrhea and sore throat.    Eyes: Negative for pain, redness and visual disturbance.   Respiratory: Negative for cough, shortness of breath and wheezing.    Cardiovascular: Negative for chest pain, palpitations and leg swelling.   Gastrointestinal: Negative for abdominal pain, constipation, diarrhea, nausea and vomiting.   Genitourinary: Negative for decreased urine volume, dysuria, frequency and urgency.   Musculoskeletal: Negative for back pain, joint swelling and neck pain.   Skin: Negative for color change, rash and wound.   Neurological: Negative for dizziness, tremors, weakness, light-headedness and headaches.         Objective:      Physical Exam   Constitutional: He is oriented to person, place, and time. He appears well-developed and well-nourished. No distress.   HENT:   Head: Normocephalic and atraumatic.   Right Ear: External ear normal.   Left Ear:  External ear normal.   Eyes: Pupils are equal, round, and reactive to light. Conjunctivae and EOM are normal. Right eye exhibits no discharge. Left eye exhibits no discharge.   Neck: Neck supple. No tracheal deviation present.   Cardiovascular: Normal rate and regular rhythm.   No murmur heard.  Pulmonary/Chest: Effort normal and breath sounds normal. No respiratory distress. He has no wheezes. He has no rales.   Abdominal: Soft. Bowel sounds are normal. He exhibits no distension. There is no tenderness.   Neurological: He is alert and oriented to person, place, and time. No cranial nerve deficit.   Skin: Skin is warm and dry.   Psychiatric: He has a normal mood and affect. His behavior is normal.   Vitals reviewed.      Assessment:       1. Hospital discharge follow-up    2. Benign essential HTN        Plan:       Walter was seen today for transitional care, chest pain and fatigue.    Diagnoses and all orders for this visit:    Hospital discharge follow-up  meds reconciled  D/c summary reviewed  Problem list reviewed and updated    Benign essential HTN  -     furosemide (LASIX) 20 MG tablet; Take 1 tablet (20 mg total) by mouth once daily. Take an extra tablet daily for wt gain more than 3 pounds/day or 5 pounds/week  -     carvedilol (COREG) 6.25 MG tablet; Take 1 tablet (6.25 mg total) by mouth 2 (two) times daily.    BP is running low again. While I am in complete agreement that keep his BP low is ideal the patient simply can not tolerate it. I have tried adjusting and reducing medication doses in the past but several doses increased again this admission.  I have elected to reduce coreg back to 6.25mg from 12.5mg BID  Will continue lasix 20mg and imdur 120mg daily for now  He has been very difficult to manage as he sees me, cardiology and hospital admission when meds and dosages are frequently changed.  It is important for patient to stress to his other providers that he can not tolerate a low BP; he feels weak,  fatigue and has pre-syncope symptoms that prevent him from doing his daily activities.

## 2019-10-21 NOTE — PROGRESS NOTES
"Subjective:       Patient ID: Walter Bull is a 68 y.o. male.    Vitals:  height is 5' 7" (1.702 m) and weight is 104.8 kg (231 lb). His temperature is 98.2 °F (36.8 °C). His blood pressure is 109/58 (abnormal) and his pulse is 71. His respiration is 18 and oxygen saturation is 96%.     Chief Complaint: Drainage from Incision    Other   The current episode started today. The problem has been unchanged. Pertinent negatives include no arthralgias, chest pain, chills, congestion, coughing, fatigue, fever, headaches, joint swelling, myalgias, nausea, rash, sore throat, vertigo or vomiting.       Constitution: Negative for chills, fatigue and fever.   HENT: Negative for congestion and sore throat.    Neck: Negative for painful lymph nodes.   Cardiovascular: Negative for chest pain and leg swelling.        Pacemaker placed in on Nov 19, 2018. States the incision just started leaking fluids this morning. States he called his Surgeon and he told him to a ER or Urgent Care to be seen.   Eyes: Negative for double vision and blurred vision.   Respiratory: Negative for cough and shortness of breath.    Gastrointestinal: Negative for nausea, vomiting and diarrhea.   Genitourinary: Negative for dysuria, frequency and urgency.   Musculoskeletal: Negative for joint pain, joint swelling, muscle cramps and muscle ache.   Skin: Negative for color change, pale, rash and erythema.   Allergic/Immunologic: Negative for seasonal allergies.   Neurological: Positive for dizziness. Negative for history of vertigo, light-headedness, passing out and headaches.   Hematologic/Lymphatic: Negative for swollen lymph nodes, easy bruising/bleeding and history of blood clots. Does not bruise/bleed easily.   Psychiatric/Behavioral: Negative for nervous/anxious, sleep disturbance and depression. The patient is not nervous/anxious.        Objective:      Physical Exam   Constitutional: He is oriented to person, place, and time. He appears " well-developed and well-nourished. No distress.   HENT:   Head: Normocephalic and atraumatic.   Right Ear: External ear normal.   Left Ear: External ear normal.   Nose: Nose normal.   Mouth/Throat: No oropharyngeal exudate.   Eyes: Conjunctivae are normal. Right eye exhibits no discharge. Left eye exhibits no discharge.   Neck: Normal range of motion.   Cardiovascular: Normal rate, regular rhythm and normal heart sounds.   Pacemaker left upper chest     Pulmonary/Chest: Effort normal and breath sounds normal. No stridor. No respiratory distress. He has no wheezes.   Abdominal: Soft.   Lymphadenopathy:     He has no cervical adenopathy.   Neurological: He is alert and oriented to person, place, and time.   Skin: Skin is warm, dry and intact. Capillary refill takes less than 2 seconds. No rash noted. He is not diaphoretic. No erythema. No pallor.        Psychiatric: He has a normal mood and affect. His behavior is normal. Judgment and thought content normal.   Nursing note and vitals reviewed.      Assessment:       1. Pacemaker        Plan:       Monitor for redness, swelling or pus drainage.   ER if fever occurs or symptoms worsen.   Complete Keflex.     Pacemaker          To get better and follow your care plan as instructed.

## 2019-10-26 DIAGNOSIS — D50.9 IRON DEFICIENCY ANEMIA, UNSPECIFIED IRON DEFICIENCY ANEMIA TYPE: ICD-10-CM

## 2019-10-28 RX ORDER — FERROUS SULFATE 325(65) MG
TABLET ORAL
Qty: 90 TABLET | Refills: 3 | Status: SHIPPED | OUTPATIENT
Start: 2019-10-28 | End: 2019-10-30 | Stop reason: SDUPTHER

## 2019-10-30 DIAGNOSIS — D50.9 IRON DEFICIENCY ANEMIA, UNSPECIFIED IRON DEFICIENCY ANEMIA TYPE: ICD-10-CM

## 2019-10-30 RX ORDER — FERROUS SULFATE 325(65) MG
TABLET ORAL
Qty: 90 TABLET | Refills: 3 | Status: SHIPPED | OUTPATIENT
Start: 2019-10-30 | End: 2020-02-21 | Stop reason: SDUPTHER

## 2019-11-01 ENCOUNTER — OFFICE VISIT (OUTPATIENT)
Dept: URGENT CARE | Facility: CLINIC | Age: 69
End: 2019-11-01
Payer: MEDICARE

## 2019-11-01 VITALS
HEART RATE: 60 BPM | DIASTOLIC BLOOD PRESSURE: 56 MMHG | HEIGHT: 67 IN | WEIGHT: 241 LBS | OXYGEN SATURATION: 97 % | BODY MASS INDEX: 37.83 KG/M2 | SYSTOLIC BLOOD PRESSURE: 118 MMHG | TEMPERATURE: 98 F | RESPIRATION RATE: 18 BRPM

## 2019-11-01 DIAGNOSIS — J20.9 ACUTE BRONCHITIS WITH COEXISTING CONDITION, NEED PROPHYLACTIC THERAPY: Primary | ICD-10-CM

## 2019-11-01 PROCEDURE — 71046 XR CHEST PA AND LATERAL: ICD-10-PCS | Mod: S$GLB,,, | Performed by: RADIOLOGY

## 2019-11-01 PROCEDURE — 1101F PT FALLS ASSESS-DOCD LE1/YR: CPT | Mod: CPTII,S$GLB,, | Performed by: NURSE PRACTITIONER

## 2019-11-01 PROCEDURE — 94640 AIRWAY INHALATION TREATMENT: CPT | Mod: S$GLB,,, | Performed by: NURSE PRACTITIONER

## 2019-11-01 PROCEDURE — 1101F PR PT FALLS ASSESS DOC 0-1 FALLS W/OUT INJ PAST YR: ICD-10-PCS | Mod: CPTII,S$GLB,, | Performed by: NURSE PRACTITIONER

## 2019-11-01 PROCEDURE — 99499 UNLISTED E&M SERVICE: CPT | Mod: S$GLB,,, | Performed by: NURSE PRACTITIONER

## 2019-11-01 PROCEDURE — 71046 X-RAY EXAM CHEST 2 VIEWS: CPT | Mod: S$GLB,,, | Performed by: RADIOLOGY

## 2019-11-01 PROCEDURE — 3078F DIAST BP <80 MM HG: CPT | Mod: CPTII,S$GLB,, | Performed by: NURSE PRACTITIONER

## 2019-11-01 PROCEDURE — 3074F PR MOST RECENT SYSTOLIC BLOOD PRESSURE < 130 MM HG: ICD-10-PCS | Mod: CPTII,S$GLB,, | Performed by: NURSE PRACTITIONER

## 2019-11-01 PROCEDURE — 99214 OFFICE O/P EST MOD 30 MIN: CPT | Mod: 25,S$GLB,, | Performed by: NURSE PRACTITIONER

## 2019-11-01 PROCEDURE — 99499 RISK ADDL DX/OHS AUDIT: ICD-10-PCS | Mod: S$GLB,,, | Performed by: NURSE PRACTITIONER

## 2019-11-01 PROCEDURE — 3078F PR MOST RECENT DIASTOLIC BLOOD PRESSURE < 80 MM HG: ICD-10-PCS | Mod: CPTII,S$GLB,, | Performed by: NURSE PRACTITIONER

## 2019-11-01 PROCEDURE — 94640 PR INHAL RX, AIRWAY OBST/DX SPUTUM INDUCT: ICD-10-PCS | Mod: S$GLB,,, | Performed by: NURSE PRACTITIONER

## 2019-11-01 PROCEDURE — 3074F SYST BP LT 130 MM HG: CPT | Mod: CPTII,S$GLB,, | Performed by: NURSE PRACTITIONER

## 2019-11-01 PROCEDURE — 99214 PR OFFICE/OUTPT VISIT, EST, LEVL IV, 30-39 MIN: ICD-10-PCS | Mod: 25,S$GLB,, | Performed by: NURSE PRACTITIONER

## 2019-11-01 RX ORDER — ALBUTEROL SULFATE 0.83 MG/ML
2.5 SOLUTION RESPIRATORY (INHALATION)
Status: COMPLETED | OUTPATIENT
Start: 2019-11-01 | End: 2019-11-01

## 2019-11-01 RX ORDER — ALBUTEROL SULFATE 0.83 MG/ML
2.5 SOLUTION RESPIRATORY (INHALATION) EVERY 6 HOURS PRN
Qty: 1 BOX | Refills: 0 | Status: SHIPPED | OUTPATIENT
Start: 2019-11-01 | End: 2020-04-21

## 2019-11-01 RX ORDER — DOXYCYCLINE 100 MG/1
100 CAPSULE ORAL 2 TIMES DAILY
Qty: 20 CAPSULE | Refills: 0 | Status: SHIPPED | OUTPATIENT
Start: 2019-11-01 | End: 2019-11-11

## 2019-11-01 RX ORDER — LOSARTAN POTASSIUM 50 MG/1
TABLET ORAL
COMMUNITY
Start: 2019-10-26 | End: 2019-11-19

## 2019-11-01 RX ADMIN — ALBUTEROL SULFATE 2.5 MG: 0.83 SOLUTION RESPIRATORY (INHALATION) at 06:11

## 2019-11-01 NOTE — PROGRESS NOTES
"Subjective:       Patient ID: Walter Bull is a 68 y.o. male.    Vitals:  height is 5' 7" (1.702 m) and weight is 109.3 kg (241 lb). His oral temperature is 97.9 °F (36.6 °C). His blood pressure is 118/56 (abnormal) and his pulse is 60. His respiration is 18 and oxygen saturation is 97%.     Chief Complaint: Cough    67 y/o male presents with persistent cough x 2 weeks that began to worsen over the past 4 days.      has a past medical history of Anemia, Anticoagulant long-term use, CHF (congestive heart failure), Colon cancer screening, Coronary artery disease, Diabetes mellitus type I, Disorder of kidney and ureter, Encounter for blood transfusion, Glaucoma, Heart attack, Heart disease, Hypertension, Iron deficiency, Kidney failure, and S/P CABG x 5.    Past Surgical History:  No date: CARDIAC DEFIBRILLATOR PLACEMENT  11/19/2018: CARDIAC ELECTROPHYSIOLOGY STUDY; N/A      Comment:  Procedure: CARDIAC ELECTROPHYSIOLOGY STUDY;  Surgeon:                Byron Glasgow MD;  Location: [facility] EP LAB;  Service:                Cardiology;  Laterality: N/A;  VT, EPS +/- ICD, SJM,                anes, GP, 6086  11/19/2018: CARDIAC ELECTROPHYSIOLOGY STUDY; N/A      Comment:  Procedure: CARDIAC ELECTROPHYSIOLOGY STUDY;  Surgeon:                Byron Glasgow MD;  Location: [facility] EP LAB;  Service:                Cardiology;  Laterality: N/A;  2006: COLON SURGERY  2/2/2017: COLONOSCOPY; N/A      Comment:  Procedure: COLONOSCOPY;  Surgeon: Ronnie Conway MD;                 Location: [facility];  Service: Endoscopy;  Laterality:                N/A;  2005: CORONARY ARTERY BYPASS GRAFT      Comment:  5 arteries  11/16/2018: CORONARY BYPASS GRAFT ANGIOGRAPHY      Comment:  Procedure: Bypass graft study;  Surgeon: Ryan Schmidt MD;  Location: [facility] CATH LAB;  Service:                Cardiology;;  2016: EYE SURGERY; Bilateral      Comment:  Laser surgery for glaucoma  11/19/2018: INSERTION OF IMPLANTABLE " CARDIOVERTER-DEFIBRILLATOR (ICD)   GENERATOR WITH TWO EXISTING LEADS; Left      Comment:  Procedure: INSERTION, DUAL ICD;  Surgeon: Byron Glasgow MD;  Location: [facility] EP LAB;  Service: Cardiology;                Laterality: Left;  VT, EPS +/- ICD, SJM, anes, GP, 6086  11/16/2018: LEFT HEART CATHETERIZATION; Left      Comment:  Procedure: Left heart cath;  Surgeon: Ryan Schmidt MD;  Location: [facility] CATH LAB;  Service: Cardiology;                 Laterality: Left;    Cough   This is a new problem. Episode onset: 4 days ago. The problem has been gradually worsening. The problem occurs constantly. The cough is productive of sputum. Associated symptoms include chills, headaches, nasal congestion, postnasal drip, rhinorrhea, shortness of breath and wheezing. Pertinent negatives include no chest pain, ear pain, eye redness, fever, heartburn, hemoptysis, myalgias, rash or sore throat. Nothing aggravates the symptoms. Treatments tried: Mucinex, Chelle Lilly plus, flonase. The treatment provided no relief. His past medical history is significant for bronchitis, COPD and emphysema.       Constitution: Positive for chills and fatigue. Negative for sweating and fever.   HENT: Positive for congestion and postnasal drip. Negative for ear pain, sinus pain, sinus pressure, sore throat and voice change.    Neck: Negative for painful lymph nodes.   Cardiovascular: Negative for chest pain.   Eyes: Negative for eye redness.   Respiratory: Positive for cough, sputum production, COPD, shortness of breath and wheezing. Negative for chest tightness, bloody sputum and stridor.    Gastrointestinal: Negative for nausea, vomiting and heartburn.   Genitourinary: Negative for dysuria.   Musculoskeletal: Negative for muscle ache.   Skin: Negative for rash.   Allergic/Immunologic: Positive for immunocompromised state. Negative for seasonal allergies.   Neurological: Positive for headaches. Negative for altered  mental status.   Hematologic/Lymphatic: Negative for swollen lymph nodes.   Psychiatric/Behavioral: Negative for altered mental status and confusion.       Objective:      Physical Exam   Constitutional: He is oriented to person, place, and time. He appears well-developed and well-nourished. He is cooperative.  Non-toxic appearance. He appears ill. No distress.   HENT:   Head: Normocephalic and atraumatic.   Right Ear: Hearing, tympanic membrane, external ear and ear canal normal.   Left Ear: Hearing, tympanic membrane, external ear and ear canal normal.   Nose: Nose normal. No mucosal edema, rhinorrhea or nasal deformity. No epistaxis. Right sinus exhibits no maxillary sinus tenderness and no frontal sinus tenderness. Left sinus exhibits no maxillary sinus tenderness and no frontal sinus tenderness.   Mouth/Throat: Uvula is midline, oropharynx is clear and moist and mucous membranes are normal. No trismus in the jaw. Normal dentition. No uvula swelling. No oropharyngeal exudate, posterior oropharyngeal edema or posterior oropharyngeal erythema.   Eyes: Conjunctivae and lids are normal. No scleral icterus.   Neck: Trachea normal, full passive range of motion without pain and phonation normal. Neck supple. No neck rigidity. No edema and no erythema present.   Cardiovascular: Normal rate, regular rhythm and normal pulses.   Pulmonary/Chest: Effort normal. No respiratory distress. He has decreased breath sounds. He has wheezes. He has rhonchi.   Abdominal: Normal appearance.   Musculoskeletal: Normal range of motion. He exhibits no edema or deformity.   Neurological: He is alert and oriented to person, place, and time. He exhibits normal muscle tone. Coordination normal.   Skin: Skin is warm, dry, intact, not diaphoretic and not pale.   Psychiatric: He has a normal mood and affect. His speech is normal and behavior is normal. Judgment and thought content normal. Cognition and memory are normal.   Nursing note and  vitals reviewed.        Assessment:       1. Acute bronchitis with coexisting condition, need prophylactic therapy        Plan:         Acute bronchitis with coexisting condition, need prophylactic therapy  -     X-Ray Chest PA And Lateral; Future; Expected date: 11/01/2019  -     albuterol nebulizer solution 2.5 mg  -     doxycycline (MONODOX) 100 MG capsule; Take 1 capsule (100 mg total) by mouth 2 (two) times daily. for 10 days  Dispense: 20 capsule; Refill: 0  -     albuterol (PROVENTIL) 2.5 mg /3 mL (0.083 %) nebulizer solution; Take 3 mLs (2.5 mg total) by nebulization every 6 (six) hours as needed. Rescue  Dispense: 1 Box; Refill: 0    Patient reported symptoms improved after neb tx.       X-ray Chest Pa And Lateral    Result Date: 11/1/2019  EXAMINATION: XR CHEST PA AND LATERAL CLINICAL HISTORY: Cough TECHNIQUE: PA and lateral views of the chest were performed. COMPARISON: 10/05/2019. FINDINGS: There is unchanged appearance of a left-sided dual lead pacemaker.  There are changes of median sternotomy. The trachea is unremarkable.  The cardiomediastinal silhouette is unchanged.  There is no evidence of free air beneath the hemidiaphragms.  There are no pleural effusions.  There is no evidence of a pneumothorax.  There is no evidence of pneumomediastinum.  The areas of subsegmental atelectasis in the lung bases.  There are changes of pulmonary emphysema.  There are degenerative changes in the osseous structures.     No acute process.  Changes of pulmonary emphysema. Electronically signed by: Atilio Hubbard MD Date:    11/01/2019 Time:    18:44      Patient Instructions     Bronchitis, Antibiotic Treatment (Adult)    Bronchitis is an infection of the air passages (bronchial tubes) in your lungs. It often occurs when you have a cold. This illness is contagious during the first few days and is spread through the air by coughing and sneezing, or by direct contact (touching the sick person and then touching your own  eyes, nose, or mouth).  Symptoms of bronchitis include cough with mucus (phlegm) and low-grade fever. Bronchitis usually lasts 7 to 14 days. Mild cases can be treated with simple home remedies. More severe infection is treated with an antibiotic.  Home care  Follow these guidelines when caring for yourself at home:  · If your symptoms are severe, rest at home for the first 2 to 3 days. When you go back to your usual activities, don't let yourself get too tired.  · Do not smoke. Also avoid being exposed to secondhand smoke.  · You may use over-the-counter medicines to control fever or pain, unless another medicine was prescribed. (Note: If you have chronic liver or kidney disease or have ever had a stomach ulcer or gastrointestinal bleeding, talk with your healthcare provider before using these medicines. Also talk to your provider if you are taking medicine to prevent blood clots.) Aspirin should never be given to anyone younger than 18 years of age who is ill with a viral infection or fever. It may cause severe liver or brain damage.  · Your appetite may be poor, so a light diet is fine. Avoid dehydration by drinking 6 to 8 glasses of fluids per day (such as water, soft drinks, sports drinks, juices, tea, or soup). Extra fluids will help loosen secretions in the nose and lungs.  · Over-the-counter cough, cold, and sore-throat medicines will not shorten the length of the illness, but they may be helpful to reduce symptoms. (Note: Do not use decongestants if you have high blood pressure.)  · Finish all antibiotic medicine. Do this even if you are feeling better after only a few days.  Follow-up care  Follow up with your healthcare provider, or as advised. If you had an X-ray or ECG (electrocardiogram), a specialist will review it. You will be notified of any new findings that may affect your care.  Note: If you are age 65 or older, or if you have a chronic lung disease or condition that affects your immune system, or  you smoke, talk to your healthcare provider about having pneumococcal vaccinations and a yearly influenza vaccination (flu shot).  When to seek medical advice  Call your healthcare provider right away if any of these occur:  · Fever of 100.4°F (38°C) or higher  · Coughing up increased amounts of colored sputum  · Weakness, drowsiness, headache, facial pain, ear pain, or a stiff neck  Call 911, or get immediate medical care  Contact emergency services right away if any of these occur.  · Coughing up blood  · Worsening weakness, drowsiness, headache, or stiff neck  · Trouble breathing, wheezing, or pain with breathing  Date Last Reviewed: 9/13/2015 © 2000-2017 [employer]. [address] [place] [address], [place] [address]. All rights reserved. This information is not intended as a substitute for professional medical care. Always follow your healthcare professional's instructions.      1.  Take all medications as directed. If you have been prescribed antibiotics, make sure to complete them.   2. You should schedule a follow-up appointment with your Primary Care Provider/Pediatrician for recheck in 2-3 days or as directed at this visit.   3.  If your condition fails to improve in a timely manner, you should receive another evaluation by your Primary Care Provider/Pediatrician to discuss your concerns or return to urgent care for a recheck.  If your condition worsens at any time, you should report immediately to your nearest Emergency Department for further evaluation. **You must understand that you have received Urgent Care treatment only and that you may be released before all of your medical problems are known or treated. You, the patient, are responsible to arrange for follow-up care as instructed.

## 2019-11-02 NOTE — PATIENT INSTRUCTIONS
Bronchitis, Antibiotic Treatment (Adult)    Bronchitis is an infection of the air passages (bronchial tubes) in your lungs. It often occurs when you have a cold. This illness is contagious during the first few days and is spread through the air by coughing and sneezing, or by direct contact (touching the sick person and then touching your own eyes, nose, or mouth).  Symptoms of bronchitis include cough with mucus (phlegm) and low-grade fever. Bronchitis usually lasts 7 to 14 days. Mild cases can be treated with simple home remedies. More severe infection is treated with an antibiotic.  Home care  Follow these guidelines when caring for yourself at home:  · If your symptoms are severe, rest at home for the first 2 to 3 days. When you go back to your usual activities, don't let yourself get too tired.  · Do not smoke. Also avoid being exposed to secondhand smoke.  · You may use over-the-counter medicines to control fever or pain, unless another medicine was prescribed. (Note: If you have chronic liver or kidney disease or have ever had a stomach ulcer or gastrointestinal bleeding, talk with your healthcare provider before using these medicines. Also talk to your provider if you are taking medicine to prevent blood clots.) Aspirin should never be given to anyone younger than 18 years of age who is ill with a viral infection or fever. It may cause severe liver or brain damage.  · Your appetite may be poor, so a light diet is fine. Avoid dehydration by drinking 6 to 8 glasses of fluids per day (such as water, soft drinks, sports drinks, juices, tea, or soup). Extra fluids will help loosen secretions in the nose and lungs.  · Over-the-counter cough, cold, and sore-throat medicines will not shorten the length of the illness, but they may be helpful to reduce symptoms. (Note: Do not use decongestants if you have high blood pressure.)  · Finish all antibiotic medicine. Do this even if you are feeling better after only a  few days.  Follow-up care  Follow up with your healthcare provider, or as advised. If you had an X-ray or ECG (electrocardiogram), a specialist will review it. You will be notified of any new findings that may affect your care.  Note: If you are age 65 or older, or if you have a chronic lung disease or condition that affects your immune system, or you smoke, talk to your healthcare provider about having pneumococcal vaccinations and a yearly influenza vaccination (flu shot).  When to seek medical advice  Call your healthcare provider right away if any of these occur:  · Fever of 100.4°F (38°C) or higher  · Coughing up increased amounts of colored sputum  · Weakness, drowsiness, headache, facial pain, ear pain, or a stiff neck  Call 911, or get immediate medical care  Contact emergency services right away if any of these occur.  · Coughing up blood  · Worsening weakness, drowsiness, headache, or stiff neck  · Trouble breathing, wheezing, or pain with breathing  Date Last Reviewed: 9/13/2015  © 4338-8863 [employer]. [address] [place] [address]. All rights reserved. This information is not intended as a substitute for professional medical care. Always follow your healthcare professional's instructions.      1.  Take all medications as directed. If you have been prescribed antibiotics, make sure to complete them.   2. You should schedule a follow-up appointment with your Primary Care Provider/Pediatrician for recheck in 2-3 days or as directed at this visit.   3.  If your condition fails to improve in a timely manner, you should receive another evaluation by your Primary Care Provider/Pediatrician to discuss your concerns or return to urgent care for a recheck.  If your condition worsens at any time, you should report immediately to your nearest Emergency Department for further evaluation. **You must understand that you have received Urgent Care treatment only and that you may be released  before all of your medical problems are known or treated. You, the patient, are responsible to arrange for follow-up care as instructed.

## 2019-11-05 ENCOUNTER — OFFICE VISIT (OUTPATIENT)
Dept: INTERNAL MEDICINE | Facility: CLINIC | Age: 69
End: 2019-11-05
Payer: MEDICARE

## 2019-11-05 VITALS
TEMPERATURE: 98 F | BODY MASS INDEX: 38.03 KG/M2 | SYSTOLIC BLOOD PRESSURE: 112 MMHG | HEART RATE: 60 BPM | HEIGHT: 67 IN | OXYGEN SATURATION: 97 % | RESPIRATION RATE: 18 BRPM | DIASTOLIC BLOOD PRESSURE: 52 MMHG | WEIGHT: 242.31 LBS

## 2019-11-05 DIAGNOSIS — Z09 HOSPITAL DISCHARGE FOLLOW-UP: Primary | ICD-10-CM

## 2019-11-05 DIAGNOSIS — R30.0 DYSURIA: ICD-10-CM

## 2019-11-05 LAB
BILIRUB SERPL-MCNC: ABNORMAL MG/DL
BLOOD URINE, POC: 0
COLOR, POC UA: ABNORMAL
GLUCOSE UR QL STRIP: 0
KETONES UR QL STRIP: 0
LEUKOCYTE ESTERASE URINE, POC: ABNORMAL
NITRITE, POC UA: 0
PH, POC UA: 8
PROTEIN, POC: ABNORMAL
SPECIFIC GRAVITY, POC UA: 1.02
UROBILINOGEN, POC UA: ABNORMAL

## 2019-11-05 PROCEDURE — 81002 POCT URINE DIPSTICK WITHOUT MICROSCOPE: ICD-10-PCS | Mod: S$GLB,,, | Performed by: INTERNAL MEDICINE

## 2019-11-05 PROCEDURE — 3078F DIAST BP <80 MM HG: CPT | Mod: CPTII,S$GLB,, | Performed by: INTERNAL MEDICINE

## 2019-11-05 PROCEDURE — 99213 OFFICE O/P EST LOW 20 MIN: CPT | Mod: 25,S$GLB,, | Performed by: INTERNAL MEDICINE

## 2019-11-05 PROCEDURE — 99999 PR PBB SHADOW E&M-EST. PATIENT-LVL III: ICD-10-PCS | Mod: PBBFAC,,, | Performed by: INTERNAL MEDICINE

## 2019-11-05 PROCEDURE — 1101F PT FALLS ASSESS-DOCD LE1/YR: CPT | Mod: CPTII,S$GLB,, | Performed by: INTERNAL MEDICINE

## 2019-11-05 PROCEDURE — 3074F SYST BP LT 130 MM HG: CPT | Mod: CPTII,S$GLB,, | Performed by: INTERNAL MEDICINE

## 2019-11-05 PROCEDURE — 87086 URINE CULTURE/COLONY COUNT: CPT

## 2019-11-05 PROCEDURE — 99213 PR OFFICE/OUTPT VISIT, EST, LEVL III, 20-29 MIN: ICD-10-PCS | Mod: 25,S$GLB,, | Performed by: INTERNAL MEDICINE

## 2019-11-05 PROCEDURE — 99999 PR PBB SHADOW E&M-EST. PATIENT-LVL III: CPT | Mod: PBBFAC,,, | Performed by: INTERNAL MEDICINE

## 2019-11-05 PROCEDURE — 1101F PR PT FALLS ASSESS DOC 0-1 FALLS W/OUT INJ PAST YR: ICD-10-PCS | Mod: CPTII,S$GLB,, | Performed by: INTERNAL MEDICINE

## 2019-11-05 PROCEDURE — 3078F PR MOST RECENT DIASTOLIC BLOOD PRESSURE < 80 MM HG: ICD-10-PCS | Mod: CPTII,S$GLB,, | Performed by: INTERNAL MEDICINE

## 2019-11-05 PROCEDURE — 3074F PR MOST RECENT SYSTOLIC BLOOD PRESSURE < 130 MM HG: ICD-10-PCS | Mod: CPTII,S$GLB,, | Performed by: INTERNAL MEDICINE

## 2019-11-05 PROCEDURE — 81002 URINALYSIS NONAUTO W/O SCOPE: CPT | Mod: S$GLB,,, | Performed by: INTERNAL MEDICINE

## 2019-11-05 RX ORDER — CIPROFLOXACIN 500 MG/1
500 TABLET ORAL EVERY 12 HOURS
Qty: 20 TABLET | Refills: 0 | Status: SHIPPED | OUTPATIENT
Start: 2019-11-05 | End: 2019-11-15

## 2019-11-05 NOTE — PROGRESS NOTES
Subjective:       Patient ID: Walter Bull is a 68 y.o. male.    Chief Complaint: Follow-up and Dysuria      HPI:  Patient is known to me and presents for urgent care follow up and dysuria.     Went to urgent care 19 with cough and congestion. Diagnosed with bronchitis. Started on albuterol PRN and doxycycline antibx. He reports cough is improving. No SOB.    He is still having fever to 101F this AM; none in clinic. He is having some discomfort with urination. NO bladder spasms or flank pain. No hematuria.  + dark colored urine.     Past Medical History:   Diagnosis Date    Anemia     Anticoagulant long-term use     CHF (congestive heart failure)     Colon cancer screening 2017    Coronary artery disease     Diabetes mellitus type I     Disorder of kidney and ureter     Encounter for blood transfusion     Glaucoma     Heart attack     2018    Heart disease     Hypertension     Iron deficiency     Kidney failure     post CABG    S/P CABG x 5 2017       Family History   Problem Relation Age of Onset    Diabetes Mother     Heart disease Mother     Hypertension Sister     Diabetes Sister     Heart disease Sister     Heart attack Brother     Colon cancer Neg Hx     Esophageal cancer Neg Hx     Stomach cancer Neg Hx        Social History     Socioeconomic History    Marital status:      Spouse name: Not on file    Number of children: Not on file    Years of education: Not on file    Highest education level: Not on file   Occupational History    Not on file   Social Needs    Financial resource strain: Not on file    Food insecurity:     Worry: Not on file     Inability: Not on file    Transportation needs:     Medical: Not on file     Non-medical: Not on file   Tobacco Use    Smoking status: Former Smoker     Packs/day: 3.00     Types: Cigarettes     Start date: 1963     Last attempt to quit: 1981     Years since quittin.8    Smokeless tobacco:  Former User     Types: Snuff, Chew     Quit date: 1984   Substance and Sexual Activity    Alcohol use: No    Drug use: No    Sexual activity: Yes     Comment: -with a significant other   Lifestyle    Physical activity:     Days per week: Not on file     Minutes per session: Not on file    Stress: Not on file   Relationships    Social connections:     Talks on phone: Not on file     Gets together: Not on file     Attends Bahai service: Not on file     Active member of club or organization: Not on file     Attends meetings of clubs or organizations: Not on file     Relationship status: Not on file   Other Topics Concern    Not on file   Social History Narrative    Not on file       Review of Systems   Constitutional: Positive for fever. Negative for activity change, fatigue and unexpected weight change.   HENT: Negative for congestion, ear pain, hearing loss, rhinorrhea and sore throat.    Eyes: Negative for redness and visual disturbance.   Respiratory: Negative for cough, shortness of breath and wheezing.    Cardiovascular: Negative for chest pain, palpitations and leg swelling.   Gastrointestinal: Negative for abdominal pain, constipation, diarrhea, nausea and vomiting.   Genitourinary: Positive for dysuria. Negative for decreased urine volume, frequency and urgency.   Musculoskeletal: Negative for back pain, joint swelling and neck pain.   Skin: Negative for color change, rash and wound.   Neurological: Negative for dizziness, light-headedness and headaches.         Objective:      Physical Exam   Constitutional: He is oriented to person, place, and time. He appears well-developed and well-nourished. No distress.   HENT:   Head: Normocephalic and atraumatic.   Right Ear: External ear normal.   Left Ear: External ear normal.   Eyes: Pupils are equal, round, and reactive to light. Conjunctivae and EOM are normal. Right eye exhibits no discharge. Left eye exhibits no discharge.   Neck: Neck  supple. No tracheal deviation present.   Cardiovascular: Normal rate and regular rhythm. Exam reveals no gallop and no friction rub.   No murmur heard.  Pulmonary/Chest: Effort normal and breath sounds normal. No respiratory distress. He has no wheezes. He has no rales.   Abdominal: Soft. Bowel sounds are normal. He exhibits no distension. There is no tenderness.   Neurological: He is alert and oriented to person, place, and time. No cranial nerve deficit.   Skin: Skin is warm and dry.   Psychiatric: He has a normal mood and affect. His behavior is normal.   Vitals reviewed.      Assessment:       1. Hospital discharge follow-up    2. Dysuria        Plan:       Walter was seen today for follow-up and dysuria.    Diagnoses and all orders for this visit:    Hospital discharge follow-up    Dysuria  -     POCT urine dipstick without microscope  -     Urine culture; Future  -     Urine culture    Other orders  -     ciprofloxacin HCl (CIPRO) 500 MG tablet; Take 1 tablet (500 mg total) by mouth every 12 (twelve) hours. for 10 days      UA + LE  But still having fevers so will start antibx and follow up urine cx  respiratory sx resolved and lungs clear today

## 2019-11-08 LAB — BACTERIA UR CULT: NO GROWTH

## 2019-11-13 ENCOUNTER — PATIENT MESSAGE (OUTPATIENT)
Dept: INTERNAL MEDICINE | Facility: CLINIC | Age: 69
End: 2019-11-13

## 2019-11-13 DIAGNOSIS — I25.118 CORONARY ARTERY DISEASE OF NATIVE ARTERY OF NATIVE HEART WITH STABLE ANGINA PECTORIS: ICD-10-CM

## 2019-11-13 RX ORDER — RANOLAZINE 1000 MG/1
1000 TABLET, FILM COATED, EXTENDED RELEASE ORAL 2 TIMES DAILY
Qty: 60 TABLET | Refills: 11 | Status: SHIPPED | OUTPATIENT
Start: 2019-11-13 | End: 2020-02-11 | Stop reason: SDUPTHER

## 2019-11-18 NOTE — PROGRESS NOTES
Mr. Bull is a patient of Dr. Glasgow and was last seen in clinic 2/21/2019.      Subjective:   Patient ID:  Walter Bull is a 69 y.o. male who presents for follow-up of Cardiomyopathy  .     HPI:    Mr. Bull is a 69 y.o. 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     Background:      General cardiologist: Dr. Agustin Capone     67 y/o man with prior 5v CABG (2005) (LIMA to LAD, SVG to Diag), known  to RCA and LCx and recent admission to outside hospital with NSTEMI, CKD II, HTN, HLD, DM presented to the hospital 11/16/2018 with acute onset of chest pain and new onset LBBB. Had LHC at OSH and has been managed medically. Has LVEF 35%, no ICD. Was about to perform lawn work when he started having acute onset chest pain, similar in nature to his prior MI. Also noted palpitations which he had not experienced before, has never experienced syncope. 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 He denies orthopnea, PND, peripheral edema or GREENWOOD. There is no family history of sudden cardiac death.     Patient presented in wide complex tachycardia. Cardiology called for assistance. He continued to have 9-10/10 chest pain like an elephant sitting on his chest. Received NTG x 2 and ASA 325mg, was taken to the cath lab and no new occlusions were identified. The patient received lidocaine for a slow MMVT which restored NSR with a narrow complex. LHC found  that is known and the LIMA to LAD and SVG to D1 grafts are patent. Had inducible sustained VT when pigtail catheter was inserted into the LV, VT terminated with lidocaine bolus. Started on amiodarone gtt.      On 11/19/2018 he underwent EPS. Patient easily inducible for VT (LBLS  ms) with ventricular double extrastimuli. Underwent successful dual chamber ICD implantation ([employer]).    Americus well at clinic follow up 2/2019.    Update  (11/19/2019):    Was seen in  with CP in October. Stress test negative for new ischemia. His imdur and coreg were increased.    Today he says his chest pain has resolved. He does feel some LH which is resolved when he drinks fluid. Denies palpitations, syncope. Some chronic GREENWOOD. 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     He is currently on amiodarone 200mg daily. LFTs are WNL 10/7/2019. TSH is WNL 5/2019. No PFTs on file (ordered but not obtained).    Device Interrogation (11/19/2019) reveals an intrinsic SB with stable lead and device function. No arrhythmias or treated episodes were noted.  He paces 91% in the RA and 0% in the RV. Estimated battery longevity 7 years.     I have personally reviewed the patient's EKG today, which shows APVS with incomplete LBBB at 67bpm. MS interval is 280. QRS is 118. QTc is 483.    Recent Cardiac Tests:    2D Echo (10/6/2019):  · Techically challenging study, poor endocardial visualization.  · Low normal left ventricular systolic function. The estimated ejection fraction is 50%  · No obvious wall motion abnormalities, but poor endocardial visualization  · Left atrial enlargement.  · Mild mitral sclerosis.  · Indeterminate left ventricular diastolic function.  · Mildly reduced right ventricular systolic function.  · Mild tricuspid regurgitation.  · Indeterminate central venous pressure. Estimated PA pressure is at least 27 mmHg.    Current Outpatient Medications   Medication Sig    albuterol (PROVENTIL) 2.5 mg /3 mL (0.083 %) nebulizer solution Take 3 mLs (2.5 mg total) by nebulization every 6 (six) hours as needed. Rescue    amiodarone (PACERONE) 200 MG Tab Take 1 tablet (200 mg total) by mouth once daily.    ammonium lactate 12 % Crea Apply small amount to feet except for in between toes twice a day    aspirin (ECOTRIN) 81 MG EC tablet Take 1 tablet (81 mg total) by mouth once daily.    carvedilol (COREG) 6.25 MG tablet Take 1 tablet (6.25 mg total) by mouth 2  (two) times daily.    esomeprazole (NEXIUM) 40 MG capsule Take 1 capsule (40 mg total) by mouth before breakfast.    ezetimibe (ZETIA) 10 mg tablet Take 1 tablet (10 mg total) by mouth once daily.    ferrous sulfate (FEOSOL) 325 mg (65 mg iron) Tab tablet TAKE 1 TABLET BY MOUTH THREE TIMES DAILY    ferrous sulfate 325 (65 FE) MG EC tablet Take 1 tablet (325 mg total) by mouth once daily.    finasteride (PROSCAR) 5 mg tablet HOLD DUE TO ORTHOSTATIC HYPOTENSION    furosemide (LASIX) 20 MG tablet Take 1 tablet (20 mg total) by mouth once daily. Take an extra tablet daily for wt gain more than 3 pounds/day or 5 pounds/week    insulin aspart U-100 (NOVOLOG U-100 INSULIN ASPART) 100 unit/mL injection 3 x10ml vials required per month for use in VGO 30    insulin  unit/mL injection Use 30 Units in the morning and 24 Units at night    isosorbide mononitrate (IMDUR) 120 MG 24 hr tablet Take 1 tablet (120 mg total) by mouth once daily.    losartan (COZAAR) 25 MG tablet Take 1 tablet (25 mg total) by mouth once daily.    nitroGLYCERIN (NITROSTAT) 0.4 MG SL tablet DISSOLVE ONE TABLET UNDER THE TONGUE EVERY 5 MINUTES AS NEEDED FOR CHEST PAIN.    RANEXA 1,000 mg Tb12 Take 1 tablet (1,000 mg total) by mouth 2 (two) times daily.    rosuvastatin (CRESTOR) 40 MG Tab Take 1 tablet (40 mg total) by mouth once daily.    SITagliptin (JANUVIA) 100 MG Tab Take 1 tablet (100 mg total) by mouth once daily.    sub-q insulin device, 30 unit (V-GO 30) [place] 1 VGo every 24 hr as directed    sub-q insulin device, 30 unit (V-GO 30) [place] 1 VGo every 24 hr as directed    tamsulosin (FLOMAX) 0.4 mg Cap Take 1 capsule (0.4 mg total) by mouth once daily.    ticagrelor (BRILINTA) 90 mg tablet Take 90 mg by mouth 2 (two) times daily.    traMADol (ULTRAM) 50 mg tablet Take 1 tablet (50 mg total) by mouth every 8 (eight) hours as needed for Pain.     No current facility-administered medications for this visit.        Review of  "Systems   Constitution: Negative for malaise/fatigue.   Cardiovascular: Positive for dyspnea on exertion (mild chronic). Negative for chest pain, irregular heartbeat, leg swelling and palpitations.   Respiratory: Negative for shortness of breath.    Hematologic/Lymphatic: Negative for bleeding problem.   Skin: Negative for rash.   Musculoskeletal: Negative for myalgias.   Gastrointestinal: Negative for hematemesis, hematochezia and nausea.   Genitourinary: Negative for hematuria.   Neurological: Positive for light-headedness.   Psychiatric/Behavioral: Negative for altered mental status.   Allergic/Immunologic: Negative for persistent infections.     Objective:        BP (!) 111/54   Pulse 60   Ht 5' 7" (1.702 m)   Wt 111.6 kg (246 lb)   BMI 38.53 kg/m²     Physical Exam   Constitutional: He is oriented to person, place, and time. He appears well-developed and well-nourished.   HENT:   Head: Normocephalic.   Nose: Nose normal.   Eyes: Pupils are equal, round, and reactive to light.   Cardiovascular: Normal rate, regular rhythm, S1 normal and S2 normal.   No murmur heard.  Pulses:       Radial pulses are 2+ on the right side, and 2+ on the left side.   Pulmonary/Chest: Breath sounds normal. No respiratory distress.   Device to LUCW.   Abdominal: Normal appearance.   Musculoskeletal: Normal range of motion. He exhibits no edema.   Neurological: He is alert and oriented to person, place, and time.   Skin: Skin is warm and dry. No erythema.   Psychiatric: He has a normal mood and affect. His speech is normal and behavior is normal.   Nursing note and vitals reviewed.    Lab Results   Component Value Date     (L) 10/07/2019    K 4.0 10/07/2019    MG 1.9 10/07/2019    BUN 22 10/07/2019    CREATININE 1.4 10/07/2019    ALT 43 10/07/2019    AST 34 10/07/2019    HGB 11.6 (L) 10/06/2019    HCT 37.4 (L) 10/06/2019    HCT 33 (L) 06/24/2019    TSH 1.880 05/07/2019    LDLCALC 67.0 10/06/2019       Recent Labs   Lab " 11/16/18  1033 12/22/18  1342 05/17/19 2043 10/05/19  2023   INR 1.0 1.0 1.0 1.0       Assessment:     1. ICD (implantable cardioverter-defibrillator), dual, in situ    2. Benign essential HTN    3. Ischemic cardiomyopathy    4. Ventricular tachycardia    5. ANURADHA (obstructive sleep apnea)    6. Long term current use of amiodarone      Plan:     In summary, Mr. Bull is a 69 y.o. 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  Mr. Bull is doing well from a device perspective with stable lead and device function. No arrhythmia noted. On amiodarone for VT. LFTs WNL. Needs updated TSH and PFTs. No RV pacing. No acute CHF exacerbation. Last BERTIN 10/2019 shows improved EF of 50%.    TSH, PFTs  Continue current medication regimen and device settings.   Follow up in device clinic as scheduled.   Follow up in EP clinic in 6 months, sooner as needed.     *A copy of this note has been sent to Dr. Glasgow*    Follow up in about 6 months (around 5/19/2020).    ------------------------------------------------------------------    MAISHA De La Torre, NP-C  Cardiac Electrophysiology

## 2019-11-19 ENCOUNTER — OFFICE VISIT (OUTPATIENT)
Dept: CARDIOLOGY | Facility: CLINIC | Age: 69
End: 2019-11-19
Payer: MEDICARE

## 2019-11-19 ENCOUNTER — CLINICAL SUPPORT (OUTPATIENT)
Dept: CARDIOLOGY | Facility: HOSPITAL | Age: 69
End: 2019-11-19
Attending: INTERNAL MEDICINE
Payer: MEDICARE

## 2019-11-19 ENCOUNTER — OFFICE VISIT (OUTPATIENT)
Dept: ELECTROPHYSIOLOGY | Facility: CLINIC | Age: 69
End: 2019-11-19
Attending: INTERNAL MEDICINE
Payer: MEDICARE

## 2019-11-19 VITALS
DIASTOLIC BLOOD PRESSURE: 58 MMHG | BODY MASS INDEX: 38.61 KG/M2 | BODY MASS INDEX: 39.06 KG/M2 | SYSTOLIC BLOOD PRESSURE: 111 MMHG | HEART RATE: 62 BPM | DIASTOLIC BLOOD PRESSURE: 54 MMHG | WEIGHT: 248.88 LBS | WEIGHT: 246 LBS | SYSTOLIC BLOOD PRESSURE: 117 MMHG | HEIGHT: 67 IN | HEART RATE: 60 BPM | HEIGHT: 67 IN

## 2019-11-19 DIAGNOSIS — Z79.899 LONG TERM CURRENT USE OF AMIODARONE: ICD-10-CM

## 2019-11-19 DIAGNOSIS — I10 BENIGN ESSENTIAL HTN: Chronic | ICD-10-CM

## 2019-11-19 DIAGNOSIS — Z95.810 ICD (IMPLANTABLE CARDIOVERTER-DEFIBRILLATOR), DUAL, IN SITU: Primary | Chronic | ICD-10-CM

## 2019-11-19 DIAGNOSIS — I47.20 VENTRICULAR TACHYCARDIA: ICD-10-CM

## 2019-11-19 DIAGNOSIS — Z95.810 PRESENCE OF AUTOMATIC CARDIOVERTER/DEFIBRILLATOR (AICD): ICD-10-CM

## 2019-11-19 DIAGNOSIS — E66.01 SEVERE OBESITY (BMI 35.0-39.9) WITH COMORBIDITY: ICD-10-CM

## 2019-11-19 DIAGNOSIS — I25.5 ISCHEMIC CARDIOMYOPATHY: ICD-10-CM

## 2019-11-19 DIAGNOSIS — I47.20 VT (VENTRICULAR TACHYCARDIA): ICD-10-CM

## 2019-11-19 DIAGNOSIS — E66.9 DIABETES MELLITUS TYPE 2 IN OBESE: Chronic | ICD-10-CM

## 2019-11-19 DIAGNOSIS — E78.2 MIXED HYPERLIPIDEMIA: Chronic | ICD-10-CM

## 2019-11-19 DIAGNOSIS — I25.10 CORONARY ARTERY DISEASE INVOLVING NATIVE CORONARY ARTERY OF NATIVE HEART WITHOUT ANGINA PECTORIS: Primary | Chronic | ICD-10-CM

## 2019-11-19 DIAGNOSIS — E11.69 DIABETES MELLITUS TYPE 2 IN OBESE: Chronic | ICD-10-CM

## 2019-11-19 DIAGNOSIS — Z95.810 ICD (IMPLANTABLE CARDIOVERTER-DEFIBRILLATOR), DUAL, IN SITU: Chronic | ICD-10-CM

## 2019-11-19 DIAGNOSIS — I49.8 OTHER SPECIFIED CARDIAC ARRHYTHMIAS: ICD-10-CM

## 2019-11-19 DIAGNOSIS — Z95.1 S/P CABG (CORONARY ARTERY BYPASS GRAFT): ICD-10-CM

## 2019-11-19 DIAGNOSIS — G47.33 OSA (OBSTRUCTIVE SLEEP APNEA): ICD-10-CM

## 2019-11-19 PROCEDURE — 99214 PR OFFICE/OUTPT VISIT, EST, LEVL IV, 30-39 MIN: ICD-10-PCS | Mod: 25,S$GLB,, | Performed by: INTERNAL MEDICINE

## 2019-11-19 PROCEDURE — 1101F PT FALLS ASSESS-DOCD LE1/YR: CPT | Mod: CPTII,S$GLB,, | Performed by: NURSE PRACTITIONER

## 2019-11-19 PROCEDURE — 3078F DIAST BP <80 MM HG: CPT | Mod: CPTII,S$GLB,, | Performed by: NURSE PRACTITIONER

## 2019-11-19 PROCEDURE — 3074F SYST BP LT 130 MM HG: CPT | Mod: CPTII,S$GLB,, | Performed by: NURSE PRACTITIONER

## 2019-11-19 PROCEDURE — 1126F AMNT PAIN NOTED NONE PRSNT: CPT | Mod: S$GLB,,, | Performed by: INTERNAL MEDICINE

## 2019-11-19 PROCEDURE — 93005 RHYTHM STRIP: ICD-10-PCS | Mod: S$GLB,,, | Performed by: INTERNAL MEDICINE

## 2019-11-19 PROCEDURE — 1159F PR MEDICATION LIST DOCUMENTED IN MEDICAL RECORD: ICD-10-PCS | Mod: S$GLB,,, | Performed by: INTERNAL MEDICINE

## 2019-11-19 PROCEDURE — 93010 RHYTHM STRIP: ICD-10-PCS | Mod: S$GLB,,, | Performed by: INTERNAL MEDICINE

## 2019-11-19 PROCEDURE — 93283 PRGRMG EVAL IMPLANTABLE DFB: CPT

## 2019-11-19 PROCEDURE — 1126F PR PAIN SEVERITY QUANTIFIED, NO PAIN PRESENT: ICD-10-PCS | Mod: S$GLB,,, | Performed by: INTERNAL MEDICINE

## 2019-11-19 PROCEDURE — 1126F AMNT PAIN NOTED NONE PRSNT: CPT | Mod: S$GLB,,, | Performed by: NURSE PRACTITIONER

## 2019-11-19 PROCEDURE — 3078F PR MOST RECENT DIASTOLIC BLOOD PRESSURE < 80 MM HG: ICD-10-PCS | Mod: CPTII,S$GLB,, | Performed by: NURSE PRACTITIONER

## 2019-11-19 PROCEDURE — 1101F PR PT FALLS ASSESS DOC 0-1 FALLS W/OUT INJ PAST YR: ICD-10-PCS | Mod: CPTII,S$GLB,, | Performed by: NURSE PRACTITIONER

## 2019-11-19 PROCEDURE — 3074F PR MOST RECENT SYSTOLIC BLOOD PRESSURE < 130 MM HG: ICD-10-PCS | Mod: CPTII,S$GLB,, | Performed by: NURSE PRACTITIONER

## 2019-11-19 PROCEDURE — 3078F DIAST BP <80 MM HG: CPT | Mod: CPTII,S$GLB,, | Performed by: INTERNAL MEDICINE

## 2019-11-19 PROCEDURE — 3078F PR MOST RECENT DIASTOLIC BLOOD PRESSURE < 80 MM HG: ICD-10-PCS | Mod: CPTII,S$GLB,, | Performed by: INTERNAL MEDICINE

## 2019-11-19 PROCEDURE — 3074F SYST BP LT 130 MM HG: CPT | Mod: CPTII,S$GLB,, | Performed by: INTERNAL MEDICINE

## 2019-11-19 PROCEDURE — 99214 OFFICE O/P EST MOD 30 MIN: CPT | Mod: 25,S$GLB,, | Performed by: INTERNAL MEDICINE

## 2019-11-19 PROCEDURE — 99214 PR OFFICE/OUTPT VISIT, EST, LEVL IV, 30-39 MIN: ICD-10-PCS | Mod: S$GLB,,, | Performed by: NURSE PRACTITIONER

## 2019-11-19 PROCEDURE — 99214 OFFICE O/P EST MOD 30 MIN: CPT | Mod: S$GLB,,, | Performed by: NURSE PRACTITIONER

## 2019-11-19 PROCEDURE — 1159F MED LIST DOCD IN RCRD: CPT | Mod: S$GLB,,, | Performed by: INTERNAL MEDICINE

## 2019-11-19 PROCEDURE — 93010 ELECTROCARDIOGRAM REPORT: CPT | Mod: S$GLB,,, | Performed by: INTERNAL MEDICINE

## 2019-11-19 PROCEDURE — 3074F PR MOST RECENT SYSTOLIC BLOOD PRESSURE < 130 MM HG: ICD-10-PCS | Mod: CPTII,S$GLB,, | Performed by: INTERNAL MEDICINE

## 2019-11-19 PROCEDURE — 1159F PR MEDICATION LIST DOCUMENTED IN MEDICAL RECORD: ICD-10-PCS | Mod: S$GLB,,, | Performed by: NURSE PRACTITIONER

## 2019-11-19 PROCEDURE — 99999 PR PBB SHADOW E&M-EST. PATIENT-LVL V: CPT | Mod: PBBFAC,,, | Performed by: NURSE PRACTITIONER

## 2019-11-19 PROCEDURE — 99999 PR PBB SHADOW E&M-EST. PATIENT-LVL III: CPT | Mod: PBBFAC,,, | Performed by: INTERNAL MEDICINE

## 2019-11-19 PROCEDURE — 1126F PR PAIN SEVERITY QUANTIFIED, NO PAIN PRESENT: ICD-10-PCS | Mod: S$GLB,,, | Performed by: NURSE PRACTITIONER

## 2019-11-19 PROCEDURE — 1101F PR PT FALLS ASSESS DOC 0-1 FALLS W/OUT INJ PAST YR: ICD-10-PCS | Mod: CPTII,S$GLB,, | Performed by: INTERNAL MEDICINE

## 2019-11-19 PROCEDURE — 1159F MED LIST DOCD IN RCRD: CPT | Mod: S$GLB,,, | Performed by: NURSE PRACTITIONER

## 2019-11-19 PROCEDURE — 99999 PR PBB SHADOW E&M-EST. PATIENT-LVL III: ICD-10-PCS | Mod: PBBFAC,,, | Performed by: INTERNAL MEDICINE

## 2019-11-19 PROCEDURE — 99999 PR PBB SHADOW E&M-EST. PATIENT-LVL V: ICD-10-PCS | Mod: PBBFAC,,, | Performed by: NURSE PRACTITIONER

## 2019-11-19 PROCEDURE — 99499 UNLISTED E&M SERVICE: CPT | Mod: S$GLB,,, | Performed by: NURSE PRACTITIONER

## 2019-11-19 PROCEDURE — 93005 ELECTROCARDIOGRAM TRACING: CPT | Mod: S$GLB,,, | Performed by: INTERNAL MEDICINE

## 2019-11-19 PROCEDURE — 1101F PT FALLS ASSESS-DOCD LE1/YR: CPT | Mod: CPTII,S$GLB,, | Performed by: INTERNAL MEDICINE

## 2019-11-19 PROCEDURE — 99499 RISK ADDL DX/OHS AUDIT: ICD-10-PCS | Mod: S$GLB,,, | Performed by: NURSE PRACTITIONER

## 2019-11-19 NOTE — PATIENT INSTRUCTIONS
Mediterranean Diet recommended with carbohydrate and sodium ( salt )  Restriction as we discussed  Continue current regimen including adjusting Furosemide per weight gain.

## 2019-11-19 NOTE — LETTER
November 19, 2019      Wendy Pablo, NP  [address]  [facility] [address]           [place] - Cardiology  [address]  [facility] [address]  Phone: [phone]          Patient: Walter Bull   MR Number: [id number]   YOB: 1950   Date of Visit: 11/19/2019       Dear Wendy Pablo:    Thank you for referring Walter Bull to me for evaluation. Attached you will find relevant portions of my assessment and plan of care.    If you have questions, please do not hesitate to call me. I look forward to following Walter Bull along with you.    Sincerely,    Agustin Capone MD    Enclosure  CC:  No Recipients    If you would like to receive this communication electronically, please contact [email or web address] or [phone] to request more information on [employer] Link access.    For providers and/or their staff who would like to refer a patient to [facility], please contact us through our one-stop-shop provider referral line, [facility], at [phone].    If you feel you have received this communication in error or would no longer like to receive these types of communications, please e-mail [email or web address]

## 2019-11-19 NOTE — LETTER
November 19, 2019      Byron Glasgow MD  [address]  [facility] [address]           [place] - Arrhythmia  [address]  [facility] [address]  Phone: [phone]  Fax: [phone]          Patient: Walter uBll   MR Number: [id number]   YOB: 1950   Date of Visit: 11/19/2019       Dear Dr. Byron Glasgow:    Thank you for referring Walter Bull to me for evaluation. Attached you will find relevant portions of my assessment and plan of care.    If you have questions, please do not hesitate to call me. I look forward to following Walter Bull along with you.    Sincerely,    Ankita Lynn NP    Enclosure  CC:  No Recipients    If you would like to receive this communication electronically, please contact [email or web address] or [phone] to request more information on [employer] Link access.    For providers and/or their staff who would like to refer a patient to [facility], please contact us through our one-stop-shop provider referral line, [facility], at [phone].    If you feel you have received this communication in error or would no longer like to receive these types of communications, please e-mail [email or web address]

## 2019-11-19 NOTE — PROGRESS NOTES
Subjective:   Patient ID:  Walter Bull is a 69 y.o. male who presents for follow-up of Chest Pain (Hospital d/c 10/7/19)      HPI:   Walter Bull presents for follow up of a recent hospitalization for chest pain. Walter Bull has known coronary artery disease having had prior CABG with known  of the RCA and LCX with patent LIMA to LAD and SVG to Diagonal but SVG to OM occluded.He has an ischemic cardiomyopathy with last EF 50% on a technically limited echo. He had a stress test during that hospitalization with a fixed inferior defect. Has occasional lightheaded feeling but overall has felt well since discharge. Still mowing lawns . Wife adjusts furosemide per weight gain.  He is  on Amiodarone after an an episode of wide QRS tachycardia. Walter Bull has hypertension with good control. Walter Bull has dyslipidemia.  on high intensity statin At LDL goal                                                                                                            Review of Systems   Constitution: Positive for weight gain. Negative for malaise/fatigue and weight loss.   Eyes: Negative for blurred vision.   Cardiovascular: Negative for chest pain, claudication, cyanosis, dyspnea on exertion, irregular heartbeat, leg swelling, near-syncope, orthopnea, palpitations, paroxysmal nocturnal dyspnea and syncope.   Respiratory: Negative for cough, shortness of breath and wheezing.    Musculoskeletal: Negative for falls and myalgias.   Gastrointestinal: Negative for abdominal pain, heartburn, nausea and vomiting.   Genitourinary: Negative for nocturia.   Neurological: Positive for light-headedness. Negative for brief paralysis, dizziness, focal weakness, headaches, numbness, paresthesias and weakness.   Psychiatric/Behavioral: Negative for altered mental status.       Current Outpatient Medications   Medication Sig    albuterol (PROVENTIL) 2.5 mg /3 mL (0.083 %) nebulizer solution Take 3 mLs (2.5 mg total)  by nebulization every 6 (six) hours as needed. Rescue    amiodarone (PACERONE) 200 MG Tab Take 1 tablet (200 mg total) by mouth once daily.    ammonium lactate 12 % Crea Apply small amount to feet except for in between toes twice a day    aspirin (ECOTRIN) 81 MG EC tablet Take 1 tablet (81 mg total) by mouth once daily.    carvedilol (COREG) 6.25 MG tablet Take 1 tablet (6.25 mg total) by mouth 2 (two) times daily.    esomeprazole (NEXIUM) 40 MG capsule Take 1 capsule (40 mg total) by mouth before breakfast.    ezetimibe (ZETIA) 10 mg tablet Take 1 tablet (10 mg total) by mouth once daily.    ferrous sulfate (FEOSOL) 325 mg (65 mg iron) Tab tablet TAKE 1 TABLET BY MOUTH THREE TIMES DAILY    ferrous sulfate 325 (65 FE) MG EC tablet Take 1 tablet (325 mg total) by mouth once daily.    finasteride (PROSCAR) 5 mg tablet HOLD DUE TO ORTHOSTATIC HYPOTENSION    furosemide (LASIX) 20 MG tablet Take 1 tablet (20 mg total) by mouth once daily. Take an extra tablet daily for wt gain more than 3 pounds/day or 5 pounds/week    insulin aspart U-100 (NOVOLOG U-100 INSULIN ASPART) 100 unit/mL injection 3 x10ml vials required per month for use in VGO 30    insulin  unit/mL injection Use 30 Units in the morning and 24 Units at night    isosorbide mononitrate (IMDUR) 120 MG 24 hr tablet Take 1 tablet (120 mg total) by mouth once daily.    losartan (COZAAR) 25 MG tablet Take 1 tablet (25 mg total) by mouth once daily.    nitroGLYCERIN (NITROSTAT) 0.4 MG SL tablet DISSOLVE ONE TABLET UNDER THE TONGUE EVERY 5 MINUTES AS NEEDED FOR CHEST PAIN.    RANEXA 1,000 mg Tb12 Take 1 tablet (1,000 mg total) by mouth 2 (two) times daily.    rosuvastatin (CRESTOR) 40 MG Tab Take 1 tablet (40 mg total) by mouth once daily.    SITagliptin (JANUVIA) 100 MG Tab Take 1 tablet (100 mg total) by mouth once daily.    sub-q insulin device, 30 unit (V-GO 30) [place] 1 VGo every 24 hr as directed    sub-q insulin device, 30 unit (V-GO  "30) [place] 1 VGo every 24 hr as directed    tamsulosin (FLOMAX) 0.4 mg Cap Take 1 capsule (0.4 mg total) by mouth once daily.    ticagrelor (BRILINTA) 90 mg tablet Take 90 mg by mouth 2 (two) times daily.    traMADol (ULTRAM) 50 mg tablet Take 1 tablet (50 mg total) by mouth every 8 (eight) hours as needed for Pain.     No current facility-administered medications for this visit.      Objective:   Physical Exam   Constitutional: He is oriented to person, place, and time. He appears well-developed. No distress.   BP (!) 117/58 (BP Location: Left arm, Patient Position: Sitting, BP Method: Large (Automatic))   Pulse 62   Ht 5' 7" (1.702 m)   Wt 112.9 kg (248 lb 14.4 oz)   BMI 38.98 kg/m²    HENT:   Head: Normocephalic.   Right Ear: External ear normal.   Left Ear: External ear normal.   Eyes: Pupils are equal, round, and reactive to light. EOM are normal. No scleral icterus.   Neck: Neck supple. No JVD present. No thyromegaly present.   Cardiovascular: Normal rate, regular rhythm and intact distal pulses. PMI is not displaced. Exam reveals no gallop and no friction rub.   Murmur heard.   Medium-pitched midsystolic murmur is present with a grade of 2/6 at the upper left sternal border and lower left sternal border. No edema or JVD  Pulmonary/Chest: Effort normal and breath sounds normal. No respiratory distress. He has no wheezes. He has no rales.   ICD present left chest  Median sternotomy scar    Abdominal: Soft. He exhibits no distension. There is no hepatosplenomegaly. There is no tenderness.   Musculoskeletal: He exhibits no edema or tenderness.   Gait normal   Neurological: He is alert and oriented to person, place, and time.   Skin: Skin is warm and dry. No rash noted.   Psychiatric: He has a normal mood and affect. His behavior is normal.       Lab Results   Component Value Date     (L) 10/07/2019    K 4.0 10/07/2019    CL 99 10/07/2019    CO2 24 10/07/2019    BUN 22 10/07/2019    CREATININE 1.4 " 10/07/2019     (H) 10/07/2019    HGBA1C 7.5 (H) 08/07/2019    MG 1.9 10/07/2019    AST 34 10/07/2019    ALT 43 10/07/2019    ALBUMIN 3.3 (L) 10/07/2019    PROT 6.4 10/07/2019    BILITOT 0.6 10/07/2019    WBC 10.89 10/06/2019    HGB 11.6 (L) 10/06/2019    HCT 37.4 (L) 10/06/2019    HCT 33 (L) 06/24/2019    MCV 94 10/06/2019     10/06/2019    TSH 1.880 05/07/2019    CHOL 121 10/06/2019    HDL 46 10/06/2019    LDLCALC 67.0 10/06/2019    TRIG 40 10/06/2019       Assessment:     1. Coronary artery disease involving native coronary artery of native heart without angina pectoris : currently stable   2. S/P CABG (coronary artery bypass graft)    3. Ischemic cardiomyopathy : mild-compensated   4. Benign essential HTN : Good control.   5. Mixed hyperlipidemia :  on high intensity statin At LDL goal   6. ICD (implantable cardioverter-defibrillator), dual, in situ    7. Severe obesity (BMI 35.0-39.9) with comorbidity ; Worse   8. Diabetes mellitus type 2 in obese        Plan:     Walter was seen today for chest pain.    Diagnoses and all orders for this visit:    Coronary artery disease involving native coronary artery of native heart without angina pectoris    S/P CABG (coronary artery bypass graft)    Ischemic cardiomyopathy  Continue current regimen    Benign essential HTN  Continue current regimen    Mixed hyperlipidemia  Continue current regimen    ICD (implantable cardioverter-defibrillator), dual, in situ    Severe obesity (BMI 35.0-39.9) with comorbidity  Mediterranean Diet recommended and discussed with carbohydrate and sodium restriction  Diabetes mellitus type 2 in obese

## 2019-11-21 ENCOUNTER — PATIENT MESSAGE (OUTPATIENT)
Dept: SLEEP MEDICINE | Facility: CLINIC | Age: 69
End: 2019-11-21

## 2019-11-25 ENCOUNTER — OFFICE VISIT (OUTPATIENT)
Dept: SLEEP MEDICINE | Facility: CLINIC | Age: 69
End: 2019-11-25
Payer: MEDICARE

## 2019-11-25 ENCOUNTER — PATIENT MESSAGE (OUTPATIENT)
Dept: SLEEP MEDICINE | Facility: CLINIC | Age: 69
End: 2019-11-25

## 2019-11-25 ENCOUNTER — HOSPITAL ENCOUNTER (OUTPATIENT)
Dept: PULMONOLOGY | Facility: CLINIC | Age: 69
Discharge: HOME | End: 2019-11-25
Payer: MEDICARE

## 2019-11-25 VITALS
HEIGHT: 67 IN | BODY MASS INDEX: 38.44 KG/M2 | HEART RATE: 64 BPM | DIASTOLIC BLOOD PRESSURE: 68 MMHG | SYSTOLIC BLOOD PRESSURE: 118 MMHG | WEIGHT: 244.94 LBS

## 2019-11-25 DIAGNOSIS — E66.9 OBESITY (BMI 30-39.9): ICD-10-CM

## 2019-11-25 DIAGNOSIS — Z79.899 LONG TERM CURRENT USE OF AMIODARONE: ICD-10-CM

## 2019-11-25 DIAGNOSIS — G47.33 OSA (OBSTRUCTIVE SLEEP APNEA): Primary | ICD-10-CM

## 2019-11-25 DIAGNOSIS — G47.52 RBD (REM BEHAVIORAL DISORDER): ICD-10-CM

## 2019-11-25 LAB
DLCO ADJ PRE: 16.25 ML/(MIN*MMHG) (ref 19.13–32.98)
DLCO SINGLE BREATH LLN: 19.13
DLCO SINGLE BREATH PRE REF: 62.4 %
DLCO SINGLE BREATH REF: 26.06
DLCOC SBVA LLN: 2.59
DLCOC SBVA PRE REF: 106.3 %
DLCOC SBVA REF: 3.78
DLCOC SINGLE BREATH LLN: 19.13
DLCOC SINGLE BREATH PRE REF: 62.4 %
DLCOC SINGLE BREATH REF: 26.06
DLCOCSBVAULN: 4.96
DLCOCSINGLEBREATHULN: 32.98
DLCOSINGLEBREATHULN: 32.98
DLCOVA LLN: 2.59
DLCOVA PRE REF: 106.3 %
DLCOVA PRE: 4.01 ML/(MIN*MMHG*L) (ref 2.59–4.96)
DLCOVA REF: 3.78
DLCOVAULN: 4.96
DLVAADJ PRE: 4.01 ML/(MIN*MMHG*L) (ref 2.59–4.96)
FEF 25 75 LLN: 0.72
FEF 25 75 PRE REF: 68.1 %
FEF 25 75 REF: 2.03
FEV05 LLN: 1.39
FEV05 REF: 2.52
FEV1 FVC LLN: 64
FEV1 FVC PRE REF: 96.8 %
FEV1 FVC REF: 77
FEV1 LLN: 1.79
FEV1 PRE REF: 71.1 %
FEV1 REF: 2.62
FVC LLN: 2.46
FVC PRE REF: 73.3 %
FVC REF: 3.42
IVC PRE: 2.4 L (ref 2.46–4.39)
IVC SINGLE BREATH LLN: 2.46
IVC SINGLE BREATH PRE REF: 70.1 %
IVC SINGLE BREATH REF: 3.42
IVCSINGLEBREATHULN: 4.39
PEF LLN: 5.13
PEF PRE REF: 67.4 %
PEF REF: 7.7
PRE DLCO: 16.25 ML/(MIN*MMHG) (ref 19.13–32.98)
PRE FEF 25 75: 1.39 L/S (ref 0.72–3.35)
PRE FET 100: 6.47 SEC
PRE FEV05 REF: 59.9 %
PRE FEV1 FVC: 74.18 % (ref 63.5–89.75)
PRE FEV1: 1.86 L (ref 1.79–3.44)
PRE FEV5: 1.51 L (ref 1.39–3.66)
PRE FVC: 2.51 L (ref 2.46–4.39)
PRE PEF: 5.19 L/S (ref 5.13–10.27)
VA PRE: 4.05 L (ref 6.75–6.75)
VA SINGLE BREATH LLN: 6.75
VA SINGLE BREATH PRE REF: 60.1 %
VA SINGLE BREATH REF: 6.75
VASINGLEBREATHULN: 6.75

## 2019-11-25 PROCEDURE — 3078F PR MOST RECENT DIASTOLIC BLOOD PRESSURE < 80 MM HG: ICD-10-PCS | Mod: CPTII,S$GLB,, | Performed by: NURSE PRACTITIONER

## 2019-11-25 PROCEDURE — 94010 BREATHING CAPACITY TEST: ICD-10-PCS | Mod: S$GLB,,, | Performed by: INTERNAL MEDICINE

## 2019-11-25 PROCEDURE — 99999 PR PBB SHADOW E&M-EST. PATIENT-LVL IV: ICD-10-PCS | Mod: PBBFAC,,, | Performed by: NURSE PRACTITIONER

## 2019-11-25 PROCEDURE — 1126F PR PAIN SEVERITY QUANTIFIED, NO PAIN PRESENT: ICD-10-PCS | Mod: S$GLB,,, | Performed by: NURSE PRACTITIONER

## 2019-11-25 PROCEDURE — 1126F AMNT PAIN NOTED NONE PRSNT: CPT | Mod: S$GLB,,, | Performed by: NURSE PRACTITIONER

## 2019-11-25 PROCEDURE — 1159F PR MEDICATION LIST DOCUMENTED IN MEDICAL RECORD: ICD-10-PCS | Mod: S$GLB,,, | Performed by: NURSE PRACTITIONER

## 2019-11-25 PROCEDURE — 94010 BREATHING CAPACITY TEST: CPT | Mod: S$GLB,,, | Performed by: INTERNAL MEDICINE

## 2019-11-25 PROCEDURE — 94729 PR C02/MEMBANE DIFFUSE CAPACITY: ICD-10-PCS | Mod: S$GLB,,, | Performed by: INTERNAL MEDICINE

## 2019-11-25 PROCEDURE — 99214 OFFICE O/P EST MOD 30 MIN: CPT | Mod: S$GLB,,, | Performed by: NURSE PRACTITIONER

## 2019-11-25 PROCEDURE — 99214 PR OFFICE/OUTPT VISIT, EST, LEVL IV, 30-39 MIN: ICD-10-PCS | Mod: S$GLB,,, | Performed by: NURSE PRACTITIONER

## 2019-11-25 PROCEDURE — 1101F PR PT FALLS ASSESS DOC 0-1 FALLS W/OUT INJ PAST YR: ICD-10-PCS | Mod: CPTII,S$GLB,, | Performed by: NURSE PRACTITIONER

## 2019-11-25 PROCEDURE — 1101F PT FALLS ASSESS-DOCD LE1/YR: CPT | Mod: CPTII,S$GLB,, | Performed by: NURSE PRACTITIONER

## 2019-11-25 PROCEDURE — 3074F PR MOST RECENT SYSTOLIC BLOOD PRESSURE < 130 MM HG: ICD-10-PCS | Mod: CPTII,S$GLB,, | Performed by: NURSE PRACTITIONER

## 2019-11-25 PROCEDURE — 94729 DIFFUSING CAPACITY: CPT | Mod: S$GLB,,, | Performed by: INTERNAL MEDICINE

## 2019-11-25 PROCEDURE — 3078F DIAST BP <80 MM HG: CPT | Mod: CPTII,S$GLB,, | Performed by: NURSE PRACTITIONER

## 2019-11-25 PROCEDURE — 3074F SYST BP LT 130 MM HG: CPT | Mod: CPTII,S$GLB,, | Performed by: NURSE PRACTITIONER

## 2019-11-25 PROCEDURE — 99999 PR PBB SHADOW E&M-EST. PATIENT-LVL IV: CPT | Mod: PBBFAC,,, | Performed by: NURSE PRACTITIONER

## 2019-11-25 PROCEDURE — 1159F MED LIST DOCD IN RCRD: CPT | Mod: S$GLB,,, | Performed by: NURSE PRACTITIONER

## 2019-11-25 NOTE — PROGRESS NOTES
"Walter Bull  was seen in follow-up for ANURADHA management and CPAP equipment check.     CHIEF COMPLAINT:  ANURADHA management      INTERVAL HISTORY:    11/25/2019 ANDRZEJ Cast NP: Since last clinic visit, pt has completed PSG and dedicated titration study. Pt was set up with CPAP at [facility] on 08/30/2019. Reports that snoring, air gasping, unrefreshing sleep, and daytime sleepiness resolves with PAP use. Denies oral/nasal drying with Wisp nasal mask. Denies pressure intolerance. Denies nasal congestion. Denies aerophagia.     Pt and bed partner reports fitful sleep including upper and lower limb movements, mostly leg movements during sleep especially now that able to sleep "deeper" "I'm dreaming more". Denies injurious events during sleep to self or bed partner.     07/20/2019 CPAP titration study 237 lb. Effective control of sleep disordered breathing was seen in supine REM sleep on 11 cm H2O.Higher pressures resulted in further improvement. Excessive motor tone and activity during REM stage sleep. This could be supportive of a diagnosis of REM sleep behavior disorder     06/04/2019 Baseline  lb. The overall AHI was 8.4 with an oxygen deysi of 92.0%. The supine AHI was 14.4 and the REM AHI was 40.6.      05/02/2019 ANDRZEJ Cast NP: Initial HISTORY OF PRESENT ILLNESS: Walter Bull is a 69 y.o. male is here for sleep evaluation.       Patient complaints include: snoring, air gasping, unrefreshing sleep, and daytime sleepiness.   Denies difficulty falling asleep but sleep is fragmented due to either abnormal breathing and nocturia up to 6 x  Prefers to take his spironolactone dose at bedtime since he works outside, he prefers not to take it when he does not have bathroom readily available and not during sun-exposure  Denies symptoms of restless legs or kicking during sleep.    Occupation: lawn maintenance business     [place] Sleepiness Scale score during initial sleep evaluation was 17.    SLEEP ROUTINE:    Bed partner:  " Eve Burger   Time to bed: 7 pm  Sleep onset latency: 30 minutes        Disruptions or awakenings: multiple     Wakeup time:  5:30 am  Perceived sleep quality:  Poor to fair     PAST MEDICAL HISTORY:    Active Ambulatory Problems     Diagnosis Date Noted    Coronary artery disease involving native coronary artery of native heart without angina pectoris 01/11/2017    Diabetes mellitus type 2 in obese 01/11/2017    Diabetic polyneuropathy associated with type 2 diabetes mellitus 01/11/2017    HLD (hyperlipidemia) 01/11/2017    S/P CABG (coronary artery bypass graft) 01/11/2017    History of tobacco use 01/11/2017    GERD (gastroesophageal reflux disease) 01/11/2017    Benign essential HTN 07/19/2017    Benign prostatic hyperplasia without lower urinary tract symptoms 07/19/2017    Iron deficiency anemia 02/21/2018    NSTEMI (non-ST elevated myocardial infarction) 09/13/2018    Severe obesity (BMI 35.0-39.9) with comorbidity 09/25/2018    Ischemic cardiomyopathy 09/26/2018    Ventricular tachycardia 11/16/2018    Wide-complex tachycardia 11/16/2018    Bradycardia 11/17/2018    ICD (implantable cardioverter-defibrillator), dual, in situ 11/28/2018    Thrombosed vein secondary to IV placement 11/28/2018    Chest pain 05/17/2019    Epididymitis 05/17/2019    Chronic combined systolic and diastolic heart failure 05/18/2019    Hypomagnesemia 05/18/2019    ANURADHA (obstructive sleep apnea)     Acute cystitis with hematuria 06/24/2019    Physical debility 06/24/2019    RBD (REM behavioral disorder)     Chronic prostatitis 08/01/2019    Long term current use of amiodarone      Resolved Ambulatory Problems     Diagnosis Date Noted    Colon cancer screening 02/02/2017    Chest pain 09/13/2018    Chest pain 11/16/2018    Acute kidney injury superimposed on CKD 05/18/2019    Dizziness 06/24/2019    Acute renal failure superimposed on stage 3 chronic kidney disease 06/24/2019    Hyponatremia  06/24/2019    Unstable angina 10/06/2019     Past Medical History:   Diagnosis Date    Anemia     Anticoagulant long-term use     CHF (congestive heart failure)     Coronary artery disease     Diabetes mellitus type I     Disorder of kidney and ureter     Encounter for blood transfusion     Glaucoma     Heart attack     Heart disease     Hypertension     Iron deficiency     Kidney failure     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                PAST SURGICAL HISTORY:    Past Surgical History:   Procedure Laterality Date    CARDIAC DEFIBRILLATOR PLACEMENT      CARDIAC ELECTROPHYSIOLOGY STUDY N/A 11/19/2018    Procedure: CARDIAC ELECTROPHYSIOLOGY STUDY;  Surgeon: Byron Glasgow MD;  Location: [facility] EP LAB;  Service: Cardiology;  Laterality: N/A;  VT, EPS +/- ICD, SJM, anes, GP, 6086    CARDIAC ELECTROPHYSIOLOGY STUDY N/A 11/19/2018    Procedure: CARDIAC ELECTROPHYSIOLOGY STUDY;  Surgeon: Byron Glasgow MD;  Location: [facility] EP LAB;  Service: Cardiology;  Laterality: N/A;    COLON SURGERY  2006    COLONOSCOPY N/A 2/2/2017    Procedure: COLONOSCOPY;  Surgeon: Ronnie Conway MD;  Location: [facility];  Service: Endoscopy;  Laterality: N/A;    CORONARY ARTERY BYPASS GRAFT  2005    5 arteries    CORONARY BYPASS GRAFT ANGIOGRAPHY  11/16/2018    Procedure: Bypass graft study;  Surgeon: Ryan Schmidt MD;  Location: [facility] CATH LAB;  Service: Cardiology;;    EYE SURGERY Bilateral 2016    Laser surgery for glaucoma    INSERTION OF IMPLANTABLE CARDIOVERTER-DEFIBRILLATOR (ICD) GENERATOR WITH TWO EXISTING LEADS Left 11/19/2018    Procedure: INSERTION, DUAL ICD;  Surgeon: Byron Glasgow MD;  Location: [facility] EP LAB;  Service: Cardiology;  Laterality: Left;  VT, EPS +/- ICD, SJM, anes, GP, 6086    LEFT HEART CATHETERIZATION Left 11/16/2018    Procedure: Left heart cath;  Surgeon: Ryan Schmidt MD;  Location: [facility] CATH LAB;  Service: Cardiology;  Laterality: Left;         FAMILY HISTORY:                Family History    Problem Relation Age of Onset    Diabetes Mother     Heart disease Mother     Hypertension Sister     Diabetes Sister     Heart disease Sister     Heart attack Brother     Colon cancer Neg Hx     Esophageal cancer Neg Hx     Stomach cancer Neg Hx        SOCIAL HISTORY:          Tobacco:   Social History     Tobacco Use   Smoking Status Former Smoker    Packs/day: 3.00    Types: Cigarettes    Start date: 1963    Last attempt to quit: 1981    Years since quittin.9   Smokeless Tobacco Former User    Types: Snuff, Chew    Quit date:        Alcohol use:    Social History     Substance and Sexual Activity   Alcohol Use No                 ALLERGIES:  Review of patient's allergies indicates:  No Known Allergies    CURRENT MEDICATIONS:    Current Outpatient Medications   Medication Sig Dispense Refill    albuterol (PROVENTIL) 2.5 mg /3 mL (0.083 %) nebulizer solution Take 3 mLs (2.5 mg total) by nebulization every 6 (six) hours as needed. Rescue 1 Box 0    amiodarone (PACERONE) 200 MG Tab Take 1 tablet (200 mg total) by mouth once daily. 90 tablet 3    ammonium lactate 12 % Crea Apply small amount to feet except for in between toes twice a day 1 Bottle 6    aspirin (ECOTRIN) 81 MG EC tablet Take 1 tablet (81 mg total) by mouth once daily. 90 tablet 3    carvedilol (COREG) 6.25 MG tablet Take 1 tablet (6.25 mg total) by mouth 2 (two) times daily. 60 tablet 1    esomeprazole (NEXIUM) 40 MG capsule Take 1 capsule (40 mg total) by mouth before breakfast. 30 capsule 11    ezetimibe (ZETIA) 10 mg tablet Take 1 tablet (10 mg total) by mouth once daily. 90 tablet 3    ferrous sulfate (FEOSOL) 325 mg (65 mg iron) Tab tablet TAKE 1 TABLET BY MOUTH THREE TIMES DAILY 90 tablet 3    finasteride (PROSCAR) 5 mg tablet HOLD DUE TO ORTHOSTATIC HYPOTENSION 90 tablet 3    furosemide (LASIX) 20 MG tablet Take 1 tablet (20 mg total) by mouth once daily. Take an extra tablet daily for wt gain more  "than 3 pounds/day or 5 pounds/week 30 tablet 1    insulin aspart U-100 (NOVOLOG U-100 INSULIN ASPART) 100 unit/mL injection 3 x10ml vials required per month for use in VGO 30 3 vial 11    insulin  unit/mL injection Use 30 Units in the morning and 24 Units at night      isosorbide mononitrate (IMDUR) 120 MG 24 hr tablet Take 1 tablet (120 mg total) by mouth once daily. 90 tablet 3    losartan (COZAAR) 25 MG tablet Take 1 tablet (25 mg total) by mouth once daily. 90 tablet 3    nitroGLYCERIN (NITROSTAT) 0.4 MG SL tablet DISSOLVE ONE TABLET UNDER THE TONGUE EVERY 5 MINUTES AS NEEDED FOR CHEST PAIN. 100 tablet 0    RANEXA 1,000 mg Tb12 Take 1 tablet (1,000 mg total) by mouth 2 (two) times daily. 60 tablet 11    rosuvastatin (CRESTOR) 40 MG Tab Take 1 tablet (40 mg total) by mouth once daily. 90 tablet 3    SITagliptin (JANUVIA) 100 MG Tab Take 1 tablet (100 mg total) by mouth once daily. 90 tablet 3    sub-q insulin device, 30 unit (V-GO 30) [place] 1 VGo every 24 hr as directed 30 Device 11    tamsulosin (FLOMAX) 0.4 mg Cap Take 1 capsule (0.4 mg total) by mouth once daily. 90 capsule 3    ticagrelor (BRILINTA) 90 mg tablet Take 90 mg by mouth 2 (two) times daily.      ferrous sulfate 325 (65 FE) MG EC tablet Take 1 tablet (325 mg total) by mouth once daily.  0    sub-q insulin device, 30 unit (V-GO 30) [place] 1 VGo every 24 hr as directed 30 Device 11    traMADol (ULTRAM) 50 mg tablet Take 1 tablet (50 mg total) by mouth every 8 (eight) hours as needed for Pain. 20 tablet 0     No current facility-administered medications for this visit.                   REVIEW OF SYSTEMS:  Sleep related symptoms as per HPI. Otherwise, a balance of 10 systems reviewed is negative.          PHYSICAL EXAM: /68   Pulse 64   Ht 5' 7" (1.702 m)   Wt 111.1 kg (244 lb 14.9 oz)   BMI 38.36 kg/m²      GENERAL: Overweight development, well groomed    11/17/2018 estimated ejection fraction is 45%                        "                   ASSESSMENT:    REM-predominant obstructive sleep apnea with an overall AHI of 8.4 with an AHI of40.6 during REM sleep.The patient symptomatically has snoring, air gasping, unrefreshing sleep, and daytime sleepiness which improves with CPAP. The patient is adherent on CPAP and experiencing symptomatic benefit. The patient has met usage compliance after 08/30/2019 set up.  Medical co-morbidities: cardiomyopathy, LBBB, HTN, hx MI, DM2, and obesity.  This warrants continued treatment for obstructive sleep apnea.     RBD, Excessive motor tone noticed in lower limbs during the REM sleep during 07/20/2019 study     Obesity, BMI > 35, discussed relationship between ANURADHA and weight      PLAN:    ANURADHA:  Continue CPAP 11 cm. Reviewed recommended replacement schedule for supplies.     RBD:    -discussed etiology and treatment recommendations. Important to continue ANURADHA treatment, as this could trigger RBD.   -A variety of medications and substances can precipitate events, including alcohol, psychotropic medications (eg, anticholinergics, lithium, antipsychotics), and sedative-hypnotic agents, most notably the nonbenzodiazepine benzodiazepine receptor agonists such as zolpidem.  -Changes in the sleep environment should be minimized and the bedroom should be safeguarded to avoid injury.  -Pharmacotherapy is rarely necessary, and the evidence for it is primarily anecdotal. Benzodiazepines, particularly clonazepam, are the best studied option in patients with frequent or injurious episodes who fail to respond to nonpharmacologic measures  -Recommended trial melatonin 6 mg qhs    RTC 6 - 8 weeks f/u RBD management

## 2019-11-27 ENCOUNTER — CLINICAL SUPPORT (OUTPATIENT)
Dept: CARDIOLOGY | Facility: HOSPITAL | Age: 69
End: 2019-11-27
Attending: INTERNAL MEDICINE
Payer: MEDICARE

## 2019-11-27 DIAGNOSIS — Z95.810 PRESENCE OF AUTOMATIC CARDIOVERTER/DEFIBRILLATOR (AICD): ICD-10-CM

## 2019-11-27 DIAGNOSIS — I47.20 VT (VENTRICULAR TACHYCARDIA): ICD-10-CM

## 2019-11-27 PROCEDURE — 93296 REM INTERROG EVL PM/IDS: CPT | Performed by: INTERNAL MEDICINE

## 2019-11-27 PROCEDURE — 93295 CARDIAC DEVICE CHECK - REMOTE: ICD-10-PCS | Mod: ,,, | Performed by: INTERNAL MEDICINE

## 2019-11-27 PROCEDURE — 93295 DEV INTERROG REMOTE 1/2/MLT: CPT | Mod: ,,, | Performed by: INTERNAL MEDICINE

## 2019-12-02 ENCOUNTER — LAB VISIT (OUTPATIENT)
Dept: LAB | Facility: HOSPITAL | Age: 69
End: 2019-12-02
Attending: HOSPITALIST
Payer: MEDICARE

## 2019-12-02 DIAGNOSIS — E66.9 DIABETES MELLITUS TYPE 2 IN OBESE: Chronic | ICD-10-CM

## 2019-12-02 DIAGNOSIS — E11.69 DIABETES MELLITUS TYPE 2 IN OBESE: Chronic | ICD-10-CM

## 2019-12-02 DIAGNOSIS — Z79.899 LONG TERM CURRENT USE OF AMIODARONE: ICD-10-CM

## 2019-12-02 LAB
ALBUMIN SERPL BCP-MCNC: 3.1 G/DL (ref 3.5–5.2)
ALBUMIN/CREAT UR: 10.3 UG/MG (ref 0–30)
ANION GAP SERPL CALC-SCNC: 8 MMOL/L (ref 8–16)
BUN SERPL-MCNC: 9 MG/DL (ref 8–23)
CALCIUM SERPL-MCNC: 8.9 MG/DL (ref 8.7–10.5)
CHLORIDE SERPL-SCNC: 104 MMOL/L (ref 95–110)
CO2 SERPL-SCNC: 28 MMOL/L (ref 23–29)
CREAT SERPL-MCNC: 1.4 MG/DL (ref 0.5–1.4)
CREAT UR-MCNC: 117 MG/DL (ref 23–375)
EST. GFR  (AFRICAN AMERICAN): 58 ML/MIN/1.73 M^2
EST. GFR  (NON AFRICAN AMERICAN): 50 ML/MIN/1.73 M^2
ESTIMATED AVG GLUCOSE: 180 MG/DL (ref 68–131)
GLUCOSE SERPL-MCNC: 183 MG/DL (ref 70–110)
HBA1C MFR BLD HPLC: 7.9 % (ref 4–5.6)
MICROALBUMIN UR DL<=1MG/L-MCNC: 12 UG/ML
PHOSPHATE SERPL-MCNC: 3.2 MG/DL (ref 2.7–4.5)
POTASSIUM SERPL-SCNC: 4.1 MMOL/L (ref 3.5–5.1)
SODIUM SERPL-SCNC: 140 MMOL/L (ref 136–145)
TSH SERPL DL<=0.005 MIU/L-ACNC: 2.99 UIU/ML (ref 0.4–4)

## 2019-12-02 PROCEDURE — 36415 COLL VENOUS BLD VENIPUNCTURE: CPT

## 2019-12-02 PROCEDURE — 80069 RENAL FUNCTION PANEL: CPT

## 2019-12-02 PROCEDURE — 84443 ASSAY THYROID STIM HORMONE: CPT

## 2019-12-02 PROCEDURE — 83036 HEMOGLOBIN GLYCOSYLATED A1C: CPT

## 2019-12-02 PROCEDURE — 82043 UR ALBUMIN QUANTITATIVE: CPT

## 2019-12-04 NOTE — PROGRESS NOTES
Subjective:      Patient ID: Walter Bull is a 69 y.o. male.    Chief Complaint:  Diabetes      History of Present Illness  68 y.o. male with a diagnosis of type 2 DM diagnosed 25-30 year ago. Hx of CAD h/o NSTEMI/ CABGx5, LBBB, Diastolic HF, AICD, T2DM, HTN, HLD, CKD 3. Here for follow up of his Diabetes management     Current regiment   Januvia 100mg daily  VGo 30 cartridge   - 3 clicks with meals   - 1-2 clicks with snack.      Prior diabetes medication:   NPH 30U in morning and 30U at night  Janumet but unable to take Metformin.   He was on Lantus pen in the past but had to stop due to insurance change  NPH for 3 years    Recently:  Admitted in October for CHF exacerbation. Hx of recurrent UTI was on ABx a few months ago. He is doing well at this time. He woke up once time feeling cold/sweatty, ate some food as he felt he was low. Lowest glucose was 68 recently. He likes VGO as it allow him to give less shot. He is rotating sites, not having any issue with co-pay. Pt is changing pharmacy, will let us know his new preferred pharmacy.     Known diabetic complications: nephropathy and cardiovascular disease  Cardiovascular risk factors: advanced age (older than 55 for men, 65 for women), diabetes mellitus, dyslipidemia, male gender, obesity (BMI >= 30 kg/m2) and sedentary lifestyle     Eye exam current (within one year): yes in 2019  Weight trend: increasing steadily  Prior visit with dietician: no  Current diet: well balanced   Working, eat breakfast, skip often lunch, eat dinner  Drink water: water, gatorade  Current exercise: none     Current monitoring regimen: home blood tests - 2-3 times weekly  Home blood sugar records:  AM is before meals but PM is sometime before or after meal    Vit D Insuf  Taking over the counter multivitamin       CAD/CHF: medication review, close follow up with cardiology    Review of Systems   Constitutional: Negative for activity change and unexpected weight change.   HENT:  "Negative for sore throat and voice change.    Eyes: Negative for visual disturbance.   Respiratory: Negative for shortness of breath.    Cardiovascular: Negative for chest pain.   Gastrointestinal: Negative for abdominal pain and anal bleeding.   Genitourinary: Negative for urgency.   Musculoskeletal: Negative for arthralgias.   Skin: Negative for wound.   Neurological: Negative for headaches.   Psychiatric/Behavioral: Negative for confusion and sleep disturbance.       Objective:   Physical Exam  /80   Pulse 71   Ht 5' 7" (1.702 m)   Wt 115.6 kg (254 lb 15.4 oz)   BMI 39.93 kg/m²     Body mass index is 39.93 kg/m².    Lab Review:   Lab Results   Component Value Date    HGBA1C 8.1 (H) 12/05/2019     Lab Results   Component Value Date    CHOL 121 10/06/2019    HDL 46 10/06/2019    LDLCALC 67.0 10/06/2019    TRIG 40 10/06/2019    CHOLHDL 38.0 10/06/2019     Lab Results   Component Value Date     12/02/2019    K 4.1 12/02/2019     12/02/2019    CO2 28 12/02/2019     (H) 12/02/2019    BUN 9 12/02/2019    CREATININE 1.4 12/02/2019    CALCIUM 8.9 12/02/2019    PROT 6.4 10/07/2019    ALBUMIN 3.1 (L) 12/02/2019    BILITOT 0.6 10/07/2019    ALKPHOS 63 10/07/2019    AST 34 10/07/2019    ALT 43 10/07/2019    ANIONGAP 8 12/02/2019    ESTGFRAFRICA 58 (A) 12/02/2019    EGFRNONAA 50 (A) 12/02/2019    TSH 2.989 12/02/2019     Vit D, 25-Hydroxy   Date Value Ref Range Status   12/05/2019 27 (L) 30 - 96 ng/mL Final     Comment:     Vitamin D deficiency.........<10 ng/mL                              Vitamin D insufficiency......10-29 ng/mL       Vitamin D sufficiency........> or equal to 30 ng/mL  Vitamin D toxicity............>100 ng/mL         Assessment and Plan     Diabetes mellitus type 2 in obese  Type 2 diabetic, with adequate controlled   Reviewed goals of therapy are to get the best control we can without hypoglycemia  Routine health maintenance/screening: UTD Lipid, A1C, Urine P/C, Eye, feet "   Changing pharmacy, family will     Plan  - Continue Januvia 100mg daily, VGo 30 cartridge 3 clicks with meals, 1-2 clicks with snack  - Order professional CGM, pt is in agreement of this plan, will adjust regiment pending read  - Once we get his pharmacy, we will add GLP1 (trulicity vs Ozempic) given his extensive cardiac hx, not SGLT2 candidate due to frequent UTI this year 2019  - Check glucoses 3-4x a day, glucose logs given in clinic, pt was asked to filled out 7-10 days of logs and email/mail back in for review   - Advise patient about hypoglycemia, treatment of hypoglycemia, pt is aware  - Repeat lab work A1C In 3 month  - Diabetic supplies reviewed no need for refills  - Close follow up 3  mo      Severe obesity (BMI 35.0-39.9) with comorbidity  - can worsening in insulin resistance  - adding GLP1 will help      Chronic prostatitis  - would avoid SGLT2 fow now    Long term current use of amiodarone  - Stable TSH    HLD (hyperlipidemia)  - on statin    Vitamin D insufficiency  - on OTC vitamin  - repeat Vit D level and add OTC if needed      Recommendations were discussed with the patient in detail  The patient verbalized understanding and agrees with the plan outlined as above          Jacobo Coon MD   Endocrinology Fellow   12/5/2019 4:14 PM

## 2019-12-05 ENCOUNTER — OFFICE VISIT (OUTPATIENT)
Dept: ENDOCRINOLOGY | Facility: CLINIC | Age: 69
End: 2019-12-05
Payer: MEDICARE

## 2019-12-05 VITALS
BODY MASS INDEX: 40.01 KG/M2 | HEART RATE: 71 BPM | HEIGHT: 67 IN | WEIGHT: 254.94 LBS | SYSTOLIC BLOOD PRESSURE: 116 MMHG | DIASTOLIC BLOOD PRESSURE: 80 MMHG

## 2019-12-05 DIAGNOSIS — E66.01 SEVERE OBESITY (BMI 35.0-39.9) WITH COMORBIDITY: ICD-10-CM

## 2019-12-05 DIAGNOSIS — E66.9 DIABETES MELLITUS TYPE 2 IN OBESE: Primary | Chronic | ICD-10-CM

## 2019-12-05 DIAGNOSIS — E55.9 VITAMIN D INSUFFICIENCY: ICD-10-CM

## 2019-12-05 DIAGNOSIS — E78.2 MIXED HYPERLIPIDEMIA: Chronic | ICD-10-CM

## 2019-12-05 DIAGNOSIS — I10 BENIGN ESSENTIAL HTN: Chronic | ICD-10-CM

## 2019-12-05 DIAGNOSIS — N41.1 CHRONIC PROSTATITIS: ICD-10-CM

## 2019-12-05 DIAGNOSIS — E16.2 HYPOGLYCEMIA: ICD-10-CM

## 2019-12-05 DIAGNOSIS — Z79.899 LONG TERM CURRENT USE OF AMIODARONE: ICD-10-CM

## 2019-12-05 DIAGNOSIS — E11.69 DIABETES MELLITUS TYPE 2 IN OBESE: Primary | Chronic | ICD-10-CM

## 2019-12-05 PROBLEM — R53.81 PHYSICAL DEBILITY: Status: RESOLVED | Noted: 2019-06-24 | Resolved: 2019-12-05

## 2019-12-05 PROBLEM — N30.01 ACUTE CYSTITIS WITH HEMATURIA: Status: RESOLVED | Noted: 2019-06-24 | Resolved: 2019-12-05

## 2019-12-05 PROCEDURE — 99999 PR PBB SHADOW E&M-EST. PATIENT-LVL V: CPT | Mod: PBBFAC,GC,, | Performed by: HOSPITALIST

## 2019-12-05 PROCEDURE — 99999 PR PBB SHADOW E&M-EST. PATIENT-LVL V: ICD-10-PCS | Mod: PBBFAC,GC,, | Performed by: HOSPITALIST

## 2019-12-05 PROCEDURE — 99214 OFFICE O/P EST MOD 30 MIN: CPT | Mod: GC,S$GLB,, | Performed by: INTERNAL MEDICINE

## 2019-12-05 PROCEDURE — 99214 PR OFFICE/OUTPT VISIT, EST, LEVL IV, 30-39 MIN: ICD-10-PCS | Mod: GC,S$GLB,, | Performed by: INTERNAL MEDICINE

## 2019-12-05 NOTE — ASSESSMENT & PLAN NOTE
Type 2 diabetic, with adequate controlled   Reviewed goals of therapy are to get the best control we can without hypoglycemia  Routine health maintenance/screening: UTD Lipid, A1C, Urine P/C, Eye, feet   Changing pharmacy, family will     Plan  - Continue Januvia 100mg daily, VGo 30 cartridge 3 clicks with meals, 1-2 clicks with snack  - Order professional CGM, pt is in agreement of this plan, will adjust regiment pending read  - Once we get his pharmacy, we will add GLP1 (trulicity vs Ozempic) given his extensive cardiac hx, not SGLT2 candidate due to frequent UTI this year 2019  - Check glucoses 3-4x a day, glucose logs given in clinic, pt was asked to filled out 7-10 days of logs and email/mail back in for review   - Advise patient about hypoglycemia, treatment of hypoglycemia, pt is aware  - Repeat lab work A1C In 3 month  - Diabetic supplies reviewed no need for refills  - Close follow up 3  mo

## 2019-12-06 ENCOUNTER — PATIENT MESSAGE (OUTPATIENT)
Dept: ENDOCRINOLOGY | Facility: CLINIC | Age: 69
End: 2019-12-06

## 2019-12-06 DIAGNOSIS — E66.01 SEVERE OBESITY (BMI 35.0-39.9) WITH COMORBIDITY: Primary | ICD-10-CM

## 2019-12-06 DIAGNOSIS — E66.9 DIABETES MELLITUS TYPE 2 IN OBESE: Chronic | ICD-10-CM

## 2019-12-06 DIAGNOSIS — E11.42 DIABETIC POLYNEUROPATHY ASSOCIATED WITH TYPE 2 DIABETES MELLITUS: ICD-10-CM

## 2019-12-06 DIAGNOSIS — E11.69 DIABETES MELLITUS TYPE 2 IN OBESE: Chronic | ICD-10-CM

## 2019-12-06 RX ORDER — INSULIN ASPART 100 [IU]/ML
INJECTION, SOLUTION INTRAVENOUS; SUBCUTANEOUS
Qty: 3 VIAL | Refills: 11 | Status: SHIPPED | OUTPATIENT
Start: 2019-12-06 | End: 2020-02-26

## 2019-12-10 NOTE — TELEPHONE ENCOUNTER
AM sugars 45 before meals. And only in 60s-70s after meals. Pt still taking all meals as prescribed.  
Taken care of in another messagte today  
no

## 2019-12-31 ENCOUNTER — PATIENT MESSAGE (OUTPATIENT)
Dept: CARDIOLOGY | Facility: CLINIC | Age: 69
End: 2019-12-31

## 2019-12-31 DIAGNOSIS — Z95.1 S/P CABG X 5: ICD-10-CM

## 2019-12-31 DIAGNOSIS — I25.118 CORONARY ARTERY DISEASE OF NATIVE ARTERY OF NATIVE HEART WITH STABLE ANGINA PECTORIS: ICD-10-CM

## 2019-12-31 RX ORDER — NITROGLYCERIN 0.4 MG/1
TABLET SUBLINGUAL
Qty: 25 TABLET | Refills: 10 | Status: SHIPPED | OUTPATIENT
Start: 2019-12-31 | End: 2020-01-23 | Stop reason: SDUPTHER

## 2020-01-01 RX ORDER — NITROGLYCERIN 0.4 MG/1
TABLET SUBLINGUAL
Qty: 20 TABLET | Refills: 3 | Status: SHIPPED | OUTPATIENT
Start: 2020-01-01 | End: 2020-01-14 | Stop reason: SDUPTHER

## 2020-01-08 ENCOUNTER — PATIENT MESSAGE (OUTPATIENT)
Dept: ENDOCRINOLOGY | Facility: CLINIC | Age: 70
End: 2020-01-08

## 2020-01-09 ENCOUNTER — CLINICAL SUPPORT (OUTPATIENT)
Dept: ENDOCRINOLOGY | Facility: CLINIC | Age: 70
End: 2020-01-09
Payer: MEDICARE

## 2020-01-09 DIAGNOSIS — I95.9 HYPOTENSION, UNSPECIFIED HYPOTENSION TYPE: ICD-10-CM

## 2020-01-09 DIAGNOSIS — E11.69 DIABETES MELLITUS TYPE 2 IN OBESE: Primary | ICD-10-CM

## 2020-01-09 DIAGNOSIS — E66.9 DIABETES MELLITUS TYPE 2 IN OBESE: Primary | ICD-10-CM

## 2020-01-09 PROCEDURE — 99999 PR PBB SHADOW E&M-EST. PATIENT-LVL I: ICD-10-PCS | Mod: PBBFAC,,,

## 2020-01-09 PROCEDURE — 99999 PR PBB SHADOW E&M-EST. PATIENT-LVL I: CPT | Mod: PBBFAC,,,

## 2020-01-09 NOTE — PROGRESS NOTES
"DIABETES EDUCATOR NOTE   PLACEMENT OF FREESTYLE HIMA PRO SENSOR  CONTINOUS GLUCOSE MONITORING SYSTEM (CGMS)    Patient is here in clinic today for placement of continuous glucose monitoring sensor.      Each patient verified that they were here for CGMS procedure ordered by their provider and that they have a working glucose meter and supplies at home.   Patient will be provided with a Freestyle Hima Sensor and a copy of the Continuous Glucose Monitoring Patient Log to fill out during the study.   A detailed  explanation of Continuous Glucose Monitoring was  provided. Patient informed that this is a blind procedure and that they will not actually see the blood sugar tracing in real time.  Reviewed with patient the [employer] patient education handout called "Your Freestyle Hima Pro sensor: What you need to know" to review self-care during the study to avoid sensor loosening or removal ie... Bathing, Swimming, dressing, and exercising.   Instructed patient to check blood sugar using home glucometer and to record the following on provided patient log sheets:Blood sugar taken at home, Meals and snacks, Activity, and Diabetes medications taken and dosage    Patient was brought to a private location.  Arm for insertion was selected and prepared and allowed to dry. Glucose Sensor Serial Number [id number]  was inserted to back of patient's left upper arm.    The following forms  were given and reviewed in detail with patient and all questions answered.   · Continuous Glucose Monitoring Patient Log #[id number]  · Freestyle [employer] Patient Handout "Your Freestyle Hima Pro Sensor: What you need to know"     Instructions held in a group: Time: 15 min   Insertion of sensor done individually in private:  Time: 5 minutes       "

## 2020-01-13 RX ORDER — LOSARTAN POTASSIUM 25 MG/1
25 TABLET ORAL DAILY
Qty: 90 TABLET | Refills: 3 | Status: SHIPPED | OUTPATIENT
Start: 2020-01-13 | End: 2020-12-02

## 2020-01-14 ENCOUNTER — PATIENT MESSAGE (OUTPATIENT)
Dept: CARDIOLOGY | Facility: CLINIC | Age: 70
End: 2020-01-14

## 2020-01-14 DIAGNOSIS — I25.118 CORONARY ARTERY DISEASE OF NATIVE ARTERY OF NATIVE HEART WITH STABLE ANGINA PECTORIS: ICD-10-CM

## 2020-01-14 DIAGNOSIS — Z95.1 S/P CABG X 5: ICD-10-CM

## 2020-01-15 ENCOUNTER — CLINICAL SUPPORT (OUTPATIENT)
Dept: ENDOCRINOLOGY | Facility: CLINIC | Age: 70
End: 2020-01-15
Payer: MEDICARE

## 2020-01-15 DIAGNOSIS — E11.69 DIABETES MELLITUS TYPE 2 IN OBESE: Chronic | ICD-10-CM

## 2020-01-15 DIAGNOSIS — E66.9 DIABETES MELLITUS TYPE 2 IN OBESE: Chronic | ICD-10-CM

## 2020-01-15 DIAGNOSIS — E16.2 HYPOGLYCEMIA: ICD-10-CM

## 2020-01-15 PROCEDURE — 95250 CONT GLUC MNTR PHYS/QHP EQP: CPT | Mod: S$GLB,,, | Performed by: INTERNAL MEDICINE

## 2020-01-15 PROCEDURE — 99999 PR PBB SHADOW E&M-EST. PATIENT-LVL I: CPT | Mod: PBBFAC,,,

## 2020-01-15 PROCEDURE — 95251 PR GLUCOSE MONITOR, 72 HOUR, PHYS INTERP: ICD-10-PCS | Mod: S$GLB,,, | Performed by: INTERNAL MEDICINE

## 2020-01-15 PROCEDURE — 99999 PR PBB SHADOW E&M-EST. PATIENT-LVL I: ICD-10-PCS | Mod: PBBFAC,,,

## 2020-01-15 PROCEDURE — 95250 PR GLUCOSE MONITORING,72 HRS,SUB-Q SENSOR: ICD-10-PCS | Mod: S$GLB,,, | Performed by: INTERNAL MEDICINE

## 2020-01-15 PROCEDURE — 95251 CONT GLUC MNTR ANALYSIS I&R: CPT | Mod: S$GLB,,, | Performed by: INTERNAL MEDICINE

## 2020-01-15 RX ORDER — NITROGLYCERIN 0.4 MG/1
TABLET SUBLINGUAL
Qty: 20 TABLET | Refills: 3 | Status: SHIPPED | OUTPATIENT
Start: 2020-01-15 | End: 2020-01-16 | Stop reason: SDUPTHER

## 2020-01-15 NOTE — Clinical Note
Food log reviewed; Noted x3 overnights elevated; has evening highs which are contributing to overnight elevations. Excursions could be due to food consumed pop tarts etc

## 2020-01-16 DIAGNOSIS — I25.118 CORONARY ARTERY DISEASE OF NATIVE ARTERY OF NATIVE HEART WITH STABLE ANGINA PECTORIS: ICD-10-CM

## 2020-01-16 DIAGNOSIS — Z95.1 S/P CABG X 5: ICD-10-CM

## 2020-01-16 NOTE — TELEPHONE ENCOUNTER
----- Message from Robyn Kaplan sent at 2020  2:33 PM CST -----  Contact: Self  Walter Bull  MRN: [id number]  : 1950  PCP: Belkys Kruger  Home Phone      [phone]  Work Phone      Not on file.  Mobile          [phone]    MESSAGE:     Requesting refill of RX  nitroGLYCERIN (NITROSTAT) 0.4 MG SL tablet    Pharmacy: [facility] - [address]    Phone: [phone]

## 2020-01-17 RX ORDER — NITROGLYCERIN 0.4 MG/1
TABLET SUBLINGUAL
Qty: 20 TABLET | Refills: 3 | Status: SHIPPED | OUTPATIENT
Start: 2020-01-17 | End: 2020-08-24

## 2020-01-20 NOTE — PROGRESS NOTES
DIABETES EDUCATOR NOTE   Return of the Freestyle Natalya Pro Sensor and Patient Log.    Patient returned to clinic today to return Glucose Sensor and signed patient log form used in CM[facility] procedure.    The CGMS Sensor will be scanned and downloaded. All reports will be imported into the patient's electronic medical record.    Endocrine Provider will complete data interpretation and make recommendations; will forward recommendations to the ordering provider for follow up with patient.

## 2020-01-23 ENCOUNTER — PATIENT MESSAGE (OUTPATIENT)
Dept: CARDIOLOGY | Facility: CLINIC | Age: 70
End: 2020-01-23

## 2020-01-23 ENCOUNTER — PATIENT MESSAGE (OUTPATIENT)
Dept: ENDOCRINOLOGY | Facility: CLINIC | Age: 70
End: 2020-01-23

## 2020-01-23 DIAGNOSIS — Z95.1 S/P CABG X 5: ICD-10-CM

## 2020-01-23 DIAGNOSIS — I25.118 CORONARY ARTERY DISEASE OF NATIVE ARTERY OF NATIVE HEART WITH STABLE ANGINA PECTORIS: ICD-10-CM

## 2020-01-23 RX ORDER — NITROGLYCERIN 0.4 MG/1
TABLET SUBLINGUAL
Qty: 25 TABLET | Refills: 10 | Status: SHIPPED | OUTPATIENT
Start: 2020-01-23 | End: 2020-03-30

## 2020-01-27 ENCOUNTER — PATIENT MESSAGE (OUTPATIENT)
Dept: SLEEP MEDICINE | Facility: CLINIC | Age: 70
End: 2020-01-27

## 2020-01-27 ENCOUNTER — PATIENT MESSAGE (OUTPATIENT)
Dept: ENDOCRINOLOGY | Facility: CLINIC | Age: 70
End: 2020-01-27

## 2020-01-29 ENCOUNTER — PATIENT MESSAGE (OUTPATIENT)
Dept: ENDOCRINOLOGY | Facility: CLINIC | Age: 70
End: 2020-01-29

## 2020-01-29 ENCOUNTER — TELEPHONE (OUTPATIENT)
Dept: ENDOCRINOLOGY | Facility: CLINIC | Age: 70
End: 2020-01-29

## 2020-01-29 NOTE — TELEPHONE ENCOUNTER
Talk to [employer] script rep they stated that pt had two accounts which one did not had his trulicity Rx. Rep combined both accounts for pt to get his trulicity.

## 2020-01-31 ENCOUNTER — PATIENT MESSAGE (OUTPATIENT)
Dept: ENDOCRINOLOGY | Facility: CLINIC | Age: 70
End: 2020-01-31

## 2020-02-04 ENCOUNTER — TELEPHONE (OUTPATIENT)
Dept: SLEEP MEDICINE | Facility: CLINIC | Age: 70
End: 2020-02-04

## 2020-02-04 ENCOUNTER — OFFICE VISIT (OUTPATIENT)
Dept: SLEEP MEDICINE | Facility: CLINIC | Age: 70
End: 2020-02-04
Payer: MEDICARE

## 2020-02-04 VITALS
BODY MASS INDEX: 40.45 KG/M2 | HEIGHT: 67 IN | SYSTOLIC BLOOD PRESSURE: 120 MMHG | WEIGHT: 257.69 LBS | HEART RATE: 59 BPM | DIASTOLIC BLOOD PRESSURE: 64 MMHG

## 2020-02-04 DIAGNOSIS — G47.52 RBD (REM BEHAVIORAL DISORDER): ICD-10-CM

## 2020-02-04 DIAGNOSIS — G47.33 OSA (OBSTRUCTIVE SLEEP APNEA): Primary | ICD-10-CM

## 2020-02-04 PROCEDURE — 99999 PR PBB SHADOW E&M-EST. PATIENT-LVL IV: CPT | Mod: PBBFAC,,, | Performed by: NURSE PRACTITIONER

## 2020-02-04 PROCEDURE — 99499 RISK ADDL DX/OHS AUDIT: ICD-10-PCS | Mod: S$GLB,,, | Performed by: NURSE PRACTITIONER

## 2020-02-04 PROCEDURE — 1159F PR MEDICATION LIST DOCUMENTED IN MEDICAL RECORD: ICD-10-PCS | Mod: S$GLB,,, | Performed by: NURSE PRACTITIONER

## 2020-02-04 PROCEDURE — 1126F AMNT PAIN NOTED NONE PRSNT: CPT | Mod: S$GLB,,, | Performed by: NURSE PRACTITIONER

## 2020-02-04 PROCEDURE — 3074F SYST BP LT 130 MM HG: CPT | Mod: CPTII,S$GLB,, | Performed by: NURSE PRACTITIONER

## 2020-02-04 PROCEDURE — 99499 UNLISTED E&M SERVICE: CPT | Mod: S$GLB,,, | Performed by: NURSE PRACTITIONER

## 2020-02-04 PROCEDURE — 99213 PR OFFICE/OUTPT VISIT, EST, LEVL III, 20-29 MIN: ICD-10-PCS | Mod: S$GLB,,, | Performed by: NURSE PRACTITIONER

## 2020-02-04 PROCEDURE — 3078F DIAST BP <80 MM HG: CPT | Mod: CPTII,S$GLB,, | Performed by: NURSE PRACTITIONER

## 2020-02-04 PROCEDURE — 1101F PR PT FALLS ASSESS DOC 0-1 FALLS W/OUT INJ PAST YR: ICD-10-PCS | Mod: CPTII,S$GLB,, | Performed by: NURSE PRACTITIONER

## 2020-02-04 PROCEDURE — 3078F PR MOST RECENT DIASTOLIC BLOOD PRESSURE < 80 MM HG: ICD-10-PCS | Mod: CPTII,S$GLB,, | Performed by: NURSE PRACTITIONER

## 2020-02-04 PROCEDURE — 1159F MED LIST DOCD IN RCRD: CPT | Mod: S$GLB,,, | Performed by: NURSE PRACTITIONER

## 2020-02-04 PROCEDURE — 99213 OFFICE O/P EST LOW 20 MIN: CPT | Mod: S$GLB,,, | Performed by: NURSE PRACTITIONER

## 2020-02-04 PROCEDURE — 1101F PT FALLS ASSESS-DOCD LE1/YR: CPT | Mod: CPTII,S$GLB,, | Performed by: NURSE PRACTITIONER

## 2020-02-04 PROCEDURE — 99999 PR PBB SHADOW E&M-EST. PATIENT-LVL IV: ICD-10-PCS | Mod: PBBFAC,,, | Performed by: NURSE PRACTITIONER

## 2020-02-04 PROCEDURE — 3074F PR MOST RECENT SYSTOLIC BLOOD PRESSURE < 130 MM HG: ICD-10-PCS | Mod: CPTII,S$GLB,, | Performed by: NURSE PRACTITIONER

## 2020-02-04 PROCEDURE — 1126F PR PAIN SEVERITY QUANTIFIED, NO PAIN PRESENT: ICD-10-PCS | Mod: S$GLB,,, | Performed by: NURSE PRACTITIONER

## 2020-02-04 NOTE — TELEPHONE ENCOUNTER
"----- Message from Dolores Miles sent at 2/4/2020  8:10 AM CST -----  Contact: HAILEE SALDANA [[id number]]  Name of Who is Calling: HAILEE SALDANA [[id number]]      What is the request in detail:   Patient called stating, "he's stuck in traffic and running late but intends to keep his appointment."      THANKS!      Reply by MY [facility]: NO      Call Back: HAILEE SALDANA // ph# [phone]                                      "

## 2020-02-04 NOTE — PROGRESS NOTES
"Walter Bull  was seen in follow-up for RBD management.     02/04/2020 Checked-in 12 minutes into 20 min appt scheduled at 8 am.     CHIEF COMPLAINT:  RBD f/u     INTERVAL HISTORY:    02/04/2020 ANDRZEJ Cast NP: Melatonin 6 mg qhs recommended during last visit for RBD, but pt only took a handful of times "I sleep so good with just the machine, I didn't think I needed it". Snoring, air gasping, unrefreshing sleep, and daytime sleepiness resolves with CPAP. Denies oral/nasal drying, nasal congestion, or pressure intolerance. However, wife still reports gross leg movements at night. Denies injurious events to self or wife. Did not take melatonin long enough to assess whether it helped legs.     Of note, continues to use CPAP 11 cm with > 4 hour compliance of 100%, therapy effective per predicted AHI of 3.2.       11/25/2019 ANDRZEJ Cast NP: Since last clinic visit, pt has completed PSG and dedicated titration study. Pt was set up with CPAP at [facility] on 08/30/2019. Reports that snoring, air gasping, unrefreshing sleep, and daytime sleepiness resolves with PAP use. Denies oral/nasal drying with Wisp nasal mask. Denies pressure intolerance. Denies nasal congestion. Denies aerophagia.     Pt and bed partner reports fitful sleep including upper and lower limb movements, mostly leg movements during sleep especially now that able to sleep "deeper" "I'm dreaming more". Denies injurious events during sleep to self or bed partner.     07/20/2019 CPAP titration study 237 lb. Effective control of sleep disordered breathing was seen in supine REM sleep on 11 cm H2O.Higher pressures resulted in further improvement. Excessive motor tone and activity during REM stage sleep. This could be supportive of a diagnosis of REM sleep behavior disorder     06/04/2019 Baseline  lb. The overall AHI was 8.4 with an oxygen deysi of 92.0%. The supine AHI was 14.4 and the REM AHI was 40.6.      05/02/2019 ANDRZEJ Cast NP: Initial HISTORY OF PRESENT " ILLNESS: Walter Bull is a 69 y.o. male is here for sleep evaluation.       Patient complaints include: snoring, air gasping, unrefreshing sleep, and daytime sleepiness.   Denies difficulty falling asleep but sleep is fragmented due to either abnormal breathing and nocturia up to 6 x  Prefers to take his spironolactone dose at bedtime since he works outside, he prefers not to take it when he does not have bathroom readily available and not during sun-exposure  Denies symptoms of restless legs or kicking during sleep.    Occupation: lawn maintenance business     [place] Sleepiness Scale score during initial sleep evaluation was 17.    SLEEP ROUTINE:    Bed partner:  Eve Burger   Time to bed: 7 pm  Sleep onset latency: 30 minutes        Disruptions or awakenings: multiple     Wakeup time:  5:30 am  Perceived sleep quality:  Poor to fair     PAST MEDICAL HISTORY:    Active Ambulatory Problems     Diagnosis Date Noted    Coronary artery disease involving native coronary artery of native heart without angina pectoris 01/11/2017    Diabetes mellitus type 2 in obese 01/11/2017    Diabetic polyneuropathy associated with type 2 diabetes mellitus 01/11/2017    HLD (hyperlipidemia) 01/11/2017    S/P CABG (coronary artery bypass graft) 01/11/2017    History of tobacco use 01/11/2017    GERD (gastroesophageal reflux disease) 01/11/2017    Benign essential HTN 07/19/2017    Benign prostatic hyperplasia without lower urinary tract symptoms 07/19/2017    Iron deficiency anemia 02/21/2018    NSTEMI (non-ST elevated myocardial infarction) 09/13/2018    Severe obesity (BMI 35.0-39.9) with comorbidity 09/25/2018    Ischemic cardiomyopathy 09/26/2018    Ventricular tachycardia 11/16/2018    Wide-complex tachycardia 11/16/2018    Bradycardia 11/17/2018    ICD (implantable cardioverter-defibrillator), dual, in situ 11/28/2018    Thrombosed vein secondary to IV placement 11/28/2018    Chest pain 05/17/2019     Epididymitis 05/17/2019    Chronic combined systolic and diastolic heart failure 05/18/2019    Hypomagnesemia 05/18/2019    ANURADHA (obstructive sleep apnea)     Chronic prostatitis 08/01/2019    Long term current use of amiodarone     Vitamin D insufficiency 12/05/2019     Resolved Ambulatory Problems     Diagnosis Date Noted    Colon cancer screening 02/02/2017    Chest pain 09/13/2018    Chest pain 11/16/2018    Acute kidney injury superimposed on CKD 05/18/2019    Acute cystitis with hematuria 06/24/2019    Dizziness 06/24/2019    Acute renal failure superimposed on stage 3 chronic kidney disease 06/24/2019    Physical debility 06/24/2019    Hyponatremia 06/24/2019    RBD (REM behavioral disorder)     Unstable angina 10/06/2019     Past Medical History:   Diagnosis Date    Anemia     Anticoagulant long-term use     CHF (congestive heart failure)     Coronary artery disease     Diabetes mellitus type I     Disorder of kidney and ureter     Encounter for blood transfusion     Glaucoma     Heart attack     Heart disease     Hypertension     Iron deficiency     Kidney failure     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                PAST SURGICAL HISTORY:    Past Surgical History:   Procedure Laterality Date    CARDIAC DEFIBRILLATOR PLACEMENT      CARDIAC ELECTROPHYSIOLOGY STUDY N/A 11/19/2018    Procedure: CARDIAC ELECTROPHYSIOLOGY STUDY;  Surgeon: Byron Glasgow MD;  Location: [facility] EP LAB;  Service: Cardiology;  Laterality: N/A;  VT, EPS +/- ICD, SJM, anes, GP, 6086    CARDIAC ELECTROPHYSIOLOGY STUDY N/A 11/19/2018    Procedure: CARDIAC ELECTROPHYSIOLOGY STUDY;  Surgeon: Byron Glasgow MD;  Location: [facility] EP LAB;  Service: Cardiology;  Laterality: N/A;    COLON SURGERY  2006    COLONOSCOPY N/A 2/2/2017    Procedure: COLONOSCOPY;  Surgeon: Ronnie Conway MD;  Location[facility];  Service: Endoscopy;  Laterality: N/A;    CORONARY ARTERY BYPASS GRAFT  2005    5 arteries    CORONARY BYPASS  GRAFT ANGIOGRAPHY  2018    Procedure: Bypass graft study;  Surgeon: Ryan Schmidt MD;  Location: [facility] CATH LAB;  Service: Cardiology;;    EYE SURGERY Bilateral 2016    Laser surgery for glaucoma    INSERTION OF IMPLANTABLE CARDIOVERTER-DEFIBRILLATOR (ICD) GENERATOR WITH TWO EXISTING LEADS Left 2018    Procedure: INSERTION, DUAL ICD;  Surgeon: Byron Glasgow MD;  Location: [facility] EP LAB;  Service: Cardiology;  Laterality: Left;  VT, EPS +/- ICD, SJM, anes, GP, 6086    LEFT HEART CATHETERIZATION Left 2018    Procedure: Left heart cath;  Surgeon: Ryan Schmidt MD;  Location: [facility] CATH LAB;  Service: Cardiology;  Laterality: Left;         FAMILY HISTORY:                Family History   Problem Relation Age of Onset    Diabetes Mother     Heart disease Mother     Hypertension Sister     Diabetes Sister     Heart disease Sister     Heart attack Brother     Colon cancer Neg Hx     Esophageal cancer Neg Hx     Stomach cancer Neg Hx        SOCIAL HISTORY:          Tobacco:   Social History     Tobacco Use   Smoking Status Former Smoker    Packs/day: 3.00    Types: Cigarettes    Start date: 1963    Last attempt to quit: 1981    Years since quittin.1   Smokeless Tobacco Former User    Types: Snuff, Chew    Quit date:        Alcohol use:    Social History     Substance and Sexual Activity   Alcohol Use No                 ALLERGIES:  Review of patient's allergies indicates:  No Known Allergies    CURRENT MEDICATIONS:    Current Outpatient Medications   Medication Sig Dispense Refill    albuterol (PROVENTIL) 2.5 mg /3 mL (0.083 %) nebulizer solution Take 3 mLs (2.5 mg total) by nebulization every 6 (six) hours as needed. Rescue 1 Box 0    amiodarone (PACERONE) 200 MG Tab Take 1 tablet (200 mg total) by mouth once daily. 90 tablet 3    ammonium lactate 12 % Crea Apply small amount to feet except for in between toes twice a day 1 Bottle 6    aspirin (ECOTRIN) 81 MG  EC tablet Take 1 tablet (81 mg total) by mouth once daily. 90 tablet 3    carvedilol (COREG) 6.25 MG tablet Take 1 tablet (6.25 mg total) by mouth 2 (two) times daily. 60 tablet 1    dulaglutide (TRULICITY) 0.75 mg/0.5 mL PnIj Inject 0.5 mLs (0.75 mg total) into the skin every 7 days. 4 Syringe 11    esomeprazole (NEXIUM) 40 MG capsule Take 1 capsule (40 mg total) by mouth before breakfast. 30 capsule 11    ezetimibe (ZETIA) 10 mg tablet Take 1 tablet (10 mg total) by mouth once daily. 90 tablet 3    ferrous sulfate (FEOSOL) 325 mg (65 mg iron) Tab tablet TAKE 1 TABLET BY MOUTH THREE TIMES DAILY 90 tablet 3    finasteride (PROSCAR) 5 mg tablet HOLD DUE TO ORTHOSTATIC HYPOTENSION 90 tablet 3    furosemide (LASIX) 20 MG tablet Take 1 tablet (20 mg total) by mouth once daily. Take an extra tablet daily for wt gain more than 3 pounds/day or 5 pounds/week 30 tablet 1    insulin aspart U-100 (NOVOLOG U-100 INSULIN ASPART) 100 unit/mL injection 3 x10ml vials required per month for use in VGO 30 3 vial 11    isosorbide mononitrate (IMDUR) 120 MG 24 hr tablet Take 1 tablet (120 mg total) by mouth once daily. 90 tablet 3    losartan (COZAAR) 25 MG tablet Take 1 tablet (25 mg total) by mouth once daily. 90 tablet 3    nitroGLYCERIN (NITROSTAT) 0.4 MG SL tablet One tab under tongue repeat every 5 minutes two times and if no relief call 911 20 tablet 3    nitroGLYCERIN (NITROSTAT) 0.4 MG SL tablet DISSOLVE ONE TABLET UNDER THE TONGUE EVERY 5 MINUTES AS NEEDED FOR CHEST PAIN. 25 tablet 10    RANEXA 1,000 mg Tb12 Take 1 tablet (1,000 mg total) by mouth 2 (two) times daily. 60 tablet 11    rosuvastatin (CRESTOR) 40 MG Tab Take 1 tablet (40 mg total) by mouth once daily. 90 tablet 3    SITagliptin (JANUVIA) 100 MG Tab Take 1 tablet (100 mg total) by mouth once daily. 90 tablet 3    sub-q insulin device, 30 unit (V-GO 30) [place] 1 VGo every 24 hr as directed 30 Device 11    tamsulosin (FLOMAX) 0.4 mg Cap Take 1 capsule  (0.4 mg total) by mouth once daily. 90 capsule 3    ticagrelor (BRILINTA) 90 mg tablet Take 90 mg by mouth 2 (two) times daily.      traMADol (ULTRAM) 50 mg tablet Take 1 tablet (50 mg total) by mouth every 8 (eight) hours as needed for Pain. 20 tablet 0     No current facility-administered medications for this visit.                   REVIEW OF SYSTEMS:  Sleep related symptoms as per HPI. Otherwise, a balance of 10 systems reviewed is negative.          PHYSICAL EXAM: There were no vitals taken for this visit.     GENERAL: Overweight development, well groomed    11/17/2018 estimated ejection fraction is 45%                                          ASSESSMENT:    REM-predominant obstructive sleep apnea with an overall AHI of 8.4 with an AHI of40.6 during REM sleep.The patient symptomatically has snoring, air gasping, unrefreshing sleep, and daytime sleepiness which improves with CPAP. The patient is adherent on CPAP and experiencing symptomatic benefit.  Medical co-morbidities: cardiomyopathy, LBBB, HTN, hx MI, DM2, and obesity.  This warrants continued treatment for obstructive sleep apnea.     RBD, Excessive motor tone noticed in lower limbs during the REM sleep during 07/20/2019 study     Obesity, BMI > 35, discussed relationship between ANURADHA and weight      PLAN:    ANURADHA:  Continue CPAP 11 cm.     RBD:    -discussed etiology and treatment recommendations. Important to continue ANURADHA treatment, as this could trigger RBD.   -A variety of medications and substances can precipitate events, including alcohol, psychotropic medications (eg, anticholinergics, lithium, antipsychotics), and sedative-hypnotic agents, most notably the nonbenzodiazepine benzodiazepine receptor agonists such as zolpidem.  -Changes in the sleep environment should be minimized and the bedroom should be safeguarded to avoid injury.  -Pharmacotherapy is rarely necessary, and the evidence for it is primarily anecdotal. Benzodiazepines, particularly  clonazepam, are the best studied option in patients with frequent or injurious episodes who fail to respond to nonpharmacologic measures  -Recommended resuming nightly melatonin 6 mg qhs    RTC 6 - 8 weeks f/u RBD management

## 2020-02-04 NOTE — PROGRESS NOTES
Patient, Walter Bull (MRN #[id number]), presented with a recorded BMI of 40.36 kg/m^2 consistent with the definition of morbid obesity (ICD-10 E66.01). The patient's morbid obesity was monitored, evaluated, addressed and/or treated. This addendum to the medical record is made on 02/04/2020.

## 2020-02-10 DIAGNOSIS — I10 BENIGN ESSENTIAL HTN: ICD-10-CM

## 2020-02-10 RX ORDER — FUROSEMIDE 20 MG/1
TABLET ORAL
Qty: 90 TABLET | Refills: 1 | Status: SHIPPED | OUTPATIENT
Start: 2020-02-10 | End: 2020-02-28

## 2020-02-11 DIAGNOSIS — I25.118 CORONARY ARTERY DISEASE OF NATIVE ARTERY OF NATIVE HEART WITH STABLE ANGINA PECTORIS: ICD-10-CM

## 2020-02-11 RX ORDER — RANOLAZINE 1000 MG/1
1000 TABLET, FILM COATED, EXTENDED RELEASE ORAL 2 TIMES DAILY
Qty: 180 TABLET | Refills: 3 | Status: SHIPPED | OUTPATIENT
Start: 2020-02-11 | End: 2020-03-02

## 2020-02-11 RX ORDER — RANOLAZINE 1000 MG/1
1000 TABLET, FILM COATED, EXTENDED RELEASE ORAL 2 TIMES DAILY
Qty: 60 TABLET | Refills: 11 | OUTPATIENT
Start: 2020-02-11

## 2020-02-11 NOTE — TELEPHONE ENCOUNTER
----- Message from Mariza Wu sent at 2020  1:13 PM CST -----  Contact: Eve (vaughn)  Walter Bull  MRN: [id number]  : 1950  PCP: Belkys Kruger  Home Phone      [phone]  Work Phone      Not on file.  Mobile          [phone]    MESSAGE:   Rx refill - RANEXA 1,000 mg Tb12.    Phone # [phone]    Pharmacy - [facility] [address] [place] [address]

## 2020-02-11 NOTE — TELEPHONE ENCOUNTER
Previous script was sent to [facility] in [place]. They need it sent to [facility]. Order pended again with [facility] for the pharmacy.

## 2020-02-12 DIAGNOSIS — I25.118 CORONARY ARTERY DISEASE OF NATIVE ARTERY OF NATIVE HEART WITH STABLE ANGINA PECTORIS: ICD-10-CM

## 2020-02-12 RX ORDER — RANOLAZINE 1000 MG/1
TABLET, EXTENDED RELEASE ORAL
Qty: 180 TABLET | Refills: 0 | OUTPATIENT
Start: 2020-02-12

## 2020-02-13 ENCOUNTER — LAB VISIT (OUTPATIENT)
Dept: LAB | Facility: HOSPITAL | Age: 70
End: 2020-02-13
Attending: INTERNAL MEDICINE
Payer: MEDICARE

## 2020-02-13 DIAGNOSIS — I10 BENIGN ESSENTIAL HTN: Chronic | ICD-10-CM

## 2020-02-13 DIAGNOSIS — I25.5 ISCHEMIC CARDIOMYOPATHY: ICD-10-CM

## 2020-02-13 DIAGNOSIS — E11.69 DIABETES MELLITUS TYPE 2 IN OBESE: Chronic | ICD-10-CM

## 2020-02-13 DIAGNOSIS — E66.9 DIABETES MELLITUS TYPE 2 IN OBESE: Chronic | ICD-10-CM

## 2020-02-13 DIAGNOSIS — I50.42 CHRONIC COMBINED SYSTOLIC AND DIASTOLIC HEART FAILURE: Chronic | ICD-10-CM

## 2020-02-13 DIAGNOSIS — E11.42 DIABETIC POLYNEUROPATHY ASSOCIATED WITH TYPE 2 DIABETES MELLITUS: ICD-10-CM

## 2020-02-13 DIAGNOSIS — Z95.810 ICD (IMPLANTABLE CARDIOVERTER-DEFIBRILLATOR), DUAL, IN SITU: Chronic | ICD-10-CM

## 2020-02-13 DIAGNOSIS — E78.5 HYPERLIPIDEMIA, UNSPECIFIED HYPERLIPIDEMIA TYPE: Chronic | ICD-10-CM

## 2020-02-13 DIAGNOSIS — I25.10 CORONARY ARTERY DISEASE INVOLVING NATIVE CORONARY ARTERY OF NATIVE HEART WITHOUT ANGINA PECTORIS: Chronic | ICD-10-CM

## 2020-02-13 LAB
ALBUMIN SERPL BCP-MCNC: 3.3 G/DL (ref 3.5–5.2)
ALP SERPL-CCNC: 76 U/L (ref 55–135)
ALT SERPL W/O P-5'-P-CCNC: 65 U/L (ref 10–44)
ANION GAP SERPL CALC-SCNC: 7 MMOL/L (ref 8–16)
AST SERPL-CCNC: 49 U/L (ref 10–40)
BASOPHILS # BLD AUTO: 0.04 K/UL (ref 0–0.2)
BASOPHILS NFR BLD: 0.7 % (ref 0–1.9)
BILIRUB SERPL-MCNC: 0.4 MG/DL (ref 0.1–1)
BUN SERPL-MCNC: 11 MG/DL (ref 8–23)
CALCIUM SERPL-MCNC: 8.9 MG/DL (ref 8.7–10.5)
CHLORIDE SERPL-SCNC: 104 MMOL/L (ref 95–110)
CHOLEST SERPL-MCNC: 131 MG/DL (ref 120–199)
CHOLEST/HDLC SERPL: 2.9 {RATIO} (ref 2–5)
CO2 SERPL-SCNC: 27 MMOL/L (ref 23–29)
CREAT SERPL-MCNC: 1.4 MG/DL (ref 0.5–1.4)
DIFFERENTIAL METHOD: ABNORMAL
EOSINOPHIL # BLD AUTO: 0.2 K/UL (ref 0–0.5)
EOSINOPHIL NFR BLD: 3.5 % (ref 0–8)
ERYTHROCYTE [DISTWIDTH] IN BLOOD BY AUTOMATED COUNT: 15.1 % (ref 11.5–14.5)
EST. GFR  (AFRICAN AMERICAN): 58 ML/MIN/1.73 M^2
EST. GFR  (NON AFRICAN AMERICAN): 50 ML/MIN/1.73 M^2
GLUCOSE SERPL-MCNC: 175 MG/DL (ref 70–110)
HCT VFR BLD AUTO: 37.4 % (ref 40–54)
HDLC SERPL-MCNC: 45 MG/DL (ref 40–75)
HDLC SERPL: 34.4 % (ref 20–50)
HGB BLD-MCNC: 11.9 G/DL (ref 14–18)
IMM GRANULOCYTES # BLD AUTO: 0.01 K/UL (ref 0–0.04)
IMM GRANULOCYTES NFR BLD AUTO: 0.2 % (ref 0–0.5)
LDLC SERPL CALC-MCNC: 73 MG/DL (ref 63–159)
LYMPHOCYTES # BLD AUTO: 1.7 K/UL (ref 1–4.8)
LYMPHOCYTES NFR BLD: 30.6 % (ref 18–48)
MCH RBC QN AUTO: 29.2 PG (ref 27–31)
MCHC RBC AUTO-ENTMCNC: 31.8 G/DL (ref 32–36)
MCV RBC AUTO: 92 FL (ref 82–98)
MONOCYTES # BLD AUTO: 0.7 K/UL (ref 0.3–1)
MONOCYTES NFR BLD: 12.4 % (ref 4–15)
NEUTROPHILS # BLD AUTO: 2.9 K/UL (ref 1.8–7.7)
NEUTROPHILS NFR BLD: 52.6 % (ref 38–73)
NONHDLC SERPL-MCNC: 86 MG/DL
NRBC BLD-RTO: 0 /100 WBC
PLATELET # BLD AUTO: 197 K/UL (ref 150–350)
PMV BLD AUTO: 9.8 FL (ref 9.2–12.9)
POTASSIUM SERPL-SCNC: 4.4 MMOL/L (ref 3.5–5.1)
PROT SERPL-MCNC: 6.8 G/DL (ref 6–8.4)
RBC # BLD AUTO: 4.08 M/UL (ref 4.6–6.2)
SODIUM SERPL-SCNC: 138 MMOL/L (ref 136–145)
TRIGL SERPL-MCNC: 65 MG/DL (ref 30–150)
TSH SERPL DL<=0.005 MIU/L-ACNC: 2.3 UIU/ML (ref 0.4–4)
WBC # BLD AUTO: 5.49 K/UL (ref 3.9–12.7)

## 2020-02-13 PROCEDURE — 80061 LIPID PANEL: CPT

## 2020-02-13 PROCEDURE — 85025 COMPLETE CBC W/AUTO DIFF WBC: CPT

## 2020-02-13 PROCEDURE — 84443 ASSAY THYROID STIM HORMONE: CPT

## 2020-02-13 PROCEDURE — 36415 COLL VENOUS BLD VENIPUNCTURE: CPT

## 2020-02-13 PROCEDURE — 80053 COMPREHEN METABOLIC PANEL: CPT

## 2020-02-18 ENCOUNTER — PATIENT MESSAGE (OUTPATIENT)
Dept: ENDOCRINOLOGY | Facility: CLINIC | Age: 70
End: 2020-02-18

## 2020-02-21 ENCOUNTER — PATIENT MESSAGE (OUTPATIENT)
Dept: INTERNAL MEDICINE | Facility: CLINIC | Age: 70
End: 2020-02-21

## 2020-02-21 ENCOUNTER — PATIENT MESSAGE (OUTPATIENT)
Dept: CARDIOLOGY | Facility: CLINIC | Age: 70
End: 2020-02-21

## 2020-02-21 DIAGNOSIS — K21.9 GASTROESOPHAGEAL REFLUX DISEASE, ESOPHAGITIS PRESENCE NOT SPECIFIED: ICD-10-CM

## 2020-02-21 DIAGNOSIS — Z95.810 ICD (IMPLANTABLE CARDIOVERTER-DEFIBRILLATOR) IN PLACE: ICD-10-CM

## 2020-02-21 DIAGNOSIS — D50.9 IRON DEFICIENCY ANEMIA, UNSPECIFIED IRON DEFICIENCY ANEMIA TYPE: ICD-10-CM

## 2020-02-21 DIAGNOSIS — E78.5 HYPERLIPIDEMIA, UNSPECIFIED HYPERLIPIDEMIA TYPE: ICD-10-CM

## 2020-02-21 RX ORDER — FERROUS SULFATE 325(65) MG
TABLET ORAL
Qty: 90 TABLET | Refills: 3 | Status: SHIPPED | OUTPATIENT
Start: 2020-02-21 | End: 2023-04-24 | Stop reason: SDUPTHER

## 2020-02-24 RX ORDER — ROSUVASTATIN CALCIUM 40 MG/1
40 TABLET, COATED ORAL DAILY
Qty: 90 TABLET | Refills: 3 | Status: SHIPPED | OUTPATIENT
Start: 2020-02-24 | End: 2020-12-02

## 2020-02-24 RX ORDER — AMIODARONE HYDROCHLORIDE 200 MG/1
200 TABLET ORAL DAILY
Qty: 90 TABLET | Refills: 3 | Status: SHIPPED | OUTPATIENT
Start: 2020-02-24 | End: 2020-12-02

## 2020-02-25 ENCOUNTER — CLINICAL SUPPORT (OUTPATIENT)
Dept: CARDIOLOGY | Facility: HOSPITAL | Age: 70
End: 2020-02-25
Payer: MEDICARE

## 2020-02-25 DIAGNOSIS — I47.20 VENTRICULAR TACHYCARDIA: ICD-10-CM

## 2020-02-25 DIAGNOSIS — I25.5 ISCHEMIC CARDIOMYOPATHY: ICD-10-CM

## 2020-02-25 DIAGNOSIS — Z95.810 PRESENCE OF AUTOMATIC (IMPLANTABLE) CARDIAC DEFIBRILLATOR: ICD-10-CM

## 2020-02-25 PROCEDURE — 93296 REM INTERROG EVL PM/IDS: CPT | Performed by: INTERNAL MEDICINE

## 2020-02-25 PROCEDURE — 93295 CARDIAC DEVICE CHECK - REMOTE: ICD-10-PCS | Mod: ,,, | Performed by: INTERNAL MEDICINE

## 2020-02-25 PROCEDURE — 93295 DEV INTERROG REMOTE 1/2/MLT: CPT | Mod: ,,, | Performed by: INTERNAL MEDICINE

## 2020-02-26 ENCOUNTER — OFFICE VISIT (OUTPATIENT)
Dept: INTERNAL MEDICINE | Facility: CLINIC | Age: 70
End: 2020-02-26
Payer: MEDICARE

## 2020-02-26 VITALS
HEIGHT: 67 IN | BODY MASS INDEX: 41.32 KG/M2 | RESPIRATION RATE: 16 BRPM | DIASTOLIC BLOOD PRESSURE: 60 MMHG | HEART RATE: 60 BPM | WEIGHT: 263.25 LBS | SYSTOLIC BLOOD PRESSURE: 120 MMHG

## 2020-02-26 DIAGNOSIS — I50.42 CHRONIC COMBINED SYSTOLIC AND DIASTOLIC HEART FAILURE: ICD-10-CM

## 2020-02-26 DIAGNOSIS — K21.9 GASTROESOPHAGEAL REFLUX DISEASE, ESOPHAGITIS PRESENCE NOT SPECIFIED: ICD-10-CM

## 2020-02-26 DIAGNOSIS — E11.69 DIABETES MELLITUS TYPE 2 IN OBESE: ICD-10-CM

## 2020-02-26 DIAGNOSIS — R79.89 ABNORMAL LFTS: Primary | ICD-10-CM

## 2020-02-26 DIAGNOSIS — E66.01 SEVERE OBESITY (BMI 35.0-39.9) WITH COMORBIDITY: ICD-10-CM

## 2020-02-26 DIAGNOSIS — I47.20 VENTRICULAR TACHYCARDIA: ICD-10-CM

## 2020-02-26 DIAGNOSIS — J43.9 PULMONARY EMPHYSEMA, UNSPECIFIED EMPHYSEMA TYPE: ICD-10-CM

## 2020-02-26 DIAGNOSIS — I25.118 CORONARY ARTERY DISEASE OF NATIVE ARTERY OF NATIVE HEART WITH STABLE ANGINA PECTORIS: ICD-10-CM

## 2020-02-26 DIAGNOSIS — E66.9 DIABETES MELLITUS TYPE 2 IN OBESE: ICD-10-CM

## 2020-02-26 PROCEDURE — 1126F PR PAIN SEVERITY QUANTIFIED, NO PAIN PRESENT: ICD-10-PCS | Mod: S$GLB,,, | Performed by: INTERNAL MEDICINE

## 2020-02-26 PROCEDURE — 3052F PR MOST RECENT HEMOGLOBIN A1C LEVEL 8.0 - < 9.0%: ICD-10-PCS | Mod: CPTII,S$GLB,, | Performed by: INTERNAL MEDICINE

## 2020-02-26 PROCEDURE — 99214 PR OFFICE/OUTPT VISIT, EST, LEVL IV, 30-39 MIN: ICD-10-PCS | Mod: S$GLB,,, | Performed by: INTERNAL MEDICINE

## 2020-02-26 PROCEDURE — 1126F AMNT PAIN NOTED NONE PRSNT: CPT | Mod: S$GLB,,, | Performed by: INTERNAL MEDICINE

## 2020-02-26 PROCEDURE — 3078F DIAST BP <80 MM HG: CPT | Mod: CPTII,S$GLB,, | Performed by: INTERNAL MEDICINE

## 2020-02-26 PROCEDURE — 99499 RISK ADDL DX/OHS AUDIT: ICD-10-PCS | Mod: S$GLB,,, | Performed by: INTERNAL MEDICINE

## 2020-02-26 PROCEDURE — 99214 OFFICE O/P EST MOD 30 MIN: CPT | Mod: S$GLB,,, | Performed by: INTERNAL MEDICINE

## 2020-02-26 PROCEDURE — 1101F PR PT FALLS ASSESS DOC 0-1 FALLS W/OUT INJ PAST YR: ICD-10-PCS | Mod: CPTII,S$GLB,, | Performed by: INTERNAL MEDICINE

## 2020-02-26 PROCEDURE — 3052F HG A1C>EQUAL 8.0%<EQUAL 9.0%: CPT | Mod: CPTII,S$GLB,, | Performed by: INTERNAL MEDICINE

## 2020-02-26 PROCEDURE — 1159F MED LIST DOCD IN RCRD: CPT | Mod: S$GLB,,, | Performed by: INTERNAL MEDICINE

## 2020-02-26 PROCEDURE — 3074F SYST BP LT 130 MM HG: CPT | Mod: CPTII,S$GLB,, | Performed by: INTERNAL MEDICINE

## 2020-02-26 PROCEDURE — 99499 UNLISTED E&M SERVICE: CPT | Mod: S$GLB,,, | Performed by: INTERNAL MEDICINE

## 2020-02-26 PROCEDURE — 3078F PR MOST RECENT DIASTOLIC BLOOD PRESSURE < 80 MM HG: ICD-10-PCS | Mod: CPTII,S$GLB,, | Performed by: INTERNAL MEDICINE

## 2020-02-26 PROCEDURE — 99999 PR PBB SHADOW E&M-EST. PATIENT-LVL III: ICD-10-PCS | Mod: PBBFAC,,, | Performed by: INTERNAL MEDICINE

## 2020-02-26 PROCEDURE — 1101F PT FALLS ASSESS-DOCD LE1/YR: CPT | Mod: CPTII,S$GLB,, | Performed by: INTERNAL MEDICINE

## 2020-02-26 PROCEDURE — 99999 PR PBB SHADOW E&M-EST. PATIENT-LVL III: CPT | Mod: PBBFAC,,, | Performed by: INTERNAL MEDICINE

## 2020-02-26 PROCEDURE — 3074F PR MOST RECENT SYSTOLIC BLOOD PRESSURE < 130 MM HG: ICD-10-PCS | Mod: CPTII,S$GLB,, | Performed by: INTERNAL MEDICINE

## 2020-02-26 PROCEDURE — 1159F PR MEDICATION LIST DOCUMENTED IN MEDICAL RECORD: ICD-10-PCS | Mod: S$GLB,,, | Performed by: INTERNAL MEDICINE

## 2020-02-26 RX ORDER — ESOMEPRAZOLE MAGNESIUM 40 MG/1
CAPSULE, DELAYED RELEASE ORAL
Qty: 90 CAPSULE | Refills: 0 | OUTPATIENT
Start: 2020-02-26

## 2020-02-26 RX ORDER — ESOMEPRAZOLE MAGNESIUM 40 MG/1
40 CAPSULE, DELAYED RELEASE ORAL
Qty: 30 CAPSULE | Refills: 0 | Status: SHIPPED | OUTPATIENT
Start: 2020-02-26 | End: 2020-03-11

## 2020-02-26 RX ORDER — INSULIN LISPRO 100 [IU]/ML
INJECTION, SOLUTION INTRAVENOUS; SUBCUTANEOUS
Qty: 3 VIAL | Refills: 11 | Status: SHIPPED | OUTPATIENT
Start: 2020-02-26 | End: 2020-03-13

## 2020-02-26 NOTE — PROGRESS NOTES
Subjective:       Patient ID: Walter Bull is a 69 y.o. male.    Chief Complaint: Follow-up; Hypertension; Hyperlipidemia; and Diabetes    Walter Bull is a 69 y.o. male who presents for Type II DM, Hypertension, and Hyperlipidemia follow up. Labs were reviewed with patient today.    Patient of DR Kruger .      Review of Systems   Constitutional: Negative for activity change and unexpected weight change.   HENT: Negative for hearing loss, rhinorrhea and trouble swallowing.    Eyes: Negative for discharge and visual disturbance.   Respiratory: Negative for chest tightness and wheezing.    Cardiovascular: Negative for chest pain and palpitations.   Gastrointestinal: Negative for blood in stool, constipation, diarrhea and vomiting.   Endocrine: Negative for polydipsia and polyuria.   Genitourinary: Negative for difficulty urinating, hematuria and urgency.   Musculoskeletal: Negative for arthralgias, joint swelling and neck pain.   Neurological: Negative for weakness and headaches.   Psychiatric/Behavioral: Negative for confusion and dysphoric mood.       Objective:      Physical Exam   Constitutional: He is oriented to person, place, and time. He appears well-developed and well-nourished. No distress.   HENT:   Head: Normocephalic and atraumatic.   Right Ear: External ear normal.   Left Ear: External ear normal.   Eyes: Pupils are equal, round, and reactive to light. Conjunctivae and EOM are normal. Right eye exhibits no discharge. Left eye exhibits no discharge.   Neck: Neck supple. No tracheal deviation present.   Cardiovascular: Normal rate and regular rhythm. Exam reveals no gallop and no friction rub.   No murmur heard.  Pulmonary/Chest: Effort normal and breath sounds normal. No respiratory distress. He has no wheezes. He has no rales.   Abdominal: Soft. Bowel sounds are normal. He exhibits no distension. There is no tenderness.   Neurological: He is alert and oriented to person, place, and time. No  cranial nerve deficit.   Skin: Skin is warm and dry.   Psychiatric: He has a normal mood and affect. His behavior is normal.   Vitals reviewed.      Assessment:       1. Diabetes mellitus type 2 in obese    2. Severe obesity (BMI 35.0-39.9) with comorbidity    3. Chronic combined systolic and diastolic heart failure    4. Coronary artery disease of native artery of native heart with stable angina pectoris    5. Ventricular tachycardia    6. Pulmonary emphysema, unspecified emphysema type        Plan:   Walter was seen today for follow-up, hypertension, hyperlipidemia and diabetes.    Diagnoses and all orders for this visit:    Diabetes mellitus type 2 in obese  -     Hemoglobin A1c; Future  Patient has uncontrolled Diabetes .  We discussed about diet ;low in calories. Avoid sweats, sodas.  Also increasing activity;walking 2-3 miles a day.  I also adjusted medications and gave patient  instructions about adherence to plan.  Goal of  A1c  less than 7 % stressed.  Also goal of LDL less than 70 highlighted to patient.  Severe obesity (BMI 35.0-39.9) with comorbidity    # The patient is asked to make an attempt to improve diet and exercise patterns to aid in medical management of this problem.     # Eat  5 small meals a day.     # Cut out high carbohydrate  foods : bread, rice, pasta, potatoes.     # Exercise/walk 5x/week for at least 30-45  minutes.        Chronic combined systolic and diastolic heart failure  We discussed about leg edema  And  weight gain issues with CHF.  I educated patient to call  If he  Gains 5 lbs and more ,or legs start swelling or worsening shortness of breath , or inability to lie down.  If breathing is worse go to emergency Room      Coronary artery disease of native artery of native heart with stable angina pectoris  Lab Results   Component Value Date    LDLCALC 73.0 02/13/2020       Ventricular tachycardia  Stable    Pulmonary emphysema, unspecified emphysema type  Continue nebs.      Problem  List Items Addressed This Visit     Coronary artery disease of native artery of native heart with stable angina pectoris (Chronic)    Diabetes mellitus type 2 in obese (Chronic)    Relevant Orders    Hemoglobin A1c    Chronic combined systolic and diastolic heart failure (Chronic)    Severe obesity (BMI 35.0-39.9) with comorbidity    Ventricular tachycardia    Pulmonary emphysema

## 2020-02-28 DIAGNOSIS — I25.118 CORONARY ARTERY DISEASE OF NATIVE ARTERY OF NATIVE HEART WITH STABLE ANGINA PECTORIS: ICD-10-CM

## 2020-02-28 DIAGNOSIS — I10 BENIGN ESSENTIAL HTN: ICD-10-CM

## 2020-02-28 RX ORDER — FUROSEMIDE 20 MG/1
TABLET ORAL
Qty: 90 TABLET | Refills: 1 | Status: SHIPPED | OUTPATIENT
Start: 2020-02-28 | End: 2020-04-22

## 2020-03-02 RX ORDER — RANOLAZINE 1000 MG/1
TABLET, EXTENDED RELEASE ORAL
Qty: 60 TABLET | Refills: 1 | Status: SHIPPED | OUTPATIENT
Start: 2020-03-02 | End: 2020-04-27

## 2020-03-03 ENCOUNTER — PATIENT OUTREACH (OUTPATIENT)
Dept: ADMINISTRATIVE | Facility: OTHER | Age: 70
End: 2020-03-03

## 2020-03-03 NOTE — PROGRESS NOTES
Chart reviewed.   Immunizations: Reconciled  Orders placed: n/a  Upcoming appts to satisfy NONA topics: Optometry 3/04, Hemoglobin A1c 5/19

## 2020-03-04 ENCOUNTER — OFFICE VISIT (OUTPATIENT)
Dept: OPTOMETRY | Facility: CLINIC | Age: 70
End: 2020-03-04
Payer: MEDICARE

## 2020-03-04 DIAGNOSIS — H40.1131 PRIMARY OPEN ANGLE GLAUCOMA (POAG) OF BOTH EYES, MILD STAGE: Primary | ICD-10-CM

## 2020-03-04 DIAGNOSIS — R42 DIZZINESS: ICD-10-CM

## 2020-03-04 DIAGNOSIS — H52.4 MYOPIA WITH ASTIGMATISM AND PRESBYOPIA, BILATERAL: ICD-10-CM

## 2020-03-04 DIAGNOSIS — H10.523 ANGULAR BLEPHAROCONJUNCTIVITIS OF BOTH EYES: ICD-10-CM

## 2020-03-04 DIAGNOSIS — H52.13 MYOPIA WITH ASTIGMATISM AND PRESBYOPIA, BILATERAL: ICD-10-CM

## 2020-03-04 DIAGNOSIS — H52.203 MYOPIA WITH ASTIGMATISM AND PRESBYOPIA, BILATERAL: ICD-10-CM

## 2020-03-04 PROCEDURE — 92014 COMPRE OPH EXAM EST PT 1/>: CPT | Mod: S$GLB,,, | Performed by: OPTOMETRIST

## 2020-03-04 PROCEDURE — 92250 COLOR FUNDUS PHOTOGRAPHY - OU - BOTH EYES: ICD-10-PCS | Mod: S$GLB,,, | Performed by: OPTOMETRIST

## 2020-03-04 PROCEDURE — 99999 PR PBB SHADOW E&M-EST. PATIENT-LVL III: CPT | Mod: PBBFAC,,, | Performed by: OPTOMETRIST

## 2020-03-04 PROCEDURE — 92015 DETERMINE REFRACTIVE STATE: CPT | Mod: S$GLB,,, | Performed by: OPTOMETRIST

## 2020-03-04 PROCEDURE — 99999 PR PBB SHADOW E&M-EST. PATIENT-LVL III: ICD-10-PCS | Mod: PBBFAC,,, | Performed by: OPTOMETRIST

## 2020-03-04 PROCEDURE — 92250 FUNDUS PHOTOGRAPHY W/I&R: CPT | Mod: S$GLB,,, | Performed by: OPTOMETRIST

## 2020-03-04 PROCEDURE — 92014 PR EYE EXAM, EST PATIENT,COMPREHESV: ICD-10-PCS | Mod: S$GLB,,, | Performed by: OPTOMETRIST

## 2020-03-04 PROCEDURE — 92015 PR REFRACTION: ICD-10-PCS | Mod: S$GLB,,, | Performed by: OPTOMETRIST

## 2020-03-04 RX ORDER — OFLOXACIN 3 MG/ML
1 SOLUTION/ DROPS OPHTHALMIC 4 TIMES DAILY
Qty: 5 ML | Refills: 0 | Status: SHIPPED | OUTPATIENT
Start: 2020-03-04 | End: 2020-03-04

## 2020-03-04 RX ORDER — OFLOXACIN 3 MG/ML
1 SOLUTION/ DROPS OPHTHALMIC 4 TIMES DAILY
Qty: 5 ML | Refills: 0 | Status: SHIPPED | OUTPATIENT
Start: 2020-03-04 | End: 2020-03-11

## 2020-03-05 NOTE — PROGRESS NOTES
HPI     DONA:01/2019  Glasses? Yes   Contacts? no  H/o eye surgery, injections or laser: cataract sx, laser treatment OU for   glaucoma   H/o eye injury: no  Known eye conditions? nop  Family h/o eye conditions? no  Eye gtts?no    PT states that he gets light headed     (-) Flashes (-) Floaters (+) Mucous after work  (+) Tearing every now and then (+) Itching every now and then (+) Burning   (-) Headaches (-) Eye Pain/discomfort (-) Irritation   (-) Redness (-) Double vision (-) Blurry vision    Diabetic? (+)  A1c?  (Hemoglobin A1C       Date                     Value               Ref Range             Status                12/05/2019               8.1 (H)             4.0 - 5.6 %           Final              Comment:    ADA Screening Guidelines:  5.7-6.4%  Consistent with   prediabetes  >or=6.5%  Consistent with diabetes  High levels of fetal   hemoglobin interfere with the HbA1C  assay. Heterozygous hemoglobin   variants (HbS, HgC, etc)do  not significantly interfere with this assay.     However, presence of multiple variants may affect accuracy.         12/02/2019               7.9 (H)             4.0 - 5.6 %           Final              Comment:    ADA Screening Guidelines:  5.7-6.4%  Consistent with   prediabetes  >or=6.5%  Consistent with diabetes  High levels of fetal   hemoglobin interfere with the HbA1C  assay. Heterozygous hemoglobin   variants (HbS, HgC, etc)do  not significantly interfere with this assay.     However, presence of multiple variants may affect accuracy.         08/07/2019               7.5 (H)             4.0 - 5.6 %           Final              Comment:    ADA Screening Guidelines:  5.7-6.4%  Consistent with   prediabetes  >or=6.5%  Consistent with diabetes  High levels of fetal   hemoglobin interfere with the HbA1C  assay. Heterozygous hemoglobin   variants (HbS, HgC, etc)do  not significantly interfere with this assay.     However, presence of multiple variants may affect  accuracy.    ----------)        Last edited by Kal Houston, OD on 3/4/2020 10:24 AM. (History)            Assessment /Plan     For exam results, see Encounter Report.    Primary open angle glaucoma (POAG) of both eyes, mild stage  -     Color Fundus Photography - OU - Both Eyes  -     Johnson Visual Field - OU - Extended - Both Eyes; Future  -     Posterior Segment OCT Optic Nerve- Both eyes; Future    Angular blepharoconjunctivitis of both eyes  -     Discontinue: ofloxacin (OCUFLOX) 0.3 % ophthalmic solution; Place 1 drop into both eyes 4 (four) times daily. for 7 days  Dispense: 5 mL; Refill: 0  -     ofloxacin (OCUFLOX) 0.3 % ophthalmic solution; Place 1 drop into both eyes 4 (four) times daily. for 7 days  Dispense: 5 mL; Refill: 0    Dizziness    Myopia with astigmatism and presbyopia, bilateral      1. (-) fHx. IOP 18 OD, OS. Last IOP 17 OD, 18 OS. C/d 0.65 OD, OS. S/p SLT x3 about 10 years ago per pt. Pt has been off drops since SLT. Last HVF 1 year ago in [place]. Pachy 563  OS.   3/4/2020 DFE  And Photos    2/28/19- OCT OD thin TS, T, TI and G. OS thin TS, T and G and borderline TI  2/28/19- HVF OD ONL- scattered inf defect, OS borderline  Educated the patient on findings. Unclear whether progression has occurred since pt is establishing care. Pt failed to return for 6 mo repeat testing last year. RTC 1 mo IOP/OCT/HVf  2. Rx Ocuflox QID OU x 7 days.   3. Longstanding. Cont f/u w/PCP. Pt reports he does not see neurologist. Recommend establishing care w/neuro.   4. SRx released to patient. Patient educated on lens options. Normal ocular health. RTC 1 year for routine exam.

## 2020-03-09 ENCOUNTER — PATIENT OUTREACH (OUTPATIENT)
Dept: ADMINISTRATIVE | Facility: HOSPITAL | Age: 70
End: 2020-03-09

## 2020-03-09 ENCOUNTER — TELEPHONE (OUTPATIENT)
Dept: OPTOMETRY | Facility: CLINIC | Age: 70
End: 2020-03-09

## 2020-03-11 DIAGNOSIS — K21.9 GASTROESOPHAGEAL REFLUX DISEASE, ESOPHAGITIS PRESENCE NOT SPECIFIED: ICD-10-CM

## 2020-03-11 RX ORDER — ESOMEPRAZOLE MAGNESIUM 40 MG/1
CAPSULE, DELAYED RELEASE ORAL
Qty: 90 CAPSULE | Refills: 0 | Status: SHIPPED | OUTPATIENT
Start: 2020-03-11 | End: 2020-05-15 | Stop reason: SDUPTHER

## 2020-03-12 ENCOUNTER — PATIENT MESSAGE (OUTPATIENT)
Dept: SLEEP MEDICINE | Facility: CLINIC | Age: 70
End: 2020-03-12

## 2020-03-12 ENCOUNTER — PATIENT MESSAGE (OUTPATIENT)
Dept: CARDIOLOGY | Facility: CLINIC | Age: 70
End: 2020-03-12

## 2020-03-12 ENCOUNTER — TELEPHONE (OUTPATIENT)
Dept: ENDOCRINOLOGY | Facility: CLINIC | Age: 70
End: 2020-03-12

## 2020-03-12 ENCOUNTER — PATIENT OUTREACH (OUTPATIENT)
Dept: ADMINISTRATIVE | Facility: OTHER | Age: 70
End: 2020-03-12

## 2020-03-12 DIAGNOSIS — E66.9 DIABETES MELLITUS TYPE 2 IN OBESE: Primary | ICD-10-CM

## 2020-03-12 DIAGNOSIS — E11.69 DIABETES MELLITUS TYPE 2 IN OBESE: Primary | ICD-10-CM

## 2020-03-12 NOTE — PROGRESS NOTES
Subjective:      Patient ID: Walter Bull is a 69 y.o. male.    Chief Complaint:  Diabetes      History of Present Illness  68 y.o. male with a diagnosis of type 2 DM diagnosed 25-30 year ago in his 40-50s. Hx of CAD h/o NSTEMI/ CABGx5, LBBB, Diastolic HF, AICD, T2DM, HTN, HLD, CKD 3. Here for follow up of his Diabetes management     Current regiment   Trulicity 0.7 QWeekly   VGo 30 cartridge   - 3 clicks with meals   - 1-2 clicks with snack.      Prior diabetes medication:   NPH 30U in morning and 30U at night, NPH for 3 years  Janumet but unable to take Metformin.   Januvia: stop after starting Trulicity  He was on Lantus pen in the past but had to stop due to insurance change    Recently:  Patient is in the donut hole, report humalog vial is $200 dollars a month for 3 vials, not covering Novolog.  No illness, no recent hospital stay. Lost some weight with Trulicity. Still working, applying for new manager job  Cut down on sugary drink    Did not bring log, 90-130s. Rarely a few 200s before bed time.  Had one low 60, one time at 2 AM last week that woke him up    Known diabetic complications: nephropathy and cardiovascular disease  Cardiovascular risk factors: advanced age (older than 55 for men, 65 for women), diabetes mellitus, dyslipidemia, male gender, obesity (BMI >= 30 kg/m2) and sedentary lifestyle     Eye exam current (within one year): yes in 2019  Weight trend: increasing steadily  Prior visit with dietician: no  Current diet: well balanced   Working, eat breakfast, skip often lunch, eat dinner  Drink water: water, gatorade  Current exercise: none     Current monitoring regimen: home blood tests 3 times weekly  Home blood sugar records:  no logs for review      Vit D Insuf  Taking over the counter multivitamin       CAD/CHF: medication review, close follow up with cardiology    Review of Systems   Constitutional: Negative for activity change and unexpected weight change.   HENT: Negative for sore  throat and voice change.    Eyes: Negative for visual disturbance.   Respiratory: Negative for shortness of breath.    Cardiovascular: Negative for chest pain.   Gastrointestinal: Negative for abdominal pain, constipation, nausea and vomiting.   Genitourinary: Negative for urgency.   Musculoskeletal: Negative for arthralgias.   Skin: Negative for wound.   Neurological: Negative for headaches.   Psychiatric/Behavioral: Negative for confusion and sleep disturbance.       Objective:   Physical Exam   Constitutional: He is oriented to person, place, and time. He appears well-developed. No distress.   HENT:   Head: Normocephalic and atraumatic.   Right Ear: External ear normal.   Left Ear: External ear normal.   Nose: Nose normal.   Eyes: Conjunctivae are normal. No scleral icterus.   Neck: No tracheal deviation present. No thyromegaly present.   Cardiovascular: Normal rate and normal heart sounds.   No murmur heard.  Pulses:       Dorsalis pedis pulses are 1+ on the right side, and 1+ on the left side.        Posterior tibial pulses are 1+ on the right side, and 1+ on the left side.   Pulmonary/Chest: Effort normal and breath sounds normal.   Abdominal: Soft. He exhibits no mass. There is no tenderness.   Insulin injection site examined:  no redness, not TTP, no nodule noted   Musculoskeletal: Normal range of motion. He exhibits no edema.        Right foot: There is normal range of motion and no deformity.        Left foot: There is normal range of motion and no deformity.   Feet:   Right Foot:   Protective Sensation: 4 sites tested. 4 sites sensed.   Skin Integrity: Positive for dry skin. Negative for ulcer, blister, skin breakdown or erythema.   Left Foot:   Protective Sensation: 4 sites tested. 4 sites sensed.   Skin Integrity: Positive for dry skin. Negative for ulcer, blister, skin breakdown or erythema.   Neurological: He is alert and oriented to person, place, and time. No sensory deficit. Coordination normal.  "  Skin: Skin is warm. No rash noted.   Psychiatric: He has a normal mood and affect. Judgment normal.   Nursing note and vitals reviewed.    BP (!) 138/90   Pulse 60   Ht 5' 7" (1.702 m)   Wt 116.9 kg (257 lb 11.5 oz)   BMI 40.36 kg/m²     Body mass index is 40.36 kg/m².    Lab Review:   Lab Results   Component Value Date    HGBA1C 7.5 (H) 03/13/2020     Lab Results   Component Value Date    CHOL 131 02/13/2020    HDL 45 02/13/2020    LDLCALC 73.0 02/13/2020    TRIG 65 02/13/2020    CHOLHDL 34.4 02/13/2020     Lab Results   Component Value Date     02/13/2020    K 4.4 02/13/2020     02/13/2020    CO2 27 02/13/2020     (H) 02/13/2020    BUN 11 02/13/2020    CREATININE 1.4 02/13/2020    CALCIUM 8.9 02/13/2020    PROT 6.8 02/13/2020    ALBUMIN 3.3 (L) 02/13/2020    BILITOT 0.4 02/13/2020    ALKPHOS 76 02/13/2020    AST 49 (H) 02/13/2020    ALT 65 (H) 02/13/2020    ANIONGAP 7 (L) 02/13/2020    ESTGFRAFRICA 58 (A) 02/13/2020    EGFRNONAA 50 (A) 02/13/2020    TSH 2.303 02/13/2020     Vit D, 25-Hydroxy   Date Value Ref Range Status   12/05/2019 27 (L) 30 - 96 ng/mL Final     Comment:     Vitamin D deficiency.........<10 ng/mL                              Vitamin D insufficiency......10-29 ng/mL       Vitamin D sufficiency........> or equal to 30 ng/mL  Vitamin D toxicity............>100 ng/mL         Assessment and Plan     Diabetic polyneuropathy associated with type 2 diabetes mellitus  - foot exam done 3/13/2020, normal sensation, vibratory senses detected     Diabetes mellitus type 2 in obese  Type 2 diabetic, with  better controlled, A1C is improving 7.5 done today  Reviewed goals of therapy are to get the best control we can without hypoglycemia  Routine health maintenance/screening: UTD Lipid, A1C, Urine P/C, Eye, feet   Family is concern about cost of Humalog  Did have 1 low, that woke him up at 2 AM, glucose was 60       Plan  - Adjust regiment: Increase Trulicity to 1.5mg Q weekly, will " decrease to VGO20, plus 3 clicks with meal and 1-2 clicks for snack  - Advised to check glucose 3-4x a day and send logs for review soon  - Advise patient about hypoglycemia, treatment of hypoglycemia, pt is aware  - Repeat lab work A1C In 3 month  - Diabetic supplies reviewed no need for refills  - Close follow up 3  mo  - Advised that he follow up with eye exam  - consider SGLT2 in the future      Severe obesity (BMI 35.0-39.9) with comorbidity  - can worsening in insulin resistance  - adding GLP1 will help    S/P CABG (coronary artery bypass graft)  As per CAD    Chronic combined systolic and diastolic heart failure  - will consider SGLT2 in the future    Benign essential HTN  - stable, continue to monitor      RTC in 3 mo          Jacobo Coon MD   Endocrinology Fellow   3/13/2020

## 2020-03-13 ENCOUNTER — LAB VISIT (OUTPATIENT)
Dept: LAB | Facility: HOSPITAL | Age: 70
End: 2020-03-13
Attending: HOSPITALIST
Payer: MEDICARE

## 2020-03-13 ENCOUNTER — PATIENT MESSAGE (OUTPATIENT)
Dept: SLEEP MEDICINE | Facility: CLINIC | Age: 70
End: 2020-03-13

## 2020-03-13 ENCOUNTER — CLINICAL SUPPORT (OUTPATIENT)
Dept: OPHTHALMOLOGY | Facility: CLINIC | Age: 70
End: 2020-03-13
Payer: MEDICARE

## 2020-03-13 ENCOUNTER — PATIENT MESSAGE (OUTPATIENT)
Dept: ENDOCRINOLOGY | Facility: CLINIC | Age: 70
End: 2020-03-13

## 2020-03-13 ENCOUNTER — OFFICE VISIT (OUTPATIENT)
Dept: ENDOCRINOLOGY | Facility: CLINIC | Age: 70
End: 2020-03-13
Payer: MEDICARE

## 2020-03-13 VITALS
DIASTOLIC BLOOD PRESSURE: 90 MMHG | HEIGHT: 67 IN | WEIGHT: 257.69 LBS | HEART RATE: 60 BPM | SYSTOLIC BLOOD PRESSURE: 138 MMHG | BODY MASS INDEX: 40.45 KG/M2

## 2020-03-13 DIAGNOSIS — E11.69 DIABETES MELLITUS TYPE 2 IN OBESE: Primary | Chronic | ICD-10-CM

## 2020-03-13 DIAGNOSIS — E11.69 DIABETES MELLITUS TYPE 2 IN OBESE: ICD-10-CM

## 2020-03-13 DIAGNOSIS — I10 BENIGN ESSENTIAL HTN: Chronic | ICD-10-CM

## 2020-03-13 DIAGNOSIS — I50.42 CHRONIC COMBINED SYSTOLIC AND DIASTOLIC HEART FAILURE: Chronic | ICD-10-CM

## 2020-03-13 DIAGNOSIS — E11.42 DIABETIC POLYNEUROPATHY ASSOCIATED WITH TYPE 2 DIABETES MELLITUS: ICD-10-CM

## 2020-03-13 DIAGNOSIS — E66.9 DIABETES MELLITUS TYPE 2 IN OBESE: Primary | Chronic | ICD-10-CM

## 2020-03-13 DIAGNOSIS — E66.01 SEVERE OBESITY (BMI 35.0-39.9) WITH COMORBIDITY: ICD-10-CM

## 2020-03-13 DIAGNOSIS — E66.9 DIABETES MELLITUS TYPE 2 IN OBESE: ICD-10-CM

## 2020-03-13 DIAGNOSIS — H40.1131 PRIMARY OPEN ANGLE GLAUCOMA (POAG) OF BOTH EYES, MILD STAGE: ICD-10-CM

## 2020-03-13 DIAGNOSIS — Z95.1 S/P CABG (CORONARY ARTERY BYPASS GRAFT): ICD-10-CM

## 2020-03-13 LAB
ESTIMATED AVG GLUCOSE: 169 MG/DL (ref 68–131)
HBA1C MFR BLD HPLC: 7.5 % (ref 4–5.6)

## 2020-03-13 PROCEDURE — 83036 HEMOGLOBIN GLYCOSYLATED A1C: CPT

## 2020-03-13 PROCEDURE — 99999 PR PBB SHADOW E&M-EST. PATIENT-LVL III: CPT | Mod: PBBFAC,GC,, | Performed by: HOSPITALIST

## 2020-03-13 PROCEDURE — 99214 OFFICE O/P EST MOD 30 MIN: CPT | Mod: GC,S$GLB,, | Performed by: HOSPITALIST

## 2020-03-13 PROCEDURE — 3051F PR MOST RECENT HEMOGLOBIN A1C LEVEL 7.0 - < 8.0%: ICD-10-PCS | Mod: CPTII,GC,S$GLB, | Performed by: HOSPITALIST

## 2020-03-13 PROCEDURE — 92133 POSTERIOR SEGMENT OCT OPTIC NERVE(OCULAR COHERENCE TOMOGRAPHY) - OU - BOTH EYES: ICD-10-PCS | Mod: S$GLB,,, | Performed by: OPTOMETRIST

## 2020-03-13 PROCEDURE — 92083 HUMPHREY VISUAL FIELD - OU - BOTH EYES: ICD-10-PCS | Mod: S$GLB,,, | Performed by: OPTOMETRIST

## 2020-03-13 PROCEDURE — 99214 PR OFFICE/OUTPT VISIT, EST, LEVL IV, 30-39 MIN: ICD-10-PCS | Mod: GC,S$GLB,, | Performed by: HOSPITALIST

## 2020-03-13 PROCEDURE — 92133 CPTRZD OPH DX IMG PST SGM ON: CPT | Mod: S$GLB,,, | Performed by: OPTOMETRIST

## 2020-03-13 PROCEDURE — 92083 EXTENDED VISUAL FIELD XM: CPT | Mod: S$GLB,,, | Performed by: OPTOMETRIST

## 2020-03-13 PROCEDURE — 36415 COLL VENOUS BLD VENIPUNCTURE: CPT

## 2020-03-13 PROCEDURE — 3051F HG A1C>EQUAL 7.0%<8.0%: CPT | Mod: CPTII,GC,S$GLB, | Performed by: HOSPITALIST

## 2020-03-13 PROCEDURE — 99999 PR PBB SHADOW E&M-EST. PATIENT-LVL III: ICD-10-PCS | Mod: PBBFAC,GC,, | Performed by: HOSPITALIST

## 2020-03-13 RX ORDER — INSULIN LISPRO 100 [IU]/ML
INJECTION, SOLUTION INTRAVENOUS; SUBCUTANEOUS
Qty: 20 ML | Refills: 11 | Status: SHIPPED | OUTPATIENT
Start: 2020-03-13 | End: 2020-11-16

## 2020-03-13 NOTE — PROGRESS NOTES
HVF done;rel/fix/coop. Good ou./chart checked for latex allergy.-[facility]

## 2020-03-13 NOTE — PATIENT INSTRUCTIONS
Check glucose at nighttime (bedtime), send me logs via the patient portal for review in 7 day  Contact me if persistent lows

## 2020-03-15 ENCOUNTER — PATIENT MESSAGE (OUTPATIENT)
Dept: CARDIOLOGY | Facility: CLINIC | Age: 70
End: 2020-03-15

## 2020-03-15 ENCOUNTER — PATIENT MESSAGE (OUTPATIENT)
Dept: ENDOCRINOLOGY | Facility: CLINIC | Age: 70
End: 2020-03-15

## 2020-03-16 ENCOUNTER — PATIENT MESSAGE (OUTPATIENT)
Dept: SLEEP MEDICINE | Facility: CLINIC | Age: 70
End: 2020-03-16

## 2020-03-16 ENCOUNTER — TELEPHONE (OUTPATIENT)
Dept: SLEEP MEDICINE | Facility: CLINIC | Age: 70
End: 2020-03-16

## 2020-03-18 ENCOUNTER — PATIENT MESSAGE (OUTPATIENT)
Dept: CARDIOLOGY | Facility: CLINIC | Age: 70
End: 2020-03-18

## 2020-03-18 ENCOUNTER — PATIENT MESSAGE (OUTPATIENT)
Dept: OPTOMETRY | Facility: CLINIC | Age: 70
End: 2020-03-18

## 2020-03-18 ENCOUNTER — TELEPHONE (OUTPATIENT)
Dept: OPTOMETRY | Facility: CLINIC | Age: 70
End: 2020-03-18

## 2020-03-18 ENCOUNTER — PATIENT MESSAGE (OUTPATIENT)
Dept: ENDOCRINOLOGY | Facility: CLINIC | Age: 70
End: 2020-03-18

## 2020-03-18 DIAGNOSIS — Z95.1 S/P CABG (CORONARY ARTERY BYPASS GRAFT): ICD-10-CM

## 2020-03-18 DIAGNOSIS — E11.69 DIABETES MELLITUS TYPE 2 IN OBESE: Chronic | ICD-10-CM

## 2020-03-18 DIAGNOSIS — E66.9 DIABETES MELLITUS TYPE 2 IN OBESE: Chronic | ICD-10-CM

## 2020-03-18 DIAGNOSIS — I50.42 CHRONIC COMBINED SYSTOLIC AND DIASTOLIC HEART FAILURE: Chronic | ICD-10-CM

## 2020-03-18 DIAGNOSIS — E66.01 SEVERE OBESITY (BMI 35.0-39.9) WITH COMORBIDITY: ICD-10-CM

## 2020-03-23 ENCOUNTER — TELEPHONE (OUTPATIENT)
Dept: OPTOMETRY | Facility: CLINIC | Age: 70
End: 2020-03-23

## 2020-03-23 NOTE — TELEPHONE ENCOUNTER
No contact made. Pt had VF testing 03/09 results were sent via portal 03/18----- Message from Bessy Guidry sent at 3/9/2020  4:36 PM CDT -----  Regarding: CALL 03/16 1 mon VF MAIN   Schedule

## 2020-03-27 ENCOUNTER — PATIENT MESSAGE (OUTPATIENT)
Dept: INTERNAL MEDICINE | Facility: CLINIC | Age: 70
End: 2020-03-27

## 2020-03-30 ENCOUNTER — OFFICE VISIT (OUTPATIENT)
Dept: INTERNAL MEDICINE | Facility: CLINIC | Age: 70
End: 2020-03-30
Payer: MEDICARE

## 2020-03-30 ENCOUNTER — HOSPITAL ENCOUNTER (OUTPATIENT)
Dept: RADIOLOGY | Facility: HOSPITAL | Age: 70
Discharge: HOME | End: 2020-03-30
Attending: INTERNAL MEDICINE
Payer: MEDICARE

## 2020-03-30 VITALS
RESPIRATION RATE: 16 BRPM | HEART RATE: 60 BPM | BODY MASS INDEX: 38.31 KG/M2 | SYSTOLIC BLOOD PRESSURE: 120 MMHG | DIASTOLIC BLOOD PRESSURE: 80 MMHG | OXYGEN SATURATION: 99 % | TEMPERATURE: 97 F | HEIGHT: 69 IN | WEIGHT: 258.63 LBS
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
## 2020-03-30 DIAGNOSIS — M25.551 RIGHT HIP PAIN: Primary | ICD-10-CM

## 2020-03-30 DIAGNOSIS — M25.551 RIGHT HIP PAIN: ICD-10-CM

## 2020-03-30 PROCEDURE — 1159F PR MEDICATION LIST DOCUMENTED IN MEDICAL RECORD: ICD-10-PCS | Mod: S$GLB,,, | Performed by: INTERNAL MEDICINE

## 2020-03-30 PROCEDURE — 99999 PR PBB SHADOW E&M-EST. PATIENT-LVL III: CPT | Mod: PBBFAC,,, | Performed by: INTERNAL MEDICINE

## 2020-03-30 PROCEDURE — 1101F PT FALLS ASSESS-DOCD LE1/YR: CPT | Mod: CPTII,S$GLB,, | Performed by: INTERNAL MEDICINE

## 2020-03-30 PROCEDURE — 73502 X-RAY EXAM HIP UNI 2-3 VIEWS: CPT | Mod: TC,RT

## 2020-03-30 PROCEDURE — 3079F DIAST BP 80-89 MM HG: CPT | Mod: CPTII,S$GLB,, | Performed by: INTERNAL MEDICINE

## 2020-03-30 PROCEDURE — 1125F AMNT PAIN NOTED PAIN PRSNT: CPT | Mod: S$GLB,,, | Performed by: INTERNAL MEDICINE

## 2020-03-30 PROCEDURE — 3074F PR MOST RECENT SYSTOLIC BLOOD PRESSURE < 130 MM HG: ICD-10-PCS | Mod: CPTII,S$GLB,, | Performed by: INTERNAL MEDICINE

## 2020-03-30 PROCEDURE — 1125F PR PAIN SEVERITY QUANTIFIED, PAIN PRESENT: ICD-10-PCS | Mod: S$GLB,,, | Performed by: INTERNAL MEDICINE

## 2020-03-30 PROCEDURE — 73502 XR HIP 2 VIEW RIGHT: ICD-10-PCS | Mod: 26,RT,, | Performed by: RADIOLOGY

## 2020-03-30 PROCEDURE — 1101F PR PT FALLS ASSESS DOC 0-1 FALLS W/OUT INJ PAST YR: ICD-10-PCS | Mod: CPTII,S$GLB,, | Performed by: INTERNAL MEDICINE

## 2020-03-30 PROCEDURE — 3074F SYST BP LT 130 MM HG: CPT | Mod: CPTII,S$GLB,, | Performed by: INTERNAL MEDICINE

## 2020-03-30 PROCEDURE — 3079F PR MOST RECENT DIASTOLIC BLOOD PRESSURE 80-89 MM HG: ICD-10-PCS | Mod: CPTII,S$GLB,, | Performed by: INTERNAL MEDICINE

## 2020-03-30 PROCEDURE — 99213 OFFICE O/P EST LOW 20 MIN: CPT | Mod: S$GLB,,, | Performed by: INTERNAL MEDICINE

## 2020-03-30 PROCEDURE — 73502 X-RAY EXAM HIP UNI 2-3 VIEWS: CPT | Mod: 26,RT,, | Performed by: RADIOLOGY

## 2020-03-30 PROCEDURE — 99213 PR OFFICE/OUTPT VISIT, EST, LEVL III, 20-29 MIN: ICD-10-PCS | Mod: S$GLB,,, | Performed by: INTERNAL MEDICINE

## 2020-03-30 PROCEDURE — 1159F MED LIST DOCD IN RCRD: CPT | Mod: S$GLB,,, | Performed by: INTERNAL MEDICINE

## 2020-03-30 PROCEDURE — 99999 PR PBB SHADOW E&M-EST. PATIENT-LVL III: ICD-10-PCS | Mod: PBBFAC,,, | Performed by: INTERNAL MEDICINE

## 2020-03-30 RX ORDER — METHYLPREDNISOLONE 4 MG/1
TABLET ORAL
Qty: 1 PACKAGE | Refills: 0 | Status: SHIPPED | OUTPATIENT
Start: 2020-03-30 | End: 2020-04-21

## 2020-03-30 RX ORDER — MELOXICAM 15 MG/1
15 TABLET ORAL DAILY
Qty: 30 TABLET | Refills: 0 | Status: SHIPPED | OUTPATIENT
Start: 2020-03-30 | End: 2020-04-29

## 2020-03-30 NOTE — PROGRESS NOTES
Subjective:       Patient ID: Walter Bull is a 69 y.o. male.    Chief Complaint: Fall and Hip Pain    Walter Bull is a 69 y.o. male  Here with h/o fall 2 weeks ago ;tripped and fell off of trailer .  Since then pain in R lateral hip ;5/10 in intensity : radiates along the lateral thigh .      Review of Systems   Constitutional: Negative for activity change and unexpected weight change.   HENT: Negative for hearing loss, rhinorrhea and trouble swallowing.    Eyes: Negative for discharge and visual disturbance.   Respiratory: Negative for chest tightness and wheezing.    Cardiovascular: Negative for chest pain and palpitations.   Gastrointestinal: Negative for blood in stool, constipation, diarrhea and vomiting.   Endocrine: Negative for polydipsia and polyuria.   Genitourinary: Negative for difficulty urinating, hematuria and urgency.   Musculoskeletal: Positive for arthralgias. Negative for joint swelling and neck pain.   Neurological: Negative for weakness and headaches.   Psychiatric/Behavioral: Negative for confusion and dysphoric mood.       Objective:      Physical Exam   Constitutional: He is oriented to person, place, and time. He appears well-developed and well-nourished. No distress.   HENT:   Head: Normocephalic and atraumatic.   Right Ear: External ear normal.   Left Ear: External ear normal.   Eyes: Pupils are equal, round, and reactive to light. Conjunctivae and EOM are normal. Right eye exhibits no discharge. Left eye exhibits no discharge.   Neck: Neck supple. No tracheal deviation present.   Cardiovascular: Normal rate and regular rhythm. Exam reveals no gallop and no friction rub.   No murmur heard.  Pulmonary/Chest: Effort normal and breath sounds normal. No respiratory distress. He has no wheezes. He has no rales.   Abdominal: Soft. Bowel sounds are normal. He exhibits no distension. There is no tenderness.   Musculoskeletal:        Right hip: He exhibits decreased range of motion. He  exhibits no tenderness, no swelling, no deformity and no laceration.   Slight limp    Neurological: He is alert and oriented to person, place, and time. No cranial nerve deficit.   Skin: Skin is warm and dry.   Psychiatric: He has a normal mood and affect. His behavior is normal.   Vitals reviewed.      Assessment:       1. Right hip pain    2. Fall, initial encounter        Plan:   Walter was seen today for fall and hip pain.    Diagnoses and all orders for this visit:    Right hip pain  -     methylPREDNISolone (MEDROL DOSEPACK) 4 mg tablet; use as directed  -     meloxicam (MOBIC) 15 MG tablet; Take 1 tablet (15 mg total) by mouth once daily.  -     X-Ray Hip 2 or 3 views Right; Future    Fall, initial encounter  -     X-Ray Hip 2 or 3 views Right; Future      Trochanteric bursitis   Likely   R/o fracture     Problem List Items Addressed This Visit     None      Visit Diagnoses     Right hip pain    -  Primary    Fall, initial encounter

## 2020-04-20 ENCOUNTER — PATIENT MESSAGE (OUTPATIENT)
Dept: INTERNAL MEDICINE | Facility: CLINIC | Age: 70
End: 2020-04-20

## 2020-04-21 ENCOUNTER — PATIENT MESSAGE (OUTPATIENT)
Dept: ENDOCRINOLOGY | Facility: CLINIC | Age: 70
End: 2020-04-21

## 2020-04-21 ENCOUNTER — PATIENT MESSAGE (OUTPATIENT)
Dept: CARDIOLOGY | Facility: CLINIC | Age: 70
End: 2020-04-21

## 2020-04-21 ENCOUNTER — PATIENT OUTREACH (OUTPATIENT)
Dept: ADMINISTRATIVE | Facility: OTHER | Age: 70
End: 2020-04-21

## 2020-04-21 ENCOUNTER — OFFICE VISIT (OUTPATIENT)
Dept: INTERNAL MEDICINE | Facility: CLINIC | Age: 70
End: 2020-04-21
Payer: MEDICARE

## 2020-04-21 VITALS
SYSTOLIC BLOOD PRESSURE: 120 MMHG | HEIGHT: 69 IN | BODY MASS INDEX: 38.8 KG/M2 | HEART RATE: 60 BPM | WEIGHT: 261.94 LBS | TEMPERATURE: 97 F | RESPIRATION RATE: 20 BRPM | DIASTOLIC BLOOD PRESSURE: 78 MMHG | OXYGEN SATURATION: 99 %

## 2020-04-21 DIAGNOSIS — R42 WEAKNESS WITH DIZZINESS: ICD-10-CM

## 2020-04-21 DIAGNOSIS — I25.118 CORONARY ARTERY DISEASE OF NATIVE ARTERY OF NATIVE HEART WITH STABLE ANGINA PECTORIS: Primary | Chronic | ICD-10-CM

## 2020-04-21 DIAGNOSIS — R53.1 WEAKNESS WITH DIZZINESS: ICD-10-CM

## 2020-04-21 DIAGNOSIS — I50.42 CHRONIC COMBINED SYSTOLIC AND DIASTOLIC HEART FAILURE: Chronic | ICD-10-CM

## 2020-04-21 PROCEDURE — 1126F PR PAIN SEVERITY QUANTIFIED, NO PAIN PRESENT: ICD-10-PCS | Mod: S$GLB,,, | Performed by: INTERNAL MEDICINE

## 2020-04-21 PROCEDURE — 1100F PR PT FALLS ASSESS DOC 2+ FALLS/FALL W/INJURY/YR: ICD-10-PCS | Mod: CPTII,S$GLB,, | Performed by: INTERNAL MEDICINE

## 2020-04-21 PROCEDURE — 99999 PR PBB SHADOW E&M-EST. PATIENT-LVL III: ICD-10-PCS | Mod: PBBFAC,,, | Performed by: INTERNAL MEDICINE

## 2020-04-21 PROCEDURE — 99213 PR OFFICE/OUTPT VISIT, EST, LEVL III, 20-29 MIN: ICD-10-PCS | Mod: S$GLB,,, | Performed by: INTERNAL MEDICINE

## 2020-04-21 PROCEDURE — 3074F SYST BP LT 130 MM HG: CPT | Mod: CPTII,S$GLB,, | Performed by: INTERNAL MEDICINE

## 2020-04-21 PROCEDURE — 3288F PR FALLS RISK ASSESSMENT DOCUMENTED: ICD-10-PCS | Mod: CPTII,S$GLB,, | Performed by: INTERNAL MEDICINE

## 2020-04-21 PROCEDURE — 1159F PR MEDICATION LIST DOCUMENTED IN MEDICAL RECORD: ICD-10-PCS | Mod: S$GLB,,, | Performed by: INTERNAL MEDICINE

## 2020-04-21 PROCEDURE — 3074F PR MOST RECENT SYSTOLIC BLOOD PRESSURE < 130 MM HG: ICD-10-PCS | Mod: CPTII,S$GLB,, | Performed by: INTERNAL MEDICINE

## 2020-04-21 PROCEDURE — 99213 OFFICE O/P EST LOW 20 MIN: CPT | Mod: S$GLB,,, | Performed by: INTERNAL MEDICINE

## 2020-04-21 PROCEDURE — 1100F PTFALLS ASSESS-DOCD GE2>/YR: CPT | Mod: CPTII,S$GLB,, | Performed by: INTERNAL MEDICINE

## 2020-04-21 PROCEDURE — 3078F PR MOST RECENT DIASTOLIC BLOOD PRESSURE < 80 MM HG: ICD-10-PCS | Mod: CPTII,S$GLB,, | Performed by: INTERNAL MEDICINE

## 2020-04-21 PROCEDURE — 1126F AMNT PAIN NOTED NONE PRSNT: CPT | Mod: S$GLB,,, | Performed by: INTERNAL MEDICINE

## 2020-04-21 PROCEDURE — 3078F DIAST BP <80 MM HG: CPT | Mod: CPTII,S$GLB,, | Performed by: INTERNAL MEDICINE

## 2020-04-21 PROCEDURE — 99999 PR PBB SHADOW E&M-EST. PATIENT-LVL III: CPT | Mod: PBBFAC,,, | Performed by: INTERNAL MEDICINE

## 2020-04-21 PROCEDURE — 1159F MED LIST DOCD IN RCRD: CPT | Mod: S$GLB,,, | Performed by: INTERNAL MEDICINE

## 2020-04-21 PROCEDURE — 3288F FALL RISK ASSESSMENT DOCD: CPT | Mod: CPTII,S$GLB,, | Performed by: INTERNAL MEDICINE

## 2020-04-21 NOTE — PROGRESS NOTES
Chart reviewed.   Immunizations: UPDATED  Orders placed: n/a  Upcoming appts to satisfy NONA topics: Hemoglobin A1c 5/19

## 2020-04-21 NOTE — PROGRESS NOTES
"Subjective:       Patient ID: Walter Bull is a 69 y.o. male.    Chief Complaint: Fatigue and Dizziness      HPI:  Patient is known to me and presents with fatigue and dizziness. He had had these sx off an on for months. He thinks getting worse again over the last few weeks. We initially thought due to bradycardia from his medications. He denies chest pains, SOB. Just feels weak and "my heart beating in my ear" when he gets up to work. Sx resolve when he sits down and rests. No fever, SOB.     Past Medical History:   Diagnosis Date    Anemia     Anticoagulant long-term use     CHF (congestive heart failure)     Colon cancer screening 2017    Coronary artery disease     Diabetes mellitus type I     Disorder of kidney and ureter     Encounter for blood transfusion     Glaucoma     Heart attack     2018    Heart disease     Hypertension     Iron deficiency     Kidney failure     post CABG    Pulmonary emphysema 2020    S/P CABG x 5 2017       Family History   Problem Relation Age of Onset    Diabetes Mother     Heart disease Mother     Hypertension Sister     Diabetes Sister     Heart disease Sister     Heart attack Brother     Colon cancer Neg Hx     Esophageal cancer Neg Hx     Stomach cancer Neg Hx        Social History     Socioeconomic History    Marital status:      Spouse name: Not on file    Number of children: Not on file    Years of education: Not on file    Highest education level: Not on file   Occupational History    Not on file   Social Needs    Financial resource strain: Not hard at all    Food insecurity:     Worry: Never true     Inability: Never true    Transportation needs:     Medical: No     Non-medical: No   Tobacco Use    Smoking status: Former Smoker     Packs/day: 3.00     Types: Cigarettes     Start date: 1963     Last attempt to quit: 1981     Years since quittin.3    Smokeless tobacco: Former User     Types: Snuff, " "Chew     Quit date: 1984   Substance and Sexual Activity    Alcohol use: No     Frequency: Never     Drinks per session: Patient refused     Binge frequency: Never    Drug use: No    Sexual activity: Yes     Comment: -with a significant other   Lifestyle    Physical activity:     Days per week: 5 days     Minutes per session: Not on file    Stress: Not at all   Relationships    Social connections:     Talks on phone: Never     Gets together: Never     Attends Bahai service: Not on file     Active member of club or organization: No     Attends meetings of clubs or organizations: Never     Relationship status:    Other Topics Concern    Not on file   Social History Narrative    Not on file       Review of Systems   Constitutional: Positive for fatigue. Negative for activity change and unexpected weight change.   HENT: Positive for rhinorrhea. Negative for hearing loss and trouble swallowing.    Eyes: Negative for discharge and visual disturbance.   Respiratory: Negative for chest tightness and wheezing.    Cardiovascular: Negative for chest pain, palpitations and leg swelling.        "heart beating in my ear"   Gastrointestinal: Positive for constipation (on fe supplements). Negative for blood in stool, diarrhea and vomiting.   Endocrine: Negative for polydipsia and polyuria.   Genitourinary: Positive for urgency. Negative for difficulty urinating and hematuria.   Musculoskeletal: Positive for arthralgias (rigiht hip, improving ). Negative for joint swelling and neck pain.   Neurological: Positive for dizziness and weakness (generalized). Negative for headaches.   Psychiatric/Behavioral: Negative for confusion and dysphoric mood.         Objective:      Physical Exam   Constitutional: He is oriented to person, place, and time. He appears well-developed and well-nourished. No distress.   HENT:   Head: Normocephalic and atraumatic.   Right Ear: External ear normal.   Left Ear: External ear " normal.   Eyes: Pupils are equal, round, and reactive to light. Conjunctivae and EOM are normal. Right eye exhibits no discharge. Left eye exhibits no discharge.   Neck: Neck supple. No tracheal deviation present.   Cardiovascular: Normal rate and regular rhythm.   No murmur heard.  Pulmonary/Chest: Effort normal and breath sounds normal. No respiratory distress. He has no wheezes. He has no rales.   Abdominal: Soft. Bowel sounds are normal. He exhibits no distension. There is no tenderness.   Musculoskeletal: He exhibits no edema.   Neurological: He is alert and oriented to person, place, and time. No cranial nerve deficit.   Skin: Skin is warm and dry.   Psychiatric: He has a normal mood and affect. His behavior is normal.   Vitals reviewed.      Assessment:       1. Coronary artery disease of native artery of native heart with stable angina pectoris    2. Chronic combined systolic and diastolic heart failure    3. Weakness with dizziness        Plan:       Walter was seen today for fatigue and dizziness.    Diagnoses and all orders for this visit:    Coronary artery disease of native artery of native heart with stable angina pectoris    Chronic combined systolic and diastolic heart failure    Weakness with dizziness      Patient sees me often with similar complaints of generalized weakness, dizziness with exertion.  No syncope  His BP is normal  HR stead at 60 bpm; has device check for ICD in May. Sees cardiology  Reviewed meds in detail. I think all are appropriate  Discussed with his chronic medical issues I expect him to have some reduced capacity for work. He may need to stop and rest. It does not sound like the sx he describes today are significantly worse that what he has had in the past  I see no clinical signs of CHF or COPD exacerbation  Cont meds same dose. Keep cardiology follow up  Stay hydrated  Can reduced Fe to daily for constipation sx reported

## 2020-04-22 ENCOUNTER — OFFICE VISIT (OUTPATIENT)
Dept: CARDIOLOGY | Facility: CLINIC | Age: 70
End: 2020-04-22
Payer: MEDICARE

## 2020-04-22 ENCOUNTER — PATIENT MESSAGE (OUTPATIENT)
Dept: CARDIOLOGY | Facility: CLINIC | Age: 70
End: 2020-04-22

## 2020-04-22 DIAGNOSIS — E11.69 DIABETES MELLITUS TYPE 2 IN OBESE: Chronic | ICD-10-CM

## 2020-04-22 DIAGNOSIS — E78.2 MIXED HYPERLIPIDEMIA: Chronic | ICD-10-CM

## 2020-04-22 DIAGNOSIS — I25.5 ISCHEMIC CARDIOMYOPATHY: ICD-10-CM

## 2020-04-22 DIAGNOSIS — E66.9 DIABETES MELLITUS TYPE 2 IN OBESE: Chronic | ICD-10-CM

## 2020-04-22 DIAGNOSIS — I25.118 CORONARY ARTERY DISEASE OF NATIVE ARTERY OF NATIVE HEART WITH STABLE ANGINA PECTORIS: Chronic | ICD-10-CM

## 2020-04-22 DIAGNOSIS — E66.01 SEVERE OBESITY (BMI 35.0-39.9) WITH COMORBIDITY: ICD-10-CM

## 2020-04-22 DIAGNOSIS — D50.9 IRON DEFICIENCY ANEMIA, UNSPECIFIED IRON DEFICIENCY ANEMIA TYPE: ICD-10-CM

## 2020-04-22 DIAGNOSIS — Z95.1 S/P CABG (CORONARY ARTERY BYPASS GRAFT): ICD-10-CM

## 2020-04-22 DIAGNOSIS — I10 BENIGN ESSENTIAL HTN: Chronic | ICD-10-CM

## 2020-04-22 DIAGNOSIS — R42 LIGHTHEADEDNESS: Primary | ICD-10-CM

## 2020-04-22 DIAGNOSIS — Z95.810 ICD (IMPLANTABLE CARDIOVERTER-DEFIBRILLATOR), DUAL, IN SITU: Chronic | ICD-10-CM

## 2020-04-22 PROCEDURE — 99443 PR PHYSICIAN TELEPHONE EVALUATION 21-30 MIN: CPT | Mod: 95,,, | Performed by: INTERNAL MEDICINE

## 2020-04-22 PROCEDURE — 99443 PR PHYSICIAN TELEPHONE EVALUATION 21-30 MIN: ICD-10-PCS | Mod: 95,,, | Performed by: INTERNAL MEDICINE

## 2020-04-22 PROCEDURE — 1100F PTFALLS ASSESS-DOCD GE2>/YR: CPT | Mod: 95,CPTII,, | Performed by: INTERNAL MEDICINE

## 2020-04-22 PROCEDURE — 3051F PR MOST RECENT HEMOGLOBIN A1C LEVEL 7.0 - < 8.0%: ICD-10-PCS | Mod: 95,CPTII,, | Performed by: INTERNAL MEDICINE

## 2020-04-22 PROCEDURE — 3051F HG A1C>EQUAL 7.0%<8.0%: CPT | Mod: 95,CPTII,, | Performed by: INTERNAL MEDICINE

## 2020-04-22 PROCEDURE — 3288F FALL RISK ASSESSMENT DOCD: CPT | Mod: 95,CPTII,, | Performed by: INTERNAL MEDICINE

## 2020-04-22 PROCEDURE — 3288F PR FALLS RISK ASSESSMENT DOCUMENTED: ICD-10-PCS | Mod: 95,CPTII,, | Performed by: INTERNAL MEDICINE

## 2020-04-22 PROCEDURE — 1100F PR PT FALLS ASSESS DOC 2+ FALLS/FALL W/INJURY/YR: ICD-10-PCS | Mod: 95,CPTII,, | Performed by: INTERNAL MEDICINE

## 2020-04-22 PROCEDURE — 1159F PR MEDICATION LIST DOCUMENTED IN MEDICAL RECORD: ICD-10-PCS | Mod: 95,,, | Performed by: INTERNAL MEDICINE

## 2020-04-22 PROCEDURE — 1159F MED LIST DOCD IN RCRD: CPT | Mod: 95,,, | Performed by: INTERNAL MEDICINE

## 2020-04-22 RX ORDER — FUROSEMIDE 20 MG/1
TABLET ORAL
Qty: 120 TABLET | Refills: 3 | OUTPATIENT
Start: 2020-04-22 | End: 2020-06-11

## 2020-04-22 RX ORDER — FUROSEMIDE 20 MG/1
TABLET ORAL
Qty: 120 TABLET | Refills: 1 | OUTPATIENT
Start: 2020-04-22 | End: 2020-04-22 | Stop reason: SDUPTHER

## 2020-04-22 NOTE — PROGRESS NOTES
Subjective:   Patient ID:  Walter Bull is a 69 y.o. male who presents for evaluation of lightheadedness      HPI:   The patient presents for Telephone evaluation due to lightheadedness. 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 States it feel as  it was when he was dehydrated. Drank more yesterday and has not had the problem so far today at work. Walter Bull has known coronary artery disease having had prior CABG with known  of the RCA and LCX with patent LIMA to LAD and SVG to Diagonal but SVG to OM occluded . He has an ischemic cardiomyopathy with last EF 50% on a technically limited echo. Walter Bull denies chest pain,, or syncope. Weighs himself and takes additional furosemide for short term weight gain. He is  on Amiodarone after an an episode of wide QRS tachycardia with no palpitations. Walter Bull has hypertension with elevation on his home monitor in the A:M but 120s/ on all recent Clinic visits.Walter Bull has dyslipidemia  on high intensity statin near LDL goal. Has not lost weight.  Review of Systems   Constitution: Positive for malaise/fatigue. Negative for weight gain and weight loss.   Eyes: Negative for blurred vision.   Cardiovascular: Positive for dyspnea on exertion. Negative for chest pain, claudication, cyanosis, irregular heartbeat, leg swelling, near-syncope, orthopnea, palpitations, paroxysmal nocturnal dyspnea and syncope.   Respiratory: Negative for cough, shortness of breath and wheezing.    Musculoskeletal: Negative for falls and myalgias.   Gastrointestinal: Negative for abdominal pain, heartburn, nausea and vomiting.   Genitourinary: Negative for nocturia.   Neurological: Positive for light-headedness. Negative for brief paralysis, dizziness, focal weakness, headaches, numbness, paresthesias and weakness.   Psychiatric/Behavioral: Negative for  altered mental status.       Current Outpatient Medications   Medication Sig    amiodarone (PACERONE) 200 MG Tab Take 1 tablet (200 mg total) by mouth once daily.    ammonium lactate 12 % Crea Apply small amount to feet except for in between toes twice a day    aspirin (ECOTRIN) 81 MG EC tablet Take 1 tablet (81 mg total) by mouth once daily.    carvedilol (COREG) 6.25 MG tablet Take 1 tablet (6.25 mg total) by mouth 2 (two) times daily.    dulaglutide (TRULICITY) 1.5 mg/0.5 mL PnIj     esomeprazole (NEXIUM) 40 MG capsule TAKE 1 CAPSULE BY MOUTH  BEFORE BREAKFAST    ezetimibe (ZETIA) 10 mg tablet Take 1 tablet (10 mg total) by mouth once daily.    ferrous sulfate (FEOSOL) 325 mg (65 mg iron) Tab tablet TAKE 1 TABLET BY MOUTH THREE TIMES DAILY    finasteride (PROSCAR) 5 mg tablet HOLD DUE TO ORTHOSTATIC HYPOTENSION    furosemide (LASIX) 20 MG tablet Take one daily but  double the  dose  for 3-4 lb weight gain over a 2-3 day period or 5 pounds in a week until the weight has resolved then return to original dose.    insulin lispro 100 unit/mL injection For 2 x 10ml vials required per month for use in VGO 20    isosorbide mononitrate (IMDUR) 120 MG 24 hr tablet Take 1 tablet (120 mg total) by mouth once daily.    losartan (COZAAR) 25 MG tablet Take 1 tablet (25 mg total) by mouth once daily.    meloxicam (MOBIC) 15 MG tablet Take 1 tablet (15 mg total) by mouth once daily.    nitroGLYCERIN (NITROSTAT) 0.4 MG SL tablet One tab under tongue repeat every 5 minutes two times and if no relief call 911    ranolazine (RANEXA) 1,000 mg Tb12 TAKE 1 TABLET BY MOUTH  TWICE A DAY    rosuvastatin (CRESTOR) 40 MG Tab Take 1 tablet (40 mg total) by mouth once daily.    sub-q insulin device, 20 unit (VGO 20) [place] 1 Device by Misc.(Non-Drug; Combo Route) route once daily.    tamsulosin (FLOMAX) 0.4 mg Cap Take 1 capsule (0.4 mg total) by mouth once daily.    ticagrelor (BRILINTA) 90 mg tablet Take 1 tablet (90 mg  total) by mouth 2 (two) times daily.     No current facility-administered medications for this visit.      Objective:   Physical Exam    Lab Results   Component Value Date     02/13/2020    K 4.4 02/13/2020     02/13/2020    CO2 27 02/13/2020    BUN 11 02/13/2020    CREATININE 1.4 02/13/2020     (H) 02/13/2020    HGBA1C 7.5 (H) 03/13/2020    MG 1.9 10/07/2019    AST 49 (H) 02/13/2020    ALT 65 (H) 02/13/2020    ALBUMIN 3.3 (L) 02/13/2020    PROT 6.8 02/13/2020    BILITOT 0.4 02/13/2020    WBC 5.49 02/13/2020    HGB 11.9 (L) 02/13/2020    HCT 37.4 (L) 02/13/2020    HCT 33 (L) 06/24/2019    MCV 92 02/13/2020     02/13/2020    TSH 2.303 02/13/2020    CHOL 131 02/13/2020    HDL 45 02/13/2020    LDLCALC 73.0 02/13/2020    TRIG 65 02/13/2020       Assessment:     1. Lightheadedness : Uncertain etiology. Will need to exclude anemia, hypovolemia   2. Coronary artery disease of native artery of native heart with stable angina pectoris : Stable   3. S/P CABG (coronary artery bypass graft)    4. Ischemic cardiomyopathy : EF 50%. Additional  Lasix being given per short term weight gain   5. Benign essential HTN : A:M elevation per home readings   6. Mixed hyperlipidemia : Near goal  on high intensity statin   7. ICD (implantable cardioverter-defibrillator), dual, in situ    8. Severe obesity (BMI 35.0-39.9) with comorbidity    9. Diabetes mellitus type 2 in obese : No weight loss   10. Iron deficiency anemia, unspecified iron deficiency anemia type : Taking iron       Plan:     Walter was seen today for dizziness.    Diagnoses and all orders for this visit:    Lightheadedness    CBC auto differential; Future; Expected date: 04/22/2020  -     Comprehensive metabolic panel; Future; Expected date: 04/22/2020   TSH; Future; Expected date: 04/22/2020  Coronary artery disease of native artery of native heart with stable angina pectoris    S/P CABG (coronary artery bypass graft)    Ischemic cardiomyopathy  -      furosemide (LASIX) 20 MG tablet; Take one daily but  double the  dose  for 3-4 lb weight gain over a 2-3 day period or 5 pounds in a week until the weight has resolved then return to original dose.    Benign essential HTN  -     Comprehensive metabolic panel; Future; Expected date: 04/22/2020  -     TSH; Future; Expected date: 04/22/2020  Switch losartan to P:M  Mixed hyperlipidemia    ICD (implantable cardioverter-defibrillator), dual, in situ    Severe obesity (BMI 35.0-39.9) with comorbidity  Discussed Mediterranean Diet with carbohydrate restriction  Diabetes mellitus type 2 in obese    Iron deficiency anemia, unspecified iron deficiency anemia type  CBC-       Established Patient - Audio Only Telehealth Visit     The patient location is:Home  The chief complaint leading to consultation is:Lightheadedness  Visit type: Virtual visit with audio only (telephone)   Time spent > 30 minutes  Including chart review, conversation, and note  The reason for the audio only service rather than synchronous audio and video virtual visit was related to technical difficulties or patient preference/necessity.     Each patient to whom I provide medical services by telemedicine is:  (1) informed of the relationship between the physician and patient and the respective role of any other health care provider with respect to management of the patient; and (2) notified that they may decline to receive medical services by telemedicine and may withdraw from such care at any time. Patient verbally consented to receive this service via voice-only telephone call.                                    This service was not originating from a related E/M service provided within the previous 7 days nor will  to an E/M service or procedure within the next 24 hours or my soonest available appointment.  Prevailing standard of care was able to be met in this audio-only visit.

## 2020-04-23 ENCOUNTER — LAB VISIT (OUTPATIENT)
Dept: LAB | Facility: HOSPITAL | Age: 70
End: 2020-04-23
Attending: INTERNAL MEDICINE
Payer: MEDICARE

## 2020-04-23 ENCOUNTER — TELEPHONE (OUTPATIENT)
Dept: CARDIOLOGY | Facility: CLINIC | Age: 70
End: 2020-04-23

## 2020-04-23 ENCOUNTER — PATIENT MESSAGE (OUTPATIENT)
Dept: CARDIOLOGY | Facility: CLINIC | Age: 70
End: 2020-04-23

## 2020-04-23 DIAGNOSIS — D50.9 IRON DEFICIENCY ANEMIA, UNSPECIFIED IRON DEFICIENCY ANEMIA TYPE: ICD-10-CM

## 2020-04-23 DIAGNOSIS — I10 BENIGN ESSENTIAL HTN: Chronic | ICD-10-CM

## 2020-04-23 DIAGNOSIS — N18.4 CKD (CHRONIC KIDNEY DISEASE) STAGE 4, GFR 15-29 ML/MIN: ICD-10-CM

## 2020-04-23 PROBLEM — N18.30 CKD (CHRONIC KIDNEY DISEASE) STAGE 3, GFR 30-59 ML/MIN: Status: ACTIVE | Noted: 2020-04-23

## 2020-04-23 LAB
ALBUMIN SERPL BCP-MCNC: 3.7 G/DL (ref 3.5–5.2)
ALP SERPL-CCNC: 60 U/L (ref 55–135)
ALT SERPL W/O P-5'-P-CCNC: 65 U/L (ref 10–44)
ANION GAP SERPL CALC-SCNC: 9 MMOL/L (ref 8–16)
AST SERPL-CCNC: 51 U/L (ref 10–40)
BASOPHILS # BLD AUTO: 0.03 K/UL (ref 0–0.2)
BASOPHILS NFR BLD: 0.6 % (ref 0–1.9)
BILIRUB SERPL-MCNC: 0.5 MG/DL (ref 0.1–1)
BUN SERPL-MCNC: 21 MG/DL (ref 8–23)
CALCIUM SERPL-MCNC: 8.6 MG/DL (ref 8.7–10.5)
CHLORIDE SERPL-SCNC: 104 MMOL/L (ref 95–110)
CO2 SERPL-SCNC: 26 MMOL/L (ref 23–29)
CREAT SERPL-MCNC: 2.1 MG/DL (ref 0.5–1.4)
DIFFERENTIAL METHOD: ABNORMAL
EOSINOPHIL # BLD AUTO: 0.2 K/UL (ref 0–0.5)
EOSINOPHIL NFR BLD: 3.6 % (ref 0–8)
ERYTHROCYTE [DISTWIDTH] IN BLOOD BY AUTOMATED COUNT: 15 % (ref 11.5–14.5)
EST. GFR  (AFRICAN AMERICAN): 36 ML/MIN/1.73 M^2
EST. GFR  (NON AFRICAN AMERICAN): 31 ML/MIN/1.73 M^2
GLUCOSE SERPL-MCNC: 98 MG/DL (ref 70–110)
HCT VFR BLD AUTO: 38 % (ref 40–54)
HGB BLD-MCNC: 12.3 G/DL (ref 14–18)
IMM GRANULOCYTES # BLD AUTO: 0.01 K/UL (ref 0–0.04)
IMM GRANULOCYTES NFR BLD AUTO: 0.2 % (ref 0–0.5)
LYMPHOCYTES # BLD AUTO: 1.7 K/UL (ref 1–4.8)
LYMPHOCYTES NFR BLD: 33.5 % (ref 18–48)
MCH RBC QN AUTO: 29.6 PG (ref 27–31)
MCHC RBC AUTO-ENTMCNC: 32.4 G/DL (ref 32–36)
MCV RBC AUTO: 91 FL (ref 82–98)
MONOCYTES # BLD AUTO: 0.7 K/UL (ref 0.3–1)
MONOCYTES NFR BLD: 13.7 % (ref 4–15)
NEUTROPHILS # BLD AUTO: 2.4 K/UL (ref 1.8–7.7)
NEUTROPHILS NFR BLD: 48.4 % (ref 38–73)
NRBC BLD-RTO: 0 /100 WBC
PLATELET # BLD AUTO: 149 K/UL (ref 150–350)
PMV BLD AUTO: 10.2 FL (ref 9.2–12.9)
POTASSIUM SERPL-SCNC: 4.2 MMOL/L (ref 3.5–5.1)
PROT SERPL-MCNC: 6.9 G/DL (ref 6–8.4)
RBC # BLD AUTO: 4.16 M/UL (ref 4.6–6.2)
SODIUM SERPL-SCNC: 139 MMOL/L (ref 136–145)
TSH SERPL DL<=0.005 MIU/L-ACNC: 2.35 UIU/ML (ref 0.4–4)
WBC # BLD AUTO: 4.98 K/UL (ref 3.9–12.7)

## 2020-04-23 PROCEDURE — 84443 ASSAY THYROID STIM HORMONE: CPT

## 2020-04-23 PROCEDURE — 85025 COMPLETE CBC W/AUTO DIFF WBC: CPT

## 2020-04-23 PROCEDURE — 36415 COLL VENOUS BLD VENIPUNCTURE: CPT

## 2020-04-23 PROCEDURE — 80053 COMPREHEN METABOLIC PANEL: CPT

## 2020-04-24 DIAGNOSIS — I25.118 CORONARY ARTERY DISEASE OF NATIVE ARTERY OF NATIVE HEART WITH STABLE ANGINA PECTORIS: ICD-10-CM

## 2020-04-27 RX ORDER — RANOLAZINE 1000 MG/1
TABLET, EXTENDED RELEASE ORAL
Qty: 60 TABLET | Refills: 1 | Status: SHIPPED | OUTPATIENT
Start: 2020-04-27 | End: 2020-06-11

## 2020-04-29 ENCOUNTER — LAB VISIT (OUTPATIENT)
Dept: LAB | Facility: HOSPITAL | Age: 70
End: 2020-04-29
Attending: INTERNAL MEDICINE
Payer: MEDICARE

## 2020-04-29 DIAGNOSIS — N18.4 CKD (CHRONIC KIDNEY DISEASE) STAGE 4, GFR 15-29 ML/MIN: ICD-10-CM

## 2020-04-29 LAB
ANION GAP SERPL CALC-SCNC: 7 MMOL/L (ref 8–16)
BUN SERPL-MCNC: 16 MG/DL (ref 8–23)
CALCIUM SERPL-MCNC: 8 MG/DL (ref 8.7–10.5)
CHLORIDE SERPL-SCNC: 106 MMOL/L (ref 95–110)
CO2 SERPL-SCNC: 26 MMOL/L (ref 23–29)
CREAT SERPL-MCNC: 1.6 MG/DL (ref 0.5–1.4)
EST. GFR  (AFRICAN AMERICAN): 49 ML/MIN/1.73 M^2
EST. GFR  (NON AFRICAN AMERICAN): 43 ML/MIN/1.73 M^2
GLUCOSE SERPL-MCNC: 85 MG/DL (ref 70–110)
POTASSIUM SERPL-SCNC: 3.8 MMOL/L (ref 3.5–5.1)
SODIUM SERPL-SCNC: 139 MMOL/L (ref 136–145)

## 2020-04-29 PROCEDURE — 80048 BASIC METABOLIC PNL TOTAL CA: CPT

## 2020-04-29 PROCEDURE — 36415 COLL VENOUS BLD VENIPUNCTURE: CPT

## 2020-04-29 RX ORDER — TAMSULOSIN HYDROCHLORIDE 0.4 MG/1
CAPSULE ORAL
Qty: 90 CAPSULE | Refills: 3 | Status: SHIPPED | OUTPATIENT
Start: 2020-04-29 | End: 2021-04-09

## 2020-05-15 DIAGNOSIS — K21.9 GASTROESOPHAGEAL REFLUX DISEASE, ESOPHAGITIS PRESENCE NOT SPECIFIED: ICD-10-CM

## 2020-05-15 RX ORDER — ESOMEPRAZOLE MAGNESIUM 40 MG/1
40 CAPSULE, DELAYED RELEASE ORAL
Qty: 90 CAPSULE | Refills: 0 | Status: SHIPPED | OUTPATIENT
Start: 2020-05-15 | End: 2020-07-15

## 2020-05-19 ENCOUNTER — LAB VISIT (OUTPATIENT)
Dept: LAB | Facility: HOSPITAL | Age: 70
End: 2020-05-19
Attending: INTERNAL MEDICINE
Payer: MEDICARE

## 2020-05-19 DIAGNOSIS — E66.9 DIABETES MELLITUS TYPE 2 IN OBESE: ICD-10-CM

## 2020-05-19 DIAGNOSIS — R79.89 ABNORMAL LFTS: ICD-10-CM

## 2020-05-19 DIAGNOSIS — E11.69 DIABETES MELLITUS TYPE 2 IN OBESE: ICD-10-CM

## 2020-05-19 LAB
ALBUMIN SERPL BCP-MCNC: 3.7 G/DL (ref 3.5–5.2)
ALP SERPL-CCNC: 61 U/L (ref 55–135)
ALT SERPL W/O P-5'-P-CCNC: 53 U/L (ref 10–44)
AST SERPL-CCNC: 43 U/L (ref 10–40)
BILIRUB DIRECT SERPL-MCNC: 0.3 MG/DL (ref 0.1–0.3)
BILIRUB SERPL-MCNC: 0.7 MG/DL (ref 0.1–1)
ESTIMATED AVG GLUCOSE: 157 MG/DL (ref 68–131)
HBA1C MFR BLD HPLC: 7.1 % (ref 4–5.6)
PROT SERPL-MCNC: 7.1 G/DL (ref 6–8.4)

## 2020-05-19 PROCEDURE — 80076 HEPATIC FUNCTION PANEL: CPT

## 2020-05-19 PROCEDURE — 36415 COLL VENOUS BLD VENIPUNCTURE: CPT

## 2020-05-19 PROCEDURE — 83036 HEMOGLOBIN GLYCOSYLATED A1C: CPT

## 2020-05-22 RX ORDER — EZETIMIBE 10 MG/1
10 TABLET ORAL DAILY
Qty: 90 TABLET | Refills: 3 | Status: SHIPPED | OUTPATIENT
Start: 2020-05-22 | End: 2021-02-01

## 2020-05-25 ENCOUNTER — CLINICAL SUPPORT (OUTPATIENT)
Dept: CARDIOLOGY | Facility: HOSPITAL | Age: 70
End: 2020-05-25
Payer: MEDICARE

## 2020-05-25 DIAGNOSIS — Z95.810 PRESENCE OF AUTOMATIC (IMPLANTABLE) CARDIAC DEFIBRILLATOR: ICD-10-CM

## 2020-05-25 PROCEDURE — 93295 CARDIAC DEVICE CHECK - REMOTE: ICD-10-PCS | Mod: ,,, | Performed by: INTERNAL MEDICINE

## 2020-05-25 PROCEDURE — 93295 DEV INTERROG REMOTE 1/2/MLT: CPT | Mod: ,,, | Performed by: INTERNAL MEDICINE

## 2020-05-25 PROCEDURE — 93296 REM INTERROG EVL PM/IDS: CPT | Performed by: INTERNAL MEDICINE

## 2020-05-26 ENCOUNTER — OFFICE VISIT (OUTPATIENT)
Dept: INTERNAL MEDICINE | Facility: CLINIC | Age: 70
End: 2020-05-26
Payer: MEDICARE

## 2020-05-26 VITALS
BODY MASS INDEX: 37.82 KG/M2 | WEIGHT: 255.31 LBS | HEART RATE: 61 BPM | SYSTOLIC BLOOD PRESSURE: 110 MMHG | DIASTOLIC BLOOD PRESSURE: 58 MMHG | HEIGHT: 69 IN | OXYGEN SATURATION: 98 % | RESPIRATION RATE: 19 BRPM | TEMPERATURE: 98 F

## 2020-05-26 DIAGNOSIS — K21.9 GASTROESOPHAGEAL REFLUX DISEASE, ESOPHAGITIS PRESENCE NOT SPECIFIED: Chronic | ICD-10-CM

## 2020-05-26 DIAGNOSIS — I25.118 CORONARY ARTERY DISEASE OF NATIVE ARTERY OF NATIVE HEART WITH STABLE ANGINA PECTORIS: Chronic | ICD-10-CM

## 2020-05-26 DIAGNOSIS — N40.0 BENIGN PROSTATIC HYPERPLASIA WITHOUT LOWER URINARY TRACT SYMPTOMS: Chronic | ICD-10-CM

## 2020-05-26 DIAGNOSIS — Z23 NEED FOR VACCINATION: ICD-10-CM

## 2020-05-26 DIAGNOSIS — E11.42 DIABETIC POLYNEUROPATHY ASSOCIATED WITH TYPE 2 DIABETES MELLITUS: ICD-10-CM

## 2020-05-26 DIAGNOSIS — N18.30 CKD (CHRONIC KIDNEY DISEASE) STAGE 3, GFR 30-59 ML/MIN: ICD-10-CM

## 2020-05-26 DIAGNOSIS — Z95.810 ICD (IMPLANTABLE CARDIOVERTER-DEFIBRILLATOR), DUAL, IN SITU: Chronic | ICD-10-CM

## 2020-05-26 DIAGNOSIS — E66.9 DIABETES MELLITUS TYPE 2 IN OBESE: Chronic | ICD-10-CM

## 2020-05-26 DIAGNOSIS — I10 BENIGN ESSENTIAL HTN: Primary | Chronic | ICD-10-CM

## 2020-05-26 DIAGNOSIS — E11.69 DIABETES MELLITUS TYPE 2 IN OBESE: Chronic | ICD-10-CM

## 2020-05-26 DIAGNOSIS — Z12.5 PROSTATE CANCER SCREENING: ICD-10-CM

## 2020-05-26 DIAGNOSIS — I50.42 CHRONIC COMBINED SYSTOLIC AND DIASTOLIC HEART FAILURE: Chronic | ICD-10-CM

## 2020-05-26 DIAGNOSIS — E66.01 SEVERE OBESITY (BMI 35.0-39.9) WITH COMORBIDITY: ICD-10-CM

## 2020-05-26 PROBLEM — E83.42 HYPOMAGNESEMIA: Status: RESOLVED | Noted: 2019-05-18 | Resolved: 2020-05-26

## 2020-05-26 PROBLEM — N45.1 EPIDIDYMITIS: Status: RESOLVED | Noted: 2019-05-17 | Resolved: 2020-05-26

## 2020-05-26 PROBLEM — I21.4 NSTEMI (NON-ST ELEVATED MYOCARDIAL INFARCTION): Status: RESOLVED | Noted: 2018-09-13 | Resolved: 2020-05-26

## 2020-05-26 PROCEDURE — 3051F HG A1C>EQUAL 7.0%<8.0%: CPT | Mod: CPTII,S$GLB,, | Performed by: INTERNAL MEDICINE

## 2020-05-26 PROCEDURE — 3074F SYST BP LT 130 MM HG: CPT | Mod: CPTII,S$GLB,, | Performed by: INTERNAL MEDICINE

## 2020-05-26 PROCEDURE — 1159F PR MEDICATION LIST DOCUMENTED IN MEDICAL RECORD: ICD-10-PCS | Mod: S$GLB,,, | Performed by: INTERNAL MEDICINE

## 2020-05-26 PROCEDURE — 3288F FALL RISK ASSESSMENT DOCD: CPT | Mod: CPTII,S$GLB,, | Performed by: INTERNAL MEDICINE

## 2020-05-26 PROCEDURE — 90714 TD VACCINE GREATER THAN OR EQUAL TO 7YO PRESERVATIVE FREE IM: ICD-10-PCS | Mod: S$GLB,,, | Performed by: INTERNAL MEDICINE

## 2020-05-26 PROCEDURE — 1100F PR PT FALLS ASSESS DOC 2+ FALLS/FALL W/INJURY/YR: ICD-10-PCS | Mod: CPTII,S$GLB,, | Performed by: INTERNAL MEDICINE

## 2020-05-26 PROCEDURE — 99499 RISK ADDL DX/OHS AUDIT: ICD-10-PCS | Mod: S$GLB,,, | Performed by: INTERNAL MEDICINE

## 2020-05-26 PROCEDURE — 90714 TD VACC NO PRESV 7 YRS+ IM: CPT | Mod: S$GLB,,, | Performed by: INTERNAL MEDICINE

## 2020-05-26 PROCEDURE — 99214 PR OFFICE/OUTPT VISIT, EST, LEVL IV, 30-39 MIN: ICD-10-PCS | Mod: 25,S$GLB,, | Performed by: INTERNAL MEDICINE

## 2020-05-26 PROCEDURE — 90471 IMMUNIZATION ADMIN: CPT | Mod: S$GLB,,, | Performed by: INTERNAL MEDICINE

## 2020-05-26 PROCEDURE — 99999 PR PBB SHADOW E&M-EST. PATIENT-LVL III: CPT | Mod: PBBFAC,,, | Performed by: INTERNAL MEDICINE

## 2020-05-26 PROCEDURE — 99999 PR PBB SHADOW E&M-EST. PATIENT-LVL III: ICD-10-PCS | Mod: PBBFAC,,, | Performed by: INTERNAL MEDICINE

## 2020-05-26 PROCEDURE — 3288F PR FALLS RISK ASSESSMENT DOCUMENTED: ICD-10-PCS | Mod: CPTII,S$GLB,, | Performed by: INTERNAL MEDICINE

## 2020-05-26 PROCEDURE — 1100F PTFALLS ASSESS-DOCD GE2>/YR: CPT | Mod: CPTII,S$GLB,, | Performed by: INTERNAL MEDICINE

## 2020-05-26 PROCEDURE — 3078F DIAST BP <80 MM HG: CPT | Mod: CPTII,S$GLB,, | Performed by: INTERNAL MEDICINE

## 2020-05-26 PROCEDURE — 99499 UNLISTED E&M SERVICE: CPT | Mod: S$GLB,,, | Performed by: INTERNAL MEDICINE

## 2020-05-26 PROCEDURE — 90471 TD VACCINE GREATER THAN OR EQUAL TO 7YO PRESERVATIVE FREE IM: ICD-10-PCS | Mod: S$GLB,,, | Performed by: INTERNAL MEDICINE

## 2020-05-26 PROCEDURE — 1126F AMNT PAIN NOTED NONE PRSNT: CPT | Mod: S$GLB,,, | Performed by: INTERNAL MEDICINE

## 2020-05-26 PROCEDURE — 1126F PR PAIN SEVERITY QUANTIFIED, NO PAIN PRESENT: ICD-10-PCS | Mod: S$GLB,,, | Performed by: INTERNAL MEDICINE

## 2020-05-26 PROCEDURE — 3051F PR MOST RECENT HEMOGLOBIN A1C LEVEL 7.0 - < 8.0%: ICD-10-PCS | Mod: CPTII,S$GLB,, | Performed by: INTERNAL MEDICINE

## 2020-05-26 PROCEDURE — 1159F MED LIST DOCD IN RCRD: CPT | Mod: S$GLB,,, | Performed by: INTERNAL MEDICINE

## 2020-05-26 PROCEDURE — 99214 OFFICE O/P EST MOD 30 MIN: CPT | Mod: 25,S$GLB,, | Performed by: INTERNAL MEDICINE

## 2020-05-26 PROCEDURE — 3074F PR MOST RECENT SYSTOLIC BLOOD PRESSURE < 130 MM HG: ICD-10-PCS | Mod: CPTII,S$GLB,, | Performed by: INTERNAL MEDICINE

## 2020-05-26 PROCEDURE — 3078F PR MOST RECENT DIASTOLIC BLOOD PRESSURE < 80 MM HG: ICD-10-PCS | Mod: CPTII,S$GLB,, | Performed by: INTERNAL MEDICINE

## 2020-05-26 NOTE — PROGRESS NOTES
Subjective:       Patient ID: Walter Bull is a 69 y.o. male.    Chief Complaint: Follow-up (3 mo)      HPI:  Patient is known to me and presents for follow up CAD, systolic CHF, DM type 2, HLD. Labs from 5/29/2020 personally reviewed and discussed with the patient today.      CAD s/p 5v CABG and NSTEMI 9/12/18: following with Dr. Vitale as well. Now on Imdur and Ranexa. brilinta, ASA, Crestor, losartan and Coreg. Also reports h/o CHF (last known EF 43%+ diastolic dysfunction), on lasix 20mg once a day (aldactone stopped due to hypotension). Denies SOB, GREENWOOD and orthopnea.       Dm type 2: on trulicity, lispro vai VGP per endocrinology  A1C 7.1%from  7.5%.  He does report numbness and tingling of left foot > right foot and b/l fingers; sx have been present for several years. Not on medicine for neuropathy as he declines treatment. Denies polyuria, polydipsia  Foot exam: 3/2020  Eye exam: 2/2019  Microalb: 8/2019  On ARB and statin  LDL < 70     HLD: on crestor and zetia, has been taking for several years. Denies side effects.goal.     HTN: on coreg, losartan. BP is well controlled. Denies headaches, vision changes.     GERD: On Nexium daily. H/o H. Pylori on EGD (2018) with gastric erosions. Working better. No n/v.     BPH: on flomax and finasteride and working well.  No medication side effects. S/p prostate bx 5/13/19 with DR. Chaudhry, no malignany. Last PSA 2018.      Tobacco use: quit 1981; previously smoked 3 PPD for 20 years  EtOH: stopped drink 1982; was a daily drink 2 fifth liquor daily  Illicit drug: denies  Past Medical History:   Diagnosis Date    Anemia     Anticoagulant long-term use     CHF (congestive heart failure)     Colon cancer screening 2/2/2017    Coronary artery disease     Diabetes mellitus type I     Disorder of kidney and ureter     Encounter for blood transfusion     Glaucoma     Heart attack     09/2018    Heart disease     Hypertension     Iron deficiency     Kidney failure      post CABG    Pulmonary emphysema 2020    S/P CABG x 5 2017       Family History   Problem Relation Age of Onset    Diabetes Mother     Heart disease Mother     Hypertension Sister     Diabetes Sister     Heart disease Sister     Heart attack Brother     Colon cancer Neg Hx     Esophageal cancer Neg Hx     Stomach cancer Neg Hx        Social History     Socioeconomic History    Marital status:      Spouse name: Not on file    Number of children: Not on file    Years of education: Not on file    Highest education level: Not on file   Occupational History    Not on file   Social Needs    Financial resource strain: Not hard at all    Food insecurity:     Worry: Never true     Inability: Never true    Transportation needs:     Medical: No     Non-medical: No   Tobacco Use    Smoking status: Former Smoker     Packs/day: 3.00     Types: Cigarettes     Start date: 1963     Last attempt to quit: 1981     Years since quittin.4    Smokeless tobacco: Former User     Types: Snuff, Chew     Quit date:    Substance and Sexual Activity    Alcohol use: No     Frequency: Never     Drinks per session: Patient refused     Binge frequency: Never    Drug use: No    Sexual activity: Yes     Comment: -with a significant other   Lifestyle    Physical activity:     Days per week: 5 days     Minutes per session: Not on file    Stress: Not at all   Relationships    Social connections:     Talks on phone: Never     Gets together: Never     Attends Protestant service: Not on file     Active member of club or organization: No     Attends meetings of clubs or organizations: Never     Relationship status:    Other Topics Concern    Not on file   Social History Narrative    Not on file       Review of Systems   Constitutional: Negative for activity change, fatigue, fever and unexpected weight change.   HENT: Negative for congestion, ear pain, hearing loss,  rhinorrhea, sore throat and tinnitus.    Eyes: Negative for pain, redness and visual disturbance.   Respiratory: Negative for cough, shortness of breath and wheezing.    Cardiovascular: Negative for chest pain, palpitations and leg swelling.   Gastrointestinal: Negative for abdominal pain, blood in stool, constipation, diarrhea, nausea and vomiting.   Genitourinary: Negative for decreased urine volume, dysuria, frequency and urgency.   Musculoskeletal: Negative for back pain, joint swelling and neck pain.   Skin: Negative for color change, rash and wound.   Neurological: Positive for dizziness (intermittent, chronic issue). Negative for tremors, weakness, light-headedness and headaches.         Objective:      Physical Exam   Constitutional: He is oriented to person, place, and time. He appears well-developed and well-nourished. No distress.   HENT:   Head: Normocephalic and atraumatic.   Right Ear: External ear normal.   Left Ear: External ear normal.   Eyes: Pupils are equal, round, and reactive to light. Conjunctivae and EOM are normal. Right eye exhibits no discharge. Left eye exhibits no discharge.   Neck: Neck supple. No tracheal deviation present.   Cardiovascular: Normal rate and regular rhythm.   No murmur heard.  Pulmonary/Chest: Effort normal and breath sounds normal. No respiratory distress. He has no wheezes. He has no rales.   Abdominal: Soft. Bowel sounds are normal. He exhibits no distension. There is no tenderness. There is no rebound and no guarding.   Musculoskeletal: He exhibits no edema.   Neurological: He is alert and oriented to person, place, and time. No cranial nerve deficit.   Skin: Skin is warm and dry.   Psychiatric: He has a normal mood and affect. His behavior is normal.   Vitals reviewed.      Assessment:       1. Benign essential HTN    2. Diabetes mellitus type 2 in obese    3. Diabetic polyneuropathy associated with type 2 diabetes mellitus    4. Coronary artery disease of native  artery of native heart with stable angina pectoris    5. Chronic combined systolic and diastolic heart failure    6. ICD (implantable cardioverter-defibrillator), dual, in situ    7. Need for vaccination    8. Prostate cancer screening    9. Severe obesity (BMI 35.0-39.9) with comorbidity    10. Gastroesophageal reflux disease, esophagitis presence not specified    11. CKD (chronic kidney disease) stage 3, GFR 30-59 ml/min    12. Benign prostatic hyperplasia without lower urinary tract symptoms        Plan:       Walter was seen today for follow-up.    Diagnoses and all orders for this visit:    Benign essential HTN  -     CBC auto differential; Future  -     Comprehensive metabolic panel; Future  -     TSH; Future  -     Lipid Panel; Future  -     Hemoglobin A1C; Future  -     Microalbumin/creatinine urine ratio; Future  Chronic controlled  Continue medications at same dose  Low Na diet  Exercise, weight loss  Check BP and keep log for next visit    Diabetes mellitus type 2 in obese  Diabetic polyneuropathy associated with type 2 diabetes mellitus  -     CBC auto differential; Future  -     Comprehensive metabolic panel; Future  -     TSH; Future  -     Lipid Panel; Future  -     Hemoglobin A1C; Future  -     Microalbumin/creatinine urine ratio; Future  A1C trended down, very near goal  Endo managing his insulin  Cont trulicity  Diet, exercise and weight loss  1800 cookie ADA diet  Check sugars and keep log  LDL at goal    Coronary artery disease of native artery of native heart with stable angina pectoris  -     CBC auto differential; Future  -     Comprehensive metabolic panel; Future  -     TSH; Future  -     Lipid Panel; Future  -     Hemoglobin A1C; Future  -     Microalbumin/creatinine urine ratio; Future  Chronic stable  Follows with cardiology who is managing  Denies angina sx    Chronic combined systolic and diastolic heart failure  -     CBC auto differential; Future  -     Comprehensive metabolic panel;  Future  -     TSH; Future  -     Lipid Panel; Future  -     Hemoglobin A1C; Future  -     Microalbumin/creatinine urine ratio; Future  Chronic stable  Cont meds same dose  Denies GREENWOOD, Le edema  No signs of volume overload on exam today    ICD (implantable cardioverter-defibrillator), dual, in situ  Noted  HR stable    Need for vaccination  -     (In Office Administered) Td Vaccine - Preservative Free    Prostate cancer screening  -     PSA, Screening; Future    Severe obesity (BMI 35.0-39.9) with comorbidity  -     CBC auto differential; Future  -     Comprehensive metabolic panel; Future  -     TSH; Future  -     Lipid Panel; Future  -     Hemoglobin A1C; Future  -     Microalbumin/creatinine urine ratio; Future  Diet and exercise discussed    Gastroesophageal reflux disease, esophagitis presence not specified  Chronic stable  Cont nexium same dose    CKD (chronic kidney disease) stage 3, GFR 30-59 ml/min  -     CBC auto differential; Future  -     Comprehensive metabolic panel; Future  -     TSH; Future  -     Lipid Panel; Future  -     Hemoglobin A1C; Future  -     Microalbumin/creatinine urine ratio; Future  Chronic stable  Follow labs  GFR/Cr at baseline  Avoid nephrotoxic agents    Benign prostatic hyperplasia without lower urinary tract symptoms  -     PSA, Screening; Future  Chronic stable  Cont meds same dose      RTC6 months with labs and PRN

## 2020-05-26 NOTE — PROGRESS NOTES
Patient, Walter Bull (MRN #[id number]), presented with a recent Platelet count less than 150 K/uL consistent with the definition of thrombocytopenia (ICD10 - D69.6).    Platelets   Date Value Ref Range Status   04/23/2020 149 (L) 150 - 350 K/uL Final     The patient's thrombocytopenia was monitored, evaluated, addressed and/or treated. This addendum to the medical record is made on 05/26/2020.

## 2020-06-03 ENCOUNTER — LAB VISIT (OUTPATIENT)
Dept: LAB | Facility: HOSPITAL | Age: 70
End: 2020-06-03
Attending: HOSPITALIST
Payer: MEDICARE

## 2020-06-03 DIAGNOSIS — E66.01 SEVERE OBESITY (BMI 35.0-39.9) WITH COMORBIDITY: ICD-10-CM

## 2020-06-03 DIAGNOSIS — E66.9 DIABETES MELLITUS TYPE 2 IN OBESE: Chronic | ICD-10-CM

## 2020-06-03 DIAGNOSIS — E11.69 DIABETES MELLITUS TYPE 2 IN OBESE: Chronic | ICD-10-CM

## 2020-06-03 DIAGNOSIS — I50.42 CHRONIC COMBINED SYSTOLIC AND DIASTOLIC HEART FAILURE: Chronic | ICD-10-CM

## 2020-06-03 LAB
ALBUMIN SERPL BCP-MCNC: 3.4 G/DL (ref 3.5–5.2)
ANION GAP SERPL CALC-SCNC: 8 MMOL/L (ref 8–16)
BUN SERPL-MCNC: 14 MG/DL (ref 8–23)
CALCIUM SERPL-MCNC: 9 MG/DL (ref 8.7–10.5)
CHLORIDE SERPL-SCNC: 105 MMOL/L (ref 95–110)
CO2 SERPL-SCNC: 28 MMOL/L (ref 23–29)
CREAT SERPL-MCNC: 1.6 MG/DL (ref 0.5–1.4)
EST. GFR  (AFRICAN AMERICAN): 49 ML/MIN/1.73 M^2
EST. GFR  (NON AFRICAN AMERICAN): 43 ML/MIN/1.73 M^2
ESTIMATED AVG GLUCOSE: 157 MG/DL (ref 68–131)
GLUCOSE SERPL-MCNC: 127 MG/DL (ref 70–110)
HBA1C MFR BLD HPLC: 7.1 % (ref 4–5.6)
PHOSPHATE SERPL-MCNC: 3.2 MG/DL (ref 2.7–4.5)
POTASSIUM SERPL-SCNC: 4.4 MMOL/L (ref 3.5–5.1)
SODIUM SERPL-SCNC: 141 MMOL/L (ref 136–145)

## 2020-06-03 PROCEDURE — 83036 HEMOGLOBIN GLYCOSYLATED A1C: CPT

## 2020-06-03 PROCEDURE — 80069 RENAL FUNCTION PANEL: CPT

## 2020-06-03 PROCEDURE — 36415 COLL VENOUS BLD VENIPUNCTURE: CPT

## 2020-06-09 ENCOUNTER — OFFICE VISIT (OUTPATIENT)
Dept: ENDOCRINOLOGY | Facility: CLINIC | Age: 70
End: 2020-06-09
Payer: MEDICARE

## 2020-06-09 VITALS
SYSTOLIC BLOOD PRESSURE: 138 MMHG | HEART RATE: 60 BPM | HEIGHT: 69 IN | WEIGHT: 254.5 LBS | BODY MASS INDEX: 37.69 KG/M2 | DIASTOLIC BLOOD PRESSURE: 72 MMHG

## 2020-06-09 DIAGNOSIS — I25.118 CORONARY ARTERY DISEASE OF NATIVE ARTERY OF NATIVE HEART WITH STABLE ANGINA PECTORIS: Chronic | ICD-10-CM

## 2020-06-09 DIAGNOSIS — E66.9 DIABETES MELLITUS TYPE 2 IN OBESE: Chronic | ICD-10-CM

## 2020-06-09 DIAGNOSIS — Z95.1 S/P CABG (CORONARY ARTERY BYPASS GRAFT): ICD-10-CM

## 2020-06-09 DIAGNOSIS — E11.69 DIABETES MELLITUS TYPE 2 IN OBESE: Chronic | ICD-10-CM

## 2020-06-09 DIAGNOSIS — E55.9 VITAMIN D INSUFFICIENCY: ICD-10-CM

## 2020-06-09 DIAGNOSIS — E78.2 MIXED HYPERLIPIDEMIA: Chronic | ICD-10-CM

## 2020-06-09 DIAGNOSIS — E11.42 DIABETIC POLYNEUROPATHY ASSOCIATED WITH TYPE 2 DIABETES MELLITUS: Primary | ICD-10-CM

## 2020-06-09 DIAGNOSIS — Z79.899 LONG TERM CURRENT USE OF AMIODARONE: ICD-10-CM

## 2020-06-09 DIAGNOSIS — I50.42 CHRONIC COMBINED SYSTOLIC AND DIASTOLIC HEART FAILURE: Chronic | ICD-10-CM

## 2020-06-09 DIAGNOSIS — N18.30 CKD (CHRONIC KIDNEY DISEASE) STAGE 3, GFR 30-59 ML/MIN: ICD-10-CM

## 2020-06-09 DIAGNOSIS — E66.01 SEVERE OBESITY (BMI 35.0-39.9) WITH COMORBIDITY: ICD-10-CM

## 2020-06-09 PROCEDURE — 3051F HG A1C>EQUAL 7.0%<8.0%: CPT | Mod: CPTII,GC,S$GLB, | Performed by: HOSPITALIST

## 2020-06-09 PROCEDURE — 99213 OFFICE O/P EST LOW 20 MIN: CPT | Mod: GC,S$GLB,, | Performed by: HOSPITALIST

## 2020-06-09 PROCEDURE — 99213 PR OFFICE/OUTPT VISIT, EST, LEVL III, 20-29 MIN: ICD-10-PCS | Mod: GC,S$GLB,, | Performed by: HOSPITALIST

## 2020-06-09 PROCEDURE — 99999 PR PBB SHADOW E&M-EST. PATIENT-LVL III: CPT | Mod: PBBFAC,GC,, | Performed by: HOSPITALIST

## 2020-06-09 PROCEDURE — 99999 PR PBB SHADOW E&M-EST. PATIENT-LVL III: ICD-10-PCS | Mod: PBBFAC,GC,, | Performed by: HOSPITALIST

## 2020-06-09 PROCEDURE — 3051F PR MOST RECENT HEMOGLOBIN A1C LEVEL 7.0 - < 8.0%: ICD-10-PCS | Mod: CPTII,GC,S$GLB, | Performed by: HOSPITALIST

## 2020-06-09 RX ORDER — LANCING DEVICE
1 EACH MISCELLANEOUS 2 TIMES DAILY WITH MEALS
Qty: 1 EACH | Refills: 0 | Status: SHIPPED | OUTPATIENT
Start: 2020-06-09 | End: 2021-05-12

## 2020-06-09 RX ORDER — INSULIN PUMP SYRINGE, 3 ML
EACH MISCELLANEOUS
Qty: 1 EACH | Refills: 0 | Status: SHIPPED | OUTPATIENT
Start: 2020-06-09 | End: 2021-05-12

## 2020-06-09 RX ORDER — INSULIN LISPRO 100 [IU]/ML
INJECTION, SOLUTION INTRAVENOUS; SUBCUTANEOUS
Qty: 20 ML | Refills: 11 | Status: CANCELLED | OUTPATIENT
Start: 2020-06-09

## 2020-06-09 RX ORDER — LANCETS
1 EACH MISCELLANEOUS 2 TIMES DAILY WITH MEALS
Qty: 100 EACH | Refills: 11 | Status: SHIPPED | OUTPATIENT
Start: 2020-06-09 | End: 2021-05-12

## 2020-06-09 NOTE — ASSESSMENT & PLAN NOTE
Type 2 diabetic, with good controlled, A1C is 7.1   DM is complicated by obesity, CAD/MI, CHF  Reviewed goals of therapy are to get the best control we can without hypoglycemia  Routine health maintenance/screening: UTD Lipid, A1C, Urine P/C, Eye, feet   Checking glucose 4x a day: trend with highs after lunch and at dinner. No lows documented.        Plan  - Adjust regiment:  VGO20 increase to 4 clicks with breakfast and lunch and 2 clicks for dinner,  1-2 clicks for snack  - Continue Trulicity 1.5  - Advised to check glucose 4x a day and send logs for review in 1 mo  - Advise patient about hypoglycemia, treatment of hypoglycemia, pt is aware  - Repeat lab work A1C In 6 month  - Diabetic supplies: refilled  - Close follow up 6 mo  - Advised that he follow up with eye exam  - Consider SGLT2 in the future if tighter glycemic control is needed  - On Crestor  - On ARB: Losartan

## 2020-06-09 NOTE — PATIENT INSTRUCTIONS
Thank you for completing the visit with me    Per our conversation your diabetes medication changes are as follows:    Continue Trulicity 1.5 QWeekly     VGo 20 cartridge  - 4 clicks with breakfast and lunch  - can consider 2 click with dinner to prevent lows   - 1-2 clicks with snack.     Check glucoses 4x a day (breakfast, lunch, dinner and bedtime), glucose logs given in clinic, please filled out, send in 1 month    Please contact my team if there are issue with your medications, remember to take it as directed to allow me to help manage your glucose  If your insurance does not cover the medications I prescribed, please contact us to help with alternative    We will  in-clinic visit in  6 months with labs prior to that appointment.    My staff will contact you to schedule the above.     Thank you,    Jacobo Coon MD  Endocrinology Fellow  [facility], 6th Floor  [address] [place], [place], [address]    Office: [phone]  Fax: [phone]

## 2020-06-09 NOTE — PROGRESS NOTES
Subjective:      Patient ID: Walter Bull is a 69 y.o. male.    Chief Complaint:  DM2 follow up    History of Present Illness  68 y.o. male with a diagnosis of type 2 DM diagnosed 25-30 year ago in his 40-50s. Hx of CAD h/o NSTEMI/ CABGx5, LBBB, Diastolic HF, AICD, T2DM, HTN, HLD, CKD 3. Here for follow up of his Diabetes management     Interval hx:  No lows, doing well  At this time. No issue with VGO, on Trulicity at max dose, report no SE. Still working, gardening.  Weight is stable    Glucose logs reviewed, hyperglycemia in high after breakfast and lunch    Current regiment   Trulicity 1.5 QWeekly   VGo 20 cartridge   - 3 clicks with meals   - 1-2 clicks with snack.       Prior diabetes medication:   NPH 30U in morning and 30U at night, NPH for 3 years  Janumet but unable to take Metformin.   Januvia: stop after starting Trulicity  He was on Lantus pen in the past but had to stop due to insurance change     Known diabetic complications: nephropathy and cardiovascular disease  Cardiovascular risk factors: advanced age (older than 55 for men, 65 for women), diabetes mellitus, dyslipidemia, male gender, obesity (BMI >= 30 kg/m2) and sedentary lifestyle     Eye exam current (within one year): yes 3/2020  Weight trend: increasing steadily  Prior visit with dietician: no  Current diet: well balanced   Working, eat breakfast, lunch, skipping dinner   Drink water: water, gatorade  Current exercise: none     Current monitoring regimen: home blood tests 4 times a day   Home blood sugar records (see below):           Vit D Insuf  Taking over the counter multivitamin     Vit D 1000 IU daily    CAD/CHF: medication review, close follow up with cardiology  On Crestor  On ARB: Losartan    Reviewed past medical, family, social history and updated as appropriate.      Review of Systems   Constitutional: Negative for activity change and unexpected weight change.   HENT: Negative for sore throat and voice change.    Eyes:  "Negative for visual disturbance.   Respiratory: Negative for shortness of breath.    Cardiovascular: Negative for chest pain.   Gastrointestinal: Negative for abdominal pain, constipation, diarrhea, nausea and vomiting.   Genitourinary: Negative for urgency.   Musculoskeletal: Negative for arthralgias.   Skin: Negative for wound.   Neurological: Negative for headaches.   Psychiatric/Behavioral: Negative for confusion and sleep disturbance.       Objective:   Physical Exam   Constitutional: He is oriented to person, place, and time. He appears well-developed. No distress.   HENT:   Head: Normocephalic and atraumatic.   Right Ear: External ear normal.   Left Ear: External ear normal.   Nose: Nose normal.   Eyes: Conjunctivae are normal. No scleral icterus.   Neck: No tracheal deviation present. No thyromegaly present.   Cardiovascular: Normal rate and normal heart sounds.   No murmur heard.  Pulmonary/Chest: Effort normal and breath sounds normal.   Abdominal: Soft. He exhibits no mass. There is no tenderness.   Musculoskeletal: Normal range of motion. He exhibits no edema.   Neurological: He is alert and oriented to person, place, and time. No sensory deficit. Coordination normal.   Skin: Skin is warm. No rash noted.   Psychiatric: He has a normal mood and affect. Judgment normal.   Nursing note and vitals reviewed.      /72   Pulse 60   Ht 5' 9" (1.753 m)   Wt 115.5 kg (254 lb 8.3 oz)   BMI 37.59 kg/m²     Body mass index is 37.59 kg/m².    Lab Review:   Lab Results   Component Value Date    HGBA1C 7.1 (H) 06/03/2020     Lab Results   Component Value Date    CHOL 131 02/13/2020    HDL 45 02/13/2020    LDLCALC 73.0 02/13/2020    TRIG 65 02/13/2020    CHOLHDL 34.4 02/13/2020     Lab Results   Component Value Date     06/03/2020    K 4.4 06/03/2020     06/03/2020    CO2 28 06/03/2020     (H) 06/03/2020    BUN 14 06/03/2020    CREATININE 1.6 (H) 06/03/2020    CALCIUM 9.0 06/03/2020    PROT " 7.1 05/19/2020    ALBUMIN 3.4 (L) 06/03/2020    BILITOT 0.7 05/19/2020    ALKPHOS 61 05/19/2020    AST 43 (H) 05/19/2020    ALT 53 (H) 05/19/2020    ANIONGAP 8 06/03/2020    ESTGFRAFRICA 49 (A) 06/03/2020    EGFRNONAA 43 (A) 06/03/2020    TSH 2.354 04/23/2020     Vit D, 25-Hydroxy   Date Value Ref Range Status   12/05/2019 27 (L) 30 - 96 ng/mL Final     Comment:     Vitamin D deficiency.........<10 ng/mL                              Vitamin D insufficiency......10-29 ng/mL       Vitamin D sufficiency........> or equal to 30 ng/mL  Vitamin D toxicity............>100 ng/mL         Assessment and Plan     Diabetes mellitus type 2 in obese  Type 2 diabetic, with good controlled, A1C is 7.1   DM is complicated by obesity, CAD/MI, CHF  Reviewed goals of therapy are to get the best control we can without hypoglycemia  Routine health maintenance/screening: UTD Lipid, A1C, Urine P/C, Eye, feet   Checking glucose 4x a day: trend with highs after lunch and at dinner. No lows documented.        Plan  - Adjust regiment:  VGO20 increase to 4 clicks with breakfast and lunch and 2 clicks for dinner,  1-2 clicks for snack  - Continue Trulicity 1.5  - Advised to check glucose 4x a day and send logs for review in 1 mo  - Advise patient about hypoglycemia, treatment of hypoglycemia, pt is aware  - Repeat lab work A1C In 6 month  - Diabetic supplies: refilled  - Close follow up 6 mo  - Advised that he follow up with eye exam  - Consider SGLT2 in the future if tighter glycemic control is needed  - On Crestor  - On ARB: Losartan      Chronic combined systolic and diastolic heart failure  - euvolemia at this time  - will consider SGLT2 in the future    Severe obesity (BMI 35.0-39.9) with comorbidity  - can worsening in insulin resistance  - on GLP1   - continue to monitor    S/P CABG (coronary artery bypass graft)  As per cardiology  On ASA, Statin     HLD (hyperlipidemia)  - on High intensity statin  - lipid done for 2020    Coronary  artery disease of native artery of native heart with stable angina pectoris  - stable, continue to follow up with cards    Vitamin D insufficiency  - on replacement therapy  - check level next visit    Long term current use of amiodarone  - TSH is stable      RTC in 6 mo     Jacobo Coon MD  Endocrinology Fellow  6/9/2020 2:02 PM

## 2020-07-02 ENCOUNTER — TELEPHONE (OUTPATIENT)
Dept: INTERNAL MEDICINE | Facility: CLINIC | Age: 70
End: 2020-07-02

## 2020-07-02 ENCOUNTER — TELEPHONE (OUTPATIENT)
Dept: CARDIOLOGY | Facility: HOSPITAL | Age: 70
End: 2020-07-02

## 2020-07-02 NOTE — TELEPHONE ENCOUNTER
----- Message from Mariza Wu sent at 2020  3:55 PM CDT -----  Regarding: COVID 19 Testing  Contact: Eve (friend)  Walter Bull  MRN: [id number]  : 1950  PCP: Belkys Kruger  Home Phone      [phone]  Work Phone      Not on file.  Mobile          [phone]  Home Phone      [phone]  Mobile          [phone]      MESSAGE:    She request to speak to a nurse regarding COVID 19 testing.     Phone # [phone]    Pharmacy - [facility]

## 2020-07-02 NOTE — TELEPHONE ENCOUNTER
Called the pt back on this afternoon and was successful in sending and receipt of manual tx.  As it turns out the pt had his CPAP machine in front of the monitor.  Pt will rearrange CPAP and remote monitor.

## 2020-07-02 NOTE — TELEPHONE ENCOUNTER
Scheduled Remote transmission on 6/29/20 was not received.  Contacted the patient on today to assist him/her with sending a manual transmission.  Contacted the pt on this afternoon to assist him in sending a manual transmission.  Pt was on his way home and asked that I call him back after 4 pm.  Will try again to assist the pt with this.  Remote monitor is showing as not communicating with pt's ICD.  Understanding was verbalized.

## 2020-07-02 NOTE — TELEPHONE ENCOUNTER
Instructed Eve that she can go on the CDC website to find the new guidelines and testing locations for COVID. Eve verbalized understanding.

## 2020-08-17 ENCOUNTER — OFFICE VISIT (OUTPATIENT)
Dept: INTERNAL MEDICINE | Facility: CLINIC | Age: 70
End: 2020-08-17
Payer: MEDICARE

## 2020-08-17 VITALS
SYSTOLIC BLOOD PRESSURE: 100 MMHG | DIASTOLIC BLOOD PRESSURE: 52 MMHG | OXYGEN SATURATION: 98 % | TEMPERATURE: 98 F | RESPIRATION RATE: 20 BRPM | HEIGHT: 69 IN | BODY MASS INDEX: 36.57 KG/M2 | WEIGHT: 246.94 LBS | HEART RATE: 60 BPM

## 2020-08-17 DIAGNOSIS — R09.82 POST-NASAL DRIP: Primary | ICD-10-CM

## 2020-08-17 DIAGNOSIS — K59.00 CONSTIPATION, UNSPECIFIED CONSTIPATION TYPE: ICD-10-CM

## 2020-08-17 PROCEDURE — 3074F SYST BP LT 130 MM HG: CPT | Mod: CPTII,S$GLB,, | Performed by: INTERNAL MEDICINE

## 2020-08-17 PROCEDURE — 1101F PT FALLS ASSESS-DOCD LE1/YR: CPT | Mod: CPTII,S$GLB,, | Performed by: INTERNAL MEDICINE

## 2020-08-17 PROCEDURE — 99213 PR OFFICE/OUTPT VISIT, EST, LEVL III, 20-29 MIN: ICD-10-PCS | Mod: S$GLB,,, | Performed by: INTERNAL MEDICINE

## 2020-08-17 PROCEDURE — 3008F PR BODY MASS INDEX (BMI) DOCUMENTED: ICD-10-PCS | Mod: CPTII,S$GLB,, | Performed by: INTERNAL MEDICINE

## 2020-08-17 PROCEDURE — 99213 OFFICE O/P EST LOW 20 MIN: CPT | Mod: S$GLB,,, | Performed by: INTERNAL MEDICINE

## 2020-08-17 PROCEDURE — 1126F AMNT PAIN NOTED NONE PRSNT: CPT | Mod: S$GLB,,, | Performed by: INTERNAL MEDICINE

## 2020-08-17 PROCEDURE — 3008F BODY MASS INDEX DOCD: CPT | Mod: CPTII,S$GLB,, | Performed by: INTERNAL MEDICINE

## 2020-08-17 PROCEDURE — 1126F PR PAIN SEVERITY QUANTIFIED, NO PAIN PRESENT: ICD-10-PCS | Mod: S$GLB,,, | Performed by: INTERNAL MEDICINE

## 2020-08-17 PROCEDURE — 1159F MED LIST DOCD IN RCRD: CPT | Mod: S$GLB,,, | Performed by: INTERNAL MEDICINE

## 2020-08-17 PROCEDURE — 99999 PR PBB SHADOW E&M-EST. PATIENT-LVL V: CPT | Mod: PBBFAC,,, | Performed by: INTERNAL MEDICINE

## 2020-08-17 PROCEDURE — 3078F DIAST BP <80 MM HG: CPT | Mod: CPTII,S$GLB,, | Performed by: INTERNAL MEDICINE

## 2020-08-17 PROCEDURE — 3078F PR MOST RECENT DIASTOLIC BLOOD PRESSURE < 80 MM HG: ICD-10-PCS | Mod: CPTII,S$GLB,, | Performed by: INTERNAL MEDICINE

## 2020-08-17 PROCEDURE — 99999 PR PBB SHADOW E&M-EST. PATIENT-LVL V: ICD-10-PCS | Mod: PBBFAC,,, | Performed by: INTERNAL MEDICINE

## 2020-08-17 PROCEDURE — 1101F PR PT FALLS ASSESS DOC 0-1 FALLS W/OUT INJ PAST YR: ICD-10-PCS | Mod: CPTII,S$GLB,, | Performed by: INTERNAL MEDICINE

## 2020-08-17 PROCEDURE — 1159F PR MEDICATION LIST DOCUMENTED IN MEDICAL RECORD: ICD-10-PCS | Mod: S$GLB,,, | Performed by: INTERNAL MEDICINE

## 2020-08-17 PROCEDURE — 3074F PR MOST RECENT SYSTOLIC BLOOD PRESSURE < 130 MM HG: ICD-10-PCS | Mod: CPTII,S$GLB,, | Performed by: INTERNAL MEDICINE

## 2020-08-17 RX ORDER — CARVEDILOL 12.5 MG/1
TABLET ORAL
COMMUNITY
Start: 2020-06-09 | End: 2020-08-17

## 2020-08-17 NOTE — PROGRESS NOTES
Subjective:       Patient ID: Walter Bull is a 69 y.o. male.    Chief Complaint: Dizziness and Cough      HPI:  Patient is known to me and presents with cough and congestion. Sx started 3-4 days ago. Reports worse at night when laying down. This has been occurring for year. Was worse a few nights ago but this only occurred once. Denies SOB, [place]. No hypoxia today. No loss taste or smell. No diarrhea or vomiting.    He is also having constipation. Sx started a few months ago. Tried miralax and ex-lax without much relief. Sx started when he started taking iron. Last BM was this morning and was normal after taking the medication. Denies abd pain, intermittent cramping.     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       Family History   Problem Relation Age of Onset    Diabetes Mother     Heart disease Mother     Hypertension Sister     Diabetes Sister     Heart disease Sister     Heart attack Brother     Colon cancer Neg Hx     Esophageal cancer Neg Hx     Stomach cancer Neg Hx        Social History     Socioeconomic History    Marital status:      Spouse name: Not on file    Number of children: Not on file    Years of education: Not on file    Highest education level: Not on file   Occupational History    Not on file   Social Needs    Financial resource strain: Not hard at all    Food insecurity     Worry: Never true     Inability: Never true    Transportation needs     Medical: No     Non-medical: No   Tobacco Use    Smoking status: Former Smoker      Packs/day: 3.00     Types: Cigarettes     Start date: 1963     Quit date: 1981     Years since quittin.6    Smokeless tobacco: Former User     Types: Snuff, Chew     Quit date:    Substance and Sexual Activity    Alcohol use: No     Frequency: Never     Drinks per session: Patient refused     Binge frequency: Never    Drug use: No    Sexual activity: Yes     Comment: -with a significant other   Lifestyle    Physical activity     Days per week: 5 days     Minutes per session: Not on file    Stress: Not at all   Relationships    Social connections     Talks on phone: Never     Gets together: Never     Attends [employer] service: Not on file     Active member of club or organization: No     Attends meetings of clubs or organizations: Never     Relationship status:    Other Topics Concern    Not on file   Social History Narrative    Not on file       Review of Systems   Constitutional: Negative for activity change, fatigue, fever and unexpected weight change.   HENT: Positive for postnasal drip. Negative for congestion, ear pain, hearing loss, rhinorrhea and sore throat.    Eyes: Negative for redness and visual disturbance.   Respiratory: Positive for cough. Negative for shortness of breath and wheezing.    Cardiovascular: Negative for chest pain, palpitations and leg swelling.   Gastrointestinal: Positive for constipation. Negative for abdominal pain, diarrhea, nausea and vomiting.   Genitourinary: Negative for decreased urine volume, dysuria, frequency and urgency.   Musculoskeletal: Negative for back pain, joint swelling and neck pain.   Skin: Negative for color change, rash and wound.   Neurological: Negative for dizziness, tremors, weakness, light-headedness and headaches.         Objective:      Physical Exam  Vitals signs reviewed.   Constitutional:       General: He is not in acute distress.     Appearance: He is well-developed.   HENT:      Head: Normocephalic and  atraumatic.      Right Ear: External ear normal.      Left Ear: External ear normal.      Nose: Nose normal.      Mouth/Throat:      Mouth: Mucous membranes are moist.      Pharynx: Oropharynx is clear. No oropharyngeal exudate.   Eyes:      General:         Right eye: No discharge.         Left eye: No discharge.      Conjunctiva/sclera: Conjunctivae normal.      Pupils: Pupils are equal, round, and reactive to light.   Neck:      Musculoskeletal: Neck supple.      Thyroid: No thyromegaly.   Cardiovascular:      Rate and Rhythm: Normal rate and regular rhythm.      Heart sounds: No murmur. No friction rub. No gallop.    Pulmonary:      Effort: Pulmonary effort is normal. No respiratory distress.      Breath sounds: Normal breath sounds. No wheezing or rales.   Abdominal:      General: Bowel sounds are normal. There is no distension.      Palpations: Abdomen is soft.      Tenderness: There is no abdominal tenderness. There is no guarding or rebound.   Lymphadenopathy:      Cervical: No cervical adenopathy.   Skin:     General: Skin is warm and dry.   Neurological:      Mental Status: He is alert and oriented to person, place, and time.      Cranial Nerves: No cranial nerve deficit.   Psychiatric:         Behavior: Behavior normal.         Assessment:       1. Post-nasal drip    2. Constipation, unspecified constipation type        Plan:       Walter was seen today for dizziness and cough.    Diagnoses and all orders for this visit:    Post-nasal drip  Start claritin daily  Saline nasal spray  No concerning sx for COVID--if wife sent him because she was concerned his cough was due to this  Warning signs discussed. Call for worsening sx or fever > 100 F    Constipation, unspecified constipation type  Likely due to iron  Daily miralax and colace recommended  Stay hydrated    RTC as scheudled for orutine and PRN

## 2020-08-23 ENCOUNTER — CLINICAL SUPPORT (OUTPATIENT)
Dept: CARDIOLOGY | Facility: HOSPITAL | Age: 70
End: 2020-08-23
Payer: MEDICARE

## 2020-08-23 DIAGNOSIS — Z95.810 PRESENCE OF AUTOMATIC (IMPLANTABLE) CARDIAC DEFIBRILLATOR: ICD-10-CM

## 2020-08-23 PROCEDURE — 93296 REM INTERROG EVL PM/IDS: CPT | Performed by: INTERNAL MEDICINE

## 2020-08-23 PROCEDURE — 93295 CARDIAC DEVICE CHECK - REMOTE: ICD-10-PCS | Mod: ,,, | Performed by: INTERNAL MEDICINE

## 2020-08-23 PROCEDURE — 93295 DEV INTERROG REMOTE 1/2/MLT: CPT | Mod: ,,, | Performed by: INTERNAL MEDICINE

## 2020-09-02 ENCOUNTER — OFFICE VISIT (OUTPATIENT)
Dept: INTERNAL MEDICINE | Facility: CLINIC | Age: 70
End: 2020-09-02
Payer: MEDICARE

## 2020-09-02 VITALS
RESPIRATION RATE: 19 BRPM | OXYGEN SATURATION: 98 % | DIASTOLIC BLOOD PRESSURE: 62 MMHG | SYSTOLIC BLOOD PRESSURE: 130 MMHG | HEART RATE: 60 BPM | HEIGHT: 69 IN | WEIGHT: 252 LBS | BODY MASS INDEX: 37.33 KG/M2 | TEMPERATURE: 98 F

## 2020-09-02 DIAGNOSIS — R31.9 HEMATURIA, UNSPECIFIED TYPE: Primary | ICD-10-CM

## 2020-09-02 LAB
BILIRUB SERPL-MCNC: ABNORMAL MG/DL
BLOOD URINE, POC: ABNORMAL
CLARITY, POC UA: ABNORMAL
COLOR, POC UA: ABNORMAL
GLUCOSE UR QL STRIP: 50
KETONES UR QL STRIP: ABNORMAL
LEUKOCYTE ESTERASE URINE, POC: ABNORMAL
NITRITE, POC UA: ABNORMAL
PH, POC UA: 5
PROTEIN, POC: ABNORMAL
SPECIFIC GRAVITY, POC UA: 1.02
UROBILINOGEN, POC UA: NORMAL

## 2020-09-02 PROCEDURE — 3078F DIAST BP <80 MM HG: CPT | Mod: CPTII,S$GLB,, | Performed by: INTERNAL MEDICINE

## 2020-09-02 PROCEDURE — 99999 PR PBB SHADOW E&M-EST. PATIENT-LVL V: CPT | Mod: PBBFAC,,, | Performed by: INTERNAL MEDICINE

## 2020-09-02 PROCEDURE — 3008F BODY MASS INDEX DOCD: CPT | Mod: CPTII,S$GLB,, | Performed by: INTERNAL MEDICINE

## 2020-09-02 PROCEDURE — 1101F PT FALLS ASSESS-DOCD LE1/YR: CPT | Mod: CPTII,S$GLB,, | Performed by: INTERNAL MEDICINE

## 2020-09-02 PROCEDURE — 87086 URINE CULTURE/COLONY COUNT: CPT

## 2020-09-02 PROCEDURE — 3008F PR BODY MASS INDEX (BMI) DOCUMENTED: ICD-10-PCS | Mod: CPTII,S$GLB,, | Performed by: INTERNAL MEDICINE

## 2020-09-02 PROCEDURE — 3075F SYST BP GE 130 - 139MM HG: CPT | Mod: CPTII,S$GLB,, | Performed by: INTERNAL MEDICINE

## 2020-09-02 PROCEDURE — 1159F MED LIST DOCD IN RCRD: CPT | Mod: S$GLB,,, | Performed by: INTERNAL MEDICINE

## 2020-09-02 PROCEDURE — 1101F PR PT FALLS ASSESS DOC 0-1 FALLS W/OUT INJ PAST YR: ICD-10-PCS | Mod: CPTII,S$GLB,, | Performed by: INTERNAL MEDICINE

## 2020-09-02 PROCEDURE — 3075F PR MOST RECENT SYSTOLIC BLOOD PRESS GE 130-139MM HG: ICD-10-PCS | Mod: CPTII,S$GLB,, | Performed by: INTERNAL MEDICINE

## 2020-09-02 PROCEDURE — 1126F PR PAIN SEVERITY QUANTIFIED, NO PAIN PRESENT: ICD-10-PCS | Mod: S$GLB,,, | Performed by: INTERNAL MEDICINE

## 2020-09-02 PROCEDURE — 99999 PR PBB SHADOW E&M-EST. PATIENT-LVL V: ICD-10-PCS | Mod: PBBFAC,,, | Performed by: INTERNAL MEDICINE

## 2020-09-02 PROCEDURE — 81002 POCT URINE DIPSTICK WITHOUT MICROSCOPE: ICD-10-PCS | Mod: S$GLB,,, | Performed by: INTERNAL MEDICINE

## 2020-09-02 PROCEDURE — 99213 OFFICE O/P EST LOW 20 MIN: CPT | Mod: 25,S$GLB,, | Performed by: INTERNAL MEDICINE

## 2020-09-02 PROCEDURE — 1159F PR MEDICATION LIST DOCUMENTED IN MEDICAL RECORD: ICD-10-PCS | Mod: S$GLB,,, | Performed by: INTERNAL MEDICINE

## 2020-09-02 PROCEDURE — 3078F PR MOST RECENT DIASTOLIC BLOOD PRESSURE < 80 MM HG: ICD-10-PCS | Mod: CPTII,S$GLB,, | Performed by: INTERNAL MEDICINE

## 2020-09-02 PROCEDURE — 99213 PR OFFICE/OUTPT VISIT, EST, LEVL III, 20-29 MIN: ICD-10-PCS | Mod: 25,S$GLB,, | Performed by: INTERNAL MEDICINE

## 2020-09-02 PROCEDURE — 81001 URINALYSIS AUTO W/SCOPE: CPT

## 2020-09-02 PROCEDURE — 81002 URINALYSIS NONAUTO W/O SCOPE: CPT | Mod: S$GLB,,, | Performed by: INTERNAL MEDICINE

## 2020-09-02 PROCEDURE — 1126F AMNT PAIN NOTED NONE PRSNT: CPT | Mod: S$GLB,,, | Performed by: INTERNAL MEDICINE

## 2020-09-02 NOTE — PROGRESS NOTES
Subjective:       Patient ID: Walter Bull is a 69 y.o. male.    Chief Complaint: Hematuria      HPI:  Patient is known to me and present with hematura. Sx started 1 week agol noticed blood in his urine.. Has occurred twice in last 1 week. Noticed, resolved for 24 hours, saw it again and then resolved since then. Denies dysuria. No frequency or urgency. Sometimes feels like he is not emptying is bladder completely. + nocturia 2-3x/night. No fevers. No flank pain.     Past Medical History:   Diagnosis Date    Anemia     Anticoagulant long-term use     CHF (congestive heart failure)     Colon cancer screening 2017    Coronary artery disease     Diabetes mellitus type I     Disorder of kidney and ureter     Encounter for blood transfusion     Glaucoma     Heart attack     2018    Heart disease     Hypertension     Iron deficiency     Kidney failure     post CABG    Pulmonary emphysema 2020    S/P CABG x 5 2017       Family History   Problem Relation Age of Onset    Diabetes Mother     Heart disease Mother     Hypertension Sister     Diabetes Sister     Heart disease Sister     Heart attack Brother     Colon cancer Neg Hx     Esophageal cancer Neg Hx     Stomach cancer Neg Hx        Social History     Socioeconomic History    Marital status:      Spouse name: Not on file    Number of children: Not on file    Years of education: Not on file    Highest education level: Not on file   Occupational History    Not on file   Social Needs    Financial resource strain: Not hard at all    Food insecurity     Worry: Never true     Inability: Never true    Transportation needs     Medical: No     Non-medical: No   Tobacco Use    Smoking status: Former Smoker     Packs/day: 3.00     Types: Cigarettes     Start date: 1963     Quit date: 1981     Years since quittin.6    Smokeless tobacco: Former User     Types: Snuff, Chew     Quit date:    Substance  and Sexual Activity    Alcohol use: No     Frequency: Never     Drinks per session: Patient refused     Binge frequency: Never    Drug use: No    Sexual activity: Yes     Comment: -with a significant other   Lifestyle    Physical activity     Days per week: 5 days     Minutes per session: Not on file    Stress: Not at all   Relationships    Social connections     Talks on phone: Never     Gets together: Never     Attends [employer] witness service: Not on file     Active member of club or organization: No     Attends meetings of clubs or organizations: Never     Relationship status:    Other Topics Concern    Not on file   Social History Narrative    Not on file       Review of Systems   Constitutional: Negative for activity change, fatigue, fever and unexpected weight change.   HENT: Negative for congestion, ear pain, hearing loss, rhinorrhea and sore throat.    Eyes: Negative for redness and visual disturbance.   Respiratory: Negative for cough, shortness of breath and wheezing.    Cardiovascular: Negative for chest pain, palpitations and leg swelling.   Gastrointestinal: Negative for abdominal pain, constipation, diarrhea, nausea and vomiting.   Genitourinary: Positive for difficulty urinating and hematuria. Negative for decreased urine volume, dysuria, frequency and urgency.        Nocturia   Musculoskeletal: Negative for back pain, joint swelling and neck pain.   Skin: Negative for color change, rash and wound.   Neurological: Negative for dizziness, tremors, weakness, light-headedness and headaches.         Objective:      Physical Exam  Vitals signs reviewed.   Constitutional:       General: He is not in acute distress.     Appearance: He is well-developed.   HENT:      Head: Normocephalic and atraumatic.      Right Ear: External ear normal.      Left Ear: External ear normal.      Nose: Nose normal.      Mouth/Throat:      Mouth: Mucous membranes are moist.      Pharynx: Oropharynx is clear.    Eyes:      Extraocular Movements: Extraocular movements intact.      Conjunctiva/sclera: Conjunctivae normal.      Pupils: Pupils are equal, round, and reactive to light.   Neck:      Musculoskeletal: Neck supple.      Thyroid: No thyromegaly.   Cardiovascular:      Rate and Rhythm: Normal rate and regular rhythm.      Heart sounds: No murmur.   Pulmonary:      Effort: Pulmonary effort is normal. No respiratory distress.      Breath sounds: Normal breath sounds.   Abdominal:      General: Bowel sounds are normal. There is no distension.      Palpations: Abdomen is soft.   Lymphadenopathy:      Cervical: No cervical adenopathy.   Skin:     General: Skin is warm and dry.   Neurological:      Mental Status: He is alert and oriented to person, place, and time.      Cranial Nerves: No cranial nerve deficit.   Psychiatric:         Behavior: Behavior normal.         Assessment:       1. Hematuria, unspecified type        Plan:       Walter was seen today for hematuria.    Diagnoses and all orders for this visit:    Hematuria, unspecified type  -     POCT urine dipstick without microscope  -     Urinalysis, Reflex to Urine Culture Urine, Clean Catch; Future    dipstick negative  Will send for formal UA today  Renal  10/2019 without mass or stones  Not have any other sx of UTI or stones  If negative UA today will monitor. If occurs call me that day to repeat UA. Would consider urology for cystoscopy for recurrent hematuria (h/o tobacco use)  Cont flomax for BPH

## 2020-09-03 LAB
BACTERIA #/AREA URNS AUTO: ABNORMAL /HPF
BILIRUB UR QL STRIP: NEGATIVE
CAOX CRY UR QL COMP ASSIST: ABNORMAL
CLARITY UR REFRACT.AUTO: ABNORMAL
COLOR UR AUTO: ABNORMAL
GLUCOSE UR QL STRIP: ABNORMAL
HGB UR QL STRIP: NEGATIVE
KETONES UR QL STRIP: NEGATIVE
LEUKOCYTE ESTERASE UR QL STRIP: ABNORMAL
MICROSCOPIC COMMENT: ABNORMAL
NITRITE UR QL STRIP: NEGATIVE
PH UR STRIP: 5 [PH] (ref 5–8)
PROT UR QL STRIP: NEGATIVE
RBC #/AREA URNS AUTO: 3 /HPF (ref 0–4)
SP GR UR STRIP: 1.02 (ref 1–1.03)
SQUAMOUS #/AREA URNS AUTO: 3 /HPF
URN SPEC COLLECT METH UR: ABNORMAL
WBC #/AREA URNS AUTO: 54 /HPF (ref 0–5)

## 2020-09-04 LAB — BACTERIA UR CULT: NO GROWTH

## 2020-09-05 ENCOUNTER — OFFICE VISIT (OUTPATIENT)
Dept: URGENT CARE | Facility: CLINIC | Age: 70
End: 2020-09-05
Payer: MEDICARE

## 2020-09-05 VITALS
DIASTOLIC BLOOD PRESSURE: 74 MMHG | WEIGHT: 251 LBS | SYSTOLIC BLOOD PRESSURE: 150 MMHG | OXYGEN SATURATION: 98 % | HEIGHT: 69 IN | TEMPERATURE: 99 F | HEART RATE: 59 BPM | BODY MASS INDEX: 37.18 KG/M2

## 2020-09-05 DIAGNOSIS — R82.90 URINE ABNORMALITY: Primary | ICD-10-CM

## 2020-09-05 LAB
BILIRUB UR QL STRIP: NEGATIVE
GLUCOSE SERPL-MCNC: 125 MG/DL (ref 70–110)
GLUCOSE UR QL STRIP: NEGATIVE
KETONES UR QL STRIP: NEGATIVE
LEUKOCYTE ESTERASE UR QL STRIP: POSITIVE
PH, POC UA: 6.5 (ref 5–8)
POC ANION GAP: 18 MMOL/L (ref 10–20)
POC BLOOD, URINE: NEGATIVE
POC BUN: 15 MMOL/L (ref 8–26)
POC CHLORIDE: 103 MMOL/L (ref 98–109)
POC CREATININE: 1.4 MG/DL (ref 0.6–1.3)
POC HEMATOCRIT: 42 %PCV (ref 42–52)
POC HEMOGLOBIN: 14.3 G/DL (ref 13.5–18)
POC ICA: 1.08 MMOL/L (ref 1.12–1.32)
POC NITRATES, URINE: NEGATIVE
POC POTASSIUM: 4.1 MMOL/L (ref 3.5–4.9)
POC SODIUM: 140 MMOL/L (ref 138–146)
POC TCO2: 25 MMOL/L (ref 24–29)
PROT UR QL STRIP: NEGATIVE
SP GR UR STRIP: 1 (ref 1–1.03)
UROBILINOGEN UR STRIP-ACNC: ABNORMAL (ref 0.3–2.2)

## 2020-09-05 PROCEDURE — 80047 POCT CHEMISTRY PANEL: ICD-10-PCS | Mod: QW,S$GLB,, | Performed by: PHYSICIAN ASSISTANT

## 2020-09-05 PROCEDURE — 81003 POCT URINALYSIS, DIPSTICK, AUTOMATED, W/O SCOPE: ICD-10-PCS | Mod: QW,S$GLB,, | Performed by: PHYSICIAN ASSISTANT

## 2020-09-05 PROCEDURE — 81003 URINALYSIS AUTO W/O SCOPE: CPT | Mod: QW,S$GLB,, | Performed by: PHYSICIAN ASSISTANT

## 2020-09-05 PROCEDURE — 99214 OFFICE O/P EST MOD 30 MIN: CPT | Mod: 25,S$GLB,, | Performed by: PHYSICIAN ASSISTANT

## 2020-09-05 PROCEDURE — 80047 BASIC METABLC PNL IONIZED CA: CPT | Mod: QW,S$GLB,, | Performed by: PHYSICIAN ASSISTANT

## 2020-09-05 PROCEDURE — 99214 PR OFFICE/OUTPT VISIT, EST, LEVL IV, 30-39 MIN: ICD-10-PCS | Mod: 25,S$GLB,, | Performed by: PHYSICIAN ASSISTANT

## 2020-09-05 NOTE — PROGRESS NOTES
"Subjective:       Patient ID: Walter Bull is a 69 y.o. male.    Vitals:  height is 5' 9" (1.753 m) and weight is 113.9 kg (251 lb). His temperature is 98.7 °F (37.1 °C). His blood pressure is 150/74 (abnormal) and his pulse is 59 (abnormal). His oxygen saturation is 98%.     Chief Complaint: Male  Problem    Male  Problem  The patient's pertinent negatives include no penile discharge, penile pain, scrotal swelling or testicular pain. This is a recurrent problem. The current episode started 1 to 4 weeks ago. The problem has been gradually worsening. Associated symptoms include frequency (at night more). Pertinent negatives include no abdominal pain, chills, dysuria, fever, nausea, rash, urgency or vomiting. Treatments tried: Flomax  The treatment provided no relief.       Constitution: Negative for chills and fever.   Neck: Negative for painful lymph nodes.   Gastrointestinal: Negative for abdominal pain, nausea and vomiting.   Genitourinary: Positive for frequency (at night more) and hematuria. Negative for dysuria, urgency, urine decreased, history of kidney stones, genital trauma, painful intercourse, genital sore, penile discharge, painful ejaculation, penile pain, penile swelling, scrotal swelling and testicular pain.        Urine odor and color change    Musculoskeletal: Negative for back pain.   Skin: Negative for rash and lesion.   Hematologic/Lymphatic: Negative for swollen lymph nodes.       Objective:      Physical Exam   Constitutional: He is oriented to person, place, and time. He appears well-developed.   HENT:   Head: Normocephalic and atraumatic.   Ears:   Right Ear: External ear normal.   Left Ear: External ear normal.   Nose: Nose normal.   Mouth/Throat: Mucous membranes are normal.   Eyes: Conjunctivae and lids are normal.   Neck: Trachea normal and full passive range of motion without pain. Neck supple.   Cardiovascular: Normal rate, regular rhythm and normal heart sounds. "   Pulmonary/Chest: Effort normal and breath sounds normal. No respiratory distress.   Abdominal: Soft. Normal appearance and bowel sounds are normal. He exhibits no distension, no abdominal bruit, no pulsatile midline mass and no mass. There is no abdominal tenderness. There is no left CVA tenderness and no right CVA tenderness. No hernia.   Musculoskeletal: Normal range of motion.   Neurological: He is alert and oriented to person, place, and time. He has normal strength.   Skin: Skin is warm, dry, intact, not diaphoretic and not pale. Psychiatric: His speech is normal and behavior is normal. Judgment and thought content normal.   Nursing note and vitals reviewed.        Assessment:       1. Urine abnormality        Plan:         Urine abnormality  -     POCT Urinalysis, Dipstick, Automated, W/O Scope  -     POCT Chemistry Panel  -     Ambulatory referral/consult to Urology      Results for orders placed or performed in visit on 09/05/20   POCT Urinalysis, Dipstick, Automated, W/O Scope   Result Value Ref Range    POC Blood, Urine Negative Negative    POC Bilirubin, Urine Negative Negative    POC Urobilinogen, Urine abnormal 0.3 - 2.2    POC Ketones, Urine Negative Negative    POC Protein, Urine Negative Negative    POC Nitrates, Urine Negative Negative    POC Glucose, Urine Negative Negative    pH, UA 6.5 5 - 8    POC Specific Gravity, Urine 1.005 1.003 - 1.029    POC Leukocytes, Urine Positive (A) Negative   POCT Chemistry Panel   Result Value Ref Range    POC Sodium 140 138 - 146 MMOL/L    POC Potassium 4.1 3.5 - 4.9 MMOL/L    POC Chloride 103 98 - 109 MMOL/L    POC BUN 15 8 - 26 MMOL/L    POC Glucose 125 (A) 70 - 110 MG/DL    POC Creatinine 1.4 (A) 0.6 - 1.3 mg/dL    POC iCA 1.08 (A) 1.12 - 1.32 MMOL/L    POC TCO2 25 24 - 29 MMOL/L    POC Hematocrit 42 42 - 52 %PCV    POC Hemoglobin 14.3 13.5 - 18 g/dL    POC Anion Gap 18 10.0 - 20 MMOL/L     Patient had recent urinalysis which showed positive glucose and white  blood cells.  Urine culture was done on the 2nd of this month which showed no growth.  Urinalysis today with 10 white blood cells present.  Patient's kidney function is stable.  Creatinine today is 1.4.  Most recent comparison was 1.6 and prior to that 2.1.  Will send Urology referral and urged close follow-up with both his primary care physician as well as reality.  Return over the weekend if symptoms do not improve/worsened.  He states understanding and is in agreement.    Patient Instructions     Blood in the Urine    Blood in the urine (hematuria) has many possible causes. If it occurs after an injury (such as a car accident or fall), it is most often a sign of bruising to the kidney or bladder. Common causes of blood in the urine include urinary tract infections, kidney stones, inflammation, tumors, or certain other diseases of the kidney or bladder. Menstruation can cause blood to appear in the urine sample, although it is not coming from the urinary tract.  If only a trace amount of blood is present, it will show up on the urine test, even though the urine may be yellow and not pink or red. This may occur with any of the above conditions, as well as heavy exercise or high fever. In this case, your doctor may want to repeat the urine test on another day. This will show if the blood is still present. If it is, then other tests can be done to find out the cause.  Home care  Follow these home care guidelines:  · If your urine does not appear bloody (pink, brown or red) then you do not need to restrict your activity in any way.  · If you can see blood in your urine, rest and avoid heavy exertion until your next exam. Do not use aspirin, blood thinners, or anti-platelet or anti-inflammatory medicines. These include ibuprofen and naproxen. These thin the blood and may increase bleeding.  Follow-up care  Follow up with your healthcare provider, or as advised. If you were injured and had blood in your urine, you  should have a repeat urine test in 1 to 2 days. Contact your doctor for this test.  A radiologist will review any X-rays that were taken. You will be told of any new findings that may affect your care.  When to seek medical advice  Call your healthcare provider right away if any of these occur:  · Bright red blood or blood clots in the urine (if you did not have this before)  · Weakness, dizziness or fainting  · New groin, abdominal, or back pain  · Fever of 100.4ºF (38ºC) or higher, or as directed by your healthcare provider  · Repeated vomiting  · Bleeding from the nose or gums or easy bruising  Date Last Reviewed: 9/1/2016  © 7644-0502 [employer]. [address] [place] [address], [place] [address]. All rights reserved. This information is not intended as a substitute for professional medical care. Always follow your healthcare professional's instructions.

## 2020-09-08 ENCOUNTER — PATIENT OUTREACH (OUTPATIENT)
Dept: ADMINISTRATIVE | Facility: OTHER | Age: 70
End: 2020-09-08

## 2020-09-08 NOTE — PROGRESS NOTES
LINKS immunization registry updated  Health Maintenance updated  Chart reviewed for overdue Proactive [facility] Encounters (NONA) health maintenance testing (CRS, Breast Ca, Diabetic Eye Exam)   Orders entered:N/A

## 2020-09-09 ENCOUNTER — OFFICE VISIT (OUTPATIENT)
Dept: UROLOGY | Facility: CLINIC | Age: 70
End: 2020-09-09
Payer: MEDICARE

## 2020-09-09 VITALS
HEART RATE: 62 BPM | DIASTOLIC BLOOD PRESSURE: 63 MMHG | WEIGHT: 248.56 LBS | HEIGHT: 69 IN | BODY MASS INDEX: 36.81 KG/M2 | SYSTOLIC BLOOD PRESSURE: 113 MMHG

## 2020-09-09 DIAGNOSIS — R82.90 ABNORMAL URINE ODOR: ICD-10-CM

## 2020-09-09 DIAGNOSIS — R39.89 ABNORMAL URINE COLOR: ICD-10-CM

## 2020-09-09 DIAGNOSIS — N13.8 BPH WITH URINARY OBSTRUCTION: Primary | ICD-10-CM

## 2020-09-09 DIAGNOSIS — N40.1 BPH WITH URINARY OBSTRUCTION: Primary | ICD-10-CM

## 2020-09-09 DIAGNOSIS — N18.30 CKD (CHRONIC KIDNEY DISEASE) STAGE 3, GFR 30-59 ML/MIN: ICD-10-CM

## 2020-09-09 LAB
BILIRUB SERPL-MCNC: NORMAL MG/DL
BLOOD URINE, POC: NORMAL
CLARITY, POC UA: CLEAR
COLOR, POC UA: YELLOW
GLUCOSE UR QL STRIP: NORMAL
KETONES UR QL STRIP: NORMAL
LEUKOCYTE ESTERASE URINE, POC: NORMAL
NITRITE, POC UA: NORMAL
PH, POC UA: 5
PROTEIN, POC: NORMAL
SPECIFIC GRAVITY, POC UA: 1.02
UROBILINOGEN, POC UA: NORMAL

## 2020-09-09 PROCEDURE — 1101F PT FALLS ASSESS-DOCD LE1/YR: CPT | Mod: CPTII,S$GLB,, | Performed by: NURSE PRACTITIONER

## 2020-09-09 PROCEDURE — 99214 OFFICE O/P EST MOD 30 MIN: CPT | Mod: 25,S$GLB,, | Performed by: NURSE PRACTITIONER

## 2020-09-09 PROCEDURE — 81002 URINALYSIS NONAUTO W/O SCOPE: CPT | Mod: S$GLB,,, | Performed by: NURSE PRACTITIONER

## 2020-09-09 PROCEDURE — 1159F MED LIST DOCD IN RCRD: CPT | Mod: S$GLB,,, | Performed by: NURSE PRACTITIONER

## 2020-09-09 PROCEDURE — 3078F DIAST BP <80 MM HG: CPT | Mod: CPTII,S$GLB,, | Performed by: NURSE PRACTITIONER

## 2020-09-09 PROCEDURE — 3074F PR MOST RECENT SYSTOLIC BLOOD PRESSURE < 130 MM HG: ICD-10-PCS | Mod: CPTII,S$GLB,, | Performed by: NURSE PRACTITIONER

## 2020-09-09 PROCEDURE — 3078F PR MOST RECENT DIASTOLIC BLOOD PRESSURE < 80 MM HG: ICD-10-PCS | Mod: CPTII,S$GLB,, | Performed by: NURSE PRACTITIONER

## 2020-09-09 PROCEDURE — 99999 PR PBB SHADOW E&M-EST. PATIENT-LVL V: CPT | Mod: PBBFAC,,, | Performed by: NURSE PRACTITIONER

## 2020-09-09 PROCEDURE — 1126F AMNT PAIN NOTED NONE PRSNT: CPT | Mod: S$GLB,,, | Performed by: NURSE PRACTITIONER

## 2020-09-09 PROCEDURE — 3008F PR BODY MASS INDEX (BMI) DOCUMENTED: ICD-10-PCS | Mod: CPTII,S$GLB,, | Performed by: NURSE PRACTITIONER

## 2020-09-09 PROCEDURE — 99999 PR PBB SHADOW E&M-EST. PATIENT-LVL V: ICD-10-PCS | Mod: PBBFAC,,, | Performed by: NURSE PRACTITIONER

## 2020-09-09 PROCEDURE — 99214 PR OFFICE/OUTPT VISIT, EST, LEVL IV, 30-39 MIN: ICD-10-PCS | Mod: 25,S$GLB,, | Performed by: NURSE PRACTITIONER

## 2020-09-09 PROCEDURE — 1126F PR PAIN SEVERITY QUANTIFIED, NO PAIN PRESENT: ICD-10-PCS | Mod: S$GLB,,, | Performed by: NURSE PRACTITIONER

## 2020-09-09 PROCEDURE — 1159F PR MEDICATION LIST DOCUMENTED IN MEDICAL RECORD: ICD-10-PCS | Mod: S$GLB,,, | Performed by: NURSE PRACTITIONER

## 2020-09-09 PROCEDURE — 81002 POCT URINE DIPSTICK WITHOUT MICROSCOPE: ICD-10-PCS | Mod: S$GLB,,, | Performed by: NURSE PRACTITIONER

## 2020-09-09 PROCEDURE — 3008F BODY MASS INDEX DOCD: CPT | Mod: CPTII,S$GLB,, | Performed by: NURSE PRACTITIONER

## 2020-09-09 PROCEDURE — 1101F PR PT FALLS ASSESS DOC 0-1 FALLS W/OUT INJ PAST YR: ICD-10-PCS | Mod: CPTII,S$GLB,, | Performed by: NURSE PRACTITIONER

## 2020-09-09 PROCEDURE — 3074F SYST BP LT 130 MM HG: CPT | Mod: CPTII,S$GLB,, | Performed by: NURSE PRACTITIONER

## 2020-09-17 DIAGNOSIS — D50.9 IRON DEFICIENCY ANEMIA, UNSPECIFIED IRON DEFICIENCY ANEMIA TYPE: ICD-10-CM

## 2020-09-17 DIAGNOSIS — D47.2 MONOCLONAL GAMMOPATHY OF UNDETERMINED SIGNIFICANCE: Primary | ICD-10-CM

## 2020-09-23 NOTE — PROGRESS NOTES
HEMATOLOGY/ONCOLOGY Progress Note    Hematology Hx:  Mr. Bull is a 68 y/o PMHx of CHF, CAD s/p CABG in 2005 complicated by HARRIS, HTN, DM II, CKD II, who is referred by Nephrology for recently found abnormal paraproteinemia. Patient was noted to have a IgG Lambda specific monoclonal band of 0.24 g/dL in gamma on 1/15/18. His renal function remains relatively stable with Cr of 1.0 mg/dL and calcium relatively unremarkable. He does have history of mild normocytic anemia. No significant proteinuria. Urine microalbumin/crt ratio of 4.2. No reported bone pain. No weight loss or generalized weakness. Denies any fever/chills, night sweats. Denies any melena or hematochezia. Had a colonoscopy done in Feb, 2017 which was unremarkable.   - diagnosed with Iron Deficiency Anemia 2/1/18  (feritin 20 ng/mL) - on oral supplements   - seen by GI - Dr. Cunningham - plan for EGD upon cardiac clearance.   - IgG Lambda Paraproteinemia - initial work-up unremarkable, currently under surveillance    Interval Hx:  Mr. Bull is a 68 y/o PMHx of CHF, CAD s/p CABG in 2005 complicated by HARRIS, HTN, DM II, CKD II, who was initially referred for evaluation of IgG Lambda Paraproteinemia here for follow up. At prior visit he was also noted to have Iron Deficiency.  His GI work ups were unremarkable, including H-Pylori. GI endoscopy still pending, per patient will f/u with PCP/GI.  Continue to take oral iron twice a day. Per patient arthritic pain on right leg at times. Some activity intolerance and exertional dyspnea present at times. Otherwise, doing well overall. No signs symptoms of progression today.      Allergies:   Patient has no known allergies.      Medications:     Current Outpatient Medications   Medication Sig Dispense Refill    amiodarone (PACERONE) 200 MG Tab Take 1 tablet (200 mg total) by mouth once daily. 90 tablet 3    ammonium lactate 12 % Crea Apply small amount to feet except for in between toes twice a day 1 Bottle 6    aspirin  (ECOTRIN) 81 MG EC tablet Take 1 tablet (81 mg total) by mouth once daily. 90 tablet 3    blood sugar diagnostic Strp 1 strip by Misc.(Non-Drug; Combo Route) route 4 (four) times daily. Dispense: meter covered by insurance 200 strip 11    blood-glucose meter kit Use as instructed, Dispense: meter covered by insurance 1 each 0    carvedilol (COREG) 6.25 MG tablet Take 1 tablet (6.25 mg total) by mouth 2 (two) times daily. 60 tablet 1    dulaglutide (TRULICITY) 1.5 mg/0.5 mL PnIj Inject 1.5 mg into the skin once a week. 4 Syringe 11    esomeprazole (NEXIUM) 40 MG capsule TAKE 1 CAPSULE BY MOUTH  BEFORE BREAKFAST 90 capsule 1    ezetimibe (ZETIA) 10 mg tablet Take 1 tablet (10 mg total) by mouth once daily. 90 tablet 3    ferrous sulfate (FEOSOL) 325 mg (65 mg iron) Tab tablet TAKE 1 TABLET BY MOUTH THREE TIMES DAILY 90 tablet 3    furosemide (LASIX) 20 MG tablet TAKE 1 TABLET BY MOUTH ONCE DAILY 90 tablet 3    insulin lispro 100 unit/mL injection For 2 x 10ml vials required per month for use in VGO 20 20 mL 11    isosorbide mononitrate (IMDUR) 120 MG 24 hr tablet Take 1 tablet (120 mg total) by mouth once daily. 90 tablet 3    lancets Misc 1 lancet by Misc.(Non-Drug; Combo Route) route 2 (two) times daily with meals. 100 each 11    lancing device Misc 1 Device by Misc.(Non-Drug; Combo Route) route 2 (two) times daily with meals. 1 each 0    losartan (COZAAR) 25 MG tablet Take 1 tablet (25 mg total) by mouth once daily. 90 tablet 3    ranolazine (RANEXA) 1,000 mg Tb12 TAKE 1 TABLET BY MOUTH  TWICE DAILY 60 tablet 11    rosuvastatin (CRESTOR) 40 MG Tab Take 1 tablet (40 mg total) by mouth once daily. 90 tablet 3    sub-q insulin device, 20 unit (VGO 20) [place] 1 Device by Misc.(Non-Drug; Combo Route) route once daily. 30 Device 11    tamsulosin (FLOMAX) 0.4 mg Cap TAKE 1 CAPSULE ONCE DAILY 90 capsule 3    ticagrelor (BRILINTA) 90 mg tablet Take 1 tablet (90 mg total) by mouth 2 (two) times daily. 180  tablet 3    nitroGLYCERIN (NITROSTAT) 0.4 MG SL tablet DISSOLVE 1 TABLET UNDER  TONGUE EVERY 5 MINUTES AS  NEEDED FOR CHEST PAIN MAX 3 TABS IN 15 MINUTES, CALL  911 IF CHEST PAIN PERSISTS (Patient not taking: Reported on 9/25/2020) 25 tablet 14     No current facility-administered medications for this visit.          Review Of Systems:   Constitutional: Negative for activity change, fatigue, fever and unexpected weight change.   HENT: Positive for hearing loss. Negative for congestion, ear pain, rhinorrhea and sore throat.    Eyes: Negative for redness and visual disturbance.   Respiratory: Negative for cough, shortness of breath and wheezing.    Cardiovascular: Negative for chest pain, palpitations. Chronic BLE swelling +2.  Gastrointestinal: Negative for abdominal pain, blood in stool, diarrhea. Occasional constipation present.   Genitourinary: Negative for decreased urine volume, dysuria, frequency and urgency.   Musculoskeletal: Negative for back pain, joint swelling and neck pain.   Skin: Negative for color change, rash and wound.   Neurological: Negative for dizziness, tremors, weakness, light-headedness and headaches.     Physical Exam:   Performance Status: 1  Constitutional: He is oriented to person, place, and time. He appears well-developed and well-nourished. No distress.   HENT:   Head: Normocephalic and atraumatic.   Right Ear: External ear normal.   Left Ear: External ear normal.   Eyes: Conjunctivae and EOM are normal. Pupils are equal, round, and reactive to light. Right eye exhibits no discharge. Left eye exhibits no discharge.   Neck: Neck supple. No tracheal deviation present.   Cardiovascular: Normal rate and regular rhythm.    No murmur heard.  Pulmonary/Chest: Effort normal and breath sounds normal. No respiratory distress. He has no wheezes. He has no rales.   Abdominal: Soft. Bowel sounds are normal. He exhibits no distension. There is no tenderness.   Musculoskeletal: He exhibits no edema.    Neurological: He is alert and oriented to person, place, and time. No cranial nerve deficit.   Skin: Skin is warm and dry.   Psychiatric: He has a normal mood and affect. His behavior is normal.   Lab Results   Component Value Date    WBC 5.20 09/25/2020    HGB 12.1 (L) 09/25/2020    HCT 37.6 (L) 09/25/2020    MCV 94 09/25/2020     09/25/2020     CMP  Sodium   Date Value Ref Range Status   09/25/2020 138 136 - 145 mmol/L Final     Potassium   Date Value Ref Range Status   09/25/2020 4.1 3.5 - 5.1 mmol/L Final     Chloride   Date Value Ref Range Status   09/25/2020 104 95 - 110 mmol/L Final     CO2   Date Value Ref Range Status   09/25/2020 26 23 - 29 mmol/L Final     Glucose   Date Value Ref Range Status   09/25/2020 108 70 - 110 mg/dL Final     BUN, Bld   Date Value Ref Range Status   09/25/2020 13 8 - 23 mg/dL Final     Creatinine   Date Value Ref Range Status   09/25/2020 1.5 (H) 0.5 - 1.4 mg/dL Final     Calcium   Date Value Ref Range Status   09/25/2020 8.5 (L) 8.7 - 10.5 mg/dL Final     Total Protein   Date Value Ref Range Status   09/25/2020 6.6 6.0 - 8.4 g/dL Final     Albumin   Date Value Ref Range Status   09/25/2020 3.5 3.5 - 5.2 g/dL Final     Total Bilirubin   Date Value Ref Range Status   09/25/2020 0.6 0.1 - 1.0 mg/dL Final     Comment:     For infants and newborns, interpretation of results should be based  on gestational age, weight and in agreement with clinical  observations.  Premature Infant recommended reference ranges:  Up to 24 hours.............<8.0 mg/dL  Up to 48 hours............<12.0 mg/dL  3-5 days..................<15.0 mg/dL  6-29 days.................<15.0 mg/dL       Alkaline Phosphatase   Date Value Ref Range Status   09/25/2020 62 55 - 135 U/L Final     AST   Date Value Ref Range Status   09/25/2020 33 10 - 40 U/L Final     ALT   Date Value Ref Range Status   09/25/2020 40 10 - 44 U/L Final     Anion Gap   Date Value Ref Range Status   09/25/2020 8 8 - 16 mmol/L Final      eGFR if    Date Value Ref Range Status   09/25/2020 53.4 (A) >60 mL/min/1.73 m^2 Final     eGFR if non    Date Value Ref Range Status   09/25/2020 46.2 (A) >60 mL/min/1.73 m^2 Final     Comment:     Calculation used to obtain the estimated glomerular filtration  rate (eGFR) is the CKD-EPI equation.          Diagnostic Tests:   Labs: Reviewed    Recent Results (from the past 24 hour(s))   CBC auto differential    Collection Time: 09/25/20  7:06 AM   Result Value Ref Range    WBC 5.20 3.90 - 12.70 K/uL    RBC 4.02 (L) 4.60 - 6.20 M/uL    Hemoglobin 12.1 (L) 14.0 - 18.0 g/dL    Hematocrit 37.6 (L) 40.0 - 54.0 %    Mean Corpuscular Volume 94 82 - 98 fL    Mean Corpuscular Hemoglobin 30.1 27.0 - 31.0 pg    Mean Corpuscular Hemoglobin Conc 32.2 32.0 - 36.0 g/dL    RDW 14.6 (H) 11.5 - 14.5 %    Platelets 163 150 - 350 K/uL    MPV 10.0 9.2 - 12.9 fL    Immature Granulocytes 0.2 0.0 - 0.5 %    Gran # (ANC) 2.4 1.8 - 7.7 K/uL    Immature Grans (Abs) 0.01 0.00 - 0.04 K/uL    Lymph # 1.9 1.0 - 4.8 K/uL    Mono # 0.6 0.3 - 1.0 K/uL    Eos # 0.2 0.0 - 0.5 K/uL    Baso # 0.04 0.00 - 0.20 K/uL    nRBC 0 0 /100 WBC    Gran% 46.5 38.0 - 73.0 %    Lymph% 36.0 18.0 - 48.0 %    Mono% 12.3 4.0 - 15.0 %    Eosinophil% 4.2 0.0 - 8.0 %    Basophil% 0.8 0.0 - 1.9 %    Differential Method Automated    Comprehensive metabolic panel    Collection Time: 09/25/20  7:06 AM   Result Value Ref Range    Sodium 138 136 - 145 mmol/L    Potassium 4.1 3.5 - 5.1 mmol/L    Chloride 104 95 - 110 mmol/L    CO2 26 23 - 29 mmol/L    Glucose 108 70 - 110 mg/dL    BUN, Bld 13 8 - 23 mg/dL    Creatinine 1.5 (H) 0.5 - 1.4 mg/dL    Calcium 8.5 (L) 8.7 - 10.5 mg/dL    Total Protein 6.6 6.0 - 8.4 g/dL    Albumin 3.5 3.5 - 5.2 g/dL    Total Bilirubin 0.6 0.1 - 1.0 mg/dL    Alkaline Phosphatase 62 55 - 135 U/L    AST 33 10 - 40 U/L    ALT 40 10 - 44 U/L    Anion Gap 8 8 - 16 mmol/L    eGFR if African American 53.4 (A) >60 mL/min/1.73  m^2    eGFR if non  46.2 (A) >60 mL/min/1.73 m^2   Ferritin    Collection Time: 09/25/20  7:06 AM   Result Value Ref Range    Ferritin 135 20.0 - 300.0 ng/mL   Immunoglobulins (IgG, IgA, IgM) Quantitative    Collection Time: 09/25/20  7:06 AM   Result Value Ref Range    IgG - Serum 1122 650 - 1600 mg/dL    IgA 211 40 - 350 mg/dL    IgM 46 (L) 50 - 300 mg/dL   Protein electrophoresis, serum    Collection Time: 09/25/20  7:06 AM   Result Value Ref Range    Protein, Serum 6.2 6.0 - 8.4 g/dL       Assessment:     1. Monoclonal gammopathy of undetermined significance     2. Iron deficiency anemia, unspecified iron deficiency anemia type       Recommendations:     1. IgG Lambda MGUS   -no overt CRAB  Today   -mild anemia; ckd stable and  related to other issues   -Previous biochemical studies stable with only  mild increase in paraprotein. Today's lab pending    2. Iron Deficiency Anemia/Normocytic Anemia   -  GI work ups unremarkable to r/o GI blood loss and or iron malaborption from h pylori. Pending endoscopy. Patient will f/u with GI  -  contniue oral po supplementation, fes04 BID; hgb and ferritin uptrending       FU:-cbc, cmp, serum free light chains, ferritin,  quantitative immunoglobulins, serum electropheresis, serum immunofixation and np appt in 1 year     Aneta Albright NP  Hematology/Oncology/BMT

## 2020-09-25 ENCOUNTER — LAB VISIT (OUTPATIENT)
Dept: LAB | Facility: HOSPITAL | Age: 70
End: 2020-09-25
Payer: MEDICARE

## 2020-09-25 ENCOUNTER — OFFICE VISIT (OUTPATIENT)
Dept: HEMATOLOGY/ONCOLOGY | Facility: CLINIC | Age: 70
End: 2020-09-25
Payer: MEDICARE

## 2020-09-25 VITALS
BODY MASS INDEX: 37.48 KG/M2 | HEIGHT: 69 IN | OXYGEN SATURATION: 97 % | WEIGHT: 253.06 LBS | TEMPERATURE: 98 F | SYSTOLIC BLOOD PRESSURE: 122 MMHG | HEART RATE: 60 BPM | DIASTOLIC BLOOD PRESSURE: 60 MMHG | RESPIRATION RATE: 16 BRPM

## 2020-09-25 DIAGNOSIS — D50.9 IRON DEFICIENCY ANEMIA, UNSPECIFIED IRON DEFICIENCY ANEMIA TYPE: ICD-10-CM

## 2020-09-25 DIAGNOSIS — D47.2 MONOCLONAL GAMMOPATHY OF UNDETERMINED SIGNIFICANCE: Primary | ICD-10-CM

## 2020-09-25 DIAGNOSIS — D47.2 MONOCLONAL GAMMOPATHY OF UNDETERMINED SIGNIFICANCE: ICD-10-CM

## 2020-09-25 LAB
ALBUMIN SERPL BCP-MCNC: 3.5 G/DL (ref 3.5–5.2)
ALP SERPL-CCNC: 62 U/L (ref 55–135)
ALT SERPL W/O P-5'-P-CCNC: 40 U/L (ref 10–44)
ANION GAP SERPL CALC-SCNC: 8 MMOL/L (ref 8–16)
AST SERPL-CCNC: 33 U/L (ref 10–40)
BASOPHILS # BLD AUTO: 0.04 K/UL (ref 0–0.2)
BASOPHILS NFR BLD: 0.8 % (ref 0–1.9)
BILIRUB SERPL-MCNC: 0.6 MG/DL (ref 0.1–1)
BUN SERPL-MCNC: 13 MG/DL (ref 8–23)
CALCIUM SERPL-MCNC: 8.5 MG/DL (ref 8.7–10.5)
CHLORIDE SERPL-SCNC: 104 MMOL/L (ref 95–110)
CO2 SERPL-SCNC: 26 MMOL/L (ref 23–29)
CREAT SERPL-MCNC: 1.5 MG/DL (ref 0.5–1.4)
DIFFERENTIAL METHOD: ABNORMAL
EOSINOPHIL # BLD AUTO: 0.2 K/UL (ref 0–0.5)
EOSINOPHIL NFR BLD: 4.2 % (ref 0–8)
ERYTHROCYTE [DISTWIDTH] IN BLOOD BY AUTOMATED COUNT: 14.6 % (ref 11.5–14.5)
EST. GFR  (AFRICAN AMERICAN): 53.4 ML/MIN/1.73 M^2
EST. GFR  (NON AFRICAN AMERICAN): 46.2 ML/MIN/1.73 M^2
FERRITIN SERPL-MCNC: 135 NG/ML (ref 20–300)
GLUCOSE SERPL-MCNC: 108 MG/DL (ref 70–110)
HCT VFR BLD AUTO: 37.6 % (ref 40–54)
HGB BLD-MCNC: 12.1 G/DL (ref 14–18)
IGA SERPL-MCNC: 211 MG/DL (ref 40–350)
IGG SERPL-MCNC: 1122 MG/DL (ref 650–1600)
IGM SERPL-MCNC: 46 MG/DL (ref 50–300)
IMM GRANULOCYTES # BLD AUTO: 0.01 K/UL (ref 0–0.04)
IMM GRANULOCYTES NFR BLD AUTO: 0.2 % (ref 0–0.5)
LYMPHOCYTES # BLD AUTO: 1.9 K/UL (ref 1–4.8)
LYMPHOCYTES NFR BLD: 36 % (ref 18–48)
MCH RBC QN AUTO: 30.1 PG (ref 27–31)
MCHC RBC AUTO-ENTMCNC: 32.2 G/DL (ref 32–36)
MCV RBC AUTO: 94 FL (ref 82–98)
MONOCYTES # BLD AUTO: 0.6 K/UL (ref 0.3–1)
MONOCYTES NFR BLD: 12.3 % (ref 4–15)
NEUTROPHILS # BLD AUTO: 2.4 K/UL (ref 1.8–7.7)
NEUTROPHILS NFR BLD: 46.5 % (ref 38–73)
NRBC BLD-RTO: 0 /100 WBC
PLATELET # BLD AUTO: 163 K/UL (ref 150–350)
PMV BLD AUTO: 10 FL (ref 9.2–12.9)
POTASSIUM SERPL-SCNC: 4.1 MMOL/L (ref 3.5–5.1)
PROT SERPL-MCNC: 6.6 G/DL (ref 6–8.4)
RBC # BLD AUTO: 4.02 M/UL (ref 4.6–6.2)
SODIUM SERPL-SCNC: 138 MMOL/L (ref 136–145)
WBC # BLD AUTO: 5.2 K/UL (ref 3.9–12.7)

## 2020-09-25 PROCEDURE — 84165 PROTEIN E-PHORESIS SERUM: CPT

## 2020-09-25 PROCEDURE — 1101F PR PT FALLS ASSESS DOC 0-1 FALLS W/OUT INJ PAST YR: ICD-10-PCS | Mod: CPTII,S$GLB,, | Performed by: NURSE PRACTITIONER

## 2020-09-25 PROCEDURE — 3078F DIAST BP <80 MM HG: CPT | Mod: CPTII,S$GLB,, | Performed by: NURSE PRACTITIONER

## 2020-09-25 PROCEDURE — 99999 PR PBB SHADOW E&M-EST. PATIENT-LVL V: CPT | Mod: PBBFAC,,, | Performed by: NURSE PRACTITIONER

## 2020-09-25 PROCEDURE — 86334 IMMUNOFIX E-PHORESIS SERUM: CPT | Mod: 26,,, | Performed by: PATHOLOGY

## 2020-09-25 PROCEDURE — 86334 PATHOLOGIST INTERPRETATION IFE: ICD-10-PCS | Mod: 26,,, | Performed by: PATHOLOGY

## 2020-09-25 PROCEDURE — 1159F PR MEDICATION LIST DOCUMENTED IN MEDICAL RECORD: ICD-10-PCS | Mod: S$GLB,,, | Performed by: NURSE PRACTITIONER

## 2020-09-25 PROCEDURE — 84165 PATHOLOGIST INTERPRETATION SPE: ICD-10-PCS | Mod: 26,,, | Performed by: PATHOLOGY

## 2020-09-25 PROCEDURE — 1126F PR PAIN SEVERITY QUANTIFIED, NO PAIN PRESENT: ICD-10-PCS | Mod: S$GLB,,, | Performed by: NURSE PRACTITIONER

## 2020-09-25 PROCEDURE — 3008F PR BODY MASS INDEX (BMI) DOCUMENTED: ICD-10-PCS | Mod: CPTII,S$GLB,, | Performed by: NURSE PRACTITIONER

## 2020-09-25 PROCEDURE — 99213 PR OFFICE/OUTPT VISIT, EST, LEVL III, 20-29 MIN: ICD-10-PCS | Mod: S$GLB,,, | Performed by: NURSE PRACTITIONER

## 2020-09-25 PROCEDURE — 36415 COLL VENOUS BLD VENIPUNCTURE: CPT

## 2020-09-25 PROCEDURE — 3008F BODY MASS INDEX DOCD: CPT | Mod: CPTII,S$GLB,, | Performed by: NURSE PRACTITIONER

## 2020-09-25 PROCEDURE — 1101F PT FALLS ASSESS-DOCD LE1/YR: CPT | Mod: CPTII,S$GLB,, | Performed by: NURSE PRACTITIONER

## 2020-09-25 PROCEDURE — 80053 COMPREHEN METABOLIC PANEL: CPT

## 2020-09-25 PROCEDURE — 82728 ASSAY OF FERRITIN: CPT

## 2020-09-25 PROCEDURE — 86334 IMMUNOFIX E-PHORESIS SERUM: CPT

## 2020-09-25 PROCEDURE — 3074F PR MOST RECENT SYSTOLIC BLOOD PRESSURE < 130 MM HG: ICD-10-PCS | Mod: CPTII,S$GLB,, | Performed by: NURSE PRACTITIONER

## 2020-09-25 PROCEDURE — 3078F PR MOST RECENT DIASTOLIC BLOOD PRESSURE < 80 MM HG: ICD-10-PCS | Mod: CPTII,S$GLB,, | Performed by: NURSE PRACTITIONER

## 2020-09-25 PROCEDURE — 99213 OFFICE O/P EST LOW 20 MIN: CPT | Mod: S$GLB,,, | Performed by: NURSE PRACTITIONER

## 2020-09-25 PROCEDURE — 1159F MED LIST DOCD IN RCRD: CPT | Mod: S$GLB,,, | Performed by: NURSE PRACTITIONER

## 2020-09-25 PROCEDURE — 82784 ASSAY IGA/IGD/IGG/IGM EACH: CPT | Mod: 59

## 2020-09-25 PROCEDURE — 84165 PROTEIN E-PHORESIS SERUM: CPT | Mod: 26,,, | Performed by: PATHOLOGY

## 2020-09-25 PROCEDURE — 3074F SYST BP LT 130 MM HG: CPT | Mod: CPTII,S$GLB,, | Performed by: NURSE PRACTITIONER

## 2020-09-25 PROCEDURE — 83520 IMMUNOASSAY QUANT NOS NONAB: CPT

## 2020-09-25 PROCEDURE — 85025 COMPLETE CBC W/AUTO DIFF WBC: CPT

## 2020-09-25 PROCEDURE — 1126F AMNT PAIN NOTED NONE PRSNT: CPT | Mod: S$GLB,,, | Performed by: NURSE PRACTITIONER

## 2020-09-25 PROCEDURE — 99999 PR PBB SHADOW E&M-EST. PATIENT-LVL V: ICD-10-PCS | Mod: PBBFAC,,, | Performed by: NURSE PRACTITIONER

## 2020-09-28 LAB
ALBUMIN SERPL ELPH-MCNC: 3.42 G/DL (ref 3.35–5.55)
ALPHA1 GLOB SERPL ELPH-MCNC: 0.35 G/DL (ref 0.17–0.41)
ALPHA2 GLOB SERPL ELPH-MCNC: 0.74 G/DL (ref 0.43–0.99)
B-GLOBULIN SERPL ELPH-MCNC: 0.72 G/DL (ref 0.5–1.1)
GAMMA GLOB SERPL ELPH-MCNC: 0.97 G/DL (ref 0.67–1.58)
INTERPRETATION SERPL IFE-IMP: NORMAL
KAPPA LC SER QL IA: 2.04 MG/DL (ref 0.33–1.94)
KAPPA LC/LAMBDA SER IA: 1.14 (ref 0.26–1.65)
LAMBDA LC SER QL IA: 1.79 MG/DL (ref 0.57–2.63)
PATHOLOGIST INTERPRETATION IFE: NORMAL
PATHOLOGIST INTERPRETATION SPE: NORMAL
PROT SERPL-MCNC: 6.2 G/DL (ref 6–8.4)

## 2020-10-15 NOTE — ASSESSMENT & PLAN NOTE
- on replacement therapy  - check level next visit   She was diagnosed when she was 11 years old (2008). Currently not taking any medications. Has multiple bowel movements a day (x3) and sometimes she notices bright red blood in the toilet. She has taken Remicade with good response but it's been months since she last used it. Pt was following up with GI but last time she saw them was a year ago due to lack of insurance. Now, she got a new job as a  at [employer] and she has insurance and desires to establish care with Gastroenterology OP.    Additional GI recs: restart mesalamine    Plan  - Treatment pending biopsy results. Suspect splenic and hepatic lesions are crohn's manifestations.  - Opiates for pain  - Steroids for rash

## 2020-11-05 NOTE — PATIENT INSTRUCTIONS
Blood in the Urine    Blood in the urine (hematuria) has many possible causes. If it occurs after an injury (such as a car accident or fall), it is most often a sign of bruising to the kidney or bladder. Common causes of blood in the urine include urinary tract infections, kidney stones, inflammation, tumors, or certain other diseases of the kidney or bladder. Menstruation can cause blood to appear in the urine sample, although it is not coming from the urinary tract.  If only a trace amount of blood is present, it will show up on the urine test, even though the urine may be yellow and not pink or red. This may occur with any of the above conditions, as well as heavy exercise or high fever. In this case, your doctor may want to repeat the urine test on another day. This will show if the blood is still present. If it is, then other tests can be done to find out the cause.  Home care  Follow these home care guidelines:  · If your urine does not appear bloody (pink, brown or red) then you do not need to restrict your activity in any way.  · If you can see blood in your urine, rest and avoid heavy exertion until your next exam. Do not use aspirin, blood thinners, or anti-platelet or anti-inflammatory medicines. These include ibuprofen and naproxen. These thin the blood and may increase bleeding.  Follow-up care  Follow up with your healthcare provider, or as advised. If you were injured and had blood in your urine, you should have a repeat urine test in 1 to 2 days. Contact your doctor for this test.  A radiologist will review any X-rays that were taken. You will be told of any new findings that may affect your care.  When to seek medical advice  Call your healthcare provider right away if any of these occur:  · Bright red blood or blood clots in the urine (if you did not have this before)  · Weakness, dizziness or fainting  · New groin, abdominal, or back pain  · Fever of 100.4ºF (38ºC) or higher, or as directed by  I refilled the patient's trazodone for 3 months.  She needs to be seen for any further refills and have fasting blood work done please see if we can get scheduled for a physical.   your healthcare provider  · Repeated vomiting  · Bleeding from the nose or gums or easy bruising  Date Last Reviewed: 9/1/2016  © 2846-7683 [employer]. [address] [place] [address], [place] [address]. All rights reserved. This information is not intended as a substitute for professional medical care. Always follow your healthcare professional's instructions.

## 2020-11-10 ENCOUNTER — LAB VISIT (OUTPATIENT)
Dept: LAB | Facility: HOSPITAL | Age: 70
End: 2020-11-10
Attending: INTERNAL MEDICINE
Payer: MEDICARE

## 2020-11-10 DIAGNOSIS — N18.30 CKD (CHRONIC KIDNEY DISEASE) STAGE 3, GFR 30-59 ML/MIN: ICD-10-CM

## 2020-11-10 DIAGNOSIS — I10 BENIGN ESSENTIAL HTN: Chronic | ICD-10-CM

## 2020-11-10 DIAGNOSIS — E11.69 DIABETES MELLITUS TYPE 2 IN OBESE: Chronic | ICD-10-CM

## 2020-11-10 DIAGNOSIS — Z12.5 PROSTATE CANCER SCREENING: ICD-10-CM

## 2020-11-10 DIAGNOSIS — E66.01 SEVERE OBESITY (BMI 35.0-39.9) WITH COMORBIDITY: ICD-10-CM

## 2020-11-10 DIAGNOSIS — I50.42 CHRONIC COMBINED SYSTOLIC AND DIASTOLIC HEART FAILURE: Chronic | ICD-10-CM

## 2020-11-10 DIAGNOSIS — I25.118 CORONARY ARTERY DISEASE OF NATIVE ARTERY OF NATIVE HEART WITH STABLE ANGINA PECTORIS: Chronic | ICD-10-CM

## 2020-11-10 DIAGNOSIS — N40.0 BENIGN PROSTATIC HYPERPLASIA WITHOUT LOWER URINARY TRACT SYMPTOMS: Chronic | ICD-10-CM

## 2020-11-10 DIAGNOSIS — E11.42 DIABETIC POLYNEUROPATHY ASSOCIATED WITH TYPE 2 DIABETES MELLITUS: ICD-10-CM

## 2020-11-10 DIAGNOSIS — E66.9 DIABETES MELLITUS TYPE 2 IN OBESE: Chronic | ICD-10-CM

## 2020-11-10 LAB
ALBUMIN SERPL BCP-MCNC: 3.6 G/DL (ref 3.5–5.2)
ALBUMIN/CREAT UR: 15.5 UG/MG (ref 0–30)
ALP SERPL-CCNC: 56 U/L (ref 55–135)
ALT SERPL W/O P-5'-P-CCNC: 36 U/L (ref 10–44)
ANION GAP SERPL CALC-SCNC: 9 MMOL/L (ref 8–16)
AST SERPL-CCNC: 35 U/L (ref 10–40)
BASOPHILS # BLD AUTO: 0.03 K/UL (ref 0–0.2)
BASOPHILS NFR BLD: 0.6 % (ref 0–1.9)
BILIRUB SERPL-MCNC: 0.6 MG/DL (ref 0.1–1)
BUN SERPL-MCNC: 12 MG/DL (ref 8–23)
CALCIUM SERPL-MCNC: 8.7 MG/DL (ref 8.7–10.5)
CHLORIDE SERPL-SCNC: 105 MMOL/L (ref 95–110)
CHOLEST SERPL-MCNC: 126 MG/DL (ref 120–199)
CHOLEST/HDLC SERPL: 2.6 {RATIO} (ref 2–5)
CO2 SERPL-SCNC: 26 MMOL/L (ref 23–29)
COMPLEXED PSA SERPL-MCNC: 1 NG/ML (ref 0–4)
CREAT SERPL-MCNC: 1.6 MG/DL (ref 0.5–1.4)
CREAT UR-MCNC: 180.1 MG/DL (ref 23–375)
DIFFERENTIAL METHOD: ABNORMAL
EOSINOPHIL # BLD AUTO: 0.2 K/UL (ref 0–0.5)
EOSINOPHIL NFR BLD: 3.1 % (ref 0–8)
ERYTHROCYTE [DISTWIDTH] IN BLOOD BY AUTOMATED COUNT: 14.8 % (ref 11.5–14.5)
EST. GFR  (AFRICAN AMERICAN): 49 ML/MIN/1.73 M^2
EST. GFR  (NON AFRICAN AMERICAN): 43 ML/MIN/1.73 M^2
ESTIMATED AVG GLUCOSE: 143 MG/DL (ref 68–131)
GLUCOSE SERPL-MCNC: 119 MG/DL (ref 70–110)
HBA1C MFR BLD HPLC: 6.6 % (ref 4–5.6)
HCT VFR BLD AUTO: 36.8 % (ref 40–54)
HDLC SERPL-MCNC: 48 MG/DL (ref 40–75)
HDLC SERPL: 38.1 % (ref 20–50)
HGB BLD-MCNC: 11.8 G/DL (ref 14–18)
IMM GRANULOCYTES # BLD AUTO: 0.01 K/UL (ref 0–0.04)
IMM GRANULOCYTES NFR BLD AUTO: 0.2 % (ref 0–0.5)
LDLC SERPL CALC-MCNC: 67 MG/DL (ref 63–159)
LYMPHOCYTES # BLD AUTO: 1.9 K/UL (ref 1–4.8)
LYMPHOCYTES NFR BLD: 36.7 % (ref 18–48)
MCH RBC QN AUTO: 29.8 PG (ref 27–31)
MCHC RBC AUTO-ENTMCNC: 32.1 G/DL (ref 32–36)
MCV RBC AUTO: 93 FL (ref 82–98)
MICROALBUMIN UR DL<=1MG/L-MCNC: 28 UG/ML
MONOCYTES # BLD AUTO: 0.7 K/UL (ref 0.3–1)
MONOCYTES NFR BLD: 13.2 % (ref 4–15)
NEUTROPHILS # BLD AUTO: 2.4 K/UL (ref 1.8–7.7)
NEUTROPHILS NFR BLD: 46.2 % (ref 38–73)
NONHDLC SERPL-MCNC: 78 MG/DL
NRBC BLD-RTO: 0 /100 WBC
PLATELET # BLD AUTO: 170 K/UL (ref 150–350)
PMV BLD AUTO: 10.5 FL (ref 9.2–12.9)
POTASSIUM SERPL-SCNC: 4.1 MMOL/L (ref 3.5–5.1)
PROT SERPL-MCNC: 6.6 G/DL (ref 6–8.4)
RBC # BLD AUTO: 3.96 M/UL (ref 4.6–6.2)
SODIUM SERPL-SCNC: 140 MMOL/L (ref 136–145)
TRIGL SERPL-MCNC: 55 MG/DL (ref 30–150)
TSH SERPL DL<=0.005 MIU/L-ACNC: 2.06 UIU/ML (ref 0.4–4)
WBC # BLD AUTO: 5.15 K/UL (ref 3.9–12.7)

## 2020-11-10 PROCEDURE — 83036 HEMOGLOBIN GLYCOSYLATED A1C: CPT

## 2020-11-10 PROCEDURE — 84153 ASSAY OF PSA TOTAL: CPT

## 2020-11-10 PROCEDURE — 82043 UR ALBUMIN QUANTITATIVE: CPT

## 2020-11-10 PROCEDURE — 36415 COLL VENOUS BLD VENIPUNCTURE: CPT

## 2020-11-10 PROCEDURE — 80061 LIPID PANEL: CPT

## 2020-11-10 PROCEDURE — 80053 COMPREHEN METABOLIC PANEL: CPT

## 2020-11-10 PROCEDURE — 85025 COMPLETE CBC W/AUTO DIFF WBC: CPT

## 2020-11-10 PROCEDURE — 84443 ASSAY THYROID STIM HORMONE: CPT

## 2020-11-14 ENCOUNTER — OFFICE VISIT (OUTPATIENT)
Dept: URGENT CARE | Facility: CLINIC | Age: 70
End: 2020-11-14
Payer: MEDICARE

## 2020-11-14 VITALS
WEIGHT: 240 LBS | RESPIRATION RATE: 18 BRPM | BODY MASS INDEX: 35.55 KG/M2 | HEART RATE: 59 BPM | OXYGEN SATURATION: 99 % | SYSTOLIC BLOOD PRESSURE: 124 MMHG | TEMPERATURE: 98 F | HEIGHT: 69 IN | DIASTOLIC BLOOD PRESSURE: 69 MMHG

## 2020-11-14 DIAGNOSIS — M25.571 ACUTE RIGHT ANKLE PAIN: ICD-10-CM
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
## 2020-11-14 DIAGNOSIS — M79.671 RIGHT FOOT PAIN: ICD-10-CM

## 2020-11-14 PROCEDURE — 73630 X-RAY EXAM OF FOOT: CPT | Mod: RT,S$GLB,, | Performed by: RADIOLOGY

## 2020-11-14 PROCEDURE — 73610 X-RAY EXAM OF ANKLE: CPT | Mod: RT,S$GLB,, | Performed by: RADIOLOGY

## 2020-11-14 PROCEDURE — 99214 OFFICE O/P EST MOD 30 MIN: CPT | Mod: S$GLB,,, | Performed by: NURSE PRACTITIONER

## 2020-11-14 PROCEDURE — 73630 XR FOOT COMPLETE 3 VIEW RIGHT: ICD-10-PCS | Mod: RT,S$GLB,, | Performed by: RADIOLOGY

## 2020-11-14 PROCEDURE — 73610 XR ANKLE COMPLETE 3 VIEW RIGHT: ICD-10-PCS | Mod: RT,S$GLB,, | Performed by: RADIOLOGY

## 2020-11-14 PROCEDURE — 3008F PR BODY MASS INDEX (BMI) DOCUMENTED: ICD-10-PCS | Mod: CPTII,S$GLB,, | Performed by: NURSE PRACTITIONER

## 2020-11-14 PROCEDURE — 99214 PR OFFICE/OUTPT VISIT, EST, LEVL IV, 30-39 MIN: ICD-10-PCS | Mod: S$GLB,,, | Performed by: NURSE PRACTITIONER

## 2020-11-14 PROCEDURE — 3008F BODY MASS INDEX DOCD: CPT | Mod: CPTII,S$GLB,, | Performed by: NURSE PRACTITIONER

## 2020-11-14 NOTE — PATIENT INSTRUCTIONS
Over the counter tylenol as per bottle instructions as needed for pain.       Understanding Ankle Sprain    The ankle is the joint where the leg and foot meet. Bones are held in place by connective tissue called ligaments. When ankle ligaments are stretched to the point of pain and injury, it is called an ankle sprain. A sprain can tear the ligaments. These tears can be very small but still cause pain. Ankle sprains can be mild or severe.  What causes an ankle sprain?  A sprain may occur when you twist your ankle or bend it too far. This can happen when you stumble or fall. Things that can make an ankle sprain more likely include:  Having had an ankle sprain before  Playing sports that involve running and jumping. Or playing contact sports such as football or hockey.  Wearing shoes that dont support your feet and ankles well  Having ankles with poor strength and flexibility  Symptoms of an ankle sprain  Symptoms may include:  Pain or soreness in the ankle  Swelling  Redness or bruising  Not being able to walk or put weight on the affected foot  Reduced range of motion in the ankle  A popping or tearing feeling at the time the sprain occurs  An abnormal or dislocated look to the ankle  Instability or too much range of motion in the ankle  Treatment for an ankle sprain  Treatment focuses on reducing pain and swelling, and avoiding further injury. Treatments may include:  Resting the ankle. Avoid putting weight on it. This may mean using crutches until the sprain heals.  Prescription or over-the-counter pain medicines. These help reduce swelling and pain.  Cold packs. These help reduce pain and swelling.  Raising your ankle above your heart. This helps reduce swelling.  Wrapping the ankle with an elastic bandage or ankle brace. This helps reduce swelling and gives some support to the ankle. In rare cases, you may need a cast or boot.  Stretching and other exercises. These improve flexibility and strength.  Heat  packs. These may be recommended before doing ankle exercises.  Possible complications of an ankle sprain  An ankle that has been weakened by a sprain can be more likely to have repeated sprains afterward. Doing exercises to strengthen your ankle and improve balance can reduce your risk for repeated sprains. Other possible complications are long-term (chronic) pain or an ankle that remains unstable.  When to call your healthcare provider  Call your healthcare provider right away if you have any of these:  Fever of 100.4°F (38°C) or higher, or as directed  Pain, numbness, discoloration, or coldness in the foot or toes  Pain that gets worse  Symptoms that dont get better, or get worse  New symptoms   Date Last Reviewed: 3/10/2016  © 6943-7025 [employer]. [address] [place] [address], [place] [address]. All rights reserved. This information is not intended as a substitute for professional medical care. Always follow your healthcare professional's instructions.      R.I.C.E.    R.I.C.E. stands for Rest, Ice, Compression, and Elevation. Doing these things helps limit pain and swelling after an injury. R.I.C.E. also helps injuries heal faster. Use R.I.C.E. for sprains, strains, and severe bruises or bumps. Follow the tips on this handout and begin R.I.C.E. as soon as possible after an injury.  ? Rest  Pain is your bodys way of telling you to rest an injured area. Whether you have hurt an elbow, hand, foot, or knee, limiting its use will prevent further injury and help you heal.  ? Ice  Applying ice right after an injury helps prevent swelling and reduce pain. Dont place ice directly on your skin.  Wrap a cold pack or bag of ice in a thin cloth. Place it over the injured area.  Ice for 10 minutes every 3 hours. Dont ice for more than 20 minutes at a time.  ? Compression  Putting pressure (compression) on an injury helps prevent swelling and provides support.  Wrap the injured area firmly with an elastic  bandage. If your hand or foot tingles, becomes discolored, or feels cold to the touch, the bandage may be too tight. Rewrap it more loosely.  If your bandage becomes too loose, rewrap it.  Do not wear an elastic bandage overnight.  ? Elevation  Keeping an injury elevated helps reduce swelling, pain, and throbbing. Elevation is most effective when the injury is kept elevated higher than the heart.     Call your healthcare provider if you notice any of the following:  Fingers or toes feel numb, are cold to the touch, or change color  Skin looks shiny or tight  Pain, swelling, or bruising worsens and is not improved with elevation   Date Last Reviewed: 9/3/2015  © 2044-7898 [employer]. [address] [place] [address], [place] [address]. All rights reserved. This information is not intended as a substitute for professional medical care. Always follow your healthcare professional's instructions.        ·   ·   ·   · Follow up with your primary care in 2-5 days if symptoms have not improved, or you may return here.  · If you were referred to a specialist, please follow up with that specialty.  · If you were prescribed antibiotics, please take them to completion.  · If you were prescribed a narcotic or any medication with sedative effects, do not drive or operate heavy equipment or machinery while taking these medications.  · You must understand that you have received treatment at an Urgent Care facility only, and that you may be released before all of your medical problems are known or treated. Urgent Care facilities are not equipped to handle life threatening emergencies. It is recommended that you go to an Emergency Department for further evaluation of worsening or concerning symptoms, or possibly life threatening conditions as discussed.                                        If you  smoke, please stop smoking

## 2020-11-14 NOTE — PROGRESS NOTES
"Subjective:       Patient ID: Walter Bull is a 69 y.o. male.    Vitals:  height is 5' 9" (1.753 m) and weight is 108.9 kg (240 lb). His oral temperature is 98.4 °F (36.9 °C). His blood pressure is 124/69 and his pulse is 59 (abnormal). His respiration is 18 and oxygen saturation is 99%.     Chief Complaint: Ankle Pain    Patient fell down 2 stairs yesterday causing injury to the right ankle and foot, states foot and ankle twisted under him. No head impact or loc, no other injuries.     Ankle Pain   The incident occurred 12 to 24 hours ago. The injury mechanism was a fall. The pain is present in the right ankle and right foot. Pain scale: Pain increases to 10 while walking. The pain is mild. The pain has been constant since onset. Associated symptoms include an inability to bear weight. Pertinent negatives include no loss of motion, loss of sensation, muscle weakness, numbness or tingling. He reports no foreign bodies present. The symptoms are aggravated by weight bearing and movement. He has tried nothing for the symptoms.       Constitution: Negative for chills, fatigue and fever.   HENT: Negative for facial swelling and facial trauma.    Neck: Negative for neck pain and neck stiffness.   Cardiovascular: Negative for chest trauma, chest pain and sob on exertion.   Eyes: Negative for eye trauma, eye pain, double vision and blurred vision.   Respiratory: Negative for chest tightness, cough and shortness of breath.    Gastrointestinal: Negative for abdominal trauma, abdominal pain, nausea and vomiting.   Genitourinary: Negative for hematuria, genital trauma and pelvic pain.   Musculoskeletal: Positive for trauma, joint pain, joint swelling and pain with walking. Negative for muscle cramps and muscle ache.   Skin: Negative for color change, pale, rash and erythema.   Allergic/Immunologic: Negative for seasonal allergies.   Neurological: Negative for dizziness, history of vertigo, light-headedness, passing out, " headaches, numbness and tingling.   Hematologic/Lymphatic: Negative for easy bruising/bleeding and history of blood clots. Does not bruise/bleed easily.   Psychiatric/Behavioral: Negative for nervous/anxious, sleep disturbance and depression. The patient is not nervous/anxious.        Objective:      Physical Exam   Constitutional: He is oriented to person, place, and time.  Non-toxic appearance. He does not appear ill. No distress.   HENT:   Head: Normocephalic and atraumatic.   Ears:   Right Ear: External ear normal.   Left Ear: External ear normal.   Eyes: Conjunctivae are normal. Right eye exhibits no discharge. Left eye exhibits no discharge. No scleral icterus.   Neck: Normal range of motion. Neck supple. No neck rigidity.   Cardiovascular: Normal rate, regular rhythm and normal pulses.   Pulmonary/Chest: Effort normal. No respiratory distress.   Abdominal: Normal appearance.   Musculoskeletal:         General: Swelling, tenderness and signs of injury present. No deformity.      Right knee: He exhibits normal range of motion, no swelling, no effusion, no ecchymosis, no deformity, no laceration, no erythema, normal alignment, no LCL laxity, normal patellar mobility, no bony tenderness, normal meniscus and no MCL laxity. No tenderness found. No medial joint line, no lateral joint line, no MCL, no LCL and no patellar tendon tenderness noted.      Right ankle: He exhibits decreased range of motion (limited by pain) and swelling. He exhibits no ecchymosis, no deformity, no laceration and normal pulse. Tenderness. Lateral malleolus and head of 5th metatarsal tenderness found. No medial malleolus, no AITFL, no CF ligament, no posterior TFL and no proximal fibula tenderness found. Achilles tendon normal. Achilles tendon exhibits no pain, no defect and normal Miller's test results.      Right lower leg: Normal. He exhibits no tenderness, no bony tenderness, no swelling, no deformity and no laceration. No edema.       Left lower leg: No edema.      Right foot: Normal range of motion and normal capillary refill. Tenderness and swelling present. No bony tenderness, crepitus, deformity or laceration.        Feet:       Comments: Right ankle stable but painful with ranging, neurovascularly intact distally.   Neurological: He is alert and oriented to person, place, and time.   Skin: Skin is warm, dry, not diaphoretic and not pale. not right lower leg, not right foot, not right knee and not right ankleerythemajaundice  Nursing note and vitals reviewed.        Assessment:       1. Fall at home, initial encounter    2. Acute right ankle pain    3. Right foot pain        Plan:       Able range right foot and ankle but with pain, swelling. 2+ distal pulses.  Does bear weight but with pain. Has crutches at home he can use pt reports. Xrays right foot and ankle with no acute fracture noted, reviewed on pacs, rads report consistent. Reviewed with pt and wife. Advised on s/s to return to  and pcp follow up, home management. Ace wrap applied per MA, neurovascularly intact post application. Pt and wife verbalize understanding and agreement with treatment plan.     Fall at home, initial encounter  -     XR FOOT COMPLETE 3 VIEW RIGHT; Future; Expected date: 11/14/2020  -     XR ANKLE COMPLETE 3 VIEW RIGHT; Future; Expected date: 11/14/2020    Acute right ankle pain  -     XR ANKLE COMPLETE 3 VIEW RIGHT; Future; Expected date: 11/14/2020    Right foot pain  -     XR FOOT COMPLETE 3 VIEW RIGHT; Future; Expected date: 11/14/2020      Xr Ankle Complete 3 View Right    Result Date: 11/14/2020  EXAMINATION: XR ANKLE COMPLETE 3 VIEW RIGHT CLINICAL HISTORY: Unspecified fall, initial encounter TECHNIQUE: AP, lateral, and oblique images of the right ankle were performed. COMPARISON: None FINDINGS: Three views right ankle. Degenerative changes are noted of the ankle.  There is edema overlying the lateral malleolus.  The ankle mortise is intact.  There is  vascular calcification.  Degenerative changes are noted of the calcaneus.     1. No acute displaced fracture or dislocation of the ankle noting nonspecific edema overlying the lateral malleolus. Electronically signed by: Tani Talley MD Date:    11/14/2020 Time:    14:54    Xr Foot Complete 3 View Right    Result Date: 11/14/2020  EXAMINATION: XR FOOT COMPLETE 3 VIEW RIGHT CLINICAL HISTORY: . Unspecified fall, initial encounter TECHNIQUE: AP, lateral, and oblique views of the right foot were performed. COMPARISON: None FINDINGS: There is demineralization of the osseous structures.  There is no cortical step-off.  There is no evidence of periostitis.  There is a plantar calcaneal spur present. There is joint space narrowing throughout the right foot.  There are advanced vascular calcifications.  No radiopaque foreign body is identified. There is no evidence of a fracture or dislocation of the right foot.     No acute process. Electronically signed by: Atilio Hubbard MD Date:    11/14/2020 Time:    15:31      Patient Instructions       Over the counter tylenol as per bottle instructions as needed for pain.       Understanding Ankle Sprain    The ankle is the joint where the leg and foot meet. Bones are held in place by connective tissue called ligaments. When ankle ligaments are stretched to the point of pain and injury, it is called an ankle sprain. A sprain can tear the ligaments. These tears can be very small but still cause pain. Ankle sprains can be mild or severe.  What causes an ankle sprain?  A sprain may occur when you twist your ankle or bend it too far. This can happen when you stumble or fall. Things that can make an ankle sprain more likely include:  Having had an ankle sprain before  Playing sports that involve running and jumping. Or playing contact sports such as football or hockey.  Wearing shoes that dont support your feet and ankles well  Having ankles with poor strength and flexibility  Symptoms  of an ankle sprain  Symptoms may include:  Pain or soreness in the ankle  Swelling  Redness or bruising  Not being able to walk or put weight on the affected foot  Reduced range of motion in the ankle  A popping or tearing feeling at the time the sprain occurs  An abnormal or dislocated look to the ankle  Instability or too much range of motion in the ankle  Treatment for an ankle sprain  Treatment focuses on reducing pain and swelling, and avoiding further injury. Treatments may include:  Resting the ankle. Avoid putting weight on it. This may mean using crutches until the sprain heals.  Prescription or over-the-counter pain medicines. These help reduce swelling and pain.  Cold packs. These help reduce pain and swelling.  Raising your ankle above your heart. This helps reduce swelling.  Wrapping the ankle with an elastic bandage or ankle brace. This helps reduce swelling and gives some support to the ankle. In rare cases, you may need a cast or boot.  Stretching and other exercises. These improve flexibility and strength.  Heat packs. These may be recommended before doing ankle exercises.  Possible complications of an ankle sprain  An ankle that has been weakened by a sprain can be more likely to have repeated sprains afterward. Doing exercises to strengthen your ankle and improve balance can reduce your risk for repeated sprains. Other possible complications are long-term (chronic) pain or an ankle that remains unstable.  When to call your healthcare provider  Call your healthcare provider right away if you have any of these:  Fever of 100.4°F (38°C) or higher, or as directed  Pain, numbness, discoloration, or coldness in the foot or toes  Pain that gets worse  Symptoms that dont get better, or get worse  New symptoms   Date Last Reviewed: 3/10/2016  © 0507-1751 [employer]. [address] [place] [address], [place] [address]. All rights reserved. This information is not intended as a substitute for  professional medical care. Always follow your healthcare professional's instructions.      R.I.C.E.    R.I.C.E. stands for Rest, Ice, Compression, and Elevation. Doing these things helps limit pain and swelling after an injury. R.I.C.E. also helps injuries heal faster. Use R.I.C.E. for sprains, strains, and severe bruises or bumps. Follow the tips on this handout and begin R.I.C.E. as soon as possible after an injury.  ? Rest  Pain is your bodys way of telling you to rest an injured area. Whether you have hurt an elbow, hand, foot, or knee, limiting its use will prevent further injury and help you heal.  ? Ice  Applying ice right after an injury helps prevent swelling and reduce pain. Dont place ice directly on your skin.  Wrap a cold pack or bag of ice in a thin cloth. Place it over the injured area.  Ice for 10 minutes every 3 hours. Dont ice for more than 20 minutes at a time.  ? Compression  Putting pressure (compression) on an injury helps prevent swelling and provides support.  Wrap the injured area firmly with an elastic bandage. If your hand or foot tingles, becomes discolored, or feels cold to the touch, the bandage may be too tight. Rewrap it more loosely.  If your bandage becomes too loose, rewrap it.  Do not wear an elastic bandage overnight.  ? Elevation  Keeping an injury elevated helps reduce swelling, pain, and throbbing. Elevation is most effective when the injury is kept elevated higher than the heart.     Call your healthcare provider if you notice any of the following:  Fingers or toes feel numb, are cold to the touch, or change color  Skin looks shiny or tight  Pain, swelling, or bruising worsens and is not improved with elevation   Date Last Reviewed: 9/3/2015  © 3491-2435 [employer]. [address] [place] [address], [place] [address]. All rights reserved. This information is not intended as a substitute for professional medical care. Always follow your healthcare professional's  instructions.        ·   ·   ·   · Follow up with your primary care in 2-5 days if symptoms have not improved, or you may return here.  · If you were referred to a specialist, please follow up with that specialty.  · If you were prescribed antibiotics, please take them to completion.  · If you were prescribed a narcotic or any medication with sedative effects, do not drive or operate heavy equipment or machinery while taking these medications.  · You must understand that you have received treatment at an Urgent Care facility only, and that you may be released before all of your medical problems are known or treated. Urgent Care facilities are not equipped to handle life threatening emergencies. It is recommended that you go to an Emergency Department for further evaluation of worsening or concerning symptoms, or possibly life threatening conditions as discussed.                                        If you  smoke, please stop smoking

## 2020-11-17 ENCOUNTER — OFFICE VISIT (OUTPATIENT)
Dept: INTERNAL MEDICINE | Facility: CLINIC | Age: 70
End: 2020-11-17
Payer: MEDICARE

## 2020-11-17 VITALS
SYSTOLIC BLOOD PRESSURE: 128 MMHG | WEIGHT: 253.31 LBS | HEART RATE: 60 BPM | OXYGEN SATURATION: 99 % | DIASTOLIC BLOOD PRESSURE: 62 MMHG | RESPIRATION RATE: 19 BRPM | TEMPERATURE: 98 F | HEIGHT: 69 IN | BODY MASS INDEX: 37.52 KG/M2

## 2020-11-17 DIAGNOSIS — E11.69 DIABETES MELLITUS TYPE 2 IN OBESE: Chronic | ICD-10-CM

## 2020-11-17 DIAGNOSIS — I10 BENIGN ESSENTIAL HTN: Primary | Chronic | ICD-10-CM

## 2020-11-17 DIAGNOSIS — I25.118 CORONARY ARTERY DISEASE OF NATIVE ARTERY OF NATIVE HEART WITH STABLE ANGINA PECTORIS: Chronic | ICD-10-CM

## 2020-11-17 DIAGNOSIS — N18.32 STAGE 3B CHRONIC KIDNEY DISEASE: ICD-10-CM

## 2020-11-17 DIAGNOSIS — E78.2 MIXED HYPERLIPIDEMIA: Chronic | ICD-10-CM

## 2020-11-17 DIAGNOSIS — E66.9 DIABETES MELLITUS TYPE 2 IN OBESE: Chronic | ICD-10-CM

## 2020-11-17 DIAGNOSIS — I50.42 CHRONIC COMBINED SYSTOLIC AND DIASTOLIC HEART FAILURE: Chronic | ICD-10-CM

## 2020-11-17 PROCEDURE — 1159F PR MEDICATION LIST DOCUMENTED IN MEDICAL RECORD: ICD-10-PCS | Mod: S$GLB,,, | Performed by: INTERNAL MEDICINE

## 2020-11-17 PROCEDURE — 99499 UNLISTED E&M SERVICE: CPT | Mod: S$GLB,,, | Performed by: INTERNAL MEDICINE

## 2020-11-17 PROCEDURE — 1100F PR PT FALLS ASSESS DOC 2+ FALLS/FALL W/INJURY/YR: ICD-10-PCS | Mod: CPTII,S$GLB,, | Performed by: INTERNAL MEDICINE

## 2020-11-17 PROCEDURE — 3074F PR MOST RECENT SYSTOLIC BLOOD PRESSURE < 130 MM HG: ICD-10-PCS | Mod: CPTII,S$GLB,, | Performed by: INTERNAL MEDICINE

## 2020-11-17 PROCEDURE — 3008F BODY MASS INDEX DOCD: CPT | Mod: CPTII,S$GLB,, | Performed by: INTERNAL MEDICINE

## 2020-11-17 PROCEDURE — 99999 PR PBB SHADOW E&M-EST. PATIENT-LVL V: CPT | Mod: PBBFAC,,, | Performed by: INTERNAL MEDICINE

## 2020-11-17 PROCEDURE — 3288F PR FALLS RISK ASSESSMENT DOCUMENTED: ICD-10-PCS | Mod: CPTII,S$GLB,, | Performed by: INTERNAL MEDICINE

## 2020-11-17 PROCEDURE — 99499 RISK ADDL DX/OHS AUDIT: ICD-10-PCS | Mod: S$GLB,,, | Performed by: INTERNAL MEDICINE

## 2020-11-17 PROCEDURE — 3074F SYST BP LT 130 MM HG: CPT | Mod: CPTII,S$GLB,, | Performed by: INTERNAL MEDICINE

## 2020-11-17 PROCEDURE — 3044F HG A1C LEVEL LT 7.0%: CPT | Mod: CPTII,S$GLB,, | Performed by: INTERNAL MEDICINE

## 2020-11-17 PROCEDURE — 1126F PR PAIN SEVERITY QUANTIFIED, NO PAIN PRESENT: ICD-10-PCS | Mod: S$GLB,,, | Performed by: INTERNAL MEDICINE

## 2020-11-17 PROCEDURE — 3288F FALL RISK ASSESSMENT DOCD: CPT | Mod: CPTII,S$GLB,, | Performed by: INTERNAL MEDICINE

## 2020-11-17 PROCEDURE — 3008F PR BODY MASS INDEX (BMI) DOCUMENTED: ICD-10-PCS | Mod: CPTII,S$GLB,, | Performed by: INTERNAL MEDICINE

## 2020-11-17 PROCEDURE — 3078F PR MOST RECENT DIASTOLIC BLOOD PRESSURE < 80 MM HG: ICD-10-PCS | Mod: CPTII,S$GLB,, | Performed by: INTERNAL MEDICINE

## 2020-11-17 PROCEDURE — 3044F PR MOST RECENT HEMOGLOBIN A1C LEVEL <7.0%: ICD-10-PCS | Mod: CPTII,S$GLB,, | Performed by: INTERNAL MEDICINE

## 2020-11-17 PROCEDURE — 99999 PR PBB SHADOW E&M-EST. PATIENT-LVL V: ICD-10-PCS | Mod: PBBFAC,,, | Performed by: INTERNAL MEDICINE

## 2020-11-17 PROCEDURE — 1126F AMNT PAIN NOTED NONE PRSNT: CPT | Mod: S$GLB,,, | Performed by: INTERNAL MEDICINE

## 2020-11-17 PROCEDURE — 3078F DIAST BP <80 MM HG: CPT | Mod: CPTII,S$GLB,, | Performed by: INTERNAL MEDICINE

## 2020-11-17 PROCEDURE — 1159F MED LIST DOCD IN RCRD: CPT | Mod: S$GLB,,, | Performed by: INTERNAL MEDICINE

## 2020-11-17 PROCEDURE — 1100F PTFALLS ASSESS-DOCD GE2>/YR: CPT | Mod: CPTII,S$GLB,, | Performed by: INTERNAL MEDICINE

## 2020-11-17 PROCEDURE — 99214 OFFICE O/P EST MOD 30 MIN: CPT | Mod: S$GLB,,, | Performed by: INTERNAL MEDICINE

## 2020-11-17 PROCEDURE — 99214 PR OFFICE/OUTPT VISIT, EST, LEVL IV, 30-39 MIN: ICD-10-PCS | Mod: S$GLB,,, | Performed by: INTERNAL MEDICINE

## 2020-11-17 NOTE — PROGRESS NOTES
Subjective:       Patient ID: Walter Bull is a 70 y.o. male.    Chief Complaint: Follow-up (6 mo)      HPI:  Patient is known to me and presents for follow up CAD, systolic CHF, DM type 2, HLD. Labs from 11/10/2020 personally reviewed and discussed with the patient today.      CAD s/p 5v CABG and NSTEMI 9/12/18: following with Dr. Vitale as well. Now on Imdur and Ranexa. brilinta, ASA, Crestor/Zetia, losartan and Coreg. Also reports h/o CHF (last known EF 43%+ diastolic dysfunction), on lasix 20mg once a day (aldactone stopped due to hypotension). Denies SOB, [place] and orthopnea.       Dm type 2: on trulicity, lispro vai VGP per endocrinology  A1C 6.6%.  He does report numbness and tingling of left foot > right foot and b/l fingers; sx have been present for several years. Not on medicine for neuropathy as he declines treatment. Denies polyuria, polydipsia  Foot exam: 3/2020  Eye exam: 2/2019  Microalb: 11/2020  On ARB and statin  LDL < 70     HLD: on crestor and zetia, has been taking for several years. Denies side effects. LDL at goal.     HTN: on coreg, losartan. BP is well controlled. Denies headaches, vision changes.     GERD: On Nexium daily. H/o H. Pylori on EGD (2018) with gastric erosions. Working better. No n/v.     BPH: on flomax and finasteride and working well.  No medication side effects. S/p prostate bx 5/13/19 with DR. Chaudhry, no malignany. Last PSA 11/2020 normal     Tobacco use: quit 1981; previously smoked 3 PPD for 20 years  EtOH: stopped drink 1982; was a daily drink 2 fifth liquor daily  Illicit drug: denies    Since I saw him last went to urgent care after tip and fall down his stairs. Had right ankle pain and swelling. xrays without fracture, did show some swelling around lateral malleolus. Diagnosed as sprain/strain. He reports initially very painful and was using crutches but getting better now and able to walk longer distances.     Past Medical History:   Diagnosis Date    Anemia      Anticoagulant long-term use     CHF (congestive heart failure)     Colon cancer screening 2017    Coronary artery disease     Diabetes mellitus type I     Disorder of kidney and ureter     Encounter for blood transfusion     Glaucoma     Heart attack     2018    Heart disease     Hypertension     Iron deficiency     Kidney failure     post CABG    Pulmonary emphysema 2020    S/P CABG x 5 2017       Family History   Problem Relation Age of Onset    Diabetes Mother     Heart disease Mother     Hypertension Sister     Diabetes Sister     Heart disease Sister     Heart attack Brother     Colon cancer Neg Hx     Esophageal cancer Neg Hx     Stomach cancer Neg Hx        Social History     Socioeconomic History    Marital status:      Spouse name: Not on file    Number of children: Not on file    Years of education: Not on file    Highest education level: Not on file   Occupational History    Not on file   Social Needs    Financial resource strain: Not hard at all    Food insecurity     Worry: Never true     Inability: Never true    Transportation needs     Medical: No     Non-medical: No   Tobacco Use    Smoking status: Former Smoker     Packs/day: 3.00     Types: Cigarettes     Start date: 1963     Quit date: 1981     Years since quittin.9    Smokeless tobacco: Former User     Types: Snuff, Chew     Quit date:    Substance and Sexual Activity    Alcohol use: No     Frequency: Never     Drinks per session: Patient refused     Binge frequency: Never    Drug use: No    Sexual activity: Yes     Comment: -with a significant other   Lifestyle    Physical activity     Days per week: 5 days     Minutes per session: Not on file    Stress: Not at all   Relationships    Social connections     Talks on phone: Never     Gets together: Never     Attends Temple service: Not on file     Active member of club or organization: No     Attends  meetings of clubs or organizations: Never     Relationship status:    Other Topics Concern    Not on file   Social History Narrative    Not on file       Review of Systems   Constitutional: Negative for activity change, fatigue, fever and unexpected weight change.   HENT: Negative for congestion, ear pain, hearing loss, rhinorrhea, sore throat and tinnitus.    Eyes: Negative for pain, redness and visual disturbance.   Respiratory: Negative for cough, shortness of breath and wheezing.    Cardiovascular: Negative for chest pain, palpitations and leg swelling.   Gastrointestinal: Negative for abdominal pain, blood in stool, constipation, diarrhea, nausea and vomiting.   Genitourinary: Negative for decreased urine volume, dysuria, frequency and urgency.   Musculoskeletal: Positive for arthralgias (ankle). Negative for back pain, joint swelling and neck pain.   Skin: Negative for color change, rash and wound.   Neurological: Negative for dizziness, tremors, weakness, light-headedness and headaches.         Objective:      Physical Exam  Vitals signs reviewed.   Constitutional:       General: He is not in acute distress.     Appearance: He is well-developed.   HENT:      Head: Normocephalic and atraumatic.      Right Ear: External ear normal.      Left Ear: External ear normal.      Nose: Nose normal.      Mouth/Throat:      Mouth: Mucous membranes are moist.      Pharynx: Oropharynx is clear.   Eyes:      General:         Right eye: No discharge.         Left eye: No discharge.      Extraocular Movements: Extraocular movements intact.      Conjunctiva/sclera: Conjunctivae normal.      Pupils: Pupils are equal, round, and reactive to light.   Neck:      Musculoskeletal: Neck supple.      Thyroid: No thyromegaly.   Cardiovascular:      Rate and Rhythm: Normal rate and regular rhythm.      Heart sounds: No murmur.   Pulmonary:      Effort: Pulmonary effort is normal. No respiratory distress.      Breath sounds:  Normal breath sounds. No wheezing or rales.   Abdominal:      General: Bowel sounds are normal. There is no distension.      Palpations: Abdomen is soft.      Tenderness: There is no abdominal tenderness.   Musculoskeletal:         General: Tenderness (mild right lateral ankle without significant swelling) present.      Right lower leg: No edema.      Left lower leg: No edema.   Lymphadenopathy:      Cervical: No cervical adenopathy.   Skin:     General: Skin is warm and dry.   Neurological:      Mental Status: He is alert and oriented to person, place, and time.      Cranial Nerves: No cranial nerve deficit.   Psychiatric:         Behavior: Behavior normal.         Assessment:       1. Benign essential HTN    2. Mixed hyperlipidemia    3. Stage 3b chronic kidney disease    4. Coronary artery disease of native artery of native heart with stable angina pectoris    5. Chronic combined systolic and diastolic heart failure    6. Diabetes mellitus type 2 in obese        Plan:       Walter was seen today for follow-up.    Diagnoses and all orders for this visit:    Benign essential HTN  -     CBC Auto Differential; Future  -     Comprehensive Metabolic Panel; Future  -     TSH; Future  -     Lipid Panel; Future  -     Hemoglobin A1C; Future  Chronic controlled  Continue medications at same dose  Low Na diet  Exercise, weight loss  Check BP and keep log for next visit    Mixed hyperlipidemia  -     CBC Auto Differential; Future  -     Comprehensive Metabolic Panel; Future  -     TSH; Future  -     Lipid Panel; Future  -     Hemoglobin A1C; Future  Chronic controlled  Cont statin/zetia same dose  LDl at goal    Stage 3b chronic kidney disease  -     CBC Auto Differential; Future  -     Comprehensive Metabolic Panel; Future  -     TSH; Future  -     Lipid Panel; Future  -     Hemoglobin A1C; Future  Chronic stable  At baseline  Follow labs  Avoid nephrotoxic agents    Coronary artery disease of native artery of native heart  with stable angina pectoris  -     CBC Auto Differential; Future  -     Comprehensive Metabolic Panel; Future  -     TSH; Future  -     Lipid Panel; Future  -     Hemoglobin A1C; Future  Chronic stable  Denies angina sx  Cont cardiology follow up  Cont meds same dose    Chronic combined systolic and diastolic heart failure  -     CBC Auto Differential; Future  -     Comprehensive Metabolic Panel; Future  -     TSH; Future  -     Lipid Panel; Future  -     Hemoglobin A1C; Future  Chronic stable  No signs of volume overload today  Cont cardiology follow up  Cont meds same dose    Diabetes mellitus type 2 in obese  -     CBC Auto Differential; Future  -     Comprehensive Metabolic Panel; Future  -     TSH; Future  -     Lipid Panel; Future  -     Hemoglobin A1C; Future  Chronic controlled  Cont meds same dose  Cont endo follow up    RTC 6 months with labs and PRN

## 2020-11-21 ENCOUNTER — CLINICAL SUPPORT (OUTPATIENT)
Dept: CARDIOLOGY | Facility: HOSPITAL | Age: 70
End: 2020-11-21
Payer: MEDICARE

## 2020-11-21 DIAGNOSIS — I49.5 SICK SINUS SYNDROME: ICD-10-CM

## 2020-11-21 DIAGNOSIS — Z95.810 PRESENCE OF AUTOMATIC (IMPLANTABLE) CARDIAC DEFIBRILLATOR: ICD-10-CM

## 2020-11-21 DIAGNOSIS — Z95.0 PRESENCE OF CARDIAC PACEMAKER: ICD-10-CM

## 2020-11-21 PROCEDURE — 93295 DEV INTERROG REMOTE 1/2/MLT: CPT | Mod: ,,, | Performed by: INTERNAL MEDICINE

## 2020-11-21 PROCEDURE — 93295 CARDIAC DEVICE CHECK - REMOTE: ICD-10-PCS | Mod: ,,, | Performed by: INTERNAL MEDICINE

## 2020-11-21 PROCEDURE — 93296 REM INTERROG EVL PM/IDS: CPT | Performed by: INTERNAL MEDICINE

## 2020-12-02 ENCOUNTER — PATIENT MESSAGE (OUTPATIENT)
Dept: INTERNAL MEDICINE | Facility: CLINIC | Age: 70
End: 2020-12-02

## 2020-12-02 DIAGNOSIS — E78.5 HYPERLIPIDEMIA, UNSPECIFIED HYPERLIPIDEMIA TYPE: ICD-10-CM

## 2020-12-02 DIAGNOSIS — Z95.810 ICD (IMPLANTABLE CARDIOVERTER-DEFIBRILLATOR) IN PLACE: ICD-10-CM

## 2020-12-02 RX ORDER — ROSUVASTATIN CALCIUM 40 MG/1
TABLET, COATED ORAL
Qty: 90 TABLET | Refills: 3 | Status: SHIPPED | OUTPATIENT
Start: 2020-12-02 | End: 2021-12-14 | Stop reason: SDUPTHER

## 2020-12-02 RX ORDER — TICAGRELOR 90 MG/1
TABLET ORAL
Qty: 180 TABLET | Refills: 3 | Status: SHIPPED | OUTPATIENT
Start: 2020-12-02 | End: 2021-06-03 | Stop reason: ALTCHOICE

## 2020-12-02 RX ORDER — AMIODARONE HYDROCHLORIDE 200 MG/1
TABLET ORAL
Qty: 90 TABLET | Refills: 3 | Status: SHIPPED | OUTPATIENT
Start: 2020-12-02 | End: 2021-07-06

## 2020-12-02 RX ORDER — ISOSORBIDE MONONITRATE 120 MG/1
120 TABLET, EXTENDED RELEASE ORAL DAILY
Qty: 90 TABLET | Refills: 3 | Status: SHIPPED | OUTPATIENT
Start: 2020-12-02 | End: 2020-12-15 | Stop reason: SDUPTHER

## 2020-12-02 RX ORDER — CARVEDILOL 6.25 MG/1
6.25 TABLET ORAL 2 TIMES DAILY
Qty: 60 TABLET | Refills: 1 | Status: SHIPPED | OUTPATIENT
Start: 2020-12-02 | End: 2020-12-30

## 2020-12-02 NOTE — TELEPHONE ENCOUNTER
LOV 11/17/2020    Requested Prescriptions     Pending Prescriptions Disp Refills    carvediloL (COREG) 6.25 MG tablet 60 tablet 1     Sig: Take 1 tablet (6.25 mg total) by mouth 2 (two) times daily.    isosorbide mononitrate (IMDUR) 120 MG 24 hr tablet 90 tablet 3     Sig: Take 1 tablet (120 mg total) by mouth once daily.

## 2020-12-07 ENCOUNTER — HOSPITAL ENCOUNTER (EMERGENCY)
Facility: HOSPITAL | Age: 70
Discharge: HOME | End: 2020-12-07
Attending: SURGERY
Payer: MEDICARE

## 2020-12-07 VITALS
OXYGEN SATURATION: 98 % | TEMPERATURE: 98 F | SYSTOLIC BLOOD PRESSURE: 141 MMHG | WEIGHT: 244.69 LBS | HEART RATE: 84 BPM | DIASTOLIC BLOOD PRESSURE: 66 MMHG | BODY MASS INDEX: 36.14 KG/M2 | RESPIRATION RATE: 18 BRPM

## 2020-12-07 DIAGNOSIS — Z20.822 CLOSE EXPOSURE TO COVID-19 VIRUS: Primary | ICD-10-CM

## 2020-12-07 PROCEDURE — 99284 EMERGENCY DEPT VISIT MOD MDM: CPT

## 2020-12-07 PROCEDURE — U0003 INFECTIOUS AGENT DETECTION BY NUCLEIC ACID (DNA OR RNA); SEVERE ACUTE RESPIRATORY SYNDROME CORONAVIRUS 2 (SARS-COV-2) (CORONAVIRUS DISEASE [COVID-19]), AMPLIFIED PROBE TECHNIQUE, MAKING USE OF HIGH THROUGHPUT TECHNOLOGIES AS DESCRIBED BY CMS-2020-01-R: HCPCS

## 2020-12-07 RX ORDER — BENZONATATE 100 MG/1
200 CAPSULE ORAL 3 TIMES DAILY PRN
Qty: 20 CAPSULE | Refills: 0 | Status: SHIPPED | OUTPATIENT
Start: 2020-12-07 | End: 2020-12-17

## 2020-12-07 RX ORDER — AZITHROMYCIN 250 MG/1
TABLET, FILM COATED ORAL
Qty: 6 TABLET | Refills: 0 | Status: SHIPPED | OUTPATIENT
Start: 2020-12-07 | End: 2020-12-15

## 2020-12-08 NOTE — ED TRIAGE NOTES
70 y.o. male presents to ER    Chief Complaint   Patient presents with    COVID-19 Concerns     + exposure   Pt reports he was around someone who tested positive. Pt reports mild cough that started last night and body aches. No acute distress noted.

## 2020-12-08 NOTE — ED NOTES
Patient provided D/C instructions at the bedside.  Patient was provided the opportunity to review D/C summary and diagnosis.  Patient has no further questions or concerns in regards to treatment/care they have received today in the emergency department.  Patient acknowledged discharge teachings and follow-up appointment as directed. Patient educated on COVID-19 discharge instructions and importance of adhering to isolation as well as when to seek medical attention. Patient verbalizes understanding.   Patient had steady gait while exiting the emergency department and left with mask in use.

## 2020-12-08 NOTE — ED PROVIDER NOTES
[facility]                                                 I reviewed the ER triage nurse's note before evaluating the patient    Chief Complaint  70 y.o. male with COVID-19 Concerns (+ exposure)      History of Present Illness  Walter Bull presents to the emergency room with COVID concerns   Patient has no symptoms but had a relative with COVID virus yesterday  Pt states that they have been in contact with several people with COVID virus  Clear lung sounds with no wheezing or sputum identified on evaluation today  Patient has no evidence of hypoxia, no fever on  emergency room triage    The history is provided by the patient   device was not used during this ER visit    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     Past Surgical History:   Procedure Laterality Date    CARDIAC DEFIBRILLATOR PLACEMENT      CARDIAC ELECTROPHYSIOLOGY STUDY N/A 11/19/2018    Procedure: CARDIAC ELECTROPHYSIOLOGY STUDY;  Surgeon: Byron Glasgow MD;  Location: [facility] EP LAB;  Service: Cardiology;  Laterality: N/A;  VT, EPS +/- ICD, SJM, anes, GP, 6086    CARDIAC ELECTROPHYSIOLOGY STUDY N/A 11/19/2018    Procedure: CARDIAC ELECTROPHYSIOLOGY STUDY;  Surgeon: Byron Glasgow MD;  Location: [facility] EP LAB;  Service: Cardiology;  Laterality: N/A;    COLON SURGERY  2006    COLONOSCOPY N/A 2/2/2017    Procedure: COLONOSCOPY;  Surgeon: Ronnie Conway MD;  Location: [facility];  Service: Endoscopy;  Laterality: N/A;    CORONARY ARTERY BYPASS GRAFT  2005    5 arteries     CORONARY BYPASS GRAFT ANGIOGRAPHY  11/16/2018    Procedure: Bypass graft study;  Surgeon: Ryan Schmidt MD;  Location: [facility] CATH LAB;  Service: Cardiology;;    EYE SURGERY Bilateral 2016    Laser surgery for glaucoma    INSERTION OF IMPLANTABLE CARDIOVERTER-DEFIBRILLATOR (ICD) GENERATOR WITH TWO EXISTING LEADS Left 11/19/2018    Procedure: INSERTION, DUAL ICD;  Surgeon: Byron Glasgow MD;  Location: [facility] EP LAB;  Service: Cardiology;  Laterality: Left;  VT, EPS +/- ICD, SJM, anes, GP, 6086    LEFT HEART CATHETERIZATION Left 11/16/2018    Procedure: Left heart cath;  Surgeon: Ryan Schmidt MD;  Location: [facility] CATH LAB;  Service: Cardiology;  Laterality: Left;      No Known Allergies     I have reviewed all of this patient's past medical, surgical, family, and social   histories as well as active allergies and medications documented in the  electronic medical record    Review of Systems and Physical Exam      Review of Systems  -- Constitution - no fever, denies fatigue, no weakness, no chills  -- Eyes - no tearing or redness, no visual disturbance  -- Ear, Nose - no tinnitus or earache, no nasal congestion or discharge  -- Mouth,Throat - no sore throat, no toothache, normal voice, normal swallowing  -- Respiratory - denies cough and congestion, no shortness of breath, no GREENWOOD  -- Cardiovascular - denies chest pain, no palpitations, denies claudication  -- Gastrointestinal - denies abdominal pain, nausea, vomiting, or diarrhea  -- Genitourinary - no dysuria, no denies flank pain, no hematuria or frequency   -- Musculoskeletal - denies back pain, negative for myalgias and arthralgias   -- Neurological - no headache, denies weakness or seizure; no LOC  -- Skin - denies pallor, rash, or changes in skin. no hives or welts noted    Vital Signs  His pulse is 84. His respiration is 18 and oxygen saturation is 98%.     Physical Exam  -- Nursing note and vitals reviewed  -- Constitutional: Appears well-developed  and well-nourished  -- Head: Atraumatic. Normocephalic. No obvious abnormality  -- Eyes: Pupils are equal and reactive to light. Normal conjunctiva and lids  -- Nose: Nose normal in appearance, nares grossly normal. No discharge  -- Throat: Mucous membranes moist, pharynx normal, normal tonsils. No lesions   -- Ears: External ears and TM normal bilaterally. Normal hearing and no drainage  -- Neck: Normal range of motion. Neck supple. No masses, trachea midline  -- Cardiac: Normal rate, regular rhythm and normal heart sounds  -- Pulmonary: Normal respiratory effort, breath sounds clear to auscultation  -- Abdominal: Soft, no tenderness. Normal bowel sounds. Normal liver edge  -- Musculoskeletal: Normal range of motion, no effusions. Joints stable   -- Neurological: No focal deficits. Showed good interaction with staff  -- Vascular: Posterior tibial, dorsalis pedis and radial pulses 2+ bilaterally       Emergency Room Course      Treatment and Evaluation  -- routine coronavirus PCR is currently pending     Diagnosis  [Z20.828] Close exposure to COVID-19 virus (Primary)    Disposition and Plan  -- Disposition: home  -- Condition: stable  -- Follow-up: Patient to follow up with Belkys Kruger MD in 1-2 days.  -- I advised the patient that we have found no life threatening condition today  -- At this time, I believe the patient is clinically stable for discharge.   -- The patient acknowledges that close follow up with a MD is required   -- Patient agrees to comply with all instruction and direction given in the ER    This note is dictated on M*Modal word recognition program.  There are word recognition mistakes that are occasionally missed on review.         Omar Garrett MD  12/07/20 [id number]

## 2020-12-09 LAB — SARS-COV-2 RNA RESP QL NAA+PROBE: NOT DETECTED

## 2020-12-15 ENCOUNTER — HOSPITAL ENCOUNTER (OUTPATIENT)
Dept: RADIOLOGY | Facility: HOSPITAL | Age: 70
Discharge: HOME | End: 2020-12-15
Attending: INTERNAL MEDICINE
Payer: MEDICARE

## 2020-12-15 ENCOUNTER — OFFICE VISIT (OUTPATIENT)
Dept: INTERNAL MEDICINE | Facility: CLINIC | Age: 70
End: 2020-12-15
Payer: MEDICARE

## 2020-12-15 VITALS
HEIGHT: 69 IN | BODY MASS INDEX: 36.21 KG/M2 | HEART RATE: 60 BPM | RESPIRATION RATE: 20 BRPM | OXYGEN SATURATION: 99 % | DIASTOLIC BLOOD PRESSURE: 82 MMHG | WEIGHT: 244.5 LBS | TEMPERATURE: 97 F | SYSTOLIC BLOOD PRESSURE: 136 MMHG

## 2020-12-15 DIAGNOSIS — R05.9 COUGH: ICD-10-CM

## 2020-12-15 DIAGNOSIS — Z76.0 MEDICATION REFILL: ICD-10-CM

## 2020-12-15 DIAGNOSIS — I25.5 ISCHEMIC CARDIOMYOPATHY: ICD-10-CM

## 2020-12-15 DIAGNOSIS — Z20.822 CLOSE EXPOSURE TO COVID-19 VIRUS: Primary | ICD-10-CM

## 2020-12-15 DIAGNOSIS — Z20.822 CLOSE EXPOSURE TO COVID-19 VIRUS: ICD-10-CM

## 2020-12-15 LAB
CTP QC/QA: YES
POC MOLECULAR INFLUENZA A AGN: NEGATIVE
POC MOLECULAR INFLUENZA B AGN: NEGATIVE

## 2020-12-15 PROCEDURE — 3075F SYST BP GE 130 - 139MM HG: CPT | Mod: CPTII,S$GLB,, | Performed by: INTERNAL MEDICINE

## 2020-12-15 PROCEDURE — 87502 POCT INFLUENZA A/B MOLECULAR: ICD-10-PCS | Mod: QW,S$GLB,, | Performed by: INTERNAL MEDICINE

## 2020-12-15 PROCEDURE — 3008F BODY MASS INDEX DOCD: CPT | Mod: CPTII,S$GLB,, | Performed by: INTERNAL MEDICINE

## 2020-12-15 PROCEDURE — 1126F PR PAIN SEVERITY QUANTIFIED, NO PAIN PRESENT: ICD-10-PCS | Mod: S$GLB,,, | Performed by: INTERNAL MEDICINE

## 2020-12-15 PROCEDURE — 1101F PT FALLS ASSESS-DOCD LE1/YR: CPT | Mod: CPTII,S$GLB,, | Performed by: INTERNAL MEDICINE

## 2020-12-15 PROCEDURE — 71046 X-RAY EXAM CHEST 2 VIEWS: CPT | Mod: TC

## 2020-12-15 PROCEDURE — 87502 INFLUENZA DNA AMP PROBE: CPT | Mod: QW,S$GLB,, | Performed by: INTERNAL MEDICINE

## 2020-12-15 PROCEDURE — 3079F DIAST BP 80-89 MM HG: CPT | Mod: CPTII,S$GLB,, | Performed by: INTERNAL MEDICINE

## 2020-12-15 PROCEDURE — 3288F FALL RISK ASSESSMENT DOCD: CPT | Mod: CPTII,S$GLB,, | Performed by: INTERNAL MEDICINE

## 2020-12-15 PROCEDURE — 3079F PR MOST RECENT DIASTOLIC BLOOD PRESSURE 80-89 MM HG: ICD-10-PCS | Mod: CPTII,S$GLB,, | Performed by: INTERNAL MEDICINE

## 2020-12-15 PROCEDURE — 3288F PR FALLS RISK ASSESSMENT DOCUMENTED: ICD-10-PCS | Mod: CPTII,S$GLB,, | Performed by: INTERNAL MEDICINE

## 2020-12-15 PROCEDURE — 1159F PR MEDICATION LIST DOCUMENTED IN MEDICAL RECORD: ICD-10-PCS | Mod: S$GLB,,, | Performed by: INTERNAL MEDICINE

## 2020-12-15 PROCEDURE — 1126F AMNT PAIN NOTED NONE PRSNT: CPT | Mod: S$GLB,,, | Performed by: INTERNAL MEDICINE

## 2020-12-15 PROCEDURE — 99213 PR OFFICE/OUTPT VISIT, EST, LEVL III, 20-29 MIN: ICD-10-PCS | Mod: S$GLB,,, | Performed by: INTERNAL MEDICINE

## 2020-12-15 PROCEDURE — 71046 X-RAY EXAM CHEST 2 VIEWS: CPT | Mod: 26,,, | Performed by: RADIOLOGY

## 2020-12-15 PROCEDURE — 99999 PR PBB SHADOW E&M-EST. PATIENT-LVL V: ICD-10-PCS | Mod: PBBFAC,,, | Performed by: INTERNAL MEDICINE

## 2020-12-15 PROCEDURE — U0003 INFECTIOUS AGENT DETECTION BY NUCLEIC ACID (DNA OR RNA); SEVERE ACUTE RESPIRATORY SYNDROME CORONAVIRUS 2 (SARS-COV-2) (CORONAVIRUS DISEASE [COVID-19]), AMPLIFIED PROBE TECHNIQUE, MAKING USE OF HIGH THROUGHPUT TECHNOLOGIES AS DESCRIBED BY CMS-2020-01-R: HCPCS

## 2020-12-15 PROCEDURE — 71046 XR CHEST PA AND LATERAL: ICD-10-PCS | Mod: 26,,, | Performed by: RADIOLOGY

## 2020-12-15 PROCEDURE — 99999 PR PBB SHADOW E&M-EST. PATIENT-LVL V: CPT | Mod: PBBFAC,,, | Performed by: INTERNAL MEDICINE

## 2020-12-15 PROCEDURE — 3008F PR BODY MASS INDEX (BMI) DOCUMENTED: ICD-10-PCS | Mod: CPTII,S$GLB,, | Performed by: INTERNAL MEDICINE

## 2020-12-15 PROCEDURE — 1159F MED LIST DOCD IN RCRD: CPT | Mod: S$GLB,,, | Performed by: INTERNAL MEDICINE

## 2020-12-15 PROCEDURE — 1101F PR PT FALLS ASSESS DOC 0-1 FALLS W/OUT INJ PAST YR: ICD-10-PCS | Mod: CPTII,S$GLB,, | Performed by: INTERNAL MEDICINE

## 2020-12-15 PROCEDURE — 3075F PR MOST RECENT SYSTOLIC BLOOD PRESS GE 130-139MM HG: ICD-10-PCS | Mod: CPTII,S$GLB,, | Performed by: INTERNAL MEDICINE

## 2020-12-15 PROCEDURE — 99213 OFFICE O/P EST LOW 20 MIN: CPT | Mod: S$GLB,,, | Performed by: INTERNAL MEDICINE

## 2020-12-15 RX ORDER — ISOSORBIDE MONONITRATE 120 MG/1
120 TABLET, EXTENDED RELEASE ORAL DAILY
Qty: 90 TABLET | Refills: 3 | Status: SHIPPED | OUTPATIENT
Start: 2020-12-15 | End: 2021-02-26

## 2020-12-15 RX ORDER — FUROSEMIDE 20 MG/1
TABLET ORAL
Qty: 15 TABLET | Refills: 0 | Status: SHIPPED | OUTPATIENT
Start: 2020-12-15 | End: 2020-12-15 | Stop reason: SDUPTHER

## 2020-12-15 RX ORDER — FUROSEMIDE 20 MG/1
TABLET ORAL
Qty: 90 TABLET | Refills: 3 | Status: SHIPPED | OUTPATIENT
Start: 2020-12-15 | End: 2021-02-26

## 2020-12-15 RX ORDER — LEVOFLOXACIN 750 MG/1
750 TABLET ORAL EVERY OTHER DAY
Qty: 5 TABLET | Refills: 0 | Status: SHIPPED | OUTPATIENT
Start: 2020-12-15 | End: 2020-12-25

## 2020-12-15 NOTE — PROGRESS NOTES
Subjective:       Patient ID: Walter Bull is a 70 y.o. male.    Chief Complaint: Cough and Shortness of Breath (started after he started experiencing cold symptoms)      HPI:  Patient is known to me and presents with cough and SOB. Sx started about 1 Week ago. Went to ER 2020. COVID negative. Flu not done. Given Zpak. Cough is now productive. Worse at night. He is feeling SOB. No fevers but felt diaphoretic. No diarrhea. No headache or sore throat. No loss of taste or smell. + exposure. He was day 2 or 3 on first swab.     Past Medical History:   Diagnosis Date    Anemia     Anticoagulant long-term use     CHF (congestive heart failure)     Colon cancer screening 2017    Coronary artery disease     Diabetes mellitus type I     Disorder of kidney and ureter     Encounter for blood transfusion     Glaucoma     Heart attack     2018    Heart disease     Hypertension     Iron deficiency     Kidney failure     post CABG    Pulmonary emphysema 2020    S/P CABG x 5 2017       Family History   Problem Relation Age of Onset    Diabetes Mother     Heart disease Mother     Hypertension Sister     Diabetes Sister     Heart disease Sister     Heart attack Brother     Colon cancer Neg Hx     Esophageal cancer Neg Hx     Stomach cancer Neg Hx        Social History     Socioeconomic History    Marital status:      Spouse name: Not on file    Number of children: Not on file    Years of education: Not on file    Highest education level: Not on file   Occupational History    Not on file   Social Needs    Financial resource strain: Not hard at all    Food insecurity     Worry: Never true     Inability: Never true    Transportation needs     Medical: No     Non-medical: No   Tobacco Use    Smoking status: Former Smoker     Packs/day: 3.00     Types: Cigarettes     Start date: 1963     Quit date: 1981     Years since quittin.9    Smokeless tobacco: Former  User     Types: Snuff, Chew     Quit date: 1984   Substance and Sexual Activity    Alcohol use: No     Frequency: Never     Drinks per session: Patient refused     Binge frequency: Never    Drug use: No    Sexual activity: Yes     Comment: -with a significant other   Lifestyle    Physical activity     Days per week: 5 days     Minutes per session: Not on file    Stress: Not at all   Relationships    Social connections     Talks on phone: Never     Gets together: Never     Attends [employer] service: Not on file     Active member of club or organization: No     Attends meetings of clubs or organizations: Never     Relationship status:    Other Topics Concern    Not on file   Social History Narrative    Not on file       Review of Systems   Constitutional: Positive for diaphoresis and fatigue. Negative for activity change, fever and unexpected weight change.   HENT: Positive for congestion. Negative for ear pain, hearing loss, rhinorrhea and sore throat.    Eyes: Negative for pain, redness and visual disturbance.   Respiratory: Positive for cough and shortness of breath. Negative for wheezing.    Cardiovascular: Negative for chest pain, palpitations and leg swelling.   Gastrointestinal: Negative for abdominal pain, constipation, diarrhea, nausea and vomiting.   Genitourinary: Negative for decreased urine volume, dysuria, frequency and urgency.   Musculoskeletal: Negative for back pain, joint swelling and neck pain.   Skin: Negative for color change, rash and wound.   Neurological: Negative for dizziness, tremors, weakness, light-headedness and headaches.         Objective:      Physical Exam  Vitals signs reviewed.   Constitutional:       General: He is not in acute distress.     Appearance: He is well-developed.   HENT:      Head: Normocephalic and atraumatic.      Right Ear: External ear normal.      Left Ear: External ear normal.      Nose: Congestion present.      Mouth/Throat:      Mouth:  Mucous membranes are moist.      Pharynx: Oropharynx is clear.   Eyes:      General:         Right eye: No discharge.         Left eye: No discharge.      Extraocular Movements: Extraocular movements intact.      Conjunctiva/sclera: Conjunctivae normal.      Pupils: Pupils are equal, round, and reactive to light.   Neck:      Musculoskeletal: Neck supple.      Thyroid: No thyromegaly.   Cardiovascular:      Rate and Rhythm: Normal rate and regular rhythm.      Heart sounds: No murmur. No friction rub. No gallop.    Pulmonary:      Effort: Pulmonary effort is normal. No respiratory distress.      Breath sounds: Wheezing and rhonchi present. No rales.   Abdominal:      General: Bowel sounds are normal. There is no distension.      Palpations: Abdomen is soft.      Tenderness: There is no abdominal tenderness.   Lymphadenopathy:      Cervical: No cervical adenopathy.   Skin:     General: Skin is warm and dry.   Neurological:      Mental Status: He is alert and oriented to person, place, and time.      Cranial Nerves: No cranial nerve deficit.   Psychiatric:         Behavior: Behavior normal.         Assessment:       1. Close exposure to COVID-19 virus    2. Cough    3. Ischemic cardiomyopathy    4. Medication refill        Plan:       Walter was seen today for cough and shortness of breath.    Diagnoses and all orders for this visit:    Close exposure to COVID-19 virus  -     X-Ray Chest PA And Lateral; Future  -     CBC Auto Differential; Future  -     Comprehensive Metabolic Panel; Future  -     D-Dimer, Quantitative; Future  -     Sedimentation rate; Future  -     C-reactive protein; Future  -     Lactate Dehydrogenase; Future  -     POCT Influenza A/B Molecular  -     levoFLOXacin (LEVAQUIN) 750 MG tablet; Take 1 tablet (750 mg total) by mouth every other day. for 10 days  -     Procalcitonin; Future    Initial COVID swab negative but given worsening sx and lung exam findings concern for false negative  Repeat  COVID today  Flu swab pending  Labs and xray today  Will start levaquin for possible bacterial pneumonia give diffuse wheezing/rhonchi on exam but will stop pending above results  Failed zpak  sats 99% on RA after ambulating from waiting room to exam room so not hypoxic today. ER precautions discussed    Cough  -     X-Ray Chest PA And Lateral; Future  -     CBC Auto Differential; Future  -     Comprehensive Metabolic Panel; Future  -     D-Dimer, Quantitative; Future  -     Sedimentation rate; Future  -     C-reactive protein; Future  -     Lactate Dehydrogenase; Future  -     COVID-19 Routine Screening  -     levoFLOXacin (LEVAQUIN) 750 MG tablet; Take 1 tablet (750 mg total) by mouth every other day. for 10 days  -     Procalcitonin; Future    Ischemic cardiomyopathy  -     Discontinue: furosemide (LASIX) 20 MG tablet; TAKE 1 TABLET BY MOUTH ONCE DAILY  -     furosemide (LASIX) 20 MG tablet; TAKE 1 TABLET BY MOUTH ONCE DAILY  -     isosorbide mononitrate (IMDUR) 120 MG 24 hr tablet; Take 1 tablet (120 mg total) by mouth once daily.  No signs of volume overload today  meds refilled    Medication refill  -     furosemide (LASIX) 20 MG tablet; TAKE 1 TABLET BY MOUTH ONCE DAILY  -     isosorbide mononitrate (IMDUR) 120 MG 24 hr tablet; Take 1 tablet (120 mg total) by mouth once daily.      RTC as scheudled for routine and PRN

## 2020-12-16 ENCOUNTER — PATIENT MESSAGE (OUTPATIENT)
Dept: INTERNAL MEDICINE | Facility: CLINIC | Age: 70
End: 2020-12-16

## 2020-12-16 LAB — SARS-COV-2 RNA RESP QL NAA+PROBE: NOT DETECTED

## 2020-12-29 ENCOUNTER — TELEPHONE (OUTPATIENT)
Dept: CARDIOLOGY | Facility: CLINIC | Age: 70
End: 2020-12-29

## 2020-12-29 DIAGNOSIS — R00.2 PALPITATIONS: Primary | ICD-10-CM

## 2020-12-29 NOTE — TELEPHONE ENCOUNTER
"----- Message from Clover Rodriguez sent at 12/29/2020 10:26 AM CST -----  Regarding: Appointment Access  Pt's significant other Eve called requesting to schedule an appointment for patient. She explained that the patient is experiencing " high heart rate and high blood pressure." Pt's s/o stated patient was advised to see his physician for this issue. The soonest available appointment, I was able to offer patient was 3/10/2021. Pt and s/o are requesting a sooner appointment. If possible, can you please assist patient? Patient can be reached at [phone].    Thanks,    Clover Chandler Navigator     "

## 2020-12-30 ENCOUNTER — HOSPITAL ENCOUNTER (OUTPATIENT)
Dept: CARDIOLOGY | Facility: CLINIC | Age: 70
Discharge: HOME | End: 2020-12-30
Payer: MEDICARE

## 2020-12-30 ENCOUNTER — OFFICE VISIT (OUTPATIENT)
Dept: CARDIOLOGY | Facility: CLINIC | Age: 70
End: 2020-12-30
Payer: MEDICARE

## 2020-12-30 ENCOUNTER — CLINICAL SUPPORT (OUTPATIENT)
Dept: CARDIOLOGY | Facility: HOSPITAL | Age: 70
End: 2020-12-30
Attending: INTERNAL MEDICINE
Payer: MEDICARE

## 2020-12-30 ENCOUNTER — OFFICE VISIT (OUTPATIENT)
Dept: ELECTROPHYSIOLOGY | Facility: CLINIC | Age: 70
End: 2020-12-30
Payer: MEDICARE

## 2020-12-30 VITALS
HEIGHT: 69 IN | DIASTOLIC BLOOD PRESSURE: 69 MMHG | HEART RATE: 62 BPM | OXYGEN SATURATION: 99 % | SYSTOLIC BLOOD PRESSURE: 150 MMHG | WEIGHT: 241.88 LBS | BODY MASS INDEX: 35.82 KG/M2

## 2020-12-30 VITALS
SYSTOLIC BLOOD PRESSURE: 149 MMHG | BODY MASS INDEX: 35.82 KG/M2 | WEIGHT: 241.88 LBS | DIASTOLIC BLOOD PRESSURE: 70 MMHG | HEIGHT: 69 IN | HEART RATE: 59 BPM

## 2020-12-30 DIAGNOSIS — G47.33 OSA (OBSTRUCTIVE SLEEP APNEA): ICD-10-CM

## 2020-12-30 DIAGNOSIS — I50.42 CHRONIC COMBINED SYSTOLIC AND DIASTOLIC HEART FAILURE: ICD-10-CM

## 2020-12-30 DIAGNOSIS — Z95.810 ICD (IMPLANTABLE CARDIOVERTER-DEFIBRILLATOR), DUAL, IN SITU: Primary | Chronic | ICD-10-CM

## 2020-12-30 DIAGNOSIS — E66.01 SEVERE OBESITY (BMI 35.0-39.9) WITH COMORBIDITY: ICD-10-CM

## 2020-12-30 DIAGNOSIS — I50.31 ACUTE DIASTOLIC HEART FAILURE: Primary | ICD-10-CM

## 2020-12-30 DIAGNOSIS — I49.8 OTHER SPECIFIED CARDIAC ARRHYTHMIAS: Primary | ICD-10-CM

## 2020-12-30 DIAGNOSIS — R00.0 WIDE-COMPLEX TACHYCARDIA: ICD-10-CM

## 2020-12-30 DIAGNOSIS — Z79.899 LONG TERM CURRENT USE OF AMIODARONE: ICD-10-CM

## 2020-12-30 DIAGNOSIS — R00.2 PALPITATIONS: ICD-10-CM

## 2020-12-30 DIAGNOSIS — I49.8 OTHER SPECIFIED CARDIAC ARRHYTHMIAS: ICD-10-CM

## 2020-12-30 DIAGNOSIS — I10 BENIGN ESSENTIAL HTN: Chronic | ICD-10-CM

## 2020-12-30 DIAGNOSIS — I25.5 ISCHEMIC CARDIOMYOPATHY: ICD-10-CM

## 2020-12-30 DIAGNOSIS — I47.20 VENTRICULAR TACHYCARDIA: ICD-10-CM

## 2020-12-30 PROCEDURE — 99999 PR PBB SHADOW E&M-EST. PATIENT-LVL III: CPT | Mod: PBBFAC,GC,, | Performed by: INTERNAL MEDICINE

## 2020-12-30 PROCEDURE — 93005 ELECTROCARDIOGRAM TRACING: CPT | Mod: S$GLB,59

## 2020-12-30 PROCEDURE — 99499 UNLISTED E&M SERVICE: CPT | Mod: S$GLB,,, | Performed by: NURSE PRACTITIONER

## 2020-12-30 PROCEDURE — 99214 OFFICE O/P EST MOD 30 MIN: CPT | Mod: GC,S$GLB,, | Performed by: INTERNAL MEDICINE

## 2020-12-30 PROCEDURE — 1159F MED LIST DOCD IN RCRD: CPT | Mod: S$GLB,,, | Performed by: NURSE PRACTITIONER

## 2020-12-30 PROCEDURE — 93010 EKG 12-LEAD: ICD-10-PCS | Mod: S$GLB,,, | Performed by: INTERNAL MEDICINE

## 2020-12-30 PROCEDURE — 3288F FALL RISK ASSESSMENT DOCD: CPT | Mod: CPTII,S$GLB,, | Performed by: NURSE PRACTITIONER

## 2020-12-30 PROCEDURE — 3008F BODY MASS INDEX DOCD: CPT | Mod: CPTII,S$GLB,, | Performed by: NURSE PRACTITIONER

## 2020-12-30 PROCEDURE — 99999 PR PBB SHADOW E&M-EST. PATIENT-LVL IV: ICD-10-PCS | Mod: PBBFAC,,, | Performed by: NURSE PRACTITIONER

## 2020-12-30 PROCEDURE — 1159F PR MEDICATION LIST DOCUMENTED IN MEDICAL RECORD: ICD-10-PCS | Mod: S$GLB,,, | Performed by: NURSE PRACTITIONER

## 2020-12-30 PROCEDURE — 93283 CARDIAC DEVICE CHECK - IN CLINIC & HOSPITAL: ICD-10-PCS | Mod: 26,,, | Performed by: INTERNAL MEDICINE

## 2020-12-30 PROCEDURE — 3008F PR BODY MASS INDEX (BMI) DOCUMENTED: ICD-10-PCS | Mod: CPTII,S$GLB,, | Performed by: NURSE PRACTITIONER

## 2020-12-30 PROCEDURE — 93283 PRGRMG EVAL IMPLANTABLE DFB: CPT | Mod: 26,,, | Performed by: INTERNAL MEDICINE

## 2020-12-30 PROCEDURE — 93283 PRGRMG EVAL IMPLANTABLE DFB: CPT

## 2020-12-30 PROCEDURE — 99214 OFFICE O/P EST MOD 30 MIN: CPT | Mod: S$GLB,,, | Performed by: NURSE PRACTITIONER

## 2020-12-30 PROCEDURE — 1100F PTFALLS ASSESS-DOCD GE2>/YR: CPT | Mod: CPTII,S$GLB,, | Performed by: NURSE PRACTITIONER

## 2020-12-30 PROCEDURE — 3078F DIAST BP <80 MM HG: CPT | Mod: CPTII,S$GLB,, | Performed by: NURSE PRACTITIONER

## 2020-12-30 PROCEDURE — 3077F PR MOST RECENT SYSTOLIC BLOOD PRESSURE >= 140 MM HG: ICD-10-PCS | Mod: CPTII,S$GLB,, | Performed by: NURSE PRACTITIONER

## 2020-12-30 PROCEDURE — 1126F PR PAIN SEVERITY QUANTIFIED, NO PAIN PRESENT: ICD-10-PCS | Mod: S$GLB,,, | Performed by: NURSE PRACTITIONER

## 2020-12-30 PROCEDURE — 3288F PR FALLS RISK ASSESSMENT DOCUMENTED: ICD-10-PCS | Mod: CPTII,S$GLB,, | Performed by: NURSE PRACTITIONER

## 2020-12-30 PROCEDURE — 1126F AMNT PAIN NOTED NONE PRSNT: CPT | Mod: S$GLB,,, | Performed by: NURSE PRACTITIONER

## 2020-12-30 PROCEDURE — 99499 RISK ADDL DX/OHS AUDIT: ICD-10-PCS | Mod: S$GLB,,, | Performed by: NURSE PRACTITIONER

## 2020-12-30 PROCEDURE — 99999 PR PBB SHADOW E&M-EST. PATIENT-LVL III: ICD-10-PCS | Mod: PBBFAC,GC,, | Performed by: INTERNAL MEDICINE

## 2020-12-30 PROCEDURE — 3077F SYST BP >= 140 MM HG: CPT | Mod: CPTII,S$GLB,, | Performed by: NURSE PRACTITIONER

## 2020-12-30 PROCEDURE — 99214 PR OFFICE/OUTPT VISIT, EST, LEVL IV, 30-39 MIN: ICD-10-PCS | Mod: S$GLB,,, | Performed by: NURSE PRACTITIONER

## 2020-12-30 PROCEDURE — 99214 PR OFFICE/OUTPT VISIT, EST, LEVL IV, 30-39 MIN: ICD-10-PCS | Mod: GC,S$GLB,, | Performed by: INTERNAL MEDICINE

## 2020-12-30 PROCEDURE — 99999 PR PBB SHADOW E&M-EST. PATIENT-LVL IV: CPT | Mod: PBBFAC,,, | Performed by: NURSE PRACTITIONER

## 2020-12-30 PROCEDURE — 1100F PR PT FALLS ASSESS DOC 2+ FALLS/FALL W/INJURY/YR: ICD-10-PCS | Mod: CPTII,S$GLB,, | Performed by: NURSE PRACTITIONER

## 2020-12-30 PROCEDURE — 3078F PR MOST RECENT DIASTOLIC BLOOD PRESSURE < 80 MM HG: ICD-10-PCS | Mod: CPTII,S$GLB,, | Performed by: NURSE PRACTITIONER

## 2020-12-30 PROCEDURE — 93010 ELECTROCARDIOGRAM REPORT: CPT | Mod: S$GLB,,, | Performed by: INTERNAL MEDICINE

## 2020-12-30 RX ORDER — CARVEDILOL 25 MG/1
25 TABLET ORAL 2 TIMES DAILY WITH MEALS
Qty: 60 TABLET | Refills: 11 | Status: SHIPPED | OUTPATIENT
Start: 2020-12-30 | End: 2021-02-18 | Stop reason: SDUPTHER

## 2020-12-30 NOTE — LETTER
December 30, 2020      Byron Glasgow MD  [address]  [facility] [address]           [id number]  [address]  [facility] [address]  Phone: [phone]  Fax: [phone]          Patient: Walter Bull   MR Number: [id number]   YOB: 1950   Date of Visit: 12/30/2020       Dear Dr. Byron Glasgow:    Thank you for referring Walter Bull to me for evaluation. Attached you will find relevant portions of my assessment and plan of care.    If you have questions, please do not hesitate to call me. I look forward to following Walter Bull along with you.    Sincerely,    Ankita Lynn, NP    Enclosure  CC:  No Recipients    If you would like to receive this communication electronically, please contact [email or web address] or [phone] to request more information on [employer] access.    For providers and/or their staff who would like to refer a patient to [facility], please contact us through our one-stop-shop provider referral line, [facility], at [phone].    If you feel you have received this communication in error or would no longer like to receive these types of communications, please e-mail [email or web address]

## 2020-12-30 NOTE — PROGRESS NOTES
Mr. Bull is a patient of Dr. Glasgow and was last seen in clinic 11/19/2019.      Subjective:   Patient ID:  Walter Bull is a 70 y.o. male who presents for follow-up of Pacemaker Check and Palpitations  .     HPI:    Mr. Bull is a 70 y.o. 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      Background:      General cardiologist: Dr. Agustin Capone     69 y/o man with prior 5v CABG (2005) (LIMA to LAD, SVG to Diag), known  to RCA and LCx and recent admission to outside hospital with NSTEMI, CKD II, HTN, HLD, DM presented to the hospital 11/16/2018 with acute onset of chest pain and new onset LBBB. Had LHC at OSH and has been managed medically. Has LVEF 35%, no ICD. Was about to perform lawn work when he started having acute onset chest pain, similar in nature to his prior MI. Also noted palpitations which he had not experienced before, has never experienced syncope. 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 He denies orthopnea, PND, peripheral edema or GREENWOOD. There is no family history of sudden cardiac death.     Patient presented in wide complex tachycardia. Cardiology called for assistance. He continued to have 9-10/10 chest pain like an elephant sitting on his chest. Received NTG x 2 and ASA 325mg, was taken to the cath lab and no new occlusions were identified. The patient received lidocaine for a slow MMVT which restored NSR with a narrow complex. LHC found  that is known and the LIMA to LAD and SVG to D1 grafts are patent. Had inducible sustained VT when pigtail catheter was inserted into the LV, VT terminated with lidocaine bolus. Started on amiodarone gtt.      On 11/19/2018 he underwent EPS. Patient easily inducible for VT (LBLS  ms) with ventricular double extrastimuli. Underwent successful dual chamber ICD implantation ([employer]).    [place] well at clinic follow up  2/2019.    Clinic visit 11/2019:  No arrhythmia noted. On amiodarone for VT. LFTs WNL. Needs updated TSH and PFTs. No RV pacing. No acute CHF exacerbation.     Update (12/30/2020):    Today he says he had recent palpitations and high HR. Carvedilol increased today by cards. Denies worsening CP, GREENWOOD, LH, syncope.    He is currently on amiodarone 200mg daily. LFTs are mildly elevated 12/2020. TSH is WNL 11/2020. Last PFT on 11/25/2019 showed a DLCO ratio of 62.4.     Device Interrogation (12/30/2020) reveals an intrinsic SR with stable lead and device function. No arrhythmias or treated episodes were noted.  He paces 90% in the RA and <1% in the RV. Estimated battery longevity 6.3 years.     I have personally reviewed the patient's EKG today, which shows APVS with LBBB at 60bpm. MO interval is 192. QRS is 166. QT is 534.    Relevant Cardiac Test Results:    2D Echo (10/6/2019):  · Techically challenging study, poor endocardial visualization.  · Low normal left ventricular systolic function. The estimated ejection fraction is 50%  · No obvious wall motion abnormalities, but poor endocardial visualization  · Left atrial enlargement.  · Mild mitral sclerosis.  · Indeterminate left ventricular diastolic function.  · Mildly reduced right ventricular systolic function.  · Mild tricuspid regurgitation.  · Indeterminate central venous pressure. Estimated PA pressure is at least 27 mmHg.    Current Outpatient Medications   Medication Sig    amiodarone (PACERONE) 200 MG Tab TAKE 1 TABLET BY MOUTH ONCE DAILY    ammonium lactate 12 % Crea Apply small amount to feet except for in between toes twice a day    aspirin (ECOTRIN) 81 MG EC tablet Take 1 tablet (81 mg total) by mouth once daily.    blood sugar diagnostic Strp 1 strip by Misc.(Non-Drug; Combo Route) route 4 (four) times daily. Dispense: meter covered by insurance    blood-glucose meter kit Use as instructed, Dispense: meter covered by insurance    BRILINTA 90 mg  tablet TAKE 1 TABLET BY MOUTH TWO  TIMES DAILY    carvediloL (COREG) 25 MG tablet Take 1 tablet (25 mg total) by mouth 2 (two) times daily with meals.    dulaglutide (TRULICITY) 1.5 mg/0.5 mL PnIj Inject 1.5 mg into the skin once a week.    esomeprazole (NEXIUM) 40 MG capsule TAKE 1 CAPSULE BY MOUTH  BEFORE BREAKFAST    ezetimibe (ZETIA) 10 mg tablet Take 1 tablet (10 mg total) by mouth once daily.    ferrous sulfate (FEOSOL) 325 mg (65 mg iron) Tab tablet TAKE 1 TABLET BY MOUTH THREE TIMES DAILY    furosemide (LASIX) 20 MG tablet TAKE 1 TABLET BY MOUTH ONCE DAILY    insulin lispro (HUMALOG U-100 INSULIN) 100 unit/mL injection USE AS DIRECTED VIA [employer] 20    isosorbide mononitrate (IMDUR) 120 MG 24 hr tablet Take 1 tablet (120 mg total) by mouth once daily.    lancets Misc 1 lancet by Misc.(Non-Drug; Combo Route) route 2 (two) times daily with meals.    lancing device Misc 1 Device by Misc.(Non-Drug; Combo Route) route 2 (two) times daily with meals.    losartan (COZAAR) 25 MG tablet TAKE 1 TABLET BY MOUTH ONCE DAILY    nitroGLYCERIN (NITROSTAT) 0.4 MG SL tablet DISSOLVE 1 TABLET UNDER  TONGUE EVERY 5 MINUTES AS  NEEDED FOR CHEST PAIN MAX 3 TABS IN 15 MINUTES, CALL  911 IF CHEST PAIN PERSISTS    ranolazine (RANEXA) 1,000 mg Tb12 TAKE 1 TABLET BY MOUTH  TWICE DAILY    rosuvastatin (CRESTOR) 40 MG Tab TAKE 1 TABLET BY MOUTH ONCE DAILY    sub-q insulin device, 20 unit (VGO 20) [place] 1 Device by Misc.(Non-Drug; Combo Route) route once daily.    tamsulosin (FLOMAX) 0.4 mg Cap TAKE 1 CAPSULE ONCE DAILY     No current facility-administered medications for this visit.        Review of Systems   Constitution: Negative for malaise/fatigue.   Cardiovascular: Positive for palpitations. Negative for chest pain, dyspnea on exertion, irregular heartbeat and leg swelling.   Respiratory: Negative for shortness of breath.    Hematologic/Lymphatic: Negative for bleeding problem.   Skin: Negative for rash.  "  Musculoskeletal: Negative for myalgias.   Gastrointestinal: Negative for hematemesis, hematochezia and nausea.   Genitourinary: Negative for hematuria.   Neurological: Negative for light-headedness.   Psychiatric/Behavioral: Negative for altered mental status.   Allergic/Immunologic: Negative for persistent infections.     Objective:        BP (!) 149/70   Pulse (!) 59   Ht 5' 9" (1.753 m)   Wt 109.7 kg (241 lb 13.5 oz)   BMI 35.71 kg/m²     Physical Exam   Constitutional: He is oriented to person, place, and time. He appears well-developed and well-nourished.   HENT:   Head: Normocephalic.   Nose: Nose normal.   Eyes: Pupils are equal, round, and reactive to light.   Cardiovascular: Normal rate and regular rhythm.   Pulmonary/Chest: Breath sounds normal. No respiratory distress.   Device to LUCW. Incision and pocket in good repair.   Abdominal: Normal appearance.   Musculoskeletal: Normal range of motion.         General: No edema.   Neurological: He is alert and oriented to person, place, and time.   Skin: Skin is warm and dry. No erythema.   Psychiatric: He has a normal mood and affect. His speech is normal and behavior is normal.   Nursing note and vitals reviewed.    Lab Results   Component Value Date     12/15/2020    K 4.0 12/15/2020    MG 1.9 10/07/2019    BUN 16 12/15/2020    CREATININE 1.8 (H) 12/15/2020    ALT 58 (H) 12/15/2020    AST 42 (H) 12/15/2020    HGB 12.5 (L) 12/15/2020    HCT 39.2 (L) 12/15/2020    HCT 33 (L) 06/24/2019    TSH 2.061 11/10/2020    LDLCALC 67.0 11/10/2020       Recent Labs   Lab 11/16/18  1033 12/22/18  1342 05/17/19  2043 10/05/19  2023   INR 1.0 1.0 1.0 1.0         Assessment:     1. ICD (implantable cardioverter-defibrillator), dual, in situ    2. Long term current use of amiodarone    3. ANURADHA (obstructive sleep apnea)    4. Severe obesity (BMI 35.0-39.9) with comorbidity    5. Benign essential HTN    6. Ischemic cardiomyopathy    7. Wide-complex tachycardia    8. " Ventricular tachycardia      Plan:     In summary, Mr. Bull is a 70 y.o. 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   Mr. Bull is doing well from a device perspective with stable lead and device function. No RV pacing.  Had some palps and high BP - coreg increased today. No arrhythmia noted on device.  LBBB on EKG. QRS wider than prior EKG, but he has a history of LBBB (see EKG 11/16/2018).   On amiodarone for VT. Recheck PFTs, echo.    PFTs, echo  Continue current medication regimen and device settings.   Follow up in device clinic as scheduled.   Follow up in EP clinic in 6 mo, sooner as needed.     *A copy of this note has been sent to Dr. Glasgow*    Follow up in about 6 months (around 6/30/2021).    ------------------------------------------------------------------    MAISHA De La Torre, NP-C  Cardiac Electrophysiology

## 2020-12-30 NOTE — PROGRESS NOTES
"    PCP - Belkys Kruger MD  Subjective:     Reason for visit: palpitations and HTN    PMHx:   CAD s/p CABG with known  RCA, LCx, LM/LAD and patent LIMA-LAD, SVG-D1, and occluded SVG-OM   HFmEF 50%   MMVT s/p DC ICD and on amiodarone   HTN   Dyslipidemia   CKD III   DM    Walter Bull is a 70 y.o. y.o. male who presents with palpitations and elevated BPs. He was recently seen in the ED for COVID concerns regarding exposure with negative PCR. Patient reports last week around christmas he had an episode of "racing heartbeats". He took his BP and the monitor reported pulse in the 80s. No recurrence since that time. He also reports occasional left upper chest discomfort described as aching with left arm tingling. He cuts grass with push lawn  and denies this pain with such activities. Generally occurs laying down in bed. Denies CHF symptoms. BP at home 120-30/70.     Seen by EP 1 year prior with stable device function. Last interrogation with 87% RA pacing and <1% RV pacing.      ---------------  CARDIAC STUDIES  ---------------    9-2020 ICD interrogation:  · DDDR with predominant AP-VS rhythm. 87% AP and <1% RV paced. No A or V arrhythmias.     SPECT:   · 25-30% fixed defect involving the inferior wall of the left ventricle with akinesia and diminished ejection fraction on rest imaging, findings are most consistent with prior infarct. No evidence of left ventricular wall dilatation.     TTE  · The estimated ejection fraction is 50%  · No obvious wall motion abnormalities, but poor endocardial visualization  · Left atrial enlargement.  · Mild mitral sclerosis.  · Indeterminate left ventricular diastolic function.  · Mildly reduced right ventricular systolic function.  · Mild tricuspid regurgitation.  · Indeterminate central venous pressure. Estimated PA pressure is at least 27 mmHg.     [facility]:  · Origin to Prox Graft lesion , 100% stenosed.  · LVEDP (Pre): 18  · Prox RCA " lesion , 100% stenosed.  · Mid LM to Ost LAD lesion , 100% stenosed.  · Estimated blood loss: none  · Three vessel coronary artery disease.    History:     Social History     Tobacco Use    Smoking status: Former Smoker     Packs/day: 3.00     Types: Cigarettes     Start date: 1963     Quit date: 1981     Years since quittin.0    Smokeless tobacco: Former User     Types: Snuff, Chew     Quit date:    Substance Use Topics    Alcohol use: No     Frequency: Never     Drinks per session: Patient refused     Binge frequency: Never     Family History   Problem Relation Age of Onset    Diabetes Mother     Heart disease Mother     Hypertension Sister     Diabetes Sister     Heart disease Sister     Heart attack Brother     Colon cancer Neg Hx     Esophageal cancer Neg Hx     Stomach cancer Neg Hx        Meds:   Review of patient's allergies indicates:  No Known Allergies    Current Outpatient Medications:     amiodarone (PACERONE) 200 MG Tab, TAKE 1 TABLET BY MOUTH ONCE DAILY, Disp: 90 tablet, Rfl: 3    ammonium lactate 12 % Crea, Apply small amount to feet except for in between toes twice a day, Disp: 1 Bottle, Rfl: 6    aspirin (ECOTRIN) 81 MG EC tablet, Take 1 tablet (81 mg total) by mouth once daily., Disp: 90 tablet, Rfl: 3    blood sugar diagnostic Strp, 1 strip by Misc.(Non-Drug; Combo Route) route 4 (four) times daily. Dispense: meter covered by insurance, Disp: 200 strip, Rfl: 11    blood-glucose meter kit, Use as instructed, Dispense: meter covered by insurance, Disp: 1 each, Rfl: 0    BRILINTA 90 mg tablet, TAKE 1 TABLET BY MOUTH TWO  TIMES DAILY, Disp: 180 tablet, Rfl: 3    carvediloL (COREG) 6.25 MG tablet, Take 1 tablet (6.25 mg total) by mouth 2 (two) times daily., Disp: 60 tablet, Rfl: 1    dulaglutide (TRULICITY) 1.5 mg/0.5 mL PnIj, Inject 1.5 mg into the skin once a week., Disp: 4 Syringe, Rfl: 11    esomeprazole (NEXIUM) 40 MG capsule, TAKE 1 CAPSULE BY MOUTH  BEFORE  BREAKFAST, Disp: 90 capsule, Rfl: 3    ezetimibe (ZETIA) 10 mg tablet, Take 1 tablet (10 mg total) by mouth once daily., Disp: 90 tablet, Rfl: 3    ferrous sulfate (FEOSOL) 325 mg (65 mg iron) Tab tablet, TAKE 1 TABLET BY MOUTH THREE TIMES DAILY, Disp: 90 tablet, Rfl: 3    furosemide (LASIX) 20 MG tablet, TAKE 1 TABLET BY MOUTH ONCE DAILY, Disp: 90 tablet, Rfl: 3    insulin lispro (HUMALOG U-100 INSULIN) 100 unit/mL injection, USE AS DIRECTED VIA [employer] 20, Disp: 20 mL, Rfl: 11    isosorbide mononitrate (IMDUR) 120 MG 24 hr tablet, Take 1 tablet (120 mg total) by mouth once daily., Disp: 90 tablet, Rfl: 3    lancets Misc, 1 lancet by Misc.(Non-Drug; Combo Route) route 2 (two) times daily with meals., Disp: 100 each, Rfl: 11    lancing device Misc, 1 Device by Misc.(Non-Drug; Combo Route) route 2 (two) times daily with meals., Disp: 1 each, Rfl: 0    losartan (COZAAR) 25 MG tablet, TAKE 1 TABLET BY MOUTH ONCE DAILY, Disp: 90 tablet, Rfl: 3    nitroGLYCERIN (NITROSTAT) 0.4 MG SL tablet, DISSOLVE 1 TABLET UNDER  TONGUE EVERY 5 MINUTES AS  NEEDED FOR CHEST PAIN MAX 3 TABS IN 15 MINUTES, CALL  911 IF CHEST PAIN PERSISTS, Disp: 25 tablet, Rfl: 14    ranolazine (RANEXA) 1,000 mg Tb12, TAKE 1 TABLET BY MOUTH  TWICE DAILY, Disp: 60 tablet, Rfl: 11    rosuvastatin (CRESTOR) 40 MG Tab, TAKE 1 TABLET BY MOUTH ONCE DAILY, Disp: 90 tablet, Rfl: 3    sub-q insulin device, 20 unit (VGO 20) [place], 1 Device by Misc.(Non-Drug; Combo Route) route once daily., Disp: 30 Device, Rfl: 11    tamsulosin (FLOMAX) 0.4 mg Cap, TAKE 1 CAPSULE ONCE DAILY, Disp: 90 capsule, Rfl: 3    Constitution: Negative for fever or chills. Negative for weight loss or gain.   HENT: Negative for sore throat or headaches. Negative for rhinorrhea.  Eyes: Negative for blurred or double vision.   Cardiovascular: See above  Pulmonary: Negative for SOB. Negative for cough.   Gastrointestinal: Negative for abdominal pain. Negative for nausea/ vomiting.  Negative for diarrhea.   : Negative for dysuria.   Neurological: Negative for focal weakness or sensory changes.    Objective:     Vitals:    12/30/20 1001   BP: (!) 150/69   Pulse: 62     General: NAD. AAO.  HENT: No scleral icterus. Extraocular movements intact.  Neck: No JVD  Cardiovascular: Regular heart rate and rhythm. S1/S2 appreciated. No gallops, rubs, or murmurs. 2+ carotid/radial/femoral/DP/PT pulses bilaterally.  Abdomen: Nontender, Nondistended. No hepatosplenomegaly appreciated.  Respiratory: CTAB. No increased work of breathing.  Extremities: Warm. No edema.  Skin: no ulceration or wounds present    Labs:     Lab Results   Component Value Date     12/15/2020    K 4.0 12/15/2020     12/15/2020    CO2 25 12/15/2020    BUN 16 12/15/2020    CREATININE 1.8 (H) 12/15/2020    ANIONGAP 12 12/15/2020     Lab Results   Component Value Date    HGBA1C 6.6 (H) 11/10/2020     Lab Results   Component Value Date     (H) 10/06/2019     (H) 10/05/2019     (H) 06/24/2019       Lab Results   Component Value Date    WBC 9.11 12/15/2020    HGB 12.5 (L) 12/15/2020    HCT 39.2 (L) 12/15/2020    HCT 33 (L) 06/24/2019     12/15/2020    GRAN 5.7 12/15/2020    GRAN 62.8 12/15/2020     Lab Results   Component Value Date    CHOL 126 11/10/2020    HDL 48 11/10/2020    LDLCALC 67.0 11/10/2020    TRIG 55 11/10/2020       Lab Results   Component Value Date     12/15/2020    K 4.0 12/15/2020     12/15/2020    CO2 25 12/15/2020    BUN 16 12/15/2020    CREATININE 1.8 (H) 12/15/2020    ANIONGAP 12 12/15/2020     Lab Results   Component Value Date    HGBA1C 6.6 (H) 11/10/2020     Lab Results   Component Value Date     (H) 10/06/2019     (H) 10/05/2019     (H) 06/24/2019    Lab Results   Component Value Date    WBC 9.11 12/15/2020    HGB 12.5 (L) 12/15/2020    HCT 39.2 (L) 12/15/2020    HCT 33 (L) 06/24/2019     12/15/2020    GRAN 5.7 12/15/2020    GRAN 62.8  12/15/2020     Lab Results   Component Value Date    CHOL 126 11/10/2020    HDL 48 11/10/2020    LDLCALC 67.0 11/10/2020    TRIG 55 11/10/2020            Assessment & Plan:     Walter Bull is a 70 y.o. y.o. male who is here today for palpitations.     Palpitations: patient reports HRs in the 80s during this episode. Previous device interrogation with 87% AP and <1% . ECG with AP-VS with LBBB (previous incomplete LBBB). No atrial arrhythmias on previous interrogation. Not c/w VT  o Follow up with EP  o Device interrogation     HTN: controlled per home readings  o Continue current regimen     LBBB: previous incomplete LBBB. Minimal RV pacing. No indication for BiV upgrade  o F/u EP     ICM/HFrEF: compensated  o Increase Coreg to 25 mg PO BID  o Continue GDMT otherwise    F/u 1 year    Reyes Vargas M.D.  Pager #: [phone]  [facility] [facility] [place]Y-V

## 2021-01-10 ENCOUNTER — IMMUNIZATION (OUTPATIENT)
Dept: FAMILY MEDICINE | Facility: CLINIC | Age: 71
End: 2021-01-10
Payer: MEDICARE

## 2021-01-10 DIAGNOSIS — Z23 NEED FOR VACCINATION: ICD-10-CM

## 2021-01-10 PROCEDURE — 91300 COVID-19, MRNA, LNP-S, PF, 30 MCG/0.3 ML DOSE VACCINE: CPT | Mod: PBBFAC | Performed by: INTERNAL MEDICINE

## 2021-01-27 ENCOUNTER — PATIENT MESSAGE (OUTPATIENT)
Dept: INTERNAL MEDICINE | Facility: CLINIC | Age: 71
End: 2021-01-27

## 2021-01-31 ENCOUNTER — IMMUNIZATION (OUTPATIENT)
Dept: FAMILY MEDICINE | Facility: CLINIC | Age: 71
End: 2021-01-31
Payer: MEDICARE

## 2021-01-31 DIAGNOSIS — Z23 NEED FOR VACCINATION: Primary | ICD-10-CM

## 2021-01-31 PROCEDURE — 91300 COVID-19, MRNA, LNP-S, PF, 30 MCG/0.3 ML DOSE VACCINE: CPT | Mod: PBBFAC | Performed by: INTERNAL MEDICINE

## 2021-01-31 PROCEDURE — 0002A COVID-19, MRNA, LNP-S, PF, 30 MCG/0.3 ML DOSE VACCINE: CPT | Mod: PBBFAC | Performed by: INTERNAL MEDICINE

## 2021-02-18 DIAGNOSIS — I50.42 CHRONIC COMBINED SYSTOLIC AND DIASTOLIC HEART FAILURE: ICD-10-CM

## 2021-02-19 ENCOUNTER — CLINICAL SUPPORT (OUTPATIENT)
Dept: CARDIOLOGY | Facility: HOSPITAL | Age: 71
End: 2021-02-19
Payer: MEDICARE

## 2021-02-19 DIAGNOSIS — Z95.810 PRESENCE OF AUTOMATIC (IMPLANTABLE) CARDIAC DEFIBRILLATOR: ICD-10-CM

## 2021-02-19 DIAGNOSIS — I47.20 VENTRICULAR TACHYCARDIA: ICD-10-CM

## 2021-02-19 DIAGNOSIS — I25.5 ISCHEMIC CARDIOMYOPATHY: ICD-10-CM

## 2021-02-19 PROCEDURE — 93295 DEV INTERROG REMOTE 1/2/MLT: CPT | Mod: ,,, | Performed by: INTERNAL MEDICINE

## 2021-02-19 PROCEDURE — 93296 REM INTERROG EVL PM/IDS: CPT | Performed by: INTERNAL MEDICINE

## 2021-02-19 PROCEDURE — 93295 CARDIAC DEVICE CHECK - REMOTE: ICD-10-PCS | Mod: ,,, | Performed by: INTERNAL MEDICINE

## 2021-02-22 ENCOUNTER — TELEPHONE (OUTPATIENT)
Dept: OPTOMETRY | Facility: CLINIC | Age: 71
End: 2021-02-22

## 2021-02-22 RX ORDER — CARVEDILOL 25 MG/1
25 TABLET ORAL 2 TIMES DAILY WITH MEALS
Qty: 180 TABLET | Refills: 3 | Status: ON HOLD | OUTPATIENT
Start: 2021-02-22 | End: 2021-05-26 | Stop reason: HOSPADM

## 2021-02-23 ENCOUNTER — PATIENT OUTREACH (OUTPATIENT)
Dept: ADMINISTRATIVE | Facility: OTHER | Age: 71
End: 2021-02-23

## 2021-02-23 DIAGNOSIS — H40.1131 PRIMARY OPEN ANGLE GLAUCOMA (POAG) OF BOTH EYES, MILD STAGE: Primary | ICD-10-CM

## 2021-02-24 ENCOUNTER — OFFICE VISIT (OUTPATIENT)
Dept: ENDOCRINOLOGY | Facility: CLINIC | Age: 71
End: 2021-02-24
Payer: MEDICARE

## 2021-02-24 ENCOUNTER — PATIENT MESSAGE (OUTPATIENT)
Dept: ENDOCRINOLOGY | Facility: CLINIC | Age: 71
End: 2021-02-24

## 2021-02-24 ENCOUNTER — LAB VISIT (OUTPATIENT)
Dept: LAB | Facility: HOSPITAL | Age: 71
End: 2021-02-24
Payer: MEDICARE

## 2021-02-24 VITALS
BODY MASS INDEX: 37.44 KG/M2 | OXYGEN SATURATION: 99 % | DIASTOLIC BLOOD PRESSURE: 81 MMHG | SYSTOLIC BLOOD PRESSURE: 127 MMHG | HEART RATE: 60 BPM | WEIGHT: 252.75 LBS | HEIGHT: 69 IN

## 2021-02-24 DIAGNOSIS — I50.42 CHRONIC COMBINED SYSTOLIC AND DIASTOLIC HEART FAILURE: Chronic | ICD-10-CM

## 2021-02-24 DIAGNOSIS — E66.01 SEVERE OBESITY (BMI 35.0-39.9) WITH COMORBIDITY: ICD-10-CM

## 2021-02-24 DIAGNOSIS — E11.69 DIABETES MELLITUS TYPE 2 IN OBESE: Chronic | ICD-10-CM

## 2021-02-24 DIAGNOSIS — E55.9 VITAMIN D INSUFFICIENCY: Chronic | ICD-10-CM

## 2021-02-24 DIAGNOSIS — E66.9 DIABETES MELLITUS TYPE 2 IN OBESE: Chronic | ICD-10-CM

## 2021-02-24 LAB
25(OH)D3+25(OH)D2 SERPL-MCNC: 37 NG/ML (ref 30–96)
ALBUMIN SERPL BCP-MCNC: 3.7 G/DL (ref 3.5–5.2)
ALP SERPL-CCNC: 67 U/L (ref 55–135)
ALT SERPL W/O P-5'-P-CCNC: 54 U/L (ref 10–44)
ANION GAP SERPL CALC-SCNC: 7 MMOL/L (ref 8–16)
AST SERPL-CCNC: 49 U/L (ref 10–40)
BILIRUB SERPL-MCNC: 0.8 MG/DL (ref 0.1–1)
BUN SERPL-MCNC: 13 MG/DL (ref 8–23)
CALCIUM SERPL-MCNC: 8.9 MG/DL (ref 8.7–10.5)
CHLORIDE SERPL-SCNC: 102 MMOL/L (ref 95–110)
CO2 SERPL-SCNC: 29 MMOL/L (ref 23–29)
CREAT SERPL-MCNC: 1.5 MG/DL (ref 0.5–1.4)
EST. GFR  (AFRICAN AMERICAN): 53 ML/MIN/1.73 M^2
EST. GFR  (NON AFRICAN AMERICAN): 45.9 ML/MIN/1.73 M^2
ESTIMATED AVG GLUCOSE: 166 MG/DL (ref 68–131)
GLUCOSE SERPL-MCNC: 183 MG/DL (ref 70–110)
HBA1C MFR BLD: 7.4 % (ref 4–5.6)
POTASSIUM SERPL-SCNC: 4.5 MMOL/L (ref 3.5–5.1)
PROT SERPL-MCNC: 7.2 G/DL (ref 6–8.4)
SODIUM SERPL-SCNC: 138 MMOL/L (ref 136–145)

## 2021-02-24 PROCEDURE — 1126F PR PAIN SEVERITY QUANTIFIED, NO PAIN PRESENT: ICD-10-PCS | Mod: S$GLB,,, | Performed by: NURSE PRACTITIONER

## 2021-02-24 PROCEDURE — 3074F PR MOST RECENT SYSTOLIC BLOOD PRESSURE < 130 MM HG: ICD-10-PCS | Mod: CPTII,S$GLB,, | Performed by: NURSE PRACTITIONER

## 2021-02-24 PROCEDURE — 82306 VITAMIN D 25 HYDROXY: CPT

## 2021-02-24 PROCEDURE — 80053 COMPREHEN METABOLIC PANEL: CPT

## 2021-02-24 PROCEDURE — 3079F PR MOST RECENT DIASTOLIC BLOOD PRESSURE 80-89 MM HG: ICD-10-PCS | Mod: CPTII,S$GLB,, | Performed by: NURSE PRACTITIONER

## 2021-02-24 PROCEDURE — 3008F PR BODY MASS INDEX (BMI) DOCUMENTED: ICD-10-PCS | Mod: CPTII,S$GLB,, | Performed by: NURSE PRACTITIONER

## 2021-02-24 PROCEDURE — 3044F PR MOST RECENT HEMOGLOBIN A1C LEVEL <7.0%: ICD-10-PCS | Mod: CPTII,S$GLB,, | Performed by: NURSE PRACTITIONER

## 2021-02-24 PROCEDURE — 3074F SYST BP LT 130 MM HG: CPT | Mod: CPTII,S$GLB,, | Performed by: NURSE PRACTITIONER

## 2021-02-24 PROCEDURE — 99214 PR OFFICE/OUTPT VISIT, EST, LEVL IV, 30-39 MIN: ICD-10-PCS | Mod: S$GLB,,, | Performed by: NURSE PRACTITIONER

## 2021-02-24 PROCEDURE — 3288F FALL RISK ASSESSMENT DOCD: CPT | Mod: CPTII,S$GLB,, | Performed by: NURSE PRACTITIONER

## 2021-02-24 PROCEDURE — 99999 PR PBB SHADOW E&M-EST. PATIENT-LVL IV: ICD-10-PCS | Mod: PBBFAC,,, | Performed by: NURSE PRACTITIONER

## 2021-02-24 PROCEDURE — 1101F PT FALLS ASSESS-DOCD LE1/YR: CPT | Mod: CPTII,S$GLB,, | Performed by: NURSE PRACTITIONER

## 2021-02-24 PROCEDURE — 1101F PR PT FALLS ASSESS DOC 0-1 FALLS W/OUT INJ PAST YR: ICD-10-PCS | Mod: CPTII,S$GLB,, | Performed by: NURSE PRACTITIONER

## 2021-02-24 PROCEDURE — 36415 COLL VENOUS BLD VENIPUNCTURE: CPT

## 2021-02-24 PROCEDURE — 1159F MED LIST DOCD IN RCRD: CPT | Mod: S$GLB,,, | Performed by: NURSE PRACTITIONER

## 2021-02-24 PROCEDURE — 99214 OFFICE O/P EST MOD 30 MIN: CPT | Mod: S$GLB,,, | Performed by: NURSE PRACTITIONER

## 2021-02-24 PROCEDURE — 83036 HEMOGLOBIN GLYCOSYLATED A1C: CPT

## 2021-02-24 PROCEDURE — 99999 PR PBB SHADOW E&M-EST. PATIENT-LVL IV: CPT | Mod: PBBFAC,,, | Performed by: NURSE PRACTITIONER

## 2021-02-24 PROCEDURE — 1126F AMNT PAIN NOTED NONE PRSNT: CPT | Mod: S$GLB,,, | Performed by: NURSE PRACTITIONER

## 2021-02-24 PROCEDURE — 1159F PR MEDICATION LIST DOCUMENTED IN MEDICAL RECORD: ICD-10-PCS | Mod: S$GLB,,, | Performed by: NURSE PRACTITIONER

## 2021-02-24 PROCEDURE — 3288F PR FALLS RISK ASSESSMENT DOCUMENTED: ICD-10-PCS | Mod: CPTII,S$GLB,, | Performed by: NURSE PRACTITIONER

## 2021-02-24 PROCEDURE — 3008F BODY MASS INDEX DOCD: CPT | Mod: CPTII,S$GLB,, | Performed by: NURSE PRACTITIONER

## 2021-02-24 PROCEDURE — 3044F HG A1C LEVEL LT 7.0%: CPT | Mod: CPTII,S$GLB,, | Performed by: NURSE PRACTITIONER

## 2021-02-24 PROCEDURE — 3079F DIAST BP 80-89 MM HG: CPT | Mod: CPTII,S$GLB,, | Performed by: NURSE PRACTITIONER

## 2021-02-24 RX ORDER — DULAGLUTIDE 3 MG/.5ML
3 INJECTION, SOLUTION SUBCUTANEOUS
Qty: 2 ML | Refills: 0 | Status: SHIPPED | OUTPATIENT
Start: 2021-02-24 | End: 2021-02-26

## 2021-02-24 RX ORDER — BLOOD-GLUCOSE SENSOR
3 EACH MISCELLANEOUS CONTINUOUS
Qty: 3 EACH | Status: SHIPPED | OUTPATIENT
Start: 2021-02-24 | End: 2022-03-18 | Stop reason: SDUPTHER

## 2021-02-24 RX ORDER — BLOOD-GLUCOSE TRANSMITTER
1 EACH MISCELLANEOUS CONTINUOUS
Qty: 1 EACH | Status: SHIPPED | OUTPATIENT
Start: 2021-02-24 | End: 2022-03-18 | Stop reason: SDUPTHER

## 2021-02-25 ENCOUNTER — PATIENT MESSAGE (OUTPATIENT)
Dept: ENDOCRINOLOGY | Facility: CLINIC | Age: 71
End: 2021-02-25

## 2021-02-25 ENCOUNTER — PATIENT MESSAGE (OUTPATIENT)
Dept: CARDIOLOGY | Facility: CLINIC | Age: 71
End: 2021-02-25

## 2021-02-25 ENCOUNTER — PATIENT MESSAGE (OUTPATIENT)
Dept: INTERNAL MEDICINE | Facility: CLINIC | Age: 71
End: 2021-02-25

## 2021-02-26 ENCOUNTER — PATIENT MESSAGE (OUTPATIENT)
Dept: INTERNAL MEDICINE | Facility: CLINIC | Age: 71
End: 2021-02-26

## 2021-02-26 ENCOUNTER — OFFICE VISIT (OUTPATIENT)
Dept: ORTHOPEDICS | Facility: CLINIC | Age: 71
End: 2021-02-26
Payer: MEDICARE

## 2021-02-26 ENCOUNTER — HOSPITAL ENCOUNTER (OUTPATIENT)
Dept: CARDIOLOGY | Facility: HOSPITAL | Age: 71
Discharge: HOME | End: 2021-02-26
Attending: PHYSICIAN ASSISTANT
Payer: MEDICARE

## 2021-02-26 ENCOUNTER — OFFICE VISIT (OUTPATIENT)
Dept: CARDIOLOGY | Facility: CLINIC | Age: 71
End: 2021-02-26
Payer: MEDICARE

## 2021-02-26 ENCOUNTER — OFFICE VISIT (OUTPATIENT)
Dept: INTERNAL MEDICINE | Facility: CLINIC | Age: 71
End: 2021-02-26
Payer: MEDICARE

## 2021-02-26 VITALS
WEIGHT: 246.94 LBS | SYSTOLIC BLOOD PRESSURE: 138 MMHG | HEIGHT: 69 IN | BODY MASS INDEX: 36.57 KG/M2 | RESPIRATION RATE: 18 BRPM | TEMPERATURE: 97 F | DIASTOLIC BLOOD PRESSURE: 70 MMHG | HEART RATE: 68 BPM

## 2021-02-26 VITALS
BODY MASS INDEX: 36.57 KG/M2 | HEART RATE: 68 BPM | WEIGHT: 246.94 LBS | RESPIRATION RATE: 18 BRPM | HEIGHT: 69 IN | SYSTOLIC BLOOD PRESSURE: 138 MMHG | DIASTOLIC BLOOD PRESSURE: 70 MMHG | TEMPERATURE: 98 F

## 2021-02-26 VITALS
WEIGHT: 246.5 LBS | BODY MASS INDEX: 36.51 KG/M2 | DIASTOLIC BLOOD PRESSURE: 64 MMHG | SYSTOLIC BLOOD PRESSURE: 114 MMHG | HEART RATE: 70 BPM | HEIGHT: 69 IN | OXYGEN SATURATION: 99 %

## 2021-02-26 DIAGNOSIS — I87.2 VENOUS INSUFFICIENCY: ICD-10-CM

## 2021-02-26 DIAGNOSIS — R42 LIGHTHEADEDNESS: ICD-10-CM

## 2021-02-26 DIAGNOSIS — G56.03 BILATERAL CARPAL TUNNEL SYNDROME: ICD-10-CM

## 2021-02-26 DIAGNOSIS — G56.03 BILATERAL CARPAL TUNNEL SYNDROME: Primary | ICD-10-CM

## 2021-02-26 DIAGNOSIS — Z79.899 LONG TERM CURRENT USE OF AMIODARONE: ICD-10-CM

## 2021-02-26 DIAGNOSIS — I10 BENIGN ESSENTIAL HTN: Chronic | ICD-10-CM

## 2021-02-26 DIAGNOSIS — M79.89 LEG SWELLING: Primary | ICD-10-CM

## 2021-02-26 DIAGNOSIS — Z95.1 S/P CABG (CORONARY ARTERY BYPASS GRAFT): ICD-10-CM

## 2021-02-26 DIAGNOSIS — M79.89 LEG SWELLING: ICD-10-CM

## 2021-02-26 DIAGNOSIS — N18.31 STAGE 3A CHRONIC KIDNEY DISEASE: ICD-10-CM

## 2021-02-26 DIAGNOSIS — I25.5 ISCHEMIC CARDIOMYOPATHY: ICD-10-CM

## 2021-02-26 DIAGNOSIS — Z95.810 ICD (IMPLANTABLE CARDIOVERTER-DEFIBRILLATOR), DUAL, IN SITU: Chronic | ICD-10-CM

## 2021-02-26 DIAGNOSIS — G56.02 LEFT CARPAL TUNNEL SYNDROME: Primary | ICD-10-CM

## 2021-02-26 DIAGNOSIS — E66.01 SEVERE OBESITY (BMI 35.0-39.9) WITH COMORBIDITY: ICD-10-CM

## 2021-02-26 DIAGNOSIS — I25.118 CORONARY ARTERY DISEASE OF NATIVE ARTERY OF NATIVE HEART WITH STABLE ANGINA PECTORIS: Chronic | ICD-10-CM

## 2021-02-26 DIAGNOSIS — Z76.0 MEDICATION REFILL: ICD-10-CM

## 2021-02-26 DIAGNOSIS — I47.20 VENTRICULAR TACHYCARDIA: ICD-10-CM

## 2021-02-26 DIAGNOSIS — E11.42 DIABETIC POLYNEUROPATHY ASSOCIATED WITH TYPE 2 DIABETES MELLITUS: ICD-10-CM

## 2021-02-26 DIAGNOSIS — E78.2 MIXED HYPERLIPIDEMIA: Chronic | ICD-10-CM

## 2021-02-26 LAB
LEFT GREAT SAPHENOUS JUNCTION DIA: 0.42 CM
LEFT GREAT SAPHENOUS MIDDLE THIGH DIA: 0.51 CM
RIGHT GREAT SAPHENOUS DISTAL THIGH DIA: 0.33 CM
RIGHT GREAT SAPHENOUS JUNCTION DIA: 0.68 CM
RIGHT GREAT SAPHENOUS KNEE DIA: 0.25 CM
RIGHT GREAT SAPHENOUS MIDDLE THIGH DIA: 0.29 CM
RIGHT GREAT SAPHENOUS PROXIMAL CALF DIA: 0.21 CM
RIGHT SMALL SAPHENOUS KNEE DIA: 0.24 CM
RIGHT SMALL SAPHENOUS SPJ DIA: 0.22 CM

## 2021-02-26 PROCEDURE — 1126F PR PAIN SEVERITY QUANTIFIED, NO PAIN PRESENT: ICD-10-PCS | Mod: S$GLB,,, | Performed by: FAMILY MEDICINE

## 2021-02-26 PROCEDURE — 3008F BODY MASS INDEX DOCD: CPT | Mod: CPTII,S$GLB,, | Performed by: PHYSICIAN ASSISTANT

## 2021-02-26 PROCEDURE — 99214 PR OFFICE/OUTPT VISIT, EST, LEVL IV, 30-39 MIN: ICD-10-PCS | Mod: S$GLB,,, | Performed by: PHYSICIAN ASSISTANT

## 2021-02-26 PROCEDURE — 20526 PR INJECT CARPAL TUNNEL: ICD-10-PCS | Mod: LT,S$GLB,, | Performed by: PHYSICIAN ASSISTANT

## 2021-02-26 PROCEDURE — 99999 PR PBB SHADOW E&M-EST. PATIENT-LVL V: CPT | Mod: PBBFAC,,, | Performed by: PHYSICIAN ASSISTANT

## 2021-02-26 PROCEDURE — 3075F PR MOST RECENT SYSTOLIC BLOOD PRESS GE 130-139MM HG: ICD-10-PCS | Mod: CPTII,S$GLB,, | Performed by: PHYSICIAN ASSISTANT

## 2021-02-26 PROCEDURE — 99999 PR PBB SHADOW E&M-EST. PATIENT-LVL V: CPT | Mod: PBBFAC,,, | Performed by: FAMILY MEDICINE

## 2021-02-26 PROCEDURE — 3078F PR MOST RECENT DIASTOLIC BLOOD PRESSURE < 80 MM HG: ICD-10-PCS | Mod: CPTII,S$GLB,, | Performed by: PHYSICIAN ASSISTANT

## 2021-02-26 PROCEDURE — 99203 PR OFFICE/OUTPT VISIT, NEW, LEVL III, 30-44 MIN: ICD-10-PCS | Mod: 25,S$GLB,, | Performed by: PHYSICIAN ASSISTANT

## 2021-02-26 PROCEDURE — 3008F PR BODY MASS INDEX (BMI) DOCUMENTED: ICD-10-PCS | Mod: CPTII,S$GLB,, | Performed by: PHYSICIAN ASSISTANT

## 2021-02-26 PROCEDURE — 93970 CV US LOWER VENOUS INSUFFICIENCY BILATERAL (CUPID ONLY): ICD-10-PCS | Mod: 26,,, | Performed by: INTERNAL MEDICINE

## 2021-02-26 PROCEDURE — 3008F PR BODY MASS INDEX (BMI) DOCUMENTED: ICD-10-PCS | Mod: CPTII,S$GLB,, | Performed by: FAMILY MEDICINE

## 2021-02-26 PROCEDURE — 20526 THER INJECTION CARP TUNNEL: CPT | Mod: LT,S$GLB,, | Performed by: PHYSICIAN ASSISTANT

## 2021-02-26 PROCEDURE — 99214 OFFICE O/P EST MOD 30 MIN: CPT | Mod: S$GLB,,, | Performed by: PHYSICIAN ASSISTANT

## 2021-02-26 PROCEDURE — 3078F DIAST BP <80 MM HG: CPT | Mod: CPTII,S$GLB,, | Performed by: FAMILY MEDICINE

## 2021-02-26 PROCEDURE — 1159F MED LIST DOCD IN RCRD: CPT | Mod: S$GLB,,, | Performed by: FAMILY MEDICINE

## 2021-02-26 PROCEDURE — 1159F MED LIST DOCD IN RCRD: CPT | Mod: S$GLB,,, | Performed by: PHYSICIAN ASSISTANT

## 2021-02-26 PROCEDURE — 99213 OFFICE O/P EST LOW 20 MIN: CPT | Mod: S$GLB,,, | Performed by: FAMILY MEDICINE

## 2021-02-26 PROCEDURE — 99999 PR PBB SHADOW E&M-EST. PATIENT-LVL IV: ICD-10-PCS | Mod: PBBFAC,,, | Performed by: PHYSICIAN ASSISTANT

## 2021-02-26 PROCEDURE — 3075F SYST BP GE 130 - 139MM HG: CPT | Mod: CPTII,S$GLB,, | Performed by: PHYSICIAN ASSISTANT

## 2021-02-26 PROCEDURE — 3075F PR MOST RECENT SYSTOLIC BLOOD PRESS GE 130-139MM HG: ICD-10-PCS | Mod: CPTII,S$GLB,, | Performed by: FAMILY MEDICINE

## 2021-02-26 PROCEDURE — 99999 PR PBB SHADOW E&M-EST. PATIENT-LVL IV: CPT | Mod: PBBFAC,,, | Performed by: PHYSICIAN ASSISTANT

## 2021-02-26 PROCEDURE — 3075F SYST BP GE 130 - 139MM HG: CPT | Mod: CPTII,S$GLB,, | Performed by: FAMILY MEDICINE

## 2021-02-26 PROCEDURE — 3008F BODY MASS INDEX DOCD: CPT | Mod: CPTII,S$GLB,, | Performed by: FAMILY MEDICINE

## 2021-02-26 PROCEDURE — 3074F PR MOST RECENT SYSTOLIC BLOOD PRESSURE < 130 MM HG: ICD-10-PCS | Mod: CPTII,S$GLB,, | Performed by: PHYSICIAN ASSISTANT

## 2021-02-26 PROCEDURE — 93970 EXTREMITY STUDY: CPT | Mod: TC

## 2021-02-26 PROCEDURE — 93970 EXTREMITY STUDY: CPT | Mod: 26,,, | Performed by: INTERNAL MEDICINE

## 2021-02-26 PROCEDURE — 1101F PT FALLS ASSESS-DOCD LE1/YR: CPT | Mod: CPTII,S$GLB,, | Performed by: FAMILY MEDICINE

## 2021-02-26 PROCEDURE — 99999 PR PBB SHADOW E&M-EST. PATIENT-LVL V: ICD-10-PCS | Mod: PBBFAC,,, | Performed by: PHYSICIAN ASSISTANT

## 2021-02-26 PROCEDURE — 3051F PR MOST RECENT HEMOGLOBIN A1C LEVEL 7.0 - < 8.0%: ICD-10-PCS | Mod: CPTII,S$GLB,, | Performed by: PHYSICIAN ASSISTANT

## 2021-02-26 PROCEDURE — 1126F PR PAIN SEVERITY QUANTIFIED, NO PAIN PRESENT: ICD-10-PCS | Mod: S$GLB,,, | Performed by: PHYSICIAN ASSISTANT

## 2021-02-26 PROCEDURE — 1159F PR MEDICATION LIST DOCUMENTED IN MEDICAL RECORD: ICD-10-PCS | Mod: S$GLB,,, | Performed by: PHYSICIAN ASSISTANT

## 2021-02-26 PROCEDURE — 99999 PR PBB SHADOW E&M-EST. PATIENT-LVL V: ICD-10-PCS | Mod: PBBFAC,,, | Performed by: FAMILY MEDICINE

## 2021-02-26 PROCEDURE — 1101F PR PT FALLS ASSESS DOC 0-1 FALLS W/OUT INJ PAST YR: ICD-10-PCS | Mod: CPTII,S$GLB,, | Performed by: FAMILY MEDICINE

## 2021-02-26 PROCEDURE — 3288F FALL RISK ASSESSMENT DOCD: CPT | Mod: CPTII,S$GLB,, | Performed by: FAMILY MEDICINE

## 2021-02-26 PROCEDURE — 3288F PR FALLS RISK ASSESSMENT DOCUMENTED: ICD-10-PCS | Mod: CPTII,S$GLB,, | Performed by: FAMILY MEDICINE

## 2021-02-26 PROCEDURE — 3078F DIAST BP <80 MM HG: CPT | Mod: CPTII,S$GLB,, | Performed by: PHYSICIAN ASSISTANT

## 2021-02-26 PROCEDURE — 99213 PR OFFICE/OUTPT VISIT, EST, LEVL III, 20-29 MIN: ICD-10-PCS | Mod: S$GLB,,, | Performed by: FAMILY MEDICINE

## 2021-02-26 PROCEDURE — 99203 OFFICE O/P NEW LOW 30 MIN: CPT | Mod: 25,S$GLB,, | Performed by: PHYSICIAN ASSISTANT

## 2021-02-26 PROCEDURE — 1159F PR MEDICATION LIST DOCUMENTED IN MEDICAL RECORD: ICD-10-PCS | Mod: S$GLB,,, | Performed by: FAMILY MEDICINE

## 2021-02-26 PROCEDURE — 3074F SYST BP LT 130 MM HG: CPT | Mod: CPTII,S$GLB,, | Performed by: PHYSICIAN ASSISTANT

## 2021-02-26 PROCEDURE — 1126F AMNT PAIN NOTED NONE PRSNT: CPT | Mod: S$GLB,,, | Performed by: FAMILY MEDICINE

## 2021-02-26 PROCEDURE — 1126F AMNT PAIN NOTED NONE PRSNT: CPT | Mod: S$GLB,,, | Performed by: PHYSICIAN ASSISTANT

## 2021-02-26 PROCEDURE — 3051F HG A1C>EQUAL 7.0%<8.0%: CPT | Mod: CPTII,S$GLB,, | Performed by: PHYSICIAN ASSISTANT

## 2021-02-26 PROCEDURE — 3078F PR MOST RECENT DIASTOLIC BLOOD PRESSURE < 80 MM HG: ICD-10-PCS | Mod: CPTII,S$GLB,, | Performed by: FAMILY MEDICINE

## 2021-02-26 RX ORDER — DULAGLUTIDE 1.5 MG/.5ML
INJECTION, SOLUTION SUBCUTANEOUS
COMMUNITY
Start: 2021-02-25 | End: 2021-03-09

## 2021-02-26 RX ORDER — ISOSORBIDE MONONITRATE 60 MG/1
60 TABLET, EXTENDED RELEASE ORAL DAILY
Qty: 90 TABLET | Refills: 3 | Status: SHIPPED | OUTPATIENT
Start: 2021-02-26 | End: 2021-06-03 | Stop reason: ALTCHOICE

## 2021-02-26 RX ORDER — DEXAMETHASONE SODIUM PHOSPHATE 4 MG/ML
4 INJECTION, SOLUTION INTRA-ARTICULAR; INTRALESIONAL; INTRAMUSCULAR; INTRAVENOUS; SOFT TISSUE ONCE
Status: COMPLETED | OUTPATIENT
Start: 2021-02-26 | End: 2021-02-26

## 2021-02-26 RX ORDER — FUROSEMIDE 20 MG/1
TABLET ORAL
Qty: 120 TABLET | Refills: 3 | Status: SHIPPED | OUTPATIENT
Start: 2021-02-26 | End: 2021-06-03 | Stop reason: ALTCHOICE

## 2021-02-26 RX ADMIN — DEXAMETHASONE SODIUM PHOSPHATE 4 MG: 4 INJECTION, SOLUTION INTRA-ARTICULAR; INTRALESIONAL; INTRAMUSCULAR; INTRAVENOUS; SOFT TISSUE at 04:02

## 2021-03-01 ENCOUNTER — PATIENT MESSAGE (OUTPATIENT)
Dept: CARDIOLOGY | Facility: CLINIC | Age: 71
End: 2021-03-01

## 2021-03-02 ENCOUNTER — PATIENT MESSAGE (OUTPATIENT)
Dept: ENDOCRINOLOGY | Facility: CLINIC | Age: 71
End: 2021-03-02

## 2021-03-09 ENCOUNTER — OFFICE VISIT (OUTPATIENT)
Dept: OPTOMETRY | Facility: CLINIC | Age: 71
End: 2021-03-09
Payer: MEDICARE

## 2021-03-09 DIAGNOSIS — D31.61 BENIGN ORBITAL TUMOR, RIGHT: ICD-10-CM

## 2021-03-09 DIAGNOSIS — H04.123 DRY EYE SYNDROME OF BOTH EYES: Primary | ICD-10-CM

## 2021-03-09 DIAGNOSIS — H52.203 MYOPIA WITH ASTIGMATISM AND PRESBYOPIA, BILATERAL: ICD-10-CM

## 2021-03-09 DIAGNOSIS — H52.4 MYOPIA WITH ASTIGMATISM AND PRESBYOPIA, BILATERAL: ICD-10-CM

## 2021-03-09 DIAGNOSIS — E11.9 TYPE 2 DIABETES MELLITUS WITHOUT RETINOPATHY: ICD-10-CM

## 2021-03-09 DIAGNOSIS — H52.13 MYOPIA WITH ASTIGMATISM AND PRESBYOPIA, BILATERAL: ICD-10-CM

## 2021-03-09 DIAGNOSIS — H40.1131 PRIMARY OPEN ANGLE GLAUCOMA (POAG) OF BOTH EYES, MILD STAGE: ICD-10-CM

## 2021-03-09 LAB
LEFT EYE DM RETINOPATHY: NEGATIVE
RIGHT EYE DM RETINOPATHY: NEGATIVE

## 2021-03-09 PROCEDURE — 92015 PR REFRACTION: ICD-10-PCS | Mod: S$GLB,,, | Performed by: OPTOMETRIST

## 2021-03-09 PROCEDURE — 99999 PR PBB SHADOW E&M-EST. PATIENT-LVL IV: ICD-10-PCS | Mod: PBBFAC,,, | Performed by: OPTOMETRIST

## 2021-03-09 PROCEDURE — 92015 DETERMINE REFRACTIVE STATE: CPT | Mod: S$GLB,,, | Performed by: OPTOMETRIST

## 2021-03-09 PROCEDURE — 99499 RISK ADDL DX/OHS AUDIT: ICD-10-PCS | Mod: S$GLB,,, | Performed by: OPTOMETRIST

## 2021-03-09 PROCEDURE — 1100F PTFALLS ASSESS-DOCD GE2>/YR: CPT | Mod: CPTII,S$GLB,, | Performed by: OPTOMETRIST

## 2021-03-09 PROCEDURE — 99999 PR PBB SHADOW E&M-EST. PATIENT-LVL IV: CPT | Mod: PBBFAC,,, | Performed by: OPTOMETRIST

## 2021-03-09 PROCEDURE — 99499 UNLISTED E&M SERVICE: CPT | Mod: S$GLB,,, | Performed by: OPTOMETRIST

## 2021-03-09 PROCEDURE — 1126F PR PAIN SEVERITY QUANTIFIED, NO PAIN PRESENT: ICD-10-PCS | Mod: S$GLB,,, | Performed by: OPTOMETRIST

## 2021-03-09 PROCEDURE — 92250 COLOR FUNDUS PHOTOGRAPHY - OU - BOTH EYES: ICD-10-PCS | Mod: S$GLB,,, | Performed by: OPTOMETRIST

## 2021-03-09 PROCEDURE — 3288F PR FALLS RISK ASSESSMENT DOCUMENTED: ICD-10-PCS | Mod: CPTII,S$GLB,, | Performed by: OPTOMETRIST

## 2021-03-09 PROCEDURE — 92250 FUNDUS PHOTOGRAPHY W/I&R: CPT | Mod: S$GLB,,, | Performed by: OPTOMETRIST

## 2021-03-09 PROCEDURE — 3288F FALL RISK ASSESSMENT DOCD: CPT | Mod: CPTII,S$GLB,, | Performed by: OPTOMETRIST

## 2021-03-09 PROCEDURE — 92014 PR EYE EXAM, EST PATIENT,COMPREHESV: ICD-10-PCS | Mod: S$GLB,,, | Performed by: OPTOMETRIST

## 2021-03-09 PROCEDURE — 1126F AMNT PAIN NOTED NONE PRSNT: CPT | Mod: S$GLB,,, | Performed by: OPTOMETRIST

## 2021-03-09 PROCEDURE — 92014 COMPRE OPH EXAM EST PT 1/>: CPT | Mod: S$GLB,,, | Performed by: OPTOMETRIST

## 2021-03-09 PROCEDURE — 1100F PR PT FALLS ASSESS DOC 2+ FALLS/FALL W/INJURY/YR: ICD-10-PCS | Mod: CPTII,S$GLB,, | Performed by: OPTOMETRIST

## 2021-03-10 ENCOUNTER — TELEPHONE (OUTPATIENT)
Dept: OPTOMETRY | Facility: CLINIC | Age: 71
End: 2021-03-10

## 2021-03-10 ENCOUNTER — TELEPHONE (OUTPATIENT)
Dept: SLEEP MEDICINE | Facility: CLINIC | Age: 71
End: 2021-03-10

## 2021-03-10 ENCOUNTER — PATIENT MESSAGE (OUTPATIENT)
Dept: CARDIOLOGY | Facility: CLINIC | Age: 71
End: 2021-03-10

## 2021-03-10 ENCOUNTER — OFFICE VISIT (OUTPATIENT)
Dept: PODIATRY | Facility: CLINIC | Age: 71
End: 2021-03-10
Payer: MEDICARE

## 2021-03-10 VITALS
HEART RATE: 59 BPM | SYSTOLIC BLOOD PRESSURE: 117 MMHG | HEIGHT: 69 IN | DIASTOLIC BLOOD PRESSURE: 61 MMHG | BODY MASS INDEX: 36.57 KG/M2 | WEIGHT: 246.94 LBS

## 2021-03-10 DIAGNOSIS — H40.1131 PRIMARY OPEN ANGLE GLAUCOMA (POAG) OF BOTH EYES, MILD STAGE: Primary | ICD-10-CM

## 2021-03-10 DIAGNOSIS — E11.9 ENCOUNTER FOR COMPREHENSIVE DIABETIC FOOT EXAMINATION, TYPE 2 DIABETES MELLITUS: Primary | ICD-10-CM

## 2021-03-10 PROCEDURE — 99999 PR PBB SHADOW E&M-EST. PATIENT-LVL IV: ICD-10-PCS | Mod: PBBFAC,,, | Performed by: PODIATRIST

## 2021-03-10 PROCEDURE — 99999 PR PBB SHADOW E&M-EST. PATIENT-LVL IV: CPT | Mod: PBBFAC,,, | Performed by: PODIATRIST

## 2021-03-10 PROCEDURE — 3008F PR BODY MASS INDEX (BMI) DOCUMENTED: ICD-10-PCS | Mod: CPTII,S$GLB,, | Performed by: PODIATRIST

## 2021-03-10 PROCEDURE — 3008F BODY MASS INDEX DOCD: CPT | Mod: CPTII,S$GLB,, | Performed by: PODIATRIST

## 2021-03-10 PROCEDURE — 99213 OFFICE O/P EST LOW 20 MIN: CPT | Mod: S$GLB,,, | Performed by: PODIATRIST

## 2021-03-10 PROCEDURE — 3051F HG A1C>EQUAL 7.0%<8.0%: CPT | Mod: CPTII,S$GLB,, | Performed by: PODIATRIST

## 2021-03-10 PROCEDURE — 3074F SYST BP LT 130 MM HG: CPT | Mod: CPTII,S$GLB,, | Performed by: PODIATRIST

## 2021-03-10 PROCEDURE — 3078F DIAST BP <80 MM HG: CPT | Mod: CPTII,S$GLB,, | Performed by: PODIATRIST

## 2021-03-10 PROCEDURE — 1126F PR PAIN SEVERITY QUANTIFIED, NO PAIN PRESENT: ICD-10-PCS | Mod: S$GLB,,, | Performed by: PODIATRIST

## 2021-03-10 PROCEDURE — 3074F PR MOST RECENT SYSTOLIC BLOOD PRESSURE < 130 MM HG: ICD-10-PCS | Mod: CPTII,S$GLB,, | Performed by: PODIATRIST

## 2021-03-10 PROCEDURE — 3078F PR MOST RECENT DIASTOLIC BLOOD PRESSURE < 80 MM HG: ICD-10-PCS | Mod: CPTII,S$GLB,, | Performed by: PODIATRIST

## 2021-03-10 PROCEDURE — 1159F MED LIST DOCD IN RCRD: CPT | Mod: S$GLB,,, | Performed by: PODIATRIST

## 2021-03-10 PROCEDURE — 1126F AMNT PAIN NOTED NONE PRSNT: CPT | Mod: S$GLB,,, | Performed by: PODIATRIST

## 2021-03-10 PROCEDURE — 99213 PR OFFICE/OUTPT VISIT, EST, LEVL III, 20-29 MIN: ICD-10-PCS | Mod: S$GLB,,, | Performed by: PODIATRIST

## 2021-03-10 PROCEDURE — 1159F PR MEDICATION LIST DOCUMENTED IN MEDICAL RECORD: ICD-10-PCS | Mod: S$GLB,,, | Performed by: PODIATRIST

## 2021-03-10 PROCEDURE — 3051F PR MOST RECENT HEMOGLOBIN A1C LEVEL 7.0 - < 8.0%: ICD-10-PCS | Mod: CPTII,S$GLB,, | Performed by: PODIATRIST

## 2021-03-13 NOTE — ED PROVIDER NOTES
"Encounter Date: 6/23/2019       History     Chief Complaint   Patient presents with    Testicle Pain     Pt reports to ED w/ c/o bilat testicular pain when touched. mild swelling. Pt recently diagnosed with acute cystitis and currently on abx. Pt reports some dizziness. TMax 101 this morning, took ibuprofen 800mg.      68-year-old male with PMHx of DM, CAD (s/p CABG and AICD placement), HTN, and kidney failure who presents to the ED with c/o dizziness. Per pt, yesterday he developed dizziness and intermittent vision changes, which have been persistent since. He describes his dizziness as an imbalance with lightheadedness with standing from sitting. He has associated intermittent "yellow spots in my vision." He had a fever of 101 F this AM, which improved with taking 800 mg of ibuprofen. He was evaluated in the for abdominal pain and scrotal pain. He had testicular US showing bilateral hydroceles. His UA was positive for UTI and abdominal CT with evidence of pyelonephritis, but no obstructing stone. He was discharged with a prescription for Cipro. His dysuria and testicular pain has improved slightly since starting treatment. He states that around 5 PM this evening he felt his [employer] ICD fire with associated left sided chest pain, diaphoresis, and dizziness. He took 2 nitroglycerin, which relieved his pain. Denies numbness, weakness, HA, confusion, palpitations, SOB, cough, abdominal pain, n/v/d, blood in stool, hematuria, back pain, or any other medical complaints.     The history is provided by the patient and medical records.     Review of patient's allergies indicates:  No Known Allergies  Past Medical History:   Diagnosis Date    Anemia     Anticoagulant long-term use     CHF (congestive heart failure)     Colon cancer screening 2/2/2017    Coronary artery disease     Diabetes mellitus type I     Disorder of kidney and ureter     Encounter for blood transfusion     Glaucoma     Heart attack     " Progress Note - Maternal Fetal Medicine   Skyler Ghotra 25 y o  female MRN: [id number]  Unit/Bed#: -01 Encounter: [id number]    Assessment:  25 y o  [id number] at 29w2d admitted with PPROM  Hospital day 40    Plan:  1) PPROM:   - s/p latency antibiotics, magnesium sulfate x 12 hours, BTM 1/28-1/29  - Fetal ultrasound 3/11 showed fetus weighing 1462g in liang breech presentation  Last fluid check 3/11: oligohydramnios (4 6cm), stable fetal ascites  - TORCH titers wnl  - No signs or symptoms of infection at this time    2) T1DM  - on insulin pump, settings adjusted 3/8/2021  - 2 05 Midnight to 3, 2 3 u at 3 am to 8 am, 2 5 u at 8 am to 4pm, 2 6u from 4pm to 8pm, 2 5u from 8pm to 12am  - Basal rate 2 3 units/hr 3am-MN, 2 05 units/hr MN-3am  -  Insulin to carb ratio 1:4 for breakfast, 1:3 for dinner and decrease her correction factor 1:15  - Diet Jayson/CHO controlled consistent carbohydrate Diet Level 1 with 4 carb servings/60 grams CHO/meal    3) IUP @ 29 weeks:   - NST TID   - S/P NICU consult, patient wanting all interventions    4) IV access  - PICC line in place, placed 3/9/21    5) DVT PPx: SCDs, ambulation, Lovenox 40mg daily    Subjective/Objective     Subjective: Patient without complaint this AM  She denies vaginal bleeding, LOF, contractions  Reports fetal movement  Currently holding US/toco for NST this AM      Objective:     Vitals:   Temp:  [97 2 °F (36 2 °C)-98 2 °F (36 8 °C)] 97 7 °F (36 5 °C)  HR:  [90-99] 90  Resp:  [16-20] 16  BP: (110-118)/(60-73) 118/73     NST ongoing at 0618 on 3/13/2021  FHT: 130 bpm, moderate variability, accelerations, no decelerations  [place]: No contractions      Physical Exam  Vitals signs reviewed  Constitutional:       General: She is not in acute distress  Appearance: Normal appearance  HENT:      Head: Normocephalic and atraumatic  Pulmonary:      Effort: Pulmonary effort is normal  No respiratory distress  Abdominal:      Palpations: Abdomen is soft  2018    Heart disease     Hypertension     Iron deficiency     Kidney failure     post CABG    S/P CABG x 5 2017     Past Surgical History:   Procedure Laterality Date    Angiogram, Aortic Arch, Coronary  2018    [facility] CATH LAB    Bypass graft study  2018    [facility]formed by [facility] at [facility] CATH LAB    CARDIAC DEFIBRILLATOR PLACEMENT      CARDIAC ELECTROPHYSIOLOGY STUDY N/A 2018    Performed by [facility] at [facility] EP LAB    CARDIAC ELECTROPHYSIOLOGY STUDY N/A 2018    Performed by [facility] at [facility] EP LAB    COLON SURGERY  2006    COLONOSCOPY N/A 2017    [facility]    CORONARY ARTERY BYPASS GRAFT  2005    5 arteries    ESOPHAGOGASTRODUODENOSCOPY (EGD) N/A 3/21/2018    Performed by Fawad Cunningham [facility] at [facility] ENDO (4TH FLR)    EYE SURGERY Bilateral 2016    Laser surgery for glaucoma    INSERTION, DUAL ICD Left 2018    Performed by [facility] at [facility] EP LAB    Left heart cath Left 2018    Performed by [facility] at [facility] CATH LAB     Family History   Problem Relation Age of Onset    Diabetes Mother     Heart disease Mother     Hypertension Sister     Diabetes Sister     Heart disease Sister     Heart attack Brother     Colon cancer Neg Hx     Esophageal cancer Neg Hx     Stomach cancer Neg Hx      Social History     Tobacco Use    Smoking status: Former Smoker     Packs/day: 3.00     Types: Cigarettes     Start date: 1963     Last attempt to quit: 1981     Years since quittin.5    Smokeless tobacco: Former User     Types: Snuff, Chew     Quit date:    Substance Use Topics    Alcohol use: No    Drug use: No     Review of Systems   Constitutional: Positive for chills and fever. Negative for fatigue.   HENT: Negative for congestion.    Eyes: Positive for visual disturbance. Negative for pain, discharge and itching.  Tenderness: There is no abdominal tenderness  There is no guarding or rebound  Comments: Gravid   Neurological:      Mental Status: She is alert and oriented to person, place, and time  Psychiatric:         Mood and Affect: Mood normal          Behavior: Behavior normal          Thought Content:  Thought content normal          Judgment: Judgment normal            Lab Results   Component Value Date    WBC 12 93 (H) 03/06/2021    HGB 12 0 03/06/2021    HCT 37 2 03/06/2021    MCV 94 03/06/2021     03/06/2021       Lab Results   Component Value Date    CALCIUM 8 7 02/24/2021    K 3 8 02/24/2021    CO2 20 (L) 02/24/2021     02/24/2021    BUN 9 02/24/2021    CREATININE 0 62 02/24/2021       Lab Results   Component Value Date    ALT 15 02/24/2021    AST 12 02/24/2021    ALKPHOS 70 02/24/2021       Suzi Persaud MD  OBGYN, PGY-4  3/13/2021  7:01 AM   Respiratory: Negative for cough and shortness of breath.    Cardiovascular: Positive for chest pain. Negative for palpitations.   Gastrointestinal: Negative for abdominal pain, constipation, diarrhea, nausea and vomiting.   Genitourinary: Positive for dysuria and testicular pain. Negative for frequency and hematuria.   Musculoskeletal: Negative for back pain and neck pain.   Skin: Negative for rash.   Neurological: Positive for dizziness and light-headedness. Negative for weakness, numbness and headaches.   Hematological: Does not bruise/bleed easily.   Psychiatric/Behavioral: Negative for confusion.       Physical Exam     Initial Vitals [06/23/19 2305]   BP Pulse Resp Temp SpO2   (!) 114/54 64 18 98.2 °F (36.8 °C) 96 %      MAP       --         Physical Exam    Nursing note and vitals reviewed.  Constitutional: He appears well-developed and well-nourished.   HENT:   Head: Normocephalic and atraumatic.   Eyes: Conjunctivae and EOM are normal. Pupils are equal, round, and reactive to light.   Neck: Normal range of motion. Neck supple.   Cardiovascular: Normal rate, regular rhythm and normal heart sounds.   Pulmonary/Chest: Breath sounds normal. He has no wheezes. He has no rhonchi. He has no rales.   Abdominal: Soft. There is no tenderness. There is no rebound and no guarding.   No CVA tenderness.    Musculoskeletal: Normal range of motion. He exhibits no edema or tenderness.   Neurological: He is alert and oriented to person, place, and time. He has normal strength.   Strength and sensation intact and symmetric in upper and lower extremities. No facial asymmetry. Speech is clear. Negative pronator drift.    Skin: Skin is warm. Capillary refill takes less than 2 seconds.   Psychiatric: He has a normal mood and affect.         ED Course   Procedures  Labs Reviewed   CBC W/ AUTO DIFFERENTIAL - Abnormal; Notable for the following components:       Result Value    WBC 18.10 (*)     RBC 3.51 (*)     Hemoglobin 10.5 (*)  "    Hematocrit 32.4 (*)     All other components within normal limits   ISTAT PROCEDURE - Abnormal; Notable for the following components:    POC Creatinine 2.0 (*)     POC Sodium 132 (*)     POC Hematocrit 33 (*)     All other components within normal limits   COMPREHENSIVE METABOLIC PANEL   TROPONIN I   B-TYPE NATRIURETIC PEPTIDE   URINALYSIS, REFLEX TO URINE CULTURE   ISTAT CHEM8          Imaging Results          X-Ray Chest PA And Lateral (In process)                  Medical Decision Making:   History:   Old Medical Records: I decided to obtain old medical records.  Initial Assessment:   68-year-old male with PMHx of DM, CAD (s/p CABG and AICD placement), HTN, and kidney failure who presents to the ED with c/o dizziness. Per pt, yesterday he developed dizziness and intermittent vision changes, which have been persistent since. He describes his dizziness as an imbalance with lightheadedness with standing from sitting. He has associated intermittent "yellow spots in my vision." He had a fever of 101 F this AM, which improved with taking 800 mg of ibuprofen. He was evaluated in the for abdominal pain and scrotal pain. He had testicular US showing bilateral hydroceles. His UA was positive for UTI and abdominal CT with evidence of pyelonephritis, but no obstructing stone. He was discharged with a prescription for Cipro. His dysuria and testicular pain has improved slightly since starting treatment. He states that around 5 PM this evening he felt his [employer] ICD fire with associated left sided chest pain, diaphoresis, and dizziness. He took 2 nitroglycerin, which relieved his pain. Denies numbness, weakness, HA, confusion, palpitations, SOB, cough, abdominal pain, n/v/d, blood in stool, hematuria, back pain, or any other medical complaints.  Differential Diagnosis:   DDx includes but is not limited to   Clinical Tests:   Lab Tests: Ordered and Reviewed  Radiological Study: Ordered and Reviewed  Medical Tests: Ordered " and Reviewed  ED Management:  Discussed with Cardiology, who performed AICD interrogation.                Attending Attestation:     Physician Attestation Statement for NP/PA:   I have conducted a face to face encounter with this patient in addition to the NP/PA, due to Medical Complexity                     Clinical Impression:   {Add your Clinical Impression here. If you haven't documented one yet, please pend the note, finalize a Clinical Impression, and refresh your note before signing.:[id number]}

## 2021-03-15 ENCOUNTER — PATIENT MESSAGE (OUTPATIENT)
Dept: ENDOCRINOLOGY | Facility: CLINIC | Age: 71
End: 2021-03-15

## 2021-03-16 DIAGNOSIS — E66.9 DIABETES MELLITUS TYPE 2 IN OBESE: Primary | ICD-10-CM

## 2021-03-16 DIAGNOSIS — E11.69 DIABETES MELLITUS TYPE 2 IN OBESE: Primary | ICD-10-CM

## 2021-03-16 RX ORDER — DULAGLUTIDE 4.5 MG/.5ML
4.5 INJECTION, SOLUTION SUBCUTANEOUS
Qty: 2 ML | Refills: 3 | Status: SHIPPED | OUTPATIENT
Start: 2021-03-16 | End: 2021-03-26 | Stop reason: SDUPTHER

## 2021-03-17 NOTE — TRANSFER OF CARE
Anesthesia Transfer of Care Note    Patient: Walter Bull    Procedure(s) Performed: Procedure(s) (LRB):  COLONOSCOPY (N/A)    Patient location: PACU    Anesthesia Type: general    Transport from OR: Transported from OR on room air with adequate spontaneous ventilation    Post pain: adequate analgesia    Post assessment: no apparent anesthetic complications and tolerated procedure well    Post vital signs: stable    Level of consciousness: sedated    Nausea/Vomiting: no nausea/vomiting    Complications: none          Last vitals:   Visit Vitals    BP (!) 114/58    Pulse (!) 53    Temp 36 °C (96.8 °F) (Oral)    Resp 14    SpO2 99%     
actual
Negative

## 2021-03-22 ENCOUNTER — OFFICE VISIT (OUTPATIENT)
Dept: SLEEP MEDICINE | Facility: CLINIC | Age: 71
End: 2021-03-22
Payer: MEDICARE

## 2021-03-22 DIAGNOSIS — I25.5 ISCHEMIC CARDIOMYOPATHY: ICD-10-CM

## 2021-03-22 DIAGNOSIS — E66.9 DIABETES MELLITUS TYPE 2 IN OBESE: ICD-10-CM

## 2021-03-22 DIAGNOSIS — J43.9 PULMONARY EMPHYSEMA, UNSPECIFIED EMPHYSEMA TYPE: ICD-10-CM

## 2021-03-22 DIAGNOSIS — I25.118 CORONARY ARTERY DISEASE OF NATIVE ARTERY OF NATIVE HEART WITH STABLE ANGINA PECTORIS: ICD-10-CM

## 2021-03-22 DIAGNOSIS — G47.52 RBD (REM BEHAVIORAL DISORDER): ICD-10-CM

## 2021-03-22 DIAGNOSIS — R00.0 WIDE-COMPLEX TACHYCARDIA: ICD-10-CM

## 2021-03-22 DIAGNOSIS — E11.69 DIABETES MELLITUS TYPE 2 IN OBESE: ICD-10-CM

## 2021-03-22 DIAGNOSIS — K21.9 GASTROESOPHAGEAL REFLUX DISEASE, UNSPECIFIED WHETHER ESOPHAGITIS PRESENT: ICD-10-CM

## 2021-03-22 DIAGNOSIS — G47.33 OSA (OBSTRUCTIVE SLEEP APNEA): Primary | ICD-10-CM

## 2021-03-22 DIAGNOSIS — E66.9 OBESITY (BMI 30-39.9): ICD-10-CM

## 2021-03-22 PROCEDURE — 99213 PR OFFICE/OUTPT VISIT, EST, LEVL III, 20-29 MIN: ICD-10-PCS | Mod: 95,,, | Performed by: INTERNAL MEDICINE

## 2021-03-22 PROCEDURE — 99499 UNLISTED E&M SERVICE: CPT | Mod: 95,,, | Performed by: INTERNAL MEDICINE

## 2021-03-22 PROCEDURE — 99499 RISK ADDL DX/OHS AUDIT: ICD-10-PCS | Mod: 95,,, | Performed by: INTERNAL MEDICINE

## 2021-03-22 PROCEDURE — 99213 OFFICE O/P EST LOW 20 MIN: CPT | Mod: 95,,, | Performed by: INTERNAL MEDICINE

## 2021-03-22 PROCEDURE — 1159F MED LIST DOCD IN RCRD: CPT | Mod: 95,,, | Performed by: INTERNAL MEDICINE

## 2021-03-22 PROCEDURE — 3051F HG A1C>EQUAL 7.0%<8.0%: CPT | Mod: CPTII,95,, | Performed by: INTERNAL MEDICINE

## 2021-03-22 PROCEDURE — 1159F PR MEDICATION LIST DOCUMENTED IN MEDICAL RECORD: ICD-10-PCS | Mod: 95,,, | Performed by: INTERNAL MEDICINE

## 2021-03-22 PROCEDURE — 3051F PR MOST RECENT HEMOGLOBIN A1C LEVEL 7.0 - < 8.0%: ICD-10-PCS | Mod: CPTII,95,, | Performed by: INTERNAL MEDICINE

## 2021-03-25 ENCOUNTER — PATIENT MESSAGE (OUTPATIENT)
Dept: ENDOCRINOLOGY | Facility: CLINIC | Age: 71
End: 2021-03-25

## 2021-03-26 DIAGNOSIS — E66.9 DIABETES MELLITUS TYPE 2 IN OBESE: ICD-10-CM

## 2021-03-26 DIAGNOSIS — E11.69 DIABETES MELLITUS TYPE 2 IN OBESE: ICD-10-CM

## 2021-03-26 RX ORDER — DULAGLUTIDE 4.5 MG/.5ML
4.5 INJECTION, SOLUTION SUBCUTANEOUS
Qty: 2 ML | Refills: 3 | Status: SHIPPED | OUTPATIENT
Start: 2021-03-26 | End: 2021-04-25

## 2021-03-30 ENCOUNTER — OFFICE VISIT (OUTPATIENT)
Dept: OPHTHALMOLOGY | Facility: CLINIC | Age: 71
End: 2021-03-30
Payer: MEDICARE

## 2021-03-30 DIAGNOSIS — G47.33 OSA (OBSTRUCTIVE SLEEP APNEA): ICD-10-CM

## 2021-03-30 DIAGNOSIS — H40.1131 PRIMARY OPEN ANGLE GLAUCOMA (POAG) OF BOTH EYES, MILD STAGE: ICD-10-CM

## 2021-03-30 DIAGNOSIS — Z95.1 S/P CABG (CORONARY ARTERY BYPASS GRAFT): ICD-10-CM

## 2021-03-30 DIAGNOSIS — D31.61 BENIGN SOFT TISSUE TUMOR OF ORBITAL ADNEXA OF RIGHT EYE: Primary | ICD-10-CM

## 2021-03-30 DIAGNOSIS — Z87.891 HISTORY OF TOBACCO USE: ICD-10-CM

## 2021-03-30 PROCEDURE — 1159F PR MEDICATION LIST DOCUMENTED IN MEDICAL RECORD: ICD-10-PCS | Mod: S$GLB,,, | Performed by: OPHTHALMOLOGY

## 2021-03-30 PROCEDURE — 92285 EXTERNAL PHOTOGRAPHY - OU - BOTH EYES: ICD-10-PCS | Mod: S$GLB,,, | Performed by: OPHTHALMOLOGY

## 2021-03-30 PROCEDURE — 3288F PR FALLS RISK ASSESSMENT DOCUMENTED: ICD-10-PCS | Mod: CPTII,S$GLB,, | Performed by: OPHTHALMOLOGY

## 2021-03-30 PROCEDURE — 3288F FALL RISK ASSESSMENT DOCD: CPT | Mod: CPTII,S$GLB,, | Performed by: OPHTHALMOLOGY

## 2021-03-30 PROCEDURE — 1126F AMNT PAIN NOTED NONE PRSNT: CPT | Mod: S$GLB,,, | Performed by: OPHTHALMOLOGY

## 2021-03-30 PROCEDURE — 99214 OFFICE O/P EST MOD 30 MIN: CPT | Mod: S$GLB,,, | Performed by: OPHTHALMOLOGY

## 2021-03-30 PROCEDURE — 1126F PR PAIN SEVERITY QUANTIFIED, NO PAIN PRESENT: ICD-10-PCS | Mod: S$GLB,,, | Performed by: OPHTHALMOLOGY

## 2021-03-30 PROCEDURE — 1159F MED LIST DOCD IN RCRD: CPT | Mod: S$GLB,,, | Performed by: OPHTHALMOLOGY

## 2021-03-30 PROCEDURE — 1101F PT FALLS ASSESS-DOCD LE1/YR: CPT | Mod: CPTII,S$GLB,, | Performed by: OPHTHALMOLOGY

## 2021-03-30 PROCEDURE — 1101F PR PT FALLS ASSESS DOC 0-1 FALLS W/OUT INJ PAST YR: ICD-10-PCS | Mod: CPTII,S$GLB,, | Performed by: OPHTHALMOLOGY

## 2021-03-30 PROCEDURE — 92285 EXTERNAL OCULAR PHOTOGRAPHY: CPT | Mod: S$GLB,,, | Performed by: OPHTHALMOLOGY

## 2021-03-30 PROCEDURE — 99214 PR OFFICE/OUTPT VISIT, EST, LEVL IV, 30-39 MIN: ICD-10-PCS | Mod: S$GLB,,, | Performed by: OPHTHALMOLOGY

## 2021-03-30 PROCEDURE — 99999 PR PBB SHADOW E&M-EST. PATIENT-LVL III: CPT | Mod: PBBFAC,,, | Performed by: OPHTHALMOLOGY

## 2021-03-30 PROCEDURE — 99999 PR PBB SHADOW E&M-EST. PATIENT-LVL III: ICD-10-PCS | Mod: PBBFAC,,, | Performed by: OPHTHALMOLOGY

## 2021-04-09 ENCOUNTER — TELEPHONE (OUTPATIENT)
Dept: ENDOSCOPY | Facility: HOSPITAL | Age: 71
End: 2021-04-09

## 2021-04-09 ENCOUNTER — OFFICE VISIT (OUTPATIENT)
Dept: CARDIOLOGY | Facility: CLINIC | Age: 71
End: 2021-04-09
Payer: MEDICARE

## 2021-04-09 ENCOUNTER — PATIENT MESSAGE (OUTPATIENT)
Dept: CARDIOLOGY | Facility: CLINIC | Age: 71
End: 2021-04-09

## 2021-04-09 VITALS
SYSTOLIC BLOOD PRESSURE: 122 MMHG | DIASTOLIC BLOOD PRESSURE: 58 MMHG | BODY MASS INDEX: 37.45 KG/M2 | WEIGHT: 252.88 LBS | HEART RATE: 65 BPM | HEIGHT: 69 IN

## 2021-04-09 DIAGNOSIS — Z95.1 S/P CABG (CORONARY ARTERY BYPASS GRAFT): ICD-10-CM

## 2021-04-09 DIAGNOSIS — Z95.810 ICD (IMPLANTABLE CARDIOVERTER-DEFIBRILLATOR), DUAL, IN SITU: Chronic | ICD-10-CM

## 2021-04-09 DIAGNOSIS — N18.31 STAGE 3A CHRONIC KIDNEY DISEASE: ICD-10-CM

## 2021-04-09 DIAGNOSIS — E11.69 DIABETES MELLITUS TYPE 2 IN OBESE: Chronic | ICD-10-CM

## 2021-04-09 DIAGNOSIS — I50.42 CHRONIC COMBINED SYSTOLIC AND DIASTOLIC HEART FAILURE: Chronic | ICD-10-CM

## 2021-04-09 DIAGNOSIS — I25.118 CORONARY ARTERY DISEASE OF NATIVE ARTERY OF NATIVE HEART WITH STABLE ANGINA PECTORIS: Primary | Chronic | ICD-10-CM

## 2021-04-09 DIAGNOSIS — I10 BENIGN ESSENTIAL HTN: Chronic | ICD-10-CM

## 2021-04-09 DIAGNOSIS — E66.01 SEVERE OBESITY (BMI 35.0-39.9) WITH COMORBIDITY: ICD-10-CM

## 2021-04-09 DIAGNOSIS — I47.20 VENTRICULAR TACHYCARDIA: ICD-10-CM

## 2021-04-09 DIAGNOSIS — Z79.899 LONG TERM CURRENT USE OF AMIODARONE: ICD-10-CM

## 2021-04-09 DIAGNOSIS — E66.9 DIABETES MELLITUS TYPE 2 IN OBESE: Chronic | ICD-10-CM

## 2021-04-09 DIAGNOSIS — G47.33 OSA (OBSTRUCTIVE SLEEP APNEA): ICD-10-CM

## 2021-04-09 DIAGNOSIS — I25.5 ISCHEMIC CARDIOMYOPATHY: ICD-10-CM

## 2021-04-09 DIAGNOSIS — E78.2 MIXED HYPERLIPIDEMIA: Chronic | ICD-10-CM

## 2021-04-09 PROBLEM — R07.9 CHEST PAIN: Status: RESOLVED | Noted: 2019-05-17 | Resolved: 2021-04-09

## 2021-04-09 PROCEDURE — 99499 UNLISTED E&M SERVICE: CPT | Mod: S$GLB,,, | Performed by: PHYSICIAN ASSISTANT

## 2021-04-09 PROCEDURE — 3008F BODY MASS INDEX DOCD: CPT | Mod: CPTII,S$GLB,, | Performed by: PHYSICIAN ASSISTANT

## 2021-04-09 PROCEDURE — 1159F PR MEDICATION LIST DOCUMENTED IN MEDICAL RECORD: ICD-10-PCS | Mod: S$GLB,,, | Performed by: PHYSICIAN ASSISTANT

## 2021-04-09 PROCEDURE — 1126F PR PAIN SEVERITY QUANTIFIED, NO PAIN PRESENT: ICD-10-PCS | Mod: S$GLB,,, | Performed by: PHYSICIAN ASSISTANT

## 2021-04-09 PROCEDURE — 99999 PR PBB SHADOW E&M-EST. PATIENT-LVL V: ICD-10-PCS | Mod: PBBFAC,,, | Performed by: PHYSICIAN ASSISTANT

## 2021-04-09 PROCEDURE — 3008F PR BODY MASS INDEX (BMI) DOCUMENTED: ICD-10-PCS | Mod: CPTII,S$GLB,, | Performed by: PHYSICIAN ASSISTANT

## 2021-04-09 PROCEDURE — 99999 PR PBB SHADOW E&M-EST. PATIENT-LVL V: CPT | Mod: PBBFAC,,, | Performed by: PHYSICIAN ASSISTANT

## 2021-04-09 PROCEDURE — 99499 RISK ADDL DX/OHS AUDIT: ICD-10-PCS | Mod: S$GLB,,, | Performed by: PHYSICIAN ASSISTANT

## 2021-04-09 PROCEDURE — 1126F AMNT PAIN NOTED NONE PRSNT: CPT | Mod: S$GLB,,, | Performed by: PHYSICIAN ASSISTANT

## 2021-04-09 PROCEDURE — 1159F MED LIST DOCD IN RCRD: CPT | Mod: S$GLB,,, | Performed by: PHYSICIAN ASSISTANT

## 2021-04-09 PROCEDURE — 99214 PR OFFICE/OUTPT VISIT, EST, LEVL IV, 30-39 MIN: ICD-10-PCS | Mod: S$GLB,,, | Performed by: PHYSICIAN ASSISTANT

## 2021-04-09 PROCEDURE — 3051F PR MOST RECENT HEMOGLOBIN A1C LEVEL 7.0 - < 8.0%: ICD-10-PCS | Mod: CPTII,S$GLB,, | Performed by: PHYSICIAN ASSISTANT

## 2021-04-09 PROCEDURE — 3051F HG A1C>EQUAL 7.0%<8.0%: CPT | Mod: CPTII,S$GLB,, | Performed by: PHYSICIAN ASSISTANT

## 2021-04-09 PROCEDURE — 99214 OFFICE O/P EST MOD 30 MIN: CPT | Mod: S$GLB,,, | Performed by: PHYSICIAN ASSISTANT

## 2021-04-13 ENCOUNTER — OFFICE VISIT (OUTPATIENT)
Dept: ORTHOPEDICS | Facility: CLINIC | Age: 71
End: 2021-04-13
Payer: MEDICARE

## 2021-04-13 VITALS
RESPIRATION RATE: 16 BRPM | BODY MASS INDEX: 36.9 KG/M2 | HEIGHT: 69 IN | SYSTOLIC BLOOD PRESSURE: 98 MMHG | WEIGHT: 249.13 LBS | DIASTOLIC BLOOD PRESSURE: 62 MMHG | OXYGEN SATURATION: 99 % | HEART RATE: 60 BPM

## 2021-04-13 DIAGNOSIS — G56.03 BILATERAL CARPAL TUNNEL SYNDROME: Primary | ICD-10-CM

## 2021-04-13 PROCEDURE — 1159F PR MEDICATION LIST DOCUMENTED IN MEDICAL RECORD: ICD-10-PCS | Mod: S$GLB,,, | Performed by: PHYSICIAN ASSISTANT

## 2021-04-13 PROCEDURE — 1159F MED LIST DOCD IN RCRD: CPT | Mod: S$GLB,,, | Performed by: PHYSICIAN ASSISTANT

## 2021-04-13 PROCEDURE — 99999 PR PBB SHADOW E&M-EST. PATIENT-LVL IV: CPT | Mod: PBBFAC,,, | Performed by: PHYSICIAN ASSISTANT

## 2021-04-13 PROCEDURE — 1101F PR PT FALLS ASSESS DOC 0-1 FALLS W/OUT INJ PAST YR: ICD-10-PCS | Mod: CPTII,S$GLB,, | Performed by: PHYSICIAN ASSISTANT

## 2021-04-13 PROCEDURE — 1126F PR PAIN SEVERITY QUANTIFIED, NO PAIN PRESENT: ICD-10-PCS | Mod: S$GLB,,, | Performed by: PHYSICIAN ASSISTANT

## 2021-04-13 PROCEDURE — 1126F AMNT PAIN NOTED NONE PRSNT: CPT | Mod: S$GLB,,, | Performed by: PHYSICIAN ASSISTANT

## 2021-04-13 PROCEDURE — 1101F PT FALLS ASSESS-DOCD LE1/YR: CPT | Mod: CPTII,S$GLB,, | Performed by: PHYSICIAN ASSISTANT

## 2021-04-13 PROCEDURE — 3008F PR BODY MASS INDEX (BMI) DOCUMENTED: ICD-10-PCS | Mod: CPTII,S$GLB,, | Performed by: PHYSICIAN ASSISTANT

## 2021-04-13 PROCEDURE — 99213 PR OFFICE/OUTPT VISIT, EST, LEVL III, 20-29 MIN: ICD-10-PCS | Mod: S$GLB,,, | Performed by: PHYSICIAN ASSISTANT

## 2021-04-13 PROCEDURE — 99999 PR PBB SHADOW E&M-EST. PATIENT-LVL IV: ICD-10-PCS | Mod: PBBFAC,,, | Performed by: PHYSICIAN ASSISTANT

## 2021-04-13 PROCEDURE — 3288F PR FALLS RISK ASSESSMENT DOCUMENTED: ICD-10-PCS | Mod: CPTII,S$GLB,, | Performed by: PHYSICIAN ASSISTANT

## 2021-04-13 PROCEDURE — 3008F BODY MASS INDEX DOCD: CPT | Mod: CPTII,S$GLB,, | Performed by: PHYSICIAN ASSISTANT

## 2021-04-13 PROCEDURE — 3288F FALL RISK ASSESSMENT DOCD: CPT | Mod: CPTII,S$GLB,, | Performed by: PHYSICIAN ASSISTANT

## 2021-04-13 PROCEDURE — 99213 OFFICE O/P EST LOW 20 MIN: CPT | Mod: S$GLB,,, | Performed by: PHYSICIAN ASSISTANT

## 2021-04-15 ENCOUNTER — PROCEDURE VISIT (OUTPATIENT)
Dept: NEUROLOGY | Facility: CLINIC | Age: 71
End: 2021-04-15
Payer: MEDICARE

## 2021-04-15 ENCOUNTER — OFFICE VISIT (OUTPATIENT)
Dept: OPTOMETRY | Facility: CLINIC | Age: 71
End: 2021-04-15
Payer: MEDICARE

## 2021-04-15 ENCOUNTER — CLINICAL SUPPORT (OUTPATIENT)
Dept: OPHTHALMOLOGY | Facility: CLINIC | Age: 71
End: 2021-04-15
Payer: MEDICARE

## 2021-04-15 DIAGNOSIS — G56.03 BILATERAL CARPAL TUNNEL SYNDROME: ICD-10-CM

## 2021-04-15 DIAGNOSIS — H40.1131 PRIMARY OPEN ANGLE GLAUCOMA (POAG) OF BOTH EYES, MILD STAGE: ICD-10-CM

## 2021-04-15 DIAGNOSIS — H40.1131 PRIMARY OPEN ANGLE GLAUCOMA (POAG) OF BOTH EYES, MILD STAGE: Primary | ICD-10-CM

## 2021-04-15 DIAGNOSIS — E11.42 DIABETIC POLYNEUROPATHY ASSOCIATED WITH TYPE 2 DIABETES MELLITUS: Primary | ICD-10-CM

## 2021-04-15 PROCEDURE — 1126F PR PAIN SEVERITY QUANTIFIED, NO PAIN PRESENT: ICD-10-PCS | Mod: S$GLB,,, | Performed by: OPTOMETRIST

## 2021-04-15 PROCEDURE — 1126F AMNT PAIN NOTED NONE PRSNT: CPT | Mod: S$GLB,,, | Performed by: OPTOMETRIST

## 2021-04-15 PROCEDURE — 92012 INTRM OPH EXAM EST PATIENT: CPT | Mod: S$GLB,,, | Performed by: OPTOMETRIST

## 2021-04-15 PROCEDURE — 3288F PR FALLS RISK ASSESSMENT DOCUMENTED: ICD-10-PCS | Mod: CPTII,S$GLB,, | Performed by: OPTOMETRIST

## 2021-04-15 PROCEDURE — 99999 PR PBB SHADOW E&M-EST. PATIENT-LVL III: CPT | Mod: PBBFAC,,, | Performed by: OPTOMETRIST

## 2021-04-15 PROCEDURE — 1101F PR PT FALLS ASSESS DOC 0-1 FALLS W/OUT INJ PAST YR: ICD-10-PCS | Mod: CPTII,S$GLB,, | Performed by: OPTOMETRIST

## 2021-04-15 PROCEDURE — 95886 PR EMG COMPLETE, W/ NERVE CONDUCTION STUDIES, 5+ MUSCLES: ICD-10-PCS | Mod: S$GLB,,, | Performed by: PSYCHIATRY & NEUROLOGY

## 2021-04-15 PROCEDURE — 3288F FALL RISK ASSESSMENT DOCD: CPT | Mod: CPTII,S$GLB,, | Performed by: OPTOMETRIST

## 2021-04-15 PROCEDURE — 95911 PR NERVE CONDUCTION STUDY; 9-10 STUDIES: ICD-10-PCS | Mod: S$GLB,,, | Performed by: PSYCHIATRY & NEUROLOGY

## 2021-04-15 PROCEDURE — 95911 NRV CNDJ TEST 9-10 STUDIES: CPT | Mod: S$GLB,,, | Performed by: PSYCHIATRY & NEUROLOGY

## 2021-04-15 PROCEDURE — 92012 PR EYE EXAM, EST PATIENT,INTERMED: ICD-10-PCS | Mod: S$GLB,,, | Performed by: OPTOMETRIST

## 2021-04-15 PROCEDURE — 99999 PR PBB SHADOW E&M-EST. PATIENT-LVL III: ICD-10-PCS | Mod: PBBFAC,,, | Performed by: OPTOMETRIST

## 2021-04-15 PROCEDURE — 95886 MUSC TEST DONE W/N TEST COMP: CPT | Mod: S$GLB,,, | Performed by: PSYCHIATRY & NEUROLOGY

## 2021-04-15 PROCEDURE — 1101F PT FALLS ASSESS-DOCD LE1/YR: CPT | Mod: CPTII,S$GLB,, | Performed by: OPTOMETRIST

## 2021-04-26 ENCOUNTER — OFFICE VISIT (OUTPATIENT)
Dept: GASTROENTEROLOGY | Facility: CLINIC | Age: 71
End: 2021-04-26
Payer: MEDICARE

## 2021-04-26 VITALS
BODY MASS INDEX: 36.9 KG/M2 | HEIGHT: 69 IN | HEART RATE: 60 BPM | SYSTOLIC BLOOD PRESSURE: 118 MMHG | DIASTOLIC BLOOD PRESSURE: 65 MMHG | WEIGHT: 249.13 LBS

## 2021-04-26 DIAGNOSIS — R06.6 INTRACTABLE HICCUPS: Primary | ICD-10-CM

## 2021-04-26 DIAGNOSIS — R11.0 NAUSEA: ICD-10-CM

## 2021-04-26 PROCEDURE — 1159F PR MEDICATION LIST DOCUMENTED IN MEDICAL RECORD: ICD-10-PCS | Mod: GC,S$GLB,, | Performed by: INTERNAL MEDICINE

## 2021-04-26 PROCEDURE — 99214 PR OFFICE/OUTPT VISIT, EST, LEVL IV, 30-39 MIN: ICD-10-PCS | Mod: GC,S$GLB,, | Performed by: INTERNAL MEDICINE

## 2021-04-26 PROCEDURE — 1159F MED LIST DOCD IN RCRD: CPT | Mod: GC,S$GLB,, | Performed by: INTERNAL MEDICINE

## 2021-04-26 PROCEDURE — 99999 PR PBB SHADOW E&M-EST. PATIENT-LVL III: CPT | Mod: PBBFAC,GC,, | Performed by: INTERNAL MEDICINE

## 2021-04-26 PROCEDURE — 99999 PR PBB SHADOW E&M-EST. PATIENT-LVL III: ICD-10-PCS | Mod: PBBFAC,GC,, | Performed by: INTERNAL MEDICINE

## 2021-04-26 PROCEDURE — 99214 OFFICE O/P EST MOD 30 MIN: CPT | Mod: GC,S$GLB,, | Performed by: INTERNAL MEDICINE

## 2021-04-26 RX ORDER — BACLOFEN 5 MG/1
5 TABLET ORAL 2 TIMES DAILY
Qty: 60 TABLET | Refills: 11 | Status: SHIPPED | OUTPATIENT
Start: 2021-04-26 | End: 2022-02-09

## 2021-04-29 ENCOUNTER — PATIENT MESSAGE (OUTPATIENT)
Dept: GASTROENTEROLOGY | Facility: CLINIC | Age: 71
End: 2021-04-29

## 2021-04-29 ENCOUNTER — HOSPITAL ENCOUNTER (OUTPATIENT)
Dept: RADIOLOGY | Facility: HOSPITAL | Age: 71
Discharge: HOME | End: 2021-04-29
Attending: INTERNAL MEDICINE
Payer: MEDICARE

## 2021-04-29 DIAGNOSIS — R06.6 INTRACTABLE HICCUPS: ICD-10-CM

## 2021-04-29 DIAGNOSIS — R11.0 NAUSEA: ICD-10-CM

## 2021-04-29 PROCEDURE — 76705 ECHO EXAM OF ABDOMEN: CPT | Mod: 26,,, | Performed by: RADIOLOGY

## 2021-04-29 PROCEDURE — 76705 US ABDOMEN LIMITED: ICD-10-PCS | Mod: 26,,, | Performed by: RADIOLOGY

## 2021-04-29 PROCEDURE — 76705 ECHO EXAM OF ABDOMEN: CPT | Mod: TC

## 2021-05-05 ENCOUNTER — LAB VISIT (OUTPATIENT)
Dept: LAB | Facility: HOSPITAL | Age: 71
End: 2021-05-05
Attending: INTERNAL MEDICINE
Payer: MEDICARE

## 2021-05-05 DIAGNOSIS — E66.9 DIABETES MELLITUS TYPE 2 IN OBESE: Chronic | ICD-10-CM

## 2021-05-05 DIAGNOSIS — I10 BENIGN ESSENTIAL HTN: Chronic | ICD-10-CM

## 2021-05-05 DIAGNOSIS — N18.32 STAGE 3B CHRONIC KIDNEY DISEASE: ICD-10-CM

## 2021-05-05 DIAGNOSIS — E78.2 MIXED HYPERLIPIDEMIA: Chronic | ICD-10-CM

## 2021-05-05 DIAGNOSIS — E11.69 DIABETES MELLITUS TYPE 2 IN OBESE: Chronic | ICD-10-CM

## 2021-05-05 DIAGNOSIS — I50.42 CHRONIC COMBINED SYSTOLIC AND DIASTOLIC HEART FAILURE: Chronic | ICD-10-CM

## 2021-05-05 DIAGNOSIS — I25.118 CORONARY ARTERY DISEASE OF NATIVE ARTERY OF NATIVE HEART WITH STABLE ANGINA PECTORIS: Chronic | ICD-10-CM

## 2021-05-05 LAB
ALBUMIN SERPL BCP-MCNC: 3.6 G/DL (ref 3.5–5.2)
ALP SERPL-CCNC: 58 U/L (ref 55–135)
ALT SERPL W/O P-5'-P-CCNC: 85 U/L (ref 10–44)
ANION GAP SERPL CALC-SCNC: 11 MMOL/L (ref 8–16)
AST SERPL-CCNC: 58 U/L (ref 10–40)
BASOPHILS # BLD AUTO: 0.03 K/UL (ref 0–0.2)
BASOPHILS NFR BLD: 0.5 % (ref 0–1.9)
BILIRUB SERPL-MCNC: 0.7 MG/DL (ref 0.1–1)
BUN SERPL-MCNC: 23 MG/DL (ref 8–23)
CALCIUM SERPL-MCNC: 8.7 MG/DL (ref 8.7–10.5)
CHLORIDE SERPL-SCNC: 101 MMOL/L (ref 95–110)
CHOLEST SERPL-MCNC: 133 MG/DL (ref 120–199)
CHOLEST/HDLC SERPL: 2.8 {RATIO} (ref 2–5)
CO2 SERPL-SCNC: 26 MMOL/L (ref 23–29)
CREAT SERPL-MCNC: 2.1 MG/DL (ref 0.5–1.4)
DIFFERENTIAL METHOD: ABNORMAL
EOSINOPHIL # BLD AUTO: 0.2 K/UL (ref 0–0.5)
EOSINOPHIL NFR BLD: 2.7 % (ref 0–8)
ERYTHROCYTE [DISTWIDTH] IN BLOOD BY AUTOMATED COUNT: 13.8 % (ref 11.5–14.5)
EST. GFR  (AFRICAN AMERICAN): 35 ML/MIN/1.73 M^2
EST. GFR  (NON AFRICAN AMERICAN): 31 ML/MIN/1.73 M^2
ESTIMATED AVG GLUCOSE: 171 MG/DL (ref 68–131)
GLUCOSE SERPL-MCNC: 170 MG/DL (ref 70–110)
HBA1C MFR BLD: 7.6 % (ref 4–5.6)
HCT VFR BLD AUTO: 39.9 % (ref 40–54)
HDLC SERPL-MCNC: 47 MG/DL (ref 40–75)
HDLC SERPL: 35.3 % (ref 20–50)
HGB BLD-MCNC: 13 G/DL (ref 14–18)
IMM GRANULOCYTES # BLD AUTO: 0.01 K/UL (ref 0–0.04)
IMM GRANULOCYTES NFR BLD AUTO: 0.2 % (ref 0–0.5)
LDLC SERPL CALC-MCNC: 71.4 MG/DL (ref 63–159)
LYMPHOCYTES # BLD AUTO: 2.1 K/UL (ref 1–4.8)
LYMPHOCYTES NFR BLD: 36 % (ref 18–48)
MCH RBC QN AUTO: 30.4 PG (ref 27–31)
MCHC RBC AUTO-ENTMCNC: 32.6 G/DL (ref 32–36)
MCV RBC AUTO: 93 FL (ref 82–98)
MONOCYTES # BLD AUTO: 0.7 K/UL (ref 0.3–1)
MONOCYTES NFR BLD: 12.3 % (ref 4–15)
NEUTROPHILS # BLD AUTO: 2.8 K/UL (ref 1.8–7.7)
NEUTROPHILS NFR BLD: 48.3 % (ref 38–73)
NONHDLC SERPL-MCNC: 86 MG/DL
NRBC BLD-RTO: 0 /100 WBC
PLATELET # BLD AUTO: 145 K/UL (ref 150–450)
PMV BLD AUTO: 10.3 FL (ref 9.2–12.9)
POTASSIUM SERPL-SCNC: 4.1 MMOL/L (ref 3.5–5.1)
PROT SERPL-MCNC: 6.7 G/DL (ref 6–8.4)
RBC # BLD AUTO: 4.28 M/UL (ref 4.6–6.2)
SODIUM SERPL-SCNC: 138 MMOL/L (ref 136–145)
TRIGL SERPL-MCNC: 73 MG/DL (ref 30–150)
TSH SERPL DL<=0.005 MIU/L-ACNC: 1.46 UIU/ML (ref 0.4–4)
WBC # BLD AUTO: 5.86 K/UL (ref 3.9–12.7)

## 2021-05-05 PROCEDURE — 80061 LIPID PANEL: CPT | Performed by: INTERNAL MEDICINE

## 2021-05-05 PROCEDURE — 85025 COMPLETE CBC W/AUTO DIFF WBC: CPT | Performed by: INTERNAL MEDICINE

## 2021-05-05 PROCEDURE — 80053 COMPREHEN METABOLIC PANEL: CPT | Performed by: INTERNAL MEDICINE

## 2021-05-05 PROCEDURE — 83036 HEMOGLOBIN GLYCOSYLATED A1C: CPT | Performed by: INTERNAL MEDICINE

## 2021-05-05 PROCEDURE — 84443 ASSAY THYROID STIM HORMONE: CPT | Performed by: INTERNAL MEDICINE

## 2021-05-05 PROCEDURE — 36415 COLL VENOUS BLD VENIPUNCTURE: CPT | Performed by: INTERNAL MEDICINE

## 2021-05-12 ENCOUNTER — OFFICE VISIT (OUTPATIENT)
Dept: INTERNAL MEDICINE | Facility: CLINIC | Age: 71
End: 2021-05-12
Payer: MEDICARE

## 2021-05-12 VITALS
SYSTOLIC BLOOD PRESSURE: 120 MMHG | HEIGHT: 69 IN | OXYGEN SATURATION: 99 % | DIASTOLIC BLOOD PRESSURE: 66 MMHG | WEIGHT: 244.5 LBS | RESPIRATION RATE: 20 BRPM | HEART RATE: 60 BPM | BODY MASS INDEX: 36.21 KG/M2

## 2021-05-12 DIAGNOSIS — I25.118 CORONARY ARTERY DISEASE OF NATIVE ARTERY OF NATIVE HEART WITH STABLE ANGINA PECTORIS: Primary | Chronic | ICD-10-CM

## 2021-05-12 DIAGNOSIS — E66.01 SEVERE OBESITY (BMI 35.0-39.9) WITH COMORBIDITY: ICD-10-CM

## 2021-05-12 DIAGNOSIS — I10 BENIGN ESSENTIAL HTN: Chronic | ICD-10-CM

## 2021-05-12 DIAGNOSIS — E66.9 DIABETES MELLITUS TYPE 2 IN OBESE: Chronic | ICD-10-CM

## 2021-05-12 DIAGNOSIS — E11.69 DIABETES MELLITUS TYPE 2 IN OBESE: Chronic | ICD-10-CM

## 2021-05-12 DIAGNOSIS — E78.2 MIXED HYPERLIPIDEMIA: Chronic | ICD-10-CM

## 2021-05-12 DIAGNOSIS — N18.31 STAGE 3A CHRONIC KIDNEY DISEASE: ICD-10-CM

## 2021-05-12 DIAGNOSIS — N40.0 BENIGN PROSTATIC HYPERPLASIA WITHOUT LOWER URINARY TRACT SYMPTOMS: Chronic | ICD-10-CM

## 2021-05-12 DIAGNOSIS — D69.6 THROMBOCYTOPENIA: ICD-10-CM

## 2021-05-12 DIAGNOSIS — E11.42 DIABETIC POLYNEUROPATHY ASSOCIATED WITH TYPE 2 DIABETES MELLITUS: ICD-10-CM

## 2021-05-12 DIAGNOSIS — I50.42 CHRONIC COMBINED SYSTOLIC AND DIASTOLIC HEART FAILURE: Chronic | ICD-10-CM

## 2021-05-12 DIAGNOSIS — K76.0 FATTY LIVER: ICD-10-CM

## 2021-05-12 DIAGNOSIS — K21.9 GASTROESOPHAGEAL REFLUX DISEASE WITHOUT ESOPHAGITIS: ICD-10-CM

## 2021-05-12 PROCEDURE — 1126F AMNT PAIN NOTED NONE PRSNT: CPT | Mod: S$GLB,,, | Performed by: INTERNAL MEDICINE

## 2021-05-12 PROCEDURE — 3051F PR MOST RECENT HEMOGLOBIN A1C LEVEL 7.0 - < 8.0%: ICD-10-PCS | Mod: CPTII,S$GLB,, | Performed by: INTERNAL MEDICINE

## 2021-05-12 PROCEDURE — 99999 PR PBB SHADOW E&M-EST. PATIENT-LVL V: CPT | Mod: PBBFAC,,, | Performed by: INTERNAL MEDICINE

## 2021-05-12 PROCEDURE — 1101F PR PT FALLS ASSESS DOC 0-1 FALLS W/OUT INJ PAST YR: ICD-10-PCS | Mod: CPTII,S$GLB,, | Performed by: INTERNAL MEDICINE

## 2021-05-12 PROCEDURE — 3051F HG A1C>EQUAL 7.0%<8.0%: CPT | Mod: CPTII,S$GLB,, | Performed by: INTERNAL MEDICINE

## 2021-05-12 PROCEDURE — 99499 RISK ADDL DX/OHS AUDIT: ICD-10-PCS | Mod: S$GLB,,, | Performed by: INTERNAL MEDICINE

## 2021-05-12 PROCEDURE — 3288F FALL RISK ASSESSMENT DOCD: CPT | Mod: CPTII,S$GLB,, | Performed by: INTERNAL MEDICINE

## 2021-05-12 PROCEDURE — 1126F PR PAIN SEVERITY QUANTIFIED, NO PAIN PRESENT: ICD-10-PCS | Mod: S$GLB,,, | Performed by: INTERNAL MEDICINE

## 2021-05-12 PROCEDURE — 3288F PR FALLS RISK ASSESSMENT DOCUMENTED: ICD-10-PCS | Mod: CPTII,S$GLB,, | Performed by: INTERNAL MEDICINE

## 2021-05-12 PROCEDURE — 99215 PR OFFICE/OUTPT VISIT, EST, LEVL V, 40-54 MIN: ICD-10-PCS | Mod: S$GLB,,, | Performed by: INTERNAL MEDICINE

## 2021-05-12 PROCEDURE — 99999 PR PBB SHADOW E&M-EST. PATIENT-LVL V: ICD-10-PCS | Mod: PBBFAC,,, | Performed by: INTERNAL MEDICINE

## 2021-05-12 PROCEDURE — 3008F BODY MASS INDEX DOCD: CPT | Mod: CPTII,S$GLB,, | Performed by: INTERNAL MEDICINE

## 2021-05-12 PROCEDURE — 99499 UNLISTED E&M SERVICE: CPT | Mod: S$GLB,,, | Performed by: INTERNAL MEDICINE

## 2021-05-12 PROCEDURE — 99215 OFFICE O/P EST HI 40 MIN: CPT | Mod: S$GLB,,, | Performed by: INTERNAL MEDICINE

## 2021-05-12 PROCEDURE — 1159F PR MEDICATION LIST DOCUMENTED IN MEDICAL RECORD: ICD-10-PCS | Mod: S$GLB,,, | Performed by: INTERNAL MEDICINE

## 2021-05-12 PROCEDURE — 3008F PR BODY MASS INDEX (BMI) DOCUMENTED: ICD-10-PCS | Mod: CPTII,S$GLB,, | Performed by: INTERNAL MEDICINE

## 2021-05-12 PROCEDURE — 1101F PT FALLS ASSESS-DOCD LE1/YR: CPT | Mod: CPTII,S$GLB,, | Performed by: INTERNAL MEDICINE

## 2021-05-12 PROCEDURE — 1159F MED LIST DOCD IN RCRD: CPT | Mod: S$GLB,,, | Performed by: INTERNAL MEDICINE

## 2021-05-12 RX ORDER — ISOSORBIDE MONONITRATE 120 MG/1
120 TABLET, EXTENDED RELEASE ORAL DAILY
COMMUNITY
Start: 2021-05-07 | End: 2021-05-12

## 2021-05-12 RX ORDER — DULAGLUTIDE 4.5 MG/.5ML
INJECTION, SOLUTION SUBCUTANEOUS
COMMUNITY
Start: 2021-04-26 | End: 2021-05-27

## 2021-05-12 RX ORDER — ONDANSETRON 4 MG/1
4 TABLET, FILM COATED ORAL EVERY 8 HOURS PRN
Qty: 15 TABLET | Refills: 0 | Status: SHIPPED | OUTPATIENT
Start: 2021-05-12

## 2021-05-12 RX ORDER — ESOMEPRAZOLE MAGNESIUM 40 MG/1
40 CAPSULE, DELAYED RELEASE ORAL 2 TIMES DAILY
Qty: 180 CAPSULE | Refills: 3 | Status: SHIPPED | OUTPATIENT
Start: 2021-05-12 | End: 2021-06-07 | Stop reason: SDUPTHER

## 2021-05-14 ENCOUNTER — OFFICE VISIT (OUTPATIENT)
Dept: URGENT CARE | Facility: CLINIC | Age: 71
End: 2021-05-14
Payer: MEDICARE

## 2021-05-14 ENCOUNTER — TELEPHONE (OUTPATIENT)
Dept: ELECTROPHYSIOLOGY | Facility: CLINIC | Age: 71
End: 2021-05-14

## 2021-05-14 VITALS
HEART RATE: 60 BPM | SYSTOLIC BLOOD PRESSURE: 110 MMHG | HEIGHT: 69 IN | BODY MASS INDEX: 37.03 KG/M2 | RESPIRATION RATE: 18 BRPM | WEIGHT: 250 LBS | TEMPERATURE: 98 F | DIASTOLIC BLOOD PRESSURE: 60 MMHG | OXYGEN SATURATION: 98 %

## 2021-05-14 DIAGNOSIS — R42 DIZZINESS: Primary | ICD-10-CM

## 2021-05-14 DIAGNOSIS — R53.83 FATIGUE, UNSPECIFIED TYPE: ICD-10-CM

## 2021-05-14 LAB
GLUCOSE SERPL-MCNC: 156 MG/DL (ref 70–110)
POC ANION GAP: 16 MMOL/L (ref 10–20)
POC BUN: 19 MMOL/L (ref 8–26)
POC CHLORIDE: 99 MMOL/L (ref 98–109)
POC CREATININE: 2.2 MG/DL (ref 0.6–1.3)
POC HEMATOCRIT: 41 %PCV (ref 42–52)
POC HEMOGLOBIN: 13.9 G/DL (ref 13.5–18)
POC ICA: 1.15 MMOL/L (ref 1.12–1.32)
POC POTASSIUM: 4.4 MMOL/L (ref 3.5–4.9)
POC SODIUM: 135 MMOL/L (ref 138–146)
POC TCO2: 25 MMOL/L (ref 24–29)

## 2021-05-14 PROCEDURE — 99214 OFFICE O/P EST MOD 30 MIN: CPT | Mod: S$GLB,,, | Performed by: STUDENT IN AN ORGANIZED HEALTH CARE EDUCATION/TRAINING PROGRAM

## 2021-05-14 PROCEDURE — 80047 POCT CHEMISTRY PANEL: ICD-10-PCS | Mod: QW,S$GLB,, | Performed by: STUDENT IN AN ORGANIZED HEALTH CARE EDUCATION/TRAINING PROGRAM

## 2021-05-14 PROCEDURE — 3008F PR BODY MASS INDEX (BMI) DOCUMENTED: ICD-10-PCS | Mod: CPTII,S$GLB,, | Performed by: STUDENT IN AN ORGANIZED HEALTH CARE EDUCATION/TRAINING PROGRAM

## 2021-05-14 PROCEDURE — 3008F BODY MASS INDEX DOCD: CPT | Mod: CPTII,S$GLB,, | Performed by: STUDENT IN AN ORGANIZED HEALTH CARE EDUCATION/TRAINING PROGRAM

## 2021-05-14 PROCEDURE — 80047 BASIC METABLC PNL IONIZED CA: CPT | Mod: QW,S$GLB,, | Performed by: STUDENT IN AN ORGANIZED HEALTH CARE EDUCATION/TRAINING PROGRAM

## 2021-05-14 PROCEDURE — 99214 PR OFFICE/OUTPT VISIT, EST, LEVL IV, 30-39 MIN: ICD-10-PCS | Mod: S$GLB,,, | Performed by: STUDENT IN AN ORGANIZED HEALTH CARE EDUCATION/TRAINING PROGRAM

## 2021-05-18 ENCOUNTER — PATIENT MESSAGE (OUTPATIENT)
Dept: INTERNAL MEDICINE | Facility: CLINIC | Age: 71
End: 2021-05-18

## 2021-05-19 ENCOUNTER — PATIENT MESSAGE (OUTPATIENT)
Dept: INTERNAL MEDICINE | Facility: CLINIC | Age: 71
End: 2021-05-19

## 2021-05-19 ENCOUNTER — LAB VISIT (OUTPATIENT)
Dept: LAB | Facility: HOSPITAL | Age: 71
End: 2021-05-19
Attending: INTERNAL MEDICINE
Payer: MEDICARE

## 2021-05-19 DIAGNOSIS — N18.31 STAGE 3A CHRONIC KIDNEY DISEASE: ICD-10-CM

## 2021-05-19 DIAGNOSIS — N18.32 STAGE 3B CHRONIC KIDNEY DISEASE: Primary | ICD-10-CM

## 2021-05-19 LAB
ANION GAP SERPL CALC-SCNC: 8 MMOL/L (ref 8–16)
BUN SERPL-MCNC: 10 MG/DL (ref 8–23)
CALCIUM SERPL-MCNC: 8.8 MG/DL (ref 8.7–10.5)
CHLORIDE SERPL-SCNC: 104 MMOL/L (ref 95–110)
CO2 SERPL-SCNC: 27 MMOL/L (ref 23–29)
CREAT SERPL-MCNC: 1.7 MG/DL (ref 0.5–1.4)
EST. GFR  (AFRICAN AMERICAN): 46 ML/MIN/1.73 M^2
EST. GFR  (NON AFRICAN AMERICAN): 39 ML/MIN/1.73 M^2
GLUCOSE SERPL-MCNC: 155 MG/DL (ref 70–110)
POTASSIUM SERPL-SCNC: 4.7 MMOL/L (ref 3.5–5.1)
SODIUM SERPL-SCNC: 139 MMOL/L (ref 136–145)

## 2021-05-19 PROCEDURE — 36415 COLL VENOUS BLD VENIPUNCTURE: CPT | Performed by: INTERNAL MEDICINE

## 2021-05-19 PROCEDURE — 80048 BASIC METABOLIC PNL TOTAL CA: CPT | Performed by: INTERNAL MEDICINE

## 2021-05-20 ENCOUNTER — CLINICAL SUPPORT (OUTPATIENT)
Dept: CARDIOLOGY | Facility: HOSPITAL | Age: 71
End: 2021-05-20
Payer: MEDICARE

## 2021-05-20 DIAGNOSIS — Z95.810 PRESENCE OF AUTOMATIC (IMPLANTABLE) CARDIAC DEFIBRILLATOR: ICD-10-CM

## 2021-05-20 PROCEDURE — 93295 CARDIAC DEVICE CHECK - REMOTE: ICD-10-PCS | Mod: ,,, | Performed by: INTERNAL MEDICINE

## 2021-05-20 PROCEDURE — 93295 DEV INTERROG REMOTE 1/2/MLT: CPT | Mod: ,,, | Performed by: INTERNAL MEDICINE

## 2021-05-20 PROCEDURE — 93296 REM INTERROG EVL PM/IDS: CPT | Performed by: INTERNAL MEDICINE

## 2021-05-24 ENCOUNTER — HOSPITAL ENCOUNTER (OUTPATIENT)
Facility: HOSPITAL | Age: 71
Discharge: HOME HEALTH | End: 2021-05-26
Attending: EMERGENCY MEDICINE | Admitting: EMERGENCY MEDICINE
Payer: MEDICARE

## 2021-05-24 DIAGNOSIS — R07.9 CHEST PAIN: ICD-10-CM

## 2021-05-24 DIAGNOSIS — R06.02 SHORTNESS OF BREATH: ICD-10-CM

## 2021-05-24 DIAGNOSIS — R06.02 SOB (SHORTNESS OF BREATH): ICD-10-CM

## 2021-05-24 DIAGNOSIS — R53.83 FATIGUE, UNSPECIFIED TYPE: ICD-10-CM

## 2021-05-24 DIAGNOSIS — R68.89 MULTIPLE COMPLAINTS: ICD-10-CM

## 2021-05-24 DIAGNOSIS — R53.1 WEAKNESS: ICD-10-CM

## 2021-05-24 DIAGNOSIS — I95.1 ORTHOSTATIC HYPOTENSION: Primary | ICD-10-CM

## 2021-05-24 DIAGNOSIS — I95.9 HYPOTENSION: ICD-10-CM

## 2021-05-24 DIAGNOSIS — I95.9 HYPOTENSION, UNSPECIFIED HYPOTENSION TYPE: ICD-10-CM

## 2021-05-24 PROBLEM — R11.0 NAUSEA: Status: ACTIVE | Noted: 2021-05-24

## 2021-05-24 LAB
ALBUMIN SERPL BCP-MCNC: 3.4 G/DL (ref 3.5–5.2)
ALP SERPL-CCNC: 55 U/L (ref 55–135)
ALT SERPL W/O P-5'-P-CCNC: 60 U/L (ref 10–44)
ANION GAP SERPL CALC-SCNC: 10 MMOL/L (ref 8–16)
AST SERPL-CCNC: 45 U/L (ref 10–40)
BASOPHILS # BLD AUTO: 0.03 K/UL (ref 0–0.2)
BASOPHILS NFR BLD: 0.5 % (ref 0–1.9)
BILIRUB SERPL-MCNC: 0.6 MG/DL (ref 0.1–1)
BILIRUB UR QL STRIP: NEGATIVE
BNP SERPL-MCNC: 85 PG/ML (ref 0–99)
BUN SERPL-MCNC: 18 MG/DL (ref 8–23)
CALCIUM SERPL-MCNC: 8.5 MG/DL (ref 8.7–10.5)
CHLORIDE SERPL-SCNC: 104 MMOL/L (ref 95–110)
CLARITY UR REFRACT.AUTO: CLEAR
CO2 SERPL-SCNC: 19 MMOL/L (ref 23–29)
COLOR UR AUTO: YELLOW
CREAT SERPL-MCNC: 1.8 MG/DL (ref 0.5–1.4)
CTP QC/QA: YES
DIFFERENTIAL METHOD: ABNORMAL
EOSINOPHIL # BLD AUTO: 0.1 K/UL (ref 0–0.5)
EOSINOPHIL NFR BLD: 1.4 % (ref 0–8)
ERYTHROCYTE [DISTWIDTH] IN BLOOD BY AUTOMATED COUNT: 14.3 % (ref 11.5–14.5)
EST. GFR  (AFRICAN AMERICAN): 42.5 ML/MIN/1.73 M^2
EST. GFR  (NON AFRICAN AMERICAN): 36.8 ML/MIN/1.73 M^2
GLUCOSE SERPL-MCNC: 167 MG/DL (ref 70–110)
GLUCOSE UR QL STRIP: NEGATIVE
HCT VFR BLD AUTO: 36.1 % (ref 40–54)
HCV AB SERPL QL IA: NEGATIVE
HGB BLD-MCNC: 11.5 G/DL (ref 14–18)
HGB UR QL STRIP: NEGATIVE
IMM GRANULOCYTES # BLD AUTO: 0.02 K/UL (ref 0–0.04)
IMM GRANULOCYTES NFR BLD AUTO: 0.3 % (ref 0–0.5)
KETONES UR QL STRIP: NEGATIVE
LACTATE SERPL-SCNC: 1.1 MMOL/L (ref 0.5–2.2)
LEUKOCYTE ESTERASE UR QL STRIP: NEGATIVE
LYMPHOCYTES # BLD AUTO: 1.6 K/UL (ref 1–4.8)
LYMPHOCYTES NFR BLD: 27.6 % (ref 18–48)
MCH RBC QN AUTO: 29.5 PG (ref 27–31)
MCHC RBC AUTO-ENTMCNC: 31.9 G/DL (ref 32–36)
MCV RBC AUTO: 93 FL (ref 82–98)
MONOCYTES # BLD AUTO: 0.6 K/UL (ref 0.3–1)
MONOCYTES NFR BLD: 10.2 % (ref 4–15)
NEUTROPHILS # BLD AUTO: 3.5 K/UL (ref 1.8–7.7)
NEUTROPHILS NFR BLD: 60 % (ref 38–73)
NITRITE UR QL STRIP: NEGATIVE
NRBC BLD-RTO: 0 /100 WBC
PH UR STRIP: 6 [PH] (ref 5–8)
PLATELET # BLD AUTO: 146 K/UL (ref 150–450)
PMV BLD AUTO: 10.4 FL (ref 9.2–12.9)
POCT GLUCOSE: 185 MG/DL (ref 70–110)
POTASSIUM SERPL-SCNC: 4.3 MMOL/L (ref 3.5–5.1)
PROT SERPL-MCNC: 6.5 G/DL (ref 6–8.4)
PROT UR QL STRIP: NEGATIVE
RBC # BLD AUTO: 3.9 M/UL (ref 4.6–6.2)
SARS-COV-2 RDRP RESP QL NAA+PROBE: NEGATIVE
SODIUM SERPL-SCNC: 133 MMOL/L (ref 136–145)
SP GR UR STRIP: 1.01 (ref 1–1.03)
TROPONIN I SERPL DL<=0.01 NG/ML-MCNC: 0.01 NG/ML (ref 0–0.03)
TROPONIN I SERPL DL<=0.01 NG/ML-MCNC: 0.01 NG/ML (ref 0–0.03)
TROPONIN I SERPL DL<=0.01 NG/ML-MCNC: 0.02 NG/ML (ref 0–0.03)
URN SPEC COLLECT METH UR: NORMAL
WBC # BLD AUTO: 5.79 K/UL (ref 3.9–12.7)

## 2021-05-24 PROCEDURE — U0002 COVID-19 LAB TEST NON-CDC: HCPCS | Performed by: EMERGENCY MEDICINE

## 2021-05-24 PROCEDURE — 99220 PR INITIAL OBSERVATION CARE,LEVL III: CPT | Mod: ,,, | Performed by: PHYSICIAN ASSISTANT

## 2021-05-24 PROCEDURE — 99220 PR INITIAL OBSERVATION CARE,LEVL III: ICD-10-PCS | Mod: ,,, | Performed by: PHYSICIAN ASSISTANT

## 2021-05-24 PROCEDURE — 93005 ELECTROCARDIOGRAM TRACING: CPT

## 2021-05-24 PROCEDURE — 81003 URINALYSIS AUTO W/O SCOPE: CPT | Performed by: EMERGENCY MEDICINE

## 2021-05-24 PROCEDURE — 93010 EKG 12-LEAD: ICD-10-PCS | Mod: 76,,, | Performed by: INTERNAL MEDICINE

## 2021-05-24 PROCEDURE — 25000003 PHARM REV CODE 250: Performed by: EMERGENCY MEDICINE

## 2021-05-24 PROCEDURE — 86803 HEPATITIS C AB TEST: CPT | Performed by: EMERGENCY MEDICINE

## 2021-05-24 PROCEDURE — 99285 EMERGENCY DEPT VISIT HI MDM: CPT | Mod: 25

## 2021-05-24 PROCEDURE — 96372 THER/PROPH/DIAG INJ SC/IM: CPT | Mod: 59

## 2021-05-24 PROCEDURE — 63600175 PHARM REV CODE 636 W HCPCS: Performed by: PHYSICIAN ASSISTANT

## 2021-05-24 PROCEDURE — 80053 COMPREHEN METABOLIC PANEL: CPT | Performed by: EMERGENCY MEDICINE

## 2021-05-24 PROCEDURE — 99285 PR EMERGENCY DEPT VISIT,LEVEL V: ICD-10-PCS | Mod: CS,,, | Performed by: EMERGENCY MEDICINE

## 2021-05-24 PROCEDURE — 85025 COMPLETE CBC W/AUTO DIFF WBC: CPT | Performed by: EMERGENCY MEDICINE

## 2021-05-24 PROCEDURE — 96360 HYDRATION IV INFUSION INIT: CPT

## 2021-05-24 PROCEDURE — 84484 ASSAY OF TROPONIN QUANT: CPT | Mod: 91 | Performed by: PHYSICIAN ASSISTANT

## 2021-05-24 PROCEDURE — 83880 ASSAY OF NATRIURETIC PEPTIDE: CPT | Performed by: EMERGENCY MEDICINE

## 2021-05-24 PROCEDURE — 84484 ASSAY OF TROPONIN QUANT: CPT | Mod: 91 | Performed by: EMERGENCY MEDICINE

## 2021-05-24 PROCEDURE — 25000003 PHARM REV CODE 250: Performed by: PHYSICIAN ASSISTANT

## 2021-05-24 PROCEDURE — 83605 ASSAY OF LACTIC ACID: CPT | Performed by: PHYSICIAN ASSISTANT

## 2021-05-24 PROCEDURE — G0378 HOSPITAL OBSERVATION PER HR: HCPCS

## 2021-05-24 PROCEDURE — 36415 COLL VENOUS BLD VENIPUNCTURE: CPT | Performed by: PHYSICIAN ASSISTANT

## 2021-05-24 PROCEDURE — 96361 HYDRATE IV INFUSION ADD-ON: CPT

## 2021-05-24 PROCEDURE — 93010 ELECTROCARDIOGRAM REPORT: CPT | Mod: 76,,, | Performed by: INTERNAL MEDICINE

## 2021-05-24 PROCEDURE — 99285 EMERGENCY DEPT VISIT HI MDM: CPT | Mod: CS,,, | Performed by: EMERGENCY MEDICINE

## 2021-05-24 RX ORDER — BACLOFEN 5 MG/1
5 TABLET ORAL 2 TIMES DAILY
Status: DISCONTINUED | OUTPATIENT
Start: 2021-05-24 | End: 2021-05-26 | Stop reason: HOSPADM

## 2021-05-24 RX ORDER — AMIODARONE HYDROCHLORIDE 200 MG/1
200 TABLET ORAL DAILY
Status: DISCONTINUED | OUTPATIENT
Start: 2021-05-25 | End: 2021-05-26 | Stop reason: HOSPADM

## 2021-05-24 RX ORDER — PANTOPRAZOLE SODIUM 40 MG/1
40 TABLET, DELAYED RELEASE ORAL DAILY
Refills: 3 | Status: DISCONTINUED | OUTPATIENT
Start: 2021-05-25 | End: 2021-05-26 | Stop reason: HOSPADM

## 2021-05-24 RX ORDER — ONDANSETRON 8 MG/1
8 TABLET, ORALLY DISINTEGRATING ORAL EVERY 8 HOURS PRN
Status: DISCONTINUED | OUTPATIENT
Start: 2021-05-24 | End: 2021-05-26 | Stop reason: HOSPADM

## 2021-05-24 RX ORDER — IPRATROPIUM BROMIDE AND ALBUTEROL SULFATE 2.5; .5 MG/3ML; MG/3ML
3 SOLUTION RESPIRATORY (INHALATION) EVERY 4 HOURS PRN
Status: DISCONTINUED | OUTPATIENT
Start: 2021-05-24 | End: 2021-05-26 | Stop reason: HOSPADM

## 2021-05-24 RX ORDER — ACETAMINOPHEN 325 MG/1
650 TABLET ORAL EVERY 4 HOURS PRN
Status: DISCONTINUED | OUTPATIENT
Start: 2021-05-24 | End: 2021-05-26 | Stop reason: HOSPADM

## 2021-05-24 RX ORDER — TAMSULOSIN HYDROCHLORIDE 0.4 MG/1
1 CAPSULE ORAL DAILY
Status: DISCONTINUED | OUTPATIENT
Start: 2021-05-25 | End: 2021-05-26 | Stop reason: HOSPADM

## 2021-05-24 RX ORDER — EZETIMIBE 10 MG/1
10 TABLET ORAL DAILY
Status: DISCONTINUED | OUTPATIENT
Start: 2021-05-25 | End: 2021-05-26 | Stop reason: HOSPADM

## 2021-05-24 RX ORDER — INSULIN ASPART 100 [IU]/ML
5 INJECTION, SOLUTION INTRAVENOUS; SUBCUTANEOUS
Status: DISCONTINUED | OUTPATIENT
Start: 2021-05-24 | End: 2021-05-26 | Stop reason: HOSPADM

## 2021-05-24 RX ORDER — FERROUS SULFATE 325(65) MG
325 TABLET, DELAYED RELEASE (ENTERIC COATED) ORAL DAILY
Status: DISCONTINUED | OUTPATIENT
Start: 2021-05-25 | End: 2021-05-26 | Stop reason: HOSPADM

## 2021-05-24 RX ORDER — INSULIN ASPART 100 [IU]/ML
8 INJECTION, SOLUTION INTRAVENOUS; SUBCUTANEOUS
Status: DISCONTINUED | OUTPATIENT
Start: 2021-05-24 | End: 2021-05-24

## 2021-05-24 RX ORDER — RANOLAZINE 500 MG/1
1000 TABLET, EXTENDED RELEASE ORAL 2 TIMES DAILY
Status: DISCONTINUED | OUTPATIENT
Start: 2021-05-24 | End: 2021-05-26 | Stop reason: HOSPADM

## 2021-05-24 RX ORDER — INSULIN ASPART 100 [IU]/ML
0-5 INJECTION, SOLUTION INTRAVENOUS; SUBCUTANEOUS
Status: DISCONTINUED | OUTPATIENT
Start: 2021-05-24 | End: 2021-05-26 | Stop reason: HOSPADM

## 2021-05-24 RX ORDER — TALC
6 POWDER (GRAM) TOPICAL NIGHTLY PRN
Status: CANCELLED | OUTPATIENT
Start: 2021-05-24

## 2021-05-24 RX ORDER — GLUCAGON 1 MG
1 KIT INJECTION
Status: DISCONTINUED | OUTPATIENT
Start: 2021-05-24 | End: 2021-05-26 | Stop reason: HOSPADM

## 2021-05-24 RX ORDER — IBUPROFEN 200 MG
16 TABLET ORAL
Status: DISCONTINUED | OUTPATIENT
Start: 2021-05-24 | End: 2021-05-26 | Stop reason: HOSPADM

## 2021-05-24 RX ORDER — ASPIRIN 325 MG
325 TABLET ORAL
Status: COMPLETED | OUTPATIENT
Start: 2021-05-24 | End: 2021-05-24

## 2021-05-24 RX ORDER — ASPIRIN 81 MG/1
81 TABLET ORAL DAILY
Status: DISCONTINUED | OUTPATIENT
Start: 2021-05-25 | End: 2021-05-26 | Stop reason: HOSPADM

## 2021-05-24 RX ORDER — SODIUM CHLORIDE 0.9 % (FLUSH) 0.9 %
2 SYRINGE (ML) INJECTION EVERY 6 HOURS PRN
Status: DISCONTINUED | OUTPATIENT
Start: 2021-05-24 | End: 2021-05-26 | Stop reason: HOSPADM

## 2021-05-24 RX ORDER — IBUPROFEN 200 MG
24 TABLET ORAL
Status: DISCONTINUED | OUTPATIENT
Start: 2021-05-24 | End: 2021-05-26 | Stop reason: HOSPADM

## 2021-05-24 RX ORDER — BISACODYL 10 MG
10 SUPPOSITORY, RECTAL RECTAL DAILY PRN
Status: DISCONTINUED | OUTPATIENT
Start: 2021-05-24 | End: 2021-05-26 | Stop reason: HOSPADM

## 2021-05-24 RX ORDER — SODIUM CHLORIDE 0.9 % (FLUSH) 0.9 %
10 SYRINGE (ML) INJECTION
Status: CANCELLED | OUTPATIENT
Start: 2021-05-24

## 2021-05-24 RX ORDER — ROSUVASTATIN CALCIUM 20 MG/1
40 TABLET, COATED ORAL DAILY
Status: DISCONTINUED | OUTPATIENT
Start: 2021-05-25 | End: 2021-05-26 | Stop reason: HOSPADM

## 2021-05-24 RX ORDER — CARVEDILOL 3.12 MG/1
3.12 TABLET ORAL 2 TIMES DAILY
Status: DISCONTINUED | OUTPATIENT
Start: 2021-05-24 | End: 2021-05-26 | Stop reason: HOSPADM

## 2021-05-24 RX ADMIN — SODIUM CHLORIDE 500 ML: 0.9 INJECTION, SOLUTION INTRAVENOUS at 02:05

## 2021-05-24 RX ADMIN — ASPIRIN 325 MG ORAL TABLET 325 MG: 325 PILL ORAL at 12:05

## 2021-05-24 RX ADMIN — CARVEDILOL 3.12 MG: 3.12 TABLET, FILM COATED ORAL at 08:05

## 2021-05-24 RX ADMIN — SODIUM CHLORIDE 500 ML: 0.9 INJECTION, SOLUTION INTRAVENOUS at 12:05

## 2021-05-24 RX ADMIN — TICAGRELOR 90 MG: 90 TABLET ORAL at 08:05

## 2021-05-24 RX ADMIN — ACETAMINOPHEN 650 MG: 325 TABLET ORAL at 06:05

## 2021-05-24 RX ADMIN — RANOLAZINE 1000 MG: 500 TABLET, EXTENDED RELEASE ORAL at 08:05

## 2021-05-24 RX ADMIN — INSULIN ASPART 5 UNITS: 100 INJECTION, SOLUTION INTRAVENOUS; SUBCUTANEOUS at 08:05

## 2021-05-24 RX ADMIN — BACLOFEN 5 MG: 5 TABLET ORAL at 08:05

## 2021-05-25 ENCOUNTER — DOCUMENTATION ONLY (OUTPATIENT)
Dept: CARDIOLOGY | Facility: HOSPITAL | Age: 71
End: 2021-05-25

## 2021-05-25 LAB
ALBUMIN SERPL BCP-MCNC: 3.2 G/DL (ref 3.5–5.2)
ALP SERPL-CCNC: 53 U/L (ref 55–135)
ALT SERPL W/O P-5'-P-CCNC: 55 U/L (ref 10–44)
ANION GAP SERPL CALC-SCNC: 8 MMOL/L (ref 8–16)
ASCENDING AORTA: 3.06 CM
AST SERPL-CCNC: 40 U/L (ref 10–40)
AV INDEX (PROSTH): 0.52
AV MEAN GRADIENT: 6 MMHG
AV PEAK GRADIENT: 11 MMHG
AV VALVE AREA: 2.05 CM2
AV VELOCITY RATIO: 0.53
BILIRUB SERPL-MCNC: 0.6 MG/DL (ref 0.1–1)
BSA FOR ECHO PROCEDURE: 2.3 M2
BUN SERPL-MCNC: 14 MG/DL (ref 8–23)
CALCIUM SERPL-MCNC: 9.1 MG/DL (ref 8.7–10.5)
CHLORIDE SERPL-SCNC: 109 MMOL/L (ref 95–110)
CO2 SERPL-SCNC: 23 MMOL/L (ref 23–29)
CREAT SERPL-MCNC: 1.5 MG/DL (ref 0.5–1.4)
CV ECHO LV RWT: 0.37 CM
DOP CALC AO PEAK VEL: 1.68 M/S
DOP CALC AO VTI: 38.62 CM
DOP CALC LVOT AREA: 4 CM2
DOP CALC LVOT DIAMETER: 2.25 CM
DOP CALC LVOT PEAK VEL: 0.89 M/S
DOP CALC LVOT STROKE VOLUME: 79.12 CM3
DOP CALCLVOT PEAK VEL VTI: 19.91 CM
E WAVE DECELERATION TIME: 178.65 MSEC
E/A RATIO: 0.95
E/E' RATIO: 20.8 M/S
ECHO LV POSTERIOR WALL: 1 CM (ref 0.6–1.1)
EJECTION FRACTION: 35 %
EST. GFR  (AFRICAN AMERICAN): 53 ML/MIN/1.73 M^2
EST. GFR  (NON AFRICAN AMERICAN): 45.9 ML/MIN/1.73 M^2
FERRITIN SERPL-MCNC: 209 NG/ML (ref 20–300)
FRACTIONAL SHORTENING: 19 % (ref 28–44)
GLUCOSE SERPL-MCNC: 97 MG/DL (ref 70–110)
INTERVENTRICULAR SEPTUM: 0.94 CM (ref 0.6–1.1)
IRON SERPL-MCNC: 86 UG/DL (ref 45–160)
LA MAJOR: 6.64 CM
LA MINOR: 6.37 CM
LA WIDTH: 4.54 CM
LEFT ATRIUM SIZE: 4.17 CM
LEFT ATRIUM VOLUME INDEX MOD: 38.5 ML/M2
LEFT ATRIUM VOLUME INDEX: 47.3 ML/M2
LEFT ATRIUM VOLUME MOD: 85.19 CM3
LEFT ATRIUM VOLUME: 104.63 CM3
LEFT INTERNAL DIMENSION IN SYSTOLE: 4.36 CM (ref 2.1–4)
LEFT VENTRICLE DIASTOLIC VOLUME INDEX: 64.06 ML/M2
LEFT VENTRICLE DIASTOLIC VOLUME: 141.58 ML
LEFT VENTRICLE MASS INDEX: 90 G/M2
LEFT VENTRICLE SYSTOLIC VOLUME INDEX: 38.8 ML/M2
LEFT VENTRICLE SYSTOLIC VOLUME: 85.85 ML
LEFT VENTRICULAR INTERNAL DIMENSION IN DIASTOLE: 5.4 CM (ref 3.5–6)
LEFT VENTRICULAR MASS: 198.6 G
LV LATERAL E/E' RATIO: 14.86 M/S
LV SEPTAL E/E' RATIO: 34.67 M/S
MAGNESIUM SERPL-MCNC: 2 MG/DL (ref 1.6–2.6)
MV PEAK A VEL: 1.09 M/S
MV PEAK E VEL: 1.04 M/S
MV STENOSIS PRESSURE HALF TIME: 51.81 MS
MV VALVE AREA P 1/2 METHOD: 4.25 CM2
PHOSPHATE SERPL-MCNC: 2.8 MG/DL (ref 2.7–4.5)
PISA TR MAX VEL: 2.76 M/S
POCT GLUCOSE: 106 MG/DL (ref 70–110)
POCT GLUCOSE: 159 MG/DL (ref 70–110)
POCT GLUCOSE: 171 MG/DL (ref 70–110)
POCT GLUCOSE: 214 MG/DL (ref 70–110)
POTASSIUM SERPL-SCNC: 4.5 MMOL/L (ref 3.5–5.1)
PROT SERPL-MCNC: 6.2 G/DL (ref 6–8.4)
QEF: 35 %
RA MAJOR: 5.23 CM
RA PRESSURE: 3 MMHG
RA WIDTH: 3.98 CM
RIGHT VENTRICULAR END-DIASTOLIC DIMENSION: 4.05 CM
SATURATED IRON: 29 % (ref 20–50)
SINUS: 3.13 CM
SODIUM SERPL-SCNC: 140 MMOL/L (ref 136–145)
STJ: 2.86 CM
TDI LATERAL: 0.07 M/S
TDI SEPTAL: 0.03 M/S
TDI: 0.05 M/S
TOTAL IRON BINDING CAPACITY: 299 UG/DL (ref 250–450)
TR MAX PG: 30 MMHG
TRANSFERRIN SERPL-MCNC: 202 MG/DL (ref 200–375)
TRICUSPID ANNULAR PLANE SYSTOLIC EXCURSION: 0.9 CM
TV REST PULMONARY ARTERY PRESSURE: 33 MMHG

## 2021-05-25 PROCEDURE — 63600175 PHARM REV CODE 636 W HCPCS: Performed by: PHYSICIAN ASSISTANT

## 2021-05-25 PROCEDURE — 82728 ASSAY OF FERRITIN: CPT | Performed by: PHYSICIAN ASSISTANT

## 2021-05-25 PROCEDURE — 25000003 PHARM REV CODE 250: Performed by: PHYSICIAN ASSISTANT

## 2021-05-25 PROCEDURE — 83735 ASSAY OF MAGNESIUM: CPT | Performed by: PHYSICIAN ASSISTANT

## 2021-05-25 PROCEDURE — G0378 HOSPITAL OBSERVATION PER HR: HCPCS

## 2021-05-25 PROCEDURE — C9399 UNCLASSIFIED DRUGS OR BIOLOG: HCPCS | Performed by: PHYSICIAN ASSISTANT

## 2021-05-25 PROCEDURE — 25500020 PHARM REV CODE 255: Performed by: HOSPITALIST

## 2021-05-25 PROCEDURE — 83540 ASSAY OF IRON: CPT | Performed by: PHYSICIAN ASSISTANT

## 2021-05-25 PROCEDURE — 36415 COLL VENOUS BLD VENIPUNCTURE: CPT | Performed by: PHYSICIAN ASSISTANT

## 2021-05-25 PROCEDURE — 99226 PR SUBSEQUENT OBSERVATION CARE,LEVEL III: CPT | Mod: ,,, | Performed by: PHYSICIAN ASSISTANT

## 2021-05-25 PROCEDURE — 96372 THER/PROPH/DIAG INJ SC/IM: CPT | Mod: 59

## 2021-05-25 PROCEDURE — 99226 PR SUBSEQUENT OBSERVATION CARE,LEVEL III: ICD-10-PCS | Mod: ,,, | Performed by: PHYSICIAN ASSISTANT

## 2021-05-25 PROCEDURE — 84100 ASSAY OF PHOSPHORUS: CPT | Performed by: PHYSICIAN ASSISTANT

## 2021-05-25 PROCEDURE — 82962 GLUCOSE BLOOD TEST: CPT

## 2021-05-25 PROCEDURE — 80053 COMPREHEN METABOLIC PANEL: CPT | Performed by: PHYSICIAN ASSISTANT

## 2021-05-25 RX ADMIN — CARVEDILOL 3.12 MG: 3.12 TABLET, FILM COATED ORAL at 09:05

## 2021-05-25 RX ADMIN — TICAGRELOR 90 MG: 90 TABLET ORAL at 08:05

## 2021-05-25 RX ADMIN — BACLOFEN 5 MG: 5 TABLET ORAL at 11:05

## 2021-05-25 RX ADMIN — CARVEDILOL 3.12 MG: 3.12 TABLET, FILM COATED ORAL at 08:05

## 2021-05-25 RX ADMIN — INSULIN ASPART 5 UNITS: 100 INJECTION, SOLUTION INTRAVENOUS; SUBCUTANEOUS at 08:05

## 2021-05-25 RX ADMIN — TICAGRELOR 90 MG: 90 TABLET ORAL at 09:05

## 2021-05-25 RX ADMIN — RANOLAZINE 1000 MG: 500 TABLET, EXTENDED RELEASE ORAL at 09:05

## 2021-05-25 RX ADMIN — BACLOFEN 5 MG: 5 TABLET ORAL at 09:05

## 2021-05-25 RX ADMIN — EZETIMIBE 10 MG: 10 TABLET ORAL at 08:05

## 2021-05-25 RX ADMIN — PANTOPRAZOLE SODIUM 40 MG: 40 TABLET, DELAYED RELEASE ORAL at 08:05

## 2021-05-25 RX ADMIN — INSULIN DETEMIR 15 UNITS: 100 INJECTION, SOLUTION SUBCUTANEOUS at 08:05

## 2021-05-25 RX ADMIN — INSULIN ASPART 2 UNITS: 100 INJECTION, SOLUTION INTRAVENOUS; SUBCUTANEOUS at 11:05

## 2021-05-25 RX ADMIN — RANOLAZINE 1000 MG: 500 TABLET, EXTENDED RELEASE ORAL at 08:05

## 2021-05-25 RX ADMIN — ROSUVASTATIN CALCIUM 40 MG: 20 TABLET, COATED ORAL at 11:05

## 2021-05-25 RX ADMIN — AMIODARONE HYDROCHLORIDE 200 MG: 200 TABLET ORAL at 08:05

## 2021-05-25 RX ADMIN — INSULIN ASPART 5 UNITS: 100 INJECTION, SOLUTION INTRAVENOUS; SUBCUTANEOUS at 11:05

## 2021-05-25 RX ADMIN — INSULIN ASPART 5 UNITS: 100 INJECTION, SOLUTION INTRAVENOUS; SUBCUTANEOUS at 04:05

## 2021-05-25 RX ADMIN — ASPIRIN 81 MG: 81 TABLET, COATED ORAL at 08:05

## 2021-05-25 RX ADMIN — TAMSULOSIN HYDROCHLORIDE 0.4 MG: 0.4 CAPSULE ORAL at 08:05

## 2021-05-25 RX ADMIN — FERROUS SULFATE TAB EC 325 MG (65 MG FE EQUIVALENT) 325 MG: 325 (65 FE) TABLET DELAYED RESPONSE at 08:05

## 2021-05-25 RX ADMIN — HUMAN ALBUMIN MICROSPHERES AND PERFLUTREN 0.66 MG: 10; .22 INJECTION, SOLUTION INTRAVENOUS at 02:05

## 2021-05-26 ENCOUNTER — DOCUMENTATION ONLY (OUTPATIENT)
Dept: CARDIOLOGY | Facility: HOSPITAL | Age: 71
End: 2021-05-26

## 2021-05-26 VITALS
DIASTOLIC BLOOD PRESSURE: 88 MMHG | HEIGHT: 69 IN | TEMPERATURE: 96 F | OXYGEN SATURATION: 98 % | BODY MASS INDEX: 34.87 KG/M2 | HEART RATE: 59 BPM | WEIGHT: 235.44 LBS | SYSTOLIC BLOOD PRESSURE: 184 MMHG | RESPIRATION RATE: 18 BRPM

## 2021-05-26 LAB
ALBUMIN SERPL BCP-MCNC: 3.3 G/DL (ref 3.5–5.2)
ALP SERPL-CCNC: 63 U/L (ref 55–135)
ALT SERPL W/O P-5'-P-CCNC: 59 U/L (ref 10–44)
ANION GAP SERPL CALC-SCNC: 8 MMOL/L (ref 8–16)
AST SERPL-CCNC: 40 U/L (ref 10–40)
BILIRUB SERPL-MCNC: 0.6 MG/DL (ref 0.1–1)
BUN SERPL-MCNC: 14 MG/DL (ref 8–23)
CALCIUM SERPL-MCNC: 8.8 MG/DL (ref 8.7–10.5)
CHLORIDE SERPL-SCNC: 106 MMOL/L (ref 95–110)
CO2 SERPL-SCNC: 23 MMOL/L (ref 23–29)
CREAT SERPL-MCNC: 1.5 MG/DL (ref 0.5–1.4)
EST. GFR  (AFRICAN AMERICAN): 53 ML/MIN/1.73 M^2
EST. GFR  (NON AFRICAN AMERICAN): 45.9 ML/MIN/1.73 M^2
GLUCOSE SERPL-MCNC: 160 MG/DL (ref 70–110)
MAGNESIUM SERPL-MCNC: 1.8 MG/DL (ref 1.6–2.6)
PHOSPHATE SERPL-MCNC: 2.3 MG/DL (ref 2.7–4.5)
POCT GLUCOSE: 199 MG/DL (ref 70–110)
POCT GLUCOSE: 222 MG/DL (ref 70–110)
POTASSIUM SERPL-SCNC: 4.5 MMOL/L (ref 3.5–5.1)
PROT SERPL-MCNC: 6.5 G/DL (ref 6–8.4)
SODIUM SERPL-SCNC: 137 MMOL/L (ref 136–145)
TROPONIN I SERPL DL<=0.01 NG/ML-MCNC: 0.02 NG/ML (ref 0–0.03)
TROPONIN I SERPL DL<=0.01 NG/ML-MCNC: 0.02 NG/ML (ref 0–0.03)

## 2021-05-26 PROCEDURE — 84484 ASSAY OF TROPONIN QUANT: CPT | Performed by: PHYSICIAN ASSISTANT

## 2021-05-26 PROCEDURE — 84100 ASSAY OF PHOSPHORUS: CPT | Performed by: PHYSICIAN ASSISTANT

## 2021-05-26 PROCEDURE — G0378 HOSPITAL OBSERVATION PER HR: HCPCS

## 2021-05-26 PROCEDURE — 93005 ELECTROCARDIOGRAM TRACING: CPT

## 2021-05-26 PROCEDURE — 25000003 PHARM REV CODE 250: Performed by: PHYSICIAN ASSISTANT

## 2021-05-26 PROCEDURE — 83735 ASSAY OF MAGNESIUM: CPT | Performed by: PHYSICIAN ASSISTANT

## 2021-05-26 PROCEDURE — 80053 COMPREHEN METABOLIC PANEL: CPT | Performed by: PHYSICIAN ASSISTANT

## 2021-05-26 PROCEDURE — 99217 PR OBSERVATION CARE DISCHARGE: CPT | Mod: ,,, | Performed by: PHYSICIAN ASSISTANT

## 2021-05-26 PROCEDURE — 96372 THER/PROPH/DIAG INJ SC/IM: CPT

## 2021-05-26 PROCEDURE — 25000242 PHARM REV CODE 250 ALT 637 W/ HCPCS: Performed by: PHYSICIAN ASSISTANT

## 2021-05-26 PROCEDURE — 93010 EKG 12-LEAD: ICD-10-PCS | Mod: ,,, | Performed by: INTERNAL MEDICINE

## 2021-05-26 PROCEDURE — 36415 COLL VENOUS BLD VENIPUNCTURE: CPT | Performed by: PHYSICIAN ASSISTANT

## 2021-05-26 PROCEDURE — C9399 UNCLASSIFIED DRUGS OR BIOLOG: HCPCS | Performed by: PHYSICIAN ASSISTANT

## 2021-05-26 PROCEDURE — 99217 PR OBSERVATION CARE DISCHARGE: ICD-10-PCS | Mod: ,,, | Performed by: PHYSICIAN ASSISTANT

## 2021-05-26 PROCEDURE — 93010 ELECTROCARDIOGRAM REPORT: CPT | Mod: ,,, | Performed by: INTERNAL MEDICINE

## 2021-05-26 RX ORDER — NITROGLYCERIN 0.4 MG/1
0.4 TABLET SUBLINGUAL EVERY 5 MIN PRN
Status: DISCONTINUED | OUTPATIENT
Start: 2021-05-26 | End: 2021-05-26 | Stop reason: HOSPADM

## 2021-05-26 RX ORDER — CARVEDILOL 3.12 MG/1
3.12 TABLET ORAL 2 TIMES DAILY
Qty: 60 TABLET | Refills: 11 | Status: SHIPPED | OUTPATIENT
Start: 2021-05-26 | End: 2021-10-25 | Stop reason: SDUPTHER

## 2021-05-26 RX ORDER — LOSARTAN POTASSIUM 25 MG/1
12.5 TABLET ORAL DAILY
Qty: 45 TABLET | Refills: 3 | Status: SHIPPED | OUTPATIENT
Start: 2021-05-26 | End: 2021-06-07 | Stop reason: SDUPTHER

## 2021-05-26 RX ADMIN — INSULIN DETEMIR 15 UNITS: 100 INJECTION, SOLUTION SUBCUTANEOUS at 09:05

## 2021-05-26 RX ADMIN — RANOLAZINE 1000 MG: 500 TABLET, EXTENDED RELEASE ORAL at 08:05

## 2021-05-26 RX ADMIN — EZETIMIBE 10 MG: 10 TABLET ORAL at 08:05

## 2021-05-26 RX ADMIN — ROSUVASTATIN CALCIUM 40 MG: 20 TABLET, COATED ORAL at 09:05

## 2021-05-26 RX ADMIN — CARVEDILOL 3.12 MG: 3.12 TABLET, FILM COATED ORAL at 08:05

## 2021-05-26 RX ADMIN — TICAGRELOR 90 MG: 90 TABLET ORAL at 08:05

## 2021-05-26 RX ADMIN — NITROGLYCERIN 0.4 MG: 0.4 TABLET, ORALLY DISINTEGRATING SUBLINGUAL at 04:05

## 2021-05-26 RX ADMIN — INSULIN ASPART 5 UNITS: 100 INJECTION, SOLUTION INTRAVENOUS; SUBCUTANEOUS at 08:05

## 2021-05-26 RX ADMIN — TAMSULOSIN HYDROCHLORIDE 0.4 MG: 0.4 CAPSULE ORAL at 08:05

## 2021-05-26 RX ADMIN — AMIODARONE HYDROCHLORIDE 200 MG: 200 TABLET ORAL at 12:05

## 2021-05-26 RX ADMIN — ASPIRIN 81 MG: 81 TABLET, COATED ORAL at 08:05

## 2021-05-26 RX ADMIN — PANTOPRAZOLE SODIUM 40 MG: 40 TABLET, DELAYED RELEASE ORAL at 08:05

## 2021-05-26 RX ADMIN — ACETAMINOPHEN 650 MG: 325 TABLET ORAL at 04:05

## 2021-05-26 RX ADMIN — INSULIN ASPART 2 UNITS: 100 INJECTION, SOLUTION INTRAVENOUS; SUBCUTANEOUS at 08:05

## 2021-05-26 RX ADMIN — FERROUS SULFATE TAB EC 325 MG (65 MG FE EQUIVALENT) 325 MG: 325 (65 FE) TABLET DELAYED RESPONSE at 08:05

## 2021-05-26 RX ADMIN — BACLOFEN 5 MG: 5 TABLET ORAL at 08:05

## 2021-05-27 ENCOUNTER — OFFICE VISIT (OUTPATIENT)
Dept: ENDOCRINOLOGY | Facility: CLINIC | Age: 71
End: 2021-05-27
Payer: MEDICARE

## 2021-05-27 VITALS
WEIGHT: 239 LBS | BODY MASS INDEX: 35.4 KG/M2 | DIASTOLIC BLOOD PRESSURE: 80 MMHG | HEIGHT: 69 IN | SYSTOLIC BLOOD PRESSURE: 132 MMHG

## 2021-05-27 DIAGNOSIS — I50.42 CHRONIC COMBINED SYSTOLIC AND DIASTOLIC HEART FAILURE: Chronic | ICD-10-CM

## 2021-05-27 DIAGNOSIS — E78.2 MIXED HYPERLIPIDEMIA: Chronic | ICD-10-CM

## 2021-05-27 DIAGNOSIS — E55.9 VITAMIN D INSUFFICIENCY: Chronic | ICD-10-CM

## 2021-05-27 DIAGNOSIS — E66.9 DIABETES MELLITUS TYPE 2 IN OBESE: Chronic | ICD-10-CM

## 2021-05-27 DIAGNOSIS — E11.69 DIABETES MELLITUS TYPE 2 IN OBESE: Chronic | ICD-10-CM

## 2021-05-27 DIAGNOSIS — E66.01 SEVERE OBESITY (BMI 35.0-39.9) WITH COMORBIDITY: ICD-10-CM

## 2021-05-27 PROCEDURE — 95251 CONT GLUC MNTR ANALYSIS I&R: CPT | Mod: S$GLB,,, | Performed by: NURSE PRACTITIONER

## 2021-05-27 PROCEDURE — 3008F BODY MASS INDEX DOCD: CPT | Mod: CPTII,S$GLB,, | Performed by: NURSE PRACTITIONER

## 2021-05-27 PROCEDURE — 1159F PR MEDICATION LIST DOCUMENTED IN MEDICAL RECORD: ICD-10-PCS | Mod: S$GLB,,, | Performed by: NURSE PRACTITIONER

## 2021-05-27 PROCEDURE — 95251 PR GLUCOSE MONITOR, 72 HOUR, PHYS INTERP: ICD-10-PCS | Mod: S$GLB,,, | Performed by: NURSE PRACTITIONER

## 2021-05-27 PROCEDURE — 3051F PR MOST RECENT HEMOGLOBIN A1C LEVEL 7.0 - < 8.0%: ICD-10-PCS | Mod: CPTII,S$GLB,, | Performed by: NURSE PRACTITIONER

## 2021-05-27 PROCEDURE — 99999 PR PBB SHADOW E&M-EST. PATIENT-LVL V: CPT | Mod: PBBFAC,,, | Performed by: NURSE PRACTITIONER

## 2021-05-27 PROCEDURE — 99214 PR OFFICE/OUTPT VISIT, EST, LEVL IV, 30-39 MIN: ICD-10-PCS | Mod: 25,S$GLB,, | Performed by: NURSE PRACTITIONER

## 2021-05-27 PROCEDURE — 99999 PR PBB SHADOW E&M-EST. PATIENT-LVL V: ICD-10-PCS | Mod: PBBFAC,,, | Performed by: NURSE PRACTITIONER

## 2021-05-27 PROCEDURE — 1126F AMNT PAIN NOTED NONE PRSNT: CPT | Mod: S$GLB,,, | Performed by: NURSE PRACTITIONER

## 2021-05-27 PROCEDURE — 99214 OFFICE O/P EST MOD 30 MIN: CPT | Mod: 25,S$GLB,, | Performed by: NURSE PRACTITIONER

## 2021-05-27 PROCEDURE — 3008F PR BODY MASS INDEX (BMI) DOCUMENTED: ICD-10-PCS | Mod: CPTII,S$GLB,, | Performed by: NURSE PRACTITIONER

## 2021-05-27 PROCEDURE — 3051F HG A1C>EQUAL 7.0%<8.0%: CPT | Mod: CPTII,S$GLB,, | Performed by: NURSE PRACTITIONER

## 2021-05-27 PROCEDURE — 1159F MED LIST DOCD IN RCRD: CPT | Mod: S$GLB,,, | Performed by: NURSE PRACTITIONER

## 2021-05-27 PROCEDURE — 1126F PR PAIN SEVERITY QUANTIFIED, NO PAIN PRESENT: ICD-10-PCS | Mod: S$GLB,,, | Performed by: NURSE PRACTITIONER

## 2021-05-27 RX ORDER — GLUCAGON INJECTION, SOLUTION 1 MG/.2ML
1 INJECTION, SOLUTION SUBCUTANEOUS
Qty: 2 SYRINGE | Refills: 0 | Status: SHIPPED | OUTPATIENT
Start: 2021-05-27 | End: 2021-06-22

## 2021-05-27 RX ORDER — DULAGLUTIDE 4.5 MG/.5ML
4.5 INJECTION, SOLUTION SUBCUTANEOUS
Qty: 12 PEN | Refills: 1 | Status: SHIPPED | OUTPATIENT
Start: 2021-05-27 | End: 2021-08-25

## 2021-05-29 PROCEDURE — G0180 PR HOME HEALTH MD CERTIFICATION: ICD-10-PCS | Mod: ,,, | Performed by: INTERNAL MEDICINE

## 2021-05-29 PROCEDURE — G0180 MD CERTIFICATION HHA PATIENT: HCPCS | Mod: ,,, | Performed by: INTERNAL MEDICINE

## 2021-06-03 ENCOUNTER — OFFICE VISIT (OUTPATIENT)
Dept: CARDIOLOGY | Facility: CLINIC | Age: 71
End: 2021-06-03
Payer: MEDICARE

## 2021-06-03 VITALS
HEIGHT: 69 IN | WEIGHT: 240.44 LBS | BODY MASS INDEX: 35.61 KG/M2 | DIASTOLIC BLOOD PRESSURE: 54 MMHG | SYSTOLIC BLOOD PRESSURE: 111 MMHG | HEART RATE: 60 BPM

## 2021-06-03 DIAGNOSIS — I25.118 CORONARY ARTERY DISEASE OF NATIVE ARTERY OF NATIVE HEART WITH STABLE ANGINA PECTORIS: Chronic | ICD-10-CM

## 2021-06-03 DIAGNOSIS — I10 BENIGN ESSENTIAL HTN: Chronic | ICD-10-CM

## 2021-06-03 DIAGNOSIS — E78.2 MIXED HYPERLIPIDEMIA: Chronic | ICD-10-CM

## 2021-06-03 DIAGNOSIS — I50.42 CHRONIC COMBINED SYSTOLIC AND DIASTOLIC HEART FAILURE: Chronic | ICD-10-CM

## 2021-06-03 DIAGNOSIS — I95.1 ORTHOSTATIC HYPOTENSION: Primary | ICD-10-CM

## 2021-06-03 PROCEDURE — 1101F PR PT FALLS ASSESS DOC 0-1 FALLS W/OUT INJ PAST YR: ICD-10-PCS | Mod: CPTII,S$GLB,, | Performed by: INTERNAL MEDICINE

## 2021-06-03 PROCEDURE — 3288F PR FALLS RISK ASSESSMENT DOCUMENTED: ICD-10-PCS | Mod: CPTII,S$GLB,, | Performed by: INTERNAL MEDICINE

## 2021-06-03 PROCEDURE — 3288F FALL RISK ASSESSMENT DOCD: CPT | Mod: CPTII,S$GLB,, | Performed by: INTERNAL MEDICINE

## 2021-06-03 PROCEDURE — 3008F BODY MASS INDEX DOCD: CPT | Mod: CPTII,S$GLB,, | Performed by: INTERNAL MEDICINE

## 2021-06-03 PROCEDURE — 1159F MED LIST DOCD IN RCRD: CPT | Mod: S$GLB,,, | Performed by: INTERNAL MEDICINE

## 2021-06-03 PROCEDURE — 1126F AMNT PAIN NOTED NONE PRSNT: CPT | Mod: S$GLB,,, | Performed by: INTERNAL MEDICINE

## 2021-06-03 PROCEDURE — 99999 PR PBB SHADOW E&M-EST. PATIENT-LVL III: ICD-10-PCS | Mod: PBBFAC,,, | Performed by: INTERNAL MEDICINE

## 2021-06-03 PROCEDURE — 1101F PT FALLS ASSESS-DOCD LE1/YR: CPT | Mod: CPTII,S$GLB,, | Performed by: INTERNAL MEDICINE

## 2021-06-03 PROCEDURE — 99999 PR PBB SHADOW E&M-EST. PATIENT-LVL III: CPT | Mod: PBBFAC,,, | Performed by: INTERNAL MEDICINE

## 2021-06-03 PROCEDURE — 99205 PR OFFICE/OUTPT VISIT, NEW, LEVL V, 60-74 MIN: ICD-10-PCS | Mod: S$GLB,,, | Performed by: INTERNAL MEDICINE

## 2021-06-03 PROCEDURE — 1126F PR PAIN SEVERITY QUANTIFIED, NO PAIN PRESENT: ICD-10-PCS | Mod: S$GLB,,, | Performed by: INTERNAL MEDICINE

## 2021-06-03 PROCEDURE — 1159F PR MEDICATION LIST DOCUMENTED IN MEDICAL RECORD: ICD-10-PCS | Mod: S$GLB,,, | Performed by: INTERNAL MEDICINE

## 2021-06-03 PROCEDURE — 99205 OFFICE O/P NEW HI 60 MIN: CPT | Mod: S$GLB,,, | Performed by: INTERNAL MEDICINE

## 2021-06-03 PROCEDURE — 3008F PR BODY MASS INDEX (BMI) DOCUMENTED: ICD-10-PCS | Mod: CPTII,S$GLB,, | Performed by: INTERNAL MEDICINE

## 2021-06-03 RX ORDER — CLOPIDOGREL BISULFATE 75 MG/1
75 TABLET ORAL DAILY
Qty: 90 TABLET | Refills: 3 | Status: SHIPPED | OUTPATIENT
Start: 2021-06-03 | End: 2021-10-21

## 2021-06-04 ENCOUNTER — TELEPHONE (OUTPATIENT)
Dept: INTERNAL MEDICINE | Facility: CLINIC | Age: 71
End: 2021-06-04

## 2021-06-07 DIAGNOSIS — K21.9 GASTROESOPHAGEAL REFLUX DISEASE WITHOUT ESOPHAGITIS: ICD-10-CM

## 2021-06-07 RX ORDER — ESOMEPRAZOLE MAGNESIUM 40 MG/1
40 CAPSULE, DELAYED RELEASE ORAL 2 TIMES DAILY
Qty: 180 CAPSULE | Refills: 3 | Status: SHIPPED | OUTPATIENT
Start: 2021-06-07 | End: 2022-08-25 | Stop reason: SDUPTHER

## 2021-06-07 RX ORDER — LOSARTAN POTASSIUM 25 MG/1
12.5 TABLET ORAL DAILY
Qty: 45 TABLET | Refills: 3 | Status: SHIPPED | OUTPATIENT
Start: 2021-06-07 | End: 2022-08-16 | Stop reason: SDUPTHER

## 2021-06-09 ENCOUNTER — PATIENT MESSAGE (OUTPATIENT)
Dept: INTERNAL MEDICINE | Facility: CLINIC | Age: 71
End: 2021-06-09

## 2021-06-16 ENCOUNTER — PATIENT MESSAGE (OUTPATIENT)
Dept: CARDIOLOGY | Facility: HOSPITAL | Age: 71
End: 2021-06-16

## 2021-06-21 ENCOUNTER — EXTERNAL HOME HEALTH (OUTPATIENT)
Dept: HOME HEALTH SERVICES | Facility: HOSPITAL | Age: 71
End: 2021-06-21
Payer: MEDICARE

## 2021-06-22 ENCOUNTER — LAB VISIT (OUTPATIENT)
Dept: LAB | Facility: HOSPITAL | Age: 71
End: 2021-06-22
Payer: MEDICARE

## 2021-06-22 ENCOUNTER — OFFICE VISIT (OUTPATIENT)
Dept: NEPHROLOGY | Facility: CLINIC | Age: 71
End: 2021-06-22
Payer: MEDICARE

## 2021-06-22 VITALS
BODY MASS INDEX: 35.49 KG/M2 | WEIGHT: 240.31 LBS | SYSTOLIC BLOOD PRESSURE: 124 MMHG | OXYGEN SATURATION: 100 % | HEART RATE: 70 BPM | DIASTOLIC BLOOD PRESSURE: 76 MMHG

## 2021-06-22 DIAGNOSIS — N18.32 STAGE 3B CHRONIC KIDNEY DISEASE: ICD-10-CM

## 2021-06-22 DIAGNOSIS — E66.9 DIABETES MELLITUS TYPE 2 IN OBESE: ICD-10-CM

## 2021-06-22 DIAGNOSIS — N18.32 STAGE 3B CHRONIC KIDNEY DISEASE: Primary | ICD-10-CM

## 2021-06-22 DIAGNOSIS — I10 ESSENTIAL HYPERTENSION: ICD-10-CM

## 2021-06-22 DIAGNOSIS — Z95.1 S/P CABG (CORONARY ARTERY BYPASS GRAFT): ICD-10-CM

## 2021-06-22 DIAGNOSIS — R10.9 FLANK PAIN: ICD-10-CM

## 2021-06-22 DIAGNOSIS — I95.1 ORTHOSTATIC HYPOTENSION: ICD-10-CM

## 2021-06-22 DIAGNOSIS — E11.69 DIABETES MELLITUS TYPE 2 IN OBESE: ICD-10-CM

## 2021-06-22 LAB
ALBUMIN SERPL BCP-MCNC: 3.4 G/DL (ref 3.5–5.2)
ANION GAP SERPL CALC-SCNC: 7 MMOL/L (ref 8–16)
BUN SERPL-MCNC: 13 MG/DL (ref 8–23)
CALCIUM SERPL-MCNC: 9.2 MG/DL (ref 8.7–10.5)
CHLORIDE SERPL-SCNC: 105 MMOL/L (ref 95–110)
CO2 SERPL-SCNC: 27 MMOL/L (ref 23–29)
CREAT SERPL-MCNC: 1.5 MG/DL (ref 0.5–1.4)
EST. GFR  (AFRICAN AMERICAN): 53 ML/MIN/1.73 M^2
EST. GFR  (NON AFRICAN AMERICAN): 45.9 ML/MIN/1.73 M^2
GLUCOSE SERPL-MCNC: 94 MG/DL (ref 70–110)
PHOSPHATE SERPL-MCNC: 3.1 MG/DL (ref 2.7–4.5)
POTASSIUM SERPL-SCNC: 4.4 MMOL/L (ref 3.5–5.1)
SODIUM SERPL-SCNC: 139 MMOL/L (ref 136–145)

## 2021-06-22 PROCEDURE — 99999 PR PBB SHADOW E&M-EST. PATIENT-LVL V: ICD-10-PCS | Mod: PBBFAC,,, | Performed by: NURSE PRACTITIONER

## 2021-06-22 PROCEDURE — 1126F AMNT PAIN NOTED NONE PRSNT: CPT | Mod: S$GLB,,, | Performed by: NURSE PRACTITIONER

## 2021-06-22 PROCEDURE — 99999 PR PBB SHADOW E&M-EST. PATIENT-LVL V: CPT | Mod: PBBFAC,,, | Performed by: NURSE PRACTITIONER

## 2021-06-22 PROCEDURE — 99214 PR OFFICE/OUTPT VISIT, EST, LEVL IV, 30-39 MIN: ICD-10-PCS | Mod: S$GLB,,, | Performed by: NURSE PRACTITIONER

## 2021-06-22 PROCEDURE — 3008F BODY MASS INDEX DOCD: CPT | Mod: CPTII,S$GLB,, | Performed by: NURSE PRACTITIONER

## 2021-06-22 PROCEDURE — 3008F PR BODY MASS INDEX (BMI) DOCUMENTED: ICD-10-PCS | Mod: CPTII,S$GLB,, | Performed by: NURSE PRACTITIONER

## 2021-06-22 PROCEDURE — 80069 RENAL FUNCTION PANEL: CPT | Performed by: NURSE PRACTITIONER

## 2021-06-22 PROCEDURE — 1126F PR PAIN SEVERITY QUANTIFIED, NO PAIN PRESENT: ICD-10-PCS | Mod: S$GLB,,, | Performed by: NURSE PRACTITIONER

## 2021-06-22 PROCEDURE — 3288F PR FALLS RISK ASSESSMENT DOCUMENTED: ICD-10-PCS | Mod: CPTII,S$GLB,, | Performed by: NURSE PRACTITIONER

## 2021-06-22 PROCEDURE — 36415 COLL VENOUS BLD VENIPUNCTURE: CPT | Performed by: NURSE PRACTITIONER

## 2021-06-22 PROCEDURE — 3288F FALL RISK ASSESSMENT DOCD: CPT | Mod: CPTII,S$GLB,, | Performed by: NURSE PRACTITIONER

## 2021-06-22 PROCEDURE — 3051F HG A1C>EQUAL 7.0%<8.0%: CPT | Mod: CPTII,S$GLB,, | Performed by: NURSE PRACTITIONER

## 2021-06-22 PROCEDURE — 1159F PR MEDICATION LIST DOCUMENTED IN MEDICAL RECORD: ICD-10-PCS | Mod: S$GLB,,, | Performed by: NURSE PRACTITIONER

## 2021-06-22 PROCEDURE — 99499 UNLISTED E&M SERVICE: CPT | Mod: S$GLB,,, | Performed by: NURSE PRACTITIONER

## 2021-06-22 PROCEDURE — 1100F PR PT FALLS ASSESS DOC 2+ FALLS/FALL W/INJURY/YR: ICD-10-PCS | Mod: CPTII,S$GLB,, | Performed by: NURSE PRACTITIONER

## 2021-06-22 PROCEDURE — 99214 OFFICE O/P EST MOD 30 MIN: CPT | Mod: S$GLB,,, | Performed by: NURSE PRACTITIONER

## 2021-06-22 PROCEDURE — 1100F PTFALLS ASSESS-DOCD GE2>/YR: CPT | Mod: CPTII,S$GLB,, | Performed by: NURSE PRACTITIONER

## 2021-06-22 PROCEDURE — 1159F MED LIST DOCD IN RCRD: CPT | Mod: S$GLB,,, | Performed by: NURSE PRACTITIONER

## 2021-06-22 PROCEDURE — 3051F PR MOST RECENT HEMOGLOBIN A1C LEVEL 7.0 - < 8.0%: ICD-10-PCS | Mod: CPTII,S$GLB,, | Performed by: NURSE PRACTITIONER

## 2021-06-22 PROCEDURE — 99499 RISK ADDL DX/OHS AUDIT: ICD-10-PCS | Mod: S$GLB,,, | Performed by: NURSE PRACTITIONER

## 2021-06-24 ENCOUNTER — TELEPHONE (OUTPATIENT)
Dept: CARDIOLOGY | Facility: CLINIC | Age: 71
End: 2021-06-24

## 2021-06-24 ENCOUNTER — OFFICE VISIT (OUTPATIENT)
Dept: CARDIOLOGY | Facility: CLINIC | Age: 71
End: 2021-06-24
Payer: MEDICARE

## 2021-06-24 ENCOUNTER — PATIENT OUTREACH (OUTPATIENT)
Dept: ADMINISTRATIVE | Facility: OTHER | Age: 71
End: 2021-06-24

## 2021-06-24 VITALS
HEIGHT: 69 IN | HEART RATE: 60 BPM | BODY MASS INDEX: 36 KG/M2 | WEIGHT: 243.06 LBS | SYSTOLIC BLOOD PRESSURE: 130 MMHG | DIASTOLIC BLOOD PRESSURE: 63 MMHG

## 2021-06-24 DIAGNOSIS — I10 BENIGN ESSENTIAL HTN: Chronic | ICD-10-CM

## 2021-06-24 DIAGNOSIS — Z79.899 LONG TERM CURRENT USE OF AMIODARONE: ICD-10-CM

## 2021-06-24 DIAGNOSIS — E78.2 MIXED HYPERLIPIDEMIA: Chronic | ICD-10-CM

## 2021-06-24 DIAGNOSIS — I50.42 CHRONIC COMBINED SYSTOLIC AND DIASTOLIC HEART FAILURE: Primary | Chronic | ICD-10-CM

## 2021-06-24 DIAGNOSIS — I25.118 CORONARY ARTERY DISEASE OF NATIVE ARTERY OF NATIVE HEART WITH STABLE ANGINA PECTORIS: Chronic | ICD-10-CM

## 2021-06-24 DIAGNOSIS — I95.1 ORTHOSTATIC HYPOTENSION: ICD-10-CM

## 2021-06-24 PROCEDURE — 99999 PR PBB SHADOW E&M-EST. PATIENT-LVL III: CPT | Mod: PBBFAC,,, | Performed by: INTERNAL MEDICINE

## 2021-06-24 PROCEDURE — 1126F AMNT PAIN NOTED NONE PRSNT: CPT | Mod: S$GLB,,, | Performed by: INTERNAL MEDICINE

## 2021-06-24 PROCEDURE — 1126F PR PAIN SEVERITY QUANTIFIED, NO PAIN PRESENT: ICD-10-PCS | Mod: S$GLB,,, | Performed by: INTERNAL MEDICINE

## 2021-06-24 PROCEDURE — 3008F PR BODY MASS INDEX (BMI) DOCUMENTED: ICD-10-PCS | Mod: CPTII,S$GLB,, | Performed by: INTERNAL MEDICINE

## 2021-06-24 PROCEDURE — 99999 PR PBB SHADOW E&M-EST. PATIENT-LVL III: ICD-10-PCS | Mod: PBBFAC,,, | Performed by: INTERNAL MEDICINE

## 2021-06-24 PROCEDURE — 1159F PR MEDICATION LIST DOCUMENTED IN MEDICAL RECORD: ICD-10-PCS | Mod: S$GLB,,, | Performed by: INTERNAL MEDICINE

## 2021-06-24 PROCEDURE — 1159F MED LIST DOCD IN RCRD: CPT | Mod: S$GLB,,, | Performed by: INTERNAL MEDICINE

## 2021-06-24 PROCEDURE — 3008F BODY MASS INDEX DOCD: CPT | Mod: CPTII,S$GLB,, | Performed by: INTERNAL MEDICINE

## 2021-06-24 PROCEDURE — 99215 OFFICE O/P EST HI 40 MIN: CPT | Mod: S$GLB,,, | Performed by: INTERNAL MEDICINE

## 2021-06-24 PROCEDURE — 3288F FALL RISK ASSESSMENT DOCD: CPT | Mod: CPTII,S$GLB,, | Performed by: INTERNAL MEDICINE

## 2021-06-24 PROCEDURE — 3288F PR FALLS RISK ASSESSMENT DOCUMENTED: ICD-10-PCS | Mod: CPTII,S$GLB,, | Performed by: INTERNAL MEDICINE

## 2021-06-24 PROCEDURE — 99215 PR OFFICE/OUTPT VISIT, EST, LEVL V, 40-54 MIN: ICD-10-PCS | Mod: S$GLB,,, | Performed by: INTERNAL MEDICINE

## 2021-06-24 PROCEDURE — 1101F PR PT FALLS ASSESS DOC 0-1 FALLS W/OUT INJ PAST YR: ICD-10-PCS | Mod: CPTII,S$GLB,, | Performed by: INTERNAL MEDICINE

## 2021-06-24 PROCEDURE — 1101F PT FALLS ASSESS-DOCD LE1/YR: CPT | Mod: CPTII,S$GLB,, | Performed by: INTERNAL MEDICINE

## 2021-06-24 RX ORDER — TICAGRELOR 90 MG/1
90 TABLET ORAL 2 TIMES DAILY
COMMUNITY
Start: 2021-06-22 | End: 2021-07-06

## 2021-06-25 ENCOUNTER — PATIENT MESSAGE (OUTPATIENT)
Dept: SLEEP MEDICINE | Facility: CLINIC | Age: 71
End: 2021-06-25

## 2021-06-29 ENCOUNTER — PATIENT MESSAGE (OUTPATIENT)
Dept: ELECTROPHYSIOLOGY | Facility: CLINIC | Age: 71
End: 2021-06-29

## 2021-06-29 ENCOUNTER — PATIENT MESSAGE (OUTPATIENT)
Dept: SLEEP MEDICINE | Facility: CLINIC | Age: 71
End: 2021-06-29

## 2021-06-29 ENCOUNTER — HOSPITAL ENCOUNTER (OUTPATIENT)
Dept: RADIOLOGY | Facility: HOSPITAL | Age: 71
Discharge: HOME | End: 2021-06-29
Attending: NURSE PRACTITIONER
Payer: MEDICARE

## 2021-06-29 DIAGNOSIS — N18.32 STAGE 3B CHRONIC KIDNEY DISEASE: ICD-10-CM

## 2021-06-29 DIAGNOSIS — R10.9 FLANK PAIN: ICD-10-CM

## 2021-06-29 PROCEDURE — 76770 US RETROPERITONEAL COMPLETE: ICD-10-PCS | Mod: 26,,, | Performed by: RADIOLOGY

## 2021-06-29 PROCEDURE — 76770 US EXAM ABDO BACK WALL COMP: CPT | Mod: 26,,, | Performed by: RADIOLOGY

## 2021-06-29 PROCEDURE — 76770 US EXAM ABDO BACK WALL COMP: CPT | Mod: TC

## 2021-06-30 ENCOUNTER — HOSPITAL ENCOUNTER (OUTPATIENT)
Dept: CARDIOLOGY | Facility: HOSPITAL | Age: 71
Discharge: HOME | End: 2021-06-30
Attending: NURSE PRACTITIONER
Payer: MEDICARE

## 2021-06-30 VITALS
WEIGHT: 243 LBS | SYSTOLIC BLOOD PRESSURE: 140 MMHG | DIASTOLIC BLOOD PRESSURE: 70 MMHG | BODY MASS INDEX: 35.99 KG/M2 | HEIGHT: 69 IN | HEART RATE: 60 BPM

## 2021-06-30 DIAGNOSIS — Z95.810 ICD (IMPLANTABLE CARDIOVERTER-DEFIBRILLATOR), DUAL, IN SITU: ICD-10-CM

## 2021-06-30 LAB
ASCENDING AORTA: 3.08 CM
AV INDEX (PROSTH): 0.5
AV MEAN GRADIENT: 5 MMHG
AV PEAK GRADIENT: 8 MMHG
AV VALVE AREA: 1.75 CM2
AV VELOCITY RATIO: 0.5
BSA FOR ECHO PROCEDURE: 2.32 M2
CV ECHO LV RWT: 0.38 CM
DOP CALC AO PEAK VEL: 1.43 M/S
DOP CALC AO VTI: 38.54 CM
DOP CALC LVOT AREA: 3.5 CM2
DOP CALC LVOT DIAMETER: 2.11 CM
DOP CALC LVOT PEAK VEL: 0.71 M/S
DOP CALC LVOT STROKE VOLUME: 67.28 CM3
DOP CALCLVOT PEAK VEL VTI: 19.25 CM
E WAVE DECELERATION TIME: 152.55 MSEC
E/A RATIO: 0.9
E/E' RATIO: 27 M/S
ECHO LV POSTERIOR WALL: 0.99 CM (ref 0.6–1.1)
EJECTION FRACTION: 40 %
FRACTIONAL SHORTENING: 16 % (ref 28–44)
INTERVENTRICULAR SEPTUM: 1.11 CM (ref 0.6–1.1)
LA MAJOR: 5.28 CM
LA MINOR: 5.1 CM
LA WIDTH: 4 CM
LEFT ATRIUM SIZE: 4.73 CM
LEFT ATRIUM VOLUME INDEX MOD: 24 ML/M2
LEFT ATRIUM VOLUME INDEX: 37.3 ML/M2
LEFT ATRIUM VOLUME MOD: 53.69 CM3
LEFT ATRIUM VOLUME: 83.44 CM3
LEFT INTERNAL DIMENSION IN SYSTOLE: 4.37 CM (ref 2.1–4)
LEFT VENTRICLE DIASTOLIC VOLUME INDEX: 58.5 ML/M2
LEFT VENTRICLE DIASTOLIC VOLUME: 131.05 ML
LEFT VENTRICLE MASS INDEX: 93 G/M2
LEFT VENTRICLE SYSTOLIC VOLUME INDEX: 38.5 ML/M2
LEFT VENTRICLE SYSTOLIC VOLUME: 86.21 ML
LEFT VENTRICULAR INTERNAL DIMENSION IN DIASTOLE: 5.23 CM (ref 3.5–6)
LEFT VENTRICULAR MASS: 209.25 G
LV LATERAL E/E' RATIO: 21.6 M/S
LV SEPTAL E/E' RATIO: 36 M/S
MV PEAK A VEL: 1.2 M/S
MV PEAK E VEL: 1.08 M/S
MV STENOSIS PRESSURE HALF TIME: 44.24 MS
MV VALVE AREA P 1/2 METHOD: 4.97 CM2
PISA TR MAX VEL: 2.73 M/S
QEF: 42 %
RA MAJOR: 4.52 CM
RA PRESSURE: 3 MMHG
RA WIDTH: 3.05 CM
RIGHT VENTRICULAR END-DIASTOLIC DIMENSION: 3.69 CM
RV TISSUE DOPPLER FREE WALL SYSTOLIC VELOCITY 1 (APICAL 4 CHAMBER VIEW): 6.68 CM/S
SINUS: 2.86 CM
STJ: 2.38 CM
TDI LATERAL: 0.05 M/S
TDI SEPTAL: 0.03 M/S
TDI: 0.04 M/S
TR MAX PG: 30 MMHG
TRICUSPID ANNULAR PLANE SYSTOLIC EXCURSION: 2.02 CM
TV REST PULMONARY ARTERY PRESSURE: 33 MMHG

## 2021-06-30 PROCEDURE — 93306 TTE W/DOPPLER COMPLETE: CPT

## 2021-06-30 PROCEDURE — 93306 TTE W/DOPPLER COMPLETE: CPT | Mod: 26,,, | Performed by: INTERNAL MEDICINE

## 2021-06-30 PROCEDURE — 93306 ECHO (CUPID ONLY): ICD-10-PCS | Mod: 26,,, | Performed by: INTERNAL MEDICINE

## 2021-07-06 ENCOUNTER — PATIENT MESSAGE (OUTPATIENT)
Dept: ELECTROPHYSIOLOGY | Facility: CLINIC | Age: 71
End: 2021-07-06

## 2021-07-06 ENCOUNTER — OFFICE VISIT (OUTPATIENT)
Dept: ELECTROPHYSIOLOGY | Facility: CLINIC | Age: 71
End: 2021-07-06
Payer: MEDICARE

## 2021-07-06 ENCOUNTER — HOSPITAL ENCOUNTER (OUTPATIENT)
Dept: CARDIOLOGY | Facility: CLINIC | Age: 71
Discharge: HOME | End: 2021-07-06
Payer: MEDICARE

## 2021-07-06 VITALS
HEART RATE: 60 BPM | BODY MASS INDEX: 36.54 KG/M2 | WEIGHT: 246.69 LBS | SYSTOLIC BLOOD PRESSURE: 122 MMHG | DIASTOLIC BLOOD PRESSURE: 72 MMHG | HEIGHT: 69 IN

## 2021-07-06 DIAGNOSIS — I47.20 VENTRICULAR TACHYCARDIA: ICD-10-CM

## 2021-07-06 DIAGNOSIS — I50.42 CHRONIC COMBINED SYSTOLIC AND DIASTOLIC HEART FAILURE: Chronic | ICD-10-CM

## 2021-07-06 DIAGNOSIS — Z95.810 ICD (IMPLANTABLE CARDIOVERTER-DEFIBRILLATOR), DUAL, IN SITU: Primary | Chronic | ICD-10-CM

## 2021-07-06 DIAGNOSIS — G47.33 OSA (OBSTRUCTIVE SLEEP APNEA): ICD-10-CM

## 2021-07-06 DIAGNOSIS — E66.01 SEVERE OBESITY (BMI 35.0-39.9) WITH COMORBIDITY: ICD-10-CM

## 2021-07-06 DIAGNOSIS — Z79.899 LONG TERM CURRENT USE OF AMIODARONE: ICD-10-CM

## 2021-07-06 DIAGNOSIS — I25.5 ISCHEMIC CARDIOMYOPATHY: ICD-10-CM

## 2021-07-06 DIAGNOSIS — I10 BENIGN ESSENTIAL HTN: Chronic | ICD-10-CM

## 2021-07-06 DIAGNOSIS — I49.8 OTHER SPECIFIED CARDIAC ARRHYTHMIAS: ICD-10-CM

## 2021-07-06 PROCEDURE — 93010 RHYTHM STRIP: ICD-10-PCS | Mod: S$GLB,,, | Performed by: INTERNAL MEDICINE

## 2021-07-06 PROCEDURE — 3051F HG A1C>EQUAL 7.0%<8.0%: CPT | Mod: CPTII,S$GLB,, | Performed by: NURSE PRACTITIONER

## 2021-07-06 PROCEDURE — 1159F PR MEDICATION LIST DOCUMENTED IN MEDICAL RECORD: ICD-10-PCS | Mod: S$GLB,,, | Performed by: NURSE PRACTITIONER

## 2021-07-06 PROCEDURE — 3008F PR BODY MASS INDEX (BMI) DOCUMENTED: ICD-10-PCS | Mod: CPTII,S$GLB,, | Performed by: NURSE PRACTITIONER

## 2021-07-06 PROCEDURE — 99214 PR OFFICE/OUTPT VISIT, EST, LEVL IV, 30-39 MIN: ICD-10-PCS | Mod: S$GLB,,, | Performed by: NURSE PRACTITIONER

## 2021-07-06 PROCEDURE — 1126F PR PAIN SEVERITY QUANTIFIED, NO PAIN PRESENT: ICD-10-PCS | Mod: S$GLB,,, | Performed by: NURSE PRACTITIONER

## 2021-07-06 PROCEDURE — 1101F PT FALLS ASSESS-DOCD LE1/YR: CPT | Mod: CPTII,S$GLB,, | Performed by: NURSE PRACTITIONER

## 2021-07-06 PROCEDURE — 99214 OFFICE O/P EST MOD 30 MIN: CPT | Mod: S$GLB,,, | Performed by: NURSE PRACTITIONER

## 2021-07-06 PROCEDURE — 3078F DIAST BP <80 MM HG: CPT | Mod: CPTII,S$GLB,, | Performed by: NURSE PRACTITIONER

## 2021-07-06 PROCEDURE — 3051F PR MOST RECENT HEMOGLOBIN A1C LEVEL 7.0 - < 8.0%: ICD-10-PCS | Mod: CPTII,S$GLB,, | Performed by: NURSE PRACTITIONER

## 2021-07-06 PROCEDURE — 93005 RHYTHM STRIP: ICD-10-PCS | Mod: S$GLB,,, | Performed by: INTERNAL MEDICINE

## 2021-07-06 PROCEDURE — 3074F PR MOST RECENT SYSTOLIC BLOOD PRESSURE < 130 MM HG: ICD-10-PCS | Mod: CPTII,S$GLB,, | Performed by: NURSE PRACTITIONER

## 2021-07-06 PROCEDURE — 1101F PR PT FALLS ASSESS DOC 0-1 FALLS W/OUT INJ PAST YR: ICD-10-PCS | Mod: CPTII,S$GLB,, | Performed by: NURSE PRACTITIONER

## 2021-07-06 PROCEDURE — 1126F AMNT PAIN NOTED NONE PRSNT: CPT | Mod: S$GLB,,, | Performed by: NURSE PRACTITIONER

## 2021-07-06 PROCEDURE — 3008F BODY MASS INDEX DOCD: CPT | Mod: CPTII,S$GLB,, | Performed by: NURSE PRACTITIONER

## 2021-07-06 PROCEDURE — 93010 ELECTROCARDIOGRAM REPORT: CPT | Mod: S$GLB,,, | Performed by: INTERNAL MEDICINE

## 2021-07-06 PROCEDURE — 3074F SYST BP LT 130 MM HG: CPT | Mod: CPTII,S$GLB,, | Performed by: NURSE PRACTITIONER

## 2021-07-06 PROCEDURE — 93005 ELECTROCARDIOGRAM TRACING: CPT | Mod: S$GLB,,, | Performed by: INTERNAL MEDICINE

## 2021-07-06 PROCEDURE — 99999 PR PBB SHADOW E&M-EST. PATIENT-LVL IV: ICD-10-PCS | Mod: PBBFAC,,, | Performed by: NURSE PRACTITIONER

## 2021-07-06 PROCEDURE — 99499 RISK ADDL DX/OHS AUDIT: ICD-10-PCS | Mod: S$GLB,,, | Performed by: NURSE PRACTITIONER

## 2021-07-06 PROCEDURE — 3288F PR FALLS RISK ASSESSMENT DOCUMENTED: ICD-10-PCS | Mod: CPTII,S$GLB,, | Performed by: NURSE PRACTITIONER

## 2021-07-06 PROCEDURE — 3078F PR MOST RECENT DIASTOLIC BLOOD PRESSURE < 80 MM HG: ICD-10-PCS | Mod: CPTII,S$GLB,, | Performed by: NURSE PRACTITIONER

## 2021-07-06 PROCEDURE — 3288F FALL RISK ASSESSMENT DOCD: CPT | Mod: CPTII,S$GLB,, | Performed by: NURSE PRACTITIONER

## 2021-07-06 PROCEDURE — 99999 PR PBB SHADOW E&M-EST. PATIENT-LVL IV: CPT | Mod: PBBFAC,,, | Performed by: NURSE PRACTITIONER

## 2021-07-06 PROCEDURE — 1159F MED LIST DOCD IN RCRD: CPT | Mod: S$GLB,,, | Performed by: NURSE PRACTITIONER

## 2021-07-06 PROCEDURE — 99499 UNLISTED E&M SERVICE: CPT | Mod: S$GLB,,, | Performed by: NURSE PRACTITIONER

## 2021-08-18 ENCOUNTER — CLINICAL SUPPORT (OUTPATIENT)
Dept: CARDIOLOGY | Facility: HOSPITAL | Age: 71
End: 2021-08-18
Payer: MEDICARE

## 2021-08-18 DIAGNOSIS — Z95.810 PRESENCE OF AUTOMATIC (IMPLANTABLE) CARDIAC DEFIBRILLATOR: ICD-10-CM

## 2021-08-18 DIAGNOSIS — I25.5 ISCHEMIC CARDIOMYOPATHY: ICD-10-CM

## 2021-08-18 DIAGNOSIS — I47.20 VENTRICULAR TACHYCARDIA: ICD-10-CM

## 2021-08-18 PROCEDURE — 93295 CARDIAC DEVICE CHECK - REMOTE: ICD-10-PCS | Mod: ,,, | Performed by: INTERNAL MEDICINE

## 2021-08-18 PROCEDURE — 93296 REM INTERROG EVL PM/IDS: CPT | Performed by: INTERNAL MEDICINE

## 2021-08-18 PROCEDURE — 93295 DEV INTERROG REMOTE 1/2/MLT: CPT | Mod: ,,, | Performed by: INTERNAL MEDICINE

## 2021-09-17 ENCOUNTER — LAB VISIT (OUTPATIENT)
Dept: LAB | Facility: HOSPITAL | Age: 71
End: 2021-09-17
Attending: INTERNAL MEDICINE
Payer: MEDICARE

## 2021-09-17 ENCOUNTER — TELEPHONE (OUTPATIENT)
Dept: ENDOCRINOLOGY | Facility: CLINIC | Age: 71
End: 2021-09-17

## 2021-09-17 DIAGNOSIS — N18.32 STAGE 3B CHRONIC KIDNEY DISEASE: ICD-10-CM

## 2021-09-17 LAB
ALBUMIN SERPL BCP-MCNC: 3.4 G/DL (ref 3.5–5.2)
ANION GAP SERPL CALC-SCNC: 7 MMOL/L (ref 8–16)
BASOPHILS # BLD AUTO: 0.05 K/UL (ref 0–0.2)
BASOPHILS NFR BLD: 0.7 % (ref 0–1.9)
BUN SERPL-MCNC: 12 MG/DL (ref 8–23)
CALCIUM SERPL-MCNC: 9.2 MG/DL (ref 8.7–10.5)
CHLORIDE SERPL-SCNC: 103 MMOL/L (ref 95–110)
CO2 SERPL-SCNC: 31 MMOL/L (ref 23–29)
CREAT SERPL-MCNC: 1.4 MG/DL (ref 0.5–1.4)
DIFFERENTIAL METHOD: ABNORMAL
EOSINOPHIL # BLD AUTO: 0.3 K/UL (ref 0–0.5)
EOSINOPHIL NFR BLD: 3.9 % (ref 0–8)
ERYTHROCYTE [DISTWIDTH] IN BLOOD BY AUTOMATED COUNT: 13.6 % (ref 11.5–14.5)
EST. GFR  (AFRICAN AMERICAN): 58 ML/MIN/1.73 M^2
EST. GFR  (NON AFRICAN AMERICAN): 50 ML/MIN/1.73 M^2
GLUCOSE SERPL-MCNC: 37 MG/DL (ref 70–110)
HCT VFR BLD AUTO: 37.7 % (ref 40–54)
HGB BLD-MCNC: 11.9 G/DL (ref 14–18)
IMM GRANULOCYTES # BLD AUTO: 0.01 K/UL (ref 0–0.04)
IMM GRANULOCYTES NFR BLD AUTO: 0.1 % (ref 0–0.5)
LYMPHOCYTES # BLD AUTO: 3.1 K/UL (ref 1–4.8)
LYMPHOCYTES NFR BLD: 43.3 % (ref 18–48)
MCH RBC QN AUTO: 30.4 PG (ref 27–31)
MCHC RBC AUTO-ENTMCNC: 31.6 G/DL (ref 32–36)
MCV RBC AUTO: 96 FL (ref 82–98)
MONOCYTES # BLD AUTO: 0.8 K/UL (ref 0.3–1)
MONOCYTES NFR BLD: 11.8 % (ref 4–15)
NEUTROPHILS # BLD AUTO: 2.9 K/UL (ref 1.8–7.7)
NEUTROPHILS NFR BLD: 40.2 % (ref 38–73)
NRBC BLD-RTO: 0 /100 WBC
PHOSPHATE SERPL-MCNC: 2.5 MG/DL (ref 2.7–4.5)
PLATELET # BLD AUTO: 151 K/UL (ref 150–450)
PMV BLD AUTO: 10.6 FL (ref 9.2–12.9)
POTASSIUM SERPL-SCNC: 4.4 MMOL/L (ref 3.5–5.1)
PTH-INTACT SERPL-MCNC: 132.1 PG/ML (ref 9–77)
RBC # BLD AUTO: 3.91 M/UL (ref 4.6–6.2)
SODIUM SERPL-SCNC: 141 MMOL/L (ref 136–145)
WBC # BLD AUTO: 7.11 K/UL (ref 3.9–12.7)

## 2021-09-17 PROCEDURE — 36415 COLL VENOUS BLD VENIPUNCTURE: CPT | Performed by: NURSE PRACTITIONER

## 2021-09-17 PROCEDURE — 85025 COMPLETE CBC W/AUTO DIFF WBC: CPT | Performed by: NURSE PRACTITIONER

## 2021-09-17 PROCEDURE — 80069 RENAL FUNCTION PANEL: CPT | Performed by: NURSE PRACTITIONER

## 2021-09-17 PROCEDURE — 83970 ASSAY OF PARATHORMONE: CPT | Performed by: NURSE PRACTITIONER

## 2021-09-20 ENCOUNTER — PATIENT MESSAGE (OUTPATIENT)
Dept: ENDOCRINOLOGY | Facility: CLINIC | Age: 71
End: 2021-09-20

## 2021-09-21 ENCOUNTER — PATIENT OUTREACH (OUTPATIENT)
Dept: ADMINISTRATIVE | Facility: OTHER | Age: 71
End: 2021-09-21

## 2021-09-22 ENCOUNTER — HOSPITAL ENCOUNTER (OUTPATIENT)
Dept: PULMONOLOGY | Facility: CLINIC | Age: 71
Discharge: HOME | End: 2021-09-22
Payer: MEDICARE

## 2021-09-22 ENCOUNTER — OFFICE VISIT (OUTPATIENT)
Dept: NEPHROLOGY | Facility: CLINIC | Age: 71
End: 2021-09-22
Payer: MEDICARE

## 2021-09-22 ENCOUNTER — TELEPHONE (OUTPATIENT)
Dept: NEPHROLOGY | Facility: CLINIC | Age: 71
End: 2021-09-22

## 2021-09-22 VITALS
BODY MASS INDEX: 35.62 KG/M2 | HEIGHT: 69 IN | SYSTOLIC BLOOD PRESSURE: 130 MMHG | DIASTOLIC BLOOD PRESSURE: 60 MMHG | HEART RATE: 61 BPM | WEIGHT: 240.5 LBS | OXYGEN SATURATION: 98 %

## 2021-09-22 DIAGNOSIS — E21.3 HYPERPARATHYROIDISM: ICD-10-CM

## 2021-09-22 DIAGNOSIS — Z13.9 SCREENING PROCEDURE: Primary | ICD-10-CM

## 2021-09-22 DIAGNOSIS — I10 ESSENTIAL HYPERTENSION: ICD-10-CM

## 2021-09-22 DIAGNOSIS — E11.69 DIABETES MELLITUS TYPE 2 IN OBESE: ICD-10-CM

## 2021-09-22 DIAGNOSIS — E66.9 DIABETES MELLITUS TYPE 2 IN OBESE: ICD-10-CM

## 2021-09-22 DIAGNOSIS — N18.32 STAGE 3B CHRONIC KIDNEY DISEASE: Primary | ICD-10-CM

## 2021-09-22 DIAGNOSIS — Z79.899 LONG TERM CURRENT USE OF AMIODARONE: ICD-10-CM

## 2021-09-22 LAB — SARS-COV-2 RDRP RESP QL NAA+PROBE: NEGATIVE

## 2021-09-22 PROCEDURE — 3078F DIAST BP <80 MM HG: CPT | Mod: CPTII,S$GLB,, | Performed by: NURSE PRACTITIONER

## 2021-09-22 PROCEDURE — 1101F PT FALLS ASSESS-DOCD LE1/YR: CPT | Mod: CPTII,S$GLB,, | Performed by: NURSE PRACTITIONER

## 2021-09-22 PROCEDURE — 3288F FALL RISK ASSESSMENT DOCD: CPT | Mod: CPTII,S$GLB,, | Performed by: NURSE PRACTITIONER

## 2021-09-22 PROCEDURE — 3008F PR BODY MASS INDEX (BMI) DOCUMENTED: ICD-10-PCS | Mod: CPTII,S$GLB,, | Performed by: NURSE PRACTITIONER

## 2021-09-22 PROCEDURE — 99999 PR PBB SHADOW E&M-EST. PATIENT-LVL IV: ICD-10-PCS | Mod: PBBFAC,,, | Performed by: NURSE PRACTITIONER

## 2021-09-22 PROCEDURE — 3066F PR DOCUMENTATION OF TREATMENT FOR NEPHROPATHY: ICD-10-PCS | Mod: CPTII,S$GLB,, | Performed by: NURSE PRACTITIONER

## 2021-09-22 PROCEDURE — 3066F NEPHROPATHY DOC TX: CPT | Mod: CPTII,S$GLB,, | Performed by: NURSE PRACTITIONER

## 2021-09-22 PROCEDURE — 3075F PR MOST RECENT SYSTOLIC BLOOD PRESS GE 130-139MM HG: ICD-10-PCS | Mod: CPTII,S$GLB,, | Performed by: NURSE PRACTITIONER

## 2021-09-22 PROCEDURE — 1159F MED LIST DOCD IN RCRD: CPT | Mod: CPTII,S$GLB,, | Performed by: NURSE PRACTITIONER

## 2021-09-22 PROCEDURE — 3008F BODY MASS INDEX DOCD: CPT | Mod: CPTII,S$GLB,, | Performed by: NURSE PRACTITIONER

## 2021-09-22 PROCEDURE — 94729 DIFFUSING CAPACITY: CPT | Mod: S$GLB,,, | Performed by: INTERNAL MEDICINE

## 2021-09-22 PROCEDURE — 1126F PR PAIN SEVERITY QUANTIFIED, NO PAIN PRESENT: ICD-10-PCS | Mod: CPTII,S$GLB,, | Performed by: NURSE PRACTITIONER

## 2021-09-22 PROCEDURE — 1160F PR REVIEW ALL MEDS BY PRESCRIBER/CLIN PHARMACIST DOCUMENTED: ICD-10-PCS | Mod: CPTII,S$GLB,, | Performed by: NURSE PRACTITIONER

## 2021-09-22 PROCEDURE — 3075F SYST BP GE 130 - 139MM HG: CPT | Mod: CPTII,S$GLB,, | Performed by: NURSE PRACTITIONER

## 2021-09-22 PROCEDURE — U0002 COVID-19 LAB TEST NON-CDC: HCPCS | Performed by: NURSE PRACTITIONER

## 2021-09-22 PROCEDURE — 1159F PR MEDICATION LIST DOCUMENTED IN MEDICAL RECORD: ICD-10-PCS | Mod: CPTII,S$GLB,, | Performed by: NURSE PRACTITIONER

## 2021-09-22 PROCEDURE — 1160F RVW MEDS BY RX/DR IN RCRD: CPT | Mod: CPTII,S$GLB,, | Performed by: NURSE PRACTITIONER

## 2021-09-22 PROCEDURE — 1126F AMNT PAIN NOTED NONE PRSNT: CPT | Mod: CPTII,S$GLB,, | Performed by: NURSE PRACTITIONER

## 2021-09-22 PROCEDURE — 4010F ACE/ARB THERAPY RXD/TAKEN: CPT | Mod: CPTII,S$GLB,, | Performed by: NURSE PRACTITIONER

## 2021-09-22 PROCEDURE — 99214 PR OFFICE/OUTPT VISIT, EST, LEVL IV, 30-39 MIN: ICD-10-PCS | Mod: S$GLB,,, | Performed by: NURSE PRACTITIONER

## 2021-09-22 PROCEDURE — 94729 PR C02/MEMBANE DIFFUSE CAPACITY: ICD-10-PCS | Mod: S$GLB,,, | Performed by: INTERNAL MEDICINE

## 2021-09-22 PROCEDURE — 94010 BREATHING CAPACITY TEST: CPT | Mod: S$GLB,,, | Performed by: INTERNAL MEDICINE

## 2021-09-22 PROCEDURE — 3051F HG A1C>EQUAL 7.0%<8.0%: CPT | Mod: CPTII,S$GLB,, | Performed by: NURSE PRACTITIONER

## 2021-09-22 PROCEDURE — 99999 PR PBB SHADOW E&M-EST. PATIENT-LVL IV: CPT | Mod: PBBFAC,,, | Performed by: NURSE PRACTITIONER

## 2021-09-22 PROCEDURE — 3078F PR MOST RECENT DIASTOLIC BLOOD PRESSURE < 80 MM HG: ICD-10-PCS | Mod: CPTII,S$GLB,, | Performed by: NURSE PRACTITIONER

## 2021-09-22 PROCEDURE — 3288F PR FALLS RISK ASSESSMENT DOCUMENTED: ICD-10-PCS | Mod: CPTII,S$GLB,, | Performed by: NURSE PRACTITIONER

## 2021-09-22 PROCEDURE — 99214 OFFICE O/P EST MOD 30 MIN: CPT | Mod: S$GLB,,, | Performed by: NURSE PRACTITIONER

## 2021-09-22 PROCEDURE — 94010 BREATHING CAPACITY TEST: ICD-10-PCS | Mod: S$GLB,,, | Performed by: INTERNAL MEDICINE

## 2021-09-22 PROCEDURE — 4010F PR ACE/ARB THEARPY RXD/TAKEN: ICD-10-PCS | Mod: CPTII,S$GLB,, | Performed by: NURSE PRACTITIONER

## 2021-09-22 PROCEDURE — 1101F PR PT FALLS ASSESS DOC 0-1 FALLS W/OUT INJ PAST YR: ICD-10-PCS | Mod: CPTII,S$GLB,, | Performed by: NURSE PRACTITIONER

## 2021-09-22 PROCEDURE — 99499 UNLISTED E&M SERVICE: CPT | Mod: S$GLB,,, | Performed by: NURSE PRACTITIONER

## 2021-09-22 PROCEDURE — 3051F PR MOST RECENT HEMOGLOBIN A1C LEVEL 7.0 - < 8.0%: ICD-10-PCS | Mod: CPTII,S$GLB,, | Performed by: NURSE PRACTITIONER

## 2021-09-22 PROCEDURE — 99499 RISK ADDL DX/OHS AUDIT: ICD-10-PCS | Mod: S$GLB,,, | Performed by: NURSE PRACTITIONER

## 2021-09-22 RX ORDER — FUROSEMIDE 20 MG/1
20 TABLET ORAL DAILY
COMMUNITY
Start: 2021-07-28 | End: 2021-10-08

## 2021-09-23 ENCOUNTER — TELEPHONE (OUTPATIENT)
Dept: NEPHROLOGY | Facility: CLINIC | Age: 71
End: 2021-09-23

## 2021-09-23 LAB
DLCO ADJ PRE: 16.61 ML/(MIN*MMHG) (ref 18.93–32.79)
DLCO SINGLE BREATH LLN: 18.93
DLCO SINGLE BREATH PRE REF: 58.7 %
DLCO SINGLE BREATH REF: 25.86
DLCOC SBVA LLN: 2.56
DLCOC SBVA PRE REF: 112.7 %
DLCOC SBVA REF: 3.75
DLCOC SINGLE BREATH LLN: 18.93
DLCOC SINGLE BREATH PRE REF: 64.2 %
DLCOC SINGLE BREATH REF: 25.86
DLCOCSBVAULN: 4.93
DLCOCSINGLEBREATHULN: 32.79
DLCOSINGLEBREATHULN: 32.79
DLCOVA LLN: 2.56
DLCOVA PRE REF: 103.1 %
DLCOVA PRE: 3.86 ML/(MIN*MMHG*L) (ref 2.56–4.93)
DLCOVA REF: 3.75
DLCOVAULN: 4.93
DLVAADJ PRE: 4.22 ML/(MIN*MMHG*L) (ref 2.56–4.93)
FEF 25 75 LLN: 0.67
FEF 25 75 PRE REF: 65.9 %
FEF 25 75 REF: 1.96
FEV05 LLN: 1.37
FEV05 REF: 2.5
FEV1 FVC LLN: 63
FEV1 FVC PRE REF: 94 %
FEV1 FVC REF: 76
FEV1 LLN: 1.74
FEV1 PRE REF: 79.2 %
FEV1 REF: 2.56
FVC LLN: 2.41
FVC PRE REF: 84 %
FVC REF: 3.37
IVC PRE: 2.58 L (ref 2.41–4.33)
IVC SINGLE BREATH LLN: 2.41
IVC SINGLE BREATH PRE REF: 76.4 %
IVC SINGLE BREATH REF: 3.37
IVCSINGLEBREATHULN: 4.33
PEF LLN: 5.09
PEF PRE REF: 90.9 %
PEF REF: 7.66
PHYSICIAN COMMENT: ABNORMAL
PRE DLCO: 15.19 ML/(MIN*MMHG) (ref 18.93–32.79)
PRE FEF 25 75: 1.29 L/S (ref 0.67–3.25)
PRE FET 100: 7.49 SEC
PRE FEV05 REF: 67.5 %
PRE FEV1 FVC: 71.75 % (ref 63–89.74)
PRE FEV1: 2.03 L (ref 1.74–3.39)
PRE FEV5: 1.69 L (ref 1.37–3.64)
PRE FVC: 2.83 L (ref 2.41–4.33)
PRE PEF: 6.96 L/S (ref 5.09–10.23)
VA PRE: 3.93 L (ref 6.75–6.75)
VA SINGLE BREATH PRE REF: 58.2 %
VA SINGLE BREATH REF: 6.75

## 2021-09-27 ENCOUNTER — LAB VISIT (OUTPATIENT)
Dept: LAB | Facility: HOSPITAL | Age: 71
End: 2021-09-27
Payer: MEDICARE

## 2021-09-27 ENCOUNTER — OFFICE VISIT (OUTPATIENT)
Dept: ENDOCRINOLOGY | Facility: CLINIC | Age: 71
End: 2021-09-27
Payer: MEDICARE

## 2021-09-27 VITALS
WEIGHT: 237.19 LBS | DIASTOLIC BLOOD PRESSURE: 70 MMHG | HEIGHT: 69 IN | BODY MASS INDEX: 35.13 KG/M2 | SYSTOLIC BLOOD PRESSURE: 145 MMHG

## 2021-09-27 DIAGNOSIS — E11.69 DIABETES MELLITUS TYPE 2 IN OBESE: Chronic | ICD-10-CM

## 2021-09-27 DIAGNOSIS — I50.42 CHRONIC COMBINED SYSTOLIC AND DIASTOLIC HEART FAILURE: Chronic | ICD-10-CM

## 2021-09-27 DIAGNOSIS — E66.9 DIABETES MELLITUS TYPE 2 IN OBESE: Chronic | ICD-10-CM

## 2021-09-27 DIAGNOSIS — E55.9 VITAMIN D INSUFFICIENCY: Chronic | ICD-10-CM

## 2021-09-27 DIAGNOSIS — E78.2 MIXED HYPERLIPIDEMIA: Chronic | ICD-10-CM

## 2021-09-27 LAB
ALBUMIN SERPL BCP-MCNC: 3.6 G/DL (ref 3.5–5.2)
ALP SERPL-CCNC: 58 U/L (ref 55–135)
ALT SERPL W/O P-5'-P-CCNC: 33 U/L (ref 10–44)
ANION GAP SERPL CALC-SCNC: 8 MMOL/L (ref 8–16)
AST SERPL-CCNC: 32 U/L (ref 10–40)
BILIRUB SERPL-MCNC: 0.5 MG/DL (ref 0.1–1)
BUN SERPL-MCNC: 15 MG/DL (ref 8–23)
CALCIUM SERPL-MCNC: 9.1 MG/DL (ref 8.7–10.5)
CHLORIDE SERPL-SCNC: 103 MMOL/L (ref 95–110)
CO2 SERPL-SCNC: 26 MMOL/L (ref 23–29)
CREAT SERPL-MCNC: 1.6 MG/DL (ref 0.5–1.4)
EST. GFR  (AFRICAN AMERICAN): 49 ML/MIN/1.73 M^2
EST. GFR  (NON AFRICAN AMERICAN): 42.4 ML/MIN/1.73 M^2
ESTIMATED AVG GLUCOSE: 166 MG/DL (ref 68–131)
GLUCOSE SERPL-MCNC: 234 MG/DL (ref 70–110)
HBA1C MFR BLD: 7.4 % (ref 4–5.6)
POTASSIUM SERPL-SCNC: 4.3 MMOL/L (ref 3.5–5.1)
PROT SERPL-MCNC: 6.8 G/DL (ref 6–8.4)
SODIUM SERPL-SCNC: 137 MMOL/L (ref 136–145)

## 2021-09-27 PROCEDURE — 36415 COLL VENOUS BLD VENIPUNCTURE: CPT | Performed by: NURSE PRACTITIONER

## 2021-09-27 PROCEDURE — 1160F PR REVIEW ALL MEDS BY PRESCRIBER/CLIN PHARMACIST DOCUMENTED: ICD-10-PCS | Mod: CPTII,S$GLB,, | Performed by: NURSE PRACTITIONER

## 2021-09-27 PROCEDURE — 1101F PT FALLS ASSESS-DOCD LE1/YR: CPT | Mod: CPTII,S$GLB,, | Performed by: NURSE PRACTITIONER

## 2021-09-27 PROCEDURE — 4010F ACE/ARB THERAPY RXD/TAKEN: CPT | Mod: CPTII,S$GLB,, | Performed by: NURSE PRACTITIONER

## 2021-09-27 PROCEDURE — 3078F PR MOST RECENT DIASTOLIC BLOOD PRESSURE < 80 MM HG: ICD-10-PCS | Mod: CPTII,S$GLB,, | Performed by: NURSE PRACTITIONER

## 2021-09-27 PROCEDURE — 95251 CONT GLUC MNTR ANALYSIS I&R: CPT | Mod: S$GLB,,, | Performed by: NURSE PRACTITIONER

## 2021-09-27 PROCEDURE — 3051F HG A1C>EQUAL 7.0%<8.0%: CPT | Mod: CPTII,S$GLB,, | Performed by: NURSE PRACTITIONER

## 2021-09-27 PROCEDURE — 3077F SYST BP >= 140 MM HG: CPT | Mod: CPTII,S$GLB,, | Performed by: NURSE PRACTITIONER

## 2021-09-27 PROCEDURE — 4010F PR ACE/ARB THEARPY RXD/TAKEN: ICD-10-PCS | Mod: CPTII,S$GLB,, | Performed by: NURSE PRACTITIONER

## 2021-09-27 PROCEDURE — 1160F RVW MEDS BY RX/DR IN RCRD: CPT | Mod: CPTII,S$GLB,, | Performed by: NURSE PRACTITIONER

## 2021-09-27 PROCEDURE — 99214 OFFICE O/P EST MOD 30 MIN: CPT | Mod: 25,S$GLB,, | Performed by: NURSE PRACTITIONER

## 2021-09-27 PROCEDURE — 1126F PR PAIN SEVERITY QUANTIFIED, NO PAIN PRESENT: ICD-10-PCS | Mod: CPTII,S$GLB,, | Performed by: NURSE PRACTITIONER

## 2021-09-27 PROCEDURE — 3051F PR MOST RECENT HEMOGLOBIN A1C LEVEL 7.0 - < 8.0%: ICD-10-PCS | Mod: CPTII,S$GLB,, | Performed by: NURSE PRACTITIONER

## 2021-09-27 PROCEDURE — 95251 PR GLUCOSE MONITOR, 72 HOUR, PHYS INTERP: ICD-10-PCS | Mod: S$GLB,,, | Performed by: NURSE PRACTITIONER

## 2021-09-27 PROCEDURE — 3288F PR FALLS RISK ASSESSMENT DOCUMENTED: ICD-10-PCS | Mod: CPTII,S$GLB,, | Performed by: NURSE PRACTITIONER

## 2021-09-27 PROCEDURE — 99999 PR PBB SHADOW E&M-EST. PATIENT-LVL IV: CPT | Mod: PBBFAC,,, | Performed by: NURSE PRACTITIONER

## 2021-09-27 PROCEDURE — 3008F PR BODY MASS INDEX (BMI) DOCUMENTED: ICD-10-PCS | Mod: CPTII,S$GLB,, | Performed by: NURSE PRACTITIONER

## 2021-09-27 PROCEDURE — 80053 COMPREHEN METABOLIC PANEL: CPT | Performed by: NURSE PRACTITIONER

## 2021-09-27 PROCEDURE — 83036 HEMOGLOBIN GLYCOSYLATED A1C: CPT | Performed by: NURSE PRACTITIONER

## 2021-09-27 PROCEDURE — 99999 PR PBB SHADOW E&M-EST. PATIENT-LVL IV: ICD-10-PCS | Mod: PBBFAC,,, | Performed by: NURSE PRACTITIONER

## 2021-09-27 PROCEDURE — 3008F BODY MASS INDEX DOCD: CPT | Mod: CPTII,S$GLB,, | Performed by: NURSE PRACTITIONER

## 2021-09-27 PROCEDURE — 3066F PR DOCUMENTATION OF TREATMENT FOR NEPHROPATHY: ICD-10-PCS | Mod: CPTII,S$GLB,, | Performed by: NURSE PRACTITIONER

## 2021-09-27 PROCEDURE — 3066F NEPHROPATHY DOC TX: CPT | Mod: CPTII,S$GLB,, | Performed by: NURSE PRACTITIONER

## 2021-09-27 PROCEDURE — 3288F FALL RISK ASSESSMENT DOCD: CPT | Mod: CPTII,S$GLB,, | Performed by: NURSE PRACTITIONER

## 2021-09-27 PROCEDURE — 3077F PR MOST RECENT SYSTOLIC BLOOD PRESSURE >= 140 MM HG: ICD-10-PCS | Mod: CPTII,S$GLB,, | Performed by: NURSE PRACTITIONER

## 2021-09-27 PROCEDURE — 1159F MED LIST DOCD IN RCRD: CPT | Mod: CPTII,S$GLB,, | Performed by: NURSE PRACTITIONER

## 2021-09-27 PROCEDURE — 1101F PR PT FALLS ASSESS DOC 0-1 FALLS W/OUT INJ PAST YR: ICD-10-PCS | Mod: CPTII,S$GLB,, | Performed by: NURSE PRACTITIONER

## 2021-09-27 PROCEDURE — 1159F PR MEDICATION LIST DOCUMENTED IN MEDICAL RECORD: ICD-10-PCS | Mod: CPTII,S$GLB,, | Performed by: NURSE PRACTITIONER

## 2021-09-27 PROCEDURE — 99214 PR OFFICE/OUTPT VISIT, EST, LEVL IV, 30-39 MIN: ICD-10-PCS | Mod: 25,S$GLB,, | Performed by: NURSE PRACTITIONER

## 2021-09-27 PROCEDURE — 3078F DIAST BP <80 MM HG: CPT | Mod: CPTII,S$GLB,, | Performed by: NURSE PRACTITIONER

## 2021-09-27 PROCEDURE — 1126F AMNT PAIN NOTED NONE PRSNT: CPT | Mod: CPTII,S$GLB,, | Performed by: NURSE PRACTITIONER

## 2021-09-28 ENCOUNTER — PATIENT MESSAGE (OUTPATIENT)
Dept: ENDOCRINOLOGY | Facility: CLINIC | Age: 71
End: 2021-09-28

## 2021-10-05 ENCOUNTER — OFFICE VISIT (OUTPATIENT)
Dept: UROLOGY | Facility: CLINIC | Age: 71
End: 2021-10-05
Payer: MEDICARE

## 2021-10-05 ENCOUNTER — LAB VISIT (OUTPATIENT)
Dept: LAB | Facility: HOSPITAL | Age: 71
End: 2021-10-05
Payer: MEDICARE

## 2021-10-05 VITALS
DIASTOLIC BLOOD PRESSURE: 80 MMHG | SYSTOLIC BLOOD PRESSURE: 146 MMHG | HEIGHT: 69 IN | HEART RATE: 66 BPM | WEIGHT: 239 LBS | BODY MASS INDEX: 35.4 KG/M2

## 2021-10-05 DIAGNOSIS — N40.1 BPH WITH URINARY OBSTRUCTION: ICD-10-CM

## 2021-10-05 DIAGNOSIS — N52.01 ERECTILE DYSFUNCTION DUE TO ARTERIAL INSUFFICIENCY: ICD-10-CM

## 2021-10-05 DIAGNOSIS — N40.1 BPH WITH URINARY OBSTRUCTION: Primary | ICD-10-CM

## 2021-10-05 DIAGNOSIS — N13.8 BPH WITH URINARY OBSTRUCTION: Primary | ICD-10-CM

## 2021-10-05 DIAGNOSIS — N13.8 BPH WITH URINARY OBSTRUCTION: ICD-10-CM

## 2021-10-05 LAB
BILIRUB SERPL-MCNC: NORMAL MG/DL
BLOOD URINE, POC: NORMAL
CLARITY, POC UA: CLEAR
COLOR, POC UA: NORMAL
COMPLEXED PSA SERPL-MCNC: 1.3 NG/ML (ref 0–4)
GLUCOSE UR QL STRIP: NORMAL
KETONES UR QL STRIP: NORMAL
LEUKOCYTE ESTERASE URINE, POC: NORMAL
NITRITE, POC UA: NORMAL
PH, POC UA: 5
PROTEIN, POC: NORMAL
SPECIFIC GRAVITY, POC UA: 1.01
UROBILINOGEN, POC UA: NORMAL

## 2021-10-05 PROCEDURE — 99999 PR PBB SHADOW E&M-EST. PATIENT-LVL V: CPT | Mod: PBBFAC,,, | Performed by: NURSE PRACTITIONER

## 2021-10-05 PROCEDURE — 3008F PR BODY MASS INDEX (BMI) DOCUMENTED: ICD-10-PCS | Mod: CPTII,S$GLB,, | Performed by: NURSE PRACTITIONER

## 2021-10-05 PROCEDURE — 1101F PR PT FALLS ASSESS DOC 0-1 FALLS W/OUT INJ PAST YR: ICD-10-PCS | Mod: CPTII,S$GLB,, | Performed by: NURSE PRACTITIONER

## 2021-10-05 PROCEDURE — 3051F PR MOST RECENT HEMOGLOBIN A1C LEVEL 7.0 - < 8.0%: ICD-10-PCS | Mod: CPTII,S$GLB,, | Performed by: NURSE PRACTITIONER

## 2021-10-05 PROCEDURE — 99214 OFFICE O/P EST MOD 30 MIN: CPT | Mod: S$GLB,,, | Performed by: NURSE PRACTITIONER

## 2021-10-05 PROCEDURE — 3008F BODY MASS INDEX DOCD: CPT | Mod: CPTII,S$GLB,, | Performed by: NURSE PRACTITIONER

## 2021-10-05 PROCEDURE — 3066F PR DOCUMENTATION OF TREATMENT FOR NEPHROPATHY: ICD-10-PCS | Mod: CPTII,S$GLB,, | Performed by: NURSE PRACTITIONER

## 2021-10-05 PROCEDURE — 3288F PR FALLS RISK ASSESSMENT DOCUMENTED: ICD-10-PCS | Mod: CPTII,S$GLB,, | Performed by: NURSE PRACTITIONER

## 2021-10-05 PROCEDURE — 4010F PR ACE/ARB THEARPY RXD/TAKEN: ICD-10-PCS | Mod: CPTII,S$GLB,, | Performed by: NURSE PRACTITIONER

## 2021-10-05 PROCEDURE — 1159F PR MEDICATION LIST DOCUMENTED IN MEDICAL RECORD: ICD-10-PCS | Mod: CPTII,S$GLB,, | Performed by: NURSE PRACTITIONER

## 2021-10-05 PROCEDURE — 1126F AMNT PAIN NOTED NONE PRSNT: CPT | Mod: CPTII,S$GLB,, | Performed by: NURSE PRACTITIONER

## 2021-10-05 PROCEDURE — 81002 URINALYSIS NONAUTO W/O SCOPE: CPT | Mod: S$GLB,,, | Performed by: NURSE PRACTITIONER

## 2021-10-05 PROCEDURE — 3077F PR MOST RECENT SYSTOLIC BLOOD PRESSURE >= 140 MM HG: ICD-10-PCS | Mod: CPTII,S$GLB,, | Performed by: NURSE PRACTITIONER

## 2021-10-05 PROCEDURE — 84153 ASSAY OF PSA TOTAL: CPT | Performed by: NURSE PRACTITIONER

## 2021-10-05 PROCEDURE — 1126F PR PAIN SEVERITY QUANTIFIED, NO PAIN PRESENT: ICD-10-PCS | Mod: CPTII,S$GLB,, | Performed by: NURSE PRACTITIONER

## 2021-10-05 PROCEDURE — 81002 POCT URINE DIPSTICK WITHOUT MICROSCOPE: ICD-10-PCS | Mod: S$GLB,,, | Performed by: NURSE PRACTITIONER

## 2021-10-05 PROCEDURE — 1101F PT FALLS ASSESS-DOCD LE1/YR: CPT | Mod: CPTII,S$GLB,, | Performed by: NURSE PRACTITIONER

## 2021-10-05 PROCEDURE — 1160F PR REVIEW ALL MEDS BY PRESCRIBER/CLIN PHARMACIST DOCUMENTED: ICD-10-PCS | Mod: CPTII,S$GLB,, | Performed by: NURSE PRACTITIONER

## 2021-10-05 PROCEDURE — 99999 PR PBB SHADOW E&M-EST. PATIENT-LVL V: ICD-10-PCS | Mod: PBBFAC,,, | Performed by: NURSE PRACTITIONER

## 2021-10-05 PROCEDURE — 3066F NEPHROPATHY DOC TX: CPT | Mod: CPTII,S$GLB,, | Performed by: NURSE PRACTITIONER

## 2021-10-05 PROCEDURE — 3079F DIAST BP 80-89 MM HG: CPT | Mod: CPTII,S$GLB,, | Performed by: NURSE PRACTITIONER

## 2021-10-05 PROCEDURE — 4010F ACE/ARB THERAPY RXD/TAKEN: CPT | Mod: CPTII,S$GLB,, | Performed by: NURSE PRACTITIONER

## 2021-10-05 PROCEDURE — 3077F SYST BP >= 140 MM HG: CPT | Mod: CPTII,S$GLB,, | Performed by: NURSE PRACTITIONER

## 2021-10-05 PROCEDURE — 3051F HG A1C>EQUAL 7.0%<8.0%: CPT | Mod: CPTII,S$GLB,, | Performed by: NURSE PRACTITIONER

## 2021-10-05 PROCEDURE — 3079F PR MOST RECENT DIASTOLIC BLOOD PRESSURE 80-89 MM HG: ICD-10-PCS | Mod: CPTII,S$GLB,, | Performed by: NURSE PRACTITIONER

## 2021-10-05 PROCEDURE — 1160F RVW MEDS BY RX/DR IN RCRD: CPT | Mod: CPTII,S$GLB,, | Performed by: NURSE PRACTITIONER

## 2021-10-05 PROCEDURE — 3288F FALL RISK ASSESSMENT DOCD: CPT | Mod: CPTII,S$GLB,, | Performed by: NURSE PRACTITIONER

## 2021-10-05 PROCEDURE — 1159F MED LIST DOCD IN RCRD: CPT | Mod: CPTII,S$GLB,, | Performed by: NURSE PRACTITIONER

## 2021-10-05 PROCEDURE — 36415 COLL VENOUS BLD VENIPUNCTURE: CPT | Performed by: NURSE PRACTITIONER

## 2021-10-05 PROCEDURE — 99214 PR OFFICE/OUTPT VISIT, EST, LEVL IV, 30-39 MIN: ICD-10-PCS | Mod: S$GLB,,, | Performed by: NURSE PRACTITIONER

## 2021-10-05 RX ORDER — PAPAVERINE HYDROCHLORIDE 30 MG/ML
INJECTION INTRAMUSCULAR; INTRAVENOUS
Qty: 5 ML | Refills: 0 | Status: SHIPPED | OUTPATIENT
Start: 2021-10-05 | End: 2021-10-15 | Stop reason: ALTCHOICE

## 2021-10-05 RX ORDER — TAMSULOSIN HYDROCHLORIDE 0.4 MG/1
1 CAPSULE ORAL DAILY
Qty: 90 CAPSULE | Refills: 3 | Status: SHIPPED | OUTPATIENT
Start: 2021-10-05 | End: 2022-09-22 | Stop reason: SDUPTHER

## 2021-10-08 ENCOUNTER — PATIENT MESSAGE (OUTPATIENT)
Dept: UROLOGY | Facility: CLINIC | Age: 71
End: 2021-10-08

## 2021-10-15 ENCOUNTER — OFFICE VISIT (OUTPATIENT)
Dept: UROLOGY | Facility: CLINIC | Age: 71
End: 2021-10-15
Payer: MEDICARE

## 2021-10-15 VITALS
BODY MASS INDEX: 35.4 KG/M2 | WEIGHT: 239 LBS | DIASTOLIC BLOOD PRESSURE: 67 MMHG | HEART RATE: 63 BPM | HEIGHT: 69 IN | SYSTOLIC BLOOD PRESSURE: 141 MMHG

## 2021-10-15 DIAGNOSIS — N13.8 BPH WITH URINARY OBSTRUCTION: ICD-10-CM

## 2021-10-15 DIAGNOSIS — E11.59 TYPE 2 DIABETES MELLITUS WITH CIRCULATORY DISORDER CAUSING ERECTILE DYSFUNCTION: Primary | ICD-10-CM

## 2021-10-15 DIAGNOSIS — N40.1 BPH WITH URINARY OBSTRUCTION: ICD-10-CM

## 2021-10-15 DIAGNOSIS — N52.1 TYPE 2 DIABETES MELLITUS WITH CIRCULATORY DISORDER CAUSING ERECTILE DYSFUNCTION: Primary | ICD-10-CM

## 2021-10-15 PROCEDURE — 99215 PR OFFICE/OUTPT VISIT, EST, LEVL V, 40-54 MIN: ICD-10-PCS | Mod: 25,S$GLB,, | Performed by: NURSE PRACTITIONER

## 2021-10-15 PROCEDURE — 3288F FALL RISK ASSESSMENT DOCD: CPT | Mod: CPTII,S$GLB,, | Performed by: NURSE PRACTITIONER

## 2021-10-15 PROCEDURE — 99999 PR PBB SHADOW E&M-EST. PATIENT-LVL V: ICD-10-PCS | Mod: PBBFAC,,, | Performed by: NURSE PRACTITIONER

## 2021-10-15 PROCEDURE — 4010F ACE/ARB THERAPY RXD/TAKEN: CPT | Mod: CPTII,S$GLB,, | Performed by: NURSE PRACTITIONER

## 2021-10-15 PROCEDURE — 1160F RVW MEDS BY RX/DR IN RCRD: CPT | Mod: CPTII,S$GLB,, | Performed by: NURSE PRACTITIONER

## 2021-10-15 PROCEDURE — 1100F PTFALLS ASSESS-DOCD GE2>/YR: CPT | Mod: CPTII,S$GLB,, | Performed by: NURSE PRACTITIONER

## 2021-10-15 PROCEDURE — 3077F SYST BP >= 140 MM HG: CPT | Mod: CPTII,S$GLB,, | Performed by: NURSE PRACTITIONER

## 2021-10-15 PROCEDURE — 3077F PR MOST RECENT SYSTOLIC BLOOD PRESSURE >= 140 MM HG: ICD-10-PCS | Mod: CPTII,S$GLB,, | Performed by: NURSE PRACTITIONER

## 2021-10-15 PROCEDURE — 99215 OFFICE O/P EST HI 40 MIN: CPT | Mod: 25,S$GLB,, | Performed by: NURSE PRACTITIONER

## 2021-10-15 PROCEDURE — 99999 PR PBB SHADOW E&M-EST. PATIENT-LVL V: CPT | Mod: PBBFAC,,, | Performed by: NURSE PRACTITIONER

## 2021-10-15 PROCEDURE — 3078F PR MOST RECENT DIASTOLIC BLOOD PRESSURE < 80 MM HG: ICD-10-PCS | Mod: CPTII,S$GLB,, | Performed by: NURSE PRACTITIONER

## 2021-10-15 PROCEDURE — 4010F PR ACE/ARB THEARPY RXD/TAKEN: ICD-10-PCS | Mod: CPTII,S$GLB,, | Performed by: NURSE PRACTITIONER

## 2021-10-15 PROCEDURE — 3008F BODY MASS INDEX DOCD: CPT | Mod: CPTII,S$GLB,, | Performed by: NURSE PRACTITIONER

## 2021-10-15 PROCEDURE — 3008F PR BODY MASS INDEX (BMI) DOCUMENTED: ICD-10-PCS | Mod: CPTII,S$GLB,, | Performed by: NURSE PRACTITIONER

## 2021-10-15 PROCEDURE — 99499 UNLISTED E&M SERVICE: CPT | Mod: S$GLB,,, | Performed by: NURSE PRACTITIONER

## 2021-10-15 PROCEDURE — 1100F PR PT FALLS ASSESS DOC 2+ FALLS/FALL W/INJURY/YR: ICD-10-PCS | Mod: CPTII,S$GLB,, | Performed by: NURSE PRACTITIONER

## 2021-10-15 PROCEDURE — 3066F PR DOCUMENTATION OF TREATMENT FOR NEPHROPATHY: ICD-10-PCS | Mod: CPTII,S$GLB,, | Performed by: NURSE PRACTITIONER

## 2021-10-15 PROCEDURE — 3288F PR FALLS RISK ASSESSMENT DOCUMENTED: ICD-10-PCS | Mod: CPTII,S$GLB,, | Performed by: NURSE PRACTITIONER

## 2021-10-15 PROCEDURE — 54235 NJX CORPORA CAVERNOSA RX AGT: CPT | Mod: S$GLB,,, | Performed by: NURSE PRACTITIONER

## 2021-10-15 PROCEDURE — 1159F MED LIST DOCD IN RCRD: CPT | Mod: CPTII,S$GLB,, | Performed by: NURSE PRACTITIONER

## 2021-10-15 PROCEDURE — 3051F HG A1C>EQUAL 7.0%<8.0%: CPT | Mod: CPTII,S$GLB,, | Performed by: NURSE PRACTITIONER

## 2021-10-15 PROCEDURE — 1159F PR MEDICATION LIST DOCUMENTED IN MEDICAL RECORD: ICD-10-PCS | Mod: CPTII,S$GLB,, | Performed by: NURSE PRACTITIONER

## 2021-10-15 PROCEDURE — 99499 RISK ADDL DX/OHS AUDIT: ICD-10-PCS | Mod: S$GLB,,, | Performed by: NURSE PRACTITIONER

## 2021-10-15 PROCEDURE — 3051F PR MOST RECENT HEMOGLOBIN A1C LEVEL 7.0 - < 8.0%: ICD-10-PCS | Mod: CPTII,S$GLB,, | Performed by: NURSE PRACTITIONER

## 2021-10-15 PROCEDURE — 3066F NEPHROPATHY DOC TX: CPT | Mod: CPTII,S$GLB,, | Performed by: NURSE PRACTITIONER

## 2021-10-15 PROCEDURE — 3078F DIAST BP <80 MM HG: CPT | Mod: CPTII,S$GLB,, | Performed by: NURSE PRACTITIONER

## 2021-10-15 PROCEDURE — 54235 PR INJECT CORPORA CAVERN,PHARM AGNT: ICD-10-PCS | Mod: S$GLB,,, | Performed by: NURSE PRACTITIONER

## 2021-10-15 PROCEDURE — 1160F PR REVIEW ALL MEDS BY PRESCRIBER/CLIN PHARMACIST DOCUMENTED: ICD-10-PCS | Mod: CPTII,S$GLB,, | Performed by: NURSE PRACTITIONER

## 2021-10-15 RX ORDER — DULAGLUTIDE 4.5 MG/.5ML
INJECTION, SOLUTION SUBCUTANEOUS
COMMUNITY
Start: 2021-09-14 | End: 2021-11-03

## 2021-10-15 RX ORDER — PAPAVERINE HYDROCHLORIDE 30 MG/ML
INJECTION INTRAMUSCULAR; INTRAVENOUS
Qty: 10 ML | Refills: 12 | Status: SHIPPED | OUTPATIENT
Start: 2021-10-15 | End: 2022-09-28 | Stop reason: SDUPTHER

## 2021-10-15 RX ORDER — ISOSORBIDE MONONITRATE 120 MG/1
120 TABLET, EXTENDED RELEASE ORAL DAILY
COMMUNITY
Start: 2021-09-09 | End: 2021-10-25

## 2021-10-20 ENCOUNTER — IMMUNIZATION (OUTPATIENT)
Dept: FAMILY MEDICINE | Facility: CLINIC | Age: 71
End: 2021-10-20
Payer: MEDICARE

## 2021-10-20 DIAGNOSIS — Z23 NEED FOR VACCINATION: Primary | ICD-10-CM

## 2021-10-20 PROCEDURE — 0003A COVID-19, MRNA, LNP-S, PF, 30 MCG/0.3 ML DOSE VACCINE: CPT | Mod: PBBFAC | Performed by: FAMILY MEDICINE

## 2021-10-20 PROCEDURE — 91300 COVID-19, MRNA, LNP-S, PF, 30 MCG/0.3 ML DOSE VACCINE: CPT | Mod: PBBFAC | Performed by: FAMILY MEDICINE

## 2021-10-24 ENCOUNTER — PATIENT OUTREACH (OUTPATIENT)
Dept: ADMINISTRATIVE | Facility: OTHER | Age: 71
End: 2021-10-24
Payer: MEDICARE

## 2021-10-25 ENCOUNTER — OFFICE VISIT (OUTPATIENT)
Dept: CARDIOLOGY | Facility: CLINIC | Age: 71
End: 2021-10-25
Payer: MEDICARE

## 2021-10-25 VITALS — SYSTOLIC BLOOD PRESSURE: 136 MMHG | DIASTOLIC BLOOD PRESSURE: 59 MMHG | HEART RATE: 60 BPM

## 2021-10-25 DIAGNOSIS — I25.118 CORONARY ARTERY DISEASE OF NATIVE ARTERY OF NATIVE HEART WITH STABLE ANGINA PECTORIS: Chronic | ICD-10-CM

## 2021-10-25 DIAGNOSIS — I10 BENIGN ESSENTIAL HTN: Chronic | ICD-10-CM

## 2021-10-25 DIAGNOSIS — I50.42 CHRONIC COMBINED SYSTOLIC AND DIASTOLIC HEART FAILURE: Primary | Chronic | ICD-10-CM

## 2021-10-25 DIAGNOSIS — E78.2 MIXED HYPERLIPIDEMIA: Chronic | ICD-10-CM

## 2021-10-25 PROCEDURE — 3075F SYST BP GE 130 - 139MM HG: CPT | Mod: CPTII,S$GLB,, | Performed by: INTERNAL MEDICINE

## 2021-10-25 PROCEDURE — 3078F DIAST BP <80 MM HG: CPT | Mod: CPTII,S$GLB,, | Performed by: INTERNAL MEDICINE

## 2021-10-25 PROCEDURE — 1160F RVW MEDS BY RX/DR IN RCRD: CPT | Mod: CPTII,S$GLB,, | Performed by: INTERNAL MEDICINE

## 2021-10-25 PROCEDURE — 1159F MED LIST DOCD IN RCRD: CPT | Mod: CPTII,S$GLB,, | Performed by: INTERNAL MEDICINE

## 2021-10-25 PROCEDURE — 3288F FALL RISK ASSESSMENT DOCD: CPT | Mod: CPTII,S$GLB,, | Performed by: INTERNAL MEDICINE

## 2021-10-25 PROCEDURE — 4010F PR ACE/ARB THEARPY RXD/TAKEN: ICD-10-PCS | Mod: CPTII,S$GLB,, | Performed by: INTERNAL MEDICINE

## 2021-10-25 PROCEDURE — 4010F ACE/ARB THERAPY RXD/TAKEN: CPT | Mod: CPTII,S$GLB,, | Performed by: INTERNAL MEDICINE

## 2021-10-25 PROCEDURE — 3288F PR FALLS RISK ASSESSMENT DOCUMENTED: ICD-10-PCS | Mod: CPTII,S$GLB,, | Performed by: INTERNAL MEDICINE

## 2021-10-25 PROCEDURE — 3051F PR MOST RECENT HEMOGLOBIN A1C LEVEL 7.0 - < 8.0%: ICD-10-PCS | Mod: CPTII,S$GLB,, | Performed by: INTERNAL MEDICINE

## 2021-10-25 PROCEDURE — 3066F PR DOCUMENTATION OF TREATMENT FOR NEPHROPATHY: ICD-10-PCS | Mod: CPTII,S$GLB,, | Performed by: INTERNAL MEDICINE

## 2021-10-25 PROCEDURE — 3078F PR MOST RECENT DIASTOLIC BLOOD PRESSURE < 80 MM HG: ICD-10-PCS | Mod: CPTII,S$GLB,, | Performed by: INTERNAL MEDICINE

## 2021-10-25 PROCEDURE — 3075F PR MOST RECENT SYSTOLIC BLOOD PRESS GE 130-139MM HG: ICD-10-PCS | Mod: CPTII,S$GLB,, | Performed by: INTERNAL MEDICINE

## 2021-10-25 PROCEDURE — 3066F NEPHROPATHY DOC TX: CPT | Mod: CPTII,S$GLB,, | Performed by: INTERNAL MEDICINE

## 2021-10-25 PROCEDURE — 99999 PR PBB SHADOW E&M-EST. PATIENT-LVL III: ICD-10-PCS | Mod: PBBFAC,,, | Performed by: INTERNAL MEDICINE

## 2021-10-25 PROCEDURE — 1101F PR PT FALLS ASSESS DOC 0-1 FALLS W/OUT INJ PAST YR: ICD-10-PCS | Mod: CPTII,S$GLB,, | Performed by: INTERNAL MEDICINE

## 2021-10-25 PROCEDURE — 99214 OFFICE O/P EST MOD 30 MIN: CPT | Mod: S$GLB,,, | Performed by: INTERNAL MEDICINE

## 2021-10-25 PROCEDURE — 1101F PT FALLS ASSESS-DOCD LE1/YR: CPT | Mod: CPTII,S$GLB,, | Performed by: INTERNAL MEDICINE

## 2021-10-25 PROCEDURE — 99214 PR OFFICE/OUTPT VISIT, EST, LEVL IV, 30-39 MIN: ICD-10-PCS | Mod: S$GLB,,, | Performed by: INTERNAL MEDICINE

## 2021-10-25 PROCEDURE — 1160F PR REVIEW ALL MEDS BY PRESCRIBER/CLIN PHARMACIST DOCUMENTED: ICD-10-PCS | Mod: CPTII,S$GLB,, | Performed by: INTERNAL MEDICINE

## 2021-10-25 PROCEDURE — 1159F PR MEDICATION LIST DOCUMENTED IN MEDICAL RECORD: ICD-10-PCS | Mod: CPTII,S$GLB,, | Performed by: INTERNAL MEDICINE

## 2021-10-25 PROCEDURE — 3051F HG A1C>EQUAL 7.0%<8.0%: CPT | Mod: CPTII,S$GLB,, | Performed by: INTERNAL MEDICINE

## 2021-10-25 PROCEDURE — 99999 PR PBB SHADOW E&M-EST. PATIENT-LVL III: CPT | Mod: PBBFAC,,, | Performed by: INTERNAL MEDICINE

## 2021-10-25 RX ORDER — CARVEDILOL 6.25 MG/1
6.25 TABLET ORAL 2 TIMES DAILY
Qty: 60 TABLET | Refills: 11 | Status: SHIPPED | OUTPATIENT
Start: 2021-10-25 | End: 2022-01-26

## 2021-11-09 ENCOUNTER — LAB VISIT (OUTPATIENT)
Dept: LAB | Facility: HOSPITAL | Age: 71
End: 2021-11-09
Attending: INTERNAL MEDICINE
Payer: MEDICARE

## 2021-11-09 DIAGNOSIS — I50.42 CHRONIC COMBINED SYSTOLIC AND DIASTOLIC HEART FAILURE: Chronic | ICD-10-CM

## 2021-11-09 DIAGNOSIS — E66.01 SEVERE OBESITY (BMI 35.0-39.9) WITH COMORBIDITY: ICD-10-CM

## 2021-11-09 DIAGNOSIS — I10 BENIGN ESSENTIAL HTN: Chronic | ICD-10-CM

## 2021-11-09 DIAGNOSIS — E78.2 MIXED HYPERLIPIDEMIA: Chronic | ICD-10-CM

## 2021-11-09 DIAGNOSIS — E66.9 DIABETES MELLITUS TYPE 2 IN OBESE: Chronic | ICD-10-CM

## 2021-11-09 DIAGNOSIS — E11.69 DIABETES MELLITUS TYPE 2 IN OBESE: Chronic | ICD-10-CM

## 2021-11-09 DIAGNOSIS — I25.118 CORONARY ARTERY DISEASE OF NATIVE ARTERY OF NATIVE HEART WITH STABLE ANGINA PECTORIS: Chronic | ICD-10-CM

## 2021-11-09 DIAGNOSIS — N18.31 STAGE 3A CHRONIC KIDNEY DISEASE: ICD-10-CM

## 2021-11-09 DIAGNOSIS — D69.6 THROMBOCYTOPENIA: ICD-10-CM

## 2021-11-09 DIAGNOSIS — E11.42 DIABETIC POLYNEUROPATHY ASSOCIATED WITH TYPE 2 DIABETES MELLITUS: ICD-10-CM

## 2021-11-09 LAB
ALBUMIN SERPL BCP-MCNC: 3.4 G/DL (ref 3.5–5.2)
ALBUMIN/CREAT UR: NORMAL UG/MG (ref 0–30)
ALP SERPL-CCNC: 55 U/L (ref 55–135)
ALT SERPL W/O P-5'-P-CCNC: 35 U/L (ref 10–44)
ANION GAP SERPL CALC-SCNC: 6 MMOL/L (ref 8–16)
AST SERPL-CCNC: 33 U/L (ref 10–40)
BASOPHILS # BLD AUTO: 0.03 K/UL (ref 0–0.2)
BASOPHILS NFR BLD: 0.6 % (ref 0–1.9)
BILIRUB SERPL-MCNC: 0.6 MG/DL (ref 0.1–1)
BUN SERPL-MCNC: 9 MG/DL (ref 8–23)
CALCIUM SERPL-MCNC: 9.2 MG/DL (ref 8.7–10.5)
CHLORIDE SERPL-SCNC: 107 MMOL/L (ref 95–110)
CHOLEST SERPL-MCNC: 137 MG/DL (ref 120–199)
CHOLEST/HDLC SERPL: 3 {RATIO} (ref 2–5)
CO2 SERPL-SCNC: 27 MMOL/L (ref 23–29)
CREAT SERPL-MCNC: 1.3 MG/DL (ref 0.5–1.4)
CREAT UR-MCNC: 90 MG/DL (ref 23–375)
DIFFERENTIAL METHOD: ABNORMAL
EOSINOPHIL # BLD AUTO: 0.1 K/UL (ref 0–0.5)
EOSINOPHIL NFR BLD: 2.2 % (ref 0–8)
ERYTHROCYTE [DISTWIDTH] IN BLOOD BY AUTOMATED COUNT: 14.4 % (ref 11.5–14.5)
EST. GFR  (AFRICAN AMERICAN): >60 ML/MIN/1.73 M^2
EST. GFR  (NON AFRICAN AMERICAN): 55 ML/MIN/1.73 M^2
ESTIMATED AVG GLUCOSE: 148 MG/DL (ref 68–131)
GLUCOSE SERPL-MCNC: 114 MG/DL (ref 70–110)
HBA1C MFR BLD: 6.8 % (ref 4–5.6)
HCT VFR BLD AUTO: 38.5 % (ref 40–54)
HDLC SERPL-MCNC: 46 MG/DL (ref 40–75)
HDLC SERPL: 33.6 % (ref 20–50)
HGB BLD-MCNC: 12.3 G/DL (ref 14–18)
IMM GRANULOCYTES # BLD AUTO: 0.01 K/UL (ref 0–0.04)
IMM GRANULOCYTES NFR BLD AUTO: 0.2 % (ref 0–0.5)
LDLC SERPL CALC-MCNC: 80.4 MG/DL (ref 63–159)
LYMPHOCYTES # BLD AUTO: 1.8 K/UL (ref 1–4.8)
LYMPHOCYTES NFR BLD: 33.8 % (ref 18–48)
MCH RBC QN AUTO: 30.5 PG (ref 27–31)
MCHC RBC AUTO-ENTMCNC: 31.9 G/DL (ref 32–36)
MCV RBC AUTO: 96 FL (ref 82–98)
MICROALBUMIN UR DL<=1MG/L-MCNC: <2.5 UG/ML
MONOCYTES # BLD AUTO: 0.6 K/UL (ref 0.3–1)
MONOCYTES NFR BLD: 11.1 % (ref 4–15)
NEUTROPHILS # BLD AUTO: 2.8 K/UL (ref 1.8–7.7)
NEUTROPHILS NFR BLD: 52.1 % (ref 38–73)
NONHDLC SERPL-MCNC: 91 MG/DL
NRBC BLD-RTO: 0 /100 WBC
PLATELET # BLD AUTO: 168 K/UL (ref 150–450)
PMV BLD AUTO: 9.8 FL (ref 9.2–12.9)
POTASSIUM SERPL-SCNC: 4.7 MMOL/L (ref 3.5–5.1)
PROT SERPL-MCNC: 6.5 G/DL (ref 6–8.4)
RBC # BLD AUTO: 4.03 M/UL (ref 4.6–6.2)
SODIUM SERPL-SCNC: 140 MMOL/L (ref 136–145)
T4 FREE SERPL-MCNC: 1.12 NG/DL (ref 0.71–1.51)
TRIGL SERPL-MCNC: 53 MG/DL (ref 30–150)
TSH SERPL DL<=0.005 MIU/L-ACNC: 0.35 UIU/ML (ref 0.4–4)
WBC # BLD AUTO: 5.41 K/UL (ref 3.9–12.7)

## 2021-11-09 PROCEDURE — 80061 LIPID PANEL: CPT | Performed by: INTERNAL MEDICINE

## 2021-11-09 PROCEDURE — 84439 ASSAY OF FREE THYROXINE: CPT | Performed by: INTERNAL MEDICINE

## 2021-11-09 PROCEDURE — 84443 ASSAY THYROID STIM HORMONE: CPT | Performed by: INTERNAL MEDICINE

## 2021-11-09 PROCEDURE — 36415 COLL VENOUS BLD VENIPUNCTURE: CPT | Performed by: INTERNAL MEDICINE

## 2021-11-09 PROCEDURE — 85025 COMPLETE CBC W/AUTO DIFF WBC: CPT | Performed by: INTERNAL MEDICINE

## 2021-11-09 PROCEDURE — 82570 ASSAY OF URINE CREATININE: CPT | Performed by: INTERNAL MEDICINE

## 2021-11-09 PROCEDURE — 80053 COMPREHEN METABOLIC PANEL: CPT | Performed by: INTERNAL MEDICINE

## 2021-11-09 PROCEDURE — 83036 HEMOGLOBIN GLYCOSYLATED A1C: CPT | Performed by: INTERNAL MEDICINE

## 2021-11-11 RX ORDER — ISOSORBIDE MONONITRATE 120 MG/1
TABLET, EXTENDED RELEASE ORAL
Qty: 90 TABLET | Refills: 3 | OUTPATIENT
Start: 2021-11-11

## 2021-11-12 ENCOUNTER — OFFICE VISIT (OUTPATIENT)
Dept: INTERNAL MEDICINE | Facility: CLINIC | Age: 71
End: 2021-11-12
Payer: MEDICARE

## 2021-11-12 VITALS
HEART RATE: 60 BPM | RESPIRATION RATE: 16 BRPM | BODY MASS INDEX: 35.4 KG/M2 | HEIGHT: 69 IN | SYSTOLIC BLOOD PRESSURE: 110 MMHG | DIASTOLIC BLOOD PRESSURE: 68 MMHG | OXYGEN SATURATION: 99 % | WEIGHT: 239 LBS

## 2021-11-12 DIAGNOSIS — E11.69 DIABETES MELLITUS TYPE 2 IN OBESE: Chronic | ICD-10-CM

## 2021-11-12 DIAGNOSIS — I10 BENIGN ESSENTIAL HTN: Chronic | ICD-10-CM

## 2021-11-12 DIAGNOSIS — R79.89 LOW TSH LEVEL: Primary | ICD-10-CM

## 2021-11-12 DIAGNOSIS — K76.0 FATTY LIVER: ICD-10-CM

## 2021-11-12 DIAGNOSIS — E11.42 DIABETIC POLYNEUROPATHY ASSOCIATED WITH TYPE 2 DIABETES MELLITUS: ICD-10-CM

## 2021-11-12 DIAGNOSIS — Z79.899 LONG TERM CURRENT USE OF AMIODARONE: ICD-10-CM

## 2021-11-12 DIAGNOSIS — Z95.810 ICD (IMPLANTABLE CARDIOVERTER-DEFIBRILLATOR), DUAL, IN SITU: Chronic | ICD-10-CM

## 2021-11-12 DIAGNOSIS — I25.118 CORONARY ARTERY DISEASE OF NATIVE ARTERY OF NATIVE HEART WITH STABLE ANGINA PECTORIS: Chronic | ICD-10-CM

## 2021-11-12 DIAGNOSIS — Z23 NEED FOR VACCINATION: ICD-10-CM

## 2021-11-12 DIAGNOSIS — E66.9 DIABETES MELLITUS TYPE 2 IN OBESE: Chronic | ICD-10-CM

## 2021-11-12 DIAGNOSIS — E66.01 SEVERE OBESITY (BMI 35.0-39.9) WITH COMORBIDITY: ICD-10-CM

## 2021-11-12 DIAGNOSIS — I50.42 CHRONIC COMBINED SYSTOLIC AND DIASTOLIC HEART FAILURE: Chronic | ICD-10-CM

## 2021-11-12 DIAGNOSIS — N18.32 STAGE 3B CHRONIC KIDNEY DISEASE: ICD-10-CM

## 2021-11-12 DIAGNOSIS — E78.2 MIXED HYPERLIPIDEMIA: Chronic | ICD-10-CM

## 2021-11-12 PROBLEM — R42 LIGHTHEADEDNESS: Status: RESOLVED | Noted: 2020-04-22 | Resolved: 2021-11-12

## 2021-11-12 PROCEDURE — 3008F BODY MASS INDEX DOCD: CPT | Mod: CPTII,S$GLB,, | Performed by: INTERNAL MEDICINE

## 2021-11-12 PROCEDURE — G0008 FLU VACCINE - QUADRIVALENT - ADJUVANTED: ICD-10-PCS | Mod: S$GLB,,, | Performed by: INTERNAL MEDICINE

## 2021-11-12 PROCEDURE — 1159F MED LIST DOCD IN RCRD: CPT | Mod: CPTII,S$GLB,, | Performed by: INTERNAL MEDICINE

## 2021-11-12 PROCEDURE — 3061F NEG MICROALBUMINURIA REV: CPT | Mod: CPTII,S$GLB,, | Performed by: INTERNAL MEDICINE

## 2021-11-12 PROCEDURE — 99999 PR PBB SHADOW E&M-EST. PATIENT-LVL V: CPT | Mod: PBBFAC,,, | Performed by: INTERNAL MEDICINE

## 2021-11-12 PROCEDURE — 3044F HG A1C LEVEL LT 7.0%: CPT | Mod: CPTII,S$GLB,, | Performed by: INTERNAL MEDICINE

## 2021-11-12 PROCEDURE — 90694 FLU VACCINE - QUADRIVALENT - ADJUVANTED: ICD-10-PCS | Mod: S$GLB,,, | Performed by: INTERNAL MEDICINE

## 2021-11-12 PROCEDURE — 1160F PR REVIEW ALL MEDS BY PRESCRIBER/CLIN PHARMACIST DOCUMENTED: ICD-10-PCS | Mod: CPTII,S$GLB,, | Performed by: INTERNAL MEDICINE

## 2021-11-12 PROCEDURE — 3008F PR BODY MASS INDEX (BMI) DOCUMENTED: ICD-10-PCS | Mod: CPTII,S$GLB,, | Performed by: INTERNAL MEDICINE

## 2021-11-12 PROCEDURE — 1159F PR MEDICATION LIST DOCUMENTED IN MEDICAL RECORD: ICD-10-PCS | Mod: CPTII,S$GLB,, | Performed by: INTERNAL MEDICINE

## 2021-11-12 PROCEDURE — 1126F PR PAIN SEVERITY QUANTIFIED, NO PAIN PRESENT: ICD-10-PCS | Mod: CPTII,S$GLB,, | Performed by: INTERNAL MEDICINE

## 2021-11-12 PROCEDURE — 1126F AMNT PAIN NOTED NONE PRSNT: CPT | Mod: CPTII,S$GLB,, | Performed by: INTERNAL MEDICINE

## 2021-11-12 PROCEDURE — 3044F PR MOST RECENT HEMOGLOBIN A1C LEVEL <7.0%: ICD-10-PCS | Mod: CPTII,S$GLB,, | Performed by: INTERNAL MEDICINE

## 2021-11-12 PROCEDURE — 1101F PT FALLS ASSESS-DOCD LE1/YR: CPT | Mod: CPTII,S$GLB,, | Performed by: INTERNAL MEDICINE

## 2021-11-12 PROCEDURE — 90694 VACC AIIV4 NO PRSRV 0.5ML IM: CPT | Mod: S$GLB,,, | Performed by: INTERNAL MEDICINE

## 2021-11-12 PROCEDURE — 99499 RISK ADDL DX/OHS AUDIT: ICD-10-PCS | Mod: S$GLB,,, | Performed by: INTERNAL MEDICINE

## 2021-11-12 PROCEDURE — 3066F NEPHROPATHY DOC TX: CPT | Mod: CPTII,S$GLB,, | Performed by: INTERNAL MEDICINE

## 2021-11-12 PROCEDURE — 1160F RVW MEDS BY RX/DR IN RCRD: CPT | Mod: CPTII,S$GLB,, | Performed by: INTERNAL MEDICINE

## 2021-11-12 PROCEDURE — 99215 PR OFFICE/OUTPT VISIT, EST, LEVL V, 40-54 MIN: ICD-10-PCS | Mod: 25,S$GLB,, | Performed by: INTERNAL MEDICINE

## 2021-11-12 PROCEDURE — 99999 PR PBB SHADOW E&M-EST. PATIENT-LVL V: ICD-10-PCS | Mod: PBBFAC,,, | Performed by: INTERNAL MEDICINE

## 2021-11-12 PROCEDURE — 4010F ACE/ARB THERAPY RXD/TAKEN: CPT | Mod: CPTII,S$GLB,, | Performed by: INTERNAL MEDICINE

## 2021-11-12 PROCEDURE — 99215 OFFICE O/P EST HI 40 MIN: CPT | Mod: 25,S$GLB,, | Performed by: INTERNAL MEDICINE

## 2021-11-12 PROCEDURE — 3288F PR FALLS RISK ASSESSMENT DOCUMENTED: ICD-10-PCS | Mod: CPTII,S$GLB,, | Performed by: INTERNAL MEDICINE

## 2021-11-12 PROCEDURE — 3066F PR DOCUMENTATION OF TREATMENT FOR NEPHROPATHY: ICD-10-PCS | Mod: CPTII,S$GLB,, | Performed by: INTERNAL MEDICINE

## 2021-11-12 PROCEDURE — 99499 UNLISTED E&M SERVICE: CPT | Mod: S$GLB,,, | Performed by: INTERNAL MEDICINE

## 2021-11-12 PROCEDURE — 4010F PR ACE/ARB THEARPY RXD/TAKEN: ICD-10-PCS | Mod: CPTII,S$GLB,, | Performed by: INTERNAL MEDICINE

## 2021-11-12 PROCEDURE — 3078F DIAST BP <80 MM HG: CPT | Mod: CPTII,S$GLB,, | Performed by: INTERNAL MEDICINE

## 2021-11-12 PROCEDURE — G0008 ADMIN INFLUENZA VIRUS VAC: HCPCS | Mod: S$GLB,,, | Performed by: INTERNAL MEDICINE

## 2021-11-12 PROCEDURE — 3061F PR NEG MICROALBUMINURIA RESULT DOCUMENTED/REVIEW: ICD-10-PCS | Mod: CPTII,S$GLB,, | Performed by: INTERNAL MEDICINE

## 2021-11-12 PROCEDURE — 3074F PR MOST RECENT SYSTOLIC BLOOD PRESSURE < 130 MM HG: ICD-10-PCS | Mod: CPTII,S$GLB,, | Performed by: INTERNAL MEDICINE

## 2021-11-12 PROCEDURE — 3074F SYST BP LT 130 MM HG: CPT | Mod: CPTII,S$GLB,, | Performed by: INTERNAL MEDICINE

## 2021-11-12 PROCEDURE — 3078F PR MOST RECENT DIASTOLIC BLOOD PRESSURE < 80 MM HG: ICD-10-PCS | Mod: CPTII,S$GLB,, | Performed by: INTERNAL MEDICINE

## 2021-11-12 PROCEDURE — 3288F FALL RISK ASSESSMENT DOCD: CPT | Mod: CPTII,S$GLB,, | Performed by: INTERNAL MEDICINE

## 2021-11-12 PROCEDURE — 1101F PR PT FALLS ASSESS DOC 0-1 FALLS W/OUT INJ PAST YR: ICD-10-PCS | Mod: CPTII,S$GLB,, | Performed by: INTERNAL MEDICINE

## 2021-11-12 RX ORDER — TICAGRELOR 90 MG/1
90 TABLET ORAL 2 TIMES DAILY
COMMUNITY
Start: 2021-09-09 | End: 2022-02-22

## 2021-11-12 RX ORDER — AMIODARONE HYDROCHLORIDE 200 MG/1
200 TABLET ORAL DAILY
COMMUNITY
Start: 2021-09-09 | End: 2022-01-27

## 2021-11-16 ENCOUNTER — CLINICAL SUPPORT (OUTPATIENT)
Dept: CARDIOLOGY | Facility: HOSPITAL | Age: 71
End: 2021-11-16
Payer: MEDICARE

## 2021-11-16 DIAGNOSIS — I50.9 HEART FAILURE, UNSPECIFIED: ICD-10-CM

## 2021-11-16 DIAGNOSIS — I42.9 CARDIOMYOPATHY, UNSPECIFIED: ICD-10-CM

## 2021-11-16 DIAGNOSIS — I47.20 VENTRICULAR TACHYCARDIA: ICD-10-CM

## 2021-11-16 DIAGNOSIS — Z95.810 PRESENCE OF AUTOMATIC (IMPLANTABLE) CARDIAC DEFIBRILLATOR: ICD-10-CM

## 2021-11-16 PROCEDURE — 93295 DEV INTERROG REMOTE 1/2/MLT: CPT | Mod: ,,, | Performed by: INTERNAL MEDICINE

## 2021-11-16 PROCEDURE — 93296 REM INTERROG EVL PM/IDS: CPT | Performed by: INTERNAL MEDICINE

## 2021-11-16 PROCEDURE — 93295 CARDIAC DEVICE CHECK - REMOTE: ICD-10-PCS | Mod: ,,, | Performed by: INTERNAL MEDICINE

## 2021-12-14 DIAGNOSIS — E78.5 HYPERLIPIDEMIA, UNSPECIFIED HYPERLIPIDEMIA TYPE: ICD-10-CM

## 2021-12-15 RX ORDER — ROSUVASTATIN CALCIUM 40 MG/1
40 TABLET, COATED ORAL DAILY
Qty: 90 TABLET | Refills: 3 | Status: SHIPPED | OUTPATIENT
Start: 2021-12-15 | End: 2023-04-24 | Stop reason: SDUPTHER

## 2021-12-16 ENCOUNTER — PATIENT OUTREACH (OUTPATIENT)
Dept: ADMINISTRATIVE | Facility: HOSPITAL | Age: 71
End: 2021-12-16
Payer: MEDICARE

## 2021-12-24 DIAGNOSIS — I25.118 CORONARY ARTERY DISEASE OF NATIVE ARTERY OF NATIVE HEART WITH STABLE ANGINA PECTORIS: ICD-10-CM

## 2021-12-27 ENCOUNTER — OFFICE VISIT (OUTPATIENT)
Dept: PODIATRY | Facility: CLINIC | Age: 71
End: 2021-12-27
Payer: MEDICARE

## 2021-12-27 VITALS
HEIGHT: 69 IN | WEIGHT: 238.13 LBS | BODY MASS INDEX: 35.27 KG/M2 | RESPIRATION RATE: 18 BRPM | DIASTOLIC BLOOD PRESSURE: 72 MMHG | HEART RATE: 60 BPM | SYSTOLIC BLOOD PRESSURE: 136 MMHG

## 2021-12-27 DIAGNOSIS — L84 CORN OR CALLUS: ICD-10-CM

## 2021-12-27 DIAGNOSIS — E11.9 ENCOUNTER FOR COMPREHENSIVE DIABETIC FOOT EXAMINATION, TYPE 2 DIABETES MELLITUS: Primary | ICD-10-CM

## 2021-12-27 PROCEDURE — 99999 PR PBB SHADOW E&M-EST. PATIENT-LVL IV: ICD-10-PCS | Mod: PBBFAC,,, | Performed by: PODIATRIST

## 2021-12-27 PROCEDURE — 1159F MED LIST DOCD IN RCRD: CPT | Mod: CPTII,S$GLB,, | Performed by: PODIATRIST

## 2021-12-27 PROCEDURE — 99214 OFFICE O/P EST MOD 30 MIN: CPT | Mod: S$GLB,,, | Performed by: PODIATRIST

## 2021-12-27 PROCEDURE — 1159F PR MEDICATION LIST DOCUMENTED IN MEDICAL RECORD: ICD-10-PCS | Mod: CPTII,S$GLB,, | Performed by: PODIATRIST

## 2021-12-27 PROCEDURE — 3075F SYST BP GE 130 - 139MM HG: CPT | Mod: CPTII,S$GLB,, | Performed by: PODIATRIST

## 2021-12-27 PROCEDURE — 1160F RVW MEDS BY RX/DR IN RCRD: CPT | Mod: CPTII,S$GLB,, | Performed by: PODIATRIST

## 2021-12-27 PROCEDURE — 99214 PR OFFICE/OUTPT VISIT, EST, LEVL IV, 30-39 MIN: ICD-10-PCS | Mod: S$GLB,,, | Performed by: PODIATRIST

## 2021-12-27 PROCEDURE — 3078F PR MOST RECENT DIASTOLIC BLOOD PRESSURE < 80 MM HG: ICD-10-PCS | Mod: CPTII,S$GLB,, | Performed by: PODIATRIST

## 2021-12-27 PROCEDURE — 3008F PR BODY MASS INDEX (BMI) DOCUMENTED: ICD-10-PCS | Mod: CPTII,S$GLB,, | Performed by: PODIATRIST

## 2021-12-27 PROCEDURE — 1126F AMNT PAIN NOTED NONE PRSNT: CPT | Mod: CPTII,S$GLB,, | Performed by: PODIATRIST

## 2021-12-27 PROCEDURE — 1160F PR REVIEW ALL MEDS BY PRESCRIBER/CLIN PHARMACIST DOCUMENTED: ICD-10-PCS | Mod: CPTII,S$GLB,, | Performed by: PODIATRIST

## 2021-12-27 PROCEDURE — 99999 PR PBB SHADOW E&M-EST. PATIENT-LVL IV: CPT | Mod: PBBFAC,,, | Performed by: PODIATRIST

## 2021-12-27 PROCEDURE — 3008F BODY MASS INDEX DOCD: CPT | Mod: CPTII,S$GLB,, | Performed by: PODIATRIST

## 2021-12-27 PROCEDURE — 1126F PR PAIN SEVERITY QUANTIFIED, NO PAIN PRESENT: ICD-10-PCS | Mod: CPTII,S$GLB,, | Performed by: PODIATRIST

## 2021-12-27 PROCEDURE — 3078F DIAST BP <80 MM HG: CPT | Mod: CPTII,S$GLB,, | Performed by: PODIATRIST

## 2021-12-27 PROCEDURE — 3075F PR MOST RECENT SYSTOLIC BLOOD PRESS GE 130-139MM HG: ICD-10-PCS | Mod: CPTII,S$GLB,, | Performed by: PODIATRIST

## 2021-12-27 RX ORDER — CARVEDILOL 3.12 MG/1
3.12 TABLET ORAL 2 TIMES DAILY
COMMUNITY
Start: 2021-12-11 | End: 2022-02-22 | Stop reason: SDUPTHER

## 2021-12-27 RX ORDER — LIDOCAINE HYDROCHLORIDE 20 MG/ML
JELLY TOPICAL
Qty: 30 ML | Refills: 2 | Status: SHIPPED | OUTPATIENT
Start: 2021-12-27 | End: 2023-01-23 | Stop reason: SDUPTHER

## 2021-12-27 RX ORDER — KETOCONAZOLE 20 MG/G
CREAM TOPICAL DAILY
Qty: 60 G | Refills: 2 | Status: SHIPPED | OUTPATIENT
Start: 2021-12-27 | End: 2023-01-23 | Stop reason: SDUPTHER

## 2021-12-28 ENCOUNTER — LAB VISIT (OUTPATIENT)
Dept: LAB | Facility: HOSPITAL | Age: 71
End: 2021-12-28
Attending: INTERNAL MEDICINE
Payer: MEDICARE

## 2021-12-28 ENCOUNTER — PATIENT MESSAGE (OUTPATIENT)
Dept: PODIATRY | Facility: CLINIC | Age: 71
End: 2021-12-28
Payer: MEDICARE

## 2021-12-28 DIAGNOSIS — R79.89 LOW TSH LEVEL: ICD-10-CM

## 2021-12-28 DIAGNOSIS — Z79.899 LONG TERM CURRENT USE OF AMIODARONE: ICD-10-CM

## 2021-12-28 LAB
T4 FREE SERPL-MCNC: 1.02 NG/DL (ref 0.71–1.51)
TSH SERPL DL<=0.005 MIU/L-ACNC: 0.51 UIU/ML (ref 0.4–4)

## 2021-12-28 PROCEDURE — 36415 COLL VENOUS BLD VENIPUNCTURE: CPT | Performed by: INTERNAL MEDICINE

## 2021-12-28 PROCEDURE — 84439 ASSAY OF FREE THYROXINE: CPT | Performed by: INTERNAL MEDICINE

## 2021-12-28 PROCEDURE — 84443 ASSAY THYROID STIM HORMONE: CPT | Performed by: INTERNAL MEDICINE

## 2021-12-30 ENCOUNTER — TELEPHONE (OUTPATIENT)
Dept: PODIATRY | Facility: CLINIC | Age: 71
End: 2021-12-30
Payer: MEDICARE

## 2021-12-30 RX ORDER — AMMONIUM LACTATE 12 G/100G
CREAM TOPICAL
Qty: 140 G | Refills: 11 | Status: SHIPPED | OUTPATIENT
Start: 2021-12-30 | End: 2023-01-23 | Stop reason: SDUPTHER

## 2022-01-01 NOTE — PROGRESS NOTES
Subjective:      Patient ID: Walter Bull is a 71 y.o. male.    Chief Complaint: PCP (Jerri Griffiths MD 11/12/21/), Diabetic Foot Exam, and Foot Problem (Bilateral toe pain 2nd 3rd 4th hammer toes )    Walter is a 71 y.o. male who presents to the clinic upon referral from Dr. Nelly bermeo. provider found  for evaluation and treatment of diabetic feet. Walter has a past medical history of Anemia, Anticoagulant long-term use, CHF (congestive heart failure), Colon cancer screening (2/2/2017), Coronary artery disease, Diabetes mellitus type I, Disorder of kidney and ureter, Encounter for blood transfusion, Glaucoma, Heart attack, Heart disease, Hypertension, Iron deficiency, Kidney failure, Pulmonary emphysema (2/26/2020), and S/P CABG x 5 (1/11/2017).  Patient has painful hammertoe deformities would like discussed treatment options today.  PCP: Jerri Griffiths MD    Date Last Seen by PCP: dr jerri griffiths05/21/2019    Current shoe gear: Tennis shoes    Hemoglobin A1C   Date Value Ref Range Status   11/09/2021 6.8 (H) 4.0 - 5.6 % Final     Comment:     ADA Screening Guidelines:  5.7-6.4%  Consistent with prediabetes  >or=6.5%  Consistent with diabetes    High levels of fetal hemoglobin interfere with the HbA1C  assay. Heterozygous hemoglobin variants (HbS, HgC, etc)do  not significantly interfere with this assay.   However, presence of multiple variants may affect accuracy.     09/27/2021 7.4 (H) 4.0 - 5.6 % Final     Comment:     ADA Screening Guidelines:  5.7-6.4%  Consistent with prediabetes  >or=6.5%  Consistent with diabetes    High levels of fetal hemoglobin interfere with the HbA1C  assay. Heterozygous hemoglobin variants (HbS, HgC, etc)do  not significantly interfere with this assay.   However, presence of multiple variants may affect accuracy.     05/05/2021 7.6 (H) 4.0 - 5.6 % Final     Comment:     ADA Screening Guidelines:  5.7-6.4%  Consistent with prediabetes  >or=6.5%  Consistent with diabetes    High  levels of fetal hemoglobin interfere with the HbA1C  assay. Heterozygous hemoglobin variants (HbS, HgC, etc)do  not significantly interfere with this assay.   However, presence of multiple variants may affect accuracy.             Review of Systems   Constitutional: Negative for chills, fever and malaise/fatigue.   HENT: Negative for hearing loss.    Cardiovascular: Negative for claudication.   Respiratory: Negative for shortness of breath.    Skin: Negative for dry skin, flushing and rash.   Musculoskeletal: Negative for joint pain and myalgias.   Neurological: Negative for loss of balance, numbness, paresthesias and sensory change.   Psychiatric/Behavioral: Negative for altered mental status.           Objective:      Physical Exam  Cardiovascular:      Pulses:           Dorsalis pedis pulses are 2+ on the right side and 2+ on the left side.        Posterior tibial pulses are 2+ on the right side and 2+ on the left side.      Comments: No edema noted to b/L LEs  Musculoskeletal:      Comments: Adequate joint ROM noted to all lower extremity muscle groups with no pain or crepitation noted. Muscle strength is 5/5 in all groups bilaterally.     Feet:      Right foot:      Protective Sensation: 5 sites tested. 5 sites sensed.      Left foot:      Protective Sensation: 5 sites tested. 5 sites sensed.   Skin:     General: Skin is warm.      Capillary Refill: Capillary refill takes 2 to 3 seconds.      Comments: Xerosis improved. No open lesion noted b/L  Skin temp is warm to warm from proximal to distal b/L.  Webspaces clean, dry, and intact  Nails x10 short   Neurological:      Comments: Intact gross sensation noted to b/L LEs               Assessment:       Encounter Diagnoses   Name Primary?    Encounter for comprehensive diabetic foot examination, type 2 diabetes mellitus Yes    Corn or callus          Plan:       Walter was seen today for pcp, diabetic foot exam and foot problem.    Diagnoses and all orders for  this visit:    Encounter for comprehensive diabetic foot examination, type 2 diabetes mellitus  -     DIABETIC SHOES FOR HOME USE    Corn or callus  -     DIABETIC SHOES FOR HOME USE    Other orders  -     LIDOcaine HCL 2% (XYLOCAINE) 2 % jelly; Apply topically as needed. Apply topically once nightly to affected part of foot/feet, for pain.  -     ketoconazole (NIZORAL) 2 % cream; Apply topically once daily.  -     ammonium lactate 12 % Crea; Apply twice daily to affected parts both feet as needed.      I counseled the patient on his conditions, their implications and medical management.      Conservative surgical options discussed in detail regarding hammertoe deformities.  He would like to begin with shoe gear changes as well as digital padding until nursing home at which time he would like to pursue surgical intervention.  He will contact us at that time in the meantime diabetic discussed in detail.  He will follow up in 3-6 months.

## 2022-01-01 NOTE — PROGRESS NOTES
Patient, Walter Bull (MRN #[id number]), presented with a recorded BMI of 35.16 kg/m^2 and a documented comorbidity(s):  - Diabetes Mellitus Type 2  to which the severe obesity is a contributing factor. This is consistent with the definition of severe obesity (BMI 35.0-39.9) with comorbidity (ICD-10 E66.01, Z68.35). The patient's severe obesity was monitored, evaluated, addressed and/or treated. This addendum to the medical record is made on 01/01/2022.

## 2022-01-03 ENCOUNTER — PATIENT MESSAGE (OUTPATIENT)
Dept: INTERNAL MEDICINE | Facility: CLINIC | Age: 72
End: 2022-01-03
Payer: MEDICARE

## 2022-01-03 ENCOUNTER — LAB VISIT (OUTPATIENT)
Dept: FAMILY MEDICINE | Facility: CLINIC | Age: 72
End: 2022-01-03
Payer: MEDICARE

## 2022-01-03 ENCOUNTER — PATIENT MESSAGE (OUTPATIENT)
Dept: ADMINISTRATIVE | Facility: OTHER | Age: 72
End: 2022-01-03
Payer: MEDICARE

## 2022-01-03 DIAGNOSIS — Z11.52 ENCOUNTER FOR SCREENING FOR COVID-19: ICD-10-CM

## 2022-01-03 LAB
CTP QC/QA: YES
SARS-COV-2 AG RESP QL IA.RAPID: NEGATIVE

## 2022-01-03 PROCEDURE — 87811 SARS CORONAVIRUS 2 ANTIGEN POCT, MANUAL READ: ICD-10-PCS | Mod: S$GLB,,, | Performed by: INTERNAL MEDICINE

## 2022-01-03 PROCEDURE — 87811 SARS-COV-2 COVID19 W/OPTIC: CPT | Mod: S$GLB,,, | Performed by: INTERNAL MEDICINE

## 2022-01-03 NOTE — PROGRESS NOTES
Instructions for Patients with Confirmed or Suspected COVID-19    If you are awaiting your test result, you will either be called or it will be released to the patient portal.  If you have any questions about your test, please visit [email or web address] or call our COVID-19 information line at [phone].      Please isolate yourself at home.  You may leave home and/or return to work once the following conditions are met:    If you have symptoms and tested positive:   More than 5 days since symptoms first appeared AND   More than 24 hours fever free without medications AND       symptoms have improved   · For five days after ending isolation, masks are required.    If you had no symptoms but tested positive:   More than 5 days since the date of the first positive test. If you develop symptoms, then use the guidelines above  · For five days after ending isolation, masks are required.      Testing is not recommended if you are symptom free after completing isolation.

## 2022-01-04 RX ORDER — RANOLAZINE 1000 MG/1
1000 TABLET, EXTENDED RELEASE ORAL 2 TIMES DAILY
Qty: 60 TABLET | Refills: 11 | Status: SHIPPED | OUTPATIENT
Start: 2022-01-04 | End: 2022-01-10 | Stop reason: SDUPTHER

## 2022-01-10 DIAGNOSIS — I25.118 CORONARY ARTERY DISEASE OF NATIVE ARTERY OF NATIVE HEART WITH STABLE ANGINA PECTORIS: ICD-10-CM

## 2022-01-11 DIAGNOSIS — Z95.1 S/P CABG X 5: ICD-10-CM

## 2022-01-11 DIAGNOSIS — I25.118 CORONARY ARTERY DISEASE OF NATIVE ARTERY OF NATIVE HEART WITH STABLE ANGINA PECTORIS: ICD-10-CM

## 2022-01-11 RX ORDER — RANOLAZINE 1000 MG/1
1000 TABLET, EXTENDED RELEASE ORAL 2 TIMES DAILY
Qty: 60 TABLET | Refills: 11 | Status: SHIPPED | OUTPATIENT
Start: 2022-01-11 | End: 2022-12-16

## 2022-01-12 NOTE — TELEPHONE ENCOUNTER
No new care gaps identified.  Powered by [employer]. Reference number: [id number].   1/11/2022 10:16:51 PM CST

## 2022-01-13 ENCOUNTER — PES CALL (OUTPATIENT)
Dept: ADMINISTRATIVE | Facility: CLINIC | Age: 72
End: 2022-01-13
Payer: MEDICARE

## 2022-01-17 NOTE — TELEPHONE ENCOUNTER
No new care gaps identified.  Powered by [employer]. Reference number: [id number].   1/16/2022 9:38:16 PM CST

## 2022-01-18 DIAGNOSIS — I49.8 OTHER SPECIFIED CARDIAC ARRHYTHMIAS: Primary | ICD-10-CM

## 2022-01-19 RX ORDER — NITROGLYCERIN 0.4 MG/1
TABLET SUBLINGUAL
Qty: 25 TABLET | Refills: 11 | Status: SHIPPED | OUTPATIENT
Start: 2022-01-19 | End: 2022-06-22 | Stop reason: SDUPTHER

## 2022-01-19 NOTE — PROGRESS NOTES
Mr. Bull is a patient of Dr. Glasgow and was last seen in clinic 7/6/2021.      Subjective:   Patient ID:  Walter Bull is a 71 y.o. male who presents for follow-up of ICD  .     HPI:    Mr. Bull is a 71 y.o. 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      Background:      General cardiologist: Dr. Agustin Capone     67 y/o man with prior 5v CABG (2005) (LIMA to LAD, SVG to Diag), known  to RCA and LCx and recent admission to outside hospital with NSTEMI, CKD II, HTN, HLD, DM presented to the hospital 11/16/2018 with acute onset of chest pain and new onset LBBB. Had LHC at OSH and has been managed medically. Has LVEF 35%, no ICD. Was about to perform lawn work when he started having acute onset chest pain, similar in nature to his prior MI. Also noted palpitations which he had not experienced before, has never experienced syncope. 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 He denies orthopnea, PND, peripheral edema or GREENWOOD. There is no family history of sudden cardiac death.     Patient presented in wide complex tachycardia. Cardiology called for assistance. He continued to have 9-10/10 chest pain like an elephant sitting on his chest. Received NTG x 2 and ASA 325mg, was taken to the cath lab and no new occlusions were identified. The patient received lidocaine for a slow MMVT which restored NSR with a narrow complex. LHC found  that is known and the LIMA to LAD and SVG to D1 grafts are patent. Had inducible sustained VT when pigtail catheter was inserted into the LV, VT terminated with lidocaine bolus. Started on amiodarone gtt.      On 11/19/2018 he underwent EPS. Patient easily inducible for VT (LBLS  ms) with ventricular double extrastimuli. Underwent successful dual chamber ICD implantation ([employer]).    [place] well at clinic follow up 2/2019.    Clinic visit 11/2019:  No  "arrhythmia noted. On amiodarone for VT. LFTs WNL. Needs updated TSH and PFTs. No RV pacing. No acute CHF exacerbation.     12/2020 clinic visit: No RV pacing.  Had some palps and high BP - coreg increased today. No arrhythmia noted on device.  LBBB on EKG. QRS wider than prior EKG, but he has a history of LBBB (see EKG 11/16/2018).   On amiodarone for VT. Recheck PFTs, echo.    5/24/2021: Hospitalized with orthostatic hypotension. Improved when his meds were reduced.     7/6/2021: Stable from a device perspective with stable lead and device function.   No arrhythmia noted. Pt with chronic fatigue. Per cardiology, consider reducing amiodarone (easily inducible VT during EPS in 11/2018 but no subsequent VT on device). Will discuss with Dr. Glasgow. (UPDATE: Can stop amio). Recent Echo shows EF of 40% which is close to his baseline. LBBB with QRS 180ms. CHF symptoms stable NYHA II. Confirm that will continue to defer CRT upgrade. (UPDATE: No CRT upgrade at this time)    Update (01/27/2022):    Not on CPAP due to recall.     Today he reports chronic mild CP resolved with nitro. Did have on CP event after eating a brownie that felt different and lasted an hour, but resolved by the time he went home and he continued to work on his yard for the rest of the afternoon without pain. Chronic GREENWOOD. Some palpitations. Felt too dizzy on increased dose of coreg. This has now resolved. No syncope.      Device Interrogation (1/27/2022) reveals an intrinsic SR with stable lead and device function. "AMS" x6, max 1 hr 53 min, egrams c/w AT. Some p waves falling into refractory and competitive a pacing. No retrograde conduction during testing. No ventricular arrhythmias. He paces 73% in the RA and 0% in the RV. Estimated battery longevity 5 years.     I have personally reviewed the patient's EKG today, which shows APVS at 60bpm. MN interval is 264. QRS is 124. QT is 466.    Relevant Cardiac Test Results:    2D Echo (6/30/2021):  · The left " ventricle is normal in size with eccentric hypertrophy and mildly decreased systolic function. The estimated ejection fraction is 40%.  · The quantitatively derived ejection fraction is 42%.  · There is left ventricular global hypokinesis.  · There is abnormal septal wall motion.  · Grade I left ventricular diastolic dysfunction.  · Normal right ventricular size with normal right ventricular systolic function.  · Mild biatrial enlargement.  · Mild mitral regurgitation.  · Mild tricuspid regurgitation.  · The estimated PA systolic pressure is 33 mmHg.  · Normal central venous pressure (3 mmHg).    Current Outpatient Medications   Medication Sig    ammonium lactate 12 % Crea Apply twice daily to affected parts both feet as needed.    aspirin (ECOTRIN) 81 MG EC tablet Take 1 tablet (81 mg total) by mouth once daily.    baclofen (LIORESAL) 5 mg Tab tablet Take 1 tablet (5 mg total) by mouth 2 (two) times daily.    blood-glucose sensor (DEXCOM G6 SENSOR) [place] 3 each by Misc.(Non-Drug; Combo Route) route continuous.    blood-glucose transmitter (DEXCOM G6 TRANSMITTER) [place] 1 each by Misc.(Non-Drug; Combo Route) route continuous.    BRILINTA 90 mg tablet Take 90 mg by mouth 2 (two) times daily.    carvediloL (COREG) 3.125 MG tablet Take 3.125 mg by mouth 2 (two) times daily.    clopidogreL (PLAVIX) 75 mg tablet Take 1 tablet (75 mg total) by mouth once daily.    esomeprazole (NEXIUM) 40 MG capsule Take 1 capsule (40 mg total) by mouth 2 (two) times daily.    ezetimibe (ZETIA) 10 mg tablet TAKE 1 TABLET BY MOUTH ONCE DAILY    ferrous sulfate (FEOSOL) 325 mg (65 mg iron) Tab tablet TAKE 1 TABLET BY MOUTH THREE TIMES DAILY (Patient taking differently: Take 325 mg by mouth 2 (two) times daily. TAKE 1 TABLET BY MOUTH THREE TIMES DAILY)    GVOKE HYPOPEN 2-PACK 1 mg/0.2 mL AtIn INJECT SUBCUTANEOUSLY  CONTENTS OF 1 AUTO-INJECTOR AS NEEDED FOR HYPOGLCEMIA  AS DIRECTED    insulin lispro (HUMALOG U-100 INSULIN) 100  unit/mL injection USE AS DIRECTED VIA V-GO 20    ketoconazole (NIZORAL) 2 % cream Apply topically once daily.    LIDOcaine HCL 2% (XYLOCAINE) 2 % jelly Apply topically as needed. Apply topically once nightly to affected part of foot/feet, for pain.    losartan (COZAAR) 25 MG tablet Take 0.5 tablets (12.5 mg total) by mouth once daily.    nitroGLYCERIN (NITROSTAT) 0.4 MG SL tablet DISSOLVE 1 TABLET UNDER THE TONGUE EVERY 5 MINUTES AS  NEEDED FOR CHEST PAIN. MAX  OF 3 TABLETS IN 15 MINUTES. CALL 911 IF PAIN PERSISTS.    ondansetron (ZOFRAN) 4 MG tablet Take 1 tablet (4 mg total) by mouth every 8 (eight) hours as needed for Nausea.    papaverine 30 mg/mL injection Add: Phentolamine 1 mg  Add: PGE1 10 mcg    Sig:  Inject 20 units as directed; titrate up by 5 units until desired results    ranolazine (RANEXA) 1,000 mg Tb12 Take 1 tablet (1,000 mg total) by mouth 2 (two) times daily.    rosuvastatin (CRESTOR) 40 MG Tab Take 1 tablet (40 mg total) by mouth once daily.    tamsulosin (FLOMAX) 0.4 mg Cap Take 1 capsule (0.4 mg total) by mouth once daily.    TRULICITY 4.5 mg/0.5 mL pen injector INJECT 4.5 MG INTO THE SKIN EVERY 7 DAYS    V-GO 20 [place] USE AS DIRECTED ONCE DAILY     No current facility-administered medications for this visit.       Review of Systems   Constitutional: Negative for malaise/fatigue.   Cardiovascular: Positive for chest pain (chronic), dyspnea on exertion (chronic) and palpitations. Negative for irregular heartbeat and leg swelling.   Respiratory: Negative for shortness of breath.    Hematologic/Lymphatic: Negative for bleeding problem.   Skin: Negative for rash.   Musculoskeletal: Negative for myalgias.   Gastrointestinal: Negative for hematemesis, hematochezia and nausea.   Genitourinary: Negative for hematuria.   Neurological: Negative for light-headedness.   Psychiatric/Behavioral: Negative for altered mental status.   Allergic/Immunologic: Negative for persistent infections.  "      Objective:          /68   Pulse 60   Ht 5' 9" (1.753 m)   Wt 113.7 kg (250 lb 10.6 oz)   BMI 37.02 kg/m²     Physical Exam  Vitals and nursing note reviewed.   Constitutional:       Appearance: Normal appearance. He is well-developed and well-nourished.   HENT:      Head: Normocephalic.      Nose: Nose normal.   Eyes:      Pupils: Pupils are equal, round, and reactive to light.   Cardiovascular:      Rate and Rhythm: Normal rate and regular rhythm.   Pulmonary:      Effort: No respiratory distress.      Breath sounds: Normal breath sounds.   Chest:      Comments: Device to LUCW. Incision and pocket in good repair.    Musculoskeletal:         General: No edema. Normal range of motion.   Skin:     General: Skin is warm and dry.      Findings: No erythema.   Neurological:      Mental Status: He is alert and oriented to person, place, and time.   Psychiatric:         Mood and Affect: Mood and affect normal.         Speech: Speech normal.         Behavior: Behavior normal.       Lab Results   Component Value Date     11/09/2021    K 4.7 11/09/2021    MG 1.8 05/26/2021    BUN 9 11/09/2021    CREATININE 1.3 11/09/2021    ALT 35 11/09/2021    AST 33 11/09/2021    HGB 12.3 (L) 11/09/2021    HCT 38.5 (L) 11/09/2021    HCT 33 (L) 06/24/2019    TSH 0.509 12/28/2021    LDLCALC 80.4 11/09/2021       Recent Labs   Lab 05/17/19  2043 10/05/19  2023   INR 1.0 1.0         Assessment:     1. ICD (implantable cardioverter-defibrillator), dual, in situ    2. Ventricular tachycardia    3. Ischemic cardiomyopathy    4. Benign essential HTN    5. Long term current use of amiodarone    6. ANURADHA (obstructive sleep apnea)    7. Other emphysema      Plan:     In summary, Mr. Blul is a 71 y.o. 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   Mr. Bull is doing well from a device perspective with stable lead and device function. Has been off amiodarone since " "7/2021. No ventricular arrhythmia noted since he stopped this medication. On carvedilol. "AMS" event likely ST - he is very active. Chronic CP, GREENWOOD. He was told he has pulmonary emphysema but has never had this evaluated - refer to pulmonary. No RV pacing. No CHF symptoms. Did have one CP event that sounded more GI and he is overdue for GI follow up. He will continue to reach out to sleep medicine re: new CPAP. QRS is narrowed relative to prior EKG.    GI follow up  Pulm consult  Sleep medicine re: CPAP  Continue current medication regimen and device settings.   Follow up in device clinic as scheduled.   Follow up in EP clinic in 6 mo, sooner as needed.     *A copy of this note has been sent to Dr. Glasgow*    Follow up in about 6 months (around 7/27/2022).    ------------------------------------------------------------------    MAISHA De La Torre, NP-C  Cardiac Electrophysiology    "

## 2022-01-19 NOTE — TELEPHONE ENCOUNTER
Refill Routing Note   Medication(s) are not appropriate for processing by [facility]:    - Drug-Disease Interaction (nitroGLYCERIN and Iron deficiency anemia)           Medication reconciliation completed: No      Automatic Epic Protocol Generated Data:    Requested Prescriptions   Pending Prescriptions Disp Refills    nitroGLYCERIN (NITROSTAT) 0.4 MG SL tablet [Pharmacy Med Name: Nitroglycerin 0.4 MG Sublingual Tablet Sublingual] 25 tablet 11     Sig: DISSOLVE 1 TABLET UNDER THE TONGUE EVERY 5 MINUTES AS  NEEDED FOR CHEST PAIN. MAX  OF 3 TABLETS IN 15 MINUTES. CALL 911 IF PAIN PERSISTS.       Cardiovascular: Nitrates Passed - 1/11/2022 10:15 PM        Passed - Patient is at least 18 years old        Passed - BP > 90/50 mmH     BP Readings from Last 1 Encounters:   12/27/21 136/72               Passed - Valid encounter within last 15 months     Recent Visits  Date Type Provider Dept   11/12/21 Office Visit Belkys Kruger MD [facility] Internal Medicine II   05/12/21 Office Visit Belkys Kruger MD [facility] Internal Medicine II   12/15/20 Office Visit Belkys Kruger MD [facility] Internal Medicine II   11/17/20 Office Visit Belkys Kruger MD [facility] Internal Medicine II   09/02/20 Office Visit Belkys Kruger MD [facility] Internal Medicine II   08/17/20 Office Visit Belkys Kruger MD [facility] Internal Medicine II   05/26/20 Office Visit Belkys Kruger MD [facility] Internal Medicine II   04/21/20 Office Visit Belkys Kruger MD [facility] Internal Medicine II   Showing recent visits within past 720 days and meeting all other requirements  Future Appointments  No visits were found meeting these conditions.  Showing future appointments within next 150 days and meeting all other requirements      Future Appointments              In 1 week COORDINATED DEVICE CHECK [facility] - [facility] 3rd Fl, [facility]    In 1 week EKG, APPT [facility] Fl, [facility]    In 1 week CATHIE De La Torre [id number], [facility]    In  1 month SPECIMEN, [facility], [facility]    In 2 months LACI JonesHPriscilla [id number], [facility]    In 3 months LAB, [facility], [facility]    In 3 months [facility] [place] [facility]                      Appointments  past 12m or future 3m with PCP    Date Provider   Last Visit   11/12/2021 Belkys Kruger MD   Next Visit   1/16/2022 Belkys Kruger MD   ED visits in past 90 days: 0     Note composed:10:17 PM 01/18/2022

## 2022-01-21 ENCOUNTER — PES CALL (OUTPATIENT)
Dept: ADMINISTRATIVE | Facility: CLINIC | Age: 72
End: 2022-01-21
Payer: MEDICARE

## 2022-01-26 ENCOUNTER — TELEPHONE (OUTPATIENT)
Dept: ELECTROPHYSIOLOGY | Facility: CLINIC | Age: 72
End: 2022-01-26
Payer: MEDICARE

## 2022-01-26 ENCOUNTER — OFFICE VISIT (OUTPATIENT)
Dept: INTERNAL MEDICINE | Facility: CLINIC | Age: 72
End: 2022-01-26
Payer: MEDICARE

## 2022-01-26 VITALS
RESPIRATION RATE: 16 BRPM | HEART RATE: 68 BPM | BODY MASS INDEX: 36.77 KG/M2 | SYSTOLIC BLOOD PRESSURE: 124 MMHG | WEIGHT: 248.25 LBS | HEIGHT: 69 IN | DIASTOLIC BLOOD PRESSURE: 68 MMHG

## 2022-01-26 DIAGNOSIS — I25.118 CORONARY ARTERY DISEASE OF NATIVE ARTERY OF NATIVE HEART WITH STABLE ANGINA PECTORIS: Chronic | ICD-10-CM

## 2022-01-26 DIAGNOSIS — Z95.810 ICD (IMPLANTABLE CARDIOVERTER-DEFIBRILLATOR), DUAL, IN SITU: Chronic | ICD-10-CM

## 2022-01-26 DIAGNOSIS — E11.69 DIABETES MELLITUS TYPE 2 IN OBESE: Chronic | ICD-10-CM

## 2022-01-26 DIAGNOSIS — Z00.00 ENCOUNTER FOR PREVENTIVE HEALTH EXAMINATION: ICD-10-CM

## 2022-01-26 DIAGNOSIS — I47.10 SVT (SUPRAVENTRICULAR TACHYCARDIA): ICD-10-CM

## 2022-01-26 DIAGNOSIS — Z79.4 TYPE 2 DIABETES MELLITUS WITH STAGE 3A CHRONIC KIDNEY DISEASE, WITH LONG-TERM CURRENT USE OF INSULIN: ICD-10-CM

## 2022-01-26 DIAGNOSIS — E11.22 TYPE 2 DIABETES MELLITUS WITH STAGE 3A CHRONIC KIDNEY DISEASE, WITH LONG-TERM CURRENT USE OF INSULIN: ICD-10-CM

## 2022-01-26 DIAGNOSIS — K76.0 FATTY LIVER: ICD-10-CM

## 2022-01-26 DIAGNOSIS — S68.123S: ICD-10-CM

## 2022-01-26 DIAGNOSIS — D50.9 IRON DEFICIENCY ANEMIA, UNSPECIFIED IRON DEFICIENCY ANEMIA TYPE: ICD-10-CM

## 2022-01-26 DIAGNOSIS — N18.31 TYPE 2 DIABETES MELLITUS WITH STAGE 3A CHRONIC KIDNEY DISEASE, WITH LONG-TERM CURRENT USE OF INSULIN: ICD-10-CM

## 2022-01-26 DIAGNOSIS — E66.9 DIABETES MELLITUS TYPE 2 IN OBESE: Chronic | ICD-10-CM

## 2022-01-26 DIAGNOSIS — Z95.1 S/P CABG (CORONARY ARTERY BYPASS GRAFT): Primary | ICD-10-CM

## 2022-01-26 DIAGNOSIS — I50.42 CHRONIC COMBINED SYSTOLIC AND DIASTOLIC HEART FAILURE: Chronic | ICD-10-CM

## 2022-01-26 DIAGNOSIS — H91.93 BILATERAL HEARING LOSS, UNSPECIFIED HEARING LOSS TYPE: ICD-10-CM

## 2022-01-26 DIAGNOSIS — I70.0 AORTIC ATHEROSCLEROSIS: ICD-10-CM

## 2022-01-26 DIAGNOSIS — N18.31 STAGE 3A CHRONIC KIDNEY DISEASE: ICD-10-CM

## 2022-01-26 DIAGNOSIS — I10 PRIMARY HYPERTENSION: ICD-10-CM

## 2022-01-26 DIAGNOSIS — E11.42 DIABETIC POLYNEUROPATHY ASSOCIATED WITH TYPE 2 DIABETES MELLITUS: ICD-10-CM

## 2022-01-26 DIAGNOSIS — E66.01 SEVERE OBESITY (BMI 35.0-39.9) WITH COMORBIDITY: ICD-10-CM

## 2022-01-26 DIAGNOSIS — J41.0 SIMPLE CHRONIC BRONCHITIS: ICD-10-CM

## 2022-01-26 DIAGNOSIS — N25.81 HYPERPARATHYROIDISM DUE TO RENAL INSUFFICIENCY: ICD-10-CM

## 2022-01-26 PROBLEM — D69.6 THROMBOCYTOPENIA: Status: RESOLVED | Noted: 2021-05-12 | Resolved: 2022-01-26

## 2022-01-26 PROCEDURE — 3078F DIAST BP <80 MM HG: CPT | Mod: CPTII,S$GLB,, | Performed by: NURSE PRACTITIONER

## 2022-01-26 PROCEDURE — 3288F FALL RISK ASSESSMENT DOCD: CPT | Mod: CPTII,S$GLB,, | Performed by: NURSE PRACTITIONER

## 2022-01-26 PROCEDURE — G0439 PR MEDICARE ANNUAL WELLNESS SUBSEQUENT VISIT: ICD-10-PCS | Mod: S$GLB,,, | Performed by: NURSE PRACTITIONER

## 2022-01-26 PROCEDURE — 3074F PR MOST RECENT SYSTOLIC BLOOD PRESSURE < 130 MM HG: ICD-10-PCS | Mod: CPTII,S$GLB,, | Performed by: NURSE PRACTITIONER

## 2022-01-26 PROCEDURE — 3008F PR BODY MASS INDEX (BMI) DOCUMENTED: ICD-10-PCS | Mod: CPTII,S$GLB,, | Performed by: NURSE PRACTITIONER

## 2022-01-26 PROCEDURE — 1160F RVW MEDS BY RX/DR IN RCRD: CPT | Mod: CPTII,S$GLB,, | Performed by: NURSE PRACTITIONER

## 2022-01-26 PROCEDURE — 1126F PR PAIN SEVERITY QUANTIFIED, NO PAIN PRESENT: ICD-10-PCS | Mod: CPTII,S$GLB,, | Performed by: NURSE PRACTITIONER

## 2022-01-26 PROCEDURE — 1159F PR MEDICATION LIST DOCUMENTED IN MEDICAL RECORD: ICD-10-PCS | Mod: CPTII,S$GLB,, | Performed by: NURSE PRACTITIONER

## 2022-01-26 PROCEDURE — 99999 PR PBB SHADOW E&M-EST. PATIENT-LVL V: CPT | Mod: PBBFAC,,, | Performed by: NURSE PRACTITIONER

## 2022-01-26 PROCEDURE — 1160F PR REVIEW ALL MEDS BY PRESCRIBER/CLIN PHARMACIST DOCUMENTED: ICD-10-PCS | Mod: CPTII,S$GLB,, | Performed by: NURSE PRACTITIONER

## 2022-01-26 PROCEDURE — 1101F PT FALLS ASSESS-DOCD LE1/YR: CPT | Mod: CPTII,S$GLB,, | Performed by: NURSE PRACTITIONER

## 2022-01-26 PROCEDURE — 1170F FXNL STATUS ASSESSED: CPT | Mod: CPTII,S$GLB,, | Performed by: NURSE PRACTITIONER

## 2022-01-26 PROCEDURE — 3078F PR MOST RECENT DIASTOLIC BLOOD PRESSURE < 80 MM HG: ICD-10-PCS | Mod: CPTII,S$GLB,, | Performed by: NURSE PRACTITIONER

## 2022-01-26 PROCEDURE — G0439 PPPS, SUBSEQ VISIT: HCPCS | Mod: S$GLB,,, | Performed by: NURSE PRACTITIONER

## 2022-01-26 PROCEDURE — 3288F PR FALLS RISK ASSESSMENT DOCUMENTED: ICD-10-PCS | Mod: CPTII,S$GLB,, | Performed by: NURSE PRACTITIONER

## 2022-01-26 PROCEDURE — 1170F PR FUNCTIONAL STATUS ASSESSED: ICD-10-PCS | Mod: CPTII,S$GLB,, | Performed by: NURSE PRACTITIONER

## 2022-01-26 PROCEDURE — 1126F AMNT PAIN NOTED NONE PRSNT: CPT | Mod: CPTII,S$GLB,, | Performed by: NURSE PRACTITIONER

## 2022-01-26 PROCEDURE — 1101F PR PT FALLS ASSESS DOC 0-1 FALLS W/OUT INJ PAST YR: ICD-10-PCS | Mod: CPTII,S$GLB,, | Performed by: NURSE PRACTITIONER

## 2022-01-26 PROCEDURE — 1159F MED LIST DOCD IN RCRD: CPT | Mod: CPTII,S$GLB,, | Performed by: NURSE PRACTITIONER

## 2022-01-26 PROCEDURE — 3008F BODY MASS INDEX DOCD: CPT | Mod: CPTII,S$GLB,, | Performed by: NURSE PRACTITIONER

## 2022-01-26 PROCEDURE — 99999 PR PBB SHADOW E&M-EST. PATIENT-LVL V: ICD-10-PCS | Mod: PBBFAC,,, | Performed by: NURSE PRACTITIONER

## 2022-01-26 PROCEDURE — 3074F SYST BP LT 130 MM HG: CPT | Mod: CPTII,S$GLB,, | Performed by: NURSE PRACTITIONER

## 2022-01-26 NOTE — PATIENT INSTRUCTIONS
Counseling and Referral of Other Preventative  (Italic type indicates deductible and co-insurance are waived)    Patient Name: Walter Bull  Today's Date: 1/26/2022    Health Maintenance       Date Due Completion Date    Shingles Vaccine (1 of 2) Never done ---    Hemoglobin A1c 02/09/2022 11/9/2021    Eye Exam 04/15/2022 4/15/2021    Override on 3/10/2018: Done [facility])    Override on 6/24/2017: Done    Foot Exam 05/27/2022 5/27/2021 (Done)    Override on 5/27/2021: Done    Override on 6/26/2019: Done (with [facility] pod[facility]rist)    Override on 1/17/2018: Done    Diabetes Urine Screening 11/09/2022 11/9/2021    Lipid Panel 11/09/2022 11/9/2021    High Dose Statin 01/26/2023 1/26/2022    Aspirin/Antiplatelet Therapy 01/26/2023 1/26/2022    Colorectal Cancer Screening 02/02/2027 2/2/2017    TETANUS VACCINE 05/26/2030 5/26/2020        No orders of the defined types were placed in this encounter.    The following information is provided to all patients.  This information is to help you find resources for any of the problems found today that may be affecting your health:                Living healthy guide: www.[facility].[place].gov      Understanding Diabetes: www.diabetes.org      Eating healthy: www.cdc.gov/healthyweight      CDC home safety checklist: www.cdc.gov/steadi/patient.html      Agency on Aging: www.goea.[place].gov      Alcoholics anonymous (AA): www.aa.org      Physical Activity: www.shahbaz.nih.gov/hs0ddqs      Tobacco use: www.quitwithusla.org

## 2022-01-26 NOTE — PROGRESS NOTES
"  Walter Bull presented for a  Medicare AWV and comprehensive Health Risk Assessment today. The following components were reviewed and updated:    · Medical history  · Family History  · Social history  · Allergies and Current Medications  · Health Risk Assessment  · Health Maintenance  · Care Team         ** See Completed Assessments for Annual Wellness Visit within the encounter summary.**         The following assessments were completed:  · Living Situation  · CAGE  · Depression Screening  · Timed Get Up and Go  · Whisper Test  · Cognitive Function Screening  · Nutrition Screening  · ADL Screening  · PAQ Screening        Vitals:    01/26/22 0742   BP: 124/68   Pulse: 68   Resp: 16   Weight: 112.6 kg (248 lb 3.8 oz)   Height: 5' 9" (1.753 m)     Body mass index is 36.66 kg/m².  Physical Exam  Vitals and nursing note reviewed.   Constitutional:       Appearance: He is obese.   HENT:      Head: Normocephalic.   Cardiovascular:      Rate and Rhythm: Normal rate and regular rhythm.      Heart sounds: No murmur heard.      Pulmonary:      Effort: Pulmonary effort is normal. No respiratory distress.      Breath sounds: Normal breath sounds. No wheezing or rales.   Abdominal:      General: There is no distension.      Palpations: Abdomen is soft. There is no mass.   Musculoskeletal:         General: No swelling. Normal range of motion.   Skin:     General: Skin is warm and dry.      Capillary Refill: Capillary refill takes less than 2 seconds.   Neurological:      General: No focal deficit present.      Mental Status: He is alert and oriented to person, place, and time.   Psychiatric:         Mood and Affect: Mood normal.         Behavior: Behavior normal.         Thought Content: Thought content normal.         Judgment: Judgment normal.               Diagnoses and health risks identified today and associated recommendations/orders:    1. S/P CABG (coronary artery bypass graft)  Sees cardiology  On gallo gallegos" losartan, amiodarone, zetia, plavix, and asa     2. Iron deficiency anemia, unspecified iron deficiency anemia type  Lab Results   Component Value Date    WBC 5.41 11/09/2021    HGB 12.3 (L) 11/09/2021    HCT 38.5 (L) 11/09/2021    MCV 96 11/09/2021     11/09/2021           3. ICD (implantable cardioverter-defibrillator), dual, in situ  Sees cardiology  On crestor, ranexa, losartan, amiodarone, zetia, plavix, and asa     4. Primary hypertension  Well controlled 124/68 On crestor, ranexa, losartan, amiodarone, zetia, plavix, and asa    5. Diabetes mellitus type 2 in obese  Lab Results   Component Value Date    HGBA1C 6.8 (H) 11/09/2021       6. Diabetic polyneuropathy associated with type 2 diabetes mellitus  Lab Results   Component Value Date    HGBA1C 6.8 (H) 11/09/2021       7. Coronary artery disease of native artery of native heart with stable angina pectoris  Sees cardiology  On crestor, ranexa, losartan, amiodarone, zetia, plavix, and asa     8. Chronic combined systolic and diastolic heart failure  Sees cardiology  On crestor, ranexa, losartan, amiodarone, zetia, plavix, and asa     9. Aortic atherosclerosis  Asa, plavix and statin    10. Fatty liver  Noted on U/s 4/2021   Lab Results   Component Value Date    ALT 35 11/09/2021    AST 33 11/09/2021    ALKPHOS 55 11/09/2021    BILITOT 0.6 11/09/2021         11. Hyperparathyroidism due to renal insufficiency  Sees Endo for this   Lab Results   Component Value Date    .1 (H) 09/17/2021    CALCIUM 9.2 11/09/2021    PHOS 2.5 (L) 09/17/2021         12. Severe obesity (BMI 35.0-39.9) with comorbidity  Noted- encouraged exercise     13. Simple chronic bronchitis  Has in haler as needed  CXR >chronic lung changes and scarring     14. Stage 3a chronic kidney disease  BMP  Lab Results   Component Value Date     11/09/2021    K 4.7 11/09/2021     11/09/2021    CO2 27 11/09/2021    BUN 9 11/09/2021    CREATININE 1.3 11/09/2021    CALCIUM 9.2  11/09/2021    ANIONGAP 6 (L) 11/09/2021    ESTGFRAFRICA >60 11/09/2021    EGFRNONAA 55 (A) 11/09/2021         15. SVT (supraventricular tachycardia)  Has pace maker and defibrillator     16. 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 Bilateral hearing loss, unspecified hearing loss type  Using hearing aids     18. Type 2 diabetes mellitus with stage 3a chronic kidney disease, with long-term current use of insulin  V go, trulicity, dexcom  Lab Results   Component Value Date    HGBA1C 6.8 (H) 11/09/2021         19. Encounter for preventive health examination  Discussed shingrix       Provided Walter with a 5-10 year written screening schedule and personal prevention plan. Recommendations were developed using the USPSTF age appropriate recommendations. Education, counseling, and referrals were provided as needed. After Visit Summary printed and given to patient which includes a list of additional screenings\tests needed.    No follow-ups on file.    Anastasia Patel, CATHIE    I offered to discuss advanced care planning, including how to pick a person who would make decisions for you if you were unable to make them for yourself, called a health care power of , and what kind of decisions you might make such as use of life sustaining treatments such as ventilators and tube feeding when faced with a life limiting illness recorded on a living will that they will need to know. (How you want to be cared for as you near the end of your natural life)     X Patient is interested in learning more about how to make advanced directives.  I provided them paperwork and offered to discuss this with them.

## 2022-01-27 ENCOUNTER — CLINICAL SUPPORT (OUTPATIENT)
Dept: CARDIOLOGY | Facility: HOSPITAL | Age: 72
End: 2022-01-27
Attending: INTERNAL MEDICINE
Payer: MEDICARE

## 2022-01-27 ENCOUNTER — HOSPITAL ENCOUNTER (OUTPATIENT)
Dept: CARDIOLOGY | Facility: CLINIC | Age: 72
Discharge: HOME | End: 2022-01-27
Payer: MEDICARE

## 2022-01-27 ENCOUNTER — OFFICE VISIT (OUTPATIENT)
Dept: ELECTROPHYSIOLOGY | Facility: CLINIC | Age: 72
End: 2022-01-27
Payer: MEDICARE

## 2022-01-27 VITALS
BODY MASS INDEX: 37.13 KG/M2 | HEART RATE: 60 BPM | SYSTOLIC BLOOD PRESSURE: 139 MMHG | WEIGHT: 250.69 LBS | DIASTOLIC BLOOD PRESSURE: 68 MMHG | HEIGHT: 69 IN

## 2022-01-27 DIAGNOSIS — J43.8 OTHER EMPHYSEMA: ICD-10-CM

## 2022-01-27 DIAGNOSIS — I49.8 OTHER SPECIFIED CARDIAC ARRHYTHMIAS: ICD-10-CM

## 2022-01-27 DIAGNOSIS — G47.33 OSA (OBSTRUCTIVE SLEEP APNEA): ICD-10-CM

## 2022-01-27 DIAGNOSIS — I49.8 OTHER SPECIFIED CARDIAC ARRHYTHMIAS: Primary | ICD-10-CM

## 2022-01-27 DIAGNOSIS — Z79.899 LONG TERM CURRENT USE OF AMIODARONE: ICD-10-CM

## 2022-01-27 DIAGNOSIS — I10 BENIGN ESSENTIAL HTN: Chronic | ICD-10-CM

## 2022-01-27 DIAGNOSIS — Z95.810 ICD (IMPLANTABLE CARDIOVERTER-DEFIBRILLATOR), DUAL, IN SITU: Primary | Chronic | ICD-10-CM

## 2022-01-27 DIAGNOSIS — I47.20 VENTRICULAR TACHYCARDIA: ICD-10-CM

## 2022-01-27 DIAGNOSIS — I25.5 ISCHEMIC CARDIOMYOPATHY: ICD-10-CM

## 2022-01-27 PROCEDURE — 1101F PR PT FALLS ASSESS DOC 0-1 FALLS W/OUT INJ PAST YR: ICD-10-PCS | Mod: CPTII,S$GLB,, | Performed by: NURSE PRACTITIONER

## 2022-01-27 PROCEDURE — 99999 PR PBB SHADOW E&M-EST. PATIENT-LVL V: CPT | Mod: PBBFAC,,, | Performed by: NURSE PRACTITIONER

## 2022-01-27 PROCEDURE — 3008F BODY MASS INDEX DOCD: CPT | Mod: CPTII,S$GLB,, | Performed by: NURSE PRACTITIONER

## 2022-01-27 PROCEDURE — 93005 RHYTHM STRIP: ICD-10-PCS | Mod: S$GLB,,, | Performed by: INTERNAL MEDICINE

## 2022-01-27 PROCEDURE — 1159F PR MEDICATION LIST DOCUMENTED IN MEDICAL RECORD: ICD-10-PCS | Mod: CPTII,S$GLB,, | Performed by: NURSE PRACTITIONER

## 2022-01-27 PROCEDURE — 3075F PR MOST RECENT SYSTOLIC BLOOD PRESS GE 130-139MM HG: ICD-10-PCS | Mod: CPTII,S$GLB,, | Performed by: NURSE PRACTITIONER

## 2022-01-27 PROCEDURE — 93005 ELECTROCARDIOGRAM TRACING: CPT | Mod: S$GLB,,, | Performed by: INTERNAL MEDICINE

## 2022-01-27 PROCEDURE — 99214 OFFICE O/P EST MOD 30 MIN: CPT | Mod: S$GLB,,, | Performed by: NURSE PRACTITIONER

## 2022-01-27 PROCEDURE — 93283 CARDIAC DEVICE CHECK - IN CLINIC & HOSPITAL: ICD-10-PCS | Mod: 26,,, | Performed by: INTERNAL MEDICINE

## 2022-01-27 PROCEDURE — 3288F PR FALLS RISK ASSESSMENT DOCUMENTED: ICD-10-PCS | Mod: CPTII,S$GLB,, | Performed by: NURSE PRACTITIONER

## 2022-01-27 PROCEDURE — 3008F PR BODY MASS INDEX (BMI) DOCUMENTED: ICD-10-PCS | Mod: CPTII,S$GLB,, | Performed by: NURSE PRACTITIONER

## 2022-01-27 PROCEDURE — 1126F PR PAIN SEVERITY QUANTIFIED, NO PAIN PRESENT: ICD-10-PCS | Mod: CPTII,S$GLB,, | Performed by: NURSE PRACTITIONER

## 2022-01-27 PROCEDURE — 93283 PRGRMG EVAL IMPLANTABLE DFB: CPT

## 2022-01-27 PROCEDURE — 1101F PT FALLS ASSESS-DOCD LE1/YR: CPT | Mod: CPTII,S$GLB,, | Performed by: NURSE PRACTITIONER

## 2022-01-27 PROCEDURE — 1159F MED LIST DOCD IN RCRD: CPT | Mod: CPTII,S$GLB,, | Performed by: NURSE PRACTITIONER

## 2022-01-27 PROCEDURE — 1126F AMNT PAIN NOTED NONE PRSNT: CPT | Mod: CPTII,S$GLB,, | Performed by: NURSE PRACTITIONER

## 2022-01-27 PROCEDURE — 3078F DIAST BP <80 MM HG: CPT | Mod: CPTII,S$GLB,, | Performed by: NURSE PRACTITIONER

## 2022-01-27 PROCEDURE — 99214 PR OFFICE/OUTPT VISIT, EST, LEVL IV, 30-39 MIN: ICD-10-PCS | Mod: S$GLB,,, | Performed by: NURSE PRACTITIONER

## 2022-01-27 PROCEDURE — 1160F PR REVIEW ALL MEDS BY PRESCRIBER/CLIN PHARMACIST DOCUMENTED: ICD-10-PCS | Mod: CPTII,S$GLB,, | Performed by: NURSE PRACTITIONER

## 2022-01-27 PROCEDURE — 99999 PR PBB SHADOW E&M-EST. PATIENT-LVL V: ICD-10-PCS | Mod: PBBFAC,,, | Performed by: NURSE PRACTITIONER

## 2022-01-27 PROCEDURE — 3288F FALL RISK ASSESSMENT DOCD: CPT | Mod: CPTII,S$GLB,, | Performed by: NURSE PRACTITIONER

## 2022-01-27 PROCEDURE — 1160F RVW MEDS BY RX/DR IN RCRD: CPT | Mod: CPTII,S$GLB,, | Performed by: NURSE PRACTITIONER

## 2022-01-27 PROCEDURE — 93010 ELECTROCARDIOGRAM REPORT: CPT | Mod: S$GLB,,, | Performed by: INTERNAL MEDICINE

## 2022-01-27 PROCEDURE — 3075F SYST BP GE 130 - 139MM HG: CPT | Mod: CPTII,S$GLB,, | Performed by: NURSE PRACTITIONER

## 2022-01-27 PROCEDURE — 4010F PR ACE/ARB THEARPY RXD/TAKEN: ICD-10-PCS | Mod: CPTII,S$GLB,, | Performed by: NURSE PRACTITIONER

## 2022-01-27 PROCEDURE — 4010F ACE/ARB THERAPY RXD/TAKEN: CPT | Mod: CPTII,S$GLB,, | Performed by: NURSE PRACTITIONER

## 2022-01-27 PROCEDURE — 3078F PR MOST RECENT DIASTOLIC BLOOD PRESSURE < 80 MM HG: ICD-10-PCS | Mod: CPTII,S$GLB,, | Performed by: NURSE PRACTITIONER

## 2022-01-27 PROCEDURE — 93283 PRGRMG EVAL IMPLANTABLE DFB: CPT | Mod: 26,,, | Performed by: INTERNAL MEDICINE

## 2022-01-27 PROCEDURE — 93010 RHYTHM STRIP: ICD-10-PCS | Mod: S$GLB,,, | Performed by: INTERNAL MEDICINE

## 2022-01-31 RX ORDER — EZETIMIBE 10 MG/1
TABLET ORAL
Qty: 90 TABLET | Refills: 3 | Status: SHIPPED | OUTPATIENT
Start: 2022-01-31 | End: 2022-12-09

## 2022-01-31 NOTE — TELEPHONE ENCOUNTER
Refill Authorization Note   Walter Bull  is requesting a refill authorization.  Brief Assessment and Rationale for Refill:  Approve     Medication Therapy Plan:  approve     Medication Reconciliation Completed: No   Comments:   --->Care Gap information included below if applicable.       Requested Prescriptions   Pending Prescriptions Disp Refills    ezetimibe (ZETIA) 10 mg tablet [Pharmacy Med Name: Ezetimibe 10 MG Oral Tablet] 90 tablet 3     Sig: TAKE 1 TABLET BY MOUTH ONCE DAILY       Cardiovascular:  Antilipid - Sterol Transport Inhibitors Passed - 1/31/2022  1:49 PM        Passed - Patient is at least 18 years old        Passed - Valid encounter within last 15 months     Recent Visits  Date Type Provider Dept   11/12/21 Office Visit Belkys Kruger MD [facility] Internal Medicine II   05/12/21 Office Visit Belkys Kruger MD [facility]   12/15/20 Office Visit Belkys Kruger MD [facility] Medicine II   11/17/20 Office Visit Belkys Kruger MD [facility] Medicine II   09/02/20 Office Visit Belkys Kruger MD [facility] Internal Medicine II   08/17/20 Office Visit Belkys Kruger MD [facility] Internal Medicine II   05/26/20 Office Visit Belkys Kruger MD [facility] Internal Medicine II   04/21/20 Office Visit Belkys Kruger MD [facility] Internal Medicine II   Showing recent visits within past 720 days and meeting all other requirements  Future Appointments  No visits were found meeting these conditions.  Showing future appointments within next 150 days and meeting all other requirements      Future Appointments              In 1 week MD David Moy - [facility] 4th Fl, [place]    In 3 weeks Joana Canada MD [facility] - Cardiology, [place]    In 1 month CATHIE Cramer - Pulmonary Svcs 9th Fl, [place]    In 1 month SPECIMEN, [facility] - Lab, [facility]    In 1 month [id number], [place]    In 3 months LAB, [facility] - Lab, [facility]    In 3 months MD Mickey Dorman - Internal Medicine, [facility]    In 5 months COORDINATED DEVICE CHECK [facility] [place] - Cardiology Svcs 3rd Fl, [place]    In 5 months EKG, APPT [facility] 3rd Fl, [place]    In 5 months Ankita Lynn, NP [id number], [place]                Passed - Total Cholesterol within 360 days     Lab Results   Component Value Date    CHOL 137 11/09/2021    CHOL 133 05/05/2021    CHOL 126 11/10/2020              Passed - LDL within 360 days     LDL Cholesterol   Date Value Ref Range Status   11/09/2021 80.4 63.0 - 159.0 mg/dL Final     Comment:     The National Cholesterol Education Program (NCEP) has set the  following guidelines (reference values) for LDL Cholesterol:  Optimal.......................<130 mg/dL  Borderline High...............130-159 mg/dL  High..........................160-189 mg/dL  Very High.....................>190 mg/dL              Passed - HDL within 360 days     HDL   Date Value Ref Range Status   11/09/2021 46 40 - 75 mg/dL Final     Comment:     The National Cholesterol Education Program (NCEP) has set the  following guidelines (reference values) for HDL Cholesterol:  Low...............<40 mg/dL  Optimal...........>60 mg/dL              Passed - Triglycerides within 360 days     Lab Results   Component Value Date    TRIG 53 11/09/2021    TRIG 73 05/05/2021    TRIG 55 11/10/2020              Passed - AST is between 0 and 119 and within 360 days     AST   Date Value Ref Range Status   11/09/2021 33 10 - 40 U/L Final   09/27/2021 32 10 - 40 U/L Final   05/26/2021 40 10 - 40 U/L Final              Passed - ALT is between 0 and 131 and within 360 days     ALT   Date Value Ref Range Status   11/09/2021 35 10 - 44 U/L Final   09/27/2021 33 10 - 44 U/L Final   05/26/2021 59 (H) 10 - 44 U/L Final                  Appointments  past 12m or future 3m with PCP    Date Provider   Last Visit    11/12/2021 Belkys Kruger MD   Next Visit   5/13/2022 Belkys Kruger MD   ED visits in past 90 days: 0     Note composed:1:50 PM 01/31/2022

## 2022-02-01 NOTE — PROGRESS NOTES
I, Argenis Arroyo, have personally taken the history and physical exam and I agree with Dr. Coon's assessment and plan.     Attending MD Alayna Olvier:  I personally have seen and examined this patient.  Resident note reviewed and agree on plan of care and except where noted.  See HPI, PE, and MDM for details.

## 2022-02-09 ENCOUNTER — TELEPHONE (OUTPATIENT)
Dept: ENDOSCOPY | Facility: HOSPITAL | Age: 72
End: 2022-02-09
Payer: MEDICARE

## 2022-02-09 ENCOUNTER — OFFICE VISIT (OUTPATIENT)
Dept: GASTROENTEROLOGY | Facility: CLINIC | Age: 72
End: 2022-02-09
Payer: MEDICARE

## 2022-02-09 ENCOUNTER — LAB VISIT (OUTPATIENT)
Dept: LAB | Facility: HOSPITAL | Age: 72
End: 2022-02-09
Attending: INTERNAL MEDICINE
Payer: MEDICARE

## 2022-02-09 VITALS
HEIGHT: 69 IN | SYSTOLIC BLOOD PRESSURE: 117 MMHG | HEART RATE: 61 BPM | DIASTOLIC BLOOD PRESSURE: 66 MMHG | WEIGHT: 246.94 LBS | BODY MASS INDEX: 36.57 KG/M2

## 2022-02-09 DIAGNOSIS — K21.9 GASTROESOPHAGEAL REFLUX DISEASE WITHOUT ESOPHAGITIS: ICD-10-CM

## 2022-02-09 DIAGNOSIS — D64.9 ANEMIA, UNSPECIFIED TYPE: Primary | ICD-10-CM

## 2022-02-09 DIAGNOSIS — D50.9 IRON DEFICIENCY ANEMIA, UNSPECIFIED IRON DEFICIENCY ANEMIA TYPE: ICD-10-CM

## 2022-02-09 DIAGNOSIS — D64.9 ANEMIA, UNSPECIFIED TYPE: ICD-10-CM

## 2022-02-09 LAB
BASOPHILS # BLD AUTO: 0.03 K/UL (ref 0–0.2)
BASOPHILS NFR BLD: 0.5 % (ref 0–1.9)
DIFFERENTIAL METHOD: ABNORMAL
EOSINOPHIL # BLD AUTO: 0.2 K/UL (ref 0–0.5)
EOSINOPHIL NFR BLD: 3.6 % (ref 0–8)
ERYTHROCYTE [DISTWIDTH] IN BLOOD BY AUTOMATED COUNT: 13 % (ref 11.5–14.5)
FERRITIN SERPL-MCNC: 196 NG/ML (ref 20–300)
HCT VFR BLD AUTO: 41.4 % (ref 40–54)
HGB BLD-MCNC: 13.4 G/DL (ref 14–18)
IMM GRANULOCYTES # BLD AUTO: 0.01 K/UL (ref 0–0.04)
IMM GRANULOCYTES NFR BLD AUTO: 0.2 % (ref 0–0.5)
IRON SERPL-MCNC: 85 UG/DL (ref 45–160)
LYMPHOCYTES # BLD AUTO: 2.6 K/UL (ref 1–4.8)
LYMPHOCYTES NFR BLD: 44.4 % (ref 18–48)
MCH RBC QN AUTO: 30.4 PG (ref 27–31)
MCHC RBC AUTO-ENTMCNC: 32.4 G/DL (ref 32–36)
MCV RBC AUTO: 94 FL (ref 82–98)
MONOCYTES # BLD AUTO: 0.8 K/UL (ref 0.3–1)
MONOCYTES NFR BLD: 13.3 % (ref 4–15)
NEUTROPHILS # BLD AUTO: 2.2 K/UL (ref 1.8–7.7)
NEUTROPHILS NFR BLD: 38 % (ref 38–73)
NRBC BLD-RTO: 0 /100 WBC
PLATELET # BLD AUTO: 184 K/UL (ref 150–450)
PMV BLD AUTO: 10.7 FL (ref 9.2–12.9)
RBC # BLD AUTO: 4.41 M/UL (ref 4.6–6.2)
SATURATED IRON: 26 % (ref 20–50)
TOTAL IRON BINDING CAPACITY: 327 UG/DL (ref 250–450)
TRANSFERRIN SERPL-MCNC: 221 MG/DL (ref 200–375)
WBC # BLD AUTO: 5.77 K/UL (ref 3.9–12.7)

## 2022-02-09 PROCEDURE — 99999 PR PBB SHADOW E&M-EST. PATIENT-LVL IV: CPT | Mod: PBBFAC,GC,, | Performed by: INTERNAL MEDICINE

## 2022-02-09 PROCEDURE — 99214 OFFICE O/P EST MOD 30 MIN: CPT | Mod: GC,S$GLB,, | Performed by: INTERNAL MEDICINE

## 2022-02-09 PROCEDURE — 99214 PR OFFICE/OUTPT VISIT, EST, LEVL IV, 30-39 MIN: ICD-10-PCS | Mod: GC,S$GLB,, | Performed by: INTERNAL MEDICINE

## 2022-02-09 PROCEDURE — 36415 COLL VENOUS BLD VENIPUNCTURE: CPT | Performed by: INTERNAL MEDICINE

## 2022-02-09 PROCEDURE — 99999 PR PBB SHADOW E&M-EST. PATIENT-LVL IV: ICD-10-PCS | Mod: PBBFAC,GC,, | Performed by: INTERNAL MEDICINE

## 2022-02-09 PROCEDURE — 84466 ASSAY OF TRANSFERRIN: CPT | Performed by: INTERNAL MEDICINE

## 2022-02-09 PROCEDURE — 82728 ASSAY OF FERRITIN: CPT | Performed by: INTERNAL MEDICINE

## 2022-02-09 PROCEDURE — 85025 COMPLETE CBC W/AUTO DIFF WBC: CPT | Performed by: INTERNAL MEDICINE

## 2022-02-09 NOTE — PROGRESS NOTES
I have seen the patient, reviewed Jluis Broussard MD the GI fellow's history and physical, assessment, and plan. I have personally interviewed and examined the patient at bedside and I discussed the case with the GI fellow and I agree with the findings and plan as documented in the fellow's note.

## 2022-02-09 NOTE — PROGRESS NOTES
[facility]    Reason for visit: The primary encounter diagnosis was Anemia, unspecified type. Diagnoses of Gastroesophageal reflux disease without esophagitis and Iron deficiency anemia, unspecified iron deficiency anemia type were also pertinent to this visit.  Referring Provider/PCP: Belkys Kruger MD    History of Present Illness:  Walter Bull is a 71 y.o. male with a history of heart failure, HTN, type 1 diabetes, PE on anticoagulation, history of H pylori who is presenting for follow-up evaluation of nausea/vomiting.  The patient was previously seen in 2019 by Dr. Bush, at that time gastric emptying study was obtained which was normal.    Last clinic visit 4/2021: patient states that for the past three years he has been having almost daily episodes of hiccups/belching which will lead to severe nausea and weakness, these cause great distress for the patient.  He will be unable to perform any other activities for the entire day.  These have been having most days of the week for the past year.  Denies any NSAID use, no significant vomiting.  No abdominal pain.  No change in stools.    Since last visit: Patient was seen by cardiology for evaluation of multiple episodes of chest pain, ultimately ruled non-cardiac in nature and referred back to GI.  Patient reports symptoms are worse after stopping H2B 2/2 recall.  He recently restarted OTC H2B in addition to daily omeprazole which he feels is helping his symptoms.  No longer taking baclofen.  Mildly anemia on labs.  No red flag symptoms.    PEndoHx:  Colonoscopy 2/2/17 with Dr. Conway at [place] and was normal.     EGD 3/21/18 with Dr. Cunningham noting gastric erosions and biopsies positive for H pylori.  He was treated with Amoxicillin and Clarithromycin.  Stool antigen was negative in August 2018.      Review of Systems:   Constitutional: no fever, chills or change in weight   Eyes: no visual changes   ENT: no sore throat or  dysphagia  Respiratory: no cough or shortness of breath   Cardiovascular: no chest pain or palpitations   Gastrointestinal: as per HPI  Hematologic/Lymphatic: no easy bruising or lymphadenopathy   Musculoskeletal: no arthralgias or myalgias   Neurological: no change in mental status  Behavioral/Psych: no change in mood    Medical History:  Past Medical History:   Diagnosis Date    Anemia     Angina pectoris     Anticoagulant long-term use     Arthritis     Back pain     CHF (congestive heart failure)     Chronic bronchitis     Colon cancer screening 2/2/2017    Coronary artery disease     Diabetes mellitus type I     Diabetes with neurologic complications     Disorder of kidney and ureter     Encounter for blood transfusion     Glaucoma     Heart attack     09/2018    Heart disease     Hyperlipidemia     Hypertension     Hypothyroidism     Iron deficiency     Kidney failure     post CABG    Obesity     Peripheral vascular disease     Polyneuropathy     Pulmonary emphysema 2/26/2020    Renal manifestation of secondary diabetes mellitus     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    Sleep apnea     had CPAP but had recall- not currently using     Trouble in sleeping     Type 2 diabetes mellitus with ophthalmic manifestations        Past Surgical History:   Procedure Laterality Date    CARDIAC DEFIBRILLATOR PLACEMENT      CARDIAC ELECTROPHYSIOLOGY STUDY N/A 11/19/2018    Procedure: CARDIAC ELECTROPHYSIOLOGY STUDY;  Surgeon: Byron Glasgow MD;  Location: [facility] EP LAB;  Service: Cardiology;  Laterality: N/A;  VT, EPS +/- ICD, SJM, anes, GP, 6086    CARDIAC ELECTROPHYSIOLOGY STUDY N/A 11/19/2018    Procedure: CARDIAC ELECTROPHYSIOLOGY STUDY;  Surgeon: Byron Glasgow MD;  Location: [facility] EP LAB;  Service: Cardiology;  Laterality: N/A;    COLON SURGERY  2006    COLONOSCOPY N/A 2/2/2017    Procedure: COLONOSCOPY;  Surgeon: Ronnie Conway MD;  Location: [facility];  Service: Endoscopy;  Laterality: N/A;     CORONARY ARTERY BYPASS GRAFT  2005    5 arteries    CORONARY BYPASS GRAFT ANGIOGRAPHY  2018    Procedure: Bypass graft study;  Surgeon: Ryan Schmidt MD;  Location: [facility] CATH LAB;  Service: Cardiology;;    EYE SURGERY Bilateral 2016    Laser surgery for glaucoma    INSERTION OF IMPLANTABLE CARDIOVERTER-DEFIBRILLATOR (ICD) GENERATOR WITH TWO EXISTING LEADS Left 2018    Procedure: INSERTION, DUAL ICD;  Surgeon: Byron Glasgow MD;  Location: [facility] EP LAB;  Service: Cardiology;  Laterality: Left;  VT, EPS +/- ICD, SJM, anes, GP, 6086    LEFT HEART CATHETERIZATION Left 2018    Procedure: Left heart cath;  Surgeon: Ryan Schmidt MD;  Location: [facility] CATH LAB;  Service: Cardiology;  Laterality: Left;       Family History   Problem Relation Age of Onset    Diabetes Mother     Heart disease Mother     Hypertension Sister     Diabetes Sister     Heart disease Sister     Heart attack Brother     Colon cancer Neg Hx     Esophageal cancer Neg Hx     Stomach cancer Neg Hx        Social History     Socioeconomic History    Marital status:    Tobacco Use    Smoking status: Former Smoker     Packs/day: 3.00     Types: Cigarettes     Start date: 1963     Quit date: 1981     Years since quittin.1    Smokeless tobacco: Former User     Types: Snuff, Chew     Quit date:    Substance and Sexual Activity    Alcohol use: No    Drug use: No    Sexual activity: Yes     Comment: -with a significant other     Social Determinants of Health     Financial Resource Strain: Medium Risk    Difficulty of Paying Living Expenses: Somewhat hard   Food Insecurity: No Food Insecurity    Worried About Running Out of Food in the Last Year: Never true    Ran Out of Food in the Last Year: Never true   Transportation Needs: No Transportation Needs    Lack of Transportation (Medical): No    Lack of Transportation (Non-Medical): No   Physical Activity: Sufficiently Active     Days of Exercise per Week: 3 days    Minutes of Exercise per Session: 120 min   Stress: No Stress Concern Present    Feeling of Stress : Not at all   Social Connections: Unknown    Frequency of Communication with Friends and Family: More than three times a week    Frequency of Social Gatherings with Friends and Family: Never    Active Member of Clubs or Organizations: No    Attends Club or Organization Meetings: Never    Marital Status:    Housing Stability: Low Risk     Unable to Pay for Housing in the Last Year: No    Number of Places Lived in the Last Year: 1    Unstable Housing in the Last Year: No       Current Outpatient Medications on File Prior to Visit   Medication Sig Dispense Refill    ammonium lactate 12 % Crea Apply twice daily to affected parts both feet as needed. 140 g 11    aspirin (ECOTRIN) 81 MG EC tablet Take 1 tablet (81 mg total) by mouth once daily. 90 tablet 3    blood-glucose sensor (DEXCOM G6 SENSOR) [place] 3 each by Misc.(Non-Drug; Combo Route) route continuous. 3 each prn    blood-glucose transmitter (DEXCOM G6 TRANSMITTER) [place] 1 each by Misc.(Non-Drug; Combo Route) route continuous. 1 each prn    BRILINTA 90 mg tablet Take 90 mg by mouth 2 (two) times daily.      carvediloL (COREG) 3.125 MG tablet Take 3.125 mg by mouth 2 (two) times daily.      clopidogreL (PLAVIX) 75 mg tablet Take 1 tablet (75 mg total) by mouth once daily. 90 tablet 3    esomeprazole (NEXIUM) 40 MG capsule Take 1 capsule (40 mg total) by mouth 2 (two) times daily. 180 capsule 3    ezetimibe (ZETIA) 10 mg tablet TAKE 1 TABLET BY MOUTH ONCE DAILY 90 tablet 3    ferrous sulfate (FEOSOL) 325 mg (65 mg iron) Tab tablet TAKE 1 TABLET BY MOUTH THREE TIMES DAILY (Patient taking differently: Take 325 mg by mouth 2 (two) times daily. TAKE 1 TABLET BY MOUTH THREE TIMES DAILY) 90 tablet 3    GVOKE HYPOPEN 2-PACK 1 mg/0.2 mL AtIn INJECT SUBCUTANEOUSLY  CONTENTS OF 1 AUTO-INJECTOR AS NEEDED FOR  HYPOGLCEMIA  AS DIRECTED 0.4 mL 2    insulin lispro (HUMALOG U-100 INSULIN) 100 unit/mL injection USE AS DIRECTED VIA V-GO 20 20 mL 11    ketoconazole (NIZORAL) 2 % cream Apply topically once daily. 60 g 2    LIDOcaine HCL 2% (XYLOCAINE) 2 % jelly Apply topically as needed. Apply topically once nightly to affected part of foot/feet, for pain. 30 mL 2    losartan (COZAAR) 25 MG tablet Take 0.5 tablets (12.5 mg total) by mouth once daily. 45 tablet 3    nitroGLYCERIN (NITROSTAT) 0.4 MG SL tablet DISSOLVE 1 TABLET UNDER THE TONGUE EVERY 5 MINUTES AS  NEEDED FOR CHEST PAIN. MAX  OF 3 TABLETS IN 15 MINUTES. CALL 911 IF PAIN PERSISTS. 25 tablet 11    ondansetron (ZOFRAN) 4 MG tablet Take 1 tablet (4 mg total) by mouth every 8 (eight) hours as needed for Nausea. 15 tablet 0    papaverine 30 mg/mL injection Add: Phentolamine 1 mg  Add: PGE1 10 mcg    Sig:  Inject 20 units as directed; titrate up by 5 units until desired results 10 mL 12    ranolazine (RANEXA) 1,000 mg Tb12 Take 1 tablet (1,000 mg total) by mouth 2 (two) times daily. 60 tablet 11    rosuvastatin (CRESTOR) 40 MG Tab Take 1 tablet (40 mg total) by mouth once daily. 90 tablet 3    tamsulosin (FLOMAX) 0.4 mg Cap Take 1 capsule (0.4 mg total) by mouth once daily. 90 capsule 3    TRULICITY 4.5 mg/0.5 mL pen injector INJECT 4.5 MG INTO THE SKIN EVERY 7 DAYS 12 pen 3    V-GO 20 [place] USE AS DIRECTED ONCE DAILY 30 each 11    [DISCONTINUED] baclofen (LIORESAL) 5 mg Tab tablet Take 1 tablet (5 mg total) by mouth 2 (two) times daily. 60 tablet 11     No current facility-administered medications on file prior to visit.       Review of patient's allergies indicates:  No Known Allergies    Physical Exam:  Vitals:    02/09/22 1348   BP: 117/66   Pulse: 61         Gen: NAD, lying comfortably  HENT: NCAT, oropharynx clear  Eyes: anicteric sclerae, EOMI grossly  Neck: supple, no visible masses/goiter  Cardiac: RRR, no M/R/G, S1/S2 present  Lungs: CTAB, no crackles,  no wheezes  Abd: soft, NT/ND, normoactive BS, no rebound  Ext: no LE edema, warm, well perfused  Skin: skin intact on exposed body parts, no visible rashes, lesions  Neuro: A&Ox4, neuro exam grossly intact, moves all extremities  Psych: appropriate mood, affect    Laboratory:  Lab Results   Component Value Date     11/09/2021    K 4.7 11/09/2021     11/09/2021    CO2 27 11/09/2021    BUN 9 11/09/2021    CREATININE 1.3 11/09/2021    CALCIUM 9.2 11/09/2021    ANIONGAP 6 (L) 11/09/2021    ESTGFRAFRICA >60 11/09/2021    EGFRNONAA 55 (A) 11/09/2021       Lab Results   Component Value Date    ALT 35 11/09/2021    AST 33 11/09/2021    ALKPHOS 55 11/09/2021    BILITOT 0.6 11/09/2021       Lab Results   Component Value Date    WBC 5.41 11/09/2021    HGB 12.3 (L) 11/09/2021    HCT 38.5 (L) 11/09/2021    MCV 96 11/09/2021     11/09/2021       Microbiology:  H pylori negative -2018    Imaging:  Normal gastric emptying study    Assessment:  Walter Bull is a 71 y.o. male who is presenting for follow-up evaluation of chest pain and reflux.  Will plan for EGD/Bravo/Colonoscopy for further evaluation of chest pain/GERD on PPI and mild anemia.  Will recheck labs today.  Bravo ON PPI.      Plan:  1. EGD  2. Bravo ON PPI  3. Colonoscopy  4. CBC, Iron panel, ferritin today    Follow up in about 3 months (around 5/9/2022).    Jluis Broussard MD  Gastroenterology Fellow    Orders Placed This Encounter   Procedures    CBC Auto Differential    IRON AND TIBC    FERRITIN

## 2022-02-09 NOTE — TELEPHONE ENCOUNTER
Patient needs to be scheduled for a colonoscopy. Is it ok for him to hold Plavix 5 days prior? Please advise.     Thank you,  Vesna

## 2022-02-09 NOTE — TELEPHONE ENCOUNTER
Dr. Broussard,    Patient will not be able to  have a Bravo done because he has an ICD device. Would you like him to have a 24 pH study instead (on or off reflux meds)? And, do you want to continue with the EGD?  Please advise.    Thank you,  Vesna

## 2022-02-10 ENCOUNTER — TELEPHONE (OUTPATIENT)
Dept: ENDOSCOPY | Facility: HOSPITAL | Age: 72
End: 2022-02-10
Payer: MEDICARE

## 2022-02-10 DIAGNOSIS — K21.9 GASTROESOPHAGEAL REFLUX DISEASE WITHOUT ESOPHAGITIS: Primary | ICD-10-CM

## 2022-02-10 NOTE — TELEPHONE ENCOUNTER
Patient needs to be scheduled for an EGD/Colonoscopy. Is it ok for him to hold Plavix 5 days prior? Please advise.     Thank you,  Vesna

## 2022-02-14 ENCOUNTER — CLINICAL SUPPORT (OUTPATIENT)
Dept: CARDIOLOGY | Facility: HOSPITAL | Age: 72
End: 2022-02-14
Payer: MEDICARE

## 2022-02-14 DIAGNOSIS — Z95.810 PRESENCE OF AUTOMATIC (IMPLANTABLE) CARDIAC DEFIBRILLATOR: ICD-10-CM

## 2022-02-14 DIAGNOSIS — I25.5 ISCHEMIC CARDIOMYOPATHY: ICD-10-CM

## 2022-02-14 DIAGNOSIS — I50.42 CHRONIC COMBINED SYSTOLIC (CONGESTIVE) AND DIASTOLIC (CONGESTIVE) HEART FAILURE: ICD-10-CM

## 2022-02-14 PROCEDURE — 93296 REM INTERROG EVL PM/IDS: CPT | Performed by: INTERNAL MEDICINE

## 2022-02-21 ENCOUNTER — PATIENT OUTREACH (OUTPATIENT)
Dept: ADMINISTRATIVE | Facility: OTHER | Age: 72
End: 2022-02-21
Payer: MEDICARE

## 2022-02-22 ENCOUNTER — OFFICE VISIT (OUTPATIENT)
Dept: CARDIOLOGY | Facility: CLINIC | Age: 72
End: 2022-02-22
Payer: MEDICARE

## 2022-02-22 VITALS
HEART RATE: 60 BPM | DIASTOLIC BLOOD PRESSURE: 67 MMHG | BODY MASS INDEX: 37.2 KG/M2 | SYSTOLIC BLOOD PRESSURE: 129 MMHG | HEIGHT: 69 IN | RESPIRATION RATE: 20 BRPM | WEIGHT: 251.13 LBS

## 2022-02-22 DIAGNOSIS — I10 BENIGN ESSENTIAL HTN: Chronic | ICD-10-CM

## 2022-02-22 DIAGNOSIS — I25.118 CORONARY ARTERY DISEASE OF NATIVE ARTERY OF NATIVE HEART WITH STABLE ANGINA PECTORIS: Chronic | ICD-10-CM

## 2022-02-22 DIAGNOSIS — E78.2 MIXED HYPERLIPIDEMIA: Chronic | ICD-10-CM

## 2022-02-22 DIAGNOSIS — I50.42 CHRONIC COMBINED SYSTOLIC AND DIASTOLIC HEART FAILURE: Chronic | ICD-10-CM

## 2022-02-22 PROCEDURE — 3074F SYST BP LT 130 MM HG: CPT | Mod: CPTII,S$GLB,, | Performed by: INTERNAL MEDICINE

## 2022-02-22 PROCEDURE — 3288F PR FALLS RISK ASSESSMENT DOCUMENTED: ICD-10-PCS | Mod: CPTII,S$GLB,, | Performed by: INTERNAL MEDICINE

## 2022-02-22 PROCEDURE — 1160F RVW MEDS BY RX/DR IN RCRD: CPT | Mod: CPTII,S$GLB,, | Performed by: INTERNAL MEDICINE

## 2022-02-22 PROCEDURE — 1159F PR MEDICATION LIST DOCUMENTED IN MEDICAL RECORD: ICD-10-PCS | Mod: CPTII,S$GLB,, | Performed by: INTERNAL MEDICINE

## 2022-02-22 PROCEDURE — 3078F DIAST BP <80 MM HG: CPT | Mod: CPTII,S$GLB,, | Performed by: INTERNAL MEDICINE

## 2022-02-22 PROCEDURE — 1159F MED LIST DOCD IN RCRD: CPT | Mod: CPTII,S$GLB,, | Performed by: INTERNAL MEDICINE

## 2022-02-22 PROCEDURE — 99999 PR PBB SHADOW E&M-EST. PATIENT-LVL IV: CPT | Mod: PBBFAC,,, | Performed by: INTERNAL MEDICINE

## 2022-02-22 PROCEDURE — 1160F PR REVIEW ALL MEDS BY PRESCRIBER/CLIN PHARMACIST DOCUMENTED: ICD-10-PCS | Mod: CPTII,S$GLB,, | Performed by: INTERNAL MEDICINE

## 2022-02-22 PROCEDURE — 4010F PR ACE/ARB THEARPY RXD/TAKEN: ICD-10-PCS | Mod: CPTII,S$GLB,, | Performed by: INTERNAL MEDICINE

## 2022-02-22 PROCEDURE — 3078F PR MOST RECENT DIASTOLIC BLOOD PRESSURE < 80 MM HG: ICD-10-PCS | Mod: CPTII,S$GLB,, | Performed by: INTERNAL MEDICINE

## 2022-02-22 PROCEDURE — 99214 OFFICE O/P EST MOD 30 MIN: CPT | Mod: S$GLB,,, | Performed by: INTERNAL MEDICINE

## 2022-02-22 PROCEDURE — 99214 PR OFFICE/OUTPT VISIT, EST, LEVL IV, 30-39 MIN: ICD-10-PCS | Mod: S$GLB,,, | Performed by: INTERNAL MEDICINE

## 2022-02-22 PROCEDURE — 4010F ACE/ARB THERAPY RXD/TAKEN: CPT | Mod: CPTII,S$GLB,, | Performed by: INTERNAL MEDICINE

## 2022-02-22 PROCEDURE — 3008F PR BODY MASS INDEX (BMI) DOCUMENTED: ICD-10-PCS | Mod: CPTII,S$GLB,, | Performed by: INTERNAL MEDICINE

## 2022-02-22 PROCEDURE — 1101F PT FALLS ASSESS-DOCD LE1/YR: CPT | Mod: CPTII,S$GLB,, | Performed by: INTERNAL MEDICINE

## 2022-02-22 PROCEDURE — 3074F PR MOST RECENT SYSTOLIC BLOOD PRESSURE < 130 MM HG: ICD-10-PCS | Mod: CPTII,S$GLB,, | Performed by: INTERNAL MEDICINE

## 2022-02-22 PROCEDURE — 1101F PR PT FALLS ASSESS DOC 0-1 FALLS W/OUT INJ PAST YR: ICD-10-PCS | Mod: CPTII,S$GLB,, | Performed by: INTERNAL MEDICINE

## 2022-02-22 PROCEDURE — 3008F BODY MASS INDEX DOCD: CPT | Mod: CPTII,S$GLB,, | Performed by: INTERNAL MEDICINE

## 2022-02-22 PROCEDURE — 99999 PR PBB SHADOW E&M-EST. PATIENT-LVL IV: ICD-10-PCS | Mod: PBBFAC,,, | Performed by: INTERNAL MEDICINE

## 2022-02-22 PROCEDURE — 3288F FALL RISK ASSESSMENT DOCD: CPT | Mod: CPTII,S$GLB,, | Performed by: INTERNAL MEDICINE

## 2022-02-22 RX ORDER — CARVEDILOL 6.25 MG/1
6.25 TABLET ORAL 2 TIMES DAILY
Qty: 180 TABLET | Refills: 3 | Status: SHIPPED | OUTPATIENT
Start: 2022-02-22 | End: 2022-05-13

## 2022-02-22 RX ORDER — SPIRONOLACTONE 25 MG/1
25 TABLET ORAL DAILY
Qty: 90 TABLET | Refills: 3 | Status: SHIPPED | OUTPATIENT
Start: 2022-02-22

## 2022-02-22 RX ORDER — FUROSEMIDE 20 MG/1
20 TABLET ORAL DAILY
Qty: 90 TABLET | Refills: 3 | Status: SHIPPED | OUTPATIENT
Start: 2022-02-22 | End: 2022-05-24

## 2022-02-22 NOTE — ASSESSMENT & PLAN NOTE
Patient with chronic heart failure and a reduced ejection fraction likely secondary to an ischemic cardiomyopathy.  NYHA class 2 today. Slightly volume overloaded on exam.  LVEF is 35-40%, which is near his baseline.   Continue losartan 25 mg daily and carvedilol to 6.25x2. Okay to schedule furosemide 20x1 and start spironolactone 25x1. Check BMP/BNP in 2 weeks.

## 2022-02-22 NOTE — PROGRESS NOTES
Care Everywhere:   Immunization: updated  Health Maintenance: updated  Media Review:   Legacy Review:   DIS:  Order placed:   Upcoming appts:hemoglobin 5.6   EFAX:  Task Tickets:  Referrals:

## 2022-02-22 NOTE — ASSESSMENT & PLAN NOTE
Blood pressures in an acceptable range. No more orthostatic symptoms. Continue carvedilol/losartan. If he develops orthostatic symptoms on increased dose of meds we can always back down again.

## 2022-02-22 NOTE — ASSESSMENT & PLAN NOTE
The patient has severe coronary artery disease with a patent LIMA to LAD and SVG to diagonal. No recent ACS.  CCS 2, more prominent then last visit. Will treat HF. Continue carvedilol and continue ranolazine at this time.  Continue dapt with aspirin and clopidogrel.

## 2022-02-22 NOTE — PROGRESS NOTES
Chief Complaint   Patient presents with    Carotid Artery Disease       HPI: This is a 71-year-old gentleman with a history of coronary artery disease status post CABG in 2005/cardiomyopathy status post AICD in 2018/congestive heart failure/hypertension/hyperlipidemia/diabetes/CKD/previous tobacco/orthostatic hypotension s/p reduction in meds presenting for follow-up.     Overall, patient feels well today. Light headedness and dizziness have resolved. He does report chest pain with exertion that may limit him at times. Lasts minutes at a time. Once a week at most. Resolves with rest. Some associated dyspnea. He believes they are the symptoms maybe slightly worse than a year ago. He is still able to do yard work on certain days without issue, including raking leaves for 6 hours yesterday.     He has gained weight since last visit. Believes he maybe holding onto fluid. He says his systolic blood pressure range has ranged from 120-130/60-70s on current regimen. He also tolerates aspirin, clopidogrel, carvedilol, losartan, ranolazine, rosuvastatin, and ezetimibe. Has been taking furosemide more often.         PHYSICAL EXAM:  Vitals:    02/22/22 0814   BP: 129/67   Pulse: 60   Resp: 20       Physical Exam  Constitutional:       Appearance: Normal appearance.   Neck:      Vascular: JVD present. No carotid bruit.   Cardiovascular:      Rate and Rhythm: Normal rate and regular rhythm.      Pulses:           Radial pulses are 2+ on the right side and 2+ on the left side.        Posterior tibial pulses are 1+ on the right side and 1+ on the left side.      Heart sounds: S1 normal and S2 normal. No murmur heard.    No S3 sounds.      Comments: No edema.  Pulmonary:      Effort: Pulmonary effort is normal.      Breath sounds: Normal breath sounds. No rales.   Feet:      Right foot:      Skin integrity: Skin integrity normal.      Left foot:      Skin integrity: Skin integrity normal.   Skin:     General: Skin is warm and  dry.      Findings: No lesion.   Neurological:      Mental Status: He is alert and oriented to person, place, and time.      Motor: Motor function is intact.      Gait: Gait is intact.         LABS/CARDIAC TESTS:  February 2022 hemoglobin 13.4.  Iron 85 in TIBC 327 with a ferritin of 196. November 2021 creatinine 1.3 with BUN of 9.  Albumin 3.4.  LDL 80 and HDL 46. A1c 6.8.  TSH normal.  Labs from his May 2021 admission revealed negative troponins and a normal BNP.  CMP September 2021 demonstartes a cr 1.6 near baseline and BUN 15. A1c 7.4. Last ldl 71.    EKG May 2021 revealed an atrial paced rhythm with a left bundle branch block.  Device check January 2022 73% RA pacing and 0% RV pacing.  Some a tach noted.  Echocardiogram June 2021 revealed moderately reduced LVEF near baseline (35-45%) with a normal right atrial pressure and no significant valvular disease.    Nuclear stress testing from October 2019 revealed an LVEF of 25-30% with a large inferior scar and no evidence of ischemia.  Cardiac catheterization was in 2018 at [facility] and revealed severe multivessel disease with LAD, left circ, and RCA CTOs.  He had a patent LIMA to LAD/SVG to diagonal and an occluded SVG to left circ system graft.    Carotid ultrasound from his May 2021 admission revealed no evidence of significant carotid disease.    ASSESSMENT & PLAN:    Coronary artery disease of native artery of native heart with stable angina pectoris  The patient has severe coronary artery disease with a patent LIMA to LAD and SVG to diagonal. No recent ACS.  CCS 2, more prominent then last visit. Will treat HF. Continue carvedilol and continue ranolazine at this time.  Continue dapt with aspirin and clopidogrel.       Chronic combined systolic and diastolic heart failure  Patient with chronic heart failure and a reduced ejection fraction likely secondary to an ischemic cardiomyopathy.  NYHA class 2 today. Slightly volume overloaded on exam.  LVEF is 35-40%, which  is near his baseline.   Continue losartan 25 mg daily and carvedilol to 6.25x2. Okay to schedule furosemide 20x1 and start spironolactone 25x1. Check BMP/BNP in 2 weeks.     Benign essential HTN  Blood pressures in an acceptable range. No more orthostatic symptoms. Continue carvedilol/losartan. If he develops orthostatic symptoms on increased dose of meds we can always back down again.      HLD (hyperlipidemia)  LDL is 70-80 on rosuvastatin 40 mg and ezetimibe 10 mg.         Coronary artery disease of native artery of native heart with stable angina pectoris  -     carvediloL (COREG) 6.25 MG tablet; Take 1 tablet (6.25 mg total) by mouth 2 (two) times daily.  Dispense: 180 tablet; Refill: 3    Chronic combined systolic and diastolic heart failure  -     spironolactone (ALDACTONE) 25 MG tablet; Take 1 tablet (25 mg total) by mouth once daily.  Dispense: 90 tablet; Refill: 3  -     furosemide (LASIX) 20 MG tablet; Take 1 tablet (20 mg total) by mouth once daily.  Dispense: 90 tablet; Refill: 3  -     Basic Metabolic Panel; Future; Expected date: 03/08/2022  -     BNP; Future; Expected date: 03/08/2022    Benign essential HTN    Mixed hyperlipidemia        Joana Canada MD

## 2022-03-02 DIAGNOSIS — Z01.818 PRE-OP TESTING: ICD-10-CM

## 2022-03-03 ENCOUNTER — OFFICE VISIT (OUTPATIENT)
Dept: PULMONOLOGY | Facility: CLINIC | Age: 72
End: 2022-03-03
Payer: MEDICARE

## 2022-03-03 VITALS
DIASTOLIC BLOOD PRESSURE: 70 MMHG | HEIGHT: 69 IN | WEIGHT: 249.81 LBS | HEART RATE: 87 BPM | BODY MASS INDEX: 37 KG/M2 | SYSTOLIC BLOOD PRESSURE: 120 MMHG | OXYGEN SATURATION: 98 %

## 2022-03-03 DIAGNOSIS — J43.8 OTHER EMPHYSEMA: Primary | ICD-10-CM

## 2022-03-03 DIAGNOSIS — E66.01 SEVERE OBESITY (BMI 35.0-39.9) WITH COMORBIDITY: ICD-10-CM

## 2022-03-03 DIAGNOSIS — R06.02 SHORTNESS OF BREATH: ICD-10-CM

## 2022-03-03 DIAGNOSIS — Z01.818 PRE-OP TESTING: ICD-10-CM

## 2022-03-03 PROCEDURE — 99999 PR PBB SHADOW E&M-EST. PATIENT-LVL V: CPT | Mod: PBBFAC,,, | Performed by: NURSE PRACTITIONER

## 2022-03-03 PROCEDURE — 3074F SYST BP LT 130 MM HG: CPT | Mod: CPTII,S$GLB,, | Performed by: NURSE PRACTITIONER

## 2022-03-03 PROCEDURE — 1101F PT FALLS ASSESS-DOCD LE1/YR: CPT | Mod: CPTII,S$GLB,, | Performed by: NURSE PRACTITIONER

## 2022-03-03 PROCEDURE — 4010F PR ACE/ARB THEARPY RXD/TAKEN: ICD-10-PCS | Mod: CPTII,S$GLB,, | Performed by: NURSE PRACTITIONER

## 2022-03-03 PROCEDURE — 1101F PR PT FALLS ASSESS DOC 0-1 FALLS W/OUT INJ PAST YR: ICD-10-PCS | Mod: CPTII,S$GLB,, | Performed by: NURSE PRACTITIONER

## 2022-03-03 PROCEDURE — 1160F RVW MEDS BY RX/DR IN RCRD: CPT | Mod: CPTII,S$GLB,, | Performed by: NURSE PRACTITIONER

## 2022-03-03 PROCEDURE — 1160F PR REVIEW ALL MEDS BY PRESCRIBER/CLIN PHARMACIST DOCUMENTED: ICD-10-PCS | Mod: CPTII,S$GLB,, | Performed by: NURSE PRACTITIONER

## 2022-03-03 PROCEDURE — 4010F ACE/ARB THERAPY RXD/TAKEN: CPT | Mod: CPTII,S$GLB,, | Performed by: NURSE PRACTITIONER

## 2022-03-03 PROCEDURE — 1126F AMNT PAIN NOTED NONE PRSNT: CPT | Mod: CPTII,S$GLB,, | Performed by: NURSE PRACTITIONER

## 2022-03-03 PROCEDURE — 99204 PR OFFICE/OUTPT VISIT, NEW, LEVL IV, 45-59 MIN: ICD-10-PCS | Mod: S$GLB,,, | Performed by: NURSE PRACTITIONER

## 2022-03-03 PROCEDURE — 3008F PR BODY MASS INDEX (BMI) DOCUMENTED: ICD-10-PCS | Mod: CPTII,S$GLB,, | Performed by: NURSE PRACTITIONER

## 2022-03-03 PROCEDURE — 99999 PR PBB SHADOW E&M-EST. PATIENT-LVL V: ICD-10-PCS | Mod: PBBFAC,,, | Performed by: NURSE PRACTITIONER

## 2022-03-03 PROCEDURE — 3008F BODY MASS INDEX DOCD: CPT | Mod: CPTII,S$GLB,, | Performed by: NURSE PRACTITIONER

## 2022-03-03 PROCEDURE — 3288F FALL RISK ASSESSMENT DOCD: CPT | Mod: CPTII,S$GLB,, | Performed by: NURSE PRACTITIONER

## 2022-03-03 PROCEDURE — 3288F PR FALLS RISK ASSESSMENT DOCUMENTED: ICD-10-PCS | Mod: CPTII,S$GLB,, | Performed by: NURSE PRACTITIONER

## 2022-03-03 PROCEDURE — 1159F PR MEDICATION LIST DOCUMENTED IN MEDICAL RECORD: ICD-10-PCS | Mod: CPTII,S$GLB,, | Performed by: NURSE PRACTITIONER

## 2022-03-03 PROCEDURE — 99204 OFFICE O/P NEW MOD 45 MIN: CPT | Mod: S$GLB,,, | Performed by: NURSE PRACTITIONER

## 2022-03-03 PROCEDURE — 3074F PR MOST RECENT SYSTOLIC BLOOD PRESSURE < 130 MM HG: ICD-10-PCS | Mod: CPTII,S$GLB,, | Performed by: NURSE PRACTITIONER

## 2022-03-03 PROCEDURE — 1126F PR PAIN SEVERITY QUANTIFIED, NO PAIN PRESENT: ICD-10-PCS | Mod: CPTII,S$GLB,, | Performed by: NURSE PRACTITIONER

## 2022-03-03 PROCEDURE — 3078F PR MOST RECENT DIASTOLIC BLOOD PRESSURE < 80 MM HG: ICD-10-PCS | Mod: CPTII,S$GLB,, | Performed by: NURSE PRACTITIONER

## 2022-03-03 PROCEDURE — 3078F DIAST BP <80 MM HG: CPT | Mod: CPTII,S$GLB,, | Performed by: NURSE PRACTITIONER

## 2022-03-03 PROCEDURE — 1159F MED LIST DOCD IN RCRD: CPT | Mod: CPTII,S$GLB,, | Performed by: NURSE PRACTITIONER

## 2022-03-03 NOTE — ASSESSMENT & PLAN NOTE
Body mass index is 36.89 kg/m².    Discussed with pt at length about the need to work on diet and weight loss.  Have offered suggestions on approaches for this problem.  Also discussed the need to start a regular exercise program.  We discussed at length the importance of regular exercise and working at getting to a healthier weight.  All questions answered.  Pt voices understanding of these principles and hopefully will take action.

## 2022-03-03 NOTE — PROGRESS NOTES
"Subjective:       Patient ID: Walter Bull is a 71 y.o. male.    Chief Complaint: Emphysema    HPI   Mr. Bull is a 70 y/o M presents to pulmonary clinic for emphysema and "spot on lungs."      Patient  experiences chronic mMRC 3 symptoms of dyspnea.  Denies chronic cough.   Explains had 0 COPD exacerbations in the last one year; 0 hospitalizations for respiratory problems.   He does explain history of asthma as a child.  States,   " I Grew out of my asthma in my 20's." For years, states can have episodes of nighttime wakening takes a few minutes to catch his breath.  Now having albuterol which does relieve symptoms.  Explains for the most part- does not have breathing related concerns.   Denies fever, chills, hemoptysis, night sweats or weight loss.  Explains CPAP machine has been recalled.   Explains uses albuterol as needed.     During interview, pt called his girlfriend. They care very concerned of being told of having "spots on lungs." They are unaware of who told them of the "spots."  After reviewing imaging- patient noted have seiral CXR- denoting scarring and atelectasis to bilateral lungs.  Patient denies fever, chills, hemoptysis, night sweats or weight loss.     Additionally, his girlfriend explained Mr. Bull has never official been dx with emphysema.     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  Exposure to Animals/Pets: Raised on farm until .     Travel History: [place]- few weeks ago.   History of exposures to TB: Denies   Family History of Lung Cancer: Denies   Childhood history of Lung Disease: Explains having asthma as child.   Social History     Tobacco Use   Smoking Status Former Smoker    Packs/day: 3.00    Types: Cigarettes    Start date: 1963    Quit date: 1981    Years since quittin.1   Smokeless Tobacco Former User    Types: Snuff, Chew    Quit date:      Reviewed allergies and " "medications.  Reviewed medical and surgical history.  Reviewed social and family history.    Review of Systems   Constitutional: Negative for fever, chills, weight loss and night sweats.   HENT: Negative for postnasal drip, rhinorrhea, trouble swallowing and congestion.    Respiratory: Positive for apnea, cough and dyspnea on extertion. Negative for hemoptysis, sputum production, choking, chest tightness, wheezing and use of rescue inhaler.    Cardiovascular: Negative for chest pain and leg swelling.   Musculoskeletal: Negative for joint swelling.   Skin: Negative for rash.   Gastrointestinal: Negative for acid reflux.   Neurological: Negative for dizziness and light-headedness.   Hematological: Negative for adenopathy.   Psychiatric/Behavioral: Positive for sleep disturbance.         Objective:      Vitals:    03/03/22 0900   BP: 120/70   BP Location: Left arm   Patient Position: Sitting   Pulse: 87   SpO2: 98%   Weight: 113.3 kg (249 lb 12.5 oz)   Height: 5' 9" (1.753 m)      Physical Exam   Constitutional: He is oriented to person, place, and time. He appears well-developed and well-nourished. No distress. He is obese.   HENT:   Head: Normocephalic.   Cardiovascular: Normal rate, regular rhythm and normal heart sounds.   Pulmonary/Chest: Normal expansion, effort normal and breath sounds normal. No respiratory distress. He has no wheezes. He has no rhonchi.   Abdominal: Soft. Bowel sounds are normal. There is no abdominal tenderness.   Musculoskeletal:         General: No edema. Normal range of motion.      Cervical back: Normal range of motion and neck supple.   Lymphadenopathy: No supraclavicular adenopathy is present.     He has no cervical adenopathy.   Neurological: He is alert and oriented to person, place, and time. Gait normal.   Skin: Skin is warm and dry. Nails show no clubbing.   Psychiatric: He has a normal mood and affect. His behavior is normal.   Vitals reviewed.    Personal Diagnostic Review    Lab " Results   Component Value Date    PREFEV1 2.03 09/23/2021    HNF4EIIZUL 79.2 09/23/2021    PREFVC 2.83 09/23/2021    FVCPREREF 84.0 09/23/2021    LTOJNB8ODA 71.75 09/23/2021    AXV7FTLQOZJ 94.0 09/23/2021    PREDLCO 15.19 (L) 09/23/2021    DLCOSBPRRF 58.7 09/23/2021    DLCOADJPRE 16.61 (L) 09/23/2021    DLCOCSBRPRRF 64.2 09/23/2021       Personally reviewed CT of chest 05/24/2021, 12/15/2020, 11/01/2019, and CT renal stone study 06/21/2019 with patient in office  Assessment:       1. Other emphysema    2. Severe obesity (BMI 35.0-39.9) with comorbidity    3. Shortness of breath        Outpatient Encounter Medications as of 3/3/2022   Medication Sig Dispense Refill    ammonium lactate 12 % Crea Apply twice daily to affected parts both feet as needed. 140 g 11    aspirin (ECOTRIN) 81 MG EC tablet Take 1 tablet (81 mg total) by mouth once daily. 90 tablet 3    carvediloL (COREG) 6.25 MG tablet Take 1 tablet (6.25 mg total) by mouth 2 (two) times daily. 180 tablet 3    clopidogreL (PLAVIX) 75 mg tablet Take 1 tablet (75 mg total) by mouth once daily. 90 tablet 3    esomeprazole (NEXIUM) 40 MG capsule Take 1 capsule (40 mg total) by mouth 2 (two) times daily. 180 capsule 3    ezetimibe (ZETIA) 10 mg tablet TAKE 1 TABLET BY MOUTH ONCE DAILY 90 tablet 3    ferrous sulfate (FEOSOL) 325 mg (65 mg iron) Tab tablet TAKE 1 TABLET BY MOUTH THREE TIMES DAILY (Patient taking differently: Take 325 mg by mouth 2 (two) times daily. TAKE 1 TABLET BY MOUTH THREE TIMES DAILY) 90 tablet 3    furosemide (LASIX) 20 MG tablet Take 1 tablet (20 mg total) by mouth once daily. 90 tablet 3    GVOKE HYPOPEN 2-PACK 1 mg/0.2 mL AtIn INJECT SUBCUTANEOUSLY  CONTENTS OF 1 AUTO-INJECTOR AS NEEDED FOR HYPOGLCEMIA  AS DIRECTED 0.4 mL 2    insulin lispro (HUMALOG U-100 INSULIN) 100 unit/mL injection USE AS DIRECTED VIA V-GO 20 20 mL 11    ketoconazole (NIZORAL) 2 % cream Apply topically once daily. 60 g 2    LIDOcaine HCL 2% (XYLOCAINE) 2 %  jelly Apply topically as needed. Apply topically once nightly to affected part of foot/feet, for pain. 30 mL 2    losartan (COZAAR) 25 MG tablet Take 0.5 tablets (12.5 mg total) by mouth once daily. 45 tablet 3    nitroGLYCERIN (NITROSTAT) 0.4 MG SL tablet DISSOLVE 1 TABLET UNDER THE TONGUE EVERY 5 MINUTES AS  NEEDED FOR CHEST PAIN. MAX  OF 3 TABLETS IN 15 MINUTES. CALL 911 IF PAIN PERSISTS. 25 tablet 11    ondansetron (ZOFRAN) 4 MG tablet Take 1 tablet (4 mg total) by mouth every 8 (eight) hours as needed for Nausea. 15 tablet 0    papaverine 30 mg/mL injection Add: Phentolamine 1 mg  Add: PGE1 10 mcg    Sig:  Inject 20 units as directed; titrate up by 5 units until desired results 10 mL 12    ranolazine (RANEXA) 1,000 mg Tb12 Take 1 tablet (1,000 mg total) by mouth 2 (two) times daily. 60 tablet 11    rosuvastatin (CRESTOR) 40 MG Tab Take 1 tablet (40 mg total) by mouth once daily. 90 tablet 3    spironolactone (ALDACTONE) 25 MG tablet Take 1 tablet (25 mg total) by mouth once daily. 90 tablet 3    sub-q insulin device, 20 unit (V-GO 20) [place] To use one daily 31 each 11    tamsulosin (FLOMAX) 0.4 mg Cap Take 1 capsule (0.4 mg total) by mouth once daily. 90 capsule 3    TRULICITY 4.5 mg/0.5 mL pen injector INJECT 4.5 MG INTO THE SKIN EVERY 7 DAYS 12 pen 3    blood-glucose sensor (DEXCOM G6 SENSOR) [place] 3 each by Misc.(Non-Drug; Combo Route) route continuous. 3 each prn    blood-glucose transmitter (DEXCOM G6 TRANSMITTER) [place] 1 each by Misc.(Non-Drug; Combo Route) route continuous. 1 each prn     No facility-administered encounter medications on file as of 3/3/2022.     Orders Placed This Encounter   Procedures    CT Chest Without Contrast     Standing Status:   Future     Standing Expiration Date:   3/3/2023     Order Specific Question:   May the Radiologist modify the order per protocol to meet the clinical needs of the patient?     Answer:   Yes    Spirometry with/without bronchodilator      "Standing Status:   Future     Standing Expiration Date:   3/3/2023     Order Specific Question:   Release to patient     Answer:   Immediate       Plan:         Problem List Items Addressed This Visit        Pulmonary    Pulmonary emphysema - Primary    Overview     Report on chest x-ray.  Patient does have history of significant cigarette smoking.  Quit in 1981.  Experiences chronic mm RC 3 symptoms of dyspnea  Denies chronic cough  Does report history of childhood asthma.             Current Assessment & Plan     Plan:  Obtain pulmonary function tests  Obtain CT of chest in relation to patient's shortness of breath and unclear history of pulmonary "spots"              Endocrine    Severe obesity (BMI 35.0-39.9) with comorbidity    Current Assessment & Plan     Body mass index is 36.89 kg/m².    Discussed with pt at length about the need to work on diet and weight loss.  Have offered suggestions on approaches for this problem.  Also discussed the need to start a regular exercise program.  We discussed at length the importance of regular exercise and working at getting to a healthier weight.  All questions answered.  Pt voices understanding of these principles and hopefully will take action.                Other    Shortness of breath    Current Assessment & Plan     Patient's history of shortness of breath is likely multifactorial- lung disease, heart disease, of sleep apnea, deconditioning, obesity  Will obtain pulmonary function tests  Obtain CT of chest rule out parenchymal lung disease           Relevant Orders    CT Chest Without Contrast    Spirometry with/without bronchodilator         Follow-up after testing      This note is dictated on M*Modal word recognition program.  There are word recognition mistakes that are occasionally missed on review.       "

## 2022-03-03 NOTE — ASSESSMENT & PLAN NOTE
"Plan:  Obtain pulmonary function tests  Obtain CT of chest in relation to patient's shortness of breath and unclear history of pulmonary "spots"  "

## 2022-03-03 NOTE — ASSESSMENT & PLAN NOTE
Patient's history of shortness of breath is likely multifactorial- lung disease, heart disease, of sleep apnea, deconditioning, obesity  Will obtain pulmonary function tests  Obtain CT of chest rule out parenchymal lung disease

## 2022-03-08 ENCOUNTER — LAB VISIT (OUTPATIENT)
Dept: LAB | Facility: HOSPITAL | Age: 72
End: 2022-03-08
Attending: INTERNAL MEDICINE
Payer: MEDICARE

## 2022-03-08 DIAGNOSIS — I50.42 CHRONIC COMBINED SYSTOLIC AND DIASTOLIC HEART FAILURE: Chronic | ICD-10-CM

## 2022-03-08 LAB
ANION GAP SERPL CALC-SCNC: 8 MMOL/L (ref 8–16)
BNP SERPL-MCNC: 75 PG/ML (ref 0–99)
BUN SERPL-MCNC: 17 MG/DL (ref 8–23)
CALCIUM SERPL-MCNC: 9.2 MG/DL (ref 8.7–10.5)
CHLORIDE SERPL-SCNC: 103 MMOL/L (ref 95–110)
CO2 SERPL-SCNC: 28 MMOL/L (ref 23–29)
CREAT SERPL-MCNC: 1.4 MG/DL (ref 0.5–1.4)
EST. GFR  (AFRICAN AMERICAN): 57.2 ML/MIN/1.73 M^2
EST. GFR  (NON AFRICAN AMERICAN): 49.5 ML/MIN/1.73 M^2
GLUCOSE SERPL-MCNC: 124 MG/DL (ref 70–110)
POTASSIUM SERPL-SCNC: 4.9 MMOL/L (ref 3.5–5.1)
SODIUM SERPL-SCNC: 139 MMOL/L (ref 136–145)

## 2022-03-08 PROCEDURE — 36415 COLL VENOUS BLD VENIPUNCTURE: CPT | Mod: PO | Performed by: INTERNAL MEDICINE

## 2022-03-08 PROCEDURE — 83880 ASSAY OF NATRIURETIC PEPTIDE: CPT | Performed by: INTERNAL MEDICINE

## 2022-03-08 PROCEDURE — 80048 BASIC METABOLIC PNL TOTAL CA: CPT | Performed by: INTERNAL MEDICINE

## 2022-03-10 ENCOUNTER — HOSPITAL ENCOUNTER (OUTPATIENT)
Facility: HOSPITAL | Age: 72
Discharge: HOME | End: 2022-03-10
Attending: INTERNAL MEDICINE | Admitting: INTERNAL MEDICINE
Payer: MEDICARE

## 2022-03-10 VITALS
HEIGHT: 69 IN | BODY MASS INDEX: 36.88 KG/M2 | TEMPERATURE: 98 F | OXYGEN SATURATION: 98 % | SYSTOLIC BLOOD PRESSURE: 148 MMHG | RESPIRATION RATE: 18 BRPM | HEART RATE: 60 BPM | DIASTOLIC BLOOD PRESSURE: 68 MMHG | WEIGHT: 249 LBS

## 2022-03-10 DIAGNOSIS — R11.2 NAUSEA & VOMITING: ICD-10-CM

## 2022-03-10 LAB
CTP QC/QA: YES
POCT GLUCOSE: 60 MG/DL (ref 70–110)
SARS-COV-2 AG RESP QL IA.RAPID: NEGATIVE

## 2022-03-10 PROCEDURE — 91010 ESOPHAGUS MOTILITY STUDY: CPT | Performed by: INTERNAL MEDICINE

## 2022-03-10 PROCEDURE — 25000003 PHARM REV CODE 250: Performed by: INTERNAL MEDICINE

## 2022-03-10 PROCEDURE — 91037 ESOPH IMPED FUNCTION TEST: CPT | Performed by: INTERNAL MEDICINE

## 2022-03-10 RX ORDER — SODIUM CHLORIDE 9 MG/ML
INJECTION, SOLUTION INTRAVENOUS CONTINUOUS
Status: DISCONTINUED | OUTPATIENT
Start: 2022-03-10 | End: 2022-03-10 | Stop reason: HOSPADM

## 2022-03-10 RX ORDER — LIDOCAINE HYDROCHLORIDE 20 MG/ML
JELLY TOPICAL ONCE
Status: COMPLETED | OUTPATIENT
Start: 2022-03-10 | End: 2022-03-10

## 2022-03-10 RX ADMIN — LIDOCAINE HYDROCHLORIDE 10 ML: 20 JELLY TOPICAL at 09:03

## 2022-03-10 NOTE — H&P
Short Stay Endoscopy History and Physical    PCP - Belkys Kruger MD     Procedure - Esophageal manometry  ASA - per anesthesia  Mallampati - per anesthesia  History of Anesthesia problems - no  Family history Anesthesia problems -  no   Plan of anesthesia - General    HPI:  This is a 71 y.o. male here for evaluation of : pre op evaluation    ROS:  Constitutional: No fevers, chills, No weight loss  CV: No chest pain  Pulm: No cough, No shortness of breath  Ophtho: No vision changes  GI: see HPI  Derm: No rash    Medical History:  has a past medical history of Anemia, Angina pectoris, Anticoagulant long-term use, Arthritis, Back pain, CHF (congestive heart failure), Chronic bronchitis, Colon cancer screening (2/2/2017), Coronary artery disease, Diabetes mellitus type I, Diabetes with neurologic complications, Disorder of kidney and ureter, Encounter for blood transfusion, Glaucoma, Heart attack, Heart disease, Hyperlipidemia, Hypertension, Hypothyroidism, Iron deficiency, Kidney failure, Obesity, Peripheral vascular disease, Polyneuropathy, Pulmonary emphysema (2/26/2020), Renal manifestation of secondary diabetes mellitus, 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    Surgical History:  has a past surgical history that includes Eye surgery (Bilateral, 2016); Colon surgery (2006); Colonoscopy (N/A, 2/2/2017); Coronary artery bypass graft (2005); Left heart catheterization (Left, 11/16/2018); Coronary bypass graft angiography (11/16/2018); Cardiac electrophysiology study (N/A, 11/19/2018); Insertion of implantable cardioverter-defibrillator (ICD) generator with two existing leads (Left, 11/19/2018); Cardiac defibrillator placement; and Cardiac electrophysiology study (N/A, 11/19/2018).    Family History: family history includes Diabetes in his mother and sister; Heart attack in his brother; Heart disease in his mother and sister;  Hypertension in his sister.. Otherwise no colon cancer, inflammatory bowel disease, or GI malignancies.    Social History:  reports that he quit smoking about 41 years ago. His smoking use included cigarettes. He started smoking about 59 years ago. He smoked 3.00 packs per day. He quit smokeless tobacco use about 38 years ago.  His smokeless tobacco use included snuff and chew. He reports that he does not drink alcohol and does not use drugs.    Review of patient's allergies indicates:  No Known Allergies    Medications:   Medications Prior to Admission   Medication Sig Dispense Refill Last Dose    ammonium lactate 12 % Crea Apply twice daily to affected parts both feet as needed. 140 g 11     aspirin (ECOTRIN) 81 MG EC tablet Take 1 tablet (81 mg total) by mouth once daily. 90 tablet 3     blood-glucose sensor (DEXCOM G6 SENSOR) [place] 3 each by Misc.(Non-Drug; Combo Route) route continuous. 3 each prn     blood-glucose transmitter (DEXCOM G6 TRANSMITTER) [place] 1 each by Misc.(Non-Drug; Combo Route) route continuous. 1 each prn     carvediloL (COREG) 6.25 MG tablet Take 1 tablet (6.25 mg total) by mouth 2 (two) times daily. 180 tablet 3     clopidogreL (PLAVIX) 75 mg tablet Take 1 tablet (75 mg total) by mouth once daily. 90 tablet 3     esomeprazole (NEXIUM) 40 MG capsule Take 1 capsule (40 mg total) by mouth 2 (two) times daily. 180 capsule 3     ezetimibe (ZETIA) 10 mg tablet TAKE 1 TABLET BY MOUTH ONCE DAILY 90 tablet 3     ferrous sulfate (FEOSOL) 325 mg (65 mg iron) Tab tablet TAKE 1 TABLET BY MOUTH THREE TIMES DAILY (Patient taking differently: Take 325 mg by mouth 2 (two) times daily. TAKE 1 TABLET BY MOUTH THREE TIMES DAILY) 90 tablet 3     furosemide (LASIX) 20 MG tablet Take 1 tablet (20 mg total) by mouth once daily. 90 tablet 3     GVOKE HYPOPEN 2-PACK 1 mg/0.2 mL AtIn INJECT SUBCUTANEOUSLY  CONTENTS OF 1 AUTO-INJECTOR AS NEEDED FOR HYPOGLCEMIA  AS DIRECTED 0.4 mL 2     insulin lispro (HUMALOG  U-100 INSULIN) 100 unit/mL injection USE AS DIRECTED VIA V-GO 20 20 mL 11     ketoconazole (NIZORAL) 2 % cream Apply topically once daily. 60 g 2     LIDOcaine HCL 2% (XYLOCAINE) 2 % jelly Apply topically as needed. Apply topically once nightly to affected part of foot/feet, for pain. 30 mL 2     losartan (COZAAR) 25 MG tablet Take 0.5 tablets (12.5 mg total) by mouth once daily. 45 tablet 3     nitroGLYCERIN (NITROSTAT) 0.4 MG SL tablet DISSOLVE 1 TABLET UNDER THE TONGUE EVERY 5 MINUTES AS  NEEDED FOR CHEST PAIN. MAX  OF 3 TABLETS IN 15 MINUTES. CALL 911 IF PAIN PERSISTS. 25 tablet 11     ondansetron (ZOFRAN) 4 MG tablet Take 1 tablet (4 mg total) by mouth every 8 (eight) hours as needed for Nausea. 15 tablet 0     papaverine 30 mg/mL injection Add: Phentolamine 1 mg  Add: PGE1 10 mcg    Sig:  Inject 20 units as directed; titrate up by 5 units until desired results 10 mL 12     ranolazine (RANEXA) 1,000 mg Tb12 Take 1 tablet (1,000 mg total) by mouth 2 (two) times daily. 60 tablet 11     rosuvastatin (CRESTOR) 40 MG Tab Take 1 tablet (40 mg total) by mouth once daily. 90 tablet 3     spironolactone (ALDACTONE) 25 MG tablet Take 1 tablet (25 mg total) by mouth once daily. 90 tablet 3     sub-q insulin device, 20 unit (V-GO 20) [place] To use one daily 31 each 11     tamsulosin (FLOMAX) 0.4 mg Cap Take 1 capsule (0.4 mg total) by mouth once daily. 90 capsule 3     TRULICITY 4.5 mg/0.5 mL pen injector INJECT 4.5 MG INTO THE SKIN EVERY 7 DAYS 12 pen 3        Physical Exam:    Vital Signs: There were no vitals filed for this visit.    Gen: NAD, lying comfortably  HENT: NCAT, oropharynx clear  Eyes: anicteric sclerae, EOMI grossly  Neck: supple, no visible masses/goiter  Cardiac: RRR  Lungs: non-labored breathing  Abd: soft, NT/ND, normoactive BS  Ext: no LE edema, warm, well perfused  Skin: skin intact on exposed body parts, no visible rashes, lesions  Neuro: A&Ox4, neuro exam grossly intact, moves all  extremities  Psych: appropriate mood, affect      Labs:  Lab Results   Component Value Date    WBC 5.77 02/09/2022    HGB 13.4 (L) 02/09/2022    HCT 41.4 02/09/2022     02/09/2022    CHOL 137 11/09/2021    TRIG 53 11/09/2021    HDL 46 11/09/2021    ALT 35 11/09/2021    AST 33 11/09/2021     03/08/2022    K 4.9 03/08/2022     03/08/2022    CREATININE 1.4 03/08/2022    BUN 17 03/08/2022    CO2 28 03/08/2022    TSH 0.509 12/28/2021    PSA 1.0 11/10/2020    INR 1.0 10/05/2019    HGBA1C 6.8 (H) 11/09/2021       Plan:  Esophageal manometry for a pre op evaluation.    I have explained the risks and benefits of endoscopy procedures to the patient including but not limited to bleeding, perforation, infection, and death.  The patient was asked if they understand and allowed to ask any further questions to their satisfaction.      Carmelita Jacobsen MD

## 2022-03-10 NOTE — OR NURSING
"Reported BS and reassessments to [facility] Dr. Castillo. Pt personal monitor on phone was reporting BS of 74, pt asymptomatic. per [facility] pt appropriate for discharge. Educated pt on assessing BS and S/S of hypoglycemia. Pt verbalized understanding and said he  "will go eat breakfast now." pt discharged.   "

## 2022-03-11 ENCOUNTER — PATIENT MESSAGE (OUTPATIENT)
Dept: ENDOCRINOLOGY | Facility: CLINIC | Age: 72
End: 2022-03-11
Payer: MEDICARE

## 2022-03-11 DIAGNOSIS — E66.01 SEVERE OBESITY (BMI 35.0-39.9) WITH COMORBIDITY: ICD-10-CM

## 2022-03-11 DIAGNOSIS — I50.42 CHRONIC COMBINED SYSTOLIC AND DIASTOLIC HEART FAILURE: Chronic | ICD-10-CM

## 2022-03-11 DIAGNOSIS — E66.9 DIABETES MELLITUS TYPE 2 IN OBESE: Chronic | ICD-10-CM

## 2022-03-11 DIAGNOSIS — Z95.1 S/P CABG (CORONARY ARTERY BYPASS GRAFT): ICD-10-CM

## 2022-03-11 DIAGNOSIS — E11.69 DIABETES MELLITUS TYPE 2 IN OBESE: Chronic | ICD-10-CM

## 2022-03-11 PROCEDURE — 91010 PR ESOPHAGEAL MOTILITY STUDY, MA2METRY: ICD-10-PCS | Mod: 26,,, | Performed by: INTERNAL MEDICINE

## 2022-03-11 PROCEDURE — 91010 ESOPHAGUS MOTILITY STUDY: CPT | Mod: 26,,, | Performed by: INTERNAL MEDICINE

## 2022-03-11 PROCEDURE — 91037 ESOPH IMPED FUNCTION TEST: CPT | Mod: 26,,, | Performed by: INTERNAL MEDICINE

## 2022-03-11 PROCEDURE — 91037 PR GERD TST W/ NASAL IMPEDENCE ELECTROD: ICD-10-PCS | Mod: 26,,, | Performed by: INTERNAL MEDICINE

## 2022-03-14 ENCOUNTER — HOSPITAL ENCOUNTER (OUTPATIENT)
Dept: PULMONOLOGY | Facility: CLINIC | Age: 72
Discharge: HOME | End: 2022-03-14
Payer: MEDICARE

## 2022-03-14 ENCOUNTER — HOSPITAL ENCOUNTER (OUTPATIENT)
Dept: RADIOLOGY | Facility: HOSPITAL | Age: 72
Discharge: HOME | End: 2022-03-14
Attending: NURSE PRACTITIONER
Payer: MEDICARE

## 2022-03-14 DIAGNOSIS — R06.02 SHORTNESS OF BREATH: ICD-10-CM

## 2022-03-14 PROCEDURE — 71250 CT THORAX DX C-: CPT | Mod: TC

## 2022-03-14 PROCEDURE — 94060 EVALUATION OF WHEEZING: CPT | Mod: S$GLB,,, | Performed by: INTERNAL MEDICINE

## 2022-03-14 PROCEDURE — 71250 CT THORAX DX C-: CPT | Mod: 26,,, | Performed by: RADIOLOGY

## 2022-03-14 PROCEDURE — 94060 PR EVAL OF BRONCHOSPASM: ICD-10-PCS | Mod: S$GLB,,, | Performed by: INTERNAL MEDICINE

## 2022-03-14 PROCEDURE — 71250 CT CHEST WITHOUT CONTRAST: ICD-10-PCS | Mod: 26,,, | Performed by: RADIOLOGY

## 2022-03-15 LAB
FEF 25 75 LLN: 0.65
FEF 25 75 PRE REF: 62.4 %
FEF 25 75 REF: 1.9
FET100 CHG: -1.9 %
FEV05 LLN: 1.27
FEV05 REF: 2.4
FEV1 CHG: 3.5 %
FEV1 FVC LLN: 63
FEV1 FVC PRE REF: 92.3 %
FEV1 FVC REF: 76
FEV1 LLN: 1.68
FEV1 PRE REF: 80.3 %
FEV1 REF: 2.48
FEV1 VOL CHG: 0.07
FVC CHG: 2.4 %
FVC LLN: 2.32
FVC PRE REF: 86.7 %
FVC REF: 3.25
FVC VOL CHG: 0.07
PEF LLN: 4.9
PEF PRE REF: 66.6 %
PEF REF: 7.4
PHYSICIAN COMMENT: NORMAL
POST FEF 25 75: 1.23 L/S (ref 0.65–3.82)
POST FET 100: 9.49 SEC
POST FEV1 FVC: 71.31 % (ref 63–88.51)
POST FEV1: 2.06 L (ref 1.68–3.22)
POST FEV5: 1.72 L (ref 1.27–3.54)
POST FVC: 2.89 L (ref 2.32–4.2)
POST PEF: 7.65 L/S (ref 4.9–9.9)
PRE FEF 25 75: 1.19 L/S (ref 0.65–3.82)
PRE FET 100: 9.67 SEC
PRE FEV05 REF: 68.1 %
PRE FEV1 FVC: 70.55 % (ref 63–88.51)
PRE FEV1: 1.99 L (ref 1.68–3.22)
PRE FEV5: 1.64 L (ref 1.27–3.54)
PRE FVC: 2.82 L (ref 2.32–4.2)
PRE PEF: 4.93 L/S (ref 4.9–9.9)

## 2022-03-18 ENCOUNTER — LAB VISIT (OUTPATIENT)
Dept: LAB | Facility: HOSPITAL | Age: 72
End: 2022-03-18
Attending: NURSE PRACTITIONER
Payer: MEDICARE

## 2022-03-18 ENCOUNTER — PATIENT MESSAGE (OUTPATIENT)
Dept: ENDOCRINOLOGY | Facility: CLINIC | Age: 72
End: 2022-03-18
Payer: MEDICARE

## 2022-03-18 DIAGNOSIS — E11.69 DIABETES MELLITUS TYPE 2 IN OBESE: Chronic | ICD-10-CM

## 2022-03-18 DIAGNOSIS — E66.9 DIABETES MELLITUS TYPE 2 IN OBESE: Chronic | ICD-10-CM

## 2022-03-18 DIAGNOSIS — E21.3 HYPERPARATHYROIDISM: ICD-10-CM

## 2022-03-18 DIAGNOSIS — N18.32 STAGE 3B CHRONIC KIDNEY DISEASE: ICD-10-CM

## 2022-03-18 LAB
25(OH)D3+25(OH)D2 SERPL-MCNC: 30 NG/ML (ref 30–96)
ALBUMIN SERPL BCP-MCNC: 3.7 G/DL (ref 3.5–5.2)
ANION GAP SERPL CALC-SCNC: 9 MMOL/L (ref 8–16)
BASOPHILS # BLD AUTO: 0.05 K/UL (ref 0–0.2)
BASOPHILS NFR BLD: 0.7 % (ref 0–1.9)
BUN SERPL-MCNC: 20 MG/DL (ref 8–23)
CALCIUM SERPL-MCNC: 9.3 MG/DL (ref 8.7–10.5)
CHLORIDE SERPL-SCNC: 104 MMOL/L (ref 95–110)
CO2 SERPL-SCNC: 28 MMOL/L (ref 23–29)
CREAT SERPL-MCNC: 1.4 MG/DL (ref 0.5–1.4)
DIFFERENTIAL METHOD: ABNORMAL
EOSINOPHIL # BLD AUTO: 0.1 K/UL (ref 0–0.5)
EOSINOPHIL NFR BLD: 2.1 % (ref 0–8)
ERYTHROCYTE [DISTWIDTH] IN BLOOD BY AUTOMATED COUNT: 13 % (ref 11.5–14.5)
EST. GFR  (AFRICAN AMERICAN): 57 ML/MIN/1.73 M^2
EST. GFR  (NON AFRICAN AMERICAN): 49 ML/MIN/1.73 M^2
GLUCOSE SERPL-MCNC: 65 MG/DL (ref 70–110)
HCT VFR BLD AUTO: 39.9 % (ref 40–54)
HGB BLD-MCNC: 12.9 G/DL (ref 14–18)
IMM GRANULOCYTES # BLD AUTO: 0 K/UL (ref 0–0.04)
IMM GRANULOCYTES NFR BLD AUTO: 0 % (ref 0–0.5)
LYMPHOCYTES # BLD AUTO: 2.1 K/UL (ref 1–4.8)
LYMPHOCYTES NFR BLD: 31.9 % (ref 18–48)
MCH RBC QN AUTO: 30 PG (ref 27–31)
MCHC RBC AUTO-ENTMCNC: 32.3 G/DL (ref 32–36)
MCV RBC AUTO: 93 FL (ref 82–98)
MONOCYTES # BLD AUTO: 0.6 K/UL (ref 0.3–1)
MONOCYTES NFR BLD: 9 % (ref 4–15)
NEUTROPHILS # BLD AUTO: 3.8 K/UL (ref 1.8–7.7)
NEUTROPHILS NFR BLD: 56.3 % (ref 38–73)
NRBC BLD-RTO: 0 /100 WBC
PHOSPHATE SERPL-MCNC: 3.5 MG/DL (ref 2.7–4.5)
PLATELET # BLD AUTO: 168 K/UL (ref 150–450)
PMV BLD AUTO: 10.2 FL (ref 9.2–12.9)
POTASSIUM SERPL-SCNC: 4.2 MMOL/L (ref 3.5–5.1)
PTH-INTACT SERPL-MCNC: 103.8 PG/ML (ref 9–77)
RBC # BLD AUTO: 4.3 M/UL (ref 4.6–6.2)
SODIUM SERPL-SCNC: 141 MMOL/L (ref 136–145)
WBC # BLD AUTO: 6.68 K/UL (ref 3.9–12.7)

## 2022-03-18 PROCEDURE — 85025 COMPLETE CBC W/AUTO DIFF WBC: CPT | Performed by: NURSE PRACTITIONER

## 2022-03-18 PROCEDURE — 36415 COLL VENOUS BLD VENIPUNCTURE: CPT | Performed by: NURSE PRACTITIONER

## 2022-03-18 PROCEDURE — 83970 ASSAY OF PARATHORMONE: CPT | Performed by: NURSE PRACTITIONER

## 2022-03-18 PROCEDURE — 82306 VITAMIN D 25 HYDROXY: CPT | Performed by: NURSE PRACTITIONER

## 2022-03-18 PROCEDURE — 80069 RENAL FUNCTION PANEL: CPT | Performed by: NURSE PRACTITIONER

## 2022-03-18 RX ORDER — DULAGLUTIDE 4.5 MG/.5ML
INJECTION, SOLUTION SUBCUTANEOUS
Qty: 12 PEN | Refills: 3 | Status: SHIPPED | OUTPATIENT
Start: 2022-03-18 | End: 2022-11-18 | Stop reason: SDUPTHER

## 2022-03-18 RX ORDER — BLOOD-GLUCOSE SENSOR
3 EACH MISCELLANEOUS CONTINUOUS
Qty: 3 EACH | Status: SHIPPED | OUTPATIENT
Start: 2022-03-18 | End: 2022-05-13 | Stop reason: SDUPTHER

## 2022-03-18 RX ORDER — BLOOD-GLUCOSE TRANSMITTER
1 EACH MISCELLANEOUS CONTINUOUS
Qty: 1 EACH | Status: SHIPPED | OUTPATIENT
Start: 2022-03-18 | End: 2022-05-13 | Stop reason: SDUPTHER

## 2022-03-20 ENCOUNTER — OFFICE VISIT (OUTPATIENT)
Dept: URGENT CARE | Facility: CLINIC | Age: 72
End: 2022-03-20
Payer: MEDICARE

## 2022-03-20 VITALS
HEIGHT: 69 IN | SYSTOLIC BLOOD PRESSURE: 125 MMHG | BODY MASS INDEX: 36.88 KG/M2 | RESPIRATION RATE: 18 BRPM | WEIGHT: 249 LBS | DIASTOLIC BLOOD PRESSURE: 65 MMHG | HEART RATE: 65 BPM | TEMPERATURE: 97 F | OXYGEN SATURATION: 98 %

## 2022-03-20 DIAGNOSIS — M79.671 PAIN IN BOTH FEET: Primary | ICD-10-CM

## 2022-03-20 DIAGNOSIS — M79.672 PAIN IN BOTH FEET: Primary | ICD-10-CM

## 2022-03-20 DIAGNOSIS — I95.1 ORTHOSTATIC HYPOTENSION: ICD-10-CM

## 2022-03-20 PROCEDURE — 4010F ACE/ARB THERAPY RXD/TAKEN: CPT | Mod: CPTII,S$GLB,, | Performed by: FAMILY MEDICINE

## 2022-03-20 PROCEDURE — 73630 X-RAY EXAM OF FOOT: CPT | Mod: RT,S$GLB,, | Performed by: INTERNAL MEDICINE

## 2022-03-20 PROCEDURE — 1159F PR MEDICATION LIST DOCUMENTED IN MEDICAL RECORD: ICD-10-PCS | Mod: CPTII,S$GLB,, | Performed by: FAMILY MEDICINE

## 2022-03-20 PROCEDURE — 1125F PR PAIN SEVERITY QUANTIFIED, PAIN PRESENT: ICD-10-PCS | Mod: CPTII,S$GLB,, | Performed by: FAMILY MEDICINE

## 2022-03-20 PROCEDURE — 4010F PR ACE/ARB THEARPY RXD/TAKEN: ICD-10-PCS | Mod: CPTII,S$GLB,, | Performed by: FAMILY MEDICINE

## 2022-03-20 PROCEDURE — 3074F PR MOST RECENT SYSTOLIC BLOOD PRESSURE < 130 MM HG: ICD-10-PCS | Mod: CPTII,S$GLB,, | Performed by: FAMILY MEDICINE

## 2022-03-20 PROCEDURE — 99214 OFFICE O/P EST MOD 30 MIN: CPT | Mod: S$GLB,,, | Performed by: FAMILY MEDICINE

## 2022-03-20 PROCEDURE — 73630 X-RAY EXAM OF FOOT: CPT | Mod: LT,S$GLB,, | Performed by: INTERNAL MEDICINE

## 2022-03-20 PROCEDURE — 99214 PR OFFICE/OUTPT VISIT, EST, LEVL IV, 30-39 MIN: ICD-10-PCS | Mod: S$GLB,,, | Performed by: FAMILY MEDICINE

## 2022-03-20 PROCEDURE — 1159F MED LIST DOCD IN RCRD: CPT | Mod: CPTII,S$GLB,, | Performed by: FAMILY MEDICINE

## 2022-03-20 PROCEDURE — 3008F PR BODY MASS INDEX (BMI) DOCUMENTED: ICD-10-PCS | Mod: CPTII,S$GLB,, | Performed by: FAMILY MEDICINE

## 2022-03-20 PROCEDURE — 1125F AMNT PAIN NOTED PAIN PRSNT: CPT | Mod: CPTII,S$GLB,, | Performed by: FAMILY MEDICINE

## 2022-03-20 PROCEDURE — 3008F BODY MASS INDEX DOCD: CPT | Mod: CPTII,S$GLB,, | Performed by: FAMILY MEDICINE

## 2022-03-20 PROCEDURE — 3078F DIAST BP <80 MM HG: CPT | Mod: CPTII,S$GLB,, | Performed by: FAMILY MEDICINE

## 2022-03-20 PROCEDURE — 3074F SYST BP LT 130 MM HG: CPT | Mod: CPTII,S$GLB,, | Performed by: FAMILY MEDICINE

## 2022-03-20 PROCEDURE — 73630 XR FOOT COMPLETE 3 VIEW RIGHT: ICD-10-PCS | Mod: RT,S$GLB,, | Performed by: INTERNAL MEDICINE

## 2022-03-20 PROCEDURE — 3078F PR MOST RECENT DIASTOLIC BLOOD PRESSURE < 80 MM HG: ICD-10-PCS | Mod: CPTII,S$GLB,, | Performed by: FAMILY MEDICINE

## 2022-03-20 RX ORDER — TRAMADOL HYDROCHLORIDE 50 MG/1
50 TABLET ORAL EVERY 6 HOURS PRN
Qty: 20 TABLET | Refills: 0 | Status: SHIPPED | OUTPATIENT
Start: 2022-03-20 | End: 2022-09-28

## 2022-03-20 RX ORDER — MELOXICAM 7.5 MG/1
7.5 TABLET ORAL DAILY
Qty: 20 TABLET | Refills: 0 | Status: SHIPPED | OUTPATIENT
Start: 2022-03-20 | End: 2022-04-09

## 2022-03-20 NOTE — LETTER
March 20, 2022  Walter Bull  [address]  [place] [address]                [facility]  [address], [address]  [place] [address]  Phone: [phone]  Fax: [phone] Walter Bull was seen and treated in our Urgent Care department on 3/20/2022. He may return to work in 2 - 3 days.      If you have any questions or concerns, please don't hesitate to call.        Sincerely,        Juanito Mercado MD

## 2022-03-20 NOTE — PATIENT INSTRUCTIONS
Please drink plenty of fluids.  Please get plenty of rest.  Please return here or go to the Emergency Department for any concerns or worsening of condition.  If you were prescribed a narcotic medication, do not drive or operate heavy equipment or machinery while taking these medications.  If you were not prescribed an anti-inflammatory medication, and if you do not have any history of stomach/intestinal ulcers, or kidney disease, or are not taking a blood thinner such as Coumadin, Plavix, Pradaxa, Eloquis, or Xaralta for example, it is OK to take over the counter Ibuprofen or Advil or Motrin or Aleve as directed.  Do not take these medications on an empty stomach.  Rest, ice, compression and elevation to the affected joint or limb as needed.    If you were given a sling, wear it for comfort until follow up as arranged.  If you were given or placed in a splint, wear it until your follow up visit or recheck.  If you  smoke, please stop smoking.       Please follow up with your primary care doctor or specialist as needed.    Belkys Kruger MD  [phone]    Podiatry - [place]    Dr. Mohamud Beyer  [address], [place]  [address]    [phone]    You must understand that you have received treatment at an Urgent Care facility only, and that you may be  released before all of your medical problems are known or treated. Urgent Care facilities are not equipped to  handle life threatening emergencies. It is recommended that you seek care at an Emergency Department for  further evaluation of worsening or concerning symptoms, or possibly life threatening conditions as  discussed.

## 2022-03-20 NOTE — PROGRESS NOTES
"Subjective:       Patient ID: Walter Bull is a 71 y.o. male.    Vitals:  height is 5' 9" (1.753 m) and weight is 112.9 kg (249 lb). His tympanic temperature is 97.3 °F (36.3 °C). His blood pressure is 125/65 and his pulse is 65. His respiration is 18 and oxygen saturation is 98%.     Chief Complaint: Foot Problem    Other  This is a recurrent problem. The current episode started in the past 7 days. The problem occurs constantly. The problem has been gradually worsening. The symptoms are aggravated by walking and standing. He has tried nothing for the symptoms. The treatment provided no relief.       Constitution: Negative.   HENT: Negative.    Cardiovascular: Negative.    Eyes: Negative.    Respiratory: Negative.    Gastrointestinal: Negative.    Endocrine: negative.   Genitourinary: Negative.    Musculoskeletal: Negative.  Positive for pain and pain with walking.   Skin: Negative.    Allergic/Immunologic: Negative.    Neurological: Negative.    Hematologic/Lymphatic: Negative.    Psychiatric/Behavioral: Negative.        Objective:      Physical Exam   Constitutional: He is oriented to person, place, and time. He appears well-developed. He is cooperative.  Non-toxic appearance. He does not appear ill. No distress.   HENT:   Head: Normocephalic and atraumatic. Head is without abrasion, without contusion and without laceration.   Ears:   Right Ear: Hearing, tympanic membrane, external ear and ear canal normal. No hemotympanum.   Left Ear: Hearing, tympanic membrane, external ear and ear canal normal. No hemotympanum.   Nose: Nose normal. No mucosal edema, rhinorrhea or nasal deformity. No epistaxis. Right sinus exhibits no maxillary sinus tenderness and no frontal sinus tenderness. Left sinus exhibits no maxillary sinus tenderness and no frontal sinus tenderness.   Mouth/Throat: Uvula is midline, oropharynx is clear and moist and mucous membranes are normal. No trismus in the jaw. Normal dentition. No uvula " swelling. No posterior oropharyngeal erythema.   Eyes: Conjunctivae, EOM and lids are normal. Pupils are equal, round, and reactive to light. Right eye exhibits no discharge. Left eye exhibits no discharge. No scleral icterus.   Neck: Trachea normal and phonation normal. Neck supple. No tracheal deviation present. No neck rigidity present. No spinous process tenderness present. No muscular tenderness present.   Cardiovascular: Normal rate, regular rhythm, normal heart sounds and normal pulses.   Pulmonary/Chest: Effort normal and breath sounds normal. No respiratory distress.   Abdominal: Normal appearance and bowel sounds are normal. He exhibits no distension, no pulsatile midline mass and no mass. Soft. There is no abdominal tenderness.   Musculoskeletal: Normal range of motion.         General: No deformity. Normal range of motion.      Right foot: Tenderness and bony tenderness present.      Left foot: Tenderness and bony tenderness present.        Feet:    Neurological: He is alert and oriented to person, place, and time. He has normal strength. No cranial nerve deficit or sensory deficit. He exhibits normal muscle tone. He displays no seizure activity. Coordination normal. GCS eye subscore is 4. GCS verbal subscore is 5. GCS motor subscore is 6.   Skin: Skin is warm, dry, intact, not diaphoretic and not pale. Capillary refill takes less than 2 seconds. No abrasion, No burn, No bruising and No ecchymosis   Psychiatric: His speech is normal and behavior is normal. Judgment and thought content normal.   Nursing note and vitals reviewed.    Type of Interpretation: ED Physician (Independently Interpreted).  Radiology Procedure Done: Left Foot.  Interpretation: No fx seen, arthritic changes noted.    Rt foot - No fx seen, arthritic changes noted            Assessment:       1. Pain in both feet    2. Orthostatic hypotension          Plan:         Pain in both feet  -     X-Ray Foot Complete Left; Future; Expected  date: 03/20/2022  -     X-Ray Foot Complete Right; Future; Expected date: 03/20/2022  -     meloxicam (MOBIC) 7.5 MG tablet; Take 1 tablet (7.5 mg total) by mouth once daily. for 20 days  Dispense: 20 tablet; Refill: 0  -     traMADoL (ULTRAM) 50 mg tablet; Take 1 tablet (50 mg total) by mouth every 6 (six) hours as needed for Pain.  Dispense: 20 tablet; Refill: 0  -     Ambulatory referral/consult to Podiatry    Orthostatic hypotension  -     Ambulatory referral/consult to Cardiology     Please drink plenty of fluids.  Please get plenty of rest.  Please return here or go to the Emergency Department for any concerns or worsening of condition.  If you were prescribed a narcotic medication, do not drive or operate heavy equipment or machinery while taking these medications.  If you were not prescribed an anti-inflammatory medication, and if you do not have any history of stomach/intestinal ulcers, or kidney disease, or are not taking a blood thinner such as Coumadin, Plavix, Pradaxa, Eloquis, or Xaralta for example, it is OK to take over the counter Ibuprofen or Advil or Motrin or Aleve as directed.  Do not take these medications on an empty stomach.  Rest, ice, compression and elevation to the affected joint or limb as needed.    If you were given a sling, wear it for comfort until follow up as arranged.  If you were given or placed in a splint, wear it until your follow up visit or recheck.  If you  smoke, please stop smoking.       Please follow up with your primary care doctor or specialist as needed.    Belkys Kruger MD  [phone]    You must understand that you have received treatment at an Urgent Care facility only, and that you may be  released before all of your medical problems are known or treated. Urgent Care facilities are not equipped to  handle life threatening emergencies. It is recommended that you seek care at an Emergency Department for  further evaluation of worsening or concerning  symptoms, or possibly life threatening conditions as  discussed.

## 2022-03-21 ENCOUNTER — TELEPHONE (OUTPATIENT)
Dept: NEPHROLOGY | Facility: CLINIC | Age: 72
End: 2022-03-21
Payer: MEDICARE

## 2022-03-28 ENCOUNTER — OFFICE VISIT (OUTPATIENT)
Dept: PODIATRY | Facility: CLINIC | Age: 72
End: 2022-03-28
Payer: MEDICARE

## 2022-03-28 VITALS
WEIGHT: 248.88 LBS | HEART RATE: 59 BPM | SYSTOLIC BLOOD PRESSURE: 141 MMHG | BODY MASS INDEX: 36.76 KG/M2 | DIASTOLIC BLOOD PRESSURE: 66 MMHG

## 2022-03-28 DIAGNOSIS — E11.9 ENCOUNTER FOR COMPREHENSIVE DIABETIC FOOT EXAMINATION, TYPE 2 DIABETES MELLITUS: Primary | ICD-10-CM

## 2022-03-28 DIAGNOSIS — D36.10 NEUROMA: ICD-10-CM

## 2022-03-28 DIAGNOSIS — L84 CORN OR CALLUS: ICD-10-CM

## 2022-03-28 PROCEDURE — 1125F PR PAIN SEVERITY QUANTIFIED, PAIN PRESENT: ICD-10-PCS | Mod: CPTII,S$GLB,, | Performed by: PODIATRIST

## 2022-03-28 PROCEDURE — 3078F PR MOST RECENT DIASTOLIC BLOOD PRESSURE < 80 MM HG: ICD-10-PCS | Mod: CPTII,S$GLB,, | Performed by: PODIATRIST

## 2022-03-28 PROCEDURE — 99214 OFFICE O/P EST MOD 30 MIN: CPT | Mod: S$GLB,,, | Performed by: PODIATRIST

## 2022-03-28 PROCEDURE — 3008F BODY MASS INDEX DOCD: CPT | Mod: CPTII,S$GLB,, | Performed by: PODIATRIST

## 2022-03-28 PROCEDURE — 3078F DIAST BP <80 MM HG: CPT | Mod: CPTII,S$GLB,, | Performed by: PODIATRIST

## 2022-03-28 PROCEDURE — 99214 PR OFFICE/OUTPT VISIT, EST, LEVL IV, 30-39 MIN: ICD-10-PCS | Mod: S$GLB,,, | Performed by: PODIATRIST

## 2022-03-28 PROCEDURE — 3077F PR MOST RECENT SYSTOLIC BLOOD PRESSURE >= 140 MM HG: ICD-10-PCS | Mod: CPTII,S$GLB,, | Performed by: PODIATRIST

## 2022-03-28 PROCEDURE — 1159F PR MEDICATION LIST DOCUMENTED IN MEDICAL RECORD: ICD-10-PCS | Mod: CPTII,S$GLB,, | Performed by: PODIATRIST

## 2022-03-28 PROCEDURE — 4010F PR ACE/ARB THEARPY RXD/TAKEN: ICD-10-PCS | Mod: CPTII,S$GLB,, | Performed by: PODIATRIST

## 2022-03-28 PROCEDURE — 99999 PR PBB SHADOW E&M-EST. PATIENT-LVL IV: CPT | Mod: PBBFAC,,, | Performed by: PODIATRIST

## 2022-03-28 PROCEDURE — 99999 PR PBB SHADOW E&M-EST. PATIENT-LVL IV: ICD-10-PCS | Mod: PBBFAC,,, | Performed by: PODIATRIST

## 2022-03-28 PROCEDURE — 1125F AMNT PAIN NOTED PAIN PRSNT: CPT | Mod: CPTII,S$GLB,, | Performed by: PODIATRIST

## 2022-03-28 PROCEDURE — 3077F SYST BP >= 140 MM HG: CPT | Mod: CPTII,S$GLB,, | Performed by: PODIATRIST

## 2022-03-28 PROCEDURE — 4010F ACE/ARB THERAPY RXD/TAKEN: CPT | Mod: CPTII,S$GLB,, | Performed by: PODIATRIST

## 2022-03-28 PROCEDURE — 1159F MED LIST DOCD IN RCRD: CPT | Mod: CPTII,S$GLB,, | Performed by: PODIATRIST

## 2022-03-28 PROCEDURE — 3008F PR BODY MASS INDEX (BMI) DOCUMENTED: ICD-10-PCS | Mod: CPTII,S$GLB,, | Performed by: PODIATRIST

## 2022-03-28 RX ORDER — DICLOFENAC SODIUM 10 MG/G
2 GEL TOPICAL 4 TIMES DAILY
Qty: 100 G | Refills: 2 | Status: SHIPPED | OUTPATIENT
Start: 2022-03-28 | End: 2023-07-05

## 2022-03-28 RX ORDER — CARVEDILOL 3.12 MG/1
3.12 TABLET ORAL 2 TIMES DAILY
COMMUNITY
Start: 2022-03-20 | End: 2022-05-25 | Stop reason: SDUPTHER

## 2022-03-29 ENCOUNTER — TELEPHONE (OUTPATIENT)
Dept: CARDIOLOGY | Facility: HOSPITAL | Age: 72
End: 2022-03-29
Payer: MEDICARE

## 2022-03-29 NOTE — TELEPHONE ENCOUNTER
New onset atrial fibrillation noted on remote device interrogation (routine check)  Overall burden:  <1%  Max duration seen: 17 mins 36 secs on 3/5/22; this event did not suggest AF  Other events exceeding 6 mins c/w pAF, see below  Most recent episode:3/13/22 for 30 ses  Ventricular rates: peak V. Rates up to 152 bpm  Anticoagulation status:  none  Pt has EP clinic appt in July 2022  CHADS-VASc Score of 4 for age, HTN, DM, hx of CHF

## 2022-03-31 ENCOUNTER — TELEPHONE (OUTPATIENT)
Dept: CARDIOLOGY | Facility: HOSPITAL | Age: 72
End: 2022-03-31
Payer: MEDICARE

## 2022-03-31 ENCOUNTER — PATIENT MESSAGE (OUTPATIENT)
Dept: ELECTROPHYSIOLOGY | Facility: CLINIC | Age: 72
End: 2022-03-31
Payer: MEDICARE

## 2022-03-31 NOTE — TELEPHONE ENCOUNTER
S/w patient by phone to address any questions he had prior to his appt on Monday.  We discussed the findings of new onset atrial fibrillation which requires a discussion about anticoagulation.  He confirmed his appt and stated he had no other questions at this time.

## 2022-04-02 NOTE — PROGRESS NOTES
Subjective:      Patient ID: Walter Bull is a 71 y.o. male.    Chief Complaint: Diabetes Mellitus, Diabetic Foot Exam, Foot Problem, and Foot Pain (Pain and burning on the bottom of his feet  PCP Jerri Griffiths MD 11/12/2021)    Walter is a 71 y.o. male who presents to the clinic upon referral from Dr. Mercado  for evaluation and treatment of diabetic feet. Walter has a past medical history of Anemia, Angina pectoris, Anticoagulant long-term use, Arthritis, Back pain, CHF (congestive heart failure), Chronic bronchitis, Colon cancer screening (2/2/2017), Coronary artery disease, Diabetes mellitus type I, Diabetes with neurologic complications, Disorder of kidney and ureter, Encounter for blood transfusion, Glaucoma, Heart attack, Heart disease, Hyperlipidemia, Hypertension, Hypothyroidism, Iron deficiency, Kidney failure, Obesity, Peripheral vascular disease, Polyneuropathy, Pulmonary emphysema (2/26/2020), Renal manifestation of secondary diabetes mellitus, 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  Patient has painful burning and occasional numbness to the bottom of both feet.   PCP: Jerri Griffiths MD    Date Last Seen by PCP: dr jerri griffiths05/21/2019    Current shoe gear: Tennis shoes    Hemoglobin A1C   Date Value Ref Range Status   11/09/2021 6.8 (H) 4.0 - 5.6 % Final     Comment:     ADA Screening Guidelines:  5.7-6.4%  Consistent with prediabetes  >or=6.5%  Consistent with diabetes    High levels of fetal hemoglobin interfere with the HbA1C  assay. Heterozygous hemoglobin variants (HbS, HgC, etc)do  not significantly interfere with this assay.   However, presence of multiple variants may affect accuracy.     09/27/2021 7.4 (H) 4.0 - 5.6 % Final     Comment:     ADA Screening Guidelines:  5.7-6.4%  Consistent with prediabetes  >or=6.5%  Consistent with diabetes    High levels of fetal hemoglobin interfere with the  HbA1C  assay. Heterozygous hemoglobin variants (HbS, HgC, etc)do  not significantly interfere with this assay.   However, presence of multiple variants may affect accuracy.     05/05/2021 7.6 (H) 4.0 - 5.6 % Final     Comment:     ADA Screening Guidelines:  5.7-6.4%  Consistent with prediabetes  >or=6.5%  Consistent with diabetes    High levels of fetal hemoglobin interfere with the HbA1C  assay. Heterozygous hemoglobin variants (HbS, HgC, etc)do  not significantly interfere with this assay.   However, presence of multiple variants may affect accuracy.             Review of Systems   Constitutional: Negative for chills, fever and malaise/fatigue.   HENT: Negative for hearing loss.    Cardiovascular: Negative for claudication.   Respiratory: Negative for shortness of breath.    Skin: Negative for dry skin, flushing and rash.   Musculoskeletal: Negative for joint pain and myalgias.   Neurological: Negative for loss of balance, numbness, paresthesias and sensory change.   Psychiatric/Behavioral: Negative for altered mental status.           Objective:      Physical Exam  Vitals reviewed.   Constitutional:       Appearance: He is well-developed.   HENT:      Head: Normocephalic and atraumatic.   Cardiovascular:      Pulses:           Dorsalis pedis pulses are 2+ on the right side and 2+ on the left side.        Posterior tibial pulses are 2+ on the right side and 2+ on the left side.      Comments: No edema noted to b/L LEs  Pulmonary:      Effort: Pulmonary effort is normal.   Musculoskeletal:         General: Normal range of motion.      Comments: Adequate joint ROM noted to all lower extremity muscle groups with no pain or crepitation noted. Muscle strength is 5/5 in all groups bilaterally.     Feet:      Right foot:      Protective Sensation: 5 sites tested. 5 sites sensed.      Skin integrity: Callus and dry skin present.      Left foot:      Protective Sensation: 5 sites tested. 5 sites sensed.      Skin  integrity: Callus and dry skin present.   Skin:     General: Skin is warm and dry.      Capillary Refill: Capillary refill takes 2 to 3 seconds.      Coloration: Skin is pale.      Comments: Xerosis improved. No open lesion noted b/L  Skin temp is warm to warm from proximal to distal b/L.  Webspaces clean, dry, and intact  Nails x10 short   Neurological:      Mental Status: He is alert and oriented to person, place, and time.      Sensory: Sensory deficit present.      Motor: Atrophy present.      Deep Tendon Reflexes: Reflexes abnormal.      Reflex Scores:       Patellar reflexes are 1+ on the right side and 1+ on the left side.       Achilles reflexes are 1+ on the right side and 1+ on the left side.     Comments: Intact gross sensation noted to b/L LEs    There is pain on palpation of the bilateral 3rd  intermetatarsal space with a positive Nikkie's click. Minimal tenderness to palpation of the adjacent metatarsal heads.     Psychiatric:         Behavior: Behavior normal.               Assessment:       Encounter Diagnoses   Name Primary?    Encounter for comprehensive diabetic foot examination, type 2 diabetes mellitus Yes    Corn or callus     Neuroma          Plan:       Walter was seen today for diabetes mellitus, diabetic foot exam, foot problem and foot pain.    Diagnoses and all orders for this visit:    Encounter for comprehensive diabetic foot examination, type 2 diabetes mellitus  -     DIABETIC SHOES FOR HOME USE    Corn or callus  -     DIABETIC SHOES FOR HOME USE    Neuroma    Other orders  -     diclofenac sodium (VOLTAREN) 1 % Gel; Apply 2 g topically 4 (four) times daily.      I counseled the patient on his conditions, their implications and medical management.    Begin icing, topical anti-inflammatory medication as well as inserts with metatarsal pad.  Treatments for neuroma discussed both conservative and surgical.    Shoe inspection. Diabetic Foot Education. Patient reminded of the importance  of good nutrition and blood sugar control to help prevent podiatric complications of diabetes. Patient instructed on proper foot hygeine. We discussed wearing proper shoe gear, daily foot inspections and Diabetic foot education in detail.    Return to clinic in 3-6 months or sooner if problems arise

## 2022-04-04 ENCOUNTER — CLINICAL SUPPORT (OUTPATIENT)
Dept: CARDIOLOGY | Facility: HOSPITAL | Age: 72
End: 2022-04-04
Attending: INTERNAL MEDICINE
Payer: MEDICARE

## 2022-04-04 ENCOUNTER — HOSPITAL ENCOUNTER (OUTPATIENT)
Dept: CARDIOLOGY | Facility: CLINIC | Age: 72
Discharge: HOME | End: 2022-04-04
Payer: MEDICARE

## 2022-04-04 ENCOUNTER — PATIENT OUTREACH (OUTPATIENT)
Dept: ADMINISTRATIVE | Facility: OTHER | Age: 72
End: 2022-04-04
Payer: MEDICARE

## 2022-04-04 ENCOUNTER — OFFICE VISIT (OUTPATIENT)
Dept: ELECTROPHYSIOLOGY | Facility: CLINIC | Age: 72
End: 2022-04-04
Payer: MEDICARE

## 2022-04-04 ENCOUNTER — HOSPITAL ENCOUNTER (OUTPATIENT)
Dept: CARDIOLOGY | Facility: HOSPITAL | Age: 72
Discharge: HOME | End: 2022-04-04
Attending: INTERNAL MEDICINE
Payer: MEDICARE

## 2022-04-04 VITALS
BODY MASS INDEX: 36.15 KG/M2 | DIASTOLIC BLOOD PRESSURE: 99 MMHG | HEART RATE: 65 BPM | WEIGHT: 244.06 LBS | SYSTOLIC BLOOD PRESSURE: 134 MMHG | HEIGHT: 69 IN

## 2022-04-04 VITALS
WEIGHT: 244 LBS | BODY MASS INDEX: 36.14 KG/M2 | HEART RATE: 60 BPM | DIASTOLIC BLOOD PRESSURE: 99 MMHG | HEIGHT: 69 IN | SYSTOLIC BLOOD PRESSURE: 134 MMHG

## 2022-04-04 DIAGNOSIS — I50.42 CHRONIC COMBINED SYSTOLIC AND DIASTOLIC HEART FAILURE: Primary | ICD-10-CM

## 2022-04-04 DIAGNOSIS — Z95.1 S/P CABG (CORONARY ARTERY BYPASS GRAFT): ICD-10-CM

## 2022-04-04 DIAGNOSIS — Z95.810 ICD (IMPLANTABLE CARDIOVERTER-DEFIBRILLATOR), DUAL, IN SITU: Primary | Chronic | ICD-10-CM

## 2022-04-04 DIAGNOSIS — I25.5 ISCHEMIC CARDIOMYOPATHY: ICD-10-CM

## 2022-04-04 DIAGNOSIS — I50.42 CHRONIC COMBINED SYSTOLIC AND DIASTOLIC HEART FAILURE: ICD-10-CM

## 2022-04-04 DIAGNOSIS — I49.8 OTHER SPECIFIED CARDIAC ARRHYTHMIAS: ICD-10-CM

## 2022-04-04 DIAGNOSIS — I25.118 CORONARY ARTERY DISEASE OF NATIVE ARTERY OF NATIVE HEART WITH STABLE ANGINA PECTORIS: Chronic | ICD-10-CM

## 2022-04-04 DIAGNOSIS — I50.42 CHRONIC COMBINED SYSTOLIC AND DIASTOLIC HEART FAILURE: Chronic | ICD-10-CM

## 2022-04-04 LAB
ASCENDING AORTA: 2.98 CM
BSA FOR ECHO PROCEDURE: 2.32 M2
CV ECHO LV RWT: 0.2 CM
DOP CALC LVOT AREA: 3.5 CM2
DOP CALC LVOT DIAMETER: 2.12 CM
DOP CALC LVOT PEAK VEL: 0.82 M/S
DOP CALC LVOT STROKE VOLUME: 66.68 CM3
DOP CALCLVOT PEAK VEL VTI: 18.9 CM
E WAVE DECELERATION TIME: 285.12 MSEC
E/A RATIO: 0.6
E/E' RATIO: 16.5 M/S
ECHO LV POSTERIOR WALL: 0.56 CM (ref 0.6–1.1)
EJECTION FRACTION: 25 %
FRACTIONAL SHORTENING: 11 % (ref 28–44)
INTERVENTRICULAR SEPTUM: 0.69 CM (ref 0.6–1.1)
IVRT: 111.32 MSEC
LA MAJOR: 6.07 CM
LA MINOR: 5.98 CM
LA WIDTH: 4.77 CM
LEFT ATRIUM SIZE: 4.79 CM
LEFT ATRIUM VOLUME INDEX MOD: 37.1 ML/M2
LEFT ATRIUM VOLUME INDEX: 52 ML/M2
LEFT ATRIUM VOLUME MOD: 83.54 CM3
LEFT ATRIUM VOLUME: 117.01 CM3
LEFT INTERNAL DIMENSION IN SYSTOLE: 4.87 CM (ref 2.1–4)
LEFT VENTRICLE DIASTOLIC VOLUME INDEX: 65.22 ML/M2
LEFT VENTRICLE DIASTOLIC VOLUME: 146.74 ML
LEFT VENTRICLE MASS INDEX: 52 G/M2
LEFT VENTRICLE SYSTOLIC VOLUME INDEX: 49.5 ML/M2
LEFT VENTRICLE SYSTOLIC VOLUME: 111.47 ML
LEFT VENTRICULAR INTERNAL DIMENSION IN DIASTOLE: 5.49 CM (ref 3.5–6)
LEFT VENTRICULAR MASS: 117.67 G
LV LATERAL E/E' RATIO: 11 M/S
LV SEPTAL E/E' RATIO: 33 M/S
MV A" WAVE DURATION": 11.13 MSEC
MV PEAK A VEL: 1.1 M/S
MV PEAK E VEL: 0.66 M/S
MV STENOSIS PRESSURE HALF TIME: 82.68 MS
MV VALVE AREA P 1/2 METHOD: 2.66 CM2
PISA TR MAX VEL: 2.88 M/S
PULM VEIN S/D RATIO: 1.12
PV PEAK D VEL: 0.41 M/S
PV PEAK S VEL: 0.46 M/S
RA MAJOR: 5.28 CM
RA PRESSURE: 3 MMHG
RA WIDTH: 3.86 CM
RIGHT VENTRICULAR END-DIASTOLIC DIMENSION: 4.36 CM
RV TISSUE DOPPLER FREE WALL SYSTOLIC VELOCITY 1 (APICAL 4 CHAMBER VIEW): 8.43 CM/S
SINUS: 2.71 CM
STJ: 2.49 CM
TDI LATERAL: 0.06 M/S
TDI SEPTAL: 0.02 M/S
TDI: 0.04 M/S
TR MAX PG: 33 MMHG
TRICUSPID ANNULAR PLANE SYSTOLIC EXCURSION: 1.25 CM
TV REST PULMONARY ARTERY PRESSURE: 36 MMHG

## 2022-04-04 PROCEDURE — 3075F SYST BP GE 130 - 139MM HG: CPT | Mod: CPTII,S$GLB,, | Performed by: INTERNAL MEDICINE

## 2022-04-04 PROCEDURE — 3075F PR MOST RECENT SYSTOLIC BLOOD PRESS GE 130-139MM HG: ICD-10-PCS | Mod: CPTII,S$GLB,, | Performed by: INTERNAL MEDICINE

## 2022-04-04 PROCEDURE — 1126F PR PAIN SEVERITY QUANTIFIED, NO PAIN PRESENT: ICD-10-PCS | Mod: CPTII,S$GLB,, | Performed by: INTERNAL MEDICINE

## 2022-04-04 PROCEDURE — 1159F MED LIST DOCD IN RCRD: CPT | Mod: CPTII,S$GLB,, | Performed by: INTERNAL MEDICINE

## 2022-04-04 PROCEDURE — 3080F DIAST BP >= 90 MM HG: CPT | Mod: CPTII,S$GLB,, | Performed by: INTERNAL MEDICINE

## 2022-04-04 PROCEDURE — 1101F PT FALLS ASSESS-DOCD LE1/YR: CPT | Mod: CPTII,S$GLB,, | Performed by: INTERNAL MEDICINE

## 2022-04-04 PROCEDURE — 93283 PRGRMG EVAL IMPLANTABLE DFB: CPT

## 2022-04-04 PROCEDURE — 3080F PR MOST RECENT DIASTOLIC BLOOD PRESSURE >= 90 MM HG: ICD-10-PCS | Mod: CPTII,S$GLB,, | Performed by: INTERNAL MEDICINE

## 2022-04-04 PROCEDURE — 93283 PRGRMG EVAL IMPLANTABLE DFB: CPT | Mod: 26,,, | Performed by: INTERNAL MEDICINE

## 2022-04-04 PROCEDURE — 99999 PR PBB SHADOW E&M-EST. PATIENT-LVL II: ICD-10-PCS | Mod: PBBFAC,,, | Performed by: INTERNAL MEDICINE

## 2022-04-04 PROCEDURE — 1160F PR REVIEW ALL MEDS BY PRESCRIBER/CLIN PHARMACIST DOCUMENTED: ICD-10-PCS | Mod: CPTII,S$GLB,, | Performed by: INTERNAL MEDICINE

## 2022-04-04 PROCEDURE — 93010 ELECTROCARDIOGRAM REPORT: CPT | Mod: S$GLB,,, | Performed by: INTERNAL MEDICINE

## 2022-04-04 PROCEDURE — 99215 PR OFFICE/OUTPT VISIT, EST, LEVL V, 40-54 MIN: ICD-10-PCS | Mod: S$GLB,,, | Performed by: INTERNAL MEDICINE

## 2022-04-04 PROCEDURE — 1101F PR PT FALLS ASSESS DOC 0-1 FALLS W/OUT INJ PAST YR: ICD-10-PCS | Mod: CPTII,S$GLB,, | Performed by: INTERNAL MEDICINE

## 2022-04-04 PROCEDURE — 93010 RHYTHM STRIP: ICD-10-PCS | Mod: S$GLB,,, | Performed by: INTERNAL MEDICINE

## 2022-04-04 PROCEDURE — 93306 ECHO (CUPID ONLY): ICD-10-PCS | Mod: 26,,, | Performed by: INTERNAL MEDICINE

## 2022-04-04 PROCEDURE — 3288F PR FALLS RISK ASSESSMENT DOCUMENTED: ICD-10-PCS | Mod: CPTII,S$GLB,, | Performed by: INTERNAL MEDICINE

## 2022-04-04 PROCEDURE — 93306 TTE W/DOPPLER COMPLETE: CPT

## 2022-04-04 PROCEDURE — 99999 PR PBB SHADOW E&M-EST. PATIENT-LVL II: CPT | Mod: PBBFAC,,, | Performed by: INTERNAL MEDICINE

## 2022-04-04 PROCEDURE — 93005 ELECTROCARDIOGRAM TRACING: CPT | Mod: S$GLB,,, | Performed by: INTERNAL MEDICINE

## 2022-04-04 PROCEDURE — 99215 OFFICE O/P EST HI 40 MIN: CPT | Mod: S$GLB,,, | Performed by: INTERNAL MEDICINE

## 2022-04-04 PROCEDURE — 3288F FALL RISK ASSESSMENT DOCD: CPT | Mod: CPTII,S$GLB,, | Performed by: INTERNAL MEDICINE

## 2022-04-04 PROCEDURE — 4010F PR ACE/ARB THEARPY RXD/TAKEN: ICD-10-PCS | Mod: CPTII,S$GLB,, | Performed by: INTERNAL MEDICINE

## 2022-04-04 PROCEDURE — 93306 TTE W/DOPPLER COMPLETE: CPT | Mod: 26,,, | Performed by: INTERNAL MEDICINE

## 2022-04-04 PROCEDURE — 4010F ACE/ARB THERAPY RXD/TAKEN: CPT | Mod: CPTII,S$GLB,, | Performed by: INTERNAL MEDICINE

## 2022-04-04 PROCEDURE — 1126F AMNT PAIN NOTED NONE PRSNT: CPT | Mod: CPTII,S$GLB,, | Performed by: INTERNAL MEDICINE

## 2022-04-04 PROCEDURE — 1160F RVW MEDS BY RX/DR IN RCRD: CPT | Mod: CPTII,S$GLB,, | Performed by: INTERNAL MEDICINE

## 2022-04-04 PROCEDURE — 93283 CARDIAC DEVICE CHECK - IN CLINIC & HOSPITAL: ICD-10-PCS | Mod: 26,,, | Performed by: INTERNAL MEDICINE

## 2022-04-04 PROCEDURE — 93005 RHYTHM STRIP: ICD-10-PCS | Mod: S$GLB,,, | Performed by: INTERNAL MEDICINE

## 2022-04-04 PROCEDURE — 3008F BODY MASS INDEX DOCD: CPT | Mod: CPTII,S$GLB,, | Performed by: INTERNAL MEDICINE

## 2022-04-04 PROCEDURE — 3008F PR BODY MASS INDEX (BMI) DOCUMENTED: ICD-10-PCS | Mod: CPTII,S$GLB,, | Performed by: INTERNAL MEDICINE

## 2022-04-04 PROCEDURE — 1159F PR MEDICATION LIST DOCUMENTED IN MEDICAL RECORD: ICD-10-PCS | Mod: CPTII,S$GLB,, | Performed by: INTERNAL MEDICINE

## 2022-04-04 NOTE — PROGRESS NOTES
Subjective:     HPI:    Mr. Bull is a 71 y.o. 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      Background:      67 y/o man with prior 5v CABG (2005) (LIMA to LAD, SVG to Diag), known  to RCA and LCx and recent admission to outside hospital with NSTEMI, CKD II, HTN, HLD, DM presented to the hospital 11/16/2018 with acute onset of chest pain and new onset LBBB. Had LHC at OSH and has been managed medically. Has LVEF 35%, no ICD. Was about to perform lawn work when he started having acute onset chest pain, similar in nature to his prior MI. Also noted palpitations which he had not experienced before, has never experienced syncope. 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 He denies orthopnea, PND, peripheral edema or GREENWOOD. There is no family history of sudden cardiac death.     Patient presented in wide complex tachycardia. Cardiology called for assistance. He continued to have 9-10/10 chest pain like an elephant sitting on his chest. Received NTG x 2 and ASA 325mg, was taken to the cath lab and no new occlusions were identified. The patient received lidocaine for a slow MMVT which restored NSR with a narrow complex. LHC found  that is known and the LIMA to LAD and SVG to D1 grafts are patent. Had inducible sustained VT when pigtail catheter was inserted into the LV, VT terminated with lidocaine bolus. Started on amiodarone gtt.      On 11/19/2018 he underwent EPS. Patient easily inducible for VT (LBLS  ms) with ventricular double extrastimuli. Underwent successful dual chamber ICD implantation ([employer]).    Briggs well at clinic follow up 2/2019.    Clinic visit 11/2019:  No arrhythmia noted. On amiodarone for VT. LFTs WNL. Needs updated TSH and PFTs. No RV pacing. No acute CHF exacerbation.     12/2020 clinic visit: No RV pacing.  Had some palps and high BP - coreg increased  today. No arrhythmia noted on device.  LBBB on EKG. QRS wider than prior EKG, but he has a history of LBBB (see EKG 11/16/2018).   On amiodarone for VT. Recheck PFTs, echo.    5/24/2021: Hospitalized with orthostatic hypotension. Improved when his meds were reduced.     7/6/2021: Stable from a device perspective with stable lead and device function.  No arrhythmia noted. Pt with chronic fatigue. Per cardiology, consider reducing amiodarone (easily inducible VT during EPS in 11/2018 but no subsequent VT on device). Will discuss with Dr. Glasgow. (UPDATE: Can stop amio). Recent Echo shows EF of 40% which is close to his baseline. LBBB with QRS 180ms. CHF symptoms stable NYHA II. Confirm that will continue to defer CRT upgrade. (UPDATE: No CRT upgrade at this time)    1/2022: Felt too dizzy on increased dose of coreg. This has now resolved. No syncope. Amiodarone was stopped in 1/2022.    Mr. Bull has felt a bit lightheaded this morning. He has also felt intermittent palpitations, and GREEN[place]OD.    I reviewed today's device check which shows stable device/lead function, RA pacing 77%, RV pacing <1%, 1% AF burden (longest episode 18 minutes), battery longevity 5 years. I reviewed the AMS EGMs which are most consistent with sinus tachycardia vs atrial tachycardia -440 ms. He says he was pressure washing and painting a fence that day.    My interpretation of today's ECG is sinus rhythm with LBBB at 65 bpm.    Review of Systems   Constitutional: Negative for malaise/fatigue.   Cardiovascular: Positive for chest pain (chronic), dyspnea on exertion (chronic) and palpitations. Negative for irregular heartbeat and leg swelling.   Respiratory: Negative for shortness of breath.    Hematologic/Lymphatic: Negative for bleeding problem.   Skin: Negative for rash.   Musculoskeletal: Negative for myalgias.   Gastrointestinal: Negative for hematemesis, hematochezia and nausea.   Genitourinary: Negative for hematuria.   Neurological:  "Negative for light-headedness.   Psychiatric/Behavioral: Negative for altered mental status.   Allergic/Immunologic: Negative for persistent infections.       Objective:     BP (!) 134/99   Pulse 65   Ht 5' 9" (1.753 m)   Wt 110.7 kg (244 lb 0.8 oz)   BMI 36.04 kg/m²     Physical Exam  Vitals and nursing note reviewed.   Constitutional:       Appearance: Normal appearance. He is well-developed.   HENT:      Head: Normocephalic.      Nose: Nose normal.   Eyes:      Pupils: Pupils are equal, round, and reactive to light.   Cardiovascular:      Rate and Rhythm: Normal rate and regular rhythm.   Pulmonary:      Effort: No respiratory distress.      Breath sounds: Normal breath sounds.   Chest:      Comments: Device to LUCW. Incision and pocket in good repair.    Musculoskeletal:         General: Normal range of motion.   Skin:     General: Skin is warm and dry.      Findings: No erythema.   Neurological:      Mental Status: He is alert and oriented to person, place, and time.   Psychiatric:         Speech: Speech normal.         Behavior: Behavior normal.       Lab Results   Component Value Date     03/18/2022    K 4.2 03/18/2022    MG 1.8 05/26/2021    BUN 20 03/18/2022    CREATININE 1.4 03/18/2022    ALT 35 11/09/2021    AST 33 11/09/2021    HGB 12.9 (L) 03/18/2022    HCT 39.9 (L) 03/18/2022    HCT 33 (L) 06/24/2019    TSH 0.509 12/28/2021    LDLCALC 80.4 11/09/2021       Recent Labs   Lab 05/17/19  2043 10/05/19  2023   INR 1.0 1.0         Assessment:     1. ICD (implantable cardioverter-defibrillator), dual, in situ    2. Chronic combined systolic and diastolic heart failure    3. S/P CABG (coronary artery bypass graft)    4. Ischemic cardiomyopathy    5. Coronary artery disease of native artery of native heart with stable angina pectoris      Plan:     In summary, Mr. Bull is a 71 y.o. male with CAD/MI (CABG 2005, PCI 2018), CKD, HTN, HLD, DM, LBBB, MMVT (stopped amiodarone in 1/2022), ICM here for " follow up.     Mr. Bull is now describing NYHA Class II HF symptoms in the office today in the setting of ischemic cardiomyopathy, dual chamber ICD in situ, and LBBB. We discussed the risks ,benefits, inidications, and alternatives to CRT-D upgrade. [place] shared decision tool utilized. After considering his options he has agreed to proceed.    Venogram before draping. [employer] upgrade.    Thank you for allowing me to participate in the care of this patient. Please do not hesitate to call me with any questions or concerns.

## 2022-04-04 NOTE — H&P (VIEW-ONLY)
Subjective:     HPI:    Mr. Bull is a 71 y.o. 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      Background:      69 y/o man with prior 5v CABG (2005) (LIMA to LAD, SVG to Diag), known  to RCA and LCx and recent admission to outside hospital with NSTEMI, CKD II, HTN, HLD, DM presented to the hospital 11/16/2018 with acute onset of chest pain and new onset LBBB. Had LHC at OSH and has been managed medically. Has LVEF 35%, no ICD. Was about to perform lawn work when he started having acute onset chest pain, similar in nature to his prior MI. Also noted palpitations which he had not experienced before, has never experienced syncope. 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 He denies orthopnea, PND, peripheral edema or GREEN[place]. There is no family history of sudden cardiac death.     Patient presented in wide complex tachycardia. Cardiology called for assistance. He continued to have 9-10/10 chest pain like an elephant sitting on his chest. Received NTG x 2 and ASA 325mg, was taken to the cath lab and no new occlusions were identified. The patient received lidocaine for a slow MMVT which restored NSR with a narrow complex. LHC found  that is known and the LIMA to LAD and SVG to D1 grafts are patent. Had inducible sustained VT when pigtail catheter was inserted into the LV, VT terminated with lidocaine bolus. Started on amiodarone gtt.      On 11/19/2018 he underwent EPS. Patient easily inducible for VT (LBLS  ms) with ventricular double extrastimuli. Underwent successful dual chamber ICD implantation ([employer]).    [place] well at clinic follow up 2/2019.    Clinic visit 11/2019:  No arrhythmia noted. On amiodarone for VT. LFTs WNL. Needs updated TSH and PFTs. No RV pacing. No acute CHF exacerbation.     12/2020 clinic visit: No RV pacing.  Had some palps and high BP - coreg increased  today. No arrhythmia noted on device.  LBBB on EKG. QRS wider than prior EKG, but he has a history of LBBB (see EKG 11/16/2018).   On amiodarone for VT. Recheck PFTs, echo.    5/24/2021: Hospitalized with orthostatic hypotension. Improved when his meds were reduced.     7/6/2021: Stable from a device perspective with stable lead and device function.  No arrhythmia noted. Pt with chronic fatigue. Per cardiology, consider reducing amiodarone (easily inducible VT during EPS in 11/2018 but no subsequent VT on device). Will discuss with Dr. Glasgow. (UPDATE: Can stop amio). Recent Echo shows EF of 40% which is close to his baseline. LBBB with QRS 180ms. CHF symptoms stable NYHA II. Confirm that will continue to defer CRT upgrade. (UPDATE: No CRT upgrade at this time)    1/2022: Felt too dizzy on increased dose of coreg. This has now resolved. No syncope. Amiodarone was stopped in 1/2022.    Mr. Bull has felt a bit lightheaded this morning. He has also felt intermittent palpitations, and [place].    I reviewed today's device check which shows stable device/lead function, RA pacing 77%, RV pacing <1%, 1% AF burden (longest episode 18 minutes), battery longevity 5 years. I reviewed the AMS EGMs which are most consistent with sinus tachycardia vs atrial tachycardia -440 ms. He says he was pressure washing and painting a fence that day.    My interpretation of today's ECG is sinus rhythm with LBBB at 65 bpm.    Review of Systems   Constitutional: Negative for malaise/fatigue.   Cardiovascular: Positive for chest pain (chronic), dyspnea on exertion (chronic) and palpitations. Negative for irregular heartbeat and leg swelling.   Respiratory: Negative for shortness of breath.    Hematologic/Lymphatic: Negative for bleeding problem.   Skin: Negative for rash.   Musculoskeletal: Negative for myalgias.   Gastrointestinal: Negative for hematemesis, hematochezia and nausea.   Genitourinary: Negative for hematuria.   Neurological:  "Negative for light-headedness.   Psychiatric/Behavioral: Negative for altered mental status.   Allergic/Immunologic: Negative for persistent infections.       Objective:     BP (!) 134/99   Pulse 65   Ht 5' 9" (1.753 m)   Wt 110.7 kg (244 lb 0.8 oz)   BMI 36.04 kg/m²     Physical Exam  Vitals and nursing note reviewed.   Constitutional:       Appearance: Normal appearance. He is well-developed.   HENT:      Head: Normocephalic.      Nose: Nose normal.   Eyes:      Pupils: Pupils are equal, round, and reactive to light.   Cardiovascular:      Rate and Rhythm: Normal rate and regular rhythm.   Pulmonary:      Effort: No respiratory distress.      Breath sounds: Normal breath sounds.   Chest:      Comments: Device to LUCW. Incision and pocket in good repair.    Musculoskeletal:         General: Normal range of motion.   Skin:     General: Skin is warm and dry.      Findings: No erythema.   Neurological:      Mental Status: He is alert and oriented to person, place, and time.   Psychiatric:         Speech: Speech normal.         Behavior: Behavior normal.       Lab Results   Component Value Date     03/18/2022    K 4.2 03/18/2022    MG 1.8 05/26/2021    BUN 20 03/18/2022    CREATININE 1.4 03/18/2022    ALT 35 11/09/2021    AST 33 11/09/2021    HGB 12.9 (L) 03/18/2022    HCT 39.9 (L) 03/18/2022    HCT 33 (L) 06/24/2019    TSH 0.509 12/28/2021    LDLCALC 80.4 11/09/2021       Recent Labs   Lab 05/17/19  2043 10/05/19  2023   INR 1.0 1.0         Assessment:     1. ICD (implantable cardioverter-defibrillator), dual, in situ    2. Chronic combined systolic and diastolic heart failure    3. S/P CABG (coronary artery bypass graft)    4. Ischemic cardiomyopathy    5. Coronary artery disease of native artery of native heart with stable angina pectoris      Plan:     In summary, Mr. Bull is a 71 y.o. male with CAD/MI (CABG 2005, PCI 2018), CKD, HTN, HLD, DM, LBBB, MMVT (stopped amiodarone in 1/2022), ICM here for " follow up.     Mr. Bull is now describing NYHA Class II HF symptoms in the office today in the setting of ischemic cardiomyopathy, dual chamber ICD in situ, and LBBB. We discussed the risks ,benefits, inidications, and alternatives to CRT-D upgrade. [place] shared decision tool utilized. After considering his options he has agreed to proceed.    Venogram before draping. [employer] upgrade.    Thank you for allowing me to participate in the care of this patient. Please do not hesitate to call me with any questions or concerns.

## 2022-04-04 NOTE — PROGRESS NOTES
Vijay Wilde Patient Age: 51 year old  MESSAGE:     The patient is asking if the letter generated yesterday can be modified with a return to work date of 9/28/19 and a 20lb weight restriction added?  Please send to his patient portal account.     WEIGHT AND HEIGHT:   Wt Readings from Last 1 Encounters:   09/20/19 116.6 kg (257 lb)     Ht Readings from Last 1 Encounters:   09/20/19 5' 11\" (1.803 m)     BMI Readings from Last 1 Encounters:   09/20/19 35.84 kg/m²       ALLERGIES: no known allergies.  Current Outpatient Medications   Medication   • baclofen (LIORESAL) 10 MG tablet   • carisoprodol (SOMA) 350 MG tablet   • nabumetone (RELAFEN) 750 MG tablet   • gabapentin (NEURONTIN) 100 MG capsule   • metformin (GLUCOPHAGE) 1000 MG tablet   • glimepiride (AMARYL) 4 MG tablet   • lisinopril (ZESTRIL) 10 MG tablet     No current facility-administered medications for this visit.      PHARMACY to use: see below          Pharmacy preference(s) on file:   [id number] - [place], [place] - [facility] [address] [address]  Phone: [phone] Fax: [phone]      CALL BACK INFO: Ok to leave response (including medical information) with family member or on answering machine  ROUTING: Patient's physician/staff        PCP: Jacquelin Stephen PA-C         INS: Payor: [employer] [place] / Plan: PPO [id number] / Product Type: PPO MISC   PATIENT ADDRESS:  [address] [place] [id number]     Patient's chart was reviewed for overdue NONA topics.  Health maintenance:updated  Immunizations:reconciled   Legacy:   Media:  Orders placed:  Tasked appts:  Labs Linked:  Upcoming appt:

## 2022-04-05 ENCOUNTER — PATIENT MESSAGE (OUTPATIENT)
Dept: ELECTROPHYSIOLOGY | Facility: CLINIC | Age: 72
End: 2022-04-05
Payer: MEDICARE

## 2022-04-05 ENCOUNTER — LAB VISIT (OUTPATIENT)
Dept: LAB | Facility: HOSPITAL | Age: 72
End: 2022-04-05
Attending: INTERNAL MEDICINE
Payer: MEDICARE

## 2022-04-05 ENCOUNTER — PATIENT MESSAGE (OUTPATIENT)
Dept: MEDSURG UNIT | Facility: HOSPITAL | Age: 72
End: 2022-04-05
Payer: MEDICARE

## 2022-04-05 DIAGNOSIS — Z01.818 PRE-OP TESTING: ICD-10-CM

## 2022-04-05 DIAGNOSIS — I25.5 ISCHEMIC CARDIOMYOPATHY: Primary | ICD-10-CM

## 2022-04-05 DIAGNOSIS — I49.9 CARDIAC ARRHYTHMIA, UNSPECIFIED CARDIAC ARRHYTHMIA TYPE: ICD-10-CM

## 2022-04-05 DIAGNOSIS — I50.42 CHRONIC COMBINED SYSTOLIC AND DIASTOLIC HEART FAILURE: ICD-10-CM

## 2022-04-05 DIAGNOSIS — I25.5 ISCHEMIC CARDIOMYOPATHY: ICD-10-CM

## 2022-04-05 LAB
ANION GAP SERPL CALC-SCNC: 10 MMOL/L (ref 8–16)
APTT BLDCRRT: 27.3 SEC (ref 21–32)
BASOPHILS # BLD AUTO: 0.06 K/UL (ref 0–0.2)
BASOPHILS NFR BLD: 0.8 % (ref 0–1.9)
BUN SERPL-MCNC: 23 MG/DL (ref 8–23)
CALCIUM SERPL-MCNC: 9.2 MG/DL (ref 8.7–10.5)
CHLORIDE SERPL-SCNC: 102 MMOL/L (ref 95–110)
CO2 SERPL-SCNC: 27 MMOL/L (ref 23–29)
CREAT SERPL-MCNC: 1.8 MG/DL (ref 0.5–1.4)
DIFFERENTIAL METHOD: ABNORMAL
EOSINOPHIL # BLD AUTO: 0.4 K/UL (ref 0–0.5)
EOSINOPHIL NFR BLD: 5.5 % (ref 0–8)
ERYTHROCYTE [DISTWIDTH] IN BLOOD BY AUTOMATED COUNT: 13.2 % (ref 11.5–14.5)
EST. GFR  (AFRICAN AMERICAN): 42 ML/MIN/1.73 M^2
EST. GFR  (NON AFRICAN AMERICAN): 37 ML/MIN/1.73 M^2
GLUCOSE SERPL-MCNC: 87 MG/DL (ref 70–110)
HCT VFR BLD AUTO: 40.7 % (ref 40–54)
HGB BLD-MCNC: 13.1 G/DL (ref 14–18)
IMM GRANULOCYTES # BLD AUTO: 0.01 K/UL (ref 0–0.04)
IMM GRANULOCYTES NFR BLD AUTO: 0.1 % (ref 0–0.5)
INR PPP: 1.1 (ref 0.8–1.2)
LYMPHOCYTES # BLD AUTO: 2.5 K/UL (ref 1–4.8)
LYMPHOCYTES NFR BLD: 34.6 % (ref 18–48)
MCH RBC QN AUTO: 29.9 PG (ref 27–31)
MCHC RBC AUTO-ENTMCNC: 32.2 G/DL (ref 32–36)
MCV RBC AUTO: 93 FL (ref 82–98)
MONOCYTES # BLD AUTO: 0.7 K/UL (ref 0.3–1)
MONOCYTES NFR BLD: 10.2 % (ref 4–15)
NEUTROPHILS # BLD AUTO: 3.5 K/UL (ref 1.8–7.7)
NEUTROPHILS NFR BLD: 48.8 % (ref 38–73)
NRBC BLD-RTO: 0 /100 WBC
PLATELET # BLD AUTO: 173 K/UL (ref 150–450)
PMV BLD AUTO: 10.1 FL (ref 9.2–12.9)
POTASSIUM SERPL-SCNC: 4.4 MMOL/L (ref 3.5–5.1)
PROTHROMBIN TIME: 10.9 SEC (ref 9–12.5)
RBC # BLD AUTO: 4.38 M/UL (ref 4.6–6.2)
SODIUM SERPL-SCNC: 139 MMOL/L (ref 136–145)
WBC # BLD AUTO: 7.14 K/UL (ref 3.9–12.7)

## 2022-04-05 PROCEDURE — 85025 COMPLETE CBC W/AUTO DIFF WBC: CPT | Performed by: INTERNAL MEDICINE

## 2022-04-05 PROCEDURE — 80048 BASIC METABOLIC PNL TOTAL CA: CPT | Performed by: INTERNAL MEDICINE

## 2022-04-05 PROCEDURE — 85730 THROMBOPLASTIN TIME PARTIAL: CPT | Performed by: INTERNAL MEDICINE

## 2022-04-05 PROCEDURE — 36415 COLL VENOUS BLD VENIPUNCTURE: CPT | Performed by: INTERNAL MEDICINE

## 2022-04-05 PROCEDURE — 85610 PROTHROMBIN TIME: CPT | Performed by: INTERNAL MEDICINE

## 2022-04-06 ENCOUNTER — TELEPHONE (OUTPATIENT)
Dept: ELECTROPHYSIOLOGY | Facility: CLINIC | Age: 72
End: 2022-04-06
Payer: MEDICARE

## 2022-04-06 NOTE — TELEPHONE ENCOUNTER
Returned call     Spoke to Ms. Bull    CONFIRMED procedure arrival time  4/07/2022 at 10:00 am    Reiterated instructions including:  -Directions to check in desk  -NPO after midnight night prior to procedure  -High importance of HOLDING trulicity and humalog    -Pre-procedure LABS Completed and reviewed  -COVID vaccines documented.  -Confirmed no fever, cough, or shortness of breath in the past 30 days   -Bathe night prior and morning prior to procedure with Hibiclens solution or an antibacterial soap  -Reviewed current visitor policy    Patient verbalizes understanding of above and appreciates call.  '          ----- Message from Lisa Lujan MA sent at 4/6/2022 11:04 AM CDT -----  Regarding: FW: returning a call  Googdmorning,    Pt's wife stated no VM was left and she was trying to see if someone may have been reaching out because of his procedure on tomorrow.   ----- Message -----  From: Kristi Wiseman  Sent: 4/6/2022  10:37 AM CDT  To: Myah Matthews Staff  Subject: returning a call                                 The pt is returning a  call. Please call him back @ [phone]. Thanks, Kristi

## 2022-04-07 ENCOUNTER — HOSPITAL ENCOUNTER (OUTPATIENT)
Facility: HOSPITAL | Age: 72
Discharge: HOME | End: 2022-04-08
Attending: INTERNAL MEDICINE | Admitting: INTERNAL MEDICINE
Payer: MEDICARE

## 2022-04-07 ENCOUNTER — ANESTHESIA EVENT (OUTPATIENT)
Dept: MEDSURG UNIT | Facility: HOSPITAL | Age: 72
End: 2022-04-07
Payer: MEDICARE

## 2022-04-07 ENCOUNTER — ANESTHESIA (OUTPATIENT)
Dept: MEDSURG UNIT | Facility: HOSPITAL | Age: 72
End: 2022-04-07
Payer: MEDICARE

## 2022-04-07 DIAGNOSIS — I25.5 ISCHEMIC CARDIOMYOPATHY: ICD-10-CM

## 2022-04-07 DIAGNOSIS — I49.9 ARRHYTHMIA: ICD-10-CM

## 2022-04-07 DIAGNOSIS — I50.40 COMBINED SYSTOLIC AND DIASTOLIC HEART FAILURE, UNSPECIFIED HF CHRONICITY: ICD-10-CM

## 2022-04-07 DIAGNOSIS — Z95.9 CARDIAC DEVICE IN SITU: ICD-10-CM

## 2022-04-07 LAB
POCT GLUCOSE: 200 MG/DL (ref 70–110)
POCT GLUCOSE: 276 MG/DL (ref 70–110)

## 2022-04-07 PROCEDURE — D9220A PRA ANESTHESIA: Mod: ANES,,, | Performed by: ANESTHESIOLOGY

## 2022-04-07 PROCEDURE — 37000009 HC ANESTHESIA EA ADD 15 MINS: Performed by: INTERNAL MEDICINE

## 2022-04-07 PROCEDURE — 63600175 PHARM REV CODE 636 W HCPCS: Performed by: NURSE ANESTHETIST, CERTIFIED REGISTERED

## 2022-04-07 PROCEDURE — 33225 L VENTRIC PACING LEAD ADD-ON: CPT | Performed by: INTERNAL MEDICINE

## 2022-04-07 PROCEDURE — 25000003 PHARM REV CODE 250: Performed by: NURSE PRACTITIONER

## 2022-04-07 PROCEDURE — 33225 PR INSRT PACING ELECT,AT INSERT NEW DEVICE: ICD-10-PCS | Mod: ,,, | Performed by: INTERNAL MEDICINE

## 2022-04-07 PROCEDURE — C1894 INTRO/SHEATH, NON-LASER: HCPCS | Performed by: INTERNAL MEDICINE

## 2022-04-07 PROCEDURE — C1893 INTRO/SHEATH, FIXED,NON-PEEL: HCPCS | Performed by: INTERNAL MEDICINE

## 2022-04-07 PROCEDURE — 63600175 PHARM REV CODE 636 W HCPCS: Performed by: INTERNAL MEDICINE

## 2022-04-07 PROCEDURE — 25000003 PHARM REV CODE 250: Performed by: NURSE ANESTHETIST, CERTIFIED REGISTERED

## 2022-04-07 PROCEDURE — 93010 EKG 12-LEAD: ICD-10-PCS | Mod: ,,, | Performed by: INTERNAL MEDICINE

## 2022-04-07 PROCEDURE — 37000008 HC ANESTHESIA 1ST 15 MINUTES: Performed by: INTERNAL MEDICINE

## 2022-04-07 PROCEDURE — 93005 ELECTROCARDIOGRAM TRACING: CPT

## 2022-04-07 PROCEDURE — 99900035 HC TECH TIME PER 15 MIN (STAT)

## 2022-04-07 PROCEDURE — C1730 CATH, EP, 19 OR FEW ELECT: HCPCS | Performed by: INTERNAL MEDICINE

## 2022-04-07 PROCEDURE — 33225 L VENTRIC PACING LEAD ADD-ON: CPT | Mod: ,,, | Performed by: INTERNAL MEDICINE

## 2022-04-07 PROCEDURE — 25500020 PHARM REV CODE 255: Performed by: NURSE ANESTHETIST, CERTIFIED REGISTERED

## 2022-04-07 PROCEDURE — 33264 RMVL & RPLCMT DFB GEN MLT LD: CPT | Mod: ,,, | Performed by: INTERNAL MEDICINE

## 2022-04-07 PROCEDURE — 27201423 OPTIME MED/SURG SUP & DEVICES STERILE SUPPLY: Performed by: INTERNAL MEDICINE

## 2022-04-07 PROCEDURE — D9220A PRA ANESTHESIA: ICD-10-PCS | Mod: CRNA,,, | Performed by: NURSE ANESTHETIST, CERTIFIED REGISTERED

## 2022-04-07 PROCEDURE — A4216 STERILE WATER/SALINE, 10 ML: HCPCS | Performed by: INTERNAL MEDICINE

## 2022-04-07 PROCEDURE — 82962 GLUCOSE BLOOD TEST: CPT | Performed by: INTERNAL MEDICINE

## 2022-04-07 PROCEDURE — D9220A PRA ANESTHESIA: ICD-10-PCS | Mod: ANES,,, | Performed by: ANESTHESIOLOGY

## 2022-04-07 PROCEDURE — D9220A PRA ANESTHESIA: Mod: CRNA,,, | Performed by: NURSE ANESTHETIST, CERTIFIED REGISTERED

## 2022-04-07 PROCEDURE — 33264 PR REMV&REPLC CVD GEN MULT LEAD: ICD-10-PCS | Mod: ,,, | Performed by: INTERNAL MEDICINE

## 2022-04-07 PROCEDURE — 33264 RMVL & RPLCMT DFB GEN MLT LD: CPT | Performed by: INTERNAL MEDICINE

## 2022-04-07 PROCEDURE — 94761 N-INVAS EAR/PLS OXIMETRY MLT: CPT

## 2022-04-07 PROCEDURE — 93010 ELECTROCARDIOGRAM REPORT: CPT | Mod: ,,, | Performed by: INTERNAL MEDICINE

## 2022-04-07 PROCEDURE — 25500020 PHARM REV CODE 255: Performed by: INTERNAL MEDICINE

## 2022-04-07 PROCEDURE — 25000003 PHARM REV CODE 250: Performed by: INTERNAL MEDICINE

## 2022-04-07 PROCEDURE — C1900 LEAD, CORONARY VENOUS: HCPCS | Performed by: INTERNAL MEDICINE

## 2022-04-07 PROCEDURE — C1725 CATH, TRANSLUMIN NON-LASER: HCPCS | Performed by: INTERNAL MEDICINE

## 2022-04-07 PROCEDURE — C1882 AICD, OTHER THAN SING/DUAL: HCPCS | Performed by: INTERNAL MEDICINE

## 2022-04-07 PROCEDURE — C1769 GUIDE WIRE: HCPCS | Performed by: INTERNAL MEDICINE

## 2022-04-07 DEVICE — LEFT HEART LEAD
Type: IMPLANTABLE DEVICE | Site: HEART | Status: FUNCTIONAL
Brand: QUARTET™

## 2022-04-07 DEVICE — CARDIAC RESYNCHRONIZATION DEVICE, TIERED-THERAPY CARDIOVERTER/DEFIBRILLATOR VVED DDDRV
Type: IMPLANTABLE DEVICE | Site: CHEST | Status: FUNCTIONAL
Brand: QUADRA ASSURA MP™

## 2022-04-07 RX ORDER — ACETAMINOPHEN 325 MG/1
650 TABLET ORAL EVERY 6 HOURS PRN
Status: DISCONTINUED | OUTPATIENT
Start: 2022-04-08 | End: 2022-04-08 | Stop reason: HOSPADM

## 2022-04-07 RX ORDER — VANCOMYCIN HYDROCHLORIDE 1 G/20ML
INJECTION, POWDER, LYOPHILIZED, FOR SOLUTION INTRAVENOUS
Status: DISCONTINUED | OUTPATIENT
Start: 2022-04-07 | End: 2022-04-08 | Stop reason: HOSPADM

## 2022-04-07 RX ORDER — IBUPROFEN 200 MG
24 TABLET ORAL
Status: DISCONTINUED | OUTPATIENT
Start: 2022-04-07 | End: 2022-04-08 | Stop reason: HOSPADM

## 2022-04-07 RX ORDER — CARVEDILOL 3.12 MG/1
3.12 TABLET ORAL 2 TIMES DAILY
Status: DISCONTINUED | OUTPATIENT
Start: 2022-04-08 | End: 2022-04-08 | Stop reason: HOSPADM

## 2022-04-07 RX ORDER — MELOXICAM 7.5 MG/1
7.5 TABLET ORAL DAILY
Status: DISCONTINUED | OUTPATIENT
Start: 2022-04-08 | End: 2022-04-08 | Stop reason: HOSPADM

## 2022-04-07 RX ORDER — CEFAZOLIN SODIUM/D5W 2 G/100 ML
2 PLASTIC BAG, INJECTION (ML) INTRAVENOUS ONCE
Status: COMPLETED | OUTPATIENT
Start: 2022-04-07 | End: 2022-04-07

## 2022-04-07 RX ORDER — FENTANYL CITRATE 50 UG/ML
INJECTION, SOLUTION INTRAMUSCULAR; INTRAVENOUS
Status: DISCONTINUED | OUTPATIENT
Start: 2022-04-07 | End: 2022-04-07

## 2022-04-07 RX ORDER — DOXYCYCLINE HYCLATE 100 MG
100 TABLET ORAL EVERY 12 HOURS
Status: DISCONTINUED | OUTPATIENT
Start: 2022-04-08 | End: 2022-04-08 | Stop reason: HOSPADM

## 2022-04-07 RX ORDER — IODIXANOL 320 MG/ML
INJECTION, SOLUTION INTRAVASCULAR
Status: DISCONTINUED | OUTPATIENT
Start: 2022-04-07 | End: 2022-04-07

## 2022-04-07 RX ORDER — CLOPIDOGREL BISULFATE 75 MG/1
75 TABLET ORAL DAILY
Status: DISCONTINUED | OUTPATIENT
Start: 2022-04-08 | End: 2022-04-08 | Stop reason: HOSPADM

## 2022-04-07 RX ORDER — PROPOFOL 10 MG/ML
VIAL (ML) INTRAVENOUS CONTINUOUS PRN
Status: DISCONTINUED | OUTPATIENT
Start: 2022-04-07 | End: 2022-04-07

## 2022-04-07 RX ORDER — TAMSULOSIN HYDROCHLORIDE 0.4 MG/1
1 CAPSULE ORAL DAILY
Status: DISCONTINUED | OUTPATIENT
Start: 2022-04-08 | End: 2022-04-08 | Stop reason: HOSPADM

## 2022-04-07 RX ORDER — CEFAZOLIN SODIUM/D5W 2 G/100 ML
2 PLASTIC BAG, INJECTION (ML) INTRAVENOUS
Status: DISCONTINUED | OUTPATIENT
Start: 2022-04-07 | End: 2022-04-07 | Stop reason: HOSPADM

## 2022-04-07 RX ORDER — TRAMADOL HYDROCHLORIDE 50 MG/1
50 TABLET ORAL EVERY 6 HOURS PRN
Status: DISCONTINUED | OUTPATIENT
Start: 2022-04-07 | End: 2022-04-08 | Stop reason: HOSPADM

## 2022-04-07 RX ORDER — LIDOCAINE HYDROCHLORIDE 20 MG/ML
INJECTION INTRAVENOUS
Status: DISCONTINUED | OUTPATIENT
Start: 2022-04-07 | End: 2022-04-07

## 2022-04-07 RX ORDER — FUROSEMIDE 20 MG/1
20 TABLET ORAL DAILY
Status: DISCONTINUED | OUTPATIENT
Start: 2022-04-08 | End: 2022-04-08 | Stop reason: HOSPADM

## 2022-04-07 RX ORDER — INSULIN ASPART 100 [IU]/ML
0-5 INJECTION, SOLUTION INTRAVENOUS; SUBCUTANEOUS
Status: DISCONTINUED | OUTPATIENT
Start: 2022-04-07 | End: 2022-04-08 | Stop reason: HOSPADM

## 2022-04-07 RX ORDER — BUPIVACAINE HYDROCHLORIDE 2.5 MG/ML
INJECTION, SOLUTION EPIDURAL; INFILTRATION; INTRACAUDAL
Status: DISCONTINUED | OUTPATIENT
Start: 2022-04-07 | End: 2022-04-08 | Stop reason: HOSPADM

## 2022-04-07 RX ORDER — MIDAZOLAM HYDROCHLORIDE 1 MG/ML
INJECTION, SOLUTION INTRAMUSCULAR; INTRAVENOUS
Status: DISCONTINUED | OUTPATIENT
Start: 2022-04-07 | End: 2022-04-07

## 2022-04-07 RX ORDER — IODIXANOL 320 MG/ML
INJECTION, SOLUTION INTRAVASCULAR
Status: DISCONTINUED | OUTPATIENT
Start: 2022-04-07 | End: 2022-04-08 | Stop reason: HOSPADM

## 2022-04-07 RX ORDER — DEXMEDETOMIDINE HYDROCHLORIDE 100 UG/ML
INJECTION, SOLUTION INTRAVENOUS
Status: DISCONTINUED | OUTPATIENT
Start: 2022-04-07 | End: 2022-04-07

## 2022-04-07 RX ORDER — SODIUM CHLORIDE 9 MG/ML
INJECTION, SOLUTION INTRAMUSCULAR; INTRAVENOUS; SUBCUTANEOUS
Status: DISCONTINUED | OUTPATIENT
Start: 2022-04-07 | End: 2022-04-08 | Stop reason: HOSPADM

## 2022-04-07 RX ORDER — LIDOCAINE HYDROCHLORIDE 20 MG/ML
INJECTION, SOLUTION INFILTRATION; PERINEURAL
Status: DISCONTINUED | OUTPATIENT
Start: 2022-04-07 | End: 2022-04-08 | Stop reason: HOSPADM

## 2022-04-07 RX ORDER — HYDROMORPHONE HYDROCHLORIDE 1 MG/ML
0.2 INJECTION, SOLUTION INTRAMUSCULAR; INTRAVENOUS; SUBCUTANEOUS EVERY 5 MIN PRN
Status: DISCONTINUED | OUTPATIENT
Start: 2022-04-07 | End: 2022-04-08 | Stop reason: HOSPADM

## 2022-04-07 RX ORDER — DOXYCYCLINE 100 MG/1
100 CAPSULE ORAL EVERY 12 HOURS
Qty: 10 CAPSULE | Refills: 0 | Status: SHIPPED | OUTPATIENT
Start: 2022-04-08 | End: 2022-04-13

## 2022-04-07 RX ORDER — ONDANSETRON 2 MG/ML
INJECTION INTRAMUSCULAR; INTRAVENOUS
Status: DISCONTINUED | OUTPATIENT
Start: 2022-04-07 | End: 2022-04-07

## 2022-04-07 RX ORDER — GLUCAGON 1 MG
1 KIT INJECTION
Status: DISCONTINUED | OUTPATIENT
Start: 2022-04-07 | End: 2022-04-08 | Stop reason: HOSPADM

## 2022-04-07 RX ORDER — IBUPROFEN 200 MG
16 TABLET ORAL
Status: DISCONTINUED | OUTPATIENT
Start: 2022-04-07 | End: 2022-04-08 | Stop reason: HOSPADM

## 2022-04-07 RX ORDER — SODIUM CHLORIDE 0.9 % (FLUSH) 0.9 %
3 SYRINGE (ML) INJECTION
Status: DISCONTINUED | OUTPATIENT
Start: 2022-04-07 | End: 2022-04-08 | Stop reason: HOSPADM

## 2022-04-07 RX ADMIN — DEXMEDETOMIDINE HYDROCHLORIDE 4 MCG: 100 INJECTION, SOLUTION, CONCENTRATE INTRAVENOUS at 02:04

## 2022-04-07 RX ADMIN — FENTANYL CITRATE 25 MCG: 50 INJECTION, SOLUTION INTRAMUSCULAR; INTRAVENOUS at 03:04

## 2022-04-07 RX ADMIN — DEXMEDETOMIDINE HYDROCHLORIDE 8 MCG: 100 INJECTION, SOLUTION, CONCENTRATE INTRAVENOUS at 02:04

## 2022-04-07 RX ADMIN — INSULIN ASPART 1 UNITS: 100 INJECTION, SOLUTION INTRAVENOUS; SUBCUTANEOUS at 10:04

## 2022-04-07 RX ADMIN — ONDANSETRON HYDROCHLORIDE 4 MG: 2 INJECTION INTRAMUSCULAR; INTRAVENOUS at 02:04

## 2022-04-07 RX ADMIN — MIDAZOLAM HYDROCHLORIDE 1 MG: 1 INJECTION, SOLUTION INTRAMUSCULAR; INTRAVENOUS at 02:04

## 2022-04-07 RX ADMIN — IODIXANOL 10 ML: 320 INJECTION, SOLUTION INTRAVASCULAR at 03:04

## 2022-04-07 RX ADMIN — FENTANYL CITRATE 25 MCG: 50 INJECTION, SOLUTION INTRAMUSCULAR; INTRAVENOUS at 04:04

## 2022-04-07 RX ADMIN — Medication 2 G: at 11:04

## 2022-04-07 RX ADMIN — IODIXANOL 10 ML: 320 INJECTION, SOLUTION INTRAVASCULAR at 02:04

## 2022-04-07 RX ADMIN — LIDOCAINE HYDROCHLORIDE 40 MG: 20 INJECTION, SOLUTION INTRAVENOUS at 02:04

## 2022-04-07 RX ADMIN — FENTANYL CITRATE 25 MCG: 50 INJECTION, SOLUTION INTRAMUSCULAR; INTRAVENOUS at 02:04

## 2022-04-07 RX ADMIN — Medication 2 G: at 02:04

## 2022-04-07 RX ADMIN — DEXMEDETOMIDINE HYDROCHLORIDE 4 MCG: 100 INJECTION, SOLUTION, CONCENTRATE INTRAVENOUS at 04:04

## 2022-04-07 RX ADMIN — SODIUM CHLORIDE: 0.9 INJECTION, SOLUTION INTRAVENOUS at 02:04

## 2022-04-07 RX ADMIN — PROPOFOL 20 MCG/KG/MIN: 10 INJECTION, EMULSION INTRAVENOUS at 02:04

## 2022-04-07 NOTE — Clinical Note
A generator pocket was revised at the left chest with blunt dissection, electrocautery and sharp dissection.

## 2022-04-07 NOTE — Clinical Note
The lead was inserted under fluorscopic guidance. The lead was inserted in the coronary sinus.

## 2022-04-07 NOTE — Clinical Note
A venogram was performed in the left axillary vein. The vessel was injected via hand injection  with 10 mL of contrast. Per JOSIE Dawkins

## 2022-04-07 NOTE — ANESTHESIA POSTPROCEDURE EVALUATION
Anesthesia Post Evaluation    Patient: Walter Bull    Procedure(s) Performed: Procedure(s) (LRB):  INSERTION, ICD, BIVENTRICULAR (Left)  Venogram, EP Lab    Final Anesthesia Type: general (Natural airway)      Patient location during evaluation: PACU  Patient participation: Yes- Able to Participate  Level of consciousness: awake and alert  Post-procedure vital signs: reviewed and stable  Pain management: adequate  Airway patency: patent    PONV status at discharge: No PONV  Anesthetic complications: no      Cardiovascular status: hemodynamically stable  Respiratory status: unassisted  Hydration status: euvolemic  Follow-up not needed.          Vitals Value Taken Time   /63 04/07/22 1801   Temp 36.3 °C (97.3 °F) 04/07/22 1704   Pulse 60 04/07/22 1812   Resp 15 04/07/22 1812   SpO2 99 % 04/07/22 1812   Vitals shown include unvalidated device data.      No case tracking events are documented in the log.      Pain/Madhav Score: Madhav Score: 10 (4/7/2022  5:07 PM)

## 2022-04-07 NOTE — INTERVAL H&P NOTE
The patient has been examined and the H&P has been reviewed and changes made below:    Briefly, Mr. Bull is a 71 year old male with CAD/MI (CABG 2005, PCI 2018), CKD, HTN, HLD, DM, LBBB, MMVT and heart failure with reduced LVEF. Also has VT and s/p ICD in 2018. Mr. Bull has NYHA Class II HF symptoms. In the setting of his underlying LBBB and dual chamber ICD in situ, and LBBB we discussed CRT-D implant. We discussed the risks ,benefits, inidications, and alternatives to CRT-D upgrade. [place] shared decision tool utilized. After considering his options he has agreed to proceed.      ECG today shows sinus with LBBB morphology QRS.     Plan:  Venogram before draping. [employer] upgrade.  DC home today with antibx for 5 days following procedure

## 2022-04-07 NOTE — Clinical Note
A venogram was performed in the coronary sinus. The vessel was injected via hand injection  with 8 mL of contrast.

## 2022-04-07 NOTE — Clinical Note
A venogram was performed in the left axillary vein. A balloon was inserted. The vessel was injected via hand injection  with 10 mL of contrast.

## 2022-04-07 NOTE — Clinical Note
A venogram was performed in the left axillary vein. The vessel was injected via hand injection  with 2 mL of contrast.

## 2022-04-07 NOTE — PLAN OF CARE
Patient arrived to room. PIV placed, labs sent. Admit assessment completed. Plan of care discussed with patient. Significant other and daughter at bedside. Nurse call bell within reach. Will monitor

## 2022-04-07 NOTE — ANESTHESIA PREPROCEDURE EVALUATION
04/07/2022  Walter Bull is a 71 y.o., male.      Pre-op Assessment    I have reviewed the Patient Summary Reports.          Review of Systems  Anesthesia Hx:  No problems with previous Anesthesia    Social:  Former Smoker    Hematology/Oncology:  Hematology Normal   Oncology Normal     EENT/Dental:EENT/Dental Normal   Cardiovascular:   Hypertension Past MI CAD  CABG/stent  Angina CHF    Pulmonary:   COPD Shortness of breath Sleep Apnea    Renal/:   Chronic Renal Disease, CRI    Hepatic/GI:   GERD    Musculoskeletal:  Musculoskeletal Normal    Neurological:   Peripheral Neuropathy    Endocrine:   Diabetes, type 2 Hypothyroidism    Dermatological:  Skin Normal    Psych:  Psychiatric Normal         2D Echo 4/4/22  · Severe left atrial enlargement.  · Left ventricle is at the upper limits of normal in size measuring 5.4 cm with severely decreased systolic function.  · The estimated ejection fraction is 25%.  · There is severe left ventricular global hypokinesis. Additionally the inferolateral wall and basal inferior wall are akinetic.  · Grade I left ventricular diastolic dysfunction.  · Mild right atrial enlargement.  · Mild right ventricular enlargement with mildly to moderately reduced right ventricular systolic function.  · Mild tricuspid regurgitation.  · Normal central venous pressure (3 mmHg).  · The estimated PA systolic pressure is 36 mmHg.         Physical Exam  General: Alert and Oriented    Airway:  Mallampati: II / II  Mouth Opening: Normal  TM Distance: Normal  Tongue: Normal  Neck ROM: Normal ROM    Dental:  Intact    Chest/Lungs:  Clear to auscultation, Normal Respiratory Rate    Heart:  Rate: Normal  Rhythm: Regular Rhythm  Sounds: Normal        Anesthesia Plan  Type of Anesthesia, risks & benefits discussed:    Anesthesia Type: Gen ETT, MAC  Intra-op Monitoring Plan: Standard ASA  Monitors  Post Op Pain Control Plan: multimodal analgesia  Airway Plan: Direct  Informed Consent: Informed consent signed with the Patient and all parties understand the risks and agree with anesthesia plan.  All questions answered.   ASA Score: 4    Ready For Surgery From Anesthesia Perspective.     .

## 2022-04-08 VITALS
RESPIRATION RATE: 18 BRPM | HEART RATE: 60 BPM | TEMPERATURE: 98 F | SYSTOLIC BLOOD PRESSURE: 145 MMHG | DIASTOLIC BLOOD PRESSURE: 65 MMHG | BODY MASS INDEX: 36.18 KG/M2 | HEIGHT: 69 IN | OXYGEN SATURATION: 97 % | WEIGHT: 244.25 LBS

## 2022-04-08 LAB
POCT GLUCOSE: 185 MG/DL (ref 70–110)
POCT GLUCOSE: 301 MG/DL (ref 70–110)

## 2022-04-08 PROCEDURE — 25000003 PHARM REV CODE 250: Performed by: INTERNAL MEDICINE

## 2022-04-08 PROCEDURE — 63600175 PHARM REV CODE 636 W HCPCS: Performed by: INTERNAL MEDICINE

## 2022-04-08 RX ADMIN — CLOPIDOGREL 75 MG: 75 TABLET, FILM COATED ORAL at 08:04

## 2022-04-08 RX ADMIN — INSULIN ASPART 4 UNITS: 100 INJECTION, SOLUTION INTRAVENOUS; SUBCUTANEOUS at 08:04

## 2022-04-08 RX ADMIN — DOXYCYCLINE HYCLATE 100 MG: 100 TABLET, COATED ORAL at 08:04

## 2022-04-08 RX ADMIN — CARVEDILOL 3.12 MG: 3.12 TABLET, FILM COATED ORAL at 08:04

## 2022-04-08 RX ADMIN — LOSARTAN POTASSIUM 12.5 MG: 25 TABLET, FILM COATED ORAL at 08:04

## 2022-04-08 RX ADMIN — TAMSULOSIN HYDROCHLORIDE 0.4 MG: 0.4 CAPSULE ORAL at 08:04

## 2022-04-08 RX ADMIN — FUROSEMIDE 20 MG: 20 TABLET ORAL at 08:04

## 2022-04-08 RX ADMIN — ACETAMINOPHEN 650 MG: 325 TABLET ORAL at 12:04

## 2022-04-08 NOTE — HOSPITAL COURSE
Briefly, Mr. Bull is a 71 year old male with CAD/MI (CABG 2005, PCI 2018), CKD, HTN, HLD, DM, LBBB, MMVT and heart failure with reduced LVEF. Also has VT and s/p ICD in 2018. Mr. Bull has NYHA Class II HF symptoms. In the setting of his underlying LBBB and dual chamber ICD in situ, and LBBB we discussed CRT-D implant. We discussed the risks ,benefits, inidications, and alternatives to CRT-D upgrade. [place] shared decision tool utilized. After considering his options he  agreed to proceed.      After consenting to the procedure, patient was brought to the EP lab. He underwent successful device upgrade ( addition of a CS lead). He tolerated the procedure well and was discharged home in good condition the following day. He will continue on antibiotics for 5 days and follow up in clinic in one week for wound check.

## 2022-04-08 NOTE — NURSING
Rounding done.pt is up and on cell phone the patient is av paced on monitor.no complaints of cp .pressure dressing c/d/i. To left chest and left arm remains in sling

## 2022-04-08 NOTE — PROGRESS NOTES
Patient given discharge instructions. Family member at bedside. Patient verbalizes education and importance of follow up care. All belongings sent home, IV's removed, and telemetry box taken off.

## 2022-04-08 NOTE — DISCHARGE INSTRUCTIONS
Sling to left arm - wear for 24 hours, then only at night for 6 weeks.  Doxycycline 100 mg PO BID for 5 days at discharge  No lifting left elbow above shoulder height  No lifting over 5 pounds  No driving for 1 week and for 4 weeks if patient uses left arm to make turns  Patient may shower in 24 hours, do not let beam of shower hit site directly and no scrubbing in area  Follow up in device clinic in 1 week and with Dr. Glasgow in 4 months.       No

## 2022-04-08 NOTE — DISCHARGE SUMMARY
[facility] - Cardiology Stepdown  Cardiac Electrophysiology  Discharge Summary      Patient Name: Walter Bull  MRN: [id number]  Admission Date: 4/7/2022  Hospital Length of Stay: 0 days  Discharge Date and Time:  04/08/2022 6:58 AM  Attending Physician: Byron Glasgow MD    Discharging Provider: Shane Marie MD  Primary Care Physician: Belkys Kruger MD      No notes on file    Procedure(s) (LRB):  INSERTION, ICD, BIVENTRICULAR (Left)  Venogram, EP Lab     Indwelling Lines/Drains at time of discharge:  Lines/Drains/Airways     None                 Hospital Course:     Briefly, Mr. Bull is a 71 year old male with CAD/MI (CABG 2005, PCI 2018), CKD, HTN, HLD, DM, LBBB, MMVT and heart failure with reduced LVEF. Also has VT and s/p ICD in 2018. Mr. Bull has NYHA Class II HF symptoms. In the setting of his underlying LBBB and dual chamber ICD in situ, and LBBB we discussed CRT-D implant. We discussed the risks ,benefits, inidications, and alternatives to CRT-D upgrade. [place] shared decision tool utilized. After considering his options he  agreed to proceed.      After consenting to the procedure, patient was brought to the EP lab. He underwent successful device upgrade ( addition of a CS lead). He tolerated the procedure well and was discharged home in good condition the following day. He will continue on antibiotics for 5 days and follow up in clinic in one week for wound check.       Goals of Care Treatment Preferences:  Code Status: Full Code    Living Will: Yes              Lab Results   Component Value Date    WBC 7.14 04/05/2022    HGB 13.1 (L) 04/05/2022    HCT 40.7 04/05/2022    MCV 93 04/05/2022     04/05/2022       CMP  Sodium   Date Value Ref Range Status   04/05/2022 139 136 - 145 mmol/L Final     Potassium   Date Value Ref Range Status   04/05/2022 4.4 3.5 - 5.1 mmol/L Final     Chloride   Date Value Ref Range Status   04/05/2022 102 95 - 110 mmol/L Final     CO2   Date Value Ref  Range Status   04/05/2022 27 23 - 29 mmol/L Final     Glucose   Date Value Ref Range Status   04/05/2022 87 70 - 110 mg/dL Final     BUN   Date Value Ref Range Status   04/05/2022 23 8 - 23 mg/dL Final     Creatinine   Date Value Ref Range Status   04/05/2022 1.8 (H) 0.5 - 1.4 mg/dL Final     Calcium   Date Value Ref Range Status   04/05/2022 9.2 8.7 - 10.5 mg/dL Final     Total Protein   Date Value Ref Range Status   11/09/2021 6.5 6.0 - 8.4 g/dL Final     Albumin   Date Value Ref Range Status   03/18/2022 3.7 3.5 - 5.2 g/dL Final     Total Bilirubin   Date Value Ref Range Status   11/09/2021 0.6 0.1 - 1.0 mg/dL Final     Comment:     For infants and newborns, interpretation of results should be based  on gestational age, weight and in agreement with clinical  observations.    Premature Infant recommended reference ranges:  Up to 24 hours.............<8.0 mg/dL  Up to 48 hours............<12.0 mg/dL  3-5 days..................<15.0 mg/dL  6-29 days.................<15.0 mg/dL       Alkaline Phosphatase   Date Value Ref Range Status   11/09/2021 55 55 - 135 U/L Final     AST   Date Value Ref Range Status   11/09/2021 33 10 - 40 U/L Final     ALT   Date Value Ref Range Status   11/09/2021 35 10 - 44 U/L Final     Anion Gap   Date Value Ref Range Status   04/05/2022 10 8 - 16 mmol/L Final     eGFR if    Date Value Ref Range Status   04/05/2022 42 (A) >60 mL/min/1.73 m^2 Final     eGFR if non    Date Value Ref Range Status   04/05/2022 37 (A) >60 mL/min/1.73 m^2 Final     Comment:     Calculation used to obtain the estimated glomerular filtration  rate (eGFR) is the CKD-EPI equation.        Lab Results   Component Value Date    INR 1.1 04/05/2022    INR 1.0 10/05/2019    INR 1.0 05/17/2019         Pending Diagnostic Studies:     Procedure Component Value Units Date/Time    Hemoglobin A1c if not done in past 3 months [[id number]]     Order Status: Sent Lab Status: No result     Specimen:  Blood           There are no hospital problems to display for this patient.    No new Assessment & Plan notes have been filed under this hospital service since the last note was generated.  Service: Arrhythmia      Discharged Condition: good    Disposition: Home     Follow Up:   Follow-up Information     [facility] - Electrophysiology 3rd Fl Follow up in 1 week(s).    Specialty: Electrophysiology  Contact information:  [id number]  [facility] [id number] [phone]  Additional information:  Cardiology Services Clin[facility] - Main Building, [facility] 3rd Floor   Please park in [facility] and use Atrium elevator           Byron Glasgow MD Follow up in 4 month(s).    Specialties: Electrophysiology, Cardiovascular Disease, Cardiology  Contact information:  [address]  [facility] [id number] [phone]                       Patient Instructions:     Sling to left arm - wear for 24 hours, then only at night for 6 weeks.  Doxycycline 100 mg PO BID for 5 days at discharge  No lifting left elbow above shoulder height  No lifting over 5 pounds  No driving for 1 week and for 4 weeks if patient uses left arm to make turns  Patient may shower in 24 hours, do not let beam of shower hit site directly and no scrubbing in area  Follow up in device clinic in 1 week and with Dr. Glasgow in 4 months.    Medications:  Reconciled Home Medications:      Medication List      START taking these medications    doxycycline 100 MG Cap  Commonly known as: VIBRAMYCIN  Take 1 capsule (100 mg total) by mouth every 12 (twelve) hours. for 5 days        CHANGE how you take these medications    ferrous sulfate 325 mg (65 mg iron) Tab tablet  Commonly known as: FEOSOL  TAKE 1 TABLET BY MOUTH THREE TIMES DAILY  What changed:   · how much to take  · how to take this  · when to take this        CONTINUE taking these medications    ammonium lactate 12 % Crea  Apply twice daily to affected parts both feet as needed.     aspirin 81 MG EC  tablet  Commonly known as: ECOTRIN  Take 1 tablet (81 mg total) by mouth once daily.     * carvediloL 6.25 MG tablet  Commonly known as: COREG  Take 1 tablet (6.25 mg total) by mouth 2 (two) times daily.     * carvediloL 3.125 MG tablet  Commonly known as: COREG  Take 3.125 mg by mouth 2 (two) times daily.     clopidogreL 75 mg tablet  Commonly known as: PLAVIX  Take 1 tablet (75 mg total) by mouth once daily.     DEXCOM G6 SENSOR [place]  Generic drug: blood-glucose sensor  3 each by Misc.(Non-Drug; Combo Route) route continuous.     DEXCOM G6 TRANSMITTER [place]enne  Generic drug: blood-glucose transmitter  1 each by Misc.(Non-Drug; Combo Route) route continuous.     diclofenac sodium 1 % Gel  Commonly known as: VOLTAREN  Apply 2 g topically 4 (four) times daily.     esomeprazole 40 MG capsule  Commonly known as: NEXIUM  Take 1 capsule (40 mg total) by mouth 2 (two) times daily.     ezetimibe 10 mg tablet  Commonly known as: ZETIA  TAKE 1 TABLET BY MOUTH ONCE DAILY     furosemide 20 MG tablet  Commonly known as: LASIX  Take 1 tablet (20 mg total) by mouth once daily.     GVOKE HYPOPEN 2-PACK 1 mg/0.2 mL Atin  Generic drug: glucagon  INJECT SUBCUTANEOUSLY  CONTENTS OF 1 AUTO-INJECTOR AS NEEDED FOR HYPOGLCEMIA  AS DIRECTED     insulin lispro 100 unit/mL injection  Commonly known as: HumaLOG U-100 Insulin  USE AS DIRECTED VIA V-GO 20     ketoconazole 2 % cream  Commonly known as: NIZORAL  Apply topically once daily.     LIDOcaine HCL 2% 2 % jelly  Commonly known as: XYLOCAINE  Apply topically as needed. Apply topically once nightly to affected part of foot/feet, for pain.     losartan 25 MG tablet  Commonly known as: COZAAR  Take 0.5 tablets (12.5 mg total) by mouth once daily.     meloxicam 7.5 MG tablet  Commonly known as: MOBIC  Take 1 tablet (7.5 mg total) by mouth once daily. for 20 days     nitroGLYCERIN 0.4 MG SL tablet  Commonly known as: NITROSTAT  DISSOLVE 1 TABLET UNDER THE TONGUE EVERY 5 MINUTES AS  NEEDED FOR  CHEST PAIN. MAX  OF 3 TABLETS IN 15 MINUTES. CALL 911 IF PAIN PERSISTS.     ondansetron 4 MG tablet  Commonly known as: ZOFRAN  Take 1 tablet (4 mg total) by mouth every 8 (eight) hours as needed for Nausea.     papaverine 30 mg/mL injection  Add: Phentolamine 1 mg  Add: PGE1 10 mcg    Sig:  Inject 20 units as directed; titrate up by 5 units until desired results     ranolazine 1,000 mg Tb12  Commonly known as: RANEXA  Take 1 tablet (1,000 mg total) by mouth 2 (two) times daily.     rosuvastatin 40 MG Tab  Commonly known as: CRESTOR  Take 1 tablet (40 mg total) by mouth once daily.     spironolactone 25 MG tablet  Commonly known as: ALDACTONE  Take 1 tablet (25 mg total) by mouth once daily.     sub-q insulin device, 20 unit [place]enne  Commonly known as: VGO 20  1 Device by Misc.(Non-Drug; Combo Route) route once daily.     tamsulosin 0.4 mg Cap  Commonly known as: FLOMAX  Take 1 capsule (0.4 mg total) by mouth once daily.     traMADoL 50 mg tablet  Commonly known as: ULTRAM  Take 1 tablet (50 mg total) by mouth every 6 (six) hours as needed for Pain.     TRULICITY 4.5 mg/0.5 mL pen injector  Generic drug: dulaglutide  INJECT 4.5 MG INTO THE SKIN EVERY 7 DAYS         * This list has 2 medication(s) that are the same as other medications prescribed for you. Read the directions carefully, and ask your doctor or other care provider to review them with you.                Time spent on the discharge of patient: 20 minutes    Shane Marie MD  Cardiac Electrophysiology  [facility]

## 2022-04-08 NOTE — BRIEF OP NOTE
: Byron Glasgow MD  Date of procedure:4/7/2022  Post-operative Diagnosis: LBBB, Reduced LVEF    Procedure Performed: ICD upgrade to CRT-D    Description of Procedure: The patient was brought to the EP lab in the fasting state. Prepped and draped in sterile fashion. Safety timeout was performed. Sedation administered by anesthesia staff. Prophylactic IV antibiotics given. Selective venogram of the left axillary and cephalic veins performed via left ac IV. Lidocaine used for local anesthetic. Incision made. Blunt and electrocautery dissection performed to level of existing generator. Hemostasis achieved. Pocket washed with antibiotic solution.Fluoroscopic guided axillary access utilized. Guide/J Wire advanced and confirmed in IVC by fluoroscopy. CS sheath placed and long guide wire advanced with confirmation in the IVC. CS catheter advanced into CS. Selective venogram of the CS performed to evaluate anatomy to define optimal positioning. CS lead advanced into place. Testing performed including testing for diaphragmatic stimulation. Pocket washed using antibiotic solution. Leads disconnected from old generator and connected to new  generator. Generator placed into pocket, sutured in place, and washed with antibiotic solution. Deep layer closed with interrupted 3-0 suture. Intermediate layer closed with running 3-0 suture. Superficial layer closed with running 4-0 suture. Skin closed with Dermabond. Meplex dressing to be placed after dermabond has dried.     EBL: <10 mL    Specimens Removed: None  Complications: no immediate    1. Ancef 2 grams q8 hours x 2 doses   2. Sling to left arm - wear for 48 hours, then only at night for 6 weeks.  3. NO HEPARIN PRODUCTS  4. Doxycycline 100 mg twice a day   5. No lifting left elbow above shoulder height  6. No lifting over 5 pounds  7. No driving for 1 week and for 4 weeks if patient uses left arm to make turns  8. Dressing will be removed in AM by EP  9. Chest  Xray   10. Follow up in device clinic in 1 week for device and wound checks.     Dr Glasgow, the attending electrophysiologist, was present for the entirety of this procedure.    Shane MCCAIN Fellow

## 2022-04-08 NOTE — PLAN OF CARE
Plan of care discussed.all night meds given and tolerated well.left arm remains in a sling.pt requesting prn tylenol for sleep.cardio resident on call and informed with new orders for tylenol

## 2022-04-12 ENCOUNTER — OFFICE VISIT (OUTPATIENT)
Dept: NEPHROLOGY | Facility: CLINIC | Age: 72
End: 2022-04-12
Payer: MEDICARE

## 2022-04-12 ENCOUNTER — LAB VISIT (OUTPATIENT)
Dept: LAB | Facility: HOSPITAL | Age: 72
End: 2022-04-12
Payer: MEDICARE

## 2022-04-12 ENCOUNTER — PATIENT MESSAGE (OUTPATIENT)
Dept: INTERNAL MEDICINE | Facility: CLINIC | Age: 72
End: 2022-04-12
Payer: MEDICARE

## 2022-04-12 VITALS
BODY MASS INDEX: 36.6 KG/M2 | HEART RATE: 64 BPM | WEIGHT: 247.13 LBS | OXYGEN SATURATION: 99 % | SYSTOLIC BLOOD PRESSURE: 124 MMHG | DIASTOLIC BLOOD PRESSURE: 79 MMHG | HEIGHT: 69 IN

## 2022-04-12 DIAGNOSIS — E11.69 DIABETES MELLITUS TYPE 2 IN OBESE: ICD-10-CM

## 2022-04-12 DIAGNOSIS — N18.32 STAGE 3B CHRONIC KIDNEY DISEASE: ICD-10-CM

## 2022-04-12 DIAGNOSIS — E66.9 DIABETES MELLITUS TYPE 2 IN OBESE: ICD-10-CM

## 2022-04-12 DIAGNOSIS — E21.3 HYPERPARATHYROIDISM: ICD-10-CM

## 2022-04-12 DIAGNOSIS — N18.32 STAGE 3B CHRONIC KIDNEY DISEASE: Primary | ICD-10-CM

## 2022-04-12 DIAGNOSIS — Z95.1 S/P CABG (CORONARY ARTERY BYPASS GRAFT): ICD-10-CM

## 2022-04-12 DIAGNOSIS — I10 ESSENTIAL HYPERTENSION: ICD-10-CM

## 2022-04-12 LAB
ALBUMIN SERPL BCP-MCNC: 3.6 G/DL (ref 3.5–5.2)
ANION GAP SERPL CALC-SCNC: 7 MMOL/L (ref 8–16)
BUN SERPL-MCNC: 14 MG/DL (ref 8–23)
CALCIUM SERPL-MCNC: 9.2 MG/DL (ref 8.7–10.5)
CHLORIDE SERPL-SCNC: 102 MMOL/L (ref 95–110)
CO2 SERPL-SCNC: 29 MMOL/L (ref 23–29)
CREAT SERPL-MCNC: 1.3 MG/DL (ref 0.5–1.4)
EST. GFR  (AFRICAN AMERICAN): >60 ML/MIN/1.73 M^2
EST. GFR  (NON AFRICAN AMERICAN): 54.1 ML/MIN/1.73 M^2
GLUCOSE SERPL-MCNC: 112 MG/DL (ref 70–110)
PHOSPHATE SERPL-MCNC: 2.7 MG/DL (ref 2.7–4.5)
POTASSIUM SERPL-SCNC: 4.8 MMOL/L (ref 3.5–5.1)
SODIUM SERPL-SCNC: 138 MMOL/L (ref 136–145)

## 2022-04-12 PROCEDURE — 1101F PR PT FALLS ASSESS DOC 0-1 FALLS W/OUT INJ PAST YR: ICD-10-PCS | Mod: CPTII,S$GLB,, | Performed by: NURSE PRACTITIONER

## 2022-04-12 PROCEDURE — 3078F PR MOST RECENT DIASTOLIC BLOOD PRESSURE < 80 MM HG: ICD-10-PCS | Mod: CPTII,S$GLB,, | Performed by: NURSE PRACTITIONER

## 2022-04-12 PROCEDURE — 99999 PR PBB SHADOW E&M-EST. PATIENT-LVL III: CPT | Mod: PBBFAC,,, | Performed by: NURSE PRACTITIONER

## 2022-04-12 PROCEDURE — 1159F MED LIST DOCD IN RCRD: CPT | Mod: CPTII,S$GLB,, | Performed by: NURSE PRACTITIONER

## 2022-04-12 PROCEDURE — 3288F PR FALLS RISK ASSESSMENT DOCUMENTED: ICD-10-PCS | Mod: CPTII,S$GLB,, | Performed by: NURSE PRACTITIONER

## 2022-04-12 PROCEDURE — 80069 RENAL FUNCTION PANEL: CPT | Performed by: NURSE PRACTITIONER

## 2022-04-12 PROCEDURE — 3288F FALL RISK ASSESSMENT DOCD: CPT | Mod: CPTII,S$GLB,, | Performed by: NURSE PRACTITIONER

## 2022-04-12 PROCEDURE — 1126F PR PAIN SEVERITY QUANTIFIED, NO PAIN PRESENT: ICD-10-PCS | Mod: CPTII,S$GLB,, | Performed by: NURSE PRACTITIONER

## 2022-04-12 PROCEDURE — 3066F PR DOCUMENTATION OF TREATMENT FOR NEPHROPATHY: ICD-10-PCS | Mod: CPTII,S$GLB,, | Performed by: NURSE PRACTITIONER

## 2022-04-12 PROCEDURE — 3078F DIAST BP <80 MM HG: CPT | Mod: CPTII,S$GLB,, | Performed by: NURSE PRACTITIONER

## 2022-04-12 PROCEDURE — 1160F RVW MEDS BY RX/DR IN RCRD: CPT | Mod: CPTII,S$GLB,, | Performed by: NURSE PRACTITIONER

## 2022-04-12 PROCEDURE — 1160F PR REVIEW ALL MEDS BY PRESCRIBER/CLIN PHARMACIST DOCUMENTED: ICD-10-PCS | Mod: CPTII,S$GLB,, | Performed by: NURSE PRACTITIONER

## 2022-04-12 PROCEDURE — 99214 OFFICE O/P EST MOD 30 MIN: CPT | Mod: S$GLB,,, | Performed by: NURSE PRACTITIONER

## 2022-04-12 PROCEDURE — 36415 COLL VENOUS BLD VENIPUNCTURE: CPT | Performed by: NURSE PRACTITIONER

## 2022-04-12 PROCEDURE — 3008F BODY MASS INDEX DOCD: CPT | Mod: CPTII,S$GLB,, | Performed by: NURSE PRACTITIONER

## 2022-04-12 PROCEDURE — 3066F NEPHROPATHY DOC TX: CPT | Mod: CPTII,S$GLB,, | Performed by: NURSE PRACTITIONER

## 2022-04-12 PROCEDURE — 1101F PT FALLS ASSESS-DOCD LE1/YR: CPT | Mod: CPTII,S$GLB,, | Performed by: NURSE PRACTITIONER

## 2022-04-12 PROCEDURE — 4010F ACE/ARB THERAPY RXD/TAKEN: CPT | Mod: CPTII,S$GLB,, | Performed by: NURSE PRACTITIONER

## 2022-04-12 PROCEDURE — 1126F AMNT PAIN NOTED NONE PRSNT: CPT | Mod: CPTII,S$GLB,, | Performed by: NURSE PRACTITIONER

## 2022-04-12 PROCEDURE — 4010F PR ACE/ARB THEARPY RXD/TAKEN: ICD-10-PCS | Mod: CPTII,S$GLB,, | Performed by: NURSE PRACTITIONER

## 2022-04-12 PROCEDURE — 1159F PR MEDICATION LIST DOCUMENTED IN MEDICAL RECORD: ICD-10-PCS | Mod: CPTII,S$GLB,, | Performed by: NURSE PRACTITIONER

## 2022-04-12 PROCEDURE — 3074F SYST BP LT 130 MM HG: CPT | Mod: CPTII,S$GLB,, | Performed by: NURSE PRACTITIONER

## 2022-04-12 PROCEDURE — 99499 UNLISTED E&M SERVICE: CPT | Mod: S$GLB,,, | Performed by: NURSE PRACTITIONER

## 2022-04-12 PROCEDURE — 99999 PR PBB SHADOW E&M-EST. PATIENT-LVL III: ICD-10-PCS | Mod: PBBFAC,,, | Performed by: NURSE PRACTITIONER

## 2022-04-12 PROCEDURE — 99214 PR OFFICE/OUTPT VISIT, EST, LEVL IV, 30-39 MIN: ICD-10-PCS | Mod: S$GLB,,, | Performed by: NURSE PRACTITIONER

## 2022-04-12 PROCEDURE — 99499 RISK ADDL DX/OHS AUDIT: ICD-10-PCS | Mod: S$GLB,,, | Performed by: NURSE PRACTITIONER

## 2022-04-12 PROCEDURE — 3008F PR BODY MASS INDEX (BMI) DOCUMENTED: ICD-10-PCS | Mod: CPTII,S$GLB,, | Performed by: NURSE PRACTITIONER

## 2022-04-12 PROCEDURE — 3074F PR MOST RECENT SYSTOLIC BLOOD PRESSURE < 130 MM HG: ICD-10-PCS | Mod: CPTII,S$GLB,, | Performed by: NURSE PRACTITIONER

## 2022-04-12 NOTE — PROGRESS NOTES
"Subjective:       Patient ID: Walter Bull is a 71 y.o. AA male who presents for new evaluation of       HPI     Patient is new to me. Last seen by Dr. Burroughs in 2017. Lost to f/u and returning today as a new patient.  Prior pertinent chart reviewed since this is patient's first appointment with me. Presents with friendEve.    Patient presents for new evaluation of CKD.  Baseline creatinine of 1.5-1.6 mg/dL. Had HARRIS in May 2021 c max sCr of 2.1 (thought to be 2/2 volume depletion), but now back to baseline.     Recently admitted c orthostatic hypotension; BP meds decreased. Still with "a little bit" of dizziness, usually when he has not drank enough water; worse with position changes. Tries to drink about 64 oz of water a day + electrolyte drinks. Works outside a lot and sweats in the heat.    Significant hx includes pulmonary emphysema, HTN for at least 17 years, s/p CABG x 5 (complicated by HARRIS not requiring dialysis), CAD, combined systolic and diastolic HF, T2DM since 1980s, GERD, fatty liver  Surgical hx: CABG x 5, AICD/PM    The patient denies taking NSAIDs, herbal supplements, or new antibiotics, recreational drugs, recent episode of  diarrhea, nausea or vomiting, or exposure to IV radiocontrast. On PPI.    Significant family hx includes: daughter (DM) c kidney transplant; no other known familial kidney issues    Last renal US: Oct. 2019, reviewed.    Update 4/12/22:  Returns for f/u of CKD. Last seen September 2021. Presents with friendStarr.  Baseline creatinine of 1.5-1.6 mg/dL vs. 1.3-1.4. Recent sCr of 1.8.  Home BPs: SBP 120s-130s  AICD implanted 4/6/22 (switched from 2 wire to 3 wire).  Pt had been having NYHA Class II HF symptoms prior to deciding to change PM alone to AICD. Had also been having chest pain with exertion sometimes.  Spironolactone and furosemide (daily vs PRN) restarted 2/22/22  Denies NSAIDs.      Review of Systems   Respiratory: Positive for shortness of breath " "(improved since defib placement).    Cardiovascular: Positive for chest pain (improved recently). Negative for leg swelling.   Gastrointestinal: Negative for diarrhea, nausea and vomiting.   Genitourinary: Negative for difficulty urinating, dysuria, frequency (nocturia x3), hematuria and urgency.   Neurological: Positive for dizziness (improved since defib placement).       Objective:       Blood pressure 124/79, pulse 64, height 5' 9" (1.753 m), weight 112.1 kg (247 lb 2.2 oz), SpO2 99 %.  Physical Exam  Vitals reviewed.   Constitutional:       General: He is not in acute distress.     Appearance: Normal appearance. He is well-developed. He is not diaphoretic.   Eyes:      Conjunctiva/sclera: Conjunctivae normal.   Neck:      Vascular: No JVD.   Cardiovascular:      Rate and Rhythm: Normal rate and regular rhythm.      Heart sounds: Normal heart sounds. No murmur heard.    No friction rub. No gallop.   Pulmonary:      Effort: Pulmonary effort is normal. No respiratory distress.      Breath sounds: Normal breath sounds. No wheezing or rales.   Abdominal:      Tenderness: There is no right CVA tenderness or left CVA tenderness.   Musculoskeletal:      Cervical back: Neck supple.      Right lower leg: No edema.      Left lower leg: No edema.   Skin:     General: Skin is warm and dry.      Findings: No lesion or rash.   Neurological:      Mental Status: He is alert and oriented to person, place, and time.   Psychiatric:         Mood and Affect: Mood normal.         Behavior: Behavior normal.         Thought Content: Thought content normal.         Judgment: Judgment normal.           Lab Results   Component Value Date    CREATININE 1.8 (H) 04/05/2022     Prot/Creat Ratio, Urine   Date Value Ref Range Status   09/27/2021 0.08 0.00 - 0.20 Final   09/27/2021 0.08 0.00 - 0.20 Final   09/27/2021 0.08 0.00 - 0.20 Final     Lab Results   Component Value Date     04/05/2022    K 4.4 04/05/2022    CO2 27 04/05/2022    CL " 102 04/05/2022     Lab Results   Component Value Date    .8 (H) 03/18/2022    CALCIUM 9.2 04/05/2022    PHOS 3.5 03/18/2022     Lab Results   Component Value Date    HGB 13.1 (L) 04/05/2022    WBC 7.14 04/05/2022    HCT 40.7 04/05/2022      Lab Results   Component Value Date    HGBA1C 6.8 (H) 11/09/2021     04/05/2022    BUN 23 04/05/2022     Lab Results   Component Value Date    LDLCALC 80.4 11/09/2021         Assessment:       1. Stage 3b chronic kidney disease    2. Essential hypertension    3. Diabetes mellitus type 2 in obese    4. Hyperparathyroidism    5. S/P CABG (coronary artery bypass graft)        Plan:   CKD stage 3A c eGFR 53 mL/min - likely 2/2 longstanding HTN and atherosclerotic disease. DM could be contributing but no retinopathy or proteinuria. Slight rise in sCr could be r/t recent cardiac issues; increased diuretic use could contribute, but he had sCr near previous baseline after starting the daily diuretics.  Will repeat labs today.    Previously c AKIs c dehydration episodes.  Educated patient to control BP, BG, remain well-hydrated, and avoid NSAIDs to prevent progression of CKD.  Discussed the importance of staying hydrated when working outside in the heat and trying to go inside during the hottest part of the day.    Discussed the need to balance cardiac needs with renal needs.  May sacrifice some kidney function to keep the cardiac diseases controlled.     UPCR Needs repeat. Previously without significant proteinuria.   Acid-base WNL   Renal osteodystrophy Ca okay. PTH slightly elevated.   Anemia At goal for CKD. On iron tablets.   DM Well-controlled. No documented retinopathy.   Lipid Management On statin.   ESRD planning Anticipatory guidance provided about timing of dialysis. Start discussions and planning when eGFR is about 20 mL/min; most patients start dialysis between 5-10 mL/min.     HTN - WNL on amiodarone 200 mg, carvedilol 3.125 mg BID, losartan 25 mg, furosemide 20  mg daily, spironolactone 25 mg    All questions patient had were answered.  Asked if further questions. None. F/u in clinic in 3 mos with labs and urine prior to next visit or sooner if needed.  ER for emergency concerns.    Summary of Plan:  1. UPCR, UA, RFP  2. avoid NSAID/ bactrim/ IV contrast/ gadolinium/ aminoglycoside where possible  4. Continue spironolactone and furosemide  5. Continue losartan  6. RTC in 3 mos

## 2022-04-13 ENCOUNTER — TELEPHONE (OUTPATIENT)
Dept: ELECTROPHYSIOLOGY | Facility: CLINIC | Age: 72
End: 2022-04-13
Payer: MEDICARE

## 2022-04-13 NOTE — TELEPHONE ENCOUNTER
Spoke to patient regarding post op care. Patient states denies any complaints. Wound site dry and intact without redness, swelling, hematoma or drainage. Patient denies any fever or pain.   Patient  has resumed medications and/or picked up new prescriptions ordered at discharge.Patient verbalizes understanding of all post op instructions.     Reminded patient of appt tomorrow for wound check.

## 2022-04-14 ENCOUNTER — CLINICAL SUPPORT (OUTPATIENT)
Dept: CARDIOLOGY | Facility: HOSPITAL | Age: 72
End: 2022-04-14
Attending: INTERNAL MEDICINE
Payer: MEDICARE

## 2022-04-14 DIAGNOSIS — R91.1 PULMONARY NODULE: Primary | ICD-10-CM

## 2022-04-14 DIAGNOSIS — I50.42 CHRONIC COMBINED SYSTOLIC AND DIASTOLIC HEART FAILURE: ICD-10-CM

## 2022-04-14 DIAGNOSIS — I25.5 ISCHEMIC CARDIOMYOPATHY: ICD-10-CM

## 2022-04-14 PROCEDURE — 93284 CARDIAC DEVICE CHECK - IN CLINIC & HOSPITAL: ICD-10-PCS | Mod: 26,,, | Performed by: INTERNAL MEDICINE

## 2022-04-14 PROCEDURE — 93284 PRGRMG EVAL IMPLANTABLE DFB: CPT | Mod: 26,,, | Performed by: INTERNAL MEDICINE

## 2022-04-14 PROCEDURE — 93284 PRGRMG EVAL IMPLANTABLE DFB: CPT

## 2022-04-20 ENCOUNTER — TELEPHONE (OUTPATIENT)
Dept: ENDOSCOPY | Facility: HOSPITAL | Age: 72
End: 2022-04-20
Payer: MEDICARE

## 2022-04-20 ENCOUNTER — PATIENT MESSAGE (OUTPATIENT)
Dept: ENDOSCOPY | Facility: HOSPITAL | Age: 72
End: 2022-04-20
Payer: MEDICARE

## 2022-04-20 DIAGNOSIS — Z12.11 SPECIAL SCREENING FOR MALIGNANT NEOPLASMS, COLON: Primary | ICD-10-CM

## 2022-04-20 RX ORDER — SODIUM, POTASSIUM,MAG SULFATES 17.5-3.13G
SOLUTION, RECONSTITUTED, ORAL ORAL
Qty: 1 KIT | Refills: 0 | Status: ON HOLD | OUTPATIENT
Start: 2022-04-20 | End: 2022-04-25 | Stop reason: HOSPADM

## 2022-04-25 ENCOUNTER — ANESTHESIA EVENT (OUTPATIENT)
Dept: ENDOSCOPY | Facility: HOSPITAL | Age: 72
End: 2022-04-25
Payer: MEDICARE

## 2022-04-25 ENCOUNTER — ANESTHESIA (OUTPATIENT)
Dept: ENDOSCOPY | Facility: HOSPITAL | Age: 72
End: 2022-04-25
Payer: MEDICARE

## 2022-04-25 ENCOUNTER — HOSPITAL ENCOUNTER (OUTPATIENT)
Facility: HOSPITAL | Age: 72
Discharge: HOME | End: 2022-04-25
Attending: INTERNAL MEDICINE | Admitting: INTERNAL MEDICINE
Payer: MEDICARE

## 2022-04-25 VITALS
RESPIRATION RATE: 18 BRPM | WEIGHT: 240 LBS | TEMPERATURE: 98 F | HEIGHT: 69 IN | HEART RATE: 60 BPM | OXYGEN SATURATION: 100 % | DIASTOLIC BLOOD PRESSURE: 77 MMHG | SYSTOLIC BLOOD PRESSURE: 161 MMHG | BODY MASS INDEX: 35.55 KG/M2

## 2022-04-25 DIAGNOSIS — D64.9 ANEMIA: ICD-10-CM

## 2022-04-25 LAB
POCT GLUCOSE: 108 MG/DL (ref 70–110)
POCT GLUCOSE: 152 MG/DL (ref 70–110)

## 2022-04-25 PROCEDURE — 82962 GLUCOSE BLOOD TEST: CPT | Mod: 91 | Performed by: INTERNAL MEDICINE

## 2022-04-25 PROCEDURE — 88305 TISSUE EXAM BY PATHOLOGIST: CPT | Mod: 26,,, | Performed by: PATHOLOGY

## 2022-04-25 PROCEDURE — 37000008 HC ANESTHESIA 1ST 15 MINUTES: Performed by: INTERNAL MEDICINE

## 2022-04-25 PROCEDURE — 88342 IMHCHEM/IMCYTCHM 1ST ANTB: CPT | Mod: 26,,, | Performed by: PATHOLOGY

## 2022-04-25 PROCEDURE — 88305 TISSUE EXAM BY PATHOLOGIST: ICD-10-PCS | Mod: 26,,, | Performed by: PATHOLOGY

## 2022-04-25 PROCEDURE — D9220A PRA ANESTHESIA: Mod: CRNA,,, | Performed by: NURSE ANESTHETIST, CERTIFIED REGISTERED

## 2022-04-25 PROCEDURE — 45380 COLONOSCOPY AND BIOPSY: CPT | Mod: PT,GC,, | Performed by: INTERNAL MEDICINE

## 2022-04-25 PROCEDURE — 37000009 HC ANESTHESIA EA ADD 15 MINS: Performed by: INTERNAL MEDICINE

## 2022-04-25 PROCEDURE — 88342 IMHCHEM/IMCYTCHM 1ST ANTB: CPT | Performed by: PATHOLOGY

## 2022-04-25 PROCEDURE — 88305 TISSUE EXAM BY PATHOLOGIST: CPT | Performed by: PATHOLOGY

## 2022-04-25 PROCEDURE — D9220A PRA ANESTHESIA: ICD-10-PCS | Mod: CRNA,,, | Performed by: NURSE ANESTHETIST, CERTIFIED REGISTERED

## 2022-04-25 PROCEDURE — 63600175 PHARM REV CODE 636 W HCPCS: Performed by: NURSE ANESTHETIST, CERTIFIED REGISTERED

## 2022-04-25 PROCEDURE — 82962 GLUCOSE BLOOD TEST: CPT | Performed by: INTERNAL MEDICINE

## 2022-04-25 PROCEDURE — 45380 PR COLONOSCOPY,BIOPSY: ICD-10-PCS | Mod: PT,GC,, | Performed by: INTERNAL MEDICINE

## 2022-04-25 PROCEDURE — 25000003 PHARM REV CODE 250: Performed by: NURSE ANESTHETIST, CERTIFIED REGISTERED

## 2022-04-25 PROCEDURE — D9220A PRA ANESTHESIA: Mod: ANES,,, | Performed by: ANESTHESIOLOGY

## 2022-04-25 PROCEDURE — 43239 EGD BIOPSY SINGLE/MULTIPLE: CPT | Mod: 51,GC,, | Performed by: INTERNAL MEDICINE

## 2022-04-25 PROCEDURE — 43239 PR EGD, FLEX, W/BIOPSY, SGL/MULTI: ICD-10-PCS | Mod: 51,GC,, | Performed by: INTERNAL MEDICINE

## 2022-04-25 PROCEDURE — 43239 EGD BIOPSY SINGLE/MULTIPLE: CPT | Performed by: INTERNAL MEDICINE

## 2022-04-25 PROCEDURE — D9220A PRA ANESTHESIA: ICD-10-PCS | Mod: ANES,,, | Performed by: ANESTHESIOLOGY

## 2022-04-25 PROCEDURE — 45380 COLONOSCOPY AND BIOPSY: CPT | Mod: PT | Performed by: INTERNAL MEDICINE

## 2022-04-25 PROCEDURE — 27201012 HC FORCEPS, HOT/COLD, DISP: Performed by: INTERNAL MEDICINE

## 2022-04-25 PROCEDURE — 88342 CHG IMMUNOCYTOCHEMISTRY: ICD-10-PCS | Mod: 26,,, | Performed by: PATHOLOGY

## 2022-04-25 RX ORDER — PROPOFOL 10 MG/ML
VIAL (ML) INTRAVENOUS CONTINUOUS PRN
Status: DISCONTINUED | OUTPATIENT
Start: 2022-04-25 | End: 2022-04-25

## 2022-04-25 RX ORDER — ONDANSETRON 2 MG/ML
4 INJECTION INTRAMUSCULAR; INTRAVENOUS DAILY PRN
Status: DISCONTINUED | OUTPATIENT
Start: 2022-04-25 | End: 2022-04-25 | Stop reason: HOSPADM

## 2022-04-25 RX ORDER — ETOMIDATE 2 MG/ML
INJECTION INTRAVENOUS
Status: DISCONTINUED | OUTPATIENT
Start: 2022-04-25 | End: 2022-04-25

## 2022-04-25 RX ORDER — FENTANYL CITRATE 50 UG/ML
25 INJECTION, SOLUTION INTRAMUSCULAR; INTRAVENOUS EVERY 5 MIN PRN
Status: DISCONTINUED | OUTPATIENT
Start: 2022-04-25 | End: 2022-04-25 | Stop reason: HOSPADM

## 2022-04-25 RX ORDER — LIDOCAINE HYDROCHLORIDE 20 MG/ML
INJECTION, SOLUTION EPIDURAL; INFILTRATION; INTRACAUDAL; PERINEURAL
Status: DISCONTINUED | OUTPATIENT
Start: 2022-04-25 | End: 2022-04-25

## 2022-04-25 RX ADMIN — ETOMIDATE 2 MG: 2 INJECTION INTRAVENOUS at 11:04

## 2022-04-25 RX ADMIN — ETOMIDATE 10 MG: 2 INJECTION INTRAVENOUS at 11:04

## 2022-04-25 RX ADMIN — ETOMIDATE 4 MG: 2 INJECTION INTRAVENOUS at 11:04

## 2022-04-25 RX ADMIN — PROPOFOL 100 MCG/KG/MIN: 10 INJECTION, EMULSION INTRAVENOUS at 11:04

## 2022-04-25 RX ADMIN — LIDOCAINE HYDROCHLORIDE 50 MG: 20 INJECTION, SOLUTION EPIDURAL; INFILTRATION; INTRACAUDAL at 11:04

## 2022-04-25 NOTE — PROVATION PATIENT INSTRUCTIONS
Discharge Summary/Instructions after an Endoscopic Procedure  Patient Name: Walter Bull  Patient MRN: [id number]  Patient YOB: 1950  Monday, April 25, 2022  Branden Bush MD  Dear patient,  As a result of recent federal legislation (The Federal Cures Act), you may   receive lab or pathology results from your procedure in your [facility]   account before your physician is able to contact you. Your physician or   their representative will relay the results to you with their   recommendations at their soonest availability.  Thank you,  RESTRICTIONS:  During your procedure today, you received medications for sedation.  These   medications may affect your judgment, balance and coordination.  Therefore,   for 24 hours, you have the following restrictions:   - DO NOT drive a car, operate machinery, make legal/financial decisions,   sign important papers or drink alcohol.    ACTIVITY:  Today: no heavy lifting, straining or running due to procedural   sedation/anesthesia.  The following day: return to full activity including work.  DIET:  Eat and drink normally unless instructed otherwise.     TREATMENT FOR COMMON SIDE EFFECTS:  - Mild abdominal pain, nausea, belching, bloating or excessive gas:  rest,   eat lightly and use a heating pad.  - Sore Throat: treat with throat lozenges and/or gargle with warm salt   water.  - Because air was used during the procedure, expelling large amounts of air   from your rectum or belching is normal.  - If a bowel prep was taken, you may not have a bowel movement for 1-3 days.    This is normal.  SYMPTOMS TO WATCH FOR AND REPORT TO YOUR PHYSICIAN:  1. Abdominal pain or bloating, other than gas cramps.  2. Chest pain.  3. Back pain.  4. Signs of infection such as: chills or fever occurring within 24 hours   after the procedure.  5. Rectal bleeding, which would show as bright red, maroon, or black stools.   (A tablespoon of blood from the rectum is not serious, especially if    hemorrhoids are present.)  6. Vomiting.  7. Weakness or dizziness.  GO DIRECTLY TO THE NEAREST EMERGENCY ROOM IF YOU HAVE ANY OF THE FOLLOWING:      Difficulty breathing              Chills and/or fever over 101 F   Persistent vomiting and/or vomiting blood   Severe abdominal pain   Severe chest pain   Black, tarry stools   Bleeding- more than one tablespoon   Any other symptom or condition that you feel may need urgent attention  Your doctor recommends these additional instructions:  If any biopsies were taken, your doctors clinic will contact you in 1 to 2   weeks with any results.  - Discharge patient to home.   - Resume previous diet.   - Resume Plavix (clopidogrel) at prior dose tomorrow.   - Await pathology results.   - No repeat colonoscopy due to age and comorbidities.   - Patient has a contact number available for emergencies.  The signs and   symptoms of potential delayed complications were discussed with the   patient.  Return to normal activities tomorrow.  Written discharge   instructions were provided to the patient.  For questions, problems or results please call your physician - Branden Bush MD at Work:  [phone].  [facility], EMERGENCY ROOM PHONE NUMBER: [phone]  IF A COMPLICATION OR EMERGENCY SITUATION ARISES AND YOU ARE UNABLE TO REACH   YOUR PHYSICIAN - GO DIRECTLY TO THE EMERGENCY ROOM.  Branden Bush MD  4/25/2022 12:23:08 PM  This report has been verified and signed electronically.  Dear patient,  As a result of recent federal legislation (The Federal Cures Act), you may   receive lab or pathology results from your procedure in your [facility]   account before your physician is able to contact you. Your physician or   their representative will relay the results to you with their   recommendations at their soonest availability.  Thank you,  PROVATION

## 2022-04-25 NOTE — ANESTHESIA POSTPROCEDURE EVALUATION
Anesthesia Post Evaluation    Patient: Walter Bull    Procedure(s) Performed: Procedure(s) (LRB):  EGD (ESOPHAGOGASTRODUODENOSCOPY) (N/A)  COLONOSCOPY (N/A)    Final Anesthesia Type: general      Patient location during evaluation: PACU  Patient participation: Yes- Able to Participate  Level of consciousness: awake and alert  Post-procedure vital signs: reviewed and stable  Pain management: adequate  Airway patency: patent    PONV status at discharge: No PONV  Anesthetic complications: no      Cardiovascular status: hemodynamically stable  Respiratory status: spontaneous ventilation  Follow-up not needed.          Vitals Value Taken Time   /72 04/25/22 1210   Temp 36.9 °C (98.4 °F) 04/25/22 1210   Pulse 59 04/25/22 1210   Resp 16 04/25/22 1210   SpO2 100 % 04/25/22 1210         No case tracking events are documented in the log.      Pain/Madhav Score: Madhav Score: 7 (4/25/2022 12:27 PM)

## 2022-04-25 NOTE — PROVATION PATIENT INSTRUCTIONS
Discharge Summary/Instructions after an Endoscopic Procedure  Patient Name: Walter Bull  Patient MRN: [id number]  Patient YOB: 1950  Monday, April 25, 2022  Branden Bush MD  Dear patient,  As a result of recent federal legislation (The Federal Cures Act), you may   receive lab or pathology results from your procedure in your [facility]   account before your physician is able to contact you. Your physician or   their representative will relay the results to you with their   recommendations at their soonest availability.  Thank you,  RESTRICTIONS:  During your procedure today, you received medications for sedation.  These   medications may affect your judgment, balance and coordination.  Therefore,   for 24 hours, you have the following restrictions:   - DO NOT drive a car, operate machinery, make legal/financial decisions,   sign important papers or drink alcohol.    ACTIVITY:  Today: no heavy lifting, straining or running due to procedural   sedation/anesthesia.  The following day: return to full activity including work.  DIET:  Eat and drink normally unless instructed otherwise.     TREATMENT FOR COMMON SIDE EFFECTS:  - Mild abdominal pain, nausea, belching, bloating or excessive gas:  rest,   eat lightly and use a heating pad.  - Sore Throat: treat with throat lozenges and/or gargle with warm salt   water.  - Because air was used during the procedure, expelling large amounts of air   from your rectum or belching is normal.  - If a bowel prep was taken, you may not have a bowel movement for 1-3 days.    This is normal.  SYMPTOMS TO WATCH FOR AND REPORT TO YOUR PHYSICIAN:  1. Abdominal pain or bloating, other than gas cramps.  2. Chest pain.  3. Back pain.  4. Signs of infection such as: chills or fever occurring within 24 hours   after the procedure.  5. Rectal bleeding, which would show as bright red, maroon, or black stools.   (A tablespoon of blood from the rectum is not serious, especially if    hemorrhoids are present.)  6. Vomiting.  7. Weakness or dizziness.  GO DIRECTLY TO THE NEAREST EMERGENCY ROOM IF YOU HAVE ANY OF THE FOLLOWING:      Difficulty breathing              Chills and/or fever over 101 F   Persistent vomiting and/or vomiting blood   Severe abdominal pain   Severe chest pain   Black, tarry stools   Bleeding- more than one tablespoon   Any other symptom or condition that you feel may need urgent attention  Your doctor recommends these additional instructions:  If any biopsies were taken, your doctors clinic will contact you in 1 to 2   weeks with any results.  - Await pathology results.   - Perform a colonoscopy now.   - See colonoscopy report to follow for further recommendations.   For questions, problems or results please call your physician - Branden Bush MD at Work:  [phone].  [facility] [place], EMERGENCY ROOM PHONE NUMBER: [phone]  IF A COMPLICATION OR EMERGENCY SITUATION ARISES AND YOU ARE UNABLE TO REACH   YOUR PHYSICIAN - GO DIRECTLY TO THE EMERGENCY ROOM.  Branden Bush MD  4/25/2022 12:20:36 PM  This report has been verified and signed electronically.  Dear patient,  As a result of recent federal legislation (The Federal Cures Act), you may   receive lab or pathology results from your procedure in your [facility]   account before your physician is able to contact you. Your physician or   their representative will relay the results to you with their   recommendations at their soonest availability.  Thank you,  PROVATION

## 2022-04-25 NOTE — PLAN OF CARE
Pt and significant other at bedside given discharge instructions. Pt and significant other questions answered. IV access removed; bleeding controlled. VSS. Pt given wheelchair. Pt dressed per significant other.

## 2022-04-25 NOTE — H&P
Short Stay Endoscopy History and Physical    PCP - Belkys Kruger MD    Procedure - EGD & colonoscopy  ASA - per anesthesia  Mallampati - per anesthesia  History of Anesthesia problems - no  Family history Anesthesia problems -  no   Plan of anesthesia - General    HPI:  This is a 71 y.o. male here for evaluation of : chest pain, anemia, reflux    ROS:  Constitutional: No fevers, chills  CV: No chest pain  Pulm: No shortness of breath  GI: see HPI  Derm: No rash    Medical History:  has a past medical history of Anemia, Angina pectoris, Anticoagulant long-term use, Arthritis, Back pain, CHF (congestive heart failure), Chronic bronchitis, Colon cancer screening (2/2/2017), Coronary artery disease, Diabetes mellitus type I, Diabetes with neurologic complications, Disorder of kidney and ureter, Encounter for blood transfusion, Glaucoma, Heart attack, Heart disease, Hyperlipidemia, Hypertension, Hypothyroidism, Iron deficiency, Kidney failure, Obesity, Peripheral vascular disease, Polyneuropathy, Pulmonary emphysema (2/26/2020), Renal manifestation of secondary diabetes mellitus, 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    Surgical History:  has a past surgical history that includes Eye surgery (Bilateral, 2016); Colon surgery (2006); Colonoscopy (N/A, 2/2/2017); Coronary artery bypass graft (2005); Left heart catheterization (Left, 11/16/2018); Coronary bypass graft angiography (11/16/2018); Cardiac electrophysiology study (N/A, 11/19/2018); Insertion of implantable cardioverter-defibrillator (ICD) generator with two existing leads (Left, 11/19/2018); Cardiac defibrillator placement; Cardiac electrophysiology study (N/A, 11/19/2018); Esophageal manometry with measurement of impedance (N/A, 3/10/2022); 24 hour impedance pH monitoring of esophagus in patient taking acid reducing medications (N/A, 3/10/2022); and Insertion of  biventricular implantable cardioverter-defibrillator (ICD) (Left, 4/7/2022).    Family History: family history includes Diabetes in his mother and sister; Heart attack in his brother; Heart disease in his mother and sister; Hypertension in his sister.. Otherwise no colon cancer, inflammatory bowel disease, or GI malignancies.    Social History:  reports that he quit smoking about 41 years ago. His smoking use included cigarettes. He started smoking about 59 years ago. He smoked 3.00 packs per day. He quit smokeless tobacco use about 38 years ago.  His smokeless tobacco use included snuff and chew. He reports that he does not drink alcohol and does not use drugs.    Review of patient's allergies indicates:  No Known Allergies    Medications:   Medications Prior to Admission   Medication Sig Dispense Refill Last Dose    aspirin (ECOTRIN) 81 MG EC tablet Take 1 tablet (81 mg total) by mouth once daily. 90 tablet 3 4/24/2022 at Unknown time    carvediloL (COREG) 3.125 MG tablet Take 3.125 mg by mouth 2 (two) times daily.   4/24/2022 at Unknown time    clopidogreL (PLAVIX) 75 mg tablet Take 1 tablet (75 mg total) by mouth once daily. 90 tablet 3 4/20/2022    dulaglutide (TRULICITY) 4.5 mg/0.5 mL pen injector INJECT 4.5 MG INTO THE SKIN EVERY 7 DAYS 12 pen 3 Past Week at Unknown time    esomeprazole (NEXIUM) 40 MG capsule Take 1 capsule (40 mg total) by mouth 2 (two) times daily. 180 capsule 3 4/24/2022 at Unknown time    ezetimibe (ZETIA) 10 mg tablet TAKE 1 TABLET BY MOUTH ONCE DAILY 90 tablet 3 4/24/2022 at Unknown time    ferrous sulfate (FEOSOL) 325 mg (65 mg iron) Tab tablet TAKE 1 TABLET BY MOUTH THREE TIMES DAILY (Patient taking differently: Take 325 mg by mouth 2 (two) times daily. TAKE 1 TABLET BY MOUTH THREE TIMES DAILY) 90 tablet 3 4/24/2022 at Unknown time    furosemide (LASIX) 20 MG tablet Take 1 tablet (20 mg total) by mouth once daily. 90 tablet 3 4/24/2022 at Unknown time    insulin lispro  (HUMALOG U-100 INSULIN) 100 unit/mL injection USE AS DIRECTED VIA V-GO 20 20 mL 11 4/24/2022 at Unknown time    ketoconazole (NIZORAL) 2 % cream Apply topically once daily. 60 g 2 4/24/2022 at Unknown time    losartan (COZAAR) 25 MG tablet Take 0.5 tablets (12.5 mg total) by mouth once daily. 45 tablet 3 4/24/2022 at Unknown time    ondansetron (ZOFRAN) 4 MG tablet Take 1 tablet (4 mg total) by mouth every 8 (eight) hours as needed for Nausea. 15 tablet 0 4/24/2022 at Unknown time    papaverine 30 mg/mL injection Add: Phentolamine 1 mg  Add: PGE1 10 mcg    Sig:  Inject 20 units as directed; titrate up by 5 units until desired results 10 mL 12 4/24/2022 at Unknown time    ranolazine (RANEXA) 1,000 mg Tb12 Take 1 tablet (1,000 mg total) by mouth 2 (two) times daily. 60 tablet 11 4/24/2022 at Unknown time    rosuvastatin (CRESTOR) 40 MG Tab Take 1 tablet (40 mg total) by mouth once daily. 90 tablet 3 4/24/2022 at Unknown time    spironolactone (ALDACTONE) 25 MG tablet Take 1 tablet (25 mg total) by mouth once daily. 90 tablet 3 4/24/2022 at Unknown time    sub-q insulin device, 20 unit (VGO 20) [place] 1 Device by Misc.(Non-Drug; Combo Route) route once daily. 30 each 6 4/24/2022 at Unknown time    tamsulosin (FLOMAX) 0.4 mg Cap Take 1 capsule (0.4 mg total) by mouth once daily. 90 capsule 3 4/24/2022 at Unknown time    ammonium lactate 12 % Crea Apply twice daily to affected parts both feet as needed. 140 g 11     blood-glucose sensor (DEXCOM G6 SENSOR) [place] 3 each by Misc.(Non-Drug; Combo Route) route continuous. 3 each prn     blood-glucose transmitter (DEXCOM G6 TRANSMITTER) [place] 1 each by Misc.(Non-Drug; Combo Route) route continuous. 1 each prn     carvediloL (COREG) 6.25 MG tablet Take 1 tablet (6.25 mg total) by mouth 2 (two) times daily. (Patient not taking: Reported on 4/12/2022) 180 tablet 3     diclofenac sodium (VOLTAREN) 1 % Gel Apply 2 g topically 4 (four) times daily. 100 g 2     GVOKE  HYPOPEN 2-PACK 1 mg/0.2 mL AtIn INJECT SUBCUTANEOUSLY  CONTENTS OF 1 AUTO-INJECTOR AS NEEDED FOR HYPOGLCEMIA  AS DIRECTED 0.4 mL 2     LIDOcaine HCL 2% (XYLOCAINE) 2 % jelly Apply topically as needed. Apply topically once nightly to affected part of foot/feet, for pain. 30 mL 2     nitroGLYCERIN (NITROSTAT) 0.4 MG SL tablet DISSOLVE 1 TABLET UNDER THE TONGUE EVERY 5 MINUTES AS  NEEDED FOR CHEST PAIN. MAX  OF 3 TABLETS IN 15 MINUTES. CALL 911 IF PAIN PERSISTS. 25 tablet 11     sodium,potassium,mag sulfates (SUPREP BOWEL PREP KIT) 17.5-3.13-1.6 gram SolR As Directed 1 kit 0     traMADoL (ULTRAM) 50 mg tablet Take 1 tablet (50 mg total) by mouth every 6 (six) hours as needed for Pain. 20 tablet 0          Physical Exam:    Vital Signs:   Vitals:    04/25/22 1028   BP: (!) 162/73   Pulse: 60   Resp: 20   Temp: 98.1 °F (36.7 °C)       General Appearance: Well appearing in no acute distress  Eyes:    No scleral icterus  ENT: lips and tongue normal  Lungs: no use of accessory muscles  Heart:  normal rate, regular rhythm  Abdomen: Soft, non distended   Extremities: no edema  Skin: No rash      Labs:  Lab Results   Component Value Date    WBC 7.14 04/05/2022    HGB 13.1 (L) 04/05/2022    HCT 40.7 04/05/2022     04/05/2022    CHOL 137 11/09/2021    TRIG 53 11/09/2021    HDL 46 11/09/2021    ALT 35 11/09/2021    AST 33 11/09/2021     04/12/2022    K 4.8 04/12/2022     04/12/2022    CREATININE 1.3 04/12/2022    BUN 14 04/12/2022    CO2 29 04/12/2022    TSH 0.509 12/28/2021    PSA 1.0 11/10/2020    INR 1.1 04/05/2022    HGBA1C 6.8 (H) 11/09/2021       I have explained the risks and benefits of endoscopy procedures to the patient including but not limited to bleeding, perforation, infection, and death.  The patient was asked if they understand and allowed to ask any further questions to their satisfaction.    Xuan Donis MD

## 2022-04-25 NOTE — ANESTHESIA PREPROCEDURE EVALUATION
04/25/2022  Walter Bull is a 71 y.o., male.  Pre-operative evaluation for Procedure(s) (LRB):  EGD (ESOPHAGOGASTRODUODENOSCOPY) (N/A)  COLONOSCOPY (N/A)    Walter Bull is a 71 y.o. male       Patient Active Problem List   Diagnosis    Coronary artery disease of native artery of native heart with stable angina pectoris    Diabetes mellitus type 2 in obese    Diabetic polyneuropathy associated with type 2 diabetes mellitus    HLD (hyperlipidemia)    S/P CABG (coronary artery bypass graft)    History of tobacco use    Gastroesophageal reflux disease    Benign essential HTN    Benign prostatic hyperplasia without lower urinary tract symptoms    Iron deficiency anemia    Severe obesity (BMI 35.0-39.9) with comorbidity    Ischemic cardiomyopathy    Ventricular tachycardia    Wide-complex tachycardia    Bradycardia    ICD (implantable cardioverter-defibrillator), dual, in situ    Thrombosed vein secondary to IV placement    Chronic combined systolic and diastolic heart failure    ANURADHA (obstructive sleep apnea)    Chronic prostatitis    Long term current use of amiodarone    Vitamin D insufficiency    Pulmonary emphysema    CKD (chronic kidney disease) stage 3, GFR 30-59 ml/min    Fatty liver    Orthostatic hypotension    Nausea    Hypertension    Aortic atherosclerosis    Hyperparathyroidism due to renal insufficiency    Simple chronic bronchitis    SVT (supraventricular tachycardia)    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, sequela    Bilateral hearing loss    Shortness of breath       Past Surgical History:   Procedure Laterality Date    24 HOUR IMPEDANCE PH MONITORING OF ESOPHAGUS IN PATIENT TAKING ACID REDUCING MEDICATIONS N/A 3/10/2022    Procedure: IMPEDANCE PH STUDY, ESOPHAGEAL, 24 HOUR, IN PATIENT TAKING ACID REDUCING MEDICATION;   Surgeon: Carmelita Jacobsen MD;  Location: [facility] ENDO (4TH FLR);  Service: Endoscopy;  Laterality: N/A;  ICD-  fully vaccinated  Yes he can hold Plavix 5 days prior to endoscopies and restart post procedure when safe from a operator perspective per Dr.A. Canada    CARDIAC DEFIBRILLATOR PLACEMENT      CARDIAC ELECTROPHYSIOLOGY STUDY N/A 11/19/2018    Procedure: CARDIAC ELECTROPHYSIOLOGY STUDY;  Surgeon: Byron Glasgow MD;  Location: [facility] EP LAB;  Service: Cardiology;  Laterality: N/A;  VT, EPS +/- ICD, SJM, anes, GP, 6086    CARDIAC ELECTROPHYSIOLOGY STUDY N/A 11/19/2018    Procedure: CARDIAC ELECTROPHYSIOLOGY STUDY;  Surgeon: Byron Glasgow MD;  Location: [facility] EP LAB;  Service: Cardiology;  Laterality: N/A;    COLON SURGERY  2006    COLONOSCOPY N/A 2/2/2017    Procedure: COLONOSCOPY;  Surgeon: Ronnie Conway MD;  Location: [facility] ENDO;  Service: Endoscopy;  Laterality: N/A;    CORONARY ARTERY BYPASS GRAFT  2005    5 arteries    CORONARY BYPASS GRAFT ANGIOGRAPHY  11/16/2018    Procedure: Bypass graft study;  Surgeon: Ryan Schmidt MD;  Location: [facility] CATH LAB;  Service: Cardiology;;    ESOPHAGEAL MANOMETRY WITH MEASUREMENT OF IMPEDANCE N/A 3/10/2022    Procedure: MANOMETRY, ESOPHAGUS, WITH IMPEDANCE MEASUREMENT;  Surgeon: Carmelita Jacobsen MD;  [facility]  Service: Endoscopy;  Laterality: N/A;  RAPID COVID test, pt to arrive for 6:00 am-Kpvt    EYE SURGERY Bilateral 2016    Laser surgery for glaucoma    INSERTION OF BIVENTRICULAR IMPLANTABLE CARDIOVERTER-DEFIBRILLATOR (ICD) Left 4/7/2022    Procedure: INSERTION, ICD, BIVENTRICULAR;  Surgeon: Byron Glasgow MD;  Location: [facility] EP LAB;  Service: Cardiology;  Laterality: Left;  HF/ICM, Venogram prior to opening, CRT-D upgrade, SJM, MAC, GP, 3 PREP    INSERTION OF IMPLANTABLE CARDIOVERTER-DEFIBRILLATOR (ICD) GENERATOR WITH TWO EXISTING LEADS Left 11/19/2018    Procedure: INSERTION, DUAL ICD;  Surgeon: Byron Glasgow MD;  Location: [facility]  EP LAB;  Service: Cardiology;  Laterality: Left;  VT, EPS +/- ICD, SJM, anes, GP, 6086    LEFT HEART CATHETERIZATION Left 2018    Procedure: Left heart cath;  Surgeon: Ryan Schmidt MD;  Location: [facility] CATH LAB;  Service: Cardiology;  Laterality: Left;       Social History     Socioeconomic History    Marital status:    Tobacco Use    Smoking status: Former Smoker     Packs/day: 3.00     Types: Cigarettes     Start date: 1963     Quit date: 1981     Years since quittin.3    Smokeless tobacco: Former User     Types: Snuff, Chew     Quit date:    Substance and Sexual Activity    Alcohol use: No    Drug use: No    Sexual activity: Yes     Comment: -with a significant other     Social Determinants of Health     Financial Resource Strain: Medium Risk    Difficulty of Paying Living Expenses: Somewhat hard   Food Insecurity: No Food Insecurity    Worried About Running Out of Food in the Last Year: Never true    Ran Out of Food in the Last Year: Never true   Transportation Needs: No Transportation Needs    Lack of Transportation (Medical): No    Lack of Transportation (Non-Medical): No   Physical Activity: Sufficiently Active    Days of Exercise per Week: 3 days    Minutes of Exercise per Session: 120 min   Stress: No Stress Concern Present    Feeling of Stress : Not at all   Social Connections: Unknown    Frequency of Communication with Friends and Family: More than three times a week    Frequency of Social Gatherings with Friends and Family: Never    Active Member of Clubs or Organizations: No    Attends Club or Organization Meetings: Never    Marital Status:    Housing Stability: Low Risk     Unable to Pay for Housing in the Last Year: No    Number of Places Lived in the Last Year: 1    Unstable Housing in the Last Year: No       No current facility-administered medications on file prior to encounter.     Current Outpatient Medications on File  Prior to Encounter   Medication Sig Dispense Refill    ammonium lactate 12 % Crea Apply twice daily to affected parts both feet as needed. 140 g 11    aspirin (ECOTRIN) 81 MG EC tablet Take 1 tablet (81 mg total) by mouth once daily. 90 tablet 3    clopidogreL (PLAVIX) 75 mg tablet Take 1 tablet (75 mg total) by mouth once daily. 90 tablet 3    esomeprazole (NEXIUM) 40 MG capsule Take 1 capsule (40 mg total) by mouth 2 (two) times daily. 180 capsule 3    ezetimibe (ZETIA) 10 mg tablet TAKE 1 TABLET BY MOUTH ONCE DAILY 90 tablet 3    ferrous sulfate (FEOSOL) 325 mg (65 mg iron) Tab tablet TAKE 1 TABLET BY MOUTH THREE TIMES DAILY (Patient taking differently: Take 325 mg by mouth 2 (two) times daily. TAKE 1 TABLET BY MOUTH THREE TIMES DAILY) 90 tablet 3    GVOKE HYPOPEN 2-PACK 1 mg/0.2 mL AtIn INJECT SUBCUTANEOUSLY  CONTENTS OF 1 AUTO-INJECTOR AS NEEDED FOR HYPOGLCEMIA  AS DIRECTED 0.4 mL 2    insulin lispro (HUMALOG U-100 INSULIN) 100 unit/mL injection USE AS DIRECTED VIA Birdi 20 20 mL 11    ketoconazole (NIZORAL) 2 % cream Apply topically once daily. 60 g 2    LIDOcaine HCL 2% (XYLOCAINE) 2 % jelly Apply topically as needed. Apply topically once nightly to affected part of foot/feet, for pain. 30 mL 2    losartan (COZAAR) 25 MG tablet Take 0.5 tablets (12.5 mg total) by mouth once daily. 45 tablet 3    nitroGLYCERIN (NITROSTAT) 0.4 MG SL tablet DISSOLVE 1 TABLET UNDER THE TONGUE EVERY 5 MINUTES AS  NEEDED FOR CHEST PAIN. MAX  OF 3 TABLETS IN 15 MINUTES. CALL 911 IF PAIN PERSISTS. 25 tablet 11    ondansetron (ZOFRAN) 4 MG tablet Take 1 tablet (4 mg total) by mouth every 8 (eight) hours as needed for Nausea. 15 tablet 0    papaverine 30 mg/mL injection Add: Phentolamine 1 mg  Add: PGE1 10 mcg    Sig:  Inject 20 units as directed; titrate up by 5 units until desired results 10 mL 12    ranolazine (RANEXA) 1,000 mg Tb12 Take 1 tablet (1,000 mg total) by mouth 2 (two) times daily. 60 tablet 11     rosuvastatin (CRESTOR) 40 MG Tab Take 1 tablet (40 mg total) by mouth once daily. 90 tablet 3    tamsulosin (FLOMAX) 0.4 mg Cap Take 1 capsule (0.4 mg total) by mouth once daily. 90 capsule 3       Review of patient's allergies indicates:  No Known Allergies      CBC: No results for input(s): WBC, RBC, HGB, HCT, PLT, MCV, MCH, MCHC in the last 72 hours.    CMP: No results for input(s): NA, K, CL, CO2, BUN, CREATININE, GLU, MG, PHOS, CALCIUM, ALBUMIN, PROT, ALKPHOS, ALT, AST, BILITOT in the last 72 hours.    INR  No results for input(s): PT, INR, PROTIME, APTT in the last 72 hours.      Diagnostic Studies:    EKG:   Results for orders placed or performed during the hospital encounter of 04/07/22   EKG 12-lead    Collection Time: 04/07/22  5:19 PM    Narrative    Test Reason : I49.9,    Vent. Rate : 060 BPM     Atrial Rate : 060 BPM     P-R Int : 318 ms          QRS Dur : 130 ms      QT Int : 504 ms       P-R-T Axes : 063 -20 071 degrees     QTc Int : 504 ms    AV dual-paced rhythm with prolonged AV conduction with occasional   ventricular-paced complexes  Abnormal ECG  When compared with ECG of 07-APR-2022 11:16,  Electronic ventricular pacemaker has replaced Sinus rhythm  Confirmed by Prince BROOKS MD ([id number]) on 4/8/2022 11:28:08 AM    Referred By: NICOL NEVAREZ           Confirmed By:Prince BROOKS MD       TTE:  Results for orders placed or performed during the hospital encounter of 06/30/21   Echo Color Flow Doppler? Yes   Result Value Ref Range    Ascending aorta 3.08 cm    STJ 2.38 cm    AV mean gradient 5 mmHg    Ao peak chino 1.43 m/s    Ao VTI 38.54 cm    IVS 1.11 (A) 0.6 - 1.1 cm    LA size 4.73 cm    Left Atrium Major Axis 5.28 cm    Left Atrium Minor Axis 5.10 cm    LVIDd 5.23 3.5 - 6.0 cm    LVIDs 4.37 (A) 2.1 - 4.0 cm    LVOT diameter 2.11 cm    LVOT peak VTI 19.25 cm    Posterior Wall 0.99 0.6 - 1.1 cm    MV Peak A [place] 1.20 m/s    E wave deceleration time 152.55 msec    MV Peak E [place] 1.08 m/s    RA Major [place]  4.52 cm    RA Width 3.05 cm    RVDD 3.69 cm    Sinus 2.86 cm    TAPSE 2.02 cm    TR Max [place] 2.73 m/s    TDI LATERAL 0.05 m/s    TDI SEPTAL 0.03 m/s    LA WIDTH 4.00 cm    MV stenosis pressure 1/2 time 44.24 ms    LV Diastolic Volume 131.05 mL    LV Systolic Volume 86.21 mL    RV S' 6.68 cm/s    LVOT peak chino 0.71 m/s    LA volume (mod) 53.69 cm3    LV LATERAL E/E' RATIO 21.60 m/s    LV SEPTAL E/E' RATIO 36.00 m/s    FS 16 %    LA volume 83.44 cm3    LV mass 209.25 g    Left Ventricle Relative Wall Thickness 0.38 cm    AV valve area 1.75 cm2    AV Velocity Ratio 0.50     AV index (prosthetic) 0.50     MV valve area p 1/2 method 4.97 cm2    E/A ratio 0.90     Mean e' 0.04 m/s    LVOT area 3.5 cm2    LVOT stroke volume 67.28 cm3    AV peak gradient 8 mmHg    E/E' ratio 27.00 m/s    Triscuspid Valve Regurgitation Peak Gradient 30 mmHg    Right Atrial Pressure (from IVC) 3 mmHg    QEF 42 %    EF 40 %    TV rest pulmonary artery pressure 33 mmHg    BSA 2.32 m2    LV Systolic Volume Index 38.5 mL/m2    LV Diastolic Volume Index 58.50 mL/m2    LA Volume Index 37.3 mL/m2    LV Mass Index 93 g/m2    LA Volume Index (Mod) 24.0 mL/m2    Narrative    · The left ventricle is normal in size with eccentric hypertrophy and   mildly decreased systolic function. The estimated ejection fraction is   40%.  · The quantitatively derived ejection fraction is 42%.  · There is left ventricular global hypokinesis.  · There is abnormal septal wall motion.  · Grade I left ventricular diastolic dysfunction.  · Normal right ventricular size with normal right ventricular systolic   function.  · Mild biatrial enlargement.  · Mild mitral regurgitation.  · Mild tricuspid regurgitation.  · The estimated PA systolic pressure is 33 mmHg.  · Normal central venous pressure (3 mmHg).        EF   Date Value Ref Range Status   04/04/2022 25 % Final   06/30/2021 40 % Final      No results found for this or any previous visit.    BERTIN:  No results found for  this or any previous visit.    Stress Test:  Results for orders placed during the hospital encounter of 10/06/19    Treadmill Stress Test    Interpretation Summary    The EKG portion of this study is negative for ischemia.    The patient reported no chest pain during the stress test.    There were no arrhythmias during stress.    Nuclear portion of this study will be reported separately.       [facility]:  Results for orders placed during the hospital encounter of 11/16/18    Interventional Radiology    Conclusion  · Sedation performed by anesthesia staff.  · Sedation performed by anesthesia staff. No specimen was collected.  · Other procedures performed: His bundle recording, invasive EP Study.  · Successful implantation of ICD Dual.    1) History of ischemic cardiomyopathy and wide complex tachycardia.  2) Patient easily inducible for VT (LBLS  ms) with ventricular double extrastimuli.  3) Status-post successful dual chamber ICD implantation ([employer]).    Date: 11/19/2018  Time: 2:18 PM       PFT:  No results found for: FEV1, FVC, JVB9THK, TLC, DLCO     ALLERGIES:   Review of patient's allergies indicates:  No Known Allergies  LDA:      Lines/Drains/Airways     Peripheral Intravenous Line  Duration                Peripheral IV - Single Lumen 10/07/19 0428 20 G Left;Posterior Forearm 931 days               Anesthesia Evaluation      Airway   Mallampati: II  TM distance: > 6 cm  Neck ROM: Normal ROM  Dental    (+) Intact    Pulmonary    (+) COPD, sleep apnea,   Cardiovascular   (+) hypertension, CAD, CABG/stent, CHF,     Rate: Normal    Neuro/Psych      GI/Hepatic/Renal    (+) GERD, liver disease, chronic renal disease CRI,     Endo/Other    (+) diabetes mellitus, hypothyroidism,   Abdominal                         Pre-op Assessment    I have reviewed the Patient Summary Reports.     I have reviewed the Nursing Notes. I have reviewed the NPO Status.   I have reviewed the Medications.     Review of  Systems  Anesthesia Hx:  No problems with previous Anesthesia  Denies Family Hx of Anesthesia complications.   Denies Personal Hx of Anesthesia complications.   Cardiovascular:   Hypertension CAD  CABG/stent  CHF LVEF 25%; moderately decreased RV fxn; AICD   Pulmonary:   COPD Sleep Apnea    Renal/:   Chronic Renal Disease, CRI    Hepatic/GI:   GERD Liver Disease,    Neurological:  Neurology Normal    Endocrine:   Diabetes Hypothyroidism        Physical Exam  General: Well nourished    Airway:  Mallampati: II   Mouth Opening: Normal  TM Distance: > 6 cm  Tongue: Normal  Neck ROM: Normal ROM    Dental:  Intact    Chest/Lungs:  Normal Respiratory Rate    Heart:  Rate: Normal        Anesthesia Plan  Type of Anesthesia, risks & benefits discussed:    Anesthesia Type: Gen Natural Airway, MAC  Intra-op Monitoring Plan: Standard ASA Monitors  Post Op Pain Control Plan: multimodal analgesia and IV/PO Opioids PRN  Induction:  IV  Airway Plan: Direct, Post-Induction  Informed Consent: Informed consent signed with the Patient and all parties understand the risks and agree with anesthesia plan.  All questions answered. Patient consented to blood products? Yes  ASA Score: 4  Day of Surgery Review of History & Physical: H&P Update referred to the surgeon/provider.    Ready For Surgery From Anesthesia Perspective.     .

## 2022-05-02 ENCOUNTER — TELEPHONE (OUTPATIENT)
Dept: ELECTROPHYSIOLOGY | Facility: CLINIC | Age: 72
End: 2022-05-02
Payer: MEDICARE

## 2022-05-02 ENCOUNTER — PATIENT MESSAGE (OUTPATIENT)
Dept: ELECTROPHYSIOLOGY | Facility: CLINIC | Age: 72
End: 2022-05-02
Payer: MEDICARE

## 2022-05-02 ENCOUNTER — TELEPHONE (OUTPATIENT)
Dept: CARDIOLOGY | Facility: HOSPITAL | Age: 72
End: 2022-05-02
Payer: MEDICARE

## 2022-05-02 NOTE — TELEPHONE ENCOUNTER
Attempted to call pt, no answer. M with Number for pt to call back.     Pt returned call. Pt stated he was having pain shooting down below his breast bone and he fell down on his arm on the device side.  Pt states the pain has subsided. Will send message to device team.    Monalisa with the device team called pt. He will send a transmission once he gets home from work around 1PM.

## 2022-05-02 NOTE — TELEPHONE ENCOUNTER
Contacted pt to advise that his transmission was within normal limits with no arrhythmia's. Advised that if he has chest pain he needs to take his nitroglycerin. Pt stated I wasn't hurting enough to take it. Advised if he is having chest pain he needs to call his general cardiologist and take his nitroglycerin. Pt voiced understanding.        ----- Message from Monalisa Ray sent at 5/2/2022  4:11 PM CDT -----  I have not spoken to him, was unsure if any other advice would be offered.    Thanks,  Monalisa    ----- Message -----  From: Nadege Scruggs RN  Sent: 5/2/2022   3:42 PM CDT  To: Monalisa Ray    Did you talk to him about that?? If not I will call.    ----- Message -----  From: Monalisa Ray  Sent: 5/2/2022   3:38 PM CDT  To: Nadege Scruggs RN    Just received his remote check. Device fxn is WNL w/ no arrhythmias noted.  Lead #'s are stable.  Nothing to suggest a device or rhythm issue.    Thanks,  Monalisa    ----- Message -----  From: Nadege Scruggs RN  Sent: 5/2/2022  11:13 AM CDT  To: Monalisa Ray    Pt stated he was having pains below his device toward his breast bone. Pt stated it hurt so bad he took a fall and fell on his arm which is the same side as his device. Pt reports pain is now gone.  Could we please check a transmission.

## 2022-05-02 NOTE — TELEPHONE ENCOUNTER
S/w patient to request he send a manual Merlin transmission to assess unspecified pain (as reported during RN phone call earlier today).  Patient is currently at work and will return home at approx 1pm to send the information.  Will await transmission.

## 2022-05-03 LAB
FINAL PATHOLOGIC DIAGNOSIS: NORMAL
Lab: NORMAL

## 2022-05-05 ENCOUNTER — PATIENT MESSAGE (OUTPATIENT)
Dept: SLEEP MEDICINE | Facility: CLINIC | Age: 72
End: 2022-05-05
Payer: MEDICARE

## 2022-05-07 ENCOUNTER — PATIENT MESSAGE (OUTPATIENT)
Dept: INTERNAL MEDICINE | Facility: CLINIC | Age: 72
End: 2022-05-07
Payer: MEDICARE

## 2022-05-10 ENCOUNTER — LAB VISIT (OUTPATIENT)
Dept: LAB | Facility: HOSPITAL | Age: 72
End: 2022-05-10
Attending: INTERNAL MEDICINE
Payer: MEDICARE

## 2022-05-10 DIAGNOSIS — I50.42 CHRONIC COMBINED SYSTOLIC AND DIASTOLIC HEART FAILURE: Chronic | ICD-10-CM

## 2022-05-10 DIAGNOSIS — E78.2 MIXED HYPERLIPIDEMIA: Chronic | ICD-10-CM

## 2022-05-10 DIAGNOSIS — K76.0 FATTY LIVER: ICD-10-CM

## 2022-05-10 DIAGNOSIS — E66.01 SEVERE OBESITY (BMI 35.0-39.9) WITH COMORBIDITY: ICD-10-CM

## 2022-05-10 DIAGNOSIS — I25.118 CORONARY ARTERY DISEASE OF NATIVE ARTERY OF NATIVE HEART WITH STABLE ANGINA PECTORIS: Chronic | ICD-10-CM

## 2022-05-10 DIAGNOSIS — E66.9 DIABETES MELLITUS TYPE 2 IN OBESE: Chronic | ICD-10-CM

## 2022-05-10 DIAGNOSIS — E11.69 DIABETES MELLITUS TYPE 2 IN OBESE: Chronic | ICD-10-CM

## 2022-05-10 DIAGNOSIS — I10 BENIGN ESSENTIAL HTN: Chronic | ICD-10-CM

## 2022-05-10 DIAGNOSIS — N18.32 STAGE 3B CHRONIC KIDNEY DISEASE: ICD-10-CM

## 2022-05-10 LAB
ALBUMIN SERPL BCP-MCNC: 3.9 G/DL (ref 3.5–5.2)
ALP SERPL-CCNC: 75 U/L (ref 55–135)
ALT SERPL W/O P-5'-P-CCNC: 27 U/L (ref 10–44)
ANION GAP SERPL CALC-SCNC: 14 MMOL/L (ref 8–16)
AST SERPL-CCNC: 25 U/L (ref 10–40)
BASOPHILS # BLD AUTO: 0.06 K/UL (ref 0–0.2)
BASOPHILS NFR BLD: 0.8 % (ref 0–1.9)
BILIRUB SERPL-MCNC: 0.9 MG/DL (ref 0.1–1)
BUN SERPL-MCNC: 23 MG/DL (ref 8–23)
CALCIUM SERPL-MCNC: 9.3 MG/DL (ref 8.7–10.5)
CHLORIDE SERPL-SCNC: 98 MMOL/L (ref 95–110)
CHOLEST SERPL-MCNC: 141 MG/DL (ref 120–199)
CHOLEST/HDLC SERPL: 3.1 {RATIO} (ref 2–5)
CO2 SERPL-SCNC: 25 MMOL/L (ref 23–29)
CREAT SERPL-MCNC: 2.1 MG/DL (ref 0.5–1.4)
DIFFERENTIAL METHOD: ABNORMAL
EOSINOPHIL # BLD AUTO: 0.4 K/UL (ref 0–0.5)
EOSINOPHIL NFR BLD: 4.5 % (ref 0–8)
ERYTHROCYTE [DISTWIDTH] IN BLOOD BY AUTOMATED COUNT: 13.6 % (ref 11.5–14.5)
EST. GFR  (AFRICAN AMERICAN): 35 ML/MIN/1.73 M^2
EST. GFR  (NON AFRICAN AMERICAN): 30 ML/MIN/1.73 M^2
ESTIMATED AVG GLUCOSE: 183 MG/DL (ref 68–131)
GLUCOSE SERPL-MCNC: 167 MG/DL (ref 70–110)
HBA1C MFR BLD: 8 % (ref 4–5.6)
HCT VFR BLD AUTO: 40.9 % (ref 40–54)
HDLC SERPL-MCNC: 46 MG/DL (ref 40–75)
HDLC SERPL: 32.6 % (ref 20–50)
HGB BLD-MCNC: 13.4 G/DL (ref 14–18)
IMM GRANULOCYTES # BLD AUTO: 0.01 K/UL (ref 0–0.04)
IMM GRANULOCYTES NFR BLD AUTO: 0.1 % (ref 0–0.5)
LDLC SERPL CALC-MCNC: 81.8 MG/DL (ref 63–159)
LYMPHOCYTES # BLD AUTO: 2.7 K/UL (ref 1–4.8)
LYMPHOCYTES NFR BLD: 34.2 % (ref 18–48)
MCH RBC QN AUTO: 30 PG (ref 27–31)
MCHC RBC AUTO-ENTMCNC: 32.8 G/DL (ref 32–36)
MCV RBC AUTO: 92 FL (ref 82–98)
MONOCYTES # BLD AUTO: 0.7 K/UL (ref 0.3–1)
MONOCYTES NFR BLD: 9 % (ref 4–15)
NEUTROPHILS # BLD AUTO: 4 K/UL (ref 1.8–7.7)
NEUTROPHILS NFR BLD: 51.4 % (ref 38–73)
NONHDLC SERPL-MCNC: 95 MG/DL
NRBC BLD-RTO: 0 /100 WBC
PLATELET # BLD AUTO: 181 K/UL (ref 150–450)
PMV BLD AUTO: 9.9 FL (ref 9.2–12.9)
POTASSIUM SERPL-SCNC: 4.6 MMOL/L (ref 3.5–5.1)
PROT SERPL-MCNC: 7.4 G/DL (ref 6–8.4)
RBC # BLD AUTO: 4.47 M/UL (ref 4.6–6.2)
SODIUM SERPL-SCNC: 137 MMOL/L (ref 136–145)
TRIGL SERPL-MCNC: 66 MG/DL (ref 30–150)
TSH SERPL DL<=0.005 MIU/L-ACNC: 2.04 UIU/ML (ref 0.4–4)
WBC # BLD AUTO: 7.74 K/UL (ref 3.9–12.7)

## 2022-05-10 PROCEDURE — 85025 COMPLETE CBC W/AUTO DIFF WBC: CPT | Performed by: INTERNAL MEDICINE

## 2022-05-10 PROCEDURE — 36415 COLL VENOUS BLD VENIPUNCTURE: CPT | Performed by: INTERNAL MEDICINE

## 2022-05-10 PROCEDURE — 84443 ASSAY THYROID STIM HORMONE: CPT | Performed by: INTERNAL MEDICINE

## 2022-05-10 PROCEDURE — 80053 COMPREHEN METABOLIC PANEL: CPT | Performed by: INTERNAL MEDICINE

## 2022-05-10 PROCEDURE — 83036 HEMOGLOBIN GLYCOSYLATED A1C: CPT | Performed by: INTERNAL MEDICINE

## 2022-05-10 PROCEDURE — 80061 LIPID PANEL: CPT | Performed by: INTERNAL MEDICINE

## 2022-05-13 ENCOUNTER — OFFICE VISIT (OUTPATIENT)
Dept: INTERNAL MEDICINE | Facility: CLINIC | Age: 72
End: 2022-05-13
Payer: MEDICARE

## 2022-05-13 VITALS
BODY MASS INDEX: 34.74 KG/M2 | DIASTOLIC BLOOD PRESSURE: 64 MMHG | OXYGEN SATURATION: 99 % | RESPIRATION RATE: 16 BRPM | HEIGHT: 69 IN | HEART RATE: 59 BPM | SYSTOLIC BLOOD PRESSURE: 108 MMHG | WEIGHT: 234.56 LBS

## 2022-05-13 DIAGNOSIS — G47.33 OSA (OBSTRUCTIVE SLEEP APNEA): ICD-10-CM

## 2022-05-13 DIAGNOSIS — Z95.1 S/P CABG (CORONARY ARTERY BYPASS GRAFT): ICD-10-CM

## 2022-05-13 DIAGNOSIS — E11.42 DIABETIC POLYNEUROPATHY ASSOCIATED WITH TYPE 2 DIABETES MELLITUS: ICD-10-CM

## 2022-05-13 DIAGNOSIS — E66.9 DIABETES MELLITUS TYPE 2 IN OBESE: Chronic | ICD-10-CM

## 2022-05-13 DIAGNOSIS — Z95.810 ICD (IMPLANTABLE CARDIOVERTER-DEFIBRILLATOR), DUAL, IN SITU: Chronic | ICD-10-CM

## 2022-05-13 DIAGNOSIS — I10 PRIMARY HYPERTENSION: Primary | ICD-10-CM

## 2022-05-13 DIAGNOSIS — I50.42 CHRONIC COMBINED SYSTOLIC AND DIASTOLIC HEART FAILURE: Chronic | ICD-10-CM

## 2022-05-13 DIAGNOSIS — E66.01 SEVERE OBESITY (BMI 35.0-39.9) WITH COMORBIDITY: ICD-10-CM

## 2022-05-13 DIAGNOSIS — E11.69 DIABETES MELLITUS TYPE 2 IN OBESE: Chronic | ICD-10-CM

## 2022-05-13 DIAGNOSIS — I25.118 CORONARY ARTERY DISEASE OF NATIVE ARTERY OF NATIVE HEART WITH STABLE ANGINA PECTORIS: Chronic | ICD-10-CM

## 2022-05-13 DIAGNOSIS — I70.0 AORTIC ATHEROSCLEROSIS: ICD-10-CM

## 2022-05-13 PROCEDURE — 1159F PR MEDICATION LIST DOCUMENTED IN MEDICAL RECORD: ICD-10-PCS | Mod: CPTII,S$GLB,, | Performed by: INTERNAL MEDICINE

## 2022-05-13 PROCEDURE — 3066F NEPHROPATHY DOC TX: CPT | Mod: CPTII,S$GLB,, | Performed by: INTERNAL MEDICINE

## 2022-05-13 PROCEDURE — 99499 UNLISTED E&M SERVICE: CPT | Mod: S$GLB,,, | Performed by: INTERNAL MEDICINE

## 2022-05-13 PROCEDURE — 99214 OFFICE O/P EST MOD 30 MIN: CPT | Mod: S$GLB,,, | Performed by: INTERNAL MEDICINE

## 2022-05-13 PROCEDURE — 99214 PR OFFICE/OUTPT VISIT, EST, LEVL IV, 30-39 MIN: ICD-10-PCS | Mod: S$GLB,,, | Performed by: INTERNAL MEDICINE

## 2022-05-13 PROCEDURE — 1101F PR PT FALLS ASSESS DOC 0-1 FALLS W/OUT INJ PAST YR: ICD-10-PCS | Mod: CPTII,S$GLB,, | Performed by: INTERNAL MEDICINE

## 2022-05-13 PROCEDURE — 1101F PT FALLS ASSESS-DOCD LE1/YR: CPT | Mod: CPTII,S$GLB,, | Performed by: INTERNAL MEDICINE

## 2022-05-13 PROCEDURE — 3008F BODY MASS INDEX DOCD: CPT | Mod: CPTII,S$GLB,, | Performed by: INTERNAL MEDICINE

## 2022-05-13 PROCEDURE — 4010F PR ACE/ARB THEARPY RXD/TAKEN: ICD-10-PCS | Mod: CPTII,S$GLB,, | Performed by: INTERNAL MEDICINE

## 2022-05-13 PROCEDURE — 1159F MED LIST DOCD IN RCRD: CPT | Mod: CPTII,S$GLB,, | Performed by: INTERNAL MEDICINE

## 2022-05-13 PROCEDURE — 99999 PR PBB SHADOW E&M-EST. PATIENT-LVL V: ICD-10-PCS | Mod: PBBFAC,,, | Performed by: INTERNAL MEDICINE

## 2022-05-13 PROCEDURE — 99999 PR PBB SHADOW E&M-EST. PATIENT-LVL V: CPT | Mod: PBBFAC,,, | Performed by: INTERNAL MEDICINE

## 2022-05-13 PROCEDURE — 3052F HG A1C>EQUAL 8.0%<EQUAL 9.0%: CPT | Mod: CPTII,S$GLB,, | Performed by: INTERNAL MEDICINE

## 2022-05-13 PROCEDURE — 3078F DIAST BP <80 MM HG: CPT | Mod: CPTII,S$GLB,, | Performed by: INTERNAL MEDICINE

## 2022-05-13 PROCEDURE — 3074F PR MOST RECENT SYSTOLIC BLOOD PRESSURE < 130 MM HG: ICD-10-PCS | Mod: CPTII,S$GLB,, | Performed by: INTERNAL MEDICINE

## 2022-05-13 PROCEDURE — 1160F RVW MEDS BY RX/DR IN RCRD: CPT | Mod: CPTII,S$GLB,, | Performed by: INTERNAL MEDICINE

## 2022-05-13 PROCEDURE — 3074F SYST BP LT 130 MM HG: CPT | Mod: CPTII,S$GLB,, | Performed by: INTERNAL MEDICINE

## 2022-05-13 PROCEDURE — 3008F PR BODY MASS INDEX (BMI) DOCUMENTED: ICD-10-PCS | Mod: CPTII,S$GLB,, | Performed by: INTERNAL MEDICINE

## 2022-05-13 PROCEDURE — 3052F PR MOST RECENT HEMOGLOBIN A1C LEVEL 8.0 - < 9.0%: ICD-10-PCS | Mod: CPTII,S$GLB,, | Performed by: INTERNAL MEDICINE

## 2022-05-13 PROCEDURE — 3066F PR DOCUMENTATION OF TREATMENT FOR NEPHROPATHY: ICD-10-PCS | Mod: CPTII,S$GLB,, | Performed by: INTERNAL MEDICINE

## 2022-05-13 PROCEDURE — 1160F PR REVIEW ALL MEDS BY PRESCRIBER/CLIN PHARMACIST DOCUMENTED: ICD-10-PCS | Mod: CPTII,S$GLB,, | Performed by: INTERNAL MEDICINE

## 2022-05-13 PROCEDURE — 1126F AMNT PAIN NOTED NONE PRSNT: CPT | Mod: CPTII,S$GLB,, | Performed by: INTERNAL MEDICINE

## 2022-05-13 PROCEDURE — 3078F PR MOST RECENT DIASTOLIC BLOOD PRESSURE < 80 MM HG: ICD-10-PCS | Mod: CPTII,S$GLB,, | Performed by: INTERNAL MEDICINE

## 2022-05-13 PROCEDURE — 3288F FALL RISK ASSESSMENT DOCD: CPT | Mod: CPTII,S$GLB,, | Performed by: INTERNAL MEDICINE

## 2022-05-13 PROCEDURE — 1126F PR PAIN SEVERITY QUANTIFIED, NO PAIN PRESENT: ICD-10-PCS | Mod: CPTII,S$GLB,, | Performed by: INTERNAL MEDICINE

## 2022-05-13 PROCEDURE — 99499 RISK ADDL DX/OHS AUDIT: ICD-10-PCS | Mod: S$GLB,,, | Performed by: INTERNAL MEDICINE

## 2022-05-13 PROCEDURE — 4010F ACE/ARB THERAPY RXD/TAKEN: CPT | Mod: CPTII,S$GLB,, | Performed by: INTERNAL MEDICINE

## 2022-05-13 PROCEDURE — 3288F PR FALLS RISK ASSESSMENT DOCUMENTED: ICD-10-PCS | Mod: CPTII,S$GLB,, | Performed by: INTERNAL MEDICINE

## 2022-05-13 RX ORDER — BLOOD-GLUCOSE TRANSMITTER
1 EACH MISCELLANEOUS CONTINUOUS
Qty: 1 EACH | Status: SHIPPED | OUTPATIENT
Start: 2022-05-13 | End: 2023-11-17

## 2022-05-13 RX ORDER — BLOOD-GLUCOSE SENSOR
3 EACH MISCELLANEOUS CONTINUOUS
Qty: 3 EACH | Status: SHIPPED | OUTPATIENT
Start: 2022-05-13 | End: 2023-11-17

## 2022-05-13 NOTE — PROGRESS NOTES
Subjective:       Patient ID: Walter Bull is a 71 y.o. male.    Chief Complaint: Follow-up      HPI:  Patient is known to me and presents for follow up CAD, systolic CHF, DM type 2, HLD. Labs from 5/10/22 personally reviewed and discussed with the patient today.      CAD s/p 5v CABG and NSTEMI 9/12/18: following with cardiology. Now on Ranexa (indur was stopped), brilinta, ASA, Crestor/Zetia, losartan and Coreg. Also reports h/o CHF (last known EF 25%+ diastolic dysfunction), on lasix 20mg once a day (aldactone stopped due to hypotension). Denies SOB, [place] and orthopnea. S/p ICD placement.       Dm type 2: on trulicity, lispro vai VGP per endocrinology. Has DEXCOM  A1C 8% from 6.8%.  He does report numbness and tingling of left foot > right foot and b/l fingers; sx have been present for several years. Not on medicine for neuropathy as he declines treatment. Denies polyuria, polydipsia  Foot exam: 3/2020  Eye exam: 2/2019  Microalb: 11/2020  On ARB and statin  LDL < 70   he would like to see endo here for management now and requesting appt.     HLD: on crestor and zetia, has been taking for several years. Denies side effects. LDL at goal.     HTN: on coreg, losartan. BP is well controlled. Denies headaches, vision changes.     GERD: On Nexium daily. H/o H. Pylori on EGD (2018) with gastric erosions. Working better but has a bad sour taste in his mouth. Has had intermittent nausea and vomiting. No constipation.      BPH: on flomax and finasteride and working well.  No medication side effects. S/p prostate bx 5/13/19 with DR. Chaudhry, no malignany. Last PSA 11/2020 normal     Tobacco use: quit 1981; previously smoked 3 PPD for 20 years  EtOH: stopped drink 1982; was a daily drink 2 fifth liquor daily  Illicit drug: denies    Past Medical History:   Diagnosis Date    Anemia     Angina pectoris     Anticoagulant long-term use     Arthritis     Back pain     CHF (congestive heart failure)     Chronic bronchitis      Colon cancer screening 2017    Coronary artery disease     Diabetes mellitus type I     Diabetes with neurologic complications     Disorder of kidney and ureter     Encounter for blood transfusion     Glaucoma     Heart attack     2018    Heart disease     Hyperlipidemia     Hypertension     Hypothyroidism     Iron deficiency     Kidney failure     post CABG    Obesity     Peripheral vascular disease     Polyneuropathy     Pulmonary emphysema 2020    Renal manifestation of secondary diabetes mellitus     S/P CABG x 5 2017    Sleep apnea     had CPAP but had recall- not currently using     Trouble in sleeping     Type 2 diabetes mellitus with ophthalmic manifestations        Family History   Problem Relation Age of Onset    Diabetes Mother     Heart disease Mother     Hypertension Sister     Diabetes Sister     Heart disease Sister     Heart attack Brother     Colon cancer Neg Hx     Esophageal cancer Neg Hx     Stomach cancer Neg Hx        Social History     Socioeconomic History    Marital status:    Tobacco Use    Smoking status: Former Smoker     Packs/day: 3.00     Types: Cigarettes     Start date: 1963     Quit date: 1981     Years since quittin.3    Smokeless tobacco: Former User     Types: Snuff, Chew     Quit date:    Substance and Sexual Activity    Alcohol use: No    Drug use: No    Sexual activity: Yes     Comment: -with a significant other     Social Determinants of Health     Financial Resource Strain: Medium Risk    Difficulty of Paying Living Expenses: Somewhat hard   Food Insecurity: No Food Insecurity    Worried About Running Out of Food in the Last Year: Never true    Ran Out of Food in the Last Year: Never true   Transportation Needs: No Transportation Needs    Lack of Transportation (Medical): No    Lack of Transportation (Non-Medical): No   Physical Activity: Sufficiently Active    Days of Exercise  per Week: 3 days    Minutes of Exercise per Session: 120 min   Stress: No Stress Concern Present    Feeling of Stress : Not at all   Social Connections: Unknown    Frequency of Communication with Friends and Family: More than three times a week    Frequency of Social Gatherings with Friends and Family: Never    Active Member of Clubs or Organizations: No    Attends Club or Organization Meetings: Never    Marital Status:    Housing Stability: Low Risk     Unable to Pay for Housing in the Last Year: No    Number of Places Lived in the Last Year: 1    Unstable Housing in the Last Year: No       Review of Systems   Constitutional: Negative for activity change, fatigue, fever and unexpected weight change.   HENT: Negative for congestion, ear pain, hearing loss, rhinorrhea, sore throat and tinnitus.    Eyes: Negative for pain, redness and visual disturbance.   Respiratory: Negative for cough, shortness of breath and wheezing.    Cardiovascular: Negative for chest pain, palpitations and leg swelling.   Gastrointestinal: Negative for abdominal pain, blood in stool, constipation, diarrhea, nausea and vomiting.   Genitourinary: Negative for decreased urine volume, dysuria, frequency and urgency.   Musculoskeletal: Negative for back pain, joint swelling and neck pain.   Skin: Negative for color change, rash and wound.   Neurological: Negative for dizziness, tremors, weakness, light-headedness and headaches.         Objective:      Physical Exam  Vitals reviewed.   Constitutional:       General: He is not in acute distress.     Appearance: He is well-developed.   HENT:      Head: Normocephalic and atraumatic.      Right Ear: External ear normal.      Left Ear: External ear normal.   Eyes:      General:         Right eye: No discharge.         Left eye: No discharge.      Extraocular Movements: Extraocular movements intact.      Conjunctiva/sclera: Conjunctivae normal.      Pupils: Pupils are equal, round, and  reactive to light.   Neck:      Thyroid: No thyromegaly.   Cardiovascular:      Rate and Rhythm: Normal rate and regular rhythm.      Heart sounds: No murmur heard.    No friction rub. No gallop.   Pulmonary:      Effort: Pulmonary effort is normal. No respiratory distress.      Breath sounds: Normal breath sounds. No wheezing or rales.   Abdominal:      General: Bowel sounds are normal. There is no distension.      Palpations: Abdomen is soft.      Tenderness: There is no abdominal tenderness.   Skin:     General: Skin is warm and dry.   Neurological:      Mental Status: He is alert and oriented to person, place, and time.      Cranial Nerves: No cranial nerve deficit.   Psychiatric:         Behavior: Behavior normal.         Thought Content: Thought content normal.         Assessment:       1. Primary hypertension    2. Aortic atherosclerosis    3. Diabetes mellitus type 2 in obese    4. Diabetic polyneuropathy associated with type 2 diabetes mellitus    5. Chronic combined systolic and diastolic heart failure    6. ICD (implantable cardioverter-defibrillator), dual, in situ    7. Coronary artery disease of native artery of native heart with stable angina pectoris    8. S/P CABG (coronary artery bypass graft)    9. Severe obesity (BMI 35.0-39.9) with comorbidity    10. ANURADHA (obstructive sleep apnea)        Plan:       Walter was seen today for follow-up.    Diagnoses and all orders for this visit:    Primary hypertension  -     CBC Auto Differential; Future  -     Comprehensive Metabolic Panel; Future  -     TSH; Future  -     Lipid Panel; Future  -     Hemoglobin A1C; Future  Chronic controlled  Continue medications at same dose  Low Na diet  Exercise, weight loss  Check BP and keep log for next visit    Aortic atherosclerosis  -     CBC Auto Differential; Future  -     Comprehensive Metabolic Panel; Future  -     TSH; Future  -     Lipid Panel; Future  -     Hemoglobin A1C; Future  Noted on prior imaging  Cont  statin    Diabetes mellitus type 2 in obese  -     Ambulatory referral/consult to Endocrinology; Future  -     blood-glucose sensor (DEXCOM G6 SENSOR) [place]; 3 each by Misc.(Non-Drug; Combo Route) route continuous.  -     blood-glucose transmitter (DEXCOM G6 TRANSMITTER) [place]; 1 each by Misc.(Non-Drug; Combo Route) route continuous.  -     CBC Auto Differential; Future  -     Comprehensive Metabolic Panel; Future  -     TSH; Future  -     Lipid Panel; Future  -     Hemoglobin A1C; Future  Chronic uncontrolled  Sees endo, on VGO and DEXCOM--wants to see endo here now. Scheduled    Diabetic polyneuropathy associated with type 2 diabetes mellitus  -     CBC Auto Differential; Future  -     Comprehensive Metabolic Panel; Future  -     TSH; Future  -     Lipid Panel; Future  -     Hemoglobin A1C; Future  Chronic stable  Cont meds same dose    Chronic combined systolic and diastolic heart failure  -     CBC Auto Differential; Future  -     Comprehensive Metabolic Panel; Future  -     TSH; Future  -     Lipid Panel; Future  -     Hemoglobin A1C; Future  Chronic stable  No signs of acute exacerbation  Cont meds per cardiology    ICD (implantable cardioverter-defibrillator), dual, in situ  -     CBC Auto Differential; Future  -     Comprehensive Metabolic Panel; Future  -     TSH; Future  -     Lipid Panel; Future  -     Hemoglobin A1C; Future  Noted  Management per cards    Coronary artery disease of native artery of native heart with stable angina pectoris  -     CBC Auto Differential; Future  -     Comprehensive Metabolic Panel; Future  -     TSH; Future  -     Lipid Panel; Future  -     Hemoglobin A1C; Future  Chronic stable  Cont meds per cards    S/P CABG (coronary artery bypass graft)  -     CBC Auto Differential; Future  -     Comprehensive Metabolic Panel; Future  -     TSH; Future  -     Lipid Panel; Future  -     Hemoglobin A1C; Future    Severe obesity (BMI 35.0-39.9) with comorbidity  -     CBC Auto Differential;  Future  -     Comprehensive Metabolic Panel; Future  -     TSH; Future  -     Lipid Panel; Future  -     Hemoglobin A1C; Future  Diet, exercise, weight loss    ANURADHA (obstructive sleep apnea)  CPAP nightly    RTC as scheudled with labs and PRN. DM per endo

## 2022-05-15 ENCOUNTER — CLINICAL SUPPORT (OUTPATIENT)
Dept: CARDIOLOGY | Facility: HOSPITAL | Age: 72
End: 2022-05-15
Payer: MEDICARE

## 2022-05-15 DIAGNOSIS — Z95.810 PRESENCE OF AUTOMATIC (IMPLANTABLE) CARDIAC DEFIBRILLATOR: ICD-10-CM

## 2022-05-15 PROCEDURE — 93295 CARDIAC DEVICE CHECK - REMOTE: ICD-10-PCS | Mod: ,,, | Performed by: INTERNAL MEDICINE

## 2022-05-15 PROCEDURE — 93295 DEV INTERROG REMOTE 1/2/MLT: CPT | Mod: ,,, | Performed by: INTERNAL MEDICINE

## 2022-05-15 PROCEDURE — 93296 REM INTERROG EVL PM/IDS: CPT | Performed by: INTERNAL MEDICINE

## 2022-05-18 ENCOUNTER — OFFICE VISIT (OUTPATIENT)
Dept: ENDOCRINOLOGY | Facility: CLINIC | Age: 72
End: 2022-05-18
Payer: MEDICARE

## 2022-05-18 ENCOUNTER — TELEPHONE (OUTPATIENT)
Dept: ENDOCRINOLOGY | Facility: CLINIC | Age: 72
End: 2022-05-18

## 2022-05-18 VITALS
WEIGHT: 245.38 LBS | SYSTOLIC BLOOD PRESSURE: 130 MMHG | HEIGHT: 69 IN | DIASTOLIC BLOOD PRESSURE: 66 MMHG | BODY MASS INDEX: 36.34 KG/M2

## 2022-05-18 DIAGNOSIS — E78.2 MIXED HYPERLIPIDEMIA: Chronic | ICD-10-CM

## 2022-05-18 DIAGNOSIS — Z79.4 TYPE 2 DIABETES MELLITUS WITH DIABETIC NEPHROPATHY, WITH LONG-TERM CURRENT USE OF INSULIN: ICD-10-CM

## 2022-05-18 DIAGNOSIS — N25.81 HYPERPARATHYROIDISM DUE TO RENAL INSUFFICIENCY: ICD-10-CM

## 2022-05-18 DIAGNOSIS — Z95.1 S/P CABG (CORONARY ARTERY BYPASS GRAFT): ICD-10-CM

## 2022-05-18 DIAGNOSIS — E66.9 DIABETES MELLITUS TYPE 2 IN OBESE: Chronic | ICD-10-CM

## 2022-05-18 DIAGNOSIS — E11.69 DIABETES MELLITUS TYPE 2 IN OBESE: Chronic | ICD-10-CM

## 2022-05-18 DIAGNOSIS — I50.42 CHRONIC COMBINED SYSTOLIC AND DIASTOLIC HEART FAILURE: Chronic | ICD-10-CM

## 2022-05-18 DIAGNOSIS — E11.21 TYPE 2 DIABETES MELLITUS WITH DIABETIC NEPHROPATHY, WITH LONG-TERM CURRENT USE OF INSULIN: ICD-10-CM

## 2022-05-18 PROCEDURE — 99499 UNLISTED E&M SERVICE: CPT | Mod: S$GLB,,, | Performed by: STUDENT IN AN ORGANIZED HEALTH CARE EDUCATION/TRAINING PROGRAM

## 2022-05-18 PROCEDURE — 3008F PR BODY MASS INDEX (BMI) DOCUMENTED: ICD-10-PCS | Mod: CPTII,S$GLB,, | Performed by: STUDENT IN AN ORGANIZED HEALTH CARE EDUCATION/TRAINING PROGRAM

## 2022-05-18 PROCEDURE — 3052F PR MOST RECENT HEMOGLOBIN A1C LEVEL 8.0 - < 9.0%: ICD-10-PCS | Mod: CPTII,S$GLB,, | Performed by: STUDENT IN AN ORGANIZED HEALTH CARE EDUCATION/TRAINING PROGRAM

## 2022-05-18 PROCEDURE — 3008F BODY MASS INDEX DOCD: CPT | Mod: CPTII,S$GLB,, | Performed by: STUDENT IN AN ORGANIZED HEALTH CARE EDUCATION/TRAINING PROGRAM

## 2022-05-18 PROCEDURE — 4010F ACE/ARB THERAPY RXD/TAKEN: CPT | Mod: CPTII,S$GLB,, | Performed by: STUDENT IN AN ORGANIZED HEALTH CARE EDUCATION/TRAINING PROGRAM

## 2022-05-18 PROCEDURE — 99999 PR PBB SHADOW E&M-EST. PATIENT-LVL IV: CPT | Mod: PBBFAC,,, | Performed by: STUDENT IN AN ORGANIZED HEALTH CARE EDUCATION/TRAINING PROGRAM

## 2022-05-18 PROCEDURE — 4010F PR ACE/ARB THEARPY RXD/TAKEN: ICD-10-PCS | Mod: CPTII,S$GLB,, | Performed by: STUDENT IN AN ORGANIZED HEALTH CARE EDUCATION/TRAINING PROGRAM

## 2022-05-18 PROCEDURE — 99999 PR PBB SHADOW E&M-EST. PATIENT-LVL IV: ICD-10-PCS | Mod: PBBFAC,,, | Performed by: STUDENT IN AN ORGANIZED HEALTH CARE EDUCATION/TRAINING PROGRAM

## 2022-05-18 PROCEDURE — 99214 PR OFFICE/OUTPT VISIT, EST, LEVL IV, 30-39 MIN: ICD-10-PCS | Mod: S$GLB,,, | Performed by: STUDENT IN AN ORGANIZED HEALTH CARE EDUCATION/TRAINING PROGRAM

## 2022-05-18 PROCEDURE — 3075F SYST BP GE 130 - 139MM HG: CPT | Mod: CPTII,S$GLB,, | Performed by: STUDENT IN AN ORGANIZED HEALTH CARE EDUCATION/TRAINING PROGRAM

## 2022-05-18 PROCEDURE — 3066F NEPHROPATHY DOC TX: CPT | Mod: CPTII,S$GLB,, | Performed by: STUDENT IN AN ORGANIZED HEALTH CARE EDUCATION/TRAINING PROGRAM

## 2022-05-18 PROCEDURE — 99214 OFFICE O/P EST MOD 30 MIN: CPT | Mod: S$GLB,,, | Performed by: STUDENT IN AN ORGANIZED HEALTH CARE EDUCATION/TRAINING PROGRAM

## 2022-05-18 PROCEDURE — 1159F MED LIST DOCD IN RCRD: CPT | Mod: CPTII,S$GLB,, | Performed by: STUDENT IN AN ORGANIZED HEALTH CARE EDUCATION/TRAINING PROGRAM

## 2022-05-18 PROCEDURE — 3078F DIAST BP <80 MM HG: CPT | Mod: CPTII,S$GLB,, | Performed by: STUDENT IN AN ORGANIZED HEALTH CARE EDUCATION/TRAINING PROGRAM

## 2022-05-18 PROCEDURE — 1159F PR MEDICATION LIST DOCUMENTED IN MEDICAL RECORD: ICD-10-PCS | Mod: CPTII,S$GLB,, | Performed by: STUDENT IN AN ORGANIZED HEALTH CARE EDUCATION/TRAINING PROGRAM

## 2022-05-18 PROCEDURE — 3052F HG A1C>EQUAL 8.0%<EQUAL 9.0%: CPT | Mod: CPTII,S$GLB,, | Performed by: STUDENT IN AN ORGANIZED HEALTH CARE EDUCATION/TRAINING PROGRAM

## 2022-05-18 PROCEDURE — 99499 RISK ADDL DX/OHS AUDIT: ICD-10-PCS | Mod: S$GLB,,, | Performed by: STUDENT IN AN ORGANIZED HEALTH CARE EDUCATION/TRAINING PROGRAM

## 2022-05-18 PROCEDURE — 3075F PR MOST RECENT SYSTOLIC BLOOD PRESS GE 130-139MM HG: ICD-10-PCS | Mod: CPTII,S$GLB,, | Performed by: STUDENT IN AN ORGANIZED HEALTH CARE EDUCATION/TRAINING PROGRAM

## 2022-05-18 PROCEDURE — 3066F PR DOCUMENTATION OF TREATMENT FOR NEPHROPATHY: ICD-10-PCS | Mod: CPTII,S$GLB,, | Performed by: STUDENT IN AN ORGANIZED HEALTH CARE EDUCATION/TRAINING PROGRAM

## 2022-05-18 PROCEDURE — 3078F PR MOST RECENT DIASTOLIC BLOOD PRESSURE < 80 MM HG: ICD-10-PCS | Mod: CPTII,S$GLB,, | Performed by: STUDENT IN AN ORGANIZED HEALTH CARE EDUCATION/TRAINING PROGRAM

## 2022-05-18 NOTE — TELEPHONE ENCOUNTER
[facility] health form for Dexcom g6 faxed on 5/18/22 Confirmation was received.

## 2022-05-18 NOTE — PATIENT INSTRUCTIONS
You should have gotten an e-mail to reset you Dexcom password, please create new password and login to Dexcom [facility]    Continue V Go 20 with 4 clicks per meal    Continue Trulicity 4.5 mg weekly

## 2022-05-18 NOTE — ASSESSMENT & PLAN NOTE
Uncontrolled based on A1c and reported POCT glucose with significant glycemic variability.    Patient is on an intensive insulin regimen with a VGO device (continuous basal plus 4 boluses daily with meals) and is testing glucose 4+ times daily. Patient has demonstrated an understanding of technology and is motivated to use the device correctly. Patient is expected to adhere to a diabetes treatment plan and is capable of recognizing alerts and alarms. Patient has recurrent, severe (BG <54) hypoglycemia and impaired awareness of hypoglycemia.    Patient's insulin regimen requires frequent adjustments based on BG results. Patient will receive an in-person visit every 6 months to assess adherence to CGM regimen and diabetes treatment.    He currently has an HARRIS so will avoid SGLT-2 inhibitor.     He was doing well with CGM so expect glucose will improve after restarting - will renew orders.    Plan  - Continue VGo 20 + 4 clicks per meals  - Continue Trulicity 4.5 mg weekly  - Restart Dexcom G6    F/u 3 months

## 2022-05-18 NOTE — PROGRESS NOTES
"Subjective:      Patient ID: Walter Bull is a 71 y.o. male.    Chief Complaint:  Type 2 diabetes mellitus      History of Present Illness  This is a 71 y.o. male. with a past medical history of type 2 diabetes, CAD s/p CABG, CHF, HLD, here for evaluation.    Type 2 diabetes mellitus  Current diabetes medications:  - VGO 20 with 4 clicks with each meal + 1-2 click with snacks  - Trulicity 4.5 mg weekly    Past diabetes medications:  - Metformin - unable to tolerate  - Januvia   - SGLT-2 inhibitors avoided given frequent AKIs/dehydration    Known diabetic complications: nephropathy and cardiovascular disease    Weight trend:  Wt Readings from Last 5 Encounters:   05/13/22 106.4 kg (234 lb 9.1 oz)   04/25/22 108.9 kg (240 lb)   04/12/22 112.1 kg (247 lb 2.2 oz)   04/08/22 110.8 kg (244 lb 4.3 oz)   04/04/22 110.7 kg (244 lb)     Prior visit with diabetes education: yes    Current diet: 3 meals per day  Current exercise: Limited    Blood glucose monitoring at home: 4x/day  Home blood sugar records:   Any episodes of hypoglycemia? Yes    Used to be on Dexcom G6 - last shipment 3 months ago - needs orders renewed    Diabetes Management Status  Statin: Taking  ACE/ARB: Taking    Screening or Prevention Patient's value   HgA1C Testing and Control   Lab Results   Component Value Date    HGBA1C 8.0 (H) 05/10/2022        Lipid profile : 05/10/2022   LDL control Lab Results   Component Value Date    LDLCALC 81.8 05/10/2022      Nephropathy screening Lab Results   Component Value Date    MICALBCREAT Unable to calculate 11/09/2021      Blood pressure BP Readings from Last 1 Encounters:   05/18/22 130/66      Dilated retinal exam : 04/15/2021   Foot exam : 03/28/2022         Review of Systems  As above    Social and family history reviewed  Current medications and allergies reviewed    Objective:   /66 (BP Location: Right arm, Patient Position: Sitting)   Ht 5' 9" (1.753 m)   Wt 111.3 kg (245 lb 6 oz)   BMI " 36.24 kg/m²   Physical Exam  Alert, oriented    BP Readings from Last 1 Encounters:   05/18/22 130/66      Wt Readings from Last 1 Encounters:   05/18/22 1409 111.3 kg (245 lb 6 oz)     Body mass index is 36.24 kg/m².    Lab Review:   Lab Results   Component Value Date    HGBA1C 8.0 (H) 05/10/2022     Lab Results   Component Value Date    CHOL 141 05/10/2022    HDL 46 05/10/2022    LDLCALC 81.8 05/10/2022    TRIG 66 05/10/2022    CHOLHDL 32.6 05/10/2022     Lab Results   Component Value Date     05/10/2022    K 4.6 05/10/2022    CL 98 05/10/2022    CO2 25 05/10/2022     (H) 05/10/2022    BUN 23 05/10/2022    CREATININE 2.1 (H) 05/10/2022    CALCIUM 9.3 05/10/2022    PROT 7.4 05/10/2022    ALBUMIN 3.9 05/10/2022    BILITOT 0.9 05/10/2022    ALKPHOS 75 05/10/2022    AST 25 05/10/2022    ALT 27 05/10/2022    ANIONGAP 14 05/10/2022    ESTGFRAFRICA 35 (A) 05/10/2022    EGFRNONAA 30 (A) 05/10/2022    TSH 2.041 05/10/2022       All pertinent labs reviewed    Assessment and Plan     Type 2 diabetes mellitus with diabetic nephropathy, with long-term current use of insulin  Uncontrolled based on A1c and reported POCT glucose with significant glycemic variability.    Patient is on an intensive insulin regimen with a VGO device (continuous basal plus 4 boluses daily with meals) and is testing glucose 4+ times daily. Patient has demonstrated an understanding of technology and is motivated to use the device correctly. Patient is expected to adhere to a diabetes treatment plan and is capable of recognizing alerts and alarms. Patient has recurrent, severe (BG <54) hypoglycemia and impaired awareness of hypoglycemia.    Patient's insulin regimen requires frequent adjustments based on BG results. Patient will receive an in-person visit every 6 months to assess adherence to CGM regimen and diabetes treatment.    He currently has an HARRIS so will avoid SGLT-2 inhibitor.     He was doing well with CGM so expect glucose will  improve after restarting - will renew orders.    Plan  - Continue VGo 20 + 4 clicks per meals  - Continue Trulicity 4.5 mg weekly  - Restart Dexcom G6    F/u 3 months    Hyperparathyroidism due to renal insufficiency  Nephrology follwing    S/P CABG (coronary artery bypass graft)  Continue GLP-1 RA    HLD (hyperlipidemia)  Continue statin    Chronic combined systolic and diastolic heart failure  Unable to use SGLT-2 inhibitor given current HARRIS    Follow-up in 3 months    nAdrew Briones MD  Endocrinology

## 2022-05-23 ENCOUNTER — PATIENT OUTREACH (OUTPATIENT)
Dept: ADMINISTRATIVE | Facility: OTHER | Age: 72
End: 2022-05-23
Payer: MEDICARE

## 2022-05-23 NOTE — PROGRESS NOTES
HPI: This is a 71-year-old gentleman with a history of coronary artery disease status post CABG in 2005/cardiomyopathy status post AICD in 2018 and BiV upgrade 2022/congestive heart failure/hypertension/hyperlipidemia/diabetes/CKD/previous tobacco/orthostatic hypotension s/p reduction in meds presenting for follow-up.     Overall, patient feels well today. S/p BiV upgrade by EPS since last evaluation. No issues with chest pain, shortness of breath, or dyspnea on exertion at this time. Very active in his life doing [employer] as his work. No edmea or orthopnea.    He also asprin, clopidogrel, carvedilol, losartan, ranolazine, spironolactone, rosuvastatin, ezetimibe, and furosemide. Bps at home running 120-130s/60s on current regmine.       PHYSICAL EXAM:  Vitals:    05/24/22 0928   BP: (!) 149/70   Pulse: 60   Resp: 20       Physical Exam  Constitutional:       Appearance: Normal appearance.   Neck:      Vascular: No carotid bruit or JVD.   Cardiovascular:      Rate and Rhythm: Normal rate and regular rhythm.      Pulses:           Radial pulses are 2+ on the right side and 2+ on the left side.        Posterior tibial pulses are 1+ on the right side and 1+ on the left side.      Heart sounds: S1 normal and S2 normal. No murmur heard.    No S3 sounds.      Comments: No edema.  Pulmonary:      Effort: Pulmonary effort is normal.      Breath sounds: Normal breath sounds. No rales.   Feet:      Right foot:      Skin integrity: Skin integrity normal.      Left foot:      Skin integrity: Skin integrity normal.   Skin:     General: Skin is warm and dry.      Findings: No lesion.   Neurological:      Mental Status: He is alert and oriented to person, place, and time.      Motor: Motor function is intact.      Gait: Gait is intact.         LABS/CARDIAC TESTS (Imaging reviewed):  May 2022 hemoglobin 13.4. Creatinine 2.1 (prev baseline 1.6) with BUN of 23.  Albumin 3.9.  LDL 81 and HDL 46. A1c 8.  TSH normal. BNP normal  March 2022.   EKG May 2021 revealed an atrial paced rhythm with a left bundle branch block.  Device check April 2022 80% RA and 98% BiV pacing. pAtach rare.   TTE April 2022 LVEF 25% w global HK and IL/inf akinesis. Mildly enlarged RV and decreased fxn. CVP 3. PASP 35. June 2021 revealed moderately reduced LVEF near baseline (35-45%) with a normal right atrial pressure and no significant valvular disease. Nuclear stress testing from October 2019 revealed an LVEF of 25-30% with a large inferior scar and no evidence of ischemia.  Cardiac catheterization was in 2018 at [facility] and revealed severe multivessel disease with LAD, left circ, and RCA CTOs.  He had a patent LIMA to LAD/SVG to diagonal and an occluded SVG to left circ system graft.    Carotid ultrasound from his May 2021 admission revealed no evidence of significant carotid disease.    ASSESSMENT & PLAN:    Coronary artery disease of native artery of native heart with stable angina pectoris  The patient has severe coronary artery disease with a patent LIMA to LAD and SVG to diagonal. No recent ACS.  CCS 0 today. Seemed to have responded to HF management. Continue carvedilol 6.25x2 and continue ranolazine 1000x2 at this time.  Continue dapt with aspirin and clopidogrel.    Chronic combined systolic and diastolic heart failure  Patient with chronic heart failure and a reduced ejection fraction likely secondary to an ischemic cardiomyopathy.  NYHA class 1 today. Improved w BiV upgrade.  LVEF 20s on April TTE down from baseline of 35-40%.   Continue losartan 25 mg daily, carvedilol 6.25x2, spironolactone 25x1. GFR 35 on last check w cr slightly higher than baseline. Will stop furosemide and start empaglaflozin 10x1. Recheck BMP. Recheck TTE prior to follow-up.      Benign essential HTN  Blood pressures in an acceptable range. No more orthostatic symptoms. Continue carvedilol/losartan. Had to to back down on med doses previously for orthostatic symptoms.     HLD  (hyperlipidemia)  LDL is 70-80 on rosuvastatin 40 mg and ezetimibe 10 mg.      ICD (implantable cardioverter-defibrillator), dual, in situ    Coronary artery disease of native artery of native heart with stable angina pectoris    Chronic combined systolic and diastolic heart failure  -     empagliflozin (JARDIANCE) 10 mg tablet; Take 1 tablet (10 mg total) by mouth once daily.  Dispense: 30 tablet; Refill: 11  -     Basic Metabolic Panel; Future; Expected date: 05/24/2022  -     Echo; Future    Benign essential HTN    Mixed hyperlipidemia    Stage 3b chronic kidney disease        Joana Canada MD    Addendum:  Follow-up BMP shows a return of creatinine and GFR to baseline.  The patient notes that empagliflozin has made him sick before and the cost is too high.  Will not initiate this medicine and the patient can stay on furosemide 20 x 1. Will additionally increase carvedilol back to 6.25 x 2. Pt seemed to have been on 3.125x2.

## 2022-05-23 NOTE — PROGRESS NOTES
Care Everywhere:   Immunization: updated  Health Maintenance: updated  Media Review:   Legacy Review:   DIS:  Order placed:   Upcoming appts:optometry 9.2.2022  EFAX:  Task Tickets:  Referrals:

## 2022-05-24 ENCOUNTER — PATIENT MESSAGE (OUTPATIENT)
Dept: CARDIOLOGY | Facility: CLINIC | Age: 72
End: 2022-05-24

## 2022-05-24 ENCOUNTER — OFFICE VISIT (OUTPATIENT)
Dept: CARDIOLOGY | Facility: CLINIC | Age: 72
End: 2022-05-24
Payer: MEDICARE

## 2022-05-24 ENCOUNTER — LAB VISIT (OUTPATIENT)
Dept: LAB | Facility: HOSPITAL | Age: 72
End: 2022-05-24
Attending: INTERNAL MEDICINE
Payer: MEDICARE

## 2022-05-24 VITALS
HEART RATE: 60 BPM | RESPIRATION RATE: 20 BRPM | DIASTOLIC BLOOD PRESSURE: 70 MMHG | SYSTOLIC BLOOD PRESSURE: 149 MMHG | HEIGHT: 69 IN | WEIGHT: 242.5 LBS | BODY MASS INDEX: 35.92 KG/M2

## 2022-05-24 DIAGNOSIS — I50.42 CHRONIC COMBINED SYSTOLIC AND DIASTOLIC HEART FAILURE: Chronic | ICD-10-CM

## 2022-05-24 DIAGNOSIS — I10 BENIGN ESSENTIAL HTN: Chronic | ICD-10-CM

## 2022-05-24 DIAGNOSIS — I25.118 CORONARY ARTERY DISEASE OF NATIVE ARTERY OF NATIVE HEART WITH STABLE ANGINA PECTORIS: Chronic | ICD-10-CM

## 2022-05-24 DIAGNOSIS — N18.32 STAGE 3B CHRONIC KIDNEY DISEASE: ICD-10-CM

## 2022-05-24 DIAGNOSIS — E78.2 MIXED HYPERLIPIDEMIA: Chronic | ICD-10-CM

## 2022-05-24 DIAGNOSIS — Z95.810 ICD (IMPLANTABLE CARDIOVERTER-DEFIBRILLATOR), DUAL, IN SITU: Primary | Chronic | ICD-10-CM

## 2022-05-24 LAB
ANION GAP SERPL CALC-SCNC: 8 MMOL/L (ref 8–16)
BUN SERPL-MCNC: 12 MG/DL (ref 8–23)
CALCIUM SERPL-MCNC: 8.8 MG/DL (ref 8.7–10.5)
CHLORIDE SERPL-SCNC: 105 MMOL/L (ref 95–110)
CO2 SERPL-SCNC: 24 MMOL/L (ref 23–29)
CREAT SERPL-MCNC: 1.3 MG/DL (ref 0.5–1.4)
EST. GFR  (AFRICAN AMERICAN): >60 ML/MIN/1.73 M^2
EST. GFR  (NON AFRICAN AMERICAN): 54.1 ML/MIN/1.73 M^2
GLUCOSE SERPL-MCNC: 124 MG/DL (ref 70–110)
POTASSIUM SERPL-SCNC: 4.7 MMOL/L (ref 3.5–5.1)
SODIUM SERPL-SCNC: 137 MMOL/L (ref 136–145)

## 2022-05-24 PROCEDURE — 3077F PR MOST RECENT SYSTOLIC BLOOD PRESSURE >= 140 MM HG: ICD-10-PCS | Mod: CPTII,S$GLB,, | Performed by: INTERNAL MEDICINE

## 2022-05-24 PROCEDURE — 1160F PR REVIEW ALL MEDS BY PRESCRIBER/CLIN PHARMACIST DOCUMENTED: ICD-10-PCS | Mod: CPTII,S$GLB,, | Performed by: INTERNAL MEDICINE

## 2022-05-24 PROCEDURE — 99214 PR OFFICE/OUTPT VISIT, EST, LEVL IV, 30-39 MIN: ICD-10-PCS | Mod: S$GLB,,, | Performed by: INTERNAL MEDICINE

## 2022-05-24 PROCEDURE — 1159F MED LIST DOCD IN RCRD: CPT | Mod: CPTII,S$GLB,, | Performed by: INTERNAL MEDICINE

## 2022-05-24 PROCEDURE — 1101F PT FALLS ASSESS-DOCD LE1/YR: CPT | Mod: CPTII,S$GLB,, | Performed by: INTERNAL MEDICINE

## 2022-05-24 PROCEDURE — 3052F HG A1C>EQUAL 8.0%<EQUAL 9.0%: CPT | Mod: CPTII,S$GLB,, | Performed by: INTERNAL MEDICINE

## 2022-05-24 PROCEDURE — 4010F PR ACE/ARB THEARPY RXD/TAKEN: ICD-10-PCS | Mod: CPTII,S$GLB,, | Performed by: INTERNAL MEDICINE

## 2022-05-24 PROCEDURE — 1126F PR PAIN SEVERITY QUANTIFIED, NO PAIN PRESENT: ICD-10-PCS | Mod: CPTII,S$GLB,, | Performed by: INTERNAL MEDICINE

## 2022-05-24 PROCEDURE — 4010F ACE/ARB THERAPY RXD/TAKEN: CPT | Mod: CPTII,S$GLB,, | Performed by: INTERNAL MEDICINE

## 2022-05-24 PROCEDURE — 80048 BASIC METABOLIC PNL TOTAL CA: CPT | Performed by: INTERNAL MEDICINE

## 2022-05-24 PROCEDURE — 36415 COLL VENOUS BLD VENIPUNCTURE: CPT | Mod: PO | Performed by: INTERNAL MEDICINE

## 2022-05-24 PROCEDURE — 3066F NEPHROPATHY DOC TX: CPT | Mod: CPTII,S$GLB,, | Performed by: INTERNAL MEDICINE

## 2022-05-24 PROCEDURE — 99214 OFFICE O/P EST MOD 30 MIN: CPT | Mod: S$GLB,,, | Performed by: INTERNAL MEDICINE

## 2022-05-24 PROCEDURE — 3008F PR BODY MASS INDEX (BMI) DOCUMENTED: ICD-10-PCS | Mod: CPTII,S$GLB,, | Performed by: INTERNAL MEDICINE

## 2022-05-24 PROCEDURE — 99999 PR PBB SHADOW E&M-EST. PATIENT-LVL IV: CPT | Mod: PBBFAC,,, | Performed by: INTERNAL MEDICINE

## 2022-05-24 PROCEDURE — 3078F DIAST BP <80 MM HG: CPT | Mod: CPTII,S$GLB,, | Performed by: INTERNAL MEDICINE

## 2022-05-24 PROCEDURE — 1160F RVW MEDS BY RX/DR IN RCRD: CPT | Mod: CPTII,S$GLB,, | Performed by: INTERNAL MEDICINE

## 2022-05-24 PROCEDURE — 3288F PR FALLS RISK ASSESSMENT DOCUMENTED: ICD-10-PCS | Mod: CPTII,S$GLB,, | Performed by: INTERNAL MEDICINE

## 2022-05-24 PROCEDURE — 3078F PR MOST RECENT DIASTOLIC BLOOD PRESSURE < 80 MM HG: ICD-10-PCS | Mod: CPTII,S$GLB,, | Performed by: INTERNAL MEDICINE

## 2022-05-24 PROCEDURE — 3052F PR MOST RECENT HEMOGLOBIN A1C LEVEL 8.0 - < 9.0%: ICD-10-PCS | Mod: CPTII,S$GLB,, | Performed by: INTERNAL MEDICINE

## 2022-05-24 PROCEDURE — 1126F AMNT PAIN NOTED NONE PRSNT: CPT | Mod: CPTII,S$GLB,, | Performed by: INTERNAL MEDICINE

## 2022-05-24 PROCEDURE — 3008F BODY MASS INDEX DOCD: CPT | Mod: CPTII,S$GLB,, | Performed by: INTERNAL MEDICINE

## 2022-05-24 PROCEDURE — 1159F PR MEDICATION LIST DOCUMENTED IN MEDICAL RECORD: ICD-10-PCS | Mod: CPTII,S$GLB,, | Performed by: INTERNAL MEDICINE

## 2022-05-24 PROCEDURE — 3066F PR DOCUMENTATION OF TREATMENT FOR NEPHROPATHY: ICD-10-PCS | Mod: CPTII,S$GLB,, | Performed by: INTERNAL MEDICINE

## 2022-05-24 PROCEDURE — 3288F FALL RISK ASSESSMENT DOCD: CPT | Mod: CPTII,S$GLB,, | Performed by: INTERNAL MEDICINE

## 2022-05-24 PROCEDURE — 1101F PR PT FALLS ASSESS DOC 0-1 FALLS W/OUT INJ PAST YR: ICD-10-PCS | Mod: CPTII,S$GLB,, | Performed by: INTERNAL MEDICINE

## 2022-05-24 PROCEDURE — 3077F SYST BP >= 140 MM HG: CPT | Mod: CPTII,S$GLB,, | Performed by: INTERNAL MEDICINE

## 2022-05-24 PROCEDURE — 99999 PR PBB SHADOW E&M-EST. PATIENT-LVL IV: ICD-10-PCS | Mod: PBBFAC,,, | Performed by: INTERNAL MEDICINE

## 2022-05-24 NOTE — ASSESSMENT & PLAN NOTE
Blood pressures in an acceptable range. No more orthostatic symptoms. Continue carvedilol/losartan. Had to to back down on med doses previously for orthostatic symptoms.

## 2022-05-24 NOTE — ASSESSMENT & PLAN NOTE
The patient has severe coronary artery disease with a patent LIMA to LAD and SVG to diagonal. No recent ACS.  CCS 0 today. Seemed to have responded to HF management. Continue carvedilol 6.25x2 and continue ranolazine 1000x2 at this time.  Continue dapt with aspirin and clopidogrel.

## 2022-05-24 NOTE — ASSESSMENT & PLAN NOTE
Patient with chronic heart failure and a reduced ejection fraction likely secondary to an ischemic cardiomyopathy.  NYHA class 1 today. Improved w BiV upgrade.  LVEF 20s on April TTE down from baseline of 35-40%.   Continue losartan 25 mg daily, carvedilol 6.25x2, spironolactone 25x1. GFR 35 on last check w cr slightly higher than baseline. Will stop furosemide and start empaglaflozin 10x1. Recheck BMP. Recheck TTE prior to follow-up.

## 2022-05-25 ENCOUNTER — PATIENT MESSAGE (OUTPATIENT)
Dept: CARDIOLOGY | Facility: CLINIC | Age: 72
End: 2022-05-25
Payer: MEDICARE

## 2022-05-25 RX ORDER — FUROSEMIDE 20 MG/1
20 TABLET ORAL DAILY
Qty: 90 TABLET | Refills: 3 | Status: SHIPPED | OUTPATIENT
Start: 2022-05-25 | End: 2022-07-28 | Stop reason: SDUPTHER

## 2022-05-25 RX ORDER — CARVEDILOL 6.25 MG/1
6.25 TABLET ORAL 2 TIMES DAILY
Qty: 180 TABLET | Refills: 3 | Status: SHIPPED | OUTPATIENT
Start: 2022-05-25 | End: 2023-02-15 | Stop reason: SDUPTHER

## 2022-05-31 ENCOUNTER — PATIENT MESSAGE (OUTPATIENT)
Dept: ENDOCRINOLOGY | Facility: CLINIC | Age: 72
End: 2022-05-31
Payer: MEDICARE

## 2022-05-31 ENCOUNTER — PATIENT MESSAGE (OUTPATIENT)
Dept: SLEEP MEDICINE | Facility: CLINIC | Age: 72
End: 2022-05-31
Payer: MEDICARE

## 2022-05-31 DIAGNOSIS — E11.42 DIABETIC POLYNEUROPATHY ASSOCIATED WITH TYPE 2 DIABETES MELLITUS: ICD-10-CM

## 2022-05-31 DIAGNOSIS — E66.9 DIABETES MELLITUS TYPE 2 IN OBESE: Chronic | ICD-10-CM

## 2022-05-31 DIAGNOSIS — E11.69 DIABETES MELLITUS TYPE 2 IN OBESE: Chronic | ICD-10-CM

## 2022-06-01 RX ORDER — INSULIN LISPRO 100 [IU]/ML
INJECTION, SOLUTION INTRAVENOUS; SUBCUTANEOUS
Qty: 20 ML | Refills: 11 | Status: SHIPPED | OUTPATIENT
Start: 2022-06-01 | End: 2022-08-31 | Stop reason: SDUPTHER

## 2022-06-23 ENCOUNTER — PATIENT MESSAGE (OUTPATIENT)
Dept: CARDIOLOGY | Facility: CLINIC | Age: 72
End: 2022-06-23
Payer: MEDICARE

## 2022-06-24 ENCOUNTER — OFFICE VISIT (OUTPATIENT)
Dept: URGENT CARE | Facility: CLINIC | Age: 72
End: 2022-06-24
Payer: MEDICARE

## 2022-06-24 ENCOUNTER — TELEPHONE (OUTPATIENT)
Dept: INTERNAL MEDICINE | Facility: CLINIC | Age: 72
End: 2022-06-24
Payer: MEDICARE

## 2022-06-24 VITALS
SYSTOLIC BLOOD PRESSURE: 153 MMHG | OXYGEN SATURATION: 99 % | HEIGHT: 69 IN | HEART RATE: 60 BPM | RESPIRATION RATE: 18 BRPM | DIASTOLIC BLOOD PRESSURE: 73 MMHG | TEMPERATURE: 98 F | WEIGHT: 242 LBS | BODY MASS INDEX: 35.84 KG/M2

## 2022-06-24 DIAGNOSIS — Z79.4 TYPE 2 DIABETES MELLITUS WITH OTHER SKIN COMPLICATION, WITH LONG-TERM CURRENT USE OF INSULIN: ICD-10-CM

## 2022-06-24 DIAGNOSIS — S90.812A ABRASION OF LEFT FOOT, INITIAL ENCOUNTER: Primary | ICD-10-CM

## 2022-06-24 DIAGNOSIS — E11.628 TYPE 2 DIABETES MELLITUS WITH OTHER SKIN COMPLICATION, WITH LONG-TERM CURRENT USE OF INSULIN: ICD-10-CM

## 2022-06-24 PROCEDURE — 3008F BODY MASS INDEX DOCD: CPT | Mod: CPTII,S$GLB,, | Performed by: NURSE PRACTITIONER

## 2022-06-24 PROCEDURE — 1125F PR PAIN SEVERITY QUANTIFIED, PAIN PRESENT: ICD-10-PCS | Mod: CPTII,S$GLB,, | Performed by: NURSE PRACTITIONER

## 2022-06-24 PROCEDURE — 3052F HG A1C>EQUAL 8.0%<EQUAL 9.0%: CPT | Mod: CPTII,S$GLB,, | Performed by: NURSE PRACTITIONER

## 2022-06-24 PROCEDURE — 1160F RVW MEDS BY RX/DR IN RCRD: CPT | Mod: CPTII,S$GLB,, | Performed by: NURSE PRACTITIONER

## 2022-06-24 PROCEDURE — 3066F PR DOCUMENTATION OF TREATMENT FOR NEPHROPATHY: ICD-10-PCS | Mod: CPTII,S$GLB,, | Performed by: NURSE PRACTITIONER

## 2022-06-24 PROCEDURE — 3052F PR MOST RECENT HEMOGLOBIN A1C LEVEL 8.0 - < 9.0%: ICD-10-PCS | Mod: CPTII,S$GLB,, | Performed by: NURSE PRACTITIONER

## 2022-06-24 PROCEDURE — 4010F PR ACE/ARB THEARPY RXD/TAKEN: ICD-10-PCS | Mod: CPTII,S$GLB,, | Performed by: NURSE PRACTITIONER

## 2022-06-24 PROCEDURE — 3078F PR MOST RECENT DIASTOLIC BLOOD PRESSURE < 80 MM HG: ICD-10-PCS | Mod: CPTII,S$GLB,, | Performed by: NURSE PRACTITIONER

## 2022-06-24 PROCEDURE — 3077F SYST BP >= 140 MM HG: CPT | Mod: CPTII,S$GLB,, | Performed by: NURSE PRACTITIONER

## 2022-06-24 PROCEDURE — 3008F PR BODY MASS INDEX (BMI) DOCUMENTED: ICD-10-PCS | Mod: CPTII,S$GLB,, | Performed by: NURSE PRACTITIONER

## 2022-06-24 PROCEDURE — 99214 OFFICE O/P EST MOD 30 MIN: CPT | Mod: S$GLB,,, | Performed by: NURSE PRACTITIONER

## 2022-06-24 PROCEDURE — 1125F AMNT PAIN NOTED PAIN PRSNT: CPT | Mod: CPTII,S$GLB,, | Performed by: NURSE PRACTITIONER

## 2022-06-24 PROCEDURE — 1159F MED LIST DOCD IN RCRD: CPT | Mod: CPTII,S$GLB,, | Performed by: NURSE PRACTITIONER

## 2022-06-24 PROCEDURE — 99214 PR OFFICE/OUTPT VISIT, EST, LEVL IV, 30-39 MIN: ICD-10-PCS | Mod: S$GLB,,, | Performed by: NURSE PRACTITIONER

## 2022-06-24 PROCEDURE — 3078F DIAST BP <80 MM HG: CPT | Mod: CPTII,S$GLB,, | Performed by: NURSE PRACTITIONER

## 2022-06-24 PROCEDURE — 1160F PR REVIEW ALL MEDS BY PRESCRIBER/CLIN PHARMACIST DOCUMENTED: ICD-10-PCS | Mod: CPTII,S$GLB,, | Performed by: NURSE PRACTITIONER

## 2022-06-24 PROCEDURE — 3077F PR MOST RECENT SYSTOLIC BLOOD PRESSURE >= 140 MM HG: ICD-10-PCS | Mod: CPTII,S$GLB,, | Performed by: NURSE PRACTITIONER

## 2022-06-24 PROCEDURE — 3066F NEPHROPATHY DOC TX: CPT | Mod: CPTII,S$GLB,, | Performed by: NURSE PRACTITIONER

## 2022-06-24 PROCEDURE — 4010F ACE/ARB THERAPY RXD/TAKEN: CPT | Mod: CPTII,S$GLB,, | Performed by: NURSE PRACTITIONER

## 2022-06-24 PROCEDURE — 1159F PR MEDICATION LIST DOCUMENTED IN MEDICAL RECORD: ICD-10-PCS | Mod: CPTII,S$GLB,, | Performed by: NURSE PRACTITIONER

## 2022-06-24 RX ORDER — CEPHALEXIN 500 MG/1
500 CAPSULE ORAL EVERY 8 HOURS
Qty: 21 CAPSULE | Refills: 0 | Status: SHIPPED | OUTPATIENT
Start: 2022-06-24 | End: 2022-07-01

## 2022-06-24 RX ORDER — MUPIROCIN 20 MG/G
OINTMENT TOPICAL
Qty: 22 G | Refills: 1 | Status: SHIPPED | OUTPATIENT
Start: 2022-06-24

## 2022-06-24 NOTE — TELEPHONE ENCOUNTER
"Eve (sig other) called because patient was "cutting his corns on his foot and cut his foot" The foot is still bleeding and it happened 1 hour ago. She said she was not there and is still not with pt. States pt put alcohol swab on it to keep it clean so it won't get infected. Pt is on blood thinners. Informed her the alcohol pad will not allow blood to clot. Pt needs clean dressing pad and to put direct pressure on area for 15-20 min. If it is deep where it might need a stitch he needs to go to UC or ER. Also if with direct pressure and clean dressing if it doesn't stop bleeding to go to UC or ER. Verbalized understanding.   "

## 2022-06-24 NOTE — PROGRESS NOTES
"Subjective:       Patient ID: Walter Bull is a 71 y.o. male.    Vitals:  height is 5' 9" (1.753 m) and weight is 109.8 kg (242 lb). His temperature is 97.5 °F (36.4 °C). His blood pressure is 153/73 (abnormal) and his pulse is 60. His respiration is 18 and oxygen saturation is 99%.     Chief Complaint: Abrasion    71 year old male presents today with an abraision to the left foot from a corn blade. States he was filing the corn on his toes. Blade was dirty. TD is UTD 05/26/2020. Pain scale 08/10 while walking or putting pressure on foot. Bilateral feet pain but abrasion on left foot. DM II. Checked Glucose at home today at 1:40pm and it was 140. Has appointment with Podiatry next week     has a past medical history of  Anticoagulant long-term use, CHF (congestive heart failure),Coronary artery disease, Glaucoma, Heart attack, Heart disease, Hyperlipidemia, Hypertension, Hypothyroidism, Iron deficiency, Kidney failure, Obesity, Peripheral vascular disease, Polyneuropathy, Pulmonary emphysema, Renal manifestation of secondary diabetes mellitus, S/P CABG x 5, Sleep apnea    Other  This is a new problem. The current episode started today. The problem occurs constantly. The problem has been waxing and waning. Pertinent negatives include no abdominal pain, anorexia, arthralgias, change in bowel habit, chest pain, chills, congestion, coughing, diaphoresis, fatigue, fever, headaches, joint swelling, myalgias, nausea, neck pain, numbness, rash, sore throat, swollen glands, urinary symptoms, vertigo, visual change, vomiting or weakness. The symptoms are aggravated by walking. He has tried nothing for the symptoms.       Constitution: Negative for chills, sweating, fatigue and fever.   HENT: Negative for congestion and sore throat.    Neck: Negative for neck pain.   Cardiovascular: Negative for chest pain.   Respiratory: Negative for cough.    Gastrointestinal: Negative for abdominal pain, nausea and vomiting. "   Musculoskeletal: Negative for joint pain, joint swelling and muscle ache.   Skin: Positive for wound, abrasion and erythema. Negative for rash.   Allergic/Immunologic: Positive for immunocompromised state.   Neurological: Negative for history of vertigo, headaches and numbness.       Objective:      Physical Exam   Constitutional: He is oriented to person, place, and time.  Non-toxic appearance. No distress.   HENT:   Head: Atraumatic.   Ears:   Right Ear: External ear normal.   Left Ear: External ear normal.   Mouth/Throat: Mucous membranes are moist.   Eyes: Conjunctivae are normal. No scleral icterus.   Neck: Neck supple.   Cardiovascular: Normal rate.   Pulmonary/Chest: Effort normal. No respiratory distress.   Musculoskeletal:      Right foot: Normal range of motion and normal capillary refill. Tenderness present.      Left foot: Normal range of motion and normal capillary refill. Tenderness present.        Feet:    Neurological: He is alert and oriented to person, place, and time.   Skin: Skin is warm, dry and not diaphoretic. erythema   Psychiatric: His behavior is normal. Mood, judgment and thought content normal.   Nursing note and vitals reviewed.        Assessment:       1. Abrasion of left foot, initial encounter    2. Type 2 diabetes mellitus with other skin complication, with long-term current use of insulin          Plan:         Abrasion of left foot, initial encounter  -     mupirocin (BACTROBAN) 2 % ointment; Apply to affected area 3 times daily  Dispense: 22 g; Refill: 1  -     cephALEXin (KEFLEX) 500 MG capsule; Take 1 capsule (500 mg total) by mouth every 8 (eight) hours. for 7 days  Dispense: 21 capsule; Refill: 0    Type 2 diabetes mellitus with other skin complication, with long-term current use of insulin  -     mupirocin (BACTROBAN) 2 % ointment; Apply to affected area 3 times daily  Dispense: 22 g; Refill: 1  -     cephALEXin (KEFLEX) 500 MG capsule; Take 1 capsule (500 mg total) by  mouth every 8 (eight) hours. for 7 days  Dispense: 21 capsule; Refill: 0           Medical Decision Making:   History:   Old Medical Records: I decided to obtain old medical records.  Old Records Summarized: records from clinic visits and other records.  Urgent Care Management:  Pt presenting with abrasion to left foot after trying to shave down callus on left foot. Tenderness to palpate. Also tenderness to right foot, mild erythema, without abrasion. Ambulating without assistance. Pt diabetic with multiple comorbidities.  Wound was cleansed with Hibiclens and sterile water, Bactroban ointment and dressing applied.  A prescription for Bactroban provided. Wound care instructions given. Pt also given a Rx for Keflex. Instructed to begin if symptoms worsened or any signs of infection. He has an appointment with Podiatry next week and will follow-up as scheduled.

## 2022-06-25 NOTE — PATIENT INSTRUCTIONS
WOUND CARE/ABRASIONS    Wash the wound(s) gently daily with warm water and mild soap.      Next, apply Bactroban/mupirocin (if prescribed) or Vaseline/petroleum jelly, or Aquaphor.  Avoid Neosporin or bacitracin, as this can cause allergic reactions, making the wound itchy, red, and bumpy.      After application of ointment, bandage the wound using a topical non-stick dressing. This can be purchased over the counter. The wound may have weeping of clear fluid (exudates) which seeps through the non-stick pad, so place a layer of dry, absorbent gauze over this to handle any exudates.    This type of dressing should be removed and re-applied twice daily initially, then reduced to once daily when the weeping of clear fluid decreases.    Do not let the wound get dry and crusted.  Keeping it moist with the Vaseline/petroleum will help it heal faster.    Watch for signs or symptoms of infection such as increasing pain at the site of your wound; redness spreading around the wound edges (although a thin, light rim of redness around the wound edges initially can be normal); white, thick or foul-smelling discharge from the wound base; or unexplained fevers.  If you suspect these symptoms or have other unexplained symptoms or concerns, seek out consultation with a qualified health care professional immediately.    Once your wound is no longer oozing and raw, and shiny new pink healthy skin is visible, begin:  Gentle rubbing of the scar(s) with Vaseline/petroleum jelly to help the scar mature and flatten faster.  Silicone gel sheeting such as Curad Scar Therapy can be used if it is raised.  Leave this on for 24 hours per day (you can leave it off for an hour or so around bathing).  If you are not able to do this, then use it nightly.  Replace the pad with a new one when old one falls off.    As the wound heals, keep in mind:  In the first few weeks, you may not feel satisfied with the appearance of your wound.  Don't get too  concerned.  You won't see the final appearance of your scar for 6 months to a year, and it is very likely to fade and improve with time.    In the meantime, strict sun protection is essential. The sun can permanently darken scars.  Wear sun protective clothing and sunscreen which is at least SPF 30, broad spectrum (including UVA and UVB) with either zinc or titanium or both as active ingredients.  A thick layer and frequent application when in the sun for prolonged periods of time is essential.  You are also better off avoiding peak sun hours altogether!      When should you call for help?  Call your doctor immediately or seek medical care in an emergency department if:  Your wound is causing new pain, or the pain gets worse.  Some pain is normal with a wound, but do not ignore pain that is getting worse instead of better. You could have an infection.  The cut starts to bleed, and blood soaks through the bandage. Oozing small amounts of blood is normal.  The skin near the cut is cold or pale or changes color.  You have tingling, weakness, or numbness near the cut that we did not address during your visit.  You have trouble moving the area near the cut that we did not address during the visit.  You have increased pain, swelling, warmth, or redness around the cut, which could be a sign of infection.  There are red streaks leading from the cut, or there is pus draining from the cut.  You have a fever that you can't explain for another reason (like having a common cold).    What else do I need to know/do?  Being treated in an urgent care is only one step in your treatment. Even if you feel better, you still need to go to all suggested follow-up appointments and take medicines exactly as directed. This will help you recover and help prevent future problems.    Follow-up care is a key part of your treatment and safety.  Be sure to make and go to all appointments, and call your doctor if things are not going as  expected.  If you've been prescribed antibiotics, take them as directed.  Do not stop taking them just because you feel better. If you don't finish the course, you can breed resistant infections, which are harder to treat!    Take good care of your wound at home to help it heal quickly and reduce your chance of infection. While your wound is healing, avoid any activity that could cause your wound to reopen.  Use common sense when it comes to activities.  Also, avoid unnecessary bacteria exposure-for example, swimming in an ocean or hot tub, or wearing shoes or gloves over a wound for a long period of time (which promotes bacterial growth).

## 2022-06-28 ENCOUNTER — OFFICE VISIT (OUTPATIENT)
Dept: PODIATRY | Facility: CLINIC | Age: 72
End: 2022-06-28
Payer: MEDICARE

## 2022-06-28 VITALS
SYSTOLIC BLOOD PRESSURE: 147 MMHG | DIASTOLIC BLOOD PRESSURE: 75 MMHG | HEART RATE: 71 BPM | BODY MASS INDEX: 37.34 KG/M2 | WEIGHT: 252.88 LBS

## 2022-06-28 DIAGNOSIS — L84 CORN OR CALLUS: ICD-10-CM

## 2022-06-28 DIAGNOSIS — G58.9 NERVE ENTRAPMENT SYNDROME: ICD-10-CM

## 2022-06-28 DIAGNOSIS — E11.9 ENCOUNTER FOR COMPREHENSIVE DIABETIC FOOT EXAMINATION, TYPE 2 DIABETES MELLITUS: Primary | ICD-10-CM

## 2022-06-28 DIAGNOSIS — B35.1 ONYCHOMYCOSIS DUE TO DERMATOPHYTE: ICD-10-CM

## 2022-06-28 PROCEDURE — 3077F PR MOST RECENT SYSTOLIC BLOOD PRESSURE >= 140 MM HG: ICD-10-PCS | Mod: CPTII,S$GLB,, | Performed by: PODIATRIST

## 2022-06-28 PROCEDURE — 11056 PR TRIM BENIGN HYPERKERATOTIC SKIN LESION,2-4: ICD-10-PCS | Mod: Q9,S$GLB,, | Performed by: PODIATRIST

## 2022-06-28 PROCEDURE — 4010F ACE/ARB THERAPY RXD/TAKEN: CPT | Mod: CPTII,S$GLB,, | Performed by: PODIATRIST

## 2022-06-28 PROCEDURE — 99213 PR OFFICE/OUTPT VISIT, EST, LEVL III, 20-29 MIN: ICD-10-PCS | Mod: 25,S$GLB,, | Performed by: PODIATRIST

## 2022-06-28 PROCEDURE — 1159F MED LIST DOCD IN RCRD: CPT | Mod: CPTII,S$GLB,, | Performed by: PODIATRIST

## 2022-06-28 PROCEDURE — 11721 DEBRIDE NAIL 6 OR MORE: CPT | Mod: 59,Q9,S$GLB, | Performed by: PODIATRIST

## 2022-06-28 PROCEDURE — 3077F SYST BP >= 140 MM HG: CPT | Mod: CPTII,S$GLB,, | Performed by: PODIATRIST

## 2022-06-28 PROCEDURE — 4010F PR ACE/ARB THEARPY RXD/TAKEN: ICD-10-PCS | Mod: CPTII,S$GLB,, | Performed by: PODIATRIST

## 2022-06-28 PROCEDURE — 3008F PR BODY MASS INDEX (BMI) DOCUMENTED: ICD-10-PCS | Mod: CPTII,S$GLB,, | Performed by: PODIATRIST

## 2022-06-28 PROCEDURE — 3066F PR DOCUMENTATION OF TREATMENT FOR NEPHROPATHY: ICD-10-PCS | Mod: CPTII,S$GLB,, | Performed by: PODIATRIST

## 2022-06-28 PROCEDURE — 1159F PR MEDICATION LIST DOCUMENTED IN MEDICAL RECORD: ICD-10-PCS | Mod: CPTII,S$GLB,, | Performed by: PODIATRIST

## 2022-06-28 PROCEDURE — 11721 PR DEBRIDEMENT OF NAILS, 6 OR MORE: ICD-10-PCS | Mod: 59,Q9,S$GLB, | Performed by: PODIATRIST

## 2022-06-28 PROCEDURE — 3008F BODY MASS INDEX DOCD: CPT | Mod: CPTII,S$GLB,, | Performed by: PODIATRIST

## 2022-06-28 PROCEDURE — 1125F PR PAIN SEVERITY QUANTIFIED, PAIN PRESENT: ICD-10-PCS | Mod: CPTII,S$GLB,, | Performed by: PODIATRIST

## 2022-06-28 PROCEDURE — 11056 PARNG/CUTG B9 HYPRKR LES 2-4: CPT | Mod: Q9,S$GLB,, | Performed by: PODIATRIST

## 2022-06-28 PROCEDURE — 1125F AMNT PAIN NOTED PAIN PRSNT: CPT | Mod: CPTII,S$GLB,, | Performed by: PODIATRIST

## 2022-06-28 PROCEDURE — 3066F NEPHROPATHY DOC TX: CPT | Mod: CPTII,S$GLB,, | Performed by: PODIATRIST

## 2022-06-28 PROCEDURE — 3052F PR MOST RECENT HEMOGLOBIN A1C LEVEL 8.0 - < 9.0%: ICD-10-PCS | Mod: CPTII,S$GLB,, | Performed by: PODIATRIST

## 2022-06-28 PROCEDURE — 3052F HG A1C>EQUAL 8.0%<EQUAL 9.0%: CPT | Mod: CPTII,S$GLB,, | Performed by: PODIATRIST

## 2022-06-28 PROCEDURE — 3078F DIAST BP <80 MM HG: CPT | Mod: CPTII,S$GLB,, | Performed by: PODIATRIST

## 2022-06-28 PROCEDURE — 99999 PR PBB SHADOW E&M-EST. PATIENT-LVL II: CPT | Mod: PBBFAC,,, | Performed by: PODIATRIST

## 2022-06-28 PROCEDURE — 99213 OFFICE O/P EST LOW 20 MIN: CPT | Mod: 25,S$GLB,, | Performed by: PODIATRIST

## 2022-06-28 PROCEDURE — 64450 NJX AA&/STRD OTHER PN/BRANCH: CPT | Mod: 59,50,S$GLB, | Performed by: PODIATRIST

## 2022-06-28 PROCEDURE — 3078F PR MOST RECENT DIASTOLIC BLOOD PRESSURE < 80 MM HG: ICD-10-PCS | Mod: CPTII,S$GLB,, | Performed by: PODIATRIST

## 2022-06-28 PROCEDURE — 99999 PR PBB SHADOW E&M-EST. PATIENT-LVL II: ICD-10-PCS | Mod: PBBFAC,,, | Performed by: PODIATRIST

## 2022-06-28 PROCEDURE — 64450 PR NERVE BLOCK INJ, ANES/STEROID, OTHER PERIPHERAL: ICD-10-PCS | Mod: 59,50,S$GLB, | Performed by: PODIATRIST

## 2022-07-03 NOTE — PROGRESS NOTES
Subjective:      Patient ID: Walter Bull is a 71 y.o. male.    Chief Complaint: Diabetes Mellitus, Diabetic Foot Exam, and Wound Care (Left foot cut)    Walter is a 71 y.o. male who presents to the clinic upon referral from Dr. Nelly bermeo. provider found  for evaluation and treatment of diabetic feet. Walter has a past medical history of Anemia, Angina pectoris, Anticoagulant long-term use, Arthritis, Back pain, CHF (congestive heart failure), Chronic bronchitis, Colon cancer screening (2/2/2017), Coronary artery disease, Diabetes mellitus type I, Diabetes with neurologic complications, Disorder of kidney and ureter, Encounter for blood transfusion, Glaucoma, Heart attack, Heart disease, Hyperlipidemia, Hypertension, Hypothyroidism, Iron deficiency, Kidney failure, Obesity, Peripheral vascular disease, Polyneuropathy, Pulmonary emphysema (2/26/2020), Renal manifestation of secondary diabetes mellitus, 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  Would like to discuss topical wound treatment options for dry skin.  In addition, he is having increased burning to both feet specifically the bottom of both feet and several toes.  This is worse at night.   PCP: Jerri Griffiths MD    Date Last Seen by PCP: dr jerri griffiths05/21/2019    Current shoe gear: Tennis shoes    Hemoglobin A1C   Date Value Ref Range Status   05/10/2022 8.0 (H) 4.0 - 5.6 % Final     Comment:     ADA Screening Guidelines:  5.7-6.4%  Consistent with prediabetes  >or=6.5%  Consistent with diabetes    High levels of fetal hemoglobin interfere with the HbA1C  assay. Heterozygous hemoglobin variants (HbS, HgC, etc)do  not significantly interfere with this assay.   However, presence of multiple variants may affect accuracy.     11/09/2021 6.8 (H) 4.0 - 5.6 % Final     Comment:     ADA Screening Guidelines:  5.7-6.4%  Consistent with prediabetes  >or=6.5%  Consistent with  diabetes    High levels of fetal hemoglobin interfere with the HbA1C  assay. Heterozygous hemoglobin variants (HbS, HgC, etc)do  not significantly interfere with this assay.   However, presence of multiple variants may affect accuracy.     09/27/2021 7.4 (H) 4.0 - 5.6 % Final     Comment:     ADA Screening Guidelines:  5.7-6.4%  Consistent with prediabetes  >or=6.5%  Consistent with diabetes    High levels of fetal hemoglobin interfere with the HbA1C  assay. Heterozygous hemoglobin variants (HbS, HgC, etc)do  not significantly interfere with this assay.   However, presence of multiple variants may affect accuracy.             Review of Systems   Constitutional: Negative for chills, fever and malaise/fatigue.   HENT: Negative for hearing loss.    Cardiovascular: Negative for claudication.   Respiratory: Negative for shortness of breath.    Skin: Negative for dry skin, flushing and rash.   Musculoskeletal: Negative for joint pain and myalgias.   Neurological: Negative for loss of balance, numbness, paresthesias and sensory change.   Psychiatric/Behavioral: Negative for altered mental status.           Objective:      Physical Exam  Vitals reviewed.   Constitutional:       Appearance: He is well-developed.   HENT:      Head: Normocephalic and atraumatic.   Cardiovascular:      Pulses:           Dorsalis pedis pulses are 2+ on the right side and 2+ on the left side.        Posterior tibial pulses are 2+ on the right side and 2+ on the left side.      Comments: No edema noted to b/L LEs  Pulmonary:      Effort: Pulmonary effort is normal.   Musculoskeletal:         General: Normal range of motion.      Comments: Adequate joint ROM noted to all lower extremity muscle groups with no pain or crepitation noted. Muscle strength is 5/5 in all groups bilaterally.     Feet:      Right foot:      Protective Sensation: 5 sites tested. 5 sites sensed.      Skin integrity: Callus and dry skin present.      Left foot:      Protective  Sensation: 5 sites tested. 5 sites sensed.      Skin integrity: Callus and dry skin present.   Skin:     General: Skin is warm and dry.      Capillary Refill: Capillary refill takes 2 to 3 seconds.      Coloration: Skin is pale.      Comments: Xerosis improved. No open lesion noted b/L  Skin temp is warm to warm from proximal to distal b/L.  Webspaces clean, dry, and intact  Nails x10 short   Neurological:      Mental Status: He is alert and oriented to person, place, and time.      Sensory: Sensory deficit present.      Motor: Atrophy present.      Deep Tendon Reflexes: Reflexes abnormal.      Reflex Scores:       Patellar reflexes are 1+ on the right side and 1+ on the left side.       Achilles reflexes are 1+ on the right side and 1+ on the left side.     Comments: Intact gross sensation noted to b/L LEs    There is pain on palpation of the bilateral 3rd  intermetatarsal space with a positive Nikkie's click. Minimal tenderness to palpation of the adjacent metatarsal heads.     Psychiatric:         Behavior: Behavior normal.               Assessment:       Encounter Diagnoses   Name Primary?    Encounter for comprehensive diabetic foot examination, type 2 diabetes mellitus Yes    Corn or callus     Onychomycosis due to dermatophyte          Plan:       Walter was seen today for diabetes mellitus, diabetic foot exam and wound care.    Diagnoses and all orders for this visit:    Encounter for comprehensive diabetic foot examination, type 2 diabetes mellitus    Corn or callus    Onychomycosis due to dermatophyte      I counseled the patient on his conditions, their implications and medical management.    Discussed options for peripheral neuropathy/nerve entrapment syndrome including nerve block therapy, surgical nerve entrapment decompression procedures, and various vitamins and supplementation available shown to improve nerve function.    Shoe inspection. Diabetic Foot Education. Patient reminded of the importance  of good nutrition and blood sugar control to help prevent podiatric complications of diabetes. Patient instructed on proper foot hygeine. We discussed wearing proper shoe gear, daily foot inspections and Diabetic foot education in detail.    The area overlying the bilateral tarsal tunnel and deep peroneal nerve(s) was sterilely prepped, verbal consent was obtained, 2 cc's of 0.5% Marcaine plain was infiltrated around the affected nerve(s) for a diagnostic nerve block.  This was well tolerated with no complications.    Routine Foot Care    Performed by:  Jonnathan Kenney. DPM  Authorized by:  Patient     Consent Done?:  Yes (Verbal)     Nail Care Type:  Debride  Location(s): All  (Left 1st Toe, Left 3rd Toe, Left 2nd Toe, Left 4th Toe, Left 5th Toe, Right 1st Toe, Right 2nd Toe, Right 3rd Toe, Right 4th Toe and Right 5th Toe)  Patient tolerance:  Patient tolerated the procedure well with no immediate complications     With patient's permission, the toenails mentioned above were aggressively reduced and debrided using a nail nipper, removing all offending nail and debris. The patient will continue to monitor the areas daily, inspect the feet, wear protective shoe gear when ambulatory, and moisturizer to maintain skin integrity.      Callus Care Type: Debride    With patient's permission, the calluses/hyperkeratotic lesions mentioned above were aggressively reduced and debrided using a number 15 blade. The patient will continue to monitor the areas daily, inspect the feet, wear protective shoe gear when ambulatory, and moisturizer to maintain skin integrity.     Return to clinic in 3-6 months or sooner if problems arise

## 2022-07-06 ENCOUNTER — CLINICAL SUPPORT (OUTPATIENT)
Dept: CARDIOLOGY | Facility: HOSPITAL | Age: 72
End: 2022-07-06
Payer: MEDICARE

## 2022-07-06 DIAGNOSIS — Z95.810 PRESENCE OF AUTOMATIC (IMPLANTABLE) CARDIAC DEFIBRILLATOR: ICD-10-CM

## 2022-07-06 DIAGNOSIS — I25.5 ISCHEMIC CARDIOMYOPATHY: ICD-10-CM

## 2022-07-06 DIAGNOSIS — I50.42 CHRONIC COMBINED SYSTOLIC (CONGESTIVE) AND DIASTOLIC (CONGESTIVE) HEART FAILURE: ICD-10-CM

## 2022-07-06 DIAGNOSIS — I47.20 VENTRICULAR TACHYCARDIA: ICD-10-CM

## 2022-07-06 PROCEDURE — 93296 REM INTERROG EVL PM/IDS: CPT | Performed by: INTERNAL MEDICINE

## 2022-07-08 ENCOUNTER — LAB VISIT (OUTPATIENT)
Dept: LAB | Facility: HOSPITAL | Age: 72
End: 2022-07-08
Attending: NURSE PRACTITIONER
Payer: MEDICARE

## 2022-07-08 DIAGNOSIS — N18.32 STAGE 3B CHRONIC KIDNEY DISEASE: ICD-10-CM

## 2022-07-08 LAB
25(OH)D3+25(OH)D2 SERPL-MCNC: 28 NG/ML (ref 30–96)
ALBUMIN SERPL BCP-MCNC: 3.4 G/DL (ref 3.5–5.2)
ANION GAP SERPL CALC-SCNC: 9 MMOL/L (ref 8–16)
BASOPHILS # BLD AUTO: 0.06 K/UL (ref 0–0.2)
BASOPHILS NFR BLD: 0.8 % (ref 0–1.9)
BILIRUB UR QL STRIP: NEGATIVE
BUN SERPL-MCNC: 17 MG/DL (ref 8–23)
CALCIUM SERPL-MCNC: 9.1 MG/DL (ref 8.7–10.5)
CHLORIDE SERPL-SCNC: 104 MMOL/L (ref 95–110)
CLARITY UR: CLEAR
CO2 SERPL-SCNC: 26 MMOL/L (ref 23–29)
COLOR UR: YELLOW
CREAT SERPL-MCNC: 1.4 MG/DL (ref 0.5–1.4)
CREAT UR-MCNC: 158.8 MG/DL (ref 23–375)
DIFFERENTIAL METHOD: ABNORMAL
EOSINOPHIL # BLD AUTO: 0.4 K/UL (ref 0–0.5)
EOSINOPHIL NFR BLD: 5.6 % (ref 0–8)
ERYTHROCYTE [DISTWIDTH] IN BLOOD BY AUTOMATED COUNT: 14.3 % (ref 11.5–14.5)
EST. GFR  (AFRICAN AMERICAN): 57 ML/MIN/1.73 M^2
EST. GFR  (NON AFRICAN AMERICAN): 49 ML/MIN/1.73 M^2
GLUCOSE SERPL-MCNC: 93 MG/DL (ref 70–110)
GLUCOSE UR QL STRIP: NEGATIVE
HCT VFR BLD AUTO: 36.6 % (ref 40–54)
HGB BLD-MCNC: 11.9 G/DL (ref 14–18)
HGB UR QL STRIP: NEGATIVE
IMM GRANULOCYTES # BLD AUTO: 0.03 K/UL (ref 0–0.04)
IMM GRANULOCYTES NFR BLD AUTO: 0.4 % (ref 0–0.5)
KETONES UR QL STRIP: NEGATIVE
LEUKOCYTE ESTERASE UR QL STRIP: NEGATIVE
LYMPHOCYTES # BLD AUTO: 2.5 K/UL (ref 1–4.8)
LYMPHOCYTES NFR BLD: 35.4 % (ref 18–48)
MCH RBC QN AUTO: 30.7 PG (ref 27–31)
MCHC RBC AUTO-ENTMCNC: 32.5 G/DL (ref 32–36)
MCV RBC AUTO: 94 FL (ref 82–98)
MONOCYTES # BLD AUTO: 0.6 K/UL (ref 0.3–1)
MONOCYTES NFR BLD: 8.9 % (ref 4–15)
NEUTROPHILS # BLD AUTO: 3.5 K/UL (ref 1.8–7.7)
NEUTROPHILS NFR BLD: 48.9 % (ref 38–73)
NITRITE UR QL STRIP: NEGATIVE
NRBC BLD-RTO: 0 /100 WBC
PH UR STRIP: 7 [PH] (ref 5–8)
PHOSPHATE SERPL-MCNC: 3.6 MG/DL (ref 2.7–4.5)
PLATELET # BLD AUTO: 181 K/UL (ref 150–450)
PMV BLD AUTO: 9.8 FL (ref 9.2–12.9)
POTASSIUM SERPL-SCNC: 4.4 MMOL/L (ref 3.5–5.1)
PROT UR QL STRIP: NEGATIVE
PROT UR-MCNC: 9 MG/DL (ref 0–15)
PROT/CREAT UR: 0.06 MG/G{CREAT} (ref 0–0.2)
PTH-INTACT SERPL-MCNC: 115 PG/ML (ref 9–77)
RBC # BLD AUTO: 3.88 M/UL (ref 4.6–6.2)
SODIUM SERPL-SCNC: 139 MMOL/L (ref 136–145)
SP GR UR STRIP: 1.02 (ref 1–1.03)
URN SPEC COLLECT METH UR: NORMAL
UROBILINOGEN UR STRIP-ACNC: NEGATIVE EU/DL
WBC # BLD AUTO: 7.17 K/UL (ref 3.9–12.7)

## 2022-07-08 PROCEDURE — 81003 URINALYSIS AUTO W/O SCOPE: CPT | Performed by: NURSE PRACTITIONER

## 2022-07-08 PROCEDURE — 82570 ASSAY OF URINE CREATININE: CPT | Performed by: NURSE PRACTITIONER

## 2022-07-08 PROCEDURE — 36415 COLL VENOUS BLD VENIPUNCTURE: CPT | Performed by: NURSE PRACTITIONER

## 2022-07-08 PROCEDURE — 83970 ASSAY OF PARATHORMONE: CPT | Performed by: NURSE PRACTITIONER

## 2022-07-08 PROCEDURE — 82306 VITAMIN D 25 HYDROXY: CPT | Performed by: NURSE PRACTITIONER

## 2022-07-08 PROCEDURE — 85025 COMPLETE CBC W/AUTO DIFF WBC: CPT | Performed by: NURSE PRACTITIONER

## 2022-07-08 PROCEDURE — 80069 RENAL FUNCTION PANEL: CPT | Performed by: NURSE PRACTITIONER

## 2022-07-11 ENCOUNTER — PATIENT MESSAGE (OUTPATIENT)
Dept: ADMINISTRATIVE | Facility: HOSPITAL | Age: 72
End: 2022-07-11
Payer: MEDICARE

## 2022-07-13 ENCOUNTER — TELEPHONE (OUTPATIENT)
Dept: NEPHROLOGY | Facility: CLINIC | Age: 72
End: 2022-07-13
Payer: MEDICARE

## 2022-07-15 ENCOUNTER — TELEPHONE (OUTPATIENT)
Dept: ELECTROPHYSIOLOGY | Facility: CLINIC | Age: 72
End: 2022-07-15
Payer: MEDICARE

## 2022-07-15 NOTE — TELEPHONE ENCOUNTER
Recommendations for NSRVOS received from [facility]    See Below  Will forward to Dr. Glasgow for approval.   Pt has an upcoming appt 8/8/22

## 2022-07-18 ENCOUNTER — TELEPHONE (OUTPATIENT)
Dept: NEPHROLOGY | Facility: CLINIC | Age: 72
End: 2022-07-18
Payer: MEDICARE

## 2022-07-18 NOTE — TELEPHONE ENCOUNTER
[facility] Staff  Caller: Luis [phone] (Today,  8:24 AM)  Calling to schedule three month follow up appointment. Please all and schedule.

## 2022-07-28 ENCOUNTER — TELEPHONE (OUTPATIENT)
Dept: INTERNAL MEDICINE | Facility: CLINIC | Age: 72
End: 2022-07-28
Payer: MEDICARE

## 2022-07-28 RX ORDER — FUROSEMIDE 20 MG/1
20 TABLET ORAL DAILY
Qty: 90 TABLET | Refills: 3 | Status: SHIPPED | OUTPATIENT
Start: 2022-07-28 | End: 2022-08-16

## 2022-07-28 NOTE — TELEPHONE ENCOUNTER
"----- Message from Mariza Wu sent at 2022 12:02 PM CDT -----  Regarding: Rx Refill  Contact: self  Walter Bull  MRN: [id number]  : 1950  PCP: Belkys Kruger  Home Phone      [phone]  Work Phone      Not on file.  Mobile          [phone]  Home Phone      [phone]  Mobile          [phone]      MESSAGE:   Rx Refill - "fluid medication" Patient stated he does not know the name of his medication.   Patient is out of medication.     Phone #   [phone]    Pharmacy -  [facility] [address] [place] [address]    "

## 2022-08-03 ENCOUNTER — OFFICE VISIT (OUTPATIENT)
Dept: NEPHROLOGY | Facility: CLINIC | Age: 72
End: 2022-08-03
Payer: MEDICARE

## 2022-08-03 VITALS
HEART RATE: 60 BPM | SYSTOLIC BLOOD PRESSURE: 145 MMHG | OXYGEN SATURATION: 98 % | DIASTOLIC BLOOD PRESSURE: 81 MMHG | BODY MASS INDEX: 36.44 KG/M2 | WEIGHT: 246.06 LBS | HEIGHT: 69 IN

## 2022-08-03 DIAGNOSIS — N18.32 STAGE 3B CHRONIC KIDNEY DISEASE: Primary | ICD-10-CM

## 2022-08-03 PROCEDURE — 4010F PR ACE/ARB THEARPY RXD/TAKEN: ICD-10-PCS | Mod: CPTII,S$GLB,, | Performed by: NURSE PRACTITIONER

## 2022-08-03 PROCEDURE — 99215 PR OFFICE/OUTPT VISIT, EST, LEVL V, 40-54 MIN: ICD-10-PCS | Mod: S$GLB,,, | Performed by: NURSE PRACTITIONER

## 2022-08-03 PROCEDURE — 1159F PR MEDICATION LIST DOCUMENTED IN MEDICAL RECORD: ICD-10-PCS | Mod: CPTII,S$GLB,, | Performed by: NURSE PRACTITIONER

## 2022-08-03 PROCEDURE — 3008F BODY MASS INDEX DOCD: CPT | Mod: CPTII,S$GLB,, | Performed by: NURSE PRACTITIONER

## 2022-08-03 PROCEDURE — 1159F MED LIST DOCD IN RCRD: CPT | Mod: CPTII,S$GLB,, | Performed by: NURSE PRACTITIONER

## 2022-08-03 PROCEDURE — 1101F PT FALLS ASSESS-DOCD LE1/YR: CPT | Mod: CPTII,S$GLB,, | Performed by: NURSE PRACTITIONER

## 2022-08-03 PROCEDURE — 99999 PR PBB SHADOW E&M-EST. PATIENT-LVL V: CPT | Mod: PBBFAC,,, | Performed by: NURSE PRACTITIONER

## 2022-08-03 PROCEDURE — 3052F HG A1C>EQUAL 8.0%<EQUAL 9.0%: CPT | Mod: CPTII,S$GLB,, | Performed by: NURSE PRACTITIONER

## 2022-08-03 PROCEDURE — 3288F FALL RISK ASSESSMENT DOCD: CPT | Mod: CPTII,S$GLB,, | Performed by: NURSE PRACTITIONER

## 2022-08-03 PROCEDURE — 1160F PR REVIEW ALL MEDS BY PRESCRIBER/CLIN PHARMACIST DOCUMENTED: ICD-10-PCS | Mod: CPTII,S$GLB,, | Performed by: NURSE PRACTITIONER

## 2022-08-03 PROCEDURE — 3079F PR MOST RECENT DIASTOLIC BLOOD PRESSURE 80-89 MM HG: ICD-10-PCS | Mod: CPTII,S$GLB,, | Performed by: NURSE PRACTITIONER

## 2022-08-03 PROCEDURE — 1125F AMNT PAIN NOTED PAIN PRSNT: CPT | Mod: CPTII,S$GLB,, | Performed by: NURSE PRACTITIONER

## 2022-08-03 PROCEDURE — 3079F DIAST BP 80-89 MM HG: CPT | Mod: CPTII,S$GLB,, | Performed by: NURSE PRACTITIONER

## 2022-08-03 PROCEDURE — 3077F PR MOST RECENT SYSTOLIC BLOOD PRESSURE >= 140 MM HG: ICD-10-PCS | Mod: CPTII,S$GLB,, | Performed by: NURSE PRACTITIONER

## 2022-08-03 PROCEDURE — 3077F SYST BP >= 140 MM HG: CPT | Mod: CPTII,S$GLB,, | Performed by: NURSE PRACTITIONER

## 2022-08-03 PROCEDURE — 1125F PR PAIN SEVERITY QUANTIFIED, PAIN PRESENT: ICD-10-PCS | Mod: CPTII,S$GLB,, | Performed by: NURSE PRACTITIONER

## 2022-08-03 PROCEDURE — 3008F PR BODY MASS INDEX (BMI) DOCUMENTED: ICD-10-PCS | Mod: CPTII,S$GLB,, | Performed by: NURSE PRACTITIONER

## 2022-08-03 PROCEDURE — 3052F PR MOST RECENT HEMOGLOBIN A1C LEVEL 8.0 - < 9.0%: ICD-10-PCS | Mod: CPTII,S$GLB,, | Performed by: NURSE PRACTITIONER

## 2022-08-03 PROCEDURE — 99499 UNLISTED E&M SERVICE: CPT | Mod: S$GLB,,, | Performed by: NURSE PRACTITIONER

## 2022-08-03 PROCEDURE — 4010F ACE/ARB THERAPY RXD/TAKEN: CPT | Mod: CPTII,S$GLB,, | Performed by: NURSE PRACTITIONER

## 2022-08-03 PROCEDURE — 3288F PR FALLS RISK ASSESSMENT DOCUMENTED: ICD-10-PCS | Mod: CPTII,S$GLB,, | Performed by: NURSE PRACTITIONER

## 2022-08-03 PROCEDURE — 3066F NEPHROPATHY DOC TX: CPT | Mod: CPTII,S$GLB,, | Performed by: NURSE PRACTITIONER

## 2022-08-03 PROCEDURE — 99999 PR PBB SHADOW E&M-EST. PATIENT-LVL V: ICD-10-PCS | Mod: PBBFAC,,, | Performed by: NURSE PRACTITIONER

## 2022-08-03 PROCEDURE — 1160F RVW MEDS BY RX/DR IN RCRD: CPT | Mod: CPTII,S$GLB,, | Performed by: NURSE PRACTITIONER

## 2022-08-03 PROCEDURE — 99499 RISK ADDL DX/OHS AUDIT: ICD-10-PCS | Mod: S$GLB,,, | Performed by: NURSE PRACTITIONER

## 2022-08-03 PROCEDURE — 1101F PR PT FALLS ASSESS DOC 0-1 FALLS W/OUT INJ PAST YR: ICD-10-PCS | Mod: CPTII,S$GLB,, | Performed by: NURSE PRACTITIONER

## 2022-08-03 PROCEDURE — 99215 OFFICE O/P EST HI 40 MIN: CPT | Mod: S$GLB,,, | Performed by: NURSE PRACTITIONER

## 2022-08-03 PROCEDURE — 3066F PR DOCUMENTATION OF TREATMENT FOR NEPHROPATHY: ICD-10-PCS | Mod: CPTII,S$GLB,, | Performed by: NURSE PRACTITIONER

## 2022-08-03 NOTE — PROGRESS NOTES
"Subjective:       Patient ID: Walter Bull is a 71 y.o. AA male who presents for new evaluation of       HPI     Patient is new to me. Last seen by Dr. Burroughs in 2017. Lost to f/u and returning today as a new patient.  Prior pertinent chart reviewed since this is patient's first appointment with me. Presents with friendEve.    Patient presents for new evaluation of CKD.  Baseline creatinine of 1.5-1.6 mg/dL. Had HARRIS in May 2021 c max sCr of 2.1 (thought to be 2/2 volume depletion), but now back to baseline.     Recently admitted c orthostatic hypotension; BP meds decreased. Still with "a little bit" of dizziness, usually when he has not drank enough water; worse with position changes. Tries to drink about 64 oz of water a day + electrolyte drinks. Works outside a lot and sweats in the heat.    Significant hx includes pulmonary emphysema, HTN for at least 17 years, s/p CABG x 5 (complicated by HARRIS not requiring dialysis), CAD, combined systolic and diastolic HF, T2DM since 1980s, GERD, fatty liver  Surgical hx: CABG x 5, AICD/PM    The patient denies taking NSAIDs, herbal supplements, or new antibiotics, recreational drugs, recent episode of  diarrhea, nausea or vomiting, or exposure to IV radiocontrast. On PPI.    Significant family hx includes: daughter (DM) c kidney transplant; no other known familial kidney issues    Last renal US: Oct. 2019, reviewed.    Update 4/12/22:  Returns for f/u of CKD. Last seen September 2021. Presents with friendStarr.  Baseline creatinine of 1.5-1.6 mg/dL vs. 1.3-1.4. Recent sCr of 1.8.  Home BPs: SBP 120s-130s  AICD implanted 4/6/22 (switched from 2 wire to 3 wire).  Pt had been having NYHA Class II HF symptoms prior to deciding to change PM alone to AICD. Had also been having chest pain with exertion sometimes.  Spironolactone and furosemide (daily vs PRN) restarted 2/22/22  Denies NSAIDs.    Update 8/3/22:  Returns for f/u of CKD.  Baseline creatinine of 1.5-1.6 " "mg/dL vs. 1.3-1.4.   Had HARRIS c sCr up to 2.1 in May 2022. Carvedilol decreased.   Home BPs: SBP 120s-130s  Denies NSAID use.  Had chills and fatigue last week x 3-4 days. Had fevers too. Feels better now. Did not get tested for covid.    Review of Systems   Respiratory: Negative for shortness of breath.    Cardiovascular: Positive for chest pain (x 2-3 weeks). Negative for leg swelling.   Gastrointestinal: Negative for diarrhea, nausea and vomiting.   Genitourinary: Negative for difficulty urinating, dysuria, frequency, hematuria and urgency.   Neurological: Negative for dizziness.       Objective:       Blood pressure (!) 145/81, pulse 60, height 5' 9" (1.753 m), weight 111.6 kg (246 lb 0.5 oz), SpO2 98 %.  Physical Exam  Vitals reviewed.   Constitutional:       General: He is not in acute distress.     Appearance: Normal appearance. He is well-developed. He is not diaphoretic.   Eyes:      Conjunctiva/sclera: Conjunctivae normal.   Cardiovascular:      Rate and Rhythm: Normal rate and regular rhythm.      Heart sounds: Normal heart sounds. No murmur heard.    No friction rub. No gallop.   Pulmonary:      Effort: Pulmonary effort is normal. No respiratory distress.      Breath sounds: Normal breath sounds. No wheezing or rales.   Abdominal:      Tenderness: There is no right CVA tenderness or left CVA tenderness.   Musculoskeletal:      Cervical back: Neck supple.      Right lower leg: No edema.      Left lower leg: No edema.   Skin:     General: Skin is warm and dry.      Findings: No lesion or rash.   Neurological:      Mental Status: He is alert and oriented to person, place, and time.   Psychiatric:         Mood and Affect: Mood normal.         Behavior: Behavior normal.         Thought Content: Thought content normal.         Judgment: Judgment normal.           Lab Results   Component Value Date    CREATININE 1.4 07/08/2022     Prot/Creat Ratio, Urine   Date Value Ref Range Status   07/08/2022 0.06 0.00 - " 0.20 Final   04/12/2022 0.07 0.00 - 0.20 Final   09/27/2021 0.08 0.00 - 0.20 Final   09/27/2021 0.08 0.00 - 0.20 Final   09/27/2021 0.08 0.00 - 0.20 Final     Lab Results   Component Value Date     07/08/2022    K 4.4 07/08/2022    CO2 26 07/08/2022     07/08/2022     Lab Results   Component Value Date    .0 (H) 07/08/2022    CALCIUM 9.1 07/08/2022    PHOS 3.6 07/08/2022     Lab Results   Component Value Date    HGB 11.9 (L) 07/08/2022    WBC 7.17 07/08/2022    HCT 36.6 (L) 07/08/2022      Lab Results   Component Value Date    HGBA1C 8.0 (H) 05/10/2022     07/08/2022    BUN 17 07/08/2022     Lab Results   Component Value Date    LDLCALC 81.8 05/10/2022         Assessment:       1. Stage 3b chronic kidney disease        Plan:   CKD stage 3A c eGFR 53 mL/min - likely 2/2 longstanding HTN and atherosclerotic disease. DM could be contributing but no retinopathy or proteinuria. Slight rise in sCr could be r/t recent cardiac issues; increased diuretic use could contribute, but he had sCr near previous baseline after starting the daily diuretics.  Will repeat labs today.    Previously c AKIs c dehydration episodes.  Educated patient to control BP, BG, remain well-hydrated, and avoid NSAIDs to prevent progression of CKD.  Discussed the importance of staying hydrated when working outside in the heat and trying to go inside during the hottest part of the day.    Discussed the need to balance cardiac needs with renal needs.  May sacrifice some kidney function to keep the cardiac diseases controlled.     UPCR No protein in urine.   Acid-base WNL   Renal osteodystrophy Ca, phos okay. PTH slightly elevated. Vit D slightly low.   Anemia At goal for CKD. On iron tablets.   DM A1c above goal. No documented retinopathy.   Lipid Management On statin.   ESRD planning Anticipatory guidance provided about timing of dialysis. Start discussions and planning when eGFR is about 20 mL/min; most patients start  dialysis between 5-10 mL/min.     HTN - WNL on carvedilol 3.125 mg BID, furosemide 20 mg daily, spironolactone 25 mg  - Says he has been of ARB for a while  - Monitor home BPs BID x 1 week and send results    Chest pain - More frequent lately. Resolves with nitro. Will message cardiologist.    All questions patient had were answered.  Asked if further questions. None. F/u in clinic in 4-5 mos with labs and urine prior to next visit or sooner if needed.  ER for emergency concerns.    Summary of Plan:  1. Monitor BPs BID x 1 week and send in results; may resume ARB  2. avoid NSAID/ bactrim/ IV contrast/ gadolinium/ aminoglycoside where possible  3. RTC in 4-5 mos    47 minutes of total time spent on the encounter, which includes face to face time and non-face to face time preparing to see the patient (eg, review of tests), Obtaining and/or reviewing separately obtained history, documenting clinical information in the electronic or other health record, independently interpreting results (not separately reported) and communicating results to the patient/family/caregiver, or Care coordination (not separately reported).

## 2022-08-03 NOTE — PATIENT INSTRUCTIONS
Message me with vitamin D dose.    Take blood pressures at home twice a day before medications. Send them to me over portal in 1 week.       A&O x3, repeats answers, refers to wife for reassurance  some forgetfulness at times per daughter but patient is aware of todays events and still signs his own consents as he is oriented

## 2022-08-03 NOTE — Clinical Note
Padma [place], [place] patient. Reported increased frequency of chest pain episodes x 2-3 weeks. Resolves c nitro x 1 usually. Sometimes having multiple episodes per day. Just wanted to loop you in in case you wanted to see him sooner.  Thanks, Belkys

## 2022-08-05 NOTE — PROGRESS NOTES
Mr. Bull is a patient of Dr. Glasgow and was last seen in clinic 4/4/2022.      Subjective:   Patient ID:  Walter Bull is a 71 y.o. male who presents for follow-up of CRT-D  .     HPI:    Mr. Bull is a 71 y.o. male with CAD (CABG 2005, PCI 2018), CKD, HTN, HLD, DM, LBBB, MMVT, ICM here for follow up after CRT-D upgrade.      Background:      69 y/o man with prior 5v CABG (2005) (LIMA to LAD, SVG to Diag), known  to RCA and LCx and recent admission to outside hospital with NSTEMI, CKD II, HTN, HLD, DM presented to the hospital 11/16/2018 with acute onset of chest pain and new onset LBBB. Had LHC at OSH and has been managed medically. Has LVEF 35%, no ICD. Was about to perform lawn work when he started having acute onset chest pain, similar in nature to his prior MI. Also noted palpitations which he had not experienced before, has never experienced syncope. 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 He denies orthopnea, PND, peripheral edema or GREENWOOD. There is no family history of sudden cardiac death.     Patient presented in wide complex tachycardia. Cardiology called for assistance. He continued to have 9-10/10 chest pain like an elephant sitting on his chest. Received NTG x 2 and ASA 325mg, was taken to the cath lab and no new occlusions were identified. The patient received lidocaine for a slow MMVT which restored NSR with a narrow complex. LHC found  that is known and the LIMA to LAD and SVG to D1 grafts are patent. Had inducible sustained VT when pigtail catheter was inserted into the LV, VT terminated with lidocaine bolus. Started on amiodarone gtt.      On 11/19/2018 he underwent EPS. Patient easily inducible for VT (LBLS  ms) with ventricular double extrastimuli. Underwent successful dual chamber ICD implantation ([employer] Bebo).    [place] well at clinic follow up 2/2019.    Clinic visit 11/2019:  No arrhythmia noted. On amiodarone for  VT. LFTs WNL. Needs updated TSH and PFTs. No RV pacing. No acute CHF exacerbation.     12/2020 clinic visit: No RV pacing.  Had some palps and high BP - coreg increased today. No arrhythmia noted on device.  LBBB on EKG. QRS wider than prior EKG, but he has a history of LBBB (see EKG 11/16/2018).   On amiodarone for VT. Recheck PFTs, echo.    5/24/2021: Hospitalized with orthostatic hypotension. Improved when his meds were reduced.     7/6/2021: Stable from a device perspective with stable lead and device function.  No arrhythmia noted. Pt with chronic fatigue. Per cardiology, consider reducing amiodarone (easily inducible VT during EPS in 11/2018 but no subsequent VT on device). Will discuss with Dr. Glasgow. (UPDATE: Can stop amio). Recent Echo shows EF of 40% which is close to his baseline. LBBB with QRS 180ms. CHF symptoms stable NYHA II. Confirm that will continue to defer CRT upgrade. (UPDATE: No CRT upgrade at this time)    1/2022: Felt too dizzy on increased dose of coreg. This has now resolved. No syncope. Amiodarone was stopped in 1/2022.  Mr. Bull has felt a bit lightheaded this morning. He has also felt intermittent palpitations, and GREENWOOD.  Device check which shows stable device/lead function, RA pacing 77%, RV pacing <1%, 1% AF burden (longest episode 18 minutes), battery longevity 5 years. I reviewed the AMS EGMs which are most consistent with sinus tachycardia vs atrial tachycardia -440 ms. He says he was pressure washing and painting a fence that day.  ECG is sinus rhythm with LBBB at 65 bpm.  In summary, Mr. Bull is a 71 y.o. male with CAD/MI (CABG 2005, PCI 2018), CKD, HTN, HLD, DM, LBBB, MMVT (stopped amiodarone in 1/2022), ICM here for follow up.   Mr. Bull is now describing NYHA Class II HF symptoms in the office today in the setting of ischemic cardiomyopathy, dual chamber ICD in situ, and LBBB. We discussed the risks ,benefits, inidications, and alternatives to CRT-D upgrade. [place]  shared decision tool utilized. After considering his options he has agreed to proceed.    Update (08/08/2022):    4/7/2022: Status-post successful upgrade from dual chamber ICD to [employer] CRT-D.    Today he says overall has been feeling well. Does feel better since device upgrade (more energy). Some days where he is still more fatigued than others. Had a couple of days last week where he felt weak - resolved after taking [place]. He continues to work outside. No exertional CP, worsening GREENWOOD, palpitations, syncope.    Device Interrogation (8/8/2022) reveals an intrinsic SB with stable lead and device function. AMSx6, c/w FFRW. No ventricular arrhythmias or treated episodes were noted.  He paces 55% in the RA and 98% in the BiV. Estimated battery longevity 5.9-6.3 years. Device changes made to minimize NSRVO events.    I have personally reviewed the patient's EKG today, which shows ASBP at 63bpm. AR interval is 170. QRS is 120. QTc is 476.    Relevant Cardiac Test Results:    2D Echo (4/4/2022):  · Severe left atrial enlargement.  · Left ventricle is at the upper limits of normal in size measuring 5.4 cm with severely decreased systolic function.  · The estimated ejection fraction is 25%.  · There is severe left ventricular global hypokinesis. Additionally the inferolateral wall and basal inferior wall are akinetic.  · Grade I left ventricular diastolic dysfunction.  · Mild right atrial enlargement.  · Mild right ventricular enlargement with mildly to moderately reduced right ventricular systolic function.  · Mild tricuspid regurgitation.  · Normal central venous pressure (3 mmHg).  · The estimated PA systolic pressure is 36 mmHg.    Current Outpatient Medications   Medication Sig    ammonium lactate 12 % Crea Apply twice daily to affected parts both feet as needed.    aspirin (ECOTRIN) 81 MG EC tablet Take 1 tablet (81 mg total) by mouth once daily.    blood-glucose sensor (DEXCOM G6 SENSOR) [place] 3 each by  Misc.(Non-Drug; Combo Route) route continuous.    blood-glucose transmitter (DEXCOM G6 TRANSMITTER) [place] 1 each by Misc.(Non-Drug; Combo Route) route continuous.    carvediloL (COREG) 6.25 MG tablet Take 1 tablet (6.25 mg total) by mouth 2 (two) times daily.    clopidogreL (PLAVIX) 75 mg tablet Take 1 tablet (75 mg total) by mouth once daily.    diclofenac sodium (VOLTAREN) 1 % Gel Apply 2 g topically 4 (four) times daily.    dulaglutide (TRULICITY) 4.5 mg/0.5 mL pen injector INJECT 4.5 MG INTO THE SKIN EVERY 7 DAYS    esomeprazole (NEXIUM) 40 MG capsule Take 1 capsule (40 mg total) by mouth 2 (two) times daily.    ezetimibe (ZETIA) 10 mg tablet TAKE 1 TABLET BY MOUTH ONCE DAILY    ferrous sulfate (FEOSOL) 325 mg (65 mg iron) Tab tablet TAKE 1 TABLET BY MOUTH THREE TIMES DAILY (Patient taking differently: Take 325 mg by mouth 2 (two) times daily. TAKE 1 TABLET BY MOUTH THREE TIMES DAILY)    furosemide (LASIX) 20 MG tablet Take 1 tablet (20 mg total) by mouth once daily.    GVOKE HYPOPEN 2-PACK 1 mg/0.2 mL AtIn INJECT SUBCUTANEOUSLY  CONTENTS OF 1 AUTO-INJECTOR AS NEEDED FOR HYPOGLCEMIA  AS DIRECTED    insulin lispro (HUMALOG U-100 INSULIN) 100 unit/mL injection USE AS DIRECTED VIA [facility] 20    ketoconazole (NIZORAL) 2 % cream Apply topically once daily.    LIDOcaine HCL 2% (XYLOCAINE) 2 % jelly Apply topically as needed. Apply topically once nightly to affected part of foot/feet, for pain.    mupirocin (BACTROBAN) 2 % ointment Apply to affected area 3 times daily    nitroGLYCERIN (NITROSTAT) 0.4 MG SL tablet DISSOLVE 1 TABLET UNDER THE TONGUE EVERY 5 MINUTES AS  NEEDED FOR CHEST PAIN. MAX  OF 3 TABLETS IN 15 MINUTES. CALL 911 IF PAIN PERSISTS.    ondansetron (ZOFRAN) 4 MG tablet Take 1 tablet (4 mg total) by mouth every 8 (eight) hours as needed for Nausea.    papaverine 30 mg/mL injection Add: Phentolamine 1 mg  Add: PGE1 10 mcg    Sig:  Inject 20 units as directed; titrate up by 5 units until  "desired results    ranolazine (RANEXA) 1,000 mg Tb12 Take 1 tablet (1,000 mg total) by mouth 2 (two) times daily.    rosuvastatin (CRESTOR) 40 MG Tab Take 1 tablet (40 mg total) by mouth once daily.    spironolactone (ALDACTONE) 25 MG tablet Take 1 tablet (25 mg total) by mouth once daily.    sub-q insulin device, 20 unit (VGO 20) [place] 1 Device by Misc.(Non-Drug; Combo Route) route once daily.    tamsulosin (FLOMAX) 0.4 mg Cap Take 1 capsule (0.4 mg total) by mouth once daily.    traMADoL (ULTRAM) 50 mg tablet Take 1 tablet (50 mg total) by mouth every 6 (six) hours as needed for Pain.    losartan (COZAAR) 25 MG tablet Take 0.5 tablets (12.5 mg total) by mouth once daily.     No current facility-administered medications for this visit.       Review of Systems   Constitutional: Positive for malaise/fatigue (episodic).   Cardiovascular: Negative for chest pain, dyspnea on exertion, irregular heartbeat, leg swelling and palpitations.   Respiratory: Negative for shortness of breath.    Hematologic/Lymphatic: Negative for bleeding problem.   Skin: Negative for rash.   Musculoskeletal: Negative for myalgias.   Gastrointestinal: Negative for hematemesis, hematochezia and nausea.   Genitourinary: Negative for hematuria.   Neurological: Negative for light-headedness.   Psychiatric/Behavioral: Negative for altered mental status.   Allergic/Immunologic: Negative for persistent infections.       Objective:          /63   Pulse 63   Ht 5' 9" (1.753 m)   Wt 111.4 kg (245 lb 9.5 oz)   BMI 36.27 kg/m²     Physical Exam  Vitals and nursing note reviewed.   Constitutional:       Appearance: Normal appearance. He is well-developed.   HENT:      Head: Normocephalic.      Nose: Nose normal.   Eyes:      Pupils: Pupils are equal, round, and reactive to light.   Cardiovascular:      Rate and Rhythm: Normal rate and regular rhythm.   Pulmonary:      Effort: No respiratory distress.      Breath sounds: Normal breath sounds. "   Chest:      Comments: Device to LUCW. Incision and pocket in good repair.    Musculoskeletal:         General: Normal range of motion.   Skin:     General: Skin is warm and dry.      Findings: No erythema.   Neurological:      Mental Status: He is alert and oriented to person, place, and time.   Psychiatric:         Speech: Speech normal.         Behavior: Behavior normal.       Lab Results   Component Value Date     07/08/2022    K 4.4 07/08/2022    MG 1.8 05/26/2021    BUN 17 07/08/2022    CREATININE 1.4 07/08/2022    ALT 27 05/10/2022    AST 25 05/10/2022    HGB 11.9 (L) 07/08/2022    HCT 36.6 (L) 07/08/2022    HCT 33 (L) 06/24/2019    TSH 2.041 05/10/2022    LDLCALC 81.8 05/10/2022       Recent Labs   Lab 10/05/19  2023 04/05/22  0911   INR 1.0 1.1         Assessment:     1. Presence of cardiac resynchronization therapy defibrillator (CRT-D)    2. Benign essential HTN    3. Chronic combined systolic and diastolic heart failure    4. Primary hypertension    5. PVT (paroxysmal ventricular tachycardia)    6. Stage 3 chronic kidney disease, unspecified whether stage 3a or 3b CKD    7. ANURADHA (obstructive sleep apnea)    8. Severe obesity (BMI 35.0-39.9) with comorbidity      Plan:     In summary, Mr. Bull is a 71 y.o. male with CAD (CABG 2005, PCI 2018), CKD, HTN, HLD, DM, LBBB, MMVT, ICM here for follow up after CRT-D upgrade.   He is 4 months s/p CRT-D upgrade. Mr. Bull is doing well from a device perspective with stable lead and device function. Device changes made today to minimize NSRVO events. No arrhythmia noted. Off amio since 1/2022.  98% biventricular pacing. On GDMT. Echo scheduled for later this month. Managed by HTS. Overall doing well.    Continue current medication regimen and device settings.   Follow up in device clinic as scheduled.   Follow up in EP clinic in 1 year, sooner as needed.     *A copy of this note has been sent to Dr. Glasgow*    Follow up in about 1 year (around  8/8/2023).    ------------------------------------------------------------------    MAISHA De La Torre, NP-C  Cardiac Electrophysiology

## 2022-08-08 ENCOUNTER — HOSPITAL ENCOUNTER (OUTPATIENT)
Dept: CARDIOLOGY | Facility: CLINIC | Age: 72
Discharge: HOME | End: 2022-08-08
Payer: MEDICARE

## 2022-08-08 ENCOUNTER — OFFICE VISIT (OUTPATIENT)
Dept: ELECTROPHYSIOLOGY | Facility: CLINIC | Age: 72
End: 2022-08-08
Payer: MEDICARE

## 2022-08-08 ENCOUNTER — CLINICAL SUPPORT (OUTPATIENT)
Dept: CARDIOLOGY | Facility: HOSPITAL | Age: 72
End: 2022-08-08
Attending: INTERNAL MEDICINE
Payer: MEDICARE

## 2022-08-08 VITALS
HEIGHT: 69 IN | WEIGHT: 245.56 LBS | DIASTOLIC BLOOD PRESSURE: 63 MMHG | BODY MASS INDEX: 36.37 KG/M2 | HEART RATE: 63 BPM | SYSTOLIC BLOOD PRESSURE: 132 MMHG

## 2022-08-08 DIAGNOSIS — N18.30 STAGE 3 CHRONIC KIDNEY DISEASE, UNSPECIFIED WHETHER STAGE 3A OR 3B CKD: ICD-10-CM

## 2022-08-08 DIAGNOSIS — Z95.810 PRESENCE OF CARDIAC RESYNCHRONIZATION THERAPY DEFIBRILLATOR (CRT-D): Primary | ICD-10-CM

## 2022-08-08 DIAGNOSIS — I50.42 CHRONIC COMBINED SYSTOLIC AND DIASTOLIC HEART FAILURE: ICD-10-CM

## 2022-08-08 DIAGNOSIS — G47.33 OSA (OBSTRUCTIVE SLEEP APNEA): ICD-10-CM

## 2022-08-08 DIAGNOSIS — I50.42 CHRONIC COMBINED SYSTOLIC AND DIASTOLIC HEART FAILURE: Chronic | ICD-10-CM

## 2022-08-08 DIAGNOSIS — I25.5 ISCHEMIC CARDIOMYOPATHY: ICD-10-CM

## 2022-08-08 DIAGNOSIS — I10 BENIGN ESSENTIAL HTN: Chronic | ICD-10-CM

## 2022-08-08 DIAGNOSIS — E66.01 SEVERE OBESITY (BMI 35.0-39.9) WITH COMORBIDITY: ICD-10-CM

## 2022-08-08 DIAGNOSIS — I49.8 OTHER SPECIFIED CARDIAC ARRHYTHMIAS: ICD-10-CM

## 2022-08-08 DIAGNOSIS — I47.29 PVT (PAROXYSMAL VENTRICULAR TACHYCARDIA): ICD-10-CM

## 2022-08-08 DIAGNOSIS — I10 PRIMARY HYPERTENSION: ICD-10-CM

## 2022-08-08 PROCEDURE — 3288F PR FALLS RISK ASSESSMENT DOCUMENTED: ICD-10-PCS | Mod: CPTII,S$GLB,, | Performed by: NURSE PRACTITIONER

## 2022-08-08 PROCEDURE — 93284 CARDIAC DEVICE CHECK - IN CLINIC & HOSPITAL: ICD-10-PCS | Mod: 26,,, | Performed by: INTERNAL MEDICINE

## 2022-08-08 PROCEDURE — 93005 ELECTROCARDIOGRAM TRACING: CPT | Mod: S$GLB,,, | Performed by: INTERNAL MEDICINE

## 2022-08-08 PROCEDURE — 93284 PRGRMG EVAL IMPLANTABLE DFB: CPT

## 2022-08-08 PROCEDURE — 1125F AMNT PAIN NOTED PAIN PRSNT: CPT | Mod: CPTII,S$GLB,, | Performed by: NURSE PRACTITIONER

## 2022-08-08 PROCEDURE — 1160F RVW MEDS BY RX/DR IN RCRD: CPT | Mod: CPTII,S$GLB,, | Performed by: NURSE PRACTITIONER

## 2022-08-08 PROCEDURE — 1101F PR PT FALLS ASSESS DOC 0-1 FALLS W/OUT INJ PAST YR: ICD-10-PCS | Mod: CPTII,S$GLB,, | Performed by: NURSE PRACTITIONER

## 2022-08-08 PROCEDURE — 3078F DIAST BP <80 MM HG: CPT | Mod: CPTII,S$GLB,, | Performed by: NURSE PRACTITIONER

## 2022-08-08 PROCEDURE — 99214 PR OFFICE/OUTPT VISIT, EST, LEVL IV, 30-39 MIN: ICD-10-PCS | Mod: S$GLB,,, | Performed by: NURSE PRACTITIONER

## 2022-08-08 PROCEDURE — 1125F PR PAIN SEVERITY QUANTIFIED, PAIN PRESENT: ICD-10-PCS | Mod: CPTII,S$GLB,, | Performed by: NURSE PRACTITIONER

## 2022-08-08 PROCEDURE — 4010F PR ACE/ARB THEARPY RXD/TAKEN: ICD-10-PCS | Mod: CPTII,S$GLB,, | Performed by: NURSE PRACTITIONER

## 2022-08-08 PROCEDURE — 3075F SYST BP GE 130 - 139MM HG: CPT | Mod: CPTII,S$GLB,, | Performed by: NURSE PRACTITIONER

## 2022-08-08 PROCEDURE — 3078F PR MOST RECENT DIASTOLIC BLOOD PRESSURE < 80 MM HG: ICD-10-PCS | Mod: CPTII,S$GLB,, | Performed by: NURSE PRACTITIONER

## 2022-08-08 PROCEDURE — 93005 RHYTHM STRIP: ICD-10-PCS | Mod: S$GLB,,, | Performed by: INTERNAL MEDICINE

## 2022-08-08 PROCEDURE — 99214 OFFICE O/P EST MOD 30 MIN: CPT | Mod: S$GLB,,, | Performed by: NURSE PRACTITIONER

## 2022-08-08 PROCEDURE — 93284 PRGRMG EVAL IMPLANTABLE DFB: CPT | Mod: 26,,, | Performed by: INTERNAL MEDICINE

## 2022-08-08 PROCEDURE — 93010 RHYTHM STRIP: ICD-10-PCS | Mod: S$GLB,,, | Performed by: INTERNAL MEDICINE

## 2022-08-08 PROCEDURE — 93010 ELECTROCARDIOGRAM REPORT: CPT | Mod: S$GLB,,, | Performed by: INTERNAL MEDICINE

## 2022-08-08 PROCEDURE — 99999 PR PBB SHADOW E&M-EST. PATIENT-LVL IV: ICD-10-PCS | Mod: PBBFAC,,, | Performed by: NURSE PRACTITIONER

## 2022-08-08 PROCEDURE — 3066F PR DOCUMENTATION OF TREATMENT FOR NEPHROPATHY: ICD-10-PCS | Mod: CPTII,S$GLB,, | Performed by: NURSE PRACTITIONER

## 2022-08-08 PROCEDURE — 4010F ACE/ARB THERAPY RXD/TAKEN: CPT | Mod: CPTII,S$GLB,, | Performed by: NURSE PRACTITIONER

## 2022-08-08 PROCEDURE — 3008F PR BODY MASS INDEX (BMI) DOCUMENTED: ICD-10-PCS | Mod: CPTII,S$GLB,, | Performed by: NURSE PRACTITIONER

## 2022-08-08 PROCEDURE — 3288F FALL RISK ASSESSMENT DOCD: CPT | Mod: CPTII,S$GLB,, | Performed by: NURSE PRACTITIONER

## 2022-08-08 PROCEDURE — 1159F MED LIST DOCD IN RCRD: CPT | Mod: CPTII,S$GLB,, | Performed by: NURSE PRACTITIONER

## 2022-08-08 PROCEDURE — 3075F PR MOST RECENT SYSTOLIC BLOOD PRESS GE 130-139MM HG: ICD-10-PCS | Mod: CPTII,S$GLB,, | Performed by: NURSE PRACTITIONER

## 2022-08-08 PROCEDURE — 3052F PR MOST RECENT HEMOGLOBIN A1C LEVEL 8.0 - < 9.0%: ICD-10-PCS | Mod: CPTII,S$GLB,, | Performed by: NURSE PRACTITIONER

## 2022-08-08 PROCEDURE — 1159F PR MEDICATION LIST DOCUMENTED IN MEDICAL RECORD: ICD-10-PCS | Mod: CPTII,S$GLB,, | Performed by: NURSE PRACTITIONER

## 2022-08-08 PROCEDURE — 3052F HG A1C>EQUAL 8.0%<EQUAL 9.0%: CPT | Mod: CPTII,S$GLB,, | Performed by: NURSE PRACTITIONER

## 2022-08-08 PROCEDURE — 3008F BODY MASS INDEX DOCD: CPT | Mod: CPTII,S$GLB,, | Performed by: NURSE PRACTITIONER

## 2022-08-08 PROCEDURE — 1160F PR REVIEW ALL MEDS BY PRESCRIBER/CLIN PHARMACIST DOCUMENTED: ICD-10-PCS | Mod: CPTII,S$GLB,, | Performed by: NURSE PRACTITIONER

## 2022-08-08 PROCEDURE — 1101F PT FALLS ASSESS-DOCD LE1/YR: CPT | Mod: CPTII,S$GLB,, | Performed by: NURSE PRACTITIONER

## 2022-08-08 PROCEDURE — 3066F NEPHROPATHY DOC TX: CPT | Mod: CPTII,S$GLB,, | Performed by: NURSE PRACTITIONER

## 2022-08-08 PROCEDURE — 99999 PR PBB SHADOW E&M-EST. PATIENT-LVL IV: CPT | Mod: PBBFAC,,, | Performed by: NURSE PRACTITIONER

## 2022-08-11 ENCOUNTER — PATIENT MESSAGE (OUTPATIENT)
Dept: NEPHROLOGY | Facility: CLINIC | Age: 72
End: 2022-08-11
Payer: MEDICARE

## 2022-08-16 ENCOUNTER — TELEPHONE (OUTPATIENT)
Dept: NEPHROLOGY | Facility: CLINIC | Age: 72
End: 2022-08-16
Payer: MEDICARE

## 2022-08-16 DIAGNOSIS — I10 ESSENTIAL HYPERTENSION: Primary | ICD-10-CM

## 2022-08-16 RX ORDER — LOSARTAN POTASSIUM 25 MG/1
12.5 TABLET ORAL DAILY
Qty: 45 TABLET | Refills: 3 | Status: SHIPPED | OUTPATIENT
Start: 2022-08-16 | End: 2023-05-23

## 2022-08-16 NOTE — TELEPHONE ENCOUNTER
Pls schedule BMP for 2 weeks from now.    Message text       Belkys Munroe NP BarnesWalter 26 minutes ago (8:24 AM)           Thanks! It's okay to continue current dose of vitamin D.     Blood pressures are a little high. I sent losartan in to your pharmacy. It will be half a pill (12.5 mg) daily.  Please keep taking your blood pressures and send them in two week. We will also need to spot check your labwork in two weeks. The main side effect to watch for is dizziness as your body gets used to a lower BP.  Please stand up slowly. If it persists let me know.     Please let me know if you have any questions or concerns.     Thanks,  Belkys Munroe, RY

## 2022-08-19 ENCOUNTER — OFFICE VISIT (OUTPATIENT)
Dept: ENDOCRINOLOGY | Facility: CLINIC | Age: 72
End: 2022-08-19
Payer: MEDICARE

## 2022-08-19 VITALS
HEART RATE: 60 BPM | BODY MASS INDEX: 37.72 KG/M2 | DIASTOLIC BLOOD PRESSURE: 62 MMHG | SYSTOLIC BLOOD PRESSURE: 124 MMHG | HEIGHT: 67 IN | RESPIRATION RATE: 14 BRPM | WEIGHT: 240.31 LBS

## 2022-08-19 DIAGNOSIS — Z79.4 TYPE 2 DIABETES MELLITUS WITH DIABETIC NEPHROPATHY, WITH LONG-TERM CURRENT USE OF INSULIN: ICD-10-CM

## 2022-08-19 DIAGNOSIS — N25.81 HYPERPARATHYROIDISM DUE TO RENAL INSUFFICIENCY: ICD-10-CM

## 2022-08-19 DIAGNOSIS — E11.21 TYPE 2 DIABETES MELLITUS WITH DIABETIC NEPHROPATHY, WITH LONG-TERM CURRENT USE OF INSULIN: ICD-10-CM

## 2022-08-19 DIAGNOSIS — E66.01 SEVERE OBESITY (BMI 35.0-39.9) WITH COMORBIDITY: ICD-10-CM

## 2022-08-19 DIAGNOSIS — E78.2 MIXED HYPERLIPIDEMIA: Chronic | ICD-10-CM

## 2022-08-19 PROCEDURE — 3066F NEPHROPATHY DOC TX: CPT | Mod: CPTII,S$GLB,, | Performed by: STUDENT IN AN ORGANIZED HEALTH CARE EDUCATION/TRAINING PROGRAM

## 2022-08-19 PROCEDURE — 3052F HG A1C>EQUAL 8.0%<EQUAL 9.0%: CPT | Mod: CPTII,S$GLB,, | Performed by: STUDENT IN AN ORGANIZED HEALTH CARE EDUCATION/TRAINING PROGRAM

## 2022-08-19 PROCEDURE — 1126F AMNT PAIN NOTED NONE PRSNT: CPT | Mod: CPTII,S$GLB,, | Performed by: STUDENT IN AN ORGANIZED HEALTH CARE EDUCATION/TRAINING PROGRAM

## 2022-08-19 PROCEDURE — 99999 PR PBB SHADOW E&M-EST. PATIENT-LVL V: CPT | Mod: PBBFAC,,, | Performed by: STUDENT IN AN ORGANIZED HEALTH CARE EDUCATION/TRAINING PROGRAM

## 2022-08-19 PROCEDURE — 3078F PR MOST RECENT DIASTOLIC BLOOD PRESSURE < 80 MM HG: ICD-10-PCS | Mod: CPTII,S$GLB,, | Performed by: STUDENT IN AN ORGANIZED HEALTH CARE EDUCATION/TRAINING PROGRAM

## 2022-08-19 PROCEDURE — 95251 CONT GLUC MNTR ANALYSIS I&R: CPT | Mod: S$GLB,,, | Performed by: STUDENT IN AN ORGANIZED HEALTH CARE EDUCATION/TRAINING PROGRAM

## 2022-08-19 PROCEDURE — 95251 PR GLUCOSE MONITOR, 72 HOUR, PHYS INTERP: ICD-10-PCS | Mod: S$GLB,,, | Performed by: STUDENT IN AN ORGANIZED HEALTH CARE EDUCATION/TRAINING PROGRAM

## 2022-08-19 PROCEDURE — 99999 PR PBB SHADOW E&M-EST. PATIENT-LVL V: ICD-10-PCS | Mod: PBBFAC,,, | Performed by: STUDENT IN AN ORGANIZED HEALTH CARE EDUCATION/TRAINING PROGRAM

## 2022-08-19 PROCEDURE — 1159F PR MEDICATION LIST DOCUMENTED IN MEDICAL RECORD: ICD-10-PCS | Mod: CPTII,S$GLB,, | Performed by: STUDENT IN AN ORGANIZED HEALTH CARE EDUCATION/TRAINING PROGRAM

## 2022-08-19 PROCEDURE — 3052F PR MOST RECENT HEMOGLOBIN A1C LEVEL 8.0 - < 9.0%: ICD-10-PCS | Mod: CPTII,S$GLB,, | Performed by: STUDENT IN AN ORGANIZED HEALTH CARE EDUCATION/TRAINING PROGRAM

## 2022-08-19 PROCEDURE — 1160F PR REVIEW ALL MEDS BY PRESCRIBER/CLIN PHARMACIST DOCUMENTED: ICD-10-PCS | Mod: CPTII,S$GLB,, | Performed by: STUDENT IN AN ORGANIZED HEALTH CARE EDUCATION/TRAINING PROGRAM

## 2022-08-19 PROCEDURE — 1160F RVW MEDS BY RX/DR IN RCRD: CPT | Mod: CPTII,S$GLB,, | Performed by: STUDENT IN AN ORGANIZED HEALTH CARE EDUCATION/TRAINING PROGRAM

## 2022-08-19 PROCEDURE — 99214 PR OFFICE/OUTPT VISIT, EST, LEVL IV, 30-39 MIN: ICD-10-PCS | Mod: 25,S$GLB,, | Performed by: STUDENT IN AN ORGANIZED HEALTH CARE EDUCATION/TRAINING PROGRAM

## 2022-08-19 PROCEDURE — 3074F PR MOST RECENT SYSTOLIC BLOOD PRESSURE < 130 MM HG: ICD-10-PCS | Mod: CPTII,S$GLB,, | Performed by: STUDENT IN AN ORGANIZED HEALTH CARE EDUCATION/TRAINING PROGRAM

## 2022-08-19 PROCEDURE — 4010F PR ACE/ARB THEARPY RXD/TAKEN: ICD-10-PCS | Mod: CPTII,S$GLB,, | Performed by: STUDENT IN AN ORGANIZED HEALTH CARE EDUCATION/TRAINING PROGRAM

## 2022-08-19 PROCEDURE — 3078F DIAST BP <80 MM HG: CPT | Mod: CPTII,S$GLB,, | Performed by: STUDENT IN AN ORGANIZED HEALTH CARE EDUCATION/TRAINING PROGRAM

## 2022-08-19 PROCEDURE — 3066F PR DOCUMENTATION OF TREATMENT FOR NEPHROPATHY: ICD-10-PCS | Mod: CPTII,S$GLB,, | Performed by: STUDENT IN AN ORGANIZED HEALTH CARE EDUCATION/TRAINING PROGRAM

## 2022-08-19 PROCEDURE — 1126F PR PAIN SEVERITY QUANTIFIED, NO PAIN PRESENT: ICD-10-PCS | Mod: CPTII,S$GLB,, | Performed by: STUDENT IN AN ORGANIZED HEALTH CARE EDUCATION/TRAINING PROGRAM

## 2022-08-19 PROCEDURE — 4010F ACE/ARB THERAPY RXD/TAKEN: CPT | Mod: CPTII,S$GLB,, | Performed by: STUDENT IN AN ORGANIZED HEALTH CARE EDUCATION/TRAINING PROGRAM

## 2022-08-19 PROCEDURE — 1159F MED LIST DOCD IN RCRD: CPT | Mod: CPTII,S$GLB,, | Performed by: STUDENT IN AN ORGANIZED HEALTH CARE EDUCATION/TRAINING PROGRAM

## 2022-08-19 PROCEDURE — 3008F PR BODY MASS INDEX (BMI) DOCUMENTED: ICD-10-PCS | Mod: CPTII,S$GLB,, | Performed by: STUDENT IN AN ORGANIZED HEALTH CARE EDUCATION/TRAINING PROGRAM

## 2022-08-19 PROCEDURE — 99214 OFFICE O/P EST MOD 30 MIN: CPT | Mod: 25,S$GLB,, | Performed by: STUDENT IN AN ORGANIZED HEALTH CARE EDUCATION/TRAINING PROGRAM

## 2022-08-19 PROCEDURE — 3074F SYST BP LT 130 MM HG: CPT | Mod: CPTII,S$GLB,, | Performed by: STUDENT IN AN ORGANIZED HEALTH CARE EDUCATION/TRAINING PROGRAM

## 2022-08-19 PROCEDURE — 3008F BODY MASS INDEX DOCD: CPT | Mod: CPTII,S$GLB,, | Performed by: STUDENT IN AN ORGANIZED HEALTH CARE EDUCATION/TRAINING PROGRAM

## 2022-08-19 NOTE — ASSESSMENT & PLAN NOTE
Control is adequate based on CGM data. Will continue same regimen for now. Encouraged to time VGo click before meals. Unable to tolerated metformin or SGLT-2 inhibitor.    Plan  - Continue VGo 20 + 4 clicks per meals  - Continue Trulicity 4.5 mg weekly  - Continue Dexcom G6  - Recheck A1c    F/u 3 months

## 2022-08-19 NOTE — PROGRESS NOTES
"Subjective:      Patient ID: Walter Bull is a 71 y.o. male.    Chief Complaint:  Type 2 diabetes mellitus      History of Present Illness  This is a 71 y.o. male. with a past medical history of type 2 diabetes, CAD s/p CABG, CHF, HLD, here for evaluation.    Type 2 diabetes mellitus  Current diabetes medications:  - VGO 20 with 4 clicks with each meal - forgetting clicks at times  - Trulicity 4.5 mg weekly    Last GFR 57    Past diabetes medications:  - Metformin - unable to tolerate  - Januvia   - SGLT-2 inhibitors avoided given frequent AKIs/dehydration    Known diabetic complications: nephropathy and cardiovascular disease    Weight trend:  Wt Readings from Last 5 Encounters:   05/13/22 106.4 kg (234 lb 9.1 oz)   04/25/22 108.9 kg (240 lb)   04/12/22 112.1 kg (247 lb 2.2 oz)   04/08/22 110.8 kg (244 lb 4.3 oz)   04/04/22 110.7 kg (244 lb)     Prior visit with diabetes education: yes    Current diet: 3 meals per day  Current exercise: Limited            Diabetes Management Status  Statin: Taking  ACE/ARB: Taking    Screening or Prevention Patient's value   HgA1C Testing and Control   Lab Results   Component Value Date    HGBA1C 8.0 (H) 05/10/2022        Lipid profile : 05/10/2022   LDL control Lab Results   Component Value Date    LDLCALC 81.8 05/10/2022      Nephropathy screening Lab Results   Component Value Date    MICALBCREAT Unable to calculate 11/09/2021      Blood pressure BP Readings from Last 1 Encounters:   08/19/22 124/62      Dilated retinal exam : 04/15/2021   Foot exam : 06/28/2022         Review of Systems  As above    Social and family history reviewed  Current medications and allergies reviewed    Objective:   /62 (BP Location: Right arm, Patient Position: Sitting, BP Method: Large (Manual))   Pulse 60   Resp 14   Ht 5' 7" (1.702 m)   Wt 109 kg (240 lb 4.8 oz)   BMI 37.64 kg/m²   Physical Exam  Alert, oriented    BP Readings from Last 1 Encounters:   08/19/22 124/62      Wt " Readings from Last 1 Encounters:   08/19/22 1258 109 kg (240 lb 4.8 oz)     Body mass index is 37.64 kg/m².    Lab Review:   Lab Results   Component Value Date    HGBA1C 8.0 (H) 05/10/2022     Lab Results   Component Value Date    CHOL 141 05/10/2022    HDL 46 05/10/2022    LDLCALC 81.8 05/10/2022    TRIG 66 05/10/2022    CHOLHDL 32.6 05/10/2022     Lab Results   Component Value Date     07/08/2022    K 4.4 07/08/2022     07/08/2022    CO2 26 07/08/2022    GLU 93 07/08/2022    BUN 17 07/08/2022    CREATININE 1.4 07/08/2022    CALCIUM 9.1 07/08/2022    PROT 7.4 05/10/2022    ALBUMIN 3.4 (L) 07/08/2022    BILITOT 0.9 05/10/2022    ALKPHOS 75 05/10/2022    AST 25 05/10/2022    ALT 27 05/10/2022    ANIONGAP 9 07/08/2022    ESTGFRAFRICA 57 (A) 07/08/2022    EGFRNONAA 49 (A) 07/08/2022    TSH 2.041 05/10/2022       All pertinent labs reviewed    Assessment and Plan     Type 2 diabetes mellitus with diabetic nephropathy, with long-term current use of insulin  Control is adequate based on CGM data. Will continue same regimen for now. Encouraged to time VGo click before meals. Unable to tolerated metformin or SGLT-2 inhibitor.    Plan  - Continue VGo 20 + 4 clicks per meals  - Continue Trulicity 4.5 mg weekly  - Continue Dexcom G6  - Recheck A1c    F/u 3 months    Severe obesity (BMI 35.0-39.9) with comorbidity  Continue GLP-1 RA given weight loss benefit    Hyperparathyroidism due to renal insufficiency  Nephrology following    HLD (hyperlipidemia)  Continue statin    Follow-up in 3 months    Andrew Briones MD  Endocrinology

## 2022-08-22 ENCOUNTER — PATIENT OUTREACH (OUTPATIENT)
Dept: ADMINISTRATIVE | Facility: HOSPITAL | Age: 72
End: 2022-08-22
Payer: MEDICARE

## 2022-08-22 DIAGNOSIS — K63.5 POLYP OF COLON, UNSPECIFIED PART OF COLON, UNSPECIFIED TYPE: ICD-10-CM

## 2022-08-22 NOTE — PROGRESS NOTES
Chart reviewed, immunization record updated.  No new results noted on [facility] or [facility] web site.  Care Everywhere unavailable.  Patient care coordination note updated.  LOV with PCP 5/13/2022.  Patient scheduled for DM lab visit on 9/02/2022.  Patient has scheduled eye exam with Kal Houston, OD on 9/02/2022.

## 2022-08-24 ENCOUNTER — SPECIALTY PHARMACY (OUTPATIENT)
Dept: PHARMACY | Facility: CLINIC | Age: 72
End: 2022-08-24

## 2022-08-24 ENCOUNTER — HOSPITAL ENCOUNTER (OUTPATIENT)
Dept: CARDIOLOGY | Facility: HOSPITAL | Age: 72
Discharge: HOME | End: 2022-08-24
Attending: INTERNAL MEDICINE
Payer: MEDICARE

## 2022-08-24 ENCOUNTER — OFFICE VISIT (OUTPATIENT)
Dept: CARDIOLOGY | Facility: CLINIC | Age: 72
End: 2022-08-24
Payer: MEDICARE

## 2022-08-24 VITALS
BODY MASS INDEX: 35.55 KG/M2 | HEART RATE: 60 BPM | HEIGHT: 69 IN | DIASTOLIC BLOOD PRESSURE: 74 MMHG | BODY MASS INDEX: 36.29 KG/M2 | HEIGHT: 69 IN | DIASTOLIC BLOOD PRESSURE: 74 MMHG | HEART RATE: 60 BPM | SYSTOLIC BLOOD PRESSURE: 140 MMHG | RESPIRATION RATE: 20 BRPM | SYSTOLIC BLOOD PRESSURE: 140 MMHG | WEIGHT: 240 LBS | WEIGHT: 245 LBS

## 2022-08-24 DIAGNOSIS — E78.2 MIXED HYPERLIPIDEMIA: Chronic | ICD-10-CM

## 2022-08-24 DIAGNOSIS — I10 PRIMARY HYPERTENSION: ICD-10-CM

## 2022-08-24 DIAGNOSIS — I50.42 CHRONIC COMBINED SYSTOLIC AND DIASTOLIC HEART FAILURE: Chronic | ICD-10-CM

## 2022-08-24 DIAGNOSIS — I25.118 CORONARY ARTERY DISEASE OF NATIVE ARTERY OF NATIVE HEART WITH STABLE ANGINA PECTORIS: Primary | Chronic | ICD-10-CM

## 2022-08-24 DIAGNOSIS — I50.42 CHRONIC COMBINED SYSTOLIC AND DIASTOLIC HEART FAILURE: ICD-10-CM

## 2022-08-24 LAB
ASCENDING AORTA: 3.13 CM
AV INDEX (PROSTH): 0.5
AV MEAN GRADIENT: 6 MMHG
AV PEAK GRADIENT: 10 MMHG
AV VALVE AREA: 1.87 CM2
AV VELOCITY RATIO: 0.54
BSA FOR ECHO PROCEDURE: 2.33 M2
CV ECHO LV RWT: 0.24 CM
DOP CALC AO PEAK VEL: 1.56 M/S
DOP CALC AO VTI: 40.18 CM
DOP CALC LVOT AREA: 3.8 CM2
DOP CALC LVOT DIAMETER: 2.19 CM
DOP CALC LVOT PEAK VEL: 0.84 M/S
DOP CALC LVOT STROKE VOLUME: 75.04 CM3
DOP CALCLVOT PEAK VEL VTI: 19.93 CM
E WAVE DECELERATION TIME: 211.51 MSEC
E/A RATIO: 0.72
E/E' RATIO: 15 M/S
ECHO LV POSTERIOR WALL: 0.64 CM (ref 0.6–1.1)
EJECTION FRACTION: 30 %
FRACTIONAL SHORTENING: 15 % (ref 28–44)
INTERVENTRICULAR SEPTUM: 0.73 CM (ref 0.6–1.1)
IVRT: 117.03 MSEC
LA MAJOR: 6.13 CM
LA MINOR: 5.61 CM
LA WIDTH: 4.5 CM
LEFT ATRIUM SIZE: 3.89 CM
LEFT ATRIUM VOLUME INDEX MOD: 34.2 ML/M2
LEFT ATRIUM VOLUME INDEX: 38.7 ML/M2
LEFT ATRIUM VOLUME MOD: 77.01 CM3
LEFT ATRIUM VOLUME: 87.17 CM3
LEFT INTERNAL DIMENSION IN SYSTOLE: 4.58 CM (ref 2.1–4)
LEFT VENTRICLE DIASTOLIC VOLUME INDEX: 61.76 ML/M2
LEFT VENTRICLE DIASTOLIC VOLUME: 138.97 ML
LEFT VENTRICLE MASS INDEX: 56 G/M2
LEFT VENTRICLE SYSTOLIC VOLUME INDEX: 42.8 ML/M2
LEFT VENTRICLE SYSTOLIC VOLUME: 96.31 ML
LEFT VENTRICULAR INTERNAL DIMENSION IN DIASTOLE: 5.36 CM (ref 3.5–6)
LEFT VENTRICULAR MASS: 126.09 G
LV LATERAL E/E' RATIO: 12.5 M/S
LV SEPTAL E/E' RATIO: 18.75 M/S
MV PEAK A VEL: 1.04 M/S
MV PEAK E VEL: 0.75 M/S
MV STENOSIS PRESSURE HALF TIME: 61.34 MS
MV VALVE AREA P 1/2 METHOD: 3.59 CM2
PISA TR MAX VEL: 2.68 M/S
PULM VEIN S/D RATIO: 1.33
PV PEAK D VEL: 0.39 M/S
PV PEAK S VEL: 0.52 M/S
RA MAJOR: 5.35 CM
RA PRESSURE: 3 MMHG
RA WIDTH: 4.13 CM
RIGHT VENTRICULAR END-DIASTOLIC DIMENSION: 3.99 CM
SINUS: 3.37 CM
STJ: 2.93 CM
TDI LATERAL: 0.06 M/S
TDI SEPTAL: 0.04 M/S
TDI: 0.05 M/S
TR MAX PG: 29 MMHG
TRICUSPID ANNULAR PLANE SYSTOLIC EXCURSION: 1.55 CM
TV REST PULMONARY ARTERY PRESSURE: 32 MMHG

## 2022-08-24 PROCEDURE — 93306 ECHO (CUPID ONLY): ICD-10-PCS | Mod: 26,,, | Performed by: INTERNAL MEDICINE

## 2022-08-24 PROCEDURE — 1126F AMNT PAIN NOTED NONE PRSNT: CPT | Mod: CPTII,S$GLB,, | Performed by: INTERNAL MEDICINE

## 2022-08-24 PROCEDURE — 1101F PT FALLS ASSESS-DOCD LE1/YR: CPT | Mod: CPTII,S$GLB,, | Performed by: INTERNAL MEDICINE

## 2022-08-24 PROCEDURE — 3052F HG A1C>EQUAL 8.0%<EQUAL 9.0%: CPT | Mod: CPTII,S$GLB,, | Performed by: INTERNAL MEDICINE

## 2022-08-24 PROCEDURE — 3077F PR MOST RECENT SYSTOLIC BLOOD PRESSURE >= 140 MM HG: ICD-10-PCS | Mod: CPTII,S$GLB,, | Performed by: INTERNAL MEDICINE

## 2022-08-24 PROCEDURE — 93306 TTE W/DOPPLER COMPLETE: CPT

## 2022-08-24 PROCEDURE — 3052F PR MOST RECENT HEMOGLOBIN A1C LEVEL 8.0 - < 9.0%: ICD-10-PCS | Mod: CPTII,S$GLB,, | Performed by: INTERNAL MEDICINE

## 2022-08-24 PROCEDURE — 93306 TTE W/DOPPLER COMPLETE: CPT | Mod: 26,,, | Performed by: INTERNAL MEDICINE

## 2022-08-24 PROCEDURE — 3078F DIAST BP <80 MM HG: CPT | Mod: CPTII,S$GLB,, | Performed by: INTERNAL MEDICINE

## 2022-08-24 PROCEDURE — 3077F SYST BP >= 140 MM HG: CPT | Mod: CPTII,S$GLB,, | Performed by: INTERNAL MEDICINE

## 2022-08-24 PROCEDURE — 3008F PR BODY MASS INDEX (BMI) DOCUMENTED: ICD-10-PCS | Mod: CPTII,S$GLB,, | Performed by: INTERNAL MEDICINE

## 2022-08-24 PROCEDURE — 3066F PR DOCUMENTATION OF TREATMENT FOR NEPHROPATHY: ICD-10-PCS | Mod: CPTII,S$GLB,, | Performed by: INTERNAL MEDICINE

## 2022-08-24 PROCEDURE — 1159F PR MEDICATION LIST DOCUMENTED IN MEDICAL RECORD: ICD-10-PCS | Mod: CPTII,S$GLB,, | Performed by: INTERNAL MEDICINE

## 2022-08-24 PROCEDURE — 1159F MED LIST DOCD IN RCRD: CPT | Mod: CPTII,S$GLB,, | Performed by: INTERNAL MEDICINE

## 2022-08-24 PROCEDURE — 99214 PR OFFICE/OUTPT VISIT, EST, LEVL IV, 30-39 MIN: ICD-10-PCS | Mod: 25,S$GLB,, | Performed by: INTERNAL MEDICINE

## 2022-08-24 PROCEDURE — 99214 OFFICE O/P EST MOD 30 MIN: CPT | Mod: 25,S$GLB,, | Performed by: INTERNAL MEDICINE

## 2022-08-24 PROCEDURE — 3078F PR MOST RECENT DIASTOLIC BLOOD PRESSURE < 80 MM HG: ICD-10-PCS | Mod: CPTII,S$GLB,, | Performed by: INTERNAL MEDICINE

## 2022-08-24 PROCEDURE — 4010F PR ACE/ARB THEARPY RXD/TAKEN: ICD-10-PCS | Mod: CPTII,S$GLB,, | Performed by: INTERNAL MEDICINE

## 2022-08-24 PROCEDURE — 3288F FALL RISK ASSESSMENT DOCD: CPT | Mod: CPTII,S$GLB,, | Performed by: INTERNAL MEDICINE

## 2022-08-24 PROCEDURE — 3066F NEPHROPATHY DOC TX: CPT | Mod: CPTII,S$GLB,, | Performed by: INTERNAL MEDICINE

## 2022-08-24 PROCEDURE — 3008F BODY MASS INDEX DOCD: CPT | Mod: CPTII,S$GLB,, | Performed by: INTERNAL MEDICINE

## 2022-08-24 PROCEDURE — 1126F PR PAIN SEVERITY QUANTIFIED, NO PAIN PRESENT: ICD-10-PCS | Mod: CPTII,S$GLB,, | Performed by: INTERNAL MEDICINE

## 2022-08-24 PROCEDURE — 99999 PR PBB SHADOW E&M-EST. PATIENT-LVL IV: CPT | Mod: PBBFAC,,, | Performed by: INTERNAL MEDICINE

## 2022-08-24 PROCEDURE — 3288F PR FALLS RISK ASSESSMENT DOCUMENTED: ICD-10-PCS | Mod: CPTII,S$GLB,, | Performed by: INTERNAL MEDICINE

## 2022-08-24 PROCEDURE — 4010F ACE/ARB THERAPY RXD/TAKEN: CPT | Mod: CPTII,S$GLB,, | Performed by: INTERNAL MEDICINE

## 2022-08-24 PROCEDURE — 1101F PR PT FALLS ASSESS DOC 0-1 FALLS W/OUT INJ PAST YR: ICD-10-PCS | Mod: CPTII,S$GLB,, | Performed by: INTERNAL MEDICINE

## 2022-08-24 PROCEDURE — 99999 PR PBB SHADOW E&M-EST. PATIENT-LVL IV: ICD-10-PCS | Mod: PBBFAC,,, | Performed by: INTERNAL MEDICINE

## 2022-08-24 NOTE — TELEPHONE ENCOUNTER
Padma, this is Mahogany Matthews with [facility].  We are working on your prescription that your doctor has sent us. We will be working with your insurance to get this approved for you. We will be calling you along the way with updates on your medication.  If you have any questions, you can reach us at [phone].    Welcome call outcome: Patient/caregiver reached

## 2022-08-24 NOTE — ASSESSMENT & PLAN NOTE
Patient with chronic heart failure and a reduced ejection fraction likely secondary to an ischemic cardiomyopathy.  NYHA class 1 today. Improved w BiV upgrade.  LVEF 30s. Continue losartan 12.5x1 and carvedilol (3.125x2 or 6.25x2), spironolactone 25x1, and furosemide 20x1. Pt has not tolerated further higher doses of carvedilol or empaglaflozin 10x1.

## 2022-08-24 NOTE — ASSESSMENT & PLAN NOTE
LDL is 70-80 on rosuvastatin 40 mg and ezetimibe 10 mg. Will try evolocumab q2 weeks. [facility].

## 2022-08-24 NOTE — ASSESSMENT & PLAN NOTE
The patient has severe coronary artery disease with a patent LIMA to LAD and SVG to diagonal. No recent ACS.  CCS 0 today. Seemed to have responded to HF management. Continue carvedilol (change to 3.125x2 or 6.25x2) and continue ranolazine 1000x2 at this time.  Continue dapt with aspirin and clopidogrel.

## 2022-08-24 NOTE — PROGRESS NOTES
HPI: This is a 71-year-old gentleman with a history of coronary artery disease status post CABG in 2005/cardiomyopathy status post AICD in 2018 and BiV upgrade 2022/congestive heart failure/hypertension/hyperlipidemia/diabetes/CKD/previous tobacco/orthostatic hypotension s/p reduction in meds presenting for follow-up.     Overall, patient feels well today. No issues with chest pain, shortness of breath, or dyspnea on exertion at this time. Very active in his life doing [employer] as his work. No edmea or orthopnea.    He tolerates aspirin 81x1, clopidogrel 75x1, carvedilol (3.125x2 or 6.25x1 patient not sure), losartan 12.5x1, ranolazine 1000x2, spironolactone 25x1, rosuvastatin 40x1, ezetimibe 10x1, and furosemide 20x1. Bps at home running slightly high on current regmin.     We tried increasing patient's carvedilol and adding empagliflozin at last visit, but patient did not tolerate. He felt bad with an increase in carvedilol he felt bad. He had significant nausea with empaglaflozin.     PHYSICAL EXAM:  Vitals:    08/24/22 0935   BP: (!) 140/74   Pulse: 60   Resp: 20       Physical Exam  Constitutional:       Appearance: Normal appearance.   Neck:      Vascular: No carotid bruit or JVD.   Cardiovascular:      Rate and Rhythm: Normal rate and regular rhythm.      Pulses:           Radial pulses are 2+ on the right side and 2+ on the left side.        Posterior tibial pulses are 1+ on the right side and 1+ on the left side.      Heart sounds: S1 normal and S2 normal. No murmur heard.    No S3 sounds.      Comments: No edema.  Pulmonary:      Effort: Pulmonary effort is normal.      Breath sounds: Normal breath sounds. No rales.   Feet:      Right foot:      Skin integrity: Skin integrity normal.      Left foot:      Skin integrity: Skin integrity normal.   Skin:     General: Skin is warm and dry.      Findings: No lesion.   Neurological:      Mental Status: He is alert and oriented to person, place, and time.       Motor: Motor function is intact.      Gait: Gait is intact.         LABS/CARDIAC TESTS (Imaging reviewed):  July 2022 hemoglobin 11.9/MCV 94. Creatinine 1.4/BUN 17. Albumin 3.4.  LDL 81 and HDL 46. A1c 8.  TSH normal. BNP normal March 2022.   EKG May 2021 revealed an atrial paced rhythm with a left bundle branch block.  Device check April 2022 80% RA and 98% BiV pacing. pAtach rare.   TTE April 2022 LVEF 25% w global HK and IL/inf akinesis. Mildly enlarged RV and decreased fxn. CVP 3. PASP 35. June 2021 revealed moderately reduced LVEF near baseline (35-45%) with a normal right atrial pressure and no significant valvular disease.   Nuclear stress testing from October 2019 revealed an LVEF of 25-30% with a large inferior scar and no evidence of ischemia.  [place] [facility] [place] multivessel disease with LAD, left circ, and RCA CTOs.  He had a patent LIMA to LAD/SVG to diagonal and an occluded SVG to left circ system graft.    Carotid ultrasound from his May 2021 admission revealed no evidence of significant carotid disease.    ASSESSMENT & PLAN:    Coronary artery disease of native artery of native heart with stable angina pectoris  The patient has severe coronary artery disease with a patent LIMA to LAD and SVG to diagonal. No recent ACS.  CCS 0 today. Seemed to have responded to HF management. Continue carvedilol (change to 3.125x2 or 6.25x2) and continue ranolazine 1000x2 at this time.  Continue dapt with aspirin and clopidogrel.    Chronic combined systolic and diastolic heart failure  Patient with chronic heart failure and a reduced ejection fraction likely secondary to an ischemic cardiomyopathy.  NYHA class 1 today. Improved w BiV upgrade.  LVEF 30s. Continue losartan 12.5x1 and carvedilol (3.125x2 or 6.25x2), spironolactone 25x1, and furosemide 20x1. Pt has not tolerated further higher doses of carvedilol or empaglaflozin 10x1.      Hypertension  Blood pressures in an  acceptable range. No more orthostatic symptoms. Continue carvedilol/losartan. Had to to back down on med doses previously for orthostatic symptoms.     HLD (hyperlipidemia)  LDL is 70-80 on rosuvastatin 40 mg and ezetimibe 10 mg. Will try evolocumab q2 weeks. Prescrpition to sent to [facility] pharmacy.        Coronary artery disease of native artery of native heart with stable angina pectoris    Chronic combined systolic and diastolic heart failure    Primary hypertension    Mixed hyperlipidemia  -     evolocumab (REPATHA SURECLICK) 140 mg/mL PnIj; Inject 1 mL (140 mg total) into the skin every 14 (fourteen) days.  Dispense: 2 each; Refill: 11        Joana Canada MD

## 2022-08-25 ENCOUNTER — PATIENT MESSAGE (OUTPATIENT)
Dept: CARDIOLOGY | Facility: CLINIC | Age: 72
End: 2022-08-25
Payer: MEDICARE

## 2022-08-31 ENCOUNTER — PATIENT MESSAGE (OUTPATIENT)
Dept: NEPHROLOGY | Facility: CLINIC | Age: 72
End: 2022-08-31
Payer: MEDICARE

## 2022-08-31 ENCOUNTER — OFFICE VISIT (OUTPATIENT)
Dept: UROLOGY | Facility: CLINIC | Age: 72
End: 2022-08-31
Payer: MEDICARE

## 2022-08-31 VITALS
RESPIRATION RATE: 18 BRPM | DIASTOLIC BLOOD PRESSURE: 65 MMHG | HEIGHT: 69 IN | WEIGHT: 237.63 LBS | SYSTOLIC BLOOD PRESSURE: 116 MMHG | HEART RATE: 62 BPM | BODY MASS INDEX: 35.2 KG/M2

## 2022-08-31 DIAGNOSIS — N40.0 BENIGN NON-NODULAR PROSTATIC HYPERPLASIA WITHOUT LOWER URINARY TRACT SYMPTOMS: Primary | ICD-10-CM

## 2022-08-31 PROCEDURE — 3066F PR DOCUMENTATION OF TREATMENT FOR NEPHROPATHY: ICD-10-PCS | Mod: CPTII,S$GLB,, | Performed by: UROLOGY

## 2022-08-31 PROCEDURE — 4010F PR ACE/ARB THEARPY RXD/TAKEN: ICD-10-PCS | Mod: CPTII,S$GLB,, | Performed by: UROLOGY

## 2022-08-31 PROCEDURE — 99999 PR PBB SHADOW E&M-EST. PATIENT-LVL III: CPT | Mod: PBBFAC,,, | Performed by: UROLOGY

## 2022-08-31 PROCEDURE — 99213 PR OFFICE/OUTPT VISIT, EST, LEVL III, 20-29 MIN: ICD-10-PCS | Mod: S$GLB,,, | Performed by: UROLOGY

## 2022-08-31 PROCEDURE — 3052F PR MOST RECENT HEMOGLOBIN A1C LEVEL 8.0 - < 9.0%: ICD-10-PCS | Mod: CPTII,S$GLB,, | Performed by: UROLOGY

## 2022-08-31 PROCEDURE — 3074F PR MOST RECENT SYSTOLIC BLOOD PRESSURE < 130 MM HG: ICD-10-PCS | Mod: CPTII,S$GLB,, | Performed by: UROLOGY

## 2022-08-31 PROCEDURE — 3078F PR MOST RECENT DIASTOLIC BLOOD PRESSURE < 80 MM HG: ICD-10-PCS | Mod: CPTII,S$GLB,, | Performed by: UROLOGY

## 2022-08-31 PROCEDURE — 3074F SYST BP LT 130 MM HG: CPT | Mod: CPTII,S$GLB,, | Performed by: UROLOGY

## 2022-08-31 PROCEDURE — 3008F BODY MASS INDEX DOCD: CPT | Mod: CPTII,S$GLB,, | Performed by: UROLOGY

## 2022-08-31 PROCEDURE — 3008F PR BODY MASS INDEX (BMI) DOCUMENTED: ICD-10-PCS | Mod: CPTII,S$GLB,, | Performed by: UROLOGY

## 2022-08-31 PROCEDURE — 1126F PR PAIN SEVERITY QUANTIFIED, NO PAIN PRESENT: ICD-10-PCS | Mod: CPTII,S$GLB,, | Performed by: UROLOGY

## 2022-08-31 PROCEDURE — 3078F DIAST BP <80 MM HG: CPT | Mod: CPTII,S$GLB,, | Performed by: UROLOGY

## 2022-08-31 PROCEDURE — 3052F HG A1C>EQUAL 8.0%<EQUAL 9.0%: CPT | Mod: CPTII,S$GLB,, | Performed by: UROLOGY

## 2022-08-31 PROCEDURE — 3066F NEPHROPATHY DOC TX: CPT | Mod: CPTII,S$GLB,, | Performed by: UROLOGY

## 2022-08-31 PROCEDURE — 99213 OFFICE O/P EST LOW 20 MIN: CPT | Mod: S$GLB,,, | Performed by: UROLOGY

## 2022-08-31 PROCEDURE — 99999 PR PBB SHADOW E&M-EST. PATIENT-LVL III: ICD-10-PCS | Mod: PBBFAC,,, | Performed by: UROLOGY

## 2022-08-31 PROCEDURE — 1159F MED LIST DOCD IN RCRD: CPT | Mod: CPTII,S$GLB,, | Performed by: UROLOGY

## 2022-08-31 PROCEDURE — 4010F ACE/ARB THERAPY RXD/TAKEN: CPT | Mod: CPTII,S$GLB,, | Performed by: UROLOGY

## 2022-08-31 PROCEDURE — 1126F AMNT PAIN NOTED NONE PRSNT: CPT | Mod: CPTII,S$GLB,, | Performed by: UROLOGY

## 2022-08-31 PROCEDURE — 1159F PR MEDICATION LIST DOCUMENTED IN MEDICAL RECORD: ICD-10-PCS | Mod: CPTII,S$GLB,, | Performed by: UROLOGY

## 2022-08-31 NOTE — PROGRESS NOTES
Subjective:       Patient ID: Walter Bull is a 71 y.o. male.    Chief Complaint: f/u chronic prostatitis    HPI is for patient is here for prostate check.  He is voiding well.  He is unable to void for urinalysis today.  He has a good stream and empties his bladder.    Past Medical History:   Diagnosis Date    Anemia     Angina pectoris     Anticoagulant long-term use     Arthritis     Back pain     CHF (congestive heart failure)     Chronic bronchitis     Colon cancer screening 2/2/2017    Coronary artery disease     Diabetes mellitus type I     Diabetes with neurologic complications     Disorder of kidney and ureter     Encounter for blood transfusion     Glaucoma     Heart attack     09/2018    Heart disease     Hyperlipidemia     Hypertension     Hypothyroidism     Iron deficiency     Kidney failure     post CABG    Obesity     Peripheral vascular disease     Polyneuropathy     Pulmonary emphysema 2/26/2020    Renal manifestation of secondary diabetes mellitus     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    Sleep apnea     had CPAP but had recall- not currently using     Trouble in sleeping     Type 2 diabetes mellitus with ophthalmic manifestations        Past Surgical History:   Procedure Laterality Date    24 HOUR IMPEDANCE PH MONITORING OF ESOPHAGUS IN PATIENT TAKING ACID REDUCING MEDICATIONS N/A 3/10/2022    Procedure: IMPEDANCE PH STUDY, ESOPHAGEAL, 24 HOUR, IN PATIENT TAKING ACID REDUCING MEDICATION;  Surgeon: Carmelita Jacobsen MD;  Location: [facility] ENDO ([address] [place] [address]);  Service: Endoscopy;  Laterality: N/A;  ICD-  fully vaccinated  Yes he can hold Plavix 5 days prior to endoscopies and restart post procedure when safe from a operator perspective per Dr.A. Canada    CARDIAC DEFIBRILLATOR PLACEMENT      CARDIAC ELECTROPHYSIOLOGY STUDY N/A 11/19/2018    Procedure: CARDIAC ELECTROPHYSIOLOGY STUDY;  Surgeon: Byron Glasgow MD;  Location: [facility] EP LAB;  Service: Cardiology;  Laterality: N/A;  VT, EPS +/- ICD, SJM, anes, GP, 2503     CARDIAC ELECTROPHYSIOLOGY STUDY N/A 11/19/2018    Procedure: CARDIAC ELECTROPHYSIOLOGY STUDY;  Surgeon: Byron Glasgow MD;  Location: [facility] EP LAB;  Service: Cardiology;  Laterality: N/A;    COLON SURGERY  2006    COLONOSCOPY N/A 2/2/2017    Procedure: COLONOSCOPY;  Surgeon: Ronnie Conway MD;  Location: [facility] ENDO;  Service: Endoscopy;  Laterality: N/A;    COLONOSCOPY N/A 4/25/2022    Procedure: COLONOSCOPY;  Surgeon: Branden Bush MD;  Location: [facility] ENDO (2ND FLR);  Service: Endoscopy;  Laterality: N/A;    CORONARY ARTERY BYPASS GRAFT  2005    5 arteries    CORONARY BYPASS GRAFT ANGIOGRAPHY  11/16/2018    Procedure: Bypass graft study;  Surgeon: Ryan Schmidt MD;  Location: [facility] CATH LAB;  Service: Cardiology;;    ESOPHAGEAL MANOMETRY WITH MEASUREMENT OF IMPEDANCE N/A 3/10/2022    Procedure: MANOMETRY, ESOPHAGUS, WITH IMPEDANCE MEASUREMENT;  Surgeon: Carmelita Jacobsen MD;  Location: [facility] ENDO (4TH FLR);  Service: Endoscopy;  Laterality: N/A;  RAPID COVID test, pt to arrive for 6:00 am-Kpvt    ESOPHAGOGASTRODUODENOSCOPY N/A 4/25/2022    Procedure: EGD (ESOPHAGOGASTRODUODENOSCOPY);  Surgeon: Branden Bush MD;  Location: [facility] ENDO (2ND FLR);  Service: Endoscopy;  Laterality: N/A;  Bravo procedure canceled- pt has an ICD ([employer])- will do 24h pH study instead  ok to hold Brilinta and Plavix-RB  most recent ECHO 4/2022- EF 25%    EYE SURGERY Bilateral 2016    Laser surgery for glaucoma    INSERTION OF BIVENTRICULAR IMPLANTABLE CARDIOVERTER-DEFIBRILLATOR (ICD) Left 4/7/2022    Procedure: INSERTION, ICD, BIVENTRICULAR;  Surgeon: Byron Glasgow MD;  Location: [facility] EP LAB;  Service: Cardiology;  Laterality: Left;  HF/ICM, Venogram prior to opening, CRT-D upgrade, SJM, MAC, GP, 3 PREP    INSERTION OF IMPLANTABLE CARDIOVERTER-DEFIBRILLATOR (ICD) GENERATOR WITH TWO EXISTING LEADS Left 11/19/2018    Procedure: INSERTION, DUAL ICD;  Surgeon: Byron Glasgow MD;  Location: [facility] EP LAB;  Service: Cardiology;   Laterality: Left;  VT, EPS +/- ICD, SJM, anes, GP, 6086    LEFT HEART CATHETERIZATION Left 2018    Procedure: Left heart cath;  Surgeon: Ryan Schmidt MD;  Location: [facility] CATH LAB;  Service: Cardiology;  Laterality: Left;       Family History   Problem Relation Age of Onset    Diabetes Mother     Heart disease Mother     Hypertension Sister     Diabetes Sister     Heart disease Sister     Heart attack Brother     Colon cancer Neg Hx     Esophageal cancer Neg Hx     Stomach cancer Neg Hx        Social History     Socioeconomic History    Marital status:    Tobacco Use    Smoking status: Former     Packs/day: 3.00     Types: Cigarettes     Start date: 1963     Quit date: 1981     Years since quittin.6    Smokeless tobacco: Former     Types: Snuff, Chew     Quit date:    Substance and Sexual Activity    Alcohol use: No    Drug use: No    Sexual activity: Yes     Comment: -with a significant other     Social Determinants of Health     Financial Resource Strain: Medium Risk    Difficulty of Paying Living Expenses: Somewhat hard   Food Insecurity: No Food Insecurity    Worried About Running Out of Food in the Last Year: Never true    Ran Out of Food in the Last Year: Never true   Transportation Needs: No Transportation Needs    Lack of Transportation (Medical): No    Lack of Transportation (Non-Medical): No   Physical Activity: Sufficiently Active    Days of Exercise per Week: 3 days    Minutes of Exercise per Session: 120 min   Stress: No Stress Concern Present    Feeling of Stress : Not at all   Social Connections: Unknown    Frequency of Communication with Friends and Family: More than three times a week    Frequency of Social Gatherings with Friends and Family: Never    Active Member of Clubs or Organizations: No    Attends Club or Organization Meetings: Never    Marital Status:    Housing Stability: Low Risk     Unable to Pay for Housing in the Last Year: No     Number of Places Lived in the Last Year: 1    Unstable Housing in the Last Year: No       Allergies:  Patient has no known allergies.    Medications:    Current Outpatient Medications:     ammonium lactate 12 % Crea, Apply twice daily to affected parts both feet as needed., Disp: 140 g, Rfl: 11    aspirin (ECOTRIN) 81 MG EC tablet, Take 1 tablet (81 mg total) by mouth once daily., Disp: 90 tablet, Rfl: 3    blood-glucose sensor (DEXCOM G6 SENSOR) [place], 3 each by Misc.(Non-Drug; Combo Route) route continuous., Disp: 3 each, Rfl: prn    blood-glucose transmitter (DEXCOM G6 TRANSMITTER) [place], 1 each by Misc.(Non-Drug; Combo Route) route continuous., Disp: 1 each, Rfl: prn    carvediloL (COREG) 6.25 MG tablet, Take 1 tablet (6.25 mg total) by mouth 2 (two) times daily., Disp: 180 tablet, Rfl: 3    clopidogreL (PLAVIX) 75 mg tablet, Take 1 tablet (75 mg total) by mouth once daily., Disp: 90 tablet, Rfl: 3    diclofenac sodium (VOLTAREN) 1 % Gel, Apply 2 g topically 4 (four) times daily. (Patient taking differently: Apply 2 g topically as needed.), Disp: 100 g, Rfl: 2    dulaglutide (TRULICITY) 4.5 mg/0.5 mL pen injector, INJECT 4.5 MG INTO THE SKIN EVERY 7 DAYS, Disp: 12 pen, Rfl: 3    esomeprazole (NEXIUM) 40 MG capsule, Take 1 capsule (40 mg total) by mouth 2 (two) times daily., Disp: 180 capsule, Rfl: 3    evolocumab (REPATHA SURECLICK) 140 mg/mL PnIj, Inject 1 mL (140 mg total) into the skin every 14 (fourteen) days., Disp: 2 each, Rfl: 11    ezetimibe (ZETIA) 10 mg tablet, TAKE 1 TABLET BY MOUTH ONCE DAILY, Disp: 90 tablet, Rfl: 3    ferrous sulfate (FEOSOL) 325 mg (65 mg iron) Tab tablet, TAKE 1 TABLET BY MOUTH THREE TIMES DAILY (Patient taking differently: Take 325 mg by mouth 2 (two) times daily. TAKE 1 TABLET BY MOUTH THREE TIMES DAILY), Disp: 90 tablet, Rfl: 3    furosemide (LASIX) 20 MG tablet, TAKE 1 TABLET BY MOUTH ONCE DAILY, Disp: 90 tablet, Rfl: 3    GVOKE HYPOPEN 2-PACK 1 mg/0.2 mL AtIn, INJECT  SUBCUTANEOUSLY  CONTENTS OF 1 AUTO-INJECTOR AS NEEDED FOR HYPOGLCEMIA  AS DIRECTED, Disp: 0.4 mL, Rfl: 2    insulin lispro (HUMALOG U-100 INSULIN) 100 unit/mL injection, USE AS DIRECTED VIA V-GO 20, Disp: 20 mL, Rfl: 11    ketoconazole (NIZORAL) 2 % cream, Apply topically once daily., Disp: 60 g, Rfl: 2    LIDOcaine HCL 2% (XYLOCAINE) 2 % jelly, Apply topically as needed. Apply topically once nightly to affected part of foot/feet, for pain., Disp: 30 mL, Rfl: 2    losartan (COZAAR) 25 MG tablet, Take 0.5 tablets (12.5 mg total) by mouth once daily., Disp: 45 tablet, Rfl: 3    mupirocin (BACTROBAN) 2 % ointment, Apply to affected area 3 times daily, Disp: 22 g, Rfl: 1    nitroGLYCERIN (NITROSTAT) 0.4 MG SL tablet, DISSOLVE 1 TABLET UNDER THE TONGUE EVERY 5 MINUTES AS  NEEDED FOR CHEST PAIN. MAX  OF 3 TABLETS IN 15 MINUTES. CALL 911 IF PAIN PERSISTS., Disp: 25 tablet, Rfl: 11    ondansetron (ZOFRAN) 4 MG tablet, Take 1 tablet (4 mg total) by mouth every 8 (eight) hours as needed for Nausea., Disp: 15 tablet, Rfl: 0    papaverine 30 mg/mL injection, Add: Phentolamine 1 mg Add: PGE1 10 mcg  Sig:  Inject 20 units as directed; titrate up by 5 units until desired results, Disp: 10 mL, Rfl: 12    ranolazine (RANEXA) 1,000 mg Tb12, Take 1 tablet (1,000 mg total) by mouth 2 (two) times daily., Disp: 60 tablet, Rfl: 11    rosuvastatin (CRESTOR) 40 MG Tab, Take 1 tablet (40 mg total) by mouth once daily., Disp: 90 tablet, Rfl: 3    spironolactone (ALDACTONE) 25 MG tablet, Take 1 tablet (25 mg total) by mouth once daily., Disp: 90 tablet, Rfl: 3    sub-q insulin device, 20 unit (VGO 20) [place], 1 Device by Misc.(Non-Drug; Combo Route) route once daily., Disp: 30 each, Rfl: 6    tamsulosin (FLOMAX) 0.4 mg Cap, Take 1 capsule (0.4 mg total) by mouth once daily., Disp: 90 capsule, Rfl: 3    traMADoL (ULTRAM) 50 mg tablet, Take 1 tablet (50 mg total) by mouth every 6 (six) hours as needed for Pain., Disp: 20 tablet, Rfl: 0    Review  of Systems   Constitutional:  Negative for activity change, appetite change, chills, diaphoresis, fatigue, fever and unexpected weight change.   HENT:  Negative for congestion, dental problem, hearing loss, mouth sores, postnasal drip, rhinorrhea, sinus pressure and trouble swallowing.    Eyes:  Negative for pain, discharge and itching.   Respiratory:  Negative for apnea, cough, choking, chest tightness, shortness of breath and wheezing.    Cardiovascular:  Negative for chest pain, palpitations and leg swelling.   Gastrointestinal:  Negative for abdominal distention, abdominal pain, anal bleeding, blood in stool, constipation, diarrhea, nausea, rectal pain and vomiting.   Endocrine: Negative for polydipsia and polyuria.   Genitourinary:  Negative for decreased urine volume, difficulty urinating, dysuria, enuresis, flank pain, frequency, genital sores, hematuria, penile discharge, penile pain, penile swelling, scrotal swelling, testicular pain and urgency.   Musculoskeletal:  Negative for arthralgias, back pain and myalgias.   Skin:  Negative for color change, rash and wound.   Neurological:  Negative for dizziness, syncope, speech difficulty, light-headedness and headaches.   Hematological:  Negative for adenopathy. Does not bruise/bleed easily.   Psychiatric/Behavioral:  Negative for behavioral problems, confusion, hallucinations and sleep disturbance.      Objective:      Physical Exam  Constitutional:       Appearance: He is well-developed.   HENT:      Head: Normocephalic.   Cardiovascular:      Rate and Rhythm: Normal rate.   Pulmonary:      Effort: Pulmonary effort is normal.   Abdominal:      Palpations: Abdomen is soft.   Genitourinary:     Prostate: Normal.      Comments: 30 g benign no nodules  Skin:     General: Skin is warm.   Neurological:      Mental Status: He is alert.       Assessment:       1. Benign non-nodular prostatic hyperplasia without lower urinary tract symptoms          Plan:       Walter  was seen today for f/u chronic prostatitis.    Diagnoses and all orders for this visit:    Benign non-nodular prostatic hyperplasia without lower urinary tract symptoms        Yearly check with a PP

## 2022-09-02 ENCOUNTER — OFFICE VISIT (OUTPATIENT)
Dept: OPTOMETRY | Facility: CLINIC | Age: 72
End: 2022-09-02
Payer: MEDICARE

## 2022-09-02 ENCOUNTER — LAB VISIT (OUTPATIENT)
Dept: LAB | Facility: HOSPITAL | Age: 72
End: 2022-09-02
Attending: NURSE PRACTITIONER
Payer: MEDICARE

## 2022-09-02 DIAGNOSIS — D31.61 BENIGN ORBITAL TUMOR, RIGHT: ICD-10-CM

## 2022-09-02 DIAGNOSIS — I10 ESSENTIAL HYPERTENSION: ICD-10-CM

## 2022-09-02 DIAGNOSIS — H52.203 MYOPIA WITH ASTIGMATISM AND PRESBYOPIA, BILATERAL: ICD-10-CM

## 2022-09-02 DIAGNOSIS — H40.1131 PRIMARY OPEN ANGLE GLAUCOMA (POAG) OF BOTH EYES, MILD STAGE: Primary | ICD-10-CM

## 2022-09-02 DIAGNOSIS — Z79.4 TYPE 2 DIABETES MELLITUS WITH DIABETIC NEPHROPATHY, WITH LONG-TERM CURRENT USE OF INSULIN: ICD-10-CM

## 2022-09-02 DIAGNOSIS — E11.9 TYPE 2 DIABETES MELLITUS WITHOUT RETINOPATHY: ICD-10-CM

## 2022-09-02 DIAGNOSIS — H52.13 MYOPIA WITH ASTIGMATISM AND PRESBYOPIA, BILATERAL: ICD-10-CM

## 2022-09-02 DIAGNOSIS — E11.21 TYPE 2 DIABETES MELLITUS WITH DIABETIC NEPHROPATHY, WITH LONG-TERM CURRENT USE OF INSULIN: ICD-10-CM

## 2022-09-02 DIAGNOSIS — H52.4 MYOPIA WITH ASTIGMATISM AND PRESBYOPIA, BILATERAL: ICD-10-CM

## 2022-09-02 DIAGNOSIS — H04.123 DRY EYE SYNDROME OF BOTH EYES: ICD-10-CM

## 2022-09-02 LAB
ANION GAP SERPL CALC-SCNC: 8 MMOL/L (ref 8–16)
BUN SERPL-MCNC: 15 MG/DL (ref 8–23)
CALCIUM SERPL-MCNC: 8.8 MG/DL (ref 8.7–10.5)
CHLORIDE SERPL-SCNC: 104 MMOL/L (ref 95–110)
CO2 SERPL-SCNC: 27 MMOL/L (ref 23–29)
CREAT SERPL-MCNC: 1.5 MG/DL (ref 0.5–1.4)
EST. GFR  (NO RACE VARIABLE): 49 ML/MIN/1.73 M^2
ESTIMATED AVG GLUCOSE: 154 MG/DL (ref 68–131)
GLUCOSE SERPL-MCNC: 141 MG/DL (ref 70–110)
HBA1C MFR BLD: 7 % (ref 4–5.6)
POTASSIUM SERPL-SCNC: 4.6 MMOL/L (ref 3.5–5.1)
SODIUM SERPL-SCNC: 139 MMOL/L (ref 136–145)

## 2022-09-02 PROCEDURE — 92015 DETERMINE REFRACTIVE STATE: CPT | Mod: S$GLB,,, | Performed by: OPTOMETRIST

## 2022-09-02 PROCEDURE — 83036 HEMOGLOBIN GLYCOSYLATED A1C: CPT | Performed by: STUDENT IN AN ORGANIZED HEALTH CARE EDUCATION/TRAINING PROGRAM

## 2022-09-02 PROCEDURE — 1126F AMNT PAIN NOTED NONE PRSNT: CPT | Mod: CPTII,S$GLB,, | Performed by: OPTOMETRIST

## 2022-09-02 PROCEDURE — 92014 PR EYE EXAM, EST PATIENT,COMPREHESV: ICD-10-PCS | Mod: S$GLB,,, | Performed by: OPTOMETRIST

## 2022-09-02 PROCEDURE — 3288F PR FALLS RISK ASSESSMENT DOCUMENTED: ICD-10-PCS | Mod: CPTII,S$GLB,, | Performed by: OPTOMETRIST

## 2022-09-02 PROCEDURE — 1159F MED LIST DOCD IN RCRD: CPT | Mod: CPTII,S$GLB,, | Performed by: OPTOMETRIST

## 2022-09-02 PROCEDURE — 3288F FALL RISK ASSESSMENT DOCD: CPT | Mod: CPTII,S$GLB,, | Performed by: OPTOMETRIST

## 2022-09-02 PROCEDURE — 92014 COMPRE OPH EXAM EST PT 1/>: CPT | Mod: S$GLB,,, | Performed by: OPTOMETRIST

## 2022-09-02 PROCEDURE — 1159F PR MEDICATION LIST DOCUMENTED IN MEDICAL RECORD: ICD-10-PCS | Mod: CPTII,S$GLB,, | Performed by: OPTOMETRIST

## 2022-09-02 PROCEDURE — 99999 PR PBB SHADOW E&M-EST. PATIENT-LVL II: CPT | Mod: PBBFAC,,, | Performed by: OPTOMETRIST

## 2022-09-02 PROCEDURE — 80048 BASIC METABOLIC PNL TOTAL CA: CPT | Performed by: NURSE PRACTITIONER

## 2022-09-02 PROCEDURE — 4010F PR ACE/ARB THEARPY RXD/TAKEN: ICD-10-PCS | Mod: CPTII,S$GLB,, | Performed by: OPTOMETRIST

## 2022-09-02 PROCEDURE — 99999 PR PBB SHADOW E&M-EST. PATIENT-LVL II: ICD-10-PCS | Mod: PBBFAC,,, | Performed by: OPTOMETRIST

## 2022-09-02 PROCEDURE — 1101F PR PT FALLS ASSESS DOC 0-1 FALLS W/OUT INJ PAST YR: ICD-10-PCS | Mod: CPTII,S$GLB,, | Performed by: OPTOMETRIST

## 2022-09-02 PROCEDURE — 4010F ACE/ARB THERAPY RXD/TAKEN: CPT | Mod: CPTII,S$GLB,, | Performed by: OPTOMETRIST

## 2022-09-02 PROCEDURE — 1160F RVW MEDS BY RX/DR IN RCRD: CPT | Mod: CPTII,S$GLB,, | Performed by: OPTOMETRIST

## 2022-09-02 PROCEDURE — 3052F HG A1C>EQUAL 8.0%<EQUAL 9.0%: CPT | Mod: CPTII,S$GLB,, | Performed by: OPTOMETRIST

## 2022-09-02 PROCEDURE — 1126F PR PAIN SEVERITY QUANTIFIED, NO PAIN PRESENT: ICD-10-PCS | Mod: CPTII,S$GLB,, | Performed by: OPTOMETRIST

## 2022-09-02 PROCEDURE — 1160F PR REVIEW ALL MEDS BY PRESCRIBER/CLIN PHARMACIST DOCUMENTED: ICD-10-PCS | Mod: CPTII,S$GLB,, | Performed by: OPTOMETRIST

## 2022-09-02 PROCEDURE — 3066F NEPHROPATHY DOC TX: CPT | Mod: CPTII,S$GLB,, | Performed by: OPTOMETRIST

## 2022-09-02 PROCEDURE — 92015 PR REFRACTION: ICD-10-PCS | Mod: S$GLB,,, | Performed by: OPTOMETRIST

## 2022-09-02 PROCEDURE — 1101F PT FALLS ASSESS-DOCD LE1/YR: CPT | Mod: CPTII,S$GLB,, | Performed by: OPTOMETRIST

## 2022-09-02 PROCEDURE — 3066F PR DOCUMENTATION OF TREATMENT FOR NEPHROPATHY: ICD-10-PCS | Mod: CPTII,S$GLB,, | Performed by: OPTOMETRIST

## 2022-09-02 PROCEDURE — 3052F PR MOST RECENT HEMOGLOBIN A1C LEVEL 8.0 - < 9.0%: ICD-10-PCS | Mod: CPTII,S$GLB,, | Performed by: OPTOMETRIST

## 2022-09-02 PROCEDURE — 36415 COLL VENOUS BLD VENIPUNCTURE: CPT | Performed by: NURSE PRACTITIONER

## 2022-09-02 NOTE — PROGRESS NOTES
JHONATHAN    DONA: [birth year]  Chief complaint (CC): Patient is here for annual eye exam.  Patient has   noticed that distance is not as clear and would like new glasses.  Glasses? +1 yr. old  Contacts? -  H/o eye surgery, injections or laser: PC IOL OU, laser OU for glaucoma  H/o eye injury: -  Known eye conditions? See above  Family h/o eye conditions? -  Eye gtts? -      (-) Flashes (-)  Floaters (-) Mucous   (-)  Tearing (-) Itching (-) Burning   (-) Headaches (-) Eye Pain/discomfort (-) Irritation   (-)  Redness (-) Double vision (-) Blurry vision    Diabetic? + now  A1c? Hemoglobin A1C       Date                     Value               Ref Range             Status                05/10/2022               8.0 (H)             4.0 - 5.6 %           Final                 11/09/2021               6.8 (H)             4.0 - 5.6 %           Final                 09/27/2021               7.4 (H)             4.0 - 5.6 %           Final                Last edited by Kim Rosenberg on 9/2/2022  9:52 AM.            Assessment /Plan     For exam results, see Encounter Report.      Primary open angle glaucoma (POAG) of both eyes, mild stage  -     Posterior Segment OCT Optic Nerve- Both eyes; Future  -     Johnson Visual Field - OU - Extended - Both Eyes; Future  (-) fHx. IOP 18 OD, OS. Last 14 OD, OS. Tmax 24 OD, OS.   C/d 0.65 OD, OS. S/p SLT x3 about 11-12 years ago per pt. Pt has been off drops since SLT. Pachy 563  OS.   Photos  3/4/2020, 3/9/2021  9/2/2022 DFE  4/15/2021 OCT OD thin TS70, T42, TI90 and G73 borderline N , OS borderline TS 98 and T 45   10/6/2022 OCT OD thin TS54, T39, TI91 and G79, OS thin TS62 and T43, borderline TI And G  4/15/2021 HVF OD low reliability, WNL, OS WNL  10/6/2022 HVF OD borderline but WNL, OS ONL- scattered nonspecific inferior defects  Educated the patient on findings. Importance of testing and f/u expressed   RTC w/in 1 mo for BMO ppole/24-2 kalia faster HVF    Dry eye syndrome of  both eyes  Rec Refresh TID-QID OU    Type 2 diabetes mellitus without retinopathy  BS control. No signs of diabetic retinopathy. Monitor with annual exam.    Myopia with astigmatism and presbyopia, bilateral  SRx released to patient. Patient educated on lens options. Normal ocular health. RTC 1 year for routine exam.     Benign orbital tumor, right  Pt saw Dr Lopez 2021. Will monitor.       10/6/2022 Pt returned for testing on 10/5/2022. Questions of progression in TS OD and TS OS. RTC 4-6 mo IOP/OCT

## 2022-09-08 NOTE — TELEPHONE ENCOUNTER
Outgoing call to [facility]. Representative stated Bita MOLINA has been approved from 8/24/22 to 2/24/23.   Case #[id number]  $139.04 copay      Requested approval letter be faxed to [facility].   Forwarding to BI

## 2022-09-12 NOTE — TELEPHONE ENCOUNTER
Benefit Investigation    Rep[facility] [employer] per Skye  No LIS  Estimated copay: $139.04   Patient is in the coverage gap  [facility] in network    Forward to FA

## 2022-09-14 NOTE — TELEPHONE ENCOUNTER
Outgoing call to patient to let him know Bita has been approved on his insurance with a $139.04 copay and to offer FA. If patient would like to apply for PAP, HH size and annual income information is needed. No answer, LVM.

## 2022-09-14 NOTE — TELEPHONE ENCOUNTER
Incoming call from the patient. Advised patient that Bita MOLINA is approved with a high copayment. Pt states he would like to apply for PAP. Pt provided HH size and income. Pt confirmed that he would like to receive his portion of the application via email on file in Epic. Forwarding financial information to the FA team.

## 2022-09-14 NOTE — TELEPHONE ENCOUNTER
Blank application for Repatha PAP emailed to email address on file. Provider portion faxed to [phone] and staff message sent to [facility].

## 2022-09-21 NOTE — TELEPHONE ENCOUNTER
Incoming call from Eve, received PAP form,  did not have proof of income, will retrieve income documents from SS office. Informed caregiver to e-mail all documents back to [facility] once retrieved. Caregiver repeated understanding.

## 2022-09-22 ENCOUNTER — PATIENT MESSAGE (OUTPATIENT)
Dept: NEPHROLOGY | Facility: CLINIC | Age: 72
End: 2022-09-22
Payer: MEDICARE

## 2022-09-23 NOTE — TELEPHONE ENCOUNTER
Pls have pt bring BP cuff to nurse visit and check BP in both arms with his cuff and with our cuff. I will need to know if there is a large discrepancy between arms. Thanks.

## 2022-09-27 NOTE — TELEPHONE ENCOUNTER
Outgoing call to patient to follow up about patient portion of Repatha PAP application. Patient stated he is not going to be able to get income documents from the social security office. He tried to call office and was on hold for too long. He expressed frustration and stated it is going to be too difficult to show proof of income and does not want to proceed with PAP. Informed patient that this is the only financial assistance option at this time for him and he declined to move forward and will not be starting on Repatha. Staff message sent to [facility] about patient's refusal. Closing out patient from [facility] serices.

## 2022-09-28 ENCOUNTER — OFFICE VISIT (OUTPATIENT)
Dept: UROLOGY | Facility: CLINIC | Age: 72
End: 2022-09-28
Payer: MEDICARE

## 2022-09-28 VITALS
BODY MASS INDEX: 35.1 KG/M2 | HEIGHT: 69 IN | DIASTOLIC BLOOD PRESSURE: 83 MMHG | WEIGHT: 237 LBS | HEART RATE: 73 BPM | SYSTOLIC BLOOD PRESSURE: 158 MMHG

## 2022-09-28 DIAGNOSIS — R30.0 DYSURIA: Primary | ICD-10-CM

## 2022-09-28 DIAGNOSIS — E11.59 TYPE 2 DIABETES MELLITUS WITH CIRCULATORY DISORDER CAUSING ERECTILE DYSFUNCTION: ICD-10-CM

## 2022-09-28 DIAGNOSIS — N40.1 BPH WITH URINARY OBSTRUCTION: ICD-10-CM

## 2022-09-28 DIAGNOSIS — N13.8 BPH WITH URINARY OBSTRUCTION: ICD-10-CM

## 2022-09-28 DIAGNOSIS — N52.1 TYPE 2 DIABETES MELLITUS WITH CIRCULATORY DISORDER CAUSING ERECTILE DYSFUNCTION: ICD-10-CM

## 2022-09-28 DIAGNOSIS — N42.81 PROSTADYNIA: ICD-10-CM

## 2022-09-28 LAB
BILIRUB SERPL-MCNC: ABNORMAL MG/DL
BLOOD URINE, POC: ABNORMAL
CLARITY, POC UA: CLEAR
COLOR, POC UA: ABNORMAL
GLUCOSE UR QL STRIP: ABNORMAL
KETONES UR QL STRIP: ABNORMAL
LEUKOCYTE ESTERASE URINE, POC: ABNORMAL
NITRITE, POC UA: ABNORMAL
PH, POC UA: 5
PROTEIN, POC: ABNORMAL
SPECIFIC GRAVITY, POC UA: 1.01
UROBILINOGEN, POC UA: NORMAL

## 2022-09-28 PROCEDURE — 3051F HG A1C>EQUAL 7.0%<8.0%: CPT | Mod: CPTII,S$GLB,, | Performed by: NURSE PRACTITIONER

## 2022-09-28 PROCEDURE — 1125F PR PAIN SEVERITY QUANTIFIED, PAIN PRESENT: ICD-10-PCS | Mod: CPTII,S$GLB,, | Performed by: NURSE PRACTITIONER

## 2022-09-28 PROCEDURE — 1160F PR REVIEW ALL MEDS BY PRESCRIBER/CLIN PHARMACIST DOCUMENTED: ICD-10-PCS | Mod: CPTII,S$GLB,, | Performed by: NURSE PRACTITIONER

## 2022-09-28 PROCEDURE — 1125F AMNT PAIN NOTED PAIN PRSNT: CPT | Mod: CPTII,S$GLB,, | Performed by: NURSE PRACTITIONER

## 2022-09-28 PROCEDURE — 4010F PR ACE/ARB THEARPY RXD/TAKEN: ICD-10-PCS | Mod: CPTII,S$GLB,, | Performed by: NURSE PRACTITIONER

## 2022-09-28 PROCEDURE — 99999 PR PBB SHADOW E&M-EST. PATIENT-LVL V: CPT | Mod: PBBFAC,,, | Performed by: NURSE PRACTITIONER

## 2022-09-28 PROCEDURE — 3077F PR MOST RECENT SYSTOLIC BLOOD PRESSURE >= 140 MM HG: ICD-10-PCS | Mod: CPTII,S$GLB,, | Performed by: NURSE PRACTITIONER

## 2022-09-28 PROCEDURE — 99999 PR PBB SHADOW E&M-EST. PATIENT-LVL V: ICD-10-PCS | Mod: PBBFAC,,, | Performed by: NURSE PRACTITIONER

## 2022-09-28 PROCEDURE — 3077F SYST BP >= 140 MM HG: CPT | Mod: CPTII,S$GLB,, | Performed by: NURSE PRACTITIONER

## 2022-09-28 PROCEDURE — 3079F DIAST BP 80-89 MM HG: CPT | Mod: CPTII,S$GLB,, | Performed by: NURSE PRACTITIONER

## 2022-09-28 PROCEDURE — 4010F ACE/ARB THERAPY RXD/TAKEN: CPT | Mod: CPTII,S$GLB,, | Performed by: NURSE PRACTITIONER

## 2022-09-28 PROCEDURE — 3288F PR FALLS RISK ASSESSMENT DOCUMENTED: ICD-10-PCS | Mod: CPTII,S$GLB,, | Performed by: NURSE PRACTITIONER

## 2022-09-28 PROCEDURE — 1101F PR PT FALLS ASSESS DOC 0-1 FALLS W/OUT INJ PAST YR: ICD-10-PCS | Mod: CPTII,S$GLB,, | Performed by: NURSE PRACTITIONER

## 2022-09-28 PROCEDURE — 3066F PR DOCUMENTATION OF TREATMENT FOR NEPHROPATHY: ICD-10-PCS | Mod: CPTII,S$GLB,, | Performed by: NURSE PRACTITIONER

## 2022-09-28 PROCEDURE — 1159F MED LIST DOCD IN RCRD: CPT | Mod: CPTII,S$GLB,, | Performed by: NURSE PRACTITIONER

## 2022-09-28 PROCEDURE — 3008F PR BODY MASS INDEX (BMI) DOCUMENTED: ICD-10-PCS | Mod: CPTII,S$GLB,, | Performed by: NURSE PRACTITIONER

## 2022-09-28 PROCEDURE — 3008F BODY MASS INDEX DOCD: CPT | Mod: CPTII,S$GLB,, | Performed by: NURSE PRACTITIONER

## 2022-09-28 PROCEDURE — 1160F RVW MEDS BY RX/DR IN RCRD: CPT | Mod: CPTII,S$GLB,, | Performed by: NURSE PRACTITIONER

## 2022-09-28 PROCEDURE — 3288F FALL RISK ASSESSMENT DOCD: CPT | Mod: CPTII,S$GLB,, | Performed by: NURSE PRACTITIONER

## 2022-09-28 PROCEDURE — 81002 URINALYSIS NONAUTO W/O SCOPE: CPT | Mod: S$GLB,,, | Performed by: NURSE PRACTITIONER

## 2022-09-28 PROCEDURE — 1101F PT FALLS ASSESS-DOCD LE1/YR: CPT | Mod: CPTII,S$GLB,, | Performed by: NURSE PRACTITIONER

## 2022-09-28 PROCEDURE — 3066F NEPHROPATHY DOC TX: CPT | Mod: CPTII,S$GLB,, | Performed by: NURSE PRACTITIONER

## 2022-09-28 PROCEDURE — 99214 OFFICE O/P EST MOD 30 MIN: CPT | Mod: S$GLB,,, | Performed by: NURSE PRACTITIONER

## 2022-09-28 PROCEDURE — 3051F PR MOST RECENT HEMOGLOBIN A1C LEVEL 7.0 - < 8.0%: ICD-10-PCS | Mod: CPTII,S$GLB,, | Performed by: NURSE PRACTITIONER

## 2022-09-28 PROCEDURE — 99214 PR OFFICE/OUTPT VISIT, EST, LEVL IV, 30-39 MIN: ICD-10-PCS | Mod: S$GLB,,, | Performed by: NURSE PRACTITIONER

## 2022-09-28 PROCEDURE — 81002 POCT URINE DIPSTICK WITHOUT MICROSCOPE: ICD-10-PCS | Mod: S$GLB,,, | Performed by: NURSE PRACTITIONER

## 2022-09-28 PROCEDURE — 1159F PR MEDICATION LIST DOCUMENTED IN MEDICAL RECORD: ICD-10-PCS | Mod: CPTII,S$GLB,, | Performed by: NURSE PRACTITIONER

## 2022-09-28 PROCEDURE — 3079F PR MOST RECENT DIASTOLIC BLOOD PRESSURE 80-89 MM HG: ICD-10-PCS | Mod: CPTII,S$GLB,, | Performed by: NURSE PRACTITIONER

## 2022-09-28 RX ORDER — NAPROXEN 500 MG/1
500 TABLET ORAL 2 TIMES DAILY WITH MEALS
Qty: 10 TABLET | Refills: 0 | Status: SHIPPED | OUTPATIENT
Start: 2022-09-28 | End: 2022-09-28 | Stop reason: CLARIF

## 2022-09-28 RX ORDER — NAPROXEN 500 MG/1
500 TABLET ORAL 2 TIMES DAILY WITH MEALS
Qty: 20 TABLET | Refills: 0 | Status: SHIPPED | OUTPATIENT
Start: 2022-09-28 | End: 2022-09-28 | Stop reason: SDUPTHER

## 2022-09-28 RX ORDER — PAPAVERINE HYDROCHLORIDE 30 MG/ML
INJECTION INTRAMUSCULAR; INTRAVENOUS
Qty: 10 ML | Refills: 12 | Status: SHIPPED | OUTPATIENT
Start: 2022-09-28 | End: 2023-09-28

## 2022-09-28 NOTE — PROGRESS NOTES
CHIEF COMPLAINT:    Walter Bull is a 71 y.o. male presents today for Dysuria.    HISTORY OF PRESENTING ILLINESS:    Walter Bull is a 71 y.o. male with a PMHx of kidney failure, DM, and heart disease and chronic prostatitis. He takes Flomax for BPH.  Ok with urination.  Nocturia x 1   He has active Rx for NTG.  I saw him 10/15/2021 to restart ICI therapy.     He was last seen in clinic 08/31/2022 with Dr. Carver    Here today due to pain with ejaculation and perineal pain with urination  But as resolved. Nothing in 3 days  Urine stream has improved  Did not see any blood in urine or ejaculation  Using the trimix without difficulty.   No priapism  Needs refill.       REVIEW OF SYSTEMS:  Review of Systems   Constitutional: Negative.  Negative for chills and fever.   Eyes:  Negative for double vision.   Respiratory:  Negative for cough and shortness of breath.    Cardiovascular:  Negative for chest pain and palpitations.   Gastrointestinal:  Negative for nausea and vomiting.   Genitourinary:  Positive for frequency. Negative for dysuria, flank pain and hematuria.        Ok with urination;   No pain with urination or ejaculation     Neurological:  Negative for dizziness.       PATIENT HISTORY:    Past Medical History:   Diagnosis Date    Anemia     Angina pectoris     Anticoagulant long-term use     Arthritis     Back pain     CHF (congestive heart failure)     Chronic bronchitis     Colon cancer screening 2/2/2017    Coronary artery disease     Diabetes mellitus type I     Diabetes with neurologic complications     Disorder of kidney and ureter     Encounter for blood transfusion     Glaucoma     Heart attack     09/2018    Heart disease     Hyperlipidemia     Hypertension     Hypothyroidism     Iron deficiency     Kidney failure     post CABG    Obesity     Peripheral vascular disease     Polyneuropathy     Pulmonary emphysema 2/26/2020    Renal manifestation of secondary diabetes mellitus     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    Sleep apnea     had CPAP but had recall- not currently using     Trouble in sleeping     Type 2 diabetes mellitus with ophthalmic manifestations        Past Surgical History:   Procedure Laterality Date    24 HOUR IMPEDANCE PH MONITORING OF ESOPHAGUS IN PATIENT TAKING ACID REDUCING MEDICATIONS N/A 3/10/2022    Procedure: IMPEDANCE PH STUDY, ESOPHAGEAL, 24 HOUR, IN PATIENT TAKING ACID REDUCING MEDICATION;  Surgeon: Carmelita Jacobsen MD;  Location: [facility] (4TH FLR);  Service: Endoscopy;  Laterality: N/A;  ICD-  fully vaccinated  Yes he can hold Plavix 5 days prior to endoscopies and restart post procedure when safe from a operator perspective per Dr.A. Canada    CARDIAC DEFIBRILLATOR PLACEMENT      CARDIAC ELECTROPHYSIOLOGY STUDY N/A 11/19/2018    Procedure: CARDIAC ELECTROPHYSIOLOGY STUDY;  Surgeon: Byron Glasgow MD;  Location: [facility] EP LAB;  Service: Cardiology;  Laterality: N/A;  VT, EPS +/- ICD, SJM, anes, GP, 6086    CARDIAC ELECTROPHYSIOLOGY STUDY N/A 11/19/2018    Procedure: CARDIAC ELECTROPHYSIOLOGY STUDY;  Surgeon: Byron Glasgow MD;  Location: [facility] EP LAB;  Service: Cardiology;  Laterality: N/A;    COLON SURGERY  2006    COLONOSCOPY N/A 2/2/2017    Procedure: COLONOSCOPY;  Surgeon: Ronnie Conway MD;  Location: [facility];  Service: Endoscopy;  Laterality: N/A;    COLONOSCOPY N/A 4/25/2022    Procedure: COLONOSCOPY;  Surgeon: Branden Bush MD;  Location: [facility] ENDO (2ND FLR);  Service: Endoscopy;  Laterality: N/A;    CORONARY ARTERY BYPASS GRAFT  2005    5 arteries    CORONARY BYPASS GRAFT ANGIOGRAPHY  11/16/2018    Procedure: Bypass graft study;  Surgeon: Ryan Schmidt MD;  Location: [facility] CATH LAB;  Service: Cardiology;;    ESOPHAGEAL MANOMETRY WITH MEASUREMENT OF IMPEDANCE N/A 3/10/2022    Procedure: MANOMETRY, ESOPHAGUS, WITH IMPEDANCE MEASUREMENT;  Surgeon: Carmelita Jacobsen MD;  Location: [facility] ENDO (4TH FLR);  Service: Endoscopy;  Laterality: N/A;  RAPID COVID test, pt to arrive  for 6:00 am-Kpvt    ESOPHAGOGASTRODUODENOSCOPY N/A 2022    Procedure: EGD (ESOPHAGOGASTRODUODENOSCOPY);  Surgeon: Branden Bush MD;  Location: [facility] ENDO ([address] [place] [address]);  Service: Endoscopy;  Laterality: N/A;  Bravo procedure canceled- pt has an ICD ([employer])- will do 24h pH study instead  ok to hold Brilinta and Plavix-RB  most recent ECHO 2022- EF 25%    EYE SURGERY Bilateral 2016    Laser surgery for glaucoma    INSERTION OF BIVENTRICULAR IMPLANTABLE CARDIOVERTER-DEFIBRILLATOR (ICD) Left 2022    Procedure: INSERTION, ICD, BIVENTRICULAR;  Surgeon: Byron Glasgow MD;  Location: [facility] EP LAB;  Service: Cardiology;  Laterality: Left;  HF/ICM, Venogram prior to opening, CRT-D upgrade, SJM, MAC, GP, 3 PREP    INSERTION OF IMPLANTABLE CARDIOVERTER-DEFIBRILLATOR (ICD) GENERATOR WITH TWO EXISTING LEADS Left 2018    Procedure: INSERTION, DUAL ICD;  Surgeon: Byron Glasgow MD;  Location: [facility] EP LAB;  Service: Cardiology;  Laterality: Left;  VT, EPS +/- ICD, SJM, anes, GP, 6086    LEFT HEART CATHETERIZATION Left 2018    Procedure: Left heart cath;  Surgeon: Ryan Schmidt MD;  Location: [facility] CATH LAB;  Service: Cardiology;  Laterality: Left;       Family History   Problem Relation Age of Onset    Diabetes Mother     Heart disease Mother     Hypertension Sister     Diabetes Sister     Heart disease Sister     Heart attack Brother     Colon cancer Neg Hx     Esophageal cancer Neg Hx     Stomach cancer Neg Hx        Social History     Socioeconomic History    Marital status:    Tobacco Use    Smoking status: Former     Packs/day: 3.00     Types: Cigarettes     Start date: 1963     Quit date: 1981     Years since quittin.7    Smokeless tobacco: Former     Types: Snuff, Chew     Quit date:    Substance and Sexual Activity    Alcohol use: No    Drug use: No    Sexual activity: Yes     Comment: -with a significant other     Social Determinants of Health     Financial  Resource Strain: Medium Risk    Difficulty of Paying Living Expenses: Somewhat hard   Food Insecurity: No Food Insecurity    Worried About Running Out of Food in the Last Year: Never true    Ran Out of Food in the Last Year: Never true   Transportation Needs: No Transportation Needs    Lack of Transportation (Medical): No    Lack of Transportation (Non-Medical): No   Physical Activity: Sufficiently Active    Days of Exercise per Week: 3 days    Minutes of Exercise per Session: 120 min   Stress: No Stress Concern Present    Feeling of Stress : Not at all   Social Connections: Unknown    Frequency of Communication with Friends and Family: More than three times a week    Frequency of Social Gatherings with Friends and Family: Never    Active Member of Clubs or Organizations: No    Attends Club or Organization Meetings: Never    Marital Status:    Housing Stability: Low Risk     Unable to Pay for Housing in the Last Year: No    Number of Places Lived in the Last Year: 1    Unstable Housing in the Last Year: No       Allergies:  Patient has no known allergies.    Medications:    Current Outpatient Medications:     ammonium lactate 12 % Crea, Apply twice daily to affected parts both feet as needed., Disp: 140 g, Rfl: 11    aspirin (ECOTRIN) 81 MG EC tablet, Take 1 tablet (81 mg total) by mouth once daily., Disp: 90 tablet, Rfl: 3    blood-glucose sensor (DEXCOM G6 SENSOR) [place], 3 each by Misc.(Non-Drug; Combo Route) route continuous., Disp: 3 each, Rfl: prn    blood-glucose transmitter (DEXCOM G6 TRANSMITTER) [place], 1 each by Misc.(Non-Drug; Combo Route) route continuous., Disp: 1 each, Rfl: prn    carvediloL (COREG) 6.25 MG tablet, Take 1 tablet (6.25 mg total) by mouth 2 (two) times daily., Disp: 180 tablet, Rfl: 3    clopidogreL (PLAVIX) 75 mg tablet, Take 1 tablet (75 mg total) by mouth once daily., Disp: 90 tablet, Rfl: 3    diclofenac sodium (VOLTAREN) 1 % Gel, Apply 2 g topically 4 (four) times daily.  (Patient taking differently: Apply 2 g topically as needed.), Disp: 100 g, Rfl: 2    dulaglutide (TRULICITY) 4.5 mg/0.5 mL pen injector, INJECT 4.5 MG INTO THE SKIN EVERY 7 DAYS, Disp: 12 pen, Rfl: 3    esomeprazole (NEXIUM) 40 MG capsule, Take 1 capsule (40 mg total) by mouth 2 (two) times daily., Disp: 180 capsule, Rfl: 3    ezetimibe (ZETIA) 10 mg tablet, TAKE 1 TABLET BY MOUTH ONCE DAILY, Disp: 90 tablet, Rfl: 3    ferrous sulfate (FEOSOL) 325 mg (65 mg iron) Tab tablet, TAKE 1 TABLET BY MOUTH THREE TIMES DAILY (Patient taking differently: Take 325 mg by mouth 2 (two) times daily. TAKE 1 TABLET BY MOUTH THREE TIMES DAILY), Disp: 90 tablet, Rfl: 3    furosemide (LASIX) 20 MG tablet, TAKE 1 TABLET BY MOUTH ONCE DAILY, Disp: 90 tablet, Rfl: 3    GVOKE HYPOPEN 2-PACK 1 mg/0.2 mL AtIn, INJECT SUBCUTANEOUSLY  CONTENTS OF 1 AUTO-INJECTOR AS NEEDED FOR HYPOGLCEMIA  AS DIRECTED, Disp: 0.4 mL, Rfl: 2    insulin lispro (HUMALOG U-100 INSULIN) 100 unit/mL injection, USE AS DIRECTED VIA V-GO 20, Disp: 20 mL, Rfl: 11    ketoconazole (NIZORAL) 2 % cream, Apply topically once daily., Disp: 60 g, Rfl: 2    LIDOcaine HCL 2% (XYLOCAINE) 2 % jelly, Apply topically as needed. Apply topically once nightly to affected part of foot/feet, for pain., Disp: 30 mL, Rfl: 2    losartan (COZAAR) 25 MG tablet, Take 0.5 tablets (12.5 mg total) by mouth once daily., Disp: 45 tablet, Rfl: 3    mupirocin (BACTROBAN) 2 % ointment, Apply to affected area 3 times daily, Disp: 22 g, Rfl: 1    nitroGLYCERIN (NITROSTAT) 0.4 MG SL tablet, DISSOLVE 1 TABLET UNDER THE TONGUE EVERY 5 MINUTES AS  NEEDED FOR CHEST PAIN. MAX  OF 3 TABLETS IN 15 MINUTES. CALL 911 IF PAIN PERSISTS., Disp: 25 tablet, Rfl: 11    ondansetron (ZOFRAN) 4 MG tablet, Take 1 tablet (4 mg total) by mouth every 8 (eight) hours as needed for Nausea., Disp: 15 tablet, Rfl: 0    ranolazine (RANEXA) 1,000 mg Tb12, Take 1 tablet (1,000 mg total) by mouth 2 (two) times daily., Disp: 60 tablet,  Rfl: 11    rosuvastatin (CRESTOR) 40 MG Tab, Take 1 tablet (40 mg total) by mouth once daily., Disp: 90 tablet, Rfl: 3    spironolactone (ALDACTONE) 25 MG tablet, Take 1 tablet (25 mg total) by mouth once daily., Disp: 90 tablet, Rfl: 3    sub-q insulin device, 20 unit (VGO 20) [place], 1 Device by Misc.(Non-Drug; Combo Route) route once daily., Disp: 30 each, Rfl: 6    tamsulosin (FLOMAX) 0.4 mg Cap, Take 1 capsule (0.4 mg total) by mouth once daily., Disp: 90 capsule, Rfl: 3    papaverine 30 mg/mL injection, Add: Phentolamine 1 mg Add: PGE1 10 mcg  Sig:  Inject 25 units as directed; titrate up by 5 units until desired results, Disp: 10 mL, Rfl: 12    traMADoL (ULTRAM) 50 mg tablet, Take 1 tablet (50 mg total) by mouth every 6 (six) hours as needed for Pain., Disp: 20 tablet, Rfl: 0    PHYSICAL EXAMINATION:  Physical Exam  Vitals and nursing note reviewed.   Constitutional:       General: He is awake.      Appearance: Normal appearance.   HENT:      Head: Normocephalic.      Right Ear: External ear normal.      Left Ear: External ear normal.      Nose: Nose normal.   Cardiovascular:      Rate and Rhythm: Normal rate.   Pulmonary:      Effort: Pulmonary effort is normal. No respiratory distress.   Abdominal:      Tenderness: There is no abdominal tenderness. There is no right CVA tenderness or left CVA tenderness.   Genitourinary:     Penis: Normal.       Testes: Normal.      Comments: MALGORZATA done recently with Dr Carver    Musculoskeletal:         General: Normal range of motion.      Cervical back: Normal range of motion.   Skin:     General: Skin is warm and dry.   Neurological:      General: No focal deficit present.      Mental Status: He is alert and oriented to person, place, and time.   Psychiatric:         Mood and Affect: Mood normal.         Behavior: Behavior is cooperative.         LABS:      In office UA today was clear of active infection; no bacteria; trace leuks; no blood.         Lab Results   Component  Value Date    PSA 1.0 11/10/2020    PSA 1.6 09/26/2018    PSA 1.7 01/15/2018    PSADIAG 1.3 10/05/2021             IMPRESSION:    Encounter Diagnoses   Name Primary?    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 Dysuria    2. BPH with urinary obstruction    3. Prostadynia    4. 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  We addressed the office findings and recent labs.   Education and recommendations of today's plan of care including home remedies and needed follow up with PCP.   We discussed the chief complaint/LUTS and the possible contributory factors.   Ressurance; warm sitz baths can help.   Recommended lifestyle modifications with proper, healthy diet, good hydration if no fluid restrictions; reducing bladder irritants.   Benefits of regular exercise approved by PCP.  Discussed ICI and dosing.  New Rx sent to [facility].  F/u in recall

## 2022-10-04 ENCOUNTER — CLINICAL SUPPORT (OUTPATIENT)
Dept: CARDIOLOGY | Facility: HOSPITAL | Age: 72
End: 2022-10-04
Payer: MEDICARE

## 2022-10-04 DIAGNOSIS — I47.20 VENTRICULAR TACHYCARDIA: ICD-10-CM

## 2022-10-04 DIAGNOSIS — Z95.810 PRESENCE OF AUTOMATIC (IMPLANTABLE) CARDIAC DEFIBRILLATOR: ICD-10-CM

## 2022-10-04 DIAGNOSIS — I25.5 ISCHEMIC CARDIOMYOPATHY: ICD-10-CM

## 2022-10-04 DIAGNOSIS — I50.42 CHRONIC COMBINED SYSTOLIC (CONGESTIVE) AND DIASTOLIC (CONGESTIVE) HEART FAILURE: ICD-10-CM

## 2022-10-04 PROCEDURE — 93295 CARDIAC DEVICE CHECK - REMOTE: ICD-10-PCS | Mod: ,,, | Performed by: INTERNAL MEDICINE

## 2022-10-04 PROCEDURE — 93295 DEV INTERROG REMOTE 1/2/MLT: CPT | Mod: ,,, | Performed by: INTERNAL MEDICINE

## 2022-10-04 PROCEDURE — 93296 REM INTERROG EVL PM/IDS: CPT | Performed by: INTERNAL MEDICINE

## 2022-10-05 ENCOUNTER — CLINICAL SUPPORT (OUTPATIENT)
Dept: OPHTHALMOLOGY | Facility: CLINIC | Age: 72
End: 2022-10-05
Payer: MEDICARE

## 2022-10-05 DIAGNOSIS — H40.1131 PRIMARY OPEN ANGLE GLAUCOMA (POAG) OF BOTH EYES, MILD STAGE: ICD-10-CM

## 2022-10-05 NOTE — PROGRESS NOTES
HVF/OCt done ou. Rel/fix/coop. Good ou/chart checked for latex allergy. -.75 + 1.00 x 125/od -1.00 + .50 x 35/os-[facility]

## 2022-10-06 ENCOUNTER — PATIENT MESSAGE (OUTPATIENT)
Dept: OPTOMETRY | Facility: CLINIC | Age: 72
End: 2022-10-06
Payer: MEDICARE

## 2022-10-06 ENCOUNTER — OFFICE VISIT (OUTPATIENT)
Dept: URGENT CARE | Facility: CLINIC | Age: 72
End: 2022-10-06
Payer: MEDICARE

## 2022-10-06 ENCOUNTER — TELEPHONE (OUTPATIENT)
Dept: OPTOMETRY | Facility: CLINIC | Age: 72
End: 2022-10-06
Payer: MEDICARE

## 2022-10-06 VITALS
OXYGEN SATURATION: 98 % | DIASTOLIC BLOOD PRESSURE: 85 MMHG | WEIGHT: 237 LBS | RESPIRATION RATE: 16 BRPM | BODY MASS INDEX: 35.1 KG/M2 | TEMPERATURE: 98 F | HEART RATE: 65 BPM | SYSTOLIC BLOOD PRESSURE: 148 MMHG | HEIGHT: 69 IN

## 2022-10-06 DIAGNOSIS — H40.1131 PRIMARY OPEN ANGLE GLAUCOMA (POAG) OF BOTH EYES, MILD STAGE: Primary | ICD-10-CM

## 2022-10-06 DIAGNOSIS — J41.1 PURULENT BRONCHITIS: Primary | ICD-10-CM

## 2022-10-06 DIAGNOSIS — N18.30 STAGE 3 CHRONIC KIDNEY DISEASE, UNSPECIFIED WHETHER STAGE 3A OR 3B CKD: ICD-10-CM

## 2022-10-06 DIAGNOSIS — J44.1 COPD EXACERBATION: ICD-10-CM

## 2022-10-06 DIAGNOSIS — E11.69 TYPE 2 DIABETES MELLITUS WITH OTHER SPECIFIED COMPLICATION, UNSPECIFIED WHETHER LONG TERM INSULIN USE: ICD-10-CM

## 2022-10-06 PROCEDURE — 99499 UNLISTED E&M SERVICE: CPT | Mod: S$GLB,,, | Performed by: NURSE PRACTITIONER

## 2022-10-06 PROCEDURE — 3077F PR MOST RECENT SYSTOLIC BLOOD PRESSURE >= 140 MM HG: ICD-10-PCS | Mod: CPTII,S$GLB,, | Performed by: NURSE PRACTITIONER

## 2022-10-06 PROCEDURE — 3051F PR MOST RECENT HEMOGLOBIN A1C LEVEL 7.0 - < 8.0%: ICD-10-PCS | Mod: CPTII,S$GLB,, | Performed by: NURSE PRACTITIONER

## 2022-10-06 PROCEDURE — 1126F PR PAIN SEVERITY QUANTIFIED, NO PAIN PRESENT: ICD-10-PCS | Mod: CPTII,S$GLB,, | Performed by: NURSE PRACTITIONER

## 2022-10-06 PROCEDURE — 3008F BODY MASS INDEX DOCD: CPT | Mod: CPTII,S$GLB,, | Performed by: NURSE PRACTITIONER

## 2022-10-06 PROCEDURE — 3051F HG A1C>EQUAL 7.0%<8.0%: CPT | Mod: CPTII,S$GLB,, | Performed by: NURSE PRACTITIONER

## 2022-10-06 PROCEDURE — 99214 PR OFFICE/OUTPT VISIT, EST, LEVL IV, 30-39 MIN: ICD-10-PCS | Mod: S$GLB,,, | Performed by: NURSE PRACTITIONER

## 2022-10-06 PROCEDURE — 99499 RISK ADDL DX/OHS AUDIT: ICD-10-PCS | Mod: S$GLB,,, | Performed by: NURSE PRACTITIONER

## 2022-10-06 PROCEDURE — 4010F PR ACE/ARB THEARPY RXD/TAKEN: ICD-10-PCS | Mod: CPTII,S$GLB,, | Performed by: NURSE PRACTITIONER

## 2022-10-06 PROCEDURE — 3079F PR MOST RECENT DIASTOLIC BLOOD PRESSURE 80-89 MM HG: ICD-10-PCS | Mod: CPTII,S$GLB,, | Performed by: NURSE PRACTITIONER

## 2022-10-06 PROCEDURE — 3079F DIAST BP 80-89 MM HG: CPT | Mod: CPTII,S$GLB,, | Performed by: NURSE PRACTITIONER

## 2022-10-06 PROCEDURE — 1160F RVW MEDS BY RX/DR IN RCRD: CPT | Mod: CPTII,S$GLB,, | Performed by: NURSE PRACTITIONER

## 2022-10-06 PROCEDURE — 3066F PR DOCUMENTATION OF TREATMENT FOR NEPHROPATHY: ICD-10-PCS | Mod: CPTII,S$GLB,, | Performed by: NURSE PRACTITIONER

## 2022-10-06 PROCEDURE — 1126F AMNT PAIN NOTED NONE PRSNT: CPT | Mod: CPTII,S$GLB,, | Performed by: NURSE PRACTITIONER

## 2022-10-06 PROCEDURE — 1159F PR MEDICATION LIST DOCUMENTED IN MEDICAL RECORD: ICD-10-PCS | Mod: CPTII,S$GLB,, | Performed by: NURSE PRACTITIONER

## 2022-10-06 PROCEDURE — 4010F ACE/ARB THERAPY RXD/TAKEN: CPT | Mod: CPTII,S$GLB,, | Performed by: NURSE PRACTITIONER

## 2022-10-06 PROCEDURE — 3066F NEPHROPATHY DOC TX: CPT | Mod: CPTII,S$GLB,, | Performed by: NURSE PRACTITIONER

## 2022-10-06 PROCEDURE — 3008F PR BODY MASS INDEX (BMI) DOCUMENTED: ICD-10-PCS | Mod: CPTII,S$GLB,, | Performed by: NURSE PRACTITIONER

## 2022-10-06 PROCEDURE — 1160F PR REVIEW ALL MEDS BY PRESCRIBER/CLIN PHARMACIST DOCUMENTED: ICD-10-PCS | Mod: CPTII,S$GLB,, | Performed by: NURSE PRACTITIONER

## 2022-10-06 PROCEDURE — 1159F MED LIST DOCD IN RCRD: CPT | Mod: CPTII,S$GLB,, | Performed by: NURSE PRACTITIONER

## 2022-10-06 PROCEDURE — 3077F SYST BP >= 140 MM HG: CPT | Mod: CPTII,S$GLB,, | Performed by: NURSE PRACTITIONER

## 2022-10-06 PROCEDURE — 99214 OFFICE O/P EST MOD 30 MIN: CPT | Mod: S$GLB,,, | Performed by: NURSE PRACTITIONER

## 2022-10-06 RX ORDER — ALBUTEROL SULFATE 0.83 MG/ML
2.5 SOLUTION RESPIRATORY (INHALATION) EVERY 6 HOURS PRN
Qty: 75 ML | Refills: 0 | Status: SHIPPED | OUTPATIENT
Start: 2022-10-06 | End: 2023-10-06

## 2022-10-06 RX ORDER — DOXYCYCLINE 100 MG/1
100 CAPSULE ORAL EVERY 12 HOURS
Qty: 20 CAPSULE | Refills: 0 | Status: SHIPPED | OUTPATIENT
Start: 2022-10-06 | End: 2022-10-16

## 2022-10-06 NOTE — TELEPHONE ENCOUNTER
----- Message from Kal Houston OD sent at 10/6/2022 11:33 AM CDT -----  Please schedule for 4 mo IOP and bmo ppole OCT repeat. Note sent to pt in MyChart about test results

## 2022-10-06 NOTE — PROGRESS NOTES
"Subjective:       Patient ID: Walter Bull is a 71 y.o. male.    Vitals:  height is 5' 9" (1.753 m) and weight is 107.5 kg (237 lb). His oral temperature is 97.9 °F (36.6 °C). His blood pressure is 148/85 (abnormal) and his pulse is 65. His respiration is 16 and oxygen saturation is 98%.     Chief Complaint: Cough    Cough  This is a new problem. Episode onset: 10 days ago. The problem has been waxing and waning. The problem occurs every few minutes. The cough is Productive of sputum and productive of purulent sputum (sometimes black sputum). Associated symptoms include chills, ear pain (right ear), a fever, nasal congestion and a sore throat. Pertinent negatives include no chest pain, headaches, rash or sweats. He has tried nothing for the symptoms. His past medical history is significant for asthma, bronchitis, COPD and pneumonia.     Constitution: Positive for chills and fever.   HENT:  Positive for ear pain (right ear), sinus pressure and sore throat. Negative for ear discharge and trouble swallowing.    Neck: Negative for neck pain and neck stiffness.   Cardiovascular:  Negative for chest pain.   Respiratory:  Positive for cough and sputum production.    Gastrointestinal:  Negative for abdominal pain, nausea, vomiting and diarrhea.   Skin:  Negative for rash.   Neurological:  Negative for headaches and altered mental status.   Psychiatric/Behavioral:  Negative for altered mental status and confusion.      Objective:      Physical Exam   Constitutional: He is oriented to person, place, and time. He appears well-developed. He is cooperative.  Non-toxic appearance. No distress.   HENT:   Head: Normocephalic and atraumatic.   Ears:   Right Ear: External ear and ear canal normal. A middle ear effusion is present.   Left Ear: External ear and ear canal normal. A middle ear effusion is present.   Nose: Mucosal edema and rhinorrhea present. No nasal deformity. No epistaxis.   Mouth/Throat: Uvula is midline, " oropharynx is clear and moist and mucous membranes are normal. No trismus in the jaw. Normal dentition. No uvula swelling. No oropharyngeal exudate, posterior oropharyngeal edema or posterior oropharyngeal erythema.   Eyes: Conjunctivae and lids are normal. No scleral icterus.   Neck: Trachea normal and phonation normal. Neck supple. No edema present. No erythema present. No neck rigidity present.   Cardiovascular: Normal rate, regular rhythm, normal heart sounds and normal pulses.   Pulmonary/Chest: Effort normal. No accessory muscle usage. No tachypnea. No respiratory distress. He has decreased breath sounds in the right middle field, the right lower field and the left lower field. He has no wheezes. He has no rhonchi. He has no rales.   Abdominal: Normal appearance.   Musculoskeletal: Normal range of motion.         General: No deformity. Normal range of motion.   Neurological: He is alert and oriented to person, place, and time. He exhibits normal muscle tone. Coordination normal.   Skin: Skin is warm, dry, intact, not diaphoretic and not pale.   Psychiatric: His speech is normal and behavior is normal. Judgment and thought content normal.   Nursing note and vitals reviewed.      Assessment:       1. Purulent bronchitis    2. COPD exacerbation    3. Stage 3 chronic kidney disease, unspecified whether stage 3a or 3b CKD    4. Type 2 diabetes mellitus with other specified complication, unspecified whether long term insulin use          Plan:         Purulent bronchitis  -     doxycycline (VIBRAMYCIN) 100 MG Cap; Take 1 capsule (100 mg total) by mouth every 12 (twelve) hours. for 10 days  Dispense: 20 capsule; Refill: 0    COPD exacerbation  -     doxycycline (VIBRAMYCIN) 100 MG Cap; Take 1 capsule (100 mg total) by mouth every 12 (twelve) hours. for 10 days  Dispense: 20 capsule; Refill: 0  -     albuterol (PROVENTIL) 2.5 mg /3 mL (0.083 %) nebulizer solution; Take 3 mLs (2.5 mg total) by nebulization every 6  (six) hours as needed for Wheezing or Shortness of Breath. Rescue  Dispense: 75 mL; Refill: 0    Stage 3 chronic kidney disease, unspecified whether stage 3a or 3b CKD    Type 2 diabetes mellitus with other specified complication, unspecified whether long term insulin use

## 2022-10-26 ENCOUNTER — PATIENT MESSAGE (OUTPATIENT)
Dept: ENDOCRINOLOGY | Facility: CLINIC | Age: 72
End: 2022-10-26
Payer: MEDICARE

## 2022-10-26 DIAGNOSIS — E11.69 DIABETES MELLITUS TYPE 2 IN OBESE: Chronic | ICD-10-CM

## 2022-10-26 DIAGNOSIS — E11.21 TYPE 2 DIABETES MELLITUS WITH DIABETIC NEPHROPATHY, WITH LONG-TERM CURRENT USE OF INSULIN: Primary | ICD-10-CM

## 2022-10-26 DIAGNOSIS — E66.9 DIABETES MELLITUS TYPE 2 IN OBESE: Chronic | ICD-10-CM

## 2022-10-26 DIAGNOSIS — Z79.4 TYPE 2 DIABETES MELLITUS WITH DIABETIC NEPHROPATHY, WITH LONG-TERM CURRENT USE OF INSULIN: Primary | ICD-10-CM

## 2022-10-26 RX ORDER — DULAGLUTIDE 3 MG/.5ML
3 INJECTION, SOLUTION SUBCUTANEOUS
Qty: 4 PEN | Refills: 0 | Status: SHIPPED | OUTPATIENT
Start: 2022-10-26 | End: 2022-12-01 | Stop reason: SDUPTHER

## 2022-11-02 NOTE — TELEPHONE ENCOUNTER
[facility] approved for Repatha  HWID: [id number]  Hypercholesterolemia  10/3/22 - 10/2/23  $2500 Max    ID: [id number]  BIN: [id number]  PCN: [id number]  GRP: [id number]  HELP DESK: [phone]    Patient gave permission to secure paulina. Requested new rx from Dr. Joana Canada.

## 2022-11-08 ENCOUNTER — SPECIALTY PHARMACY (OUTPATIENT)
Dept: PHARMACY | Facility: CLINIC | Age: 72
End: 2022-11-08
Payer: MEDICARE

## 2022-11-08 DIAGNOSIS — E78.5 HYPERLIPIDEMIA, UNSPECIFIED HYPERLIPIDEMIA TYPE: Primary | ICD-10-CM

## 2022-11-08 NOTE — TELEPHONE ENCOUNTER
Specialty Pharmacy - Initial Clinical Assessment    Specialty Medication Orders Linked to Encounter      Flowsheet Row Most Recent Value   Medication #1 evolocumab (REPATHA SURECLICK) 140 mg/mL PnIj (Order#[id number], Rx#[id number])          Patient Diagnosis   E78.5 - Hyperlipidemia, unspecified hyperlipidemia type    Subjective    Walter Bull is a 71 y.o. male, who is followed by the specialty pharmacy service for management and education.    Recent Encounters       Date Type Provider Description    11/08/2022 Specialty Pharmacy Danay Calderon, Dorinda Initial Clinical Assessment    08/24/2022 Specialty Pharmacy Mahogany Matthews, Dorinda Referral Authorization          Clinical call attempts since last clinical assessment   11/8/2022  9:31 PM - Specialty Pharmacy - Clinical Assessment by Danay Calderon, Dorinda     Current Outpatient Medications   Medication Sig    albuterol (PROVENTIL) 2.5 mg /3 mL (0.083 %) nebulizer solution Take 3 mLs (2.5 mg total) by nebulization every 6 (six) hours as needed for Wheezing or Shortness of Breath. Rescue    ammonium lactate 12 % Crea Apply twice daily to affected parts both feet as needed.    aspirin (ECOTRIN) 81 MG EC tablet Take 1 tablet (81 mg total) by mouth once daily.    blood-glucose sensor (DEXCOM G6 SENSOR) [place] 3 each by Misc.(Non-Drug; Combo Route) route continuous.    blood-glucose transmitter (DEXCOM G6 TRANSMITTER) [place] 1 each by Misc.(Non-Drug; Combo Route) route continuous.    carvediloL (COREG) 6.25 MG tablet Take 1 tablet (6.25 mg total) by mouth 2 (two) times daily.    clopidogreL (PLAVIX) 75 mg tablet Take 1 tablet (75 mg total) by mouth once daily.    diclofenac sodium (VOLTAREN) 1 % Gel Apply 2 g topically 4 (four) times daily. (Patient taking differently: Apply 2 g topically as needed.)    dulaglutide (TRULICITY) 3 mg/0.5 mL pen injector Inject 3 mg into the skin every 7 days.    dulaglutide (TRULICITY) 4.5 mg/0.5 mL pen injector INJECT 4.5 MG INTO THE  SKIN EVERY 7 DAYS    esomeprazole (NEXIUM) 40 MG capsule Take 1 capsule (40 mg total) by mouth 2 (two) times daily.    evolocumab (REPATHA SURECLICK) 140 mg/mL PnIj Inject 1 mL (140 mg total) into the skin every 14 (fourteen) days.    ezetimibe (ZETIA) 10 mg tablet TAKE 1 TABLET BY MOUTH ONCE DAILY    ferrous sulfate (FEOSOL) 325 mg (65 mg iron) Tab tablet TAKE 1 TABLET BY MOUTH THREE TIMES DAILY (Patient taking differently: Take 325 mg by mouth 2 (two) times daily. TAKE 1 TABLET BY MOUTH THREE TIMES DAILY)    furosemide (LASIX) 20 MG tablet TAKE 1 TABLET BY MOUTH ONCE DAILY    GVOKE HYPOPEN 2-PACK 1 mg/0.2 mL AtIn INJECT SUBCUTANEOUSLY  CONTENTS OF 1 AUTO-INJECTOR AS NEEDED FOR HYPOGLCEMIA  AS DIRECTED    insulin lispro (HUMALOG U-100 INSULIN) 100 unit/mL injection USE AS DIRECTED VIA Second Sight 20    ketoconazole (NIZORAL) 2 % cream Apply topically once daily.    LIDOcaine HCL 2% (XYLOCAINE) 2 % jelly Apply topically as needed. Apply topically once nightly to affected part of foot/feet, for pain.    losartan (COZAAR) 25 MG tablet Take 0.5 tablets (12.5 mg total) by mouth once daily.    mupirocin (BACTROBAN) 2 % ointment Apply to affected area 3 times daily    nitroGLYCERIN (NITROSTAT) 0.4 MG SL tablet DISSOLVE 1 TABLET UNDER THE TONGUE EVERY 5 MINUTES AS  NEEDED FOR CHEST PAIN. MAX  OF 3 TABLETS IN 15 MINUTES. CALL 911 IF PAIN PERSISTS.    ondansetron (ZOFRAN) 4 MG tablet Take 1 tablet (4 mg total) by mouth every 8 (eight) hours as needed for Nausea.    papaverine 30 mg/mL injection Add: Phentolamine 1 mg  Add: PGE1 10 mcg    Sig:  Inject 25 units as directed; titrate up by 5 units until desired results    ranolazine (RANEXA) 1,000 mg Tb12 Take 1 tablet (1,000 mg total) by mouth 2 (two) times daily.    rosuvastatin (CRESTOR) 40 MG Tab Take 1 tablet (40 mg total) by mouth once daily.    spironolactone (ALDACTONE) 25 MG tablet Take 1 tablet (25 mg total) by mouth once daily.    sub-q insulin device, 20 unit (VGO 20) [place]enne  1 Device by Misc.(Non-Drug; Combo Route) route once daily.    tamsulosin (FLOMAX) 0.4 mg Cap Take 1 capsule (0.4 mg total) by mouth once daily.   Last reviewed on 11/2/2022 12:00 PM by Joana Canada MD    Review of patient's allergies indicates:  No Known AllergiesLast reviewed on  11/2/2022 12:00 PM by Joana Canada    Drug Interactions    Drug interactions evaluated: no  Clinically relevant drug interactions identified: no  Provided the patient with educational material regarding drug interactions: not applicable         Adverse Effects    *All other systems reviewed and are negative       Assessment Questions - Documented Responses      Flowsheet Row Most Recent Value   Assessment    Medication Reconciliation completed for patient No   During the past 4 weeks, has patient missed any activities due to condition or medication? No   During the past 4 weeks, did patient have any of the following urgent care visits? None   Goals of Therapy Status Discussed (new start)   Status of the patients ability to self-administer: Is Able   All education points have been covered with patient? No, patient declined- printed education provided   Welcome packet contents reviewed and discussed with patient? Yes   Assesment completed? Yes   Plan Therapy being initiated   Do you need to open a clinical intervention (i-vent)? No   Do you want to schedule first shipment? Yes          Refill Questions - Documented Responses      Flowsheet Row Most Recent Value   Patient Availability and HIPAA Verification    Does patient want to proceed with activity? Yes   HIPAA/medical authority confirmed? Yes   Relationship to patient of person spoken to? Self   Refill Screening Questions    When does the patient need to receive the medication? 11/10/22   Refill Delivery Questions    How will the patient receive the medication? MED[facility]   When does the patient need to receive the medication? 11/10/22   Shipping Address Prescription   Address in [place]  "Ambulatory confirmed and updated if neccessary? Yes   Expected Copay ($) 0   Is the patient able to afford the medication copay? Yes   Payment Method zero copay   Days supply of Refill 28   Supplies needed? No supplies needed   Refill activity completed? Yes   Refill activity plan Refill scheduled   Shipment/Pickup Date: 11/09/22            Objective    He has a past medical history of Anemia, Angina pectoris, Anticoagulant long-term use, Arthritis, Back pain, CHF (congestive heart failure), Chronic bronchitis, Colon cancer screening (2/2/2017), Coronary artery disease, Diabetes mellitus type I, Diabetes with neurologic complications, Disorder of kidney and ureter, Encounter for blood transfusion, Glaucoma, Heart attack, Heart disease, Hyperlipidemia, Hypertension, Hypothyroidism, Iron deficiency, Kidney failure, Obesity, Peripheral vascular disease, Polyneuropathy, Pulmonary emphysema (2/26/2020), Renal manifestation of secondary diabetes mellitus, 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    Tried/failed medications:     BP Readings from Last 4 Encounters:   10/06/22 (!) 148/85   09/28/22 (!) 158/83   08/31/22 116/65   08/24/22 (!) 140/74     Ht Readings from Last 4 Encounters:   10/06/22 5' 9" (1.753 m)   09/28/22 5' 9" (1.753 m)   08/31/22 5' 9" (1.753 m)   08/24/22 5' 9" (1.753 m)     Wt Readings from Last 4 Encounters:   10/06/22 107.5 kg (237 lb)   09/28/22 107.5 kg (237 lb)   08/31/22 107.8 kg (237 lb 10.5 oz)   08/24/22 111.1 kg (245 lb)       The goals of prescribed drug therapy management include:  Supporting patient to meet the prescriber's medical treatment objectives  Improving or maintaining quality of life  Maintaining optimal therapy adherence  Minimizing and managing side effects      Goals of Therapy Status: Discussed (new start)    Assessment/Plan  Patient plans to start therapy on 11/10/22      Indication, dosage, " appropriateness, effectiveness, safety and convenience of his specialty medication(s) were reviewed today.     Patient Education   Pharmacist offer to  patient was declined. Printed educational materials will be provided with medication.  Patient did accept verbal education on the following topics:  · Expectations and possible outcomes of therapy  · Proper use, timely administration, and missed dose management  · Duration of therapy  · Side effects, including prevention, minimization, and management  · Storage, safe handling, and disposal      Tasks added this encounter   12/1/2022 - Refill Call (Auto Added)   Tasks due within next 3 months   No tasks due.     Danay Calderon, PharmD  [facility]  [address] [address]  Phone: [phone]  Fax: [phone]

## 2022-11-14 ENCOUNTER — TELEPHONE (OUTPATIENT)
Dept: CARDIOLOGY | Facility: HOSPITAL | Age: 72
End: 2022-11-14
Payer: MEDICARE

## 2022-11-14 NOTE — TELEPHONE ENCOUNTER
Atrial fibrillation noted during device interrogation/device remote check:    Overall burden:  <1% over 3 months    Max duration seen: 8m 42s    Most recent episode: 10/27/2022 at 1:16 am    Ventricular rates:   Controlled        Anticoagulation status:  not on OAC    Patient symptoms: asymptomatic. Episode occurred during sleeping hours. Hx of ANURADHA and uses CPAP    I explained to patient that once I hear from Dr. Glasgow, I will call him back with any recommendations.

## 2022-11-15 ENCOUNTER — OFFICE VISIT (OUTPATIENT)
Dept: INTERNAL MEDICINE | Facility: CLINIC | Age: 72
End: 2022-11-15
Payer: MEDICARE

## 2022-11-15 VITALS
SYSTOLIC BLOOD PRESSURE: 104 MMHG | BODY MASS INDEX: 36.9 KG/M2 | OXYGEN SATURATION: 98 % | DIASTOLIC BLOOD PRESSURE: 60 MMHG | HEIGHT: 69 IN | RESPIRATION RATE: 18 BRPM | WEIGHT: 249.13 LBS | HEART RATE: 64 BPM

## 2022-11-15 DIAGNOSIS — I25.118 CORONARY ARTERY DISEASE OF NATIVE ARTERY OF NATIVE HEART WITH STABLE ANGINA PECTORIS: Chronic | ICD-10-CM

## 2022-11-15 DIAGNOSIS — K76.0 FATTY LIVER: ICD-10-CM

## 2022-11-15 DIAGNOSIS — I50.42 CHRONIC COMBINED SYSTOLIC AND DIASTOLIC HEART FAILURE: Chronic | ICD-10-CM

## 2022-11-15 DIAGNOSIS — E78.2 MIXED HYPERLIPIDEMIA: Chronic | ICD-10-CM

## 2022-11-15 DIAGNOSIS — E11.21 TYPE 2 DIABETES MELLITUS WITH DIABETIC NEPHROPATHY, WITH LONG-TERM CURRENT USE OF INSULIN: ICD-10-CM

## 2022-11-15 DIAGNOSIS — I70.0 AORTIC ATHEROSCLEROSIS: ICD-10-CM

## 2022-11-15 DIAGNOSIS — E11.42 DIABETIC POLYNEUROPATHY ASSOCIATED WITH TYPE 2 DIABETES MELLITUS: ICD-10-CM

## 2022-11-15 DIAGNOSIS — Z79.4 TYPE 2 DIABETES MELLITUS WITH DIABETIC NEPHROPATHY, WITH LONG-TERM CURRENT USE OF INSULIN: ICD-10-CM

## 2022-11-15 DIAGNOSIS — Z23 NEED FOR VACCINATION: ICD-10-CM

## 2022-11-15 DIAGNOSIS — J43.9 PULMONARY EMPHYSEMA, UNSPECIFIED EMPHYSEMA TYPE: ICD-10-CM

## 2022-11-15 DIAGNOSIS — E66.01 SEVERE OBESITY (BMI 35.0-39.9) WITH COMORBIDITY: ICD-10-CM

## 2022-11-15 DIAGNOSIS — N18.31 STAGE 3A CHRONIC KIDNEY DISEASE: ICD-10-CM

## 2022-11-15 DIAGNOSIS — K21.9 GASTROESOPHAGEAL REFLUX DISEASE WITHOUT ESOPHAGITIS: ICD-10-CM

## 2022-11-15 DIAGNOSIS — I25.5 ISCHEMIC CARDIOMYOPATHY: ICD-10-CM

## 2022-11-15 DIAGNOSIS — Z95.810 PRESENCE OF CARDIAC RESYNCHRONIZATION THERAPY DEFIBRILLATOR (CRT-D): ICD-10-CM

## 2022-11-15 DIAGNOSIS — I47.20 VENTRICULAR TACHYCARDIA: ICD-10-CM

## 2022-11-15 DIAGNOSIS — I10 BENIGN ESSENTIAL HTN: Primary | Chronic | ICD-10-CM

## 2022-11-15 PROBLEM — R06.02 SHORTNESS OF BREATH: Status: RESOLVED | Noted: 2022-03-03 | Resolved: 2022-11-15

## 2022-11-15 PROBLEM — R11.0 NAUSEA: Status: RESOLVED | Noted: 2021-05-24 | Resolved: 2022-11-15

## 2022-11-15 PROCEDURE — 3078F PR MOST RECENT DIASTOLIC BLOOD PRESSURE < 80 MM HG: ICD-10-PCS | Mod: CPTII,S$GLB,, | Performed by: INTERNAL MEDICINE

## 2022-11-15 PROCEDURE — 1126F PR PAIN SEVERITY QUANTIFIED, NO PAIN PRESENT: ICD-10-PCS | Mod: CPTII,S$GLB,, | Performed by: INTERNAL MEDICINE

## 2022-11-15 PROCEDURE — 1160F RVW MEDS BY RX/DR IN RCRD: CPT | Mod: CPTII,S$GLB,, | Performed by: INTERNAL MEDICINE

## 2022-11-15 PROCEDURE — 90694 FLU VACCINE - QUADRIVALENT - ADJUVANTED: ICD-10-PCS | Mod: S$GLB,,, | Performed by: INTERNAL MEDICINE

## 2022-11-15 PROCEDURE — 3066F PR DOCUMENTATION OF TREATMENT FOR NEPHROPATHY: ICD-10-PCS | Mod: CPTII,S$GLB,, | Performed by: INTERNAL MEDICINE

## 2022-11-15 PROCEDURE — 99499 RISK ADDL DX/OHS AUDIT: ICD-10-PCS | Mod: S$GLB,,, | Performed by: INTERNAL MEDICINE

## 2022-11-15 PROCEDURE — 1159F MED LIST DOCD IN RCRD: CPT | Mod: CPTII,S$GLB,, | Performed by: INTERNAL MEDICINE

## 2022-11-15 PROCEDURE — 4010F PR ACE/ARB THEARPY RXD/TAKEN: ICD-10-PCS | Mod: CPTII,S$GLB,, | Performed by: INTERNAL MEDICINE

## 2022-11-15 PROCEDURE — 99499 UNLISTED E&M SERVICE: CPT | Mod: S$GLB,,, | Performed by: INTERNAL MEDICINE

## 2022-11-15 PROCEDURE — 1159F PR MEDICATION LIST DOCUMENTED IN MEDICAL RECORD: ICD-10-PCS | Mod: CPTII,S$GLB,, | Performed by: INTERNAL MEDICINE

## 2022-11-15 PROCEDURE — G0008 ADMIN INFLUENZA VIRUS VAC: HCPCS | Mod: S$GLB,,, | Performed by: INTERNAL MEDICINE

## 2022-11-15 PROCEDURE — 99999 PR PBB SHADOW E&M-EST. PATIENT-LVL III: CPT | Mod: PBBFAC,,, | Performed by: INTERNAL MEDICINE

## 2022-11-15 PROCEDURE — 3008F BODY MASS INDEX DOCD: CPT | Mod: CPTII,S$GLB,, | Performed by: INTERNAL MEDICINE

## 2022-11-15 PROCEDURE — 3066F NEPHROPATHY DOC TX: CPT | Mod: CPTII,S$GLB,, | Performed by: INTERNAL MEDICINE

## 2022-11-15 PROCEDURE — 3288F FALL RISK ASSESSMENT DOCD: CPT | Mod: CPTII,S$GLB,, | Performed by: INTERNAL MEDICINE

## 2022-11-15 PROCEDURE — 3074F PR MOST RECENT SYSTOLIC BLOOD PRESSURE < 130 MM HG: ICD-10-PCS | Mod: CPTII,S$GLB,, | Performed by: INTERNAL MEDICINE

## 2022-11-15 PROCEDURE — 3074F SYST BP LT 130 MM HG: CPT | Mod: CPTII,S$GLB,, | Performed by: INTERNAL MEDICINE

## 2022-11-15 PROCEDURE — 3051F PR MOST RECENT HEMOGLOBIN A1C LEVEL 7.0 - < 8.0%: ICD-10-PCS | Mod: CPTII,S$GLB,, | Performed by: INTERNAL MEDICINE

## 2022-11-15 PROCEDURE — 1101F PT FALLS ASSESS-DOCD LE1/YR: CPT | Mod: CPTII,S$GLB,, | Performed by: INTERNAL MEDICINE

## 2022-11-15 PROCEDURE — 1101F PR PT FALLS ASSESS DOC 0-1 FALLS W/OUT INJ PAST YR: ICD-10-PCS | Mod: CPTII,S$GLB,, | Performed by: INTERNAL MEDICINE

## 2022-11-15 PROCEDURE — 3078F DIAST BP <80 MM HG: CPT | Mod: CPTII,S$GLB,, | Performed by: INTERNAL MEDICINE

## 2022-11-15 PROCEDURE — 1126F AMNT PAIN NOTED NONE PRSNT: CPT | Mod: CPTII,S$GLB,, | Performed by: INTERNAL MEDICINE

## 2022-11-15 PROCEDURE — G0008 FLU VACCINE - QUADRIVALENT - ADJUVANTED: ICD-10-PCS | Mod: S$GLB,,, | Performed by: INTERNAL MEDICINE

## 2022-11-15 PROCEDURE — 1160F PR REVIEW ALL MEDS BY PRESCRIBER/CLIN PHARMACIST DOCUMENTED: ICD-10-PCS | Mod: CPTII,S$GLB,, | Performed by: INTERNAL MEDICINE

## 2022-11-15 PROCEDURE — 3008F PR BODY MASS INDEX (BMI) DOCUMENTED: ICD-10-PCS | Mod: CPTII,S$GLB,, | Performed by: INTERNAL MEDICINE

## 2022-11-15 PROCEDURE — 3288F PR FALLS RISK ASSESSMENT DOCUMENTED: ICD-10-PCS | Mod: CPTII,S$GLB,, | Performed by: INTERNAL MEDICINE

## 2022-11-15 PROCEDURE — 99215 PR OFFICE/OUTPT VISIT, EST, LEVL V, 40-54 MIN: ICD-10-PCS | Mod: S$GLB,,, | Performed by: INTERNAL MEDICINE

## 2022-11-15 PROCEDURE — 99215 OFFICE O/P EST HI 40 MIN: CPT | Mod: S$GLB,,, | Performed by: INTERNAL MEDICINE

## 2022-11-15 PROCEDURE — 99999 PR PBB SHADOW E&M-EST. PATIENT-LVL III: ICD-10-PCS | Mod: PBBFAC,,, | Performed by: INTERNAL MEDICINE

## 2022-11-15 PROCEDURE — 90694 VACC AIIV4 NO PRSRV 0.5ML IM: CPT | Mod: S$GLB,,, | Performed by: INTERNAL MEDICINE

## 2022-11-15 PROCEDURE — 4010F ACE/ARB THERAPY RXD/TAKEN: CPT | Mod: CPTII,S$GLB,, | Performed by: INTERNAL MEDICINE

## 2022-11-15 PROCEDURE — 3051F HG A1C>EQUAL 7.0%<8.0%: CPT | Mod: CPTII,S$GLB,, | Performed by: INTERNAL MEDICINE

## 2022-11-15 NOTE — PROGRESS NOTES
Subjective:       Patient ID: Walter Bull is a 72 y.o. male.    Chief Complaint: Follow-up      HPI:  Patient is known to me and presents for follow up CAD, systolic CHF, DM type 2, HLD. Labs from 9/2/22 personally reviewed and discussed with the patient today.      CAD s/p 5v CABG and NSTEMI 9/12/18: following with cardiology. Now on Ranexa (indur was stopped), brilinta, ASA, Crestor/Zetia, losartan and Coreg. Also reports h/o CHF (last known EF 25%+ diastolic dysfunction), on lasix 20mg once a day (aldactone stopped due to hypotension). Denies SOB, [place] and orthopnea. S/p ICD placement.       Dm type 2: on trulicity, lispro vai VGP per endocrinology. Has DEXCOM  A1C 7% from 8%.  He does report numbness and tingling of left foot > right foot and b/l fingers; sx have been present for several years. Not on medicine for neuropathy as he declines treatment. Denies polyuria, polydipsia  Foot exam: 6/2022  Eye exam: 9/2022  Microalb: 11/2020-repeat ordered  On ARB and statin      HLD: on crestor and zetia, has been taking for several years. Denies side effects. Cards recently added repatha.      HTN: on coreg, losartan. BP is well controlled. Denies headaches, vision changes.     GERD: On Nexium daily. H/o H. Pylori on EGD (2018) with gastric erosions. Working better but has a bad sour taste in his mouth. Has had intermittent nausea and vomiting which is unchanged for him. No constipation.      BPH: on flomax and finasteride and working well.  No medication side effects. S/p prostate bx 5/13/19 with DR. Chaudhry, no malignany. Last PSA 11/2020 normal     Tobacco use: quit 1981; previously smoked 3 PPD for 20 years  EtOH: stopped drink 1982; was a daily drink 2 fifth liquor daily  Illicit drug: denies    Past Medical History:   Diagnosis Date    Anemia     Angina pectoris     Anticoagulant long-term use     Arthritis     Back pain     CHF (congestive heart failure)     Chronic bronchitis     Colon cancer screening  2017    Coronary artery disease     Diabetes mellitus type I     Diabetes with neurologic complications     Disorder of kidney and ureter     Encounter for blood transfusion     Glaucoma     Heart attack     2018    Heart disease     Hyperlipidemia     Hypertension     Hypothyroidism     Iron deficiency     Kidney failure     post CABG    Obesity     Peripheral vascular disease     Polyneuropathy     Pulmonary emphysema 2020    Renal manifestation of secondary diabetes mellitus     S/P CABG x 5 2017    Sleep apnea     had CPAP but had recall- not currently using     Trouble in sleeping     Type 2 diabetes mellitus with ophthalmic manifestations        Family History   Problem Relation Age of Onset    Diabetes Mother     Heart disease Mother     Hypertension Sister     Diabetes Sister     Heart disease Sister     Heart attack Brother     Colon cancer Neg Hx     Esophageal cancer Neg Hx     Stomach cancer Neg Hx        Social History     Socioeconomic History    Marital status:    Tobacco Use    Smoking status: Former     Packs/day: 3.00     Types: Cigarettes     Start date: 1963     Quit date: 1981     Years since quittin.8    Smokeless tobacco: Former     Types: Snuff, Chew     Quit date:    Substance and Sexual Activity    Alcohol use: No    Drug use: No    Sexual activity: Yes     Comment: -with a significant other     Social Determinants of Health     Financial Resource Strain: Medium Risk    Difficulty of Paying Living Expenses: Somewhat hard   Food Insecurity: No Food Insecurity    Worried About Running Out of Food in the Last Year: Never true    Ran Out of Food in the Last Year: Never true   Transportation Needs: No Transportation Needs    Lack of Transportation (Medical): No    Lack of Transportation (Non-Medical): No   Physical Activity: Sufficiently Active    Days of Exercise per Week: 3 days    Minutes of Exercise per Session: 120 min   Stress: No Stress  Concern Present    Feeling of Stress : Not at all   Social Connections: Unknown    Frequency of Communication with Friends and Family: More than three times a week    Frequency of Social Gatherings with Friends and Family: Never    Active Member of Clubs or Organizations: No    Attends Club or Organization Meetings: Never    Marital Status:    Housing Stability: Low Risk     Unable to Pay for Housing in the Last Year: No    Number of Places Lived in the Last Year: 1    Unstable Housing in the Last Year: No       Review of Systems   Constitutional:  Negative for activity change, fatigue, fever and unexpected weight change.   HENT:  Negative for congestion, ear pain, hearing loss, rhinorrhea and sore throat.    Eyes:  Negative for redness and visual disturbance.   Respiratory:  Negative for cough, shortness of breath and wheezing.    Cardiovascular:  Negative for chest pain, palpitations and leg swelling.   Gastrointestinal:  Positive for nausea (intermittent). Negative for abdominal pain, constipation, diarrhea and vomiting.   Genitourinary:  Negative for decreased urine volume, dysuria, frequency and urgency.   Musculoskeletal:  Negative for back pain, joint swelling and neck pain.   Skin:  Negative for color change, rash and wound.   Neurological:  Negative for dizziness, tremors, weakness, light-headedness and headaches.       Objective:      Physical Exam  Vitals reviewed.   Constitutional:       General: He is not in acute distress.     Appearance: He is well-developed. He is obese.   HENT:      Head: Normocephalic and atraumatic.      Right Ear: External ear normal.      Left Ear: External ear normal.   Eyes:      General:         Right eye: No discharge.         Left eye: No discharge.      Extraocular Movements: Extraocular movements intact.      Conjunctiva/sclera: Conjunctivae normal.      Pupils: Pupils are equal, round, and reactive to light.   Neck:      Thyroid: No thyromegaly.   Cardiovascular:       Rate and Rhythm: Normal rate and regular rhythm.      Heart sounds: No murmur heard.  Pulmonary:      Effort: Pulmonary effort is normal. No respiratory distress.      Breath sounds: Normal breath sounds. No wheezing.   Abdominal:      General: Bowel sounds are normal. There is no distension.      Palpations: Abdomen is soft.      Tenderness: There is no abdominal tenderness.   Musculoskeletal:      Right lower leg: No edema.      Left lower leg: No edema.   Skin:     General: Skin is warm and dry.   Neurological:      Mental Status: He is alert and oriented to person, place, and time.      Cranial Nerves: No cranial nerve deficit.   Psychiatric:         Behavior: Behavior normal.         Thought Content: Thought content normal.       Assessment:       1. Benign essential HTN    2. Mixed hyperlipidemia    3. Coronary artery disease of native artery of native heart with stable angina pectoris    4. Chronic combined systolic and diastolic heart failure    5. Severe obesity (BMI 35.0-39.9) with comorbidity    6. Type 2 diabetes mellitus with diabetic nephropathy, with long-term current use of insulin    7. Diabetic polyneuropathy associated with type 2 diabetes mellitus    8. Aortic atherosclerosis    9. Ventricular tachycardia    10. Presence of cardiac resynchronization therapy defibrillator (CRT-D)    11. Pulmonary emphysema, unspecified emphysema type    12. Ischemic cardiomyopathy    13. Gastroesophageal reflux disease without esophagitis    14. Fatty liver    15. Stage 3a chronic kidney disease    16. Need for vaccination        Plan:       Walter was seen today for follow-up.    Diagnoses and all orders for this visit:    Benign essential HTN  -     CBC Auto Differential; Future  -     Comprehensive Metabolic Panel; Future  -     Lipid Panel; Future  -     Hemoglobin A1C; Future  -     Microalbumin/Creatinine Ratio, Urine; Future  Chronic controlled  Continue medications at same dose  Low Na diet  Exercise,  weight loss  Check BP and keep log for next visit    Mixed hyperlipidemia  -     CBC Auto Differential; Future  -     Comprehensive Metabolic Panel; Future  -     Lipid Panel; Future  -     Hemoglobin A1C; Future  -     Microalbumin/Creatinine Ratio, Urine; Future  Chronic stable  Cont meds same dose per cards recommendations    Coronary artery disease of native artery of native heart with stable angina pectoris  -     CBC Auto Differential; Future  -     Comprehensive Metabolic Panel; Future  -     Lipid Panel; Future  -     Hemoglobin A1C; Future  -     Microalbumin/Creatinine Ratio, Urine; Future  Chronic stable  Cont meds same dose per cards recommendatinos    Chronic combined systolic and diastolic heart failure  -     CBC Auto Differential; Future  -     Comprehensive Metabolic Panel; Future  -     Lipid Panel; Future  -     Hemoglobin A1C; Future  -     Microalbumin/Creatinine Ratio, Urine; Future  Chronic stable  No signs of volume overload today   Cont meds same dose per cards recommendations    Severe obesity (BMI 35.0-39.9) with comorbidity  -     CBC Auto Differential; Future  -     Comprehensive Metabolic Panel; Future  -     Lipid Panel; Future  -     Hemoglobin A1C; Future  -     Microalbumin/Creatinine Ratio, Urine; Future  Diet, exercise, weight loss    Type 2 diabetes mellitus with diabetic nephropathy, with long-term current use of insulin  -     CBC Auto Differential; Future  -     Comprehensive Metabolic Panel; Future  -     Lipid Panel; Future  -     Hemoglobin A1C; Future  -     Microalbumin/Creatinine Ratio, Urine; Future  Chronic improving  Cont meds same dose per endo recommendations  A1C 7%!   ADA diet  Check sugars and keep log    Diabetic polyneuropathy associated with type 2 diabetes mellitus  -     CBC Auto Differential; Future  -     Comprehensive Metabolic Panel; Future  -     Lipid Panel; Future  -     Hemoglobin A1C; Future  -     Microalbumin/Creatinine Ratio, Urine;  Future  Chronic stable  Cont meds same dose  Cont glucose control    Aortic atherosclerosis  -     CBC Auto Differential; Future  -     Comprehensive Metabolic Panel; Future  -     Lipid Panel; Future  -     Hemoglobin A1C; Future  -     Microalbumin/Creatinine Ratio, Urine; Future  Noted on prior imaging  Cont aSA, statin    Ventricular tachycardia  Presence of cardiac resynchronization therapy defibrillator (CRT-D)  Recent message sent to cards regarding occurrence of a-fib noted on device interrogation  Pending Dr. Myah emanuel    Pulmonary emphysema, unspecified emphysema type  Chronic stable  Not smoking  No wheezing, SOB    Ischemic cardiomyopathy  -     CBC Auto Differential; Future  -     Comprehensive Metabolic Panel; Future  -     Lipid Panel; Future  -     Hemoglobin A1C; Future  -     Microalbumin/Creatinine Ratio, Urine; Future  Chronic stable  No signs of volume overload today  Cont meds per cards recommendations    Gastroesophageal reflux disease without esophagitis  Chronic stable  Cont PPI same dose    Fatty liver  -     CBC Auto Differential; Future  -     Comprehensive Metabolic Panel; Future  -     Lipid Panel; Future  -     Hemoglobin A1C; Future  -     Microalbumin/Creatinine Ratio, Urine; Future  Diet, exercise, weight loss  Follow LFTs    Stage 3a chronic kidney disease  -     CBC Auto Differential; Future  -     Comprehensive Metabolic Panel; Future  -     Lipid Panel; Future  -     Hemoglobin A1C; Future  -     Microalbumin/Creatinine Ratio, Urine; Future  Chronic satble  Avoid nephrotoxic agents  Stay hydrated  Follow labs    Need for vaccination  -     Influenza - Quadrivalent (Adjuvanted)    RTC 6 months with labs and PRN

## 2022-11-18 ENCOUNTER — IMMUNIZATION (OUTPATIENT)
Dept: FAMILY MEDICINE | Facility: CLINIC | Age: 72
End: 2022-11-18
Payer: MEDICARE

## 2022-11-18 ENCOUNTER — PATIENT MESSAGE (OUTPATIENT)
Dept: ENDOCRINOLOGY | Facility: CLINIC | Age: 72
End: 2022-11-18
Payer: MEDICARE

## 2022-11-18 DIAGNOSIS — E11.69 DIABETES MELLITUS TYPE 2 IN OBESE: Chronic | ICD-10-CM

## 2022-11-18 DIAGNOSIS — Z23 NEED FOR VACCINATION: Primary | ICD-10-CM

## 2022-11-18 DIAGNOSIS — E66.9 DIABETES MELLITUS TYPE 2 IN OBESE: Chronic | ICD-10-CM

## 2022-11-18 PROCEDURE — 91312 COVID-19, MRNA, LNP-S, BIVALENT BOOSTER, PF, 30 MCG/0.3 ML DOSE: CPT | Mod: S$GLB,,, | Performed by: FAMILY MEDICINE

## 2022-11-18 PROCEDURE — 91312 COVID-19, MRNA, LNP-S, BIVALENT BOOSTER, PF, 30 MCG/0.3 ML DOSE: ICD-10-PCS | Mod: S$GLB,,, | Performed by: FAMILY MEDICINE

## 2022-11-18 PROCEDURE — 0124A COVID-19, MRNA, LNP-S, BIVALENT BOOSTER, PF, 30 MCG/0.3 ML DOSE: CPT | Mod: PBBFAC | Performed by: FAMILY MEDICINE

## 2022-11-21 RX ORDER — DULAGLUTIDE 4.5 MG/.5ML
INJECTION, SOLUTION SUBCUTANEOUS
Qty: 12 PEN | Refills: 3 | OUTPATIENT
Start: 2022-11-21

## 2022-11-22 ENCOUNTER — PATIENT MESSAGE (OUTPATIENT)
Dept: CARDIOLOGY | Facility: CLINIC | Age: 72
End: 2022-11-22
Payer: MEDICARE

## 2022-11-22 ENCOUNTER — TELEPHONE (OUTPATIENT)
Dept: CARDIOLOGY | Facility: CLINIC | Age: 72
End: 2022-11-22

## 2022-12-01 ENCOUNTER — PATIENT MESSAGE (OUTPATIENT)
Dept: ENDOCRINOLOGY | Facility: CLINIC | Age: 72
End: 2022-12-01
Payer: MEDICARE

## 2022-12-01 ENCOUNTER — SPECIALTY PHARMACY (OUTPATIENT)
Dept: PHARMACY | Facility: CLINIC | Age: 72
End: 2022-12-01
Payer: MEDICARE

## 2022-12-01 DIAGNOSIS — E11.21 TYPE 2 DIABETES MELLITUS WITH DIABETIC NEPHROPATHY, WITH LONG-TERM CURRENT USE OF INSULIN: ICD-10-CM

## 2022-12-01 DIAGNOSIS — E11.69 DIABETES MELLITUS TYPE 2 IN OBESE: Chronic | ICD-10-CM

## 2022-12-01 DIAGNOSIS — E66.9 DIABETES MELLITUS TYPE 2 IN OBESE: Chronic | ICD-10-CM

## 2022-12-01 DIAGNOSIS — Z79.4 TYPE 2 DIABETES MELLITUS WITH DIABETIC NEPHROPATHY, WITH LONG-TERM CURRENT USE OF INSULIN: ICD-10-CM

## 2022-12-01 RX ORDER — DULAGLUTIDE 3 MG/.5ML
3 INJECTION, SOLUTION SUBCUTANEOUS
Qty: 4 PEN | Refills: 2 | Status: SHIPPED | OUTPATIENT
Start: 2022-12-01 | End: 2022-12-02

## 2022-12-01 NOTE — TELEPHONE ENCOUNTER
Outgoing call regarding repatha refill; per pt, he was unsure of when he injects again and said he will call us back when he gets home; gave him the number to the refill line

## 2022-12-01 NOTE — TELEPHONE ENCOUNTER
Specialty Pharmacy - Refill Coordination    Specialty Medication Orders Linked to Encounter      Flowsheet Row Most Recent Value   Medication #1 evolocumab (REPATHA SURECLICK) 140 mg/mL PnIj (Order#[id number], Rx#[id number])            Refill Questions - Documented Responses      Flowsheet Row Most Recent Value   Patient Availability and HIPAA Verification    Does patient want to proceed with activity? Yes   HIPAA/medical authority confirmed? Yes   Relationship to patient of person spoken to? Self   Refill Screening Questions    Would patient like to speak to a pharmacist? No   When does the patient need to receive the medication? 12/09/22   Refill Delivery Questions    How will the patient receive the medication? MEDRx   When does the patient need to receive the medication? 12/09/22   Shipping Address Home   Address in [facility] confirmed and updated if neccessary? Yes   Expected Copay ($) 0   Is the patient able to afford the medication copay? Yes   Payment Method zero copay   Days supply of Refill 28   Supplies needed? No supplies needed   Refill activity completed? Yes   Refill activity plan Refill scheduled   Shipment/Pickup Date: 12/05/22            Current Outpatient Medications   Medication Sig    albuterol (PROVENTIL) 2.5 mg /3 mL (0.083 %) nebulizer solution Take 3 mLs (2.5 mg total) by nebulization every 6 (six) hours as needed for Wheezing or Shortness of Breath. Rescue    ammonium lactate 12 % Crea Apply twice daily to affected parts both feet as needed.    aspirin (ECOTRIN) 81 MG EC tablet Take 1 tablet (81 mg total) by mouth once daily.    blood-glucose sensor (DEXCOM G6 SENSOR) [place] 3 each by Misc.(Non-Drug; Combo Route) route continuous.    blood-glucose transmitter (DEXCOM G6 TRANSMITTER) [place] 1 each by Misc.(Non-Drug; Combo Route) route continuous.    carvediloL (COREG) 6.25 MG tablet Take 1 tablet (6.25 mg total) by mouth 2 (two) times daily.    clopidogreL (PLAVIX) 75 mg tablet Take 1  tablet (75 mg total) by mouth once daily.    diclofenac sodium (VOLTAREN) 1 % Gel Apply 2 g topically 4 (four) times daily. (Patient taking differently: Apply 2 g topically as needed.)    dulaglutide (TRULICITY) 3 mg/0.5 mL pen injector Inject 3 mg into the skin every 7 days.    dulaglutide (TRULICITY) 4.5 mg/0.5 mL pen injector INJECT 4.5 MG INTO THE SKIN EVERY 7 DAYS    esomeprazole (NEXIUM) 40 MG capsule Take 1 capsule (40 mg total) by mouth 2 (two) times daily.    evolocumab (REPATHA SURECLICK) 140 mg/mL PnIj Inject 1 mL (140 mg total) into the skin every 14 (fourteen) days.    ezetimibe (ZETIA) 10 mg tablet TAKE 1 TABLET BY MOUTH ONCE DAILY    ferrous sulfate (FEOSOL) 325 mg (65 mg iron) Tab tablet TAKE 1 TABLET BY MOUTH THREE TIMES DAILY (Patient taking differently: Take 325 mg by mouth 2 (two) times daily. TAKE 1 TABLET BY MOUTH THREE TIMES DAILY)    furosemide (LASIX) 20 MG tablet TAKE 1 TABLET BY MOUTH ONCE DAILY    GVOKE HYPOPEN 2-PACK 1 mg/0.2 mL AtIn INJECT SUBCUTANEOUSLY  CONTENTS OF 1 AUTO-INJECTOR AS NEEDED FOR HYPOGLCEMIA  AS DIRECTED    insulin lispro (HUMALOG U-100 INSULIN) 100 unit/mL injection USE AS DIRECTED VIA V-GO 20 with 3 CLICKS PER MEAL    ketoconazole (NIZORAL) 2 % cream Apply topically once daily.    LIDOcaine HCL 2% (XYLOCAINE) 2 % jelly Apply topically as needed. Apply topically once nightly to affected part of foot/feet, for pain.    losartan (COZAAR) 25 MG tablet Take 0.5 tablets (12.5 mg total) by mouth once daily.    mupirocin (BACTROBAN) 2 % ointment Apply to affected area 3 times daily    nitroGLYCERIN (NITROSTAT) 0.4 MG SL tablet DISSOLVE 1 TABLET UNDER THE TONGUE EVERY 5 MINUTES AS  NEEDED FOR CHEST PAIN. MAX  OF 3 TABLETS IN 15 MINUTES. CALL 911 IF PAIN PERSISTS. (Patient not taking: Reported on 11/15/2022)    ondansetron (ZOFRAN) 4 MG tablet Take 1 tablet (4 mg total) by mouth every 8 (eight) hours as needed for Nausea.    papaverine 30 mg/mL injection Add: Phentolamine 1  mg  Add: PGE1 10 mcg    Sig:  Inject 25 units as directed; titrate up by 5 units until desired results    ranolazine (RANEXA) 1,000 mg Tb12 Take 1 tablet (1,000 mg total) by mouth 2 (two) times daily.    rosuvastatin (CRESTOR) 40 MG Tab Take 1 tablet (40 mg total) by mouth once daily.    spironolactone (ALDACTONE) 25 MG tablet Take 1 tablet (25 mg total) by mouth once daily.    sub-q insulin device, 20 unit (VGO 20) [place] 1 Device by Misc.(Non-Drug; Combo Route) route once daily.    tamsulosin (FLOMAX) 0.4 mg Cap Take 1 capsule (0.4 mg total) by mouth once daily.   Last reviewed on 11/15/2022  9:06 AM by Belkys Kruger MD    Review of patient's allergies indicates:  No Known Allergies Last reviewed on  12/1/2022 11:15 AM by Andrew Briones      Tasks added this encounter   No tasks added.   Tasks due within next 3 months   12/1/2022 - Refill Call (Auto Added)     YOUSUF CONRAD, PharmD  [facility]  [address] [place] [id number]  Phone: [phone]  Fax: [phone]

## 2022-12-02 ENCOUNTER — OFFICE VISIT (OUTPATIENT)
Dept: ENDOCRINOLOGY | Facility: CLINIC | Age: 72
End: 2022-12-02
Payer: MEDICARE

## 2022-12-02 VITALS
RESPIRATION RATE: 18 BRPM | DIASTOLIC BLOOD PRESSURE: 58 MMHG | WEIGHT: 246.94 LBS | HEART RATE: 62 BPM | SYSTOLIC BLOOD PRESSURE: 110 MMHG | BODY MASS INDEX: 36.57 KG/M2 | HEIGHT: 69 IN

## 2022-12-02 DIAGNOSIS — E78.2 MIXED HYPERLIPIDEMIA: Chronic | ICD-10-CM

## 2022-12-02 DIAGNOSIS — E11.21 TYPE 2 DIABETES MELLITUS WITH DIABETIC NEPHROPATHY, WITH LONG-TERM CURRENT USE OF INSULIN: ICD-10-CM

## 2022-12-02 DIAGNOSIS — Z79.4 TYPE 2 DIABETES MELLITUS WITH DIABETIC NEPHROPATHY, WITH LONG-TERM CURRENT USE OF INSULIN: ICD-10-CM

## 2022-12-02 DIAGNOSIS — I25.118 CORONARY ARTERY DISEASE OF NATIVE ARTERY OF NATIVE HEART WITH STABLE ANGINA PECTORIS: Chronic | ICD-10-CM

## 2022-12-02 PROCEDURE — 1126F AMNT PAIN NOTED NONE PRSNT: CPT | Mod: CPTII,S$GLB,, | Performed by: STUDENT IN AN ORGANIZED HEALTH CARE EDUCATION/TRAINING PROGRAM

## 2022-12-02 PROCEDURE — 4010F ACE/ARB THERAPY RXD/TAKEN: CPT | Mod: CPTII,S$GLB,, | Performed by: STUDENT IN AN ORGANIZED HEALTH CARE EDUCATION/TRAINING PROGRAM

## 2022-12-02 PROCEDURE — 3078F PR MOST RECENT DIASTOLIC BLOOD PRESSURE < 80 MM HG: ICD-10-PCS | Mod: CPTII,S$GLB,, | Performed by: STUDENT IN AN ORGANIZED HEALTH CARE EDUCATION/TRAINING PROGRAM

## 2022-12-02 PROCEDURE — 3288F FALL RISK ASSESSMENT DOCD: CPT | Mod: CPTII,S$GLB,, | Performed by: STUDENT IN AN ORGANIZED HEALTH CARE EDUCATION/TRAINING PROGRAM

## 2022-12-02 PROCEDURE — 99999 PR PBB SHADOW E&M-EST. PATIENT-LVL III: ICD-10-PCS | Mod: PBBFAC,,, | Performed by: STUDENT IN AN ORGANIZED HEALTH CARE EDUCATION/TRAINING PROGRAM

## 2022-12-02 PROCEDURE — 3066F NEPHROPATHY DOC TX: CPT | Mod: CPTII,S$GLB,, | Performed by: STUDENT IN AN ORGANIZED HEALTH CARE EDUCATION/TRAINING PROGRAM

## 2022-12-02 PROCEDURE — 1101F PR PT FALLS ASSESS DOC 0-1 FALLS W/OUT INJ PAST YR: ICD-10-PCS | Mod: CPTII,S$GLB,, | Performed by: STUDENT IN AN ORGANIZED HEALTH CARE EDUCATION/TRAINING PROGRAM

## 2022-12-02 PROCEDURE — 99999 PR PBB SHADOW E&M-EST. PATIENT-LVL III: CPT | Mod: PBBFAC,,, | Performed by: STUDENT IN AN ORGANIZED HEALTH CARE EDUCATION/TRAINING PROGRAM

## 2022-12-02 PROCEDURE — 4010F PR ACE/ARB THEARPY RXD/TAKEN: ICD-10-PCS | Mod: CPTII,S$GLB,, | Performed by: STUDENT IN AN ORGANIZED HEALTH CARE EDUCATION/TRAINING PROGRAM

## 2022-12-02 PROCEDURE — 99214 OFFICE O/P EST MOD 30 MIN: CPT | Mod: S$GLB,,, | Performed by: STUDENT IN AN ORGANIZED HEALTH CARE EDUCATION/TRAINING PROGRAM

## 2022-12-02 PROCEDURE — 1159F PR MEDICATION LIST DOCUMENTED IN MEDICAL RECORD: ICD-10-PCS | Mod: CPTII,S$GLB,, | Performed by: STUDENT IN AN ORGANIZED HEALTH CARE EDUCATION/TRAINING PROGRAM

## 2022-12-02 PROCEDURE — 95251 CONT GLUC MNTR ANALYSIS I&R: CPT | Mod: S$GLB,,, | Performed by: STUDENT IN AN ORGANIZED HEALTH CARE EDUCATION/TRAINING PROGRAM

## 2022-12-02 PROCEDURE — 3051F PR MOST RECENT HEMOGLOBIN A1C LEVEL 7.0 - < 8.0%: ICD-10-PCS | Mod: CPTII,S$GLB,, | Performed by: STUDENT IN AN ORGANIZED HEALTH CARE EDUCATION/TRAINING PROGRAM

## 2022-12-02 PROCEDURE — 3008F BODY MASS INDEX DOCD: CPT | Mod: CPTII,S$GLB,, | Performed by: STUDENT IN AN ORGANIZED HEALTH CARE EDUCATION/TRAINING PROGRAM

## 2022-12-02 PROCEDURE — 3051F HG A1C>EQUAL 7.0%<8.0%: CPT | Mod: CPTII,S$GLB,, | Performed by: STUDENT IN AN ORGANIZED HEALTH CARE EDUCATION/TRAINING PROGRAM

## 2022-12-02 PROCEDURE — 3008F PR BODY MASS INDEX (BMI) DOCUMENTED: ICD-10-PCS | Mod: CPTII,S$GLB,, | Performed by: STUDENT IN AN ORGANIZED HEALTH CARE EDUCATION/TRAINING PROGRAM

## 2022-12-02 PROCEDURE — 1159F MED LIST DOCD IN RCRD: CPT | Mod: CPTII,S$GLB,, | Performed by: STUDENT IN AN ORGANIZED HEALTH CARE EDUCATION/TRAINING PROGRAM

## 2022-12-02 PROCEDURE — 1160F RVW MEDS BY RX/DR IN RCRD: CPT | Mod: CPTII,S$GLB,, | Performed by: STUDENT IN AN ORGANIZED HEALTH CARE EDUCATION/TRAINING PROGRAM

## 2022-12-02 PROCEDURE — 95251 PR GLUCOSE MONITOR, 72 HOUR, PHYS INTERP: ICD-10-PCS | Mod: S$GLB,,, | Performed by: STUDENT IN AN ORGANIZED HEALTH CARE EDUCATION/TRAINING PROGRAM

## 2022-12-02 PROCEDURE — 3078F DIAST BP <80 MM HG: CPT | Mod: CPTII,S$GLB,, | Performed by: STUDENT IN AN ORGANIZED HEALTH CARE EDUCATION/TRAINING PROGRAM

## 2022-12-02 PROCEDURE — 3288F PR FALLS RISK ASSESSMENT DOCUMENTED: ICD-10-PCS | Mod: CPTII,S$GLB,, | Performed by: STUDENT IN AN ORGANIZED HEALTH CARE EDUCATION/TRAINING PROGRAM

## 2022-12-02 PROCEDURE — 1160F PR REVIEW ALL MEDS BY PRESCRIBER/CLIN PHARMACIST DOCUMENTED: ICD-10-PCS | Mod: CPTII,S$GLB,, | Performed by: STUDENT IN AN ORGANIZED HEALTH CARE EDUCATION/TRAINING PROGRAM

## 2022-12-02 PROCEDURE — 99214 PR OFFICE/OUTPT VISIT, EST, LEVL IV, 30-39 MIN: ICD-10-PCS | Mod: S$GLB,,, | Performed by: STUDENT IN AN ORGANIZED HEALTH CARE EDUCATION/TRAINING PROGRAM

## 2022-12-02 PROCEDURE — 3066F PR DOCUMENTATION OF TREATMENT FOR NEPHROPATHY: ICD-10-PCS | Mod: CPTII,S$GLB,, | Performed by: STUDENT IN AN ORGANIZED HEALTH CARE EDUCATION/TRAINING PROGRAM

## 2022-12-02 PROCEDURE — 1126F PR PAIN SEVERITY QUANTIFIED, NO PAIN PRESENT: ICD-10-PCS | Mod: CPTII,S$GLB,, | Performed by: STUDENT IN AN ORGANIZED HEALTH CARE EDUCATION/TRAINING PROGRAM

## 2022-12-02 PROCEDURE — 3074F SYST BP LT 130 MM HG: CPT | Mod: CPTII,S$GLB,, | Performed by: STUDENT IN AN ORGANIZED HEALTH CARE EDUCATION/TRAINING PROGRAM

## 2022-12-02 PROCEDURE — 3074F PR MOST RECENT SYSTOLIC BLOOD PRESSURE < 130 MM HG: ICD-10-PCS | Mod: CPTII,S$GLB,, | Performed by: STUDENT IN AN ORGANIZED HEALTH CARE EDUCATION/TRAINING PROGRAM

## 2022-12-02 PROCEDURE — 1101F PT FALLS ASSESS-DOCD LE1/YR: CPT | Mod: CPTII,S$GLB,, | Performed by: STUDENT IN AN ORGANIZED HEALTH CARE EDUCATION/TRAINING PROGRAM

## 2022-12-02 NOTE — PROGRESS NOTES
"Subjective:      Patient ID: Walter Bull is a 72 y.o. male.    Chief Complaint:  Type 2 diabetes mellitus      History of Present Illness  This is a 72 y.o. male. with a past medical history of type 2 diabetes, CAD s/p CABG, CHF, HLD, here for evaluation.    Type 2 diabetes mellitus  Current diabetes medications:  - VGO 20 with 4 clicks with each meal and 1-2 clicks per snack  - Trulicity 4.5 mg weekly (has been doing 3 mg due to medication on backorder)     Lab Results   Component Value Date    CREATININE 1.5 (H) 09/02/2022    EGFRNORACEVR 49 (A) 09/02/2022       Past diabetes medications:  - Metformin - unable to tolerate  - Januvia   - SGLT-2 inhibitors avoided given frequent AKIs/dehydration    Known diabetic complications: nephropathy and cardiovascular disease    Weight trend:  Wt Readings from Last 5 Encounters:   12/02/22 112 kg (246 lb 14.6 oz)   11/15/22 113 kg (249 lb 1.9 oz)   10/06/22 107.5 kg (237 lb)   09/28/22 107.5 kg (237 lb)   08/31/22 107.8 kg (237 lb 10.5 oz)         Prior visit with diabetes education: yes    Current diet: 3 meals per day  Current exercise: Limited            Diabetes Management Status  Statin: Taking  ACE/ARB: Taking    Screening or Prevention Patient's value   HgA1C Testing and Control   Lab Results   Component Value Date    HGBA1C 7.0 (H) 09/02/2022        Lipid profile : 05/10/2022   LDL control Lab Results   Component Value Date    LDLCALC 81.8 05/10/2022      Nephropathy screening Lab Results   Component Value Date    MICALBCREAT Unable to calculate 11/09/2021      Blood pressure BP Readings from Last 1 Encounters:   12/02/22 (!) 110/58      Dilated retinal exam : 09/02/2022   Foot exam : 06/28/2022         Review of Systems  As above    Social and family history reviewed  Current medications and allergies reviewed    Objective:   BP (!) 110/58 (BP Location: Left arm, Patient Position: Sitting, BP Method: Medium (Manual))   Pulse 62   Resp 18   Ht 5' 9" (1.753 m)  "  Wt 112 kg (246 lb 14.6 oz)   BMI 36.46 kg/m²   Physical Exam  Alert, oriented    BP Readings from Last 1 Encounters:   12/02/22 (!) 110/58      Wt Readings from Last 1 Encounters:   12/02/22 1449 112 kg (246 lb 14.6 oz)     Body mass index is 36.46 kg/m².    Lab Review:   Lab Results   Component Value Date    HGBA1C 7.0 (H) 09/02/2022     Lab Results   Component Value Date    CHOL 141 05/10/2022    HDL 46 05/10/2022    LDLCALC 81.8 05/10/2022    TRIG 66 05/10/2022    CHOLHDL 32.6 05/10/2022     Lab Results   Component Value Date     09/02/2022    K 4.6 09/02/2022     09/02/2022    CO2 27 09/02/2022     (H) 09/02/2022    BUN 15 09/02/2022    CREATININE 1.5 (H) 09/02/2022    CALCIUM 8.8 09/02/2022    PROT 7.4 05/10/2022    ALBUMIN 3.4 (L) 07/08/2022    BILITOT 0.9 05/10/2022    ALKPHOS 75 05/10/2022    AST 25 05/10/2022    ALT 27 05/10/2022    ANIONGAP 8 09/02/2022    ESTGFRAFRICA 57 (A) 07/08/2022    EGFRNONAA 49 (A) 07/08/2022    TSH 2.041 05/10/2022       All pertinent labs reviewed    Assessment and Plan     Type 2 diabetes mellitus with diabetic nephropathy, with long-term current use of insulin  Controlled based on CGM data and A1c 7.0%. He has some hypoglycemia episodes mostly throughout day which are likely from VGo clicks. He sometimes forgets to click and clicks after he sees glucose is high. Advised to click before meals and if he forgets to try to use less click to correct (1 to 2).    Unable to tolerated metformin or SGLT-2 inhibitor.    Plan  - Continue VGo 20 + 4 clicks per meals + 1-2 clicks per snack  - Continue Trulicity 4.5 mg weekly (will use 1.5 mg temporarily as other doses are on backorder)  - Continue Dexcom G6    F/u May 2023 with labs    BMI 36.0-36.9,adult  Continue GLP-1 RA given weight loss benefit    Coronary artery disease of native artery of native heart with stable angina pectoris  Continue GLP-1 RA with CV benefit    HLD (hyperlipidemia)  Continue PCSK-9i,  statin    Follow-up in May 2023    Andrew Briones MD  Endocrinology

## 2022-12-02 NOTE — ASSESSMENT & PLAN NOTE
Controlled based on CGM data and A1c 7.0%. He has some hypoglycemia episodes mostly throughout day which are likely from VGo clicks. He sometimes forgets to click and clicks after he sees glucose is high. Advised to click before meals and if he forgets to try to use less click to correct (1 to 2).    Unable to tolerated metformin or SGLT-2 inhibitor.    Plan  - Continue VGo 20 + 4 clicks per meals + 1-2 clicks per snack  - Continue Trulicity 4.5 mg weekly (will use 1.5 mg temporarily as other doses are on backorder)  - Continue Dexcom G6    F/u May 2023 with labs

## 2022-12-30 ENCOUNTER — SPECIALTY PHARMACY (OUTPATIENT)
Dept: PHARMACY | Facility: CLINIC | Age: 72
End: 2022-12-30
Payer: MEDICARE

## 2022-12-30 ENCOUNTER — PATIENT MESSAGE (OUTPATIENT)
Dept: INTERNAL MEDICINE | Facility: CLINIC | Age: 72
End: 2022-12-30
Payer: MEDICARE

## 2022-12-30 NOTE — TELEPHONE ENCOUNTER
Specialty Pharmacy - Refill Coordination    Specialty Medication Orders Linked to Encounter      Flowsheet Row Most Recent Value   Medication #1 evolocumab (REPATHA SURECLICK) 140 mg/mL PnIj (Order#[id number], Rx#[id number])            Refill Questions - Documented Responses      Flowsheet Row Most Recent Value   Patient Availability and HIPAA Verification    Does patient want to proceed with activity? Yes   HIPAA/medical authority confirmed? Yes   Relationship to patient of person spoken to? Self   Refill Screening Questions    Would patient like to speak to a pharmacist? No   When does the patient need to receive the medication? 01/06/23   Refill Delivery Questions    How will the patient receive the medication? MEDRx   When does the patient need to receive the medication? 01/06/23   Shipping Address Prescription   Address in [facility] confirmed and updated if neccessary? Yes   Expected Copay ($) 0   Is the patient able to afford the medication copay? Yes   Payment Method zero copay   Days supply of Refill 28   Supplies needed? No supplies needed   Refill activity completed? Yes   Refill activity plan Refill scheduled   Shipment/Pickup Date: 01/03/22            Current Outpatient Medications   Medication Sig    albuterol (PROVENTIL) 2.5 mg /3 mL (0.083 %) nebulizer solution Take 3 mLs (2.5 mg total) by nebulization every 6 (six) hours as needed for Wheezing or Shortness of Breath. Rescue    ammonium lactate 12 % Crea Apply twice daily to affected parts both feet as needed.    aspirin (ECOTRIN) 81 MG EC tablet Take 1 tablet (81 mg total) by mouth once daily.    blood-glucose sensor (DEXCOM G6 SENSOR) [place] 3 each by Misc.(Non-Drug; Combo Route) route continuous.    blood-glucose transmitter (DEXCOM G6 TRANSMITTER) [place] 1 each by Misc.(Non-Drug; Combo Route) route continuous.    carvediloL (COREG) 6.25 MG tablet Take 1 tablet (6.25 mg total) by mouth 2 (two) times daily.    clopidogreL (PLAVIX) 75 mg tablet  Take 1 tablet by mouth once daily    clopidogreL (PLAVIX) 75 mg tablet Take 1 tablet (75 mg total) by mouth once daily.    diclofenac sodium (VOLTAREN) 1 % Gel Apply 2 g topically 4 (four) times daily. (Patient taking differently: Apply 2 g topically as needed.)    dulaglutide (TRULICITY) 1.5 mg/0.5 mL pen injector Inject 1.5 mg into the skin once a week.    dulaglutide (TRULICITY) 4.5 mg/0.5 mL pen injector INJECT 4.5 MG INTO THE SKIN EVERY 7 DAYS    esomeprazole (NEXIUM) 40 MG capsule Take 1 capsule (40 mg total) by mouth 2 (two) times daily.    evolocumab (REPATHA SURECLICK) 140 mg/mL PnIj Inject 1 mL (140 mg total) into the skin every 14 (fourteen) days.    ezetimibe (ZETIA) 10 mg tablet TAKE 1 TABLET BY MOUTH ONCE DAILY    ferrous sulfate (FEOSOL) 325 mg (65 mg iron) Tab tablet TAKE 1 TABLET BY MOUTH THREE TIMES DAILY (Patient taking differently: Take 325 mg by mouth 2 (two) times daily. TAKE 1 TABLET BY MOUTH THREE TIMES DAILY)    furosemide (LASIX) 20 MG tablet TAKE 1 TABLET BY MOUTH ONCE DAILY    GVOKE HYPOPEN 2-PACK 1 mg/0.2 mL AtIn INJECT SUBCUTANEOUSLY  CONTENTS OF 1 AUTO-INJECTOR AS NEEDED FOR HYPOGLCEMIA  AS DIRECTED    insulin lispro (HUMALOG U-100 INSULIN) 100 unit/mL injection USE AS DIRECTED VIA V-Tapit 20 with 3 CLICKS PER MEAL    ketoconazole (NIZORAL) 2 % cream Apply topically once daily.    LIDOcaine HCL 2% (XYLOCAINE) 2 % jelly Apply topically as needed. Apply topically once nightly to affected part of foot/feet, for pain.    losartan (COZAAR) 25 MG tablet Take 0.5 tablets (12.5 mg total) by mouth once daily.    mupirocin (BACTROBAN) 2 % ointment Apply to affected area 3 times daily    nitroGLYCERIN (NITROSTAT) 0.4 MG SL tablet DISSOLVE 1 TABLET UNDER THE TONGUE EVERY 5 MINUTES AS  NEEDED FOR CHEST PAIN. MAX  OF 3 TABLETS IN 15 MINUTES. CALL 911 IF PAIN PERSISTS.    ondansetron (ZOFRAN) 4 MG tablet Take 1 tablet (4 mg total) by mouth every 8 (eight) hours as needed for Nausea.    papaverine 30  mg/mL injection Add: Phentolamine 1 mg  Add: PGE1 10 mcg    Sig:  Inject 25 units as directed; titrate up by 5 units until desired results    ranolazine (RANEXA) 1,000 mg Tb12 TAKE 1 TABLET BY MOUTH  TWICE DAILY    rosuvastatin (CRESTOR) 40 MG Tab Take 1 tablet (40 mg total) by mouth once daily.    spironolactone (ALDACTONE) 25 MG tablet Take 1 tablet (25 mg total) by mouth once daily.    sub-q insulin device, 20 unit (VGO 20) [place] 1 Device by Misc.(Non-Drug; Combo Route) route once daily.    tamsulosin (FLOMAX) 0.4 mg Cap Take 1 capsule (0.4 mg total) by mouth once daily.   Last reviewed on 12/2/2022  3:11 PM by Andrew Briones MD    Review of patient's allergies indicates:  No Known Allergies Last reviewed on  12/2/2022 3:11 PM by Andrew Briones      Tasks added this encounter   1/27/2023 - Refill Call (Auto Added)   Tasks due within next 3 months   No tasks due.     Yesica Hernandez [facility]  [address] [place] [id number]  Phone: [phone]  Fax: [phone]

## 2022-12-31 ENCOUNTER — OFFICE VISIT (OUTPATIENT)
Dept: URGENT CARE | Facility: CLINIC | Age: 72
End: 2022-12-31
Payer: MEDICARE

## 2022-12-31 VITALS
HEART RATE: 69 BPM | TEMPERATURE: 99 F | RESPIRATION RATE: 20 BRPM | OXYGEN SATURATION: 97 % | WEIGHT: 246 LBS | BODY MASS INDEX: 36.43 KG/M2 | DIASTOLIC BLOOD PRESSURE: 68 MMHG | HEIGHT: 69 IN | SYSTOLIC BLOOD PRESSURE: 117 MMHG

## 2022-12-31 DIAGNOSIS — R50.9 FEVER, UNSPECIFIED FEVER CAUSE: Primary | ICD-10-CM

## 2022-12-31 DIAGNOSIS — J10.1 INFLUENZA A: ICD-10-CM

## 2022-12-31 LAB
CTP QC/QA: YES
CTP QC/QA: YES
POC MOLECULAR INFLUENZA A AGN: POSITIVE
POC MOLECULAR INFLUENZA B AGN: NEGATIVE
SARS-COV-2 RDRP RESP QL NAA+PROBE: NEGATIVE

## 2022-12-31 PROCEDURE — 99214 PR OFFICE/OUTPT VISIT, EST, LEVL IV, 30-39 MIN: ICD-10-PCS | Mod: S$GLB,,, | Performed by: NURSE PRACTITIONER

## 2022-12-31 PROCEDURE — 3008F PR BODY MASS INDEX (BMI) DOCUMENTED: ICD-10-PCS | Mod: CPTII,S$GLB,, | Performed by: NURSE PRACTITIONER

## 2022-12-31 PROCEDURE — 87502 POCT INFLUENZA A/B MOLECULAR: ICD-10-PCS | Mod: QW,S$GLB,, | Performed by: NURSE PRACTITIONER

## 2022-12-31 PROCEDURE — 3078F DIAST BP <80 MM HG: CPT | Mod: CPTII,S$GLB,, | Performed by: NURSE PRACTITIONER

## 2022-12-31 PROCEDURE — 1126F AMNT PAIN NOTED NONE PRSNT: CPT | Mod: CPTII,S$GLB,, | Performed by: NURSE PRACTITIONER

## 2022-12-31 PROCEDURE — 87635 SARS-COV-2 COVID-19 AMP PRB: CPT | Mod: QW,S$GLB,, | Performed by: NURSE PRACTITIONER

## 2022-12-31 PROCEDURE — 3008F BODY MASS INDEX DOCD: CPT | Mod: CPTII,S$GLB,, | Performed by: NURSE PRACTITIONER

## 2022-12-31 PROCEDURE — 3051F HG A1C>EQUAL 7.0%<8.0%: CPT | Mod: CPTII,S$GLB,, | Performed by: NURSE PRACTITIONER

## 2022-12-31 PROCEDURE — 87635: ICD-10-PCS | Mod: QW,S$GLB,, | Performed by: NURSE PRACTITIONER

## 2022-12-31 PROCEDURE — 1159F MED LIST DOCD IN RCRD: CPT | Mod: CPTII,S$GLB,, | Performed by: NURSE PRACTITIONER

## 2022-12-31 PROCEDURE — 3074F SYST BP LT 130 MM HG: CPT | Mod: CPTII,S$GLB,, | Performed by: NURSE PRACTITIONER

## 2022-12-31 PROCEDURE — 1126F PR PAIN SEVERITY QUANTIFIED, NO PAIN PRESENT: ICD-10-PCS | Mod: CPTII,S$GLB,, | Performed by: NURSE PRACTITIONER

## 2022-12-31 PROCEDURE — 1159F PR MEDICATION LIST DOCUMENTED IN MEDICAL RECORD: ICD-10-PCS | Mod: CPTII,S$GLB,, | Performed by: NURSE PRACTITIONER

## 2022-12-31 PROCEDURE — 87502 INFLUENZA DNA AMP PROBE: CPT | Mod: QW,S$GLB,, | Performed by: NURSE PRACTITIONER

## 2022-12-31 PROCEDURE — 4010F ACE/ARB THERAPY RXD/TAKEN: CPT | Mod: CPTII,S$GLB,, | Performed by: NURSE PRACTITIONER

## 2022-12-31 PROCEDURE — 99214 OFFICE O/P EST MOD 30 MIN: CPT | Mod: S$GLB,,, | Performed by: NURSE PRACTITIONER

## 2022-12-31 PROCEDURE — 3051F PR MOST RECENT HEMOGLOBIN A1C LEVEL 7.0 - < 8.0%: ICD-10-PCS | Mod: CPTII,S$GLB,, | Performed by: NURSE PRACTITIONER

## 2022-12-31 PROCEDURE — 4010F PR ACE/ARB THEARPY RXD/TAKEN: ICD-10-PCS | Mod: CPTII,S$GLB,, | Performed by: NURSE PRACTITIONER

## 2022-12-31 PROCEDURE — 3066F NEPHROPATHY DOC TX: CPT | Mod: CPTII,S$GLB,, | Performed by: NURSE PRACTITIONER

## 2022-12-31 PROCEDURE — 3074F PR MOST RECENT SYSTOLIC BLOOD PRESSURE < 130 MM HG: ICD-10-PCS | Mod: CPTII,S$GLB,, | Performed by: NURSE PRACTITIONER

## 2022-12-31 PROCEDURE — 3078F PR MOST RECENT DIASTOLIC BLOOD PRESSURE < 80 MM HG: ICD-10-PCS | Mod: CPTII,S$GLB,, | Performed by: NURSE PRACTITIONER

## 2022-12-31 PROCEDURE — 3066F PR DOCUMENTATION OF TREATMENT FOR NEPHROPATHY: ICD-10-PCS | Mod: CPTII,S$GLB,, | Performed by: NURSE PRACTITIONER

## 2022-12-31 RX ORDER — OSELTAMIVIR PHOSPHATE 75 MG/1
75 CAPSULE ORAL 2 TIMES DAILY
Qty: 10 CAPSULE | Refills: 0 | Status: SHIPPED | OUTPATIENT
Start: 2022-12-31 | End: 2023-01-05

## 2022-12-31 NOTE — PROGRESS NOTES
"Subjective:       Patient ID: Walter Bull is a 72 y.o. male.    Vitals:  height is 5' 9" (1.753 m) and weight is 111.6 kg (246 lb). His oral temperature is 98.6 °F (37 °C). His blood pressure is 117/68 and his pulse is 69. His respiration is 20 and oxygen saturation is 97%.     Chief Complaint: Fever    Patient states that he started having fever, dry cough and chest congestion yesterday. Took tylenol (2 capsules) this morning around 6am.      Fever   This is a new problem. The current episode started yesterday. The problem occurs intermittently. The problem has been unchanged. His temperature was unmeasured prior to arrival. Associated symptoms include congestion (chest), coughing (dry) and a sore throat. Pertinent negatives include no headaches, muscle aches, nausea or vomiting. Chest pain: chest congestion.He has tried acetaminophen for the symptoms. The treatment provided mild relief.   Risk factors: no recent sickness and no recent travel      Constitution: Positive for fever (unknown temp). Negative for activity change and appetite change.   HENT:  Positive for congestion (chest) and sore throat.    Neck: Negative for neck pain.   Cardiovascular:  Chest pain: chest congestion.   Respiratory:  Positive for cough (dry).    Gastrointestinal:  Negative for nausea and vomiting.   Neurological:  Negative for headaches.     Objective:      Physical Exam   HENT:   Head: Normocephalic.   Ears:   Right Ear: Tympanic membrane normal.   Left Ear: Tympanic membrane normal.   Nose: Congestion present.   Mouth/Throat: Mucous membranes are moist.   Eyes: Conjunctivae are normal. Pupils are equal, round, and reactive to light. Extraocular movement intact   Cardiovascular: Normal rate.   Pulmonary/Chest: Effort normal. No stridor. No respiratory distress. He has no wheezes. He has no rhonchi. He has no rales. He exhibits no tenderness.   Abdominal: Normal appearance.   Neurological: He is alert.   Skin: Skin is warm. " Capillary refill takes less than 2 seconds.   Nursing note and vitals reviewed.      Assessment:       1. Fever, unspecified fever cause    2. Influenza A        Results for orders placed or performed in visit on 12/31/22   POCT Influenza A/B MOLECULAR   Result Value Ref Range    POC Molecular Influenza A Ag Positive (A) Negative, Not Reported    POC Molecular Influenza B Ag Negative Negative, Not Reported     Acceptable Yes    POCT COVID-19 Rapid Screening   Result Value Ref Range    POC Rapid COVID Negative Negative     Acceptable Yes        Plan:         Fever, unspecified fever cause  -     POCT Influenza A/B MOLECULAR  -     POCT COVID-19 Rapid Screening    Influenza A         Flu Discharge Instructions, Adult   About this topic   Influenza, or flu, is an infection caused by the influenza virus. It affects your throat, breathing tube, and lungs (the respiratory system). It spreads from a person who is sick to some other person from close contact. Flu may cause:  Fever over 100.4°F (38°C)  Chills  Body aches  Headache  Cough  Runny or stuffy nose  Sore throat  Tiredness  Throwing up  What care is needed at home?   Ask your doctor what you need to do when you go home. Make sure you ask questions if you do not understand what the doctor says. This way you will know what you need to do.  Drink lots of fluids, such as water, broth, sports drinks, and tea. This will keep your fluid levels up.  You need to rest while you are getting better.  Get enough sleep.  Use a machine that makes steam like a vaporizer or humidifier. It may help open up a clogged nose so you can breathe easier.  What follow-up care is needed?   Your doctor may ask you to make visits to the office to check on your progress. Be sure to keep these visits.  What drugs may be needed?   The doctor may order drugs to:  Treat the flu  Lower fever  Help with pain  Relieve body aches  Control coughing  Will physical activity  be limited?   You need to rest while you are getting better. This means you may need to limit your activity until you feel well.  What changes to diet are needed?   Eat food that will not upset your stomach such as:  Chicken soup  Bananas  Rice  Apples  Toast  What problems could happen?   Pneumonia  Too much fluid loss. This is called dehydration.  Infection  Worsening of heart and lung problems  What can be done to prevent this health problem?   Keep your hands away from your nose, eyes, and mouth. The virus most often enters the body through these parts.  Wash your hands often with soap and water for at least 20 seconds, especially after you cough or sneeze. Use alcohol-based hand sanitizers if soap and water are not available.     Do not get close to, hug, or kiss people who are sick.  Avoid sharing your towels, tissues, food, or drink with anyone who is sick.  Avoid going to crowded places.  Get a flu shot each year.  To keep from spreading germs in the house or other places:  If you are sick, stay at home. Stay in a separate room if possible. You may spread the flu from the day before you have signs and up to 7 days after you get sick. You may return to work after the fever is gone for 24 hours without the use of drugs to lower your fever.  Cover your mouth and nose with tissue when you cough or sneeze. You can also cough into your elbow. Throw away tissues in the trash and wash your hands after touching used tissues.  Keep your house clean by wiping down counters, sinks, faucets, doorknobs, telephones, remotes, and light switches with a  with bleach. Wash dishes in the  or with hot soapy water. The flu virus can live on solid surfaces for 24 hours.     When do I need to call the doctor?   Fever or cough returns or gets worse  Chest pain with deep breathing  Confusion or sudden dizziness  Very bad throwing up or throwing up that does not stop  Trouble breathing  Passing less urine        You  are not feeling better in 2 to 3 days or you are feeling worse  Teach Back: Helping You Understand   The Teach Back Method helps you understand the information we are giving you. The idea is simple. After talking with the staff, tell them in your own words what you were just told. This helps to make sure the staff has covered each thing clearly. It also helps to explain things that may have been a bit confusing. Before going home, make sure you are able to do these:  I can tell you about my condition.  I can tell you what I can do to help keep my fluid levels up.  I can tell you what I will do to keep others from getting sick.  I can tell you what I will do if I have chest pain with deep breathing, trouble breathing, passing less urine, or very bad throwing up.  Where can I learn more?   [place] [facility] [employer]  https://www.lung.ca/lung-health/lung-disease/influenza   Centers for Disease Control and Prevention  https://www.cdc.gov/flu/highrisk/65over.htm   Centers for Disease Control and Prevention  http://www.cdc.gov/flu/   Last Reviewed Date   2020-02-28  Consumer Information Use and Disclaimer   This information is not specific medical advice and does not replace information you receive from your health care provider. This is only a brief summary of general information. It does NOT include all information about conditions, illnesses, injuries, tests, procedures, treatments, therapies, discharge instructions or life-style choices that may apply to you. You must talk with your health care provider for complete information about your health and treatment options. This information should not be used to decide whether or not to accept your health care providers advice, instructions or recommendations. Only your health care provider has the knowledge and training to provide advice that is right for you.  Copyright   Copyright © 2021 [employer] and its affiliates and/or licensors. All rights reserved.

## 2023-01-02 ENCOUNTER — CLINICAL SUPPORT (OUTPATIENT)
Dept: CARDIOLOGY | Facility: HOSPITAL | Age: 73
End: 2023-01-02
Payer: MEDICARE

## 2023-01-02 DIAGNOSIS — I47.29 OTHER VENTRICULAR TACHYCARDIA: ICD-10-CM

## 2023-01-02 DIAGNOSIS — I25.5 ISCHEMIC CARDIOMYOPATHY: ICD-10-CM

## 2023-01-02 DIAGNOSIS — I50.42 CHRONIC COMBINED SYSTOLIC (CONGESTIVE) AND DIASTOLIC (CONGESTIVE) HEART FAILURE: ICD-10-CM

## 2023-01-02 DIAGNOSIS — I48.91 UNSPECIFIED ATRIAL FIBRILLATION: ICD-10-CM

## 2023-01-02 DIAGNOSIS — Z95.810 PRESENCE OF AUTOMATIC (IMPLANTABLE) CARDIAC DEFIBRILLATOR: ICD-10-CM

## 2023-01-02 PROCEDURE — 93296 REM INTERROG EVL PM/IDS: CPT | Performed by: INTERNAL MEDICINE

## 2023-01-04 ENCOUNTER — OFFICE VISIT (OUTPATIENT)
Dept: URGENT CARE | Facility: CLINIC | Age: 73
End: 2023-01-04
Payer: MEDICARE

## 2023-01-04 VITALS
HEART RATE: 60 BPM | DIASTOLIC BLOOD PRESSURE: 72 MMHG | OXYGEN SATURATION: 98 % | SYSTOLIC BLOOD PRESSURE: 137 MMHG | RESPIRATION RATE: 19 BRPM | BODY MASS INDEX: 36.43 KG/M2 | HEIGHT: 69 IN | WEIGHT: 246 LBS | TEMPERATURE: 98 F

## 2023-01-04 DIAGNOSIS — J98.8 WHEEZING-ASSOCIATED RESPIRATORY INFECTION (WARI): ICD-10-CM

## 2023-01-04 DIAGNOSIS — R06.2 WHEEZING: Primary | ICD-10-CM

## 2023-01-04 DIAGNOSIS — J11.1 INFLUENZA: ICD-10-CM

## 2023-01-04 PROCEDURE — 3078F PR MOST RECENT DIASTOLIC BLOOD PRESSURE < 80 MM HG: ICD-10-PCS | Mod: CPTII,S$GLB,, | Performed by: PHYSICIAN ASSISTANT

## 2023-01-04 PROCEDURE — 3008F PR BODY MASS INDEX (BMI) DOCUMENTED: ICD-10-PCS | Mod: CPTII,S$GLB,, | Performed by: PHYSICIAN ASSISTANT

## 2023-01-04 PROCEDURE — 1160F PR REVIEW ALL MEDS BY PRESCRIBER/CLIN PHARMACIST DOCUMENTED: ICD-10-PCS | Mod: CPTII,S$GLB,, | Performed by: PHYSICIAN ASSISTANT

## 2023-01-04 PROCEDURE — 3075F PR MOST RECENT SYSTOLIC BLOOD PRESS GE 130-139MM HG: ICD-10-PCS | Mod: CPTII,S$GLB,, | Performed by: PHYSICIAN ASSISTANT

## 2023-01-04 PROCEDURE — 1126F AMNT PAIN NOTED NONE PRSNT: CPT | Mod: CPTII,S$GLB,, | Performed by: PHYSICIAN ASSISTANT

## 2023-01-04 PROCEDURE — 3008F BODY MASS INDEX DOCD: CPT | Mod: CPTII,S$GLB,, | Performed by: PHYSICIAN ASSISTANT

## 2023-01-04 PROCEDURE — 1160F RVW MEDS BY RX/DR IN RCRD: CPT | Mod: CPTII,S$GLB,, | Performed by: PHYSICIAN ASSISTANT

## 2023-01-04 PROCEDURE — 3075F SYST BP GE 130 - 139MM HG: CPT | Mod: CPTII,S$GLB,, | Performed by: PHYSICIAN ASSISTANT

## 2023-01-04 PROCEDURE — 1159F MED LIST DOCD IN RCRD: CPT | Mod: CPTII,S$GLB,, | Performed by: PHYSICIAN ASSISTANT

## 2023-01-04 PROCEDURE — 1159F PR MEDICATION LIST DOCUMENTED IN MEDICAL RECORD: ICD-10-PCS | Mod: CPTII,S$GLB,, | Performed by: PHYSICIAN ASSISTANT

## 2023-01-04 PROCEDURE — 94640 AIRWAY INHALATION TREATMENT: CPT | Mod: 59,S$GLB,, | Performed by: PHYSICIAN ASSISTANT

## 2023-01-04 PROCEDURE — 1126F PR PAIN SEVERITY QUANTIFIED, NO PAIN PRESENT: ICD-10-PCS | Mod: CPTII,S$GLB,, | Performed by: PHYSICIAN ASSISTANT

## 2023-01-04 PROCEDURE — 99214 PR OFFICE/OUTPT VISIT, EST, LEVL IV, 30-39 MIN: ICD-10-PCS | Mod: 25,S$GLB,, | Performed by: PHYSICIAN ASSISTANT

## 2023-01-04 PROCEDURE — 99214 OFFICE O/P EST MOD 30 MIN: CPT | Mod: 25,S$GLB,, | Performed by: PHYSICIAN ASSISTANT

## 2023-01-04 PROCEDURE — 3078F DIAST BP <80 MM HG: CPT | Mod: CPTII,S$GLB,, | Performed by: PHYSICIAN ASSISTANT

## 2023-01-04 PROCEDURE — 94640 PR INHAL RX, AIRWAY OBST/DX SPUTUM INDUCT: ICD-10-PCS | Mod: 59,S$GLB,, | Performed by: PHYSICIAN ASSISTANT

## 2023-01-04 RX ORDER — ALBUTEROL SULFATE 90 UG/1
2 AEROSOL, METERED RESPIRATORY (INHALATION) EVERY 6 HOURS PRN
Qty: 8 G | Refills: 0 | Status: SHIPPED | OUTPATIENT
Start: 2023-01-04 | End: 2023-07-05 | Stop reason: SDUPTHER

## 2023-01-04 RX ORDER — ALBUTEROL SULFATE 0.83 MG/ML
2.5 SOLUTION RESPIRATORY (INHALATION)
Status: COMPLETED | OUTPATIENT
Start: 2023-01-04 | End: 2023-01-04

## 2023-01-04 RX ORDER — IPRATROPIUM BROMIDE AND ALBUTEROL SULFATE 2.5; .5 MG/3ML; MG/3ML
3 SOLUTION RESPIRATORY (INHALATION) EVERY 6 HOURS PRN
Qty: 75 ML | Refills: 0 | Status: SHIPPED | OUTPATIENT
Start: 2023-01-04 | End: 2024-01-04

## 2023-01-04 RX ORDER — IPRATROPIUM BROMIDE 0.5 MG/2.5ML
0.5 SOLUTION RESPIRATORY (INHALATION)
Status: COMPLETED | OUTPATIENT
Start: 2023-01-04 | End: 2023-01-04

## 2023-01-04 RX ORDER — BENZONATATE 200 MG/1
200 CAPSULE ORAL 3 TIMES DAILY PRN
Qty: 20 CAPSULE | Refills: 0 | Status: SHIPPED | OUTPATIENT
Start: 2023-01-04 | End: 2023-01-14

## 2023-01-04 RX ADMIN — IPRATROPIUM BROMIDE 0.5 MG: 0.5 SOLUTION RESPIRATORY (INHALATION) at 03:01

## 2023-01-04 RX ADMIN — ALBUTEROL SULFATE 2.5 MG: 0.83 SOLUTION RESPIRATORY (INHALATION) at 03:01

## 2023-01-04 NOTE — PROGRESS NOTES
"Subjective:       Patient ID: Walter Bull is a 72 y.o. male.    Vitals:  height is 5' 9" (1.753 m) and weight is 111.6 kg (246 lb). His oral temperature is 98 °F (36.7 °C). His blood pressure is 137/72 and his pulse is 60. His respiration is 19 and oxygen saturation is 98%.     Chief Complaint: Cough    Flu + on 12/31. Reports persistent coughing. States that he has nebulizer and albuterol pump at home. Using nebulizer, last used last night. Did not use today. Also treated at home with cough drops    Cough  The current episode started in the past 7 days. The problem has been unchanged. The problem occurs constantly. The cough is Productive of sputum. Associated symptoms include a sore throat and wheezing. Pertinent negatives include no chest pain. Treatments tried: Tamiflu, halls. The treatment provided no relief. His past medical history is significant for bronchitis.     HENT:  Positive for sore throat.    Cardiovascular:  Negative for chest pain.   Respiratory:  Positive for cough and wheezing.      Objective:      Physical Exam   Constitutional: He is oriented to person, place, and time. He appears well-developed. He is cooperative.  Non-toxic appearance. He does not appear ill. No distress.   HENT:   Head: Normocephalic and atraumatic.   Ears:   Right Ear: Hearing normal.   Left Ear: Hearing normal.   Eyes: Conjunctivae and lids are normal. No scleral icterus.   Neck: Trachea normal and phonation normal. Neck supple. No edema present. No erythema present. No neck rigidity present.   Cardiovascular: Normal rate, regular rhythm, normal heart sounds and normal pulses.   No murmur heard.Exam reveals no gallop and no friction rub.   Pulmonary/Chest: Effort normal. No respiratory distress. He has wheezes (diffuse throughout lung fields).   Abdominal: Normal appearance.   Neurological: He is alert and oriented to person, place, and time. Coordination normal.   Skin: Skin is dry, intact, not diaphoretic and not " pale.   Psychiatric: His speech is normal and behavior is normal. Judgment and thought content normal.   Nursing note and vitals reviewed.      Assessment:       1. Wheezing    2. Wheezing-associated respiratory infection (WARI)    3. Influenza          Plan:         Wheezing  -     albuterol nebulizer solution 2.5 mg  -     ipratropium 0.02 % nebulizer solution 0.5 mg  -     albuterol-ipratropium (DUO-NEB) 2.5 mg-0.5 mg/3 mL nebulizer solution; Take 3 mLs by nebulization every 6 (six) hours as needed for Wheezing or Shortness of Breath. Rescue  Dispense: 75 mL; Refill: 0  -     albuterol (PROVENTIL HFA) 90 mcg/actuation inhaler; Inhale 2 puffs into the lungs every 6 (six) hours as needed for Wheezing or Shortness of Breath. Rescue  Dispense: 8 g; Refill: 0  -     benzonatate (TESSALON) 200 MG capsule; Take 1 capsule (200 mg total) by mouth 3 (three) times daily as needed for Cough.  Dispense: 20 capsule; Refill: 0    Wheezing-associated respiratory infection (WARI)  -     albuterol-ipratropium (DUO-NEB) 2.5 mg-0.5 mg/3 mL nebulizer solution; Take 3 mLs by nebulization every 6 (six) hours as needed for Wheezing or Shortness of Breath. Rescue  Dispense: 75 mL; Refill: 0  -     albuterol (PROVENTIL HFA) 90 mcg/actuation inhaler; Inhale 2 puffs into the lungs every 6 (six) hours as needed for Wheezing or Shortness of Breath. Rescue  Dispense: 8 g; Refill: 0  -     benzonatate (TESSALON) 200 MG capsule; Take 1 capsule (200 mg total) by mouth 3 (three) times daily as needed for Cough.  Dispense: 20 capsule; Refill: 0    Influenza    Patient aware that there is no xray available today. Given duoneb with improvement of his symptoms. Scant wheezing now. Discussed with patient the importance of f/u with their primary care provider. Urged to go to the ER for any worsening signs or symptoms.

## 2023-01-12 ENCOUNTER — OFFICE VISIT (OUTPATIENT)
Dept: CARDIOLOGY | Facility: CLINIC | Age: 73
End: 2023-01-12
Payer: MEDICARE

## 2023-01-12 VITALS
WEIGHT: 250.31 LBS | BODY MASS INDEX: 37.07 KG/M2 | HEART RATE: 62 BPM | DIASTOLIC BLOOD PRESSURE: 71 MMHG | HEIGHT: 69 IN | SYSTOLIC BLOOD PRESSURE: 143 MMHG

## 2023-01-12 DIAGNOSIS — E11.21 TYPE 2 DIABETES MELLITUS WITH DIABETIC NEPHROPATHY, WITH LONG-TERM CURRENT USE OF INSULIN: ICD-10-CM

## 2023-01-12 DIAGNOSIS — I50.42 CHRONIC COMBINED SYSTOLIC AND DIASTOLIC HEART FAILURE: Chronic | ICD-10-CM

## 2023-01-12 DIAGNOSIS — I10 BENIGN ESSENTIAL HTN: Chronic | ICD-10-CM

## 2023-01-12 DIAGNOSIS — I70.0 AORTIC ATHEROSCLEROSIS: ICD-10-CM

## 2023-01-12 DIAGNOSIS — Z79.4 TYPE 2 DIABETES MELLITUS WITH DIABETIC NEPHROPATHY, WITH LONG-TERM CURRENT USE OF INSULIN: ICD-10-CM

## 2023-01-12 DIAGNOSIS — I10 PRIMARY HYPERTENSION: ICD-10-CM

## 2023-01-12 DIAGNOSIS — I25.118 CORONARY ARTERY DISEASE OF NATIVE ARTERY OF NATIVE HEART WITH STABLE ANGINA PECTORIS: Primary | Chronic | ICD-10-CM

## 2023-01-12 DIAGNOSIS — I48.0 PAROXYSMAL ATRIAL FIBRILLATION: ICD-10-CM

## 2023-01-12 PROCEDURE — 99215 OFFICE O/P EST HI 40 MIN: CPT | Mod: S$GLB,,, | Performed by: INTERNAL MEDICINE

## 2023-01-12 PROCEDURE — 1159F PR MEDICATION LIST DOCUMENTED IN MEDICAL RECORD: ICD-10-PCS | Mod: CPTII,S$GLB,, | Performed by: INTERNAL MEDICINE

## 2023-01-12 PROCEDURE — 1101F PR PT FALLS ASSESS DOC 0-1 FALLS W/OUT INJ PAST YR: ICD-10-PCS | Mod: CPTII,S$GLB,, | Performed by: INTERNAL MEDICINE

## 2023-01-12 PROCEDURE — 1101F PT FALLS ASSESS-DOCD LE1/YR: CPT | Mod: CPTII,S$GLB,, | Performed by: INTERNAL MEDICINE

## 2023-01-12 PROCEDURE — 3077F SYST BP >= 140 MM HG: CPT | Mod: CPTII,S$GLB,, | Performed by: INTERNAL MEDICINE

## 2023-01-12 PROCEDURE — 1159F MED LIST DOCD IN RCRD: CPT | Mod: CPTII,S$GLB,, | Performed by: INTERNAL MEDICINE

## 2023-01-12 PROCEDURE — 3077F PR MOST RECENT SYSTOLIC BLOOD PRESSURE >= 140 MM HG: ICD-10-PCS | Mod: CPTII,S$GLB,, | Performed by: INTERNAL MEDICINE

## 2023-01-12 PROCEDURE — 1126F AMNT PAIN NOTED NONE PRSNT: CPT | Mod: CPTII,S$GLB,, | Performed by: INTERNAL MEDICINE

## 2023-01-12 PROCEDURE — 3288F PR FALLS RISK ASSESSMENT DOCUMENTED: ICD-10-PCS | Mod: CPTII,S$GLB,, | Performed by: INTERNAL MEDICINE

## 2023-01-12 PROCEDURE — 1160F PR REVIEW ALL MEDS BY PRESCRIBER/CLIN PHARMACIST DOCUMENTED: ICD-10-PCS | Mod: CPTII,S$GLB,, | Performed by: INTERNAL MEDICINE

## 2023-01-12 PROCEDURE — 3008F BODY MASS INDEX DOCD: CPT | Mod: CPTII,S$GLB,, | Performed by: INTERNAL MEDICINE

## 2023-01-12 PROCEDURE — 99215 PR OFFICE/OUTPT VISIT, EST, LEVL V, 40-54 MIN: ICD-10-PCS | Mod: S$GLB,,, | Performed by: INTERNAL MEDICINE

## 2023-01-12 PROCEDURE — 3288F FALL RISK ASSESSMENT DOCD: CPT | Mod: CPTII,S$GLB,, | Performed by: INTERNAL MEDICINE

## 2023-01-12 PROCEDURE — 3078F DIAST BP <80 MM HG: CPT | Mod: CPTII,S$GLB,, | Performed by: INTERNAL MEDICINE

## 2023-01-12 PROCEDURE — 1126F PR PAIN SEVERITY QUANTIFIED, NO PAIN PRESENT: ICD-10-PCS | Mod: CPTII,S$GLB,, | Performed by: INTERNAL MEDICINE

## 2023-01-12 PROCEDURE — 99999 PR PBB SHADOW E&M-EST. PATIENT-LVL III: ICD-10-PCS | Mod: PBBFAC,,, | Performed by: INTERNAL MEDICINE

## 2023-01-12 PROCEDURE — 3008F PR BODY MASS INDEX (BMI) DOCUMENTED: ICD-10-PCS | Mod: CPTII,S$GLB,, | Performed by: INTERNAL MEDICINE

## 2023-01-12 PROCEDURE — 99999 PR PBB SHADOW E&M-EST. PATIENT-LVL III: CPT | Mod: PBBFAC,,, | Performed by: INTERNAL MEDICINE

## 2023-01-12 PROCEDURE — 1160F RVW MEDS BY RX/DR IN RCRD: CPT | Mod: CPTII,S$GLB,, | Performed by: INTERNAL MEDICINE

## 2023-01-12 PROCEDURE — 3078F PR MOST RECENT DIASTOLIC BLOOD PRESSURE < 80 MM HG: ICD-10-PCS | Mod: CPTII,S$GLB,, | Performed by: INTERNAL MEDICINE

## 2023-01-12 NOTE — PROGRESS NOTES
HISTORY:    72-year-old gentleman with a history of coronary artery disease status post CABG in 2005/cardiomyopathy status post AICD in 2018 and BiV upgrade 2022/congestive heart failure/hypertension/hyperlipidemia/diabetes/CKD/previous tobacco/orthostatic hypotension s/p reduction in meds presenting for follow-up.     Overall, patient feels well today. No issues with chest pain, shortness of breath, or dyspnea on exertion at this time. Very active in his life doing [employer] as his work. No edmea or orthopnea.    Did have flu two weeks ago, which set him back.     Has been having intermittent palpitations. Noted to have afib on his device check.      He tolerates aspirin 81x1, clopidogrel 75x1, carvedilol 6.25x2, losartan 12.5x2, ranolazine 1000x2, spironolactone 25x1, rosuvastatin 40x1, ezetimibe 10x1, evolocumab, and furosemide 20x1. Bps at home running slightly high on current regmin.      MEDICATIONS:      Current Outpatient Medications:     albuterol (PROVENTIL HFA) 90 mcg/actuation inhaler, Inhale 2 puffs into the lungs every 6 (six) hours as needed for Wheezing or Shortness of Breath. Rescue, Disp: 8 g, Rfl: 0    albuterol (PROVENTIL) 2.5 mg /3 mL (0.083 %) nebulizer solution, Take 3 mLs (2.5 mg total) by nebulization every 6 (six) hours as needed for Wheezing or Shortness of Breath. Rescue, Disp: 75 mL, Rfl: 0    albuterol-ipratropium (DUO-NEB) 2.5 mg-0.5 mg/3 mL nebulizer solution, Take 3 mLs by nebulization every 6 (six) hours as needed for Wheezing or Shortness of Breath. Rescue, Disp: 75 mL, Rfl: 0    ammonium lactate 12 % Crea, Apply twice daily to affected parts both feet as needed., Disp: 140 g, Rfl: 11    benzonatate (TESSALON) 200 MG capsule, Take 1 capsule (200 mg total) by mouth 3 (three) times daily as needed for Cough., Disp: 20 capsule, Rfl: 0    blood-glucose sensor (DEXCOM G6 SENSOR) [place], 3 each by Misc.(Non-Drug; Combo Route) route continuous., Disp: 3 each, Rfl: prn    blood-glucose  transmitter (DEXCOM G6 TRANSMITTER) [place], 1 each by Misc.(Non-Drug; Combo Route) route continuous., Disp: 1 each, Rfl: prn    carvediloL (COREG) 6.25 MG tablet, Take 1 tablet (6.25 mg total) by mouth 2 (two) times daily., Disp: 180 tablet, Rfl: 3    diclofenac sodium (VOLTAREN) 1 % Gel, Apply 2 g topically 4 (four) times daily. (Patient taking differently: Apply 2 g topically as needed.), Disp: 100 g, Rfl: 2    dulaglutide (TRULICITY) 1.5 mg/0.5 mL pen injector, Inject 1.5 mg into the skin once a week., Disp: 4 pen, Rfl: 11    dulaglutide (TRULICITY) 4.5 mg/0.5 mL pen injector, INJECT 4.5 MG INTO THE SKIN EVERY 7 DAYS, Disp: 12 pen, Rfl: 3    esomeprazole (NEXIUM) 40 MG capsule, Take 1 capsule (40 mg total) by mouth 2 (two) times daily., Disp: 180 capsule, Rfl: 3    evolocumab (REPATHA SURECLICK) 140 mg/mL PnIj, Inject 1 mL (140 mg total) into the skin every 14 (fourteen) days., Disp: 2 each, Rfl: 11    ferrous sulfate (FEOSOL) 325 mg (65 mg iron) Tab tablet, TAKE 1 TABLET BY MOUTH THREE TIMES DAILY (Patient taking differently: Take 325 mg by mouth 2 (two) times daily. TAKE 1 TABLET BY MOUTH THREE TIMES DAILY), Disp: 90 tablet, Rfl: 3    furosemide (LASIX) 20 MG tablet, TAKE 1 TABLET BY MOUTH ONCE DAILY, Disp: 90 tablet, Rfl: 3    GVOKE HYPOPEN 2-PACK 1 mg/0.2 mL AtIn, INJECT SUBCUTANEOUSLY  CONTENTS OF 1 AUTO-INJECTOR AS NEEDED FOR HYPOGLCEMIA  AS DIRECTED, Disp: 0.4 mL, Rfl: 2    insulin lispro (HUMALOG U-100 INSULIN) 100 unit/mL injection, USE AS DIRECTED VIA Quitt[employer]ch 20 with 3 CLICKS PER MEAL, Disp: 20 mL, Rfl: 11    ketoconazole (NIZORAL) 2 % cream, Apply topically once daily., Disp: 60 g, Rfl: 2    LIDOcaine HCL 2% (XYLOCAINE) 2 % jelly, Apply topically as needed. Apply topically once nightly to affected part of foot/feet, for pain., Disp: 30 mL, Rfl: 2    losartan (COZAAR) 25 MG tablet, Take 0.5 tablets (12.5 mg total) by mouth once daily., Disp: 45 tablet, Rfl: 3    mupirocin (BACTROBAN) 2 % ointment, Apply to  affected area 3 times daily, Disp: 22 g, Rfl: 1    nitroGLYCERIN (NITROSTAT) 0.4 MG SL tablet, DISSOLVE 1 TABLET UNDER THE TONGUE EVERY 5 MINUTES AS  NEEDED FOR CHEST PAIN. MAX  OF 3 TABLETS IN 15 MINUTES. CALL 911 IF PAIN PERSISTS., Disp: 25 tablet, Rfl: 11    ondansetron (ZOFRAN) 4 MG tablet, Take 1 tablet (4 mg total) by mouth every 8 (eight) hours as needed for Nausea., Disp: 15 tablet, Rfl: 0    papaverine 30 mg/mL injection, Add: Phentolamine 1 mg Add: PGE1 10 mcg  Sig:  Inject 25 units as directed; titrate up by 5 units until desired results, Disp: 10 mL, Rfl: 12    ranolazine (RANEXA) 1,000 mg Tb12, TAKE 1 TABLET BY MOUTH  TWICE DAILY, Disp: 60 tablet, Rfl: 11    rosuvastatin (CRESTOR) 40 MG Tab, Take 1 tablet (40 mg total) by mouth once daily., Disp: 90 tablet, Rfl: 3    spironolactone (ALDACTONE) 25 MG tablet, Take 1 tablet (25 mg total) by mouth once daily., Disp: 90 tablet, Rfl: 3    sub-q insulin device, 20 unit (VGO 20) [place], 1 Device by Misc.(Non-Drug; Combo Route) route once daily., Disp: 30 each, Rfl: 11    tamsulosin (FLOMAX) 0.4 mg Cap, Take 1 capsule (0.4 mg total) by mouth once daily., Disp: 90 capsule, Rfl: 3    apixaban (ELIQUIS) 5 mg Tab, Take 1 tablet (5 mg total) by mouth 2 (two) times daily., Disp: 180 tablet, Rfl: 3      PHYSICAL EXAM:    Vitals:    01/12/23 0827   BP: (!) 143/71   Pulse: 62       NAD, A+Ox3.  No jvd, no bruit.  RRR nml s1,s2. No murmurs.  CTA B no wheezes or crackles.  2+ B radial and 1+ B DP/PT. No edema.    LABS/STUDIES (imaging reviewed during clinic visit):    September 2022 creatinine 1.5 with a BUN of 15.  July 2022 hemoglobin 11.9/MCV 94. Creatinine 1.4/BUN 17. Albumin 3.4.  LDL 81 and HDL 46. A1c 8.  TSH normal. BNP normal March 2022.   EKG May 2021 revealed an atrial paced rhythm with a left bundle branch block.  Device check November 2022 57% RA pacing 98% bi V.  One episode of atrial fibrillation lasting a minutes and 42 seconds.  April 2022 80% RA and 98% BiV  pacing. pAtach rare.   TTE April 2022 LVEF 25% w global HK and IL/inf akinesis. Mildly enlarged RV and decreased fxn. CVP 3. PASP 35. June 2021 revealed moderately reduced LVEF near baseline (35-45%) with a normal right atrial pressure and no significant valvular disease.   Nuclear stress testing from October 2019 revealed an LVEF of 25-30% with a large inferior scar and no evidence of ischemia.  Cardiac catheter[place] was in 2018 at [facility] [place] and revealed severe multivessel disease with LAD, left circ, and RCA CTOs.  He had a patent LIMA to LAD/SVG to diagonal and an occluded SVG to left circ system graft.    Carotid ultrasound from his May 2021 admission revealed no evidence of significant carotid disease.    ASSESSMENT & PLAN:    1. Coronary artery disease of native artery of native heart with stable angina pectoris    2. Chronic combined systolic and diastolic heart failure    3. Benign essential HTN    4. Aortic atherosclerosis    5. Primary hypertension    6. Paroxysmal atrial fibrillation    7. Type 2 diabetes mellitus with diabetic nephropathy, with long-term current use of insulin    8. BMI 36.0-36.9,adult        Orders Placed This Encounter    Basic Metabolic Panel    CBC Without Differential    apixaban (ELIQUIS) 5 mg Tab          Pt with newly found afib on AICD interrogation. Has been intermittently experiencing palpitations post device check. Will start apixiban 5x2 for CVA prophylaxis. On betablocker therapy. Can consider anti-arrhythmic in future if symptoms worsen. Follows w Dr. Glasgow.     The patient has severe coronary artery disease with a patent LIMA to LAD and SVG to diagonal. No recent ACS.  CCS 0 today. Seemed to have responded to HF management. Continue carvedilol 6.25x2 and continue ranolazine 1000x2 at this time. Okay to stop asa and clopidogrel as pt is now on NOAC.    Patient with chronic heart failure and a reduced ejection fraction likely secondary to an ischemic cardiomyopathy.  NYHA class  1 today. Improved w BiV upgrade.  LVEF 30s. Continue losartan 12.5x2 and carvedilol 6.25x2, spironolactone 25x1, and furosemide 20x1. Pt has not tolerated higher doses of losartan/carvedilol or empaglaflozin 10x1.       Blood pressures in an acceptable range. No more orthostatic symptoms. Continue carvedilol/losartan. Had to to back down on med doses previously for orthostatic symptoms.     LDL was 70-80 on rosuvastatin 40 mg/ezetimibe 10 mg. Has been tolerating evolocumab q2 weeks. Okay to stop ezetimibe. Follow-up LDL w next set of labs.       Joana Canada MD

## 2023-01-17 ENCOUNTER — TELEPHONE (OUTPATIENT)
Dept: ENDOCRINOLOGY | Facility: CLINIC | Age: 73
End: 2023-01-17
Payer: MEDICARE

## 2023-01-17 ENCOUNTER — PES CALL (OUTPATIENT)
Dept: ADMINISTRATIVE | Facility: CLINIC | Age: 73
End: 2023-01-17
Payer: MEDICARE

## 2023-01-17 NOTE — TRANSFER OF CARE
"Anesthesia Transfer of Care Note    Patient: Walter Bull    Procedure(s) Performed: Procedure(s) (LRB):  INSERTION, ICD, BIVENTRICULAR (Left)  Venogram, EP Lab    Patient location: PACU    Anesthesia Type: general    Transport from OR: Transported from OR on room air with adequate spontaneous ventilation    Post pain: adequate analgesia    Post assessment: no apparent anesthetic complications and tolerated procedure well    Post vital signs: stable    Level of consciousness: awake and alert    Nausea/Vomiting: no nausea/vomiting    Complications: none    Transfer of care protocol was followed      Last vitals:   Visit Vitals  BP (!) 151/65   Pulse 62   Temp 36.3 °C (97.3 °F) (Temporal)   Resp 16   Ht 5' 9" (1.753 m)   Wt 110.7 kg (244 lb)   SpO2 100%   BMI 36.03 kg/m²     " Mucosal Advancement Flap Text: Given the location of the defect, shape of the defect and the proximity to free margins a mucosal advancement flap was deemed most appropriate. Incisions were made with a 15 blade scalpel in the appropriate fashion along the cutaneous vermilion border and the mucosal lip. The remaining actinically damaged mucosal tissue was excised.  The mucosal advancement flap was then elevated to the gingival sulcus with care taken to preserve the neurovascular structures and advanced into the primary defect. Care was taken to ensure that precise realignment of the vermilion border was achieved.

## 2023-01-17 NOTE — TELEPHONE ENCOUNTER
[facility] approved DME through [employer]  5/18/22-1/11/2023

## 2023-01-18 DIAGNOSIS — Z95.1 S/P CABG X 5: ICD-10-CM

## 2023-01-18 DIAGNOSIS — I25.118 CORONARY ARTERY DISEASE OF NATIVE ARTERY OF NATIVE HEART WITH STABLE ANGINA PECTORIS: ICD-10-CM

## 2023-01-19 RX ORDER — NITROGLYCERIN 0.4 MG/1
TABLET SUBLINGUAL
Qty: 25 TABLET | Refills: 11 | Status: SHIPPED | OUTPATIENT
Start: 2023-01-19 | End: 2023-07-27

## 2023-01-19 NOTE — TELEPHONE ENCOUNTER
No new care gaps identified.  [facility] Embedded Care Gaps. Reference number: [id number]. 1/18/2023   10:29:17 PM CST

## 2023-01-29 NOTE — ASSESSMENT & PLAN NOTE
69 yo male patient with h/o CAD (s/p CABGx5). CHF (EF 43% RVPS 31mmg with grade I DD in 05/19), s/p AICD placement, T2DM (~40 years on insulin), glaucoma without diabetic retinopathy. HTN (~19 years), MGUS and history of CKD2 coming for chest pain and concern for cardiac etiology. Nephrology consulted due to HARRIS (Cr 1.9 on admission from baseline of 1.2). CKD per Dr. Burroughs in 2017 thought to be 2/2 DM, HTN and HARRIS post CABG with minimal proteinuria. Last UPCR in chart is from 01/2018 at 0.08 g/g. Yesterday on presentation his sCr was 1.9, no fluids were given per chart review, and in fact 60 mg IV of lasix were administered. Today  s sCr is 1.4.     Plan:   - Creatinine appears to be improving (1.4 from 1.9 yesterday). Etiology of HARRIS unclear, he denies NSAID use. Possibly from hemodynamic changes, Currently Cr is coming back to baseline.    - No electrolyte, acid/base abnormality. Will continue to monitor for now. No acute interventions from the Nephrology standpoint.   - Educated patient on importance of avoiding NSAIDs or OTC meds   - Avoid contrast use, only if strictly needed.   unknown

## 2023-01-30 ENCOUNTER — SPECIALTY PHARMACY (OUTPATIENT)
Dept: PHARMACY | Facility: CLINIC | Age: 73
End: 2023-01-30
Payer: MEDICARE

## 2023-01-30 NOTE — TELEPHONE ENCOUNTER
Specialty Pharmacy - Refill Coordination    Specialty Medication Orders Linked to Encounter      Flowsheet Row Most Recent Value   Medication #1 evolocumab (REPATHA SURECLICK) 140 mg/mL PnIj (Order#[id number], Rx#[id number])            Refill Questions - Documented Responses      Flowsheet Row Most Recent Value   Patient Availability and HIPAA Verification    Does patient want to proceed with activity? Yes   HIPAA/medical authority confirmed? Yes   Relationship to patient of person spoken to? Self   Refill Screening Questions    Would patient like to speak to a pharmacist? No   When does the patient need to receive the medication? 02/03/23   Refill Delivery Questions    How will the patient receive the medication? [facility]   When does the patient need to receive the medication? 02/03/23   Shipping Address Home   Address in [facility] confirmed and updated if neccessary? Yes   Expected Copay ($) 0   Is the patient able to afford the medication copay? Yes   Payment Method zero copay   Days supply of Refill 28   Supplies needed? No supplies needed   Refill activity completed? Yes   Refill activity plan Refill scheduled   Shipment/Pickup Date: 02/01/23            Current Outpatient Medications   Medication Sig    albuterol (PROVENTIL HFA) 90 mcg/actuation inhaler Inhale 2 puffs into the lungs every 6 (six) hours as needed for Wheezing or Shortness of Breath. Rescue    albuterol (PROVENTIL) 2.5 mg /3 mL (0.083 %) nebulizer solution Take 3 mLs (2.5 mg total) by nebulization every 6 (six) hours as needed for Wheezing or Shortness of Breath. Rescue    albuterol-ipratropium (DUO-NEB) 2.5 mg-0.5 mg/3 mL nebulizer solution Take 3 mLs by nebulization every 6 (six) hours as needed for Wheezing or Shortness of Breath. Rescue    ammonium lactate 12 % Crea Apply twice daily to affected parts both feet as needed.    apixaban (ELIQUIS) 5 mg Tab Take 1 tablet (5 mg total) by mouth 2 (two) times daily.    blood-glucose sensor  (DEXCOM G6 SENSOR) [place] 3 each by Misc.(Non-Drug; Combo Route) route continuous.    blood-glucose transmitter (DEXCOM G6 TRANSMITTER) [place] 1 each by Misc.(Non-Drug; Combo Route) route continuous.    carvediloL (COREG) 6.25 MG tablet Take 1 tablet (6.25 mg total) by mouth 2 (two) times daily.    diclofenac sodium (VOLTAREN) 1 % Gel Apply 2 g topically 4 (four) times daily. (Patient taking differently: Apply 2 g topically as needed.)    dulaglutide (TRULICITY) 1.5 mg/0.5 mL pen injector Inject 1.5 mg into the skin once a week.    dulaglutide (TRULICITY) 4.5 mg/0.5 mL pen injector INJECT 4.5 MG INTO THE SKIN EVERY 7 DAYS    esomeprazole (NEXIUM) 40 MG capsule Take 1 capsule (40 mg total) by mouth 2 (two) times daily.    evolocumab (REPATHA SURECLICK) 140 mg/mL PnIj Inject 1 mL (140 mg total) into the skin every 14 (fourteen) days.    ferrous sulfate (FEOSOL) 325 mg (65 mg iron) Tab tablet TAKE 1 TABLET BY MOUTH THREE TIMES DAILY (Patient taking differently: Take 325 mg by mouth 2 (two) times daily. TAKE 1 TABLET BY MOUTH THREE TIMES DAILY)    furosemide (LASIX) 20 MG tablet TAKE 1 TABLET BY MOUTH ONCE DAILY    GVOKE HYPOPEN 2-PACK 1 mg/0.2 mL AtIn INJECT SUBCUTANEOUSLY  CONTENTS OF 1 AUTO-INJECTOR AS NEEDED FOR HYPOGLCEMIA  AS DIRECTED    insulin lispro (HUMALOG U-100 INSULIN) 100 unit/mL injection USE AS DIRECTED VIA V-GO 20 with 3 CLICKS PER MEAL    ketoconazole (NIZORAL) 2 % cream Apply topically once daily.    LIDOcaine HCL 2% (XYLOCAINE) 2 % jelly Apply topically as needed. Apply topically once nightly to affected part of foot/feet, for pain.    losartan (COZAAR) 25 MG tablet Take 0.5 tablets (12.5 mg total) by mouth once daily.    mupirocin (BACTROBAN) 2 % ointment Apply to affected area 3 times daily    nitroGLYCERIN (NITROSTAT) 0.4 MG SL tablet DISSOLVE 1 TABLET UNDER THE TONGUE EVERY 5 MINUTES AS  NEEDED FOR CHEST PAIN. MAX  OF 3 TABLETS IN 15 MINUTES. CALL 911 IF PAIN PERSISTS.    ondansetron (ZOFRAN) 4 MG  tablet Take 1 tablet (4 mg total) by mouth every 8 (eight) hours as needed for Nausea.    papaverine 30 mg/mL injection Add: Phentolamine 1 mg  Add: PGE1 10 mcg    Sig:  Inject 25 units as directed; titrate up by 5 units until desired results    ranolazine (RANEXA) 1,000 mg Tb12 TAKE 1 TABLET BY MOUTH  TWICE DAILY    rosuvastatin (CRESTOR) 40 MG Tab Take 1 tablet (40 mg total) by mouth once daily.    spironolactone (ALDACTONE) 25 MG tablet Take 1 tablet (25 mg total) by mouth once daily.    sub-q insulin device, 20 unit (VGO 20) [place] 1 Device by Misc.(Non-Drug; Combo Route) route once daily.    tamsulosin (FLOMAX) 0.4 mg Cap Take 1 capsule (0.4 mg total) by mouth once daily.   Last reviewed on 1/12/2023  8:41 AM by Joana Canada MD    Review of patient's allergies indicates:  No Known Allergies Last reviewed on  1/12/2023 8:41 AM by Joana Canada      Tasks added this encounter   2/24/2023 - Refill Call (Auto Added)   Tasks due within next 3 months   No tasks due.     Pamella Elizabeth, PharmD  [facility]  [address] [place], [place] [address]  [facility] [address]  Phone: [phone]  Fax: [phone]

## 2023-02-06 ENCOUNTER — CLINICAL SUPPORT (OUTPATIENT)
Dept: OPHTHALMOLOGY | Facility: CLINIC | Age: 73
End: 2023-02-06
Payer: MEDICARE

## 2023-02-06 ENCOUNTER — OFFICE VISIT (OUTPATIENT)
Dept: OPTOMETRY | Facility: CLINIC | Age: 73
End: 2023-02-06
Payer: MEDICARE

## 2023-02-06 DIAGNOSIS — H40.1131 PRIMARY OPEN ANGLE GLAUCOMA (POAG) OF BOTH EYES, MILD STAGE: ICD-10-CM

## 2023-02-06 DIAGNOSIS — H04.123 DRY EYE SYNDROME OF BOTH EYES: ICD-10-CM

## 2023-02-06 DIAGNOSIS — H40.1131 PRIMARY OPEN ANGLE GLAUCOMA (POAG) OF BOTH EYES, MILD STAGE: Primary | ICD-10-CM

## 2023-02-06 PROCEDURE — 99212 PR OFFICE/OUTPT VISIT, EST, LEVL II, 10-19 MIN: ICD-10-PCS | Mod: S$GLB,,, | Performed by: OPTOMETRIST

## 2023-02-06 PROCEDURE — 1126F PR PAIN SEVERITY QUANTIFIED, NO PAIN PRESENT: ICD-10-PCS | Mod: CPTII,S$GLB,, | Performed by: OPTOMETRIST

## 2023-02-06 PROCEDURE — 3288F FALL RISK ASSESSMENT DOCD: CPT | Mod: CPTII,S$GLB,, | Performed by: OPTOMETRIST

## 2023-02-06 PROCEDURE — 1159F PR MEDICATION LIST DOCUMENTED IN MEDICAL RECORD: ICD-10-PCS | Mod: CPTII,S$GLB,, | Performed by: OPTOMETRIST

## 2023-02-06 PROCEDURE — 99999 PR PBB SHADOW E&M-EST. PATIENT-LVL II: ICD-10-PCS | Mod: PBBFAC,,, | Performed by: OPTOMETRIST

## 2023-02-06 PROCEDURE — 1101F PR PT FALLS ASSESS DOC 0-1 FALLS W/OUT INJ PAST YR: ICD-10-PCS | Mod: CPTII,S$GLB,, | Performed by: OPTOMETRIST

## 2023-02-06 PROCEDURE — 99999 PR PBB SHADOW E&M-EST. PATIENT-LVL II: CPT | Mod: PBBFAC,,, | Performed by: OPTOMETRIST

## 2023-02-06 PROCEDURE — 92133 POSTERIOR SEGMENT OCT OPTIC NERVE(OCULAR COHERENCE TOMOGRAPHY) - OU - BOTH EYES: ICD-10-PCS | Mod: S$GLB,,, | Performed by: OPTOMETRIST

## 2023-02-06 PROCEDURE — 92133 CPTRZD OPH DX IMG PST SGM ON: CPT | Mod: S$GLB,,, | Performed by: OPTOMETRIST

## 2023-02-06 PROCEDURE — 99212 OFFICE O/P EST SF 10 MIN: CPT | Mod: S$GLB,,, | Performed by: OPTOMETRIST

## 2023-02-06 PROCEDURE — 1101F PT FALLS ASSESS-DOCD LE1/YR: CPT | Mod: CPTII,S$GLB,, | Performed by: OPTOMETRIST

## 2023-02-06 PROCEDURE — 1160F RVW MEDS BY RX/DR IN RCRD: CPT | Mod: CPTII,S$GLB,, | Performed by: OPTOMETRIST

## 2023-02-06 PROCEDURE — 1160F PR REVIEW ALL MEDS BY PRESCRIBER/CLIN PHARMACIST DOCUMENTED: ICD-10-PCS | Mod: CPTII,S$GLB,, | Performed by: OPTOMETRIST

## 2023-02-06 PROCEDURE — 1126F AMNT PAIN NOTED NONE PRSNT: CPT | Mod: CPTII,S$GLB,, | Performed by: OPTOMETRIST

## 2023-02-06 PROCEDURE — 3288F PR FALLS RISK ASSESSMENT DOCUMENTED: ICD-10-PCS | Mod: CPTII,S$GLB,, | Performed by: OPTOMETRIST

## 2023-02-06 PROCEDURE — 1159F MED LIST DOCD IN RCRD: CPT | Mod: CPTII,S$GLB,, | Performed by: OPTOMETRIST

## 2023-02-07 NOTE — PROGRESS NOTES
JHONATHAN CARCAMO 09/22 Patient is here for 4 month follow up with HVF,OCT and IOP check   today.  Patient hasn't noticed any vision changes since the last exam.    Using Refresh prn.  Last edited by Kim Rosenberg on 2/6/2023  9:39 AM.            Assessment /Plan     For exam results, see Encounter Report.    Primary open angle glaucoma (POAG) of both eyes, mild stage      (-) fHx. IOP 20 OD, OS. Last 18 OD, OS. Tmax 24 OD, OS.   C/d 0.65 OD, OS. S/p SLT x3 about 11-12 years ago per pt. Pt has been off drops since SLT. Pachy 563  OS.   Photos  3/4/2020, 3/9/2021  9/2/2022 DFE  4/15/2021 OCT OD thin TS70, T42, TI90 and G73 borderline N , OS borderline TS 98 and T 45   10/6/2022 OCT OD thin TS54, T39, TI91 and G79, OS thin TS62 and T43, borderline TI And G  2/7/2023 OCT OD thin TS54, T38, TI91 and G78, OS thin TS62, T42 and G78, borderline   4/15/2021 HVF OD low reliability, WNL, OS WNL  10/6/2022 HVF OD borderline but WNL, OS ONL- scattered nonspecific inferior defects  OCT today appears stable in comparison to 10/2022.  Educated the patient on findings. Importance of testing and f/u expressed   RTC 6 mo Routine exam

## 2023-02-24 ENCOUNTER — SPECIALTY PHARMACY (OUTPATIENT)
Dept: PHARMACY | Facility: CLINIC | Age: 73
End: 2023-02-24
Payer: MEDICARE

## 2023-02-24 NOTE — TELEPHONE ENCOUNTER
Specialty Pharmacy - Refill Coordination    Specialty Medication Orders Linked to Encounter      Flowsheet Row Most Recent Value   Medication #1 evolocumab (REPATHA SURECLICK) 140 mg/mL PnIj (Order#[id number], Rx#[id number])            Refill Questions - Documented Responses      Flowsheet Row Most Recent Value   Patient Availability and HIPAA Verification    Does patient want to proceed with activity? Yes   HIPAA/medical authority confirmed? Yes   Relationship to patient of person spoken to? Self   Refill Screening Questions    Would patient like to speak to a pharmacist? No   When does the patient need to receive the medication? 03/03/23   Refill Delivery Questions    How will the patient receive the medication? MEDRx   When does the patient need to receive the medication? 03/03/23   Shipping Address Prescription   Address in [facility] confirmed and updated if neccessary? Yes   Expected Copay ($) 0   Is the patient able to afford the medication copay? Yes   Payment Method zero copay   Days supply of Refill 28   Supplies needed? No supplies needed   Refill activity completed? Yes   Refill activity plan Refill scheduled   Shipment/Pickup Date: 02/28/23            Current Outpatient Medications   Medication Sig    albuterol (PROVENTIL HFA) 90 mcg/actuation inhaler Inhale 2 puffs into the lungs every 6 (six) hours as needed for Wheezing or Shortness of Breath. Rescue    albuterol (PROVENTIL) 2.5 mg /3 mL (0.083 %) nebulizer solution Take 3 mLs (2.5 mg total) by nebulization every 6 (six) hours as needed for Wheezing or Shortness of Breath. Rescue    albuterol-ipratropium (DUO-NEB) 2.5 mg-0.5 mg/3 mL nebulizer solution Take 3 mLs by nebulization every 6 (six) hours as needed for Wheezing or Shortness of Breath. Rescue    ammonium lactate 12 % Crea Apply twice daily to affected parts both feet as needed.    apixaban (ELIQUIS) 5 mg Tab Take 1 tablet (5 mg total) by mouth 2 (two) times daily.    blood-glucose  sensor (DEXCOM G6 SENSOR) [place] 3 each by Misc.(Non-Drug; Combo Route) route continuous.    blood-glucose transmitter (DEXCOM G6 TRANSMITTER) [place] 1 each by Misc.(Non-Drug; Combo Route) route continuous.    carvediloL (COREG) 6.25 MG tablet Take 1 tablet (6.25 mg total) by mouth 2 (two) times daily.    diclofenac sodium (VOLTAREN) 1 % Gel Apply 2 g topically 4 (four) times daily. (Patient taking differently: Apply 2 g topically as needed.)    dulaglutide (TRULICITY) 1.5 mg/0.5 mL pen injector Inject 1.5 mg into the skin once a week.    dulaglutide (TRULICITY) 4.5 mg/0.5 mL pen injector INJECT 4.5 MG INTO THE SKIN EVERY 7 DAYS    esomeprazole (NEXIUM) 40 MG capsule Take 1 capsule (40 mg total) by mouth 2 (two) times daily.    evolocumab (REPATHA SURECLICK) 140 mg/mL PnIj Inject 1 mL (140 mg total) into the skin every 14 (fourteen) days.    ferrous sulfate (FEOSOL) 325 mg (65 mg iron) Tab tablet TAKE 1 TABLET BY MOUTH THREE TIMES DAILY (Patient taking differently: Take 325 mg by mouth 2 (two) times daily. TAKE 1 TABLET BY MOUTH THREE TIMES DAILY)    furosemide (LASIX) 20 MG tablet TAKE 1 TABLET BY MOUTH ONCE DAILY    GVOKE HYPOPEN 2-PACK 1 mg/0.2 mL AtIn INJECT SUBCUTANEOUSLY  CONTENTS OF 1 AUTO-INJECTOR AS NEEDED FOR HYPOGLCEMIA  AS DIRECTED    insulin lispro (HUMALOG U-100 INSULIN) 100 unit/mL injection USE AS DIRECTED VIA V-GO 20 with 3 CLICKS PER MEAL    ketoconazole (NIZORAL) 2 % cream Apply topically once daily.    LIDOcaine HCL 2% (XYLOCAINE) 2 % jelly Apply topically as needed. Apply topically once nightly to affected part of foot/feet, for pain.    losartan (COZAAR) 25 MG tablet Take 0.5 tablets (12.5 mg total) by mouth once daily.    mupirocin (BACTROBAN) 2 % ointment Apply to affected area 3 times daily    nitroGLYCERIN (NITROSTAT) 0.4 MG SL tablet DISSOLVE 1 TABLET UNDER THE TONGUE EVERY 5 MINUTES AS  NEEDED FOR CHEST PAIN. MAX  OF 3 TABLETS IN 15 MINUTES. CALL 911 IF PAIN PERSISTS.    ondansetron (ZOFRAN)  4 MG tablet Take 1 tablet (4 mg total) by mouth every 8 (eight) hours as needed for Nausea.    papaverine 30 mg/mL injection Add: Phentolamine 1 mg  Add: PGE1 10 mcg    Sig:  Inject 25 units as directed; titrate up by 5 units until desired results    ranolazine (RANEXA) 1,000 mg Tb12 TAKE 1 TABLET BY MOUTH  TWICE DAILY    rosuvastatin (CRESTOR) 40 MG Tab Take 1 tablet (40 mg total) by mouth once daily.    spironolactone (ALDACTONE) 25 MG tablet Take 1 tablet (25 mg total) by mouth once daily.    sub-q insulin device, 20 unit (VGO 20) [place] 1 Device by Misc.(Non-Drug; Combo Route) route once daily.    tamsulosin (FLOMAX) 0.4 mg Cap Take 1 capsule (0.4 mg total) by mouth once daily.   Last reviewed on 2/7/2023  1:02 PM by Kal Houston, SMITH    Review of patient's allergies indicates:  No Known Allergies Last reviewed on  2/7/2023 1:02 PM by Kal Houston      Tasks added this encounter   3/24/2023 - Refill Call (Auto Added)   Tasks due within next 3 months   No tasks due.     [facility]  [address] [place] [id number]  Phone: [phone]  Fax: [phone]

## 2023-03-09 ENCOUNTER — PATIENT OUTREACH (OUTPATIENT)
Dept: ADMINISTRATIVE | Facility: HOSPITAL | Age: 73
End: 2023-03-09
Payer: MEDICARE

## 2023-03-09 NOTE — PROGRESS NOTES
Chart reviewed, immunization record updated.  No new results noted on [facility] or [facility] web site.  Care Everywhere updated.   Patient care coordination note updated.   Upcoming PCP visit updated.  Next PCP visit 05/16/2023.  LOV with PCP 11/15/2022.  Contacted patient to discuss statin medication.  Patient continues on Repatha medication.  Lab appointment on 05/08/2023.

## 2023-03-24 ENCOUNTER — PATIENT MESSAGE (OUTPATIENT)
Dept: PHARMACY | Facility: CLINIC | Age: 73
End: 2023-03-24
Payer: MEDICARE

## 2023-03-27 ENCOUNTER — SPECIALTY PHARMACY (OUTPATIENT)
Dept: PHARMACY | Facility: CLINIC | Age: 73
End: 2023-03-27
Payer: MEDICARE

## 2023-03-27 NOTE — TELEPHONE ENCOUNTER
Specialty Pharmacy - Refill Coordination    Specialty Medication Orders Linked to Encounter      Flowsheet Row Most Recent Value   Medication #1 evolocumab (REPATHA SURECLICK) 140 mg/mL PnIj (Order#[id number], Rx#[id number])            Refill Questions - Documented Responses      Flowsheet Row Most Recent Value   Patient Availability and HIPAA Verification    Does patient want to proceed with activity? Yes   HIPAA/medical authority confirmed? Yes   Relationship to patient of person spoken to? Self   Refill Screening Questions    Would patient like to speak to a pharmacist? No   When does the patient need to receive the medication? 03/31/23   Refill Delivery Questions    How will the patient receive the medication? MEDRx   When does the patient need to receive the medication? 03/31/23   Shipping Address Home   Address in [facility] confirmed and updated if neccessary? Yes   Expected Copay ($) 0   Is the patient able to afford the medication copay? Yes   Payment Method zero copay   Days supply of Refill 28   Supplies needed? No supplies needed   Refill activity completed? Yes   Refill activity plan Refill scheduled   Shipment/Pickup Date: 03/29/23            Current Outpatient Medications   Medication Sig    albuterol (PROVENTIL HFA) 90 mcg/actuation inhaler Inhale 2 puffs into the lungs every 6 (six) hours as needed for Wheezing or Shortness of Breath. Rescue    albuterol (PROVENTIL) 2.5 mg /3 mL (0.083 %) nebulizer solution Take 3 mLs (2.5 mg total) by nebulization every 6 (six) hours as needed for Wheezing or Shortness of Breath. Rescue    albuterol-ipratropium (DUO-NEB) 2.5 mg-0.5 mg/3 mL nebulizer solution Take 3 mLs by nebulization every 6 (six) hours as needed for Wheezing or Shortness of Breath. Rescue    ammonium lactate 12 % Crea Apply twice daily to affected parts both feet as needed.    apixaban (ELIQUIS) 5 mg Tab Take 1 tablet (5 mg total) by mouth 2 (two) times daily.    blood-glucose sensor  (DEXCOM G6 SENSOR) [place] 3 each by Misc.(Non-Drug; Combo Route) route continuous.    blood-glucose transmitter (DEXCOM G6 TRANSMITTER) [place] 1 each by Misc.(Non-Drug; Combo Route) route continuous.    carvediloL (COREG) 6.25 MG tablet Take 1 tablet (6.25 mg total) by mouth 2 (two) times daily.    diclofenac sodium (VOLTAREN) 1 % Gel Apply 2 g topically 4 (four) times daily. (Patient taking differently: Apply 2 g topically as needed.)    dulaglutide (TRULICITY) 1.5 mg/0.5 mL pen injector Inject 1.5 mg into the skin once a week.    dulaglutide (TRULICITY) 4.5 mg/0.5 mL pen injector INJECT 4.5 MG INTO THE SKIN EVERY 7 DAYS    esomeprazole (NEXIUM) 40 MG capsule Take 1 capsule (40 mg total) by mouth 2 (two) times daily.    evolocumab (REPATHA SURECLICK) 140 mg/mL PnIj Inject 1 mL (140 mg total) into the skin every 14 (fourteen) days.    ferrous sulfate (FEOSOL) 325 mg (65 mg iron) Tab tablet TAKE 1 TABLET BY MOUTH THREE TIMES DAILY (Patient taking differently: Take 325 mg by mouth 2 (two) times daily. TAKE 1 TABLET BY MOUTH THREE TIMES DAILY)    furosemide (LASIX) 20 MG tablet TAKE 1 TABLET BY MOUTH ONCE DAILY    GVOKE HYPOPEN 2-PACK 1 mg/0.2 mL AtIn INJECT SUBCUTANEOUSLY  CONTENTS OF 1 AUTO-INJECTOR AS NEEDED FOR HYPOGLCEMIA  AS DIRECTED    insulin lispro (HUMALOG U-100 INSULIN) 100 unit/mL injection USE AS DIRECTED VIA V-GO 20 with 3 CLICKS PER MEAL    ketoconazole (NIZORAL) 2 % cream Apply topically once daily.    LIDOcaine HCL 2% (XYLOCAINE) 2 % jelly Apply topically as needed. Apply topically once nightly to affected part of foot/feet, for pain.    losartan (COZAAR) 25 MG tablet Take 0.5 tablets (12.5 mg total) by mouth once daily.    mupirocin (BACTROBAN) 2 % ointment Apply to affected area 3 times daily    nitroGLYCERIN (NITROSTAT) 0.4 MG SL tablet DISSOLVE 1 TABLET UNDER THE TONGUE EVERY 5 MINUTES AS  NEEDED FOR CHEST PAIN. MAX  OF 3 TABLETS IN 15 MINUTES. CALL 911 IF PAIN PERSISTS.    ondansetron (ZOFRAN) 4 MG  tablet Take 1 tablet (4 mg total) by mouth every 8 (eight) hours as needed for Nausea.    papaverine 30 mg/mL injection Add: Phentolamine 1 mg  Add: PGE1 10 mcg    Sig:  Inject 25 units as directed; titrate up by 5 units until desired results    ranolazine (RANEXA) 1,000 mg Tb12 TAKE 1 TABLET BY MOUTH  TWICE DAILY    rosuvastatin (CRESTOR) 40 MG Tab Take 1 tablet (40 mg total) by mouth once daily.    spironolactone (ALDACTONE) 25 MG tablet Take 1 tablet (25 mg total) by mouth once daily.    sub-q insulin device, 20 unit (VGO 20) [place] 1 Device by Misc.(Non-Drug; Combo Route) route once daily.    tamsulosin (FLOMAX) 0.4 mg Cap Take 1 capsule (0.4 mg total) by mouth once daily.   Last reviewed on 2/7/2023  1:02 PM by Kal Houston, SMITH    Review of patient's allergies indicates:  No Known Allergies Last reviewed on  2/7/2023 1:02 PM by Kal Houston      Tasks added this encounter   No tasks added.   Tasks due within next 3 months   3/24/2023 - Refill Call (Auto Added)     Sarah EvansD  [facility]  [address] [place], [place] [address]  [facility] [address]  Phone: [phone]  Fax: [phone]

## 2023-04-02 ENCOUNTER — CLINICAL SUPPORT (OUTPATIENT)
Dept: CARDIOLOGY | Facility: HOSPITAL | Age: 73
End: 2023-04-02
Payer: MEDICARE

## 2023-04-02 DIAGNOSIS — Z95.810 PRESENCE OF AUTOMATIC (IMPLANTABLE) CARDIAC DEFIBRILLATOR: ICD-10-CM

## 2023-04-02 PROCEDURE — 93296 REM INTERROG EVL PM/IDS: CPT | Performed by: INTERNAL MEDICINE

## 2023-04-02 PROCEDURE — 93295 CARDIAC DEVICE CHECK - REMOTE: ICD-10-PCS | Mod: ,,, | Performed by: INTERNAL MEDICINE

## 2023-04-02 PROCEDURE — 93295 DEV INTERROG REMOTE 1/2/MLT: CPT | Mod: ,,, | Performed by: INTERNAL MEDICINE

## 2023-04-03 ENCOUNTER — PATIENT MESSAGE (OUTPATIENT)
Dept: ADMINISTRATIVE | Facility: HOSPITAL | Age: 73
End: 2023-04-03
Payer: MEDICARE

## 2023-04-21 ENCOUNTER — SPECIALTY PHARMACY (OUTPATIENT)
Dept: PHARMACY | Facility: CLINIC | Age: 73
End: 2023-04-21
Payer: MEDICARE

## 2023-04-21 NOTE — TELEPHONE ENCOUNTER
Specialty Pharmacy - Refill Coordination    Specialty Medication Orders Linked to Encounter      Flowsheet Row Most Recent Value   Medication #1 evolocumab (REPATHA SURECLICK) 140 mg/mL PnIj (Order#[id number], Rx#[id number])            Refill Questions - Documented Responses      Flowsheet Row Most Recent Value   Patient Availability and HIPAA Verification    Does patient want to proceed with activity? Yes   HIPAA/medical authority confirmed? Yes   Relationship to patient of person spoken to? Self   Refill Screening Questions    Would patient like to speak to a pharmacist? No   When does the patient need to receive the medication? 04/28/23   Refill Delivery Questions    How will the patient receive the medication? MEDRx   When does the patient need to receive the medication? 04/28/23   Shipping Address Home   Address in [facility] confirmed and updated if neccessary? Yes   Expected Copay ($) 0   Is the patient able to afford the medication copay? Yes   Payment Method zero copay   Days supply of Refill 28   Supplies needed? No supplies needed   Refill activity completed? Yes   Refill activity plan Refill scheduled   Shipment/Pickup Date: 04/25/23            Current Outpatient Medications   Medication Sig    albuterol (PROVENTIL HFA) 90 mcg/actuation inhaler Inhale 2 puffs into the lungs every 6 (six) hours as needed for Wheezing or Shortness of Breath. Rescue    albuterol (PROVENTIL) 2.5 mg /3 mL (0.083 %) nebulizer solution Take 3 mLs (2.5 mg total) by nebulization every 6 (six) hours as needed for Wheezing or Shortness of Breath. Rescue    albuterol-ipratropium (DUO-NEB) 2.5 mg-0.5 mg/3 mL nebulizer solution Take 3 mLs by nebulization every 6 (six) hours as needed for Wheezing or Shortness of Breath. Rescue    ammonium lactate 12 % Crea Apply twice daily to affected parts both feet as needed.    apixaban (ELIQUIS) 5 mg Tab Take 1 tablet (5 mg total) by mouth 2 (two) times daily.    blood-glucose sensor  (DEXCOM G6 SENSOR) [place] 3 each by Misc.(Non-Drug; Combo Route) route continuous.    blood-glucose transmitter (DEXCOM G6 TRANSMITTER) [place] 1 each by Misc.(Non-Drug; Combo Route) route continuous.    carvediloL (COREG) 6.25 MG tablet Take 1 tablet (6.25 mg total) by mouth 2 (two) times daily.    diclofenac sodium (VOLTAREN) 1 % Gel Apply 2 g topically 4 (four) times daily. (Patient taking differently: Apply 2 g topically as needed.)    dulaglutide (TRULICITY) 1.5 mg/0.5 mL pen injector Inject 1.5 mg into the skin once a week.    dulaglutide (TRULICITY) 4.5 mg/0.5 mL pen injector INJECT 4.5 MG INTO THE SKIN EVERY 7 DAYS    esomeprazole (NEXIUM) 40 MG capsule Take 1 capsule (40 mg total) by mouth 2 (two) times daily.    evolocumab (REPATHA SURECLICK) 140 mg/mL PnIj Inject 1 mL (140 mg total) into the skin every 14 (fourteen) days.    ferrous sulfate (FEOSOL) 325 mg (65 mg iron) Tab tablet TAKE 1 TABLET BY MOUTH THREE TIMES DAILY (Patient taking differently: Take 325 mg by mouth 2 (two) times daily. TAKE 1 TABLET BY MOUTH THREE TIMES DAILY)    furosemide (LASIX) 20 MG tablet TAKE 1 TABLET BY MOUTH ONCE DAILY    GVOKE HYPOPEN 2-PACK 1 mg/0.2 mL AtIn INJECT SUBCUTANEOUSLY  CONTENTS OF 1 AUTO-INJECTOR AS NEEDED FOR HYPOGLCEMIA  AS DIRECTED    insulin lispro (HUMALOG U-100 INSULIN) 100 unit/mL injection USE AS DIRECTED VIA V-GO 20 with 3 CLICKS PER MEAL    ketoconazole (NIZORAL) 2 % cream Apply topically once daily.    LIDOcaine HCL 2% (XYLOCAINE) 2 % jelly Apply topically as needed. Apply topically once nightly to affected part of foot/feet, for pain.    losartan (COZAAR) 25 MG tablet Take 0.5 tablets (12.5 mg total) by mouth once daily.    mupirocin (BACTROBAN) 2 % ointment Apply to affected area 3 times daily    nitroGLYCERIN (NITROSTAT) 0.4 MG SL tablet DISSOLVE 1 TABLET UNDER THE TONGUE EVERY 5 MINUTES AS  NEEDED FOR CHEST PAIN. MAX  OF 3 TABLETS IN 15 MINUTES. CALL 911 IF PAIN PERSISTS.    ondansetron (ZOFRAN) 4 MG  tablet Take 1 tablet (4 mg total) by mouth every 8 (eight) hours as needed for Nausea.    papaverine 30 mg/mL injection Add: Phentolamine 1 mg  Add: PGE1 10 mcg    Sig:  Inject 25 units as directed; titrate up by 5 units until desired results    ranolazine (RANEXA) 1,000 mg Tb12 TAKE 1 TABLET BY MOUTH  TWICE DAILY    rosuvastatin (CRESTOR) 40 MG Tab Take 1 tablet (40 mg total) by mouth once daily.    spironolactone (ALDACTONE) 25 MG tablet Take 1 tablet (25 mg total) by mouth once daily.    sub-q insulin device, 20 unit (VGO 20) [place] 1 Device by Misc.(Non-Drug; Combo Route) route once daily.    tamsulosin (FLOMAX) 0.4 mg Cap Take 1 capsule (0.4 mg total) by mouth once daily.   Last reviewed on 2/7/2023  1:02 PM by Kal Houston, SMITH    Review of patient's allergies indicates:  No Known Allergies Last reviewed on  2/7/2023 1:02 PM by Kal Houston      Tasks added this encounter   5/19/2023 - Refill Call (Auto Added)   Tasks due within next 3 months   No tasks due.     Megha Olea, PharmD  [facility]  [address] [place] [id number]  Phone: [phone]  Fax: [phone]

## 2023-05-10 ENCOUNTER — LAB VISIT (OUTPATIENT)
Dept: LAB | Facility: HOSPITAL | Age: 73
End: 2023-05-10
Attending: STUDENT IN AN ORGANIZED HEALTH CARE EDUCATION/TRAINING PROGRAM
Payer: MEDICARE

## 2023-05-10 ENCOUNTER — OFFICE VISIT (OUTPATIENT)
Dept: ENDOCRINOLOGY | Facility: CLINIC | Age: 73
End: 2023-05-10
Payer: MEDICARE

## 2023-05-10 VITALS
SYSTOLIC BLOOD PRESSURE: 134 MMHG | HEIGHT: 69 IN | RESPIRATION RATE: 16 BRPM | DIASTOLIC BLOOD PRESSURE: 74 MMHG | WEIGHT: 253.5 LBS | BODY MASS INDEX: 37.55 KG/M2 | HEART RATE: 60 BPM

## 2023-05-10 DIAGNOSIS — E78.2 MIXED HYPERLIPIDEMIA: Chronic | ICD-10-CM

## 2023-05-10 DIAGNOSIS — Z79.4 TYPE 2 DIABETES MELLITUS WITH DIABETIC NEPHROPATHY, WITH LONG-TERM CURRENT USE OF INSULIN: ICD-10-CM

## 2023-05-10 DIAGNOSIS — E11.21 TYPE 2 DIABETES MELLITUS WITH DIABETIC NEPHROPATHY, WITH LONG-TERM CURRENT USE OF INSULIN: Primary | ICD-10-CM

## 2023-05-10 DIAGNOSIS — E66.01 SEVERE OBESITY (BMI 35.0-39.9) WITH COMORBIDITY: ICD-10-CM

## 2023-05-10 DIAGNOSIS — Z79.4 TYPE 2 DIABETES MELLITUS WITH DIABETIC NEPHROPATHY, WITH LONG-TERM CURRENT USE OF INSULIN: Primary | ICD-10-CM

## 2023-05-10 DIAGNOSIS — N25.81 HYPERPARATHYROIDISM DUE TO RENAL INSUFFICIENCY: ICD-10-CM

## 2023-05-10 DIAGNOSIS — I10 BENIGN ESSENTIAL HTN: Chronic | ICD-10-CM

## 2023-05-10 DIAGNOSIS — E11.21 TYPE 2 DIABETES MELLITUS WITH DIABETIC NEPHROPATHY, WITH LONG-TERM CURRENT USE OF INSULIN: ICD-10-CM

## 2023-05-10 LAB
ALBUMIN SERPL BCP-MCNC: 3.4 G/DL (ref 3.5–5.2)
ALBUMIN/CREAT UR: 2 UG/MG (ref 0–30)
ALP SERPL-CCNC: 70 U/L (ref 55–135)
ALT SERPL W/O P-5'-P-CCNC: 11 U/L (ref 10–44)
ANION GAP SERPL CALC-SCNC: 7 MMOL/L (ref 8–16)
AST SERPL-CCNC: 17 U/L (ref 10–40)
BILIRUB SERPL-MCNC: 0.4 MG/DL (ref 0.1–1)
BUN SERPL-MCNC: 15 MG/DL (ref 8–23)
C PEPTIDE SERPL-MCNC: 0.58 NG/ML (ref 0.78–5.19)
CALCIUM SERPL-MCNC: 9 MG/DL (ref 8.7–10.5)
CHLORIDE SERPL-SCNC: 104 MMOL/L (ref 95–110)
CHOLEST SERPL-MCNC: 158 MG/DL (ref 120–199)
CHOLEST/HDLC SERPL: 3.3 {RATIO} (ref 2–5)
CO2 SERPL-SCNC: 29 MMOL/L (ref 23–29)
CREAT SERPL-MCNC: 1.3 MG/DL (ref 0.5–1.4)
CREAT UR-MCNC: 146.9 MG/DL (ref 23–375)
EST. GFR  (NO RACE VARIABLE): 58 ML/MIN/1.73 M^2
ESTIMATED AVG GLUCOSE: 134 MG/DL (ref 68–131)
GLUCOSE SERPL-MCNC: 108 MG/DL (ref 70–110)
HBA1C MFR BLD: 6.3 % (ref 4–5.6)
HDLC SERPL-MCNC: 48 MG/DL (ref 40–75)
HDLC SERPL: 30.4 % (ref 20–50)
LDLC SERPL CALC-MCNC: 94.2 MG/DL (ref 63–159)
MICROALBUMIN UR DL<=1MG/L-MCNC: 3 UG/ML
NONHDLC SERPL-MCNC: 110 MG/DL
POTASSIUM SERPL-SCNC: 4.7 MMOL/L (ref 3.5–5.1)
PROT SERPL-MCNC: 6.9 G/DL (ref 6–8.4)
SODIUM SERPL-SCNC: 140 MMOL/L (ref 136–145)
TRIGL SERPL-MCNC: 79 MG/DL (ref 30–150)
TSH SERPL DL<=0.005 MIU/L-ACNC: 3.49 UIU/ML (ref 0.4–4)

## 2023-05-10 PROCEDURE — 3078F DIAST BP <80 MM HG: CPT | Mod: CPTII,S$GLB,, | Performed by: STUDENT IN AN ORGANIZED HEALTH CARE EDUCATION/TRAINING PROGRAM

## 2023-05-10 PROCEDURE — 99499 RISK ADDL DX/OHS AUDIT: ICD-10-PCS | Mod: S$GLB,,, | Performed by: STUDENT IN AN ORGANIZED HEALTH CARE EDUCATION/TRAINING PROGRAM

## 2023-05-10 PROCEDURE — 3008F PR BODY MASS INDEX (BMI) DOCUMENTED: ICD-10-PCS | Mod: CPTII,S$GLB,, | Performed by: STUDENT IN AN ORGANIZED HEALTH CARE EDUCATION/TRAINING PROGRAM

## 2023-05-10 PROCEDURE — 1126F PR PAIN SEVERITY QUANTIFIED, NO PAIN PRESENT: ICD-10-PCS | Mod: CPTII,S$GLB,, | Performed by: STUDENT IN AN ORGANIZED HEALTH CARE EDUCATION/TRAINING PROGRAM

## 2023-05-10 PROCEDURE — 1101F PR PT FALLS ASSESS DOC 0-1 FALLS W/OUT INJ PAST YR: ICD-10-PCS | Mod: CPTII,S$GLB,, | Performed by: STUDENT IN AN ORGANIZED HEALTH CARE EDUCATION/TRAINING PROGRAM

## 2023-05-10 PROCEDURE — 3288F FALL RISK ASSESSMENT DOCD: CPT | Mod: CPTII,S$GLB,, | Performed by: STUDENT IN AN ORGANIZED HEALTH CARE EDUCATION/TRAINING PROGRAM

## 2023-05-10 PROCEDURE — 3075F PR MOST RECENT SYSTOLIC BLOOD PRESS GE 130-139MM HG: ICD-10-PCS | Mod: CPTII,S$GLB,, | Performed by: STUDENT IN AN ORGANIZED HEALTH CARE EDUCATION/TRAINING PROGRAM

## 2023-05-10 PROCEDURE — 99999 PR PBB SHADOW E&M-EST. PATIENT-LVL IV: CPT | Mod: PBBFAC,,, | Performed by: STUDENT IN AN ORGANIZED HEALTH CARE EDUCATION/TRAINING PROGRAM

## 2023-05-10 PROCEDURE — 4010F ACE/ARB THERAPY RXD/TAKEN: CPT | Mod: CPTII,S$GLB,, | Performed by: STUDENT IN AN ORGANIZED HEALTH CARE EDUCATION/TRAINING PROGRAM

## 2023-05-10 PROCEDURE — 83036 HEMOGLOBIN GLYCOSYLATED A1C: CPT | Performed by: STUDENT IN AN ORGANIZED HEALTH CARE EDUCATION/TRAINING PROGRAM

## 2023-05-10 PROCEDURE — 95251 PR GLUCOSE MONITOR, 72 HOUR, PHYS INTERP: ICD-10-PCS | Mod: S$GLB,,, | Performed by: STUDENT IN AN ORGANIZED HEALTH CARE EDUCATION/TRAINING PROGRAM

## 2023-05-10 PROCEDURE — 3075F SYST BP GE 130 - 139MM HG: CPT | Mod: CPTII,S$GLB,, | Performed by: STUDENT IN AN ORGANIZED HEALTH CARE EDUCATION/TRAINING PROGRAM

## 2023-05-10 PROCEDURE — 82570 ASSAY OF URINE CREATININE: CPT | Performed by: STUDENT IN AN ORGANIZED HEALTH CARE EDUCATION/TRAINING PROGRAM

## 2023-05-10 PROCEDURE — 36415 COLL VENOUS BLD VENIPUNCTURE: CPT | Performed by: STUDENT IN AN ORGANIZED HEALTH CARE EDUCATION/TRAINING PROGRAM

## 2023-05-10 PROCEDURE — 95251 CONT GLUC MNTR ANALYSIS I&R: CPT | Mod: S$GLB,,, | Performed by: STUDENT IN AN ORGANIZED HEALTH CARE EDUCATION/TRAINING PROGRAM

## 2023-05-10 PROCEDURE — 1126F AMNT PAIN NOTED NONE PRSNT: CPT | Mod: CPTII,S$GLB,, | Performed by: STUDENT IN AN ORGANIZED HEALTH CARE EDUCATION/TRAINING PROGRAM

## 2023-05-10 PROCEDURE — 99499 UNLISTED E&M SERVICE: CPT | Mod: S$GLB,,, | Performed by: STUDENT IN AN ORGANIZED HEALTH CARE EDUCATION/TRAINING PROGRAM

## 2023-05-10 PROCEDURE — 3078F PR MOST RECENT DIASTOLIC BLOOD PRESSURE < 80 MM HG: ICD-10-PCS | Mod: CPTII,S$GLB,, | Performed by: STUDENT IN AN ORGANIZED HEALTH CARE EDUCATION/TRAINING PROGRAM

## 2023-05-10 PROCEDURE — 3288F PR FALLS RISK ASSESSMENT DOCUMENTED: ICD-10-PCS | Mod: CPTII,S$GLB,, | Performed by: STUDENT IN AN ORGANIZED HEALTH CARE EDUCATION/TRAINING PROGRAM

## 2023-05-10 PROCEDURE — 84681 ASSAY OF C-PEPTIDE: CPT | Performed by: STUDENT IN AN ORGANIZED HEALTH CARE EDUCATION/TRAINING PROGRAM

## 2023-05-10 PROCEDURE — 99214 OFFICE O/P EST MOD 30 MIN: CPT | Mod: 25,S$GLB,, | Performed by: STUDENT IN AN ORGANIZED HEALTH CARE EDUCATION/TRAINING PROGRAM

## 2023-05-10 PROCEDURE — 3008F BODY MASS INDEX DOCD: CPT | Mod: CPTII,S$GLB,, | Performed by: STUDENT IN AN ORGANIZED HEALTH CARE EDUCATION/TRAINING PROGRAM

## 2023-05-10 PROCEDURE — 99999 PR PBB SHADOW E&M-EST. PATIENT-LVL IV: ICD-10-PCS | Mod: PBBFAC,,, | Performed by: STUDENT IN AN ORGANIZED HEALTH CARE EDUCATION/TRAINING PROGRAM

## 2023-05-10 PROCEDURE — 80053 COMPREHEN METABOLIC PANEL: CPT | Performed by: STUDENT IN AN ORGANIZED HEALTH CARE EDUCATION/TRAINING PROGRAM

## 2023-05-10 PROCEDURE — 1101F PT FALLS ASSESS-DOCD LE1/YR: CPT | Mod: CPTII,S$GLB,, | Performed by: STUDENT IN AN ORGANIZED HEALTH CARE EDUCATION/TRAINING PROGRAM

## 2023-05-10 PROCEDURE — 80061 LIPID PANEL: CPT | Performed by: STUDENT IN AN ORGANIZED HEALTH CARE EDUCATION/TRAINING PROGRAM

## 2023-05-10 PROCEDURE — 99214 PR OFFICE/OUTPT VISIT, EST, LEVL IV, 30-39 MIN: ICD-10-PCS | Mod: 25,S$GLB,, | Performed by: STUDENT IN AN ORGANIZED HEALTH CARE EDUCATION/TRAINING PROGRAM

## 2023-05-10 PROCEDURE — 84443 ASSAY THYROID STIM HORMONE: CPT | Performed by: STUDENT IN AN ORGANIZED HEALTH CARE EDUCATION/TRAINING PROGRAM

## 2023-05-10 PROCEDURE — 1159F MED LIST DOCD IN RCRD: CPT | Mod: CPTII,S$GLB,, | Performed by: STUDENT IN AN ORGANIZED HEALTH CARE EDUCATION/TRAINING PROGRAM

## 2023-05-10 PROCEDURE — 4010F PR ACE/ARB THEARPY RXD/TAKEN: ICD-10-PCS | Mod: CPTII,S$GLB,, | Performed by: STUDENT IN AN ORGANIZED HEALTH CARE EDUCATION/TRAINING PROGRAM

## 2023-05-10 PROCEDURE — 1159F PR MEDICATION LIST DOCUMENTED IN MEDICAL RECORD: ICD-10-PCS | Mod: CPTII,S$GLB,, | Performed by: STUDENT IN AN ORGANIZED HEALTH CARE EDUCATION/TRAINING PROGRAM

## 2023-05-10 RX ORDER — TIRZEPATIDE 5 MG/.5ML
5 INJECTION, SOLUTION SUBCUTANEOUS
Qty: 4 PEN | Refills: 11 | Status: SHIPPED | OUTPATIENT
Start: 2023-05-10 | End: 2023-06-23

## 2023-05-10 NOTE — ASSESSMENT & PLAN NOTE
Controlled based on CGM data and A1c 7.0%. He has some hypoglycemia episodes mostly throughout day which are likely from VGo clicks. He sometimes forgets to click and clicks after he sees glucose is high. Advised to click before meals and if he forgets to try to use less click to correct (1 to 2).    Unable to tolerated metformin or SGLT-2 inhibitor.    GLP-1/GIP RA medically necessary given failure to multiple GLP-1 RA.    Plan  - Continue VGo 20 + 4 clicks per meals + 1-2 clicks per snack  - Stop Trulicity, start Mounjaro 5 mg weekly, increase every 4 weeks as tolerated  - Continue Dexcom G6    Labs today    F/u 4 months

## 2023-05-10 NOTE — PATIENT INSTRUCTIONS
Stop Trulicity, start Mounjaro 5 mg weekly     Continue VGO 20 for now, pending response to Mounjaro we will see if we need less insulin

## 2023-05-10 NOTE — PROGRESS NOTES
"Subjective:      Patient ID: Walter Bull is a 72 y.o. male.    Chief Complaint:  Type 2 diabetes mellitus      History of Present Illness  This is a 72 y.o. male. with a past medical history of type 2 diabetes, CAD s/p CABG, CHF, HLD, here for evaluation.    Type 2 diabetes mellitus  Current diabetes medications:  - VGO 20 with 4 clicks with each meal and 1-2 clicks per snack  - Trulicity 4.5 mg weekly     Lab Results   Component Value Date    CREATININE 1.5 (H) 09/02/2022    EGFRNORACEVR 49 (A) 09/02/2022       Past diabetes medications:  - Metformin - unable to tolerate  - Januvia   - SGLT-2 inhibitors avoided given frequent AKIs/dehydration    Known diabetic complications: nephropathy and cardiovascular disease    Weight trend:  Wt Readings from Last 6 Encounters:   05/10/23 115 kg (253 lb 8.5 oz)   01/12/23 113.6 kg (250 lb 5.3 oz)   01/04/23 111.6 kg (246 lb)   12/31/22 111.6 kg (246 lb)   12/02/22 112 kg (246 lb 14.6 oz)   11/15/22 113 kg (249 lb 1.9 oz)           Prior visit with diabetes education: yes    Current diet: 3 meals per day  Current exercise: Limited              Diabetes Management Status  Statin: Taking  ACE/ARB: Taking    Screening or Prevention Patient's value   HgA1C Testing and Control   Lab Results   Component Value Date    HGBA1C 7.0 (H) 09/02/2022        LDL control Lab Results   Component Value Date    LDLCALC 81.8 05/10/2022      Nephropathy screening Lab Results   Component Value Date    MICALBCREAT Unable to calculate 11/09/2021            Review of Systems  As above    Social and family history reviewed  Current medications and allergies reviewed    Objective:   /74 (BP Location: Right arm, Patient Position: Sitting, BP Method: Medium (Manual))   Pulse 60   Resp 16   Ht 5' 9" (1.753 m)   Wt 115 kg (253 lb 8.5 oz)   BMI 37.44 kg/m²   Physical Exam  Alert, oriented    BP Readings from Last 1 Encounters:   05/10/23 134/74      Wt Readings from Last 1 Encounters: "   05/10/23 1015 115 kg (253 lb 8.5 oz)     Body mass index is 37.44 kg/m².    Lab Review:   Lab Results   Component Value Date    HGBA1C 7.0 (H) 09/02/2022     Lab Results   Component Value Date    CHOL 141 05/10/2022    HDL 46 05/10/2022    LDLCALC 81.8 05/10/2022    TRIG 66 05/10/2022    CHOLHDL 32.6 05/10/2022     Lab Results   Component Value Date     09/02/2022    K 4.6 09/02/2022     09/02/2022    CO2 27 09/02/2022     (H) 09/02/2022    BUN 15 09/02/2022    CREATININE 1.5 (H) 09/02/2022    CALCIUM 8.8 09/02/2022    PROT 7.4 05/10/2022    ALBUMIN 3.4 (L) 07/08/2022    BILITOT 0.9 05/10/2022    ALKPHOS 75 05/10/2022    AST 25 05/10/2022    ALT 27 05/10/2022    ANIONGAP 8 09/02/2022    ESTGFRAFRICA 57 (A) 07/08/2022    EGFRNONAA 49 (A) 07/08/2022    TSH 2.041 05/10/2022       All pertinent labs reviewed    Assessment and Plan     Type 2 diabetes mellitus with diabetic nephropathy, with long-term current use of insulin  Controlled based on CGM data and A1c 7.0%. He has some hypoglycemia episodes mostly throughout day which are likely from VGo clicks. He sometimes forgets to click and clicks after he sees glucose is high. Advised to click before meals and if he forgets to try to use less click to correct (1 to 2).    Unable to tolerated metformin or SGLT-2 inhibitor.    GLP-1/GIP RA medically necessary given failure to multiple GLP-1 RA.    Plan  - Continue VGo 20 + 4 clicks per meals + 1-2 clicks per snack  - Stop Trulicity, start Mounjaro 5 mg weekly, increase every 4 weeks as tolerated  - Continue Dexcom G6    Labs today    F/u 4 months    BMI 37.0-37.9, adult  GLP-1/GIP RA medically necessary given failure to multiple GLP-1 RA.    Benign essential HTN  Continue antihypertensive regimen including ARB/ACEi    HLD (hyperlipidemia)  Continue statin      Follow-up in 4 months    Andrew Briones MD  Endocrinology

## 2023-05-11 ENCOUNTER — PATIENT MESSAGE (OUTPATIENT)
Dept: ENDOCRINOLOGY | Facility: CLINIC | Age: 73
End: 2023-05-11
Payer: MEDICARE

## 2023-05-12 ENCOUNTER — TELEPHONE (OUTPATIENT)
Dept: ENDOCRINOLOGY | Facility: CLINIC | Age: 73
End: 2023-05-12
Payer: MEDICARE

## 2023-05-12 NOTE — TELEPHONE ENCOUNTER
Patient notified of results, verbalized understanding.        ----- Message from Andrew Briones MD sent at 5/11/2023  4:16 PM CDT -----  Please inform that A1c is 6.3 which is great.    Kidney, liver and thyroid functions are normal.     Cholesterol levels are at goal    
2.07

## 2023-05-14 ENCOUNTER — OFFICE VISIT (OUTPATIENT)
Dept: URGENT CARE | Facility: CLINIC | Age: 73
End: 2023-05-14
Payer: MEDICARE

## 2023-05-14 VITALS
HEIGHT: 69 IN | OXYGEN SATURATION: 97 % | SYSTOLIC BLOOD PRESSURE: 117 MMHG | TEMPERATURE: 98 F | BODY MASS INDEX: 34.8 KG/M2 | DIASTOLIC BLOOD PRESSURE: 62 MMHG | WEIGHT: 235 LBS | HEART RATE: 77 BPM

## 2023-05-14 DIAGNOSIS — S61.215A LACERATION OF LEFT RING FINGER WITHOUT FOREIGN BODY WITHOUT DAMAGE TO NAIL, INITIAL ENCOUNTER: Primary | ICD-10-CM

## 2023-05-14 PROCEDURE — 12002 LACERATION REPAIR: ICD-10-PCS | Mod: S$GLB,,, | Performed by: FAMILY MEDICINE

## 2023-05-14 PROCEDURE — 99214 PR OFFICE/OUTPT VISIT, EST, LEVL IV, 30-39 MIN: ICD-10-PCS | Mod: 25,S$GLB,, | Performed by: FAMILY MEDICINE

## 2023-05-14 PROCEDURE — 73130 XR HAND COMPLETE 3 VIEW LEFT: ICD-10-PCS | Mod: LT,S$GLB,, | Performed by: RADIOLOGY

## 2023-05-14 PROCEDURE — 12002 RPR S/N/AX/GEN/TRNK2.6-7.5CM: CPT | Mod: S$GLB,,, | Performed by: FAMILY MEDICINE

## 2023-05-14 PROCEDURE — 73130 X-RAY EXAM OF HAND: CPT | Mod: LT,S$GLB,, | Performed by: RADIOLOGY

## 2023-05-14 PROCEDURE — 99214 OFFICE O/P EST MOD 30 MIN: CPT | Mod: 25,S$GLB,, | Performed by: FAMILY MEDICINE

## 2023-05-14 RX ORDER — MUPIROCIN 20 MG/G
OINTMENT TOPICAL 2 TIMES DAILY
Qty: 30 G | Refills: 0 | Status: SHIPPED | OUTPATIENT
Start: 2023-05-14 | End: 2024-01-10

## 2023-05-14 RX ORDER — CEPHALEXIN 250 MG/1
250 CAPSULE ORAL 4 TIMES DAILY
Qty: 40 CAPSULE | Refills: 0 | Status: SHIPPED | OUTPATIENT
Start: 2023-05-14 | End: 2023-05-24

## 2023-05-14 NOTE — LETTER
May 14, 2023  Walter Bull  [address]  [place] [address]                [facility]  [address], [address]  [place] [address]  Phone: [phone]  Fax: [phone] Walter Bull was seen and treated in our Urgent Care department on 5/14/2023. He may return to work in 2 - 3 days.      If you have any questions or concerns, please don't hesitate to call.        Sincerely,        Juanito Mercado MD

## 2023-05-14 NOTE — PATIENT INSTRUCTIONS
Please drink plenty of fluids.  Please get plenty of rest.  Please return here or go to the Emergency Department for any concerns or worsening of condition.  If you were prescribed antibiotics, please take them to completion.  If you were prescribed a narcotic medication, do not drive or operate heavy equipment or machinery while taking these medications.      If not allergic, please take over the counter Tylenol (Acetaminophen) and/or Motrin (Ibuprofen) as directed for control of pain and/or fever.    Your tetanus was brought up to date if needed.    Keep wound clean and dry.  You may use a plastic bag to keep area dry while showering    Clean wound once or twice a day with soap and water, then apply antibiotic ointment and redress wound.    Follow up here or your primary care doctor for suture removal in    7 - 10 days for lacerations on your body    5 - 7 days for lacerations on your face.    Please follow up with your primary care doctor or specialist as needed.  Belkys Kruger MD  [phone]    You must understand that you have received treatment at an Urgent Care facility only, and that you may be  released before all of your medical problems are known or treated. Urgent Care facilities are not equipped to  handle life threatening emergencies. It is recommended that you seek care at an Emergency Department for  further evaluation of worsening or concerning symptoms, or possibly life threatening conditions as  Discussed.    Laceration: All Closures  A laceration is a cut through the skin. This will usually require stitches (sutures) or staples if it is deep. Minor cuts may be treated with a surgical tape closure or skin glue.    Home care  Your healthcare provider may prescribe an antibiotic. This is to help prevent infection. Follow all instructions for taking this medicine. Take the medicine every day until it is gone or you are told to stop. You should not have any left over.  The healthcare provider  may prescribe medicines for pain. Follow instructions for taking them.  Follow the healthcare providers instructions on how to care for the cut.  Keep the wound clean and dry. Do not get the wound wet until you are told it is okay to do so. If the area gets wet, gently pat it dry with a clean cloth. Replace the wet bandage with a dry one.  If a bandage was applied and it becomes wet or dirty, replace it. Otherwise, leave it in place for the first 24 hours.  Caring for sutures or staples: Once you no longer need to keep them dry, clean the wound daily. First, remove the bandage. Then wash the area gently with soap and warm water, or as directed by the health care provider. Use a wet cotton swab to loosen and remove any blood or crust that forms. After cleaning, apply a thin layer of antibiotic ointment if advised. Then put on a new bandage unless you are told not to.  Caring for skin glue: Dont put apply liquid, ointment, or cream on the wound while the glue is in place. Avoid activities that cause heavy sweating. Protect the wound from sunlight. Do not scratch, rub, or pick at the adhesive film. Do not place tape directly over the film. The glue should peel off within 5 to 10 days.   Caring for surgical tape: Keep the area dry. If it gets wet, blot it dry with a clean towel. Surgical tape usually falls off within 7 to 10 days. If it has not fallen off after 10 days, you can take it off yourself. Put mineral oil or petroleum jelly on a cotton ball and gently rub the tape until it is removed.  Once you can get the wound wet, you may shower as usual but do not soak the wound in water (no tub baths or swimming)  Even with proper treatment, a wound infection may sometimes occur. Check the wound daily for signs of infection listed below.  Scalp wounds  During the first two days, you may carefully rinse your hair in the shower to remove blood, glass or dirt particles. After two days, you may shower and shampoo your hair  normally. Do not soak your scalp in the tub or go swimming until the stitches or staples have been removed. Talk with your healthcare provider before applying any antibiotic ointment to the wound.  Mouth wounds  Eat soft foods to reduce pain. If the cut is inside of your mouth, clean by rinsing after each meal and at bedtime with a mixture of equal parts water and hydrogen peroxide (do not swallow!). Or, you can use a cotton swab to directly apply hydrogen peroxide onto the cut. Mouth wounds can be painful when eating. You may use an over-the-counter local numbing solution for pain relief. If this is not available, you may use any numbing solution intended for teething babies. You may apply this directly to the sores with a cotton-tip swab or with your finger.  Follow-up care  Follow up with your healthcare provider as advised. Ask your healthcare provider how long sutures should be left in place. Be sure to return for suture removal as directed. If dissolving stitches were used in the mouth, these should fall out or dissolve without the need for removal. If tape closures were used, remove them yourself when your provider recommends if they have not fallen off on their own. If skin glue was used, the film will wear off by itself.  When to seek medical advice  Call your healthcare provider right away if any of these occur:  Wound bleeding not controlled by direct pressure  Signs of infection, including increasing pain in the wound, increasing wound redness or swelling, or pus or bad odor coming from the wound  Fever of 100.4°F (38.ºC) or higher or as directed by your healthcare provider  Stitches or staples come apart or fall out or surgical tape falls off before 7 days  Wound edges re-open  Wound changes colors  Numbness around the wound   Decreased movement around the injured area  Date Last Reviewed: 6/14/2015  © 7670-5464 [employer]. [address] [place] [address], [place] [address]. All rights reserved.  This information is not intended as a substitute for professional medical care. Always follow your healthcare professional's instructions.

## 2023-05-14 NOTE — PROGRESS NOTES
"Subjective:      Patient ID: Walter Bull is a 72 y.o. male.    Vitals:  height is 5' 9" (1.753 m) and weight is 106.6 kg (235 lb). His oral temperature is 97.8 °F (36.6 °C). His blood pressure is 117/62 and his pulse is 77. His oxygen saturation is 97%.     Chief Complaint: Laceration    Laceration   The incident occurred 1 to 3 hours ago. Pain location: left 4th and 5th digits. Injury mechanism: a . The pain is at a severity of 0/10. The patient is experiencing no pain. It is unknown if a foreign body is present. His tetanus status is unknown.     Constitution: Negative.   HENT: Negative.     Cardiovascular: Negative.    Eyes: Negative.    Respiratory: Negative.     Gastrointestinal: Negative.    Endocrine: negative.   Genitourinary: Negative.    Musculoskeletal: Negative.    Skin: Negative.  Positive for laceration.   Allergic/Immunologic: Negative.    Neurological: Negative.    Hematologic/Lymphatic: Negative.    Psychiatric/Behavioral: Negative.      Objective:     Physical Exam   Constitutional: He is oriented to person, place, and time. He appears well-developed. He is cooperative.  Non-toxic appearance. He does not appear ill. No distress.   HENT:   Head: Normocephalic and atraumatic. Head is without abrasion, without contusion and without laceration.   Ears:   Right Ear: Hearing, tympanic membrane, external ear and ear canal normal. No hemotympanum.   Left Ear: Hearing, tympanic membrane, external ear and ear canal normal. No hemotympanum.   Nose: Nose normal. No mucosal edema, rhinorrhea or nasal deformity. No epistaxis. Right sinus exhibits no maxillary sinus tenderness and no frontal sinus tenderness. Left sinus exhibits no maxillary sinus tenderness and no frontal sinus tenderness.   Mouth/Throat: Uvula is midline, oropharynx is clear and moist and mucous membranes are normal. No trismus in the jaw. Normal dentition. No uvula swelling. No posterior oropharyngeal erythema.   Eyes: " Conjunctivae, EOM and lids are normal. Pupils are equal, round, and reactive to light. Right eye exhibits no discharge. Left eye exhibits no discharge. No scleral icterus.   Neck: Trachea normal and phonation normal. Neck supple. No tracheal deviation present. No neck rigidity present. No spinous process tenderness present. No muscular tenderness present.   Cardiovascular: Normal rate, regular rhythm, normal heart sounds and normal pulses.   Pulmonary/Chest: Effort normal and breath sounds normal. No respiratory distress.   Abdominal: Normal appearance and bowel sounds are normal. He exhibits no distension and no mass. Soft. There is no abdominal tenderness.   Musculoskeletal: Normal range of motion.         General: No deformity. Normal range of motion.      Left hand: He exhibits laceration (2.8 cm laceration ring finger, no sign infection.  Abrasion noted L small finger).        Hands:    Neurological: He is alert and oriented to person, place, and time. He has normal strength. No cranial nerve deficit or sensory deficit. He exhibits normal muscle tone. He displays no seizure activity. Coordination normal. GCS eye subscore is 4. GCS verbal subscore is 5. GCS motor subscore is 6.   Skin: Skin is warm, dry, intact, not diaphoretic and not pale. Capillary refill takes less than 2 seconds. Lacerations - upper ext.:  left hand (2.8 cm laceration ring finger, no sign infection.  Abrasion noted L small finger)No abrasion, No burn, No bruising and No ecchymosis   Psychiatric: His speech is normal and behavior is normal. Judgment and thought content normal.   Nursing note and vitals reviewed.    Assessment:     1. Laceration of left ring finger without foreign body without damage to nail, initial encounter        Plan:       Laceration of left ring finger without foreign body without damage to nail, initial encounter  -     Cancel: X-Ray Hand 3 view Right; Future; Expected date: 05/14/2023  -     cephALEXin (KEFLEX) 250  MG capsule; Take 1 capsule (250 mg total) by mouth 4 (four) times daily. for 10 days  Dispense: 40 capsule; Refill: 0  -     mupirocin (BACTROBAN) 2 % ointment; Apply topically 2 (two) times daily.  Dispense: 30 g; Refill: 0  -     Laceration Repair      Please drink plenty of fluids.  Please get plenty of rest.  Please return here or go to the Emergency Department for any concerns or worsening of condition.  If you were prescribed antibiotics, please take them to completion.  If you were prescribed a narcotic medication, do not drive or operate heavy equipment or machinery while taking these medications.      If not allergic, please take over the counter Tylenol (Acetaminophen) and/or Motrin (Ibuprofen) as directed for control of pain and/or fever.    Your tetanus was brought up to date if needed.    Keep wound clean and dry.  You may use a plastic bag to keep area dry while showering    Clean wound once or twice a day with soap and water, then apply antibiotic ointment and redress wound.    Follow up here or your primary care doctor for suture removal in    7 - 10 days for lacerations on your body    5 - 7 days for lacerations on your face.      Please follow up with your primary care doctor or specialist as needed.  Belkys Kruger MD  [phone]    You must understand that you have received treatment at an Urgent Care facility only, and that you may be  released before all of your medical problems are known or treated. Urgent Care facilities are not equipped to  handle life threatening emergencies. It is recommended that you seek care at an Emergency Department for  further evaluation of worsening or concerning symptoms, or possibly life threatening conditions as  discussed.

## 2023-05-14 NOTE — PROCEDURES
"Laceration Repair    Date/Time: 5/14/2023 3:15 PM  Performed by: Juanito Mercado MD  Authorized by: Juanito Mercado MD   Consent Done: Yes  Consent: Verbal consent obtained.  Consent given by: patient  Patient understanding: patient states understanding of the procedure being performed  Patient consent: the patient's understanding of the procedure matches consent given  Procedure consent: procedure consent matches procedure scheduled  Relevant documents: relevant documents present and verified  Test results: test results available and properly labeled  Time out: Immediately prior to procedure a "time out" was called to verify the correct patient, procedure, equipment, support staff and site/side marked as required.  Body area: upper extremity  Location details: left ring finger  Laceration length: 2.8 cm  Foreign bodies: no foreign bodies  Tendon involvement: none  Nerve involvement: none  Vascular damage: no  Anesthesia: local infiltration    Anesthesia:  Local Anesthetic: lidocaine 1% without epinephrine  Anesthetic total: 4 mL    Patient sedated: no  Preparation: Patient was prepped and draped in the usual sterile fashion.  Irrigation solution: saline  Irrigation method: syringe  Amount of cleaning: standard  Debridement: none  Degree of undermining: none  Skin closure: 4-0 Prolene  Number of sutures: 5  Technique: simple  Approximation: close  Approximation difficulty: simple  Dressing: antibiotic ointment and non-stick sterile dressing  Patient tolerance: Patient tolerated the procedure well with no immediate complications  Comments: Td up to date      "

## 2023-05-16 ENCOUNTER — OFFICE VISIT (OUTPATIENT)
Dept: INTERNAL MEDICINE | Facility: CLINIC | Age: 73
End: 2023-05-16
Payer: MEDICARE

## 2023-05-16 VITALS
RESPIRATION RATE: 16 BRPM | HEART RATE: 60 BPM | DIASTOLIC BLOOD PRESSURE: 62 MMHG | OXYGEN SATURATION: 98 % | BODY MASS INDEX: 37.2 KG/M2 | WEIGHT: 251.13 LBS | HEIGHT: 69 IN | SYSTOLIC BLOOD PRESSURE: 118 MMHG

## 2023-05-16 DIAGNOSIS — I10 BENIGN ESSENTIAL HTN: Primary | Chronic | ICD-10-CM

## 2023-05-16 DIAGNOSIS — K76.0 FATTY LIVER: ICD-10-CM

## 2023-05-16 DIAGNOSIS — I50.42 CHRONIC COMBINED SYSTOLIC AND DIASTOLIC HEART FAILURE: Chronic | ICD-10-CM

## 2023-05-16 DIAGNOSIS — J43.9 PULMONARY EMPHYSEMA, UNSPECIFIED EMPHYSEMA TYPE: ICD-10-CM

## 2023-05-16 DIAGNOSIS — Z79.4 TYPE 2 DIABETES MELLITUS WITH DIABETIC NEPHROPATHY, WITH LONG-TERM CURRENT USE OF INSULIN: ICD-10-CM

## 2023-05-16 DIAGNOSIS — E78.5 HYPERLIPIDEMIA, UNSPECIFIED HYPERLIPIDEMIA TYPE: ICD-10-CM

## 2023-05-16 DIAGNOSIS — N40.0 BENIGN PROSTATIC HYPERPLASIA WITHOUT LOWER URINARY TRACT SYMPTOMS: Chronic | ICD-10-CM

## 2023-05-16 DIAGNOSIS — E78.2 MIXED HYPERLIPIDEMIA: Chronic | ICD-10-CM

## 2023-05-16 DIAGNOSIS — I70.0 AORTIC ATHEROSCLEROSIS: ICD-10-CM

## 2023-05-16 DIAGNOSIS — N18.31 STAGE 3A CHRONIC KIDNEY DISEASE: ICD-10-CM

## 2023-05-16 DIAGNOSIS — I48.0 PAROXYSMAL ATRIAL FIBRILLATION: ICD-10-CM

## 2023-05-16 DIAGNOSIS — R91.1 PULMONARY NODULE: ICD-10-CM

## 2023-05-16 DIAGNOSIS — E11.21 TYPE 2 DIABETES MELLITUS WITH DIABETIC NEPHROPATHY, WITH LONG-TERM CURRENT USE OF INSULIN: ICD-10-CM

## 2023-05-16 DIAGNOSIS — I25.118 CORONARY ARTERY DISEASE OF NATIVE ARTERY OF NATIVE HEART WITH STABLE ANGINA PECTORIS: Chronic | ICD-10-CM

## 2023-05-16 DIAGNOSIS — K21.9 GASTROESOPHAGEAL REFLUX DISEASE WITHOUT ESOPHAGITIS: ICD-10-CM

## 2023-05-16 DIAGNOSIS — Z12.5 PROSTATE CANCER SCREENING: ICD-10-CM

## 2023-05-16 DIAGNOSIS — E66.01 SEVERE OBESITY (BMI 35.0-39.9) WITH COMORBIDITY: ICD-10-CM

## 2023-05-16 PROBLEM — S68.123S: Status: RESOLVED | Noted: 2022-01-26 | Resolved: 2023-05-16

## 2023-05-16 PROBLEM — T80.1XXA: Status: RESOLVED | Noted: 2018-11-28 | Resolved: 2023-05-16

## 2023-05-16 PROBLEM — I82.90: Status: RESOLVED | Noted: 2018-11-28 | Resolved: 2023-05-16

## 2023-05-16 PROCEDURE — 3078F PR MOST RECENT DIASTOLIC BLOOD PRESSURE < 80 MM HG: ICD-10-PCS | Mod: CPTII,,, | Performed by: INTERNAL MEDICINE

## 2023-05-16 PROCEDURE — 1101F PT FALLS ASSESS-DOCD LE1/YR: CPT | Mod: CPTII,,, | Performed by: INTERNAL MEDICINE

## 2023-05-16 PROCEDURE — 3008F PR BODY MASS INDEX (BMI) DOCUMENTED: ICD-10-PCS | Mod: CPTII,,, | Performed by: INTERNAL MEDICINE

## 2023-05-16 PROCEDURE — 1159F PR MEDICATION LIST DOCUMENTED IN MEDICAL RECORD: ICD-10-PCS | Mod: CPTII,,, | Performed by: INTERNAL MEDICINE

## 2023-05-16 PROCEDURE — 1126F AMNT PAIN NOTED NONE PRSNT: CPT | Mod: CPTII,,, | Performed by: INTERNAL MEDICINE

## 2023-05-16 PROCEDURE — 3074F PR MOST RECENT SYSTOLIC BLOOD PRESSURE < 130 MM HG: ICD-10-PCS | Mod: CPTII,,, | Performed by: INTERNAL MEDICINE

## 2023-05-16 PROCEDURE — 3044F HG A1C LEVEL LT 7.0%: CPT | Mod: CPTII,,, | Performed by: INTERNAL MEDICINE

## 2023-05-16 PROCEDURE — 99215 PR OFFICE/OUTPT VISIT, EST, LEVL V, 40-54 MIN: ICD-10-PCS | Mod: ,,, | Performed by: INTERNAL MEDICINE

## 2023-05-16 PROCEDURE — 3044F PR MOST RECENT HEMOGLOBIN A1C LEVEL <7.0%: ICD-10-PCS | Mod: CPTII,,, | Performed by: INTERNAL MEDICINE

## 2023-05-16 PROCEDURE — 1160F PR REVIEW ALL MEDS BY PRESCRIBER/CLIN PHARMACIST DOCUMENTED: ICD-10-PCS | Mod: CPTII,,, | Performed by: INTERNAL MEDICINE

## 2023-05-16 PROCEDURE — 3288F PR FALLS RISK ASSESSMENT DOCUMENTED: ICD-10-PCS | Mod: CPTII,,, | Performed by: INTERNAL MEDICINE

## 2023-05-16 PROCEDURE — 3074F SYST BP LT 130 MM HG: CPT | Mod: CPTII,,, | Performed by: INTERNAL MEDICINE

## 2023-05-16 PROCEDURE — 4010F PR ACE/ARB THEARPY RXD/TAKEN: ICD-10-PCS | Mod: CPTII,,, | Performed by: INTERNAL MEDICINE

## 2023-05-16 PROCEDURE — 3008F BODY MASS INDEX DOCD: CPT | Mod: CPTII,,, | Performed by: INTERNAL MEDICINE

## 2023-05-16 PROCEDURE — 99999 PR PBB SHADOW E&M-EST. PATIENT-LVL V: ICD-10-PCS | Mod: PBBFAC,,, | Performed by: INTERNAL MEDICINE

## 2023-05-16 PROCEDURE — 3288F FALL RISK ASSESSMENT DOCD: CPT | Mod: CPTII,,, | Performed by: INTERNAL MEDICINE

## 2023-05-16 PROCEDURE — 3061F PR NEG MICROALBUMINURIA RESULT DOCUMENTED/REVIEW: ICD-10-PCS | Mod: CPTII,,, | Performed by: INTERNAL MEDICINE

## 2023-05-16 PROCEDURE — 99999 PR PBB SHADOW E&M-EST. PATIENT-LVL V: CPT | Mod: PBBFAC,,, | Performed by: INTERNAL MEDICINE

## 2023-05-16 PROCEDURE — 1101F PR PT FALLS ASSESS DOC 0-1 FALLS W/OUT INJ PAST YR: ICD-10-PCS | Mod: CPTII,,, | Performed by: INTERNAL MEDICINE

## 2023-05-16 PROCEDURE — 4010F ACE/ARB THERAPY RXD/TAKEN: CPT | Mod: CPTII,,, | Performed by: INTERNAL MEDICINE

## 2023-05-16 PROCEDURE — 1160F RVW MEDS BY RX/DR IN RCRD: CPT | Mod: CPTII,,, | Performed by: INTERNAL MEDICINE

## 2023-05-16 PROCEDURE — 3066F NEPHROPATHY DOC TX: CPT | Mod: CPTII,,, | Performed by: INTERNAL MEDICINE

## 2023-05-16 PROCEDURE — 3078F DIAST BP <80 MM HG: CPT | Mod: CPTII,,, | Performed by: INTERNAL MEDICINE

## 2023-05-16 PROCEDURE — 99215 OFFICE O/P EST HI 40 MIN: CPT | Mod: ,,, | Performed by: INTERNAL MEDICINE

## 2023-05-16 PROCEDURE — 1126F PR PAIN SEVERITY QUANTIFIED, NO PAIN PRESENT: ICD-10-PCS | Mod: CPTII,,, | Performed by: INTERNAL MEDICINE

## 2023-05-16 PROCEDURE — 3066F PR DOCUMENTATION OF TREATMENT FOR NEPHROPATHY: ICD-10-PCS | Mod: CPTII,,, | Performed by: INTERNAL MEDICINE

## 2023-05-16 PROCEDURE — 3061F NEG MICROALBUMINURIA REV: CPT | Mod: CPTII,,, | Performed by: INTERNAL MEDICINE

## 2023-05-16 PROCEDURE — 1159F MED LIST DOCD IN RCRD: CPT | Mod: CPTII,,, | Performed by: INTERNAL MEDICINE

## 2023-05-16 RX ORDER — DULAGLUTIDE 4.5 MG/.5ML
INJECTION, SOLUTION SUBCUTANEOUS
COMMUNITY
Start: 2023-05-12 | End: 2023-06-23

## 2023-05-16 RX ORDER — ROSUVASTATIN CALCIUM 40 MG/1
40 TABLET, COATED ORAL DAILY
Qty: 90 TABLET | Refills: 3 | Status: SHIPPED | OUTPATIENT
Start: 2023-05-16 | End: 2024-05-10

## 2023-05-16 NOTE — PROGRESS NOTES
"Subjective:       Patient ID: Walter Bull is a 72 y.o. male.    Chief Complaint: Follow-up (Pt here for 6 mth f/u. )      HPI:  Patient is known to me and presents for follow up CAD, systolic CHF, DM type 2, HLD. Labs from 5/10/2023 personally reviewed and discussed with the patient today.      CAD s/p 5v CABG and NSTEMI 9/12/18: following with cardiology. Now on Ranexa (indur was stopped), brilinta, ASA, repatha (cards stopped crestor), losartan and Coreg. Also reports h/o CHF (last known EF 25%+ diastolic dysfunction), on lasix 20mg once a day and aldactone 25mg daily. Denies SOB, [place] and orthopnea. S/p ICD placement.       Dm type 2: on trulicity (just switched to mounjaro but hasn't come in yet), lispro vai VGP per endocrinology. Has DEXCOM  A1C <7%.  He does report numbness and tingling of left foot > right foot and b/l fingers; sx have been present for several years. Not on medicine for neuropathy as he declines treatment. Denies polyuria, polydipsia  Foot exam: 6/2022  Eye exam: 9/2022  Microalb: 5/2023  On ARB and statin        HLD: on repatha per cards; crestor stopped per patient; has been taking for several years. Denies side effects.      HTN: on coreg, losartan. BP is well controlled. Denies headaches, vision changes.     A-fib: new diagnosis since last visit. Cards notes Jan 2023: "Pt with newly found afib on AICD interrogation. Has been intermittently experiencing palpitations post device check. Will start apixiban 5x2 for CVA prophylaxis" Now on Eliquis and ASA/Plavix stopped due to taking nOAC.     GERD: On Nexium daily. H/o H. Pylori on EGD (2018) with gastric erosions. Working better. Has had intermittent nausea and vomiting which is chronic and no acute complaints today No constipation.      BPH: on flomax and finasteride and working well.  No medication side effects. S/p prostate bx 5/13/19 with DR. Chaudhry, no malignany. Last PSA 11/2020 normal; repeat ordered for next visit.      Tobacco " "use: quit 1981; previously smoked 3 PPD for 20 years  EtOH: stopped drink 1982; was a daily drink 2 fifth liquor daily  Illicit drug: denies      Pulmonary nodules 3/14/23: "Bilateral solid pulmonary nodules measuring up to 5 mm, for example the nodule in the apical segment of the right upper lobe (series 4, image 88).  For multiple solid nodules all <6 mm, Fleischner Society 2017 guidelines recommend no routine follow up for a low risk patient, or follow up with non-contrast chest CT at 12 months after discovery in a high risk patient."  Repeat CT chest ordered today.      Stitches left 4th finger after getting finger caught on . Went to urgent care 2 days ago; will follow up there for suture removal. He is on keflex and topical bactroban per urgent care. No swelling, drainage, fevers.      Past Medical History:   Diagnosis Date    Anemia     Angina pectoris     Anticoagulant long-term use     Arthritis     Back pain     CHF (congestive heart failure)     Chronic bronchitis     Colon cancer screening 2/2/2017    Coronary artery disease     Diabetes mellitus type I     Diabetes with neurologic complications     Disorder of kidney and ureter     Encounter for blood transfusion     Glaucoma     Heart attack     09/2018    Heart disease     Hyperlipidemia     Hypertension     Hypothyroidism     Iron deficiency     Kidney failure     post CABG    Obesity     Paroxysmal atrial fibrillation 1/12/2023    Peripheral vascular disease     Polyneuropathy     Pulmonary emphysema 2/26/2020    Renal manifestation of secondary diabetes mellitus     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    Sleep apnea     had CPAP but had recall- not currently using     Trouble in sleeping     Type 2 diabetes mellitus with ophthalmic manifestations        Family History   Problem Relation Age of Onset    Diabetes Mother     Heart disease Mother     Hypertension Sister     Diabetes Sister     Heart disease Sister     Heart attack Brother     Colon cancer " Neg Hx     Esophageal cancer Neg Hx     Stomach cancer Neg Hx        Social History     Socioeconomic History    Marital status:     Number of children: 5   Tobacco Use    Smoking status: Former     Packs/day: 3.00     Types: Cigarettes     Start date: 1963     Quit date: 1981     Years since quittin.3    Smokeless tobacco: Former     Types: Snuff, Chew     Quit date:    Substance and Sexual Activity    Alcohol use: No    Drug use: No    Sexual activity: Yes     Comment: -with a significant other       Review of Systems   Constitutional:  Negative for activity change, fatigue, fever and unexpected weight change.   HENT:  Negative for congestion, ear pain, hearing loss, rhinorrhea and sore throat.    Eyes:  Negative for redness and visual disturbance.   Respiratory:  Negative for cough, shortness of breath and wheezing.    Cardiovascular:  Negative for chest pain, palpitations and leg swelling.   Gastrointestinal:  Negative for abdominal pain, constipation, diarrhea, nausea and vomiting.   Genitourinary:  Negative for decreased urine volume, dysuria, frequency and urgency.   Musculoskeletal:  Negative for back pain, joint swelling and neck pain.   Skin:  Negative for color change, rash and wound.   Neurological:  Negative for dizziness, tremors, weakness, light-headedness and headaches.       Objective:      Physical Exam  Vitals reviewed.   Constitutional:       General: He is not in acute distress.     Appearance: He is well-developed. He is obese.   HENT:      Head: Normocephalic and atraumatic.      Right Ear: External ear normal.      Left Ear: External ear normal.   Eyes:      General:         Right eye: No discharge.         Left eye: No discharge.      Extraocular Movements: Extraocular movements intact.      Conjunctiva/sclera: Conjunctivae normal.      Pupils: Pupils are equal, round, and reactive to light.   Neck:      Thyroid: No thyromegaly.   Cardiovascular:      Rate  and Rhythm: Normal rate and regular rhythm.      Heart sounds: No murmur heard.  Pulmonary:      Effort: Pulmonary effort is normal. No respiratory distress.      Breath sounds: Normal breath sounds. No rales.   Abdominal:      General: Bowel sounds are normal. There is no distension.      Palpations: Abdomen is soft.      Tenderness: There is no abdominal tenderness.   Musculoskeletal:      Right lower leg: No edema.      Left lower leg: No edema.   Skin:     General: Skin is warm and dry.      Comments: Laceration to left 4th finger, dressing c/d/i   Neurological:      Mental Status: He is alert and oriented to person, place, and time.      Cranial Nerves: No cranial nerve deficit.   Psychiatric:         Behavior: Behavior normal.         Thought Content: Thought content normal.       Assessment:       1. Benign essential HTN    2. Mixed hyperlipidemia    3. Chronic combined systolic and diastolic heart failure    4. Coronary artery disease of native artery of native heart with stable angina pectoris    5. Type 2 diabetes mellitus with diabetic nephropathy, with long-term current use of insulin    6. Pulmonary nodule    7. Prostate cancer screening    8. Pulmonary emphysema, unspecified emphysema type    9. Stage 3a chronic kidney disease    10. Gastroesophageal reflux disease without esophagitis    11. Fatty liver    12. Severe obesity (BMI 35.0-39.9) with comorbidity    13. Benign prostatic hyperplasia without lower urinary tract symptoms    14. Paroxysmal atrial fibrillation    15. Aortic atherosclerosis        Plan:       1. Benign essential HTN  Chronic controlled  Continue medications at same dose  Low Na diet  Exercise, weight loss  Check BP and keep log for next visit    -     CBC Auto Differential; Future; Expected date: 11/12/2023  -     Comprehensive Metabolic Panel; Future; Expected date: 11/12/2023  -     Lipid Panel; Future; Expected date: 11/12/2023  -     Hemoglobin A1C; Future; Expected date:  11/12/2023    2. Mixed hyperlipidemia  Chronic stable  On repatha, continue  Will reach out to cards. Per patient crestor stopped. Notes say to stop Zetia. Will reach out to clarify as seems should still be on statin therapy  -     CBC Auto Differential; Future; Expected date: 11/12/2023  -     Comprehensive Metabolic Panel; Future; Expected date: 11/12/2023  -     Lipid Panel; Future; Expected date: 11/12/2023  -     Hemoglobin A1C; Future; Expected date: 11/12/2023    3. Chronic combined systolic and diastolic heart failure  Chronic controlled  No signs of volume overload today  S/p ICD  Cont diuretics same dose  Cont ARB and BB  -     CBC Auto Differential; Future; Expected date: 11/12/2023  -     Comprehensive Metabolic Panel; Future; Expected date: 11/12/2023  -     Lipid Panel; Future; Expected date: 11/12/2023  -     Hemoglobin A1C; Future; Expected date: 11/12/2023    4. Coronary artery disease of native artery of native heart with stable angina pectoris  Chronic stable  Denies angina sx  On NOAC now so ASA, Plavix stopped by cards  Clarify with cards need for statin, cont repatha  Cont ranexa same dose  -     CBC Auto Differential; Future; Expected date: 11/12/2023  -     Comprehensive Metabolic Panel; Future; Expected date: 11/12/2023  -     Lipid Panel; Future; Expected date: 11/12/2023  -     Hemoglobin A1C; Future; Expected date: 11/12/2023    5. Type 2 diabetes mellitus with diabetic nephropathy, with long-term current use of insulin  Chronic controlled   Cont meds same dose per endocrinology, recent switch to Mounjaro  A1C at goal  ADA diet  Check sugars and keep log  -     CBC Auto Differential; Future; Expected date: 11/12/2023  -     Comprehensive Metabolic Panel; Future; Expected date: 11/12/2023  -     Lipid Panel; Future; Expected date: 11/12/2023  -     Hemoglobin A1C; Future; Expected date: 11/12/2023    6. Pulmonary nodule  Chronic  Noted 3/2022, 12 mth follow up scheudled given significant  smoking history  -     CT Chest Without Contrast; Future; Expected date: 05/16/2023    7. Prostate cancer screening  Ordered    -     PSA, Screening; Future; Expected date: 11/12/2023    8. Pulmonary emphysema, unspecified emphysema type  Chronic stable  No signs of exacerbation  Quit smoking  Overview:  Report on chest x-ray.  Patient does have history of significant cigarette smoking.  Quit in 1981.  Experiences chronic mm RC 3 symptoms of dyspnea  Denies chronic cough  Does report history of childhood asthma.        9. Stage 3a chronic kidney disease  Chronic stable  GFR 50s, improved from last check  Follow labs  Avoid nephrotoxic agents  -     CBC Auto Differential; Future; Expected date: 11/12/2023  -     Comprehensive Metabolic Panel; Future; Expected date: 11/12/2023  -     Hemoglobin A1C; Future; Expected date: 11/12/2023    10. Gastroesophageal reflux disease without esophagitis  Chronic stable  Cont PPI    11. Fatty liver  Chronic stable  LFTs normal  Diet, exercise, weight loss  -     CBC Auto Differential; Future; Expected date: 11/12/2023  -     Comprehensive Metabolic Panel; Future; Expected date: 11/12/2023    12. Severe obesity (BMI 35.0-39.9) with comorbidity  Chronic stable  Diet, exercise, weight loss discussed  -     CBC Auto Differential; Future; Expected date: 11/12/2023  -     Comprehensive Metabolic Panel; Future; Expected date: 11/12/2023  -     Lipid Panel; Future; Expected date: 11/12/2023  -     Hemoglobin A1C; Future; Expected date: 11/12/2023    13. Benign prostatic hyperplasia without lower urinary tract symptoms  Chronic stable  Cont meds same dose  Yearly PSA ordered  -     PSA, Screening; Future; Expected date: 11/12/2023    14. Paroxysmal atrial fibrillation  New diagnosis  Diagnosed by cards Jan 2023  Now on eliquis  Cont BB  RRR on exam today  -     CBC Auto Differential; Future; Expected date: 11/12/2023  -     Comprehensive Metabolic Panel; Future; Expected date:  11/12/2023    15. Aortic atherosclerosis  Chronic stable  Noted on prior imaging  Cont NOAC and repatha, clarify statin with cards  -     CBC Auto Differential; Future; Expected date: 11/12/2023  -     Comprehensive Metabolic Panel; Future; Expected date: 11/12/2023  -     Lipid Panel; Future; Expected date: 11/12/2023       RTC 6 months with labs and PRN

## 2023-05-16 NOTE — Clinical Note
Good morning, You saw this patient Jan 2023. Patient is on Repatha. Was also on Crestor and Zetia. Patient tells me he was told to stop Crestor but your notes look like instructions were to stop Zetia. I just wanted to clarify if, from cardiology standpoint, you wanted this patient to continue statin therapy.   Thanks Belkys Kruger MD [facility] [place]

## 2023-05-18 ENCOUNTER — SPECIALTY PHARMACY (OUTPATIENT)
Dept: PHARMACY | Facility: CLINIC | Age: 73
End: 2023-05-18
Payer: MEDICARE

## 2023-05-18 NOTE — TELEPHONE ENCOUNTER
Specialty Pharmacy - Refill Coordination    Specialty Medication Orders Linked to Encounter      Flowsheet Row Most Recent Value   Medication #1 evolocumab (REPATHA SURECLICK) 140 mg/mL PnIj (Order#[id number], Rx#[id number])            Refill Questions - Documented Responses      Flowsheet Row Most Recent Value   Patient Availability and HIPAA Verification    Does patient want to proceed with activity? Yes   HIPAA/medical authority confirmed? Yes   Relationship to patient of person spoken to? Self   Refill Screening Questions    Would patient like to speak to a pharmacist? No   When does the patient need to receive the medication? 05/26/23   Refill Delivery Questions    How will the patient receive the medication? [facility]   When does the patient need to receive the medication? 05/26/23   Shipping Address Prescription   Address in [facility] confirmed and updated if neccessary? Yes   Expected Copay ($) 0   Is the patient able to afford the medication copay? Yes   Payment Method zero copay   Days supply of Refill 28   Supplies needed? No supplies needed   Refill activity completed? Yes   Refill activity plan Refill scheduled   Shipment/Pickup Date: 05/19/23            Current Outpatient Medications   Medication Sig    albuterol (PROVENTIL HFA) 90 mcg/actuation inhaler Inhale 2 puffs into the lungs every 6 (six) hours as needed for Wheezing or Shortness of Breath. Rescue    albuterol (PROVENTIL) 2.5 mg /3 mL (0.083 %) nebulizer solution Take 3 mLs (2.5 mg total) by nebulization every 6 (six) hours as needed for Wheezing or Shortness of Breath. Rescue    albuterol-ipratropium (DUO-NEB) 2.5 mg-0.5 mg/3 mL nebulizer solution Take 3 mLs by nebulization every 6 (six) hours as needed for Wheezing or Shortness of Breath. Rescue    ammonium lactate 12 % Crea Apply twice daily to affected parts both feet as needed.    apixaban (ELIQUIS) 5 mg Tab Take 1 tablet (5 mg total) by mouth 2 (two) times daily.    blood-glucose  sensor (DEXCOM G6 SENSOR) [place] 3 each by Misc.(Non-Drug; Combo Route) route continuous.    blood-glucose transmitter (DEXCOM G6 TRANSMITTER) [place] 1 each by Misc.(Non-Drug; Combo Route) route continuous.    carvediloL (COREG) 6.25 MG tablet Take 1 tablet (6.25 mg total) by mouth 2 (two) times daily.    cephALEXin (KEFLEX) 250 MG capsule Take 1 capsule (250 mg total) by mouth 4 (four) times daily. for 10 days    diclofenac sodium (VOLTAREN) 1 % Gel Apply 2 g topically 4 (four) times daily. (Patient taking differently: Apply 2 g topically as needed.)    esomeprazole (NEXIUM) 40 MG capsule Take 1 capsule (40 mg total) by mouth 2 (two) times daily.    evolocumab (REPATHA SURECLICK) 140 mg/mL PnIj Inject 1 mL (140 mg total) into the skin every 14 (fourteen) days.    ferrous sulfate (FEOSOL) 325 mg (65 mg iron) Tab tablet TAKE 1 TABLET BY MOUTH THREE TIMES DAILY    furosemide (LASIX) 20 MG tablet TAKE 1 TABLET BY MOUTH ONCE DAILY    GVOKE HYPOPEN 2-PACK 1 mg/0.2 mL AtIn INJECT SUBCUTANEOUSLY  CONTENTS OF 1 AUTO-INJECTOR AS NEEDED FOR HYPOGLCEMIA  AS DIRECTED    insulin lispro (HUMALOG U-100 INSULIN) 100 unit/mL injection USE AS DIRECTED VIA V-Tilt 20 with 3 CLICKS PER MEAL    ketoconazole (NIZORAL) 2 % cream Apply topically once daily.    LIDOcaine HCL 2% (XYLOCAINE) 2 % jelly Apply topically as needed. Apply topically once nightly to affected part of foot/feet, for pain.    losartan (COZAAR) 25 MG tablet Take 0.5 tablets (12.5 mg total) by mouth once daily.    mupirocin (BACTROBAN) 2 % ointment Apply to affected area 3 times daily    mupirocin (BACTROBAN) 2 % ointment Apply topically 2 (two) times daily.    nitroGLYCERIN (NITROSTAT) 0.4 MG SL tablet DISSOLVE 1 TABLET UNDER THE TONGUE EVERY 5 MINUTES AS  NEEDED FOR CHEST PAIN. MAX  OF 3 TABLETS IN 15 MINUTES. CALL 911 IF PAIN PERSISTS.    ondansetron (ZOFRAN) 4 MG tablet Take 1 tablet (4 mg total) by mouth every 8 (eight) hours as needed for Nausea.    papaverine 30 mg/mL  injection Add: Phentolamine 1 mg  Add: PGE1 10 mcg    Sig:  Inject 25 units as directed; titrate up by 5 units until desired results    ranolazine (RANEXA) 1,000 mg Tb12 TAKE 1 TABLET BY MOUTH  TWICE DAILY    rosuvastatin (CRESTOR) 40 MG Tab Take 1 tablet (40 mg total) by mouth once daily.    spironolactone (ALDACTONE) 25 MG tablet Take 1 tablet (25 mg total) by mouth once daily.    sub-q insulin device, 20 unit (VGO 20) [place]yenne 1 Device by Misc.(Non-Drug; Combo Route) route once daily.    tamsulosin (FLOMAX) 0.4 mg Cap Take 1 capsule (0.4 mg total) by mouth once daily.    tirzepatide (MOUNJARO) 5 mg/0.5 mL PnIj Inject 5 mg into the skin every 7 days.    TRULICITY 4.5 mg/0.5 mL pen injector Inject into the skin.   Last reviewed on 5/16/2023  8:44 AM by Belkys Kruger MD    Review of patient's allergies indicates:  No Known Allergies Last reviewed on  5/16/2023 3:20 PM by Leanna Reed      Tasks added this encounter   No tasks added.   Tasks due within next 3 months   5/19/2023 - Refill Coordination Outreach (1 time occurrence)  5/16/2023 - Refill Coordination Outreach (1 time occurrence)     Christie Hernandez [facility]  [address] [place] [id number]  Phone: [phone]  Fax: [phone]

## 2023-05-19 ENCOUNTER — HOSPITAL ENCOUNTER (OUTPATIENT)
Dept: RADIOLOGY | Facility: HOSPITAL | Age: 73
Discharge: HOME | End: 2023-05-19
Attending: INTERNAL MEDICINE
Payer: MEDICARE

## 2023-05-19 DIAGNOSIS — R91.1 PULMONARY NODULE: ICD-10-CM

## 2023-05-19 PROCEDURE — 71250 CT CHEST WITHOUT CONTRAST: ICD-10-PCS | Mod: 26,,, | Performed by: RADIOLOGY

## 2023-05-19 PROCEDURE — 71250 CT THORAX DX C-: CPT | Mod: 26,,, | Performed by: RADIOLOGY

## 2023-05-19 PROCEDURE — 71250 CT THORAX DX C-: CPT | Mod: TC

## 2023-05-23 ENCOUNTER — CLINICAL SUPPORT (OUTPATIENT)
Dept: URGENT CARE | Facility: CLINIC | Age: 73
End: 2023-05-23
Payer: MEDICARE

## 2023-05-23 VITALS
TEMPERATURE: 98 F | DIASTOLIC BLOOD PRESSURE: 73 MMHG | HEART RATE: 60 BPM | BODY MASS INDEX: 37.2 KG/M2 | WEIGHT: 251.13 LBS | SYSTOLIC BLOOD PRESSURE: 128 MMHG | HEIGHT: 69 IN | RESPIRATION RATE: 19 BRPM | OXYGEN SATURATION: 98 %

## 2023-05-23 DIAGNOSIS — Z48.02 ENCOUNTER FOR REMOVAL OF SUTURES: Primary | ICD-10-CM

## 2023-05-23 DIAGNOSIS — S61.215A LACERATION OF LEFT RING FINGER WITHOUT FOREIGN BODY WITHOUT DAMAGE TO NAIL, INITIAL ENCOUNTER: ICD-10-CM

## 2023-05-23 PROCEDURE — 99024 POSTOP FOLLOW-UP VISIT: CPT | Mod: S$GLB,,, | Performed by: PHYSICIAN ASSISTANT

## 2023-05-23 PROCEDURE — 99024 SUTURE REMOVAL: ICD-10-PCS | Mod: S$GLB,,, | Performed by: PHYSICIAN ASSISTANT

## 2023-05-23 PROCEDURE — 99499 NO LOS: ICD-10-PCS | Mod: S$GLB,,, | Performed by: PHYSICIAN ASSISTANT

## 2023-05-23 PROCEDURE — 99499 UNLISTED E&M SERVICE: CPT | Mod: S$GLB,,, | Performed by: PHYSICIAN ASSISTANT

## 2023-05-23 RX ORDER — LOSARTAN POTASSIUM 25 MG/1
TABLET ORAL
Qty: 45 TABLET | Refills: 0 | Status: SHIPPED | OUTPATIENT
Start: 2023-05-23 | End: 2023-06-23 | Stop reason: SDUPTHER

## 2023-05-23 NOTE — PROCEDURES
Suture Removal    Date/Time: 5/23/2023 9:30 AM  Location procedure was performed: [facility]  Performed by: Cindy Iniguez PA-C  Authorized by: Cindy Iniguez PA-C   Body area: upper extremity  Location details: left ring finger  Wound Appearance: clean, well healed, normal color, nontender and no drainage  Sutures Removed: 5  Post-removal: bandaid applied  Facility: sutures placed in this facility  Complications: No  Specimens: No  Implants: No  Patient tolerance: Patient tolerated the procedure well with no immediate complications

## 2023-05-23 NOTE — PROGRESS NOTES
"Subjective:      Patient ID: Walter Bull is a 72 y.o. male.    Vitals:  height is 5' 9.02" (1.753 m) and weight is 113.9 kg (251 lb 1.7 oz). His oral temperature is 97.7 °F (36.5 °C). His blood pressure is 128/73 and his pulse is 60. His respiration is 19 and oxygen saturation is 98%.     Chief Complaint: Suture / Staple Removal    Pt came in 10 days ago and had stitches placed in his right hand on his pinky and ring finger. Pt was told to come back in 10 days to get the stitches removed.    Suture / Staple Removal  The sutures were placed 7 to 10 days ago. He tried regular soap and water washings and antibiotic ointment use since the wound repair. The treatment provided moderate relief. There has been clear discharge from the wound. There is no redness present. There is no swelling present. The pain has improved. He has no difficulty moving the affected extremity or digit.     Constitution: Negative for fever.   Skin:  Positive for laceration (here for suture removal). Negative for erythema.    Objective:     Physical Exam   Constitutional: He is oriented to person, place, and time. He appears well-developed. He is cooperative.  Non-toxic appearance. He does not appear ill. No distress.   HENT:   Head: Normocephalic and atraumatic.   Ears:   Right Ear: Hearing normal.   Left Ear: Hearing normal.   Eyes: Conjunctivae and lids are normal. No scleral icterus.   Neck: Trachea normal and phonation normal. Neck supple. No edema present. No erythema present. No neck rigidity present.   Cardiovascular: Normal rate and normal pulses.   Pulmonary/Chest: Effort normal. No respiratory distress.   Abdominal: Normal appearance.   Musculoskeletal:        Hands:    Neurological: He is alert and oriented to person, place, and time. Coordination normal.   Skin: Skin is dry, intact, not diaphoretic and not pale. No erythema   Psychiatric: His speech is normal and behavior is normal. Judgment and thought content normal.   Nursing " note and vitals reviewed.    Assessment:     1. Encounter for removal of sutures    2. Laceration of left ring finger without foreign body without damage to nail, initial encounter        Plan:       Encounter for removal of sutures  -     Suture Removal    Laceration of left ring finger without foreign body without damage to nail, initial encounter  -     Suture Removal      Discussed with patient the importance of f/u with their primary care provider. Urged to go to the ER for any worsening signs or symptoms.

## 2023-05-25 NOTE — PROGRESS NOTES
Staff Handoff    Bedside report received from ANCA Alcaraz. VS stable. Afebrile. Troponin bumped to 0.810, patient asymptomatic. No complatints at this time. Call bell in reach. Will continue to monitor for any change in status.  Resident Handoff     Abdomen soft, non-tender, no guarding.

## 2023-06-01 NOTE — TELEPHONE ENCOUNTER
90 day supply called in during your absence.    LOV 5/16/2023  Scheduled visit 11/17/2023    Requested Prescriptions     Pending Prescriptions Disp Refills    furosemide (LASIX) 20 MG tablet 90 tablet 3     Sig: Take 1 tablet (20 mg total) by mouth once daily.

## 2023-06-01 NOTE — TELEPHONE ENCOUNTER
----- Message from Toña Palacios sent at 2023  2:58 PM CDT -----  Contact: [facility]  Walter Bull  MRN: [id number]  : 1950  PCP: Belkys Kruger  Home Phone      [phone]  Work Phone      Not on file.  Mobile          [phone]  Home Phone      [phone]  Mobile          [phone]      MESSAGE:     Pt needs a refill of furosemide (LASIX) 20 MG tablet sent to [facility] in [place].       Please advise  [facility]  [phone]

## 2023-06-01 NOTE — TELEPHONE ENCOUNTER
No care due was identified.  [facility] Embedded Care Due Messages. Reference number: [id number].   6/01/2023 3:10:15 PM CDT

## 2023-06-05 ENCOUNTER — PES CALL (OUTPATIENT)
Dept: ADMINISTRATIVE | Facility: CLINIC | Age: 73
End: 2023-06-05
Payer: MEDICARE

## 2023-06-06 RX ORDER — FUROSEMIDE 20 MG/1
20 TABLET ORAL DAILY
Qty: 90 TABLET | Refills: 3 | Status: SHIPPED | OUTPATIENT
Start: 2023-06-06 | End: 2023-06-23 | Stop reason: SDUPTHER

## 2023-06-07 ENCOUNTER — HOSPITAL ENCOUNTER (EMERGENCY)
Facility: HOSPITAL | Age: 73
Discharge: HOME | End: 2023-06-07
Attending: STUDENT IN AN ORGANIZED HEALTH CARE EDUCATION/TRAINING PROGRAM
Payer: MEDICARE

## 2023-06-07 ENCOUNTER — OFFICE VISIT (OUTPATIENT)
Dept: URGENT CARE | Facility: CLINIC | Age: 73
End: 2023-06-07
Payer: MEDICARE

## 2023-06-07 VITALS
TEMPERATURE: 98 F | SYSTOLIC BLOOD PRESSURE: 160 MMHG | WEIGHT: 255.19 LBS | DIASTOLIC BLOOD PRESSURE: 72 MMHG | BODY MASS INDEX: 37.8 KG/M2 | RESPIRATION RATE: 17 BRPM | HEART RATE: 66 BPM | HEIGHT: 69 IN | OXYGEN SATURATION: 98 %

## 2023-06-07 VITALS
DIASTOLIC BLOOD PRESSURE: 78 MMHG | WEIGHT: 252 LBS | HEIGHT: 69 IN | TEMPERATURE: 99 F | RESPIRATION RATE: 19 BRPM | BODY MASS INDEX: 37.33 KG/M2 | SYSTOLIC BLOOD PRESSURE: 163 MMHG | OXYGEN SATURATION: 98 % | HEART RATE: 67 BPM

## 2023-06-07 DIAGNOSIS — R05.9 COUGH, UNSPECIFIED TYPE: Primary | ICD-10-CM

## 2023-06-07 DIAGNOSIS — R79.89 ELEVATED TROPONIN: ICD-10-CM

## 2023-06-07 DIAGNOSIS — R06.09 DYSPNEA ON EXERTION: ICD-10-CM

## 2023-06-07 DIAGNOSIS — J18.9 PNEUMONIA OF LEFT LOWER LOBE DUE TO INFECTIOUS ORGANISM: Primary | ICD-10-CM

## 2023-06-07 DIAGNOSIS — Z86.79 HISTORY OF CHF (CONGESTIVE HEART FAILURE): ICD-10-CM

## 2023-06-07 DIAGNOSIS — R06.02 SHORTNESS OF BREATH: ICD-10-CM

## 2023-06-07 DIAGNOSIS — M79.89 LEG SWELLING: ICD-10-CM

## 2023-06-07 DIAGNOSIS — I50.9 HEART FAILURE, UNSPECIFIED HF CHRONICITY, UNSPECIFIED HEART FAILURE TYPE: ICD-10-CM

## 2023-06-07 LAB
ALBUMIN SERPL BCP-MCNC: 3.6 G/DL (ref 3.5–5.2)
ALP SERPL-CCNC: 71 U/L (ref 55–135)
ALT SERPL W/O P-5'-P-CCNC: 19 U/L (ref 10–44)
ANION GAP SERPL CALC-SCNC: 10 MMOL/L (ref 8–16)
APTT PPP: 30.2 SEC (ref 21–32)
AST SERPL-CCNC: 25 U/L (ref 10–40)
BASOPHILS # BLD AUTO: 0.06 K/UL (ref 0–0.2)
BASOPHILS NFR BLD: 1 % (ref 0–1.9)
BILIRUB SERPL-MCNC: 0.3 MG/DL (ref 0.1–1)
BNP SERPL-MCNC: 209 PG/ML (ref 0–99)
BUN SERPL-MCNC: 13 MG/DL (ref 8–23)
CALCIUM SERPL-MCNC: 8.8 MG/DL (ref 8.7–10.5)
CHLORIDE SERPL-SCNC: 106 MMOL/L (ref 95–110)
CO2 SERPL-SCNC: 23 MMOL/L (ref 23–29)
CREAT SERPL-MCNC: 1.8 MG/DL (ref 0.5–1.4)
CTP QC/QA: YES
CTP QC/QA: YES
DIFFERENTIAL METHOD: ABNORMAL
EOSINOPHIL # BLD AUTO: 0.3 K/UL (ref 0–0.5)
EOSINOPHIL NFR BLD: 5 % (ref 0–8)
ERYTHROCYTE [DISTWIDTH] IN BLOOD BY AUTOMATED COUNT: 13.9 % (ref 11.5–14.5)
EST. GFR  (NO RACE VARIABLE): 39 ML/MIN/1.73 M^2
GLUCOSE SERPL-MCNC: 77 MG/DL (ref 70–110)
HCT VFR BLD AUTO: 36 % (ref 40–54)
HGB BLD-MCNC: 11.6 G/DL (ref 14–18)
IMM GRANULOCYTES # BLD AUTO: 0 K/UL (ref 0–0.04)
IMM GRANULOCYTES NFR BLD AUTO: 0 % (ref 0–0.5)
INFLUENZA A, MOLECULAR: NEGATIVE
INFLUENZA B, MOLECULAR: NEGATIVE
INR PPP: 1 (ref 0.8–1.2)
LACTATE SERPL-SCNC: 1.3 MMOL/L (ref 0.5–2.2)
LYMPHOCYTES # BLD AUTO: 2.5 K/UL (ref 1–4.8)
LYMPHOCYTES NFR BLD: 40.4 % (ref 18–48)
MCH RBC QN AUTO: 29.7 PG (ref 27–31)
MCHC RBC AUTO-ENTMCNC: 32.2 G/DL (ref 32–36)
MCV RBC AUTO: 92 FL (ref 82–98)
MONOCYTES # BLD AUTO: 1 K/UL (ref 0.3–1)
MONOCYTES NFR BLD: 16.5 % (ref 4–15)
NEUTROPHILS # BLD AUTO: 2.3 K/UL (ref 1.8–7.7)
NEUTROPHILS NFR BLD: 37.1 % (ref 38–73)
NRBC BLD-RTO: 0 /100 WBC
PLATELET # BLD AUTO: 151 K/UL (ref 150–450)
PMV BLD AUTO: 9.9 FL (ref 9.2–12.9)
POC MOLECULAR INFLUENZA A AGN: NEGATIVE
POC MOLECULAR INFLUENZA B AGN: NEGATIVE
POTASSIUM SERPL-SCNC: 3.8 MMOL/L (ref 3.5–5.1)
PROT SERPL-MCNC: 6.7 G/DL (ref 6–8.4)
PROTHROMBIN TIME: 11.1 SEC (ref 9–12.5)
RBC # BLD AUTO: 3.9 M/UL (ref 4.6–6.2)
SARS-COV-2 AG RESP QL IA.RAPID: NEGATIVE
SARS-COV-2 RDRP RESP QL NAA+PROBE: NEGATIVE
SODIUM SERPL-SCNC: 139 MMOL/L (ref 136–145)
SPECIMEN SOURCE: NORMAL
TROPONIN I SERPL DL<=0.01 NG/ML-MCNC: 0.02 NG/ML (ref 0–0.03)
TROPONIN I SERPL DL<=0.01 NG/ML-MCNC: 0.03 NG/ML (ref 0–0.03)
WBC # BLD AUTO: 6.19 K/UL (ref 3.9–12.7)

## 2023-06-07 PROCEDURE — 63600175 PHARM REV CODE 636 W HCPCS: Performed by: EMERGENCY MEDICINE

## 2023-06-07 PROCEDURE — 87811 SARS CORONAVIRUS 2 ANTIGEN POCT, MANUAL READ: ICD-10-PCS | Mod: QW,S$GLB,,

## 2023-06-07 PROCEDURE — 25000003 PHARM REV CODE 250: Performed by: STUDENT IN AN ORGANIZED HEALTH CARE EDUCATION/TRAINING PROGRAM

## 2023-06-07 PROCEDURE — 87502 POCT INFLUENZA A/B MOLECULAR: ICD-10-PCS | Mod: QW,S$GLB,,

## 2023-06-07 PROCEDURE — 85610 PROTHROMBIN TIME: CPT | Performed by: EMERGENCY MEDICINE

## 2023-06-07 PROCEDURE — 84484 ASSAY OF TROPONIN QUANT: CPT | Mod: 91 | Performed by: EMERGENCY MEDICINE

## 2023-06-07 PROCEDURE — 83605 ASSAY OF LACTIC ACID: CPT | Performed by: EMERGENCY MEDICINE

## 2023-06-07 PROCEDURE — 80053 COMPREHEN METABOLIC PANEL: CPT | Performed by: STUDENT IN AN ORGANIZED HEALTH CARE EDUCATION/TRAINING PROGRAM

## 2023-06-07 PROCEDURE — 96365 THER/PROPH/DIAG IV INF INIT: CPT

## 2023-06-07 PROCEDURE — U0002 COVID-19 LAB TEST NON-CDC: HCPCS | Performed by: EMERGENCY MEDICINE

## 2023-06-07 PROCEDURE — 87040 BLOOD CULTURE FOR BACTERIA: CPT | Performed by: EMERGENCY MEDICINE

## 2023-06-07 PROCEDURE — 94640 AIRWAY INHALATION TREATMENT: CPT

## 2023-06-07 PROCEDURE — 84484 ASSAY OF TROPONIN QUANT: CPT | Performed by: STUDENT IN AN ORGANIZED HEALTH CARE EDUCATION/TRAINING PROGRAM

## 2023-06-07 PROCEDURE — 96367 TX/PROPH/DG ADDL SEQ IV INF: CPT

## 2023-06-07 PROCEDURE — 99215 OFFICE O/P EST HI 40 MIN: CPT | Mod: S$GLB,,,

## 2023-06-07 PROCEDURE — 87502 INFLUENZA DNA AMP PROBE: CPT | Mod: QW,S$GLB,,

## 2023-06-07 PROCEDURE — 25000242 PHARM REV CODE 250 ALT 637 W/ HCPCS: Performed by: STUDENT IN AN ORGANIZED HEALTH CARE EDUCATION/TRAINING PROGRAM

## 2023-06-07 PROCEDURE — 99215 PR OFFICE/OUTPT VISIT, EST, LEVL V, 40-54 MIN: ICD-10-PCS | Mod: S$GLB,,,

## 2023-06-07 PROCEDURE — 36415 COLL VENOUS BLD VENIPUNCTURE: CPT | Performed by: EMERGENCY MEDICINE

## 2023-06-07 PROCEDURE — 83880 ASSAY OF NATRIURETIC PEPTIDE: CPT | Performed by: STUDENT IN AN ORGANIZED HEALTH CARE EDUCATION/TRAINING PROGRAM

## 2023-06-07 PROCEDURE — 87502 INFLUENZA DNA AMP PROBE: CPT | Performed by: EMERGENCY MEDICINE

## 2023-06-07 PROCEDURE — 93005 ELECTROCARDIOGRAM TRACING: CPT

## 2023-06-07 PROCEDURE — 99285 EMERGENCY DEPT VISIT HI MDM: CPT | Mod: 25

## 2023-06-07 PROCEDURE — 85730 THROMBOPLASTIN TIME PARTIAL: CPT | Performed by: EMERGENCY MEDICINE

## 2023-06-07 PROCEDURE — 87811 SARS-COV-2 COVID19 W/OPTIC: CPT | Mod: QW,S$GLB,,

## 2023-06-07 PROCEDURE — 25000003 PHARM REV CODE 250: Performed by: EMERGENCY MEDICINE

## 2023-06-07 PROCEDURE — 93010 EKG 12-LEAD: ICD-10-PCS | Mod: ,,, | Performed by: INTERNAL MEDICINE

## 2023-06-07 PROCEDURE — 93010 ELECTROCARDIOGRAM REPORT: CPT | Mod: ,,, | Performed by: INTERNAL MEDICINE

## 2023-06-07 PROCEDURE — 36415 COLL VENOUS BLD VENIPUNCTURE: CPT | Performed by: STUDENT IN AN ORGANIZED HEALTH CARE EDUCATION/TRAINING PROGRAM

## 2023-06-07 PROCEDURE — 85025 COMPLETE CBC W/AUTO DIFF WBC: CPT | Performed by: STUDENT IN AN ORGANIZED HEALTH CARE EDUCATION/TRAINING PROGRAM

## 2023-06-07 RX ORDER — CEFDINIR 300 MG/1
300 CAPSULE ORAL 2 TIMES DAILY
Qty: 20 CAPSULE | Refills: 0 | Status: SHIPPED | OUTPATIENT
Start: 2023-06-07 | End: 2023-06-07 | Stop reason: ALTCHOICE

## 2023-06-07 RX ORDER — ALBUTEROL SULFATE 90 UG/1
2 AEROSOL, METERED RESPIRATORY (INHALATION) EVERY 4 HOURS PRN
Qty: 18 G | Refills: 0 | Status: SHIPPED | OUTPATIENT
Start: 2023-06-07 | End: 2023-06-17

## 2023-06-07 RX ORDER — IPRATROPIUM BROMIDE AND ALBUTEROL SULFATE 2.5; .5 MG/3ML; MG/3ML
3 SOLUTION RESPIRATORY (INHALATION)
Status: DISCONTINUED | OUTPATIENT
Start: 2023-06-07 | End: 2023-06-07

## 2023-06-07 RX ORDER — ALBUTEROL SULFATE 90 UG/1
1-2 AEROSOL, METERED RESPIRATORY (INHALATION) EVERY 6 HOURS PRN
Qty: 1 G | Refills: 0 | Status: SHIPPED | OUTPATIENT
Start: 2023-06-07 | End: 2023-06-07 | Stop reason: ALTCHOICE

## 2023-06-07 RX ORDER — LEVOFLOXACIN 500 MG/1
500 TABLET, FILM COATED ORAL DAILY
Qty: 10 TABLET | Refills: 0 | Status: SHIPPED | OUTPATIENT
Start: 2023-06-07 | End: 2023-06-17

## 2023-06-07 RX ORDER — AZITHROMYCIN 250 MG/1
TABLET, FILM COATED ORAL
Qty: 6 TABLET | Refills: 0 | Status: SHIPPED | OUTPATIENT
Start: 2023-06-07 | End: 2023-06-07 | Stop reason: ALTCHOICE

## 2023-06-07 RX ORDER — IPRATROPIUM BROMIDE AND ALBUTEROL SULFATE 2.5; .5 MG/3ML; MG/3ML
3 SOLUTION RESPIRATORY (INHALATION)
Status: COMPLETED | OUTPATIENT
Start: 2023-06-07 | End: 2023-06-07

## 2023-06-07 RX ADMIN — AZITHROMYCIN MONOHYDRATE 500 MG: 500 INJECTION, POWDER, LYOPHILIZED, FOR SOLUTION INTRAVENOUS at 07:06

## 2023-06-07 RX ADMIN — CEFTRIAXONE SODIUM 2 G: 2 INJECTION, POWDER, FOR SOLUTION INTRAMUSCULAR; INTRAVENOUS at 06:06

## 2023-06-07 RX ADMIN — SODIUM CHLORIDE 500 ML: 9 INJECTION, SOLUTION INTRAVENOUS at 05:06

## 2023-06-07 RX ADMIN — IPRATROPIUM BROMIDE AND ALBUTEROL SULFATE 3 ML: 2.5; .5 SOLUTION RESPIRATORY (INHALATION) at 05:06

## 2023-06-07 NOTE — ED PROVIDER NOTES
Encounter Date: 6/7/2023       History     Chief Complaint   Patient presents with    Cough     Patient to ER reports he has been having a cough for 3 days. States he has been getting SOB with exertion     73 YO MALE WHO COMES IN TODAY DUE TO COUGH.  HE STATES THAT HE WAS SEEN AT URGENT CARE ON TODAY DUE TO A COUGH TIMES THREE DAYS.  HE STATES THAT HE WAS SENT OVER TO THE HOSPITAL FOR EVALUATION AND TREATMENT.  ON EVALUATION, THE PATIENT DOES HAVE AN ELEVATED TROPONIN AND PNEUMONIA.  HE STATES THAT HE SEES CARDIOLOGY IN [place].  HE ALSO HAS A PACEMAKER/DEFIBRILLATOR IN PLACE WITH, A HISTORY OF HEART FAILURE AND CABG.  HE ALSO ADMITS TO HAVING A TRIPLE AAA.  HE DENIES ANY CHEST PAIN, SHORTNESS OF BREATH, FEVER, CHILLS, NAUSEA, AND/OR VOMITING.  HE ALSO HAS OBSTRUCTIVE SLEEP APNEA FOR WHICH HE USES CPAP ON OCCASION.       Review of patient's allergies indicates:  No Known Allergies  Past Medical History:   Diagnosis Date    Anemia     Angina pectoris     Anticoagulant long-term use     Arthritis     Back pain     CHF (congestive heart failure)     Chronic bronchitis     Colon cancer screening 2/2/2017    Coronary artery disease     Diabetes mellitus type I     Diabetes with neurologic complications     Disorder of kidney and ureter     Encounter for blood transfusion     Glaucoma     Heart attack     09/2018    Heart disease     Hyperlipidemia     Hypertension     Hypothyroidism     Iron deficiency     Kidney failure     post CABG    Obesity     Paroxysmal atrial fibrillation 1/12/2023    Peripheral vascular disease     Polyneuropathy     Pulmonary emphysema 2/26/2020    Renal manifestation of secondary diabetes mellitus     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    Sleep apnea     had CPAP but had recall- not currently using     Trouble in sleeping     Type 2 diabetes mellitus with ophthalmic manifestations      Past Surgical History:   Procedure Laterality Date    24 HOUR IMPEDANCE PH MONITORING OF ESOPHAGUS IN PATIENT TAKING  ACID REDUCING MEDICATIONS N/A 3/10/2022    Procedure: IMPEDANCE PH STUDY, ESOPHAGEAL, 24 HOUR, IN PATIENT TAKING ACID REDUCING MEDICATION;  Surgeon: Carmelita Jacobsen MD;  Location: [facility] (4TH FLR);  Service: Endoscopy;  Laterality: N/A;  ICD-  fully vaccinated  Yes he can hold Plavix 5 days prior to endoscopies and restart post procedure when safe from a operator perspective per Dr.A. [place]    CARDIAC DEFIBRILLATOR PLACEMENT      CARDIAC ELECTROPHYSIOLOGY STUDY N/A 11/19/2018    Procedure: CARDIAC ELECTROPHYSIOLOGY STUDY;  Surgeon: Byron Glasgow MD;  Location: [facility] EP LAB;  Service: Cardiology;  Laterality: N/A;  VT, EPS +/- ICD, SJM, anes, GP, 6086    CARDIAC ELECTROPHYSIOLOGY STUDY N/A 11/19/2018    Procedure: CARDIAC ELECTROPHYSIOLOGY STUDY;  Surgeon: Byron Glasgow MD;  Location: [facility] EP LAB;  Service: Cardiology;  Laterality: N/A;    COLON SURGERY  2006    COLONOSCOPY N/A 2/2/2017    Procedure: COLONOSCOPY;  Surgeon: Ronnie Conway MD;  Location: [facility];  Service: Endoscopy;  Laterality: N/A;    COLONOSCOPY N/A 4/25/2022    Procedure: COLONOSCOPY;  Surgeon: Branden Bush MD;  Location: [facility] (2ND FLR);  Service: Endoscopy;  Laterality: N/A;    CORONARY ARTERY BYPASS GRAFT  2005    5 arteries    CORONARY BYPASS GRAFT ANGIOGRAPHY  11/16/2018    Procedure: Bypass graft study;  Surgeon: Ryan Schmidt MD;  Location: [facility] CATH LAB;  Service: Cardiology;;    ESOPHAGEAL MANOMETRY WITH MEASUREMENT OF IMPEDANCE N/A 3/10/2022    Procedure: MANOMETRY, ESOPHAGUS, WITH IMPEDANCE MEASUREMENT;  Surgeon: Carmelita Jacobsen MD;  Location: [facility] (4TH FLR);  Service: Endoscopy;  Laterality: N/A;  RAPID COVID test, pt to arrive for 6:00 am-Kpvt    ESOPHAGOGASTRODUODENOSCOPY N/A 4/25/2022    Procedure: EGD (ESOPHAGOGASTRODUODENOSCOPY);  Surgeon: Branden Bush MD;  Location: [facility] ENDO (2ND FLR);  Service: Endoscopy;  Laterality: N/A;  Bravo procedure canceled- pt has an ICD ([employer])- will do 24h pH study  instead  ok to hold Brilinta and Plavix-RB  most recent ECHO 2022- EF 25%    EYE SURGERY Bilateral     Laser surgery for glaucoma    INSERTION OF BIVENTRICULAR IMPLANTABLE CARDIOVERTER-DEFIBRILLATOR (ICD) Left 2022    Procedure: INSERTION, ICD, BIVENTRICULAR;  Surgeon: Byron Glasgow MD;  Location: [facility] EP LAB;  Service: Cardiology;  Laterality: Left;  HF/ICM, Venogram prior to opening, CRT-D upgrade, SJM, MAC, GP, 3 PREP    INSERTION OF IMPLANTABLE CARDIOVERTER-DEFIBRILLATOR (ICD) GENERATOR WITH TWO EXISTING LEADS Left 2018    Procedure: INSERTION, DUAL ICD;  Surgeon: Byron Glasgow MD;  Location: [facility] EP LAB;  Service: Cardiology;  Laterality: Left;  VT, EPS +/- ICD, SJM, anes, GP, 6086    LEFT HEART CATHETERIZATION Left 2018    Procedure: Left heart cath;  Surgeon: Ryan Schmidt MD;  Location: [facility] CATH LAB;  Service: Cardiology;  Laterality: Left;     Family History   Problem Relation Age of Onset    Diabetes Mother     Heart disease Mother     Hypertension Sister     Diabetes Sister     Heart disease Sister     Heart attack Brother     Colon cancer Neg Hx     Esophageal cancer Neg Hx     Stomach cancer Neg Hx      Social History     Tobacco Use    Smoking status: Former     Packs/day: 3.00     Types: Cigarettes     Start date: 1963     Quit date: 1981     Years since quittin.4     Passive exposure: Past    Smokeless tobacco: Former     Types: Snuff, Chew     Quit date:    Substance Use Topics    Alcohol use: No    Drug use: No     Review of Systems   Constitutional: Negative.    HENT: Negative.     Eyes: Negative.    Respiratory:  Positive for cough.    Cardiovascular: Negative.    Gastrointestinal: Negative.    Genitourinary: Negative.    Musculoskeletal: Negative.    Skin: Negative.    Neurological: Negative.    Hematological: Negative.      Physical Exam     Initial Vitals [23 1639]   BP Pulse Resp Temp SpO2   (!) 145/65 61 18 98 °F (36.7 °C) 98 %      MAP        --         Physical Exam    Nursing note and vitals reviewed.  Constitutional: He appears well-developed and well-nourished.   HENT:   Head: Normocephalic and atraumatic.   Eyes: EOM are normal. Pupils are equal, round, and reactive to light.   Neck: Neck supple.   Normal range of motion.  Cardiovascular:  Normal rate, regular rhythm and normal heart sounds.           Pulmonary/Chest: Breath sounds normal.   Abdominal: Abdomen is soft.   Musculoskeletal:         General: Normal range of motion.      Cervical back: Normal range of motion and neck supple.     Neurological: He is alert and oriented to person, place, and time. GCS score is 15. GCS eye subscore is 4. GCS verbal subscore is 5. GCS motor subscore is 6.   Skin: Skin is warm.   Psychiatric: He has a normal mood and affect.       ED Course   Procedures  Labs Reviewed   B-TYPE NATRIURETIC PEPTIDE - Abnormal; Notable for the following components:       Result Value     (*)     All other components within normal limits   CBC W/ AUTO DIFFERENTIAL - Abnormal; Notable for the following components:    RBC 3.90 (*)     Hemoglobin 11.6 (*)     Hematocrit 36.0 (*)     Gran % 37.1 (*)     Mono % 16.5 (*)     All other components within normal limits   COMPREHENSIVE METABOLIC PANEL - Abnormal; Notable for the following components:    Creatinine 1.8 (*)     eGFR 39 (*)     All other components within normal limits   TROPONIN I - Abnormal; Notable for the following components:    Troponin I 0.029 (*)     All other components within normal limits   INFLUENZA A & B BY MOLECULAR   CULTURE, BLOOD   CULTURE, BLOOD   LACTIC ACID, PLASMA   APTT   PROTIME-INR   SARS-COV-2 RNA AMPLIFICATION, QUAL   TROPONIN I     EKG Readings: (Independently Interpreted)   PACED RHYTHM WITH A RATE OF 63.       Imaging Results              X-Ray Chest AP Portable (Final result)  Result time 06/07/23 17:14:14      Final result by Praveen Kimball Jr., MD (06/07/23 17:14:14)                    Impression:      Possible left lower lobe pneumonia.  Mild cardiomegaly.  Prior sternotomy.  AICD in place.      Electronically signed by: Praveen Kimball MD  Date:    06/07/2023  Time:    17:14               Narrative:    EXAMINATION:  XR CHEST AP PORTABLE    CLINICAL HISTORY:  shortness of breath;    TECHNIQUE:  Single frontal view of the chest was performed.    COMPARISON:  Chest portable of April 7, 2022    FINDINGS:  An AICD is noted in place on the left with leads to the right heart.  The patient has had a prior sternotomy.  There is mild cardiomegaly.  There is an increased density in the left lower lobe which may represent an infiltrate and pneumonia.  The right lung is clear.  No pneumothorax or pleural effusion is noted.                                    X-Rays:   Independently Interpreted Readings:   Other Readings:  CHEST RADIOGRAPH REVEALED A LEFT LOWER LOBE PNEUMONIA IN ADDITION TO   CARDIOMEGALY.   Medications   azithromycin (ZITHROMAX) 500 mg in dextrose 5 % (D5W) 250 mL IVPB (Vial-Mate) (500 mg Intravenous New Bag 6/7/23 1938)   albuterol-ipratropium 2.5 mg-0.5 mg/3 mL nebulizer solution 3 mL (3 mLs Nebulization Given 6/7/23 1702)   cefTRIAXone (ROCEPHIN) 2 g in dextrose 5 % in water (D5W) 5 % 100 mL IVPB (MB+) (0 g Intravenous Stopped 6/7/23 1929)     Medical Decision Making:   Differential Diagnosis:   PNEUMONIA, NSTEMI, STEMI, CONGESTIVE HEART FAILURE, INFLUENZA, COVID  ED Management:  73 YO MALE WHO COMES IN TODAY DUE TO A COUGH.  HE STATES THAT HE WAS SENT OVER FROM URGENT CARE  FOR EVALUATION AND TREATMENT.  ON EVALUATION, THE PATIENT HAS A LEFT LOWER LOBE PNEUMONIA IN   ADDITION TO AN ELEVATED TROPONIN.  HE DOES HAVE AN EXTENSIVE CARDIAC HISTORY FOR WHICH HE   DOES SEE CARDIOLOGY IN [place].  I HAVE INFORMED THE PATIENT IN REGARDS TO HIS RESULTS.    HE IS IN AGREEMENT WITH BEING ADMITTED FOR SERIAL CARDIAC ENZYMES AND IV ANTIBIOTICS.  WILL REACH OUT   TO THE HOSPITALIST IN  REGARDS TO ADMITTING THE PATIENT.      I DID SPEAK WITH THE HOSPITALIST IN REGARDS TO ADMITTING THE PATIENT.  HOSPITALIST RECOMMENDED CHECKING  ANOTHER TROPONIN PRIOR TO ADMISSION VS DISCHARGE.  THE PATIENT IS AWARE OF THE PLAN OF CARE.   THE PATIENT IS APPROPRIATE FOR OUTPATIENT THERAPY FOR HIS PNEUMONIA.  HE DOES HAVE AN EXTENSIVE   CARDIAC HISTORY.  SECOND CARDIAC ENZYME IS PENDING.     SECOND CARDIAC ENZYME IS DECREASED.  PLAN TO DISCHARGE HOME.  THE PATIENT IS AWARE OF THE PLAN OF CARE.    I HAVE SPOKEN WITH THE HOSPITALIST IN REGARDS TO THE PATIENT'S RESULTS.  HOME TODAY WITH OUTPATIENT   ANTIBIOTIC THERAPY.            ED Course as of 06/07/23 2032 Wed Jun 07, 2023 1723 WBC: 6.19 [NB]   1723 Temp: 98 °F (36.7 °C) [NB]   1723 Pulse: 79 [NB]      ED Course User Index  [NB] Jacinto Romero MD                 Clinical Impression:   Final diagnoses:  [R06.02] Shortness of breath  [J18.9] Pneumonia of left lower lobe due to infectious organism (Primary)  [I50.9] Heart failure, unspecified HF chronicity, unspecified heart failure type  [R77.8] Elevated troponin        ED Disposition Condition    Discharge Stable          ED Prescriptions       Medication Sig Dispense Start Date End Date Auth. Provider    cefdinir (OMNICEF) 300 MG capsule  (Status: Discontinued) Take 1 capsule (300 mg total) by mouth 2 (two) times daily. for 10 days 20 capsule 6/7/2023 6/7/2023 Jacinto Romero MD    azithromycin (Z-ARELI) 250 MG tablet  (Status: Discontinued) Take 2 tablets by mouth on day 1; Take 1 tablet by mouth on days 2-5 6 tablet 6/7/2023 6/7/2023 Jacinto Romero MD    albuterol (PROVENTIL/VENTOLIN HFA) 90 mcg/actuation inhaler  (Status: Discontinued) Inhale 1-2 puffs into the lungs every 6 (six) hours as needed for Wheezing. Rescue 1 g 6/7/2023 6/7/2023 Jacinto Romero MD    levoFLOXacin (LEVAQUIN) 500 MG tablet Take 1 tablet (500 mg total) by mouth once daily. for 10 days 10 tablet 6/7/2023 6/17/2023 Monalisa KAUR  MD Javier    albuterol (PROVENTIL/VENTOLIN HFA) 90 mcg/actuation inhaler Inhale 2 puffs into the lungs every 4 (four) hours as needed for Wheezing or Shortness of Breath. Rescue 18 g 6/7/2023 6/17/2023 Monalisa Herrera MD          Follow-up Information       Follow up With Specialties Details Why Contact Info    Belkys Kruger MD Internal Medicine Schedule an appointment as soon as possible for a visit in 2 days  [address] [place] [phone]  [phone]      [facility] - Emergency Dept Emergency Medicine  If symptoms worsen [address] [place] [phone]  [phone]             Monalisa Herrera MD  06/07/23 2032

## 2023-06-07 NOTE — ED PROVIDER NOTES
Encounter Date: 6/7/2023       History     Chief Complaint   Patient presents with    Cough     Patient to ER reports he has been having a cough for 3 days. States he has been getting SOB with exertion     72-year-old male with history of diabetes, paroxysmal AFib, CHF, prior MI, CABG, presenting with shortness of breath for 2-3 days.  Patient denies any chest pain today, but states that yesterday he would had a short bout of chest pain (about 10 minutes), took a nitroglycerin with some relief.  Patient denies any fever, but does endorse body aches last night.  Patient does have chronic cough, but states that is worse over the last few days.  Endorses leg swelling and feels as though he is retaining fluid.  Patient was seen at urgent care, found to have negative COVID and negative flu swabs.  Patient states that he thinks he may have been diagnosed with COPD at one point in his life.    Review of patient's allergies indicates:  No Known Allergies  Past Medical History:   Diagnosis Date    Anemia     Angina pectoris     Anticoagulant long-term use     Arthritis     Back pain     CHF (congestive heart failure)     Chronic bronchitis     Colon cancer screening 2/2/2017    Coronary artery disease     Diabetes mellitus type I     Diabetes with neurologic complications     Disorder of kidney and ureter     Encounter for blood transfusion     Glaucoma     Heart attack     09/2018    Heart disease     Hyperlipidemia     Hypertension     Hypothyroidism     Iron deficiency     Kidney failure     post CABG    Obesity     Paroxysmal atrial fibrillation 1/12/2023    Peripheral vascular disease     Polyneuropathy     Pulmonary emphysema 2/26/2020    Renal manifestation of secondary diabetes mellitus     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    Sleep apnea     had CPAP but had recall- not currently using     Trouble in sleeping     Type 2 diabetes mellitus with ophthalmic manifestations      Past Surgical History:   Procedure Laterality Date     24 HOUR IMPEDANCE PH MONITORING OF ESOPHAGUS IN PATIENT TAKING ACID REDUCING MEDICATIONS N/A 3/10/2022    Procedure: IMPEDANCE PH STUDY, ESOPHAGEAL, 24 HOUR, IN PATIENT TAKING ACID REDUCING MEDICATION;  Surgeon: Carmelita Jacobsen MD;  Location: [facility] (4TH FLR);  Service: Endoscopy;  Laterality: N/A;  ICD-  fully vaccinated  Yes he can hold Plavix 5 days prior to endoscopies and restart post procedure when safe from a operator perspective per Dr.A. Canada    CARDIAC DEFIBRILLATOR PLACEMENT      CARDIAC ELECTROPHYSIOLOGY STUDY N/A 11/19/2018    Procedure: CARDIAC ELECTROPHYSIOLOGY STUDY;  Surgeon: Byron Glasgow MD;  Location: [facility] EP LAB;  Service: Cardiology;  Laterality: N/A;  VT, EPS +/- ICD, SJM, anes, GP, 6086    CARDIAC ELECTROPHYSIOLOGY STUDY N/A 11/19/2018    Procedure: CARDIAC ELECTROPHYSIOLOGY STUDY;  Surgeon: Byron Glasgow MD;  Location: [facility] EP LAB;  Service: Cardiology;  Laterality: N/A;    COLON SURGERY  2006    COLONOSCOPY N/A 2/2/2017    Procedure: COLONOSCOPY;  Surgeon: Ronnie Conway MD;  Location: [facility];  Service: Endoscopy;  Laterality: N/A;    COLONOSCOPY N/A 4/25/2022    Procedure: COLONOSCOPY;  Surgeon: Branden Bush MD;  Location: [facility] (2ND FLR);  Service: Endoscopy;  Laterality: N/A;    CORONARY ARTERY BYPASS GRAFT  2005    5 arteries    CORONARY BYPASS GRAFT ANGIOGRAPHY  11/16/2018    Procedure: Bypass graft study;  Surgeon: Ryan Schmidt MD;  Location: [facility] CATH LAB;  Service: Cardiology;;    ESOPHAGEAL MANOMETRY WITH MEASUREMENT OF IMPEDANCE N/A 3/10/2022    Procedure: MANOMETRY, ESOPHAGUS, WITH IMPEDANCE MEASUREMENT;  Surgeon: Carmelita Jacobsen MD;  Location: [facility] (4TH FLR);  Service: Endoscopy;  Laterality: N/A;  RAPID COVID test, pt to arrive for 6:00 am-Kpvt    ESOPHAGOGASTRODUODENOSCOPY N/A 4/25/2022    Procedure: EGD (ESOPHAGOGASTRODUODENOSCOPY);  Surgeon: Branden Bush MD;  Location: [facility] (2ND FLR);  Service: Endoscopy;  Laterality: N/A;  Bravo  procedure canceled- pt has an ICD ([employer])- will do 24h pH study instead  ok to hold Brilinta and Plavix-RB  most recent ECHO 2022- EF 25%    EYE SURGERY Bilateral     Laser surgery for glaucoma    INSERTION OF BIVENTRICULAR IMPLANTABLE CARDIOVERTER-DEFIBRILLATOR (ICD) Left 2022    Procedure: INSERTION, ICD, BIVENTRICULAR;  Surgeon: Byron Glasgow MD;  Location: [facility] EP LAB;  Service: Cardiology;  Laterality: Left;  HF/ICM, Venogram prior to opening, CRT-D upgrade, SJM, MAC, GP, 3 PREP    INSERTION OF IMPLANTABLE CARDIOVERTER-DEFIBRILLATOR (ICD) GENERATOR WITH TWO EXISTING LEADS Left 2018    Procedure: INSERTION, DUAL ICD;  Surgeon: Byron Glasgow MD;  Location: [facility] EP LAB;  Service: Cardiology;  Laterality: Left;  VT, EPS +/- ICD, SJM, anes, GP, 6086    LEFT HEART CATHETERIZATION Left 2018    Procedure: Left heart cath;  Surgeon: Ryan Schmidt MD;  Location: [facility] CATH LAB;  Service: Cardiology;  Laterality: Left;     Family History   Problem Relation Age of Onset    Diabetes Mother     Heart disease Mother     Hypertension Sister     Diabetes Sister     Heart disease Sister     Heart attack Brother     Colon cancer Neg Hx     Esophageal cancer Neg Hx     Stomach cancer Neg Hx      Social History     Tobacco Use    Smoking status: Former     Packs/day: 3.00     Types: Cigarettes     Start date: 1963     Quit date: 1981     Years since quittin.4     Passive exposure: Past    Smokeless tobacco: Former     Types: Snuff, Chew     Quit date:    Substance Use Topics    Alcohol use: No    Drug use: No     Review of Systems   Constitutional:  Negative for fever.   HENT:  Negative for sore throat.    Respiratory:  Positive for cough and shortness of breath.    Cardiovascular:  Positive for chest pain.   Gastrointestinal:  Negative for abdominal pain, diarrhea, nausea and vomiting.   Genitourinary:  Negative for dysuria.   Musculoskeletal:  Negative for back pain.   Skin:   Negative for rash.   Neurological:  Negative for weakness.   Hematological:  Does not bruise/bleed easily.     Physical Exam     Initial Vitals [06/07/23 1639]   BP Pulse Resp Temp SpO2   (!) 145/65 61 18 98 °F (36.7 °C) 98 %      MAP       --         Physical Exam    Nursing note and vitals reviewed.  Constitutional: He appears well-developed. He is not diaphoretic. No distress.   Eyes: EOM are normal.   Neck:   Normal range of motion.  Cardiovascular:            No murmur heard.  Pulmonary/Chest: No respiratory distress.   Bilateral wheezing at bases.  Good air movement.  No crackles.  No accessory muscle use.  No respiratory distress.   Abdominal: He exhibits no distension.   Musculoskeletal:         General: Normal range of motion.      Cervical back: Normal range of motion.     Neurological: He is alert and oriented to person, place, and time.   Skin: Skin is warm.   Psychiatric: He has a normal mood and affect.       ED Course   Procedures  Labs Reviewed   B-TYPE NATRIURETIC PEPTIDE - Abnormal; Notable for the following components:       Result Value     (*)     All other components within normal limits   CBC W/ AUTO DIFFERENTIAL - Abnormal; Notable for the following components:    RBC 3.90 (*)     Hemoglobin 11.6 (*)     Hematocrit 36.0 (*)     Gran % 37.1 (*)     Mono % 16.5 (*)     All other components within normal limits   COMPREHENSIVE METABOLIC PANEL - Abnormal; Notable for the following components:    Creatinine 1.8 (*)     eGFR 39 (*)     All other components within normal limits   TROPONIN I - Abnormal; Notable for the following components:    Troponin I 0.029 (*)     All other components within normal limits          Imaging Results              X-Ray Chest AP Portable (Final result)  Result time 06/07/23 17:14:14      Final result by Praveen Kimball Jr., MD (06/07/23 17:14:14)                   Impression:      Possible left lower lobe pneumonia.  Mild cardiomegaly.  Prior sternotomy.   AICD in place.      Electronically signed by: Praveen Kimball MD  Date:    06/07/2023  Time:    17:14               Narrative:    EXAMINATION:  XR CHEST AP PORTABLE    CLINICAL HISTORY:  shortness of breath;    TECHNIQUE:  Single frontal view of the chest was performed.    COMPARISON:  Chest portable of April 7, 2022    FINDINGS:  An AICD is noted in place on the left with leads to the right heart.  The patient has had a prior sternotomy.  There is mild cardiomegaly.  There is an increased density in the left lower lobe which may represent an infiltrate and pneumonia.  The right lung is clear.  No pneumothorax or pleural effusion is noted.                                       Medications   sodium chloride 0.9% bolus 500 mL 500 mL (500 mLs Intravenous New Bag 6/7/23 1754)   albuterol-ipratropium 2.5 mg-0.5 mg/3 mL nebulizer solution 3 mL (3 mLs Nebulization Given 6/7/23 1702)     Medical Decision Making:   Differential Diagnosis:   DDX:  Likely CHF exacerbation versus COPD exacerbation. R/o PNA. Unlikely PE given history, physical, risk factor analysis, +other more likely diagnosis.  Will screen for ACS given significant risk factors.   DX:  CBC, CMP, troponin, BNP, chest x-ray, EKG  TX:  Pending lab work, Lasix if indicated.  Antibiotics if indicated by chest x-ray.  Dispo: Pending studies. If symptoms controlled, studies WNL or stable for outpatient management, discharge to follow up with PMD within 3-5 days. If no improvement in respiratory distress with appropriate observation in ED, consider observation vs. admission for CHF exacerbation.             ED Course as of 06/07/23 1758 Wed Jun 07, 2023   1723 WBC: 6.19 [NB]   1723 Temp: 98 °F (36.7 °C) [NB]   1723 Pulse: 79 [NB]      ED Course User Index  [NB] Jacinto Romero MD                 Clinical Impression:   Final diagnoses:  [R06.02] Shortness of breath  [J18.9] Pneumonia of left lower lobe due to infectious organism (Primary)               Jacinto  KEKE Romero MD  06/07/23 6018       Jacinto Romero MD  06/07/23 2795

## 2023-06-07 NOTE — DISCHARGE INSTRUCTIONS
Please follow up with your primary care physician within 2 days. Ensure that you review all lab work results and/or imaging results. If you have any questions about your discharge paperwork please call the Emergency Department.     TAKE MEDICINES AS PRESCRIBED.  RETURN TO THE ED FOR WORSENING OF CONDITION.      Return to the Emergency Department for any fevers, worsening cough or congestion, shortness of breath, chest pain, nausea, vomiting, diarrhea, if symptoms do not improve, or for any new or worsening symptoms, unless otherwise told.     If you were prescribed antibiotics, please take them to completion. If you were prescribed a narcotic or any sedating medication, do not drive or operate heavy equipment or machinery while taking these medications.    Thank you for visiting [facility], Department of Emergency Medicine. Please see the entirety of the educational materials provided. Please note that a visit to the emergency department does not substitute ongoing care from a primary medical provider or specialist. Please ensure to follow up as recommended. However, please return to the emergency department immediately if symptoms do not improve as discussed, symptoms worsen, new symptoms develop, difficulty in following up or for any of your concerns or issues. Please note on discharge you are acknowledging understanding and agreement on medical evaluation, management recommendations and follow up recommendations.

## 2023-06-07 NOTE — PROGRESS NOTES
"Subjective:      Patient ID: Walter Bull is a 72 y.o. male.    Vitals:  height is 5' 9" (1.753 m) and weight is 114.3 kg (252 lb). His oral temperature is 98.7 °F (37.1 °C). His blood pressure is 163/78 (abnormal) and his pulse is 67. His respiration is 19 and oxygen saturation is 98%.     Chief Complaint: Cough    Patient reports he has been having a productive cough for 3 days. Stated today he was cutting his grass and had to take a break due to SOB with exertion. Stated that was not normal for him. Stated he has increasing SOB from his baseline. Stated he took two lasix last night because he had increase swelling in his legs. Two days ago patient took a nitroglycerin due to chest pain. Stated chest pain went away and does not endorse CP currently in the clinic. Patient has been using albuterol inhaler for wheezing.     Cough  This is a new problem. Episode onset: 3 days. The problem has been gradually improving. The problem occurs every few minutes. The cough is Productive of sputum. Associated symptoms include a sore throat and wheezing. Pertinent negatives include no fever, nasal congestion, postnasal drip or rhinorrhea. Treatments tried: inhaler. His past medical history is significant for bronchitis and emphysema. There is no history of COPD or environmental allergies.     Constitution: Negative for fever.   HENT:  Positive for sore throat. Negative for postnasal drip.    Cardiovascular:  Positive for leg swelling and sob on exertion.   Respiratory:  Positive for cough, sputum production and wheezing.    Allergic/Immunologic: Negative for environmental allergies.    Objective:     Physical Exam   Constitutional: He is oriented to person, place, and time. He appears well-developed. He is cooperative.  Non-toxic appearance. He does not appear ill. No distress.   HENT:   Head: Normocephalic and atraumatic.   Ears:   Right Ear: Hearing, tympanic membrane, external ear and ear canal normal.   Left Ear: " Hearing, tympanic membrane, external ear and ear canal normal.   Nose: Congestion present. No mucosal edema, rhinorrhea or nasal deformity. No epistaxis. Right sinus exhibits no maxillary sinus tenderness and no frontal sinus tenderness. Left sinus exhibits no maxillary sinus tenderness and no frontal sinus tenderness.   Mouth/Throat: Uvula is midline, oropharynx is clear and moist and mucous membranes are normal. Mucous membranes are moist. No trismus in the jaw. Normal dentition. No uvula swelling. No oropharyngeal exudate, posterior oropharyngeal edema or posterior oropharyngeal erythema.   Eyes: Conjunctivae and lids are normal. No scleral icterus.   Neck: Trachea normal and phonation normal. Neck supple. No edema present. No erythema present. No neck rigidity present.   Cardiovascular: Normal rate.   Pulmonary/Chest: Effort normal. No respiratory distress. He has no decreased breath sounds. He has no wheezes.         Comments: Patient able to talk in complete sentences. No wheezing noted on auscultation. No accessory muscle use.     Abdominal: Normal appearance.   Musculoskeletal: Normal range of motion.         General: No deformity. Normal range of motion.      Right lower leg: Edema present.      Left lower leg: Edema present.   Neurological: He is alert and oriented to person, place, and time. He exhibits normal muscle tone. Coordination normal.   Skin: Skin is warm, dry, intact, not diaphoretic and not pale.   Psychiatric: His speech is normal and behavior is normal. Judgment and thought content normal.   Nursing note and vitals reviewed.  Results for orders placed or performed in visit on 06/07/23   SARS Coronavirus 2 Antigen, POCT Manual Read   Result Value Ref Range    SARS Coronavirus 2 Antigen Negative Negative     Acceptable Yes    POCT Influenza A/B MOLECULAR   Result Value Ref Range    POC Molecular Influenza A Ag Negative Negative, Not Reported    POC Molecular Influenza B Ag  Negative Negative, Not Reported     Acceptable Yes      *Note: Due to a large number of results and/or encounters for the requested time period, some results have not been displayed. A complete set of results can be found in Results Review.       Assessment:     1. Cough, unspecified type    2. Dyspnea on exertion    3. Leg swelling    4. History of CHF (congestive heart failure)        Plan:       Cough, unspecified type  -     SARS Coronavirus 2 Antigen, POCT Manual Read  -     POCT Influenza A/B MOLECULAR  -     Refer to Emergency Dept.    Dyspnea on exertion  -     Refer to Emergency Dept.    Leg swelling  -     Refer to Emergency Dept.    History of CHF (congestive heart failure)  -     Refer to Emergency Dept.          Medical Decision Making:   Differential Diagnosis:   Covid/flu, viral URI, acute bronchitis, CHF exacerbation, pneumonia   No CXR available in clinic due to radiologist technician available today. Patient made aware that not CXR is available in clinic. VS stable. O2 is 98% on RA.     Due to high risk of complications the patient is being sent to ER for further evaluation, treatment and possible hospitalizations.    GO TO NEAREST EMERGENCY DEPARTMENT FOR FURTHER EVALUATION AS YOUR CONDITION MAY BE LIFE THREATENING.     Recommended patient go to nearest ER for further evaluation. Discussed limitations of urgent care with patient and that no CXR is available in clinic. Pt verbalized understanding and stated he will go to [place] by private vehicle. Pt declined transfer by [facility] ambulance, discussed the risk of this decision with patient. Pt verbalized understanding.

## 2023-06-07 NOTE — PATIENT INSTRUCTIONS
GO TO NEAREST EMERGENCY DEPARTMENT FOR FURTHER EVALUATION AS YOUR CONDITION MAY BE LIFE THREATENING.

## 2023-06-07 NOTE — ED NOTES
Pt states he has an insulin pump to right abdominal wall which dispenses humalog 20U/day (through out the day).  States he is unsure of exact dosaging with each meal.  Pt also has continuous CBG monitoring.

## 2023-06-08 ENCOUNTER — TELEPHONE (OUTPATIENT)
Dept: URGENT CARE | Facility: CLINIC | Age: 73
End: 2023-06-08
Payer: MEDICARE

## 2023-06-08 ENCOUNTER — PATIENT OUTREACH (OUTPATIENT)
Dept: EMERGENCY MEDICINE | Facility: HOSPITAL | Age: 73
End: 2023-06-08
Payer: MEDICARE

## 2023-06-08 NOTE — PROGRESS NOTES
Patient agreed to scheduling assistance for post ED 7-day follow up with PCP. The first available was not until 6/27/23, a staff message was sent to Dr Kruger's office for scheduling assistance.

## 2023-06-08 NOTE — PROGRESS NOTES
MILTON Stone MA  Appointment scheduled for 6/13/23 @ 9:30.   Thanks,   Ivanna Washington LPN   [facility]           Patient was notified of scheduled date and time of post ED 7-day follow up with Dr Kruger. Patient will receive an appointment reminder.

## 2023-06-08 NOTE — TELEPHONE ENCOUNTER
Called patient to follow up on yesterday's appointment. Pt stated he is doing well and was kept at [facility] a while yesterday. Pt is back home now. Stated he was diagnosed with pneumonia and given antibiotics for home and breathing treatments. Pt stated he is doing okay. Told patient to call clinic if he has any further concerns of questions.

## 2023-06-09 ENCOUNTER — SPECIALTY PHARMACY (OUTPATIENT)
Dept: PHARMACY | Facility: CLINIC | Age: 73
End: 2023-06-09
Payer: MEDICARE

## 2023-06-12 ENCOUNTER — PATIENT OUTREACH (OUTPATIENT)
Dept: EMERGENCY MEDICINE | Facility: HOSPITAL | Age: 73
End: 2023-06-12
Payer: MEDICARE

## 2023-06-12 NOTE — PROGRESS NOTES
ED Navigator reminded the patient of scheduled appointment with Dr Kruger on 6/13/23 at 9:30 am. Patient agreed to appointment date and time. Follow up encounter closed.

## 2023-06-13 ENCOUNTER — OFFICE VISIT (OUTPATIENT)
Dept: INTERNAL MEDICINE | Facility: CLINIC | Age: 73
End: 2023-06-13
Payer: MEDICARE

## 2023-06-13 ENCOUNTER — PATIENT MESSAGE (OUTPATIENT)
Dept: INTERNAL MEDICINE | Facility: CLINIC | Age: 73
End: 2023-06-13

## 2023-06-13 VITALS
SYSTOLIC BLOOD PRESSURE: 148 MMHG | BODY MASS INDEX: 37.16 KG/M2 | DIASTOLIC BLOOD PRESSURE: 74 MMHG | RESPIRATION RATE: 16 BRPM | OXYGEN SATURATION: 98 % | HEART RATE: 60 BPM | WEIGHT: 250.88 LBS | HEIGHT: 69 IN

## 2023-06-13 DIAGNOSIS — J13 PNEUMONIA OF LEFT LOWER LOBE DUE TO STREPTOCOCCUS PNEUMONIAE: ICD-10-CM

## 2023-06-13 DIAGNOSIS — Z09 HOSPITAL DISCHARGE FOLLOW-UP: Primary | ICD-10-CM

## 2023-06-13 LAB
BACTERIA BLD CULT: NORMAL
BACTERIA BLD CULT: NORMAL

## 2023-06-13 PROCEDURE — 3288F PR FALLS RISK ASSESSMENT DOCUMENTED: ICD-10-PCS | Mod: CPTII,S$GLB,, | Performed by: INTERNAL MEDICINE

## 2023-06-13 PROCEDURE — 99214 PR OFFICE/OUTPT VISIT, EST, LEVL IV, 30-39 MIN: ICD-10-PCS | Mod: S$GLB,,, | Performed by: INTERNAL MEDICINE

## 2023-06-13 PROCEDURE — 1159F MED LIST DOCD IN RCRD: CPT | Mod: CPTII,S$GLB,, | Performed by: INTERNAL MEDICINE

## 2023-06-13 PROCEDURE — 1160F RVW MEDS BY RX/DR IN RCRD: CPT | Mod: CPTII,S$GLB,, | Performed by: INTERNAL MEDICINE

## 2023-06-13 PROCEDURE — 99999 PR PBB SHADOW E&M-EST. PATIENT-LVL V: ICD-10-PCS | Mod: PBBFAC,,, | Performed by: INTERNAL MEDICINE

## 2023-06-13 PROCEDURE — 99499 UNLISTED E&M SERVICE: CPT | Mod: S$GLB,,, | Performed by: INTERNAL MEDICINE

## 2023-06-13 PROCEDURE — 1160F PR REVIEW ALL MEDS BY PRESCRIBER/CLIN PHARMACIST DOCUMENTED: ICD-10-PCS | Mod: CPTII,S$GLB,, | Performed by: INTERNAL MEDICINE

## 2023-06-13 PROCEDURE — 1159F PR MEDICATION LIST DOCUMENTED IN MEDICAL RECORD: ICD-10-PCS | Mod: CPTII,S$GLB,, | Performed by: INTERNAL MEDICINE

## 2023-06-13 PROCEDURE — 3008F BODY MASS INDEX DOCD: CPT | Mod: CPTII,S$GLB,, | Performed by: INTERNAL MEDICINE

## 2023-06-13 PROCEDURE — 3066F PR DOCUMENTATION OF TREATMENT FOR NEPHROPATHY: ICD-10-PCS | Mod: CPTII,S$GLB,, | Performed by: INTERNAL MEDICINE

## 2023-06-13 PROCEDURE — 3066F NEPHROPATHY DOC TX: CPT | Mod: CPTII,S$GLB,, | Performed by: INTERNAL MEDICINE

## 2023-06-13 PROCEDURE — 3061F NEG MICROALBUMINURIA REV: CPT | Mod: CPTII,S$GLB,, | Performed by: INTERNAL MEDICINE

## 2023-06-13 PROCEDURE — 3008F PR BODY MASS INDEX (BMI) DOCUMENTED: ICD-10-PCS | Mod: CPTII,S$GLB,, | Performed by: INTERNAL MEDICINE

## 2023-06-13 PROCEDURE — 1126F PR PAIN SEVERITY QUANTIFIED, NO PAIN PRESENT: ICD-10-PCS | Mod: CPTII,S$GLB,, | Performed by: INTERNAL MEDICINE

## 2023-06-13 PROCEDURE — 3061F PR NEG MICROALBUMINURIA RESULT DOCUMENTED/REVIEW: ICD-10-PCS | Mod: CPTII,S$GLB,, | Performed by: INTERNAL MEDICINE

## 2023-06-13 PROCEDURE — 3044F PR MOST RECENT HEMOGLOBIN A1C LEVEL <7.0%: ICD-10-PCS | Mod: CPTII,S$GLB,, | Performed by: INTERNAL MEDICINE

## 2023-06-13 PROCEDURE — 3078F DIAST BP <80 MM HG: CPT | Mod: CPTII,S$GLB,, | Performed by: INTERNAL MEDICINE

## 2023-06-13 PROCEDURE — 99999 PR PBB SHADOW E&M-EST. PATIENT-LVL V: CPT | Mod: PBBFAC,,, | Performed by: INTERNAL MEDICINE

## 2023-06-13 PROCEDURE — 3077F PR MOST RECENT SYSTOLIC BLOOD PRESSURE >= 140 MM HG: ICD-10-PCS | Mod: CPTII,S$GLB,, | Performed by: INTERNAL MEDICINE

## 2023-06-13 PROCEDURE — 1101F PR PT FALLS ASSESS DOC 0-1 FALLS W/OUT INJ PAST YR: ICD-10-PCS | Mod: CPTII,S$GLB,, | Performed by: INTERNAL MEDICINE

## 2023-06-13 PROCEDURE — 3078F PR MOST RECENT DIASTOLIC BLOOD PRESSURE < 80 MM HG: ICD-10-PCS | Mod: CPTII,S$GLB,, | Performed by: INTERNAL MEDICINE

## 2023-06-13 PROCEDURE — 4010F PR ACE/ARB THEARPY RXD/TAKEN: ICD-10-PCS | Mod: CPTII,S$GLB,, | Performed by: INTERNAL MEDICINE

## 2023-06-13 PROCEDURE — 99214 OFFICE O/P EST MOD 30 MIN: CPT | Mod: S$GLB,,, | Performed by: INTERNAL MEDICINE

## 2023-06-13 PROCEDURE — 3044F HG A1C LEVEL LT 7.0%: CPT | Mod: CPTII,S$GLB,, | Performed by: INTERNAL MEDICINE

## 2023-06-13 PROCEDURE — 3288F FALL RISK ASSESSMENT DOCD: CPT | Mod: CPTII,S$GLB,, | Performed by: INTERNAL MEDICINE

## 2023-06-13 PROCEDURE — 1101F PT FALLS ASSESS-DOCD LE1/YR: CPT | Mod: CPTII,S$GLB,, | Performed by: INTERNAL MEDICINE

## 2023-06-13 PROCEDURE — 3077F SYST BP >= 140 MM HG: CPT | Mod: CPTII,S$GLB,, | Performed by: INTERNAL MEDICINE

## 2023-06-13 PROCEDURE — 4010F ACE/ARB THERAPY RXD/TAKEN: CPT | Mod: CPTII,S$GLB,, | Performed by: INTERNAL MEDICINE

## 2023-06-13 PROCEDURE — 1126F AMNT PAIN NOTED NONE PRSNT: CPT | Mod: CPTII,S$GLB,, | Performed by: INTERNAL MEDICINE

## 2023-06-13 RX ORDER — BENZONATATE 200 MG/1
200 CAPSULE ORAL 3 TIMES DAILY PRN
Qty: 30 CAPSULE | Refills: 0 | Status: SHIPPED | OUTPATIENT
Start: 2023-06-13 | End: 2023-06-23

## 2023-06-13 RX ORDER — GUAIFENESIN 600 MG/1
1200 TABLET, EXTENDED RELEASE ORAL 2 TIMES DAILY
Qty: 30 TABLET | Refills: 0 | Status: SHIPPED | OUTPATIENT
Start: 2023-06-13 | End: 2023-07-05

## 2023-06-13 NOTE — PROGRESS NOTES
Subjective:       Patient ID: Walter Bull is a 72 y.o. male.    Chief Complaint: hospital discharge      HPI:  Patient is known to me and presents for ER follow up. He presented to ED 6/7/23 with cough and congestion. Diagnosed with LLL PNA.  Given zpak and levaquin. Completed zpak. Still taking levaquin. He reports he is still coughing but improving. He was feeling SOB with coughing spells but that is improving. Cough is still productive. No fevers. Using nebs intermittently for chest tightness.     Past Medical History:   Diagnosis Date    Anemia     Angina pectoris     Anticoagulant long-term use     Arthritis     Back pain     CHF (congestive heart failure)     Chronic bronchitis     Colon cancer screening 2/2/2017    Coronary artery disease     Diabetes mellitus type I     Diabetes with neurologic complications     Disorder of kidney and ureter     Encounter for blood transfusion     Glaucoma     Heart attack     09/2018    Heart disease     Hyperlipidemia     Hypertension     Hypothyroidism     Iron deficiency     Kidney failure     post CABG    Obesity     Paroxysmal atrial fibrillation 1/12/2023    Peripheral vascular disease     Polyneuropathy     Pulmonary emphysema 2/26/2020    Renal manifestation of secondary diabetes mellitus     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    Sleep apnea     had CPAP but had recall- not currently using     Trouble in sleeping     Type 2 diabetes mellitus with ophthalmic manifestations        Family History   Problem Relation Age of Onset    Diabetes Mother     Heart disease Mother     Hypertension Sister     Diabetes Sister     Heart disease Sister     Heart attack Brother     Colon cancer Neg Hx     Esophageal cancer Neg Hx     Stomach cancer Neg Hx        Social History     Socioeconomic History    Marital status:     Number of children: 5   Tobacco Use    Smoking status: Former     Packs/day: 3.00     Types: Cigarettes     Start date: 1/18/1963     Quit date: 1/1/1981      Years since quittin.4     Passive exposure: Past    Smokeless tobacco: Former     Types: Snuff, Chew     Quit date:    Substance and Sexual Activity    Alcohol use: No    Drug use: No    Sexual activity: Yes     Comment: -with a significant other       Review of Systems   Constitutional:  Positive for fatigue. Negative for activity change, fever and unexpected weight change.   HENT:  Negative for congestion, ear pain, hearing loss, rhinorrhea and sore throat.    Eyes:  Negative for pain, redness and visual disturbance.   Respiratory:  Positive for cough and chest tightness. Negative for shortness of breath and wheezing.    Cardiovascular:  Negative for chest pain, palpitations and leg swelling.   Gastrointestinal:  Negative for abdominal pain, constipation, diarrhea, nausea and vomiting.   Genitourinary:  Negative for decreased urine volume, dysuria, frequency and urgency.   Musculoskeletal:  Negative for back pain, joint swelling and neck pain.   Skin:  Negative for color change, rash and wound.   Neurological:  Negative for dizziness, tremors, weakness, light-headedness and headaches.       Objective:      Physical Exam  Vitals reviewed.   Constitutional:       General: He is not in acute distress.     Appearance: He is well-developed.   HENT:      Head: Normocephalic and atraumatic.      Right Ear: External ear normal.      Left Ear: External ear normal.   Eyes:      General:         Right eye: No discharge.         Left eye: No discharge.      Extraocular Movements: Extraocular movements intact.      Conjunctiva/sclera: Conjunctivae normal.   Neck:      Thyroid: No thyromegaly.   Cardiovascular:      Rate and Rhythm: Normal rate and regular rhythm.   Pulmonary:      Effort: Pulmonary effort is normal. No respiratory distress.      Breath sounds: Wheezing (RLL) present.   Skin:     General: Skin is warm and dry.   Neurological:      Mental Status: He is alert and oriented to person, place, and  time. Mental status is at baseline.   Psychiatric:         Behavior: Behavior normal.         Thought Content: Thought content normal.       Assessment:       1. Hospital discharge follow-up    2. Pneumonia of right lower lobe due to Streptococcus pneumoniae        Plan:       1. Hospital discharge follow-up  Meds reconciled  ER noted 6/7/23 personally reviewed  ER labs 6/7/23 personally reviewed and interprted:   Trop 0.0.29 then normalized  GFR 39, at baseline  WBC 6K, normal  Lactic acid 1.3, normal  CXR 6/7/23 personally reivewed and interpreted today showing haziness in left lower lobe concerning for pneumonia    2. Pneumonia of right lower lobe due to Streptococcus pneumoniae  Improving  Complete antibiotics. H/o a-fib but so far no issues with palpitations and RRR on exam today so will complete treatment. Off azithromycin  Nebs TID recommended, again watch for palpitations with albuterol  Add mucinex and tessalon for cough  -     guaiFENesin (MUCINEX) 600 mg 12 hr tablet; Take 2 tablets (1,200 mg total) by mouth 2 (two) times daily.  Dispense: 30 tablet; Refill: 0  -     benzonatate (TESSALON) 200 MG capsule; Take 1 capsule (200 mg total) by mouth 3 (three) times daily as needed.  Dispense: 30 capsule; Refill: 0       RTC as scheudled for routine and PRN

## 2023-06-23 ENCOUNTER — TELEPHONE (OUTPATIENT)
Dept: ENDOCRINOLOGY | Facility: CLINIC | Age: 73
End: 2023-06-23
Payer: MEDICARE

## 2023-06-23 ENCOUNTER — PATIENT MESSAGE (OUTPATIENT)
Dept: ENDOCRINOLOGY | Facility: CLINIC | Age: 73
End: 2023-06-23
Payer: MEDICARE

## 2023-06-23 DIAGNOSIS — Z79.4 TYPE 2 DIABETES MELLITUS WITH DIABETIC NEPHROPATHY, WITH LONG-TERM CURRENT USE OF INSULIN: Primary | ICD-10-CM

## 2023-06-23 DIAGNOSIS — E66.9 DIABETES MELLITUS TYPE 2 IN OBESE: Chronic | ICD-10-CM

## 2023-06-23 DIAGNOSIS — E11.42 DIABETIC POLYNEUROPATHY ASSOCIATED WITH TYPE 2 DIABETES MELLITUS: ICD-10-CM

## 2023-06-23 DIAGNOSIS — E11.21 TYPE 2 DIABETES MELLITUS WITH DIABETIC NEPHROPATHY, WITH LONG-TERM CURRENT USE OF INSULIN: ICD-10-CM

## 2023-06-23 DIAGNOSIS — Z79.4 TYPE 2 DIABETES MELLITUS WITH DIABETIC NEPHROPATHY, WITH LONG-TERM CURRENT USE OF INSULIN: ICD-10-CM

## 2023-06-23 DIAGNOSIS — E11.69 DIABETES MELLITUS TYPE 2 IN OBESE: Chronic | ICD-10-CM

## 2023-06-23 DIAGNOSIS — E11.21 TYPE 2 DIABETES MELLITUS WITH DIABETIC NEPHROPATHY, WITH LONG-TERM CURRENT USE OF INSULIN: Primary | ICD-10-CM

## 2023-06-23 RX ORDER — INSULIN LISPRO 100 [IU]/ML
INJECTION, SOLUTION INTRAVENOUS; SUBCUTANEOUS
Qty: 20 ML | Refills: 11 | Status: SHIPPED | OUTPATIENT
Start: 2023-06-23 | End: 2023-06-23 | Stop reason: SDUPTHER

## 2023-06-23 RX ORDER — INSULIN LISPRO 100 [IU]/ML
INJECTION, SOLUTION INTRAVENOUS; SUBCUTANEOUS
Qty: 20 ML | Refills: 11 | Status: SHIPPED | OUTPATIENT
Start: 2023-06-23

## 2023-06-23 RX ORDER — TIRZEPATIDE 5 MG/.5ML
5 INJECTION, SOLUTION SUBCUTANEOUS
Qty: 4 PEN | Refills: 11 | Status: CANCELLED | OUTPATIENT
Start: 2023-06-23

## 2023-06-23 NOTE — TELEPHONE ENCOUNTER
No care due was identified.  [facility] Embedded Care Due Messages. Reference number: [id number].   6/23/2023 9:32:27 AM CDT

## 2023-06-23 NOTE — TELEPHONE ENCOUNTER
----- Message from Megan Cisneros sent at 2023 12:19 PM CDT -----  Contact: zakiya Bull  MRN: [id number]  : 1950  PCP: Belkys Kruger  Home Phone      [phone]  Work Phone      Not on file.  Mobile          [phone]  Home Phone      [phone]  Mobile          [phone]      MESSAGE:Have question for insulin lispro (HUMALOG U-100 INSULIN) 100 unit/mL injection    Phone [phone]

## 2023-06-26 RX ORDER — LOSARTAN POTASSIUM 25 MG/1
12.5 TABLET ORAL DAILY
Qty: 45 TABLET | Refills: 0 | Status: SHIPPED | OUTPATIENT
Start: 2023-06-26 | End: 2023-07-18 | Stop reason: SDUPTHER

## 2023-06-26 RX ORDER — FUROSEMIDE 20 MG/1
20 TABLET ORAL DAILY
Qty: 90 TABLET | Refills: 3 | Status: SHIPPED | OUTPATIENT
Start: 2023-06-26

## 2023-06-28 ENCOUNTER — HOSPITAL ENCOUNTER (EMERGENCY)
Facility: HOSPITAL | Age: 73
Discharge: HOME | End: 2023-06-29
Attending: EMERGENCY MEDICINE
Payer: MEDICARE

## 2023-06-28 DIAGNOSIS — M79.89 ARM SWELLING: ICD-10-CM

## 2023-06-29 ENCOUNTER — TELEPHONE (OUTPATIENT)
Dept: INTERNAL MEDICINE | Facility: CLINIC | Age: 73
End: 2023-06-29
Payer: MEDICARE

## 2023-06-29 VITALS
RESPIRATION RATE: 16 BRPM | DIASTOLIC BLOOD PRESSURE: 73 MMHG | TEMPERATURE: 98 F | HEART RATE: 61 BPM | SYSTOLIC BLOOD PRESSURE: 180 MMHG | OXYGEN SATURATION: 97 %

## 2023-06-29 LAB
ALBUMIN SERPL BCP-MCNC: 4 G/DL (ref 3.5–5.2)
ALP SERPL-CCNC: 67 U/L (ref 55–135)
ALT SERPL W/O P-5'-P-CCNC: 24 U/L (ref 10–44)
ANION GAP SERPL CALC-SCNC: 11 MMOL/L (ref 8–16)
AST SERPL-CCNC: 31 U/L (ref 10–40)
BASOPHILS # BLD AUTO: 0.05 K/UL (ref 0–0.2)
BASOPHILS NFR BLD: 0.6 % (ref 0–1.9)
BILIRUB SERPL-MCNC: 0.5 MG/DL (ref 0.1–1)
BNP SERPL-MCNC: 122 PG/ML (ref 0–99)
BUN SERPL-MCNC: 14 MG/DL (ref 8–23)
CALCIUM SERPL-MCNC: 9.2 MG/DL (ref 8.7–10.5)
CHLORIDE SERPL-SCNC: 104 MMOL/L (ref 95–110)
CO2 SERPL-SCNC: 26 MMOL/L (ref 23–29)
CREAT SERPL-MCNC: 1.6 MG/DL (ref 0.5–1.4)
DIFFERENTIAL METHOD: ABNORMAL
EOSINOPHIL # BLD AUTO: 0.3 K/UL (ref 0–0.5)
EOSINOPHIL NFR BLD: 3.7 % (ref 0–8)
ERYTHROCYTE [DISTWIDTH] IN BLOOD BY AUTOMATED COUNT: 13.8 % (ref 11.5–14.5)
EST. GFR  (NO RACE VARIABLE): 44.9 ML/MIN/1.73 M^2
GLUCOSE SERPL-MCNC: 92 MG/DL (ref 70–110)
HCT VFR BLD AUTO: 38.8 % (ref 40–54)
HGB BLD-MCNC: 12.2 G/DL (ref 14–18)
IMM GRANULOCYTES # BLD AUTO: 0.01 K/UL (ref 0–0.04)
IMM GRANULOCYTES NFR BLD AUTO: 0.1 % (ref 0–0.5)
LYMPHOCYTES # BLD AUTO: 3.3 K/UL (ref 1–4.8)
LYMPHOCYTES NFR BLD: 42.2 % (ref 18–48)
MCH RBC QN AUTO: 29.9 PG (ref 27–31)
MCHC RBC AUTO-ENTMCNC: 31.4 G/DL (ref 32–36)
MCV RBC AUTO: 95 FL (ref 82–98)
MONOCYTES # BLD AUTO: 0.8 K/UL (ref 0.3–1)
MONOCYTES NFR BLD: 10.2 % (ref 4–15)
NEUTROPHILS # BLD AUTO: 3.4 K/UL (ref 1.8–7.7)
NEUTROPHILS NFR BLD: 43.2 % (ref 38–73)
NRBC BLD-RTO: 0 /100 WBC
PLATELET # BLD AUTO: 180 K/UL (ref 150–450)
PMV BLD AUTO: 10.6 FL (ref 9.2–12.9)
POTASSIUM SERPL-SCNC: 3.7 MMOL/L (ref 3.5–5.1)
PROT SERPL-MCNC: 7.5 G/DL (ref 6–8.4)
RBC # BLD AUTO: 4.08 M/UL (ref 4.6–6.2)
SODIUM SERPL-SCNC: 141 MMOL/L (ref 136–145)
TROPONIN I SERPL DL<=0.01 NG/ML-MCNC: 0.02 NG/ML (ref 0–0.03)
WBC # BLD AUTO: 7.78 K/UL (ref 3.9–12.7)

## 2023-06-29 PROCEDURE — 93010 EKG 12-LEAD: ICD-10-PCS | Mod: ,,, | Performed by: INTERNAL MEDICINE

## 2023-06-29 PROCEDURE — 99285 EMERGENCY DEPT VISIT HI MDM: CPT | Mod: 25

## 2023-06-29 PROCEDURE — 93005 ELECTROCARDIOGRAM TRACING: CPT

## 2023-06-29 PROCEDURE — 80053 COMPREHEN METABOLIC PANEL: CPT

## 2023-06-29 PROCEDURE — 93010 ELECTROCARDIOGRAM REPORT: CPT | Mod: ,,, | Performed by: INTERNAL MEDICINE

## 2023-06-29 PROCEDURE — 83880 ASSAY OF NATRIURETIC PEPTIDE: CPT

## 2023-06-29 PROCEDURE — 85025 COMPLETE CBC W/AUTO DIFF WBC: CPT

## 2023-06-29 PROCEDURE — 84484 ASSAY OF TROPONIN QUANT: CPT

## 2023-06-29 NOTE — DISCHARGE INSTRUCTIONS
Please return to the emergency department if you develop any new or worsening symptoms.  This could include worsening swelling, fevers, drainage or weakness in the hand.    Otherwise follow-up with your primary care physician in 1 week to discuss her most recent ED visit.

## 2023-06-29 NOTE — ED PROVIDER NOTES
Encounter Date: 6/28/2023       History     Chief Complaint   Patient presents with    Arm Pain     Was bit on elbow, not having pain and swelling to L arm. Denies drainage or fevers.      72-year-old male with a past medical history of paroxysmal atrial fibrillation, CABG x5, CHF, T1DM, CAD, HLD, HTN and hypothyroidism presents to the ED complaining of being bit on his left elbow while he was gardening 4 days ago. He does not remember what bit him but is adamant that something bit him.  She also reports of associated left arm swelling and some left shoulder pain.  He denies chest pain, shortness of breath, abdominal pain, headache, fever, chills, nausea, vomiting, diarrhea, dysuria and hematuria.      The history is provided by the patient and medical records.   Review of patient's allergies indicates:  No Known Allergies  Past Medical History:   Diagnosis Date    Anemia     Angina pectoris     Anticoagulant long-term use     Arthritis     Back pain     CHF (congestive heart failure)     Chronic bronchitis     Colon cancer screening 2/2/2017    Coronary artery disease     Diabetes mellitus type I     Diabetes with neurologic complications     Disorder of kidney and ureter     Encounter for blood transfusion     Glaucoma     Heart attack     09/2018    Heart disease     Hyperlipidemia     Hypertension     Hypothyroidism     Iron deficiency     Kidney failure     post CABG    Obesity     Paroxysmal atrial fibrillation 1/12/2023    Peripheral vascular disease     Polyneuropathy     Pulmonary emphysema 2/26/2020    Renal manifestation of secondary diabetes mellitus     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    Sleep apnea     had CPAP but had recall- not currently using     Trouble in sleeping     Type 2 diabetes mellitus with ophthalmic manifestations      Past Surgical History:   Procedure Laterality Date    24 HOUR IMPEDANCE PH MONITORING OF ESOPHAGUS IN PATIENT TAKING ACID REDUCING MEDICATIONS N/A 3/10/2022    Procedure: IMPEDANCE  PH STUDY, ESOPHAGEAL, 24 HOUR, IN PATIENT TAKING ACID REDUCING MEDICATION;  Surgeon: Carmelita Jacobsen MD;  Location: [facility] (4TH FLR);  Service: Endoscopy;  Laterality: N/A;  ICD-  fully vaccinated  Yes he can hold Plavix 5 days prior to endoscopies and restart post procedure when safe from a operator perspective per Dr.A. Canada    CARDIAC DEFIBRILLATOR PLACEMENT      CARDIAC ELECTROPHYSIOLOGY STUDY N/A 11/19/2018    Procedure: CARDIAC ELECTROPHYSIOLOGY STUDY;  Surgeon: Byron Glasgow MD;  Location: [facility] EP LAB;  Service: Cardiology;  Laterality: N/A;  VT, EPS +/- ICD, SJM, anes, GP, 6086    CARDIAC ELECTROPHYSIOLOGY STUDY N/A 11/19/2018    Procedure: CARDIAC ELECTROPHYSIOLOGY STUDY;  Surgeon: Byron Glasgow MD;  Location: [facility] EP LAB;  Service: Cardiology;  Laterality: N/A;    COLON SURGERY  2006    COLONOSCOPY N/A 2/2/2017    Procedure: COLONOSCOPY;  Surgeon: Ronnie Conway MD;  Location: [facility];  Service: Endoscopy;  Laterality: N/A;    COLONOSCOPY N/A 4/25/2022    Procedure: COLONOSCOPY;  Surgeon: Branden Bush MD;  Location: [facility] (2ND FLR);  Service: Endoscopy;  Laterality: N/A;    CORONARY ARTERY BYPASS GRAFT  2005    5 arteries    CORONARY BYPASS GRAFT ANGIOGRAPHY  11/16/2018    Procedure: Bypass graft study;  Surgeon: Ryan Schmidt MD;  Location: [facility] CATH LAB;  Service: Cardiology;;    ESOPHAGEAL MANOMETRY WITH MEASUREMENT OF IMPEDANCE N/A 3/10/2022    Procedure: MANOMETRY, ESOPHAGUS, WITH IMPEDANCE MEASUREMENT;  Surgeon: Carmelita Jacobsen MD;  Location: [facility] (4TH FLR);  Service: Endoscopy;  Laterality: N/A;  RAPID COVID test, pt to arrive for 6:00 am-Kpvt    ESOPHAGOGASTRODUODENOSCOPY N/A 4/25/2022    Procedure: EGD (ESOPHAGOGASTRODUODENOSCOPY);  Surgeon: Branden Bush MD;  Location: [facility] ENDO (2ND FLR);  Service: Endoscopy;  Laterality: N/A;  Bravo procedure canceled- pt has an ICD ([employer])- will do 24h pH study instead  ok to hold Brilinta and Plavix-RB  most recent ECHO  2022- EF 25%    EYE SURGERY Bilateral     Laser surgery for glaucoma    INSERTION OF BIVENTRICULAR IMPLANTABLE CARDIOVERTER-DEFIBRILLATOR (ICD) Left 2022    Procedure: INSERTION, ICD, BIVENTRICULAR;  Surgeon: Byron Glasgow MD;  Location: [facility] EP LAB;  Service: Cardiology;  Laterality: Left;  HF/ICM, Venogram prior to opening, CRT-D upgrade, SJM, MAC, GP, 3 PREP    INSERTION OF IMPLANTABLE CARDIOVERTER-DEFIBRILLATOR (ICD) GENERATOR WITH TWO EXISTING LEADS Left 2018    Procedure: INSERTION, DUAL ICD;  Surgeon: Byron Glasgow MD;  Location: [facility] EP LAB;  Service: Cardiology;  Laterality: Left;  VT, EPS +/- ICD, SJM, anes, GP, 6086    LEFT HEART CATHETERIZATION Left 2018    Procedure: Left heart cath;  Surgeon: Ryan Schmidt MD;  Location: [facility] CATH LAB;  Service: Cardiology;  Laterality: Left;     Family History   Problem Relation Age of Onset    Diabetes Mother     Heart disease Mother     Hypertension Sister     Diabetes Sister     Heart disease Sister     Heart attack Brother     Colon cancer Neg Hx     Esophageal cancer Neg Hx     Stomach cancer Neg Hx      Social History     Tobacco Use    Smoking status: Former     Packs/day: 3.00     Types: Cigarettes     Start date: 1963     Quit date: 1981     Years since quittin.5     Passive exposure: Past    Smokeless tobacco: Former     Types: Snuff, Chew     Quit date:    Substance Use Topics    Alcohol use: No    Drug use: No     Review of Systems    Physical Exam     Initial Vitals   BP Pulse Resp Temp SpO2   23 2135 23 2135 23 2135 23 21323   128/60 65 16 97.9 °F (36.6 °C) 97 %      MAP       --                Physical Exam    Nursing note and vitals reviewed.  Constitutional: Vital signs are normal. He appears well-developed and well-nourished. He is not diaphoretic. No distress.   HENT:   Head: Normocephalic and atraumatic.   Right Ear: External ear normal.   Left Ear: External ear  normal.   Eyes: EOM are normal. Right eye exhibits no discharge. Left eye exhibits no discharge.   Neck: Trachea normal. Neck supple. No thyroid mass present.   Normal range of motion.  Cardiovascular:  Normal rate, regular rhythm, normal heart sounds and intact distal pulses.     Exam reveals no gallop and no friction rub.       No murmur heard.  Pulmonary/Chest: Breath sounds normal. No respiratory distress. He has no wheezes. He has no rhonchi. He has no rales.   Abdominal: Abdomen is soft. Bowel sounds are normal. He exhibits no distension. There is no abdominal tenderness. There is no rebound and no guarding.   Musculoskeletal:         General: Edema (Left upper extremity. Bilateral lower extremity edema, 2+, symmetrical.) present. No tenderness. Normal range of motion.      Cervical back: Normal range of motion and neck supple.     Neurological: He is alert and oriented to person, place, and time. He has normal strength. No cranial nerve deficit or sensory deficit. GCS score is 15. GCS eye subscore is 4. GCS verbal subscore is 5. GCS motor subscore is 6.   Skin: Skin is warm and dry. Capillary refill takes less than 2 seconds. No rash noted.   No obvious puncture wounds noted at the left elbow.  No erythema, active drainage, bleeding or fluctuance.   Psychiatric: He has a normal mood and affect.       ED Course   Procedures  Labs Reviewed   CBC W/ AUTO DIFFERENTIAL - Abnormal; Notable for the following components:       Result Value    RBC 4.08 (*)     Hemoglobin 12.2 (*)     Hematocrit 38.8 (*)     MCHC 31.4 (*)     All other components within normal limits   COMPREHENSIVE METABOLIC PANEL - Abnormal; Notable for the following components:    Creatinine 1.6 (*)     eGFR 44.9 (*)     All other components within normal limits   B-TYPE NATRIURETIC PEPTIDE - Abnormal; Notable for the following components:     (*)     All other components within normal limits   TROPONIN I     EKG Readings: (Independently  Interpreted)   Rhythm: Normal Sinus Rhythm. Heart Rate: 60. ST Segments: Normal ST Segments. T Waves: Normal. Axis: Normal. Clinical Impression: AV Block - 1st Degree     Imaging Results              US Upper Extremity Veins Left (Final result)  Result time 06/29/23 03:06:13      Final result by Jackson Florez MD (06/29/23 03:06:13)                   Impression:      No thrombus in central veins of the left upper extremity.      Electronically signed by: Jackson Florez MD  Date:    06/29/2023  Time:    03:06               Narrative:    EXAMINATION:  US UPPER EXTREMITY VEINS LEFT    CLINICAL HISTORY:  arm swelling;    TECHNIQUE:  Duplex and color flow Doppler evaluation and dynamic compression was performed of the left upper extremity veins.    COMPARISON:  None    FINDINGS:  Central veins: The internal jugular, subclavian, and axillary veins are patent and free of thrombus.    Arm veins: The brachial, basilic, and cephalic veins are patent and compressible.    Contralateral subclavian/internal jugular veins: The right subclavian and internal jugular veins are patent and free of thrombus.    Other findings: None.                                       Medications - No data to display  Medical Decision Making:   Initial Assessment:   72-year-old male who appears to be in NAD presents to the ED complaining of LUE pain 2/2 a bite.  ABC's intact.  Initial triage vital signs are within normal limits.  Differential Diagnosis:   Cellulitis  Erysipelas  Abscess  DVT  ED Management:  See ED course.    ADDENDUM:  Imaging and imaging results are discussed with patient.  Return precautions provided.  Will discharge the patient.  Additional MDM:     Well's Criteria Score:  -Clinical symptoms of DVT (leg swelling, pain with palpation) = 0.0  -Other diagnosis less likely than pulmonary embolism =            0.0  -Heart Rate >100 =   0.0  -Immobilization (= or > than 3 days) or surgery in the previous 4 weeks = 0.0  -Previous  DVT/PE = 0.0  -Hemoptysis =          0.0  -Malignancy =           0.0  Well's Probability Score =    0            ED Course as of 06/29/23 0533   Thu Jun 29, 2023   0050 Following initial evaluation I will obtain an ultrasound of the left upper extremity to rule out DVT.  I will also obtain cardiac labs as patient has bilateral lower extremity edema that is symmetrical.  Well's criteria score is 0 not concerned for PE.  I will re-evaluate patient following lab and imaging results. [BG]   0257 Troponin I: 0.022  Within normal limits. Similar to historical data. [BG]   0258 BNP(!): 122 [BG]   0258 Creatinine(!): 1.6  Improved from most recent creatinine 2 weeks ago. [BG]   0258 Hemoglobin(!): 12.2  Similar to historical data.  In no need of emergent transfusion. [BG]   0352 US Upper Extremity Veins Left  No thrombus in central veins of the left upper extremity. [BG]      ED Course User Index  [BG] Maria De Jesus Fajardo MD                 Clinical Impression:   Final diagnoses:  [M79.89] Arm swelling        ED Disposition Condition    Discharge Stable          ED Prescriptions    None       Follow-up Information       Follow up With Specialties Details Why Contact Info    Belkys Kruger MD Internal Medicine Schedule an appointment as soon as possible for a visit in 1 week As needed [address] [address]  [phone]      [facility] Emergency Medicine Go to  If symptoms worsen [address]  [facility] [address] [phone]             Maria De Jesus Fajardo MD  Resident  06/29/23 0533

## 2023-06-29 NOTE — TELEPHONE ENCOUNTER
No improvement yet. Per note f/u in 1 week. Appt scheudled with instructions that if it is better they can call and cancel. Verbalized understanding.

## 2023-06-29 NOTE — TELEPHONE ENCOUNTER
----- Message from Toña Palacios sent at 2023 12:52 PM CDT -----  Contact: Eve/ PREM  Walter Bull  MRN: [id number]  : 1950  PCP: Belkys Kruger  Home Phone      [phone]  Work Phone      Not on file.  Mobile          [phone]  Home Phone      [phone]  Mobile          [phone]      MESSAGE:     Pt girlfriend Eve called stating Pt is needing an ER f/u from a bite.       Please advise  Eve  [phone]

## 2023-06-30 ENCOUNTER — PATIENT OUTREACH (OUTPATIENT)
Dept: EMERGENCY MEDICINE | Facility: HOSPITAL | Age: 73
End: 2023-06-30
Payer: MEDICARE

## 2023-06-30 DIAGNOSIS — I10 BENIGN ESSENTIAL HTN: Primary | Chronic | ICD-10-CM

## 2023-06-30 RX ORDER — LOSARTAN POTASSIUM 25 MG/1
12.5 TABLET ORAL DAILY
Qty: 45 TABLET | Refills: 0 | Status: CANCELLED | OUTPATIENT
Start: 2023-06-30

## 2023-06-30 NOTE — PROGRESS NOTES
Previous appt scheduled with Belkys Kruger MD on 7/5/2023 at 2:30pm. Appt reminder scheduled for 7/3/2023.      PARVIZ Mcdaniel

## 2023-07-01 ENCOUNTER — CLINICAL SUPPORT (OUTPATIENT)
Dept: CARDIOLOGY | Facility: HOSPITAL | Age: 73
End: 2023-07-01
Payer: MEDICARE

## 2023-07-01 DIAGNOSIS — Z95.810 PRESENCE OF AUTOMATIC (IMPLANTABLE) CARDIAC DEFIBRILLATOR: ICD-10-CM

## 2023-07-01 PROCEDURE — 93296 REM INTERROG EVL PM/IDS: CPT | Performed by: INTERNAL MEDICINE

## 2023-07-01 PROCEDURE — 93295 DEV INTERROG REMOTE 1/2/MLT: CPT | Mod: ,,, | Performed by: INTERNAL MEDICINE

## 2023-07-01 PROCEDURE — 93295 CARDIAC DEVICE CHECK - REMOTE: ICD-10-PCS | Mod: ,,, | Performed by: INTERNAL MEDICINE

## 2023-07-05 ENCOUNTER — OFFICE VISIT (OUTPATIENT)
Dept: INTERNAL MEDICINE | Facility: CLINIC | Age: 73
End: 2023-07-05
Payer: MEDICARE

## 2023-07-05 VITALS
WEIGHT: 253.75 LBS | RESPIRATION RATE: 16 BRPM | OXYGEN SATURATION: 98 % | HEIGHT: 69 IN | DIASTOLIC BLOOD PRESSURE: 56 MMHG | BODY MASS INDEX: 37.58 KG/M2 | SYSTOLIC BLOOD PRESSURE: 128 MMHG | HEART RATE: 60 BPM

## 2023-07-05 DIAGNOSIS — T78.40XA ALLERGIC REACTION, INITIAL ENCOUNTER: ICD-10-CM

## 2023-07-05 DIAGNOSIS — N13.8 BPH WITH URINARY OBSTRUCTION: ICD-10-CM

## 2023-07-05 DIAGNOSIS — R06.2 WHEEZING: ICD-10-CM

## 2023-07-05 DIAGNOSIS — M79.89 LEFT ARM SWELLING: Primary | ICD-10-CM

## 2023-07-05 DIAGNOSIS — N40.1 BPH WITH URINARY OBSTRUCTION: ICD-10-CM

## 2023-07-05 PROCEDURE — 1125F PR PAIN SEVERITY QUANTIFIED, PAIN PRESENT: ICD-10-PCS | Mod: CPTII,S$GLB,, | Performed by: INTERNAL MEDICINE

## 2023-07-05 PROCEDURE — 3066F PR DOCUMENTATION OF TREATMENT FOR NEPHROPATHY: ICD-10-PCS | Mod: CPTII,S$GLB,, | Performed by: INTERNAL MEDICINE

## 2023-07-05 PROCEDURE — 99214 PR OFFICE/OUTPT VISIT, EST, LEVL IV, 30-39 MIN: ICD-10-PCS | Mod: S$GLB,,, | Performed by: INTERNAL MEDICINE

## 2023-07-05 PROCEDURE — 4010F ACE/ARB THERAPY RXD/TAKEN: CPT | Mod: CPTII,S$GLB,, | Performed by: INTERNAL MEDICINE

## 2023-07-05 PROCEDURE — 3061F NEG MICROALBUMINURIA REV: CPT | Mod: CPTII,S$GLB,, | Performed by: INTERNAL MEDICINE

## 2023-07-05 PROCEDURE — 3008F PR BODY MASS INDEX (BMI) DOCUMENTED: ICD-10-PCS | Mod: CPTII,S$GLB,, | Performed by: INTERNAL MEDICINE

## 2023-07-05 PROCEDURE — 3288F FALL RISK ASSESSMENT DOCD: CPT | Mod: CPTII,S$GLB,, | Performed by: INTERNAL MEDICINE

## 2023-07-05 PROCEDURE — 1160F PR REVIEW ALL MEDS BY PRESCRIBER/CLIN PHARMACIST DOCUMENTED: ICD-10-PCS | Mod: CPTII,S$GLB,, | Performed by: INTERNAL MEDICINE

## 2023-07-05 PROCEDURE — 3074F PR MOST RECENT SYSTOLIC BLOOD PRESSURE < 130 MM HG: ICD-10-PCS | Mod: CPTII,S$GLB,, | Performed by: INTERNAL MEDICINE

## 2023-07-05 PROCEDURE — 1101F PT FALLS ASSESS-DOCD LE1/YR: CPT | Mod: CPTII,S$GLB,, | Performed by: INTERNAL MEDICINE

## 2023-07-05 PROCEDURE — 3078F PR MOST RECENT DIASTOLIC BLOOD PRESSURE < 80 MM HG: ICD-10-PCS | Mod: CPTII,S$GLB,, | Performed by: INTERNAL MEDICINE

## 2023-07-05 PROCEDURE — 1159F PR MEDICATION LIST DOCUMENTED IN MEDICAL RECORD: ICD-10-PCS | Mod: CPTII,S$GLB,, | Performed by: INTERNAL MEDICINE

## 2023-07-05 PROCEDURE — 1125F AMNT PAIN NOTED PAIN PRSNT: CPT | Mod: CPTII,S$GLB,, | Performed by: INTERNAL MEDICINE

## 2023-07-05 PROCEDURE — 3288F PR FALLS RISK ASSESSMENT DOCUMENTED: ICD-10-PCS | Mod: CPTII,S$GLB,, | Performed by: INTERNAL MEDICINE

## 2023-07-05 PROCEDURE — 3044F PR MOST RECENT HEMOGLOBIN A1C LEVEL <7.0%: ICD-10-PCS | Mod: CPTII,S$GLB,, | Performed by: INTERNAL MEDICINE

## 2023-07-05 PROCEDURE — 3044F HG A1C LEVEL LT 7.0%: CPT | Mod: CPTII,S$GLB,, | Performed by: INTERNAL MEDICINE

## 2023-07-05 PROCEDURE — 3074F SYST BP LT 130 MM HG: CPT | Mod: CPTII,S$GLB,, | Performed by: INTERNAL MEDICINE

## 2023-07-05 PROCEDURE — 3078F DIAST BP <80 MM HG: CPT | Mod: CPTII,S$GLB,, | Performed by: INTERNAL MEDICINE

## 2023-07-05 PROCEDURE — 3061F PR NEG MICROALBUMINURIA RESULT DOCUMENTED/REVIEW: ICD-10-PCS | Mod: CPTII,S$GLB,, | Performed by: INTERNAL MEDICINE

## 2023-07-05 PROCEDURE — 4010F PR ACE/ARB THEARPY RXD/TAKEN: ICD-10-PCS | Mod: CPTII,S$GLB,, | Performed by: INTERNAL MEDICINE

## 2023-07-05 PROCEDURE — 1160F RVW MEDS BY RX/DR IN RCRD: CPT | Mod: CPTII,S$GLB,, | Performed by: INTERNAL MEDICINE

## 2023-07-05 PROCEDURE — 99999 PR PBB SHADOW E&M-EST. PATIENT-LVL V: ICD-10-PCS | Mod: PBBFAC,,, | Performed by: INTERNAL MEDICINE

## 2023-07-05 PROCEDURE — 99214 OFFICE O/P EST MOD 30 MIN: CPT | Mod: S$GLB,,, | Performed by: INTERNAL MEDICINE

## 2023-07-05 PROCEDURE — 3008F BODY MASS INDEX DOCD: CPT | Mod: CPTII,S$GLB,, | Performed by: INTERNAL MEDICINE

## 2023-07-05 PROCEDURE — 1159F MED LIST DOCD IN RCRD: CPT | Mod: CPTII,S$GLB,, | Performed by: INTERNAL MEDICINE

## 2023-07-05 PROCEDURE — 99999 PR PBB SHADOW E&M-EST. PATIENT-LVL V: CPT | Mod: PBBFAC,,, | Performed by: INTERNAL MEDICINE

## 2023-07-05 PROCEDURE — 3066F NEPHROPATHY DOC TX: CPT | Mod: CPTII,S$GLB,, | Performed by: INTERNAL MEDICINE

## 2023-07-05 PROCEDURE — 1101F PR PT FALLS ASSESS DOC 0-1 FALLS W/OUT INJ PAST YR: ICD-10-PCS | Mod: CPTII,S$GLB,, | Performed by: INTERNAL MEDICINE

## 2023-07-05 RX ORDER — ALBUTEROL SULFATE 90 UG/1
2 AEROSOL, METERED RESPIRATORY (INHALATION) EVERY 6 HOURS PRN
Qty: 8 G | Refills: 0 | Status: SHIPPED | OUTPATIENT
Start: 2023-07-05 | End: 2024-01-10

## 2023-07-05 RX ORDER — TAMSULOSIN HYDROCHLORIDE 0.4 MG/1
1 CAPSULE ORAL DAILY
Qty: 90 CAPSULE | Refills: 3 | Status: SHIPPED | OUTPATIENT
Start: 2023-07-05 | End: 2024-07-04

## 2023-07-05 NOTE — PROGRESS NOTES
Subjective:       Patient ID: Walter Bull is a 72 y.o. male.    Chief Complaint: Shoulder Pain      HPI:  Patient is known to me and presents for follow up left arm pain and swelling. 6/24/23 he reports he was stung by something and the next day had immediate swelling of the arm. Went to ER. US negative for DVT. Labs reassuring. I don't see that he was treated for an allergic reaction or infection and discharged home. He was scheduled this follow up a few days after ER visit due to persist swelling. However, he reports swell is improved now. No pain. No rash. No redness.     Past Medical History:   Diagnosis Date    Anemia     Angina pectoris     Anticoagulant long-term use     Arthritis     Back pain     CHF (congestive heart failure)     Chronic bronchitis     Colon cancer screening 2/2/2017    Coronary artery disease     Diabetes mellitus type I     Diabetes with neurologic complications     Disorder of kidney and ureter     Encounter for blood transfusion     Glaucoma     Heart attack     09/2018    Heart disease     Hyperlipidemia     Hypertension     Hypothyroidism     Iron deficiency     Kidney failure     post CABG    Obesity     Paroxysmal atrial fibrillation 1/12/2023    Peripheral vascular disease     Polyneuropathy     Pulmonary emphysema 2/26/2020    Renal manifestation of secondary diabetes mellitus     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    Sleep apnea     had CPAP but had recall- not currently using     Trouble in sleeping     Type 2 diabetes mellitus with ophthalmic manifestations        Family History   Problem Relation Age of Onset    Diabetes Mother     Heart disease Mother     Hypertension Sister     Diabetes Sister     Heart disease Sister     Heart attack Brother     Colon cancer Neg Hx     Esophageal cancer Neg Hx     Stomach cancer Neg Hx        Social History     Socioeconomic History    Marital status:     Number of children: 5   Tobacco Use    Smoking status: Former     Packs/day: 3.00      Types: Cigarettes     Start date: 1963     Quit date: 1981     Years since quittin.5     Passive exposure: Past    Smokeless tobacco: Former     Types: Snuff, Chew     Quit date:    Substance and Sexual Activity    Alcohol use: No    Drug use: No    Sexual activity: Yes     Comment: -with a significant other       Review of Systems   Constitutional:  Negative for activity change, fatigue, fever and unexpected weight change.   HENT:  Negative for congestion, ear pain, hearing loss, rhinorrhea and sore throat.    Eyes:  Negative for redness and visual disturbance.   Respiratory:  Negative for cough, shortness of breath and wheezing.    Cardiovascular:  Positive for leg swelling (chronic). Negative for chest pain and palpitations.   Gastrointestinal:  Negative for abdominal pain, constipation, diarrhea, nausea and vomiting.   Genitourinary:  Negative for decreased urine volume, dysuria, frequency and urgency.   Musculoskeletal:  Negative for back pain, joint swelling and neck pain.   Skin:  Negative for color change, rash and wound.   Neurological:  Negative for dizziness, tremors, weakness, light-headedness and headaches.       Objective:      Physical Exam  Vitals reviewed.   Constitutional:       General: He is not in acute distress.     Appearance: He is well-developed.   HENT:      Head: Normocephalic and atraumatic.      Right Ear: External ear normal.      Left Ear: External ear normal.   Eyes:      General:         Right eye: No discharge.         Left eye: No discharge.      Conjunctiva/sclera: Conjunctivae normal.   Neck:      Thyroid: No thyromegaly.   Cardiovascular:      Rate and Rhythm: Normal rate and regular rhythm.   Pulmonary:      Effort: Pulmonary effort is normal. No respiratory distress.   Musculoskeletal:         General: No swelling or tenderness.      Right lower leg: Edema present.      Left lower leg: Edema present.   Skin:     General: Skin is warm and dry.    Neurological:      Mental Status: He is alert and oriented to person, place, and time. Mental status is at baseline.   Psychiatric:         Behavior: Behavior normal.         Thought Content: Thought content normal.       Assessment:       1. Left arm swelling    2. Allergic reaction, initial encounter    3. Wheezing    4. BPH with urinary obstruction        Plan:       1. Left arm swelling  Now resolved  Likely due to bug bit/sting. Unsure what was the culprit however  No further treatment needed    2. Allergic reaction, initial encounter  Now resolved  Likely due to bug bit/sting. Unsure what was the culprit however  No further treatment needed    3. Wheezing  Resolving, due to recent infection  Med refilled  -     albuterol (PROVENTIL HFA) 90 mcg/actuation inhaler; Inhale 2 puffs into the lungs every 6 (six) hours as needed for Wheezing or Shortness of Breath. Rescue  Dispense: 8 g; Refill: 0    4. BPH with urinary obstruction  Chronic stable  Med refilled per patient request  -     tamsulosin (FLOMAX) 0.4 mg Cap; Take 1 capsule (0.4 mg total) by mouth once daily.  Dispense: 90 capsule; Refill: 3

## 2023-07-11 ENCOUNTER — SPECIALTY PHARMACY (OUTPATIENT)
Dept: PHARMACY | Facility: CLINIC | Age: 73
End: 2023-07-11
Payer: MEDICARE

## 2023-07-11 NOTE — TELEPHONE ENCOUNTER
Specialty Pharmacy - Refill Coordination    Specialty Medication Orders Linked to Encounter      Flowsheet Row Most Recent Value   Medication #1 evolocumab (REPATHA SURECLICK) 140 mg/mL PnIj (Order#[id number], Rx#[id number])            Refill Questions - Documented Responses      Flowsheet Row Most Recent Value   Patient Availability and HIPAA Verification    Does patient want to proceed with activity? Yes   HIPAA/medical authority confirmed? Yes   Relationship to patient of person spoken to? Self   Refill Screening Questions    Would patient like to speak to a pharmacist? No   When does the patient need to receive the medication? 07/21/23   Refill Delivery Questions    How will the patient receive the medication? MEDRx   When does the patient need to receive the medication? 07/21/23   Shipping Address Home   Address in [facility] confirmed and updated if neccessary? Yes   Expected Copay ($) 0   Is the patient able to afford the medication copay? Yes   Payment Method zero copay   Days supply of Refill 28   Supplies needed? No supplies needed   Refill activity completed? Yes   Refill activity plan Refill scheduled   Shipment/Pickup Date: 07/19/23            Current Outpatient Medications   Medication Sig    albuterol (PROVENTIL HFA) 90 mcg/actuation inhaler Inhale 2 puffs into the lungs every 6 (six) hours as needed for Wheezing or Shortness of Breath. Rescue    albuterol (PROVENTIL) 2.5 mg /3 mL (0.083 %) nebulizer solution Take 3 mLs (2.5 mg total) by nebulization every 6 (six) hours as needed for Wheezing or Shortness of Breath. Rescue    albuterol-ipratropium (DUO-NEB) 2.5 mg-0.5 mg/3 mL nebulizer solution Take 3 mLs by nebulization every 6 (six) hours as needed for Wheezing or Shortness of Breath. Rescue    ammonium lactate 12 % Crea Apply twice daily to affected parts both feet as needed.    apixaban (ELIQUIS) 5 mg Tab Take 1 tablet (5 mg total) by mouth 2 (two) times daily.    blood-glucose sensor  (DEXCOM G6 SENSOR) [place] 3 each by Misc.(Non-Drug; Combo Route) route continuous.    blood-glucose transmitter (DEXCOM G6 TRANSMITTER) [place] 1 each by Misc.(Non-Drug; Combo Route) route continuous.    carvediloL (COREG) 6.25 MG tablet Take 1 tablet (6.25 mg total) by mouth 2 (two) times daily.    esomeprazole (NEXIUM) 40 MG capsule Take 1 capsule (40 mg total) by mouth 2 (two) times daily.    evolocumab (REPATHA SURECLICK) 140 mg/mL PnIj Inject 1 mL (140 mg total) into the skin every 14 (fourteen) days.    ferrous sulfate (FEOSOL) 325 mg (65 mg iron) Tab tablet TAKE 1 TABLET BY MOUTH THREE TIMES DAILY    furosemide (LASIX) 20 MG tablet Take 1 tablet (20 mg total) by mouth once daily.    GVOKE HYPOPEN 2-PACK 1 mg/0.2 mL AtIn INJECT SUBCUTANEOUSLY  CONTENTS OF 1 AUTO-INJECTOR AS NEEDED FOR HYPOGLCEMIA  AS DIRECTED (Patient not taking: Reported on 7/5/2023)    insulin lispro (HUMALOG U-100 INSULIN) 100 unit/mL injection USE AS DIRECTED VIA [employer] 20 with 3 CLICKS PER MEAL    ketoconazole (NIZORAL) 2 % cream Apply topically once daily.    LIDOcaine HCL 2% (XYLOCAINE) 2 % jelly Apply topically as needed. Apply topically once nightly to affected part of foot/feet, for pain.    losartan (COZAAR) 25 MG tablet Take 0.5 tablets (12.5 mg total) by mouth once daily.    mupirocin (BACTROBAN) 2 % ointment Apply to affected area 3 times daily    mupirocin (BACTROBAN) 2 % ointment Apply topically 2 (two) times daily.    nitroGLYCERIN (NITROSTAT) 0.4 MG SL tablet DISSOLVE 1 TABLET UNDER THE TONGUE EVERY 5 MINUTES AS  NEEDED FOR CHEST PAIN. MAX  OF 3 TABLETS IN 15 MINUTES. CALL 911 IF PAIN PERSISTS. (Patient not taking: Reported on 7/5/2023)    ondansetron (ZOFRAN) 4 MG tablet Take 1 tablet (4 mg total) by mouth every 8 (eight) hours as needed for Nausea.    papaverine 30 mg/mL injection Add: Phentolamine 1 mg  Add: PGE1 10 mcg    Sig:  Inject 25 units as directed; titrate up by 5 units until desired results    ranolazine (RANEXA) 1,000  mg Tb12 TAKE 1 TABLET BY MOUTH  TWICE DAILY    rosuvastatin (CRESTOR) 40 MG Tab Take 1 tablet (40 mg total) by mouth once daily.    spironolactone (ALDACTONE) 25 MG tablet Take 1 tablet (25 mg total) by mouth once daily.    sub-q insulin device, 20 unit (VGO 20) [place] 1 Device by Misc.(Non-Drug; Combo Route) route once daily.    tamsulosin (FLOMAX) 0.4 mg Cap Take 1 capsule (0.4 mg total) by mouth once daily.    tirzepatide 7.5 mg/0.5 mL PnIj Inject 7.5 mg into the skin every 7 days.   Last reviewed on 7/5/2023  1:51 PM by Belkys Kruger MD    Review of patient's allergies indicates:  No Known Allergies Last reviewed on  7/5/2023 1:51 PM by Belkys Kruger      Tasks added this encounter   No tasks added.   Tasks due within next 3 months   7/11/2023 - Refill Coordination Outreach (1 time occurrence)     Mahogany Guerin, PharmD  [facility]  [address] [place] [id number]  Phone: [phone]  Fax: [phone]

## 2023-07-17 ENCOUNTER — PATIENT MESSAGE (OUTPATIENT)
Dept: CARDIOLOGY | Facility: CLINIC | Age: 73
End: 2023-07-17
Payer: MEDICARE

## 2023-07-18 ENCOUNTER — OFFICE VISIT (OUTPATIENT)
Dept: INTERNAL MEDICINE | Facility: CLINIC | Age: 73
End: 2023-07-18
Payer: MEDICARE

## 2023-07-18 VITALS
RESPIRATION RATE: 16 BRPM | BODY MASS INDEX: 37.88 KG/M2 | OXYGEN SATURATION: 98 % | SYSTOLIC BLOOD PRESSURE: 134 MMHG | HEART RATE: 68 BPM | HEIGHT: 69 IN | WEIGHT: 255.75 LBS | DIASTOLIC BLOOD PRESSURE: 82 MMHG

## 2023-07-18 DIAGNOSIS — N40.1 BENIGN PROSTATIC HYPERPLASIA WITH URINARY FREQUENCY: ICD-10-CM

## 2023-07-18 DIAGNOSIS — D50.9 IRON DEFICIENCY ANEMIA, UNSPECIFIED IRON DEFICIENCY ANEMIA TYPE: ICD-10-CM

## 2023-07-18 DIAGNOSIS — Z79.4 TYPE 2 DIABETES MELLITUS WITH STAGE 3B CHRONIC KIDNEY DISEASE, WITH LONG-TERM CURRENT USE OF INSULIN: ICD-10-CM

## 2023-07-18 DIAGNOSIS — I25.118 CORONARY ARTERY DISEASE OF NATIVE ARTERY OF NATIVE HEART WITH STABLE ANGINA PECTORIS: Chronic | ICD-10-CM

## 2023-07-18 DIAGNOSIS — K21.9 GASTROESOPHAGEAL REFLUX DISEASE WITHOUT ESOPHAGITIS: ICD-10-CM

## 2023-07-18 DIAGNOSIS — G47.33 OSA (OBSTRUCTIVE SLEEP APNEA): ICD-10-CM

## 2023-07-18 DIAGNOSIS — N18.32 STAGE 3B CHRONIC KIDNEY DISEASE: ICD-10-CM

## 2023-07-18 DIAGNOSIS — I48.0 PAROXYSMAL ATRIAL FIBRILLATION: ICD-10-CM

## 2023-07-18 DIAGNOSIS — R35.0 BENIGN PROSTATIC HYPERPLASIA WITH URINARY FREQUENCY: ICD-10-CM

## 2023-07-18 DIAGNOSIS — N18.32 TYPE 2 DIABETES MELLITUS WITH STAGE 3B CHRONIC KIDNEY DISEASE, WITH LONG-TERM CURRENT USE OF INSULIN: ICD-10-CM

## 2023-07-18 DIAGNOSIS — I50.42 CHRONIC COMBINED SYSTOLIC AND DIASTOLIC HEART FAILURE: Chronic | ICD-10-CM

## 2023-07-18 DIAGNOSIS — Z79.4 TYPE 2 DIABETES MELLITUS WITH OTHER OPHTHALMIC COMPLICATION, WITH LONG-TERM CURRENT USE OF INSULIN: ICD-10-CM

## 2023-07-18 DIAGNOSIS — E11.22 TYPE 2 DIABETES MELLITUS WITH STAGE 3B CHRONIC KIDNEY DISEASE, WITH LONG-TERM CURRENT USE OF INSULIN: ICD-10-CM

## 2023-07-18 DIAGNOSIS — J43.9 PULMONARY EMPHYSEMA, UNSPECIFIED EMPHYSEMA TYPE: ICD-10-CM

## 2023-07-18 DIAGNOSIS — I25.5 ISCHEMIC CARDIOMYOPATHY: ICD-10-CM

## 2023-07-18 DIAGNOSIS — E11.42 DIABETIC POLYNEUROPATHY ASSOCIATED WITH TYPE 2 DIABETES MELLITUS: ICD-10-CM

## 2023-07-18 DIAGNOSIS — Z00.00 ENCOUNTER FOR PREVENTIVE HEALTH EXAMINATION: Primary | ICD-10-CM

## 2023-07-18 DIAGNOSIS — I10 BENIGN ESSENTIAL HTN: Chronic | ICD-10-CM

## 2023-07-18 DIAGNOSIS — H91.93 BILATERAL HEARING LOSS, UNSPECIFIED HEARING LOSS TYPE: ICD-10-CM

## 2023-07-18 DIAGNOSIS — Z95.1 S/P CABG (CORONARY ARTERY BYPASS GRAFT): ICD-10-CM

## 2023-07-18 DIAGNOSIS — E66.01 SEVERE OBESITY (BMI 35.0-39.9) WITH COMORBIDITY: ICD-10-CM

## 2023-07-18 DIAGNOSIS — E11.39 TYPE 2 DIABETES MELLITUS WITH OTHER OPHTHALMIC COMPLICATION, WITH LONG-TERM CURRENT USE OF INSULIN: ICD-10-CM

## 2023-07-18 DIAGNOSIS — K76.0 FATTY LIVER: ICD-10-CM

## 2023-07-18 DIAGNOSIS — S68.119S AMPUTATION, FINGER, TRAUMATIC, SEQUELA: ICD-10-CM

## 2023-07-18 DIAGNOSIS — I70.0 AORTIC ATHEROSCLEROSIS: ICD-10-CM

## 2023-07-18 DIAGNOSIS — E78.2 MIXED HYPERLIPIDEMIA: Chronic | ICD-10-CM

## 2023-07-18 DIAGNOSIS — N25.81 HYPERPARATHYROIDISM DUE TO RENAL INSUFFICIENCY: ICD-10-CM

## 2023-07-18 PROCEDURE — 4010F ACE/ARB THERAPY RXD/TAKEN: CPT | Mod: CPTII,S$GLB,, | Performed by: NURSE PRACTITIONER

## 2023-07-18 PROCEDURE — 99499 UNLISTED E&M SERVICE: CPT | Mod: S$GLB,,, | Performed by: NURSE PRACTITIONER

## 2023-07-18 PROCEDURE — 1101F PT FALLS ASSESS-DOCD LE1/YR: CPT | Mod: CPTII,S$GLB,, | Performed by: NURSE PRACTITIONER

## 2023-07-18 PROCEDURE — 99999 PR PBB SHADOW E&M-EST. PATIENT-LVL III: CPT | Mod: PBBFAC,,, | Performed by: NURSE PRACTITIONER

## 2023-07-18 PROCEDURE — G0439 PPPS, SUBSEQ VISIT: HCPCS | Mod: S$GLB,,, | Performed by: NURSE PRACTITIONER

## 2023-07-18 PROCEDURE — 1159F PR MEDICATION LIST DOCUMENTED IN MEDICAL RECORD: ICD-10-PCS | Mod: CPTII,S$GLB,, | Performed by: NURSE PRACTITIONER

## 2023-07-18 PROCEDURE — 3075F SYST BP GE 130 - 139MM HG: CPT | Mod: CPTII,S$GLB,, | Performed by: NURSE PRACTITIONER

## 2023-07-18 PROCEDURE — 3066F NEPHROPATHY DOC TX: CPT | Mod: CPTII,S$GLB,, | Performed by: NURSE PRACTITIONER

## 2023-07-18 PROCEDURE — 3288F PR FALLS RISK ASSESSMENT DOCUMENTED: ICD-10-PCS | Mod: CPTII,S$GLB,, | Performed by: NURSE PRACTITIONER

## 2023-07-18 PROCEDURE — 3044F HG A1C LEVEL LT 7.0%: CPT | Mod: CPTII,S$GLB,, | Performed by: NURSE PRACTITIONER

## 2023-07-18 PROCEDURE — 1170F FXNL STATUS ASSESSED: CPT | Mod: CPTII,S$GLB,, | Performed by: NURSE PRACTITIONER

## 2023-07-18 PROCEDURE — 3066F PR DOCUMENTATION OF TREATMENT FOR NEPHROPATHY: ICD-10-PCS | Mod: CPTII,S$GLB,, | Performed by: NURSE PRACTITIONER

## 2023-07-18 PROCEDURE — 3288F FALL RISK ASSESSMENT DOCD: CPT | Mod: CPTII,S$GLB,, | Performed by: NURSE PRACTITIONER

## 2023-07-18 PROCEDURE — 3075F PR MOST RECENT SYSTOLIC BLOOD PRESS GE 130-139MM HG: ICD-10-PCS | Mod: CPTII,S$GLB,, | Performed by: NURSE PRACTITIONER

## 2023-07-18 PROCEDURE — 4010F PR ACE/ARB THEARPY RXD/TAKEN: ICD-10-PCS | Mod: CPTII,S$GLB,, | Performed by: NURSE PRACTITIONER

## 2023-07-18 PROCEDURE — 99999 PR PBB SHADOW E&M-EST. PATIENT-LVL III: ICD-10-PCS | Mod: PBBFAC,,, | Performed by: NURSE PRACTITIONER

## 2023-07-18 PROCEDURE — 3008F BODY MASS INDEX DOCD: CPT | Mod: CPTII,S$GLB,, | Performed by: NURSE PRACTITIONER

## 2023-07-18 PROCEDURE — 3061F NEG MICROALBUMINURIA REV: CPT | Mod: CPTII,S$GLB,, | Performed by: NURSE PRACTITIONER

## 2023-07-18 PROCEDURE — 3079F PR MOST RECENT DIASTOLIC BLOOD PRESSURE 80-89 MM HG: ICD-10-PCS | Mod: CPTII,S$GLB,, | Performed by: NURSE PRACTITIONER

## 2023-07-18 PROCEDURE — 3061F PR NEG MICROALBUMINURIA RESULT DOCUMENTED/REVIEW: ICD-10-PCS | Mod: CPTII,S$GLB,, | Performed by: NURSE PRACTITIONER

## 2023-07-18 PROCEDURE — 1170F PR FUNCTIONAL STATUS ASSESSED: ICD-10-PCS | Mod: CPTII,S$GLB,, | Performed by: NURSE PRACTITIONER

## 2023-07-18 PROCEDURE — 3008F PR BODY MASS INDEX (BMI) DOCUMENTED: ICD-10-PCS | Mod: CPTII,S$GLB,, | Performed by: NURSE PRACTITIONER

## 2023-07-18 PROCEDURE — 1160F RVW MEDS BY RX/DR IN RCRD: CPT | Mod: CPTII,S$GLB,, | Performed by: NURSE PRACTITIONER

## 2023-07-18 PROCEDURE — 1101F PR PT FALLS ASSESS DOC 0-1 FALLS W/OUT INJ PAST YR: ICD-10-PCS | Mod: CPTII,S$GLB,, | Performed by: NURSE PRACTITIONER

## 2023-07-18 PROCEDURE — 1159F MED LIST DOCD IN RCRD: CPT | Mod: CPTII,S$GLB,, | Performed by: NURSE PRACTITIONER

## 2023-07-18 PROCEDURE — G0439 PR MEDICARE ANNUAL WELLNESS SUBSEQUENT VISIT: ICD-10-PCS | Mod: S$GLB,,, | Performed by: NURSE PRACTITIONER

## 2023-07-18 PROCEDURE — 3079F DIAST BP 80-89 MM HG: CPT | Mod: CPTII,S$GLB,, | Performed by: NURSE PRACTITIONER

## 2023-07-18 PROCEDURE — 1160F PR REVIEW ALL MEDS BY PRESCRIBER/CLIN PHARMACIST DOCUMENTED: ICD-10-PCS | Mod: CPTII,S$GLB,, | Performed by: NURSE PRACTITIONER

## 2023-07-18 PROCEDURE — 3044F PR MOST RECENT HEMOGLOBIN A1C LEVEL <7.0%: ICD-10-PCS | Mod: CPTII,S$GLB,, | Performed by: NURSE PRACTITIONER

## 2023-07-18 RX ORDER — LOSARTAN POTASSIUM 25 MG/1
12.5 TABLET ORAL DAILY
Qty: 45 TABLET | Refills: 0 | Status: SHIPPED | OUTPATIENT
Start: 2023-07-18 | End: 2023-07-24 | Stop reason: SDUPTHER

## 2023-07-18 NOTE — PATIENT INSTRUCTIONS
Counseling and Referral of Other Preventative  (Italic type indicates deductible and co-insurance are waived)    Patient Name: Walter Bull  Today's Date: 7/18/2023    Health Maintenance       Date Due Completion Date    Shingles Vaccine (1 of 2) Never done ---    COVID-19 Vaccine (5 - Pfizer series) 03/18/2023 11/18/2022    Foot Exam 06/28/2023 6/28/2022    Influenza Vaccine (1) 09/01/2023 11/15/2022    Eye Exam 09/02/2023 9/2/2022    Hemoglobin A1c 11/10/2023 5/10/2023    Diabetes Urine Screening 05/10/2024 5/10/2023    Lipid Panel 05/10/2024 5/10/2023    High Dose Statin 07/18/2024 7/18/2023    TETANUS VACCINE 05/26/2030 5/26/2020    Colorectal Cancer Screening 04/25/2032 4/25/2022        No orders of the defined types were placed in this encounter.      The following information is provided to all patients.  This information is to help you find resources for any of the problems found today that may be affecting your health:                Living healthy guide: www.[facility].[place].gov      Understanding Diabetes: www.diabetes.org      Eating healthy: www.cdc.gov/healthyweight      CDC home safety checklist: www.cdc.gov/steadi/patient.html      Agency on Aging: www.goea.[place].[facility]      Alcoholics anonymous (AA): www.aa.org      Physical Activity: www.shahbaz.nih.gov/jy6mymf      Tobacco use: www.quitwithusla.org

## 2023-07-18 NOTE — PROGRESS NOTES
"Walter Bull presented for a  Medicare AWV and comprehensive Health Risk Assessment today. The following components were reviewed and updated:    Medical history  Family History  Social history  Allergies and Current Medications  Health Risk Assessment  Health Maintenance  Care Team         ** See Completed Assessments for Annual Wellness Visit within the encounter summary.**           The following assessments were completed:  Living Situation  CAGE  Depression Screening  Timed Get Up and Go  Whisper Test  Cognitive Function Screening  Nutrition Screening  ADL Screening  PAQ Screening        Vitals:    07/18/23 0723   BP: 134/82   Pulse: 68   Resp: 16   SpO2: 98%   Weight: 116 kg (255 lb 11.7 oz)   Height: 5' 9" (1.753 m)     Body mass index is 37.77 kg/m².  Physical Exam  Vitals and nursing note reviewed.   Constitutional:       Appearance: He is obese.   HENT:      Head: Normocephalic and atraumatic.   Cardiovascular:      Rate and Rhythm: Normal rate and regular rhythm.      Pulses: Normal pulses.      Heart sounds: No murmur heard.  Pulmonary:      Effort: Pulmonary effort is normal. No respiratory distress.      Breath sounds: Normal breath sounds. No wheezing or rales.   Musculoskeletal:         General: Swelling (pitting bilateral lower ext) present. Normal range of motion.      Comments: Distal middle right finger missing- healed- old traumatic amputation    Skin:     General: Skin is warm and dry.      Capillary Refill: Capillary refill takes less than 2 seconds.      Findings: No bruising.   Neurological:      General: No focal deficit present.      Mental Status: He is alert and oriented to person, place, and time.   Psychiatric:         Mood and Affect: Mood normal.         Behavior: Behavior normal.         Thought Content: Thought content normal.         Judgment: Judgment normal.             Diagnoses and health risks identified today and associated recommendations/orders:    1. Encounter for " preventive health examination  We discussed shingrix but he declined   Colonoscopy was done in 2022 and no further screens were needed due to co morbidities and age    2. Diabetic polyneuropathy associated with type 2 diabetes mellitus  Pt follows with endocrinology as well as neurology  He is well controlled on mounjaro and humalog with vgo pump  Lab Results   Component Value Date    HGBA1C 6.3 (H) 05/10/2023       3. Pulmonary emphysema, unspecified emphysema type  Noted on imaging CT chest 5/2023  Has albuterol and duonebs for use as needed  Follows with pcp     4. Coronary artery disease of native artery of native heart with stable angina pectoris  Follows with cardiology  On eliquis/coreg/repatha/lasix/losartan/crestor/renexa and papaverine inj    5. Chronic combined systolic and diastolic heart failure  Follows with cardiology  On eliquis/coreg/repatha/lasix/losartan/crestor/renexa and papaverine inj  Last echo shows 30-35% EF 5/2022    6. S/P CABG (coronary artery bypass graft)  Follows with cardiology  On eliquis/coreg/repatha/lasix/losartan/crestor/renexa and papaverine inj  Last echo shows 30-35% EF 5/2022    7. Ischemic cardiomyopathy  Follows with cardiology  On eliquis/coreg/repatha/lasix/losartan/crestor/renexa and papaverine inj  Last echo shows 30-35% EF 5/2022    8. Aortic atherosclerosis  Noted on CT chest 5/52023 on eliquis and statin as well as repatha     9. Paroxysmal atrial fibrillation  Follows with cardiology  On eliquis/coreg/repatha/lasix/losartan/crestor/renexa and papaverine inj  Last echo shows 30-35% EF 5/2022    10. Stage 3b chronic kidney disease  Stable  BMP  Lab Results   Component Value Date     06/29/2023    K 3.7 06/29/2023     06/29/2023    CO2 26 06/29/2023    BUN 14 06/29/2023    CREATININE 1.6 (H) 06/29/2023    CALCIUM 9.2 06/29/2023    ANIONGAP 11 06/29/2023    EGFRNORACEVR 44.9 (A) 06/29/2023     Followed by nephrology     11. Severe obesity (BMI 35.0-39.9)  with comorbidity  NEEDS WEIGHT LOSS  Seems very active. Hopefully mounjaro will help     12. Hyperparathyroidism due to renal insufficiency  Noted on lans- follows with endocrine     13. Fatty liver  Noted on imaging  Stable LFT  Followed by pcp     14. ANURADHA (obstructive sleep apnea)  On cpap     15. Type 2 diabetes mellitus with stage 3b chronic kidney disease, with long-term current use of insulin  Pt follows with endocrinology as well as nephrology   He is well controlled on mounjaro and humalog with vgo pump  Lab Results   Component Value Date    HGBA1C 6.3 (H) 05/10/2023       16. Type 2 diabetes mellitus with other ophthalmic complication, with long-term current use of insulin  Pt follows with endocrinology as well as opthalmology   He is well controlled on mounjaro and humalog with vgo pump  Lab Results   Component Value Date    HGBA1C 6.3 (H) 05/10/2023     17. Amputation, finger, traumatic, sequela  He had a hx of traumatic amputation in accident YEARS ago-     18. Iron deficiency anemia, unspecified iron deficiency anemia type  Stable  Lab Results   Component Value Date    WBC 7.78 06/29/2023    HGB 12.2 (L) 06/29/2023    HCT 38.8 (L) 06/29/2023    MCV 95 06/29/2023     06/29/2023       On fe daily po    19. Bilateral hearing loss, unspecified hearing loss type  Noted- has heating aids     20. Mixed hyperlipidemia  Follows with cardiology on statin     21. Benign essential HTN  Well controlled 134/82 today follows with cardiology on coreg/losartan renexa and lasix     22. Benign prostatic hyperplasia with urinary frequency  Follows with urology-on flomax     23. Gastroesophageal reflux disease without esophagitis  follows with GI- has had several EGD. On PPI       * reviewed and he has not filled anything     Provided Walter with a 5-10 year written screening schedule and personal prevention plan. Recommendations were developed using the USPSTF age appropriate recommendations. Education,  counseling, and referrals were provided as needed. After Visit Summary printed and given to patient which includes a list of additional screenings\tests needed.    No follow-ups on file.    Anastasia Patel, CATHIE      I offered to discuss advanced care planning, including how to pick a person who would make decisions for you if you were unable to make them for yourself, called a health care power of , and what kind of decisions you might make such as use of life sustaining treatments such as ventilators and tube feeding when faced with a life limiting illness recorded on a living will that they will need to know. (How you want to be cared for as you near the end of your natural life)     X  Patient has advanced directives on file, which we reviewed, and they do not wish to make changes. He does not have  POA. He has 5 children and has expressed that he would want guerita Brooks and one of his girls as the POA. He discussed that paperwork. He will take it home to review and return a copy to next PCP visit.

## 2023-07-24 ENCOUNTER — OFFICE VISIT (OUTPATIENT)
Dept: PODIATRY | Facility: CLINIC | Age: 73
End: 2023-07-24
Payer: MEDICARE

## 2023-07-24 VITALS — HEART RATE: 60 BPM | DIASTOLIC BLOOD PRESSURE: 70 MMHG | SYSTOLIC BLOOD PRESSURE: 131 MMHG

## 2023-07-24 DIAGNOSIS — E11.42 DIABETIC POLYNEUROPATHY ASSOCIATED WITH TYPE 2 DIABETES MELLITUS: Primary | ICD-10-CM

## 2023-07-24 DIAGNOSIS — M79.2 NEURITIS: ICD-10-CM

## 2023-07-24 PROCEDURE — 3061F NEG MICROALBUMINURIA REV: CPT | Mod: CPTII,S$GLB,, | Performed by: PODIATRIST

## 2023-07-24 PROCEDURE — 3078F PR MOST RECENT DIASTOLIC BLOOD PRESSURE < 80 MM HG: ICD-10-PCS | Mod: CPTII,S$GLB,, | Performed by: PODIATRIST

## 2023-07-24 PROCEDURE — 3075F PR MOST RECENT SYSTOLIC BLOOD PRESS GE 130-139MM HG: ICD-10-PCS | Mod: CPTII,S$GLB,, | Performed by: PODIATRIST

## 2023-07-24 PROCEDURE — 3061F PR NEG MICROALBUMINURIA RESULT DOCUMENTED/REVIEW: ICD-10-PCS | Mod: CPTII,S$GLB,, | Performed by: PODIATRIST

## 2023-07-24 PROCEDURE — 3066F NEPHROPATHY DOC TX: CPT | Mod: CPTII,S$GLB,, | Performed by: PODIATRIST

## 2023-07-24 PROCEDURE — 99999 PR PBB SHADOW E&M-EST. PATIENT-LVL II: ICD-10-PCS | Mod: PBBFAC,,, | Performed by: PODIATRIST

## 2023-07-24 PROCEDURE — 99214 PR OFFICE/OUTPT VISIT, EST, LEVL IV, 30-39 MIN: ICD-10-PCS | Mod: S$GLB,,, | Performed by: PODIATRIST

## 2023-07-24 PROCEDURE — 3066F PR DOCUMENTATION OF TREATMENT FOR NEPHROPATHY: ICD-10-PCS | Mod: CPTII,S$GLB,, | Performed by: PODIATRIST

## 2023-07-24 PROCEDURE — 4010F ACE/ARB THERAPY RXD/TAKEN: CPT | Mod: CPTII,S$GLB,, | Performed by: PODIATRIST

## 2023-07-24 PROCEDURE — 99214 OFFICE O/P EST MOD 30 MIN: CPT | Mod: S$GLB,,, | Performed by: PODIATRIST

## 2023-07-24 PROCEDURE — 3044F HG A1C LEVEL LT 7.0%: CPT | Mod: CPTII,S$GLB,, | Performed by: PODIATRIST

## 2023-07-24 PROCEDURE — 3044F PR MOST RECENT HEMOGLOBIN A1C LEVEL <7.0%: ICD-10-PCS | Mod: CPTII,S$GLB,, | Performed by: PODIATRIST

## 2023-07-24 PROCEDURE — 3075F SYST BP GE 130 - 139MM HG: CPT | Mod: CPTII,S$GLB,, | Performed by: PODIATRIST

## 2023-07-24 PROCEDURE — 99999 PR PBB SHADOW E&M-EST. PATIENT-LVL II: CPT | Mod: PBBFAC,,, | Performed by: PODIATRIST

## 2023-07-24 PROCEDURE — 3078F DIAST BP <80 MM HG: CPT | Mod: CPTII,S$GLB,, | Performed by: PODIATRIST

## 2023-07-24 PROCEDURE — 4010F PR ACE/ARB THEARPY RXD/TAKEN: ICD-10-PCS | Mod: CPTII,S$GLB,, | Performed by: PODIATRIST

## 2023-07-24 RX ORDER — METHYLPREDNISOLONE 4 MG/1
TABLET ORAL
Qty: 1 EACH | Refills: 0 | Status: SHIPPED | OUTPATIENT
Start: 2023-07-24 | End: 2023-08-14

## 2023-07-24 RX ORDER — CEFDINIR 300 MG/1
300 CAPSULE ORAL 2 TIMES DAILY
COMMUNITY
Start: 2023-06-07

## 2023-07-24 RX ORDER — AZITHROMYCIN 250 MG/1
TABLET, FILM COATED ORAL
COMMUNITY
Start: 2023-06-07 | End: 2023-11-17

## 2023-07-24 RX ORDER — LOSARTAN POTASSIUM 25 MG/1
12.5 TABLET ORAL DAILY
Qty: 45 TABLET | Refills: 3 | Status: SHIPPED | OUTPATIENT
Start: 2023-07-24

## 2023-07-24 NOTE — PATIENT INSTRUCTIONS
Continue with albuterol as needed  Obtain pulmonary function tests  Obtain CT of chest  Follow-up after testing.   INFECTIOUS DISEASE PROGRESS NOTE    Norma Shore  50 year old female  MRN : [id number]    Date: 7/24/2023     Attending physician : Andres Nixon MD    Reason for consult / chief complaint : fever, SOB    Interval history : Hx OHT, ESRD on HD. Admitted 7/1-7/5/23 with COVID and pancytopenia, treated w/Remdesivir and Cefepime. Continued to have SOB and low grade fevers at home. Presented 7/21/23 with hypoxemia from PCP's office. Also noted recent diarrhea. SARS CoV2 PCR still positive (Ct 21.1). CXR worsening diffuse patchy opacities bilaterally. Intubated 7/22. Bronchoscopy with normal mucosa, thin secretions. Currently remains hypotensive on levophed, intubated with 60% FiO2, on CVVH.     Subjective : intubated, sedated     Vitals:   Vitals:    07/24/23 0730   BP:    Pulse: (!) 109   Resp: (!) 22   Temp: 96.6 °F (35.9 °C)       Physical Examination:  General : intubated, sedated. Well developed, well nourished.   HEENT : Head is normocephalic, atraumatic. No scleral icterus. Oral mucosa moist without lesions. Good dentition.   Neck : Supple, No LAD.   Lungs : NAD, coarse rales bilaterally. No accessory muscle use.   Heart : Regular rate and rhythm. No murmurs, gallops, or rubs.  Abdomen : Soft, positive bowel sounds, Non tender, Non distended. No masses or hepatosplenomegaly appreciated.   Musculoskeletal : No clubbing, cyanosis, or edema in bilateral lower extremities.  Skin : Warm and dry, No lesions, rashes, or ulcers.  Neurological : deferred   Psych : deferred     Current medications: Reviewed     Laboratory data:    Recent Labs   Lab 07/24/23  0543 07/24/23  0315 07/23/23  1559 07/23/23  0329 07/22/23  1959 07/22/23  1604 07/22/23  0421 07/22/23  0420 07/21/23  1350 07/21/23  1344 07/20/23  1203 07/17/23  0940   WBC 9.8  --   --  4.6  --   --   --  5.0  --  5.4  --  4.7   HCT 32.7*  --   --  30.8*  --   --   --  27.6*  --  26.7*  --  27.3*   HGB 10.4*  --   --  9.5*  --   --   --  8.9*  --  8.4*  --   8.4*     --   --  151  --   --   --  124*  --  110*  --  93*   INR 1.8  --   --  3.2 3.5   < > 10.4*  --   --  6.9*   < > 2.7   PTT  --   --   --   --   --   --  73*  --   --  75*  --   --    SODIUM  --  135 136 135  --    < >  --  133*   < > 131*  --  133*   POTASSIUM  --  3.8 4.3 4.2  --    < >  --  4.5   < > 4.9  --  4.2   CHLORIDE  --  102 102 102  --    < >  --  102   < > 98  --  103   CO2  --  24 24 27  --    < >  --  21   < > 17*  --  20*   CALCIUM  --  8.5 8.5 8.4  --    < >  --  8.0*   < > 7.5*  --  8.2*   GLUCOSE  --  122* 149* 106*  --    < >  --  92   < > 110*  --  128*   BUN  --  13 13 20  --    < >  --  47*   < > 64*  --  58*   CREATININE  --  0.96* 1.19* 1.89*  --    < >  --  5.24*   < > 7.84*  --  7.24*   AST  --  38*  --   --   --   --   --   --   --  25  --  16   GPT  --  23  --   --   --   --   --   --   --  18  --  18   ALKPT  --  159*  --   --   --   --   --   --   --  140*  --  148*   BILIRUBIN  --  0.7  --   --   --   --   --   --   --  0.5  --  0.6   ALBUMIN  --  3.3*  --   --   --   --   --   --   --  2.8*  --  3.0*   PHOS  --  3.2 2.0* 3.1  --    < >  --  4.6   < >  --   --   --     < > = values in this interval not displayed.       Imaging: Reviewed  CXR 7/24:      Culture data: Reviewed, negative to date    COVID status:  Vaccinated x 5   Positive 7/1/23, 7/21/23    Isolation:   Contact, droplet     Immunosuppression:   Tacrolimus     Antimicrobials:   Ceftaroline 7/21 - 7/22  Remdesivir 7/21 - 7/23  Linezolid 7/22 - 7/23  Doxycycline 7/21 - current  Cefepime 7/22 - current  Amphotericin 7/23 - current  Bactrim 7/23 - current       Assessment / Diagnoses:  1. Hypotension, presumed septic shock    - etiology uncertain, differential as below    2. Acute hypoxemic respiratory failure    - intubated 7/22. Bronchoscopy w/normal mucosa, thin secretions    3. Progressive bilateral pulmonary infiltrates and fever    - ddx includes progression of COVID pneumonia, PJP pneumonia, fungal  pneumonia, CMV pneumonitis    - severe tricuspid regurgitation also could be contributing     4. Supratherapeutic INR     5. Hx OHT (2005) on chronic immunosuppression     6. ESRD on HD    - missed HD session PTA due to GI symptoms    - currently on CVVH    7. Severe tricuspid regurgitation   - was scheduled for TVR w/Dr Loera 7/19 but rescheduled due to COVID    8. Cirrhotic liver disease     9. Sick sinus syndrome    - s/p PPM     10. T2DM  11. Hx DVT on warfarin PTA      Plan / Recommendations:  1. Remains critically ill, infectious differential as above   2. Continue steroids for possible COVID pneumonia   3. Continue fungal coverage with amphotericin B pending fungal studies  4. Continue Bactrim pending PJP PCR  5. Continue cefepime and doxycycline for possible superimposed bacterial pneumonia   6. Qualitative CMV added to BAL sample   7. Will continue to follow closely       Discussed with RN, Dr. Joseph Valles PA-C  Infectious Disease  Office: [phone]      Physician addendum :  Patient seen and examined.  Chart reviewed.  Case discussed in detail with Shanna Valles PA-C.  I have repeated the pertinent parts of the history and physical examination.  I have reviewed the patient's vitals, laboratory findings, microbiological data, and radiographic imaging.  The assessment and plan, as noted above, have been have been formulated by me and discussed with the physician assistant.  I agree with the note as documented by the physician assistant with the exceptions, if any, as noted below.       Lungs clear, heart S1S2, abdomen soft.  Intubated but awake, interactive   Labs, imaging, cultures reviewed    Plan as detailed in the above note.  Same antimicrobials for now.  Can likely de-escalate soon.  Holding on antivirals for now given low suspicion for CMV pneumonitis in the setting of low grade viremia.      Discussed with Andres Ramos MD Minhaj Husain MD  [place] infectious disease  Pager :  [phone]  Answering service : [phone]    Communication preferences :  Weekdays   6 AM - 9 PM : Epic chat or pager  9 PM - 6 AM : Call answering service to connect

## 2023-07-24 NOTE — PROGRESS NOTES
Subjective:      Patient ID: Walter Bull is a 72 y.o. male.    Chief Complaint: Diabetes Mellitus (Pcp - Belkys Kruger MD - 7/5/2023) and Foot Pain (Bilateral foot pain due to burning. Pt stated he had a pedicure and after he felt like he couldn't walk)    Walter is a 72 y.o. male who presents to the clinic upon referral from Dr. Nelly bermeo. provider found  for evaluation and treatment of diabetic feet. Walter has a past medical history of Anemia, Angina pectoris, Anticoagulant long-term use, Arthritis, Back pain, CHF (congestive heart failure), Chronic bronchitis, Colon cancer screening (02/02/2017), Coronary artery disease, Diabetes mellitus type I, Diabetes with neurologic complications, Disorder of kidney and ureter, Encounter for blood transfusion, Glaucoma, Heart attack, Heart disease, Hyperlipidemia, Hypertension, Iron deficiency, Kidney failure, Obesity, Paroxysmal atrial fibrillation (01/12/2023), Pneumonia, Polyneuropathy, Pulmonary emphysema (02/26/2020), Renal manifestation of secondary diabetes mellitus, 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  Pt presents today c/o burning on the top and bottom of his feet. Pt states it started after he got a pedicure last month.          PCP: Belkys Kruger MD    Date Last Seen by PCP:   Chief Complaint   Patient presents with    Diabetes Mellitus     Pcp - Belkys Kruger MD - 7/5/2023    Foot Pain     Bilateral foot pain due to burning. Pt stated he had a pedicure and after he felt like he couldn't walk         Current shoe gear: Tennis shoes    Hemoglobin A1C   Date Value Ref Range Status   05/10/2023 6.3 (H) 4.0 - 5.6 % Final     Comment:     ADA Screening Guidelines:  5.7-6.4%  Consistent with prediabetes  >or=6.5%  Consistent with diabetes    High levels of fetal hemoglobin interfere with the HbA1C  assay. Heterozygous hemoglobin variants (HbS, HgC,  etc)do  not significantly interfere with this assay.   However, presence of multiple variants may affect accuracy.     09/02/2022 7.0 (H) 4.0 - 5.6 % Final     Comment:     ADA Screening Guidelines:  5.7-6.4%  Consistent with prediabetes  >or=6.5%  Consistent with diabetes    High levels of fetal hemoglobin interfere with the HbA1C  assay. Heterozygous hemoglobin variants (HbS, HgC, etc)do  not significantly interfere with this assay.   However, presence of multiple variants may affect accuracy.     05/10/2022 8.0 (H) 4.0 - 5.6 % Final     Comment:     ADA Screening Guidelines:  5.7-6.4%  Consistent with prediabetes  >or=6.5%  Consistent with diabetes    High levels of fetal hemoglobin interfere with the HbA1C  assay. Heterozygous hemoglobin variants (HbS, HgC, etc)do  not significantly interfere with this assay.   However, presence of multiple variants may affect accuracy.             Review of Systems   Constitutional: Negative for chills, fever and malaise/fatigue.   HENT:  Negative for hearing loss.    Cardiovascular:  Negative for claudication.   Respiratory:  Negative for shortness of breath.    Skin:  Negative for dry skin, flushing and rash.   Musculoskeletal:  Negative for joint pain and myalgias.   Neurological:  Positive for numbness and paresthesias. Negative for loss of balance and sensory change.   Psychiatric/Behavioral:  Negative for altered mental status.          Objective:      Physical Exam  Vitals reviewed.   Constitutional:       Appearance: He is well-developed.   HENT:      Head: Normocephalic and atraumatic.   Cardiovascular:      Pulses:           Dorsalis pedis pulses are 2+ on the right side and 2+ on the left side.        Posterior tibial pulses are 2+ on the right side and 2+ on the left side.      Comments: No edema noted to b/L LEs  Pulmonary:      Effort: Pulmonary effort is normal.   Musculoskeletal:         General: Normal range of motion.      Comments: Adequate joint ROM noted to  all lower extremity muscle groups with no pain or crepitation noted. Muscle strength is 5/5 in all groups bilaterally.     Feet:      Right foot:      Protective Sensation: 5 sites tested.  5 sites sensed.      Skin integrity: Callus and dry skin present.      Left foot:      Protective Sensation: 5 sites tested.  5 sites sensed.      Skin integrity: Callus and dry skin present.   Skin:     General: Skin is warm and dry.      Capillary Refill: Capillary refill takes 2 to 3 seconds.      Coloration: Skin is pale.      Comments: Xerosis improved. No open lesion noted b/L  Skin temp is warm to warm from proximal to distal b/L.  Webspaces clean, dry, and intact  Nails x10 short   Neurological:      Mental Status: He is alert and oriented to person, place, and time.      Sensory: No sensory deficit.      Motor: Atrophy present.      Deep Tendon Reflexes: Reflexes abnormal.      Reflex Scores:       Patellar reflexes are 1+ on the right side and 1+ on the left side.       Achilles reflexes are 1+ on the right side and 1+ on the left side.     Comments: Intact gross sensation noted to b/L LEs    There is pain on palpation of the bilateral 3rd  intermetatarsal space with a positive Nikkie's click. Minimal tenderness to palpation of the adjacent metatarsal heads.     Psychiatric:         Behavior: Behavior normal.             Assessment:       Encounter Diagnoses   Name Primary?    Diabetic polyneuropathy associated with type 2 diabetes mellitus Yes    Neuritis          Plan:       Walter was seen today for diabetes mellitus and foot pain.    Diagnoses and all orders for this visit:    Diabetic polyneuropathy associated with type 2 diabetes mellitus    Neuritis    Other orders  -     methylPREDNISolone (MEDROL DOSEPACK) 4 mg tablet; use as directed      I counseled the patient on his conditions, their implications and medical management.    Medical records reviewed  A1c reviewed for rx of medrol dose pack    Rx medrol dose  pack    Pt advised to f/u with Dr GENTILE

## 2023-07-27 DIAGNOSIS — Z95.1 S/P CABG X 5: ICD-10-CM

## 2023-07-27 DIAGNOSIS — I25.118 CORONARY ARTERY DISEASE OF NATIVE ARTERY OF NATIVE HEART WITH STABLE ANGINA PECTORIS: ICD-10-CM

## 2023-07-27 RX ORDER — NITROGLYCERIN 0.4 MG/1
0.4 TABLET SUBLINGUAL EVERY 5 MIN PRN
Qty: 75 TABLET | Refills: 3 | Status: SHIPPED | OUTPATIENT
Start: 2023-07-27 | End: 2024-01-16

## 2023-07-27 NOTE — TELEPHONE ENCOUNTER
Refill Routing Note   Medication(s) are not appropriate for processing by [facility] for the following reason(s):      Drug-disease interaction    [facility] action(s):  Defer Care Due:  None identified     Medication Therapy Plan: Drug-Disease: nitroGLYCERIN and Iron deficiency anemia; Iron deficiency anemia, unspecified iron deficiency anemia type      Pharmacist review requested: Yes     Appointments  past 12m or future 3m with PCP    Date Provider   Last Visit   7/5/2023 Belkys Kruger MD   Next Visit   11/17/2023 Belkys Kruger MD   ED visits in past 90 days: 2        Note composed:11:02 AM 07/27/2023

## 2023-07-27 NOTE — TELEPHONE ENCOUNTER
No care due was identified.  [facility] Embedded Care Due Messages. Reference number: [id number].   7/27/2023 1:01:42 AM CDT

## 2023-07-27 NOTE — TELEPHONE ENCOUNTER
Refill Decision Note      Refill Decision Note   Walter Bull  is requesting a refill authorization.  Brief Assessment and Rationale for Refill:  Approve     Medication Therapy Plan:         Pharmacist review requested: Yes   Comments:     Note composed:11:44 AM 07/27/2023             Appointments     Last Visit   7/5/2023 Belkys Kruger MD   Next Visit   11/17/2023 Belkys Kruger MD

## 2023-08-09 ENCOUNTER — OFFICE VISIT (OUTPATIENT)
Dept: PODIATRY | Facility: CLINIC | Age: 73
End: 2023-08-09
Payer: MEDICARE

## 2023-08-09 VITALS
HEIGHT: 69 IN | DIASTOLIC BLOOD PRESSURE: 53 MMHG | HEART RATE: 62 BPM | WEIGHT: 255.75 LBS | SYSTOLIC BLOOD PRESSURE: 131 MMHG | BODY MASS INDEX: 37.88 KG/M2

## 2023-08-09 DIAGNOSIS — M79.672 PAIN IN BOTH FEET: ICD-10-CM

## 2023-08-09 DIAGNOSIS — M79.671 PAIN IN BOTH FEET: ICD-10-CM

## 2023-08-09 DIAGNOSIS — M21.622 TAILOR'S BUNION OF BOTH FEET: ICD-10-CM

## 2023-08-09 DIAGNOSIS — G57.53 TARSAL TUNNEL SYNDROME OF BOTH LOWER EXTREMITIES: ICD-10-CM

## 2023-08-09 DIAGNOSIS — M21.621 TAILOR'S BUNION OF BOTH FEET: ICD-10-CM

## 2023-08-09 DIAGNOSIS — E11.42 DIABETIC POLYNEUROPATHY ASSOCIATED WITH TYPE 2 DIABETES MELLITUS: Primary | ICD-10-CM

## 2023-08-09 DIAGNOSIS — L84 CORN OR CALLUS: ICD-10-CM

## 2023-08-09 PROCEDURE — 3061F NEG MICROALBUMINURIA REV: CPT | Mod: CPTII,S$GLB,, | Performed by: PODIATRIST

## 2023-08-09 PROCEDURE — 3078F PR MOST RECENT DIASTOLIC BLOOD PRESSURE < 80 MM HG: ICD-10-PCS | Mod: CPTII,S$GLB,, | Performed by: PODIATRIST

## 2023-08-09 PROCEDURE — 99999 PR PBB SHADOW E&M-EST. PATIENT-LVL II: ICD-10-PCS | Mod: PBBFAC,,, | Performed by: PODIATRIST

## 2023-08-09 PROCEDURE — 99214 PR OFFICE/OUTPT VISIT, EST, LEVL IV, 30-39 MIN: ICD-10-PCS | Mod: 25,S$GLB,, | Performed by: PODIATRIST

## 2023-08-09 PROCEDURE — 4010F PR ACE/ARB THEARPY RXD/TAKEN: ICD-10-PCS | Mod: CPTII,S$GLB,, | Performed by: PODIATRIST

## 2023-08-09 PROCEDURE — 3288F PR FALLS RISK ASSESSMENT DOCUMENTED: ICD-10-PCS | Mod: CPTII,S$GLB,, | Performed by: PODIATRIST

## 2023-08-09 PROCEDURE — 3288F FALL RISK ASSESSMENT DOCD: CPT | Mod: CPTII,S$GLB,, | Performed by: PODIATRIST

## 2023-08-09 PROCEDURE — 3075F PR MOST RECENT SYSTOLIC BLOOD PRESS GE 130-139MM HG: ICD-10-PCS | Mod: CPTII,S$GLB,, | Performed by: PODIATRIST

## 2023-08-09 PROCEDURE — 11721 PR DEBRIDEMENT OF NAILS, 6 OR MORE: ICD-10-PCS | Mod: Q9,59,S$GLB, | Performed by: PODIATRIST

## 2023-08-09 PROCEDURE — 3061F PR NEG MICROALBUMINURIA RESULT DOCUMENTED/REVIEW: ICD-10-PCS | Mod: CPTII,S$GLB,, | Performed by: PODIATRIST

## 2023-08-09 PROCEDURE — 4010F ACE/ARB THERAPY RXD/TAKEN: CPT | Mod: CPTII,S$GLB,, | Performed by: PODIATRIST

## 2023-08-09 PROCEDURE — 1125F AMNT PAIN NOTED PAIN PRSNT: CPT | Mod: CPTII,S$GLB,, | Performed by: PODIATRIST

## 2023-08-09 PROCEDURE — 3078F DIAST BP <80 MM HG: CPT | Mod: CPTII,S$GLB,, | Performed by: PODIATRIST

## 2023-08-09 PROCEDURE — 11721 DEBRIDE NAIL 6 OR MORE: CPT | Mod: Q9,59,S$GLB, | Performed by: PODIATRIST

## 2023-08-09 PROCEDURE — 3066F PR DOCUMENTATION OF TREATMENT FOR NEPHROPATHY: ICD-10-PCS | Mod: CPTII,S$GLB,, | Performed by: PODIATRIST

## 2023-08-09 PROCEDURE — 3066F NEPHROPATHY DOC TX: CPT | Mod: CPTII,S$GLB,, | Performed by: PODIATRIST

## 2023-08-09 PROCEDURE — 11055 PARING/CUTG B9 HYPRKER LES 1: CPT | Mod: Q9,S$GLB,, | Performed by: PODIATRIST

## 2023-08-09 PROCEDURE — 3008F BODY MASS INDEX DOCD: CPT | Mod: CPTII,S$GLB,, | Performed by: PODIATRIST

## 2023-08-09 PROCEDURE — 3008F PR BODY MASS INDEX (BMI) DOCUMENTED: ICD-10-PCS | Mod: CPTII,S$GLB,, | Performed by: PODIATRIST

## 2023-08-09 PROCEDURE — 1125F PR PAIN SEVERITY QUANTIFIED, PAIN PRESENT: ICD-10-PCS | Mod: CPTII,S$GLB,, | Performed by: PODIATRIST

## 2023-08-09 PROCEDURE — 11055 PR TRIM HYPERKERATOTIC SKIN LESION, ONE: ICD-10-PCS | Mod: Q9,S$GLB,, | Performed by: PODIATRIST

## 2023-08-09 PROCEDURE — 3075F SYST BP GE 130 - 139MM HG: CPT | Mod: CPTII,S$GLB,, | Performed by: PODIATRIST

## 2023-08-09 PROCEDURE — 3044F PR MOST RECENT HEMOGLOBIN A1C LEVEL <7.0%: ICD-10-PCS | Mod: CPTII,S$GLB,, | Performed by: PODIATRIST

## 2023-08-09 PROCEDURE — 99999 PR PBB SHADOW E&M-EST. PATIENT-LVL II: CPT | Mod: PBBFAC,,, | Performed by: PODIATRIST

## 2023-08-09 PROCEDURE — 1101F PT FALLS ASSESS-DOCD LE1/YR: CPT | Mod: CPTII,S$GLB,, | Performed by: PODIATRIST

## 2023-08-09 PROCEDURE — 99214 OFFICE O/P EST MOD 30 MIN: CPT | Mod: 25,S$GLB,, | Performed by: PODIATRIST

## 2023-08-09 PROCEDURE — 3044F HG A1C LEVEL LT 7.0%: CPT | Mod: CPTII,S$GLB,, | Performed by: PODIATRIST

## 2023-08-09 PROCEDURE — 1101F PR PT FALLS ASSESS DOC 0-1 FALLS W/OUT INJ PAST YR: ICD-10-PCS | Mod: CPTII,S$GLB,, | Performed by: PODIATRIST

## 2023-08-09 RX ORDER — AMMONIUM LACTATE 12 G/100G
CREAM TOPICAL
Qty: 140 G | Refills: 11 | Status: SHIPPED | OUTPATIENT
Start: 2023-08-09 | End: 2023-08-09

## 2023-08-09 RX ORDER — LIDOCAINE 30 MG/G
1 CREAM TOPICAL 2 TIMES DAILY
Qty: 85 G | Refills: 3 | Status: SHIPPED | OUTPATIENT
Start: 2023-08-09

## 2023-08-09 NOTE — PROGRESS NOTES
Subjective:      Patient ID: Walter Bull is a 72 y.o. male.    Chief Complaint: Peripheral Neuropathy (Consult LM)    Walter is a 72 y.o. male who presents to the clinic upon referral from Dr. Nelly bermeo. provider found  for evaluation and treatment of diabetic feet. Walter has a past medical history of Anemia, Angina pectoris, Anticoagulant long-term use, Arthritis, Back pain, CHF (congestive heart failure), Chronic bronchitis, Colon cancer screening (02/02/2017), Coronary artery disease, Diabetes mellitus type I, Diabetes with neurologic complications, Disorder of kidney and ureter, Encounter for blood transfusion, Glaucoma, Heart attack, Heart disease, Hyperlipidemia, Hypertension, Iron deficiency, Kidney failure, Obesity, Paroxysmal atrial fibrillation (01/12/2023), Pneumonia, Polyneuropathy, Pulmonary emphysema (02/26/2020), Renal manifestation of secondary diabetes mellitus, 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  Would like to discuss topical wound treatment options for dry skin and ongoing neuropathic changes bilateral.     PCP: Jerri Griffiths MD    Date Last Seen by PCP: dr jerri griffiths05/21/2019    Current shoe gear: Tennis shoes    Hemoglobin A1C   Date Value Ref Range Status   05/10/2023 6.3 (H) 4.0 - 5.6 % Final     Comment:     ADA Screening Guidelines:  5.7-6.4%  Consistent with prediabetes  >or=6.5%  Consistent with diabetes    High levels of fetal hemoglobin interfere with the HbA1C  assay. Heterozygous hemoglobin variants (HbS, HgC, etc)do  not significantly interfere with this assay.   However, presence of multiple variants may affect accuracy.     09/02/2022 7.0 (H) 4.0 - 5.6 % Final     Comment:     ADA Screening Guidelines:  5.7-6.4%  Consistent with prediabetes  >or=6.5%  Consistent with diabetes    High levels of fetal hemoglobin interfere with the HbA1C  assay. Heterozygous hemoglobin  variants (HbS, HgC, etc)do  not significantly interfere with this assay.   However, presence of multiple variants may affect accuracy.     05/10/2022 8.0 (H) 4.0 - 5.6 % Final     Comment:     ADA Screening Guidelines:  5.7-6.4%  Consistent with prediabetes  >or=6.5%  Consistent with diabetes    High levels of fetal hemoglobin interfere with the HbA1C  assay. Heterozygous hemoglobin variants (HbS, HgC, etc)do  not significantly interfere with this assay.   However, presence of multiple variants may affect accuracy.             Review of Systems   Constitutional: Negative for chills, fever and malaise/fatigue.   HENT:  Negative for hearing loss.    Cardiovascular:  Negative for claudication.   Respiratory:  Negative for shortness of breath.    Skin:  Negative for dry skin, flushing and rash.   Musculoskeletal:  Negative for joint pain and myalgias.   Neurological:  Negative for loss of balance, numbness, paresthesias and sensory change.   Psychiatric/Behavioral:  Negative for altered mental status.            Objective:      Physical Exam  Vitals reviewed.   Constitutional:       Appearance: He is well-developed.   HENT:      Head: Normocephalic and atraumatic.   Cardiovascular:      Pulses:           Dorsalis pedis pulses are 2+ on the right side and 2+ on the left side.        Posterior tibial pulses are 2+ on the right side and 2+ on the left side.      Comments: No edema noted to b/L LEs  Pulmonary:      Effort: Pulmonary effort is normal.   Musculoskeletal:         General: Normal range of motion.      Comments: Adequate joint ROM noted to all lower extremity muscle groups with no pain or crepitation noted. Muscle strength is 5/5 in all groups bilaterally.     Feet:      Right foot:      Protective Sensation: 5 sites tested.  5 sites sensed.      Skin integrity: Callus and dry skin present.      Left foot:      Protective Sensation: 5 sites tested.  5 sites sensed.      Skin integrity: Callus and dry skin  present.   Skin:     General: Skin is warm and dry.      Capillary Refill: Capillary refill takes 2 to 3 seconds.      Coloration: Skin is pale.      Comments: Xerosis improved. No open lesion noted b/L  Skin temp is warm to warm from proximal to distal b/L.  Webspaces clean, dry, and intact  Nails x10 short   Neurological:      Mental Status: He is alert and oriented to person, place, and time.      Sensory: Sensory deficit present.      Motor: Atrophy present.      Deep Tendon Reflexes: Reflexes abnormal.      Reflex Scores:       Patellar reflexes are 1+ on the right side and 1+ on the left side.       Achilles reflexes are 1+ on the right side and 1+ on the left side.     Comments: Intact gross sensation noted to b/L LEs    There is pain on palpation of the bilateral 3rd  intermetatarsal space with a positive Nikkie's click. Minimal tenderness to palpation of the adjacent metatarsal heads.     Psychiatric:         Behavior: Behavior normal.               Assessment:       Encounter Diagnoses   Name Primary?    Diabetic polyneuropathy associated with type 2 diabetes mellitus Yes    Corn or callus     Pain in both feet     Tailor's bunion of both feet     Tarsal tunnel syndrome of both lower extremities          Plan:       Walter was seen today for peripheral neuropathy.    Diagnoses and all orders for this visit:    Diabetic polyneuropathy associated with type 2 diabetes mellitus  -     Cancel: DIABETIC SHOES FOR HOME USE  -     DIABETIC SHOES FOR HOME USE    Corn or callus  -     Cancel: DIABETIC SHOES FOR HOME USE  -     DIABETIC SHOES FOR HOME USE    Pain in both feet  -     Cancel: DIABETIC SHOES FOR HOME USE  -     DIABETIC SHOES FOR HOME USE    Tailor's bunion of both feet    Tarsal tunnel syndrome of both lower extremities    Other orders  -     Discontinue: ammonium lactate 12 % Crea; Apply twice daily to affected parts both feet as needed.  -     LIDOcaine 3 % Crea; Apply 1 application  topically 2 (two)  times a day.      I counseled the patient on his conditions, their implications and medical management.    ,Decision making:  Chronic illnesses discussed in detail, previous records/notes and imaging independently reviewed, prescription drug management performed in addition to lengthy discussion regarding both conservative and surgical treatment options.    Discussed options for peripheral neuropathy/nerve entrapment syndrome including nerve block therapy, surgical nerve entrapment decompression procedures, and various vitamins and supplementation available shown to improve nerve function.    Shoe inspection. Diabetic Foot Education. Patient reminded of the importance of good nutrition and blood sugar control to help prevent podiatric complications of diabetes. Patient instructed on proper foot hygeine. We discussed wearing proper shoe gear, daily foot inspections and Diabetic foot education in detail.      Routine Foot Care    Performed by:  Jonnathan Kenney. DPM  Authorized by:  Patient     Consent Done?:  Yes (Verbal)     Nail Care Type:  Debride  Location(s): All  (Left 1st Toe, Left 3rd Toe, Left 2nd Toe, Left 4th Toe, Left 5th Toe, Right 1st Toe, Right 2nd Toe, Right 3rd Toe, Right 4th Toe and Right 5th Toe)  Patient tolerance:  Patient tolerated the procedure well with no immediate complications     With patient's permission, the toenails mentioned above were aggressively reduced and debrided using a nail nipper, removing all offending nail and debris. The patient will continue to monitor the areas daily, inspect the feet, wear protective shoe gear when ambulatory, and moisturizer to maintain skin integrity.      Callus Care Type: Debride    With patient's permission, the calluses/hyperkeratotic lesions mentioned above were aggressively reduced and debrided using a number 15 blade. The patient will continue to monitor the areas daily, inspect the feet, wear protective shoe gear when ambulatory, and  moisturizer to maintain skin integrity.     Return to clinic in 3-6 months or sooner if problems arise

## 2023-08-10 ENCOUNTER — TELEPHONE (OUTPATIENT)
Dept: PODIATRY | Facility: CLINIC | Age: 73
End: 2023-08-10
Payer: MEDICARE

## 2023-08-10 ENCOUNTER — PATIENT MESSAGE (OUTPATIENT)
Dept: PODIATRY | Facility: CLINIC | Age: 73
End: 2023-08-10
Payer: MEDICARE

## 2023-08-10 NOTE — TELEPHONE ENCOUNTER
Good afternoon I called the place for the diabetic shoes but they say I need the prescription for them to send it to my insurance for approval and I don't see it in my paperwork from yesterday can you please Get me a copy so I can send it in to them thanks janusz       Patient was notified DM shoe RX was route to [facility]

## 2023-08-15 ENCOUNTER — SPECIALTY PHARMACY (OUTPATIENT)
Dept: PHARMACY | Facility: CLINIC | Age: 73
End: 2023-08-15
Payer: MEDICARE

## 2023-08-15 NOTE — TELEPHONE ENCOUNTER
Specialty Pharmacy - Refill Coordination    Specialty Medication Orders Linked to Encounter      Flowsheet Row Most Recent Value   Medication #1 evolocumab (REPATHA SURECLICK) 140 mg/mL PnIj (Order#[id number], Rx#[id number])            Refill Questions - Documented Responses      Flowsheet Row Most Recent Value   Patient Availability and HIPAA Verification    Does patient want to proceed with activity? Yes   HIPAA/medical authority confirmed? Yes   Relationship to patient of person spoken to? Self   Refill Screening Questions    Would patient like to speak to a pharmacist? No   When does the patient need to receive the medication? 08/18/23   Refill Delivery Questions    How will the patient receive the medication? [facility]   When does the patient need to receive the medication? 08/18/23   Shipping Address Prescription   Address in [facility] confirmed and updated if neccessary? Yes   Expected Copay ($) 0   Is the patient able to afford the medication copay? Yes   Payment Method zero copay   Days supply of Refill 28   Supplies needed? No supplies needed   Refill activity completed? Yes   Refill activity plan Refill scheduled   Shipment/Pickup Date: 08/16/23            Current Outpatient Medications   Medication Sig    albuterol (PROVENTIL HFA) 90 mcg/actuation inhaler Inhale 2 puffs into the lungs every 6 (six) hours as needed for Wheezing or Shortness of Breath. Rescue    albuterol (PROVENTIL) 2.5 mg /3 mL (0.083 %) nebulizer solution Take 3 mLs (2.5 mg total) by nebulization every 6 (six) hours as needed for Wheezing or Shortness of Breath. Rescue    albuterol-ipratropium (DUO-NEB) 2.5 mg-0.5 mg/3 mL nebulizer solution Take 3 mLs by nebulization every 6 (six) hours as needed for Wheezing or Shortness of Breath. Rescue    apixaban (ELIQUIS) 5 mg Tab Take 1 tablet (5 mg total) by mouth 2 (two) times daily.    azithromycin (Z-ARELI) 250 MG tablet TAKE 2 TABLETS BY MOUTH ON DAY 1, AND THEN TAKE 1 TABLET BY MOUTH ONCE  A DAY ON DAY 2 THROUGH DAY 5    blood-glucose sensor (DEXCOM G6 SENSOR) [place]enne 3 each by Misc.(Non-Drug; Combo Route) route continuous.    blood-glucose transmitter (DEXCOM G6 TRANSMITTER) [place]enne 1 each by Misc.(Non-Drug; Combo Route) route continuous.    carvediloL (COREG) 6.25 MG tablet Take 1 tablet (6.25 mg total) by mouth 2 (two) times daily.    cefdinir (OMNICEF) 300 MG capsule Take 300 mg by mouth 2 (two) times daily.    esomeprazole (NEXIUM) 40 MG capsule Take 1 capsule (40 mg total) by mouth 2 (two) times daily.    evolocumab (REPATHA SURECLICK) 140 mg/mL PnIj Inject 1 mL (140 mg total) into the skin every 14 (fourteen) days.    ferrous sulfate (FEOSOL) 325 mg (65 mg iron) Tab tablet TAKE 1 TABLET BY MOUTH THREE TIMES DAILY    furosemide (LASIX) 20 MG tablet Take 1 tablet (20 mg total) by mouth once daily.    GVOKE HYPOPEN 2-PACK 1 mg/0.2 mL AtIn INJECT SUBCUTANEOUSLY  CONTENTS OF 1 AUTO-INJECTOR AS NEEDED FOR HYPOGLCEMIA  AS DIRECTED    insulin lispro (HUMALOG U-100 INSULIN) 100 unit/mL injection USE AS DIRECTED VIA Lopoly 20 with 3 CLICKS PER MEAL    ketoconazole (NIZORAL) 2 % cream Apply topically once daily.    LIDOcaine 3 % Crea Apply 1 application  topically 2 (two) times a day.    LIDOcaine HCL 2% (XYLOCAINE) 2 % jelly Apply topically as needed. Apply topically once nightly to affected part of foot/feet, for pain.    losartan (COZAAR) 25 MG tablet Take 0.5 tablets (12.5 mg total) by mouth once daily.    mupirocin (BACTROBAN) 2 % ointment Apply to affected area 3 times daily    mupirocin (BACTROBAN) 2 % ointment Apply topically 2 (two) times daily.    nitroGLYCERIN (NITROSTAT) 0.4 MG SL tablet Place 1 tablet (0.4 mg total) under the tongue every 5 (five) minutes as needed for Chest pain (MAX OF 3 TABLETS IN 15 MINUTES. CALL 911 IF PAIN PERSISTS.).    ondansetron (ZOFRAN) 4 MG tablet Take 1 tablet (4 mg total) by mouth every 8 (eight) hours as needed for Nausea.    papaverine 30 mg/mL injection Add:  Phentolamine 1 mg  Add: PGE1 10 mcg    Sig:  Inject 25 units as directed; titrate up by 5 units until desired results    ranolazine (RANEXA) 1,000 mg Tb12 TAKE 1 TABLET BY MOUTH  TWICE DAILY    rosuvastatin (CRESTOR) 40 MG Tab Take 1 tablet (40 mg total) by mouth once daily.    spironolactone (ALDACTONE) 25 MG tablet Take 1 tablet (25 mg total) by mouth once daily.    sub-q insulin device, 20 unit (VGO 20) [place]yenne 1 Device by Misc.(Non-Drug; Combo Route) route once daily.    tamsulosin (FLOMAX) 0.4 mg Cap Take 1 capsule (0.4 mg total) by mouth once daily.    tirzepatide 7.5 mg/0.5 mL PnIj Inject 7.5 mg into the skin every 7 days.   Last reviewed on 7/18/2023  7:41 AM by Anastasia Patel NP    Review of patient's allergies indicates:  No Known Allergies Last reviewed on  8/9/2023 8:01 AM by Irlanda uLke      Tasks added this encounter   No tasks added.   Tasks due within next 3 months   No tasks due.     Danay Calderon, PharmD  [facility]  [address] [facility] [address]  Phone: [phone]  Fax: [phone]

## 2023-08-17 ENCOUNTER — TELEPHONE (OUTPATIENT)
Dept: ELECTROPHYSIOLOGY | Facility: CLINIC | Age: 73
End: 2023-08-17
Payer: MEDICARE

## 2023-08-17 NOTE — TELEPHONE ENCOUNTER
----- Message from Byron Glasgow MD sent at 8/16/2023  5:18 PM CDT -----  Those sound like brilliant suggestions. I think we should proceed with changes.    ----- Message -----  From: Monalisa Villanueva  Sent: 8/15/2023   4:39 PM CDT  To: Byron Glasgow MD    Patient with a SJM CRT-D w/ NSRVO events noted c/w T wave oversensing.  No tachy counters or inappropriate therapy as a result.    Technical services has suggested to try reprogramming the ventricular post paced threshold start to 1.8 mV and the ventricular post paced decay delay to 95 ms OR to turn on the low frequency attenuation filter.    May we make these changes?      ThanksMonalisa

## 2023-08-31 ENCOUNTER — PATIENT MESSAGE (OUTPATIENT)
Dept: ADMINISTRATIVE | Facility: HOSPITAL | Age: 73
End: 2023-08-31
Payer: MEDICARE

## 2023-08-31 ENCOUNTER — PATIENT MESSAGE (OUTPATIENT)
Dept: ENDOCRINOLOGY | Facility: CLINIC | Age: 73
End: 2023-08-31
Payer: MEDICARE

## 2023-09-01 ENCOUNTER — OFFICE VISIT (OUTPATIENT)
Dept: ENDOCRINOLOGY | Facility: CLINIC | Age: 73
End: 2023-09-01
Payer: MEDICARE

## 2023-09-01 VITALS
SYSTOLIC BLOOD PRESSURE: 126 MMHG | WEIGHT: 237 LBS | BODY MASS INDEX: 35.1 KG/M2 | HEIGHT: 69 IN | RESPIRATION RATE: 18 BRPM | HEART RATE: 60 BPM | DIASTOLIC BLOOD PRESSURE: 68 MMHG

## 2023-09-01 DIAGNOSIS — E78.2 MIXED HYPERLIPIDEMIA: Chronic | ICD-10-CM

## 2023-09-01 DIAGNOSIS — N25.81 HYPERPARATHYROIDISM DUE TO RENAL INSUFFICIENCY: ICD-10-CM

## 2023-09-01 DIAGNOSIS — Z79.4 TYPE 2 DIABETES MELLITUS WITH STAGE 3B CHRONIC KIDNEY DISEASE, WITH LONG-TERM CURRENT USE OF INSULIN: ICD-10-CM

## 2023-09-01 DIAGNOSIS — E11.22 TYPE 2 DIABETES MELLITUS WITH STAGE 3B CHRONIC KIDNEY DISEASE, WITH LONG-TERM CURRENT USE OF INSULIN: ICD-10-CM

## 2023-09-01 DIAGNOSIS — N18.32 STAGE 3B CHRONIC KIDNEY DISEASE: ICD-10-CM

## 2023-09-01 DIAGNOSIS — N18.32 TYPE 2 DIABETES MELLITUS WITH STAGE 3B CHRONIC KIDNEY DISEASE, WITH LONG-TERM CURRENT USE OF INSULIN: ICD-10-CM

## 2023-09-01 PROCEDURE — 3008F BODY MASS INDEX DOCD: CPT | Mod: CPTII,S$GLB,, | Performed by: STUDENT IN AN ORGANIZED HEALTH CARE EDUCATION/TRAINING PROGRAM

## 2023-09-01 PROCEDURE — 1159F MED LIST DOCD IN RCRD: CPT | Mod: CPTII,S$GLB,, | Performed by: STUDENT IN AN ORGANIZED HEALTH CARE EDUCATION/TRAINING PROGRAM

## 2023-09-01 PROCEDURE — 99999 PR PBB SHADOW E&M-EST. PATIENT-LVL IV: CPT | Mod: PBBFAC,,, | Performed by: STUDENT IN AN ORGANIZED HEALTH CARE EDUCATION/TRAINING PROGRAM

## 2023-09-01 PROCEDURE — 95251 CONT GLUC MNTR ANALYSIS I&R: CPT | Mod: S$GLB,,, | Performed by: STUDENT IN AN ORGANIZED HEALTH CARE EDUCATION/TRAINING PROGRAM

## 2023-09-01 PROCEDURE — 1101F PT FALLS ASSESS-DOCD LE1/YR: CPT | Mod: CPTII,S$GLB,, | Performed by: STUDENT IN AN ORGANIZED HEALTH CARE EDUCATION/TRAINING PROGRAM

## 2023-09-01 PROCEDURE — 3288F FALL RISK ASSESSMENT DOCD: CPT | Mod: CPTII,S$GLB,, | Performed by: STUDENT IN AN ORGANIZED HEALTH CARE EDUCATION/TRAINING PROGRAM

## 2023-09-01 PROCEDURE — 3066F NEPHROPATHY DOC TX: CPT | Mod: CPTII,S$GLB,, | Performed by: STUDENT IN AN ORGANIZED HEALTH CARE EDUCATION/TRAINING PROGRAM

## 2023-09-01 PROCEDURE — 3074F PR MOST RECENT SYSTOLIC BLOOD PRESSURE < 130 MM HG: ICD-10-PCS | Mod: CPTII,S$GLB,, | Performed by: STUDENT IN AN ORGANIZED HEALTH CARE EDUCATION/TRAINING PROGRAM

## 2023-09-01 PROCEDURE — 3074F SYST BP LT 130 MM HG: CPT | Mod: CPTII,S$GLB,, | Performed by: STUDENT IN AN ORGANIZED HEALTH CARE EDUCATION/TRAINING PROGRAM

## 2023-09-01 PROCEDURE — 1101F PR PT FALLS ASSESS DOC 0-1 FALLS W/OUT INJ PAST YR: ICD-10-PCS | Mod: CPTII,S$GLB,, | Performed by: STUDENT IN AN ORGANIZED HEALTH CARE EDUCATION/TRAINING PROGRAM

## 2023-09-01 PROCEDURE — 3066F PR DOCUMENTATION OF TREATMENT FOR NEPHROPATHY: ICD-10-PCS | Mod: CPTII,S$GLB,, | Performed by: STUDENT IN AN ORGANIZED HEALTH CARE EDUCATION/TRAINING PROGRAM

## 2023-09-01 PROCEDURE — 95251 PR GLUCOSE MONITOR, 72 HOUR, PHYS INTERP: ICD-10-PCS | Mod: S$GLB,,, | Performed by: STUDENT IN AN ORGANIZED HEALTH CARE EDUCATION/TRAINING PROGRAM

## 2023-09-01 PROCEDURE — 3008F PR BODY MASS INDEX (BMI) DOCUMENTED: ICD-10-PCS | Mod: CPTII,S$GLB,, | Performed by: STUDENT IN AN ORGANIZED HEALTH CARE EDUCATION/TRAINING PROGRAM

## 2023-09-01 PROCEDURE — 4010F ACE/ARB THERAPY RXD/TAKEN: CPT | Mod: CPTII,S$GLB,, | Performed by: STUDENT IN AN ORGANIZED HEALTH CARE EDUCATION/TRAINING PROGRAM

## 2023-09-01 PROCEDURE — 3044F PR MOST RECENT HEMOGLOBIN A1C LEVEL <7.0%: ICD-10-PCS | Mod: CPTII,S$GLB,, | Performed by: STUDENT IN AN ORGANIZED HEALTH CARE EDUCATION/TRAINING PROGRAM

## 2023-09-01 PROCEDURE — 1159F PR MEDICATION LIST DOCUMENTED IN MEDICAL RECORD: ICD-10-PCS | Mod: CPTII,S$GLB,, | Performed by: STUDENT IN AN ORGANIZED HEALTH CARE EDUCATION/TRAINING PROGRAM

## 2023-09-01 PROCEDURE — 3044F HG A1C LEVEL LT 7.0%: CPT | Mod: CPTII,S$GLB,, | Performed by: STUDENT IN AN ORGANIZED HEALTH CARE EDUCATION/TRAINING PROGRAM

## 2023-09-01 PROCEDURE — 3288F PR FALLS RISK ASSESSMENT DOCUMENTED: ICD-10-PCS | Mod: CPTII,S$GLB,, | Performed by: STUDENT IN AN ORGANIZED HEALTH CARE EDUCATION/TRAINING PROGRAM

## 2023-09-01 PROCEDURE — 3078F DIAST BP <80 MM HG: CPT | Mod: CPTII,S$GLB,, | Performed by: STUDENT IN AN ORGANIZED HEALTH CARE EDUCATION/TRAINING PROGRAM

## 2023-09-01 PROCEDURE — 1125F AMNT PAIN NOTED PAIN PRSNT: CPT | Mod: CPTII,S$GLB,, | Performed by: STUDENT IN AN ORGANIZED HEALTH CARE EDUCATION/TRAINING PROGRAM

## 2023-09-01 PROCEDURE — 3061F PR NEG MICROALBUMINURIA RESULT DOCUMENTED/REVIEW: ICD-10-PCS | Mod: CPTII,S$GLB,, | Performed by: STUDENT IN AN ORGANIZED HEALTH CARE EDUCATION/TRAINING PROGRAM

## 2023-09-01 PROCEDURE — 99999 PR PBB SHADOW E&M-EST. PATIENT-LVL IV: ICD-10-PCS | Mod: PBBFAC,,, | Performed by: STUDENT IN AN ORGANIZED HEALTH CARE EDUCATION/TRAINING PROGRAM

## 2023-09-01 PROCEDURE — 3078F PR MOST RECENT DIASTOLIC BLOOD PRESSURE < 80 MM HG: ICD-10-PCS | Mod: CPTII,S$GLB,, | Performed by: STUDENT IN AN ORGANIZED HEALTH CARE EDUCATION/TRAINING PROGRAM

## 2023-09-01 PROCEDURE — 1125F PR PAIN SEVERITY QUANTIFIED, PAIN PRESENT: ICD-10-PCS | Mod: CPTII,S$GLB,, | Performed by: STUDENT IN AN ORGANIZED HEALTH CARE EDUCATION/TRAINING PROGRAM

## 2023-09-01 PROCEDURE — 99214 PR OFFICE/OUTPT VISIT, EST, LEVL IV, 30-39 MIN: ICD-10-PCS | Mod: 25,S$GLB,, | Performed by: STUDENT IN AN ORGANIZED HEALTH CARE EDUCATION/TRAINING PROGRAM

## 2023-09-01 PROCEDURE — 99214 OFFICE O/P EST MOD 30 MIN: CPT | Mod: 25,S$GLB,, | Performed by: STUDENT IN AN ORGANIZED HEALTH CARE EDUCATION/TRAINING PROGRAM

## 2023-09-01 PROCEDURE — 3061F NEG MICROALBUMINURIA REV: CPT | Mod: CPTII,S$GLB,, | Performed by: STUDENT IN AN ORGANIZED HEALTH CARE EDUCATION/TRAINING PROGRAM

## 2023-09-01 PROCEDURE — 4010F PR ACE/ARB THEARPY RXD/TAKEN: ICD-10-PCS | Mod: CPTII,S$GLB,, | Performed by: STUDENT IN AN ORGANIZED HEALTH CARE EDUCATION/TRAINING PROGRAM

## 2023-09-01 RX ORDER — AMMONIUM LACTATE 12 G/100G
CREAM TOPICAL
COMMUNITY
Start: 2023-08-28

## 2023-09-01 NOTE — PROGRESS NOTES
"Subjective:      Patient ID: Walter Bull is a 72 y.o. male.    Chief Complaint:  Type 2 diabetes mellitus      History of Present Illness  This is a 72 y.o. male. with a past medical history of type 2 diabetes, CAD s/p CABG, CHF, HLD, here for evaluation.    Type 2 diabetes mellitus  Current diabetes medications:  - VGO 20 with 4 clicks with each meal and 1-2 clicks per snack  - Mounjaro 7.5 mg weekly    Lab Results   Component Value Date    CREATININE 1.6 (H) 06/29/2023    EGFRNORACEVR 44.9 (A) 06/29/2023       Past diabetes medications:  - Metformin - unable to tolerate  - Januvia   - SGLT-2 inhibitors avoided given frequent AKIs/dehydration    Known diabetic complications: nephropathy and cardiovascular disease    Weight trend:  Wt Readings from Last 6 Encounters:   09/01/23 107.5 kg (237 lb)   08/09/23 116 kg (255 lb 11.7 oz)   07/18/23 116 kg (255 lb 11.7 oz)   07/05/23 115.1 kg (253 lb 12 oz)   06/13/23 113.8 kg (250 lb 14.1 oz)   06/07/23 115.8 kg (255 lb 2.9 oz)           Prior visit with diabetes education: yes    Current diet: 3 meals per day  Current exercise: Limited              Diabetes Management Status  Statin: Taking  ACE/ARB: Taking    Screening or Prevention Patient's value   HgA1C Testing and Control   Lab Results   Component Value Date    HGBA1C 6.3 (H) 05/10/2023        LDL control Lab Results   Component Value Date    LDLCALC 94.2 05/10/2023      Nephropathy screening Lab Results   Component Value Date    MICALBCREAT 2.0 05/10/2023            Review of Systems  As above    Social and family history reviewed  Current medications and allergies reviewed    Objective:   /68 (BP Location: Right arm, Patient Position: Sitting, BP Method: Medium (Manual))   Pulse 60   Resp 18   Ht 5' 9" (1.753 m)   Wt 107.5 kg (237 lb)   BMI 35.00 kg/m²   Physical Exam  Alert, oriented    BP Readings from Last 1 Encounters:   09/01/23 126/68      Wt Readings from Last 1 Encounters:   09/01/23 0746 " 107.5 kg (237 lb)     Body mass index is 35 kg/m².    Lab Review:   Lab Results   Component Value Date    HGBA1C 6.3 (H) 05/10/2023     Lab Results   Component Value Date    CHOL 158 05/10/2023    HDL 48 05/10/2023    LDLCALC 94.2 05/10/2023    TRIG 79 05/10/2023    CHOLHDL 30.4 05/10/2023     Lab Results   Component Value Date     06/29/2023    K 3.7 06/29/2023     06/29/2023    CO2 26 06/29/2023    GLU 92 06/29/2023    BUN 14 06/29/2023    CREATININE 1.6 (H) 06/29/2023    CALCIUM 9.2 06/29/2023    PROT 7.5 06/29/2023    ALBUMIN 4.0 06/29/2023    BILITOT 0.5 06/29/2023    ALKPHOS 67 06/29/2023    AST 31 06/29/2023    ALT 24 06/29/2023    ANIONGAP 11 06/29/2023    ESTGFRAFRICA 57 (A) 07/08/2022    EGFRNONAA 49 (A) 07/08/2022    TSH 3.492 05/10/2023       All pertinent labs reviewed    Assessment and Plan     Type 2 diabetes mellitus with stage 3b chronic kidney disease, with long-term current use of insulin  Controlled based on CGM data with TIR > 70% and A1c 6.2%. He has some hypoglycemia episodes mostly throughout day which are likely from VGo clicks rather than the basal. He sometimes forgets to click and clicks after he sees glucose is high. Advised to click before meals and if he forgets to try to use less click to correct (1 to 2).    Unable to tolerated metformin or SGLT-2 inhibitor.    GLP-1/GIP RA medically necessary given failure to multiple GLP-1 RA.    Plan  - Continue VGo 20 + 4 clicks per meals + 1-2 clicks per snack  - Continue Mounjaro 7.5 mg weekly  - Continue Dexcom G6    Labs per PCP in November    F/u 4 months    HLD (hyperlipidemia)  Continue statin, Repatha    CKD (chronic kidney disease) stage 3, GFR 30-59 ml/min  Unable to tolerate SGLT-2 inhibitors      Hyperparathyroidism due to renal insufficiency  Nephrology following        Follow-up in 4 months    Andrew Briones MD  Endocrinology

## 2023-09-01 NOTE — ASSESSMENT & PLAN NOTE
Controlled based on CGM data with TIR > 70% and A1c 6.2%. He has some hypoglycemia episodes mostly throughout day which are likely from VGo clicks rather than the basal. He sometimes forgets to click and clicks after he sees glucose is high. Advised to click before meals and if he forgets to try to use less click to correct (1 to 2).    Unable to tolerated metformin or SGLT-2 inhibitor.    GLP-1/GIP RA medically necessary given failure to multiple GLP-1 RA.    Plan  - Continue VGo 20 + 4 clicks per meals + 1-2 clicks per snack  - Continue Mounjaro 7.5 mg weekly  - Continue Dexcom G6    Labs per PCP in November    F/u 4 months

## 2023-09-11 DIAGNOSIS — I25.118 CORONARY ARTERY DISEASE OF NATIVE ARTERY OF NATIVE HEART WITH STABLE ANGINA PECTORIS: ICD-10-CM

## 2023-09-12 RX ORDER — RANOLAZINE 1000 MG/1
TABLET, EXTENDED RELEASE ORAL
Qty: 60 TABLET | Refills: 11 | Status: SHIPPED | OUTPATIENT
Start: 2023-09-12

## 2023-09-18 ENCOUNTER — TELEPHONE (OUTPATIENT)
Dept: ELECTROPHYSIOLOGY | Facility: CLINIC | Age: 73
End: 2023-09-18
Payer: MEDICARE

## 2023-09-18 RX ORDER — AMIODARONE HYDROCHLORIDE 200 MG/1
200 TABLET ORAL DAILY
Qty: 30 TABLET | Refills: 11 | Status: SHIPPED | OUTPATIENT
Start: 2023-10-19 | End: 2024-10-18

## 2023-09-18 RX ORDER — AMIODARONE HYDROCHLORIDE 200 MG/1
200 TABLET ORAL 2 TIMES DAILY
Qty: 60 TABLET | Refills: 0 | Status: SHIPPED | OUTPATIENT
Start: 2023-09-18 | End: 2023-10-18

## 2023-09-18 NOTE — TELEPHONE ENCOUNTER
----- Message from Val Duval sent at 9/18/2023  2:52 PM CDT -----  Regarding: return call  Monalisa Brugess is returning your call and can be reached at [phone].    Thank you

## 2023-09-18 NOTE — TELEPHONE ENCOUNTER
Attempted to reach patient by phone re: amiodarone  Left detailed voicemail regarding medication and dosage but asked that patient call back to confirm receiving

## 2023-09-18 NOTE — TELEPHONE ENCOUNTER
"Merlin remote device transmission received; alert for ATP    Following Provider: Byron Glasgow MD    Device Type:  SJM/Abbott CRT-D  Date/Time:  9/13/2023 @ 04:53 pm  Event Type:  MMVT  Ventricular CL:  285 ms  Duration:  10 secs  Therapy Delivered:  ATP  Appropriate Therapy:  Yes  Successful:  Yes, type II break  Pt Symptomatic:  Asymptomatic with regard to this arrhythmia event.  He does report having frequent dizzy spells, some lasting an hour.  Most recent was last Friday, 9/15/23, in which he had to pull over because his vision was "yellow"; last almost an hour.  No arrhythmias seen from this time.  He does lawn work professionally.  SO Eve thinks he does not eat properly during the day contributing to the dizziness.  She reports he is not taking amiodarone.     71 y.o. male with CAD (CABG 2005, PCI 2018), CKD, HTN, HLD, DM, LBBB, MMVT, CRT-D 07/2022  Most recent BP:  126/68    No hx of VT ablation, VT easily inducible on EPS, was previously on amiodarone (dc'ed due to no VT seen via device)--remains off amiodarone  Was tried on higher dose of Coreg in the past and did not tolerate well    EF 30-35% 08/2022    Last clinic visit:08/8/2022  Next clinic visit: overdue recall for 08/2023    Will escalate to clinic RN to review with MD covering for Dr. Glasgow                        "

## 2023-09-18 NOTE — TELEPHONE ENCOUNTER
Returned the pt's call on this afternoon.  Pt confirmed listening to the VM and understands that Dr. Mujica has recommended he start on the Amiodarone and that it was electronically sent to his preferred Pharmacy of [facility] in [place], [place] on [address].  Pt did not have any additional questions. Understanding was verbalized.  Pt appreciated the call.

## 2023-09-29 ENCOUNTER — CLINICAL SUPPORT (OUTPATIENT)
Dept: CARDIOLOGY | Facility: HOSPITAL | Age: 73
End: 2023-09-29
Payer: MEDICARE

## 2023-09-29 DIAGNOSIS — Z95.810 PRESENCE OF AUTOMATIC (IMPLANTABLE) CARDIAC DEFIBRILLATOR: ICD-10-CM

## 2023-09-29 PROCEDURE — 93296 REM INTERROG EVL PM/IDS: CPT | Performed by: INTERNAL MEDICINE

## 2023-10-05 ENCOUNTER — PATIENT OUTREACH (OUTPATIENT)
Dept: ADMINISTRATIVE | Facility: HOSPITAL | Age: 73
End: 2023-10-05
Payer: MEDICARE

## 2023-10-05 ENCOUNTER — TELEPHONE (OUTPATIENT)
Dept: OPTOMETRY | Facility: CLINIC | Age: 73
End: 2023-10-05
Payer: MEDICARE

## 2023-10-05 NOTE — PROGRESS NOTES
Chart reviewed, immunization record updated.  No new results noted on [facility] or [facility] web site.  Care Everywhere updated.   Patient care coordination note updated.   Upcoming PCP visit updated.  Next PCP visit 11/17/2023.  LOV with PCP 07/18/2023.  Contacted patient to discuss Diabetic Eye exam.   Patient states he see Dr. Kal Houston in [place].  Called Dr. Houston' office, patient scheduled on 01/17/2023.  Staff message sent to possibly have patient scheduled for a sooner appointment date.    Clinic will notify patient.

## 2023-10-05 NOTE — TELEPHONE ENCOUNTER
"----- Message from Ricky Rivera sent at 10/5/2023  3:56 PM CDT -----  Consult/Advisory:          Name Of Caller: Self      Contact Preference?: [phone]       What is the nature of the call?: Inquiring if it's possible to be seen sometime again this year for diabetic retinopathy chk up      Additional Notes:  "Thank you for all that you do for our patients"      "

## 2023-10-10 DIAGNOSIS — I25.118 CORONARY ARTERY DISEASE OF NATIVE ARTERY OF NATIVE HEART WITH STABLE ANGINA PECTORIS: Primary | ICD-10-CM

## 2023-10-11 RX ORDER — EVOLOCUMAB 140 MG/ML
140 INJECTION, SOLUTION SUBCUTANEOUS
Qty: 2 EACH | Refills: 11 | Status: ACTIVE | OUTPATIENT
Start: 2023-10-11

## 2023-10-19 NOTE — ASSESSMENT & PLAN NOTE
Keflex PO at discharge   - UA showed hematuria.  - US showed enlargement and hyperemia of the right epididymis, suggestive of acute epididymitis.  - N. gonorrhoeae and C. trachomatis. In process  - Patient has a h/o wide complex tachycardia and is on amiodarone 200mg daily.  Floroquinolones, Bactrim contraindicated.  Patient discussed with Dr. Horowitz.     bilateral upper extremity ROM was WFL (within functional limits)/bilateral lower extremity ROM was WFL (within functional limits)

## 2023-10-27 DIAGNOSIS — Z79.4 TYPE 2 DIABETES MELLITUS WITH DIABETIC NEPHROPATHY, WITH LONG-TERM CURRENT USE OF INSULIN: ICD-10-CM

## 2023-10-27 DIAGNOSIS — E11.21 TYPE 2 DIABETES MELLITUS WITH DIABETIC NEPHROPATHY, WITH LONG-TERM CURRENT USE OF INSULIN: ICD-10-CM

## 2023-11-09 ENCOUNTER — OFFICE VISIT (OUTPATIENT)
Dept: OPTOMETRY | Facility: CLINIC | Age: 73
End: 2023-11-09
Payer: MEDICARE

## 2023-11-09 ENCOUNTER — PATIENT MESSAGE (OUTPATIENT)
Dept: OPTOMETRY | Facility: CLINIC | Age: 73
End: 2023-11-09

## 2023-11-09 DIAGNOSIS — H52.13 MYOPIA WITH ASTIGMATISM AND PRESBYOPIA, BILATERAL: ICD-10-CM

## 2023-11-09 DIAGNOSIS — H04.123 DRY EYE SYNDROME OF BOTH EYES: ICD-10-CM

## 2023-11-09 DIAGNOSIS — D31.61 BENIGN ORBITAL TUMOR, RIGHT: ICD-10-CM

## 2023-11-09 DIAGNOSIS — H52.203 MYOPIA WITH ASTIGMATISM AND PRESBYOPIA, BILATERAL: ICD-10-CM

## 2023-11-09 DIAGNOSIS — H52.4 MYOPIA WITH ASTIGMATISM AND PRESBYOPIA, BILATERAL: ICD-10-CM

## 2023-11-09 DIAGNOSIS — H40.1131 PRIMARY OPEN ANGLE GLAUCOMA (POAG) OF BOTH EYES, MILD STAGE: Primary | ICD-10-CM

## 2023-11-09 DIAGNOSIS — E11.9 TYPE 2 DIABETES MELLITUS WITHOUT RETINOPATHY: ICD-10-CM

## 2023-11-09 PROCEDURE — 99999 PR PBB SHADOW E&M-EST. PATIENT-LVL III: CPT | Mod: PBBFAC,,, | Performed by: OPTOMETRIST

## 2023-11-09 PROCEDURE — 2022F DILAT RTA XM EVC RTNOPTHY: CPT | Mod: CPTII,S$GLB,, | Performed by: OPTOMETRIST

## 2023-11-09 PROCEDURE — 1126F PR PAIN SEVERITY QUANTIFIED, NO PAIN PRESENT: ICD-10-PCS | Mod: CPTII,S$GLB,, | Performed by: OPTOMETRIST

## 2023-11-09 PROCEDURE — 1101F PT FALLS ASSESS-DOCD LE1/YR: CPT | Mod: CPTII,S$GLB,, | Performed by: OPTOMETRIST

## 2023-11-09 PROCEDURE — 3066F PR DOCUMENTATION OF TREATMENT FOR NEPHROPATHY: ICD-10-PCS | Mod: CPTII,S$GLB,, | Performed by: OPTOMETRIST

## 2023-11-09 PROCEDURE — 92015 DETERMINE REFRACTIVE STATE: CPT | Mod: S$GLB,,, | Performed by: OPTOMETRIST

## 2023-11-09 PROCEDURE — 92015 PR REFRACTION: ICD-10-PCS | Mod: S$GLB,,, | Performed by: OPTOMETRIST

## 2023-11-09 PROCEDURE — 4010F ACE/ARB THERAPY RXD/TAKEN: CPT | Mod: CPTII,S$GLB,, | Performed by: OPTOMETRIST

## 2023-11-09 PROCEDURE — 99214 OFFICE O/P EST MOD 30 MIN: CPT | Mod: S$GLB,,, | Performed by: OPTOMETRIST

## 2023-11-09 PROCEDURE — 3044F HG A1C LEVEL LT 7.0%: CPT | Mod: CPTII,S$GLB,, | Performed by: OPTOMETRIST

## 2023-11-09 PROCEDURE — 4010F PR ACE/ARB THEARPY RXD/TAKEN: ICD-10-PCS | Mod: CPTII,S$GLB,, | Performed by: OPTOMETRIST

## 2023-11-09 PROCEDURE — 3044F PR MOST RECENT HEMOGLOBIN A1C LEVEL <7.0%: ICD-10-PCS | Mod: CPTII,S$GLB,, | Performed by: OPTOMETRIST

## 2023-11-09 PROCEDURE — 3066F NEPHROPATHY DOC TX: CPT | Mod: CPTII,S$GLB,, | Performed by: OPTOMETRIST

## 2023-11-09 PROCEDURE — 99999 PR PBB SHADOW E&M-EST. PATIENT-LVL III: ICD-10-PCS | Mod: PBBFAC,,, | Performed by: OPTOMETRIST

## 2023-11-09 PROCEDURE — 1160F RVW MEDS BY RX/DR IN RCRD: CPT | Mod: CPTII,S$GLB,, | Performed by: OPTOMETRIST

## 2023-11-09 PROCEDURE — 3288F FALL RISK ASSESSMENT DOCD: CPT | Mod: CPTII,S$GLB,, | Performed by: OPTOMETRIST

## 2023-11-09 PROCEDURE — 99214 PR OFFICE/OUTPT VISIT, EST, LEVL IV, 30-39 MIN: ICD-10-PCS | Mod: S$GLB,,, | Performed by: OPTOMETRIST

## 2023-11-09 PROCEDURE — 1160F PR REVIEW ALL MEDS BY PRESCRIBER/CLIN PHARMACIST DOCUMENTED: ICD-10-PCS | Mod: CPTII,S$GLB,, | Performed by: OPTOMETRIST

## 2023-11-09 PROCEDURE — 1101F PR PT FALLS ASSESS DOC 0-1 FALLS W/OUT INJ PAST YR: ICD-10-PCS | Mod: CPTII,S$GLB,, | Performed by: OPTOMETRIST

## 2023-11-09 PROCEDURE — 1159F PR MEDICATION LIST DOCUMENTED IN MEDICAL RECORD: ICD-10-PCS | Mod: CPTII,S$GLB,, | Performed by: OPTOMETRIST

## 2023-11-09 PROCEDURE — 2022F PR DILATED RETINAL EYE EXAM WITH INTERP/REVIEW: ICD-10-PCS | Mod: CPTII,S$GLB,, | Performed by: OPTOMETRIST

## 2023-11-09 PROCEDURE — 1126F AMNT PAIN NOTED NONE PRSNT: CPT | Mod: CPTII,S$GLB,, | Performed by: OPTOMETRIST

## 2023-11-09 PROCEDURE — 3288F PR FALLS RISK ASSESSMENT DOCUMENTED: ICD-10-PCS | Mod: CPTII,S$GLB,, | Performed by: OPTOMETRIST

## 2023-11-09 PROCEDURE — 3061F NEG MICROALBUMINURIA REV: CPT | Mod: CPTII,S$GLB,, | Performed by: OPTOMETRIST

## 2023-11-09 PROCEDURE — 1159F MED LIST DOCD IN RCRD: CPT | Mod: CPTII,S$GLB,, | Performed by: OPTOMETRIST

## 2023-11-09 PROCEDURE — 3061F PR NEG MICROALBUMINURIA RESULT DOCUMENTED/REVIEW: ICD-10-PCS | Mod: CPTII,S$GLB,, | Performed by: OPTOMETRIST

## 2023-11-09 NOTE — PROGRESS NOTES
HPI     Glaucoma     Additional comments: Iop chk/dm chk           Comments    DONA: 2/6/2023  Chief complaint (CC): Patient is here for annual eye exam.  He states his   va is doing well in OU for the distance and near.  Glasses? +1 yr. old  Contacts? -  H/o eye surgery, injections or laser: PC IOL OU, laser OU for glaucoma  H/o eye injury: -  Known eye conditions? See above  Family h/o eye conditions? -  Eye gtts? -      (-) Flashes (-)  Floaters (-) Mucous   (-)  Tearing (-) Itching (-) Burning   (-) Headaches (-) Eye Pain/discomfort (-) Irritation   (-)  Redness (-) Double vision (-) Blurry vision    Diabetic? +  A1c?  Hemoglobin A1C       Date                     Value               Ref Range             Status                05/10/2023               6.3 (H)             4.0 - 5.6 %           Final              Comment:    ADA Screening Guidelines:  5.7-6.4%  Consistent with   prediabetes  >or=6.5%  Consistent with diabetes    High levels of fetal   hemoglobin interfere with the HbA1C  assay. Heterozygous hemoglobin   variants (HbS, HgC, etc)do  not significantly interfere with this assay.     However, presence of multiple variants may affect accuracy.         09/02/2022               7.0 (H)             4.0 - 5.6 %           Final              Comment:    ADA Screening Guidelines:  5.7-6.4%  Consistent with   prediabetes  >or=6.5%  Consistent with diabetes    High levels of fetal   hemoglobin interfere with the HbA1C  assay. Heterozygous hemoglobin   variants (HbS, HgC, etc)do  not significantly interfere with this assay.     However, presence of multiple variants may affect accuracy.         05/10/2022               8.0 (H)             4.0 - 5.6 %           Final              Comment:    ADA Screening Guidelines:  5.7-6.4%  Consistent with   prediabetes  >or=6.5%  Consistent with diabetes    High levels of fetal   hemoglobin interfere with the HbA1C  assay. Heterozygous hemoglobin   variants (HbS, HgC,  etc)do  not significantly interfere with this assay.     However, presence of multiple variants may affect accuracy.    ----------                    Last edited by Alexis Eng on 11/9/2023  8:11 AM.            Assessment /Plan     For exam results, see Encounter Report.      Primary open angle glaucoma (POAG) of both eyes, mild stage  (-) fHx. IOP 18 OD, OS. Last 20 OD, OS. LTmax 24 OD, OS.   C/d 0.65 OD, OS. S/p SLT x3 about 11-12 years ago per pt. Pt has been off drops since SLT. Pachy 563  OS.   Photos  3/4/2020, 3/9/2021  11/9/2023 DFE  4/15/2021 OCT OD thin TS70, T42, TI90 and G73 borderline N , OS borderline TS 98 and T 45   10/6/2022 OCT OD thin TS54, T39, TI91 and G79, OS thin TS62 and T43, borderline TI And G  2/7/2023 OCT OD thin TS54, T38, TI91 and G78, OS thin TS62, T42 and G78, borderline   4/15/2021 HVF OD low reliability, WNL, OS WNL  10/6/2022 HVF OD borderline but WNL, OS ONL- scattered nonspecific inferior defects  OCT today appears stable in comparison to 10/2022.  Educated the patient on findings. Importance of testing and f/u expressed   RTC 4 mo IOP/OCT/24-2 kalia faster  Will call w/results    Dry eye syndrome of both eyes  Rec ATs TID-QID OU    Type 2 diabetes mellitus without retinopathy  BS control. No signs of diabetic retinopathy. Monitor with annual exam.    Myopia with astigmatism and presbyopia, bilateral  SRx released to patient. Patient educated on lens options. Normal ocular health. RTC 1 year for routine exam.     Benign orbital tumor, right  Pt saw Dr Lopez 2021. Will monitor.       3/19/2024  3/13/2024 OCT OD thin TS, T, TI and G, OS thin TS, T, borderline TI, G  3/13/2024 HVF OD borderline, OS nonspecific defect  Stable.   Note to pt.

## 2023-11-10 ENCOUNTER — PATIENT MESSAGE (OUTPATIENT)
Dept: INTERNAL MEDICINE | Facility: CLINIC | Age: 73
End: 2023-11-10
Payer: MEDICARE

## 2023-11-11 ENCOUNTER — LAB VISIT (OUTPATIENT)
Dept: LAB | Facility: HOSPITAL | Age: 73
End: 2023-11-11
Attending: INTERNAL MEDICINE
Payer: MEDICARE

## 2023-11-11 DIAGNOSIS — I48.0 PAROXYSMAL ATRIAL FIBRILLATION: ICD-10-CM

## 2023-11-11 DIAGNOSIS — N18.31 STAGE 3A CHRONIC KIDNEY DISEASE: ICD-10-CM

## 2023-11-11 DIAGNOSIS — N40.0 BENIGN PROSTATIC HYPERPLASIA WITHOUT LOWER URINARY TRACT SYMPTOMS: Chronic | ICD-10-CM

## 2023-11-11 DIAGNOSIS — E11.21 TYPE 2 DIABETES MELLITUS WITH DIABETIC NEPHROPATHY, WITH LONG-TERM CURRENT USE OF INSULIN: ICD-10-CM

## 2023-11-11 DIAGNOSIS — K76.0 FATTY LIVER: ICD-10-CM

## 2023-11-11 DIAGNOSIS — I70.0 AORTIC ATHEROSCLEROSIS: ICD-10-CM

## 2023-11-11 DIAGNOSIS — I50.42 CHRONIC COMBINED SYSTOLIC AND DIASTOLIC HEART FAILURE: Chronic | ICD-10-CM

## 2023-11-11 DIAGNOSIS — I10 BENIGN ESSENTIAL HTN: Chronic | ICD-10-CM

## 2023-11-11 DIAGNOSIS — E66.01 SEVERE OBESITY (BMI 35.0-39.9) WITH COMORBIDITY: ICD-10-CM

## 2023-11-11 DIAGNOSIS — E78.2 MIXED HYPERLIPIDEMIA: Chronic | ICD-10-CM

## 2023-11-11 DIAGNOSIS — Z12.5 PROSTATE CANCER SCREENING: ICD-10-CM

## 2023-11-11 DIAGNOSIS — Z79.4 TYPE 2 DIABETES MELLITUS WITH DIABETIC NEPHROPATHY, WITH LONG-TERM CURRENT USE OF INSULIN: ICD-10-CM

## 2023-11-11 DIAGNOSIS — I25.118 CORONARY ARTERY DISEASE OF NATIVE ARTERY OF NATIVE HEART WITH STABLE ANGINA PECTORIS: Chronic | ICD-10-CM

## 2023-11-11 LAB
ALBUMIN SERPL BCP-MCNC: 3.4 G/DL (ref 3.5–5.2)
ALP SERPL-CCNC: 61 U/L (ref 55–135)
ALT SERPL W/O P-5'-P-CCNC: 25 U/L (ref 10–44)
ANION GAP SERPL CALC-SCNC: 8 MMOL/L (ref 8–16)
AST SERPL-CCNC: 22 U/L (ref 10–40)
BASOPHILS # BLD AUTO: 0.07 K/UL (ref 0–0.2)
BASOPHILS NFR BLD: 1.1 % (ref 0–1.9)
BILIRUB SERPL-MCNC: 0.3 MG/DL (ref 0.1–1)
BUN SERPL-MCNC: 15 MG/DL (ref 8–23)
CALCIUM SERPL-MCNC: 8.5 MG/DL (ref 8.7–10.5)
CHLORIDE SERPL-SCNC: 105 MMOL/L (ref 95–110)
CHOLEST SERPL-MCNC: 105 MG/DL (ref 120–199)
CHOLEST/HDLC SERPL: 2.2 {RATIO} (ref 2–5)
CO2 SERPL-SCNC: 27 MMOL/L (ref 23–29)
COMPLEXED PSA SERPL-MCNC: 1.2 NG/ML (ref 0–4)
CREAT SERPL-MCNC: 1.4 MG/DL (ref 0.5–1.4)
DIFFERENTIAL METHOD: ABNORMAL
EOSINOPHIL # BLD AUTO: 0.5 K/UL (ref 0–0.5)
EOSINOPHIL NFR BLD: 7.4 % (ref 0–8)
ERYTHROCYTE [DISTWIDTH] IN BLOOD BY AUTOMATED COUNT: 14.1 % (ref 11.5–14.5)
EST. GFR  (NO RACE VARIABLE): 53 ML/MIN/1.73 M^2
ESTIMATED AVG GLUCOSE: 137 MG/DL (ref 68–131)
GLUCOSE SERPL-MCNC: 99 MG/DL (ref 70–110)
HBA1C MFR BLD: 6.4 % (ref 4–5.6)
HCT VFR BLD AUTO: 38 % (ref 40–54)
HDLC SERPL-MCNC: 48 MG/DL (ref 40–75)
HDLC SERPL: 45.7 % (ref 20–50)
HGB BLD-MCNC: 12.1 G/DL (ref 14–18)
IMM GRANULOCYTES # BLD AUTO: 0.02 K/UL (ref 0–0.04)
IMM GRANULOCYTES NFR BLD AUTO: 0.3 % (ref 0–0.5)
LDLC SERPL CALC-MCNC: 50.4 MG/DL (ref 63–159)
LYMPHOCYTES # BLD AUTO: 2.6 K/UL (ref 1–4.8)
LYMPHOCYTES NFR BLD: 40.3 % (ref 18–48)
MCH RBC QN AUTO: 30 PG (ref 27–31)
MCHC RBC AUTO-ENTMCNC: 31.8 G/DL (ref 32–36)
MCV RBC AUTO: 94 FL (ref 82–98)
MONOCYTES # BLD AUTO: 0.5 K/UL (ref 0.3–1)
MONOCYTES NFR BLD: 8.3 % (ref 4–15)
NEUTROPHILS # BLD AUTO: 2.8 K/UL (ref 1.8–7.7)
NEUTROPHILS NFR BLD: 42.6 % (ref 38–73)
NONHDLC SERPL-MCNC: 57 MG/DL
NRBC BLD-RTO: 0 /100 WBC
PLATELET # BLD AUTO: 154 K/UL (ref 150–450)
PMV BLD AUTO: 9.8 FL (ref 9.2–12.9)
POTASSIUM SERPL-SCNC: 4.6 MMOL/L (ref 3.5–5.1)
PROT SERPL-MCNC: 6.4 G/DL (ref 6–8.4)
RBC # BLD AUTO: 4.03 M/UL (ref 4.6–6.2)
SODIUM SERPL-SCNC: 140 MMOL/L (ref 136–145)
TRIGL SERPL-MCNC: 33 MG/DL (ref 30–150)
WBC # BLD AUTO: 6.53 K/UL (ref 3.9–12.7)

## 2023-11-11 PROCEDURE — 80053 COMPREHEN METABOLIC PANEL: CPT | Performed by: INTERNAL MEDICINE

## 2023-11-11 PROCEDURE — 85025 COMPLETE CBC W/AUTO DIFF WBC: CPT | Performed by: INTERNAL MEDICINE

## 2023-11-11 PROCEDURE — 83036 HEMOGLOBIN GLYCOSYLATED A1C: CPT | Performed by: INTERNAL MEDICINE

## 2023-11-11 PROCEDURE — 36415 COLL VENOUS BLD VENIPUNCTURE: CPT | Performed by: INTERNAL MEDICINE

## 2023-11-11 PROCEDURE — 84153 ASSAY OF PSA TOTAL: CPT | Performed by: INTERNAL MEDICINE

## 2023-11-11 PROCEDURE — 80061 LIPID PANEL: CPT | Performed by: INTERNAL MEDICINE

## 2023-11-17 ENCOUNTER — PATIENT MESSAGE (OUTPATIENT)
Dept: ADMINISTRATIVE | Facility: OTHER | Age: 73
End: 2023-11-17
Payer: MEDICARE

## 2023-11-17 ENCOUNTER — OFFICE VISIT (OUTPATIENT)
Dept: INTERNAL MEDICINE | Facility: CLINIC | Age: 73
End: 2023-11-17
Payer: MEDICARE

## 2023-11-17 VITALS
SYSTOLIC BLOOD PRESSURE: 122 MMHG | BODY MASS INDEX: 35.89 KG/M2 | DIASTOLIC BLOOD PRESSURE: 74 MMHG | OXYGEN SATURATION: 99 % | WEIGHT: 242.31 LBS | RESPIRATION RATE: 16 BRPM | HEIGHT: 69 IN | HEART RATE: 60 BPM

## 2023-11-17 DIAGNOSIS — I70.0 AORTIC ATHEROSCLEROSIS: ICD-10-CM

## 2023-11-17 DIAGNOSIS — Z23 NEED FOR VACCINATION: ICD-10-CM

## 2023-11-17 DIAGNOSIS — I50.42 CHRONIC COMBINED SYSTOLIC AND DIASTOLIC HEART FAILURE: Chronic | ICD-10-CM

## 2023-11-17 DIAGNOSIS — N18.32 TYPE 2 DIABETES MELLITUS WITH STAGE 3B CHRONIC KIDNEY DISEASE, WITH LONG-TERM CURRENT USE OF INSULIN: ICD-10-CM

## 2023-11-17 DIAGNOSIS — E11.21 TYPE 2 DIABETES MELLITUS WITH DIABETIC NEPHROPATHY, WITH LONG-TERM CURRENT USE OF INSULIN: ICD-10-CM

## 2023-11-17 DIAGNOSIS — E11.39 TYPE 2 DIABETES MELLITUS WITH OTHER OPHTHALMIC COMPLICATION, WITH LONG-TERM CURRENT USE OF INSULIN: ICD-10-CM

## 2023-11-17 DIAGNOSIS — E78.2 MIXED HYPERLIPIDEMIA: Chronic | ICD-10-CM

## 2023-11-17 DIAGNOSIS — I25.118 CORONARY ARTERY DISEASE OF NATIVE ARTERY OF NATIVE HEART WITH STABLE ANGINA PECTORIS: Chronic | ICD-10-CM

## 2023-11-17 DIAGNOSIS — Z79.4 TYPE 2 DIABETES MELLITUS WITH STAGE 3B CHRONIC KIDNEY DISEASE, WITH LONG-TERM CURRENT USE OF INSULIN: ICD-10-CM

## 2023-11-17 DIAGNOSIS — I10 BENIGN ESSENTIAL HTN: Primary | Chronic | ICD-10-CM

## 2023-11-17 DIAGNOSIS — E11.22 TYPE 2 DIABETES MELLITUS WITH STAGE 3B CHRONIC KIDNEY DISEASE, WITH LONG-TERM CURRENT USE OF INSULIN: ICD-10-CM

## 2023-11-17 DIAGNOSIS — I48.0 PAROXYSMAL ATRIAL FIBRILLATION: ICD-10-CM

## 2023-11-17 DIAGNOSIS — E66.01 SEVERE OBESITY (BMI 35.0-39.9) WITH COMORBIDITY: ICD-10-CM

## 2023-11-17 DIAGNOSIS — K21.9 GASTROESOPHAGEAL REFLUX DISEASE WITHOUT ESOPHAGITIS: ICD-10-CM

## 2023-11-17 DIAGNOSIS — Z79.4 TYPE 2 DIABETES MELLITUS WITH OTHER OPHTHALMIC COMPLICATION, WITH LONG-TERM CURRENT USE OF INSULIN: ICD-10-CM

## 2023-11-17 DIAGNOSIS — Z79.4 TYPE 2 DIABETES MELLITUS WITH DIABETIC NEPHROPATHY, WITH LONG-TERM CURRENT USE OF INSULIN: ICD-10-CM

## 2023-11-17 DIAGNOSIS — J43.9 PULMONARY EMPHYSEMA, UNSPECIFIED EMPHYSEMA TYPE: ICD-10-CM

## 2023-11-17 PROCEDURE — 3288F PR FALLS RISK ASSESSMENT DOCUMENTED: ICD-10-PCS | Mod: CPTII,S$GLB,, | Performed by: INTERNAL MEDICINE

## 2023-11-17 PROCEDURE — 1126F AMNT PAIN NOTED NONE PRSNT: CPT | Mod: CPTII,S$GLB,, | Performed by: INTERNAL MEDICINE

## 2023-11-17 PROCEDURE — 3078F DIAST BP <80 MM HG: CPT | Mod: CPTII,S$GLB,, | Performed by: INTERNAL MEDICINE

## 2023-11-17 PROCEDURE — 1159F PR MEDICATION LIST DOCUMENTED IN MEDICAL RECORD: ICD-10-PCS | Mod: CPTII,S$GLB,, | Performed by: INTERNAL MEDICINE

## 2023-11-17 PROCEDURE — 1101F PR PT FALLS ASSESS DOC 0-1 FALLS W/OUT INJ PAST YR: ICD-10-PCS | Mod: CPTII,S$GLB,, | Performed by: INTERNAL MEDICINE

## 2023-11-17 PROCEDURE — 90694 FLU VACCINE - QUADRIVALENT - ADJUVANTED: ICD-10-PCS | Mod: S$GLB,,, | Performed by: INTERNAL MEDICINE

## 2023-11-17 PROCEDURE — 3044F HG A1C LEVEL LT 7.0%: CPT | Mod: CPTII,S$GLB,, | Performed by: INTERNAL MEDICINE

## 2023-11-17 PROCEDURE — 4010F PR ACE/ARB THEARPY RXD/TAKEN: ICD-10-PCS | Mod: CPTII,S$GLB,, | Performed by: INTERNAL MEDICINE

## 2023-11-17 PROCEDURE — 3074F SYST BP LT 130 MM HG: CPT | Mod: CPTII,S$GLB,, | Performed by: INTERNAL MEDICINE

## 2023-11-17 PROCEDURE — 1101F PT FALLS ASSESS-DOCD LE1/YR: CPT | Mod: CPTII,S$GLB,, | Performed by: INTERNAL MEDICINE

## 2023-11-17 PROCEDURE — 3078F PR MOST RECENT DIASTOLIC BLOOD PRESSURE < 80 MM HG: ICD-10-PCS | Mod: CPTII,S$GLB,, | Performed by: INTERNAL MEDICINE

## 2023-11-17 PROCEDURE — 3066F PR DOCUMENTATION OF TREATMENT FOR NEPHROPATHY: ICD-10-PCS | Mod: CPTII,S$GLB,, | Performed by: INTERNAL MEDICINE

## 2023-11-17 PROCEDURE — G0008 ADMIN INFLUENZA VIRUS VAC: HCPCS | Mod: S$GLB,,, | Performed by: INTERNAL MEDICINE

## 2023-11-17 PROCEDURE — 3044F PR MOST RECENT HEMOGLOBIN A1C LEVEL <7.0%: ICD-10-PCS | Mod: CPTII,S$GLB,, | Performed by: INTERNAL MEDICINE

## 2023-11-17 PROCEDURE — 1159F MED LIST DOCD IN RCRD: CPT | Mod: CPTII,S$GLB,, | Performed by: INTERNAL MEDICINE

## 2023-11-17 PROCEDURE — 3066F NEPHROPATHY DOC TX: CPT | Mod: CPTII,S$GLB,, | Performed by: INTERNAL MEDICINE

## 2023-11-17 PROCEDURE — 3288F FALL RISK ASSESSMENT DOCD: CPT | Mod: CPTII,S$GLB,, | Performed by: INTERNAL MEDICINE

## 2023-11-17 PROCEDURE — 3074F PR MOST RECENT SYSTOLIC BLOOD PRESSURE < 130 MM HG: ICD-10-PCS | Mod: CPTII,S$GLB,, | Performed by: INTERNAL MEDICINE

## 2023-11-17 PROCEDURE — 99999 PR PBB SHADOW E&M-EST. PATIENT-LVL III: ICD-10-PCS | Mod: PBBFAC,,, | Performed by: INTERNAL MEDICINE

## 2023-11-17 PROCEDURE — 3061F NEG MICROALBUMINURIA REV: CPT | Mod: CPTII,S$GLB,, | Performed by: INTERNAL MEDICINE

## 2023-11-17 PROCEDURE — 1126F PR PAIN SEVERITY QUANTIFIED, NO PAIN PRESENT: ICD-10-PCS | Mod: CPTII,S$GLB,, | Performed by: INTERNAL MEDICINE

## 2023-11-17 PROCEDURE — G0008 FLU VACCINE - QUADRIVALENT - ADJUVANTED: ICD-10-PCS | Mod: S$GLB,,, | Performed by: INTERNAL MEDICINE

## 2023-11-17 PROCEDURE — 1160F RVW MEDS BY RX/DR IN RCRD: CPT | Mod: CPTII,S$GLB,, | Performed by: INTERNAL MEDICINE

## 2023-11-17 PROCEDURE — 99999 PR PBB SHADOW E&M-EST. PATIENT-LVL III: CPT | Mod: PBBFAC,,, | Performed by: INTERNAL MEDICINE

## 2023-11-17 PROCEDURE — 99214 OFFICE O/P EST MOD 30 MIN: CPT | Mod: S$GLB,,, | Performed by: INTERNAL MEDICINE

## 2023-11-17 PROCEDURE — 4010F ACE/ARB THERAPY RXD/TAKEN: CPT | Mod: CPTII,S$GLB,, | Performed by: INTERNAL MEDICINE

## 2023-11-17 PROCEDURE — 3061F PR NEG MICROALBUMINURIA RESULT DOCUMENTED/REVIEW: ICD-10-PCS | Mod: CPTII,S$GLB,, | Performed by: INTERNAL MEDICINE

## 2023-11-17 PROCEDURE — 90694 VACC AIIV4 NO PRSRV 0.5ML IM: CPT | Mod: S$GLB,,, | Performed by: INTERNAL MEDICINE

## 2023-11-17 PROCEDURE — 99499 UNLISTED E&M SERVICE: CPT | Mod: S$GLB,,, | Performed by: INTERNAL MEDICINE

## 2023-11-17 PROCEDURE — 99214 PR OFFICE/OUTPT VISIT, EST, LEVL IV, 30-39 MIN: ICD-10-PCS | Mod: S$GLB,,, | Performed by: INTERNAL MEDICINE

## 2023-11-17 PROCEDURE — 1160F PR REVIEW ALL MEDS BY PRESCRIBER/CLIN PHARMACIST DOCUMENTED: ICD-10-PCS | Mod: CPTII,S$GLB,, | Performed by: INTERNAL MEDICINE

## 2023-11-17 RX ORDER — FAMOTIDINE 40 MG/1
40 TABLET, FILM COATED ORAL DAILY
Qty: 30 TABLET | Refills: 11 | Status: SHIPPED | OUTPATIENT
Start: 2023-11-17 | End: 2024-11-16

## 2023-11-17 NOTE — PROGRESS NOTES
"Subjective:       Patient ID: Walter Bull is a 73 y.o. male.    Chief Complaint: Follow-up      HPI:  Patient is known to me and presents for follow up CAD, systolic CHF, DM type 2, HLD. Labs from 11/11/2023 personally reviewed, interpreted and discussed with the patient today.     PSA 1.2, normal  A1C  6.4%, at goal  LDL 50, normal  GFR 53, improving    CAD s/p 5v CABG and NSTEMI 9/12/18: following with cardiology. Now on Ranexa (indur was stopped), brilinta, ASA, crestor, losartan and Coreg. Also reports h/o CHF (last known EF 25%+ diastolic dysfunction), on lasix 20mg once a day and aldactone 25mg daily. Denies SOB, [place] and orthopnea. S/p ICD placement.       Dm type 2: on mounjaro, lispro vai VGP per endocrinology. Has DEXCOM  A1C <7%.  He does report numbness and tingling of left foot > right foot and b/l fingers; sx have been present for several years. Not on medicine for neuropathy as he declines treatment. Denies polyuria, polydipsia  Foot exam: 6/2022  Eye exam: 9/2022  Microalb: 5/2023  On ARB and statin        HLD: on repatha, crestor, has been taking for several years. Denies side effects.      HTN: on coreg, losartan. BP is well controlled. Denies headaches, vision changes.     A-fib: new diagnosis since last visit. Cards notes Jan 2023: "Pt with newly found afib on AICD interrogation. Has been intermittently experiencing palpitations post device check. Will start apixiban 5x2 for CVA prophylaxis" Now on Eliquis and ASA/Plavix stopped due to taking nOAC.      GERD: On Nexium daily. H/o H. Pylori on EGD (2018) with gastric erosions. Has had intermittent nausea and vomiting which is chronic but reports worsening and having to take TUMS OTC for sx control. No constipation.      BPH: on flomax and finasteride and working well.  No medication side effects. S/p prostate bx 5/13/19 with DR. Chaudhry, no malignany. Last PSA normal.     Tobacco use: quit 1981; previously smoked 3 PPD for 20 years  EtOH: " "stopped drink 1982; was a daily drink 2 fifth liquor daily  Illicit drug: denies        Pulmonary nodules 3/14/23: "Bilateral solid pulmonary nodules measuring up to 5 mm, for example the nodule in the apical segment of the right upper lobe (series 4, image 88).  For multiple solid nodules all <6 mm, Fleischner Society 2017 guidelines recommend no routine follow up for a low risk patient, or follow up with non-contrast chest CT at 12 months after discovery in a high risk patient."  Repeat CT chest 3/2024       Past Medical History:   Diagnosis Date    Anemia     Angina pectoris     Anticoagulant long-term use     Arthritis     Back pain     CHF (congestive heart failure)     Chronic bronchitis     Colon cancer screening 02/02/2017    Coronary artery disease     Diabetes mellitus type I     Diabetes with neurologic complications     Disorder of kidney and ureter     Encounter for blood transfusion     Glaucoma     Heart attack     09/2018    Heart disease     Hyperlipidemia     Hypertension     Iron deficiency     Kidney failure     post CABG    Obesity     Paroxysmal atrial fibrillation 01/12/2023    Pneumonia     Polyneuropathy     Pulmonary emphysema 02/26/2020    Renal manifestation of secondary diabetes mellitus     S/P CABG x 5 01/11/2017    Sleep apnea     had CPAP but had recall- not currently using     Trouble in sleeping     Type 2 diabetes mellitus with ophthalmic manifestations     Urinary incontinence        Family History   Problem Relation Age of Onset    Diabetes Mother     Heart disease Mother     Hypertension Sister     Diabetes Sister     Heart disease Sister     Heart attack Brother     Colon cancer Neg Hx     Esophageal cancer Neg Hx     Stomach cancer Neg Hx        Social History     Socioeconomic History    Marital status:     Number of children: 5   Tobacco Use    Smoking status: Former     Current packs/day: 0.00     Average packs/day: 3.0 packs/day for 18.0 years (53.9 ttl pk-yrs)    "  Types: Cigarettes     Start date: 1963     Quit date: 1981     Years since quittin.9     Passive exposure: Past    Smokeless tobacco: Former     Types: Snuff, Chew     Quit date:    Substance and Sexual Activity    Alcohol use: No    Drug use: No    Sexual activity: Yes     Comment: -with a significant other     Social Determinants of Health     Financial Resource Strain: Low Risk  (2023)    Overall Financial Resource Strain (CARDIA)     Difficulty of Paying Living Expenses: Not hard at all   Food Insecurity: No Food Insecurity (2023)    Hunger Vital Sign     Worried About Running Out of Food in the Last Year: Never true     Ran Out of Food in the Last Year: Never true   Transportation Needs: No Transportation Needs (2023)    PRAPARE - Transportation     Lack of Transportation (Medical): No     Lack of Transportation (Non-Medical): No   Physical Activity: Sufficiently Active (2023)    Exercise Vital Sign     Days of Exercise per Week: 5 days     Minutes of Exercise per Session: 120 min   Stress: No Stress Concern Present (2023)    [employer] - Occupational Stress Questionnaire     Feeling of Stress : Not at all   Social Connections: Socially Isolated (2023)    Social Connection and Isolation Panel [NHANES]     Frequency of Communication with Friends and Family: Once a week     Frequency of Social Gatherings with Friends and Family: Once a week     Attends [employer] Services: Never     Active Member of Clubs or Organizations: No     Attends Club or Organization Meetings: Never     Marital Status:    Housing Stability: Unknown (2023)    Housing Stability Vital Sign     Unable to Pay for Housing in the Last Year: No     Unstable Housing in the Last Year: No       Review of Systems   Constitutional:  Negative for activity change, fatigue, fever and unexpected weight change.   HENT:  Negative for congestion, ear pain,  hearing loss, rhinorrhea and sore throat.    Eyes:  Negative for redness and visual disturbance.   Respiratory:  Negative for cough, shortness of breath and wheezing.    Cardiovascular:  Negative for chest pain, palpitations and leg swelling.   Gastrointestinal:  Positive for abdominal pain (reflux symptoms). Negative for constipation, diarrhea, nausea and vomiting.   Genitourinary:  Negative for decreased urine volume, dysuria, frequency and urgency.   Musculoskeletal:  Negative for back pain, joint swelling and neck pain.   Skin:  Negative for color change, rash and wound.   Neurological:  Negative for dizziness, tremors, weakness, light-headedness and headaches.         Objective:      Physical Exam  Vitals reviewed.   Constitutional:       General: He is not in acute distress.     Appearance: He is well-developed.   HENT:      Head: Normocephalic and atraumatic.      Right Ear: External ear normal.      Left Ear: External ear normal.   Eyes:      General:         Right eye: No discharge.         Left eye: No discharge.      Conjunctiva/sclera: Conjunctivae normal.   Neck:      Thyroid: No thyromegaly.   Cardiovascular:      Rate and Rhythm: Normal rate and regular rhythm.      Heart sounds: No murmur heard.  Pulmonary:      Effort: Pulmonary effort is normal. No respiratory distress.      Breath sounds: Normal breath sounds.   Abdominal:      General: Bowel sounds are normal.      Tenderness: There is no abdominal tenderness.   Skin:     General: Skin is warm and dry.   Neurological:      Mental Status: He is alert and oriented to person, place, and time.   Psychiatric:         Behavior: Behavior normal.         Thought Content: Thought content normal.         Assessment:       1. Benign essential HTN    2. Mixed hyperlipidemia    3. Coronary artery disease of native artery of native heart with stable angina pectoris    4. Chronic combined systolic and diastolic heart failure    5. Pulmonary emphysema,  unspecified emphysema type    6. Type 2 diabetes mellitus with diabetic nephropathy, with long-term current use of insulin    7. Type 2 diabetes mellitus with stage 3b chronic kidney disease, with long-term current use of insulin    8. Type 2 diabetes mellitus with other ophthalmic complication, with long-term current use of insulin    9. Severe obesity (BMI 35.0-39.9) with comorbidity    10. Aortic atherosclerosis    11. Paroxysmal atrial fibrillation    12. Gastroesophageal reflux disease without esophagitis    13. Need for vaccination        Plan:       1. Benign essential HTN  Chronic controlled  Continue medications at same dose  Low Na diet  Exercise, weight loss  Check BP and keep log for next visit    -     CBC Auto Differential; Future; Expected date: 05/15/2024  -     Comprehensive Metabolic Panel; Future; Expected date: 05/15/2024  -     TSH; Future; Expected date: 05/15/2024  -     Lipid Panel; Future; Expected date: 05/15/2024  -     Hemoglobin A1C; Future; Expected date: 05/15/2024  -     Microalbumin/Creatinine Ratio, Urine; Future; Expected date: 05/15/2024    2. Mixed hyperlipidemia  Chronic controlled  Cont statin  Cont repatha    -     CBC Auto Differential; Future; Expected date: 05/15/2024  -     Comprehensive Metabolic Panel; Future; Expected date: 05/15/2024  -     TSH; Future; Expected date: 05/15/2024  -     Lipid Panel; Future; Expected date: 05/15/2024  -     Hemoglobin A1C; Future; Expected date: 05/15/2024  -     Microalbumin/Creatinine Ratio, Urine; Future; Expected date: 05/15/2024    3. Coronary artery disease of native artery of native heart with stable angina pectoris  Chronic stable  Cont meds per cards recommendations same dose  No acute angina sx today  -     CBC Auto Differential; Future; Expected date: 05/15/2024  -     Comprehensive Metabolic Panel; Future; Expected date: 05/15/2024  -     TSH; Future; Expected date: 05/15/2024  -     Lipid Panel; Future; Expected date:  05/15/2024  -     Hemoglobin A1C; Future; Expected date: 05/15/2024  -     Microalbumin/Creatinine Ratio, Urine; Future; Expected date: 05/15/2024    4. Chronic combined systolic and diastolic heart failure  Chronic stable  S/p ICD  No signs of volume overload today  Cont meds same dose  -     CBC Auto Differential; Future; Expected date: 05/15/2024  -     Comprehensive Metabolic Panel; Future; Expected date: 05/15/2024  -     TSH; Future; Expected date: 05/15/2024  -     Lipid Panel; Future; Expected date: 05/15/2024  -     Hemoglobin A1C; Future; Expected date: 05/15/2024  -     Microalbumin/Creatinine Ratio, Urine; Future; Expected date: 05/15/2024    5. Pulmonary emphysema, unspecified emphysema type  Chronic stable  Cont current management  Overview:  Report on chest x-ray.  Patient does have history of significant cigarette smoking.  Quit in 1981.  Experiences chronic mm RC 3 symptoms of dyspnea  Denies chronic cough  Does report history of childhood asthma.        6. Type 2 diabetes mellitus with diabetic nephropathy, with long-term current use of insulin  Chronic controlled  Cont meds same dose  ADA diet  Check sugars and keep log    -     CBC Auto Differential; Future; Expected date: 05/15/2024  -     Comprehensive Metabolic Panel; Future; Expected date: 05/15/2024  -     TSH; Future; Expected date: 05/15/2024  -     Lipid Panel; Future; Expected date: 05/15/2024  -     Hemoglobin A1C; Future; Expected date: 05/15/2024  -     Microalbumin/Creatinine Ratio, Urine; Future; Expected date: 05/15/2024    7. Type 2 diabetes mellitus with stage 3b chronic kidney disease, with long-term current use of insulin  Chronic controlled  Cont meds same dose  ADA diet  Check sugars and keep log  Follow renal fxn labs; avoid nephrotoxic agents  -     CBC Auto Differential; Future; Expected date: 05/15/2024  -     Comprehensive Metabolic Panel; Future; Expected date: 05/15/2024  -     TSH; Future; Expected date: 05/15/2024  -      Lipid Panel; Future; Expected date: 05/15/2024  -     Hemoglobin A1C; Future; Expected date: 05/15/2024  -     Microalbumin/Creatinine Ratio, Urine; Future; Expected date: 05/15/2024    8. Type 2 diabetes mellitus with other ophthalmic complication, with long-term current use of insulin  Chronic controlled  Cont meds same dose  ADA diet  Check sugars and keep log  Cont yearly eye exams    -     CBC Auto Differential; Future; Expected date: 05/15/2024  -     Comprehensive Metabolic Panel; Future; Expected date: 05/15/2024  -     TSH; Future; Expected date: 05/15/2024  -     Lipid Panel; Future; Expected date: 05/15/2024  -     Hemoglobin A1C; Future; Expected date: 05/15/2024  -     Microalbumin/Creatinine Ratio, Urine; Future; Expected date: 05/15/2024    9. Severe obesity (BMI 35.0-39.9) with comorbidity  Chronic stable  Diet, exercise, weightloss  -     CBC Auto Differential; Future; Expected date: 05/15/2024  -     Comprehensive Metabolic Panel; Future; Expected date: 05/15/2024  -     TSH; Future; Expected date: 05/15/2024  -     Lipid Panel; Future; Expected date: 05/15/2024  -     Hemoglobin A1C; Future; Expected date: 05/15/2024  -     Microalbumin/Creatinine Ratio, Urine; Future; Expected date: 05/15/2024    10. Aortic atherosclerosis  Chronic stable  Cont statin/repatha  -     CBC Auto Differential; Future; Expected date: 05/15/2024  -     Comprehensive Metabolic Panel; Future; Expected date: 05/15/2024  -     TSH; Future; Expected date: 05/15/2024  -     Lipid Panel; Future; Expected date: 05/15/2024  -     Hemoglobin A1C; Future; Expected date: 05/15/2024  -     Microalbumin/Creatinine Ratio, Urine; Future; Expected date: 05/15/2024    11. Paroxysmal atrial fibrillation  Chroni c stable  Cont NOAC  Cont BB  -     CBC Auto Differential; Future; Expected date: 05/15/2024  -     Comprehensive Metabolic Panel; Future; Expected date: 05/15/2024  -     TSH; Future; Expected date: 05/15/2024  -     Lipid  Panel; Future; Expected date: 05/15/2024  -     Hemoglobin A1C; Future; Expected date: 05/15/2024  -     Microalbumin/Creatinine Ratio, Urine; Future; Expected date: 05/15/2024    12. Gastroesophageal reflux disease without esophagitis  Chronic uncontroleld  Cont PPI  Add pepcid  Weight loss  Diet modifications  -     famotidine (PEPCID) 40 MG tablet; Take 1 tablet (40 mg total) by mouth once daily.  Dispense: 30 tablet; Refill: 11    13. Need for vaccination  done  -     Influenza - Quadrivalent (Adjuvanted)       RTC 6 months with labs and PRN

## 2023-12-04 RX ORDER — CARVEDILOL 6.25 MG/1
6.25 TABLET ORAL 2 TIMES DAILY
Qty: 180 TABLET | Refills: 3 | Status: SHIPPED | OUTPATIENT
Start: 2023-12-04

## 2023-12-12 ENCOUNTER — OFFICE VISIT (OUTPATIENT)
Dept: PODIATRY | Facility: CLINIC | Age: 73
End: 2023-12-12
Payer: MEDICARE

## 2023-12-12 VITALS
BODY MASS INDEX: 37.03 KG/M2 | HEART RATE: 68 BPM | HEIGHT: 69 IN | DIASTOLIC BLOOD PRESSURE: 58 MMHG | WEIGHT: 250 LBS | SYSTOLIC BLOOD PRESSURE: 148 MMHG

## 2023-12-12 DIAGNOSIS — L84 CORN OR CALLUS: ICD-10-CM

## 2023-12-12 DIAGNOSIS — G57.53 TARSAL TUNNEL SYNDROME OF BOTH LOWER EXTREMITIES: ICD-10-CM

## 2023-12-12 DIAGNOSIS — B35.1 ONYCHOMYCOSIS DUE TO DERMATOPHYTE: ICD-10-CM

## 2023-12-12 DIAGNOSIS — E11.42 DIABETIC POLYNEUROPATHY ASSOCIATED WITH TYPE 2 DIABETES MELLITUS: Primary | ICD-10-CM

## 2023-12-12 DIAGNOSIS — E66.9 DIABETES MELLITUS TYPE 2 IN OBESE: Chronic | ICD-10-CM

## 2023-12-12 DIAGNOSIS — E11.69 DIABETES MELLITUS TYPE 2 IN OBESE: Chronic | ICD-10-CM

## 2023-12-12 PROCEDURE — 1159F MED LIST DOCD IN RCRD: CPT | Mod: CPTII,S$GLB,, | Performed by: PODIATRIST

## 2023-12-12 PROCEDURE — 64450 NJX AA&/STRD OTHER PN/BRANCH: CPT | Mod: 59,50,S$GLB, | Performed by: PODIATRIST

## 2023-12-12 PROCEDURE — 3288F FALL RISK ASSESSMENT DOCD: CPT | Mod: CPTII,S$GLB,, | Performed by: PODIATRIST

## 2023-12-12 PROCEDURE — 1101F PR PT FALLS ASSESS DOC 0-1 FALLS W/OUT INJ PAST YR: ICD-10-PCS | Mod: CPTII,S$GLB,, | Performed by: PODIATRIST

## 2023-12-12 PROCEDURE — 1101F PT FALLS ASSESS-DOCD LE1/YR: CPT | Mod: CPTII,S$GLB,, | Performed by: PODIATRIST

## 2023-12-12 PROCEDURE — 3288F PR FALLS RISK ASSESSMENT DOCUMENTED: ICD-10-PCS | Mod: CPTII,S$GLB,, | Performed by: PODIATRIST

## 2023-12-12 PROCEDURE — 99999 PR PBB SHADOW E&M-EST. PATIENT-LVL IV: CPT | Mod: PBBFAC,,, | Performed by: PODIATRIST

## 2023-12-12 PROCEDURE — 1159F PR MEDICATION LIST DOCUMENTED IN MEDICAL RECORD: ICD-10-PCS | Mod: CPTII,S$GLB,, | Performed by: PODIATRIST

## 2023-12-12 PROCEDURE — 3077F PR MOST RECENT SYSTOLIC BLOOD PRESSURE >= 140 MM HG: ICD-10-PCS | Mod: CPTII,S$GLB,, | Performed by: PODIATRIST

## 2023-12-12 PROCEDURE — 3066F NEPHROPATHY DOC TX: CPT | Mod: CPTII,S$GLB,, | Performed by: PODIATRIST

## 2023-12-12 PROCEDURE — 3061F PR NEG MICROALBUMINURIA RESULT DOCUMENTED/REVIEW: ICD-10-PCS | Mod: CPTII,S$GLB,, | Performed by: PODIATRIST

## 2023-12-12 PROCEDURE — 3044F HG A1C LEVEL LT 7.0%: CPT | Mod: CPTII,S$GLB,, | Performed by: PODIATRIST

## 2023-12-12 PROCEDURE — 11721 PR DEBRIDEMENT OF NAILS, 6 OR MORE: ICD-10-PCS | Mod: 59,Q9,S$GLB, | Performed by: PODIATRIST

## 2023-12-12 PROCEDURE — 3061F NEG MICROALBUMINURIA REV: CPT | Mod: CPTII,S$GLB,, | Performed by: PODIATRIST

## 2023-12-12 PROCEDURE — 4010F PR ACE/ARB THEARPY RXD/TAKEN: ICD-10-PCS | Mod: CPTII,S$GLB,, | Performed by: PODIATRIST

## 2023-12-12 PROCEDURE — 3078F DIAST BP <80 MM HG: CPT | Mod: CPTII,S$GLB,, | Performed by: PODIATRIST

## 2023-12-12 PROCEDURE — 11721 DEBRIDE NAIL 6 OR MORE: CPT | Mod: 59,Q9,S$GLB, | Performed by: PODIATRIST

## 2023-12-12 PROCEDURE — 11056 PARNG/CUTG B9 HYPRKR LES 2-4: CPT | Mod: Q9,S$GLB,, | Performed by: PODIATRIST

## 2023-12-12 PROCEDURE — 11056 PR TRIM BENIGN HYPERKERATOTIC SKIN LESION,2-4: ICD-10-PCS | Mod: Q9,S$GLB,, | Performed by: PODIATRIST

## 2023-12-12 PROCEDURE — 3078F PR MOST RECENT DIASTOLIC BLOOD PRESSURE < 80 MM HG: ICD-10-PCS | Mod: CPTII,S$GLB,, | Performed by: PODIATRIST

## 2023-12-12 PROCEDURE — 99213 OFFICE O/P EST LOW 20 MIN: CPT | Mod: 25,S$GLB,, | Performed by: PODIATRIST

## 2023-12-12 PROCEDURE — 99999 PR PBB SHADOW E&M-EST. PATIENT-LVL IV: ICD-10-PCS | Mod: PBBFAC,,, | Performed by: PODIATRIST

## 2023-12-12 PROCEDURE — 3077F SYST BP >= 140 MM HG: CPT | Mod: CPTII,S$GLB,, | Performed by: PODIATRIST

## 2023-12-12 PROCEDURE — 1126F AMNT PAIN NOTED NONE PRSNT: CPT | Mod: CPTII,S$GLB,, | Performed by: PODIATRIST

## 2023-12-12 PROCEDURE — 1126F PR PAIN SEVERITY QUANTIFIED, NO PAIN PRESENT: ICD-10-PCS | Mod: CPTII,S$GLB,, | Performed by: PODIATRIST

## 2023-12-12 PROCEDURE — 3044F PR MOST RECENT HEMOGLOBIN A1C LEVEL <7.0%: ICD-10-PCS | Mod: CPTII,S$GLB,, | Performed by: PODIATRIST

## 2023-12-12 PROCEDURE — 3066F PR DOCUMENTATION OF TREATMENT FOR NEPHROPATHY: ICD-10-PCS | Mod: CPTII,S$GLB,, | Performed by: PODIATRIST

## 2023-12-12 PROCEDURE — 64450 PR NERVE BLOCK INJ, ANES/STEROID, OTHER PERIPHERAL: ICD-10-PCS | Mod: 59,50,S$GLB, | Performed by: PODIATRIST

## 2023-12-12 PROCEDURE — 99213 PR OFFICE/OUTPT VISIT, EST, LEVL III, 20-29 MIN: ICD-10-PCS | Mod: 25,S$GLB,, | Performed by: PODIATRIST

## 2023-12-12 PROCEDURE — 4010F ACE/ARB THERAPY RXD/TAKEN: CPT | Mod: CPTII,S$GLB,, | Performed by: PODIATRIST

## 2023-12-12 RX ORDER — GLUCAGON INJECTION, SOLUTION 1 MG/.2ML
1 INJECTION, SOLUTION SUBCUTANEOUS
Qty: 0.4 ML | Refills: 2 | Status: SHIPPED | OUTPATIENT
Start: 2023-12-12

## 2023-12-13 ENCOUNTER — PATIENT MESSAGE (OUTPATIENT)
Dept: ADMINISTRATIVE | Facility: OTHER | Age: 73
End: 2023-12-13
Payer: MEDICARE

## 2023-12-17 NOTE — PROGRESS NOTES
Subjective:      Patient ID: Walter Bull is a 73 y.o. male.    Chief Complaint: Routine Foot Care (11/17/2023 - Jerri Griffiths MD, PCP) and Diabetic Foot Exam    Walter is a 73 y.o. male who presents to the clinic upon referral from Dr. Nelly bermeo. provider found  for evaluation and treatment of diabetic feet. Walter has a past medical history of Anemia, Angina pectoris, Anticoagulant long-term use, Arthritis, Back pain, CHF (congestive heart failure), Chronic bronchitis, Colon cancer screening (02/02/2017), Coronary artery disease, Diabetes mellitus type I, Diabetes with neurologic complications, Disorder of kidney and ureter, Encounter for blood transfusion, Glaucoma, Heart attack, Heart disease, Hyperlipidemia, Hypertension, Iron deficiency, Kidney failure, Obesity, Paroxysmal atrial fibrillation (01/12/2023), Pneumonia, Polyneuropathy, Pulmonary emphysema (02/26/2020), Renal manifestation of secondary diabetes mellitus, 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  Would like to discuss topical wound treatment options for dry skin and ongoing neuropathic changes bilateral.  Increase in her pain to the bottoms of both feet.     PCP: Jerri Griffiths MD    Date Last Seen by PCP: dr jerri griffiths05/21/2019    Current shoe gear: Tennis shoes    Hemoglobin A1C   Date Value Ref Range Status   11/11/2023 6.4 (H) 4.0 - 5.6 % Final     Comment:     ADA Screening Guidelines:  5.7-6.4%  Consistent with prediabetes  >or=6.5%  Consistent with diabetes    High levels of fetal hemoglobin interfere with the HbA1C  assay. Heterozygous hemoglobin variants (HbS, HgC, etc)do  not significantly interfere with this assay.   However, presence of multiple variants may affect accuracy.     05/10/2023 6.3 (H) 4.0 - 5.6 % Final     Comment:     ADA Screening Guidelines:  5.7-6.4%  Consistent with prediabetes  >or=6.5%  Consistent with  diabetes    High levels of fetal hemoglobin interfere with the HbA1C  assay. Heterozygous hemoglobin variants (HbS, HgC, etc)do  not significantly interfere with this assay.   However, presence of multiple variants may affect accuracy.     09/02/2022 7.0 (H) 4.0 - 5.6 % Final     Comment:     ADA Screening Guidelines:  5.7-6.4%  Consistent with prediabetes  >or=6.5%  Consistent with diabetes    High levels of fetal hemoglobin interfere with the HbA1C  assay. Heterozygous hemoglobin variants (HbS, HgC, etc)do  not significantly interfere with this assay.   However, presence of multiple variants may affect accuracy.             Review of Systems   Constitutional: Negative for chills, fever and malaise/fatigue.   HENT:  Negative for hearing loss.    Cardiovascular:  Negative for claudication.   Respiratory:  Negative for shortness of breath.    Skin:  Negative for dry skin, flushing and rash.   Musculoskeletal:  Negative for joint pain and myalgias.   Neurological:  Negative for loss of balance, numbness, paresthesias and sensory change.   Psychiatric/Behavioral:  Negative for altered mental status.            Objective:      Physical Exam  Vitals reviewed.   Constitutional:       Appearance: He is well-developed.   HENT:      Head: Normocephalic and atraumatic.   Cardiovascular:      Pulses:           Dorsalis pedis pulses are 2+ on the right side and 2+ on the left side.        Posterior tibial pulses are 2+ on the right side and 2+ on the left side.      Comments: No edema noted to b/L LEs  Pulmonary:      Effort: Pulmonary effort is normal.   Musculoskeletal:         General: Normal range of motion.      Comments: Adequate joint ROM noted to all lower extremity muscle groups with no pain or crepitation noted. Muscle strength is 5/5 in all groups bilaterally.     Feet:      Right foot:      Protective Sensation: 5 sites tested.  5 sites sensed.      Skin integrity: Callus and dry skin present.      Left foot:       Protective Sensation: 5 sites tested.  5 sites sensed.      Skin integrity: Callus and dry skin present.   Skin:     General: Skin is warm and dry.      Capillary Refill: Capillary refill takes 2 to 3 seconds.      Coloration: Skin is pale.      Comments: Xerosis improved. No open lesion noted b/L  Skin temp is warm to warm from proximal to distal b/L.  Webspaces clean, dry, and intact  Nails x10 short   Neurological:      Mental Status: He is alert and oriented to person, place, and time.      Sensory: Sensory deficit present.      Motor: Atrophy present.      Deep Tendon Reflexes: Reflexes abnormal.      Reflex Scores:       Patellar reflexes are 1+ on the right side and 1+ on the left side.       Achilles reflexes are 1+ on the right side and 1+ on the left side.     Comments: Intact gross sensation noted to b/L LEs    There is pain on palpation of the bilateral 3rd  intermetatarsal space with a positive Nikkie's click. Minimal tenderness to palpation of the adjacent metatarsal heads.     Psychiatric:         Behavior: Behavior normal.               Assessment:       Encounter Diagnoses   Name Primary?    Diabetic polyneuropathy associated with type 2 diabetes mellitus Yes    Corn or callus     Tarsal tunnel syndrome of both lower extremities     Onychomycosis due to dermatophyte            Plan:       Walter was seen today for routine foot care and diabetic foot exam.    Diagnoses and all orders for this visit:    Diabetic polyneuropathy associated with type 2 diabetes mellitus    Corn or callus    Tarsal tunnel syndrome of both lower extremities    Onychomycosis due to dermatophyte        I counseled the patient on his conditions, their implications and medical management.    Decision making:  Chronic illnesses discussed in detail, previous records/notes and imaging independently reviewed, prescription drug management performed in addition to lengthy discussion regarding both conservative and surgical treatment  options.    Discussed options for peripheral neuropathy/nerve entrapment syndrome including nerve block therapy, surgical nerve entrapment decompression procedures, and various vitamins and supplementation available shown to improve nerve function.    Nerve injection:    Performed by:  Jonnathan Kenney DPM    Preop diagnosis:  Neuropathy symptoms/paresthesias/pain    The area overlying the bilateral posterior tibial nerve(s) was sterilely prepped, verbal consent was obtained, 2 cc's of 0.5% Marcaine plain was infiltrated around the affected nerve(s) for a diagnostic nerve block.  This was well tolerated with no complications.    Shoe inspection. Diabetic Foot Education. Patient reminded of the importance of good nutrition and blood sugar control to help prevent podiatric complications of diabetes. Patient instructed on proper foot hygeine. We discussed wearing proper shoe gear, daily foot inspections and Diabetic foot education in detail.      Routine Foot Care    Performed by:  Jonnathan Kenney. DPM  Authorized by:  Patient     Consent Done?:  Yes (Verbal)     Nail Care Type:  Debride  Location(s): All  (Left 1st Toe, Left 3rd Toe, Left 2nd Toe, Left 4th Toe, Left 5th Toe, Right 1st Toe, Right 2nd Toe, Right 3rd Toe, Right 4th Toe and Right 5th Toe)  Patient tolerance:  Patient tolerated the procedure well with no immediate complications     With patient's permission, the toenails mentioned above were aggressively reduced and debrided using a nail nipper, removing all offending nail and debris. The patient will continue to monitor the areas daily, inspect the feet, wear protective shoe gear when ambulatory, and moisturizer to maintain skin integrity.      Callus Care Type: Debride    With patient's permission, the calluses/hyperkeratotic lesions mentioned above were aggressively reduced and debrided using a number 15 blade. The patient will continue to monitor the areas daily, inspect the feet, wear protective shoe  gear when ambulatory, and moisturizer to maintain skin integrity.     Return to clinic in 3-6 months or sooner if problems arise

## 2023-12-30 ENCOUNTER — CLINICAL SUPPORT (OUTPATIENT)
Dept: CARDIOLOGY | Facility: HOSPITAL | Age: 73
End: 2023-12-30
Payer: MEDICARE

## 2023-12-30 ENCOUNTER — CLINICAL SUPPORT (OUTPATIENT)
Dept: CARDIOLOGY | Facility: HOSPITAL | Age: 73
End: 2023-12-30
Attending: INTERNAL MEDICINE
Payer: MEDICARE

## 2023-12-30 DIAGNOSIS — Z95.810 PRESENCE OF AUTOMATIC (IMPLANTABLE) CARDIAC DEFIBRILLATOR: ICD-10-CM

## 2023-12-30 PROCEDURE — 93296 REM INTERROG EVL PM/IDS: CPT | Performed by: INTERNAL MEDICINE

## 2023-12-30 PROCEDURE — 93295 DEV INTERROG REMOTE 1/2/MLT: CPT | Mod: ,,, | Performed by: INTERNAL MEDICINE

## 2024-01-03 LAB
OHS CV AF BURDEN PERCENT: < 1
OHS CV BIV PACING PERCENT: 96 %
OHS CV DC REMOTE DEVICE TYPE: NORMAL
OHS CV ICD SHOCK: NO

## 2024-01-04 ENCOUNTER — PATIENT MESSAGE (OUTPATIENT)
Dept: ENDOCRINOLOGY | Facility: CLINIC | Age: 74
End: 2024-01-04
Payer: MEDICARE

## 2024-01-05 DIAGNOSIS — N18.32 TYPE 2 DIABETES MELLITUS WITH STAGE 3B CHRONIC KIDNEY DISEASE, WITH LONG-TERM CURRENT USE OF INSULIN: Primary | ICD-10-CM

## 2024-01-05 DIAGNOSIS — Z79.4 TYPE 2 DIABETES MELLITUS WITH STAGE 3B CHRONIC KIDNEY DISEASE, WITH LONG-TERM CURRENT USE OF INSULIN: Primary | ICD-10-CM

## 2024-01-05 DIAGNOSIS — E11.22 TYPE 2 DIABETES MELLITUS WITH STAGE 3B CHRONIC KIDNEY DISEASE, WITH LONG-TERM CURRENT USE OF INSULIN: Primary | ICD-10-CM

## 2024-01-05 RX ORDER — BLOOD-GLUCOSE,RECEIVER,CONT
EACH MISCELLANEOUS
Qty: 1 EACH | Refills: 0 | Status: SHIPPED | OUTPATIENT
Start: 2024-01-05 | End: 2024-02-09 | Stop reason: SDUPTHER

## 2024-01-05 RX ORDER — BLOOD-GLUCOSE SENSOR
1 EACH MISCELLANEOUS
Qty: 3 EACH | Refills: 11 | Status: SHIPPED | OUTPATIENT
Start: 2024-01-05 | End: 2024-02-09 | Stop reason: SDUPTHER

## 2024-01-10 ENCOUNTER — OFFICE VISIT (OUTPATIENT)
Dept: URGENT CARE | Facility: CLINIC | Age: 74
End: 2024-01-10
Payer: MEDICARE

## 2024-01-10 VITALS
SYSTOLIC BLOOD PRESSURE: 177 MMHG | HEART RATE: 60 BPM | OXYGEN SATURATION: 97 % | RESPIRATION RATE: 18 BRPM | HEIGHT: 69 IN | BODY MASS INDEX: 36.96 KG/M2 | DIASTOLIC BLOOD PRESSURE: 80 MMHG | TEMPERATURE: 99 F | WEIGHT: 249.56 LBS

## 2024-01-10 DIAGNOSIS — B96.89 ACUTE BACTERIAL BRONCHITIS: ICD-10-CM

## 2024-01-10 DIAGNOSIS — J20.8 ACUTE BACTERIAL BRONCHITIS: ICD-10-CM

## 2024-01-10 DIAGNOSIS — R05.9 COUGH, UNSPECIFIED TYPE: Primary | ICD-10-CM

## 2024-01-10 PROCEDURE — 71046 X-RAY EXAM CHEST 2 VIEWS: CPT | Mod: S$GLB,,, | Performed by: RADIOLOGY

## 2024-01-10 PROCEDURE — 99213 OFFICE O/P EST LOW 20 MIN: CPT | Mod: S$GLB,,, | Performed by: PHYSICIAN ASSISTANT

## 2024-01-10 RX ORDER — ALBUTEROL SULFATE 90 UG/1
2 AEROSOL, METERED RESPIRATORY (INHALATION) EVERY 6 HOURS PRN
Qty: 18 G | Refills: 0 | Status: SHIPPED | OUTPATIENT
Start: 2024-01-10

## 2024-01-10 RX ORDER — BENZONATATE 100 MG/1
100 CAPSULE ORAL 3 TIMES DAILY PRN
Qty: 15 CAPSULE | Refills: 0 | Status: SHIPPED | OUTPATIENT
Start: 2024-01-10 | End: 2024-01-20

## 2024-01-10 RX ORDER — DOXYCYCLINE 100 MG/1
100 CAPSULE ORAL EVERY 12 HOURS
Qty: 14 CAPSULE | Refills: 0 | Status: SHIPPED | OUTPATIENT
Start: 2024-01-10

## 2024-01-10 NOTE — LETTER
January 10, 2024      [facility]  [address], [address]  [place] [address]  Phone: [phone]  Fax: [phone]       Patient: Walter Bull   YOB: 1950  Date of Visit: 01/10/2024    To Whom It May Concern:    Mercedes Bull  was at [facility] on 01/10/2024. The patient may return to work/school on 01/12/2024 with no restrictions. If you have any questions or concerns, or if I can be of further assistance, please do not hesitate to contact me.    Sincerely,    Jermaine Marie PA-C

## 2024-01-10 NOTE — PROGRESS NOTES
"Subjective:      Patient ID: Walter Bull is a 73 y.o. male.    Vitals:  height is 5' 9" (1.753 m) and weight is 113.2 kg (249 lb 9 oz). His oral temperature is 98.6 °F (37 °C). His blood pressure is 177/80 (abnormal) and his pulse is 60. His respiration is 18 and oxygen saturation is 97%.     Chief Complaint: Cough    Pt is coming in for a productive cough that began about a week and a half ago. Pt states he was coughing up green mucus but now he is coughing up black stuff.    Cough  This is a new problem. The current episode started 1 to 4 weeks ago. The problem has been unchanged. The problem occurs every few minutes. The cough is Productive of sputum (green and black). Associated symptoms include ear congestion, nasal congestion, a sore throat and sweats. Pertinent negatives include no chills, ear pain, fever, headaches, rash, shortness of breath or wheezing. Nothing aggravates the symptoms. Treatments tried: breathing treatment. The treatment provided mild (only lasts a few hours) relief. His past medical history is significant for asthma (when pt was a kid), bronchitis and pneumonia (long time ago).       Constitution: Negative for appetite change, chills, fatigue, fever, unexpected weight change and generalized weakness.   HENT:  Positive for sore throat. Negative for ear pain.    Respiratory:  Positive for cough and sputum production. Negative for COPD, shortness of breath, wheezing and asthma.    Musculoskeletal:  Negative for back pain.   Skin:  Negative for rash.   Allergic/Immunologic: Negative for asthma.   Neurological:  Negative for headaches and disorientation.   Psychiatric/Behavioral:  Negative for disorientation and confusion.       Objective:     Physical Exam   Constitutional: He is oriented to person, place, and time. He appears well-developed. He is cooperative.  Non-toxic appearance. He does not appear ill. No distress.   HENT:   Head: Normocephalic and atraumatic.   Ears:   Right Ear: " Hearing, tympanic membrane, external ear and ear canal normal.   Left Ear: Hearing, tympanic membrane, external ear and ear canal normal.   Nose: Nose normal. No mucosal edema, rhinorrhea or nasal deformity. No epistaxis. Right sinus exhibits no maxillary sinus tenderness and no frontal sinus tenderness. Left sinus exhibits no maxillary sinus tenderness and no frontal sinus tenderness.   Mouth/Throat: Uvula is midline, oropharynx is clear and moist and mucous membranes are normal. No trismus in the jaw. Normal dentition. No uvula swelling. No oropharyngeal exudate, posterior oropharyngeal edema or posterior oropharyngeal erythema.   Eyes: Conjunctivae and lids are normal. No scleral icterus.   Neck: Trachea normal and phonation normal. Neck supple. No edema present. No erythema present. No neck rigidity present.   Cardiovascular: Normal rate, regular rhythm, normal heart sounds and normal pulses.   Pulmonary/Chest: Effort normal and breath sounds normal. No respiratory distress. He has no decreased breath sounds. He has no rhonchi.   Abdominal: Normal appearance.   Musculoskeletal: Normal range of motion.         General: No deformity. Normal range of motion.   Neurological: He is alert and oriented to person, place, and time. He exhibits normal muscle tone. Coordination normal.   Skin: Skin is warm, dry, intact, not diaphoretic and not pale.   Psychiatric: His speech is normal and behavior is normal. Judgment and thought content normal.   Nursing note and vitals reviewed.      Assessment:     1. Cough, unspecified type    2. Acute bacterial bronchitis        Plan:       Cough, unspecified type  -     XR CHEST PA AND LATERAL; Future; Expected date: 01/10/2024    Acute bacterial bronchitis      XR CHEST PA AND LATERAL    Result Date: 1/10/2024  EXAMINATION: XR CHEST PA AND LATERAL CLINICAL HISTORY: Cough, unspecified TECHNIQUE: PA and lateral views of the chest were performed. COMPARISON: Non 06/07/2023 e FINDINGS:  Postoperative changes and pacemaker as before.  Heart size normal.  Small subsegmental atelectatic changes noted at the lung bases.  Otherwise the lungs are clear.  No significant airspace consolidation or pleural effusion identified     See above Electronically signed by: Alexis Holman MD Date:    01/10/2024 Time:    13:08      Medical Decision Making:   Clinical Tests:   Radiological Study: Ordered and Reviewed  Urgent Care Management:  Patient is a 73  year old male who presents to the clinic for complaints of cough and sputum production. Patient denies fever or SOB.  He reports it began 10-14 days ago and initially had green mucus production. He reports in the last few days the mucus is more black. He has been having some nosebleeds from dryness in nose. CXR performed and showed no acute abnormalities. He will be treated accordingly and instructed him to follow up with primary care doctor on Monday.      Additional MDM:     Heart Failure Score:   COPD = No

## 2024-01-11 ENCOUNTER — PATIENT MESSAGE (OUTPATIENT)
Dept: INTERNAL MEDICINE | Facility: CLINIC | Age: 74
End: 2024-01-11
Payer: MEDICARE

## 2024-01-14 DIAGNOSIS — Z95.1 S/P CABG X 5: ICD-10-CM

## 2024-01-14 DIAGNOSIS — I25.118 CORONARY ARTERY DISEASE OF NATIVE ARTERY OF NATIVE HEART WITH STABLE ANGINA PECTORIS: ICD-10-CM

## 2024-01-15 NOTE — TELEPHONE ENCOUNTER
No care due was identified.  [facility] Embedded Care Due Messages. Reference number: [id number].   1/14/2024 9:38:28 PM CST

## 2024-01-16 DIAGNOSIS — Z79.4 TYPE 2 DIABETES MELLITUS WITH DIABETIC NEPHROPATHY, WITH LONG-TERM CURRENT USE OF INSULIN: ICD-10-CM

## 2024-01-16 DIAGNOSIS — E11.21 TYPE 2 DIABETES MELLITUS WITH DIABETIC NEPHROPATHY, WITH LONG-TERM CURRENT USE OF INSULIN: ICD-10-CM

## 2024-01-16 RX ORDER — NITROGLYCERIN 0.4 MG/1
TABLET SUBLINGUAL
Qty: 75 TABLET | Refills: 3 | Status: SHIPPED | OUTPATIENT
Start: 2024-01-16

## 2024-01-16 NOTE — TELEPHONE ENCOUNTER
Refill Decision Note   Walter Bull  is requesting a refill authorization.  Brief Assessment and Rationale for Refill:  Approve     Medication Therapy Plan:         Pharmacist review requested: Yes   Extended chart review required: Yes   Comments:     Note composed:1:21 PM 01/16/2024

## 2024-01-16 NOTE — TELEPHONE ENCOUNTER
Refill Routing Note   Medication(s) are not appropriate for processing by [facility] for the following reason(s):        Drug-disease interaction    [facility] action(s):  Defer        Medication Therapy Plan: Drug-Disease: nitroGLYCERIN and Iron deficiency anemia    Pharmacist review requested: Yes     Appointments  past 12m or future 3m with PCP    Date Provider   Last Visit   11/17/2023 Belkys Kruger MD   Next Visit   5/17/2024 Belkys Kruger MD   ED visits in past 90 days: 0        Note composed:11:36 AM 01/16/2024

## 2024-01-26 ENCOUNTER — OFFICE VISIT (OUTPATIENT)
Dept: ENDOCRINOLOGY | Facility: CLINIC | Age: 74
End: 2024-01-26
Payer: MEDICARE

## 2024-01-26 VITALS
HEIGHT: 69 IN | HEART RATE: 60 BPM | DIASTOLIC BLOOD PRESSURE: 82 MMHG | BODY MASS INDEX: 36.58 KG/M2 | SYSTOLIC BLOOD PRESSURE: 136 MMHG | WEIGHT: 247 LBS

## 2024-01-26 DIAGNOSIS — Z79.4 TYPE 2 DIABETES MELLITUS WITH STAGE 3B CHRONIC KIDNEY DISEASE, WITH LONG-TERM CURRENT USE OF INSULIN: ICD-10-CM

## 2024-01-26 DIAGNOSIS — E11.22 TYPE 2 DIABETES MELLITUS WITH STAGE 3B CHRONIC KIDNEY DISEASE, WITH LONG-TERM CURRENT USE OF INSULIN: ICD-10-CM

## 2024-01-26 DIAGNOSIS — E78.2 MIXED HYPERLIPIDEMIA: Chronic | ICD-10-CM

## 2024-01-26 DIAGNOSIS — N25.81 HYPERPARATHYROIDISM DUE TO RENAL INSUFFICIENCY: ICD-10-CM

## 2024-01-26 DIAGNOSIS — E66.01 SEVERE OBESITY (BMI 35.0-39.9) WITH COMORBIDITY: Primary | ICD-10-CM

## 2024-01-26 DIAGNOSIS — N18.32 TYPE 2 DIABETES MELLITUS WITH STAGE 3B CHRONIC KIDNEY DISEASE, WITH LONG-TERM CURRENT USE OF INSULIN: ICD-10-CM

## 2024-01-26 DIAGNOSIS — I10 BENIGN ESSENTIAL HTN: Chronic | ICD-10-CM

## 2024-01-26 PROCEDURE — 95251 CONT GLUC MNTR ANALYSIS I&R: CPT | Mod: S$GLB,,, | Performed by: STUDENT IN AN ORGANIZED HEALTH CARE EDUCATION/TRAINING PROGRAM

## 2024-01-26 PROCEDURE — 99999 PR PBB SHADOW E&M-EST. PATIENT-LVL IV: CPT | Mod: PBBFAC,,, | Performed by: STUDENT IN AN ORGANIZED HEALTH CARE EDUCATION/TRAINING PROGRAM

## 2024-01-26 PROCEDURE — 99214 OFFICE O/P EST MOD 30 MIN: CPT | Mod: 25,S$GLB,, | Performed by: STUDENT IN AN ORGANIZED HEALTH CARE EDUCATION/TRAINING PROGRAM

## 2024-01-26 NOTE — PROGRESS NOTES
"Subjective:      Patient ID: Walter Bull is a 73 y.o. male.    Chief Complaint:  Type 2 diabetes mellitus      History of Present Illness  This is a 73 y.o. male. with a past medical history of type 2 diabetes, CAD s/p CABG, CHF, HLD, here for evaluation.    Type 2 diabetes mellitus  Current diabetes medications:  - VGO 20 with 4 clicks with each meal and 1-2 clicks per snack  - Mounjaro 7.5 mg weekly - pharmacy was out for 2 months    Lab Results   Component Value Date    CREATININE 1.4 11/11/2023    EGFRNORACEVR 53 (A) 11/11/2023       Past diabetes medications:  - Metformin - unable to tolerate  - Januvia   - SGLT-2 inhibitors avoided given frequent AKIs/dehydration    Known diabetic complications: nephropathy and cardiovascular disease    Weight trend:  Wt Readings from Last 6 Encounters:   01/26/24 112 kg (247 lb)   01/10/24 113.2 kg (249 lb 9 oz)   12/12/23 113.4 kg (250 lb)   11/17/23 109.9 kg (242 lb 4.6 oz)   09/01/23 107.5 kg (237 lb)   08/09/23 116 kg (255 lb 11.7 oz)           Prior visit with diabetes education: yes    Current diet: 3 meals per day  Current exercise: Limited              Diabetes Management Status  Statin: Taking  ACE/ARB: Taking    Screening or Prevention Patient's value   HgA1C Testing and Control   Lab Results   Component Value Date    HGBA1C 6.4 (H) 11/11/2023        LDL control Lab Results   Component Value Date    LDLCALC 50.4 (L) 11/11/2023      Nephropathy screening Lab Results   Component Value Date    MICALBCREAT 2.0 05/10/2023            Review of Systems  As above    Social and family history reviewed  Current medications and allergies reviewed    Objective:   /82 (BP Location: Right arm, Patient Position: Sitting, BP Method: Medium (Manual))   Pulse 60   Ht 5' 9" (1.753 m)   Wt 112 kg (247 lb)   BMI 36.48 kg/m²   Physical Exam  Alert, oriented    BP Readings from Last 1 Encounters:   01/26/24 136/82      Wt Readings from Last 1 Encounters:   01/26/24 0756 112 " kg (247 lb)     Body mass index is 36.48 kg/m².    Lab Review:   Lab Results   Component Value Date    HGBA1C 6.4 (H) 11/11/2023     Lab Results   Component Value Date    CHOL 105 (L) 11/11/2023    HDL 48 11/11/2023    LDLCALC 50.4 (L) 11/11/2023    TRIG 33 11/11/2023    CHOLHDL 45.7 11/11/2023     Lab Results   Component Value Date     11/11/2023    K 4.6 11/11/2023     11/11/2023    CO2 27 11/11/2023    GLU 99 11/11/2023    BUN 15 11/11/2023    CREATININE 1.4 11/11/2023    CALCIUM 8.5 (L) 11/11/2023    PROT 6.4 11/11/2023    ALBUMIN 3.4 (L) 11/11/2023    BILITOT 0.3 11/11/2023    ALKPHOS 61 11/11/2023    AST 22 11/11/2023    ALT 25 11/11/2023    ANIONGAP 8 11/11/2023    ESTGFRAFRICA 57 (A) 07/08/2022    EGFRNONAA 49 (A) 07/08/2022    TSH 3.492 05/10/2023       All pertinent labs reviewed    Assessment and Plan     Type 2 diabetes mellitus with stage 3b chronic kidney disease, with long-term current use of insulin  Controlled based on CGM data with TIR > 70% and A1c 6.4%. He has some hypoglycemia episodes mostly throughout day which are likely from VGo clicks rather than the basal. He sometimes forgets to click and clicks after he sees glucose is high. Advised to click before meals and if he forgets to try to use less clicks to correct (1 to 2).    There was one episode where he had ran out of VGOs and was using basal insulin. He then restarted the VGO on a day where he had injected the basal and had significant hypoglycemia that day. Advised to always wait until next day to start Vgo.    Unable to tolerate metformin or SGLT-2 inhibitor.      Plan  - Continue VGo 20 + 4 clicks per meals + 1-2 clicks per snack  - Continue Mounjaro 7.5 mg weekly  - Continue Dexcom G6 - seem slike Duramed requested upgrade to G7 - okay by me    Labs per PCP    F/u 6 months    Severe obesity (BMI 35.0-39.9) with comorbidity  Continue GLP-1/GIP RA given weight loss benefit    HLD (hyperlipidemia)  Continue statin,  Bita    Benign essential HTN  Continue antihypertensive regimen including ARB/ACEi      Andrew Briones MD  Endocrinology

## 2024-01-26 NOTE — ASSESSMENT & PLAN NOTE
Controlled based on CGM data with TIR > 70% and A1c 6.4%. He has some hypoglycemia episodes mostly throughout day which are likely from VGo clicks rather than the basal. He sometimes forgets to click and clicks after he sees glucose is high. Advised to click before meals and if he forgets to try to use less clicks to correct (1 to 2).    There was one episode where he had ran out of VGOs and was using basal insulin. He then restarted the VGO on a day where he had injected the basal and had significant hypoglycemia that day. Advised to always wait until next day to start Vgo.    Unable to tolerate metformin or SGLT-2 inhibitor.      Plan  - Continue VGo 20 + 4 clicks per meals + 1-2 clicks per snack  - Continue Mounjaro 7.5 mg weekly  - Continue Dexcom G6 - seem slike Crystal requested upgrade to G7 - okay by me    Labs per PCP    F/u 6 months

## 2024-01-27 DIAGNOSIS — K21.9 GASTROESOPHAGEAL REFLUX DISEASE WITHOUT ESOPHAGITIS: ICD-10-CM

## 2024-01-28 NOTE — TELEPHONE ENCOUNTER
No care due was identified.  [facility] Embedded Care Due Messages. Reference number: [id number].   1/27/2024 9:49:46 PM CST

## 2024-01-29 ENCOUNTER — PATIENT MESSAGE (OUTPATIENT)
Dept: ENDOCRINOLOGY | Facility: CLINIC | Age: 74
End: 2024-01-29
Payer: MEDICARE

## 2024-01-29 RX ORDER — ESOMEPRAZOLE MAGNESIUM 40 MG/1
40 CAPSULE, DELAYED RELEASE ORAL 2 TIMES DAILY
Qty: 180 CAPSULE | Refills: 3 | Status: SHIPPED | OUTPATIENT
Start: 2024-01-29

## 2024-01-29 RX ORDER — APIXABAN 5 MG/1
5 TABLET, FILM COATED ORAL 2 TIMES DAILY
Qty: 180 TABLET | Refills: 3 | Status: SHIPPED | OUTPATIENT
Start: 2024-01-29

## 2024-01-29 NOTE — TELEPHONE ENCOUNTER
Refill Routing Note   Medication(s) are not appropriate for processing by [facility] for the following reason(s):        Outside of protocol    [facility] action(s):  Route        Medication Therapy Plan: Total daily dose outside of [facility] protocol      Appointments  past 12m or future 3m with PCP    Date Provider   Last Visit   11/17/2023 Belkys Kruger MD   Next Visit   5/17/2024 Belkys Kruger MD   ED visits in past 90 days: 0        Note composed:7:50 PM 01/28/2024

## 2024-02-09 ENCOUNTER — PATIENT MESSAGE (OUTPATIENT)
Dept: INTERNAL MEDICINE | Facility: CLINIC | Age: 74
End: 2024-02-09
Payer: MEDICARE

## 2024-02-09 DIAGNOSIS — E11.22 TYPE 2 DIABETES MELLITUS WITH STAGE 3B CHRONIC KIDNEY DISEASE, WITH LONG-TERM CURRENT USE OF INSULIN: ICD-10-CM

## 2024-02-09 DIAGNOSIS — N18.32 TYPE 2 DIABETES MELLITUS WITH STAGE 3B CHRONIC KIDNEY DISEASE, WITH LONG-TERM CURRENT USE OF INSULIN: ICD-10-CM

## 2024-02-09 DIAGNOSIS — Z79.4 TYPE 2 DIABETES MELLITUS WITH STAGE 3B CHRONIC KIDNEY DISEASE, WITH LONG-TERM CURRENT USE OF INSULIN: ICD-10-CM

## 2024-02-09 RX ORDER — BLOOD-GLUCOSE,RECEIVER,CONT
EACH MISCELLANEOUS
Qty: 1 EACH | Refills: 0 | Status: SHIPPED | OUTPATIENT
Start: 2024-02-09 | End: 2024-02-09 | Stop reason: SDUPTHER

## 2024-02-09 RX ORDER — BLOOD-GLUCOSE,RECEIVER,CONT
EACH MISCELLANEOUS
Qty: 1 EACH | Refills: 0 | Status: SHIPPED | OUTPATIENT
Start: 2024-02-09

## 2024-02-09 RX ORDER — BLOOD-GLUCOSE SENSOR
1 EACH MISCELLANEOUS
Qty: 3 EACH | Refills: 11 | Status: SHIPPED | OUTPATIENT
Start: 2024-02-09 | End: 2024-02-09 | Stop reason: SDUPTHER

## 2024-02-09 RX ORDER — BLOOD-GLUCOSE SENSOR
1 EACH MISCELLANEOUS
Qty: 3 EACH | Refills: 11 | Status: SHIPPED | OUTPATIENT
Start: 2024-02-09 | End: 2024-02-23 | Stop reason: SDUPTHER

## 2024-02-23 ENCOUNTER — PATIENT MESSAGE (OUTPATIENT)
Dept: ENDOCRINOLOGY | Facility: CLINIC | Age: 74
End: 2024-02-23
Payer: MEDICARE

## 2024-02-23 DIAGNOSIS — N18.32 TYPE 2 DIABETES MELLITUS WITH STAGE 3B CHRONIC KIDNEY DISEASE, WITH LONG-TERM CURRENT USE OF INSULIN: ICD-10-CM

## 2024-02-23 DIAGNOSIS — Z79.4 TYPE 2 DIABETES MELLITUS WITH STAGE 3B CHRONIC KIDNEY DISEASE, WITH LONG-TERM CURRENT USE OF INSULIN: ICD-10-CM

## 2024-02-23 DIAGNOSIS — E11.22 TYPE 2 DIABETES MELLITUS WITH STAGE 3B CHRONIC KIDNEY DISEASE, WITH LONG-TERM CURRENT USE OF INSULIN: ICD-10-CM

## 2024-02-23 RX ORDER — BLOOD-GLUCOSE SENSOR
1 EACH MISCELLANEOUS
Qty: 3 EACH | Refills: 11 | Status: SHIPPED | OUTPATIENT
Start: 2024-02-23 | End: 2025-02-22

## 2024-02-28 DIAGNOSIS — E66.9 DIABETES MELLITUS TYPE 2 IN OBESE: Chronic | ICD-10-CM

## 2024-02-28 DIAGNOSIS — E11.69 DIABETES MELLITUS TYPE 2 IN OBESE: Chronic | ICD-10-CM

## 2024-03-13 ENCOUNTER — CLINICAL SUPPORT (OUTPATIENT)
Dept: OPHTHALMOLOGY | Facility: CLINIC | Age: 74
End: 2024-03-13
Payer: MEDICARE

## 2024-03-13 DIAGNOSIS — Z95.810 PRESENCE OF AUTOMATIC (IMPLANTABLE) CARDIAC DEFIBRILLATOR: ICD-10-CM

## 2024-03-13 DIAGNOSIS — H40.1131 PRIMARY OPEN ANGLE GLAUCOMA (POAG) OF BOTH EYES, MILD STAGE: ICD-10-CM

## 2024-03-13 NOTE — PROGRESS NOTES
HVF/OCT rel/fix coop good OU/chart checked for latex allergy/-1.00 + 1.50 x 125 OD - 0.75 OS - BJ

## 2024-03-19 ENCOUNTER — PATIENT MESSAGE (OUTPATIENT)
Dept: OPTOMETRY | Facility: CLINIC | Age: 74
End: 2024-03-19
Payer: MEDICARE

## 2024-03-19 ENCOUNTER — PATIENT MESSAGE (OUTPATIENT)
Dept: ENDOCRINOLOGY | Facility: CLINIC | Age: 74
End: 2024-03-19
Payer: MEDICARE

## 2024-03-30 ENCOUNTER — CLINICAL SUPPORT (OUTPATIENT)
Dept: CARDIOLOGY | Facility: HOSPITAL | Age: 74
End: 2024-03-30
Attending: INTERNAL MEDICINE
Payer: MEDICARE

## 2024-03-30 DIAGNOSIS — Z95.810 PRESENCE OF AUTOMATIC (IMPLANTABLE) CARDIAC DEFIBRILLATOR: ICD-10-CM

## 2024-03-30 PROCEDURE — 93295 DEV INTERROG REMOTE 1/2/MLT: CPT | Mod: ,,, | Performed by: INTERNAL MEDICINE

## 2024-03-30 PROCEDURE — 93296 REM INTERROG EVL PM/IDS: CPT | Performed by: INTERNAL MEDICINE

## 2024-04-05 LAB
OHS CV AF BURDEN PERCENT: < 1
OHS CV BIV PACING PERCENT: 97 %
OHS CV DC REMOTE DEVICE TYPE: NORMAL

## 2024-04-16 ENCOUNTER — OFFICE VISIT (OUTPATIENT)
Dept: PODIATRY | Facility: CLINIC | Age: 74
End: 2024-04-16
Payer: MEDICARE

## 2024-04-16 DIAGNOSIS — G57.53 TARSAL TUNNEL SYNDROME OF BOTH LOWER EXTREMITIES: ICD-10-CM

## 2024-04-16 DIAGNOSIS — B35.1 ONYCHOMYCOSIS DUE TO DERMATOPHYTE: ICD-10-CM

## 2024-04-16 DIAGNOSIS — E11.42 DIABETIC POLYNEUROPATHY ASSOCIATED WITH TYPE 2 DIABETES MELLITUS: Primary | ICD-10-CM

## 2024-04-16 DIAGNOSIS — L84 CORN OR CALLUS: ICD-10-CM

## 2024-04-16 PROCEDURE — 11721 DEBRIDE NAIL 6 OR MORE: CPT | Mod: 59,Q9,S$GLB, | Performed by: PODIATRIST

## 2024-04-16 PROCEDURE — 3075F SYST BP GE 130 - 139MM HG: CPT | Mod: CPTII,S$GLB,, | Performed by: PODIATRIST

## 2024-04-16 PROCEDURE — 99213 OFFICE O/P EST LOW 20 MIN: CPT | Mod: 25,S$GLB,, | Performed by: PODIATRIST

## 2024-04-16 PROCEDURE — 99999 PR PBB SHADOW E&M-EST. PATIENT-LVL V: CPT | Mod: PBBFAC,,, | Performed by: PODIATRIST

## 2024-04-16 PROCEDURE — 11056 PARNG/CUTG B9 HYPRKR LES 2-4: CPT | Mod: Q9,S$GLB,, | Performed by: PODIATRIST

## 2024-04-16 PROCEDURE — 1126F AMNT PAIN NOTED NONE PRSNT: CPT | Mod: CPTII,S$GLB,, | Performed by: PODIATRIST

## 2024-04-16 PROCEDURE — 1160F RVW MEDS BY RX/DR IN RCRD: CPT | Mod: CPTII,S$GLB,, | Performed by: PODIATRIST

## 2024-04-16 PROCEDURE — 1159F MED LIST DOCD IN RCRD: CPT | Mod: CPTII,S$GLB,, | Performed by: PODIATRIST

## 2024-04-16 PROCEDURE — 3078F DIAST BP <80 MM HG: CPT | Mod: CPTII,S$GLB,, | Performed by: PODIATRIST

## 2024-04-17 VITALS
HEART RATE: 63 BPM | BODY MASS INDEX: 35.02 KG/M2 | HEIGHT: 69 IN | SYSTOLIC BLOOD PRESSURE: 132 MMHG | WEIGHT: 236.44 LBS | DIASTOLIC BLOOD PRESSURE: 50 MMHG

## 2024-04-24 DIAGNOSIS — E78.5 HYPERLIPIDEMIA, UNSPECIFIED HYPERLIPIDEMIA TYPE: ICD-10-CM

## 2024-04-24 NOTE — PROGRESS NOTES
Subjective:      Patient ID: Walter Bull is a 73 y.o. male.    Chief Complaint: Nail Care, Diabetes Mellitus (PCP- Jerri Griffiths MD//), and Callouses (Left heel cause stabbing pain with pressure applied )    Walter is a 73 y.o. male who presents to the clinic upon referral from Dr. Nelly bermeo. provider found  for evaluation and treatment of diabetic feet. Walter has a past medical history of Anemia, Angina pectoris, Anticoagulant long-term use, Arthritis, Back pain, CHF (congestive heart failure), Chronic bronchitis, Colon cancer screening (02/02/2017), Coronary artery disease, Diabetes mellitus type I, Diabetes with neurologic complications, Disorder of kidney and ureter, Encounter for blood transfusion, Glaucoma, Heart attack, Heart disease, Hyperlipidemia, Hypertension, Iron deficiency, Kidney failure, Obesity, Paroxysmal atrial fibrillation (01/12/2023), Pneumonia, Polyneuropathy, Pulmonary emphysema (02/26/2020), Renal manifestation of secondary diabetes mellitus, 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  Increase in her pain to the bottoms of both feet.  Here for routine foot care as well/diabetic foot exam.     PCP: Jerri Griffiths MD    Date Last Seen by PCP: dr jerri griffiths05/21/2019    Current shoe gear: Tennis shoes    Hemoglobin A1C   Date Value Ref Range Status   11/11/2023 6.4 (H) 4.0 - 5.6 % Final     Comment:     ADA Screening Guidelines:  5.7-6.4%  Consistent with prediabetes  >or=6.5%  Consistent with diabetes    High levels of fetal hemoglobin interfere with the HbA1C  assay. Heterozygous hemoglobin variants (HbS, HgC, etc)do  not significantly interfere with this assay.   However, presence of multiple variants may affect accuracy.     05/10/2023 6.3 (H) 4.0 - 5.6 % Final     Comment:     ADA Screening Guidelines:  5.7-6.4%  Consistent with prediabetes  >or=6.5%  Consistent with diabetes    High  levels of fetal hemoglobin interfere with the HbA1C  assay. Heterozygous hemoglobin variants (HbS, HgC, etc)do  not significantly interfere with this assay.   However, presence of multiple variants may affect accuracy.     09/02/2022 7.0 (H) 4.0 - 5.6 % Final     Comment:     ADA Screening Guidelines:  5.7-6.4%  Consistent with prediabetes  >or=6.5%  Consistent with diabetes    High levels of fetal hemoglobin interfere with the HbA1C  assay. Heterozygous hemoglobin variants (HbS, HgC, etc)do  not significantly interfere with this assay.   However, presence of multiple variants may affect accuracy.             Review of Systems   Constitutional: Negative for chills, fever and malaise/fatigue.   HENT:  Negative for hearing loss.    Cardiovascular:  Negative for claudication.   Respiratory:  Negative for shortness of breath.    Skin:  Negative for dry skin, flushing and rash.   Musculoskeletal:  Negative for joint pain and myalgias.   Neurological:  Negative for loss of balance, numbness, paresthesias and sensory change.   Psychiatric/Behavioral:  Negative for altered mental status.            Objective:      Physical Exam  Vitals reviewed.   Constitutional:       Appearance: He is well-developed.   HENT:      Head: Normocephalic and atraumatic.   Cardiovascular:      Pulses:           Dorsalis pedis pulses are 2+ on the right side and 2+ on the left side.        Posterior tibial pulses are 2+ on the right side and 2+ on the left side.      Comments: No edema noted to b/L LEs  Pulmonary:      Effort: Pulmonary effort is normal.   Musculoskeletal:         General: Normal range of motion.      Comments: Adequate joint ROM noted to all lower extremity muscle groups with no pain or crepitation noted. Muscle strength is 5/5 in all groups bilaterally.     Feet:      Right foot:      Protective Sensation: 5 sites tested.  5 sites sensed.      Skin integrity: Callus and dry skin present.      Left foot:      Protective  Sensation: 5 sites tested.  5 sites sensed.      Skin integrity: Callus and dry skin present.   Skin:     General: Skin is warm and dry.      Capillary Refill: Capillary refill takes 2 to 3 seconds.      Coloration: Skin is pale.      Comments: Xerosis improved. No open lesion noted b/L  Skin temp is warm to warm from proximal to distal b/L.  Webspaces clean, dry, and intact  Nails x10 short   Neurological:      Mental Status: He is alert and oriented to person, place, and time.      Sensory: Sensory deficit present.      Motor: Atrophy present.      Deep Tendon Reflexes: Reflexes abnormal.      Reflex Scores:       Patellar reflexes are 1+ on the right side and 1+ on the left side.       Achilles reflexes are 1+ on the right side and 1+ on the left side.     Comments: Intact gross sensation noted to b/L LEs    There is pain on palpation of the bilateral 3rd  intermetatarsal space with a positive Nikkie's click. Minimal tenderness to palpation of the adjacent metatarsal heads.     Psychiatric:         Behavior: Behavior normal.               Assessment:       Encounter Diagnoses   Name Primary?    Diabetic polyneuropathy associated with type 2 diabetes mellitus Yes    Corn or callus     Tarsal tunnel syndrome of both lower extremities     Onychomycosis due to dermatophyte            Plan:       Walter was seen today for nail care, diabetes mellitus and callouses.    Diagnoses and all orders for this visit:    Diabetic polyneuropathy associated with type 2 diabetes mellitus    Corn or callus    Tarsal tunnel syndrome of both lower extremities    Onychomycosis due to dermatophyte        I counseled the patient on his conditions, their implications and medical management.    Decision making:  Chronic illnesses discussed in detail, previous records/notes and imaging independently reviewed, prescription drug management performed in addition to lengthy discussion regarding both conservative and surgical treatment  options.    Discussed options for peripheral neuropathy/nerve entrapment syndrome including nerve block therapy, surgical nerve entrapment decompression procedures, and various vitamins and supplementation available shown to improve nerve function.    Nerve injection:    Performed by:  Jonnathan Kenney DPM    Preop diagnosis:  Neuropathy symptoms/paresthesias/pain    The area overlying the bilateral posterior tibial nerve(s) was sterilely prepped, verbal consent was obtained, 2 cc's of 0.5% Marcaine plain was infiltrated around the affected nerve(s) for a diagnostic nerve block.  This was well tolerated with no complications.    Shoe inspection. Diabetic Foot Education. Patient reminded of the importance of good nutrition and blood sugar control to help prevent podiatric complications of diabetes. Patient instructed on proper foot hygeine. We discussed wearing proper shoe gear, daily foot inspections and Diabetic foot education in detail.      Routine Foot Care    Performed by:  Jonnathan Kenney. DPM  Authorized by:  Patient     Consent Done?:  Yes (Verbal)     Nail Care Type:  Debride  Location(s): All  (Left 1st Toe, Left 3rd Toe, Left 2nd Toe, Left 4th Toe, Left 5th Toe, Right 1st Toe, Right 2nd Toe, Right 3rd Toe, Right 4th Toe and Right 5th Toe)  Patient tolerance:  Patient tolerated the procedure well with no immediate complications     With patient's permission, the toenails mentioned above were aggressively reduced and debrided using a nail nipper, removing all offending nail and debris. The patient will continue to monitor the areas daily, inspect the feet, wear protective shoe gear when ambulatory, and moisturizer to maintain skin integrity.      Callus Care Type: Debride    With patient's permission, the calluses/hyperkeratotic lesions mentioned above were aggressively reduced and debrided using a number 15 blade. The patient will continue to monitor the areas daily, inspect the feet, wear protective shoe  gear when ambulatory, and moisturizer to maintain skin integrity.     Shoe inspection. Diabetic Foot Education. Patient reminded of the importance of good nutrition and blood sugar control to help prevent podiatric complications of diabetes. Patient instructed on proper foot hygeine. We discussed wearing proper shoe gear, daily foot inspections and Diabetic foot education in detail.    Return to clinic in 3-6 months or sooner if problems arise

## 2024-04-25 RX ORDER — FUROSEMIDE 20 MG/1
20 TABLET ORAL
Qty: 90 TABLET | Refills: 1 | Status: SHIPPED | OUTPATIENT
Start: 2024-04-25

## 2024-04-25 RX ORDER — ROSUVASTATIN CALCIUM 40 MG/1
40 TABLET, COATED ORAL
Qty: 90 TABLET | Refills: 1 | Status: SHIPPED | OUTPATIENT
Start: 2024-04-25

## 2024-04-25 NOTE — TELEPHONE ENCOUNTER
Refill Decision Note   Walter Bull  is requesting a refill authorization.  Brief Assessment and Rationale for Refill:  Approve     Medication Therapy Plan:         Pharmacist review requested: Yes   Extended chart review required: Yes   Comments:     Note composed:12:46 PM 04/25/2024

## 2024-04-25 NOTE — TELEPHONE ENCOUNTER
Refill Routing Note   Medication(s) are not appropriate for processing by [facility] for the following reason(s):        Drug-disease interaction  Drug-Disease: furosemide and Type 2 diabetes mellitus with diabetic nephropathy, with long-term current use of insulin    [facility] action(s):  Defer  Approve             Pharmacist review requested: Yes     Appointments  past 12m or future 3m with PCP    Date Provider   Last Visit   11/17/2023 Belkys Kruger MD   Next Visit   5/17/2024 Belkys Kruger MD   ED visits in past 90 days: 0        Note composed:8:18 AM 04/25/2024

## 2024-04-25 NOTE — TELEPHONE ENCOUNTER
No care due was identified.  [facility] Embedded Care Due Messages. Reference number: [id number].   4/24/2024 9:21:43 PM CDT

## 2024-05-10 ENCOUNTER — PATIENT MESSAGE (OUTPATIENT)
Dept: INTERNAL MEDICINE | Facility: CLINIC | Age: 74
End: 2024-05-10
Payer: MEDICARE

## 2024-05-13 ENCOUNTER — LAB VISIT (OUTPATIENT)
Dept: LAB | Facility: HOSPITAL | Age: 74
End: 2024-05-13
Attending: INTERNAL MEDICINE
Payer: MEDICARE

## 2024-05-13 DIAGNOSIS — E11.21 TYPE 2 DIABETES MELLITUS WITH DIABETIC NEPHROPATHY, WITH LONG-TERM CURRENT USE OF INSULIN: ICD-10-CM

## 2024-05-13 DIAGNOSIS — Z79.4 TYPE 2 DIABETES MELLITUS WITH DIABETIC NEPHROPATHY, WITH LONG-TERM CURRENT USE OF INSULIN: ICD-10-CM

## 2024-05-13 DIAGNOSIS — I48.0 PAROXYSMAL ATRIAL FIBRILLATION: ICD-10-CM

## 2024-05-13 DIAGNOSIS — N18.32 TYPE 2 DIABETES MELLITUS WITH STAGE 3B CHRONIC KIDNEY DISEASE, WITH LONG-TERM CURRENT USE OF INSULIN: ICD-10-CM

## 2024-05-13 DIAGNOSIS — I50.42 CHRONIC COMBINED SYSTOLIC AND DIASTOLIC HEART FAILURE: Chronic | ICD-10-CM

## 2024-05-13 DIAGNOSIS — I70.0 AORTIC ATHEROSCLEROSIS: ICD-10-CM

## 2024-05-13 DIAGNOSIS — Z79.4 TYPE 2 DIABETES MELLITUS WITH STAGE 3B CHRONIC KIDNEY DISEASE, WITH LONG-TERM CURRENT USE OF INSULIN: ICD-10-CM

## 2024-05-13 DIAGNOSIS — E11.22 TYPE 2 DIABETES MELLITUS WITH STAGE 3B CHRONIC KIDNEY DISEASE, WITH LONG-TERM CURRENT USE OF INSULIN: ICD-10-CM

## 2024-05-13 DIAGNOSIS — I10 BENIGN ESSENTIAL HTN: Chronic | ICD-10-CM

## 2024-05-13 DIAGNOSIS — E78.2 MIXED HYPERLIPIDEMIA: Chronic | ICD-10-CM

## 2024-05-13 DIAGNOSIS — Z79.4 TYPE 2 DIABETES MELLITUS WITH OTHER OPHTHALMIC COMPLICATION, WITH LONG-TERM CURRENT USE OF INSULIN: ICD-10-CM

## 2024-05-13 DIAGNOSIS — I25.118 CORONARY ARTERY DISEASE OF NATIVE ARTERY OF NATIVE HEART WITH STABLE ANGINA PECTORIS: Chronic | ICD-10-CM

## 2024-05-13 DIAGNOSIS — E66.01 SEVERE OBESITY (BMI 35.0-39.9) WITH COMORBIDITY: ICD-10-CM

## 2024-05-13 DIAGNOSIS — E11.39 TYPE 2 DIABETES MELLITUS WITH OTHER OPHTHALMIC COMPLICATION, WITH LONG-TERM CURRENT USE OF INSULIN: ICD-10-CM

## 2024-05-13 LAB
ALBUMIN SERPL BCP-MCNC: 3.3 G/DL (ref 3.5–5.2)
ALBUMIN/CREAT UR: 4 UG/MG (ref 0–30)
ALP SERPL-CCNC: 65 U/L (ref 55–135)
ALT SERPL W/O P-5'-P-CCNC: 24 U/L (ref 10–44)
ANION GAP SERPL CALC-SCNC: 6 MMOL/L (ref 8–16)
AST SERPL-CCNC: 21 U/L (ref 10–40)
BASOPHILS # BLD AUTO: 0.07 K/UL (ref 0–0.2)
BASOPHILS NFR BLD: 1.1 % (ref 0–1.9)
BILIRUB SERPL-MCNC: 0.4 MG/DL (ref 0.1–1)
BUN SERPL-MCNC: 9 MG/DL (ref 8–23)
CALCIUM SERPL-MCNC: 8.7 MG/DL (ref 8.7–10.5)
CHLORIDE SERPL-SCNC: 108 MMOL/L (ref 95–110)
CHOLEST SERPL-MCNC: 107 MG/DL (ref 120–199)
CHOLEST/HDLC SERPL: 2.1 {RATIO} (ref 2–5)
CO2 SERPL-SCNC: 29 MMOL/L (ref 23–29)
CREAT SERPL-MCNC: 1.3 MG/DL (ref 0.5–1.4)
CREAT UR-MCNC: 247.1 MG/DL (ref 23–375)
DIFFERENTIAL METHOD BLD: ABNORMAL
EOSINOPHIL # BLD AUTO: 0.5 K/UL (ref 0–0.5)
EOSINOPHIL NFR BLD: 8.2 % (ref 0–8)
ERYTHROCYTE [DISTWIDTH] IN BLOOD BY AUTOMATED COUNT: 14.6 % (ref 11.5–14.5)
EST. GFR  (NO RACE VARIABLE): 57 ML/MIN/1.73 M^2
ESTIMATED AVG GLUCOSE: 134 MG/DL (ref 68–131)
GLUCOSE SERPL-MCNC: 108 MG/DL (ref 70–110)
HBA1C MFR BLD: 6.3 % (ref 4–5.6)
HCT VFR BLD AUTO: 35.4 % (ref 40–54)
HDLC SERPL-MCNC: 50 MG/DL (ref 40–75)
HDLC SERPL: 46.7 % (ref 20–50)
HGB BLD-MCNC: 11.4 G/DL (ref 14–18)
IMM GRANULOCYTES # BLD AUTO: 0.01 K/UL (ref 0–0.04)
IMM GRANULOCYTES NFR BLD AUTO: 0.2 % (ref 0–0.5)
LDLC SERPL CALC-MCNC: 44.4 MG/DL (ref 63–159)
LYMPHOCYTES # BLD AUTO: 2.2 K/UL (ref 1–4.8)
LYMPHOCYTES NFR BLD: 33.7 % (ref 18–48)
MCH RBC QN AUTO: 30.2 PG (ref 27–31)
MCHC RBC AUTO-ENTMCNC: 32.2 G/DL (ref 32–36)
MCV RBC AUTO: 94 FL (ref 82–98)
MICROALBUMIN UR DL<=1MG/L-MCNC: 10 UG/ML
MONOCYTES # BLD AUTO: 0.7 K/UL (ref 0.3–1)
MONOCYTES NFR BLD: 11 % (ref 4–15)
NEUTROPHILS # BLD AUTO: 3 K/UL (ref 1.8–7.7)
NEUTROPHILS NFR BLD: 45.8 % (ref 38–73)
NONHDLC SERPL-MCNC: 57 MG/DL
NRBC BLD-RTO: 0 /100 WBC
PLATELET # BLD AUTO: 161 K/UL (ref 150–450)
PMV BLD AUTO: 10.4 FL (ref 9.2–12.9)
POTASSIUM SERPL-SCNC: 4.4 MMOL/L (ref 3.5–5.1)
PROT SERPL-MCNC: 6 G/DL (ref 6–8.4)
RBC # BLD AUTO: 3.78 M/UL (ref 4.6–6.2)
SODIUM SERPL-SCNC: 143 MMOL/L (ref 136–145)
TRIGL SERPL-MCNC: 63 MG/DL (ref 30–150)
TSH SERPL DL<=0.005 MIU/L-ACNC: 3.44 UIU/ML (ref 0.4–4)
WBC # BLD AUTO: 6.44 K/UL (ref 3.9–12.7)

## 2024-05-13 PROCEDURE — 80053 COMPREHEN METABOLIC PANEL: CPT | Performed by: INTERNAL MEDICINE

## 2024-05-13 PROCEDURE — 82043 UR ALBUMIN QUANTITATIVE: CPT | Performed by: INTERNAL MEDICINE

## 2024-05-13 PROCEDURE — 36415 COLL VENOUS BLD VENIPUNCTURE: CPT | Performed by: INTERNAL MEDICINE

## 2024-05-13 PROCEDURE — 80061 LIPID PANEL: CPT | Performed by: INTERNAL MEDICINE

## 2024-05-13 PROCEDURE — 84443 ASSAY THYROID STIM HORMONE: CPT | Performed by: INTERNAL MEDICINE

## 2024-05-13 PROCEDURE — 85025 COMPLETE CBC W/AUTO DIFF WBC: CPT | Performed by: INTERNAL MEDICINE

## 2024-05-13 PROCEDURE — 83036 HEMOGLOBIN GLYCOSYLATED A1C: CPT | Performed by: INTERNAL MEDICINE

## 2024-05-17 ENCOUNTER — OFFICE VISIT (OUTPATIENT)
Dept: INTERNAL MEDICINE | Facility: CLINIC | Age: 74
End: 2024-05-17
Payer: MEDICARE

## 2024-05-17 VITALS
BODY MASS INDEX: 34.71 KG/M2 | WEIGHT: 234.38 LBS | DIASTOLIC BLOOD PRESSURE: 70 MMHG | SYSTOLIC BLOOD PRESSURE: 120 MMHG | OXYGEN SATURATION: 99 % | HEIGHT: 69 IN | HEART RATE: 60 BPM | RESPIRATION RATE: 18 BRPM

## 2024-05-17 DIAGNOSIS — I50.42 CHRONIC COMBINED SYSTOLIC AND DIASTOLIC HEART FAILURE: ICD-10-CM

## 2024-05-17 DIAGNOSIS — S68.119S AMPUTATION, FINGER, TRAUMATIC, SEQUELA: ICD-10-CM

## 2024-05-17 DIAGNOSIS — N18.31 STAGE 3A CHRONIC KIDNEY DISEASE: ICD-10-CM

## 2024-05-17 DIAGNOSIS — E78.49 OTHER HYPERLIPIDEMIA: Chronic | ICD-10-CM

## 2024-05-17 DIAGNOSIS — R91.1 PULMONARY NODULE: ICD-10-CM

## 2024-05-17 DIAGNOSIS — E11.21 TYPE 2 DIABETES MELLITUS WITH DIABETIC NEPHROPATHY, WITH LONG-TERM CURRENT USE OF INSULIN: ICD-10-CM

## 2024-05-17 DIAGNOSIS — E66.09 CLASS 1 OBESITY DUE TO EXCESS CALORIES WITH SERIOUS COMORBIDITY AND BODY MASS INDEX (BMI) OF 34.0 TO 34.9 IN ADULT: ICD-10-CM

## 2024-05-17 DIAGNOSIS — Z12.5 PROSTATE CANCER SCREENING: ICD-10-CM

## 2024-05-17 DIAGNOSIS — Z79.4 TYPE 2 DIABETES MELLITUS WITH DIABETIC NEPHROPATHY, WITH LONG-TERM CURRENT USE OF INSULIN: ICD-10-CM

## 2024-05-17 DIAGNOSIS — J43.9 PULMONARY EMPHYSEMA, UNSPECIFIED EMPHYSEMA TYPE: ICD-10-CM

## 2024-05-17 DIAGNOSIS — I25.118 CORONARY ARTERY DISEASE OF NATIVE ARTERY OF NATIVE HEART WITH STABLE ANGINA PECTORIS: Chronic | ICD-10-CM

## 2024-05-17 DIAGNOSIS — I70.0 AORTIC ATHEROSCLEROSIS: ICD-10-CM

## 2024-05-17 DIAGNOSIS — I10 BENIGN ESSENTIAL HTN: Primary | Chronic | ICD-10-CM

## 2024-05-17 DIAGNOSIS — I48.0 PAROXYSMAL ATRIAL FIBRILLATION: ICD-10-CM

## 2024-05-17 PROBLEM — E66.811 CLASS 1 OBESITY DUE TO EXCESS CALORIES WITH SERIOUS COMORBIDITY AND BODY MASS INDEX (BMI) OF 34.0 TO 34.9 IN ADULT: Status: ACTIVE | Noted: 2023-05-10

## 2024-05-17 PROCEDURE — 99215 OFFICE O/P EST HI 40 MIN: CPT | Mod: S$GLB,,, | Performed by: INTERNAL MEDICINE

## 2024-05-17 PROCEDURE — 1101F PT FALLS ASSESS-DOCD LE1/YR: CPT | Mod: CPTII,S$GLB,, | Performed by: INTERNAL MEDICINE

## 2024-05-17 PROCEDURE — 3044F HG A1C LEVEL LT 7.0%: CPT | Mod: CPTII,S$GLB,, | Performed by: INTERNAL MEDICINE

## 2024-05-17 PROCEDURE — 3074F SYST BP LT 130 MM HG: CPT | Mod: CPTII,S$GLB,, | Performed by: INTERNAL MEDICINE

## 2024-05-17 PROCEDURE — 1126F AMNT PAIN NOTED NONE PRSNT: CPT | Mod: CPTII,S$GLB,, | Performed by: INTERNAL MEDICINE

## 2024-05-17 PROCEDURE — 3288F FALL RISK ASSESSMENT DOCD: CPT | Mod: CPTII,S$GLB,, | Performed by: INTERNAL MEDICINE

## 2024-05-17 PROCEDURE — 1160F RVW MEDS BY RX/DR IN RCRD: CPT | Mod: CPTII,S$GLB,, | Performed by: INTERNAL MEDICINE

## 2024-05-17 PROCEDURE — 3078F DIAST BP <80 MM HG: CPT | Mod: CPTII,S$GLB,, | Performed by: INTERNAL MEDICINE

## 2024-05-17 PROCEDURE — G2211 COMPLEX E/M VISIT ADD ON: HCPCS | Mod: S$GLB,,, | Performed by: INTERNAL MEDICINE

## 2024-05-17 PROCEDURE — 99999 PR PBB SHADOW E&M-EST. PATIENT-LVL III: CPT | Mod: PBBFAC,,, | Performed by: INTERNAL MEDICINE

## 2024-05-17 PROCEDURE — 1159F MED LIST DOCD IN RCRD: CPT | Mod: CPTII,S$GLB,, | Performed by: INTERNAL MEDICINE

## 2024-05-17 RX ORDER — CARVEDILOL 3.12 MG/1
3.12 TABLET ORAL 2 TIMES DAILY
Qty: 180 TABLET | Refills: 1 | Status: SHIPPED | OUTPATIENT
Start: 2024-05-17

## 2024-05-17 NOTE — Clinical Note
Good morning,  You were last prescriber on Coreg for our mutual patient. Just FYI I reduced his dose to 3.125mg BID as he was dizzy with HR 60s. He had already self reduced by not taking daily and was feeling much better. Just keeping you in loop in case he goes back into RVR--I gave instruction on when to call and HR/BP monitoring.  Have a great weekend!

## 2024-05-17 NOTE — PROGRESS NOTES
"Subjective:       Patient ID: Walter Bull is a 73 y.o. male.    Chief Complaint: Follow-up      HPI:  Patient is known to me and presents for follow up CAD, systolic CHF, DM type 2, HLD. Labs from 5/13/24 personally reviewed, interpreted and discussed with the patient today.      A1C  6.3%, at goal  LDL 44, normal  GFR 57, improving  Microalb 5/2024- normal  H/H 11.4/35.4, slight trend down but overall stable for last 1 year; MCV normal     CAD s/p 5v CABG and NSTEMI 9/12/18: following with cardiology. Now on Ranexa (indur was stopped), brilinta, ASA, crestor, losartan and Coreg. Also reports h/o CHF (last known EF 25%+ diastolic dysfunction), on lasix 20mg once a day and aldactone 25mg daily. Denies SOB, GREENWOOD and orthopnea. S/p ICD placement.       Dm type 2: on mounjaro, lispro via VGP per endocrinology. Has DEXCOM  A1C <7%.  He does report numbness and tingling of left foot > right foot and b/l fingers; sx have been present for several years. Not on medicine for neuropathy as he declines treatment. Denies polyuria, polydipsia    Eye exam: 11/2023  Microalb: 5/2024  On ARB and statin        HLD: on repatha, crestor, has been taking for several years. Denies side effects.      HTN: on coreg, losartan. BP is well controlled. Denies headaches, vision changes.     A-fib:  Cards notes Jan 2023: "Pt with newly found afib on AICD interrogation. Has been intermittently experiencing palpitations post device check. Will start apixiban 5x2 for CVA prophylaxis" Now on Eliquis and ASA/Plavix stopped due to taking NOAC. On Coreg for rate control.      GERD: On Nexium daily. H/o H. Pylori on EGD (2018) with gastric erosions. Has had intermittent nausea and vomiting which is chronic but reports worsening and having to take TUMS OTC for sx control. No constipation.      BPH: on flomax and finasteride and working well.  No medication side effects. S/p prostate bx 5/13/19 with DR. Chaudhry, no malignany. Last PSA normal.   " "  Tobacco use: quit 1981; previously smoked 3 PPD for 20 years  EtOH: stopped drink 1982; was a daily drink 2 fifth liquor daily  Illicit drug: denies        Pulmonary nodules 3/14/23: "Bilateral solid pulmonary nodules measuring up to 5 mm, for example the nodule in the apical segment of the right upper lobe (series 4, image 88).  For multiple solid nodules all <6 mm, Fleischner Society 2017 guidelines recommend no routine follow up for a low risk patient, or follow up with non-contrast chest CT at 12 months after discovery in a high risk patient."  Repeat CT chest 3/2024- ordered    Past Medical History:   Diagnosis Date    Anemia     Angina pectoris     Anticoagulant long-term use     Arthritis     Back pain     CHF (congestive heart failure)     Chronic bronchitis     Colon cancer screening 02/02/2017    Coronary artery disease     Diabetes mellitus type I     Diabetes with neurologic complications     Disorder of kidney and ureter     Encounter for blood transfusion     Glaucoma     Heart attack     09/2018    Heart disease     Hyperlipidemia     Hypertension     Iron deficiency     Kidney failure     post CABG    Obesity     Paroxysmal atrial fibrillation 01/12/2023    Pneumonia     Polyneuropathy     Pulmonary emphysema 02/26/2020    Renal manifestation of secondary diabetes mellitus     S/P CABG x 5 01/11/2017    Sleep apnea     had CPAP but had recall- not currently using     Trouble in sleeping     Type 2 diabetes mellitus with ophthalmic manifestations     Urinary incontinence        Family History   Problem Relation Name Age of Onset    Diabetes Mother      Heart disease Mother      Hypertension Sister      Diabetes Sister      Heart disease Sister      Heart attack Brother      Colon cancer Neg Hx      Esophageal cancer Neg Hx      Stomach cancer Neg Hx         Social History     Socioeconomic History    Marital status:     Number of children: 5   Tobacco Use    Smoking status: Former     " Current packs/day: 0.00     Average packs/day: 3.0 packs/day for 18.0 years (53.9 ttl pk-yrs)     Types: Cigarettes     Start date: 1963     Quit date: 1981     Years since quittin.4     Passive exposure: Past    Smokeless tobacco: Former     Types: Snuff, Chew     Quit date:    Substance and Sexual Activity    Alcohol use: No    Drug use: No    Sexual activity: Yes     Comment: -with a significant other     Social Determinants of Health     Financial Resource Strain: Low Risk  (2023)    Overall Financial Resource Strain (CARDIA)     Difficulty of Paying Living Expenses: Not hard at all   Food Insecurity: No Food Insecurity (2023)    Hunger Vital Sign     Worried About Running Out of Food in the Last Year: Never true     Ran Out of Food in the Last Year: Never true   Transportation Needs: No Transportation Needs (2023)    PRAPARE - Transportation     Lack of Transportation (Medical): No     Lack of Transportation (Non-Medical): No   Physical Activity: Sufficiently Active (2023)    Exercise Vital Sign     Days of Exercise per Week: 3 days     Minutes of Exercise per Session: 60 min   Stress: Stress Concern Present (2023)    [employer] - Occupational Stress Questionnaire     Feeling of Stress : To some extent   Housing Stability: Low Risk  (2023)    Housing Stability Vital Sign     Unable to Pay for Housing in the Last Year: No     Number of Places Lived in the Last Year: 1     Unstable Housing in the Last Year: No       Review of Systems   Constitutional:  Negative for activity change, fatigue, fever and unexpected weight change.   HENT:  Negative for congestion, ear pain, hearing loss, rhinorrhea and sore throat.    Eyes:  Negative for redness and visual disturbance.   Respiratory:  Negative for cough, shortness of breath and wheezing.    Cardiovascular:  Negative for chest pain, palpitations and leg swelling.    Gastrointestinal:  Negative for abdominal pain, constipation, diarrhea, nausea and vomiting.   Genitourinary:  Negative for decreased urine volume, dysuria, frequency and urgency.   Musculoskeletal:  Negative for back pain, joint swelling and neck pain.   Skin:  Negative for color change, rash and wound.   Neurological:  Positive for dizziness. Negative for tremors, weakness, light-headedness and headaches.         Objective:      Physical Exam  Vitals reviewed.   Constitutional:       General: He is not in acute distress.     Appearance: He is well-developed.   HENT:      Head: Normocephalic and atraumatic.      Right Ear: External ear normal.      Left Ear: External ear normal.   Eyes:      General:         Right eye: No discharge.         Left eye: No discharge.      Conjunctiva/sclera: Conjunctivae normal.   Neck:      Thyroid: No thyromegaly.   Cardiovascular:      Rate and Rhythm: Normal rate and regular rhythm.      Heart sounds: No murmur heard.  Pulmonary:      Effort: Pulmonary effort is normal. No respiratory distress.      Breath sounds: Normal breath sounds. No wheezing or rales.   Abdominal:      General: There is no distension.      Palpations: Abdomen is soft.      Tenderness: There is no abdominal tenderness.   Skin:     General: Skin is warm and dry.   Neurological:      Mental Status: He is alert and oriented to person, place, and time.   Psychiatric:         Behavior: Behavior normal.         Thought Content: Thought content normal.         Assessment:       1. Benign essential HTN    2. Other hyperlipidemia    3. Coronary artery disease of native artery of native heart with stable angina pectoris    4. Stage 3a chronic kidney disease    5. Type 2 diabetes mellitus with diabetic nephropathy, with long-term current use of insulin    6. Class 1 obesity due to excess calories with serious comorbidity and body mass index (BMI) of 34.0 to 34.9 in adult    7. Pulmonary emphysema, unspecified  emphysema type    8. Chronic combined systolic and diastolic heart failure    9. Paroxysmal atrial fibrillation    10. Aortic atherosclerosis    11. Amputation, finger, traumatic, sequela    12. Prostate cancer screening    13. Pulmonary nodule        Plan:       1. Benign essential HTN  Chronic stable  However HR 60s with dizziness--he self reduced BB to every other day and dizziness resolved  Will trial lower dose of 3.126 (6.25 prior) BID and see how he feels. Sending message to cardiology as well so they are aware  Continue other medications at same dose  Low Na diet  Exercise, weight loss  Check BP and keep log for next visit    -     carvediloL (COREG) 3.125 MG tablet; Take 1 tablet (3.125 mg total) by mouth 2 (two) times daily.  Dispense: 180 tablet; Refill: 1  -     CBC Auto Differential; Future; Expected date: 11/13/2024  -     Comprehensive Metabolic Panel; Future; Expected date: 11/13/2024  -     Lipid Panel; Future; Expected date: 11/13/2024  -     Hemoglobin A1C; Future; Expected date: 11/13/2024    2. Other hyperlipidemia  Chronic controlled  Cont meds same dose  -     CBC Auto Differential; Future; Expected date: 11/13/2024  -     Comprehensive Metabolic Panel; Future; Expected date: 11/13/2024  -     Lipid Panel; Future; Expected date: 11/13/2024  -     Hemoglobin A1C; Future; Expected date: 11/13/2024    3. Coronary artery disease of native artery of native heart with stable angina pectoris  Chronic controlled  Cont statin  Denies angina sx  -     CBC Auto Differential; Future; Expected date: 11/13/2024  -     Comprehensive Metabolic Panel; Future; Expected date: 11/13/2024  -     Lipid Panel; Future; Expected date: 11/13/2024  -     Hemoglobin A1C; Future; Expected date: 11/13/2024    4. Stage 3a chronic kidney disease  Chronic improving  GFR slowly rising  Stay hdyrated  Follow labs  Avoid nephrotoxic agents  -     CBC Auto Differential; Future; Expected date: 11/13/2024  -     Comprehensive  Metabolic Panel; Future; Expected date: 11/13/2024  -     Lipid Panel; Future; Expected date: 11/13/2024  -     Hemoglobin A1C; Future; Expected date: 11/13/2024    5. Type 2 diabetes mellitus with diabetic nephropathy, with long-term current use of insulin  Chronic stable  Cont meds same dose  ADA diet  Check sugars and keep log  -     CBC Auto Differential; Future; Expected date: 11/13/2024  -     Comprehensive Metabolic Panel; Future; Expected date: 11/13/2024  -     Lipid Panel; Future; Expected date: 11/13/2024  -     Hemoglobin A1C; Future; Expected date: 11/13/2024    6. Class 1 obesity due to excess calories with serious comorbidity and body mass index (BMI) of 34.0 to 34.9 in adult  Chronic stable  Diet, exercise, weight loss  -     CBC Auto Differential; Future; Expected date: 11/13/2024  -     Comprehensive Metabolic Panel; Future; Expected date: 11/13/2024  -     Lipid Panel; Future; Expected date: 11/13/2024  -     Hemoglobin A1C; Future; Expected date: 11/13/2024    7. Pulmonary emphysema, unspecified emphysema type  Chronic stable  Denies SOB/GREENWOOD  No cough  No signs of acute exacerbation today  Overview:  Report on chest x-ray.  Patient does have history of significant cigarette smoking.  Quit in 1981.  Experiences chronic mm RC 3 symptoms of dyspnea  Denies chronic cough  Does report history of childhood asthma.        8. Chronic combined systolic and diastolic heart failure  Chronic stable  No signs of volume overload  Cont ARB, BB  Cont diuretics  Follow up cards as planned  -     CBC Auto Differential; Future; Expected date: 11/13/2024  -     Comprehensive Metabolic Panel; Future; Expected date: 11/13/2024  -     Lipid Panel; Future; Expected date: 11/13/2024  -     Hemoglobin A1C; Future; Expected date: 11/13/2024    9. Paroxysmal atrial fibrillation  Chronic stable  REDUCED coreg to 3.125mg due to dizziness (he self adjsuted dosing and felt much better already)  Cont eliquis  Let me or  cardiology know if palpitations, chest pains, fast HR  -     CBC Auto Differential; Future; Expected date: 11/13/2024  -     Comprehensive Metabolic Panel; Future; Expected date: 11/13/2024  -     Lipid Panel; Future; Expected date: 11/13/2024  -     Hemoglobin A1C; Future; Expected date: 11/13/2024    10. Aortic atherosclerosis  Chronic stable  Cont statin  -     CBC Auto Differential; Future; Expected date: 11/13/2024  -     Comprehensive Metabolic Panel; Future; Expected date: 11/13/2024  -     Lipid Panel; Future; Expected date: 11/13/2024  -     Hemoglobin A1C; Future; Expected date: 11/13/2024    11. Amputation, finger, traumatic, sequela  History noted  No acute issues today  Overview:  2000- healed well      12. Prostate cancer screening  Per orders, next visit screening  -     PSA, Screening; Future; Expected date: 11/13/2024    13. Pulmonary nodule  Chronic stable  Due to update yearly CT. If normal no further screening needed  -     CT Chest With Contrast; Future; Expected date: 05/17/2024       RTC 6 months with labs and PRN

## 2024-06-06 DIAGNOSIS — I25.118 CORONARY ARTERY DISEASE OF NATIVE ARTERY OF NATIVE HEART WITH STABLE ANGINA PECTORIS: ICD-10-CM

## 2024-06-07 RX ORDER — RANOLAZINE 1000 MG/1
TABLET, EXTENDED RELEASE ORAL
Qty: 60 TABLET | Refills: 11 | Status: SHIPPED | OUTPATIENT
Start: 2024-06-07

## 2024-07-03 ENCOUNTER — PATIENT MESSAGE (OUTPATIENT)
Dept: INTERNAL MEDICINE | Facility: CLINIC | Age: 74
End: 2024-07-03
Payer: MEDICARE

## 2024-07-03 DIAGNOSIS — E78.5 HYPERLIPIDEMIA, UNSPECIFIED HYPERLIPIDEMIA TYPE: ICD-10-CM

## 2024-07-03 DIAGNOSIS — N40.1 BPH WITH URINARY OBSTRUCTION: ICD-10-CM

## 2024-07-03 DIAGNOSIS — E11.42 DIABETIC POLYNEUROPATHY ASSOCIATED WITH TYPE 2 DIABETES MELLITUS: ICD-10-CM

## 2024-07-03 DIAGNOSIS — K21.9 GASTROESOPHAGEAL REFLUX DISEASE WITHOUT ESOPHAGITIS: ICD-10-CM

## 2024-07-03 DIAGNOSIS — E11.69 DIABETES MELLITUS TYPE 2 IN OBESE: Chronic | ICD-10-CM

## 2024-07-03 DIAGNOSIS — I10 BENIGN ESSENTIAL HTN: Chronic | ICD-10-CM

## 2024-07-03 DIAGNOSIS — I25.118 CORONARY ARTERY DISEASE OF NATIVE ARTERY OF NATIVE HEART WITH STABLE ANGINA PECTORIS: ICD-10-CM

## 2024-07-03 DIAGNOSIS — N13.8 BPH WITH URINARY OBSTRUCTION: ICD-10-CM

## 2024-07-03 DIAGNOSIS — E66.9 DIABETES MELLITUS TYPE 2 IN OBESE: Chronic | ICD-10-CM

## 2024-07-03 RX ORDER — FUROSEMIDE 20 MG/1
20 TABLET ORAL DAILY
Qty: 90 TABLET | Refills: 1 | Status: SHIPPED | OUTPATIENT
Start: 2024-07-03

## 2024-07-03 RX ORDER — RANOLAZINE 1000 MG/1
1000 TABLET, EXTENDED RELEASE ORAL 2 TIMES DAILY
Qty: 180 TABLET | Refills: 3 | Status: SHIPPED | OUTPATIENT
Start: 2024-07-03

## 2024-07-03 RX ORDER — ROSUVASTATIN CALCIUM 40 MG/1
40 TABLET, COATED ORAL DAILY
Qty: 90 TABLET | Refills: 1 | Status: SHIPPED | OUTPATIENT
Start: 2024-07-03

## 2024-07-03 RX ORDER — TAMSULOSIN HYDROCHLORIDE 0.4 MG/1
1 CAPSULE ORAL DAILY
Qty: 90 CAPSULE | Refills: 3 | Status: SHIPPED | OUTPATIENT
Start: 2024-07-03 | End: 2025-07-03

## 2024-07-03 RX ORDER — FAMOTIDINE 40 MG/1
40 TABLET, FILM COATED ORAL DAILY
Qty: 90 TABLET | Refills: 3 | Status: SHIPPED | OUTPATIENT
Start: 2024-07-03 | End: 2025-07-03

## 2024-07-03 RX ORDER — INSULIN LISPRO 100 [IU]/ML
INJECTION, SOLUTION INTRAVENOUS; SUBCUTANEOUS
Qty: 20 ML | Refills: 11 | Status: SHIPPED | OUTPATIENT
Start: 2024-07-03

## 2024-07-03 RX ORDER — ESOMEPRAZOLE MAGNESIUM 40 MG/1
40 CAPSULE, DELAYED RELEASE ORAL 2 TIMES DAILY
Qty: 180 CAPSULE | Refills: 3 | Status: SHIPPED | OUTPATIENT
Start: 2024-07-03

## 2024-07-03 RX ORDER — CARVEDILOL 3.12 MG/1
3.12 TABLET ORAL 2 TIMES DAILY
Qty: 180 TABLET | Refills: 1 | Status: SHIPPED | OUTPATIENT
Start: 2024-07-03

## 2024-07-03 NOTE — TELEPHONE ENCOUNTER
No care due was identified.  [facility] Embedded Care Due Messages. Reference number: [id number].   7/03/2024 3:04:05 PM CDT

## 2024-07-04 ENCOUNTER — OFFICE VISIT (OUTPATIENT)
Dept: URGENT CARE | Facility: CLINIC | Age: 74
End: 2024-07-04
Payer: MEDICARE

## 2024-07-04 ENCOUNTER — HOSPITAL ENCOUNTER (EMERGENCY)
Facility: HOSPITAL | Age: 74
Discharge: HOME | End: 2024-07-04
Attending: EMERGENCY MEDICINE
Payer: MEDICARE

## 2024-07-04 VITALS
OXYGEN SATURATION: 99 % | HEIGHT: 69 IN | HEART RATE: 60 BPM | RESPIRATION RATE: 20 BRPM | TEMPERATURE: 98 F | DIASTOLIC BLOOD PRESSURE: 74 MMHG | SYSTOLIC BLOOD PRESSURE: 168 MMHG | WEIGHT: 224.31 LBS | BODY MASS INDEX: 33.22 KG/M2

## 2024-07-04 VITALS
HEIGHT: 69 IN | SYSTOLIC BLOOD PRESSURE: 112 MMHG | BODY MASS INDEX: 35.84 KG/M2 | TEMPERATURE: 98 F | WEIGHT: 242 LBS | HEART RATE: 60 BPM | RESPIRATION RATE: 18 BRPM | DIASTOLIC BLOOD PRESSURE: 76 MMHG | OXYGEN SATURATION: 98 %

## 2024-07-04 DIAGNOSIS — M79.89 LEG SWELLING: Primary | ICD-10-CM

## 2024-07-04 DIAGNOSIS — L81.9 DISCOLORATION OF SKIN OF LOWER LEG: Primary | ICD-10-CM

## 2024-07-04 DIAGNOSIS — M79.89 LEFT LEG SWELLING: ICD-10-CM

## 2024-07-04 PROCEDURE — 99284 EMERGENCY DEPT VISIT MOD MDM: CPT | Mod: 25

## 2024-07-04 PROCEDURE — 99215 OFFICE O/P EST HI 40 MIN: CPT | Mod: S$GLB,,, | Performed by: FAMILY MEDICINE

## 2024-07-04 NOTE — ED PROVIDER NOTES
Encounter Date: 7/4/2024       History     Chief Complaint   Patient presents with    Leg Swelling     HPI    Patient is a 73y AAM hx CHF, CAD on Eliquis presenting with discoloration to the left knee for the past 3 days.  Symptoms started one week ago.  No trauma  Review of patient's allergies indicates:  No Known Allergies  Past Medical History:   Diagnosis Date    Anemia     Angina pectoris     Anticoagulant long-term use     Arthritis     Back pain     CHF (congestive heart failure)     Chronic bronchitis     Colon cancer screening 02/02/2017    Coronary artery disease     Diabetes mellitus type I     Diabetes with neurologic complications     Disorder of kidney and ureter     Encounter for blood transfusion     Glaucoma     Heart attack     09/2018    Heart disease     Hyperlipidemia     Hypertension     Iron deficiency     Kidney failure     post CABG    Obesity     Paroxysmal atrial fibrillation 01/12/2023    Pneumonia     Polyneuropathy     Pulmonary emphysema 02/26/2020    Renal manifestation of secondary diabetes mellitus     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 ENDO ([address] [place] [address]);  Service: Endoscopy;  Laterality: N/A;  ICD-  fully vaccinated  Yes he can hold Plavix 5 days prior to endoscopies and restart post procedure when safe from a operator perspective per Dr.A. Canada    CARDIAC DEFIBRILLATOR PLACEMENT      CARDIAC ELECTROPHYSIOLOGY STUDY N/A 11/19/2018    Procedure: CARDIAC ELECTROPHYSIOLOGY STUDY;  Surgeon: Byron Glasgow MD;  Location: [facility]  EP LAB;  Service: Cardiology;  Laterality: N/A;  VT, EPS +/- ICD, SJM, anes, GP, 6086    CARDIAC ELECTROPHYSIOLOGY STUDY N/A 11/19/2018    Procedure: CARDIAC ELECTROPHYSIOLOGY STUDY;  Surgeon: Byron Glasgow MD;  Location: [facility] EP LAB;  Service: Cardiology;  Laterality: N/A;    COLON SURGERY  2006    COLONOSCOPY N/A 2/2/2017    Procedure: COLONOSCOPY;  Surgeon: Ronnie Conway MD;  Location: [facility] ENDO;  Service: Endoscopy;  Laterality: N/A;    COLONOSCOPY N/A 4/25/2022    Procedure: COLONOSCOPY;  Surgeon: Branden Bush MD;  Location: [facility] ENDO (2ND FLR);  Service: Endoscopy;  Laterality: N/A;    CORONARY ARTERY BYPASS GRAFT  2005    5 arteries    CORONARY BYPASS GRAFT ANGIOGRAPHY  11/16/2018    Procedure: Bypass graft study;  Surgeon: Ryan Schmidt MD;  Location: [facility] CATH LAB;  Service: Cardiology;;    ESOPHAGEAL MANOMETRY WITH MEASUREMENT OF IMPEDANCE N/A 3/10/2022    Procedure: MANOMETRY, ESOPHAGUS, WITH IMPEDANCE MEASUREMENT;  Surgeon: Carmelita Jacobsen MD;  Location: [facility] (4TH FLR);  Service: Endoscopy;  Laterality: N/A;  RAPID COVID test, pt to arrive for 6:00 am-Kpvt    ESOPHAGOGASTRODUODENOSCOPY N/A 4/25/2022    Procedure: EGD (ESOPHAGOGASTRODUODENOSCOPY);  Surgeon: Branden Bush MD;  Location: [facility] (2ND FLR);  Service: Endoscopy;  Laterality: N/A;  Bravo procedure canceled- pt has an ICD ([facility])- will do 24h pH study instead  ok to hold Brilinta and Plavix-RB  most recent ECHO 4/2022- EF 25%    EYE SURGERY Bilateral 2016    Laser surgery for glaucoma    INSERTION OF BIVENTRICULAR IMPLANTABLE CARDIOVERTER-DEFIBRILLATOR (ICD) Left 4/7/2022    Procedure: INSERTION, ICD, BIVENTRICULAR;  Surgeon: Byron Glasgow MD;  Location: [facility] EP LAB;  Service: Cardiology;  Laterality: Left;  HF/ICM, Venogram prior to opening, CRT-D upgrade, SJM, MAC, GP, 3 PREP    INSERTION OF IMPLANTABLE CARDIOVERTER-DEFIBRILLATOR (ICD) GENERATOR WITH TWO EXISTING LEADS Left 11/19/2018    Procedure: INSERTION, DUAL  ICD;  Surgeon: Byron Glasgow MD;  Location: [facility] EP LAB;  Service: Cardiology;  Laterality: Left;  VT, EPS +/- ICD, SJM, anes, GP, 6086    LEFT HEART CATHETERIZATION Left 2018    Procedure: Left heart cath;  Surgeon: Ryan Schmidt MD;  Location: [facility] CATH LAB;  Service: Cardiology;  Laterality: Left;     Family History   Problem Relation Name Age of Onset    Diabetes Mother      Heart disease Mother      Hypertension Sister      Diabetes Sister      Heart disease Sister      Heart attack Brother      Colon cancer Neg Hx      Esophageal cancer Neg Hx      Stomach cancer Neg Hx       Social History     Tobacco Use    Smoking status: Former     Current packs/day: 0.00     Average packs/day: 3.0 packs/day for 18.0 years (53.9 ttl pk-yrs)     Types: Cigarettes     Start date: 1963     Quit date: 1981     Years since quittin.5     Passive exposure: Past    Smokeless tobacco: Former     Types: Snuff, Chew     Quit date:    Substance Use Topics    Alcohol use: No    Drug use: No     Review of Systems   Constitutional:  Negative for chills and fever.   Cardiovascular:  Negative for chest pain.   Gastrointestinal:  Negative for abdominal pain.   Musculoskeletal:  Negative for back pain.   Skin:  Positive for color change. Negative for wound.   All other systems reviewed and are negative.    Social Determinants of Health     Tobacco Use: Medium Risk (2024)    Patient History     Smoking Tobacco Use: Former     Smokeless Tobacco Use: Former     Passive Exposure: Past   Alcohol Use: Not At Risk (2023)    AUDIT-C     Frequency of Alcohol Consumption: Never     Average Number of Drinks: Patient does not drink     Frequency of Binge Drinking: Never   Financial Resource Strain: Low Risk  (2023)    Overall Financial Resource Strain (CARDIA)     Difficulty of Paying Living Expenses: Not hard at all   Food Insecurity: No Food Insecurity (2023)    Hunger Vital Sign     Worried About  Running Out of Food in the Last Year: Never true     Ran Out of Food in the Last Year: Never true   Transportation Needs: No Transportation Needs (12/11/2023)    PRAPARE - Transportation     Lack of Transportation (Medical): No     Lack of Transportation (Non-Medical): No   Physical Activity: Sufficiently Active (12/11/2023)    Exercise Vital Sign     Days of Exercise per Week: 3 days     Minutes of Exercise per Session: 60 min   Stress: Stress Concern Present (12/11/2023)    [employer] [place] [employer] - Occupational Stress Questionnaire     Feeling of Stress : To some extent   Housing Stability: Low Risk  (12/11/2023)    Housing Stability Vital Sign     Unable to Pay for Housing in the Last Year: No     Number of Places Lived in the Last Year: 1     Unstable Housing in the Last Year: No   Depression: Low Risk  (5/17/2024)    Depression     Last PHQ-4: Flowsheet Data: 0   Utilities: Not on file   Health Literacy: Not on file   Social Isolation: Not on file       Physical Exam     Initial Vitals [07/04/24 1651]   BP Pulse Resp Temp SpO2   (!) 177/77 60 20 97.8 °F (36.6 °C) 99 %      MAP       --         Physical Exam    Nursing note and vitals reviewed.  Constitutional: He appears well-developed and well-nourished.   HENT:   Head: Normocephalic and atraumatic.   Mouth/Throat: Oropharynx is clear and moist.   Eyes: EOM are normal. Pupils are equal, round, and reactive to light.   Neck: No JVD present.   Normal range of motion.  Cardiovascular:  Normal rate and intact distal pulses.           Pulmonary/Chest: Breath sounds normal.   Abdominal: Abdomen is soft.   Musculoskeletal:         General: Normal range of motion.      Cervical back: Normal range of motion.     Neurological: He is alert and oriented to person, place, and time. GCS score is 15. GCS eye subscore is 4. GCS verbal subscore is 5. GCS motor subscore is 6.   Skin: Skin is warm. Capillary refill takes less than 2 seconds.   Bruising to left  medial thigh near knee.  2+ DP pulse, no warmth or erythema         ED Course   Procedures  Labs Reviewed - No data to display       Imaging Results              US Lower Extremity Veins Bilateral (In process)                      Medications - No data to display  Medical Decision Making    This is an emergent evaluation of a 73y AAM hx CAD on Eliquis presenting with discoloration of the left thigh.  Exam he has dark discoloration to the medial left thigh, he is NVI.   Do not suspect cellulitis given there is no warmth, redness or palpable induration.  Plan is ultrasound to evaluate for DVT.                                      Clinical Impression:  Final diagnoses:  [M79.89] Left leg swelling  [L81.9] Discoloration of skin of lower leg (Primary)                 Darya Vilchis MD  07/04/24 6498

## 2024-07-04 NOTE — PROGRESS NOTES
"Subjective:      Patient ID: Walter Bull is a 73 y.o. male.    Vitals:  height is 5' 9" (1.753 m) and weight is 109.8 kg (242 lb). His oral temperature is 98.3 °F (36.8 °C). His blood pressure is 112/76 and his pulse is 60. His respiration is 18 and oxygen saturation is 98%.     Chief Complaint: Pain    Pt reports noticing pain and darkness around his left knee about 3 days ago.    Pain  This is a new problem. The current episode started in the past 7 days. The problem occurs constantly. The problem has been gradually worsening. Associated symptoms include fatigue. Pertinent negatives include no chills, congestion, coughing, fever, headaches or numbness. Associated symptoms comments: Swelling . He has tried nothing for the symptoms. The treatment provided no relief.       Constitution: Positive for fatigue. Negative for chills and fever.   HENT: Negative.  Negative for congestion.    Cardiovascular: Negative.    Eyes: Negative.    Respiratory: Negative.  Negative for cough.    Gastrointestinal: Negative.    Endocrine: negative.   Genitourinary: Negative.    Musculoskeletal: Negative.    Skin: Negative.    Allergic/Immunologic: Negative.    Neurological: Negative.  Negative for headaches and numbness.   Hematologic/Lymphatic: Negative.    Psychiatric/Behavioral: Negative.        Objective:     Physical Exam   Constitutional: He is oriented to person, place, and time. He appears well-developed. He is cooperative.  Non-toxic appearance. He does not appear ill. No distress.   HENT:   Head: Normocephalic and atraumatic.   Ears:   Right Ear: Hearing, tympanic membrane, external ear and ear canal normal.   Left Ear: Hearing, tympanic membrane, external ear and ear canal normal.   Nose: Nose normal. No mucosal edema, rhinorrhea or nasal deformity. No epistaxis. Right sinus exhibits no maxillary sinus tenderness and no frontal sinus tenderness. Left sinus exhibits no maxillary sinus tenderness and no frontal sinus " tenderness.   Mouth/Throat: Uvula is midline, oropharynx is clear and moist and mucous membranes are normal. No trismus in the jaw. Normal dentition. No uvula swelling. No posterior oropharyngeal erythema.   Eyes: Conjunctivae and lids are normal. Right eye exhibits no discharge. Left eye exhibits no discharge. No scleral icterus.   Neck: Trachea normal and phonation normal. Neck supple.   Cardiovascular: Normal rate, regular rhythm, normal heart sounds and normal pulses.   Pulmonary/Chest: Effort normal and breath sounds normal. No respiratory distress.   Abdominal: Normal appearance and bowel sounds are normal. He exhibits no distension and no mass. Soft. There is no abdominal tenderness.   Musculoskeletal: Normal range of motion.         General: No deformity. Normal range of motion.      Left lower leg: He exhibits swelling.        Legs:    Neurological: He is alert and oriented to person, place, and time. He exhibits normal muscle tone. Coordination normal.   Skin: Skin is warm, dry, intact, not diaphoretic and not pale.   Psychiatric: His speech is normal and behavior is normal. Judgment and thought content normal.   Nursing note and vitals reviewed.      Assessment:     1. Leg swelling        Plan:       Leg swelling  -     Refer to Emergency Dept.      YOUR CONDITION IS POTENTIALLY LIFE THREATENING.  GO TO NEAREST ER NOW FOR FURTHER EVALUATION AND TREATMENT.    Patient was offered transfer by [facility].  Patient declines ambulance transport and will go by private auto.  The risks of this decision were explained to patient.

## 2024-07-04 NOTE — DISCHARGE INSTRUCTIONS

## 2024-07-05 ENCOUNTER — PATIENT OUTREACH (OUTPATIENT)
Dept: EMERGENCY MEDICINE | Facility: HOSPITAL | Age: 74
End: 2024-07-05
Payer: MEDICARE

## 2024-07-05 NOTE — PROGRESS NOTES
Pt stated he is doing a little better. Pt stated he is going out of town Sunday for 28 days and today would be his only day he can go to an appt.     ED navigator offered to make appts., but explained there is nothing for today. ED navigator reminded patient to reach out if there is ever anything she can assist with. ED navigator will follow-up with patient on/around 8/13/2024.    Suzanne Humphrey  ED Navigator  [phone]

## 2024-07-07 ENCOUNTER — CLINICAL SUPPORT (OUTPATIENT)
Dept: CARDIOLOGY | Facility: HOSPITAL | Age: 74
End: 2024-07-07
Attending: INTERNAL MEDICINE
Payer: MEDICARE

## 2024-07-07 ENCOUNTER — CLINICAL SUPPORT (OUTPATIENT)
Dept: CARDIOLOGY | Facility: HOSPITAL | Age: 74
End: 2024-07-07
Payer: MEDICARE

## 2024-07-07 DIAGNOSIS — Z95.810 PRESENCE OF AUTOMATIC (IMPLANTABLE) CARDIAC DEFIBRILLATOR: ICD-10-CM

## 2024-07-07 PROCEDURE — 93295 DEV INTERROG REMOTE 1/2/MLT: CPT | Mod: ,,, | Performed by: INTERNAL MEDICINE

## 2024-07-07 PROCEDURE — 93296 REM INTERROG EVL PM/IDS: CPT | Performed by: INTERNAL MEDICINE

## 2024-07-08 ENCOUNTER — PATIENT MESSAGE (OUTPATIENT)
Dept: CARDIOLOGY | Facility: CLINIC | Age: 74
End: 2024-07-08
Payer: MEDICARE

## 2024-07-08 ENCOUNTER — PATIENT MESSAGE (OUTPATIENT)
Dept: PODIATRY | Facility: CLINIC | Age: 74
End: 2024-07-08
Payer: MEDICARE

## 2024-07-08 ENCOUNTER — PATIENT MESSAGE (OUTPATIENT)
Dept: ENDOCRINOLOGY | Facility: CLINIC | Age: 74
End: 2024-07-08
Payer: MEDICARE

## 2024-07-08 DIAGNOSIS — E66.9 DIABETES MELLITUS TYPE 2 IN OBESE: Chronic | ICD-10-CM

## 2024-07-08 DIAGNOSIS — I50.42 CHRONIC COMBINED SYSTOLIC AND DIASTOLIC HEART FAILURE: Chronic | ICD-10-CM

## 2024-07-08 DIAGNOSIS — E11.22 TYPE 2 DIABETES MELLITUS WITH STAGE 3B CHRONIC KIDNEY DISEASE, WITH LONG-TERM CURRENT USE OF INSULIN: ICD-10-CM

## 2024-07-08 DIAGNOSIS — E11.42 DIABETIC POLYNEUROPATHY ASSOCIATED WITH TYPE 2 DIABETES MELLITUS: ICD-10-CM

## 2024-07-08 DIAGNOSIS — N18.32 TYPE 2 DIABETES MELLITUS WITH STAGE 3B CHRONIC KIDNEY DISEASE, WITH LONG-TERM CURRENT USE OF INSULIN: ICD-10-CM

## 2024-07-08 DIAGNOSIS — I25.118 CORONARY ARTERY DISEASE OF NATIVE ARTERY OF NATIVE HEART WITH STABLE ANGINA PECTORIS: ICD-10-CM

## 2024-07-08 DIAGNOSIS — Z95.1 S/P CABG X 5: ICD-10-CM

## 2024-07-08 DIAGNOSIS — E11.69 DIABETES MELLITUS TYPE 2 IN OBESE: Chronic | ICD-10-CM

## 2024-07-08 DIAGNOSIS — Z79.4 TYPE 2 DIABETES MELLITUS WITH STAGE 3B CHRONIC KIDNEY DISEASE, WITH LONG-TERM CURRENT USE OF INSULIN: ICD-10-CM

## 2024-07-08 RX ORDER — LOSARTAN POTASSIUM 25 MG/1
12.5 TABLET ORAL DAILY
Qty: 45 TABLET | Refills: 3 | Status: SHIPPED | OUTPATIENT
Start: 2024-07-08

## 2024-07-08 RX ORDER — BLOOD-GLUCOSE SENSOR
1 EACH MISCELLANEOUS
Qty: 3 EACH | Refills: 11 | Status: SHIPPED | OUTPATIENT
Start: 2024-07-08 | End: 2025-07-08

## 2024-07-08 RX ORDER — EVOLOCUMAB 140 MG/ML
140 INJECTION, SOLUTION SUBCUTANEOUS
Qty: 2 EACH | Refills: 11 | Status: ACTIVE | OUTPATIENT
Start: 2024-07-08

## 2024-07-08 RX ORDER — EVOLOCUMAB 140 MG/ML
140 INJECTION, SOLUTION SUBCUTANEOUS
Qty: 2 EACH | Refills: 11 | Status: SHIPPED | OUTPATIENT
Start: 2024-07-08 | End: 2024-07-08 | Stop reason: SDUPTHER

## 2024-07-08 RX ORDER — SPIRONOLACTONE 25 MG/1
25 TABLET ORAL DAILY
Qty: 90 TABLET | Refills: 3 | Status: SHIPPED | OUTPATIENT
Start: 2024-07-08

## 2024-07-08 RX ORDER — INSULIN LISPRO 100 [IU]/ML
INJECTION, SOLUTION INTRAVENOUS; SUBCUTANEOUS
Qty: 20 ML | Refills: 11 | Status: SHIPPED | OUTPATIENT
Start: 2024-07-08

## 2024-07-08 RX ORDER — SUB-Q INSULIN DEVICE, 40 UNIT
1 EACH MISCELLANEOUS DAILY
Qty: 30 EACH | Refills: 11 | Status: SHIPPED | OUTPATIENT
Start: 2024-07-08

## 2024-07-09 RX ORDER — NITROGLYCERIN 0.4 MG/1
TABLET SUBLINGUAL
Qty: 75 TABLET | Refills: 4 | Status: SHIPPED | OUTPATIENT
Start: 2024-07-09

## 2024-07-09 NOTE — TELEPHONE ENCOUNTER
No care due was identified.  [facility] Embedded Care Due Messages. Reference number: [id number].   7/08/2024 9:45:46 PM CDT

## 2024-07-26 DIAGNOSIS — E11.22 TYPE 2 DIABETES MELLITUS WITH STAGE 3B CHRONIC KIDNEY DISEASE, WITH LONG-TERM CURRENT USE OF INSULIN: Primary | ICD-10-CM

## 2024-07-26 DIAGNOSIS — Z79.4 TYPE 2 DIABETES MELLITUS WITH STAGE 3B CHRONIC KIDNEY DISEASE, WITH LONG-TERM CURRENT USE OF INSULIN: Primary | ICD-10-CM

## 2024-07-26 DIAGNOSIS — N18.32 TYPE 2 DIABETES MELLITUS WITH STAGE 3B CHRONIC KIDNEY DISEASE, WITH LONG-TERM CURRENT USE OF INSULIN: Primary | ICD-10-CM

## 2024-07-26 RX ORDER — INSULIN ASPART 100 [IU]/ML
INJECTION, SOLUTION INTRAVENOUS; SUBCUTANEOUS
Qty: 60 ML | Refills: 3 | Status: SHIPPED | OUTPATIENT
Start: 2024-07-26

## 2024-07-29 ENCOUNTER — PATIENT MESSAGE (OUTPATIENT)
Dept: ELECTROPHYSIOLOGY | Facility: CLINIC | Age: 74
End: 2024-07-29
Payer: MEDICARE

## 2024-07-30 DIAGNOSIS — Z00.00 ENCOUNTER FOR MEDICARE ANNUAL WELLNESS EXAM: ICD-10-CM

## 2024-08-13 ENCOUNTER — PATIENT OUTREACH (OUTPATIENT)
Dept: EMERGENCY MEDICINE | Facility: HOSPITAL | Age: 74
End: 2024-08-13
Payer: MEDICARE

## 2024-08-13 NOTE — PROGRESS NOTES
Pt is doing good, as expressed. Pt revealed there are no needs during this time. Pt identified he is still out of state.    ED navigator ensured there was nothing she could help patient with. ED navigator reminded patient to reach out if there is ever anything she can assist with. ED navigator will follow-up with patient on/around 10/11/2024.    Suzanne Humphrey  ED Navigator  [phone]

## 2024-08-14 ENCOUNTER — PATIENT MESSAGE (OUTPATIENT)
Dept: CARDIOLOGY | Facility: HOSPITAL | Age: 74
End: 2024-08-14
Payer: MEDICARE

## 2024-08-19 ENCOUNTER — PATIENT MESSAGE (OUTPATIENT)
Dept: ADMINISTRATIVE | Facility: HOSPITAL | Age: 74
End: 2024-08-19
Payer: MEDICARE

## 2024-08-20 DIAGNOSIS — Z79.4 TYPE 2 DIABETES MELLITUS WITH STAGE 3B CHRONIC KIDNEY DISEASE, WITH LONG-TERM CURRENT USE OF INSULIN: ICD-10-CM

## 2024-08-20 DIAGNOSIS — N18.32 TYPE 2 DIABETES MELLITUS WITH STAGE 3B CHRONIC KIDNEY DISEASE, WITH LONG-TERM CURRENT USE OF INSULIN: ICD-10-CM

## 2024-08-20 DIAGNOSIS — E11.22 TYPE 2 DIABETES MELLITUS WITH STAGE 3B CHRONIC KIDNEY DISEASE, WITH LONG-TERM CURRENT USE OF INSULIN: ICD-10-CM

## 2024-08-20 RX ORDER — INSULIN ASPART 100 [IU]/ML
INJECTION, SOLUTION INTRAVENOUS; SUBCUTANEOUS
Qty: 60 ML | Refills: 3 | Status: SHIPPED | OUTPATIENT
Start: 2024-08-20

## 2024-08-26 ENCOUNTER — PATIENT OUTREACH (OUTPATIENT)
Dept: ADMINISTRATIVE | Facility: HOSPITAL | Age: 74
End: 2024-08-26
Payer: MEDICARE

## 2024-08-26 ENCOUNTER — TELEPHONE (OUTPATIENT)
Dept: OPTOMETRY | Facility: CLINIC | Age: 74
End: 2024-08-26
Payer: MEDICARE

## 2024-08-26 DIAGNOSIS — E11.39 TYPE 2 DIABETES MELLITUS WITH OTHER OPHTHALMIC COMPLICATION, WITH LONG-TERM CURRENT USE OF INSULIN: Primary | ICD-10-CM

## 2024-08-26 DIAGNOSIS — Z79.4 TYPE 2 DIABETES MELLITUS WITH OTHER OPHTHALMIC COMPLICATION, WITH LONG-TERM CURRENT USE OF INSULIN: Primary | ICD-10-CM

## 2024-09-08 ENCOUNTER — OFFICE VISIT (OUTPATIENT)
Dept: URGENT CARE | Facility: CLINIC | Age: 74
End: 2024-09-08
Payer: MEDICARE

## 2024-09-08 VITALS
BODY MASS INDEX: 33.27 KG/M2 | HEART RATE: 72 BPM | TEMPERATURE: 98 F | RESPIRATION RATE: 19 BRPM | DIASTOLIC BLOOD PRESSURE: 88 MMHG | SYSTOLIC BLOOD PRESSURE: 142 MMHG | HEIGHT: 69 IN | WEIGHT: 224.63 LBS | OXYGEN SATURATION: 98 %

## 2024-09-08 DIAGNOSIS — T14.8XXA SKIN ABRASION: ICD-10-CM

## 2024-09-08 DIAGNOSIS — S99.922A INJURY OF LEFT FOOT, INITIAL ENCOUNTER: Primary | ICD-10-CM

## 2024-09-08 PROCEDURE — 73630 X-RAY EXAM OF FOOT: CPT | Mod: LT,S$GLB,, | Performed by: RADIOLOGY

## 2024-09-08 PROCEDURE — 99213 OFFICE O/P EST LOW 20 MIN: CPT | Mod: S$GLB,,,

## 2024-09-08 RX ORDER — CEPHALEXIN 500 MG/1
500 CAPSULE ORAL EVERY 12 HOURS
Qty: 10 CAPSULE | Refills: 0 | Status: SHIPPED | OUTPATIENT
Start: 2024-09-08 | End: 2024-09-08

## 2024-09-08 RX ORDER — MUPIROCIN 20 MG/G
OINTMENT TOPICAL 3 TIMES DAILY
Qty: 15 G | Refills: 0 | Status: SHIPPED | OUTPATIENT
Start: 2024-09-08 | End: 2024-09-08

## 2024-09-08 RX ORDER — CEPHALEXIN 500 MG/1
500 CAPSULE ORAL EVERY 12 HOURS
Qty: 10 CAPSULE | Refills: 0 | Status: SHIPPED | OUTPATIENT
Start: 2024-09-08 | End: 2024-09-13

## 2024-09-08 RX ORDER — MUPIROCIN 20 MG/G
OINTMENT TOPICAL 3 TIMES DAILY
Qty: 15 G | Refills: 0 | Status: SHIPPED | OUTPATIENT
Start: 2024-09-08

## 2024-09-08 NOTE — PROGRESS NOTES
"Subjective:      Patient ID: Walter Bull is a 73 y.o. male.    Vitals:  height is 5' 9" (1.753 m) and weight is 101.9 kg (224 lb 10.4 oz). His tympanic temperature is 98.2 °F (36.8 °C). His blood pressure is 142/88 (abnormal) and his pulse is 72. His respiration is 19 and oxygen saturation is 98%.     Chief Complaint: Foot Injury    PT is coming in for a foot injury that occurred today. Pt was power washing and stepped in ants. Pt stated he did not loosen pressure on  and tried to rinse his feet. Pt reports suing hydrogen peroxide to wound.     Foot Injury   The incident occurred 1 to 3 hours ago. The incident occurred at home. The injury mechanism was a direct blow. The pain is present in the left foot. The quality of the pain is described as aching. The pain is at a severity of 8/10. The pain is moderate. The pain has been Constant since onset. Pertinent negatives include no numbness. He reports no foreign bodies present. The symptoms are aggravated by palpation. Treatments tried: hydrogen peroxide, neosporin. The treatment provided mild relief.       Neurological:  Negative for numbness.      Objective:     Physical Exam   Constitutional:  Non-toxic appearance. He does not appear ill. No distress.   HENT:   Head: Normocephalic and atraumatic.   Ears:   Right Ear: External ear normal.   Left Ear: External ear normal.   Nose: Nose normal.   Mouth/Throat: Mucous membranes are moist.   Eyes: Conjunctivae are normal.   Neck: No neck rigidity present.   Abdominal: Normal appearance.   Musculoskeletal: Normal range of motion.         General: Tenderness present. Normal range of motion.      Left ankle: He exhibits swelling. He exhibits normal range of motion. Tenderness (medial aspect).        Legs:       Comments: Full AROM of digits and ankles. Soft swelling noted to medial aspect of ankle. Patient ambulates with symmetric gait. DP pulse intact. Superficial skin abrasion to left dorsal ankle. No " surrounding erythema, purulent drainage to area or sign of infection. Thickened and discolored nails noted. Limited sensation to distal feet due to DM neuropathy.     Neurological: He is alert.   Skin: Skin is warm, dry and not diaphoretic.   Psychiatric: His behavior is normal. Judgment and thought content normal.   Nursing note and vitals reviewed.  XR FOOT COMPLETE 3 VIEW LEFT    Result Date: 9/8/2024  EXAMINATION: XR FOOT COMPLETE 3 VIEW LEFT CLINICAL HISTORY: .  Unspecified injury of left foot, initial encounter TECHNIQUE: AP, lateral and oblique views of the left foot were performed. COMPARISON: Left foot series 03/20/2022 FINDINGS: Bones appear well mineralized for age.  Overall alignment is within normal limits and similar to prior.  Lisfranc articulation is congruent.  No displaced fracture, dislocation or destructive osseous process.  Mild degenerative change at the 1st MTP joint and dorsal midfoot.  Small plantar calcaneal spur.  Scattered advanced atherosclerotic vascular calcifications noted.  No subcutaneous emphysema or radiopaque foreign body.     No acute displaced fracture-dislocation identified. Electronically signed by: Ronnie Colvin MD Date:    09/08/2024 Time:    16:46       Assessment:     1. Injury of left foot, initial encounter    2. Skin abrasion        Plan:       Injury of left foot, initial encounter  -     XR FOOT COMPLETE 3 VIEW LEFT; Future; Expected date: 09/08/2024    Skin abrasion  -     Discontinue: mupirocin (BACTROBAN) 2 % ointment; Apply topically 3 (three) times daily.  Dispense: 15 g; Refill: 0  -     Discontinue: cephALEXin (KEFLEX) 500 MG capsule; Take 1 capsule (500 mg total) by mouth every 12 (twelve) hours. for 5 days  Dispense: 10 capsule; Refill: 0  -     cephALEXin (KEFLEX) 500 MG capsule; Take 1 capsule (500 mg total) by mouth every 12 (twelve) hours. for 5 days  Dispense: 10 capsule; Refill: 0  -     mupirocin (BACTROBAN) 2 % ointment; Apply topically 3 (three)  times daily.  Dispense: 15 g; Refill: 0         Tetanus is UTD. Patient is diabetic. Will being oral prophylactics antibiotics.      Reviewed left foot x-ray in PACS: vascular calcification. No acute displaced fracture-dislocation. Discussed results with patient.      Area cleaned in clinic with saline and Hibiclens. Bactroban applied and bandage with non-stick dressing and ACE bandage.     Keep area clean with gentle soap and water. Elevate leg while at home. Apply ice to ankle daily. Apply topical antibiotic ointment to area three times a day. Do not use hydrogen peroxide to wound.   Discussed with patient the importance of follow up with their primary care provider. Urged to go to the ER for any worsening signs or symptoms.     Patient Instructions   1.  Take all medications as directed. If you have been prescribed antibiotics, make sure to complete them.   2.  Rest and keep yourself/patient well hydrated. For adults, it is recommended to drink at least 8-10 glasses of water daily.   3.  For patients above 6 months of age who are not allergic to and are not on anticoagulants, you can alternate Tylenol and Motrin every 4-6 hours for fever above 100.4F and/or pain.  For patients less than 6 months of age, allergic to or intolerant to NSAIDS, have gastritis, gastric ulcers, or history of GI bleeds, are pregnant, or are on anticoagulant therapy, you can take Tylenol every 4 hours as needed for fever above 100.4F and/or pain.   4. You should schedule a follow-up appointment with your Primary Care Provider/Pediatrician for recheck in 2-3 days or as directed at this visit.   5.  If your condition fails to improve in a timely manner, you should receive another evaluation by your Primary Care Provider/Pediatrician to discuss your concerns or return to urgent care for a recheck.  If your condition worsens at any time, you should report immediately to your nearest Emergency Department for further evaluation. **You must  understand that you have received Urgent Care treatment only and that you may be released before all of your medical problems are known or treated. You, the patient, are responsible to arrange for follow-up care as instructed.

## 2024-09-08 NOTE — PATIENT INSTRUCTIONS
You must understand that you have received treatment at an Urgent Care facility only, and that you may be  released before all of your medical problems are known or treated. Urgent Care facilities are not equipped to  handle life threatening emergencies. It is recommended that you seek care at an Emergency Department for  further evaluation of worsening or concerning symptoms, or possibly life threatening conditions as  discussed.    Keep area clean with gentle soap and water. Elevate leg while at home. Apply ice to ankle daily. Apply topical antibiotic ointment to area three times a day. Do not use hydrogen peroxide to wound.   Discussed with patient the importance of follow up with their primary care provider. Urged to go to the ER for any worsening signs or symptoms.     Patient Instructions   1.  Take all medications as directed. If you have been prescribed antibiotics, make sure to complete them.   2.  Rest and keep yourself/patient well hydrated. For adults, it is recommended to drink at least 8-10 glasses of water daily.   3.  For patients above 6 months of age who are not allergic to and are not on anticoagulants, you can alternate Tylenol and Motrin every 4-6 hours for fever above 100.4F and/or pain.  For patients less than 6 months of age, allergic to or intolerant to NSAIDS, have gastritis, gastric ulcers, or history of GI bleeds, are pregnant, or are on anticoagulant therapy, you can take Tylenol every 4 hours as needed for fever above 100.4F and/or pain.   4. You should schedule a follow-up appointment with your Primary Care Provider/Pediatrician for recheck in 2-3 days or as directed at this visit.   5.  If your condition fails to improve in a timely manner, you should receive another evaluation by your Primary Care Provider/Pediatrician to discuss your concerns or return to urgent care for a recheck.  If your condition worsens at any time, you should report immediately to your nearest Emergency  Department for further evaluation. **You must understand that you have received Urgent Care treatment only and that you may be released before all of your medical problems are known or treated. You, the patient, are responsible to arrange for follow-up care as instructed.

## 2024-09-10 ENCOUNTER — TELEPHONE (OUTPATIENT)
Dept: INTERNAL MEDICINE | Facility: CLINIC | Age: 74
End: 2024-09-10
Payer: MEDICARE

## 2024-09-10 NOTE — TELEPHONE ENCOUNTER
Sofy received from [facility] for patient. He was in a PAD study. The following abnormality was noted in his lower extremity arterial ultrasound:    PAD:  >50% stenosis of the right dorsal pedis artery. There are post-stenotic pulse wave patterns in both left posterior tibial artery and dorsal pedis artery suggesting high-grade stenosis in the popliteal artery, which is not clearly seen.   Other abnormality noted: none

## 2024-09-10 NOTE — TELEPHONE ENCOUNTER
Dr. Canada,    Good afternoon. I wanted to coordinate care with you on our mutual patient. I received the message below. Wondering if this is something you might have referred him for or if you are familiar with this PAD study being done at [facility]?    He is obviously high risk for PVD. He is medically optimized on crestor, repatha, eliquis. I'm wondering the utility in an arterial study in this patient who has not complained of claudication sx to me in the past.     I appreciate your insight on this. Would be happy to order an arterial US or run-off study---or even just start with ABIs :) Just not sure it is necessary in an asymptomatic patient. Thoughts?     Thanks and take care

## 2024-09-12 NOTE — TELEPHONE ENCOUNTER
Please notify patient I reviewed his results. I wanted to coordinate with his cardiology team to be sure they did not have any differing opinions. We both agree this does not need further work up. He has no symptoms of peripheral vascular disease and is already very well medically managed for this. No changes or further work up is necessary. Thanks

## 2024-09-27 ENCOUNTER — TELEPHONE (OUTPATIENT)
Dept: ADMINISTRATIVE | Facility: CLINIC | Age: 74
End: 2024-09-27
Payer: MEDICARE

## 2024-09-27 NOTE — TELEPHONE ENCOUNTER
Called pt; informed pt I was just making a reminder call for pt's virtual visit today at 9:00am and to see if pt needed any help; pt stated he had no clue how to do the portal stuff; informed pt I could walk him through getting the [facility] on his phone and help him complete the e-pre check; pt stated he did not have time for all that and to cancel the appt; pt stated to call him back in a few months; appt canceled per pt request

## 2024-10-11 ENCOUNTER — PATIENT OUTREACH (OUTPATIENT)
Dept: EMERGENCY MEDICINE | Facility: HOSPITAL | Age: 74
End: 2024-10-11
Payer: MEDICARE

## 2024-10-11 NOTE — PROGRESS NOTES
Pt is well. Pt stated he has all of his appointments taken care of and has no needs today.    ED navigator ensured there was nothing she could help patient with. ED navigator reminded patient to reach out if there is ever anything she can assist with. ED navigator will follow-up with patient on/around 11/8/2024.    Suzanne Humphrey  ED Navigator  [phone]

## 2024-10-11 NOTE — PROGRESS NOTES
Pt is unreachable for 3rd F/U. ED navigator will follow-up with patient on/around 11/8/2024 for attempt at additional.    Suzanne Humphrey  ED Navigator  [phone]

## 2024-11-08 ENCOUNTER — PATIENT OUTREACH (OUTPATIENT)
Dept: EMERGENCY MEDICINE | Facility: HOSPITAL | Age: 74
End: 2024-11-08
Payer: MEDICARE

## 2024-11-08 ENCOUNTER — PATIENT MESSAGE (OUTPATIENT)
Dept: PODIATRY | Facility: CLINIC | Age: 74
End: 2024-11-08
Payer: MEDICARE

## 2024-11-08 NOTE — PROGRESS NOTES
Pt was reminded about and will be attending his labs for 8:40 a.m. on Monday with [facility], Lab and then with Jonnathan Kenney DPM for 11:30 a.m.    Suzanne Humphrey ED Navigator  [phone]

## 2024-11-08 NOTE — PROGRESS NOTES
Pt is well and has what he needs at this time. ED navigator reminded patient to reach out if there is ever anything she can assist with. ED navigator will close the encounter at this time; however, pt may be contacted again if he comes back into ER and falls under report.    Suzanne Humphrey  ED Navigator  [phone]

## 2024-11-11 ENCOUNTER — LAB VISIT (OUTPATIENT)
Dept: LAB | Facility: HOSPITAL | Age: 74
End: 2024-11-11
Attending: INTERNAL MEDICINE
Payer: MEDICARE

## 2024-11-11 ENCOUNTER — OFFICE VISIT (OUTPATIENT)
Dept: PODIATRY | Facility: CLINIC | Age: 74
End: 2024-11-11
Payer: MEDICARE

## 2024-11-11 VITALS
DIASTOLIC BLOOD PRESSURE: 82 MMHG | HEIGHT: 69 IN | WEIGHT: 230.19 LBS | HEART RATE: 59 BPM | BODY MASS INDEX: 34.09 KG/M2 | SYSTOLIC BLOOD PRESSURE: 179 MMHG

## 2024-11-11 DIAGNOSIS — E78.49 OTHER HYPERLIPIDEMIA: Chronic | ICD-10-CM

## 2024-11-11 DIAGNOSIS — E66.09 CLASS 1 OBESITY DUE TO EXCESS CALORIES WITH SERIOUS COMORBIDITY AND BODY MASS INDEX (BMI) OF 34.0 TO 34.9 IN ADULT: ICD-10-CM

## 2024-11-11 DIAGNOSIS — E66.811 CLASS 1 OBESITY DUE TO EXCESS CALORIES WITH SERIOUS COMORBIDITY AND BODY MASS INDEX (BMI) OF 34.0 TO 34.9 IN ADULT: ICD-10-CM

## 2024-11-11 DIAGNOSIS — Z79.4 TYPE 2 DIABETES MELLITUS WITH DIABETIC NEPHROPATHY, WITH LONG-TERM CURRENT USE OF INSULIN: ICD-10-CM

## 2024-11-11 DIAGNOSIS — E11.21 TYPE 2 DIABETES MELLITUS WITH DIABETIC NEPHROPATHY, WITH LONG-TERM CURRENT USE OF INSULIN: ICD-10-CM

## 2024-11-11 DIAGNOSIS — E11.42 DIABETIC POLYNEUROPATHY ASSOCIATED WITH TYPE 2 DIABETES MELLITUS: Primary | ICD-10-CM

## 2024-11-11 DIAGNOSIS — I50.42 CHRONIC COMBINED SYSTOLIC AND DIASTOLIC HEART FAILURE: ICD-10-CM

## 2024-11-11 DIAGNOSIS — L84 CORN OR CALLUS: ICD-10-CM

## 2024-11-11 DIAGNOSIS — I48.0 PAROXYSMAL ATRIAL FIBRILLATION: ICD-10-CM

## 2024-11-11 DIAGNOSIS — N18.31 STAGE 3A CHRONIC KIDNEY DISEASE: ICD-10-CM

## 2024-11-11 DIAGNOSIS — B35.1 ONYCHOMYCOSIS DUE TO DERMATOPHYTE: ICD-10-CM

## 2024-11-11 DIAGNOSIS — I70.0 AORTIC ATHEROSCLEROSIS: ICD-10-CM

## 2024-11-11 DIAGNOSIS — Z12.5 PROSTATE CANCER SCREENING: ICD-10-CM

## 2024-11-11 DIAGNOSIS — I10 BENIGN ESSENTIAL HTN: Chronic | ICD-10-CM

## 2024-11-11 DIAGNOSIS — I25.118 CORONARY ARTERY DISEASE OF NATIVE ARTERY OF NATIVE HEART WITH STABLE ANGINA PECTORIS: Chronic | ICD-10-CM

## 2024-11-11 LAB
ALBUMIN SERPL BCP-MCNC: 3.6 G/DL (ref 3.5–5.2)
ALP SERPL-CCNC: 76 U/L (ref 40–150)
ALT SERPL W/O P-5'-P-CCNC: 19 U/L (ref 10–44)
ANION GAP SERPL CALC-SCNC: 9 MMOL/L (ref 8–16)
AST SERPL-CCNC: 19 U/L (ref 10–40)
BASOPHILS # BLD AUTO: 0.08 K/UL (ref 0–0.2)
BASOPHILS NFR BLD: 1.3 % (ref 0–1.9)
BILIRUB SERPL-MCNC: 0.4 MG/DL (ref 0.1–1)
BUN SERPL-MCNC: 23 MG/DL (ref 8–23)
CALCIUM SERPL-MCNC: 9.1 MG/DL (ref 8.7–10.5)
CHLORIDE SERPL-SCNC: 107 MMOL/L (ref 95–110)
CHOLEST SERPL-MCNC: 165 MG/DL (ref 120–199)
CHOLEST/HDLC SERPL: 2.7 {RATIO} (ref 2–5)
CO2 SERPL-SCNC: 25 MMOL/L (ref 23–29)
COMPLEXED PSA SERPL-MCNC: 1.4 NG/ML (ref 0–4)
CREAT SERPL-MCNC: 1.8 MG/DL (ref 0.5–1.4)
DIFFERENTIAL METHOD BLD: ABNORMAL
EOSINOPHIL # BLD AUTO: 0.4 K/UL (ref 0–0.5)
EOSINOPHIL NFR BLD: 7 % (ref 0–8)
ERYTHROCYTE [DISTWIDTH] IN BLOOD BY AUTOMATED COUNT: 15 % (ref 11.5–14.5)
EST. GFR  (NO RACE VARIABLE): 39 ML/MIN/1.73 M^2
ESTIMATED AVG GLUCOSE: 134 MG/DL (ref 68–131)
GLUCOSE SERPL-MCNC: 100 MG/DL (ref 70–110)
HBA1C MFR BLD: 6.3 % (ref 4–5.6)
HCT VFR BLD AUTO: 40.7 % (ref 40–54)
HDLC SERPL-MCNC: 62 MG/DL (ref 40–75)
HDLC SERPL: 37.6 % (ref 20–50)
HGB BLD-MCNC: 13 G/DL (ref 14–18)
IMM GRANULOCYTES # BLD AUTO: 0.01 K/UL (ref 0–0.04)
IMM GRANULOCYTES NFR BLD AUTO: 0.2 % (ref 0–0.5)
LDLC SERPL CALC-MCNC: 93.6 MG/DL (ref 63–159)
LYMPHOCYTES # BLD AUTO: 2.6 K/UL (ref 1–4.8)
LYMPHOCYTES NFR BLD: 42.6 % (ref 18–48)
MCH RBC QN AUTO: 29.7 PG (ref 27–31)
MCHC RBC AUTO-ENTMCNC: 31.9 G/DL (ref 32–36)
MCV RBC AUTO: 93 FL (ref 82–98)
MONOCYTES # BLD AUTO: 0.6 K/UL (ref 0.3–1)
MONOCYTES NFR BLD: 10.5 % (ref 4–15)
NEUTROPHILS # BLD AUTO: 2.3 K/UL (ref 1.8–7.7)
NEUTROPHILS NFR BLD: 38.4 % (ref 38–73)
NONHDLC SERPL-MCNC: 103 MG/DL
NRBC BLD-RTO: 0 /100 WBC
PLATELET # BLD AUTO: 154 K/UL (ref 150–450)
PMV BLD AUTO: 10.9 FL (ref 9.2–12.9)
POTASSIUM SERPL-SCNC: 4.6 MMOL/L (ref 3.5–5.1)
PROT SERPL-MCNC: 6.9 G/DL (ref 6–8.4)
RBC # BLD AUTO: 4.38 M/UL (ref 4.6–6.2)
SODIUM SERPL-SCNC: 141 MMOL/L (ref 136–145)
TRIGL SERPL-MCNC: 47 MG/DL (ref 30–150)
WBC # BLD AUTO: 6.01 K/UL (ref 3.9–12.7)

## 2024-11-11 PROCEDURE — 36415 COLL VENOUS BLD VENIPUNCTURE: CPT | Performed by: INTERNAL MEDICINE

## 2024-11-11 PROCEDURE — 80053 COMPREHEN METABOLIC PANEL: CPT | Performed by: INTERNAL MEDICINE

## 2024-11-11 PROCEDURE — 84153 ASSAY OF PSA TOTAL: CPT | Performed by: INTERNAL MEDICINE

## 2024-11-11 PROCEDURE — 85025 COMPLETE CBC W/AUTO DIFF WBC: CPT | Performed by: INTERNAL MEDICINE

## 2024-11-11 PROCEDURE — 80061 LIPID PANEL: CPT | Performed by: INTERNAL MEDICINE

## 2024-11-11 PROCEDURE — 83036 HEMOGLOBIN GLYCOSYLATED A1C: CPT | Performed by: INTERNAL MEDICINE

## 2024-11-11 PROCEDURE — 99999 PR PBB SHADOW E&M-EST. PATIENT-LVL II: CPT | Mod: PBBFAC,,, | Performed by: PODIATRIST

## 2024-11-11 RX ORDER — KETOCONAZOLE 20 MG/G
CREAM TOPICAL DAILY
Qty: 60 G | Refills: 2 | Status: SHIPPED | OUTPATIENT
Start: 2024-11-11

## 2024-11-12 NOTE — PROGRESS NOTES
Subjective:      Patient ID: Walter Bull is a 73 y.o. male.    Chief Complaint: Diabetes Mellitus (Pcp Jerri Griffiths MD 05/17/2024) and Callouses (Bl foot)    Walter is a 73 y.o. male who presents to the clinic upon referral from Dr. Nelly bermeo. provider found  for evaluation and treatment of diabetic feet. Walter has a past medical history of Anemia, Angina pectoris, Anticoagulant long-term use, Arthritis, Back pain, CHF (congestive heart failure), Chronic bronchitis, Colon cancer screening (02/02/2017), Coronary artery disease, Diabetes mellitus type I, Diabetes with neurologic complications, Disorder of kidney and ureter, Encounter for blood transfusion, Glaucoma, Heart attack, Heart disease, Hyperlipidemia, Hypertension, Iron deficiency, Kidney failure, Obesity, Paroxysmal atrial fibrillation (01/12/2023), Pneumonia, Polyneuropathy, Pulmonary emphysema (02/26/2020), Renal manifestation of secondary diabetes mellitus, 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  Increase in her pain to the bottoms of both feet.  Here for routine foot care as well/diabetic foot exam.     PCP: Jerri Griffiths MD    Date Last Seen by PCP: dr jerri griffiths05/21/2019    Current shoe gear: Tennis shoes    Hemoglobin A1C   Date Value Ref Range Status   11/11/2024 6.3 (H) 4.0 - 5.6 % Final     Comment:     ADA Screening Guidelines:  5.7-6.4%  Consistent with prediabetes  >or=6.5%  Consistent with diabetes    High levels of fetal hemoglobin interfere with the HbA1C  assay. Heterozygous hemoglobin variants (HbS, HgC, etc)do  not significantly interfere with this assay.   However, presence of multiple variants may affect accuracy.     05/13/2024 6.3 (H) 4.0 - 5.6 % Final     Comment:     ADA Screening Guidelines:  5.7-6.4%  Consistent with prediabetes  >or=6.5%  Consistent with diabetes    High levels of fetal hemoglobin interfere with the  HbA1C  assay. Heterozygous hemoglobin variants (HbS, HgC, etc)do  not significantly interfere with this assay.   However, presence of multiple variants may affect accuracy.     11/11/2023 6.4 (H) 4.0 - 5.6 % Final     Comment:     ADA Screening Guidelines:  5.7-6.4%  Consistent with prediabetes  >or=6.5%  Consistent with diabetes    High levels of fetal hemoglobin interfere with the HbA1C  assay. Heterozygous hemoglobin variants (HbS, HgC, etc)do  not significantly interfere with this assay.   However, presence of multiple variants may affect accuracy.             Review of Systems   Constitutional: Negative for chills, fever and malaise/fatigue.   HENT:  Negative for hearing loss.    Cardiovascular:  Negative for claudication.   Respiratory:  Negative for shortness of breath.    Skin:  Negative for dry skin, flushing and rash.   Musculoskeletal:  Negative for joint pain and myalgias.   Neurological:  Negative for loss of balance, numbness, paresthesias and sensory change.   Psychiatric/Behavioral:  Negative for altered mental status.            Objective:      Physical Exam  Vitals reviewed.   Constitutional:       Appearance: He is well-developed.   HENT:      Head: Normocephalic and atraumatic.   Cardiovascular:      Pulses:           Dorsalis pedis pulses are 2+ on the right side and 2+ on the left side.        Posterior tibial pulses are 2+ on the right side and 2+ on the left side.      Comments: No edema noted to b/L LEs  Pulmonary:      Effort: Pulmonary effort is normal.   Musculoskeletal:         General: Normal range of motion.      Comments: Adequate joint ROM noted to all lower extremity muscle groups with no pain or crepitation noted. Muscle strength is 5/5 in all groups bilaterally.     Feet:      Right foot:      Protective Sensation: 5 sites tested.  5 sites sensed.      Skin integrity: Callus and dry skin present.      Left foot:      Protective Sensation: 5 sites tested.  5 sites sensed.      Skin  integrity: Callus and dry skin present.   Skin:     General: Skin is warm and dry.      Capillary Refill: Capillary refill takes 2 to 3 seconds.      Coloration: Skin is pale.      Comments: Xerosis improved. No open lesion noted b/L  Skin temp is warm to warm from proximal to distal b/L.  Webspaces clean, dry, and intact  Nails x10 short   Neurological:      Mental Status: He is alert and oriented to person, place, and time.      Sensory: Sensory deficit present.      Motor: Atrophy present.      Deep Tendon Reflexes: Reflexes abnormal.      Reflex Scores:       Patellar reflexes are 1+ on the right side and 1+ on the left side.       Achilles reflexes are 1+ on the right side and 1+ on the left side.     Comments: Intact gross sensation noted to b/L LEs    There is pain on palpation of the bilateral 3rd  intermetatarsal space with a positive Nikkie's click. Minimal tenderness to palpation of the adjacent metatarsal heads.     Psychiatric:         Behavior: Behavior normal.               Assessment:       Encounter Diagnoses   Name Primary?    Diabetic polyneuropathy associated with type 2 diabetes mellitus Yes    Corn or callus     Onychomycosis due to dermatophyte            Plan:       Walter was seen today for diabetes mellitus and callouses.    Diagnoses and all orders for this visit:    Diabetic polyneuropathy associated with type 2 diabetes mellitus  -     DIABETIC SHOES FOR HOME USE    Corn or callus    Onychomycosis due to dermatophyte    Other orders  -     ketoconazole (NIZORAL) 2 % cream; Apply topically once daily.        I counseled the patient on his conditions, their implications and medical management.    Decision making:  Chronic illnesses discussed in detail, previous records/notes and imaging independently reviewed, prescription drug management performed in addition to lengthy discussion regarding both conservative and surgical treatment options.    Discussed options for peripheral  neuropathy/nerve entrapment syndrome including nerve block therapy, surgical nerve entrapment decompression procedures, and various vitamins and supplementation available shown to improve nerve function.    Nerve injection:    Performed by:  Jonnathan Kenney DPM    Preop diagnosis:  Neuropathy symptoms/paresthesias/pain    The area overlying the bilateral posterior tibial nerve(s) was sterilely prepped, verbal consent was obtained, 2 cc's of 0.5% Marcaine plain was infiltrated around the affected nerve(s) for a diagnostic nerve block.  This was well tolerated with no complications.    Shoe inspection. Diabetic Foot Education. Patient reminded of the importance of good nutrition and blood sugar control to help prevent podiatric complications of diabetes. Patient instructed on proper foot hygeine. We discussed wearing proper shoe gear, daily foot inspections and Diabetic foot education in detail.      Routine Foot Care    Performed by:  Jonnathan Kenney. LACI  Authorized by:  Patient     Consent Done?:  Yes (Verbal)     Nail Care Type:  Debride  Location(s): All  (Left 1st Toe, Left 3rd Toe, Left 2nd Toe, Left 4th Toe, Left 5th Toe, Right 1st Toe, Right 2nd Toe, Right 3rd Toe, Right 4th Toe and Right 5th Toe)  Patient tolerance:  Patient tolerated the procedure well with no immediate complications     With patient's permission, the toenails mentioned above were aggressively reduced and debrided using a nail nipper, removing all offending nail and debris. The patient will continue to monitor the areas daily, inspect the feet, wear protective shoe gear when ambulatory, and moisturizer to maintain skin integrity.      Callus Care Type: Debride    With patient's permission, the calluses/hyperkeratotic lesions mentioned above were aggressively reduced and debrided using a number 15 blade. The patient will continue to monitor the areas daily, inspect the feet, wear protective shoe gear when ambulatory, and moisturizer to  maintain skin integrity.     Shoe inspection. Diabetic Foot Education. Patient reminded of the importance of good nutrition and blood sugar control to help prevent podiatric complications of diabetes. Patient instructed on proper foot hygeine. We discussed wearing proper shoe gear, daily foot inspections and Diabetic foot education in detail.    Return to clinic in 3-6 months or sooner if problems arise

## 2024-11-18 ENCOUNTER — OFFICE VISIT (OUTPATIENT)
Dept: INTERNAL MEDICINE | Facility: CLINIC | Age: 74
End: 2024-11-18
Payer: MEDICARE

## 2024-11-18 VITALS
WEIGHT: 225.75 LBS | DIASTOLIC BLOOD PRESSURE: 82 MMHG | HEIGHT: 69 IN | OXYGEN SATURATION: 98 % | BODY MASS INDEX: 33.44 KG/M2 | SYSTOLIC BLOOD PRESSURE: 124 MMHG | HEART RATE: 60 BPM | RESPIRATION RATE: 20 BRPM

## 2024-11-18 DIAGNOSIS — I50.42 CHRONIC COMBINED SYSTOLIC AND DIASTOLIC HEART FAILURE: Chronic | ICD-10-CM

## 2024-11-18 DIAGNOSIS — E11.42 DIABETIC POLYNEUROPATHY ASSOCIATED WITH TYPE 2 DIABETES MELLITUS: ICD-10-CM

## 2024-11-18 DIAGNOSIS — H91.93 BILATERAL HEARING LOSS, UNSPECIFIED HEARING LOSS TYPE: ICD-10-CM

## 2024-11-18 DIAGNOSIS — N18.32 TYPE 2 DIABETES MELLITUS WITH STAGE 3B CHRONIC KIDNEY DISEASE, WITH LONG-TERM CURRENT USE OF INSULIN: ICD-10-CM

## 2024-11-18 DIAGNOSIS — N18.31 STAGE 3A CHRONIC KIDNEY DISEASE: ICD-10-CM

## 2024-11-18 DIAGNOSIS — I48.0 PAROXYSMAL ATRIAL FIBRILLATION: ICD-10-CM

## 2024-11-18 DIAGNOSIS — E11.22 TYPE 2 DIABETES MELLITUS WITH STAGE 3B CHRONIC KIDNEY DISEASE, WITH LONG-TERM CURRENT USE OF INSULIN: ICD-10-CM

## 2024-11-18 DIAGNOSIS — R91.1 SOLITARY PULMONARY NODULE: ICD-10-CM

## 2024-11-18 DIAGNOSIS — E78.49 OTHER HYPERLIPIDEMIA: Chronic | ICD-10-CM

## 2024-11-18 DIAGNOSIS — I10 BENIGN ESSENTIAL HTN: Primary | Chronic | ICD-10-CM

## 2024-11-18 DIAGNOSIS — Z79.4 TYPE 2 DIABETES MELLITUS WITH DIABETIC NEPHROPATHY, WITH LONG-TERM CURRENT USE OF INSULIN: ICD-10-CM

## 2024-11-18 DIAGNOSIS — I25.118 CORONARY ARTERY DISEASE OF NATIVE ARTERY OF NATIVE HEART WITH STABLE ANGINA PECTORIS: Chronic | ICD-10-CM

## 2024-11-18 DIAGNOSIS — Z79.4 TYPE 2 DIABETES MELLITUS WITH STAGE 3B CHRONIC KIDNEY DISEASE, WITH LONG-TERM CURRENT USE OF INSULIN: ICD-10-CM

## 2024-11-18 DIAGNOSIS — E11.21 TYPE 2 DIABETES MELLITUS WITH DIABETIC NEPHROPATHY, WITH LONG-TERM CURRENT USE OF INSULIN: ICD-10-CM

## 2024-11-18 DIAGNOSIS — Z23 NEED FOR VACCINATION: ICD-10-CM

## 2024-11-18 PROCEDURE — 3074F SYST BP LT 130 MM HG: CPT | Mod: CPTII,S$GLB,, | Performed by: INTERNAL MEDICINE

## 2024-11-18 PROCEDURE — 1126F AMNT PAIN NOTED NONE PRSNT: CPT | Mod: CPTII,S$GLB,, | Performed by: INTERNAL MEDICINE

## 2024-11-18 PROCEDURE — 1160F RVW MEDS BY RX/DR IN RCRD: CPT | Mod: CPTII,S$GLB,, | Performed by: INTERNAL MEDICINE

## 2024-11-18 PROCEDURE — G0008 ADMIN INFLUENZA VIRUS VAC: HCPCS | Mod: S$GLB,,, | Performed by: INTERNAL MEDICINE

## 2024-11-18 PROCEDURE — 99999 PR PBB SHADOW E&M-EST. PATIENT-LVL V: CPT | Mod: PBBFAC,,, | Performed by: INTERNAL MEDICINE

## 2024-11-18 PROCEDURE — 1159F MED LIST DOCD IN RCRD: CPT | Mod: CPTII,S$GLB,, | Performed by: INTERNAL MEDICINE

## 2024-11-18 PROCEDURE — 3079F DIAST BP 80-89 MM HG: CPT | Mod: CPTII,S$GLB,, | Performed by: INTERNAL MEDICINE

## 2024-11-18 PROCEDURE — 3288F FALL RISK ASSESSMENT DOCD: CPT | Mod: CPTII,S$GLB,, | Performed by: INTERNAL MEDICINE

## 2024-11-18 PROCEDURE — 99215 OFFICE O/P EST HI 40 MIN: CPT | Mod: 25,S$GLB,, | Performed by: INTERNAL MEDICINE

## 2024-11-18 PROCEDURE — 90653 IIV ADJUVANT VACCINE IM: CPT | Mod: S$GLB,,, | Performed by: INTERNAL MEDICINE

## 2024-11-18 PROCEDURE — 1101F PT FALLS ASSESS-DOCD LE1/YR: CPT | Mod: CPTII,S$GLB,, | Performed by: INTERNAL MEDICINE

## 2024-11-18 NOTE — PROGRESS NOTES
"Subjective:       Patient ID: Walter Bull is a 74 y.o. male.    Chief Complaint: Follow-up      HPI:  Patient is known to me and presents for follow up CAD, systolic CHF, DM type 2, HLD. Labs from 11/11/24 personally reviewed, interpreted and discussed with the patient today.      A1C  6.3%, at goal  LDL 93, near goal  GFR 39, decreased from last visit  Microalb 5/2024- normal  PSA 1.4, normal 11/2024     CAD s/p 5v CABG and NSTEMI 9/12/18: following with cardiology. Now on Ranexa (indur was stopped), brilinta, ASA, crestor, losartan and Coreg. Also reports h/o CHF (last known EF 25%+ diastolic dysfunction), on lasix 20mg once a day and aldactone 25mg daily. Denies SOB, GREENWOOD and orthopnea. S/p ICD placement.   Noted GFR reduced to 39 (again). No swelling, no GREENWOOD.       Dm type 2: on mounjaro, lispro via VGP per endocrinology. Has DEXCOM  A1C <7%.  He does report numbness and tingling of left foot > right foot and b/l fingers; sx have been present for several years. Not on medicine for neuropathy as he declines treatment. Denies polyuria, polydipsia  He does have diabetic shoes. + neuropathy and callus formation. Needing order for new shoes.      Eye exam: 11/2023-exam scheduled for this month  Microalb: 5/2024  On ARB and statin      HLD: on repatha, crestor, has been taking for several years. Denies side effects.      HTN: on coreg, losartan. BP is well controlled. Denies headaches, vision changes.     A-fib:  Cards notes Jan 2023: "Pt with newly found afib on AICD interrogation. Has been intermittently experiencing palpitations post device check. Will start apixiban 5x2 for CVA prophylaxis" Now on Eliquis and ASA/Plavix stopped due to taking NOAC. On Coreg for rate control.      GERD: On Nexium daily. H/o H. Pylori on EGD (2018) with gastric erosions. Has had intermittent nausea and vomiting which is chronic but reports worsening and having to take TUMS OTC for sx control. No constipation.      BPH: on flomax " "and finasteride and working well.  No medication side effects. S/p prostate bx 5/13/19 with DR. Chaudhry, no malignany. Last PSA normal.     Tobacco use: quit 1981; previously smoked 3 PPD for 20 years  EtOH: stopped drink 1982; was a daily drink 2 fifth liquor daily  Illicit drug: denies        Pulmonary nodules 3/14/23: "Bilateral solid pulmonary nodules measuring up to 5 mm, for example the nodule in the apical segment of the right upper lobe (series 4, image 88).  For multiple solid nodules all <6 mm, Fleischner Society 2017 guidelines recommend no routine follow up for a low risk patient, or follow up with non-contrast chest CT at 12 months after discovery in a high risk patient."  Repeat CT chest 3/2024- ordered but not completed    Past Medical History:   Diagnosis Date    Anemia     Angina pectoris     Anticoagulant long-term use     Arthritis     Back pain     CHF (congestive heart failure)     Chronic bronchitis     Colon cancer screening 02/02/2017    Coronary artery disease     Diabetes mellitus type I     Diabetes with neurologic complications     Disorder of kidney and ureter     Encounter for blood transfusion     Glaucoma     Heart attack     09/2018    Heart disease     Hyperlipidemia     Hypertension     Iron deficiency     Kidney failure     post CABG    Obesity     Paroxysmal atrial fibrillation 01/12/2023    Pneumonia     Polyneuropathy     Pulmonary emphysema 02/26/2020    Renal manifestation of secondary diabetes mellitus     S/P CABG x 5 01/11/2017    Sleep apnea     had CPAP but had recall- not currently using     Trouble in sleeping     Type 2 diabetes mellitus with ophthalmic manifestations     Urinary incontinence        Family History   Problem Relation Name Age of Onset    Diabetes Mother Mother     Heart disease Mother Mother     Hypertension Sister Sister     Diabetes Sister Sister     Heart disease Sister Sister     Heart attack Brother      Colon cancer Neg Hx      Esophageal cancer " Neg Hx      Stomach cancer Neg Hx         Social History     Socioeconomic History    Marital status:     Number of children: 5   Tobacco Use    Smoking status: Former     Current packs/day: 0.00     Average packs/day: 3.0 packs/day for 18.0 years (53.9 ttl pk-yrs)     Types: Cigarettes     Start date: 1963     Quit date: 1981     Years since quittin.9     Passive exposure: Past    Smokeless tobacco: Former     Types: Snuff, Chew     Quit date:    Substance and Sexual Activity    Alcohol use: No    Drug use: No    Sexual activity: Yes     Partners: Female     Comment: -with a significant other     Social Drivers of Health     Financial Resource Strain: Low Risk  (2023)    Overall Financial Resource Strain (CARDIA)     Difficulty of Paying Living Expenses: Not hard at all   Food Insecurity: No Food Insecurity (2023)    Hunger Vital Sign     Worried About Running Out of Food in the Last Year: Never true     Ran Out of Food in the Last Year: Never true   Transportation Needs: No Transportation Needs (2023)    PRAPARE - Transportation     Lack of Transportation (Medical): No     Lack of Transportation (Non-Medical): No   Physical Activity: Sufficiently Active (2023)    Exercise Vital Sign     Days of Exercise per Week: 3 days     Minutes of Exercise per Session: 60 min   Stress: Stress Concern Present (2023)    [employer] - Occupational Stress Questionnaire     Feeling of Stress : To some extent   Housing Stability: Low Risk  (2023)    Housing Stability Vital Sign     Unable to Pay for Housing in the Last Year: No     Number of Places Lived in the Last Year: 1     Unstable Housing in the Last Year: No       Review of Systems   Constitutional:  Negative for activity change, fatigue, fever and unexpected weight change.   HENT:  Negative for congestion, ear pain, hearing loss, rhinorrhea and sore throat.    Eyes:  Negative  for redness and visual disturbance.   Respiratory:  Negative for cough, shortness of breath and wheezing.    Cardiovascular:  Negative for chest pain, palpitations and leg swelling.   Gastrointestinal:  Negative for abdominal pain, constipation, diarrhea, nausea and vomiting.   Genitourinary:  Negative for dysuria and frequency.   Musculoskeletal:  Negative for back pain, joint swelling and neck pain.   Skin:  Negative for color change, rash and wound.   Neurological:  Negative for dizziness, light-headedness and headaches.         Objective:      Physical Exam  Vitals reviewed.   Constitutional:       General: He is not in acute distress.     Appearance: He is well-developed.   HENT:      Head: Normocephalic and atraumatic.      Right Ear: External ear normal.      Left Ear: External ear normal.   Eyes:      General:         Right eye: No discharge.         Left eye: No discharge.      Conjunctiva/sclera: Conjunctivae normal.   Neck:      Thyroid: No thyromegaly.   Cardiovascular:      Rate and Rhythm: Normal rate and regular rhythm.      Pulses:           Dorsalis pedis pulses are 1+ on the right side and 1+ on the left side.      Heart sounds: No murmur heard.  Pulmonary:      Effort: Pulmonary effort is normal. No respiratory distress.      Breath sounds: Normal breath sounds. No wheezing.   Abdominal:      General: There is no distension.      Palpations: Abdomen is soft.      Tenderness: There is no abdominal tenderness.   Musculoskeletal:      Right lower leg: No edema.      Left lower leg: No edema.   Feet:      Right foot:      Skin integrity: Callus and dry skin present.      Left foot:      Skin integrity: Callus and dry skin present.   Skin:     General: Skin is warm and dry.   Neurological:      Mental Status: He is alert and oriented to person, place, and time.   Psychiatric:         Behavior: Behavior normal.         Thought Content: Thought content normal.         Assessment:       1. Benign  essential HTN    2. Other hyperlipidemia    3. Coronary artery disease of native artery of native heart with stable angina pectoris    4. Chronic combined systolic and diastolic heart failure    5. Paroxysmal atrial fibrillation    6. Stage 3a chronic kidney disease    7. Type 2 diabetes mellitus with diabetic nephropathy, with long-term current use of insulin    8. Type 2 diabetes mellitus with stage 3b chronic kidney disease, with long-term current use of insulin    9. Diabetic polyneuropathy associated with type 2 diabetes mellitus    10. Solitary pulmonary nodule    11. Bilateral hearing loss, unspecified hearing loss type    12. Need for vaccination        Plan:       1. Benign essential HTN  Chronic controlled  Continue medications at same dose  Low Na diet  Exercise, weight loss  Check BP and keep log for next visit    -     CBC Auto Differential; Future; Expected date: 05/17/2025  -     Comprehensive Metabolic Panel; Future; Expected date: 05/17/2025  -     Lipid Panel; Future; Expected date: 05/17/2025  -     Hemoglobin A1C; Future; Expected date: 05/17/2025  -     Microalbumin/Creatinine Ratio, Urine; Future; Expected date: 05/17/2025    2. Other hyperlipidemia  Chronic controlled  Cont meds same dose  Diet, exercise  -     CBC Auto Differential; Future; Expected date: 05/17/2025  -     Comprehensive Metabolic Panel; Future; Expected date: 05/17/2025  -     Lipid Panel; Future; Expected date: 05/17/2025  -     Hemoglobin A1C; Future; Expected date: 05/17/2025  -     Microalbumin/Creatinine Ratio, Urine; Future; Expected date: 05/17/2025    3. Coronary artery disease of native artery of native heart with stable angina pectoris  Chronic stable  Denies angina sx  Cont meds same dose  F/u cardiology as planned  -     CBC Auto Differential; Future; Expected date: 05/17/2025  -     Comprehensive Metabolic Panel; Future; Expected date: 05/17/2025  -     Lipid Panel; Future; Expected date: 05/17/2025  -      Hemoglobin A1C; Future; Expected date: 05/17/2025  -     Microalbumin/Creatinine Ratio, Urine; Future; Expected date: 05/17/2025    4. Chronic combined systolic and diastolic heart failure  Chronic stable  Euvolemic  Discussed using lasix PRN due to reduced GFR. Weight monitoring discussed and voiced understanding  Cont aldactone daily  Ok to increase PO water intake some as well  F/u cards as planned  -     CBC Auto Differential; Future; Expected date: 05/17/2025  -     Comprehensive Metabolic Panel; Future; Expected date: 05/17/2025  -     Lipid Panel; Future; Expected date: 05/17/2025  -     Hemoglobin A1C; Future; Expected date: 05/17/2025  -     Microalbumin/Creatinine Ratio, Urine; Future; Expected date: 05/17/2025    5. Paroxysmal atrial fibrillation  Chronic stable  Cont meds same dose  RRR on exam today  -     CBC Auto Differential; Future; Expected date: 05/17/2025  -     Comprehensive Metabolic Panel; Future; Expected date: 05/17/2025  -     Lipid Panel; Future; Expected date: 05/17/2025  -     Hemoglobin A1C; Future; Expected date: 05/17/2025  -     Microalbumin/Creatinine Ratio, Urine; Future; Expected date: 05/17/2025    6. Stage 3a chronic kidney disease  Chronic worsening  Discussed increased PO water intake  Will trial lasix PRN with weight monitoring (he has done this before and feels comfortable). Could also consider scheduled MWF dosing if having CHF exacerbation with self monitoring  Repeat labs only jan 2025 and if not improving will do further work up (nephrology, renal US, etc)  -     CBC Auto Differential; Future; Expected date: 05/17/2025  -     Comprehensive Metabolic Panel; Future; Expected date: 05/17/2025  -     Lipid Panel; Future; Expected date: 05/17/2025  -     Hemoglobin A1C; Future; Expected date: 05/17/2025  -     Microalbumin/Creatinine Ratio, Urine; Future; Expected date: 05/17/2025  -     Basic Metabolic Panel; Future; Expected date: 01/06/2025    7. Type 2 diabetes mellitus  with diabetic nephropathy, with long-term current use of insulin  Chronic controlled  Follow GFR  Cont meds same dose  -     CBC Auto Differential; Future; Expected date: 05/17/2025  -     Comprehensive Metabolic Panel; Future; Expected date: 05/17/2025  -     Lipid Panel; Future; Expected date: 05/17/2025  -     Hemoglobin A1C; Future; Expected date: 05/17/2025  -     Microalbumin/Creatinine Ratio, Urine; Future; Expected date: 05/17/2025  -     DIABETIC SHOES FOR HOME USE    8. Type 2 diabetes mellitus with stage 3b chronic kidney disease, with long-term current use of insulin  Chronic controlled  Follow GFR  Cont meds same dose  Dexcom monitoring  -     CBC Auto Differential; Future; Expected date: 05/17/2025  -     Comprehensive Metabolic Panel; Future; Expected date: 05/17/2025  -     Lipid Panel; Future; Expected date: 05/17/2025  -     Hemoglobin A1C; Future; Expected date: 05/17/2025  -     Microalbumin/Creatinine Ratio, Urine; Future; Expected date: 05/17/2025  -     DIABETIC SHOES FOR HOME USE    9. Diabetic polyneuropathy associated with type 2 diabetes mellitus  Chronic stable  New diabetic shoes needed  + neuropathy sx and callus formation  Foot care discussed  -     CBC Auto Differential; Future; Expected date: 05/17/2025  -     Comprehensive Metabolic Panel; Future; Expected date: 05/17/2025  -     Lipid Panel; Future; Expected date: 05/17/2025  -     Hemoglobin A1C; Future; Expected date: 05/17/2025  -     Microalbumin/Creatinine Ratio, Urine; Future; Expected date: 05/17/2025  -     DIABETIC SHOES FOR HOME USE    10. Solitary pulmonary nodule  History of 3/2023  CT chest ordered but not completed 3/2024, repeat orered  -     CT Chest Without Contrast; Future; Expected date: 11/18/2024    11. Bilateral hearing loss, unspecified hearing loss type  Chronic stable  Needs to establish care with ENT in [facility] due to insurance change. ENT in [place] was managing hearing aids prior  Referred  today  Overview:  Uses hearing aids bilaterally    Orders:  -     Ambulatory referral/consult to ENT; Future; Expected date: 11/25/2024  -     Ambulatory referral/consult to Audiology; Future; Expected date: 11/25/2024    12. Need for vaccination  Flu vaccine today       RTC 6 months with labs and PRN  BMP to monitor GFR Jan 2025 ordered

## 2024-12-06 ENCOUNTER — PATIENT MESSAGE (OUTPATIENT)
Dept: INTERNAL MEDICINE | Facility: CLINIC | Age: 74
End: 2024-12-06
Payer: MEDICARE

## 2024-12-06 DIAGNOSIS — J44.9 CHRONIC OBSTRUCTIVE PULMONARY DISEASE, UNSPECIFIED COPD TYPE: Primary | ICD-10-CM

## 2024-12-06 DIAGNOSIS — G47.33 OSA (OBSTRUCTIVE SLEEP APNEA): ICD-10-CM

## 2024-12-19 NOTE — PROGRESS NOTES
Subjective:      Patient ID: Walter Bull is a 74 y.o. male.    Chief Complaint: No chief complaint on file.    Pt is a 75 yo AAM pmh DM2 complicated by polyneuropathy, HFrEF with associated DD s/p CRT-d, CAD s.p CABG, SVT, VT, HLD, BPH, chronic prostatitis, obesity, CKD3, GERD, ANURADHA, previous tobacco use disorder who presents for evaluation of. Pt last seen by Ness Chance NP 3/3/22.     Smoking hx: quit 43 years ago.   Work hx: [employer] and  for [employer] work.   Exposure hx:  [place] farm [place] 1986   Inhaler use:   PRN inhaler use: albuterol 1-2x/week; nebulizer- not using  Hx of lung dz: childhood asthma  Family hx of lung dz:     Has episodes at night that he wakes gasping for breath. Occurs mostly when not wearing his CPAP, but still happen when he is. Bed partner wakes him up. Shortness of breath can occur with walking or have episodes of shortness of breath at rest. Uses albuterol as needed. Intermittent cough.     Had previously had similar night time symptoms, that improved with CPAP.     Review of Systems   Constitutional:  Positive for activity change and fatigue. Negative for weight loss and weight gain.   HENT:  Negative for postnasal drip and congestion.    Respiratory:  Positive for shortness of breath, dyspnea on extertion, use of rescue inhaler and Paroxysmal Nocturnal Dyspnea. Negative for cough, sputum production, chest tightness, wheezing and orthopnea.    Cardiovascular:  Negative for chest pain, palpitations and leg swelling.   Musculoskeletal:  Positive for arthralgias.   Gastrointestinal:  Negative for nausea, vomiting and acid reflux.   Neurological:  Negative for dizziness, syncope and headaches.   Psychiatric/Behavioral:  Negative for confusion and sleep disturbance. The patient is not nervous/anxious.      Objective:     Physical Exam   Constitutional: He is oriented to person, place, and time. He appears well-developed. He appears not cachectic.   HENT:   Head:  "Normocephalic.   Mouth/Throat: Mallampati Score: I.   Cardiovascular: Normal rate.   Pulmonary/Chest: Normal expansion, symmetric chest wall expansion and effort normal.   Musculoskeletal:         General: Edema (2+) present.   Neurological: He is alert and oriented to person, place, and time. Gait normal.   Psychiatric: He has a normal mood and affect. His behavior is normal. Judgment and thought content normal.   Vitals reviewed.    Personal Diagnostic Review    CT of chest performed on 5/19/23 without contrast revealed RUL, bandlike atelectasis. Mild mosaic attenuation in bilateral lower lobes likely consistent with pulmonary edema vs inflammation.    Echocardiogram: 8/24/22  The left ventricle is normal in size with moderately decreased systolic function.  The estimated ejection fraction is 30-35%. Abnormal septal motion 2/2 pacing vs LBBB and inf/IL akinesis.  Grade I left ventricular diastolic dysfunction.  Normal right ventricular size with normal right ventricular systolic function.  Mild left atrial enlargement.  Normal central venous pressure (3 mmHg).  The estimated PA systolic pressure is 32 mmHg.    Pulmonary function tests:   12/19/24  FEV1: 2.06L  (83.8 % predicted),   FVC:  2.89L (89.1 % predicted),  FEV1/FVC:  71        11/18/2024     8:10 AM 11/11/2024    11:12 AM 9/8/2024     4:27 PM 7/4/2024     4:51 PM 7/4/2024     4:49 PM 7/4/2024     3:56 PM 5/17/2024     8:06 AM   Pulmonary Function Tests   SpO2 98 %  98 % 99 %  98 % 99 %   Height 5' 9" (1.753 m) 5' 9" (1.753 m) 5' 9" (1.753 m)  5' 9" (1.753 m) 5' 9" (1.753 m) 5' 9" (1.753 m)   Weight 102.4 kg (225 lb 12 oz) 104.4 kg (230 lb 2.6 oz) 101.9 kg (224 lb 10.4 oz) 101.8 kg (224 lb 5.1 oz)  109.8 kg (242 lb) 106.3 kg (234 lb 5.6 oz)   BMI (Calculated) 33.3 34 33.2 33.1  35.7 34.6     Assessment:     1. Mild intermittent asthma without complication    2. ANURADHA (obstructive sleep apnea)    3. Chronic combined systolic and diastolic heart failure  "     Outpatient Encounter Medications as of 12/20/2024   Medication Sig Dispense Refill    albuterol (PROVENTIL HFA) 90 mcg/actuation inhaler Inhale 2 puffs into the lungs every 6 (six) hours as needed for Wheezing. Rescue 18 g 0    albuterol-ipratropium (DUO-NEB) 2.5 mg-0.5 mg/3 mL nebulizer solution Take 3 mLs by nebulization every 6 (six) hours as needed for Wheezing or Shortness of Breath. Rescue 75 mL 0    ammonium lactate 12 % Crea Apply topically.      apixaban (ELIQUIS) 5 mg Tab Take 1 tablet (5 mg total) by mouth 2 (two) times daily. 180 tablet 3    blood-glucose meter,continuous (DEXCOM G7 ) Misc Use to check glucose with sensors 1 each 0    blood-glucose sensor (DEXCOM G7 SENSOR) [place] 1 Device by Misc.(Non-Drug; Combo Route) route every 10 days. 3 each 11    carvediloL (COREG) 3.125 MG tablet Take 1 tablet (3.125 mg total) by mouth 2 (two) times daily. 180 tablet 1    esomeprazole (NEXIUM) 40 MG capsule Take 1 capsule (40 mg total) by mouth 2 (two) times daily. 180 capsule 3    evolocumab (REPATHA SURECLICK) 140 mg/mL PnIj Inject 1 mL (140 mg total) into the skin every 14 (fourteen) days. 2 each 11    famotidine (PEPCID) 40 MG tablet Take 1 tablet (40 mg total) by mouth once daily. 90 tablet 3    ferrous sulfate (FEOSOL) 325 mg (65 mg iron) Tab tablet TAKE 1 TABLET BY MOUTH THREE TIMES DAILY 90 tablet 3    furosemide (LASIX) 20 MG tablet Take 1 tablet (20 mg total) by mouth once daily. 90 tablet 1    glucagon (GVOKE HYPOPEN 2-PACK) 1 mg/0.2 mL AtIn Inject 1 Pen into the skin as needed (hypoglycemia). 0.4 mL 2    insulin aspart U-100 (NOVOLOG U-100 INSULIN ASPART) 100 unit/mL injection Use as directed via VGO 20, max daily dose 56 units. 60 mL 3    ketoconazole (NIZORAL) 2 % cream Apply topically once daily. 60 g 2    LIDOcaine 3 % Crea Apply 1 application  topically 2 (two) times a day. 85 g 3    LIDOcaine HCL 2% (XYLOCAINE) 2 % jelly Apply topically as needed. Apply topically once nightly to  affected part of foot/feet, for pain. 30 mL 2    losartan (COZAAR) 25 MG tablet Take 0.5 tablets (12.5 mg total) by mouth once daily. 45 tablet 3    mupirocin (BACTROBAN) 2 % ointment Apply to affected area 3 times daily 22 g 1    nitroGLYCERIN (NITROSTAT) 0.4 MG SL tablet DISSOLVE 1 TABLET UNDER THE  TONGUE EVERY 5 MINUTES AS NEEDED FOR CHEST PAIN. MAX OF 3 TABLETS IN 15 MINUTES. CALL 911 IF PAIN  PERSISTS. 75 tablet 4    ondansetron (ZOFRAN) 4 MG tablet Take 1 tablet (4 mg total) by mouth every 8 (eight) hours as needed for Nausea. 15 tablet 0    ranolazine (RANEXA) 1,000 mg Tb12 Take 1 tablet (1,000 mg total) by mouth 2 (two) times daily. 180 tablet 3    rosuvastatin (CRESTOR) 40 MG Tab Take 1 tablet (40 mg total) by mouth once daily. 90 tablet 1    spironolactone (ALDACTONE) 25 MG tablet Take 1 tablet (25 mg total) by mouth once daily. 90 tablet 3    tamsulosin (FLOMAX) 0.4 mg Cap Take 1 capsule (0.4 mg total) by mouth once daily. 90 capsule 3    tirzepatide 7.5 mg/0.5 mL PnIj Inject 7.5 mg into the skin every 7 days. 4 pen 11    tirzepatide 7.5 mg/0.5 mL PnIj Inject 7.5 mg into the skin every 7 days. 4 Pen 11    V-GO 20 [place] 1 Device by Misc.(Non-Drug; Combo Route) route once daily. 30 each 11    amiodarone (PACERONE) 200 MG Tab Take 1 tablet (200 mg total) by mouth once daily. 30 tablet 11    fluticasone-salmeterol diskus inhaler 250-50 mcg Inhale 1 puff into the lungs 2 (two) times daily. Controller 60 each 11    mupirocin (BACTROBAN) 2 % ointment Apply topically 3 (three) times daily. 15 g 0     No facility-administered encounter medications on file as of 12/20/2024.     Orders Placed This Encounter   Procedures    Ambulatory referral/consult to Sleep Disorders     Standing Status:   Future     Standing Expiration Date:   1/20/2026     Referral Priority:   Routine     Referral Type:   Consultation     Requested Specialty:   Sleep Medicine     Number of Visits Requested:   1       Plan:     Chronic combined  systolic and diastolic heart failure  1-2 + pitting edema on exam today. Notes minimal GREEN[place]OD. States that normally his edema is better, but he did not take his lasix due to appointment today.     Mild intermittent asthma without complication  No fixed obstruction on most recent PFTs. Hx of childhood asthma. Will trial ICS/LABA. Continue use of albuterol/nebulizer PRN. Educated to rinse mouth out after use and proper inhaler technique.     ANURADHA (obstructive sleep apnea)  Hx of ANURADHA on CPAP [id number]. Having episodes of PND at night more often when not using CPAP. Even when using CPAP, his bed partner will wake him up due to abnormal breathing pattern that he could not explain. May need CPAP titration/mask fitting  - referral to sleep medicine     Follow up in 3 months.     James Gilman MD  [facility]

## 2024-12-20 ENCOUNTER — TELEPHONE (OUTPATIENT)
Dept: PULMONOLOGY | Facility: CLINIC | Age: 74
End: 2024-12-20
Payer: MEDICARE

## 2024-12-20 ENCOUNTER — OFFICE VISIT (OUTPATIENT)
Dept: PULMONOLOGY | Facility: CLINIC | Age: 74
End: 2024-12-20
Payer: MEDICARE

## 2024-12-20 VITALS
SYSTOLIC BLOOD PRESSURE: 147 MMHG | HEART RATE: 84 BPM | BODY MASS INDEX: 34.29 KG/M2 | OXYGEN SATURATION: 99 % | DIASTOLIC BLOOD PRESSURE: 68 MMHG | WEIGHT: 231.5 LBS | HEIGHT: 69 IN

## 2024-12-20 DIAGNOSIS — G47.33 OSA (OBSTRUCTIVE SLEEP APNEA): ICD-10-CM

## 2024-12-20 DIAGNOSIS — J45.20 MILD INTERMITTENT ASTHMA WITHOUT COMPLICATION: Primary | ICD-10-CM

## 2024-12-20 DIAGNOSIS — I50.42 CHRONIC COMBINED SYSTOLIC AND DIASTOLIC HEART FAILURE: Chronic | ICD-10-CM

## 2024-12-20 PROBLEM — J43.9 PULMONARY EMPHYSEMA: Status: RESOLVED | Noted: 2020-02-26 | Resolved: 2024-12-20

## 2024-12-20 PROCEDURE — 99999 PR PBB SHADOW E&M-EST. PATIENT-LVL V: CPT | Mod: PBBFAC,,, | Performed by: INTERNAL MEDICINE

## 2024-12-20 RX ORDER — FLUTICASONE PROPIONATE AND SALMETEROL 250; 50 UG/1; UG/1
1 POWDER RESPIRATORY (INHALATION) 2 TIMES DAILY
Qty: 60 EACH | Refills: 11 | Status: SHIPPED | OUTPATIENT
Start: 2024-12-20 | End: 2025-12-20

## 2024-12-20 NOTE — TELEPHONE ENCOUNTER
----- Message from Domo sent at 12/20/2024  9:29 AM CST -----  Regarding: late  Pt is calling to advise the office of running late for their appt today. Pt is still asking to be seen. Please call to advise. Thanks.     Reason: traffic      Appt time:10:00     ETA: 10:15

## 2024-12-20 NOTE — ASSESSMENT & PLAN NOTE
1-2 + pitting edema on exam today. Notes minimal GREEN[place]OD. States that normally his edema is better, but he did not take his lasix due to appointment today.

## 2024-12-20 NOTE — ASSESSMENT & PLAN NOTE
No fixed obstruction on most recent PFTs. Hx of childhood asthma. Will trial ICS/LABA. Continue use of albuterol/nebulizer PRN. Educated to rinse mouth out after use and proper inhaler technique.

## 2024-12-20 NOTE — ASSESSMENT & PLAN NOTE
Hx of ANURADHA on CPAP [id number]. Having episodes of PND at night more often when not using CPAP. Even when using CPAP, his bed partner will wake him up due to abnormal breathing pattern that he could not explain. May need CPAP titration/mask fitting  - referral to sleep medicine

## 2024-12-20 NOTE — TELEPHONE ENCOUNTER
Call was returned to patient in regards to his arrival time. Patient states he's in the parking lot. I informed patient that he'll be able to check in. Patient verbalized understanding.

## 2024-12-24 DIAGNOSIS — I10 BENIGN ESSENTIAL HTN: Chronic | ICD-10-CM

## 2024-12-24 DIAGNOSIS — E78.5 HYPERLIPIDEMIA, UNSPECIFIED HYPERLIPIDEMIA TYPE: ICD-10-CM

## 2024-12-24 RX ORDER — ROSUVASTATIN CALCIUM 40 MG/1
40 TABLET, COATED ORAL DAILY
Qty: 90 TABLET | Refills: 3 | Status: SHIPPED | OUTPATIENT
Start: 2024-12-24

## 2024-12-24 NOTE — TELEPHONE ENCOUNTER
Refill Routing Note   Medication(s) are not appropriate for processing by [facility] for the following reason(s):        Required vitals abnormal  Required labs abnormal    [facility] action(s):  Defer  Approve             Appointments  past 12m or future 3m with PCP    Date Provider   Last Visit   11/18/2024 Belkys Kruger MD   Next Visit   5/19/2025 Belkys Kruger MD   ED visits in past 90 days: 0        Note composed:1:35 PM 12/24/2024

## 2024-12-24 NOTE — TELEPHONE ENCOUNTER
No care due was identified.  [facility] Embedded Care Due Messages. Reference number: [id number].   12/24/2024 10:28:00 AM CST

## 2024-12-30 RX ORDER — CARVEDILOL 3.12 MG/1
3.12 TABLET ORAL 2 TIMES DAILY
Qty: 180 TABLET | Refills: 3 | Status: SHIPPED | OUTPATIENT
Start: 2024-12-30

## 2024-12-30 RX ORDER — FUROSEMIDE 20 MG/1
20 TABLET ORAL DAILY
Qty: 90 TABLET | Refills: 3 | Status: SHIPPED | OUTPATIENT
Start: 2024-12-30

## 2025-01-13 ENCOUNTER — LAB VISIT (OUTPATIENT)
Dept: LAB | Facility: HOSPITAL | Age: 75
End: 2025-01-13
Attending: INTERNAL MEDICINE
Payer: MEDICARE

## 2025-01-13 DIAGNOSIS — N18.31 STAGE 3A CHRONIC KIDNEY DISEASE: ICD-10-CM

## 2025-01-13 LAB
ANION GAP SERPL CALC-SCNC: 5 MMOL/L (ref 8–16)
BUN SERPL-MCNC: 21 MG/DL (ref 8–23)
CALCIUM SERPL-MCNC: 9.1 MG/DL (ref 8.7–10.5)
CHLORIDE SERPL-SCNC: 105 MMOL/L (ref 95–110)
CO2 SERPL-SCNC: 30 MMOL/L (ref 23–29)
CREAT SERPL-MCNC: 1.8 MG/DL (ref 0.5–1.4)
EST. GFR  (NO RACE VARIABLE): 39 ML/MIN/1.73 M^2
GLUCOSE SERPL-MCNC: 115 MG/DL (ref 70–110)
POTASSIUM SERPL-SCNC: 4.8 MMOL/L (ref 3.5–5.1)
SODIUM SERPL-SCNC: 140 MMOL/L (ref 136–145)

## 2025-01-13 PROCEDURE — 80048 BASIC METABOLIC PNL TOTAL CA: CPT | Performed by: INTERNAL MEDICINE

## 2025-01-13 PROCEDURE — 36415 COLL VENOUS BLD VENIPUNCTURE: CPT | Performed by: INTERNAL MEDICINE

## 2025-01-15 ENCOUNTER — PATIENT MESSAGE (OUTPATIENT)
Dept: INTERNAL MEDICINE | Facility: CLINIC | Age: 75
End: 2025-01-15
Payer: MEDICARE

## 2025-01-15 ENCOUNTER — PATIENT MESSAGE (OUTPATIENT)
Dept: OTOLARYNGOLOGY | Facility: CLINIC | Age: 75
End: 2025-01-15
Payer: MEDICARE

## 2025-01-15 DIAGNOSIS — N18.32 STAGE 3B CHRONIC KIDNEY DISEASE: Primary | ICD-10-CM

## 2025-01-16 ENCOUNTER — HOSPITAL ENCOUNTER (OUTPATIENT)
Dept: RADIOLOGY | Facility: HOSPITAL | Age: 75
Discharge: HOME | End: 2025-01-16
Attending: INTERNAL MEDICINE
Payer: MEDICARE

## 2025-01-16 ENCOUNTER — OFFICE VISIT (OUTPATIENT)
Dept: OPTOMETRY | Facility: CLINIC | Age: 75
End: 2025-01-16
Payer: MEDICARE

## 2025-01-16 DIAGNOSIS — H04.123 DRY EYE SYNDROME OF BOTH EYES: ICD-10-CM

## 2025-01-16 DIAGNOSIS — E11.39 TYPE 2 DIABETES MELLITUS WITH OTHER OPHTHALMIC COMPLICATION, WITH LONG-TERM CURRENT USE OF INSULIN: ICD-10-CM

## 2025-01-16 DIAGNOSIS — D31.61 BENIGN ORBITAL TUMOR, RIGHT: ICD-10-CM

## 2025-01-16 DIAGNOSIS — H52.13 MYOPIA WITH ASTIGMATISM AND PRESBYOPIA, BILATERAL: ICD-10-CM

## 2025-01-16 DIAGNOSIS — H40.1131 PRIMARY OPEN ANGLE GLAUCOMA (POAG) OF BOTH EYES, MILD STAGE: ICD-10-CM

## 2025-01-16 DIAGNOSIS — R91.1 SOLITARY PULMONARY NODULE: ICD-10-CM

## 2025-01-16 DIAGNOSIS — E11.9 TYPE 2 DIABETES MELLITUS WITHOUT RETINOPATHY: Primary | ICD-10-CM

## 2025-01-16 DIAGNOSIS — H52.203 MYOPIA WITH ASTIGMATISM AND PRESBYOPIA, BILATERAL: ICD-10-CM

## 2025-01-16 DIAGNOSIS — H52.4 MYOPIA WITH ASTIGMATISM AND PRESBYOPIA, BILATERAL: ICD-10-CM

## 2025-01-16 DIAGNOSIS — Z79.4 TYPE 2 DIABETES MELLITUS WITH OTHER OPHTHALMIC COMPLICATION, WITH LONG-TERM CURRENT USE OF INSULIN: ICD-10-CM

## 2025-01-16 DIAGNOSIS — H10.13 ALLERGIC CONJUNCTIVITIS OF BOTH EYES: ICD-10-CM

## 2025-01-16 PROCEDURE — 92014 COMPRE OPH EXAM EST PT 1/>: CPT | Mod: S$GLB,,, | Performed by: OPTOMETRIST

## 2025-01-16 PROCEDURE — 1126F AMNT PAIN NOTED NONE PRSNT: CPT | Mod: CPTII,S$GLB,, | Performed by: OPTOMETRIST

## 2025-01-16 PROCEDURE — 1159F MED LIST DOCD IN RCRD: CPT | Mod: CPTII,S$GLB,, | Performed by: OPTOMETRIST

## 2025-01-16 PROCEDURE — 3288F FALL RISK ASSESSMENT DOCD: CPT | Mod: CPTII,S$GLB,, | Performed by: OPTOMETRIST

## 2025-01-16 PROCEDURE — 2022F DILAT RTA XM EVC RTNOPTHY: CPT | Mod: CPTII,S$GLB,, | Performed by: OPTOMETRIST

## 2025-01-16 PROCEDURE — 71250 CT THORAX DX C-: CPT | Mod: TC

## 2025-01-16 PROCEDURE — 99999 PR PBB SHADOW E&M-EST. PATIENT-LVL IV: CPT | Mod: PBBFAC,,, | Performed by: OPTOMETRIST

## 2025-01-16 PROCEDURE — 1160F RVW MEDS BY RX/DR IN RCRD: CPT | Mod: CPTII,S$GLB,, | Performed by: OPTOMETRIST

## 2025-01-16 PROCEDURE — 1101F PT FALLS ASSESS-DOCD LE1/YR: CPT | Mod: CPTII,S$GLB,, | Performed by: OPTOMETRIST

## 2025-01-16 PROCEDURE — 71250 CT THORAX DX C-: CPT | Mod: 26,,, | Performed by: RADIOLOGY

## 2025-01-17 NOTE — PROGRESS NOTES
HPI    DONA: 11/2023  Chief complaint (CC): Patient is here for annual eye exam.    Pt states vision is stable     POAG OU Mild Stage   Pt denies vision loss/ HA's   Glasses? +  Contacts? -  H/o eye surgery, injections or laser: PC IOL OU, laser OU for glaucoma  H/o eye injury: -  Known eye conditions? See above  Family h/o eye conditions? -  Eye gtts? -        (-) Flashes (-)  Floaters (-) Mucous   (-)  Tearing (+) Itching (+) Burning   (-) Headaches (-) Eye Pain/discomfort (-) Irritation   (-)  Redness (-) Double vision (-) Blurry vision     Diabetic? +  BS: 138  Hemoglobin A1C       Date                     Value               Ref Range             Status                11/11/2024               6.3 (H)             4.0 - 5.6 %           Final                   05/13/2024               6.3 (H)             4.0 - 5.6 %           Final                 11/11/2023               6.4 (H)             4.0 - 5.6 %           Final               Last edited by Teodora Styles on 1/16/2025  2:48 PM.            Assessment /Plan     For exam results, see Encounter Report.      Type 2 diabetes mellitus without retinopathy  Type 2 diabetes mellitus with other ophthalmic complication, with long-term current use of insulin  -     Ambulatory referral/consult to Optometry  BS control. No signs of diabetic retinopathy. Monitor with annual exam.    Primary open angle glaucoma (POAG) of both eyes, mild stage  -     Posterior Segment OCT Optic Nerve- Both eyes; Future  -     Johnson Visual Field - OU - Extended - Both Eyes; Future  (-) fHx. IOP 21 OD, 23 OS. Last 18 OD, OS. LTmax 24 OD, OS.   C/d 0.65 OD, OS. S/p SLT x3 about 11-12 years ago per pt. Pt has been off drops since SLT. Pachy 563  OS.   Photos  3/4/2020, 3/9/2021  1/17/2024  DFE  3/13/2024 OCT OD thin TS, T, TI and G, OS thin TS, T, borderline TI, G  3/13/2024 HVF OD borderline, OS nonspecific defect  OCT today appears stable in comparison to 10/2022.  Educated the  patient on findings. Importance of testing and f/u expressed   RTC OCT/24-2 kalia faster  Will call w/results    Dry eye syndrome of both eyes  Recommend iVizia, Systane Ultra or Refresh Optive BID-TID OU to aid with symptoms of dry eyes.    Allergic conjunctivitis of both eyes  Recommend Zaditor or Alaway bid OU and cool compresses to help soothe itching. Patient advised to RTC if condition gets worse.     Myopia with astigmatism and presbyopia, bilateral  SRx released to patient. Patient educated on lens options. Normal ocular health. RTC 1 year for routine exam.     Benign orbital tumor, right  Stable. Monitor.

## 2025-01-24 DIAGNOSIS — Z95.1 S/P CABG X 5: ICD-10-CM

## 2025-01-24 DIAGNOSIS — I25.118 CORONARY ARTERY DISEASE OF NATIVE ARTERY OF NATIVE HEART WITH STABLE ANGINA PECTORIS: ICD-10-CM

## 2025-01-24 RX ORDER — NITROGLYCERIN 0.4 MG/1
0.4 TABLET SUBLINGUAL EVERY 5 MIN PRN
Qty: 75 TABLET | Refills: 4 | Status: SHIPPED | OUTPATIENT
Start: 2025-01-24

## 2025-01-24 NOTE — TELEPHONE ENCOUNTER
No care due was identified.  [facility] Embedded Care Due Messages. Reference number: [id number].   1/24/2025 10:25:55 AM CST

## 2025-01-30 DIAGNOSIS — Z00.00 ENCOUNTER FOR MEDICARE ANNUAL WELLNESS EXAM: ICD-10-CM

## 2025-02-10 ENCOUNTER — PATIENT MESSAGE (OUTPATIENT)
Dept: CARDIOLOGY | Facility: HOSPITAL | Age: 75
End: 2025-02-10
Payer: MEDICARE

## 2025-02-10 ENCOUNTER — TELEPHONE (OUTPATIENT)
Dept: CARDIOLOGY | Facility: HOSPITAL | Age: 75
End: 2025-02-10
Payer: MEDICARE

## 2025-02-10 DIAGNOSIS — Z95.810 PRESENCE OF AUTOMATIC (IMPLANTABLE) CARDIAC DEFIBRILLATOR: ICD-10-CM

## 2025-02-10 DIAGNOSIS — I50.42 CHRONIC COMBINED SYSTOLIC AND DIASTOLIC HEART FAILURE: Primary | ICD-10-CM

## 2025-02-10 NOTE — TELEPHONE ENCOUNTER
Spoke with the patient this afternoon regarding disconnected RHM. Pt states he is currently out of town for work but states that he brought his RHM with him. Will not be off of work until 7pm. Pt was instructed to send a manual remote transmission once he is near his RHM and instructed on how to do so. Will send [employer] phone # through the portal per pt request should he need assistance doing so.     Also advised that pt is overdue for annual follow up appt. Advised that currently Dr. Glasgow is booked until May/Jaye for appts. Pt requested an appt for the next available with Dr. Glasgow and states he is active on the portal to see when appt is made. Advised that I would make appts as soon as this call ended and he should notify the clinic if there is a conflict with these appt dates/times. He verbalized understanding and appreciated the call.

## 2025-02-15 ENCOUNTER — CLINICAL SUPPORT (OUTPATIENT)
Dept: CARDIOLOGY | Facility: HOSPITAL | Age: 75
End: 2025-02-15
Payer: MEDICARE

## 2025-02-15 ENCOUNTER — CLINICAL SUPPORT (OUTPATIENT)
Dept: CARDIOLOGY | Facility: HOSPITAL | Age: 75
End: 2025-02-15
Attending: INTERNAL MEDICINE
Payer: MEDICARE

## 2025-02-15 DIAGNOSIS — I25.5 ISCHEMIC CARDIOMYOPATHY: ICD-10-CM

## 2025-02-15 DIAGNOSIS — Z95.810 PRESENCE OF AUTOMATIC (IMPLANTABLE) CARDIAC DEFIBRILLATOR: ICD-10-CM

## 2025-02-15 DIAGNOSIS — I50.40 UNSPECIFIED COMBINED SYSTOLIC (CONGESTIVE) AND DIASTOLIC (CONGESTIVE) HEART FAILURE: ICD-10-CM

## 2025-02-15 PROCEDURE — 93296 REM INTERROG EVL PM/IDS: CPT | Performed by: INTERNAL MEDICINE

## 2025-02-15 PROCEDURE — 93295 DEV INTERROG REMOTE 1/2/MLT: CPT | Mod: ,,, | Performed by: INTERNAL MEDICINE

## 2025-02-21 LAB
OHS CV AF BURDEN PERCENT: < 1
OHS CV BIV PACING PERCENT: 95 %
OHS CV DC REMOTE DEVICE TYPE: NORMAL
OHS CV ICD SHOCK: NO

## 2025-03-18 ENCOUNTER — PATIENT MESSAGE (OUTPATIENT)
Dept: ADMINISTRATIVE | Facility: OTHER | Age: 75
End: 2025-03-18
Payer: MEDICARE

## 2025-03-20 ENCOUNTER — HOSPITAL ENCOUNTER (OUTPATIENT)
Dept: RADIOLOGY | Facility: HOSPITAL | Age: 75
Discharge: HOME | End: 2025-03-20
Attending: INTERNAL MEDICINE
Payer: MEDICARE

## 2025-03-20 ENCOUNTER — CLINICAL SUPPORT (OUTPATIENT)
Dept: OPHTHALMOLOGY | Facility: CLINIC | Age: 75
End: 2025-03-20
Payer: MEDICARE

## 2025-03-20 DIAGNOSIS — H40.1131 PRIMARY OPEN ANGLE GLAUCOMA (POAG) OF BOTH EYES, MILD STAGE: ICD-10-CM

## 2025-03-20 DIAGNOSIS — N18.32 STAGE 3B CHRONIC KIDNEY DISEASE: ICD-10-CM

## 2025-03-20 PROBLEM — E66.01 SEVERE OBESITY (BMI 35.0-39.9) WITH COMORBIDITY: Status: ACTIVE | Noted: 2025-03-20

## 2025-03-20 PROCEDURE — 76770 US EXAM ABDO BACK WALL COMP: CPT | Mod: 26,,, | Performed by: RADIOLOGY

## 2025-03-20 PROCEDURE — 76770 US EXAM ABDO BACK WALL COMP: CPT | Mod: TC

## 2025-03-20 NOTE — PROGRESS NOTES
Oct /hvf done ou./ rel/fix/coop. Good ou./ chart checked for latex allergy./ -1.;25 + .75 x 125/od -1.;25 + 1.;25 x 180/os-h

## 2025-03-20 NOTE — PROGRESS NOTES
Subjective:      Patient ID: Walter Bull is a 74 y.o. male.    Chief Complaint: Follow-up, Asthma, Cough, Wheezing, and Shortness of Breath    Pt is a 73 yo AAM pmh DM2 complicated by polyneuropathy, HFrEF with associated DD s/p CRT-d, CAD s.p CABG, SVT, VT, HLD, BPH, chronic prostatitis, obesity, CKD3, GERD, ANURADHA, previous tobacco use disorder who presents for evaluation of. Pt last seen by Ness Chance NP 3/3/22.      Smoking hx: quit 43 years ago.   Work hx: [employer] and  for [employer] work.   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 Sometimes in AM. Can happen at rest or with activity. Notes to have palpitations with episodes. May occur a couple of times in a week, but can go months without having an episode. Has appointment with sleep medicine next week.     Review of Systems   Constitutional:  Positive for fatigue. Negative for weight loss, weight gain and activity change.   HENT:  Negative for postnasal drip and congestion.    Respiratory:  Positive for dyspnea on extertion (mild) and Paroxysmal Nocturnal Dyspnea. Negative for cough, sputum production, chest tightness, shortness of breath, wheezing, orthopnea and use of rescue inhaler.    Cardiovascular:  Negative for chest pain, palpitations and leg swelling.   Musculoskeletal:  Positive for arthralgias.   Gastrointestinal:  Negative for nausea, vomiting and acid reflux.   Neurological:  Negative for dizziness, syncope and headaches.   Psychiatric/Behavioral:  Negative for confusion and sleep disturbance. The patient is not nervous/anxious.      Objective:     Physical Exam   Constitutional: He is oriented to person, place, and time. He appears well-developed and well-nourished. He appears not cachectic.   HENT:   Head:  "Normocephalic.   Mouth/Throat: Mallampati Score: I.   Cardiovascular: Normal rate.   Pulmonary/Chest: Normal expansion, symmetric chest wall expansion and effort normal.   Musculoskeletal:         General: Edema (1+ Right, 2+ left) present.   Neurological: He is alert and oriented to person, place, and time. Gait normal.   Psychiatric: He has a normal mood and affect. His behavior is normal. Judgment and thought content normal.   Vitals reviewed.    Personal Diagnostic Review    CT of chest performed on 1/16/25 without contrast revealed minimal scarring in the bases and RML. Unchanged scattered pulmonary nodules. Mild upper lobe predominant emphysema.     Echocardiogram: 8/24/22  The left ventricle is normal in size with moderately decreased systolic function.  The estimated ejection fraction is 30-35%. Abnormal septal motion 2/2 pacing vs LBBB and inf/IL akinesis.  Grade I left ventricular diastolic dysfunction.  Normal right ventricular size with normal right ventricular systolic function.  Mild left atrial enlargement.  Normal central venous pressure (3 mmHg).  The estimated PA systolic pressure is 32 mmHg.    Pulmonary function tests:   3/15/22  FEV1: 2.06L  (83.8 % predicted),   FVC:  2.89L (89.1 % predicted),   FEV1/FVC:  71        12/20/2024    10:18 AM 11/18/2024     8:10 AM 11/11/2024    11:12 AM 9/8/2024     4:27 PM 7/4/2024     4:51 PM 7/4/2024     4:49 PM 7/4/2024     3:56 PM   Pulmonary Function Tests   SpO2 99 % 98 %  98 % 99 %  98 %   Height 5' 9" (1.753 m) 5' 9" (1.753 m) 5' 9" (1.753 m) 5' 9" (1.753 m)  5' 9" (1.753 m) 5' 9" (1.753 m)   Weight 105 kg (231 lb 7.7 oz) 102.4 kg (225 lb 12 oz) 104.4 kg (230 lb 2.6 oz) 101.9 kg (224 lb 10.4 oz) 101.8 kg (224 lb 5.1 oz)  109.8 kg (242 lb)   BMI (Calculated) 34.2 33.3 34 33.2 33.1  35.7     Assessment:     1. Severe obesity (BMI 35.0-39.9) with comorbidity    2. Acute bacterial bronchitis         Encounter Medications[1]  No orders of the defined types " were placed in this encounter.    Plan:     1. Mild intermittent asthma without complication  Assessment & Plan:  Improved symptoms on ICS/LABA. Refilled PRN albuterol.     Orders:  -     albuterol (PROVENTIL HFA) 90 mcg/actuation inhaler; Inhale 2 puffs into the lungs every 6 (six) hours as needed for Wheezing. Rescue  Dispense: 18 g; Refill: 6    2. Severe obesity (BMI 35.0-39.9) with comorbidity  Assessment & Plan:  Complicates all aspect of care. Well known concordance between weight and control of Asthma. Improve diet and increase exercise      Other orders  -     Discontinue: albuterol (PROVENTIL HFA) 90 mcg/actuation inhaler; Inhale 2 puffs into the lungs every 6 (six) hours as needed for Wheezing. Rescue  Dispense: 18 g; Refill: 6  -     Discontinue: albuterol (PROVENTIL HFA) 90 mcg/actuation inhaler; Inhale 2 puffs into the lungs every 6 (six) hours as needed for Wheezing. Rescue  Dispense: 18 g; Refill: 6    Follow up in 6 months.     James Gilman MD  [facility]          [1]   Outpatient Encounter Medications as of 3/21/2025   Medication Sig Dispense Refill    albuterol (PROVENTIL HFA) 90 mcg/actuation inhaler Inhale 2 puffs into the lungs every 6 (six) hours as needed for Wheezing. Rescue 18 g 0    albuterol-ipratropium (DUO-NEB) 2.5 mg-0.5 mg/3 mL nebulizer solution Take 3 mLs by nebulization every 6 (six) hours as needed for Wheezing or Shortness of Breath. Rescue 75 mL 0    amiodarone (PACERONE) 200 MG Tab Take 1 tablet (200 mg total) by mouth once daily. 30 tablet 11    ammonium lactate 12 % Crea Apply topically.      apixaban (ELIQUIS) 5 mg Tab Take 1 tablet (5 mg total) by mouth 2 (two) times daily. 180 tablet 3    blood-glucose meter,continuous (DEXCOM G7 ) Misc Use to check glucose with sensors 1 each 0    blood-glucose sensor (DEXCOM G7 SENSOR) [place] 1 Device by Misc.(Non-Drug; Combo Route) route every 10 days. 3 each 11    carvediloL (COREG) 3.125 MG tablet Take 1 tablet (3.125 mg total) by  mouth 2 (two) times daily. 180 tablet 3    esomeprazole (NEXIUM) 40 MG capsule Take 1 capsule (40 mg total) by mouth 2 (two) times daily. 180 capsule 3    evolocumab (REPATHA SURECLICK) 140 mg/mL PnIj Inject 1 mL (140 mg total) into the skin every 14 (fourteen) days. 2 each 11    famotidine (PEPCID) 40 MG tablet Take 1 tablet (40 mg total) by mouth once daily. 90 tablet 3    ferrous sulfate (FEOSOL) 325 mg (65 mg iron) Tab tablet TAKE 1 TABLET BY MOUTH THREE TIMES DAILY 90 tablet 3    fluticasone-salmeterol diskus inhaler 250-50 mcg Inhale 1 puff into the lungs 2 (two) times daily. Controller 60 each 11    furosemide (LASIX) 20 MG tablet Take 1 tablet (20 mg total) by mouth once daily. 90 tablet 3    glucagon (GVOKE HYPOPEN 2-PACK) 1 mg/0.2 mL AtIn Inject 1 Pen into the skin as needed (hypoglycemia). 0.4 mL 2    insulin aspart U-100 (NOVOLOG U-100 INSULIN ASPART) 100 unit/mL injection Use as directed via VGO 20, max daily dose 56 units. 60 mL 3    ketoconazole (NIZORAL) 2 % cream Apply topically once daily. 60 g 2    LIDOcaine 3 % Crea Apply 1 application  topically 2 (two) times a day. 85 g 3    LIDOcaine HCL 2% (XYLOCAINE) 2 % jelly Apply topically as needed. Apply topically once nightly to affected part of foot/feet, for pain. 30 mL 2    losartan (COZAAR) 25 MG tablet Take 0.5 tablets (12.5 mg total) by mouth once daily. 45 tablet 3    mupirocin (BACTROBAN) 2 % ointment Apply to affected area 3 times daily 22 g 1    mupirocin (BACTROBAN) 2 % ointment Apply topically 3 (three) times daily. 15 g 0    nitroGLYCERIN (NITROSTAT) 0.4 MG SL tablet Place 1 tablet (0.4 mg total) under the tongue every 5 (five) minutes as needed for Chest pain. 75 tablet 4    ondansetron (ZOFRAN) 4 MG tablet Take 1 tablet (4 mg total) by mouth every 8 (eight) hours as needed for Nausea. 15 tablet 0    ranolazine (RANEXA) 1,000 mg Tb12 Take 1 tablet (1,000 mg total) by mouth 2 (two) times daily. 180 tablet 3    rosuvastatin (CRESTOR) 40 MG  Tab Take 1 tablet (40 mg total) by mouth once daily. 90 tablet 3    spironolactone (ALDACTONE) 25 MG tablet Take 1 tablet (25 mg total) by mouth once daily. 90 tablet 3    tamsulosin (FLOMAX) 0.4 mg Cap Take 1 capsule (0.4 mg total) by mouth once daily. 90 capsule 3    tirzepatide 7.5 mg/0.5 mL PnIj Inject 7.5 mg into the skin every 7 days. 4 pen 11    tirzepatide 7.5 mg/0.5 mL PnIj Inject 7.5 mg into the skin every 7 days. 4 Pen 11    V-GO 20 [place] 1 Device by Misc.(Non-Drug; Combo Route) route once daily. 30 each 11     No facility-administered encounter medications on file as of 3/21/2025.

## 2025-03-21 ENCOUNTER — OFFICE VISIT (OUTPATIENT)
Dept: PULMONOLOGY | Facility: CLINIC | Age: 75
End: 2025-03-21
Payer: MEDICARE

## 2025-03-21 ENCOUNTER — CLINICAL SUPPORT (OUTPATIENT)
Dept: AUDIOLOGY | Facility: CLINIC | Age: 75
End: 2025-03-21
Payer: MEDICARE

## 2025-03-21 ENCOUNTER — OFFICE VISIT (OUTPATIENT)
Dept: OTOLARYNGOLOGY | Facility: CLINIC | Age: 75
End: 2025-03-21
Payer: MEDICARE

## 2025-03-21 ENCOUNTER — RESULTS FOLLOW-UP (OUTPATIENT)
Dept: INTERNAL MEDICINE | Facility: CLINIC | Age: 75
End: 2025-03-21

## 2025-03-21 VITALS
WEIGHT: 250 LBS | BODY MASS INDEX: 37.03 KG/M2 | DIASTOLIC BLOOD PRESSURE: 74 MMHG | OXYGEN SATURATION: 99 % | HEIGHT: 69 IN | SYSTOLIC BLOOD PRESSURE: 142 MMHG | HEART RATE: 60 BPM

## 2025-03-21 DIAGNOSIS — E66.01 SEVERE OBESITY (BMI 35.0-39.9) WITH COMORBIDITY: ICD-10-CM

## 2025-03-21 DIAGNOSIS — J45.20 MILD INTERMITTENT ASTHMA WITHOUT COMPLICATION: Primary | ICD-10-CM

## 2025-03-21 DIAGNOSIS — H90.3 BILATERAL SENSORINEURAL HEARING LOSS: Primary | ICD-10-CM

## 2025-03-21 DIAGNOSIS — H90.3 SENSORINEURAL HEARING LOSS, BILATERAL: Primary | ICD-10-CM

## 2025-03-21 PROCEDURE — 99999 PR PBB SHADOW E&M-EST. PATIENT-LVL III: CPT | Mod: PBBFAC,,, | Performed by: INTERNAL MEDICINE

## 2025-03-21 RX ORDER — ALBUTEROL SULFATE 90 UG/1
2 INHALANT RESPIRATORY (INHALATION) EVERY 6 HOURS PRN
Qty: 18 G | Refills: 6 | Status: SHIPPED | OUTPATIENT
Start: 2025-03-21 | End: 2025-03-21

## 2025-03-21 RX ORDER — ALBUTEROL SULFATE 90 UG/1
2 INHALANT RESPIRATORY (INHALATION) EVERY 6 HOURS PRN
Qty: 18 G | Refills: 6 | Status: SHIPPED | OUTPATIENT
Start: 2025-03-21

## 2025-03-21 NOTE — PROGRESS NOTES
Ear, Nose, & Throat  Otolaryngology - Head & Neck Surgery    Summary of Visit:  Walter Bull was self-referred for No chief complaint on file.      Subjective:     Chief Complaint: No chief complaint on file.      Walter Bull is a 74 y.o. male who was self-referred for evaluation for hearing aids.  He has a longstanding history of bilateral sensorineural hearing loss and has worn hearing aids for several years.  He is interested in obtaining a new set of aids.  He has not experienced any recent otalgia, otorrhea, vertigo, tinnitus.    Past Medical History  Active Ambulatory Problems     Diagnosis Date Noted    Coronary artery disease of native artery of native heart with stable angina pectoris 01/11/2017    Type 2 diabetes mellitus with diabetic nephropathy, with long-term current use of insulin 01/11/2017    Diabetic polyneuropathy associated with type 2 diabetes mellitus 01/11/2017    HLD (hyperlipidemia) 01/11/2017    S/P CABG (coronary artery bypass graft) 01/11/2017    History of tobacco use 01/11/2017    Gastroesophageal reflux disease 01/11/2017    Benign essential HTN 07/19/2017    Benign prostatic hyperplasia with urinary frequency 07/19/2017    Iron deficiency anemia 02/21/2018    Ischemic cardiomyopathy 09/26/2018    Ventricular tachycardia 11/16/2018    Wide-complex tachycardia 11/16/2018    Bradycardia 11/17/2018    Presence of cardiac resynchronization therapy defibrillator (CRT-D) 11/28/2018    Chronic combined systolic and diastolic heart failure 05/18/2019    ANURADHA (obstructive sleep apnea)     Chronic prostatitis 08/01/2019    Vitamin D insufficiency 12/05/2019    CKD (chronic kidney disease) stage 3, GFR 30-59 ml/min 04/23/2020    Fatty liver 05/12/2021    Orthostatic hypotension 05/24/2021    Aortic atherosclerosis     Hyperparathyroidism due to renal insufficiency     SVT (supraventricular tachycardia)     Amputation, finger, traumatic, sequela 01/26/2022    Bilateral hearing loss  01/26/2022    Colon polyp 08/22/2022    Paroxysmal atrial fibrillation 01/12/2023    Class 1 obesity due to excess calories with serious comorbidity and body mass index (BMI) of 34.0 to 34.9 in adult 05/10/2023    Type 2 diabetes mellitus with stage 3b chronic kidney disease, with long-term current use of insulin 07/18/2023    Type 2 diabetes mellitus with ophthalmic complication, with long-term current use of insulin 07/18/2023    Mild intermittent asthma without complication 12/20/2024    Severe obesity (BMI 35.0-39.9) with comorbidity 03/20/2025     Resolved Ambulatory Problems     Diagnosis Date Noted    Colon cancer screening 02/02/2017    Chest pain 09/13/2018    NSTEMI (non-ST elevated myocardial infarction) 09/13/2018    BMI 37.0-37.9, adult 09/25/2018    Chest pain 11/16/2018    Thrombosed vein secondary to IV placement 11/28/2018    Chest pain 05/17/2019    Epididymitis 05/17/2019    Hypomagnesemia 05/18/2019    Acute kidney injury superimposed on CKD 05/18/2019    Acute cystitis with hematuria 06/24/2019    Dizziness 06/24/2019    Stage 3a chronic kidney disease 06/24/2019    Physical debility 06/24/2019    Hyponatremia 06/24/2019    RBD (REM behavioral disorder)     Unstable angina 10/06/2019    Long term current use of amiodarone     Pulmonary emphysema 02/26/2020    Lightheadedness 04/22/2020    Thrombocytopenia 05/12/2021    Nausea 05/24/2021    Hypertension     Simple chronic bronchitis     Shortness of breath 03/03/2022     Past Medical History:   Diagnosis Date    Anemia     Angina pectoris     Anticoagulant long-term use     Arthritis     Back pain     CHF (congestive heart failure)     Chronic bronchitis     Coronary artery disease     Diabetes mellitus type I     Diabetes with neurologic complications     Disorder of kidney and ureter     Encounter for blood transfusion     Glaucoma     Heart attack     Heart disease     Hyperlipidemia     Iron deficiency     Kidney failure     Obesity      Pneumonia     Polyneuropathy     Renal manifestation of secondary diabetes mellitus     S/P CABG x 5 01/11/2017    Sleep apnea     Trouble in sleeping     Type 2 diabetes mellitus with ophthalmic manifestations     Urinary incontinence        Past Surgical History  He has a past surgical history that includes Eye surgery (Bilateral, 2016); Colon surgery (2006); Colonoscopy (N/A, 2/2/2017); Coronary artery bypass graft (2005); Left heart catheterization (Left, 11/16/2018); Coronary bypass graft angiography (11/16/2018); Cardiac electrophysiology study (N/A, 11/19/2018); Insertion of implantable cardioverter-defibrillator (ICD) generator with two existing leads (Left, 11/19/2018); Cardiac defibrillator placement; Cardiac electrophysiology study (N/A, 11/19/2018); Esophageal manometry with measurement of impedance (N/A, 3/10/2022); 24 hour impedance pH monitoring of esophagus in patient taking acid reducing medications (N/A, 3/10/2022); Insertion of biventricular implantable cardioverter-defibrillator (ICD) (Left, 4/7/2022); Esophagogastroduodenoscopy (N/A, 4/25/2022); and Colonoscopy (N/A, 4/25/2022).    Past Surgical History:   Procedure Laterality Date    24 HOUR IMPEDANCE PH MONITORING OF ESOPHAGUS IN PATIENT TAKING ACID REDUCING MEDICATIONS N/A 3/10/2022    Procedure: IMPEDANCE PH STUDY, ESOPHAGEAL, 24 HOUR, IN PATIENT TAKING ACID REDUCING MEDICATION;  Surgeon: Carmelita Jacobsen MD;  Location: [facility] ENDO ([address] [place] [address]);  Service: Endoscopy;  Laterality: N/A;  ICD-  fully vaccinated  Yes he can hold Plavix 5 days prior to endoscopies and restart post procedure when safe from a operator perspective per Dr.A. Canada    CARDIAC DEFIBRILLATOR PLACEMENT      CARDIAC ELECTROPHYSIOLOGY STUDY N/A 11/19/2018    Procedure: CARDIAC ELECTROPHYSIOLOGY STUDY;  Surgeon: Byron Glasgow MD;  Location: [facility] EP LAB;  Service: Cardiology;  Laterality: N/A;  VT, EPS +/- ICD, SJM, anes, GP, 6086    CARDIAC ELECTROPHYSIOLOGY STUDY N/A  11/19/2018    Procedure: CARDIAC ELECTROPHYSIOLOGY STUDY;  Surgeon: Byron Glasgow MD;  Location: [facility] EP LAB;  Service: Cardiology;  Laterality: N/A;    COLON SURGERY  2006    COLONOSCOPY N/A 2/2/2017    Procedure: COLONOSCOPY;  Surgeon: Ronnie Conway MD;  Location: [facility] ENDO;  Service: Endoscopy;  Laterality: N/A;    COLONOSCOPY N/A 4/25/2022    Procedure: COLONOSCOPY;  Surgeon: Branden Bush MD;  Location: [facility] ENDO (2ND FLR);  Service: Endoscopy;  Laterality: N/A;    CORONARY ARTERY BYPASS GRAFT  2005    5 arteries    CORONARY BYPASS GRAFT ANGIOGRAPHY  11/16/2018    Procedure: Bypass graft study;  Surgeon: Ryan Schmidt MD;  Location: [facility] CATH LAB;  Service: Cardiology;;    ESOPHAGEAL MANOMETRY WITH MEASUREMENT OF IMPEDANCE N/A 3/10/2022    Procedure: MANOMETRY, ESOPHAGUS, WITH IMPEDANCE MEASUREMENT;  Surgeon: Carmelita Jacobsen MD;  Location: [facility] ENDO (4TH FLR);  Service: Endoscopy;  Laterality: N/A;  RAPID COVID test, pt to arrive for 6:00 am-Kpvt    ESOPHAGOGASTRODUODENOSCOPY N/A 4/25/2022    Procedure: EGD (ESOPHAGOGASTRODUODENOSCOPY);  Surgeon: Branden Bush MD;  Location: [facility] ENDO (2ND FLR);  Service: Endoscopy;  Laterality: N/A;  Bravo procedure canceled- pt has an ICD ([employer])- will do 24h pH study instead  ok to hold Brilinta and Plavix-RB  most recent ECHO 4/2022- EF 25%    EYE SURGERY Bilateral 2016    Laser surgery for glaucoma    INSERTION OF BIVENTRICULAR IMPLANTABLE CARDIOVERTER-DEFIBRILLATOR (ICD) Left 4/7/2022    Procedure: INSERTION, ICD, BIVENTRICULAR;  Surgeon: Byron Glasgow MD;  Location: [facility] EP LAB;  Service: Cardiology;  Laterality: Left;  HF/ICM, Venogram prior to opening, CRT-D upgrade, SJM, MAC, GP, 3 PREP    INSERTION OF IMPLANTABLE CARDIOVERTER-DEFIBRILLATOR (ICD) GENERATOR WITH TWO EXISTING LEADS Left 11/19/2018    Procedure: INSERTION, DUAL ICD;  Surgeon: Byron Glasgow MD;  Location: [facility] EP LAB;  Service: Cardiology;  Laterality: Left;  VT, EPS +/- ICD, SJM,  yanet, GP, 0503    LEFT HEART CATHETERIZATION Left 11/16/2018    Procedure: Left heart cath;  Surgeon: Ryan Schmdit MD;  Location: [facility] CATH LAB;  Service: Cardiology;  Laterality: Left;        Family History  His family history includes Diabetes in his mother and sister; Heart attack in his brother; Heart disease in his mother and sister; Hypertension in his sister.    Social History  He reports that he quit smoking about 44 years ago. His smoking use included cigarettes. He started smoking about 62 years ago. He has a 53.9 pack-year smoking history. He has been exposed to tobacco smoke. He quit smokeless tobacco use about 41 years ago.  His smokeless tobacco use included snuff and chew. He reports that he does not drink alcohol and does not use drugs.    Allergies  He has no known allergies.    Medications  He has a current medication list which includes the following prescription(s): albuterol, albuterol-ipratropium, amiodarone, ammonium lactate, apixaban, dexcom g7 , dexcom g7 sensor, carvedilol, esomeprazole, repatha sureclick, famotidine, ferrous sulfate, fluticasone-salmeterol 250-50 mcg/dose, furosemide, gvoke hypopen 2-pack, insulin aspart u-100, ketoconazole, lidocaine, lidocaine hcl 2%, losartan, mupirocin, mupirocin, nitroglycerin, ondansetron, ranolazine, rosuvastatin, spironolactone, tamsulosin, tirzepatide, tirzepatide, and v-go 20.    ROS:  Pertinent positive and negative review of systems as noted in HPI.     Objective:     There were no vitals taken for this visit.   General Appearance:   Awake, Alert and Oriented. NAD. Appropriate affect and appearance      Neuro:   Spontaneous eye opening, appropriate verbal responses, follows commands  Pupils equal, round & brisk. EOMI, no proptosis  Face is symmetric, HB I, non-edematous bilaterally  Vision grossly intact, Hearing grossly intact     Head and Face:   skin is intact with no lesions noted.  Parotid and submandibular glands are  symmetric and non-tender.      Ears:  Periauricular regions non-erythematous, non-fluctuanct non-tender  Pinna normal bilaterally, no skin lesions  EACs patent and without drainage bilaterally   Tympanic membranes are normal in appearance bilaterally.  No middle ear effusion noted bilaterally.    Nose:   External nose is symmetric, no skin lesions  Septum midline, No inferior turbinate hypertrophy, No polyps or rhinorrhea     OC/OP:  Tongue midline on extension, non-edematous, soft  No labial, buccal, oral tongue or floor of mouth lesions  Soft palate symmetric, midline and without lesions or masses, tonsils symmetric  No masses or lesions of the visualized oropharynx     Neck:  Neck is symmetric, non-edematous, non-erythematous  Trachea is midline and easily palpable,  No palpable adenopathy or masses in levels I-VI  No thyroid nodules or masses, non-tender      Respiratory:  Normal work of breathing, no accessory muscle use, no stridor     Voice:  Normal vocal quality, volume and articulation    Data Review:   LABS    IMAGING        AUDIO      Procedures:       Assessment:     Bilateral sensorineural hearing loss    Plan:     I had a long discussion with the patient regarding his condition. I believe that he is an appropriate candidate for hearing aids. I will give him the contact information of Mona Crowley () our hearing aid coordinator. I also discussed with him the need to repeat his audiogram in 1 year. He was afforded an opportunity to ask questions. He showed good understanding and agreed with this plan.     No orders of the defined types were placed in this encounter.         Problem List Items Addressed This Visit    None

## 2025-03-21 NOTE — ASSESSMENT & PLAN NOTE
Complicates all aspect of care. Well known concordance between weight and control of Asthma. Improve diet and increase exercise

## 2025-03-21 NOTE — PROGRESS NOTES
Walter Bull, a 74 y.o. male, was seen today in the clinic for an audiologic evaluation.  The patient's main complaint was hearing loss.  Mr. Bull reported a history of hearing loss and that he currently has hearing aids purchased from another provider.  He recently changed insurance companies and is interested in new hearing aids.  He reported intermittent tinnitus.  He denied otalgia, aural fullness, and vertigo.    Tympanometry revealed Type A in the right ear and Type A in the left ear. Audiogram results revealed mild sloping to moderate sensorineural hearing loss (SNHL) in the right ear and mild sloping to moderate SNHL in the left ear.  Speech reception thresholds were noted at 50 dB in the right ear and 40 dB in the left ear.  Speech discrimination scores were 92% in the right ear and 84% in the left ear.    Recommendations:  Otologic evaluation  Annual audiogram  Hearing protection when in noise  Hearing aid consultation

## 2025-03-24 ENCOUNTER — CLINICAL SUPPORT (OUTPATIENT)
Dept: AUDIOLOGY | Facility: CLINIC | Age: 75
End: 2025-03-24
Payer: MEDICARE

## 2025-03-24 ENCOUNTER — OFFICE VISIT (OUTPATIENT)
Dept: INTERNAL MEDICINE | Facility: CLINIC | Age: 75
End: 2025-03-24
Payer: MEDICARE

## 2025-03-24 VITALS
HEIGHT: 69 IN | RESPIRATION RATE: 18 BRPM | WEIGHT: 244.06 LBS | DIASTOLIC BLOOD PRESSURE: 64 MMHG | HEART RATE: 60 BPM | OXYGEN SATURATION: 99 % | SYSTOLIC BLOOD PRESSURE: 100 MMHG | BODY MASS INDEX: 36.15 KG/M2

## 2025-03-24 DIAGNOSIS — G47.33 OSA (OBSTRUCTIVE SLEEP APNEA): ICD-10-CM

## 2025-03-24 DIAGNOSIS — K76.0 FATTY LIVER: ICD-10-CM

## 2025-03-24 DIAGNOSIS — E11.22 TYPE 2 DIABETES MELLITUS WITH STAGE 3B CHRONIC KIDNEY DISEASE, WITH LONG-TERM CURRENT USE OF INSULIN: ICD-10-CM

## 2025-03-24 DIAGNOSIS — N18.32 STAGE 3B CHRONIC KIDNEY DISEASE: ICD-10-CM

## 2025-03-24 DIAGNOSIS — H91.93 BILATERAL HEARING LOSS, UNSPECIFIED HEARING LOSS TYPE: ICD-10-CM

## 2025-03-24 DIAGNOSIS — Z79.4 TYPE 2 DIABETES MELLITUS WITH OTHER OPHTHALMIC COMPLICATION, WITH LONG-TERM CURRENT USE OF INSULIN: ICD-10-CM

## 2025-03-24 DIAGNOSIS — H90.3 SENSORINEURAL HEARING LOSS (SNHL) OF BOTH EARS: Primary | ICD-10-CM

## 2025-03-24 DIAGNOSIS — N40.1 BENIGN PROSTATIC HYPERPLASIA WITH URINARY FREQUENCY: ICD-10-CM

## 2025-03-24 DIAGNOSIS — I70.0 AORTIC ATHEROSCLEROSIS: ICD-10-CM

## 2025-03-24 DIAGNOSIS — I25.118 CORONARY ARTERY DISEASE OF NATIVE ARTERY OF NATIVE HEART WITH STABLE ANGINA PECTORIS: Chronic | ICD-10-CM

## 2025-03-24 DIAGNOSIS — E66.812 CLASS 2 SEVERE OBESITY DUE TO EXCESS CALORIES WITH SERIOUS COMORBIDITY AND BODY MASS INDEX (BMI) OF 36.0 TO 36.9 IN ADULT: ICD-10-CM

## 2025-03-24 DIAGNOSIS — E66.01 CLASS 2 SEVERE OBESITY DUE TO EXCESS CALORIES WITH SERIOUS COMORBIDITY AND BODY MASS INDEX (BMI) OF 36.0 TO 36.9 IN ADULT: ICD-10-CM

## 2025-03-24 DIAGNOSIS — I48.0 PAROXYSMAL ATRIAL FIBRILLATION: ICD-10-CM

## 2025-03-24 DIAGNOSIS — I47.10 SVT (SUPRAVENTRICULAR TACHYCARDIA): ICD-10-CM

## 2025-03-24 DIAGNOSIS — R35.0 BENIGN PROSTATIC HYPERPLASIA WITH URINARY FREQUENCY: ICD-10-CM

## 2025-03-24 DIAGNOSIS — N18.32 TYPE 2 DIABETES MELLITUS WITH STAGE 3B CHRONIC KIDNEY DISEASE, WITH LONG-TERM CURRENT USE OF INSULIN: ICD-10-CM

## 2025-03-24 DIAGNOSIS — I50.42 CHRONIC COMBINED SYSTOLIC AND DIASTOLIC HEART FAILURE: Chronic | ICD-10-CM

## 2025-03-24 DIAGNOSIS — E78.49 OTHER HYPERLIPIDEMIA: Chronic | ICD-10-CM

## 2025-03-24 DIAGNOSIS — Z79.4 TYPE 2 DIABETES MELLITUS WITH STAGE 3B CHRONIC KIDNEY DISEASE, WITH LONG-TERM CURRENT USE OF INSULIN: ICD-10-CM

## 2025-03-24 DIAGNOSIS — N25.81 HYPERPARATHYROIDISM DUE TO RENAL INSUFFICIENCY: ICD-10-CM

## 2025-03-24 DIAGNOSIS — Z89.022 HISTORY OF AMPUTATION OF FINGER OF LEFT HAND: ICD-10-CM

## 2025-03-24 DIAGNOSIS — J45.20 MILD INTERMITTENT ASTHMA WITHOUT COMPLICATION: ICD-10-CM

## 2025-03-24 DIAGNOSIS — Z00.00 ENCOUNTER FOR MEDICARE ANNUAL WELLNESS EXAM: Primary | ICD-10-CM

## 2025-03-24 DIAGNOSIS — Z95.1 S/P CABG (CORONARY ARTERY BYPASS GRAFT): ICD-10-CM

## 2025-03-24 DIAGNOSIS — E11.42 DIABETIC POLYNEUROPATHY ASSOCIATED WITH TYPE 2 DIABETES MELLITUS: ICD-10-CM

## 2025-03-24 DIAGNOSIS — E11.39 TYPE 2 DIABETES MELLITUS WITH OTHER OPHTHALMIC COMPLICATION, WITH LONG-TERM CURRENT USE OF INSULIN: ICD-10-CM

## 2025-03-24 PROCEDURE — 99999 PR PBB SHADOW E&M-EST. PATIENT-LVL V: CPT | Mod: PBBFAC,,, | Performed by: NURSE PRACTITIONER

## 2025-03-24 PROCEDURE — 99499 UNLISTED E&M SERVICE: CPT | Mod: S$GLB,,, | Performed by: AUDIOLOGIST

## 2025-03-24 NOTE — PROGRESS NOTES
Hearing Aid Consult    Walter Bull, 74 year old male, was seen for a hearing aid consult. Patient was counseled on his most recent hearing test. Patient reported he has been experiencing difficulty understanding and hearing people in general. Patient reported he is interested in hearing aids, but he thought hearing aids were free with [employer]. Patient was informed that he gets $2,000 annually for hearing services that can be used towards hearing aids. However he would have to pay about $236 out of pocket to cover the cost of entry level hearing aids. Patient was informed that he needs to contact the number on his insurance card to obtain his flex card in order to use the money to pay for the hearing aids. Patient was encouraged to contact us when he has his flex account information to order the hearing aids. (Oticon Intent 4 hearing aids in the color black)   Patient Instructions by Sariah Villanueva MD at 09/19/17 09:02 AM     Author:  Sariah Villanueva MD Service:  (none) Author Type:  Physician     Filed:  09/19/17 09:03 AM Encounter Date:  9/19/2017 Status:  Signed     :  Sariah Villanueva MD (Physician)            Blood work today to check for inflammatory arthritis    Stop ibuprofen and try meloxicam instead, one per day take with food    Podiatry will call you to schedule    Follow-Up  -- You have been referred to:Podiatry at the [facility] or the [facility] - Phone # is [phone]. Additional Educational Resources: For additional resources regarding your symptoms, diagnosis, or further health information, please visit the Health Resources section on [email or web address] or the Online Health Resources section in [employer].             Revision History        User Key Date/Time User Provider Type Action    > [N/A] 09/19/17 09:03 AM Sariah Villanueva MD Physician Sign

## 2025-03-24 NOTE — PATIENT INSTRUCTIONS
Counseling and Referral of Other Preventative  (Italic type indicates deductible and co-insurance are waived)    Patient Name: Walter Bull  Today's Date: 3/24/2025    Health Maintenance       Date Due Completion Date    Shingles Vaccine (1 of 2) Never done ---    RSV Vaccine (Age 60+ and Pregnant patients) (1 - 1-dose 75+ series) Never done ---    COVID-19 Vaccine (6 - 2024-25 season) 09/01/2024 1/5/2024    Hemoglobin A1c 05/11/2025 11/11/2024    Diabetes Urine Screening 05/13/2025 5/13/2024    Lipid Panel 11/11/2025 11/11/2024    Diabetic Eye Exam 01/16/2026 1/16/2025    TETANUS VACCINE 05/26/2030 5/26/2020        No orders of the defined types were placed in this encounter.      The following information is provided to all patients.  This information is to help you find resources for any of the problems found today that may be affecting your health:                  Living healthy guide: www.[facility].[place].gov      Understanding Diabetes: www.diabetes.org      Eating healthy: www.cdc.gov/healthyweight      CDC home safety checklist: www.cdc.gov/steadi/patient.html      Agency on Aging: www.goea.[place].gov      Alcoholics anonymous (AA): www.aa.org      Physical Activity: www.shahbaz.nih.gov/vy4fazz      Tobacco use: www.quitwithusla.org

## 2025-03-24 NOTE — PROGRESS NOTES
"        Walter Bull presented for a  Medicare AWV and comprehensive Health Risk Assessment today. The following components were reviewed and updated:    Medical history  Family History  Social history  Allergies and Current Medications  Health Risk Assessment  Health Maintenance  Care Team         ** See Completed Assessments for Annual Wellness Visit within the encounter summary.**         The following assessments were completed:  Living Situation  CAGE  Depression Screening  Timed Get Up and Go  Whisper Test  Cognitive Function Screening  Nutrition Screening  ADL Screening  PAQ Screening      Opioid documentation:      Patient does not have a current opioid prescription.       reviewed and he as not filled anything controlled   Vitals:    03/24/25 1410   BP: 100/64   Pulse: 60   Resp: 18   SpO2: 99%   Weight: 110.7 kg (244 lb 0.8 oz)   Height: 5' 9" (1.753 m)     Body mass index is 36.04 kg/m².  Physical Exam  Vitals and nursing note reviewed.   Constitutional:       Appearance: Normal appearance. He is obese.   HENT:      Head: Normocephalic and atraumatic.   Cardiovascular:      Rate and Rhythm: Normal rate and regular rhythm.   Pulmonary:      Effort: Pulmonary effort is normal.      Breath sounds: Normal breath sounds.   Musculoskeletal:         General: No swelling. Normal range of motion.   Skin:     General: Skin is warm and dry.      Capillary Refill: Capillary refill takes less than 2 seconds.   Neurological:      General: No focal deficit present.      Mental Status: He is alert and oriented to person, place, and time. Mental status is at baseline.   Psychiatric:         Mood and Affect: Mood normal.         Behavior: Behavior normal.         Thought Content: Thought content normal.         Judgment: Judgment normal.               Diagnoses and health risks identified today and associated recommendations/orders:    Problem List Items Addressed This Visit         Encounter for Medicare annual " "wellness exam    -  Primary   PCP Dr Kruger and he had a full set of labs 11/2024 with PSA  and is scheduled to RTC 6 months for report labs 5/2025  Colonoscopy was 2022> No repeat colonoscopy due to age and                          comorbidities   Discussed shingrix and RSV vaccines - discussed but unsure if he will take them        Coronary artery disease of native artery of native heart with stable angina pectoris (Chronic)  He has nitro but rarely takes it   He had bipass but report that "it is all clogged up again"   On crestor and repatha         HLD (hyperlipidemia) (Chronic)  On crestor and repatha         Chronic combined systolic and diastolic heart failure (Chronic)    Overview   Echo 2022> The left ventricle is normal in size with moderately decreased systolic function.  The estimated ejection fraction is 30-35%. Abnormal septal motion 2/2 pacing vs LBBB and inf/IL akinesis.  Grade I left ventricular diastolic dysfunction.  Normal right ventricular size with normal right ventricular systolic function.  Mild left atrial enlargement.  Normal central venous pressure (3 mmHg).  The estimated PA systolic pressure is 32 mmHg.    On coreg/lasix/losartan aldactone and renexa          Diabetic polyneuropathy associated with type 2 diabetes mellitus  Lab Results   Component Value Date    HGBA1C 6.3 (H) 11/11/2024     Well controlled on novolog via V Go and and mounjaro 7.5 weekly   Sees [facility]       S/P CABG (coronary artery bypass graft)  He has nitro but rarely takes it   He had bipass but report that "it is all clogged up again"   On crestor and repatha       Benign prostatic hyperplasia with urinary frequency  Discussed cont to follow up with urology   PSA, Screen (ng/mL)   Date Value   11/11/2024 1.4   On flomax         ANURADHA (obstructive sleep apnea)  Has CPAP   Encouraged use       CKD (chronic kidney disease) stage 3, GFR 30-59 ml/min  BMP  Lab Results   Component Value Date     01/13/2025    K " 4.8 01/13/2025     01/13/2025    CO2 30 (H) 01/13/2025    BUN 21 01/13/2025    CREATININE 1.8 (H) 01/13/2025    CALCIUM 9.1 01/13/2025    ANIONGAP 5 (L) 01/13/2025    EGFRNORACEVR 39 (A) 01/13/2025   Stable   Consider SGLT2      Fatty liver  Lab Results   Component Value Date    ALT 19 11/11/2024    AST 19 11/11/2024    ALKPHOS 76 11/11/2024    BILITOT 0.4 11/11/2024     Well controlled       Aortic atherosclerosis    Overview   repatha and statin  CT chest 1/20205> Calcified atheromatous disease affects the aorta and its branch vessels.         Hyperparathyroidism due to renal insufficiency  Lab Results   Component Value Date    .0 (H) 07/08/2022    CALCIUM 9.1 01/13/2025    PHOS 3.6 07/08/2022     Discussed continue to follow up with endocrine       SVT (supraventricular tachycardia)    Overview   3 SVT>Noted on  remote interrogation  of pacemeaker lasted 2 min 24sec 12/2021  On coreg            History of amputation of finger of left hand    Overview   2000- healed well         Bilateral hearing loss    Overview   Uses hearing aids bilaterally         Paroxysmal atrial fibrillation  Discussed to cont with electrophysiologist  On pacerone/eliquis and coreg  Has pacemaker ICD      Class 2 severe obesity due to excess calories with serious comorbidity and body mass index (BMI) of 36.0 to 36.9 in adult  On Mounjaro   Encourage diet and exercise- 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 SGLT2?    Pt has dexcom and novolog per  v-go on moinjaro 7.5 weekly   Follows with [facility]           Type 2 diabetes mellitus with ophthalmic complication, with long-term current use of insulin  Hx of glaucoma  Pt has dexcom and novolog per v-go on moinjaro 7.5 weekly   Discussed he cont  with [facility]  Lab Results   Component Value Date    HGBA1C 6.3 (H) 11/11/2024           Mild intermittent asthma without complication    Overview   Has albuterol inhaler as needed and duonebs   Uses advair daily   CXR >chronic lung changes and scarring               Provided Walter with a 5-10 year written screening schedule and personal prevention plan. Recommendations were developed using the USPSTF age appropriate recommendations. Education, counseling, and referrals were provided as needed. After Visit Summary printed and given to patient which includes a list of additional screenings\tests needed.    No follow-ups on file.    Anastasia Patel, NP           He I offered to discuss advanced care planning, including how to pick a person who would make decisions for you if you were unable to make them for yourself, called a health care power of , and what kind of decisions you might make such as use of life sustaining treatments such as ventilators and tube feeding when faced with a life limiting illness recorded on a living will that they will need to know. (How you want to be cared for as you near the end of your natural life)     X  Patient has advanced directives on file, they would like to make changes. I provided information on how to revoke their previous directives and make new ones. He is leally  and has 6 children- he wants to revoke his living will on the system because he would want everything done. He does want to appoint 2 children as his decision makers - will complete the POA and return he copies when he comes back to PCP visit scheduled in May

## 2025-03-25 ENCOUNTER — OFFICE VISIT (OUTPATIENT)
Dept: PODIATRY | Facility: CLINIC | Age: 75
End: 2025-03-25
Payer: MEDICARE

## 2025-03-25 VITALS
DIASTOLIC BLOOD PRESSURE: 64 MMHG | SYSTOLIC BLOOD PRESSURE: 106 MMHG | HEART RATE: 60 BPM | HEIGHT: 69 IN | BODY MASS INDEX: 35.69 KG/M2 | WEIGHT: 240.94 LBS

## 2025-03-25 DIAGNOSIS — B35.1 ONYCHOMYCOSIS DUE TO DERMATOPHYTE: ICD-10-CM

## 2025-03-25 DIAGNOSIS — M19.071 PRIMARY OSTEOARTHRITIS OF BOTH FEET: ICD-10-CM

## 2025-03-25 DIAGNOSIS — M79.671 PAIN IN BOTH FEET: ICD-10-CM

## 2025-03-25 DIAGNOSIS — E11.42 DIABETIC POLYNEUROPATHY ASSOCIATED WITH TYPE 2 DIABETES MELLITUS: Primary | ICD-10-CM

## 2025-03-25 DIAGNOSIS — M79.672 PAIN IN BOTH FEET: ICD-10-CM

## 2025-03-25 DIAGNOSIS — M19.072 PRIMARY OSTEOARTHRITIS OF BOTH FEET: ICD-10-CM

## 2025-03-25 DIAGNOSIS — L84 CORN OR CALLUS: ICD-10-CM

## 2025-03-25 PROCEDURE — 99999 PR PBB SHADOW E&M-EST. PATIENT-LVL V: CPT | Mod: PBBFAC,,, | Performed by: PODIATRIST

## 2025-03-25 RX ORDER — DICLOFENAC SODIUM 10 MG/G
2 GEL TOPICAL 4 TIMES DAILY
Qty: 100 G | Refills: 2 | Status: SHIPPED | OUTPATIENT
Start: 2025-03-25

## 2025-03-27 ENCOUNTER — PATIENT MESSAGE (OUTPATIENT)
Dept: ELECTROPHYSIOLOGY | Facility: CLINIC | Age: 75
End: 2025-03-27
Payer: MEDICARE

## 2025-03-29 NOTE — PROGRESS NOTES
Subjective:      Patient ID: Walter Bull is a 74 y.o. male.    Chief Complaint: Diabetic Foot Exam (Pcp Jerri Griffiths MD 11/18/2024) and Foot Pain (Left foot pain very painful )    Walter is a 74 y.o. male who presents to the clinic upon referral from Dr. Nelly bermeo. provider found  for evaluation and treatment of diabetic feet. Walter has a past medical history of Anemia, Angina pectoris, Anticoagulant long-term use, Arthritis, Back pain, CHF (congestive heart failure), Chronic bronchitis, Colon cancer screening (02/02/2017), Coronary artery disease, Diabetes mellitus type I, Diabetes with neurologic complications, Disorder of kidney and ureter, Encounter for blood transfusion, Glaucoma, Heart attack, Heart disease, Hyperlipidemia, Hypertension, Iron deficiency, Kidney failure, Obesity, Paroxysmal atrial fibrillation (01/12/2023), Pneumonia, Polyneuropathy, Pulmonary emphysema (02/26/2020), Renal manifestation of secondary diabetes mellitus, 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  Increase in her pain to the bottoms of both feet.  Here for routine foot care as well/diabetic foot exam. Arthritis pain.     PCP: Jerri Griffiths MD    Date Last Seen by PCP: dr jerri griffiths05/21/2019    Current shoe gear: Tennis shoes    Hemoglobin A1C   Date Value Ref Range Status   11/11/2024 6.3 (H) 4.0 - 5.6 % Final     Comment:     ADA Screening Guidelines:  5.7-6.4%  Consistent with prediabetes  >or=6.5%  Consistent with diabetes    High levels of fetal hemoglobin interfere with the HbA1C  assay. Heterozygous hemoglobin variants (HbS, HgC, etc)do  not significantly interfere with this assay.   However, presence of multiple variants may affect accuracy.     05/13/2024 6.3 (H) 4.0 - 5.6 % Final     Comment:     ADA Screening Guidelines:  5.7-6.4%  Consistent with prediabetes  >or=6.5%  Consistent with diabetes    High levels of  fetal hemoglobin interfere with the HbA1C  assay. Heterozygous hemoglobin variants (HbS, HgC, etc)do  not significantly interfere with this assay.   However, presence of multiple variants may affect accuracy.     11/11/2023 6.4 (H) 4.0 - 5.6 % Final     Comment:     ADA Screening Guidelines:  5.7-6.4%  Consistent with prediabetes  >or=6.5%  Consistent with diabetes    High levels of fetal hemoglobin interfere with the HbA1C  assay. Heterozygous hemoglobin variants (HbS, HgC, etc)do  not significantly interfere with this assay.   However, presence of multiple variants may affect accuracy.             Review of Systems   Constitutional: Negative for chills, fever and malaise/fatigue.   HENT:  Negative for hearing loss.    Cardiovascular:  Negative for claudication.   Respiratory:  Negative for shortness of breath.    Skin:  Negative for dry skin, flushing and rash.   Musculoskeletal:  Negative for joint pain and myalgias.   Neurological:  Negative for loss of balance, numbness, paresthesias and sensory change.   Psychiatric/Behavioral:  Negative for altered mental status.            Objective:      Physical Exam  Vitals reviewed.   Constitutional:       Appearance: He is well-developed.   HENT:      Head: Normocephalic and atraumatic.   Cardiovascular:      Pulses:           Dorsalis pedis pulses are 2+ on the right side and 2+ on the left side.        Posterior tibial pulses are 2+ on the right side and 2+ on the left side.      Comments: No edema noted to b/L LEs  Pulmonary:      Effort: Pulmonary effort is normal.   Musculoskeletal:         General: Normal range of motion.      Comments: Adequate joint ROM noted to all lower extremity muscle groups with no pain or crepitation noted. Muscle strength is 5/5 in all groups bilaterally.     Feet:      Right foot:      Protective Sensation: 5 sites tested.  5 sites sensed.      Skin integrity: Callus and dry skin present.      Left foot:      Protective Sensation: 5  sites tested.  5 sites sensed.      Skin integrity: Callus and dry skin present.   Skin:     General: Skin is warm and dry.      Capillary Refill: Capillary refill takes 2 to 3 seconds.      Coloration: Skin is pale.      Comments: Xerosis improved. No open lesion noted b/L  Skin temp is warm to warm from proximal to distal b/L.  Webspaces clean, dry, and intact  Nails x10 short   Neurological:      Mental Status: He is alert and oriented to person, place, and time.      Sensory: Sensory deficit present.      Motor: Atrophy present.      Deep Tendon Reflexes: Reflexes abnormal.      Reflex Scores:       Patellar reflexes are 1+ on the right side and 1+ on the left side.       Achilles reflexes are 1+ on the right side and 1+ on the left side.     Comments: Intact gross sensation noted to b/L LEs    There is pain on palpation of the bilateral 3rd  intermetatarsal space with a positive Nikkie's click. Minimal tenderness to palpation of the adjacent metatarsal heads.     Psychiatric:         Behavior: Behavior normal.               Assessment:       No diagnosis found.          Plan:       There are no diagnoses linked to this encounter.      I counseled the patient on his conditions, their implications and medical management.    Decision making:  Chronic illnesses discussed in detail, previous records/notes and imaging independently reviewed, prescription drug management performed in addition to lengthy discussion regarding both conservative and surgical treatment options.    Discussed options for peripheral neuropathy/nerve entrapment syndrome including nerve block therapy, surgical nerve entrapment decompression procedures, and various vitamins and supplementation available shown to improve nerve function.    Nerve injection:    Performed by:  Jonnathan Kenney DPM    Preop diagnosis:  Neuropathy symptoms/paresthesias/pain    The area overlying the bilateral posterior tibial nerve(s) was sterilely prepped, verbal  consent was obtained, 2 cc's of 0.5% Marcaine plain was infiltrated around the affected nerve(s) for a diagnostic nerve block.  This was well tolerated with no complications.    Shoe inspection. Diabetic Foot Education. Patient reminded of the importance of good nutrition and blood sugar control to help prevent podiatric complications of diabetes. Patient instructed on proper foot hygeine. We discussed wearing proper shoe gear, daily foot inspections and Diabetic foot education in detail.      Routine Foot Care    Performed by:  Jonnathan Kenney. DPM  Authorized by:  Patient     Consent Done?:  Yes (Verbal)     Nail Care Type:  Debride  Location(s): All  (Left 1st Toe, Left 3rd Toe, Left 2nd Toe, Left 4th Toe, Left 5th Toe, Right 1st Toe, Right 2nd Toe, Right 3rd Toe, Right 4th Toe and Right 5th Toe)  Patient tolerance:  Patient tolerated the procedure well with no immediate complications     With patient's permission, the toenails mentioned above were aggressively reduced and debrided using a nail nipper, removing all offending nail and debris. The patient will continue to monitor the areas daily, inspect the feet, wear protective shoe gear when ambulatory, and moisturizer to maintain skin integrity.      Callus Care Type: Debride    With patient's permission, the calluses/hyperkeratotic lesions mentioned above were aggressively reduced and debrided using a number 15 blade. The patient will continue to monitor the areas daily, inspect the feet, wear protective shoe gear when ambulatory, and moisturizer to maintain skin integrity.     Shoe inspection. Diabetic Foot Education. Patient reminded of the importance of good nutrition and blood sugar control to help prevent podiatric complications of diabetes. Patient instructed on proper foot hygeine. We discussed wearing proper shoe gear, daily foot inspections and Diabetic foot education in detail.    Return to clinic in 3-6 months or sooner if problems arise     Begin  Voltaren Gel.

## 2025-03-31 ENCOUNTER — PATIENT MESSAGE (OUTPATIENT)
Dept: OPTOMETRY | Facility: CLINIC | Age: 75
End: 2025-03-31
Payer: MEDICARE

## 2025-05-01 ENCOUNTER — PATIENT MESSAGE (OUTPATIENT)
Dept: AUDIOLOGY | Facility: CLINIC | Age: 75
End: 2025-05-01
Payer: MEDICARE

## 2025-05-09 RX ORDER — KETOCONAZOLE 20 MG/G
CREAM TOPICAL DAILY
Qty: 60 G | Refills: 2 | Status: SHIPPED | OUTPATIENT
Start: 2025-05-09

## 2025-05-12 ENCOUNTER — PATIENT MESSAGE (OUTPATIENT)
Dept: ELECTROPHYSIOLOGY | Facility: CLINIC | Age: 75
End: 2025-05-12
Payer: MEDICARE

## 2025-05-12 ENCOUNTER — LAB VISIT (OUTPATIENT)
Dept: LAB | Facility: HOSPITAL | Age: 75
End: 2025-05-12
Attending: INTERNAL MEDICINE
Payer: MEDICARE

## 2025-05-12 ENCOUNTER — OFFICE VISIT (OUTPATIENT)
Dept: SLEEP MEDICINE | Facility: CLINIC | Age: 75
End: 2025-05-12
Payer: MEDICARE

## 2025-05-12 ENCOUNTER — CLINICAL SUPPORT (OUTPATIENT)
Facility: CLINIC | Age: 75
End: 2025-05-12
Payer: MEDICARE

## 2025-05-12 VITALS
WEIGHT: 240.94 LBS | SYSTOLIC BLOOD PRESSURE: 163 MMHG | BODY MASS INDEX: 35.69 KG/M2 | DIASTOLIC BLOOD PRESSURE: 66 MMHG | HEIGHT: 69 IN | HEART RATE: 61 BPM

## 2025-05-12 DIAGNOSIS — N18.32 TYPE 2 DIABETES MELLITUS WITH STAGE 3B CHRONIC KIDNEY DISEASE, WITH LONG-TERM CURRENT USE OF INSULIN: ICD-10-CM

## 2025-05-12 DIAGNOSIS — F51.09 OTHER INSOMNIA NOT DUE TO A SUBSTANCE OR KNOWN PHYSIOLOGICAL CONDITION: ICD-10-CM

## 2025-05-12 DIAGNOSIS — I25.118 CORONARY ARTERY DISEASE OF NATIVE ARTERY OF NATIVE HEART WITH STABLE ANGINA PECTORIS: ICD-10-CM

## 2025-05-12 DIAGNOSIS — G47.33 OSA (OBSTRUCTIVE SLEEP APNEA): Primary | ICD-10-CM

## 2025-05-12 DIAGNOSIS — E11.42 DIABETIC POLYNEUROPATHY ASSOCIATED WITH TYPE 2 DIABETES MELLITUS: ICD-10-CM

## 2025-05-12 DIAGNOSIS — H90.3 SENSORINEURAL HEARING LOSS (SNHL) OF BOTH EARS: Primary | ICD-10-CM

## 2025-05-12 DIAGNOSIS — R06.83 SNORING: ICD-10-CM

## 2025-05-12 DIAGNOSIS — R35.1 NOCTURIA: ICD-10-CM

## 2025-05-12 DIAGNOSIS — E11.22 TYPE 2 DIABETES MELLITUS WITH STAGE 3B CHRONIC KIDNEY DISEASE, WITH LONG-TERM CURRENT USE OF INSULIN: ICD-10-CM

## 2025-05-12 DIAGNOSIS — G47.10 HYPERSOMNOLENCE: ICD-10-CM

## 2025-05-12 DIAGNOSIS — E11.21 TYPE 2 DIABETES MELLITUS WITH DIABETIC NEPHROPATHY, WITH LONG-TERM CURRENT USE OF INSULIN: ICD-10-CM

## 2025-05-12 DIAGNOSIS — E78.49 OTHER HYPERLIPIDEMIA: ICD-10-CM

## 2025-05-12 DIAGNOSIS — I50.42 CHRONIC COMBINED SYSTOLIC AND DIASTOLIC HEART FAILURE: ICD-10-CM

## 2025-05-12 DIAGNOSIS — Z79.4 TYPE 2 DIABETES MELLITUS WITH DIABETIC NEPHROPATHY, WITH LONG-TERM CURRENT USE OF INSULIN: ICD-10-CM

## 2025-05-12 DIAGNOSIS — N18.31 STAGE 3A CHRONIC KIDNEY DISEASE: ICD-10-CM

## 2025-05-12 DIAGNOSIS — I48.0 PAROXYSMAL ATRIAL FIBRILLATION: ICD-10-CM

## 2025-05-12 DIAGNOSIS — I10 BENIGN ESSENTIAL HTN: ICD-10-CM

## 2025-05-12 DIAGNOSIS — Z79.4 TYPE 2 DIABETES MELLITUS WITH STAGE 3B CHRONIC KIDNEY DISEASE, WITH LONG-TERM CURRENT USE OF INSULIN: ICD-10-CM

## 2025-05-12 LAB
ABSOLUTE EOSINOPHIL (OHS): 0.19 K/UL
ABSOLUTE MONOCYTE (OHS): 0.63 K/UL (ref 0.3–1)
ABSOLUTE NEUTROPHIL COUNT (OHS): 2.77 K/UL (ref 1.8–7.7)
ALBUMIN SERPL BCP-MCNC: 3.4 G/DL (ref 3.5–5.2)
ALP SERPL-CCNC: 63 UNIT/L (ref 40–150)
ALT SERPL W/O P-5'-P-CCNC: 32 UNIT/L (ref 10–44)
ANION GAP (OHS): 6 MMOL/L (ref 8–16)
AST SERPL-CCNC: 25 UNIT/L (ref 11–45)
BASOPHILS # BLD AUTO: 0.08 K/UL
BASOPHILS NFR BLD AUTO: 1.3 %
BILIRUB SERPL-MCNC: 0.4 MG/DL (ref 0.1–1)
BUN SERPL-MCNC: 20 MG/DL (ref 8–23)
CALCIUM SERPL-MCNC: 8.4 MG/DL (ref 8.7–10.5)
CHLORIDE SERPL-SCNC: 108 MMOL/L (ref 95–110)
CHOLEST SERPL-MCNC: 98 MG/DL (ref 120–199)
CHOLEST/HDLC SERPL: 2 {RATIO} (ref 2–5)
CO2 SERPL-SCNC: 25 MMOL/L (ref 23–29)
CREAT SERPL-MCNC: 1.5 MG/DL (ref 0.5–1.4)
EAG (OHS): 146 MG/DL (ref 68–131)
ERYTHROCYTE [DISTWIDTH] IN BLOOD BY AUTOMATED COUNT: 13.2 % (ref 11.5–14.5)
GFR SERPLBLD CREATININE-BSD FMLA CKD-EPI: 48 ML/MIN/1.73/M2
GLUCOSE SERPL-MCNC: 160 MG/DL (ref 70–110)
HBA1C MFR BLD: 6.7 % (ref 4–5.6)
HCT VFR BLD AUTO: 36.2 % (ref 40–54)
HDLC SERPL-MCNC: 50 MG/DL (ref 40–75)
HDLC SERPL: 51 % (ref 20–50)
HGB BLD-MCNC: 11.9 GM/DL (ref 14–18)
IMM GRANULOCYTES # BLD AUTO: 0.01 K/UL (ref 0–0.04)
IMM GRANULOCYTES NFR BLD AUTO: 0.2 % (ref 0–0.5)
LDLC SERPL CALC-MCNC: 39.4 MG/DL (ref 63–159)
LYMPHOCYTES # BLD AUTO: 2.29 K/UL (ref 1–4.8)
MCH RBC QN AUTO: 30.4 PG (ref 27–31)
MCHC RBC AUTO-ENTMCNC: 32.9 G/DL (ref 32–36)
MCV RBC AUTO: 92 FL (ref 82–98)
NONHDLC SERPL-MCNC: 48 MG/DL
NUCLEATED RBC (/100WBC) (OHS): 0 /100 WBC
PLATELET # BLD AUTO: 137 K/UL (ref 150–450)
PMV BLD AUTO: 10.4 FL (ref 9.2–12.9)
POTASSIUM SERPL-SCNC: 4.3 MMOL/L (ref 3.5–5.1)
PROT SERPL-MCNC: 6.6 GM/DL (ref 6–8.4)
RBC # BLD AUTO: 3.92 M/UL (ref 4.6–6.2)
RELATIVE EOSINOPHIL (OHS): 3.2 %
RELATIVE LYMPHOCYTE (OHS): 38.4 % (ref 18–48)
RELATIVE MONOCYTE (OHS): 10.6 % (ref 4–15)
RELATIVE NEUTROPHIL (OHS): 46.3 % (ref 38–73)
SODIUM SERPL-SCNC: 139 MMOL/L (ref 136–145)
TRIGL SERPL-MCNC: 43 MG/DL (ref 30–150)
WBC # BLD AUTO: 5.97 K/UL (ref 3.9–12.7)

## 2025-05-12 PROCEDURE — 99999 PR PBB SHADOW E&M-EST. PATIENT-LVL V: CPT | Mod: PBBFAC,,, | Performed by: PHYSICIAN ASSISTANT

## 2025-05-12 PROCEDURE — 85025 COMPLETE CBC W/AUTO DIFF WBC: CPT

## 2025-05-12 PROCEDURE — 3077F SYST BP >= 140 MM HG: CPT | Mod: CPTII,S$GLB,, | Performed by: PHYSICIAN ASSISTANT

## 2025-05-12 PROCEDURE — 1159F MED LIST DOCD IN RCRD: CPT | Mod: CPTII,S$GLB,, | Performed by: PHYSICIAN ASSISTANT

## 2025-05-12 PROCEDURE — 1160F RVW MEDS BY RX/DR IN RCRD: CPT | Mod: CPTII,S$GLB,, | Performed by: PHYSICIAN ASSISTANT

## 2025-05-12 PROCEDURE — 3078F DIAST BP <80 MM HG: CPT | Mod: CPTII,S$GLB,, | Performed by: PHYSICIAN ASSISTANT

## 2025-05-12 PROCEDURE — 83036 HEMOGLOBIN GLYCOSYLATED A1C: CPT

## 2025-05-12 PROCEDURE — 80053 COMPREHEN METABOLIC PANEL: CPT

## 2025-05-12 PROCEDURE — 36415 COLL VENOUS BLD VENIPUNCTURE: CPT

## 2025-05-12 PROCEDURE — 99204 OFFICE O/P NEW MOD 45 MIN: CPT | Mod: S$GLB,,, | Performed by: PHYSICIAN ASSISTANT

## 2025-05-12 PROCEDURE — 80061 LIPID PANEL: CPT

## 2025-05-12 NOTE — PROGRESS NOTES
Hearing Aid Fitting    Hearing Aid Information  Oticon intent 4 miniRITE R   Color: Madai Black   Battery: [id number]   LT SN: [id number]   RT SN: [id number]   Speaker: 3,85 C4 DETECT   Dome: 8mm bass double   Venancio SmartCharger SN: [id number]   Warranty Exp: 5/31/2028     Walter Bull, 74 year old male, was seen for a hearing aid fitting. Patient's hearing aids were programmed to their latest hearing test results. Patient reported good sound quality and a comfortable fit from the hearing aids. Patient's hearing aids were paired to his iphone. Patient was counseled on how to use the Kvng to adjust hearing aids. Patient was counseled on itemized package, 30 day trial period and the 3 year warranty. Patient will return for a follow-up in two weeks.

## 2025-05-12 NOTE — PROGRESS NOTES
Referred by James Gilman MD     NEW PATIENT VISIT    Walter Bull  is a pleasant 74 y.o. male  with PMH significant for HTN, HLD, CAD, s/p CABG, HF, Afib, presence of defibrillator, asthma, DM II, polyneuropathy, ALEKSANDR, vit D def, GERD, fatty liver, tobacco use, BMI 35+, ANURADHA  who presents for ANURADHA management        Reports dx with ANURADHA in 2019 (AHI 8, REM AHI 40) following c/o snoring, witnessed apneas, poor disrupted and un-refreshing sleep, and excessive daytime sleepiness and fatigue. States he has been on CPAP for many years. States current CPAP machine broken, so he no longer has access to a usable CPAP machine. Has been without CPAP for a while now. Presents today to re-establish care with sleep medicine to get back on CPAP therapy following referral from Pulmonology.     SLEEP SCHEDULE   Environment    Bed Time 8PM   Sleep Latency 45-60mins   Arousals 2-3   Nocturia 3-4   Back to sleep Couple hours   Wake time 4-7AM   Naps 1-2 naps a day depending on schedule    Work          5/12/2025     9:43 AM   Sleep Clinic ROS    Difficulty breathing through the nose?  No   Sore throat or dry mouth in the morning? Yes   Irregular or very fast heart beat?  Yes   Shortness of breath?  Yes   Acid reflux? Yes   Body aches and pains?  Sometimes   Morning headaches? No   Dizziness? Yes   Mood changes?  No   Do you exercise?  Sometimes   Do you feel like moving your legs a lot?  Sometimes       Past Medical History:   Diagnosis Date    Anemia     Angina pectoris     Anticoagulant long-term use     Arthritis     Back pain     CHF (congestive heart failure)     Chronic bronchitis     Colon cancer screening 02/02/2017    Coronary artery disease     Diabetes mellitus type I     Diabetes with neurologic complications     Disorder of kidney and ureter     Encounter for blood transfusion     Glaucoma     Heart attack     09/2018    Heart disease     Hyperlipidemia     Hypertension     Iron deficiency     Kidney failure     post  "CABG    Obesity     Paroxysmal atrial fibrillation 01/12/2023    Pneumonia     Polyneuropathy     Pulmonary emphysema 02/26/2020    Renal manifestation of secondary diabetes mellitus     S/P CABG x 5 01/11/2017    Sleep apnea     hads CPAP    Trouble in sleeping     Type 2 diabetes mellitus with ophthalmic manifestations     Urinary incontinence      Problem List[1]  Current Medications[2]       Vitals:    05/12/25 1432   BP: (!) 163/66   BP Location: Right arm   Patient Position: Sitting   Pulse: 61   Weight: 109.3 kg (240 lb 15.4 oz)   Height: 5' 9" (1.753 m)     Physical Exam:    GEN:   Well-appearing  Psych:  Appropriate affect, demonstrates insight  SKIN:  No rash on the face or bridge of the nose      LABS:   Lab Results   Component Value Date    HGB 11.9 (L) 05/12/2025    CO2 25 05/12/2025         RECORDS REVIEWED:    07/20/2019 CPAP titration study 237 lb. Effective control of sleep disordered breathing was seen in supine REM sleep on 11 cm H2O.Higher pressures resulted in further improvement. Excessive motor tone and activity during REM stage sleep. This could be supportive of a diagnosis of REM sleep behavior disorder      06/04/2019 Baseline  lb. The overall AHI was 8.4 with an oxygen deysi of 92.0%. The supine AHI was 14.4 and the REM AHI was 40.6    5/24/22 Echo:  The left ventricle is normal in size with moderately decreased systolic function.  The estimated ejection fraction is 30-35%. Abnormal septal motion 2/2 pacing vs LBBB and inf/IL akinesis.  Grade I left ventricular diastolic dysfunction.  Normal right ventricular size with normal right ventricular systolic function.  Mild left atrial enlargement.  Normal central venous pressure (3 mmHg).  The estimated PA systolic pressure is 32 mmHg.    Previous sleep notes: 5/2/19, 11/25/19, 2/4/20, 3/22/21    ASSESSMENT        5/12/2025     9:42 AM   EPWORTH SLEEPINESS SCALE   Sitting and reading 2   Watching TV 1   Sitting, inactive in a public " place (e.g. a theatre or a meeting) 0   As a passenger in a car for an hour without a break 3   Lying down to rest in the afternoon when circumstances permit 3   Sitting and talking to someone 1   Sitting quietly after a lunch without alcohol 1   In a car, while stopped for a few minutes in traffic 1   Total score 12        Patient-reported       PROBLEM DESCRIPTION/ Sx on Presentation  STATUS   Hx ANURADHA   + snoring, + witnessed apneas  + wakes feeling un-refreshed  PSG 2019 AHI 8, REM AHI 40  Had CPAP but no longer has access to one  New   Daytime Sx   + sleepiness when inactive   ESS 12/24 on intake  New   Insomnia   Trouble falling asleep: 45-60mins  Arousals:         2-3  Hard to get back to sleep?: couple hours    Prior pertinent medications:  Current pertinent medications:   New   Nocturia   x 3-4 per sleep period  New   Other issues:       PLAN      -recommend sleep testing to re-establish dx of ANURADHA and re-qualify for CPAP trial  -PSG ordered  -discussed trial therapy if ANURADHA present and the patient is open to a trial of CPAP therapy  -discussed ANURADHA and PAP with patient in detail, including possible complications of untreated ANURADHA like heart attack/stroke  -advised on strict driving precautions; advised never to drive drowsy  -discussed elevated blood pressure reading at today's visit; pt denies HA, changes in vision, CP, SOB  -stressed importance of close follow up with PCP for blood pressure monitoring and management  -advised on strict ER precautions        Advised on plan of care. Answered all patient questions. Patient verbalized understanding and voiced agreement with plan of care.     RTC if dx of ANURADHA made and CPAP ordered, will need follow up 31-90 days after receiving machine for compliance       The patient was given open opportunity to ask questions and/or express concerns about treatment plan. All questions/concerns were discussed.     Two patient identifiers used prior to evaluation.                   [1]   Patient Active Problem List  Diagnosis    Coronary artery disease of native artery of native heart with stable angina pectoris    Type 2 diabetes mellitus with diabetic nephropathy, with long-term current use of insulin    Diabetic polyneuropathy associated with type 2 diabetes mellitus    HLD (hyperlipidemia)    S/P CABG (coronary artery bypass graft)    History of tobacco use    Gastroesophageal reflux disease    Benign essential HTN    Benign prostatic hyperplasia with urinary frequency    Iron deficiency anemia    Ischemic cardiomyopathy    Ventricular tachycardia    Wide-complex tachycardia    Bradycardia    Presence of cardiac resynchronization therapy defibrillator (CRT-D)    Chronic combined systolic and diastolic heart failure    ANURADHA (obstructive sleep apnea)    Chronic prostatitis    Vitamin D insufficiency    CKD (chronic kidney disease) stage 3, GFR 30-59 ml/min    Fatty liver    Orthostatic hypotension    Aortic atherosclerosis    Hyperparathyroidism due to renal insufficiency    SVT (supraventricular tachycardia)    History of amputation of finger of left hand    Bilateral hearing loss    Colon polyp    Paroxysmal atrial fibrillation    Class 2 severe obesity due to excess calories with serious comorbidity and body mass index (BMI) of 36.0 to 36.9 in adult    Type 2 diabetes mellitus with stage 3b chronic kidney disease, with long-term current use of insulin    Type 2 diabetes mellitus with ophthalmic complication, with long-term current use of insulin    Mild intermittent asthma without complication    Severe obesity (BMI 35.0-39.9) with comorbidity   [2]   Current Outpatient Medications:     albuterol (PROVENTIL HFA) 90 mcg/actuation inhaler, Inhale 2 puffs into the lungs every 6 (six) hours as needed for Wheezing. Rescue, Disp: 18 g, Rfl: 6    albuterol-ipratropium (DUO-NEB) 2.5 mg-0.5 mg/3 mL nebulizer solution, Take 3 mLs by nebulization every 6 (six) hours as needed for Wheezing or  Shortness of Breath. Rescue, Disp: 75 mL, Rfl: 0    ammonium lactate 12 % Crea, Apply topically., Disp: , Rfl:     apixaban (ELIQUIS) 5 mg Tab, Take 1 tablet (5 mg total) by mouth 2 (two) times daily., Disp: 180 tablet, Rfl: 3    blood-glucose meter,continuous (DEXCOM G7 ) Misc, Use to check glucose with sensors, Disp: 1 each, Rfl: 0    blood-glucose sensor (DEXCOM G7 SENSOR) [place], 1 Device by Misc.(Non-Drug; Combo Route) route every 10 days., Disp: 3 each, Rfl: 11    carvediloL (COREG) 3.125 MG tablet, Take 1 tablet (3.125 mg total) by mouth 2 (two) times daily., Disp: 180 tablet, Rfl: 3    diclofenac sodium (VOLTAREN) 1 % Gel, Apply 2 g topically 4 (four) times daily., Disp: 100 g, Rfl: 2    esomeprazole (NEXIUM) 40 MG capsule, Take 1 capsule (40 mg total) by mouth 2 (two) times daily., Disp: 180 capsule, Rfl: 3    evolocumab (REPATHA SURECLICK) 140 mg/mL PnIj, Inject 1 mL (140 mg total) into the skin every 14 (fourteen) days., Disp: 2 each, Rfl: 11    famotidine (PEPCID) 40 MG tablet, Take 1 tablet (40 mg total) by mouth once daily., Disp: 90 tablet, Rfl: 3    ferrous sulfate (FEOSOL) 325 mg (65 mg iron) Tab tablet, TAKE 1 TABLET BY MOUTH THREE TIMES DAILY, Disp: 90 tablet, Rfl: 3    fluticasone-salmeterol diskus inhaler 250-50 mcg, Inhale 1 puff into the lungs 2 (two) times daily. Controller, Disp: 60 each, Rfl: 11    furosemide (LASIX) 20 MG tablet, Take 1 tablet (20 mg total) by mouth once daily., Disp: 90 tablet, Rfl: 3    glucagon (GVOKE HYPOPEN 2-PACK) 1 mg/0.2 mL AtIn, Inject 1 Pen into the skin as needed (hypoglycemia)., Disp: 0.4 mL, Rfl: 2    insulin aspart U-100 (NOVOLOG U-100 INSULIN ASPART) 100 unit/mL injection, Use as directed via VGO 20, max daily dose 56 units., Disp: 60 mL, Rfl: 3    ketoconazole (NIZORAL) 2 % cream, Apply topically once daily., Disp: 60 g, Rfl: 2    LIDOcaine 3 % Crea, Apply 1 application  topically 2 (two) times a day., Disp: 85 g, Rfl: 3    LIDOcaine HCL 2%  (XYLOCAINE) 2 % jelly, Apply topically as needed. Apply topically once nightly to affected part of foot/feet, for pain., Disp: 30 mL, Rfl: 2    losartan (COZAAR) 25 MG tablet, Take 0.5 tablets (12.5 mg total) by mouth once daily., Disp: 45 tablet, Rfl: 3    mupirocin (BACTROBAN) 2 % ointment, Apply to affected area 3 times daily, Disp: 22 g, Rfl: 1    ondansetron (ZOFRAN) 4 MG tablet, Take 1 tablet (4 mg total) by mouth every 8 (eight) hours as needed for Nausea., Disp: 15 tablet, Rfl: 0    ranolazine (RANEXA) 1,000 mg Tb12, Take 1 tablet (1,000 mg total) by mouth 2 (two) times daily., Disp: 180 tablet, Rfl: 3    rosuvastatin (CRESTOR) 40 MG Tab, Take 1 tablet (40 mg total) by mouth once daily., Disp: 90 tablet, Rfl: 3    spironolactone (ALDACTONE) 25 MG tablet, Take 1 tablet (25 mg total) by mouth once daily., Disp: 90 tablet, Rfl: 3    tamsulosin (FLOMAX) 0.4 mg Cap, Take 1 capsule (0.4 mg total) by mouth once daily., Disp: 90 capsule, Rfl: 3    tirzepatide 7.5 mg/0.5 mL PnIj, Inject 7.5 mg into the skin every 7 days., Disp: 4 pen , Rfl: 11    tirzepatide 7.5 mg/0.5 mL PnIj, Inject 7.5 mg into the skin every 7 days., Disp: 4 Pen, Rfl: 11    V-GO 20 [place], 1 Device by Misc.(Non-Drug; Combo Route) route once daily., Disp: 30 each, Rfl: 11    amiodarone (PACERONE) 200 MG Tab, Take 1 tablet (200 mg total) by mouth once daily., Disp: 30 tablet, Rfl: 11    mupirocin (BACTROBAN) 2 % ointment, Apply topically 3 (three) times daily., Disp: 15 g, Rfl: 0    nitroGLYCERIN (NITROSTAT) 0.4 MG SL tablet, Place 1 tablet (0.4 mg total) under the tongue every 5 (five) minutes as needed for Chest pain., Disp: 75 tablet, Rfl: 4

## 2025-05-13 ENCOUNTER — RESULTS FOLLOW-UP (OUTPATIENT)
Dept: HEPATOLOGY | Facility: HOSPITAL | Age: 75
End: 2025-05-13

## 2025-05-13 NOTE — PROGRESS NOTES
Electrophysiology Clinic Progress Note     EP Attending: Dr. Byron Glasgow   PCP - Belkys Kruger MD  Cardiology: Dr. Canada      Patient was last seen in clinic on 8/8/2022.    Subjective:   Patient ID:   Walter Bull is a 74 y.o. male with past medical history of: CAD (CABG 2005, PCI 2018), CKD, HTN, HLD, DM, LBBB, MMVT, ICM who presents for follow up of Ventricular tachycardia and Atrial Fibrillation    HPI:    Background:    Patient with prior 5v CABG (2005) (LIMA to LAD, SVG to Diag), known  to RCA and LCx and recent admission to outside hospital with NSTEMI, CKD II, HTN, HLD, DM presented to the hospital 11/16/2018 with acute onset of chest pain and new onset LBBB. Had LHC at OSH and has been managed medically. Has LVEF 35%, no ICD. Was about to perform lawn work when he started having acute onset chest pain, similar in nature to his prior MI. Also noted palpitations which he had not experienced before, has never experienced syncope. 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 He denies orthopnea, PND, peripheral edema or GREENWOOD. There is no family history of sudden cardiac death.     Patient presented in wide complex tachycardia. Cardiology called for assistance. He continued to have 9-10/10 chest pain like an elephant sitting on his chest. Received NTG x 2 and ASA 325mg, was taken to the cath lab and no new occlusions were identified. The patient received lidocaine for a slow MMVT which restored NSR with a narrow complex. LHC found  that is known and the LIMA to LAD and SVG to D1 grafts are patent. Had inducible sustained VT when pigtail catheter was inserted into the LV, VT terminated with lidocaine bolus. Started on amiodarone gtt.      On 11/19/2018 he underwent EPS. Patient easily inducible for VT (LBLS  ms) with ventricular double extrastimuli. Underwent successful dual chamber ICD implantation ([employer]).     [place] well at clinic  follow up 2/2019.     Clinic visit 11/2019:  No arrhythmia noted. On amiodarone for VT. LFTs WNL. Needs updated TSH and PFTs. No RV pacing. No acute CHF exacerbation.      12/2020 clinic visit: No RV pacing.  Had some palps and high BP - coreg increased today. No arrhythmia noted on device.  LBBB on EKG. QRS wider than prior EKG, but he has a history of LBBB (see EKG 11/16/2018).   On amiodarone for VT. Recheck PFTs, echo.     5/24/2021: Hospitalized with orthostatic hypotension. Improved when his meds were reduced.      7/6/2021: Stable from a device perspective with stable lead and device function.  No arrhythmia noted. Pt with chronic fatigue. Per cardiology, consider reducing amiodarone (easily inducible VT during EPS in 11/2018 but no subsequent VT on device). Will discuss with Dr. Glasgow. (UPDATE: Can stop amio). Recent Echo shows EF of 40% which is close to his baseline. LBBB with QRS 180ms. CHF symptoms stable NYHA II. Confirm that will continue to defer CRT upgrade. (UPDATE: No CRT upgrade at this time)     1/2022: Felt too dizzy on increased dose of coreg. This has now resolved. No syncope. Amiodarone was stopped in 1/2022.  Mr. Bull has felt a bit lightheaded this morning. He has also felt intermittent palpitations, and GREENWOOD.  Device check which shows stable device/lead function, RA pacing 77%, RV pacing <1%, 1% AF burden (longest episode 18 minutes), battery longevity 5 years. I reviewed the AMS EGMs which are most consistent with sinus tachycardia vs atrial tachycardia -440 ms. He says he was pressure washing and painting a fence that day. ECG is sinus rhythm with LBBB at 65 bpm.  In summary, Mr. Bull is now describing NYHA Class II HF symptoms in the office today in the setting of ischemic cardiomyopathy, dual chamber ICD in situ, and LBBB. We discussed the risks ,benefits, inidications, and alternatives to CRT-D upgrade. [place] shared decision tool utilized. After considering his options he  has agreed to proceed.     4/7/2022: Status-post successful upgrade from dual chamber ICD to [employer] CRT-D.     8/8/2022: Device Interrogation (8/8/2022) reveals an intrinsic SB with stable lead and device function. AMSx6, c/w FFRW. No ventricular arrhythmias or treated episodes were noted.  He paces 55% in the RA and 98% in the BiV. Estimated battery longevity 5.9-6.3 years. Device changes made to minimize NSRVO events. EKG showed ASBP at 63bpm. RI interval is 170. QRS is 120. QTc is 476.  He is 4 months s/p CRT-D upgrade. Mr. Bull is doing well from a device perspective with stable lead and device function. Device changes made today to minimize NSRVO events. No arrhythmia noted. Off amio since 1/2022. 98% biventricular pacing. On GDMT. Echo scheduled for later this month. Managed by [facility]. Overall doing well.     Update (5/15/2025):    Patient returns today for an overdue visit. Last seen in clinic on 8/8/2022.     9/18/2023: Asymptomatic MMVT,  ms. Successfully ATP. Was instructed to start amiodarone 200 mg BID x 1 month, then decrease to 200 mg daily     Today he tells me he felt his AF occur last week. Felt GREENWOOD, Chest pain, and palpitations. All symptoms resolved when he was in SR. He thinks he may have run out of amiodarone. He will check when he gets home but we will send new prescription.     Device Interrogation (5/15/2025) reveals an intrinsic sinus bradycardia at 47 bpm with stable lead and device function. No ventricular arrhythmias or treated episodes were noted. Patient with 5 episodes of AMS with max duration 2 hours on 5/8/2025. AF burden <1%. He paces 81% in the RA and 95% in the BiVP. Estimated battery longevity 3.5 years.     I have personally reviewed the patient's EKG today, which shows AP-BP at 68 bpm.    LFTs WNL in May 2025. TSH last May WNL - will recheck. Creatinine 1.5 in May 2025.     Relevant Cardiac Test Results:    2D Echo (8/24/2022):  The left ventricle is normal in size with  moderately decreased systolic function.  The estimated ejection fraction is 30-35%. Abnormal septal motion 2/2 pacing vs LBBB and inf/IL akinesis.  Grade I left ventricular diastolic dysfunction.  Normal right ventricular size with normal right ventricular systolic function.  Mild left atrial enlargement.  Normal central venous pressure (3 mmHg).  The estimated PA systolic pressure is 32 mmHg.      Current Outpatient Medications   Medication Sig    albuterol (PROVENTIL HFA) 90 mcg/actuation inhaler Inhale 2 puffs into the lungs every 6 (six) hours as needed for Wheezing. Rescue    albuterol-ipratropium (DUO-NEB) 2.5 mg-0.5 mg/3 mL nebulizer solution Take 3 mLs by nebulization every 6 (six) hours as needed for Wheezing or Shortness of Breath. Rescue    ammonium lactate 12 % Crea Apply topically.    blood-glucose meter,continuous (DEXCOM G7 ) Misc Use to check glucose with sensors    blood-glucose sensor (DEXCOM G7 SENSOR) [place] 1 Device by Misc.(Non-Drug; Combo Route) route every 10 days.    carvediloL (COREG) 3.125 MG tablet Take 1 tablet (3.125 mg total) by mouth 2 (two) times daily.    diclofenac sodium (VOLTAREN) 1 % Gel Apply 2 g topically 4 (four) times daily.    esomeprazole (NEXIUM) 40 MG capsule Take 1 capsule (40 mg total) by mouth 2 (two) times daily.    evolocumab (REPATHA SURECLICK) 140 mg/mL PnIj Inject 1 mL (140 mg total) into the skin every 14 (fourteen) days.    famotidine (PEPCID) 40 MG tablet Take 1 tablet (40 mg total) by mouth once daily.    ferrous sulfate (FEOSOL) 325 mg (65 mg iron) Tab tablet TAKE 1 TABLET BY MOUTH THREE TIMES DAILY    fluticasone-salmeterol diskus inhaler 250-50 mcg Inhale 1 puff into the lungs 2 (two) times daily. Controller    furosemide (LASIX) 20 MG tablet Take 1 tablet (20 mg total) by mouth once daily.    glucagon (GVOKE HYPOPEN 2-PACK) 1 mg/0.2 mL AtIn Inject 1 Pen into the skin as needed (hypoglycemia).    insulin aspart U-100 (NOVOLOG U-100 INSULIN ASPART)  100 unit/mL injection Use as directed via VGO 20, max daily dose 56 units.    ketoconazole (NIZORAL) 2 % cream Apply topically once daily.    LIDOcaine 3 % Crea Apply 1 application  topically 2 (two) times a day.    LIDOcaine HCL 2% (XYLOCAINE) 2 % jelly Apply topically as needed. Apply topically once nightly to affected part of foot/feet, for pain.    losartan (COZAAR) 25 MG tablet Take 0.5 tablets (12.5 mg total) by mouth once daily.    mupirocin (BACTROBAN) 2 % ointment Apply to affected area 3 times daily    mupirocin (BACTROBAN) 2 % ointment Apply topically 3 (three) times daily.    nitroGLYCERIN (NITROSTAT) 0.4 MG SL tablet Place 1 tablet (0.4 mg total) under the tongue every 5 (five) minutes as needed for Chest pain.    ondansetron (ZOFRAN) 4 MG tablet Take 1 tablet (4 mg total) by mouth every 8 (eight) hours as needed for Nausea.    ranolazine (RANEXA) 1,000 mg Tb12 Take 1 tablet (1,000 mg total) by mouth 2 (two) times daily.    rosuvastatin (CRESTOR) 40 MG Tab Take 1 tablet (40 mg total) by mouth once daily.    spironolactone (ALDACTONE) 25 MG tablet Take 1 tablet (25 mg total) by mouth once daily.    tamsulosin (FLOMAX) 0.4 mg Cap Take 1 capsule (0.4 mg total) by mouth once daily.    tirzepatide 7.5 mg/0.5 mL PnIj Inject 7.5 mg into the skin every 7 days.    tirzepatide 7.5 mg/0.5 mL PnIj Inject 7.5 mg into the skin every 7 days.    V-GO 20 [place] 1 Device by Misc.(Non-Drug; Combo Route) route once daily.    amiodarone (PACERONE) 200 MG Tab Take 1 tablet (200 mg total) by mouth once daily.    apixaban (ELIQUIS) 5 mg Tab Take 1 tablet (5 mg total) by mouth 2 (two) times daily.     No current facility-administered medications for this visit.     Review of Systems   Constitutional: Positive for malaise/fatigue. Negative for chills and fever.   HENT:  Negative for nosebleeds.    Cardiovascular:  Positive for chest pain (with AF), dyspnea on exertion (with AF) and palpitations (with AF). Negative for syncope.  "  Respiratory:  Negative for shortness of breath.    Endocrine: Positive for cold intolerance.   Hematologic/Lymphatic: Does not bruise/bleed easily.   Gastrointestinal:  Negative for hematochezia and melena.   Genitourinary:  Negative for hematuria.   Neurological:  Negative for dizziness (sometimes when not hydrated) and light-headedness.   Psychiatric/Behavioral: Negative.       Objective:     BP (!) 121/59 (BP Location: Right arm, Patient Position: Sitting)   Pulse 68   Ht 5' 9" (1.753 m)   Wt 111.3 kg (245 lb 6 oz)   BMI 36.24 kg/m²     Physical Exam  Vitals and nursing note reviewed.   Constitutional:       General: He is not in acute distress.     Appearance: Normal appearance.   HENT:      Head: Normocephalic.      Mouth/Throat:      Mouth: Mucous membranes are moist.   Eyes:      Extraocular Movements: Extraocular movements intact.   Cardiovascular:      Rate and Rhythm: Normal rate and regular rhythm.   Pulmonary:      Effort: Pulmonary effort is normal. No respiratory distress.   Musculoskeletal:         General: Normal range of motion.      Cervical back: Normal range of motion.   Neurological:      General: No focal deficit present.      Mental Status: He is alert.   Psychiatric:         Mood and Affect: Mood normal.         Behavior: Behavior normal.         Thought Content: Thought content normal.         Judgment: Judgment normal.       Lab Results   Component Value Date     05/12/2025     01/13/2025    K 4.3 05/12/2025    K 4.8 01/13/2025     05/12/2025     01/13/2025    CO2 25 05/12/2025    CO2 30 (H) 01/13/2025    BUN 20 05/12/2025    CREATININE 1.5 (H) 05/12/2025    MG 1.8 05/26/2021    TSH 3.294 05/15/2025    TSH 3.435 05/13/2024    ALT 32 05/12/2025    ALT 19 11/11/2024    AST 25 05/12/2025    AST 19 11/11/2024       Lab Results   Component Value Date    HGBA1C 6.7 (H) 05/12/2025    HGBA1C 6.3 (H) 11/11/2024    Lab Results   Component Value Date    WBC 5.97 " 05/12/2025    HGB 11.9 (L) 05/12/2025    HGB 13.0 (L) 11/11/2024    HCT 36.2 (L) 05/12/2025    HCT 40.7 11/11/2024    HCT 33 (L) 06/24/2019     (L) 05/12/2025     11/11/2024    GRAN 2.3 11/11/2024    GRAN 38.4 11/11/2024    INR 1.0 06/07/2023    APTT 30.2 06/07/2023                No results found.    Assessment:     1. Encounter for monitoring amiodarone therapy    2. Ventricular tachycardia    3. Paroxysmal atrial fibrillation    4. Presence of cardiac resynchronization therapy defibrillator (CRT-D)    5. Benign essential HTN    6. Coronary artery disease of native artery of native heart with stable angina pectoris    7. Chronic combined systolic and diastolic heart failure        Plan:     In summary, Walter Bull is a 74 y.o. male with past medical history of: CAD (CABG 2005, PCI 2018), CKD, HTN, HLD, DM, LBBB, MMVT, ICM who presents for follow up of Ventricular tachycardia and Atrial Fibrillation    Patient is stable from an arrhythmia standpoint. Device interrogation shows stable lead and device function. He AP 81% and BiVP 95%. Battery life is estimated at 3.5 years. He has no VT seen on interrogation. He did have 5 episodes of AMS with max duration 2 hours on 5/8/2025. AF burden <1%. He tells me he might be out of his amiodarone. He will check when he gets home but we will send new prescription. Will plan to continue amiodarone 200 mg daily for treatment of VT and AF. LFTs WNL in 5/2025. Repeat TSH today was WNL. Will check PFT. BP stable on current regimen. He reports chest pain when he had AF on 5/8/2025. Resolved when he was back in SR. Will reach out to cardiology to schedule follow up for patient as he has been lost to follow up with cardiology also. Hgb A1c stable at 6.7 in May 2025. Continue management per his PCP. Will plan to return to clinic in 6 months, sooner if needed. Continue with remote transmission q 3 months.     *A copy of this note has been sent to Dr. Glasgow*    Follow  up in about 6 months (around 11/15/2025).    ------------------------------------------------------------------    MAISHA Freire, CATHIE-C  Cardiac Electrophysiology

## 2025-05-15 ENCOUNTER — RESULTS FOLLOW-UP (OUTPATIENT)
Dept: CARDIOLOGY | Facility: HOSPITAL | Age: 75
End: 2025-05-15

## 2025-05-15 ENCOUNTER — TELEPHONE (OUTPATIENT)
Dept: SLEEP MEDICINE | Facility: OTHER | Age: 75
End: 2025-05-15
Payer: MEDICARE

## 2025-05-15 ENCOUNTER — CLINICAL SUPPORT (OUTPATIENT)
Dept: CARDIOLOGY | Facility: HOSPITAL | Age: 75
End: 2025-05-15
Attending: INTERNAL MEDICINE
Payer: MEDICARE

## 2025-05-15 ENCOUNTER — OFFICE VISIT (OUTPATIENT)
Dept: ELECTROPHYSIOLOGY | Facility: CLINIC | Age: 75
End: 2025-05-15
Payer: MEDICARE

## 2025-05-15 ENCOUNTER — HOSPITAL ENCOUNTER (OUTPATIENT)
Dept: CARDIOLOGY | Facility: CLINIC | Age: 75
Discharge: HOME | End: 2025-05-15
Payer: MEDICARE

## 2025-05-15 VITALS
SYSTOLIC BLOOD PRESSURE: 121 MMHG | DIASTOLIC BLOOD PRESSURE: 59 MMHG | HEIGHT: 69 IN | BODY MASS INDEX: 36.34 KG/M2 | HEART RATE: 68 BPM | WEIGHT: 245.38 LBS

## 2025-05-15 DIAGNOSIS — I50.42 CHRONIC COMBINED SYSTOLIC AND DIASTOLIC HEART FAILURE: Chronic | ICD-10-CM

## 2025-05-15 DIAGNOSIS — Z95.810 PRESENCE OF CARDIAC RESYNCHRONIZATION THERAPY DEFIBRILLATOR (CRT-D): ICD-10-CM

## 2025-05-15 DIAGNOSIS — Z95.810 PRESENCE OF AUTOMATIC (IMPLANTABLE) CARDIAC DEFIBRILLATOR: ICD-10-CM

## 2025-05-15 DIAGNOSIS — I50.42 CHRONIC COMBINED SYSTOLIC AND DIASTOLIC HEART FAILURE: ICD-10-CM

## 2025-05-15 DIAGNOSIS — Z51.81 ENCOUNTER FOR MONITORING AMIODARONE THERAPY: Primary | ICD-10-CM

## 2025-05-15 DIAGNOSIS — I47.20 VENTRICULAR TACHYCARDIA: ICD-10-CM

## 2025-05-15 DIAGNOSIS — Z79.899 ENCOUNTER FOR MONITORING AMIODARONE THERAPY: Primary | ICD-10-CM

## 2025-05-15 DIAGNOSIS — I10 BENIGN ESSENTIAL HTN: Chronic | ICD-10-CM

## 2025-05-15 DIAGNOSIS — I48.0 PAROXYSMAL ATRIAL FIBRILLATION: ICD-10-CM

## 2025-05-15 DIAGNOSIS — I25.118 CORONARY ARTERY DISEASE OF NATIVE ARTERY OF NATIVE HEART WITH STABLE ANGINA PECTORIS: Chronic | ICD-10-CM

## 2025-05-15 LAB
OHS CV AF BURDEN PERCENT: < 1
OHS CV BIV PACING PERCENT: 95
OHS CV DC REMOTE DEVICE TYPE: NORMAL
OHS QRS DURATION: 164 MS
OHS QTC CALCULATION: 576 MS

## 2025-05-15 PROCEDURE — 1160F RVW MEDS BY RX/DR IN RCRD: CPT | Mod: CPTII,S$GLB,, | Performed by: NURSE PRACTITIONER

## 2025-05-15 PROCEDURE — 1159F MED LIST DOCD IN RCRD: CPT | Mod: CPTII,S$GLB,, | Performed by: NURSE PRACTITIONER

## 2025-05-15 PROCEDURE — 3288F FALL RISK ASSESSMENT DOCD: CPT | Mod: CPTII,S$GLB,, | Performed by: NURSE PRACTITIONER

## 2025-05-15 PROCEDURE — 99214 OFFICE O/P EST MOD 30 MIN: CPT | Mod: S$GLB,,, | Performed by: NURSE PRACTITIONER

## 2025-05-15 PROCEDURE — 1101F PT FALLS ASSESS-DOCD LE1/YR: CPT | Mod: CPTII,S$GLB,, | Performed by: NURSE PRACTITIONER

## 2025-05-15 PROCEDURE — 93010 ELECTROCARDIOGRAM REPORT: CPT | Mod: S$GLB,,, | Performed by: STUDENT IN AN ORGANIZED HEALTH CARE EDUCATION/TRAINING PROGRAM

## 2025-05-15 PROCEDURE — 99999 PR PBB SHADOW E&M-EST. PATIENT-LVL V: CPT | Mod: PBBFAC,,, | Performed by: NURSE PRACTITIONER

## 2025-05-15 PROCEDURE — 93284 PRGRMG EVAL IMPLANTABLE DFB: CPT

## 2025-05-15 PROCEDURE — 1125F AMNT PAIN NOTED PAIN PRSNT: CPT | Mod: CPTII,S$GLB,, | Performed by: NURSE PRACTITIONER

## 2025-05-15 PROCEDURE — 3074F SYST BP LT 130 MM HG: CPT | Mod: CPTII,S$GLB,, | Performed by: NURSE PRACTITIONER

## 2025-05-15 PROCEDURE — 3078F DIAST BP <80 MM HG: CPT | Mod: CPTII,S$GLB,, | Performed by: NURSE PRACTITIONER

## 2025-05-15 PROCEDURE — 93005 ELECTROCARDIOGRAM TRACING: CPT | Mod: S$GLB,,, | Performed by: INTERNAL MEDICINE

## 2025-05-15 RX ORDER — AMIODARONE HYDROCHLORIDE 200 MG/1
200 TABLET ORAL DAILY
Qty: 90 TABLET | Refills: 3 | Status: SHIPPED | OUTPATIENT
Start: 2025-05-15 | End: 2026-05-15

## 2025-05-16 ENCOUNTER — TELEPHONE (OUTPATIENT)
Dept: CARDIOLOGY | Facility: CLINIC | Age: 75
End: 2025-05-16
Payer: MEDICARE

## 2025-05-16 ENCOUNTER — OFFICE VISIT (OUTPATIENT)
Dept: INTERNAL MEDICINE | Facility: CLINIC | Age: 75
End: 2025-05-16
Payer: MEDICARE

## 2025-05-16 ENCOUNTER — HOSPITAL ENCOUNTER (OUTPATIENT)
Dept: PULMONOLOGY | Facility: CLINIC | Age: 75
Discharge: HOME | End: 2025-05-16
Payer: MEDICARE

## 2025-05-16 ENCOUNTER — RESULTS FOLLOW-UP (OUTPATIENT)
Dept: INTERNAL MEDICINE | Facility: CLINIC | Age: 75
End: 2025-05-16

## 2025-05-16 VITALS
DIASTOLIC BLOOD PRESSURE: 68 MMHG | RESPIRATION RATE: 18 BRPM | HEART RATE: 59 BPM | HEIGHT: 69 IN | SYSTOLIC BLOOD PRESSURE: 120 MMHG | BODY MASS INDEX: 36.67 KG/M2 | WEIGHT: 247.56 LBS | OXYGEN SATURATION: 99 %

## 2025-05-16 DIAGNOSIS — Z79.899 ENCOUNTER FOR MONITORING AMIODARONE THERAPY: ICD-10-CM

## 2025-05-16 DIAGNOSIS — E78.49 OTHER HYPERLIPIDEMIA: Chronic | ICD-10-CM

## 2025-05-16 DIAGNOSIS — Z79.4 TYPE 2 DIABETES MELLITUS WITH DIABETIC NEPHROPATHY, WITH LONG-TERM CURRENT USE OF INSULIN: ICD-10-CM

## 2025-05-16 DIAGNOSIS — I48.0 PAROXYSMAL ATRIAL FIBRILLATION: ICD-10-CM

## 2025-05-16 DIAGNOSIS — I10 BENIGN ESSENTIAL HTN: Primary | Chronic | ICD-10-CM

## 2025-05-16 DIAGNOSIS — Z51.81 ENCOUNTER FOR MONITORING AMIODARONE THERAPY: ICD-10-CM

## 2025-05-16 DIAGNOSIS — C61 PROSTATE CANCER: ICD-10-CM

## 2025-05-16 DIAGNOSIS — Z12.5 ENCOUNTER FOR SCREENING FOR MALIGNANT NEOPLASM OF PROSTATE: ICD-10-CM

## 2025-05-16 DIAGNOSIS — E11.21 TYPE 2 DIABETES MELLITUS WITH DIABETIC NEPHROPATHY, WITH LONG-TERM CURRENT USE OF INSULIN: ICD-10-CM

## 2025-05-16 LAB
ALBUMIN/CREAT UR: 3.4 UG/MG
CREAT UR-MCNC: 208.5 MG/DL (ref 23–375)
DLCO ADJ PRE: 16.34 ML/(MIN*MMHG) (ref 17.39–31.24)
DLCO SINGLE BREATH LLN: 17.39
DLCO SINGLE BREATH PRE REF: 61.5 %
DLCO SINGLE BREATH REF: 24.32
DLCOC SBVA LLN: 2.41
DLCOC SBVA PRE REF: 115.1 %
DLCOC SBVA REF: 3.62
DLCOC SINGLE BREATH LLN: 17.39
DLCOC SINGLE BREATH PRE REF: 67.2 %
DLCOC SINGLE BREATH REF: 24.32
DLCOCSBVAULN: 4.82
DLCOCSINGLEBREATHULN: 31.24
DLCOCSINGLEBREATHZSCORE: -1.89
DLCOSINGLEBREATHULN: 31.24
DLCOSINGLEBREATHZSCORE: -2.23
DLCOVA LLN: 2.41
DLCOVA PRE REF: 105.2 %
DLCOVA PRE: 3.81 ML/(MIN*MMHG*L) (ref 2.41–4.82)
DLCOVA REF: 3.62
DLCOVAULN: 4.82
DLVAADJ PRE: 4.16 ML/(MIN*MMHG*L) (ref 2.41–4.82)
FEF 25 75 LLN: 1.07
FEF 25 75 PRE REF: 40.8 %
FEF 25 75 REF: 2.78
FEV05 LLN: 1.22
FEV05 REF: 2.35
FEV1 FVC LLN: 62
FEV1 FVC PRE REF: 93.1 %
FEV1 FVC REF: 76
FEV1 LLN: 1.6
FEV1 PRE REF: 80.1 %
FEV1 REF: 2.39
FEV1FVCZSCORE: -0.64
FEV1ZSCORE: -1
FVC LLN: 2.23
FVC PRE REF: 85.6 %
FVC REF: 3.16
FVCZSCORE: -0.8
IVC PRE: 2.44 L (ref 2.23–4.1)
IVC SINGLE BREATH LLN: 2.23
IVC SINGLE BREATH PRE REF: 77.1 %
IVC SINGLE BREATH REF: 3.16
IVCSINGLEBREATHULN: 4.1
MICROALBUMIN UR-MCNC: 7 UG/ML (ref ?–5000)
PEF LLN: 4.77
PEF PRE REF: 96.1 %
PEF REF: 7.28
PRE DLCO: 14.94 ML/(MIN*MMHG) (ref 17.39–31.24)
PRE FEF 25 75: 1.13 L/S (ref 1.07–4.49)
PRE FET 100: 6.91 SEC
PRE FEV05 REF: 64.9 %
PRE FEV1 FVC: 70.79 % (ref 62.14–88.55)
PRE FEV1: 1.91 L (ref 1.6–3.12)
PRE FEV5: 1.53 L (ref 1.22–3.49)
PRE FVC: 2.71 L (ref 2.23–4.1)
PRE PEF: 6.99 L/S (ref 4.77–9.78)
VA PRE: 3.92 L (ref 6.57–6.57)
VA SINGLE BREATH LLN: 6.57
VA SINGLE BREATH PRE REF: 59.7 %
VA SINGLE BREATH REF: 6.57
VASINGLEBREATHULN: 6.57

## 2025-05-16 PROCEDURE — 82570 ASSAY OF URINE CREATININE: CPT | Performed by: INTERNAL MEDICINE

## 2025-05-16 PROCEDURE — 99999 PR PBB SHADOW E&M-EST. PATIENT-LVL V: CPT | Mod: PBBFAC,,, | Performed by: INTERNAL MEDICINE

## 2025-05-16 PROCEDURE — 94010 BREATHING CAPACITY TEST: CPT | Mod: S$GLB,,, | Performed by: INTERNAL MEDICINE

## 2025-05-16 PROCEDURE — 94729 DIFFUSING CAPACITY: CPT | Mod: S$GLB,,, | Performed by: INTERNAL MEDICINE

## 2025-05-16 NOTE — PROGRESS NOTES
"Subjective:       Patient ID: Walter Bull is a 74 y.o. male.    Chief Complaint: Follow-up      HPI:  Patient is known to me and presents for follow up CAD, systolic CHF, DM type 2, HLD. Labs from 05/12/2025 personally reviewed, interpreted and discussed with the patient today.      A1C  6.7 %, at goal  LDL 51 at goal  GFR 49, improved and near baseline again.  Microalb 5/2024- normal.  Repeat pending at time of visit  PSA 1.4, normal 11/2024      CAD s/p 5v CABG and NSTEMI 9/12/18: following with cardiology.  On Ranexa (indur was stopped), Eliquis, ASA, crestor, losartan and Coreg. Also reports h/o CHF (last known EF 25%+ diastolic dysfunction), on lasix 20mg 1-2 times a day and aldactone 25mg daily. Denies SOB, GREENWOOD and orthopnea. S/p ICD placement.   Noted GFR reduced to 39 (again). No swelling, no GREENWOOD.       Dm type 2: on mounjaro, lispro via VGP per endocrinology. Has DEXCOM  A1C <7%.  He does report numbness and tingling of left foot > right foot and b/l fingers; sx have been present for several years. Not on medicine for neuropathy as he declines treatment. Denies polyuria, polydipsia  He does have diabetic shoes. + neuropathy and callus formation. Needing order for new shoes.      Eye exam:  01/2025  Microalb: 5/2024-repeat today pending at time of visit  On ARB and statin      HLD: on repatha, crestor, has been taking for several years. Denies side effects.      HTN: on coreg, losartan. BP is well controlled. Denies headaches, vision changes.     A-fib:  Cards notes Jan 2023: "Pt with newly found afib on AICD interrogation. Has been intermittently experiencing palpitations post device check. Will start apixiban 5x2 for CVA prophylaxis" Now on Eliquis, amiodarone and ASA/Plavix stopped due to taking NOAC. On Coreg for rate control.      GERD: On Nexium daily. H/o H. Pylori on EGD (2018) with gastric erosions. Has had intermittent nausea and vomiting which is chronic but reports worsening and having to " "take TUMS OTC for sx control. No constipation.      BPH: on flomax and finasteride and working well.  No medication side effects. S/p prostate bx 5/13/19 with DR. Chaudhry, no malignany. Last PSA normal.     Tobacco use: quit 1981; previously smoked 3 PPD for 20 years  EtOH: stopped drink 1982; was a daily drink 2 fifth liquor daily  Illicit drug: denies        Pulmonary nodules 3/14/23: "Bilateral solid pulmonary nodules measuring up to 5 mm, for example the nodule in the apical segment of the right upper lobe (series 4, image 88).  For multiple solid nodules all <6 mm, Fleischner Society 2017 guidelines recommend no routine follow up for a low risk patient, or follow up with non-contrast chest CT at 12 months after discovery in a high risk patient."  Repeat CT chest 01/16/2025 showing stability of nodules.  No new nodules.  Past Medical History:   Diagnosis Date    Anemia     Angina pectoris     Anticoagulant long-term use     Arthritis     Back pain     CHF (congestive heart failure)     Chronic bronchitis     Colon cancer screening 02/02/2017    Coronary artery disease     Diabetes mellitus type I     Diabetes with neurologic complications     Disorder of kidney and ureter     Encounter for blood transfusion     Glaucoma     Heart attack     09/2018    Heart disease     Hyperlipidemia     Hypertension     Iron deficiency     Kidney failure     post CABG    Obesity     Paroxysmal atrial fibrillation 01/12/2023    Pneumonia     Polyneuropathy     Pulmonary emphysema 02/26/2020    Renal manifestation of secondary diabetes mellitus     S/P CABG x 5 01/11/2017    Sleep apnea     hads CPAP    Trouble in sleeping     Type 2 diabetes mellitus with ophthalmic manifestations     Urinary incontinence        Family History   Problem Relation Name Age of Onset    Diabetes Mother Mother     Heart disease Mother Mother     Hypertension Sister Sister     Diabetes Sister Sister     Heart disease Sister Sister     Heart attack " Brother      Colon cancer Neg Hx      Esophageal cancer Neg Hx      Stomach cancer Neg Hx         Social History[1]    Review of Systems   Constitutional:  Negative for activity change, fatigue, fever and unexpected weight change.   HENT:  Negative for congestion, ear pain, hearing loss, rhinorrhea and sore throat.    Eyes:  Negative for redness and visual disturbance.   Respiratory:  Negative for cough, shortness of breath and wheezing.    Cardiovascular:  Positive for leg swelling (Intermittent, baseline). Negative for chest pain and palpitations.   Gastrointestinal:  Negative for abdominal pain, constipation, diarrhea, nausea and vomiting.   Genitourinary:  Negative for decreased urine volume, dysuria, frequency and urgency.   Musculoskeletal:  Negative for back pain, joint swelling and neck pain.   Skin:  Negative for color change, rash and wound.   Neurological:  Negative for dizziness, light-headedness and headaches.         Objective:      Physical Exam  Vitals reviewed.   Constitutional:       General: He is not in acute distress.     Appearance: He is well-developed.   HENT:      Head: Normocephalic and atraumatic.      Right Ear: External ear normal.      Left Ear: External ear normal.   Eyes:      General:         Right eye: No discharge.         Left eye: No discharge.      Conjunctiva/sclera: Conjunctivae normal.   Neck:      Thyroid: No thyromegaly.   Cardiovascular:      Rate and Rhythm: Normal rate and regular rhythm.      Heart sounds: No murmur heard.  Pulmonary:      Effort: Pulmonary effort is normal. No respiratory distress.      Breath sounds: Normal breath sounds. No wheezing.   Abdominal:      General: There is no distension.      Palpations: Abdomen is soft.      Tenderness: There is no abdominal tenderness.   Skin:     General: Skin is warm and dry.   Neurological:      Mental Status: He is alert and oriented to person, place, and time.   Psychiatric:         Behavior: Behavior normal.          Thought Content: Thought content normal.         Assessment:       1. Benign essential HTN    2. Other hyperlipidemia    3. Paroxysmal atrial fibrillation    4. Type 2 diabetes mellitus with diabetic nephropathy, with long-term current use of insulin    5. Prostate cancer    6. Encounter for screening for malignant neoplasm of prostate        Plan:       1. Benign essential HTN  Chronic controlled  Continue medications same dose  Low-sodium diet  Check pressure and keep log  -     CBC Auto Differential; Future; Expected date: 11/12/2025  -     Comprehensive Metabolic Panel; Future; Expected date: 11/12/2025  -     TSH; Future; Expected date: 11/12/2025  -     Lipid Panel; Future; Expected date: 11/12/2025  -     Hemoglobin A1C; Future; Expected date: 11/12/2025    2. Other hyperlipidemia  Chronic controlled  Continue medications same dose  -     CBC Auto Differential; Future; Expected date: 11/12/2025  -     Comprehensive Metabolic Panel; Future; Expected date: 11/12/2025  -     TSH; Future; Expected date: 11/12/2025  -     Lipid Panel; Future; Expected date: 11/12/2025  -     Hemoglobin A1C; Future; Expected date: 11/12/2025    3. Paroxysmal atrial fibrillation  Chronic controlled  Continue medications same dose  Follow up with Cardiology as planned  -     CBC Auto Differential; Future; Expected date: 11/12/2025  -     Comprehensive Metabolic Panel; Future; Expected date: 11/12/2025  -     TSH; Future; Expected date: 11/12/2025  -     Lipid Panel; Future; Expected date: 11/12/2025  -     Hemoglobin A1C; Future; Expected date: 11/12/2025    4. Type 2 diabetes mellitus with diabetic nephropathy, with long-term current use of insulin  Chronic controlled  Continue medications same dose  Follow up with endocrinology as planned  ADA diet  Urine microalbumin pending at time of visit    -     CBC Auto Differential; Future; Expected date: 11/12/2025  -     Comprehensive Metabolic Panel; Future; Expected date:  2025  -     TSH; Future; Expected date: 2025  -     Lipid Panel; Future; Expected date: 2025  -     Hemoglobin A1C; Future; Expected date: 2025  -     Microalbumin/Creatinine Ratio, Urine    5. Prostate cancer  Update yearly PSA with next visit per orders  -     PSA, Screening; Future; Expected date: 2025    6. Encounter for screening for malignant neoplasm of prostate  Per orders  -     PSA, Screening; Future; Expected date: 2025       Return to clinic 6 months with labs and PRN.  Given stability okay to see me when I return from maternity leave               [1]   Social History  Socioeconomic History    Marital status:     Number of children: 5   Tobacco Use    Smoking status: Former     Current packs/day: 0.00     Average packs/day: 3.0 packs/day for 18.0 years (53.9 ttl pk-yrs)     Types: Cigarettes     Start date: 1963     Quit date: 1981     Years since quittin.4     Passive exposure: Past    Smokeless tobacco: Former     Types: Snuff, Chew     Quit date:    Substance and Sexual Activity    Alcohol use: No    Drug use: No    Sexual activity: Yes     Partners: Female     Comment: -with a significant other     Social Drivers of Health     Financial Resource Strain: Low Risk  (3/24/2025)    Overall Financial Resource Strain (CARDIA)     Difficulty of Paying Living Expenses: Not hard at all   Food Insecurity: No Food Insecurity (3/24/2025)    Hunger Vital Sign     Worried About Running Out of Food in the Last Year: Never true     Ran Out of Food in the Last Year: Never true   Transportation Needs: No Transportation Needs (3/24/2025)    PRAPARE - Transportation     Lack of Transportation (Medical): No     Lack of Transportation (Non-Medical): No   Physical Activity: Sufficiently Active (3/24/2025)    Exercise Vital Sign     Days of Exercise per Week: 5 days     Minutes of Exercise per Session: 150+ min   Stress: No Stress Concern Present  (3/24/2025)    [employer] [place] [employer] - Occupational Stress Questionnaire     Feeling of Stress : Not at all   Housing Stability: Low Risk  (3/24/2025)    Housing Stability Vital Sign     Unable to Pay for Housing in the Last Year: No     Number of Times Moved in the Last Year: 0     Homeless in the Last Year: No

## 2025-05-16 NOTE — TELEPHONE ENCOUNTER
----- Message from EILEEN Freire sent at 5/16/2025  1:37 PM CDT -----  Regarding: Schedule follow up  Hey, This is a mutual patient of EP services that we share with Dr. Canada. He has been lost to follow up with us an returns for a visit but I saw he has not seen Dr. Canada since 2023 also. Can we get him a follow up visit with Dr. Canada for management of his CAD and CHF? Thanks, Alejandra

## 2025-05-17 ENCOUNTER — CLINICAL SUPPORT (OUTPATIENT)
Dept: CARDIOLOGY | Facility: HOSPITAL | Age: 75
End: 2025-05-17
Attending: INTERNAL MEDICINE
Payer: MEDICARE

## 2025-05-17 ENCOUNTER — CLINICAL SUPPORT (OUTPATIENT)
Dept: CARDIOLOGY | Facility: HOSPITAL | Age: 75
End: 2025-05-17
Payer: MEDICARE

## 2025-05-17 DIAGNOSIS — Z95.810 PRESENCE OF AUTOMATIC (IMPLANTABLE) CARDIAC DEFIBRILLATOR: ICD-10-CM

## 2025-05-17 DIAGNOSIS — I25.5 ISCHEMIC CARDIOMYOPATHY: ICD-10-CM

## 2025-05-17 DIAGNOSIS — I50.40 UNSPECIFIED COMBINED SYSTOLIC (CONGESTIVE) AND DIASTOLIC (CONGESTIVE) HEART FAILURE: ICD-10-CM

## 2025-05-17 PROCEDURE — 93296 REM INTERROG EVL PM/IDS: CPT | Performed by: INTERNAL MEDICINE

## 2025-05-18 LAB
DLCO ADJ PRE: 16.34 ML/(MIN*MMHG) (ref 17.39–31.24)
DLCO SINGLE BREATH LLN: 17.39
DLCO SINGLE BREATH PRE REF: 61.5 %
DLCO SINGLE BREATH REF: 24.32
DLCOC SBVA LLN: 2.41
DLCOC SBVA PRE REF: 115.1 %
DLCOC SBVA REF: 3.62
DLCOC SINGLE BREATH LLN: 17.39
DLCOC SINGLE BREATH PRE REF: 67.2 %
DLCOC SINGLE BREATH REF: 24.32
DLCOCSBVAULN: 4.82
DLCOCSINGLEBREATHULN: 31.24
DLCOCSINGLEBREATHZSCORE: -1.89
DLCOSINGLEBREATHULN: 31.24
DLCOSINGLEBREATHZSCORE: -2.23
DLCOVA LLN: 2.41
DLCOVA PRE REF: 105.2 %
DLCOVA PRE: 3.81 ML/(MIN*MMHG*L) (ref 2.41–4.82)
DLCOVA REF: 3.62
DLCOVAULN: 4.82
DLVAADJ PRE: 4.16 ML/(MIN*MMHG*L) (ref 2.41–4.82)
FEF 25 75 LLN: 1.07
FEF 25 75 PRE REF: 40.8 %
FEF 25 75 REF: 2.78
FEV05 LLN: 1.22
FEV05 REF: 2.35
FEV1 FVC LLN: 62
FEV1 FVC PRE REF: 93.1 %
FEV1 FVC REF: 76
FEV1 LLN: 1.6
FEV1 PRE REF: 80.1 %
FEV1 REF: 2.39
FEV1FVCZSCORE: -0.64
FEV1ZSCORE: -1
FVC LLN: 2.23
FVC PRE REF: 85.6 %
FVC REF: 3.16
FVCZSCORE: -0.8
IVC PRE: 2.44 L (ref 2.23–4.1)
IVC SINGLE BREATH LLN: 2.23
IVC SINGLE BREATH PRE REF: 77.1 %
IVC SINGLE BREATH REF: 3.16
IVCSINGLEBREATHULN: 4.1
PEF LLN: 4.77
PEF PRE REF: 96.1 %
PEF REF: 7.28
PHYSICIAN COMMENT: ABNORMAL
PRE DLCO: 14.94 ML/(MIN*MMHG) (ref 17.39–31.24)
PRE FEF 25 75: 1.13 L/S (ref 1.07–4.49)
PRE FET 100: 6.91 SEC
PRE FEV05 REF: 64.9 %
PRE FEV1 FVC: 70.79 % (ref 62.14–88.55)
PRE FEV1: 1.91 L (ref 1.6–3.12)
PRE FEV5: 1.53 L (ref 1.22–3.49)
PRE FVC: 2.71 L (ref 2.23–4.1)
PRE PEF: 6.99 L/S (ref 4.77–9.78)
VA PRE: 3.92 L (ref 6.57–6.57)
VA SINGLE BREATH LLN: 6.57
VA SINGLE BREATH PRE REF: 59.7 %
VA SINGLE BREATH REF: 6.57
VASINGLEBREATHULN: 6.57

## 2025-05-19 ENCOUNTER — HOSPITAL ENCOUNTER (OUTPATIENT)
Dept: SLEEP MEDICINE | Facility: OTHER | Age: 75
Discharge: HOME | End: 2025-05-19
Attending: PHYSICIAN ASSISTANT
Payer: MEDICARE

## 2025-05-19 ENCOUNTER — TELEPHONE (OUTPATIENT)
Dept: SLEEP MEDICINE | Facility: OTHER | Age: 75
End: 2025-05-19
Payer: MEDICARE

## 2025-05-19 DIAGNOSIS — R06.83 SNORING: ICD-10-CM

## 2025-05-19 DIAGNOSIS — F51.09 OTHER INSOMNIA NOT DUE TO A SUBSTANCE OR KNOWN PHYSIOLOGICAL CONDITION: ICD-10-CM

## 2025-05-19 DIAGNOSIS — G47.33 OSA (OBSTRUCTIVE SLEEP APNEA): ICD-10-CM

## 2025-05-19 DIAGNOSIS — R35.1 NOCTURIA: ICD-10-CM

## 2025-05-19 DIAGNOSIS — G47.10 HYPERSOMNOLENCE: ICD-10-CM

## 2025-05-19 PROCEDURE — 95810 POLYSOM 6/> YRS 4/> PARAM: CPT

## 2025-05-20 NOTE — PROGRESS NOTES
Walter Bull to [facility] 5/19/2025 for overnight sleep observation. Pt education, setup and study explanation given prior to test. Disposable leads and sensors used where applicable. Instructions on adjustable given.   Post study, follow up information given in am.

## 2025-05-25 PROCEDURE — 95810 POLYSOM 6/> YRS 4/> PARAM: CPT | Mod: 26,,, | Performed by: INTERNAL MEDICINE

## 2025-05-25 NOTE — PROCEDURES
"[facility]    Polysomnography Interpretation Report    Patient Name:  Walter Bull  MRN#:  [id number]  :  1950  Study Date:  2025  Referring Provider:  TRACY Sanders    Indications for Polysomnography:  The patient is a 74 year old Male who is 5' 9" and weighs 240.0 lbs.  His BMI equals 35.5.  Sterling was - and Neck Circumference was -.  A full night polysomnogram was performed to evaluate for -.    Polysomnogram Data  A full night polysomnogram recorded the standard physiologic parameters including EEG, EOG, EMG, EKG, nasal and oral airflow.  Respiratory parameters of chest and abdominal movements were recorded with (RIP) Respiratory Inductance Plethsmography.  Oxygen saturation was recorded by pulse oximetry.    Sleep Architecture  The total recording time of the polysomnogram was 445.6 minutes.  The total sleep time was 378.5 minutes.  The patient spent 21.9% of total sleep time in Stage N1, 60.2% in Stage N2, 0.0% in Stages N3, and 17.8% in REM.  Sleep latency was 0.9 minutes.  REM latency was 56.0 minutes.  Sleep Efficiency was 84.9%.  Wake after sleep onset was 66.0 minutes.    Respiratory Events  The polysomnogram revealed a presence of 59 obstructive, 9 central, and 1 mixed apneas resulting in a Total Apnea index of 10.9 events per hour.  There were 22 hypopneas resulting in a Total Hypopnea index of 3.5 events per hour.  The combined Apnea/Hypopnea index was 14.4 events per hour.  There were a total of 13 RERA events resulting in a Respiratory Disturbance Index (RDI) of 16.5 events per hour.     Mean oxygen saturation was 94.8%.  The lowest oxygen saturation during sleep was 79.0%.  Time spent <=88% oxygen saturation was 5.8 minutes (1.3%).    End Tidal CO2 during sleep ranged from - to - mmHg. End Tidal CO2 was greater than 50 mmHg for - minutes and greater than 55 mmHg for - minutes.  Transcutaneous CO2 during sleep ranged from - to - mmHg. Transcutaneous CO2 was " "greater than 50 mmHg for - minutes and greater than 55 mmHg for - minutes.    Limb Activity  There were - limb movements recorded.  Of this total, - were classified as PLMs.  Of the PLMs, - were associated with arousals.  The Limb Movement index was - per hour while the PLM index was - per hour and PLM with arousals index was - per hour.    Cardiac:  single lead EKG revealed normal sinus rhythm with intermittent ventricular pacing    Oxygenation:  Hypoxemia was only observed in relation to obstructive events.    Impression:  -moderately severe obstructive sleep apnea significantly worse in stage REM sleep (REM AHI=51 vs non-REM AHI =7)  -mild Sleep-related hypoxemia (G47.36)     Recommendations:  -treatment for the ANURADHA seen in this study is recommended   -consider attended  titration due to the frequent REM-related obstructive events that may not be adequately addressed by auto-CPAP   -the patient has follow up with Sleep Medicine          Festus Malin MD    (This Sleep Study was interpreted by a Board Certified Sleep Specialist who conducted an epoch-by-epoch review of the entire raw data recording.)  (The indication for this sleep study was reviewed and deemed appropriate by AASM Practice Parameters or other reasons by a Board Certified Sleep Specialist.)        [facility]     Diagnostic PSG Report     Patient Name: Walter Bull Study Date: 5/19/2025   YOB: 1950 MRN #: [id number]   Age: 74 year BILLY #: -   Sex: Male Referring Provider: TRACY Sanders   Height: 5' 9" Recording Tech: Edda Painter CRT SDS   Weight: 240.0 lbs Scoring Tech: Kayode Hamilton RRT RPSGT   BMI: 35.5 Interpreting Physician: -   ESS: - Neck Circumference: -      Study Overview     Lights Off: 09:44:44 PM   Count Index   Lights On: 05:10:18 AM Awakenings: 58 9.2   Time in Bed: 445.6 min. Arousals: 69 10.9   Total Sleep Time: 378.5 min. Apneas & Hypopneas: 91 14.4    Sleep Efficiency: 84.9% Limb " Movements: - -   Sleep Latency: 0.9 min. Snores: - -   Wake After Sleep Onset: 66.0 min. Desaturations: 67 10.6    REM Latency from Sleep Onset: 56.0 min. Minimum SpO2 TST: 79.0%        Sleep Architecture                                                                                                                           % of Time in Bed     Stages Time (mins) % Sleep Time   Wake 67.5     Stage N1 83.0 21.9%   Stage N2 228.0 60.2%   Stage N3 0.0 0.0%   REM 67.5 17.8%         Arousal Summary       NREM REM Sleep Index   Respiratory Arousals 8 4 12 1.9   PLM Arousals - - - -   Isolated Limb Movement Arousals - - - -   Spontaneous Arousals 39 18 57 9.0   Total 47 22 69 10.9         Limb Movement Summary       Count Index   Isolated Limb Movements - -   Periodic Limb Movements (PLMs) - -   Total Limb Movements - -          Respiratory Summary       By Sleep Stage By Body Position Total    NREM REM Supine Non-Supine    Time (min) 311.0 67.5 183.5 195.0 378.5                 Obstructive Apnea 13 46 24 35 59   Mixed Apnea 1 - 1 - 1   Central Apnea 8 1 4 5 9   Central Apnea Index 1.5 0.9 1.5 1.5 1.5   Total Apneas 22 47 29 40 69   Total Apnea Index 4.2 41.8 9.5 12.3 10.9                 Hypopnea 12 10 17 5 22   Hypopnea Index 2.3 8.9 5.6 1.5 3.5                 Apnea & Hypopnea 34 57 46 45 91   Apnea & Hypopnea Index 6.6 50.7 15.0 13.8 14.4                 RERAs 10 3 9 4 13   RERA Index 1.9 2.7 2.9 1.2 2.1                 RDI 8.5 53.3 18.0 15.1 16.5      Scoring Criteria: Hypopneas scored at 4% desaturation criteria.     Respiratory Event Durations       Apnea Hypopnea    NREM REM NREM REM   Average (seconds) 13.5 16.6 17.0 18.2   Maximum (seconds) 21.0 42.7 22.1 23.6         Oxygen Saturation Summary       Wake NREM REM TST TIB   Average SpO2 96.3% 94.8% 93.7% 94.6% 94.8%   Minimum SpO2 83.0% 89.0% 79.0% 79.0% 79.0%   Maximum SpO2 99.0% 100.0% 99.0% 100.0% 100.0%         Oxygen Saturation Distribution     Range (%)  Time in range (min) Time in range (%)   90.0 - 100.0 424.4 95.7%   80.0 - 90.0 10.9 2.5%   70.0 - 80.0 0.1 0.0%   60.0 - 70.0 - -   50.0 - 60.0 - -   0.0 - 50.0 - -   Time Spent <=88% SpO2     Range (%) Time in range (min) Time in range (%)   0.0 - 88.0 5.8 1.3%              Count Index   Desaturations 67 10.6           Cardiac Summary       Wake NREM REM Sleep Total   Average Pulse Rate (BPM) 60.5 60.1 60.0 60.1 60.1   Minimum Pulse Rate (BPM) 59.0 57.0 59.0 57.0 57.0   Maximum Pulse Rate (BPM) 75.0 66.0 63.0 66.0 75.0      Pulse Rate Distribution     Range (bpm) Time in range (min) Time in range (%)   0.0 - 40.0 - -   40.0 - 60.0 415.4 93.5%   60.0 - 80.0 28.7 6.5%   80.0 - 100.0 - -   100.0 - 120.0 - -   120.0 - 140.0 - -   140.0 - 200.0 - -      EtCO2 Summary     Stage Min (mmHg) Average (mmHg) Max (mmHg)   Wake - - -   NREM(1+2+3) - - -   REM - - -      Range (mmHg) Time in range (min) Time in range (%)   20.0 - 40.0 - -   40.0 - 50.0 - -   50.0 - 55.0 - -   55.0 - 100.0 - -   Excluded data <20.0 & >65.0 446.0 100.0%      TcCO2 Summary     Stage Min (mmHg) Average (mmHg) Max (mmHg)   Wake - - -   NREM(1+2+3) - - -   REM - - -      Range (mmHg) Time in range (min) Time in range (%)   20.0 - 40.0 - -   40.0 - 50.0 - -   50.0 - 55.0 - -   55.0 - 100.0 - -   Excluded data <20.0 & >65.0 446.0 100.0%      Comments     -

## 2025-05-27 ENCOUNTER — PATIENT MESSAGE (OUTPATIENT)
Dept: SLEEP MEDICINE | Facility: CLINIC | Age: 75
End: 2025-05-27
Payer: MEDICARE

## 2025-05-27 DIAGNOSIS — G47.33 OSA (OBSTRUCTIVE SLEEP APNEA): Primary | ICD-10-CM

## 2025-05-27 NOTE — TELEPHONE ENCOUNTER
CPAP titration ordered given severity of ANURADHA in REM sleep noted in diagnostic PSG. CPAP and supplies also ordered.

## 2025-05-30 ENCOUNTER — PATIENT MESSAGE (OUTPATIENT)
Facility: CLINIC | Age: 75
End: 2025-05-30
Payer: MEDICARE

## 2025-06-01 ENCOUNTER — PATIENT MESSAGE (OUTPATIENT)
Dept: INTERNAL MEDICINE | Facility: CLINIC | Age: 75
End: 2025-06-01
Payer: MEDICARE

## 2025-06-02 ENCOUNTER — PATIENT OUTREACH (OUTPATIENT)
Dept: ADMINISTRATIVE | Facility: HOSPITAL | Age: 75
End: 2025-06-02
Payer: MEDICARE

## 2025-06-02 DIAGNOSIS — E66.9 DIABETES MELLITUS TYPE 2 IN OBESE: Chronic | ICD-10-CM

## 2025-06-02 DIAGNOSIS — I50.42 CHRONIC COMBINED SYSTOLIC AND DIASTOLIC HEART FAILURE: Chronic | ICD-10-CM

## 2025-06-02 DIAGNOSIS — I25.118 CORONARY ARTERY DISEASE OF NATIVE ARTERY OF NATIVE HEART WITH STABLE ANGINA PECTORIS: ICD-10-CM

## 2025-06-02 DIAGNOSIS — E11.22 TYPE 2 DIABETES MELLITUS WITH STAGE 3B CHRONIC KIDNEY DISEASE, WITH LONG-TERM CURRENT USE OF INSULIN: ICD-10-CM

## 2025-06-02 DIAGNOSIS — N40.1 BPH WITH URINARY OBSTRUCTION: ICD-10-CM

## 2025-06-02 DIAGNOSIS — E11.21 TYPE 2 DIABETES MELLITUS WITH DIABETIC NEPHROPATHY, WITH LONG-TERM CURRENT USE OF INSULIN: ICD-10-CM

## 2025-06-02 DIAGNOSIS — K21.9 GASTROESOPHAGEAL REFLUX DISEASE WITHOUT ESOPHAGITIS: ICD-10-CM

## 2025-06-02 DIAGNOSIS — E11.69 DIABETES MELLITUS TYPE 2 IN OBESE: Chronic | ICD-10-CM

## 2025-06-02 DIAGNOSIS — N13.8 BPH WITH URINARY OBSTRUCTION: ICD-10-CM

## 2025-06-02 DIAGNOSIS — Z79.4 TYPE 2 DIABETES MELLITUS WITH DIABETIC NEPHROPATHY, WITH LONG-TERM CURRENT USE OF INSULIN: ICD-10-CM

## 2025-06-02 DIAGNOSIS — N18.32 TYPE 2 DIABETES MELLITUS WITH STAGE 3B CHRONIC KIDNEY DISEASE, WITH LONG-TERM CURRENT USE OF INSULIN: ICD-10-CM

## 2025-06-02 DIAGNOSIS — Z79.4 TYPE 2 DIABETES MELLITUS WITH STAGE 3B CHRONIC KIDNEY DISEASE, WITH LONG-TERM CURRENT USE OF INSULIN: ICD-10-CM

## 2025-06-02 RX ORDER — TIRZEPATIDE 7.5 MG/.5ML
7.5 INJECTION, SOLUTION SUBCUTANEOUS
Qty: 4 PEN | Refills: 11 | OUTPATIENT
Start: 2025-06-02

## 2025-06-02 RX ORDER — EVOLOCUMAB 140 MG/ML
140 INJECTION, SOLUTION SUBCUTANEOUS
Qty: 2 EACH | Refills: 11 | Status: ACTIVE | OUTPATIENT
Start: 2025-06-02

## 2025-06-02 RX ORDER — GLUCAGON INJECTION, SOLUTION 1 MG/.2ML
1 INJECTION, SOLUTION SUBCUTANEOUS
Qty: 0.4 ML | Refills: 2 | Status: CANCELLED | OUTPATIENT
Start: 2025-06-02

## 2025-06-02 RX ORDER — BLOOD-GLUCOSE,RECEIVER,CONT
EACH MISCELLANEOUS
Qty: 1 EACH | Refills: 0 | Status: CANCELLED | OUTPATIENT
Start: 2025-06-02

## 2025-06-02 RX ORDER — LOSARTAN POTASSIUM 25 MG/1
12.5 TABLET ORAL DAILY
Qty: 45 TABLET | Refills: 3 | Status: SHIPPED | OUTPATIENT
Start: 2025-06-02

## 2025-06-02 RX ORDER — ONDANSETRON 4 MG/1
4 TABLET, FILM COATED ORAL EVERY 8 HOURS PRN
Qty: 15 TABLET | Refills: 0 | Status: SHIPPED | OUTPATIENT
Start: 2025-06-02

## 2025-06-02 RX ORDER — SPIRONOLACTONE 25 MG/1
25 TABLET ORAL DAILY
Qty: 90 TABLET | Refills: 3 | Status: SHIPPED | OUTPATIENT
Start: 2025-06-02

## 2025-06-03 RX ORDER — RANOLAZINE 1000 MG/1
1000 TABLET, EXTENDED RELEASE ORAL 2 TIMES DAILY
Qty: 180 TABLET | Refills: 3 | Status: SHIPPED | OUTPATIENT
Start: 2025-06-03

## 2025-06-03 RX ORDER — ESOMEPRAZOLE MAGNESIUM 40 MG/1
40 CAPSULE, DELAYED RELEASE ORAL 2 TIMES DAILY
Qty: 180 CAPSULE | Refills: 3 | Status: SHIPPED | OUTPATIENT
Start: 2025-06-03

## 2025-06-03 RX ORDER — FAMOTIDINE 40 MG/1
40 TABLET, FILM COATED ORAL DAILY
Qty: 90 TABLET | Refills: 3 | Status: SHIPPED | OUTPATIENT
Start: 2025-06-03 | End: 2026-06-03

## 2025-06-03 RX ORDER — TAMSULOSIN HYDROCHLORIDE 0.4 MG/1
1 CAPSULE ORAL DAILY
Qty: 90 CAPSULE | Refills: 3 | Status: SHIPPED | OUTPATIENT
Start: 2025-06-03 | End: 2026-06-03

## 2025-06-10 LAB
OHS CV AF BURDEN PERCENT: < 1
OHS CV BIV PACING PERCENT: 95 %
OHS CV DC REMOTE DEVICE TYPE: NORMAL

## 2025-06-23 ENCOUNTER — TELEPHONE (OUTPATIENT)
Dept: SLEEP MEDICINE | Facility: CLINIC | Age: 75
End: 2025-06-23
Payer: MEDICARE

## 2025-06-23 ENCOUNTER — PATIENT MESSAGE (OUTPATIENT)
Dept: SLEEP MEDICINE | Facility: CLINIC | Age: 75
End: 2025-06-23
Payer: MEDICARE

## 2025-06-23 NOTE — TELEPHONE ENCOUNTER
Staff left message to schedule patient compliance appointment.       ----- Message from Jessica sent at 6/23/2025  2:42 PM CDT -----  Regarding: PAP Follow Up Needed  Patient received pap equipment today. Please contact him to schedule a follow up with Luz between 7/23/25 and 9/23/25.     Thank you,  Jessica Head, CRT, CRT-SDS

## 2025-06-24 ENCOUNTER — CLINICAL SUPPORT (OUTPATIENT)
Facility: CLINIC | Age: 75
End: 2025-06-24
Payer: MEDICARE

## 2025-06-24 ENCOUNTER — HOSPITAL ENCOUNTER (OUTPATIENT)
Dept: SLEEP MEDICINE | Facility: OTHER | Age: 75
Discharge: HOME | End: 2025-06-24
Attending: PHYSICIAN ASSISTANT
Payer: MEDICARE

## 2025-06-24 ENCOUNTER — TELEPHONE (OUTPATIENT)
Dept: SLEEP MEDICINE | Facility: OTHER | Age: 75
End: 2025-06-24
Payer: MEDICARE

## 2025-06-24 DIAGNOSIS — H90.3 SENSORINEURAL HEARING LOSS (SNHL) OF BOTH EARS: Primary | ICD-10-CM

## 2025-06-24 DIAGNOSIS — G47.33 OSA (OBSTRUCTIVE SLEEP APNEA): ICD-10-CM

## 2025-06-24 PROCEDURE — 99499 UNLISTED E&M SERVICE: CPT | Mod: S$GLB,,, | Performed by: AUDIOLOGIST

## 2025-06-24 PROCEDURE — 95811 POLYSOM 6/>YRS CPAP 4/> PARM: CPT

## 2025-06-24 NOTE — PROGRESS NOTES
Hearing Aid Fitting     Hearing Aid Information  Oticon intent 4 miniRITE R   Color: Madai Black   Battery: [id number]   LT SN: [id number]   RT SN: [id number]   Speaker: 3,85 C4 DETECT   Dome: 8mm bass double   Sirius SmartCharger SN: [id number]   Warranty Exp: 5/31/2028     Walter Bull, 74 year old male, was seen for a hearing aid follow-up. Patient reported he has been doing well with his hearing aids. Patient did not want any programming changes today. The Oticon carlton was reviewed with the patient and he was provided with extra domes and wax guards. Patient should return annually or as needed.

## 2025-06-25 PROCEDURE — 95811 POLYSOM 6/>YRS CPAP 4/> PARM: CPT | Mod: 26,,, | Performed by: INTERNAL MEDICINE

## 2025-06-25 NOTE — PROCEDURES
"[facility]    Titration Interpretation Report    Patient Name:  Walter Bull  MRN#:  [id number]  :  1950  Study Date:  2025  Referring Provider:  Luz Rene    The patient is a 74 year old Male who is 5' 9" and weighs 247.0 lbs.  His BMI equals 36.6.  A full night PAP titration was performed.    Polysomnogram Data  A full night polysomnogram recorded the standard physiologic parameters including EEG, EOG, EMG, EKG, nasal and oral airflow.  Respiratory parameters of chest and abdominal movements were recorded with (RIP) Respiratory Inductance Plethsmography.  Oxygen saturation was recorded by pulse oximetry.    Titration Summary  The patient was titrated at pressures ranging from 6* cm/H20 with supplemental oxygen at - up to 11* cm/H20 with supplemental oxygen at -.  The last pressure used in the study was 11* cm/H20 with supplemental oxygen at -.    Sleep Architecture  The total recording time of the polysomnogram was 420.8 minutes.  The total sleep time was 371.5 minutes.  The patient spent 9.4% of total sleep time in Stage N1, 58.4% in Stage N2, 0.0% in Stages N3, and 32.2% in REM.  Sleep latency was 1.1 minutes.  REM latency was 9.0 minutes.  Sleep Efficiency was 88.3%.  Total wake time was 50.0 minutes for a total wake percentage of 10.3%.  Wake after Sleep Onset was 48.0 minutes.    Respiratory Summary  The polysomnogram revealed a presence of - obstructive, 11 central, and - mixed apneas resulting in Total Apnea index of 1.8 events per hour.  There were 4 hypopneas resulting in Total Hypopnea index of 0.6 events per hour.  The combined Apnea/Hypopnea index was 2.4 events per hour.  There were a total of 6 RERA events resulting in a Respiratory Disturbance Index (RDI) of 3.4 events per hour.     Mean oxygen saturation was 96.9%.  The lowest oxygen saturation during sleep was 89.0%.  Time spent <=88% oxygen saturation was - minutes (-).    End Tidal CO2 during sleep " "ranged from - to - mmHg. End Tidal CO2 was greater than 50 mmHg for - minutes and greater than 55 mmHg for - minutes.  Transcutaneous CO2 during sleep ranged from - to - mmHg. Transcutaneous CO2 was greater than 50 mmHg for - minutes and greater than 55 mmHg for - minutes.    Limb Movement Activity  There were 6 limb movements recorded.  Of this total, 6 were classified as PLMs.  Of the PLMs, 2 were associated with arousals.  The Limb Movement index was 1.0 per hour while the PLM index was 1.0 per hour and PLM with arousals index was 0.3 per hour.    Cardiac: single lead EKG revealed normal sinus rhythm with intermittent V-pacing    PAP 6/24/25: Large Jannette FFM   'I liked that mask. I slept real good."  Rec: min 8(push to 10+ for supine REM)  max 10 to start  Min Ramp: 5 ok    Mask: Large Jannette FFM   CPAP = 6 cwp was largely effective in lateral position in stage N2 sleep  CPAP = 8 cwp was largely effective in lateral position in stage REM sleep  CPAP = 8 cwp was largely effective in supine position in stage N2 sleep  CPAP = 11 cwp was largely effective in supine position in stage REM sleep   The mask used in this study worked well  The patients response to the study:  "'I liked that mask. I slept real good.""    Oxygenation:  No significant hypoxemia was observed     Impression:  -obstructive sleep apnea  -successful PAP titration study  -increased amounts of stage REM sleep    Recommendations:  -initial auto-CPAP settings CPAP min = 7 cwp  and CPAP max =  10 cwp  -if the patient complains of sleep disruption, if possible, CPAP min should be adjusted to 12cwp or higher to control events in stage REM sleep   -the patient has follow up with Sleep Medicine        Festus Malin MD    (This Sleep Study was interpreted by a Board Certified Sleep Specialist who conducted an epoch-by-epoch review of the entire raw data recording.)  (The indication for this sleep study was reviewed and deemed appropriate by AASM Practice " "Parameters or other reasons by a Board Certified Sleep Specialist.)    [facility]     Titration Report     Patient Name: Walter Bull Study Date: 6/24/2025   YOB: 1950 MRN #: [id number]   Age: 74 year BILLY #: -   Sex: Male Referring Provider: Luz Rene   Height: 5' 9" Recording Tech: Ti Ferrell RPSGT   Weight: 247.0 lbs Scoring Tech: Alexis Barros RRT RPSGT   BMI: 36.6 Interpreting Physician: -   ESS: - Neck Circumference: -      Study Overview     Lights Off: 09:17:41 PM   Count Index   Lights On: 04:18:31 AM Awakenings: 33 5.3   Time in Bed: 420.8 min. Arousals: 29 4.7   Total Sleep Time: 371.5 min. Apneas & Hypopneas: 15 2.4    Sleep Efficiency: 88.3% Limb Movements: 6 1.0   Sleep Latency: 1.1 min. Snores: - -   Wake After Sleep Onset: 48.0 min. Desaturations: 4 0.6    REM Latency from Sleep Onset: 9.0 min. Minimum SpO2 TST: 89.0%       Sleep Architecture                                                                                                                           % of Time in Bed     Stages Time (mins) % Sleep Time   Wake 50.0     Stage N1 35.0 9.4%   Stage N2 217.0 58.4%   Stage N3 0.0 0.0%   .5 32.2%         Arousal Summary       NREM REM Sleep Index   Respiratory Arousals 2 2 4 0.6   PLM Arousals 2 - 2 0.3   Isolated Limb Movement Arousals - - - -   Spontaneous Arousals 17 6 23 3.7   Total 21 8 29 4.7         Limb Movement Summary       Count Index   Isolated Limb Movements - -   Periodic Limb Movements (PLMs) 6 1.0   Total Limb Movements 6 1.0       Respiratory Summary       By Sleep Stage By Body Position Total    NREM REM Supine Non-Supine    Time (min) 252.0 119.5 230.0 141.5 371.5                 Obstructive Apnea - - - - -   Mixed Apnea - - - - -   Central Apnea 7 4 7 4 11   Central Apnea Index 1.7 2.0 1.7 1.5 1.7   Total Apneas 7 4 7 4 11   Total Apnea Index 1.7 2.0 1.8 1.7 1.8                 Total Hypopnea - 4 4 - 4   Total Hypopnea " Index - 2.0 1.0 - 0.6                 Apnea & Hypopnea 7 8 11 4 15   Apnea & Hypopnea Index 1.7 4.0 2.9 1.7 2.4                 RERAs 4 2 6 - 6   RERA Index 1.0 1.0 1.6 - 1.0                 RDI 2.6 5.0 4.4 1.7 3.4      Scoring Criteria: Hypopneas scored at 4% desaturation criteria.     Respiratory Event Durations       Apnea Hypopnea    NREM REM NREM REM   Average (seconds) 13.2 12.2 - 16.0   Maximum (seconds) 16.5 12.8 - 21.9         Oxygen Saturation Summary       Wake NREM REM TST Total   Average SpO2 97.5% 96.7% 97.1% 96.8% 96.9%   Minimum SpO2 90.0% 94.0% 89.0% 89.0% 89.0%   Maximum SpO2 100.0% 100.0% 100.0% 100.0% 100.0%      Oxygen Saturation Distribution     Range (%) Time in range (min) Time in range (%)    90.0 - 100.0 414.6 99.8%   80.0 - 90.0 0.1 0.0%   70.0 - 80.0 - -   60.0 - 70.0 - -   50.0 - 60.0 - -   0.0 - 50.0 - -   Time Spent <=88% SpO2     Range (%) Time in range (min) Time in range (%)   0.0 - 88.0 - -              Count Index   Desaturations 4 0.6           Cardiac Summary       Wake NREM REM Sleep Total   Average Pulse Rate (BPM) 60.0 60.0 60.0 60.0 60.0   Minimum Pulse Rate (BPM) 59.0 56.0 59.0 56.0 56.0   Maximum Pulse Rate (BPM) 155.0 63.0 62.0 63.0 155.0      Pulse Rate Distribution     Range (bpm) Time in range (min) Time in range (%)   0.0 - 40.0 - -   40.0 - 60.0 414.4 99.6%   60.0 - 80.0 1.5 0.4%   80.0 - 100.0 - -   100.0 - 120.0 - -   120.0 - 140.0 - -   140.0 - 200.0 0.0 0.0%      EtCO2 Summary     Stage Min (mmHg) Average (mmHg) Max (mmHg)   Wake - - -   NREM(1+2+3) - - -   REM - - -      Range (mmHg) Time in range (min) Time in range (%)   20.0 - 40.0 - -   40.0 - 50.0 - -   50.0 - 55.0 - -   55.0 - 100.0 - -      TcCO2 Summary     Stage Min (mmHg) Average (mmHg) Max (mmHg)   Wake - - -   NREM(1+2+3) - - -   REM - - -      Range (mmHg) Time in range (min) Time in range (%)   20.0 - 40.0 - -   40.0 - 50.0 - -   50.0 - 55.0 - -   55.0 - 100.0 - -   Excluded data <20.0 & >65.0  421.5 100.0%      Comments     -         Titration Summary     PAP Device PAP Level O2 Level Time (min) TST (min) NREM (min) REM (min) Wake (min) Sleep Eff% OA# CA# MA# Hyp# AHI RERA RDI Min SpO2 SpO2 <=88% (min) Ar. Index   - Off - 0.5 0.0 0.0 0.0 0.5 0.0%             CPAP 6 - 19.0 14.0 8.0 6.0 5.0 73.7% - - - - - - - 96.0  0.0 -   CPAP 7 - 43.5 43.5 41.5 2.0 0.0 100.0% - - - - - - - 94.0  0.0 1.4   CPAP 8 - 177.5 157.0 102.0 55.0 20.5 88.5% - 7 - - 2.7 4 4.2 93.0  0.0 7.6   CPAP 9 - 20.5 19.5 19.0 0.5 1.0 95.1% - 1 - - 3.1 - 3.1 95.0  0.0 -   CPAP 10 - 21.5 19.0 1.0 18.0 2.5 88.4% - - - 4 12.6 2 18.9 89.0  0.0 9.5   CPAP 11 - 139.0 118.5 80.5 38.0 20.5 85.3% - 3 - - 1.5 - 1.5 94.0  0.0 2.5

## 2025-06-25 NOTE — PROGRESS NOTES
An overnight titration sleep study was performed on [facility]. The following was explained to the pt prior to the study: the time to bed and wake time, the set-up process timeframe and the purpose of each sensor, the reason (if sensors fall off) the technician will need to enter the room during the night, the possibility of the tech fitting a PAP mask on pt for treatment in the middle of the night, and how to call out for assistance during the night. A post-study letter was handed to the pt in the morning.     Mask used:   Large Jannette FFM

## 2025-06-30 ENCOUNTER — PATIENT MESSAGE (OUTPATIENT)
Dept: SLEEP MEDICINE | Facility: CLINIC | Age: 75
End: 2025-06-30
Payer: MEDICARE

## 2025-06-30 DIAGNOSIS — G47.33 OSA (OBSTRUCTIVE SLEEP APNEA): Primary | ICD-10-CM

## 2025-07-01 ENCOUNTER — TELEPHONE (OUTPATIENT)
Dept: PHARMACY | Facility: CLINIC | Age: 75
End: 2025-07-01
Payer: MEDICARE

## 2025-07-01 ENCOUNTER — PATIENT MESSAGE (OUTPATIENT)
Dept: ADMINISTRATIVE | Facility: OTHER | Age: 75
End: 2025-07-01
Payer: MEDICARE

## 2025-07-01 NOTE — TELEPHONE ENCOUNTER
[facility]/Population Health Chart Review & Patient Outreach Details For Medication Adherence Project    Reason for Outreach Encounter: 3rd Party payor non-compliance report ([employer], [employer], C, etc)  2.  Patient Outreach Method: Reviewed Patient Chart  3.   Medication in question: losartan   LAST FILLED: 6/9/25 for 90 day supply  Diabetes Medications              glucagon (GVOKE HYPOPEN 2-PACK) 1 mg/0.2 mL AtIn Inject 1 Pen into the skin as needed (hypoglycemia).    glucagon (GVOKE PFS 2-PACK SYRINGE) 1 mg/0.2 mL Syrg Inject 1 pen into the skin as needed for hypoglycemia.    insulin aspart U-100 (NOVOLOG U-100 INSULIN ASPART) 100 unit/mL injection Use as directed via VGO 20, max daily dose 56 units.    tirzepatide 7.5 mg/0.5 mL PnIj Inject 7.5 mg into the skin every 7 days.    tirzepatide 7.5 mg/0.5 mL PnIj Inject 7.5 mg  into the skin every 7 days.              4.  Reviewed and or Updates Made To: Patient Chart  5. Outreach Outcomes and/or actions taken: Patient filled medication and is on track to be adherent       Physical Exam: 


SUBJECTIVE: 





Patient seen and examined at bedside in ICU. She reported feeling better, 

slight pain at chest tube site but under control, still no bowel movement. 

Further denies fever, chills, chest pain, sob, urinary sx.








OBJECTIVE:





 Vital Signs











 Period  Temp  Pulse  Resp  BP Sys/Olivares  Pulse Ox


 


 Last 24 Hr  98 F-99 F  68-99  16-18  /50-82  93-95











GENERAL: AAOx3, not in cardiopulmonary distress


EYES:  sclera anicteric, conjunctiva clear. 


EARS, NOSE, THROAT: moist mucous membranes, no exudate


LUNGS: CTAB


HEART: RRR, normal S1 and S2 without murmur, rub or gallop.


ABDOMEN: soft, hypoactive bs, non-tender, no rebound or guarding, vertical 

surgical scar, chest tubes in place with clean dressing


EXTREMITIES: SCDs. No peripheral edema. 


SKIN: bruises on b/l forearms due to needle sticks in ED











 CBCD











WBC  7.2 K/mm3 (4.0-10.0)   03/27/17  05:05    


 


RBC  3.35 M/mm3 (3.60-5.2)  L  03/27/17  05:05    


 


Hgb  9.7 GM/dL (10.7-15.3)  L  03/27/17  05:05    


 


Hct  28.2 % (32.4-45.2)  L  03/27/17  05:05    


 


MCV  84.4 fl (80-96)   03/27/17  05:05    


 


MCHC  34.3 g/dl (32.0-36.0)   03/27/17  05:05    


 


RDW  14.3 % (11.6-15.6)   03/27/17  05:05    


 


Plt Count  411 K/MM3 (134-434)   03/27/17  05:05    


 


MPV  7.0 fl (7.5-11.1)  L  03/27/17  05:05    








 CMP











Sodium  137 mmol/L (136-145)   03/27/17  05:05    


 


Potassium  3.7 mmol/L (3.5-5.1)   03/27/17  05:05    


 


Chloride  96 mmol/L ()  L  03/27/17  05:05    


 


Carbon Dioxide  32 mmol/L (21-32)   03/27/17  05:05    


 


Anion Gap  9  (8-16)   03/27/17  05:05    


 


BUN  7 mg/dL (7-18)   03/27/17  05:05    


 


Creatinine  0.5 mg/dL (0.55-1.02)  L  03/27/17  05:05    


 


Creat Clearance w eGFR  > 60  (>60)   03/26/17  05:20    


 


Calcium  8.1 mg/dL (8.5-10.1)  L  03/27/17  05:05    


 


Total Bilirubin  0.4 mg/dL (0.2-1.0)  D 03/26/17  05:20    


 


AST  42 U/L (15-37)  H D 03/26/17  05:20    


 


ALT  60 U/L (12-78)  D 03/26/17  05:20    


 


Alkaline Phosphatase  98 U/L ()   03/26/17  05:20    


 


Total Protein  6.0 g/dl (6.4-8.2)  L  03/26/17  05:20    


 


Albumin  2.3 g/dl (3.4-5.0)  L  03/26/17  05:20    








 Intake & Output











 03/24/17 03/25/17 03/26/17 03/27/17





 23:59 23:59 23:59 23:59


 


Intake Total 3050 2175 1520 300


 


Output Total 1609 1970 80 45


 


Balance 6253 125 3918 255


 


Weight 98.883 kg 101.106 kg 99.7 kg 101.06 kg











Active Medications











Generic Name Dose Route Start Last Admin





  Trade Name Freq  PRN Reason Stop Dose Admin


 


Acetaminophen  650 mg 03/26/17 17:35 03/26/17 20:46





  Tylenol -  PO   650 mg





  Q6H PRN   Administration





  FEVER OR PAIN   


 


Heparin Sodium (Porcine)  5,000 unit 03/26/17 22:00 03/27/17 14:11





  Heparin -  SQ   5,000 unit





  TID ROBIN   Administration


 


Hydrochlorothiazide  12.5 mg 03/27/17 10:00 03/27/17 10:01





  Hctz -  PO   Not Given





  DAILY ROBIN   


 


Ertapenem 1 gm/ Sodium  50 mls @ 100 mls/hr 03/27/17 10:00 03/27/17 09:41





  Chloride  IVPB   100 mls/hr





  DAILY ROBIN   Administration


 


Ipratropium Bromide  1 amp 03/26/17 18:00 03/27/17 11:19





  Atrovent 0.02% Nebulizer -  NEB   1 amp





  QIDR ROBIN   Administration


 


Levothyroxine Sodium  125 mcg 03/27/17 07:00 03/27/17 06:10





  Synthroid -  PO   125 mcg





  DAILY@0700 ROBIN   Administration


 


Lidocaine  1 patch 03/27/17 10:00 03/27/17 10:02





  Lidoderm Patch -  TP   1 patch





  DAILY ROBIN   Administration


 


Losartan Potassium  50 mg 03/27/17 10:00 03/27/17 10:01





  Cozaar -  PO   Not Given





  DAILY ROBIN   


 


Ondansetron HCl  4 mg 03/26/17 17:35  





  Zofran Injection  IVPUSH   





  Q6H PRN   





  NAUSEA AND/OR VOMITING   


 


Oxycodone HCl  5 mg 03/26/17 17:35  





  Roxicodone -  PO   





  Q4H PRN   





  PAIN   


 


Oxycodone HCl  10 mg 03/26/17 17:35 03/27/17 16:02





  Roxicodone -  PO   10 mg





  Q4H PRN   Administration





  PAIN   


 


Polyethylene Glycol  17 gm 03/26/17 22:00 03/27/17 14:35





  Miralax (For Daily Use) -  PO   17 grams





  TID ROBIN   Administration


 


Senna  1 tab 03/27/17 22:00  





  Senna -  PO   





  BID ROBIN   


 


Tramadol HCl  50 mg 03/26/17 18:00 03/27/17 14:10





  Ultram -  PO   50 mg





  Q6H ROBIN   Administration








Microbiology





Pleural biopsy: no malignant cell


R flank mass: no malignant cell. Granulation tissue.





03/24/17   Pleural fluid analysis - PENDING





03/21/17   Pleural fluid gram stain, culture and aspirate - NEGATIVE


03/21/17   Pleural Fluid   AFB Smear Concentration - Preliminary


03/21/17   Pleural Fluid   Mycobacterial Culture - Preliminary





03/18/17   Blood Culture - NEGATIVE





Imaging





CXR on 3/24: right chest tube in place





Chest, Abd and Pelvis CT on 3/22: Posterior chest wall mass. Right effusion and 

compressive atelectasis. Abnormal hypodense area posterior to the postoperative 

stomach. Colitis with diffuse infiltrating lesion. 








ASSESSMENT/PLAN:





53 yo F w/ h/o HTN, cholecystectomy and hypothyroidism on synthroid initially 

admitted to the hospital through ER for R chest x 2 days and abdominal painx 1 

month. Patient was found to have refractory pleural effusion with unknown 

etiology and now s/p thoracentesis on observation in ICU.





Pulm: Refractory malignant vs. nonmalignant pleural effusion


   - s/p R VATS, pleural biopsy, 3rd thoracentesis, and chest tube insertion


      * exudative, negative cytology, positive inflammatory cells


   - CT removal planned tomorrow and wednesday


   - Fentanyl for pain control


   - Incentive spirometer





GI: R flank mass; perigastric fluid collection 


   - Mass likely to be reactive inflammatory process around dropped stone


      * No malignancy per pathology; granulation tissue


      * F/U repeat CT


   - Newly developed perigastric fluid collection


      * Stomach stapling in 1990 at [facility]


      * Recanulated in 1991


      * F/U repeat CT





ID: Infectious vs. inflammatory colitis; CAP; h/o Hep A


   - Afrebile with normal WBC


   - Colitis likely 2/2 stool impaction


      * persistent constipation on pain meds


      * trial of Miralax for decompaction


      * added senna


   - Cont. ertapenem





Cardiac: HTN


   - Cont. Cozaar, HCTZ and norvasc





Endo: Hypothyroidism


   - On synthroid 125 mcg





FEN


   - Not on IVF


   - Normal lytes, cont. to monitor


   - Sodium controlled diet





Prophylaxis 


   - DVT: heparin TID SQ


   - GI: Not indicated





Disposition


   - Transfer to [facility] [place]





Code status


   - Full code








Visit type





- Emergency Visit


Emergency Visit: No





- New Patient


This patient is new to me today: No





- Critical Care


Critical Care patient: Yes


Total Critical Care Time (in minutes): 45


Critical Care Statement: The care of this patient involved high complexity 

decision making to prevent further life threatening deterioration of the patient

's condition and/or to evalute & treat vital organ system(s) failure or risk of 

failure.





- Discharge Referral


Referred to [facility] No

## 2025-07-05 DIAGNOSIS — Z79.4 TYPE 2 DIABETES MELLITUS WITH STAGE 3B CHRONIC KIDNEY DISEASE, WITH LONG-TERM CURRENT USE OF INSULIN: ICD-10-CM

## 2025-07-05 DIAGNOSIS — E11.22 TYPE 2 DIABETES MELLITUS WITH STAGE 3B CHRONIC KIDNEY DISEASE, WITH LONG-TERM CURRENT USE OF INSULIN: ICD-10-CM

## 2025-07-05 DIAGNOSIS — N18.32 TYPE 2 DIABETES MELLITUS WITH STAGE 3B CHRONIC KIDNEY DISEASE, WITH LONG-TERM CURRENT USE OF INSULIN: ICD-10-CM

## 2025-07-07 RX ORDER — BLOOD-GLUCOSE SENSOR
1 EACH MISCELLANEOUS
Qty: 3 EACH | Refills: 11 | Status: SHIPPED | OUTPATIENT
Start: 2025-07-07 | End: 2026-07-07

## 2025-07-09 NOTE — ASSESSMENT & PLAN NOTE
Monitor glucose; sliding scale   Hold Janumet   
  Monitor glucose; sliding scale   Hold Janumet   
  Resume flomax  
Continue ASA, Plavix, BB, statin  Planning for [facility]    
Continue ASA, Plavix, BB, statin  Planning for [facility] - transfer today    
Continue amlodipine, Isosorbide, metoprolol,  Hold lasix today  
Continue amlodipine, Isosorbide, metoprolol,  Hold lasix today  
Continue statin- crestor,     
Continue statin- crestor,     
IV heparin  ASA, BB, statin  O2, Imdur/NTG  CXR this morning    
Planning for transfer for [facility]  Continue IV heparin; starting hydration, hold lasix this am  Continue ASA, plavix, statin, BB, heparin, cont to trend TN  O2, Morphine PRN (no longer having chest pain)    
Planning for transfer for [facility]  Continue IV heparin; starting hydration, hold lasix this am  Continue ASA, plavix, statin, BB, heparin, cont to trend TN  O2, Morphine PRN (no longer having chest pain)    
Resume flomax  
Home

## 2025-08-05 ENCOUNTER — PATIENT MESSAGE (OUTPATIENT)
Dept: ADMINISTRATIVE | Facility: OTHER | Age: 75
End: 2025-08-05
Payer: MEDICARE

## 2025-08-16 ENCOUNTER — CLINICAL SUPPORT (OUTPATIENT)
Dept: CARDIOLOGY | Facility: HOSPITAL | Age: 75
End: 2025-08-16
Attending: INTERNAL MEDICINE
Payer: MEDICARE

## 2025-08-16 ENCOUNTER — CLINICAL SUPPORT (OUTPATIENT)
Dept: CARDIOLOGY | Facility: HOSPITAL | Age: 75
End: 2025-08-16
Payer: MEDICARE

## 2025-08-16 DIAGNOSIS — I50.40 UNSPECIFIED COMBINED SYSTOLIC (CONGESTIVE) AND DIASTOLIC (CONGESTIVE) HEART FAILURE: ICD-10-CM

## 2025-08-16 DIAGNOSIS — I25.5 ISCHEMIC CARDIOMYOPATHY: ICD-10-CM

## 2025-08-16 DIAGNOSIS — Z95.810 PRESENCE OF AUTOMATIC (IMPLANTABLE) CARDIAC DEFIBRILLATOR: ICD-10-CM

## 2025-08-16 PROCEDURE — 93296 REM INTERROG EVL PM/IDS: CPT | Performed by: INTERNAL MEDICINE

## 2025-08-16 PROCEDURE — 93295 DEV INTERROG REMOTE 1/2/MLT: CPT | Mod: ,,, | Performed by: INTERNAL MEDICINE

## 2025-08-20 ENCOUNTER — OFFICE VISIT (OUTPATIENT)
Dept: ENDOCRINOLOGY | Facility: CLINIC | Age: 75
End: 2025-08-20
Payer: MEDICARE

## 2025-08-20 VITALS
HEIGHT: 69 IN | BODY MASS INDEX: 36.09 KG/M2 | WEIGHT: 243.69 LBS | DIASTOLIC BLOOD PRESSURE: 74 MMHG | SYSTOLIC BLOOD PRESSURE: 128 MMHG

## 2025-08-20 DIAGNOSIS — N18.32 STAGE 3B CHRONIC KIDNEY DISEASE: ICD-10-CM

## 2025-08-20 DIAGNOSIS — E11.22 TYPE 2 DIABETES MELLITUS WITH STAGE 3B CHRONIC KIDNEY DISEASE, WITH LONG-TERM CURRENT USE OF INSULIN: Primary | ICD-10-CM

## 2025-08-20 DIAGNOSIS — E66.01 SEVERE OBESITY (BMI 35.0-39.9) WITH COMORBIDITY: ICD-10-CM

## 2025-08-20 DIAGNOSIS — N18.32 TYPE 2 DIABETES MELLITUS WITH STAGE 3B CHRONIC KIDNEY DISEASE, WITH LONG-TERM CURRENT USE OF INSULIN: Primary | ICD-10-CM

## 2025-08-20 DIAGNOSIS — Z79.4 TYPE 2 DIABETES MELLITUS WITH STAGE 3B CHRONIC KIDNEY DISEASE, WITH LONG-TERM CURRENT USE OF INSULIN: Primary | ICD-10-CM

## 2025-08-20 PROCEDURE — 99999 PR PBB SHADOW E&M-EST. PATIENT-LVL IV: CPT | Mod: PBBFAC,,, | Performed by: STUDENT IN AN ORGANIZED HEALTH CARE EDUCATION/TRAINING PROGRAM

## 2025-08-20 RX ORDER — INSULIN ASPART 100 [IU]/ML
INJECTION, SOLUTION INTRAVENOUS; SUBCUTANEOUS
Qty: 90 ML | Refills: 11 | Status: SHIPPED | OUTPATIENT
Start: 2025-08-20

## 2025-08-20 RX ORDER — TIRZEPATIDE 7.5 MG/.5ML
7.5 INJECTION, SOLUTION SUBCUTANEOUS
Qty: 2 ML | Refills: 11 | Status: SHIPPED | OUTPATIENT
Start: 2025-08-20

## 2025-08-20 RX ORDER — INSULIN PMP CART,AUT,G6/7,CNTR
1 EACH SUBCUTANEOUS
Qty: 10 EACH | Refills: 3 | Status: SHIPPED | OUTPATIENT
Start: 2025-08-20

## 2025-08-21 ENCOUNTER — OFFICE VISIT (OUTPATIENT)
Dept: CARDIOLOGY | Facility: CLINIC | Age: 75
End: 2025-08-21
Payer: MEDICARE

## 2025-08-21 VITALS
SYSTOLIC BLOOD PRESSURE: 166 MMHG | DIASTOLIC BLOOD PRESSURE: 82 MMHG | BODY MASS INDEX: 36.37 KG/M2 | WEIGHT: 246.25 LBS | HEART RATE: 60 BPM

## 2025-08-21 DIAGNOSIS — Z79.4 TYPE 2 DIABETES MELLITUS WITH STAGE 3B CHRONIC KIDNEY DISEASE, WITH LONG-TERM CURRENT USE OF INSULIN: ICD-10-CM

## 2025-08-21 DIAGNOSIS — Z95.1 S/P CABG (CORONARY ARTERY BYPASS GRAFT): Primary | ICD-10-CM

## 2025-08-21 DIAGNOSIS — I70.0 AORTIC ATHEROSCLEROSIS: ICD-10-CM

## 2025-08-21 DIAGNOSIS — E66.01 SEVERE OBESITY (BMI 35.0-39.9) WITH COMORBIDITY: ICD-10-CM

## 2025-08-21 DIAGNOSIS — E11.22 TYPE 2 DIABETES MELLITUS WITH STAGE 3B CHRONIC KIDNEY DISEASE, WITH LONG-TERM CURRENT USE OF INSULIN: ICD-10-CM

## 2025-08-21 DIAGNOSIS — I50.42 CHRONIC COMBINED SYSTOLIC AND DIASTOLIC HEART FAILURE: Chronic | ICD-10-CM

## 2025-08-21 DIAGNOSIS — I10 BENIGN ESSENTIAL HTN: Chronic | ICD-10-CM

## 2025-08-21 DIAGNOSIS — Z95.810 PRESENCE OF CARDIAC RESYNCHRONIZATION THERAPY DEFIBRILLATOR (CRT-D): ICD-10-CM

## 2025-08-21 DIAGNOSIS — I48.0 PAROXYSMAL ATRIAL FIBRILLATION: ICD-10-CM

## 2025-08-21 DIAGNOSIS — E78.2 MIXED HYPERLIPIDEMIA: Chronic | ICD-10-CM

## 2025-08-21 DIAGNOSIS — N18.32 TYPE 2 DIABETES MELLITUS WITH STAGE 3B CHRONIC KIDNEY DISEASE, WITH LONG-TERM CURRENT USE OF INSULIN: ICD-10-CM

## 2025-08-21 DIAGNOSIS — G47.33 OSA (OBSTRUCTIVE SLEEP APNEA): ICD-10-CM

## 2025-08-21 DIAGNOSIS — I25.118 CORONARY ARTERY DISEASE OF NATIVE ARTERY OF NATIVE HEART WITH STABLE ANGINA PECTORIS: Chronic | ICD-10-CM

## 2025-08-21 DIAGNOSIS — I95.1 ORTHOSTATIC HYPOTENSION: ICD-10-CM

## 2025-08-21 DIAGNOSIS — I25.5 ISCHEMIC CARDIOMYOPATHY: ICD-10-CM

## 2025-08-21 PROCEDURE — 1101F PT FALLS ASSESS-DOCD LE1/YR: CPT | Mod: CPTII,S$GLB,, | Performed by: INTERNAL MEDICINE

## 2025-08-21 PROCEDURE — G2211 COMPLEX E/M VISIT ADD ON: HCPCS | Mod: S$GLB,,, | Performed by: INTERNAL MEDICINE

## 2025-08-21 PROCEDURE — 1160F RVW MEDS BY RX/DR IN RCRD: CPT | Mod: CPTII,S$GLB,, | Performed by: INTERNAL MEDICINE

## 2025-08-21 PROCEDURE — 99999 PR PBB SHADOW E&M-EST. PATIENT-LVL IV: CPT | Mod: PBBFAC,,, | Performed by: INTERNAL MEDICINE

## 2025-08-21 PROCEDURE — 99214 OFFICE O/P EST MOD 30 MIN: CPT | Mod: S$GLB,,, | Performed by: INTERNAL MEDICINE

## 2025-08-21 PROCEDURE — 3077F SYST BP >= 140 MM HG: CPT | Mod: CPTII,S$GLB,, | Performed by: INTERNAL MEDICINE

## 2025-08-21 PROCEDURE — 3288F FALL RISK ASSESSMENT DOCD: CPT | Mod: CPTII,S$GLB,, | Performed by: INTERNAL MEDICINE

## 2025-08-21 PROCEDURE — 1159F MED LIST DOCD IN RCRD: CPT | Mod: CPTII,S$GLB,, | Performed by: INTERNAL MEDICINE

## 2025-08-21 PROCEDURE — 3079F DIAST BP 80-89 MM HG: CPT | Mod: CPTII,S$GLB,, | Performed by: INTERNAL MEDICINE

## 2025-08-21 RX ORDER — CARVEDILOL 6.25 MG/1
6.25 TABLET ORAL 2 TIMES DAILY
Qty: 180 TABLET | Refills: 3 | Status: SHIPPED | OUTPATIENT
Start: 2025-08-21

## 2025-08-25 ENCOUNTER — HOSPITAL ENCOUNTER (OUTPATIENT)
Dept: CARDIOLOGY | Facility: HOSPITAL | Age: 75
Discharge: HOME | End: 2025-08-25
Attending: INTERNAL MEDICINE
Payer: MEDICARE

## 2025-08-25 VITALS
HEART RATE: 60 BPM | SYSTOLIC BLOOD PRESSURE: 125 MMHG | BODY MASS INDEX: 36.43 KG/M2 | WEIGHT: 246 LBS | HEIGHT: 69 IN | DIASTOLIC BLOOD PRESSURE: 70 MMHG

## 2025-08-25 DIAGNOSIS — I50.42 CHRONIC COMBINED SYSTOLIC AND DIASTOLIC HEART FAILURE: Chronic | ICD-10-CM

## 2025-08-25 DIAGNOSIS — I95.1 ORTHOSTATIC HYPOTENSION: ICD-10-CM

## 2025-08-25 DIAGNOSIS — Z95.1 S/P CABG (CORONARY ARTERY BYPASS GRAFT): ICD-10-CM

## 2025-08-25 DIAGNOSIS — I25.5 ISCHEMIC CARDIOMYOPATHY: ICD-10-CM

## 2025-08-25 LAB
AORTIC SIZE INDEX (SOV): 1.5 CM/M2
AORTIC SIZE INDEX: 1.6 CM/M2
ASCENDING AORTA: 3.6 CM
AV AREA BY CONTINUOUS VTI: 2 CM2
AV INDEX (PROSTH): 0.46
AV LVOT MEAN GRADIENT: 3 MMHG
AV LVOT PEAK GRADIENT: 4 MMHG
AV MEAN GRADIENT: 8 MMHG
AV PEAK GRADIENT: 13 MMHG
AV VALVE AREA BY VELOCITY RATIO: 2.3 CM²
AV VALVE AREA: 1.9 CM2
AV VELOCITY RATIO: 0.56
BSA FOR ECHO PROCEDURE: 2.33 M2
CV ECHO LV RWT: 0.35 CM
DOP CALC AO PEAK VEL: 1.8 M/S
DOP CALC AO VTI: 50.5 CM
DOP CALC LVOT AREA: 4.2 CM2
DOP CALC LVOT DIAMETER: 2.3 CM
DOP CALC LVOT PEAK VEL: 1 M/S
DOP CALC RVOT AREA: 3.59 CM2
DOP CALC RVOT DIAMETER: 2.14 CM
DOP CALCLVOT PEAK VEL VTI: 23 CM
E WAVE DECELERATION TIME: 191 MS
E/A RATIO: 0.74
E/E' RATIO: 13 M/S
ECHO EF ESTIMATED: 34 %
ECHO LV POSTERIOR WALL: 0.9 CM (ref 0.6–1.1)
FRACTIONAL SHORTENING: 15.4 % (ref 28–44)
HR MV ECHO: 60 BPM
INTERVENTRICULAR SEPTUM: 0.9 CM (ref 0.6–1.1)
IVC DIAMETER: 1.62 CM
LA MAJOR: 5 CM
LA MINOR: 5 CM
LA WIDTH: 4 CM
LEFT ATRIUM SIZE: 4.8 CM
LEFT ATRIUM VOLUME INDEX MOD: 25 ML/M2
LEFT ATRIUM VOLUME INDEX: 36 ML/M2
LEFT ATRIUM VOLUME MOD: 57 ML
LEFT ATRIUM VOLUME: 82 CM3
LEFT INTERNAL DIMENSION IN SYSTOLE: 4.4 CM (ref 2.1–4)
LEFT VENTRICLE DIASTOLIC VOLUME INDEX: 57.08 ML/M2
LEFT VENTRICLE DIASTOLIC VOLUME: 129 ML
LEFT VENTRICLE MASS INDEX: 74.8 G/M2
LEFT VENTRICLE SYSTOLIC VOLUME INDEX: 37.6 ML/M2
LEFT VENTRICLE SYSTOLIC VOLUME: 85 ML
LEFT VENTRICULAR INTERNAL DIMENSION IN DIASTOLE: 5.2 CM (ref 3.5–6)
LEFT VENTRICULAR MASS: 169 G
LV LATERAL E/E' RATIO: 10.4
LV SEPTAL E/E' RATIO: 15.7
Lab: 1.9 CM/M
Lab: 2.1 CM/M
MV A" WAVE DURATION": 134.16 MS
MV MEAN GRADIENT: 3 MMHG
MV PEAK A VEL: 1.27 M/S
MV PEAK E VEL: 0.94 M/S
MV PEAK GRADIENT: 8 MMHG
OHS CV AF BURDEN PERCENT: < 1
OHS CV BIV PACING PERCENT: 97 %
OHS CV CPX PATIENT HEIGHT IN: 69
OHS CV DC REMOTE DEVICE TYPE: NORMAL
OHS CV RV/LV RATIO: 0.65 CM
PULM VEIN A" WAVE DURATION": 134.16 MS
PULM VEIN S/D RATIO: 0.97
PULMONIC VEIN PEAK A VELOCITY: 0.3 M/S
PV PEAK D VEL: 0.36 M/S
PV PEAK S VEL: 0.35 M/S
RA MAJOR: 4.19 CM
RA PRESSURE ESTIMATED: 3 MMHG
RA WIDTH: 2.88 CM
RIGHT ATRIAL AREA: 16.6 CM2
RIGHT VENTRICLE DIASTOLIC BASEL DIMENSION: 3.4 CM
SINUS: 3.3 CM
STJ: 3.1 CM
TDI LATERAL: 0.09 M/S
TDI SEPTAL: 0.06 M/S
TDI: 0.08 M/S
TRICUSPID ANNULAR PLANE SYSTOLIC EXCURSION: 2.2 CM
Z-SCORE OF LEFT VENTRICULAR DIMENSION IN END DIASTOLE: -4.67
Z-SCORE OF LEFT VENTRICULAR DIMENSION IN END SYSTOLE: -1.04

## 2025-08-25 PROCEDURE — 93306 TTE W/DOPPLER COMPLETE: CPT | Mod: 26,,, | Performed by: INTERNAL MEDICINE

## 2025-08-25 PROCEDURE — 93306 TTE W/DOPPLER COMPLETE: CPT

## 2025-08-27 ENCOUNTER — CLINICAL SUPPORT (OUTPATIENT)
Dept: DIABETES | Facility: CLINIC | Age: 75
End: 2025-08-27
Payer: MEDICARE

## 2025-08-27 DIAGNOSIS — E11.65 TYPE 2 DIABETES MELLITUS WITH HYPERGLYCEMIA, WITH LONG-TERM CURRENT USE OF INSULIN: Primary | ICD-10-CM

## 2025-08-27 DIAGNOSIS — Z79.4 TYPE 2 DIABETES MELLITUS WITH HYPERGLYCEMIA, WITH LONG-TERM CURRENT USE OF INSULIN: Primary | ICD-10-CM

## 2025-08-27 PROCEDURE — G0108 DIAB MANAGE TRN  PER INDIV: HCPCS | Mod: S$GLB,,, | Performed by: STUDENT IN AN ORGANIZED HEALTH CARE EDUCATION/TRAINING PROGRAM

## 2025-08-28 ENCOUNTER — TELEPHONE (OUTPATIENT)
Dept: DIABETES | Facility: CLINIC | Age: 75
End: 2025-08-28
Payer: MEDICARE

## (undated) DEVICE — SAFESHEATH II 8FR 13CM

## (undated) DEVICE — Device

## (undated) DEVICE — OMNIPAQUE 350 200ML

## (undated) DEVICE — KIT MICROINTRO 4F .018X40X7CM

## (undated) DEVICE — CATH RESPONSE QPLR JSN 6F 120

## (undated) DEVICE — GUIDE CATH CPS DIRECT 54CM 135

## (undated) DEVICE — COVER PROBE ADHESIVE 8X72IN

## (undated) DEVICE — SLING SWATHE UNIVERSAL FOAM

## (undated) DEVICE — ELECTRODE POLYHESIVEPRE-ATTACH

## (undated) DEVICE — PAD DEFIB CADENCE ADULT R2

## (undated) DEVICE — WIRE WHISPER MS 014 X 190

## (undated) DEVICE — DRAPE INCISE IOBAN 2 23X17IN

## (undated) DEVICE — GUIDEWIRD CPS COURIER FIRM

## (undated) DEVICE — DRESSING AQUACEL AG FOAM 4X4

## (undated) DEVICE — CATH JL5 4FR INFINITY

## (undated) DEVICE — KIT CUSTOM MANIFOLD

## (undated) DEVICE — SLING AND SWATHE UNIV ADLT

## (undated) DEVICE — PACK DRAPE UNIVERSAL CONVERTOR

## (undated) DEVICE — SEE MEDLINE ITEM 156894

## (undated) DEVICE — GAUZE SPONGE 4X4 12PLY

## (undated) DEVICE — BANDAGE ELASTOPLAST 3INX5YDS

## (undated) DEVICE — KIT WRENCH

## (undated) DEVICE — CATH BLLN COR SINUS VENOGRAPHY

## (undated) DEVICE — ADHESIVE DERMABOND ADVANCED

## (undated) DEVICE — DRESSING AQUACEL AG ADV 3.5X6

## (undated) DEVICE — PACK PACER PERMANENT

## (undated) DEVICE — KIT GLIDESHEATH SLEND 6FR 10CM

## (undated) DEVICE — CATH JACKY RADIAL 5FR 100CM

## (undated) DEVICE — GUIDEWIRE ANGLED .035 150CM

## (undated) DEVICE — INTRODUCER HEMOSTASIS 6.5FR

## (undated) DEVICE — CATH IMA INFINITI 4FRX100CM

## (undated) DEVICE — SHEATH SAFE ULTRA 9FR

## (undated) DEVICE — CATH DECAPOLAR 6FRX92CM

## (undated) DEVICE — WIRE BENTSON 035/180

## (undated) DEVICE — WIRE GUIDE SAFE-T-J .035 260CM

## (undated) DEVICE — ELECTRODE REM PLYHSV RETURN 9

## (undated) DEVICE — GUIDEWIRE STD .035X180CM ANG